# Patient Record
Sex: MALE | Race: WHITE | NOT HISPANIC OR LATINO | Employment: OTHER | RURAL
[De-identification: names, ages, dates, MRNs, and addresses within clinical notes are randomized per-mention and may not be internally consistent; named-entity substitution may affect disease eponyms.]

---

## 2017-08-14 ENCOUNTER — HISTORICAL (OUTPATIENT)
Dept: ADMINISTRATIVE | Facility: HOSPITAL | Age: 57
End: 2017-08-14

## 2017-08-17 LAB
LAB AP CLINICAL INFORMATION: NORMAL
LAB AP DIAGNOSIS - HISTORICAL: NORMAL
LAB AP GROSS PATHOLOGY - HISTORICAL: NORMAL
LAB AP SPECIMEN SUBMITTED - HISTORICAL: NORMAL

## 2020-03-17 ENCOUNTER — HISTORICAL (OUTPATIENT)
Dept: ADMINISTRATIVE | Facility: HOSPITAL | Age: 60
End: 2020-03-17

## 2020-03-19 LAB
REPORT: 25
REPORT: NORMAL

## 2020-03-20 LAB
REPORT: NORMAL

## 2020-04-02 ENCOUNTER — HISTORICAL (OUTPATIENT)
Dept: ADMINISTRATIVE | Facility: HOSPITAL | Age: 60
End: 2020-04-02

## 2020-04-02 LAB
BASOPHILS # BLD AUTO: 0.05 X10E3/UL (ref 0–0.2)
BASOPHILS NFR BLD AUTO: 0.6 % (ref 0–1)
EOSINOPHIL # BLD AUTO: 0.29 X10E3/UL (ref 0–0.5)
EOSINOPHIL NFR BLD AUTO: 3.6 % (ref 1–4)
ERYTHROCYTE [DISTWIDTH] IN BLOOD BY AUTOMATED COUNT: 11.9 % (ref 11.5–14.5)
HCT VFR BLD AUTO: 38 % (ref 40–54)
HGB BLD-MCNC: 12.7 G/DL (ref 13.5–18)
IMM GRANULOCYTES # BLD AUTO: 0.02 X10E3/UL (ref 0–0.04)
IMM GRANULOCYTES NFR BLD: 0.2 % (ref 0–0.4)
LYMPHOCYTES # BLD AUTO: 2.34 X10E3/UL (ref 1–4.8)
LYMPHOCYTES NFR BLD AUTO: 28.7 % (ref 27–41)
MCH RBC QN AUTO: 30.5 PG (ref 27–31)
MCHC RBC AUTO-ENTMCNC: 33.4 G/DL (ref 32–36)
MCV RBC AUTO: 91.1 FL (ref 80–96)
MONOCYTES # BLD AUTO: 0.48 X10E3/UL (ref 0–0.8)
MONOCYTES NFR BLD AUTO: 5.9 % (ref 2–6)
MPC BLD CALC-MCNC: 10.1 FL (ref 9.4–12.4)
NEUTROPHILS # BLD AUTO: 4.98 X10E3/UL (ref 1.8–7.7)
NEUTROPHILS NFR BLD AUTO: 61 % (ref 53–65)
NRBC # BLD AUTO: 0 X10E3/UL (ref 0–0)
NRBC, AUTO (.00): 0 /100 (ref 0–0)
PLATELET # BLD AUTO: 268 X10E3/UL (ref 150–400)
RBC # BLD AUTO: 4.17 X10E6/UL (ref 4.6–6.2)
WBC # BLD AUTO: 8.16 X10E3/UL (ref 4.5–11)

## 2020-04-03 ENCOUNTER — HISTORICAL (OUTPATIENT)
Dept: ADMINISTRATIVE | Facility: HOSPITAL | Age: 60
End: 2020-04-03

## 2020-04-03 LAB
BASOPHILS # BLD AUTO: 0.04 X10E3/UL (ref 0–0.2)
BASOPHILS NFR BLD AUTO: 0.6 % (ref 0–1)
BUN SERPL-MCNC: 18 MG/DL (ref 7–18)
CALCIUM SERPL-MCNC: 9.3 MG/DL (ref 8.5–10.1)
CHLORIDE SERPL-SCNC: 99 MMOL/L (ref 98–107)
CO2 SERPL-SCNC: 31 MMOL/L (ref 21–32)
CREAT SERPL-MCNC: 0.89 MG/DL (ref 0.7–1.3)
EOSINOPHIL # BLD AUTO: 0.26 X10E3/UL (ref 0–0.5)
EOSINOPHIL NFR BLD AUTO: 4.1 % (ref 1–4)
ERYTHROCYTE [DISTWIDTH] IN BLOOD BY AUTOMATED COUNT: 11.9 % (ref 11.5–14.5)
GLUCOSE SERPL-MCNC: 341 MG/DL (ref 74–106)
HCT VFR BLD AUTO: 37.3 % (ref 40–54)
HGB BLD-MCNC: 12.3 G/DL (ref 13.5–18)
IMM GRANULOCYTES # BLD AUTO: 0.02 X10E3/UL (ref 0–0.04)
IMM GRANULOCYTES NFR BLD: 0.3 % (ref 0–0.4)
LYMPHOCYTES # BLD AUTO: 2.03 X10E3/UL (ref 1–4.8)
LYMPHOCYTES NFR BLD AUTO: 32 % (ref 27–41)
MCH RBC QN AUTO: 30.5 PG (ref 27–31)
MCHC RBC AUTO-ENTMCNC: 33 G/DL (ref 32–36)
MCV RBC AUTO: 92.6 FL (ref 80–96)
MONOCYTES # BLD AUTO: 0.44 X10E3/UL (ref 0–0.8)
MONOCYTES NFR BLD AUTO: 6.9 % (ref 2–6)
MPC BLD CALC-MCNC: 10.4 FL (ref 9.4–12.4)
NEUTROPHILS # BLD AUTO: 3.56 X10E3/UL (ref 1.8–7.7)
NEUTROPHILS NFR BLD AUTO: 56.1 % (ref 53–65)
NRBC # BLD AUTO: 0 X10E3/UL (ref 0–0)
NRBC, AUTO (.00): 0 /100 (ref 0–0)
PLATELET # BLD AUTO: 280 X10E3/UL (ref 150–400)
POTASSIUM SERPL-SCNC: 4.5 MMOL/L (ref 3.5–5.1)
RBC # BLD AUTO: 4.03 X10E6/UL (ref 4.6–6.2)
SODIUM SERPL-SCNC: 136 MMOL/L (ref 136–145)
WBC # BLD AUTO: 6.35 X10E3/UL (ref 4.5–11)

## 2020-06-29 ENCOUNTER — HISTORICAL (OUTPATIENT)
Dept: ADMINISTRATIVE | Facility: HOSPITAL | Age: 60
End: 2020-06-29

## 2020-07-01 LAB
REPORT: NORMAL

## 2020-07-02 LAB
REPORT: NORMAL

## 2020-08-05 ENCOUNTER — HISTORICAL (OUTPATIENT)
Dept: ADMINISTRATIVE | Facility: HOSPITAL | Age: 60
End: 2020-08-05

## 2020-08-05 LAB
ALBUMIN SERPL BCP-MCNC: 3.4 G/DL (ref 3.5–5)
ALBUMIN/GLOB SERPL: 0.9 {RATIO}
ALP SERPL-CCNC: 88 U/L (ref 45–115)
ALT SERPL W P-5'-P-CCNC: 18 U/L (ref 16–61)
ANION GAP SERPL CALCULATED.3IONS-SCNC: 6 MMOL/L (ref 7–16)
AST SERPL W P-5'-P-CCNC: 10 U/L (ref 15–37)
BASOPHILS # BLD AUTO: 0.03 X10E3/UL (ref 0–0.2)
BASOPHILS NFR BLD AUTO: 0.6 % (ref 0–1)
BILIRUB SERPL-MCNC: 0.3 MG/DL (ref 0–1.2)
BUN SERPL-MCNC: 17 MG/DL (ref 7–18)
BUN/CREAT SERPL: 22
CALCIUM SERPL-MCNC: 8.7 MG/DL (ref 8.5–10.1)
CHLORIDE SERPL-SCNC: 108 MMOL/L (ref 98–107)
CHOLEST SERPL-MCNC: 186 MG/DL
CHOLEST/HDLC SERPL: 5.3 {RATIO}
CO2 SERPL-SCNC: 34 MMOL/L (ref 21–32)
CREAT SERPL-MCNC: 0.78 MG/DL (ref 0.7–1.3)
EOSINOPHIL # BLD AUTO: 0.27 X10E3/UL (ref 0–0.5)
EOSINOPHIL NFR BLD AUTO: 5.7 % (ref 1–4)
ERYTHROCYTE [DISTWIDTH] IN BLOOD BY AUTOMATED COUNT: 12.5 % (ref 11.5–14.5)
EST. AVERAGE GLUCOSE BLD GHB EST-MCNC: 230 MG/DL
GLOBULIN SER-MCNC: 3.8 G/DL (ref 2–4)
GLUCOSE SERPL-MCNC: 187 MG/DL (ref 74–106)
HBA1C MFR BLD HPLC: 9.5 %
HCT VFR BLD AUTO: 38.6 % (ref 40–54)
HDLC SERPL-MCNC: 35 MG/DL
HGB BLD-MCNC: 12.7 G/DL (ref 13.5–18)
IMM GRANULOCYTES # BLD AUTO: 0.02 X10E3/UL (ref 0–0.04)
IMM GRANULOCYTES NFR BLD: 0.4 % (ref 0–0.4)
LDLC SERPL CALC-MCNC: 127 MG/DL
LYMPHOCYTES # BLD AUTO: 1.52 X10E3/UL (ref 1–4.8)
LYMPHOCYTES NFR BLD AUTO: 31.9 % (ref 27–41)
MCH RBC QN AUTO: 30.3 PG (ref 27–31)
MCHC RBC AUTO-ENTMCNC: 32.9 G/DL (ref 32–36)
MCV RBC AUTO: 92.1 FL (ref 80–96)
MONOCYTES # BLD AUTO: 0.37 X10E3/UL (ref 0–0.8)
MONOCYTES NFR BLD AUTO: 7.8 % (ref 2–6)
MPC BLD CALC-MCNC: 10.6 FL (ref 9.4–12.4)
NEUTROPHILS # BLD AUTO: 2.56 X10E3/UL (ref 1.8–7.7)
NEUTROPHILS NFR BLD AUTO: 53.6 % (ref 53–65)
NRBC # BLD AUTO: 0 X10E3/UL (ref 0–0)
NRBC, AUTO (.00): 0 /100 (ref 0–0)
PLATELET # BLD AUTO: 198 X10E3/UL (ref 150–400)
POTASSIUM SERPL-SCNC: 5 MMOL/L (ref 3.5–5.1)
PROT SERPL-MCNC: 7.2 G/DL (ref 6.4–8.2)
RBC # BLD AUTO: 4.19 X10E6/UL (ref 4.6–6.2)
SODIUM SERPL-SCNC: 143 MMOL/L (ref 136–145)
TRIGL SERPL-MCNC: 121 MG/DL
WBC # BLD AUTO: 4.77 X10E3/UL (ref 4.5–11)

## 2020-09-14 ENCOUNTER — HISTORICAL (OUTPATIENT)
Dept: ADMINISTRATIVE | Facility: HOSPITAL | Age: 60
End: 2020-09-14

## 2020-10-12 ENCOUNTER — HISTORICAL (OUTPATIENT)
Dept: ADMINISTRATIVE | Facility: HOSPITAL | Age: 60
End: 2020-10-12

## 2020-10-14 LAB
REPORT: 38
REPORT: NORMAL

## 2020-10-18 LAB
REPORT: NORMAL

## 2020-12-03 ENCOUNTER — HISTORICAL (OUTPATIENT)
Dept: ADMINISTRATIVE | Facility: HOSPITAL | Age: 60
End: 2020-12-03

## 2020-12-06 LAB
REPORT: 25
REPORT: NORMAL

## 2020-12-09 LAB
REPORT: NORMAL

## 2020-12-16 ENCOUNTER — HISTORICAL (OUTPATIENT)
Dept: ADMINISTRATIVE | Facility: HOSPITAL | Age: 60
End: 2020-12-16

## 2020-12-17 ENCOUNTER — HISTORICAL (OUTPATIENT)
Dept: ADMINISTRATIVE | Facility: HOSPITAL | Age: 60
End: 2020-12-17

## 2020-12-17 LAB
ANION GAP SERPL CALCULATED.3IONS-SCNC: 7.2 MMOL/L (ref 7–16)
BASOPHILS # BLD AUTO: 0.06 X10E3/UL (ref 0–0.2)
BASOPHILS NFR BLD AUTO: 0.9 % (ref 0–1)
BUN SERPL-MCNC: 9 MG/DL (ref 7–18)
CALCIUM SERPL-MCNC: 8.8 MG/DL (ref 8.5–10.1)
CHLORIDE SERPL-SCNC: 105 MMOL/L (ref 98–107)
CK SERPL-CCNC: 44 U/L (ref 39–308)
CO2 SERPL-SCNC: 31 MMOL/L (ref 21–32)
CREAT SERPL-MCNC: 0.79 MG/DL (ref 0.7–1.3)
EOSINOPHIL # BLD AUTO: 0.13 X10E3/UL (ref 0–0.5)
EOSINOPHIL NFR BLD AUTO: 2 % (ref 1–4)
ERYTHROCYTE [DISTWIDTH] IN BLOOD BY AUTOMATED COUNT: 11.9 % (ref 11.5–14.5)
GLUCOSE SERPL-MCNC: 236 MG/DL (ref 74–106)
HCT VFR BLD AUTO: 39.4 % (ref 40–54)
HGB BLD-MCNC: 13.5 G/DL (ref 13.5–18)
IMM GRANULOCYTES # BLD AUTO: 0.02 X10E3/UL (ref 0–0.04)
IMM GRANULOCYTES NFR BLD: 0.3 % (ref 0–0.4)
LYMPHOCYTES # BLD AUTO: 1.9 X10E3/UL (ref 1–4.8)
LYMPHOCYTES NFR BLD AUTO: 28.9 % (ref 27–41)
MCH RBC QN AUTO: 30.7 PG (ref 27–31)
MCHC RBC AUTO-ENTMCNC: 34.3 G/DL (ref 32–36)
MCV RBC AUTO: 89.5 FL (ref 80–96)
MONOCYTES # BLD AUTO: 0.41 X10E3/UL (ref 0–0.8)
MONOCYTES NFR BLD AUTO: 6.2 % (ref 2–6)
MPC BLD CALC-MCNC: 10.2 FL (ref 9.4–12.4)
NEUTROPHILS # BLD AUTO: 4.06 X10E3/UL (ref 1.8–7.7)
NEUTROPHILS NFR BLD AUTO: 61.7 % (ref 53–65)
NRBC # BLD AUTO: 0 X10E3/UL (ref 0–0)
NRBC, AUTO (.00): 0 /100 (ref 0–0)
PLATELET # BLD AUTO: 244 X10E3/UL (ref 150–400)
POTASSIUM SERPL-SCNC: 4.2 MMOL/L (ref 3.5–5.1)
RBC # BLD AUTO: 4.4 X10E6/UL (ref 4.6–6.2)
SODIUM SERPL-SCNC: 139 MMOL/L (ref 136–145)
WBC # BLD AUTO: 6.58 X10E3/UL (ref 4.5–11)

## 2020-12-23 ENCOUNTER — HISTORICAL (OUTPATIENT)
Dept: ADMINISTRATIVE | Facility: HOSPITAL | Age: 60
End: 2020-12-23

## 2020-12-23 LAB
ANION GAP SERPL CALCULATED.3IONS-SCNC: 11 MMOL/L
BASOPHILS # BLD AUTO: 0.06 X10E3/UL (ref 0–0.2)
BASOPHILS NFR BLD AUTO: 0.7 % (ref 0–1)
BUN SERPL-MCNC: 15 MG/DL (ref 7–18)
CALCIUM SERPL-MCNC: 9.2 MG/DL (ref 8.5–10.1)
CHLORIDE SERPL-SCNC: 100 MMOL/L (ref 101–111)
CK SERPL-CCNC: 49 U/L (ref 26–308)
CO2 SERPL-SCNC: 34 MMOL/L (ref 21–32)
CREAT SERPL-MCNC: 0.8 MG/DL (ref 0.6–1.3)
EOSINOPHIL # BLD AUTO: 0.33 X10E3/UL (ref 0–0.5)
EOSINOPHIL NFR BLD AUTO: 3.8 % (ref 1–4)
ERYTHROCYTE [DISTWIDTH] IN BLOOD BY AUTOMATED COUNT: 12.1 % (ref 11.5–14.5)
GLUCOSE SERPL-MCNC: 150 MG/DL (ref 74–106)
HCT VFR BLD AUTO: 37.9 % (ref 40–54)
HGB BLD-MCNC: 12.8 G/DL (ref 13.5–18)
LYMPHOCYTES # BLD AUTO: 1.73 X10E3/UL (ref 1–4.8)
LYMPHOCYTES NFR BLD AUTO: 20 % (ref 27–41)
MCH RBC QN AUTO: 31.1 PG (ref 27–31)
MCHC RBC AUTO-ENTMCNC: 33.8 G/DL (ref 32–36)
MCV RBC AUTO: 92 FL (ref 80–96)
MONOCYTES # BLD AUTO: 0.69 X10E3/UL (ref 0–0.8)
MONOCYTES NFR BLD AUTO: 8 % (ref 2–6)
MPC BLD CALC-MCNC: 11.7 FL (ref 9.4–12.4)
NEUTROPHILS # BLD AUTO: 5.86 X10E3/UL (ref 1.8–7.7)
NEUTROPHILS NFR BLD AUTO: 67.5 % (ref 53–65)
PLATELET # BLD AUTO: 189 X10E3/UL (ref 150–400)
POTASSIUM SERPL-SCNC: 5.6 MMOL/L (ref 3.6–5)
RBC # BLD AUTO: 4.11 X10E6/UL (ref 4.6–6.2)
SODIUM SERPL-SCNC: 139 MMOL/L (ref 135–145)
WBC # BLD AUTO: 8.67 X10E3/UL (ref 4.5–11)

## 2020-12-29 ENCOUNTER — HISTORICAL (OUTPATIENT)
Dept: ADMINISTRATIVE | Facility: HOSPITAL | Age: 60
End: 2020-12-29

## 2020-12-29 LAB
ANION GAP SERPL CALCULATED.3IONS-SCNC: 9.2 MMOL/L (ref 7–16)
BASOPHILS # BLD AUTO: 0.06 X10E3/UL (ref 0–0.2)
BASOPHILS NFR BLD AUTO: 0.9 % (ref 0–1)
BUN SERPL-MCNC: 13 MG/DL (ref 7–18)
CALCIUM SERPL-MCNC: 9.1 MG/DL (ref 8.5–10.1)
CHLORIDE SERPL-SCNC: 104 MMOL/L (ref 98–107)
CK SERPL-CCNC: 45 U/L (ref 39–308)
CO2 SERPL-SCNC: 31 MMOL/L (ref 21–32)
CREAT SERPL-MCNC: 0.73 MG/DL (ref 0.7–1.3)
EOSINOPHIL # BLD AUTO: 0.28 X10E3/UL (ref 0–0.5)
EOSINOPHIL NFR BLD AUTO: 4.3 % (ref 1–4)
ERYTHROCYTE [DISTWIDTH] IN BLOOD BY AUTOMATED COUNT: 11.9 % (ref 11.5–14.5)
GLUCOSE SERPL-MCNC: 139 MG/DL (ref 74–106)
HCT VFR BLD AUTO: 37.7 % (ref 40–54)
HGB BLD-MCNC: 12.7 G/DL (ref 13.5–18)
IMM GRANULOCYTES # BLD AUTO: 0.02 X10E3/UL (ref 0–0.04)
IMM GRANULOCYTES NFR BLD: 0.3 % (ref 0–0.4)
LYMPHOCYTES # BLD AUTO: 1.91 X10E3/UL (ref 1–4.8)
LYMPHOCYTES NFR BLD AUTO: 29.1 % (ref 27–41)
MCH RBC QN AUTO: 30.2 PG (ref 27–31)
MCHC RBC AUTO-ENTMCNC: 33.7 G/DL (ref 32–36)
MCV RBC AUTO: 89.5 FL (ref 80–96)
MONOCYTES # BLD AUTO: 0.42 X10E3/UL (ref 0–0.8)
MONOCYTES NFR BLD AUTO: 6.4 % (ref 2–6)
MPC BLD CALC-MCNC: 10.6 FL (ref 9.4–12.4)
NEUTROPHILS # BLD AUTO: 3.88 X10E3/UL (ref 1.8–7.7)
NEUTROPHILS NFR BLD AUTO: 59 % (ref 53–65)
NRBC # BLD AUTO: 0 X10E3/UL (ref 0–0)
NRBC, AUTO (.00): 0 /100 (ref 0–0)
PLATELET # BLD AUTO: 249 X10E3/UL (ref 150–400)
POTASSIUM SERPL-SCNC: 4.2 MMOL/L (ref 3.5–5.1)
RBC # BLD AUTO: 4.21 X10E6/UL (ref 4.6–6.2)
SODIUM SERPL-SCNC: 140 MMOL/L (ref 136–145)
WBC # BLD AUTO: 6.57 X10E3/UL (ref 4.5–11)

## 2020-12-30 ENCOUNTER — HISTORICAL (OUTPATIENT)
Dept: ADMINISTRATIVE | Facility: HOSPITAL | Age: 60
End: 2020-12-30

## 2020-12-30 LAB
CRP SERPL-MCNC: 1.01 MG/DL (ref 0–0.8)
ERYTHROCYTE [SEDIMENTATION RATE] IN BLOOD BY WESTERGREN METHOD: 73 MM/HR (ref 0–20)
PREALB SERPL NEPH-MCNC: 21 MG/DL (ref 20–40)

## 2021-01-01 ENCOUNTER — HISTORICAL (OUTPATIENT)
Dept: ADMINISTRATIVE | Facility: HOSPITAL | Age: 61
End: 2021-01-01

## 2021-01-01 LAB
ANION GAP SERPL CALCULATED.3IONS-SCNC: 10 MMOL/L
APTT PPP: 28.5 SECONDS (ref 25.2–37.3)
BASOPHILS # BLD AUTO: 0.05 X10E3/UL (ref 0–0.2)
BASOPHILS NFR BLD AUTO: 0.6 % (ref 0–1)
BUN SERPL-MCNC: 15 MG/DL (ref 7–18)
CALCIUM SERPL-MCNC: 8.8 MG/DL (ref 8.5–10.1)
CHLORIDE SERPL-SCNC: 103 MMOL/L (ref 98–107)
CK MB SERPL-MCNC: 1.2 NG/ML (ref 1–3.6)
CK SERPL-CCNC: 43 U/L (ref 39–308)
CO2 SERPL-SCNC: 32 MMOL/L (ref 21–32)
CREAT SERPL-MCNC: 0.73 MG/DL (ref 0.7–1.3)
D DIMER PPP FEU-MCNC: 0.67 UG/ML (ref 0–0.47)
EOSINOPHIL # BLD AUTO: 0.36 X10E3/UL (ref 0–0.5)
EOSINOPHIL NFR BLD AUTO: 4.4 % (ref 1–4)
ERYTHROCYTE [DISTWIDTH] IN BLOOD BY AUTOMATED COUNT: 11.7 % (ref 11.5–14.5)
GLUCOSE SERPL-MCNC: 106 MG/DL (ref 70–105)
GLUCOSE SERPL-MCNC: 122 MG/DL (ref 74–106)
HCT VFR BLD AUTO: 35.4 % (ref 40–54)
HGB BLD-MCNC: 12.2 G/DL (ref 13.5–18)
IMM GRANULOCYTES # BLD AUTO: 0.04 X10E3/UL (ref 0–0.04)
IMM GRANULOCYTES NFR BLD: 0.5 % (ref 0–0.4)
INR BLD: 1.04 (ref 0–3.3)
LYMPHOCYTES # BLD AUTO: 2 X10E3/UL (ref 1–4.8)
LYMPHOCYTES NFR BLD AUTO: 24.5 % (ref 27–41)
MAGNESIUM SERPL-MCNC: 1.8 MG/DL (ref 1.7–2.3)
MCH RBC QN AUTO: 31.4 PG (ref 27–31)
MCHC RBC AUTO-ENTMCNC: 34.5 G/DL (ref 32–36)
MCV RBC AUTO: 91.2 FL (ref 80–96)
MONOCYTES # BLD AUTO: 0.57 X10E3/UL (ref 0–0.8)
MONOCYTES NFR BLD AUTO: 7 % (ref 2–6)
MPC BLD CALC-MCNC: 9 FL (ref 9.4–12.4)
MYOGLOBIN SERPL-MCNC: 33 NG/ML (ref 16–116)
NEUTROPHILS # BLD AUTO: 5.15 X10E3/UL (ref 1.8–7.7)
NEUTROPHILS NFR BLD AUTO: 63 % (ref 53–65)
NRBC # BLD AUTO: 0 X10E3/UL (ref 0–0)
NRBC, AUTO (.00): 0 /100 (ref 0–0)
PLATELET # BLD AUTO: 214 X10E3/UL (ref 150–400)
POTASSIUM SERPL-SCNC: 3.9 MMOL/L (ref 3.5–5.1)
PROTHROMBIN TIME: 13.1 SECONDS (ref 11.7–14.7)
RBC # BLD AUTO: 3.88 X10E6/UL (ref 4.6–6.2)
SODIUM SERPL-SCNC: 141 MMOL/L (ref 136–145)
TROPONIN I SERPL-MCNC: <0.017 NG/ML (ref 0–0.06)
WBC # BLD AUTO: 8.17 X10E3/UL (ref 4.5–11)

## 2021-01-05 ENCOUNTER — HISTORICAL (OUTPATIENT)
Dept: ADMINISTRATIVE | Facility: HOSPITAL | Age: 61
End: 2021-01-05

## 2021-01-06 ENCOUNTER — HISTORICAL (OUTPATIENT)
Dept: ADMINISTRATIVE | Facility: HOSPITAL | Age: 61
End: 2021-01-06

## 2021-01-06 LAB
ANION GAP SERPL CALCULATED.3IONS-SCNC: 9 MMOL/L (ref 7–16)
BASOPHILS # BLD AUTO: 0.05 X10E3/UL (ref 0–0.2)
BASOPHILS NFR BLD AUTO: 0.7 % (ref 0–1)
BUN SERPL-MCNC: 18 MG/DL (ref 7–18)
CALCIUM SERPL-MCNC: 8.7 MG/DL (ref 8.5–10.1)
CHLORIDE SERPL-SCNC: 102 MMOL/L (ref 98–107)
CK SERPL-CCNC: 39 U/L (ref 39–308)
CO2 SERPL-SCNC: 32 MMOL/L (ref 21–32)
CREAT SERPL-MCNC: 0.93 MG/DL (ref 0.7–1.3)
EOSINOPHIL # BLD AUTO: 0.3 X10E3/UL (ref 0–0.5)
EOSINOPHIL NFR BLD AUTO: 4.2 % (ref 1–4)
ERYTHROCYTE [DISTWIDTH] IN BLOOD BY AUTOMATED COUNT: 11.9 % (ref 11.5–14.5)
GLUCOSE SERPL-MCNC: 282 MG/DL (ref 74–106)
HCT VFR BLD AUTO: 36.8 % (ref 40–54)
HGB BLD-MCNC: 12.3 G/DL (ref 13.5–18)
IMM GRANULOCYTES # BLD AUTO: 0.02 X10E3/UL (ref 0–0.04)
IMM GRANULOCYTES NFR BLD: 0.3 % (ref 0–0.4)
LYMPHOCYTES # BLD AUTO: 2 X10E3/UL (ref 1–4.8)
LYMPHOCYTES NFR BLD AUTO: 28 % (ref 27–41)
MCH RBC QN AUTO: 30.1 PG (ref 27–31)
MCHC RBC AUTO-ENTMCNC: 33.4 G/DL (ref 32–36)
MCV RBC AUTO: 90.2 FL (ref 80–96)
MONOCYTES # BLD AUTO: 0.52 X10E3/UL (ref 0–0.8)
MONOCYTES NFR BLD AUTO: 7.3 % (ref 2–6)
MPC BLD CALC-MCNC: 10 FL (ref 9.4–12.4)
NEUTROPHILS # BLD AUTO: 4.25 X10E3/UL (ref 1.8–7.7)
NEUTROPHILS NFR BLD AUTO: 59.5 % (ref 53–65)
NRBC # BLD AUTO: 0 X10E3/UL (ref 0–0)
NRBC, AUTO (.00): 0 /100 (ref 0–0)
PLATELET # BLD AUTO: 255 X10E3/UL (ref 150–400)
POTASSIUM SERPL-SCNC: 4.6 MMOL/L (ref 3.5–5.1)
RBC # BLD AUTO: 4.08 X10E6/UL (ref 4.6–6.2)
SODIUM SERPL-SCNC: 138 MMOL/L (ref 136–145)
WBC # BLD AUTO: 7.14 X10E3/UL (ref 4.5–11)

## 2021-01-11 ENCOUNTER — HISTORICAL (OUTPATIENT)
Dept: ADMINISTRATIVE | Facility: HOSPITAL | Age: 61
End: 2021-01-11

## 2021-01-11 LAB
ALBUMIN SERPL BCP-MCNC: 3.8 G/DL (ref 3.5–5)
ALBUMIN/GLOB SERPL: 1 {RATIO}
ALP SERPL-CCNC: 102 U/L (ref 45–115)
ALT SERPL W P-5'-P-CCNC: 24 U/L (ref 16–61)
ANION GAP SERPL CALCULATED.3IONS-SCNC: 8 MMOL/L (ref 7–16)
AST SERPL W P-5'-P-CCNC: 16 U/L (ref 15–37)
BASOPHILS # BLD AUTO: 0.06 X10E3/UL (ref 0–0.2)
BASOPHILS NFR BLD AUTO: 0.8 % (ref 0–1)
BILIRUB SERPL-MCNC: 0.3 MG/DL (ref 0–1.2)
BUN SERPL-MCNC: 20 MG/DL (ref 7–18)
BUN/CREAT SERPL: 20
CALCIUM SERPL-MCNC: 9 MG/DL (ref 8.5–10.1)
CHLORIDE SERPL-SCNC: 104 MMOL/L (ref 98–107)
CK SERPL-CCNC: 44 U/L (ref 39–308)
CO2 SERPL-SCNC: 32 MMOL/L (ref 21–32)
CREAT SERPL-MCNC: 1 MG/DL (ref 0.7–1.3)
EOSINOPHIL # BLD AUTO: 0.31 X10E3/UL (ref 0–0.5)
EOSINOPHIL NFR BLD AUTO: 4.3 % (ref 1–4)
ERYTHROCYTE [DISTWIDTH] IN BLOOD BY AUTOMATED COUNT: 12.1 % (ref 11.5–14.5)
GLOBULIN SER-MCNC: 4 G/DL (ref 2–4)
GLUCOSE SERPL-MCNC: 152 MG/DL (ref 74–106)
HCT VFR BLD AUTO: 37.7 % (ref 40–54)
HGB BLD-MCNC: 12.5 G/DL (ref 13.5–18)
IMM GRANULOCYTES # BLD AUTO: 0.02 X10E3/UL (ref 0–0.04)
IMM GRANULOCYTES NFR BLD: 0.3 % (ref 0–0.4)
LYMPHOCYTES # BLD AUTO: 2.84 X10E3/UL (ref 1–4.8)
LYMPHOCYTES NFR BLD AUTO: 39.4 % (ref 27–41)
MCH RBC QN AUTO: 30.1 PG (ref 27–31)
MCHC RBC AUTO-ENTMCNC: 33.2 G/DL (ref 32–36)
MCV RBC AUTO: 90.8 FL (ref 80–96)
MONOCYTES # BLD AUTO: 0.44 X10E3/UL (ref 0–0.8)
MONOCYTES NFR BLD AUTO: 6.1 % (ref 2–6)
MPC BLD CALC-MCNC: 10.6 FL (ref 9.4–12.4)
NEUTROPHILS # BLD AUTO: 3.54 X10E3/UL (ref 1.8–7.7)
NEUTROPHILS NFR BLD AUTO: 49.1 % (ref 53–65)
NRBC # BLD AUTO: 0 X10E3/UL (ref 0–0)
NRBC, AUTO (.00): 0 /100 (ref 0–0)
PLATELET # BLD AUTO: 249 X10E3/UL (ref 150–400)
POTASSIUM SERPL-SCNC: 5.2 MMOL/L (ref 3.5–5.1)
PROT SERPL-MCNC: 7.8 G/DL (ref 6.4–8.2)
RBC # BLD AUTO: 4.15 X10E6/UL (ref 4.6–6.2)
SODIUM SERPL-SCNC: 139 MMOL/L (ref 136–145)
WBC # BLD AUTO: 7.21 X10E3/UL (ref 4.5–11)

## 2021-01-12 ENCOUNTER — HISTORICAL (OUTPATIENT)
Dept: ADMINISTRATIVE | Facility: HOSPITAL | Age: 61
End: 2021-01-12

## 2021-01-12 LAB
GLUCOSE SERPL-MCNC: 176 MG/DL (ref 70–105)
GLUCOSE SERPL-MCNC: 186 MG/DL (ref 70–105)

## 2021-01-13 ENCOUNTER — HISTORICAL (OUTPATIENT)
Dept: ADMINISTRATIVE | Facility: HOSPITAL | Age: 61
End: 2021-01-13

## 2021-01-15 ENCOUNTER — HISTORICAL (OUTPATIENT)
Dept: ADMINISTRATIVE | Facility: HOSPITAL | Age: 61
End: 2021-01-15

## 2021-01-18 ENCOUNTER — HISTORICAL (OUTPATIENT)
Dept: ADMINISTRATIVE | Facility: HOSPITAL | Age: 61
End: 2021-01-18

## 2021-01-18 LAB
ANION GAP SERPL CALCULATED.3IONS-SCNC: 8 MMOL/L (ref 7–16)
BASOPHILS # BLD AUTO: 0.06 X10E3/UL (ref 0–0.2)
BASOPHILS NFR BLD AUTO: 1 % (ref 0–1)
BUN SERPL-MCNC: 19 MG/DL (ref 7–18)
CALCIUM SERPL-MCNC: 8.8 MG/DL (ref 8.5–10.1)
CHLORIDE SERPL-SCNC: 105 MMOL/L (ref 98–107)
CK SERPL-CCNC: 39 U/L (ref 39–308)
CO2 SERPL-SCNC: 30 MMOL/L (ref 21–32)
CREAT SERPL-MCNC: 0.75 MG/DL (ref 0.7–1.3)
EOSINOPHIL # BLD AUTO: 0.35 X10E3/UL (ref 0–0.5)
EOSINOPHIL NFR BLD AUTO: 5.6 % (ref 1–4)
ERYTHROCYTE [DISTWIDTH] IN BLOOD BY AUTOMATED COUNT: 11.9 % (ref 11.5–14.5)
FERRITIN SERPL-MCNC: 50 NG/ML (ref 26–388)
GLUCOSE SERPL-MCNC: 245 MG/DL (ref 74–106)
HCT VFR BLD AUTO: 38.7 % (ref 40–54)
HGB BLD-MCNC: 12.9 G/DL (ref 13.5–18)
IMM GRANULOCYTES # BLD AUTO: 0.01 X10E3/UL (ref 0–0.04)
IMM GRANULOCYTES NFR BLD: 0.2 % (ref 0–0.4)
IRON SATN MFR SERPL: 21 % (ref 14–50)
IRON SERPL-MCNC: 72 UG/DL (ref 65–175)
LYMPHOCYTES # BLD AUTO: 1.93 X10E3/UL (ref 1–4.8)
LYMPHOCYTES NFR BLD AUTO: 31 % (ref 27–41)
MCH RBC QN AUTO: 29.9 PG (ref 27–31)
MCHC RBC AUTO-ENTMCNC: 33.3 G/DL (ref 32–36)
MCV RBC AUTO: 89.8 FL (ref 80–96)
MONOCYTES # BLD AUTO: 0.44 X10E3/UL (ref 0–0.8)
MONOCYTES NFR BLD AUTO: 7.1 % (ref 2–6)
MPC BLD CALC-MCNC: 10.8 FL (ref 9.4–12.4)
NEUTROPHILS # BLD AUTO: 3.44 X10E3/UL (ref 1.8–7.7)
NEUTROPHILS NFR BLD AUTO: 55.1 % (ref 53–65)
NRBC # BLD AUTO: 0 X10E3/UL (ref 0–0)
NRBC, AUTO (.00): 0 /100 (ref 0–0)
PLATELET # BLD AUTO: 160 X10E3/UL (ref 150–400)
POTASSIUM SERPL-SCNC: 4.3 MMOL/L (ref 3.5–5.1)
RBC # BLD AUTO: 4.31 X10E6/UL (ref 4.6–6.2)
SODIUM SERPL-SCNC: 139 MMOL/L (ref 136–145)
TIBC SERPL-MCNC: 343 UG/DL (ref 250–450)
WBC # BLD AUTO: 6.23 X10E3/UL (ref 4.5–11)

## 2021-01-19 ENCOUNTER — HISTORICAL (OUTPATIENT)
Dept: ADMINISTRATIVE | Facility: HOSPITAL | Age: 61
End: 2021-01-19

## 2021-01-19 LAB
ALBUMIN %, URINE ELECTROPHORESIS: 4.9 G/DL (ref 3.5–5.2)
ALBUMIN/GLOB SERPL: 2 {RATIO}
ALPHA1 GLOB SERPL ELPH-MCNC: 0.2 G/DL (ref 0.1–0.4)
ALPHA2 GLOB SERPL ELPH-MCNC: 0.8 G/DL (ref 0.4–1.3)
B-GLOBULIN SERPL ELPH-MCNC: 0.7 G/DL (ref 0.5–1.5)
GAMMA GLOB SERPL ELPH-MCNC: 1.1 G/DL (ref 0.5–1.8)
HBA1C MFR BLD: 7.6 % (ref 4–6)
PATH INTERP BLD-IMP: NORMAL
PATH INTERP CSF-IMP: NORMAL
PROT SERPL-MCNC: 7.6 G/DL (ref 6.4–8.2)

## 2021-01-21 ENCOUNTER — HISTORICAL (OUTPATIENT)
Dept: ADMINISTRATIVE | Facility: HOSPITAL | Age: 61
End: 2021-01-21

## 2021-01-22 ENCOUNTER — HISTORICAL (OUTPATIENT)
Dept: ADMINISTRATIVE | Facility: HOSPITAL | Age: 61
End: 2021-01-22

## 2021-01-25 ENCOUNTER — HISTORICAL (OUTPATIENT)
Dept: ADMINISTRATIVE | Facility: HOSPITAL | Age: 61
End: 2021-01-25

## 2021-01-25 LAB
ANION GAP SERPL CALCULATED.3IONS-SCNC: 6.5 MMOL/L (ref 7–16)
BASOPHILS # BLD AUTO: 0.05 X10E3/UL (ref 0–0.2)
BASOPHILS NFR BLD AUTO: 1 % (ref 0–1)
BUN SERPL-MCNC: 14 MG/DL (ref 7–18)
CALCIUM SERPL-MCNC: 8.7 MG/DL (ref 8.5–10.1)
CHLORIDE SERPL-SCNC: 100 MMOL/L (ref 98–107)
CK SERPL-CCNC: 50 U/L (ref 39–308)
CO2 SERPL-SCNC: 36 MMOL/L (ref 21–32)
CREAT SERPL-MCNC: 0.78 MG/DL (ref 0.7–1.3)
EOSINOPHIL # BLD AUTO: 0.31 X10E3/UL (ref 0–0.5)
EOSINOPHIL NFR BLD AUTO: 6.1 % (ref 1–4)
ERYTHROCYTE [DISTWIDTH] IN BLOOD BY AUTOMATED COUNT: 12.3 % (ref 11.5–14.5)
GLUCOSE SERPL-MCNC: 194 MG/DL (ref 74–106)
HCT VFR BLD AUTO: 35 % (ref 40–54)
HGB BLD-MCNC: 11.5 G/DL (ref 13.5–18)
IMM GRANULOCYTES # BLD AUTO: 0.01 X10E3/UL (ref 0–0.04)
IMM GRANULOCYTES NFR BLD: 0.2 % (ref 0–0.4)
LYMPHOCYTES # BLD AUTO: 1.39 X10E3/UL (ref 1–4.8)
LYMPHOCYTES NFR BLD AUTO: 27.3 % (ref 27–41)
MCH RBC QN AUTO: 30.1 PG (ref 27–31)
MCHC RBC AUTO-ENTMCNC: 32.9 G/DL (ref 32–36)
MCV RBC AUTO: 91.6 FL (ref 80–96)
MONOCYTES # BLD AUTO: 0.46 X10E3/UL (ref 0–0.8)
MONOCYTES NFR BLD AUTO: 9 % (ref 2–6)
MPC BLD CALC-MCNC: 10.5 FL (ref 9.4–12.4)
NEUTROPHILS # BLD AUTO: 2.87 X10E3/UL (ref 1.8–7.7)
NEUTROPHILS NFR BLD AUTO: 56.4 % (ref 53–65)
NRBC # BLD AUTO: 0 X10E3/UL (ref 0–0)
NRBC, AUTO (.00): 0 /100 (ref 0–0)
PLATELET # BLD AUTO: 157 X10E3/UL (ref 150–400)
POTASSIUM SERPL-SCNC: 4.5 MMOL/L (ref 3.5–5.1)
RBC # BLD AUTO: 3.82 X10E6/UL (ref 4.6–6.2)
SODIUM SERPL-SCNC: 138 MMOL/L (ref 136–145)
WBC # BLD AUTO: 5.09 X10E3/UL (ref 4.5–11)

## 2021-01-26 ENCOUNTER — HISTORICAL (OUTPATIENT)
Dept: ADMINISTRATIVE | Facility: HOSPITAL | Age: 61
End: 2021-01-26

## 2021-01-26 LAB
GLUCOSE SERPL-MCNC: 101 MG/DL (ref 70–105)
GLUCOSE SERPL-MCNC: 106 MG/DL (ref 70–105)
GLUCOSE SERPL-MCNC: 131 MG/DL (ref 70–105)
GLUCOSE SERPL-MCNC: 132 MG/DL (ref 70–105)
GLUCOSE SERPL-MCNC: 144 MG/DL (ref 70–105)
GLUCOSE SERPL-MCNC: 177 MG/DL (ref 70–105)
GLUCOSE SERPL-MCNC: 180 MG/DL (ref 70–105)
GLUCOSE SERPL-MCNC: 194 MG/DL (ref 70–105)
GLUCOSE SERPL-MCNC: 198 MG/DL (ref 70–105)
GLUCOSE SERPL-MCNC: 198 MG/DL (ref 70–105)
GLUCOSE SERPL-MCNC: 222 MG/DL (ref 70–105)
GLUCOSE SERPL-MCNC: 62 MG/DL (ref 70–105)
GLUCOSE SERPL-MCNC: 95 MG/DL (ref 70–105)
GLUCOSE SERPL-MCNC: 96 MG/DL (ref 70–105)

## 2021-01-27 ENCOUNTER — HISTORICAL (OUTPATIENT)
Dept: ADMINISTRATIVE | Facility: HOSPITAL | Age: 61
End: 2021-01-27

## 2021-01-28 ENCOUNTER — HISTORICAL (OUTPATIENT)
Dept: ADMINISTRATIVE | Facility: HOSPITAL | Age: 61
End: 2021-01-28

## 2021-01-28 LAB — GLUCOSE SERPL-MCNC: 187 MG/DL (ref 70–105)

## 2021-02-01 ENCOUNTER — HISTORICAL (OUTPATIENT)
Dept: ADMINISTRATIVE | Facility: HOSPITAL | Age: 61
End: 2021-02-01

## 2021-02-01 LAB — GLUCOSE SERPL-MCNC: 172 MG/DL (ref 70–105)

## 2021-02-02 ENCOUNTER — HISTORICAL (OUTPATIENT)
Dept: ADMINISTRATIVE | Facility: HOSPITAL | Age: 61
End: 2021-02-02

## 2021-02-02 LAB
GLUCOSE SERPL-MCNC: 220 MG/DL (ref 70–105)
GLUCOSE SERPL-MCNC: 258 MG/DL (ref 70–105)

## 2021-02-03 ENCOUNTER — HISTORICAL (OUTPATIENT)
Dept: ADMINISTRATIVE | Facility: HOSPITAL | Age: 61
End: 2021-02-03

## 2021-02-04 ENCOUNTER — HISTORICAL (OUTPATIENT)
Dept: ADMINISTRATIVE | Facility: HOSPITAL | Age: 61
End: 2021-02-04

## 2021-02-05 ENCOUNTER — HISTORICAL (OUTPATIENT)
Dept: ADMINISTRATIVE | Facility: HOSPITAL | Age: 61
End: 2021-02-05

## 2021-02-05 LAB
GLUCOSE SERPL-MCNC: 180 MG/DL (ref 70–105)
GLUCOSE SERPL-MCNC: 225 MG/DL (ref 70–105)
GLUCOSE SERPL-MCNC: 229 MG/DL (ref 70–105)

## 2021-02-08 ENCOUNTER — HISTORICAL (OUTPATIENT)
Dept: ADMINISTRATIVE | Facility: HOSPITAL | Age: 61
End: 2021-02-08

## 2021-02-09 ENCOUNTER — HISTORICAL (OUTPATIENT)
Dept: ADMINISTRATIVE | Facility: HOSPITAL | Age: 61
End: 2021-02-09

## 2021-02-11 ENCOUNTER — HISTORICAL (OUTPATIENT)
Dept: ADMINISTRATIVE | Facility: HOSPITAL | Age: 61
End: 2021-02-11

## 2021-02-15 LAB
GLUCOSE SERPL-MCNC: 244 MG/DL (ref 70–105)
GLUCOSE SERPL-MCNC: 261 MG/DL (ref 70–105)
GLUCOSE SERPL-MCNC: 288 MG/DL (ref 70–105)
GLUCOSE SERPL-MCNC: 298 MG/DL (ref 70–105)

## 2021-03-19 ENCOUNTER — OFFICE VISIT (OUTPATIENT)
Dept: WOUND CARE | Facility: HOSPITAL | Age: 61
End: 2021-03-19
Attending: NURSE PRACTITIONER
Payer: MEDICARE

## 2021-03-19 VITALS
DIASTOLIC BLOOD PRESSURE: 88 MMHG | HEIGHT: 68 IN | WEIGHT: 171 LBS | HEART RATE: 74 BPM | TEMPERATURE: 98 F | BODY MASS INDEX: 25.91 KG/M2 | SYSTOLIC BLOOD PRESSURE: 192 MMHG | RESPIRATION RATE: 18 BRPM

## 2021-03-19 DIAGNOSIS — E08.621 DIABETIC ULCER OF MIDFOOT ASSOCIATED WITH DIABETES MELLITUS DUE TO UNDERLYING CONDITION, WITH NECROSIS OF MUSCLE, UNSPECIFIED LATERALITY: Primary | ICD-10-CM

## 2021-03-19 DIAGNOSIS — L97.403 DIABETIC ULCER OF MIDFOOT ASSOCIATED WITH DIABETES MELLITUS DUE TO UNDERLYING CONDITION, WITH NECROSIS OF MUSCLE, UNSPECIFIED LATERALITY: Primary | ICD-10-CM

## 2021-03-19 DIAGNOSIS — L97.513 ULCER OF RIGHT FOOT WITH NECROSIS OF MUSCLE: ICD-10-CM

## 2021-03-19 DIAGNOSIS — E78.2 MIXED HYPERLIPIDEMIA: ICD-10-CM

## 2021-03-19 DIAGNOSIS — I10 BENIGN HYPERTENSION: ICD-10-CM

## 2021-03-19 DIAGNOSIS — F17.218 CIGARETTE NICOTINE DEPENDENCE WITH OTHER NICOTINE-INDUCED DISORDER: ICD-10-CM

## 2021-03-19 PROCEDURE — 11042 DBRDMT SUBQ TIS 1ST 20SQCM/<: CPT

## 2021-03-19 PROCEDURE — 11042 DBRDMT SUBQ TIS 1ST 20SQCM/<: CPT | Mod: ,,, | Performed by: NURSE PRACTITIONER

## 2021-03-19 PROCEDURE — 11042 PR DEBRIDEMENT, SKIN, SUB-Q TISSUE,=<20 SQ CM: ICD-10-PCS | Mod: ,,, | Performed by: NURSE PRACTITIONER

## 2021-03-23 PROBLEM — L97.403 DIABETIC ULCER OF MIDFOOT ASSOCIATED WITH DIABETES MELLITUS DUE TO UNDERLYING CONDITION, WITH NECROSIS OF MUSCLE: Status: ACTIVE | Noted: 2021-03-23

## 2021-03-23 PROBLEM — I10 BENIGN HYPERTENSION: Status: ACTIVE | Noted: 2021-03-23

## 2021-03-23 PROBLEM — L97.513 ULCER OF RIGHT FOOT WITH NECROSIS OF MUSCLE: Status: ACTIVE | Noted: 2021-03-23

## 2021-03-23 PROBLEM — E78.2 MIXED HYPERLIPIDEMIA: Status: ACTIVE | Noted: 2021-03-23

## 2021-03-23 PROBLEM — F17.210 DEPENDENCE ON NICOTINE FROM CIGARETTES: Status: ACTIVE | Noted: 2021-03-23

## 2021-03-23 PROBLEM — E08.621 DIABETIC ULCER OF MIDFOOT ASSOCIATED WITH DIABETES MELLITUS DUE TO UNDERLYING CONDITION, WITH NECROSIS OF MUSCLE: Status: ACTIVE | Noted: 2021-03-23

## 2021-04-02 ENCOUNTER — OFFICE VISIT (OUTPATIENT)
Dept: WOUND CARE | Facility: HOSPITAL | Age: 61
End: 2021-04-02
Attending: NURSE PRACTITIONER
Payer: MEDICARE

## 2021-04-02 VITALS
HEIGHT: 68 IN | WEIGHT: 171 LBS | DIASTOLIC BLOOD PRESSURE: 93 MMHG | TEMPERATURE: 98 F | HEART RATE: 73 BPM | BODY MASS INDEX: 25.91 KG/M2 | SYSTOLIC BLOOD PRESSURE: 198 MMHG

## 2021-04-02 DIAGNOSIS — I10 BENIGN HYPERTENSION: ICD-10-CM

## 2021-04-02 DIAGNOSIS — E78.2 MIXED HYPERLIPIDEMIA: ICD-10-CM

## 2021-04-02 DIAGNOSIS — E08.621 DIABETIC ULCER OF MIDFOOT ASSOCIATED WITH DIABETES MELLITUS DUE TO UNDERLYING CONDITION, WITH NECROSIS OF MUSCLE, UNSPECIFIED LATERALITY: ICD-10-CM

## 2021-04-02 DIAGNOSIS — F17.218 CIGARETTE NICOTINE DEPENDENCE WITH OTHER NICOTINE-INDUCED DISORDER: ICD-10-CM

## 2021-04-02 DIAGNOSIS — L97.513 ULCER OF RIGHT FOOT WITH NECROSIS OF MUSCLE: Primary | ICD-10-CM

## 2021-04-02 DIAGNOSIS — L97.403 DIABETIC ULCER OF MIDFOOT ASSOCIATED WITH DIABETES MELLITUS DUE TO UNDERLYING CONDITION, WITH NECROSIS OF MUSCLE, UNSPECIFIED LATERALITY: ICD-10-CM

## 2021-04-02 PROCEDURE — 11042 DBRDMT SUBQ TIS 1ST 20SQCM/<: CPT

## 2021-04-02 PROCEDURE — 11042 DBRDMT SUBQ TIS 1ST 20SQCM/<: CPT | Mod: ,,, | Performed by: NURSE PRACTITIONER

## 2021-04-02 PROCEDURE — 11042 PR DEBRIDEMENT, SKIN, SUB-Q TISSUE,=<20 SQ CM: ICD-10-PCS | Mod: ,,, | Performed by: NURSE PRACTITIONER

## 2021-04-02 RX ORDER — AMLODIPINE BESYLATE 10 MG/1
10 TABLET ORAL DAILY
COMMUNITY
End: 2021-04-27 | Stop reason: SDUPTHER

## 2021-04-02 RX ORDER — GABAPENTIN 300 MG/1
900 CAPSULE ORAL 4 TIMES DAILY
COMMUNITY
Start: 2020-11-23 | End: 2021-04-27 | Stop reason: SDUPTHER

## 2021-04-02 RX ORDER — OXYCODONE AND ACETAMINOPHEN 7.5; 325 MG/1; MG/1
1 TABLET ORAL
COMMUNITY
Start: 2021-03-18 | End: 2021-06-16

## 2021-04-02 RX ORDER — ESCITALOPRAM OXALATE 10 MG/1
1 TABLET ORAL 2 TIMES DAILY
COMMUNITY
Start: 2021-01-04 | End: 2021-04-27 | Stop reason: SDUPTHER

## 2021-04-02 RX ORDER — CLOPIDOGREL BISULFATE 75 MG/1
75 TABLET ORAL DAILY
COMMUNITY
End: 2021-04-27 | Stop reason: SDUPTHER

## 2021-04-02 RX ORDER — TIOTROPIUM BROMIDE INHALATION SPRAY 3.12 UG/1
1 SPRAY, METERED RESPIRATORY (INHALATION) DAILY
COMMUNITY
Start: 2021-01-18 | End: 2021-04-27 | Stop reason: SDUPTHER

## 2021-04-02 RX ORDER — ATORVASTATIN CALCIUM 40 MG/1
40 TABLET, FILM COATED ORAL NIGHTLY
COMMUNITY
Start: 2021-01-04 | End: 2021-04-27 | Stop reason: SDUPTHER

## 2021-04-02 RX ORDER — PANTOPRAZOLE SODIUM 40 MG/1
40 TABLET, DELAYED RELEASE ORAL DAILY
COMMUNITY
End: 2021-04-27 | Stop reason: SDUPTHER

## 2021-04-02 RX ORDER — NAPROXEN SODIUM 220 MG/1
81 TABLET, FILM COATED ORAL DAILY
COMMUNITY

## 2021-04-02 RX ORDER — TRAZODONE HYDROCHLORIDE 50 MG/1
50 TABLET ORAL DAILY
COMMUNITY
End: 2021-04-27 | Stop reason: SDUPTHER

## 2021-04-02 RX ORDER — METOPROLOL SUCCINATE 25 MG/1
25 TABLET, EXTENDED RELEASE ORAL DAILY
COMMUNITY
End: 2021-04-27 | Stop reason: SDUPTHER

## 2021-04-02 RX ORDER — HYDROXYZINE PAMOATE 25 MG/1
1 CAPSULE ORAL DAILY
COMMUNITY
Start: 2021-02-25 | End: 2021-04-27 | Stop reason: SDUPTHER

## 2021-04-02 RX ORDER — ALBUTEROL SULFATE 90 UG/1
2 AEROSOL, METERED RESPIRATORY (INHALATION) 2 TIMES DAILY PRN
COMMUNITY
Start: 2021-02-05 | End: 2021-09-21 | Stop reason: SDUPTHER

## 2021-04-02 RX ORDER — LISINOPRIL 10 MG/1
10 TABLET ORAL DAILY
COMMUNITY
Start: 2021-03-01 | End: 2021-04-27 | Stop reason: SDUPTHER

## 2021-04-27 ENCOUNTER — OFFICE VISIT (OUTPATIENT)
Dept: FAMILY MEDICINE | Facility: CLINIC | Age: 61
End: 2021-04-27
Payer: MEDICARE

## 2021-04-27 VITALS
DIASTOLIC BLOOD PRESSURE: 62 MMHG | WEIGHT: 172 LBS | SYSTOLIC BLOOD PRESSURE: 120 MMHG | TEMPERATURE: 98 F | HEART RATE: 72 BPM | RESPIRATION RATE: 18 BRPM | OXYGEN SATURATION: 96 % | HEIGHT: 68 IN | BODY MASS INDEX: 26.07 KG/M2

## 2021-04-27 DIAGNOSIS — J44.9 CHRONIC OBSTRUCTIVE PULMONARY DISEASE, UNSPECIFIED COPD TYPE: ICD-10-CM

## 2021-04-27 DIAGNOSIS — F32.89 OTHER DEPRESSION: ICD-10-CM

## 2021-04-27 DIAGNOSIS — L97.509 CONTROLLED TYPE 2 DIABETES MELLITUS WITH FOOT ULCER, WITHOUT LONG-TERM CURRENT USE OF INSULIN: ICD-10-CM

## 2021-04-27 DIAGNOSIS — E11.621 CONTROLLED TYPE 2 DIABETES MELLITUS WITH FOOT ULCER, WITHOUT LONG-TERM CURRENT USE OF INSULIN: ICD-10-CM

## 2021-04-27 DIAGNOSIS — E78.2 MIXED HYPERLIPIDEMIA: ICD-10-CM

## 2021-04-27 DIAGNOSIS — I10 ESSENTIAL HYPERTENSION: Primary | ICD-10-CM

## 2021-04-27 DIAGNOSIS — G47.00 INSOMNIA, UNSPECIFIED TYPE: ICD-10-CM

## 2021-04-27 PROCEDURE — 93005 ELECTROCARDIOGRAM TRACING: CPT | Mod: ,,, | Performed by: NURSE PRACTITIONER

## 2021-04-27 PROCEDURE — 93010 ELECTROCARDIOGRAM REPORT: CPT | Mod: ,,, | Performed by: NURSE PRACTITIONER

## 2021-04-27 PROCEDURE — 99213 PR OFFICE/OUTPT VISIT, EST, LEVL III, 20-29 MIN: ICD-10-PCS | Mod: ,,, | Performed by: NURSE PRACTITIONER

## 2021-04-27 PROCEDURE — 93005 PR ELECTROCARDIOGRAM, TRACING: ICD-10-PCS | Mod: ,,, | Performed by: NURSE PRACTITIONER

## 2021-04-27 PROCEDURE — 99213 OFFICE O/P EST LOW 20 MIN: CPT | Mod: ,,, | Performed by: NURSE PRACTITIONER

## 2021-04-27 PROCEDURE — 93010 PR ELECTROCARDIOGRAM REPORT: ICD-10-PCS | Mod: ,,, | Performed by: NURSE PRACTITIONER

## 2021-04-27 RX ORDER — PANTOPRAZOLE SODIUM 40 MG/1
40 TABLET, DELAYED RELEASE ORAL DAILY
Qty: 90 TABLET | Refills: 1 | Status: SHIPPED | OUTPATIENT
Start: 2021-04-27 | End: 2023-01-20 | Stop reason: SDUPTHER

## 2021-04-27 RX ORDER — HYDROXYZINE PAMOATE 25 MG/1
25 CAPSULE ORAL DAILY
Qty: 90 CAPSULE | Refills: 1 | Status: SHIPPED | OUTPATIENT
Start: 2021-04-27 | End: 2021-06-17 | Stop reason: SDUPTHER

## 2021-04-27 RX ORDER — GABAPENTIN 300 MG/1
900 CAPSULE ORAL 4 TIMES DAILY
Qty: 120 CAPSULE | Refills: 3 | Status: SHIPPED | OUTPATIENT
Start: 2021-04-27 | End: 2021-06-16 | Stop reason: SDUPTHER

## 2021-04-27 RX ORDER — ESCITALOPRAM OXALATE 10 MG/1
10 TABLET ORAL 2 TIMES DAILY
Qty: 180 TABLET | Refills: 1 | Status: SHIPPED | OUTPATIENT
Start: 2021-04-27 | End: 2022-07-06 | Stop reason: SDUPTHER

## 2021-04-27 RX ORDER — METOPROLOL SUCCINATE 25 MG/1
25 TABLET, EXTENDED RELEASE ORAL DAILY
Qty: 90 TABLET | Refills: 1 | Status: SHIPPED | OUTPATIENT
Start: 2021-04-27 | End: 2023-01-20 | Stop reason: SDUPTHER

## 2021-04-27 RX ORDER — LISINOPRIL 10 MG/1
10 TABLET ORAL DAILY
Qty: 90 TABLET | Refills: 1 | Status: SHIPPED | OUTPATIENT
Start: 2021-04-27 | End: 2023-01-20 | Stop reason: SDUPTHER

## 2021-04-27 RX ORDER — CLOPIDOGREL BISULFATE 75 MG/1
75 TABLET ORAL DAILY
Qty: 90 TABLET | Refills: 1 | Status: SHIPPED | OUTPATIENT
Start: 2021-04-27 | End: 2023-01-20 | Stop reason: SDUPTHER

## 2021-04-27 RX ORDER — TIOTROPIUM BROMIDE INHALATION SPRAY 3.12 UG/1
1 SPRAY, METERED RESPIRATORY (INHALATION) DAILY
Qty: 4 G | Refills: 5 | Status: SHIPPED | OUTPATIENT
Start: 2021-04-27 | End: 2023-01-20 | Stop reason: SDUPTHER

## 2021-04-27 RX ORDER — TRAZODONE HYDROCHLORIDE 50 MG/1
100 TABLET ORAL DAILY
Qty: 30 TABLET | Refills: 2 | Status: SHIPPED | OUTPATIENT
Start: 2021-04-27 | End: 2021-06-17 | Stop reason: SDUPTHER

## 2021-04-27 RX ORDER — AMLODIPINE BESYLATE 10 MG/1
10 TABLET ORAL DAILY
Qty: 90 TABLET | Refills: 1 | Status: SHIPPED | OUTPATIENT
Start: 2021-04-27 | End: 2023-01-20 | Stop reason: SDUPTHER

## 2021-04-27 RX ORDER — ATORVASTATIN CALCIUM 40 MG/1
40 TABLET, FILM COATED ORAL NIGHTLY
Qty: 90 TABLET | Refills: 1 | Status: SHIPPED | OUTPATIENT
Start: 2021-04-27 | End: 2022-07-21

## 2021-04-27 RX ORDER — NITROGLYCERIN 0.4 MG/1
0.4 TABLET SUBLINGUAL EVERY 5 MIN PRN
Qty: 30 TABLET | Refills: 2 | Status: SHIPPED | OUTPATIENT
Start: 2021-04-27 | End: 2023-01-20 | Stop reason: SDUPTHER

## 2021-05-10 ENCOUNTER — OFFICE VISIT (OUTPATIENT)
Dept: WOUND CARE | Facility: HOSPITAL | Age: 61
End: 2021-05-10
Attending: NURSE PRACTITIONER
Payer: MEDICARE

## 2021-05-10 VITALS
SYSTOLIC BLOOD PRESSURE: 176 MMHG | WEIGHT: 172 LBS | DIASTOLIC BLOOD PRESSURE: 76 MMHG | HEART RATE: 75 BPM | HEIGHT: 68 IN | BODY MASS INDEX: 26.07 KG/M2 | RESPIRATION RATE: 18 BRPM

## 2021-05-10 DIAGNOSIS — L97.513 ULCER OF RIGHT FOOT WITH NECROSIS OF MUSCLE: Primary | ICD-10-CM

## 2021-05-10 DIAGNOSIS — I10 BENIGN HYPERTENSION: ICD-10-CM

## 2021-05-10 DIAGNOSIS — E78.2 MIXED HYPERLIPIDEMIA: ICD-10-CM

## 2021-05-10 PROCEDURE — 11042 PR DEBRIDEMENT, SKIN, SUB-Q TISSUE,=<20 SQ CM: ICD-10-PCS | Mod: ,,, | Performed by: NURSE PRACTITIONER

## 2021-05-10 PROCEDURE — 11042 DBRDMT SUBQ TIS 1ST 20SQCM/<: CPT | Mod: ,,, | Performed by: NURSE PRACTITIONER

## 2021-05-10 RX ORDER — GENTAMICIN SULFATE 1 MG/G
1 CREAM TOPICAL DAILY
COMMUNITY
Start: 2021-04-13 | End: 2021-12-16

## 2021-05-10 RX ORDER — DICLOFENAC SODIUM 10 MG/G
1 GEL TOPICAL 4 TIMES DAILY PRN
COMMUNITY
Start: 2021-04-13 | End: 2022-04-06 | Stop reason: SDUPTHER

## 2021-05-26 ENCOUNTER — OFFICE VISIT (OUTPATIENT)
Dept: CARDIOLOGY | Facility: CLINIC | Age: 61
End: 2021-05-26
Payer: MEDICARE

## 2021-05-26 VITALS
HEART RATE: 64 BPM | DIASTOLIC BLOOD PRESSURE: 68 MMHG | SYSTOLIC BLOOD PRESSURE: 124 MMHG | HEIGHT: 68 IN | OXYGEN SATURATION: 95 % | WEIGHT: 165.25 LBS | BODY MASS INDEX: 25.05 KG/M2

## 2021-05-26 DIAGNOSIS — I10 HYPERTENSION, ESSENTIAL: Primary | ICD-10-CM

## 2021-05-26 PROCEDURE — 99215 OFFICE O/P EST HI 40 MIN: CPT | Mod: PBBFAC,25 | Performed by: NURSE PRACTITIONER

## 2021-05-26 PROCEDURE — 93010 EKG 12-LEAD: ICD-10-PCS | Mod: S$PBB,,, | Performed by: INTERNAL MEDICINE

## 2021-05-26 PROCEDURE — 99213 OFFICE O/P EST LOW 20 MIN: CPT | Mod: S$PBB,,, | Performed by: NURSE PRACTITIONER

## 2021-05-26 PROCEDURE — 93005 ELECTROCARDIOGRAM TRACING: CPT | Mod: PBBFAC | Performed by: INTERNAL MEDICINE

## 2021-05-26 PROCEDURE — 93010 ELECTROCARDIOGRAM REPORT: CPT | Mod: S$PBB,,, | Performed by: INTERNAL MEDICINE

## 2021-05-26 PROCEDURE — 99213 PR OFFICE/OUTPT VISIT, EST, LEVL III, 20-29 MIN: ICD-10-PCS | Mod: S$PBB,,, | Performed by: NURSE PRACTITIONER

## 2021-05-28 ENCOUNTER — OFFICE VISIT (OUTPATIENT)
Dept: WOUND CARE | Facility: CLINIC | Age: 61
End: 2021-05-28
Attending: NURSE PRACTITIONER
Payer: MEDICARE

## 2021-05-28 DIAGNOSIS — L97.403 DIABETIC ULCER OF MIDFOOT ASSOCIATED WITH DIABETES MELLITUS DUE TO UNDERLYING CONDITION, WITH NECROSIS OF MUSCLE, UNSPECIFIED LATERALITY: ICD-10-CM

## 2021-05-28 DIAGNOSIS — L97.513 ULCER OF RIGHT FOOT WITH NECROSIS OF MUSCLE: Primary | ICD-10-CM

## 2021-05-28 DIAGNOSIS — E78.2 MIXED HYPERLIPIDEMIA: ICD-10-CM

## 2021-05-28 DIAGNOSIS — I10 BENIGN HYPERTENSION: ICD-10-CM

## 2021-05-28 DIAGNOSIS — E08.621 DIABETIC ULCER OF MIDFOOT ASSOCIATED WITH DIABETES MELLITUS DUE TO UNDERLYING CONDITION, WITH NECROSIS OF MUSCLE, UNSPECIFIED LATERALITY: ICD-10-CM

## 2021-05-28 PROCEDURE — 99213 OFFICE O/P EST LOW 20 MIN: CPT | Mod: PBBFAC,25 | Performed by: NURSE PRACTITIONER

## 2021-05-28 PROCEDURE — 11042 DBRDMT SUBQ TIS 1ST 20SQCM/<: CPT | Mod: PBBFAC | Performed by: NURSE PRACTITIONER

## 2021-05-28 PROCEDURE — 11042 PR DEBRIDEMENT, SKIN, SUB-Q TISSUE,=<20 SQ CM: ICD-10-PCS | Mod: S$PBB,,, | Performed by: NURSE PRACTITIONER

## 2021-05-28 PROCEDURE — 11042 DBRDMT SUBQ TIS 1ST 20SQCM/<: CPT | Mod: S$PBB,,, | Performed by: NURSE PRACTITIONER

## 2021-05-28 RX ORDER — SULFAMETHOXAZOLE AND TRIMETHOPRIM 800; 160 MG/1; MG/1
1 TABLET ORAL 2 TIMES DAILY
COMMUNITY
Start: 2021-05-11 | End: 2021-12-16

## 2021-05-28 RX ORDER — AMOXICILLIN 500 MG/1
500 CAPSULE ORAL 2 TIMES DAILY
COMMUNITY
Start: 2021-05-11 | End: 2021-12-16

## 2021-06-16 ENCOUNTER — OFFICE VISIT (OUTPATIENT)
Dept: PAIN MEDICINE | Facility: CLINIC | Age: 61
End: 2021-06-16
Payer: MEDICARE

## 2021-06-16 VITALS
BODY MASS INDEX: 23.64 KG/M2 | HEART RATE: 81 BPM | DIASTOLIC BLOOD PRESSURE: 62 MMHG | HEIGHT: 68 IN | WEIGHT: 156 LBS | SYSTOLIC BLOOD PRESSURE: 108 MMHG | RESPIRATION RATE: 18 BRPM

## 2021-06-16 DIAGNOSIS — Z79.899 ENCOUNTER FOR LONG-TERM (CURRENT) USE OF OTHER MEDICATIONS: ICD-10-CM

## 2021-06-16 DIAGNOSIS — I73.9 PERIPHERAL VASCULAR DISEASE, UNSPECIFIED: Chronic | ICD-10-CM

## 2021-06-16 DIAGNOSIS — G62.9 NEUROPATHY: Chronic | ICD-10-CM

## 2021-06-16 DIAGNOSIS — L97.513 ULCER OF RIGHT FOOT WITH NECROSIS OF MUSCLE: Primary | ICD-10-CM

## 2021-06-16 LAB
CTP QC/QA: YES
POC (AMP) AMPHETAMINE: NEGATIVE
POC (BAR) BARBITURATES: NEGATIVE
POC (BUP) BUPRENORPHINE: NEGATIVE
POC (BZO) BENZODIAZEPINES: NEGATIVE
POC (COC) COCAINE: NEGATIVE
POC (MDMA) METHYLENEDIOXYMETHAMPHETAMINE 3,4: NEGATIVE
POC (MET) METHAMPHETAMINE: NEGATIVE
POC (MOP) OPIATES: ABNORMAL
POC (MTD) METHADONE: NEGATIVE
POC (OXY) OXYCODONE: ABNORMAL
POC (PCP) PHENCYCLIDINE: NEGATIVE
POC (TCA) TRICYCLIC ANTIDEPRESSANTS: NEGATIVE
POC TEMPERATURE (URINE): 90
POC THC: NEGATIVE

## 2021-06-16 PROCEDURE — 80305 DRUG TEST PRSMV DIR OPT OBS: CPT | Mod: PBBFAC | Performed by: PHYSICIAN ASSISTANT

## 2021-06-16 PROCEDURE — G0481 PR DRUG TEST DEF 8-14 CLASSES: ICD-10-PCS | Mod: ,,, | Performed by: CLINICAL MEDICAL LABORATORY

## 2021-06-16 PROCEDURE — 99214 PR OFFICE/OUTPT VISIT, EST, LEVL IV, 30-39 MIN: ICD-10-PCS | Mod: S$PBB,,, | Performed by: PHYSICIAN ASSISTANT

## 2021-06-16 PROCEDURE — 99215 OFFICE O/P EST HI 40 MIN: CPT | Mod: PBBFAC | Performed by: PHYSICIAN ASSISTANT

## 2021-06-16 PROCEDURE — 99214 OFFICE O/P EST MOD 30 MIN: CPT | Mod: S$PBB,,, | Performed by: PHYSICIAN ASSISTANT

## 2021-06-16 PROCEDURE — G0481 DRUG TEST DEF 8-14 CLASSES: HCPCS | Mod: ,,, | Performed by: CLINICAL MEDICAL LABORATORY

## 2021-06-16 RX ORDER — HYDROCODONE BITARTRATE AND ACETAMINOPHEN 10; 325 MG/1; MG/1
1 TABLET ORAL EVERY 8 HOURS
Qty: 90 TABLET | Refills: 0 | Status: SHIPPED | OUTPATIENT
Start: 2021-07-16 | End: 2021-08-15

## 2021-06-16 RX ORDER — HYDROCODONE BITARTRATE AND ACETAMINOPHEN 10; 325 MG/1; MG/1
1 TABLET ORAL EVERY 8 HOURS
Qty: 90 TABLET | Refills: 0 | Status: SHIPPED | OUTPATIENT
Start: 2021-08-15 | End: 2021-09-16 | Stop reason: SDUPTHER

## 2021-06-16 RX ORDER — HYDROCODONE BITARTRATE AND ACETAMINOPHEN 10; 325 MG/1; MG/1
1 TABLET ORAL EVERY 8 HOURS
Qty: 90 TABLET | Refills: 0 | Status: SHIPPED | OUTPATIENT
Start: 2021-06-16 | End: 2021-07-16

## 2021-06-16 RX ORDER — GABAPENTIN 300 MG/1
900 CAPSULE ORAL 4 TIMES DAILY
Qty: 360 CAPSULE | Refills: 2 | Status: SHIPPED | OUTPATIENT
Start: 2021-06-16 | End: 2021-09-16 | Stop reason: SDUPTHER

## 2021-06-17 ENCOUNTER — APPOINTMENT (OUTPATIENT)
Dept: RADIOLOGY | Facility: CLINIC | Age: 61
End: 2021-06-17
Attending: FAMILY MEDICINE
Payer: MEDICARE

## 2021-06-17 ENCOUNTER — TELEPHONE (OUTPATIENT)
Dept: FAMILY MEDICINE | Facility: CLINIC | Age: 61
End: 2021-06-17

## 2021-06-17 ENCOUNTER — OFFICE VISIT (OUTPATIENT)
Dept: FAMILY MEDICINE | Facility: CLINIC | Age: 61
End: 2021-06-17
Payer: MEDICARE

## 2021-06-17 VITALS
SYSTOLIC BLOOD PRESSURE: 114 MMHG | BODY MASS INDEX: 23.64 KG/M2 | HEART RATE: 77 BPM | OXYGEN SATURATION: 97 % | RESPIRATION RATE: 18 BRPM | TEMPERATURE: 98 F | HEIGHT: 68 IN | WEIGHT: 156 LBS | DIASTOLIC BLOOD PRESSURE: 68 MMHG

## 2021-06-17 DIAGNOSIS — J44.1 COPD EXACERBATION: Primary | ICD-10-CM

## 2021-06-17 DIAGNOSIS — G47.00 INSOMNIA, UNSPECIFIED TYPE: ICD-10-CM

## 2021-06-17 DIAGNOSIS — J44.1 COPD EXACERBATION: ICD-10-CM

## 2021-06-17 DIAGNOSIS — E11.9 DIABETES MELLITUS WITHOUT COMPLICATION: ICD-10-CM

## 2021-06-17 DIAGNOSIS — F41.9 ANXIETY: ICD-10-CM

## 2021-06-17 PROCEDURE — 99213 PR OFFICE/OUTPT VISIT, EST, LEVL III, 20-29 MIN: ICD-10-PCS | Mod: 25,,, | Performed by: FAMILY MEDICINE

## 2021-06-17 PROCEDURE — 96372 THER/PROPH/DIAG INJ SC/IM: CPT | Mod: ,,, | Performed by: FAMILY MEDICINE

## 2021-06-17 PROCEDURE — 96372 PR INJECTION,THERAP/PROPH/DIAG2ST, IM OR SUBCUT: ICD-10-PCS | Mod: ,,, | Performed by: FAMILY MEDICINE

## 2021-06-17 PROCEDURE — 71046 X-RAY EXAM CHEST 2 VIEWS: CPT | Mod: TC,RHCUB | Performed by: FAMILY MEDICINE

## 2021-06-17 PROCEDURE — 99213 OFFICE O/P EST LOW 20 MIN: CPT | Mod: 25,,, | Performed by: FAMILY MEDICINE

## 2021-06-17 PROCEDURE — 71046 X-RAY EXAM CHEST 2 VIEWS: CPT | Mod: 26,,, | Performed by: RADIOLOGY

## 2021-06-17 PROCEDURE — 71046 XR CHEST PA AND LATERAL: ICD-10-PCS | Mod: 26,,, | Performed by: RADIOLOGY

## 2021-06-17 RX ORDER — HYDROXYZINE PAMOATE 25 MG/1
25 CAPSULE ORAL DAILY
Qty: 90 CAPSULE | Refills: 1 | Status: SHIPPED | OUTPATIENT
Start: 2021-06-17 | End: 2021-10-20 | Stop reason: SDUPTHER

## 2021-06-17 RX ORDER — TRAZODONE HYDROCHLORIDE 50 MG/1
100 TABLET ORAL DAILY
Qty: 30 TABLET | Refills: 2 | Status: SHIPPED | OUTPATIENT
Start: 2021-06-17 | End: 2022-04-13

## 2021-06-17 RX ORDER — CEFTRIAXONE 1 G/1
1 INJECTION, POWDER, FOR SOLUTION INTRAMUSCULAR; INTRAVENOUS
Status: COMPLETED | OUTPATIENT
Start: 2021-06-17 | End: 2021-06-17

## 2021-06-17 RX ORDER — ALBUTEROL SULFATE 0.83 MG/ML
2.5 SOLUTION RESPIRATORY (INHALATION) 3 TIMES DAILY
COMMUNITY
End: 2022-04-18

## 2021-06-17 RX ORDER — AZITHROMYCIN 250 MG/1
TABLET, FILM COATED ORAL
Qty: 6 TABLET | Refills: 0 | Status: SHIPPED | OUTPATIENT
Start: 2021-06-17 | End: 2021-06-22

## 2021-06-17 RX ADMIN — CEFTRIAXONE 1 G: 1 INJECTION, POWDER, FOR SOLUTION INTRAMUSCULAR; INTRAVENOUS at 02:06

## 2021-06-18 LAB
6-ACETYLMORPHINE, URINE (RUSH): NEGATIVE 10 NG/ML
7-AMINOCLONAZEPAM, URINE (RUSH): NEGATIVE 25 NG/ML
A-HYDROXYALPRAZOLAM, URINE (RUSH): NEGATIVE 25 NG/ML
ACETYL FENTANYL, URINE (RUSH): NEGATIVE 2.5 NG/ML
ACETYL NORFENTANYL OXALATE, URINE (RUSH): NEGATIVE 5 NG/ML
AMPHET UR QL SCN: NEGATIVE 100 NG/ML
BENZOYLECGONINE, URINE (RUSH): NEGATIVE 100 NG/ML
BUPRENORPHINE UR QL SCN: NEGATIVE 25 NG/ML
CODEINE, URINE (RUSH): NEGATIVE 25 NG/ML
CREAT UR-MCNC: 281 MG/DL (ref 39–259)
EDDP, URINE (RUSH): NEGATIVE 25 NG/ML
FENTANYL, URINE (RUSH): NEGATIVE 2.5 NG/ML
HYDROCODONE, URINE (RUSH): 55.7 25 NG/ML
HYDROMORPHONE, URINE (RUSH): 73 25 NG/ML
LORAZEPAM, URINE (RUSH): NEGATIVE 25 NG/ML
METHADONE UR QL SCN: NEGATIVE 25 NG/ML
METHAMPHET UR QL SCN: NEGATIVE 100 NG/ML
MORPHINE, URINE (RUSH): NEGATIVE 25 NG/ML
NORBUPRENORPHINE, URINE (RUSH): NEGATIVE 25 NG/ML
NORDIAZEPAM, URINE (RUSH): NEGATIVE 25 NG/ML
NORFENTANYL OXALATE, URINE (RUSH): NEGATIVE 5 NG/ML
NORHYDROCODONE, URINE (RUSH): 97 50 NG/ML
NOROXYCODONE HCL, URINE (RUSH): >500 50 NG/ML
OXAZEPAM, URINE (RUSH): NEGATIVE 25 NG/ML
OXYCODONE UR QL SCN: >250 25 NG/ML
OXYMORPHONE, URINE (RUSH): >250 25 NG/ML
PH UR STRIP: 5.5 PH UNITS
SP GR UR STRIP: >=1.03
TAPENTADOL, URINE (RUSH): NEGATIVE 25 NG/ML
TEMAZEPAM, URINE (RUSH): NEGATIVE 25 NG/ML
THC-COOH, URINE (RUSH): NEGATIVE 25 NG/ML
TRAMADOL, URINE (RUSH): 676.6 100 NG/ML

## 2021-07-12 PROCEDURE — 94640 AIRWAY INHALATION TREATMENT: CPT | Mod: ,,, | Performed by: FAMILY MEDICINE

## 2021-07-12 PROCEDURE — 94640 PR INHAL RX, AIRWAY OBST/DX SPUTUM INDUCT: ICD-10-PCS | Mod: ,,, | Performed by: FAMILY MEDICINE

## 2021-07-12 RX ORDER — ALBUTEROL SULFATE 0.83 MG/ML
2.5 SOLUTION RESPIRATORY (INHALATION)
Status: COMPLETED | OUTPATIENT
Start: 2021-07-12 | End: 2021-07-12

## 2021-07-12 RX ADMIN — ALBUTEROL SULFATE 2.5 MG: 0.83 SOLUTION RESPIRATORY (INHALATION) at 11:07

## 2021-07-15 ENCOUNTER — OFFICE VISIT (OUTPATIENT)
Dept: WOUND CARE | Facility: CLINIC | Age: 61
End: 2021-07-15
Attending: SURGERY
Payer: MEDICARE

## 2021-07-15 VITALS — RESPIRATION RATE: 20 BRPM | SYSTOLIC BLOOD PRESSURE: 171 MMHG | HEART RATE: 68 BPM | DIASTOLIC BLOOD PRESSURE: 65 MMHG

## 2021-07-15 DIAGNOSIS — E78.2 MIXED HYPERLIPIDEMIA: ICD-10-CM

## 2021-07-15 DIAGNOSIS — L97.513 ULCER OF RIGHT FOOT WITH NECROSIS OF MUSCLE: Primary | ICD-10-CM

## 2021-07-15 DIAGNOSIS — I10 BENIGN HYPERTENSION: ICD-10-CM

## 2021-07-15 DIAGNOSIS — E08.621 DIABETIC ULCER OF MIDFOOT ASSOCIATED WITH DIABETES MELLITUS DUE TO UNDERLYING CONDITION, WITH NECROSIS OF MUSCLE, UNSPECIFIED LATERALITY: ICD-10-CM

## 2021-07-15 DIAGNOSIS — F17.218 CIGARETTE NICOTINE DEPENDENCE WITH OTHER NICOTINE-INDUCED DISORDER: ICD-10-CM

## 2021-07-15 DIAGNOSIS — L97.403 DIABETIC ULCER OF MIDFOOT ASSOCIATED WITH DIABETES MELLITUS DUE TO UNDERLYING CONDITION, WITH NECROSIS OF MUSCLE, UNSPECIFIED LATERALITY: ICD-10-CM

## 2021-07-15 PROCEDURE — 11043 DBRDMT MUSC&/FSCA 1ST 20/<: CPT | Mod: S$PBB,,, | Performed by: SURGERY

## 2021-07-15 PROCEDURE — 99215 OFFICE O/P EST HI 40 MIN: CPT | Mod: PBBFAC,25 | Performed by: SURGERY

## 2021-07-15 PROCEDURE — 11043 DBRDMT MUSC&/FSCA 1ST 20/<: CPT | Mod: PBBFAC | Performed by: SURGERY

## 2021-07-15 PROCEDURE — 11043 PR DEBRIDEMENT, SKIN, SUB-Q TISSUE,MUSCLE,=<20 SQ CM: ICD-10-PCS | Mod: S$PBB,,, | Performed by: SURGERY

## 2021-08-14 ENCOUNTER — HOSPITAL ENCOUNTER (EMERGENCY)
Facility: HOSPITAL | Age: 61
Discharge: HOME | End: 2021-08-15
Attending: EMERGENCY MEDICINE
Payer: MEDICARE

## 2021-08-14 VITALS
HEART RATE: 63 BPM | BODY MASS INDEX: 24.71 KG/M2 | WEIGHT: 163 LBS | TEMPERATURE: 98 F | RESPIRATION RATE: 16 BRPM | DIASTOLIC BLOOD PRESSURE: 68 MMHG | OXYGEN SATURATION: 97 % | HEIGHT: 68 IN | SYSTOLIC BLOOD PRESSURE: 143 MMHG

## 2021-08-14 DIAGNOSIS — L08.9 DIABETIC FOOT INFECTION: ICD-10-CM

## 2021-08-14 DIAGNOSIS — S82.899A ANKLE FRACTURE: ICD-10-CM

## 2021-08-14 DIAGNOSIS — E08.621: ICD-10-CM

## 2021-08-14 DIAGNOSIS — F17.210 DEPENDENCE ON NICOTINE FROM CIGARETTES: ICD-10-CM

## 2021-08-14 DIAGNOSIS — F41.9 ANXIETY: ICD-10-CM

## 2021-08-14 DIAGNOSIS — I73.9 PERIPHERAL VASCULAR DISEASE, UNSPECIFIED: Chronic | ICD-10-CM

## 2021-08-14 DIAGNOSIS — I10 BENIGN HYPERTENSION: ICD-10-CM

## 2021-08-14 DIAGNOSIS — G47.00 INSOMNIA: ICD-10-CM

## 2021-08-14 DIAGNOSIS — E08.621: Primary | ICD-10-CM

## 2021-08-14 DIAGNOSIS — E78.2 MIXED HYPERLIPIDEMIA: ICD-10-CM

## 2021-08-14 DIAGNOSIS — E11.628 DIABETIC FOOT INFECTION: ICD-10-CM

## 2021-08-14 DIAGNOSIS — E11.9 DIABETES MELLITUS WITHOUT COMPLICATION: ICD-10-CM

## 2021-08-14 DIAGNOSIS — L97.513 ULCER OF RIGHT FOOT WITH NECROSIS OF MUSCLE: ICD-10-CM

## 2021-08-14 DIAGNOSIS — L97.408: Primary | ICD-10-CM

## 2021-08-14 DIAGNOSIS — L97.403: ICD-10-CM

## 2021-08-14 DIAGNOSIS — G62.9 NEUROPATHY: Chronic | ICD-10-CM

## 2021-08-14 LAB
ALBUMIN SERPL BCP-MCNC: 3.2 G/DL (ref 3.5–5)
ALBUMIN/GLOB SERPL: 0.7 {RATIO}
ALP SERPL-CCNC: 125 U/L (ref 45–115)
ALT SERPL W P-5'-P-CCNC: 17 U/L (ref 16–61)
ANION GAP SERPL CALCULATED.3IONS-SCNC: 10 MMOL/L (ref 7–16)
AST SERPL W P-5'-P-CCNC: 7 U/L (ref 15–37)
BASOPHILS # BLD AUTO: 0.04 K/UL (ref 0–0.2)
BASOPHILS NFR BLD AUTO: 0.4 % (ref 0–1)
BILIRUB SERPL-MCNC: 0.2 MG/DL (ref 0–1.2)
BUN SERPL-MCNC: 17 MG/DL (ref 7–18)
BUN/CREAT SERPL: 15 (ref 6–20)
CALCIUM SERPL-MCNC: 8.7 MG/DL (ref 8.5–10.1)
CHLORIDE SERPL-SCNC: 105 MMOL/L (ref 98–107)
CO2 SERPL-SCNC: 32 MMOL/L (ref 21–32)
CREAT SERPL-MCNC: 1.16 MG/DL (ref 0.7–1.3)
DIFFERENTIAL METHOD BLD: ABNORMAL
EOSINOPHIL # BLD AUTO: 0.29 K/UL (ref 0–0.5)
EOSINOPHIL NFR BLD AUTO: 3.2 % (ref 1–4)
ERYTHROCYTE [DISTWIDTH] IN BLOOD BY AUTOMATED COUNT: 11.5 % (ref 11.5–14.5)
GLOBULIN SER-MCNC: 4.4 G/DL (ref 2–4)
GLUCOSE SERPL-MCNC: 311 MG/DL (ref 74–106)
HCT VFR BLD AUTO: 37 % (ref 40–54)
HGB BLD-MCNC: 13.1 G/DL (ref 13.5–18)
IMM GRANULOCYTES # BLD AUTO: 0.04 K/UL (ref 0–0.04)
IMM GRANULOCYTES NFR BLD: 0.4 % (ref 0–0.4)
LYMPHOCYTES # BLD AUTO: 2.95 K/UL (ref 1–4.8)
LYMPHOCYTES NFR BLD AUTO: 32.9 % (ref 27–41)
MCH RBC QN AUTO: 31.3 PG (ref 27–31)
MCHC RBC AUTO-ENTMCNC: 35.4 G/DL (ref 32–36)
MCV RBC AUTO: 88.5 FL (ref 80–96)
MONOCYTES # BLD AUTO: 0.6 K/UL (ref 0–0.8)
MONOCYTES NFR BLD AUTO: 6.7 % (ref 2–6)
MPC BLD CALC-MCNC: 9.4 FL (ref 9.4–12.4)
NEUTROPHILS # BLD AUTO: 5.04 K/UL (ref 1.8–7.7)
NEUTROPHILS NFR BLD AUTO: 56.4 % (ref 53–65)
NRBC # BLD AUTO: 0 X10E3/UL
NRBC, AUTO (.00): 0 %
PLATELET # BLD AUTO: 268 K/UL (ref 150–400)
POTASSIUM SERPL-SCNC: 4 MMOL/L (ref 3.5–5.1)
PROT SERPL-MCNC: 7.6 G/DL (ref 6.4–8.2)
RBC # BLD AUTO: 4.18 M/UL (ref 4.6–6.2)
SODIUM SERPL-SCNC: 143 MMOL/L (ref 136–145)
WBC # BLD AUTO: 8.96 K/UL (ref 4.5–11)

## 2021-08-14 PROCEDURE — 36415 COLL VENOUS BLD VENIPUNCTURE: CPT | Performed by: EMERGENCY MEDICINE

## 2021-08-14 PROCEDURE — 99285 EMERGENCY DEPT VISIT HI MDM: CPT | Mod: 25

## 2021-08-14 PROCEDURE — 99283 EMERGENCY DEPT VISIT LOW MDM: CPT | Mod: ,,, | Performed by: EMERGENCY MEDICINE

## 2021-08-14 PROCEDURE — 63600175 PHARM REV CODE 636 W HCPCS: Performed by: EMERGENCY MEDICINE

## 2021-08-14 PROCEDURE — 25000003 PHARM REV CODE 250: Performed by: EMERGENCY MEDICINE

## 2021-08-14 PROCEDURE — 96365 THER/PROPH/DIAG IV INF INIT: CPT

## 2021-08-14 PROCEDURE — 85025 COMPLETE CBC W/AUTO DIFF WBC: CPT | Performed by: EMERGENCY MEDICINE

## 2021-08-14 PROCEDURE — 99283 PR EMERGENCY DEPT VISIT,LEVEL III: ICD-10-PCS | Mod: ,,, | Performed by: EMERGENCY MEDICINE

## 2021-08-14 PROCEDURE — 80053 COMPREHEN METABOLIC PANEL: CPT | Performed by: EMERGENCY MEDICINE

## 2021-08-14 PROCEDURE — 87040 BLOOD CULTURE FOR BACTERIA: CPT | Performed by: EMERGENCY MEDICINE

## 2021-08-14 RX ORDER — SODIUM CHLORIDE 9 MG/ML
INJECTION, SOLUTION INTRAVENOUS
Status: DISCONTINUED | OUTPATIENT
Start: 2021-08-14 | End: 2021-08-14

## 2021-08-14 RX ORDER — PROPOFOL 10 MG/ML
2 INJECTION, EMULSION INTRAVENOUS
Status: DISCONTINUED | OUTPATIENT
Start: 2021-08-14 | End: 2021-08-14

## 2021-08-14 RX ORDER — AMOXICILLIN AND CLAVULANATE POTASSIUM 875; 125 MG/1; MG/1
1 TABLET, FILM COATED ORAL 2 TIMES DAILY
Qty: 20 TABLET | Refills: 0 | Status: SHIPPED | OUTPATIENT
Start: 2021-08-14 | End: 2021-08-24

## 2021-08-14 RX ADMIN — AMPICILLIN SODIUM AND SULBACTAM SODIUM 3 G: 2; 1 INJECTION, POWDER, FOR SOLUTION INTRAMUSCULAR; INTRAVENOUS at 10:08

## 2021-08-16 ENCOUNTER — CLINICAL SUPPORT (OUTPATIENT)
Dept: WOUND CARE | Facility: CLINIC | Age: 61
End: 2021-08-16
Payer: MEDICARE

## 2021-08-16 VITALS — SYSTOLIC BLOOD PRESSURE: 155 MMHG | RESPIRATION RATE: 20 BRPM | DIASTOLIC BLOOD PRESSURE: 77 MMHG | HEART RATE: 83 BPM

## 2021-08-16 DIAGNOSIS — L97.513 ULCER OF RIGHT FOOT WITH NECROSIS OF MUSCLE: Primary | ICD-10-CM

## 2021-08-16 DIAGNOSIS — I10 BENIGN HYPERTENSION: ICD-10-CM

## 2021-08-16 DIAGNOSIS — E78.2 MIXED HYPERLIPIDEMIA: ICD-10-CM

## 2021-08-16 DIAGNOSIS — L97.403 DIABETIC ULCER OF MIDFOOT ASSOCIATED WITH DIABETES MELLITUS DUE TO UNDERLYING CONDITION, WITH NECROSIS OF MUSCLE, UNSPECIFIED LATERALITY: ICD-10-CM

## 2021-08-16 DIAGNOSIS — F17.218 CIGARETTE NICOTINE DEPENDENCE WITH OTHER NICOTINE-INDUCED DISORDER: ICD-10-CM

## 2021-08-16 DIAGNOSIS — E08.621 DIABETIC ULCER OF MIDFOOT ASSOCIATED WITH DIABETES MELLITUS DUE TO UNDERLYING CONDITION, WITH NECROSIS OF MUSCLE, UNSPECIFIED LATERALITY: ICD-10-CM

## 2021-08-16 PROCEDURE — 11042 DBRDMT SUBQ TIS 1ST 20SQCM/<: CPT | Mod: S$PBB,,, | Performed by: FAMILY MEDICINE

## 2021-08-16 PROCEDURE — 99215 OFFICE O/P EST HI 40 MIN: CPT | Mod: PBBFAC | Performed by: FAMILY MEDICINE

## 2021-08-16 PROCEDURE — 11042 PR DEBRIDEMENT, SKIN, SUB-Q TISSUE,=<20 SQ CM: ICD-10-PCS | Mod: S$PBB,,, | Performed by: FAMILY MEDICINE

## 2021-08-16 PROCEDURE — 11042 DBRDMT SUBQ TIS 1ST 20SQCM/<: CPT | Mod: PBBFAC | Performed by: FAMILY MEDICINE

## 2021-08-21 LAB
BACTERIA BLD CULT: NORMAL
BACTERIA BLD CULT: NORMAL

## 2021-08-23 ENCOUNTER — CLINICAL SUPPORT (OUTPATIENT)
Dept: WOUND CARE | Facility: CLINIC | Age: 61
End: 2021-08-23
Payer: MEDICARE

## 2021-08-23 VITALS — DIASTOLIC BLOOD PRESSURE: 71 MMHG | SYSTOLIC BLOOD PRESSURE: 135 MMHG | HEART RATE: 73 BPM | RESPIRATION RATE: 20 BRPM

## 2021-08-23 DIAGNOSIS — L97.403 DIABETIC ULCER OF MIDFOOT ASSOCIATED WITH DIABETES MELLITUS DUE TO UNDERLYING CONDITION, WITH NECROSIS OF MUSCLE, UNSPECIFIED LATERALITY: ICD-10-CM

## 2021-08-23 DIAGNOSIS — I10 BENIGN HYPERTENSION: ICD-10-CM

## 2021-08-23 DIAGNOSIS — E78.2 MIXED HYPERLIPIDEMIA: ICD-10-CM

## 2021-08-23 DIAGNOSIS — F17.218 CIGARETTE NICOTINE DEPENDENCE WITH OTHER NICOTINE-INDUCED DISORDER: ICD-10-CM

## 2021-08-23 DIAGNOSIS — L97.513 ULCER OF RIGHT FOOT WITH NECROSIS OF MUSCLE: Primary | ICD-10-CM

## 2021-08-23 DIAGNOSIS — E08.621 DIABETIC ULCER OF MIDFOOT ASSOCIATED WITH DIABETES MELLITUS DUE TO UNDERLYING CONDITION, WITH NECROSIS OF MUSCLE, UNSPECIFIED LATERALITY: ICD-10-CM

## 2021-08-23 PROCEDURE — 99214 OFFICE O/P EST MOD 30 MIN: CPT | Mod: PBBFAC

## 2021-09-02 RX ORDER — KETOROLAC TROMETHAMINE 5 MG/ML
1 SOLUTION OPHTHALMIC 4 TIMES DAILY
COMMUNITY
Start: 2021-08-09 | End: 2022-03-03 | Stop reason: ALTCHOICE

## 2021-09-02 RX ORDER — CIPROFLOXACIN 750 MG/1
750 TABLET, FILM COATED ORAL 2 TIMES DAILY
COMMUNITY
Start: 2021-08-16 | End: 2021-12-16

## 2021-09-02 RX ORDER — PREDNISOLONE ACETATE 10 MG/ML
1 SUSPENSION/ DROPS OPHTHALMIC
COMMUNITY
Start: 2021-08-06 | End: 2021-12-16

## 2021-09-02 RX ORDER — MOXIFLOXACIN 5 MG/ML
1 SOLUTION/ DROPS OPHTHALMIC
COMMUNITY
Start: 2021-08-20 | End: 2022-02-24 | Stop reason: ALTCHOICE

## 2021-09-02 RX ORDER — CLINDAMYCIN HYDROCHLORIDE 300 MG/1
1 CAPSULE ORAL 2 TIMES DAILY
COMMUNITY
Start: 2021-08-16 | End: 2021-12-16

## 2021-09-02 RX ORDER — SILVER SULFADIAZINE 10 G/1000G
1 CREAM TOPICAL DAILY
COMMUNITY
Start: 2021-08-16 | End: 2022-03-03 | Stop reason: ALTCHOICE

## 2021-09-02 RX ORDER — CEPHALEXIN 500 MG/1
1 CAPSULE ORAL 2 TIMES DAILY
COMMUNITY
Start: 2021-08-20 | End: 2021-12-16

## 2021-09-03 ENCOUNTER — CLINICAL SUPPORT (OUTPATIENT)
Dept: WOUND CARE | Facility: CLINIC | Age: 61
End: 2021-09-03
Attending: SURGERY
Payer: MEDICARE

## 2021-09-03 VITALS — HEART RATE: 66 BPM | RESPIRATION RATE: 20 BRPM | SYSTOLIC BLOOD PRESSURE: 166 MMHG | DIASTOLIC BLOOD PRESSURE: 71 MMHG

## 2021-09-03 DIAGNOSIS — E78.2 MIXED HYPERLIPIDEMIA: ICD-10-CM

## 2021-09-03 DIAGNOSIS — E08.621 DIABETIC ULCER OF MIDFOOT ASSOCIATED WITH DIABETES MELLITUS DUE TO UNDERLYING CONDITION, WITH NECROSIS OF MUSCLE, UNSPECIFIED LATERALITY: ICD-10-CM

## 2021-09-03 DIAGNOSIS — I10 BENIGN HYPERTENSION: ICD-10-CM

## 2021-09-03 DIAGNOSIS — F17.218 CIGARETTE NICOTINE DEPENDENCE WITH OTHER NICOTINE-INDUCED DISORDER: ICD-10-CM

## 2021-09-03 DIAGNOSIS — L97.513 ULCER OF RIGHT FOOT WITH NECROSIS OF MUSCLE: Primary | ICD-10-CM

## 2021-09-03 DIAGNOSIS — L97.403 DIABETIC ULCER OF MIDFOOT ASSOCIATED WITH DIABETES MELLITUS DUE TO UNDERLYING CONDITION, WITH NECROSIS OF MUSCLE, UNSPECIFIED LATERALITY: ICD-10-CM

## 2021-09-03 PROCEDURE — 99214 PR OFFICE/OUTPT VISIT, EST, LEVL IV, 30-39 MIN: ICD-10-PCS | Mod: S$PBB,,, | Performed by: SURGERY

## 2021-09-03 PROCEDURE — 99215 OFFICE O/P EST HI 40 MIN: CPT | Mod: PBBFAC | Performed by: SURGERY

## 2021-09-03 PROCEDURE — 99214 OFFICE O/P EST MOD 30 MIN: CPT | Mod: S$PBB,,, | Performed by: SURGERY

## 2021-09-14 ENCOUNTER — OFFICE VISIT (OUTPATIENT)
Dept: FAMILY MEDICINE | Facility: CLINIC | Age: 61
End: 2021-09-14
Payer: MEDICARE

## 2021-09-14 VITALS
BODY MASS INDEX: 24.55 KG/M2 | HEART RATE: 97 BPM | SYSTOLIC BLOOD PRESSURE: 120 MMHG | TEMPERATURE: 98 F | WEIGHT: 162 LBS | RESPIRATION RATE: 18 BRPM | HEIGHT: 68 IN | DIASTOLIC BLOOD PRESSURE: 68 MMHG | OXYGEN SATURATION: 68 %

## 2021-09-14 DIAGNOSIS — I10 HYPERTENSION, UNSPECIFIED TYPE: ICD-10-CM

## 2021-09-14 DIAGNOSIS — E11.9 DIABETES MELLITUS WITHOUT COMPLICATION: ICD-10-CM

## 2021-09-14 DIAGNOSIS — Z23 NEED FOR IMMUNIZATION AGAINST INFLUENZA: Primary | ICD-10-CM

## 2021-09-14 PROCEDURE — G0008 ADMIN INFLUENZA VIRUS VAC: HCPCS | Mod: ,,, | Performed by: FAMILY MEDICINE

## 2021-09-14 PROCEDURE — 99212 OFFICE O/P EST SF 10 MIN: CPT | Mod: ,,, | Performed by: FAMILY MEDICINE

## 2021-09-14 PROCEDURE — 99212 PR OFFICE/OUTPT VISIT, EST, LEVL II, 10-19 MIN: ICD-10-PCS | Mod: ,,, | Performed by: FAMILY MEDICINE

## 2021-09-14 PROCEDURE — 90686 FLU VACCINE (QUAD) GREATER THAN OR EQUAL TO 3YO PRESERVATIVE FREE IM: ICD-10-PCS | Mod: ,,, | Performed by: FAMILY MEDICINE

## 2021-09-14 PROCEDURE — 90686 IIV4 VACC NO PRSV 0.5 ML IM: CPT | Mod: ,,, | Performed by: FAMILY MEDICINE

## 2021-09-14 PROCEDURE — G0008 FLU VACCINE (QUAD) GREATER THAN OR EQUAL TO 3YO PRESERVATIVE FREE IM: ICD-10-PCS | Mod: ,,, | Performed by: FAMILY MEDICINE

## 2021-09-16 ENCOUNTER — OFFICE VISIT (OUTPATIENT)
Dept: PAIN MEDICINE | Facility: CLINIC | Age: 61
End: 2021-09-16
Payer: MEDICARE

## 2021-09-16 VITALS
DIASTOLIC BLOOD PRESSURE: 69 MMHG | WEIGHT: 162 LBS | BODY MASS INDEX: 25.43 KG/M2 | RESPIRATION RATE: 19 BRPM | HEIGHT: 67 IN | HEART RATE: 73 BPM | SYSTOLIC BLOOD PRESSURE: 162 MMHG

## 2021-09-16 DIAGNOSIS — L97.513 ULCER OF RIGHT FOOT WITH NECROSIS OF MUSCLE: ICD-10-CM

## 2021-09-16 DIAGNOSIS — Z79.899 ENCOUNTER FOR LONG-TERM (CURRENT) USE OF OTHER MEDICATIONS: ICD-10-CM

## 2021-09-16 DIAGNOSIS — I73.9 PERIPHERAL VASCULAR DISEASE, UNSPECIFIED: Chronic | ICD-10-CM

## 2021-09-16 DIAGNOSIS — G62.9 NEUROPATHY: Primary | Chronic | ICD-10-CM

## 2021-09-16 LAB
CTP QC/QA: YES
POC (AMP) AMPHETAMINE: NEGATIVE
POC (BAR) BARBITURATES: NEGATIVE
POC (BUP) BUPRENORPHINE: NEGATIVE
POC (BZO) BENZODIAZEPINES: NEGATIVE
POC (COC) COCAINE: NEGATIVE
POC (MDMA) METHYLENEDIOXYMETHAMPHETAMINE 3,4: NEGATIVE
POC (MET) METHAMPHETAMINE: NEGATIVE
POC (MOP) OPIATES: ABNORMAL
POC (MTD) METHADONE: NEGATIVE
POC (OXY) OXYCODONE: NEGATIVE
POC (PCP) PHENCYCLIDINE: NEGATIVE
POC (TCA) TRICYCLIC ANTIDEPRESSANTS: NEGATIVE
POC TEMPERATURE (URINE): 94
POC THC: NEGATIVE

## 2021-09-16 PROCEDURE — 99214 OFFICE O/P EST MOD 30 MIN: CPT | Mod: S$PBB,,, | Performed by: PHYSICIAN ASSISTANT

## 2021-09-16 PROCEDURE — 99215 OFFICE O/P EST HI 40 MIN: CPT | Mod: PBBFAC | Performed by: PHYSICIAN ASSISTANT

## 2021-09-16 PROCEDURE — 80305 DRUG TEST PRSMV DIR OPT OBS: CPT | Mod: PBBFAC | Performed by: PHYSICIAN ASSISTANT

## 2021-09-16 PROCEDURE — 99214 PR OFFICE/OUTPT VISIT, EST, LEVL IV, 30-39 MIN: ICD-10-PCS | Mod: S$PBB,,, | Performed by: PHYSICIAN ASSISTANT

## 2021-09-16 RX ORDER — HYDROCODONE BITARTRATE AND ACETAMINOPHEN 10; 325 MG/1; MG/1
1 TABLET ORAL EVERY 8 HOURS
Qty: 90 TABLET | Refills: 0 | Status: SHIPPED | OUTPATIENT
Start: 2021-09-16 | End: 2021-10-16

## 2021-09-16 RX ORDER — GABAPENTIN 300 MG/1
900 CAPSULE ORAL EVERY 6 HOURS
Qty: 360 CAPSULE | Refills: 2 | Status: SHIPPED | OUTPATIENT
Start: 2021-09-16 | End: 2021-12-13 | Stop reason: SDUPTHER

## 2021-09-16 RX ORDER — HYDROCODONE BITARTRATE AND ACETAMINOPHEN 10; 325 MG/1; MG/1
1 TABLET ORAL EVERY 8 HOURS
Qty: 90 TABLET | Refills: 0 | Status: SHIPPED | OUTPATIENT
Start: 2021-10-16 | End: 2021-11-15

## 2021-09-16 RX ORDER — HYDROCODONE BITARTRATE AND ACETAMINOPHEN 10; 325 MG/1; MG/1
1 TABLET ORAL EVERY 8 HOURS
Qty: 90 TABLET | Refills: 0 | Status: SHIPPED | OUTPATIENT
Start: 2021-11-15 | End: 2021-12-15

## 2021-09-21 ENCOUNTER — OFFICE VISIT (OUTPATIENT)
Dept: FAMILY MEDICINE | Facility: CLINIC | Age: 61
End: 2021-09-21
Payer: MEDICARE

## 2021-09-21 VITALS
BODY MASS INDEX: 25.43 KG/M2 | OXYGEN SATURATION: 96 % | RESPIRATION RATE: 18 BRPM | HEART RATE: 78 BPM | SYSTOLIC BLOOD PRESSURE: 126 MMHG | DIASTOLIC BLOOD PRESSURE: 70 MMHG | WEIGHT: 162 LBS | HEIGHT: 67 IN | TEMPERATURE: 99 F

## 2021-09-21 DIAGNOSIS — J44.9 CHRONIC OBSTRUCTIVE PULMONARY DISEASE, UNSPECIFIED COPD TYPE: ICD-10-CM

## 2021-09-21 DIAGNOSIS — J44.1 COPD EXACERBATION: Primary | ICD-10-CM

## 2021-09-21 DIAGNOSIS — E78.5 HYPERLIPIDEMIA, UNSPECIFIED HYPERLIPIDEMIA TYPE: ICD-10-CM

## 2021-09-21 PROCEDURE — 99213 PR OFFICE/OUTPT VISIT, EST, LEVL III, 20-29 MIN: ICD-10-PCS | Mod: ,,, | Performed by: FAMILY MEDICINE

## 2021-09-21 PROCEDURE — 99213 OFFICE O/P EST LOW 20 MIN: CPT | Mod: ,,, | Performed by: FAMILY MEDICINE

## 2021-09-21 RX ORDER — ICOSAPENT ETHYL 1000 MG/1
2 CAPSULE ORAL 2 TIMES DAILY
Qty: 120 CAPSULE | Refills: 5 | Status: ON HOLD | OUTPATIENT
Start: 2021-09-21 | End: 2022-06-24

## 2021-09-21 RX ORDER — ALBUTEROL SULFATE 90 UG/1
2 AEROSOL, METERED RESPIRATORY (INHALATION) 2 TIMES DAILY PRN
Qty: 18 G | Refills: 2 | Status: SHIPPED | OUTPATIENT
Start: 2021-09-21 | End: 2022-01-05 | Stop reason: SDUPTHER

## 2021-09-24 ENCOUNTER — OFFICE VISIT (OUTPATIENT)
Dept: WOUND CARE | Facility: CLINIC | Age: 61
End: 2021-09-24
Attending: SURGERY
Payer: MEDICARE

## 2021-09-24 VITALS
TEMPERATURE: 98 F | SYSTOLIC BLOOD PRESSURE: 133 MMHG | DIASTOLIC BLOOD PRESSURE: 72 MMHG | RESPIRATION RATE: 20 BRPM | HEART RATE: 83 BPM

## 2021-09-24 DIAGNOSIS — I10 BENIGN HYPERTENSION: ICD-10-CM

## 2021-09-24 DIAGNOSIS — E08.621 DIABETIC ULCER OF MIDFOOT ASSOCIATED WITH DIABETES MELLITUS DUE TO UNDERLYING CONDITION, WITH NECROSIS OF MUSCLE, UNSPECIFIED LATERALITY: ICD-10-CM

## 2021-09-24 DIAGNOSIS — L97.403 DIABETIC ULCER OF MIDFOOT ASSOCIATED WITH DIABETES MELLITUS DUE TO UNDERLYING CONDITION, WITH NECROSIS OF MUSCLE, UNSPECIFIED LATERALITY: ICD-10-CM

## 2021-09-24 DIAGNOSIS — L97.513 ULCER OF RIGHT FOOT WITH NECROSIS OF MUSCLE: Primary | ICD-10-CM

## 2021-09-24 DIAGNOSIS — E78.2 MIXED HYPERLIPIDEMIA: ICD-10-CM

## 2021-09-24 DIAGNOSIS — F17.218 CIGARETTE NICOTINE DEPENDENCE WITH OTHER NICOTINE-INDUCED DISORDER: ICD-10-CM

## 2021-09-24 PROCEDURE — 11042 DBRDMT SUBQ TIS 1ST 20SQCM/<: CPT | Mod: PBBFAC | Performed by: SURGERY

## 2021-09-24 PROCEDURE — 11043 PR DEBRIDEMENT, SKIN, SUB-Q TISSUE,MUSCLE,=<20 SQ CM: ICD-10-PCS | Mod: S$PBB,,, | Performed by: SURGERY

## 2021-09-24 PROCEDURE — 99215 OFFICE O/P EST HI 40 MIN: CPT | Mod: PBBFAC,25 | Performed by: SURGERY

## 2021-09-24 PROCEDURE — 11043 DBRDMT MUSC&/FSCA 1ST 20/<: CPT | Mod: S$PBB,,, | Performed by: SURGERY

## 2021-09-24 PROCEDURE — 11043 DBRDMT MUSC&/FSCA 1ST 20/<: CPT | Mod: PBBFAC | Performed by: SURGERY

## 2021-10-01 DIAGNOSIS — E11.8 DIABETES MELLITUS WITH COMPLICATION, WITH LONG-TERM CURRENT USE OF INSULIN: Primary | ICD-10-CM

## 2021-10-01 DIAGNOSIS — Z79.4 DIABETES MELLITUS WITH COMPLICATION, WITH LONG-TERM CURRENT USE OF INSULIN: Primary | ICD-10-CM

## 2021-10-08 ENCOUNTER — CLINICAL SUPPORT (OUTPATIENT)
Dept: WOUND CARE | Facility: CLINIC | Age: 61
End: 2021-10-08
Attending: SURGERY
Payer: MEDICARE

## 2021-10-08 VITALS
HEART RATE: 75 BPM | RESPIRATION RATE: 18 BRPM | TEMPERATURE: 97 F | SYSTOLIC BLOOD PRESSURE: 165 MMHG | DIASTOLIC BLOOD PRESSURE: 85 MMHG

## 2021-10-08 DIAGNOSIS — L97.513 ULCER OF RIGHT FOOT WITH NECROSIS OF MUSCLE: Primary | ICD-10-CM

## 2021-10-08 DIAGNOSIS — E78.2 MIXED HYPERLIPIDEMIA: ICD-10-CM

## 2021-10-08 DIAGNOSIS — I10 BENIGN HYPERTENSION: ICD-10-CM

## 2021-10-08 DIAGNOSIS — L97.403 DIABETIC ULCER OF MIDFOOT ASSOCIATED WITH DIABETES MELLITUS DUE TO UNDERLYING CONDITION, WITH NECROSIS OF MUSCLE, UNSPECIFIED LATERALITY: ICD-10-CM

## 2021-10-08 DIAGNOSIS — F17.218 CIGARETTE NICOTINE DEPENDENCE WITH OTHER NICOTINE-INDUCED DISORDER: ICD-10-CM

## 2021-10-08 DIAGNOSIS — E08.621 DIABETIC ULCER OF MIDFOOT ASSOCIATED WITH DIABETES MELLITUS DUE TO UNDERLYING CONDITION, WITH NECROSIS OF MUSCLE, UNSPECIFIED LATERALITY: ICD-10-CM

## 2021-10-08 PROCEDURE — 11043 DBRDMT MUSC&/FSCA 1ST 20/<: CPT | Mod: PBBFAC | Performed by: SURGERY

## 2021-10-08 PROCEDURE — 11043 DBRDMT MUSC&/FSCA 1ST 20/<: CPT | Mod: S$PBB,,, | Performed by: SURGERY

## 2021-10-08 PROCEDURE — 99215 OFFICE O/P EST HI 40 MIN: CPT | Mod: PBBFAC | Performed by: SURGERY

## 2021-10-08 PROCEDURE — 99499 UNLISTED E&M SERVICE: CPT | Mod: S$PBB,,, | Performed by: SURGERY

## 2021-10-08 PROCEDURE — 11043 PR DEBRIDEMENT, SKIN, SUB-Q TISSUE,MUSCLE,=<20 SQ CM: ICD-10-PCS | Mod: S$PBB,,, | Performed by: SURGERY

## 2021-10-08 PROCEDURE — 99499 NO LOS: ICD-10-PCS | Mod: S$PBB,,, | Performed by: SURGERY

## 2021-10-20 DIAGNOSIS — F41.9 ANXIETY: ICD-10-CM

## 2021-10-20 RX ORDER — HYDROXYZINE PAMOATE 25 MG/1
25 CAPSULE ORAL DAILY
Qty: 90 CAPSULE | Refills: 1 | Status: SHIPPED | OUTPATIENT
Start: 2021-10-20 | End: 2022-07-06 | Stop reason: SDUPTHER

## 2021-10-29 ENCOUNTER — OFFICE VISIT (OUTPATIENT)
Dept: WOUND CARE | Facility: CLINIC | Age: 61
End: 2021-10-29
Attending: SURGERY
Payer: MEDICARE

## 2021-10-29 VITALS
DIASTOLIC BLOOD PRESSURE: 66 MMHG | RESPIRATION RATE: 20 BRPM | TEMPERATURE: 98 F | SYSTOLIC BLOOD PRESSURE: 109 MMHG | HEART RATE: 54 BPM

## 2021-10-29 DIAGNOSIS — F17.218 CIGARETTE NICOTINE DEPENDENCE WITH OTHER NICOTINE-INDUCED DISORDER: ICD-10-CM

## 2021-10-29 DIAGNOSIS — E08.621 DIABETIC ULCER OF MIDFOOT ASSOCIATED WITH DIABETES MELLITUS DUE TO UNDERLYING CONDITION, WITH NECROSIS OF MUSCLE, UNSPECIFIED LATERALITY: ICD-10-CM

## 2021-10-29 DIAGNOSIS — L97.403 DIABETIC ULCER OF MIDFOOT ASSOCIATED WITH DIABETES MELLITUS DUE TO UNDERLYING CONDITION, WITH NECROSIS OF MUSCLE, UNSPECIFIED LATERALITY: ICD-10-CM

## 2021-10-29 DIAGNOSIS — L97.513 ULCER OF RIGHT FOOT WITH NECROSIS OF MUSCLE: Primary | ICD-10-CM

## 2021-10-29 DIAGNOSIS — I10 BENIGN HYPERTENSION: ICD-10-CM

## 2021-10-29 DIAGNOSIS — E78.2 MIXED HYPERLIPIDEMIA: ICD-10-CM

## 2021-10-29 PROCEDURE — 11043 PR DEBRIDEMENT, SKIN, SUB-Q TISSUE,MUSCLE,=<20 SQ CM: ICD-10-PCS | Mod: S$PBB,,, | Performed by: SURGERY

## 2021-10-29 PROCEDURE — 11043 DBRDMT MUSC&/FSCA 1ST 20/<: CPT | Mod: S$PBB,,, | Performed by: SURGERY

## 2021-10-29 PROCEDURE — 99213 OFFICE O/P EST LOW 20 MIN: CPT | Mod: PBBFAC,25 | Performed by: SURGERY

## 2021-10-29 PROCEDURE — 11043 DBRDMT MUSC&/FSCA 1ST 20/<: CPT | Mod: PBBFAC | Performed by: SURGERY

## 2021-11-12 ENCOUNTER — OFFICE VISIT (OUTPATIENT)
Dept: WOUND CARE | Facility: CLINIC | Age: 61
End: 2021-11-12
Attending: SURGERY
Payer: MEDICARE

## 2021-11-12 VITALS
DIASTOLIC BLOOD PRESSURE: 68 MMHG | SYSTOLIC BLOOD PRESSURE: 135 MMHG | TEMPERATURE: 97 F | RESPIRATION RATE: 18 BRPM | HEART RATE: 69 BPM

## 2021-11-12 DIAGNOSIS — F17.218 CIGARETTE NICOTINE DEPENDENCE WITH OTHER NICOTINE-INDUCED DISORDER: ICD-10-CM

## 2021-11-12 DIAGNOSIS — L97.513 ULCER OF RIGHT FOOT WITH NECROSIS OF MUSCLE: Primary | ICD-10-CM

## 2021-11-12 DIAGNOSIS — I10 BENIGN HYPERTENSION: ICD-10-CM

## 2021-11-12 DIAGNOSIS — E78.2 MIXED HYPERLIPIDEMIA: ICD-10-CM

## 2021-11-12 DIAGNOSIS — E08.621 DIABETIC ULCER OF MIDFOOT ASSOCIATED WITH DIABETES MELLITUS DUE TO UNDERLYING CONDITION, WITH NECROSIS OF MUSCLE, UNSPECIFIED LATERALITY: ICD-10-CM

## 2021-11-12 DIAGNOSIS — L97.403 DIABETIC ULCER OF MIDFOOT ASSOCIATED WITH DIABETES MELLITUS DUE TO UNDERLYING CONDITION, WITH NECROSIS OF MUSCLE, UNSPECIFIED LATERALITY: ICD-10-CM

## 2021-11-12 PROCEDURE — 15004 WOUND PREP F/N/HF/G: CPT | Mod: PBBFAC | Performed by: FAMILY MEDICINE

## 2021-11-12 PROCEDURE — 15004 WOUND PREP F/N/HF/G: CPT | Mod: S$PBB,,, | Performed by: FAMILY MEDICINE

## 2021-11-12 PROCEDURE — 11042 DBRDMT SUBQ TIS 1ST 20SQCM/<: CPT | Mod: PBBFAC | Performed by: FAMILY MEDICINE

## 2021-11-12 PROCEDURE — 15275 SKIN SUB GRAFT FACE/NK/HF/G: CPT | Mod: PBBFAC | Performed by: FAMILY MEDICINE

## 2021-11-12 PROCEDURE — 15275 PR SKIN SUB GRAFT FACE/NK/HF/G UP TO 100 SQCM: ICD-10-PCS | Mod: S$PBB,,, | Performed by: FAMILY MEDICINE

## 2021-11-12 PROCEDURE — 99213 OFFICE O/P EST LOW 20 MIN: CPT | Mod: PBBFAC,25 | Performed by: FAMILY MEDICINE

## 2021-11-12 PROCEDURE — 15004 PR WND PREP PED, FACE/NCK/HND/FT/GEN 1ST 100 CM: ICD-10-PCS | Mod: S$PBB,,, | Performed by: FAMILY MEDICINE

## 2021-11-12 PROCEDURE — 15275 SKIN SUB GRAFT FACE/NK/HF/G: CPT | Mod: S$PBB,,, | Performed by: FAMILY MEDICINE

## 2021-11-19 ENCOUNTER — CLINICAL SUPPORT (OUTPATIENT)
Dept: WOUND CARE | Facility: CLINIC | Age: 61
End: 2021-11-19
Attending: FAMILY MEDICINE
Payer: MEDICARE

## 2021-11-19 VITALS
SYSTOLIC BLOOD PRESSURE: 132 MMHG | TEMPERATURE: 98 F | RESPIRATION RATE: 18 BRPM | DIASTOLIC BLOOD PRESSURE: 69 MMHG | HEART RATE: 69 BPM

## 2021-11-19 DIAGNOSIS — E08.621 DIABETIC ULCER OF MIDFOOT ASSOCIATED WITH DIABETES MELLITUS DUE TO UNDERLYING CONDITION, WITH NECROSIS OF MUSCLE, UNSPECIFIED LATERALITY: ICD-10-CM

## 2021-11-19 DIAGNOSIS — L97.513 ULCER OF RIGHT FOOT WITH NECROSIS OF MUSCLE: Primary | ICD-10-CM

## 2021-11-19 DIAGNOSIS — I10 BENIGN HYPERTENSION: ICD-10-CM

## 2021-11-19 DIAGNOSIS — F17.218 CIGARETTE NICOTINE DEPENDENCE WITH OTHER NICOTINE-INDUCED DISORDER: ICD-10-CM

## 2021-11-19 DIAGNOSIS — L97.403 DIABETIC ULCER OF MIDFOOT ASSOCIATED WITH DIABETES MELLITUS DUE TO UNDERLYING CONDITION, WITH NECROSIS OF MUSCLE, UNSPECIFIED LATERALITY: ICD-10-CM

## 2021-11-19 DIAGNOSIS — E78.2 MIXED HYPERLIPIDEMIA: ICD-10-CM

## 2021-11-19 PROCEDURE — 15275 PR SKIN SUB GRAFT FACE/NK/HF/G UP TO 100 SQCM: ICD-10-PCS | Mod: S$PBB,,, | Performed by: SURGERY

## 2021-11-19 PROCEDURE — 11042 DBRDMT SUBQ TIS 1ST 20SQCM/<: CPT | Mod: PBBFAC | Performed by: SURGERY

## 2021-11-19 PROCEDURE — 15004 WOUND PREP F/N/HF/G: CPT | Mod: S$PBB,,, | Performed by: SURGERY

## 2021-11-19 PROCEDURE — 15271 SKIN SUB GRAFT TRNK/ARM/LEG: CPT | Mod: PBBFAC | Performed by: SURGERY

## 2021-11-19 PROCEDURE — 99213 OFFICE O/P EST LOW 20 MIN: CPT | Mod: PBBFAC | Performed by: SURGERY

## 2021-11-19 PROCEDURE — 15275 SKIN SUB GRAFT FACE/NK/HF/G: CPT | Mod: PBBFAC | Performed by: SURGERY

## 2021-11-19 PROCEDURE — 15271 SKIN SUB GRAFT TRNK/ARM/LEG: CPT | Performed by: SURGERY

## 2021-11-19 PROCEDURE — 15275 SKIN SUB GRAFT FACE/NK/HF/G: CPT | Mod: S$PBB,,, | Performed by: SURGERY

## 2021-11-19 PROCEDURE — 15004 PR WND PREP PED, FACE/NCK/HND/FT/GEN 1ST 100 CM: ICD-10-PCS | Mod: S$PBB,,, | Performed by: SURGERY

## 2021-11-19 PROCEDURE — 15004 WOUND PREP F/N/HF/G: CPT | Mod: PBBFAC | Performed by: SURGERY

## 2021-11-24 ENCOUNTER — CLINICAL SUPPORT (OUTPATIENT)
Dept: WOUND CARE | Facility: CLINIC | Age: 61
End: 2021-11-24
Attending: FAMILY MEDICINE
Payer: MEDICARE

## 2021-11-24 VITALS
RESPIRATION RATE: 20 BRPM | TEMPERATURE: 98 F | SYSTOLIC BLOOD PRESSURE: 140 MMHG | HEART RATE: 68 BPM | DIASTOLIC BLOOD PRESSURE: 71 MMHG

## 2021-11-24 DIAGNOSIS — I10 BENIGN HYPERTENSION: ICD-10-CM

## 2021-11-24 DIAGNOSIS — L97.403 DIABETIC ULCER OF MIDFOOT ASSOCIATED WITH DIABETES MELLITUS DUE TO UNDERLYING CONDITION, WITH NECROSIS OF MUSCLE, UNSPECIFIED LATERALITY: ICD-10-CM

## 2021-11-24 DIAGNOSIS — E08.621 DIABETIC ULCER OF MIDFOOT ASSOCIATED WITH DIABETES MELLITUS DUE TO UNDERLYING CONDITION, WITH NECROSIS OF MUSCLE, UNSPECIFIED LATERALITY: ICD-10-CM

## 2021-11-24 DIAGNOSIS — E78.2 MIXED HYPERLIPIDEMIA: ICD-10-CM

## 2021-11-24 DIAGNOSIS — F17.218 CIGARETTE NICOTINE DEPENDENCE WITH OTHER NICOTINE-INDUCED DISORDER: ICD-10-CM

## 2021-11-24 DIAGNOSIS — L97.513 ULCER OF RIGHT FOOT WITH NECROSIS OF MUSCLE: Primary | ICD-10-CM

## 2021-11-24 PROCEDURE — 99213 OFFICE O/P EST LOW 20 MIN: CPT | Mod: PBBFAC | Performed by: FAMILY MEDICINE

## 2021-11-24 PROCEDURE — 99214 PR OFFICE/OUTPT VISIT, EST, LEVL IV, 30-39 MIN: ICD-10-PCS | Mod: S$PBB,,, | Performed by: FAMILY MEDICINE

## 2021-11-24 PROCEDURE — 99214 OFFICE O/P EST MOD 30 MIN: CPT | Mod: S$PBB,,, | Performed by: FAMILY MEDICINE

## 2021-12-03 ENCOUNTER — OFFICE VISIT (OUTPATIENT)
Dept: WOUND CARE | Facility: CLINIC | Age: 61
End: 2021-12-03
Attending: SURGERY
Payer: MEDICARE

## 2021-12-03 VITALS
RESPIRATION RATE: 20 BRPM | DIASTOLIC BLOOD PRESSURE: 86 MMHG | TEMPERATURE: 98 F | SYSTOLIC BLOOD PRESSURE: 164 MMHG | HEART RATE: 74 BPM

## 2021-12-03 DIAGNOSIS — I10 BENIGN HYPERTENSION: ICD-10-CM

## 2021-12-03 DIAGNOSIS — L97.403 DIABETIC ULCER OF MIDFOOT ASSOCIATED WITH DIABETES MELLITUS DUE TO UNDERLYING CONDITION, WITH NECROSIS OF MUSCLE, UNSPECIFIED LATERALITY: ICD-10-CM

## 2021-12-03 DIAGNOSIS — E78.2 MIXED HYPERLIPIDEMIA: ICD-10-CM

## 2021-12-03 DIAGNOSIS — L97.513 ULCER OF RIGHT FOOT WITH NECROSIS OF MUSCLE: Primary | ICD-10-CM

## 2021-12-03 DIAGNOSIS — F17.218 CIGARETTE NICOTINE DEPENDENCE WITH OTHER NICOTINE-INDUCED DISORDER: ICD-10-CM

## 2021-12-03 DIAGNOSIS — E08.621 DIABETIC ULCER OF MIDFOOT ASSOCIATED WITH DIABETES MELLITUS DUE TO UNDERLYING CONDITION, WITH NECROSIS OF MUSCLE, UNSPECIFIED LATERALITY: ICD-10-CM

## 2021-12-03 PROCEDURE — 99213 OFFICE O/P EST LOW 20 MIN: CPT | Mod: PBBFAC | Performed by: SURGERY

## 2021-12-03 PROCEDURE — 15275 SKIN SUB GRAFT FACE/NK/HF/G: CPT | Mod: PBBFAC | Performed by: SURGERY

## 2021-12-03 PROCEDURE — 15275 PR SKIN SUB GRAFT FACE/NK/HF/G UP TO 100 SQCM: ICD-10-PCS | Mod: S$PBB,,, | Performed by: SURGERY

## 2021-12-03 PROCEDURE — 15004 PR WND PREP PED, FACE/NCK/HND/FT/GEN 1ST 100 CM: ICD-10-PCS | Mod: S$PBB,,, | Performed by: SURGERY

## 2021-12-03 PROCEDURE — 15004 WOUND PREP F/N/HF/G: CPT | Mod: PBBFAC | Performed by: SURGERY

## 2021-12-03 PROCEDURE — 15004 WOUND PREP F/N/HF/G: CPT | Mod: S$PBB,,, | Performed by: SURGERY

## 2021-12-03 PROCEDURE — C5272 LOW COST SKIN SUBSTITUTE APP: HCPCS | Mod: PBBFAC | Performed by: SURGERY

## 2021-12-03 PROCEDURE — 11043 DBRDMT MUSC&/FSCA 1ST 20/<: CPT | Mod: PBBFAC | Performed by: SURGERY

## 2021-12-03 PROCEDURE — 15275 SKIN SUB GRAFT FACE/NK/HF/G: CPT | Mod: S$PBB,,, | Performed by: SURGERY

## 2021-12-10 ENCOUNTER — OFFICE VISIT (OUTPATIENT)
Dept: WOUND CARE | Facility: CLINIC | Age: 61
End: 2021-12-10
Attending: FAMILY MEDICINE
Payer: MEDICARE

## 2021-12-10 VITALS
SYSTOLIC BLOOD PRESSURE: 171 MMHG | RESPIRATION RATE: 20 BRPM | DIASTOLIC BLOOD PRESSURE: 88 MMHG | TEMPERATURE: 97 F | HEART RATE: 84 BPM

## 2021-12-10 DIAGNOSIS — I10 BENIGN HYPERTENSION: ICD-10-CM

## 2021-12-10 DIAGNOSIS — L97.403 DIABETIC ULCER OF MIDFOOT ASSOCIATED WITH DIABETES MELLITUS DUE TO UNDERLYING CONDITION, WITH NECROSIS OF MUSCLE, UNSPECIFIED LATERALITY: ICD-10-CM

## 2021-12-10 DIAGNOSIS — E08.621 DIABETIC ULCER OF MIDFOOT ASSOCIATED WITH DIABETES MELLITUS DUE TO UNDERLYING CONDITION, WITH NECROSIS OF MUSCLE, UNSPECIFIED LATERALITY: ICD-10-CM

## 2021-12-10 DIAGNOSIS — F17.218 CIGARETTE NICOTINE DEPENDENCE WITH OTHER NICOTINE-INDUCED DISORDER: ICD-10-CM

## 2021-12-10 DIAGNOSIS — E78.2 MIXED HYPERLIPIDEMIA: ICD-10-CM

## 2021-12-10 DIAGNOSIS — L97.513 ULCER OF RIGHT FOOT WITH NECROSIS OF MUSCLE: Primary | ICD-10-CM

## 2021-12-10 PROCEDURE — 99214 OFFICE O/P EST MOD 30 MIN: CPT | Mod: S$PBB,,, | Performed by: FAMILY MEDICINE

## 2021-12-10 PROCEDURE — 99213 OFFICE O/P EST LOW 20 MIN: CPT | Mod: PBBFAC | Performed by: FAMILY MEDICINE

## 2021-12-10 PROCEDURE — 99214 PR OFFICE/OUTPT VISIT, EST, LEVL IV, 30-39 MIN: ICD-10-PCS | Mod: S$PBB,,, | Performed by: FAMILY MEDICINE

## 2021-12-13 ENCOUNTER — OFFICE VISIT (OUTPATIENT)
Dept: PAIN MEDICINE | Facility: CLINIC | Age: 61
End: 2021-12-13
Payer: MEDICARE

## 2021-12-13 VITALS
RESPIRATION RATE: 20 BRPM | HEIGHT: 68 IN | WEIGHT: 172 LBS | BODY MASS INDEX: 26.07 KG/M2 | DIASTOLIC BLOOD PRESSURE: 74 MMHG | SYSTOLIC BLOOD PRESSURE: 176 MMHG | HEART RATE: 67 BPM

## 2021-12-13 DIAGNOSIS — G62.9 NEUROPATHY: Chronic | ICD-10-CM

## 2021-12-13 DIAGNOSIS — Z79.899 OTHER LONG TERM (CURRENT) DRUG THERAPY: Primary | ICD-10-CM

## 2021-12-13 DIAGNOSIS — I73.9 PERIPHERAL VASCULAR DISEASE, UNSPECIFIED: Chronic | ICD-10-CM

## 2021-12-13 DIAGNOSIS — L97.513 ULCER OF RIGHT FOOT WITH NECROSIS OF MUSCLE: Chronic | ICD-10-CM

## 2021-12-13 LAB
CTP QC/QA: YES
POC (AMP) AMPHETAMINE: NEGATIVE
POC (BAR) BARBITURATES: NEGATIVE
POC (BUP) BUPRENORPHINE: NEGATIVE
POC (BZO) BENZODIAZEPINES: NEGATIVE
POC (COC) COCAINE: NEGATIVE
POC (MDMA) METHYLENEDIOXYMETHAMPHETAMINE 3,4: NEGATIVE
POC (MET) METHAMPHETAMINE: NEGATIVE
POC (MOP) OPIATES: ABNORMAL
POC (MTD) METHADONE: NEGATIVE
POC (OXY) OXYCODONE: NEGATIVE
POC (PCP) PHENCYCLIDINE: NEGATIVE
POC (TCA) TRICYCLIC ANTIDEPRESSANTS: NEGATIVE
POC TEMPERATURE (URINE): 92
POC THC: NEGATIVE

## 2021-12-13 PROCEDURE — 99215 OFFICE O/P EST HI 40 MIN: CPT | Mod: PBBFAC | Performed by: PAIN MEDICINE

## 2021-12-13 PROCEDURE — 99214 OFFICE O/P EST MOD 30 MIN: CPT | Mod: S$PBB,,, | Performed by: PAIN MEDICINE

## 2021-12-13 PROCEDURE — 99214 PR OFFICE/OUTPT VISIT, EST, LEVL IV, 30-39 MIN: ICD-10-PCS | Mod: S$PBB,,, | Performed by: PAIN MEDICINE

## 2021-12-13 PROCEDURE — 80305 DRUG TEST PRSMV DIR OPT OBS: CPT | Mod: PBBFAC | Performed by: PAIN MEDICINE

## 2021-12-13 RX ORDER — HYDROCODONE BITARTRATE AND ACETAMINOPHEN 10; 325 MG/1; MG/1
1 TABLET ORAL EVERY 8 HOURS PRN
Qty: 90 TABLET | Refills: 0 | Status: SHIPPED | OUTPATIENT
Start: 2021-12-16 | End: 2022-01-15

## 2021-12-13 RX ORDER — HYDROCODONE BITARTRATE AND ACETAMINOPHEN 10; 325 MG/1; MG/1
1 TABLET ORAL EVERY 8 HOURS PRN
Qty: 90 TABLET | Refills: 0 | Status: SHIPPED | OUTPATIENT
Start: 2022-02-14 | End: 2022-03-08 | Stop reason: SDUPTHER

## 2021-12-13 RX ORDER — HYDROCODONE BITARTRATE AND ACETAMINOPHEN 10; 325 MG/1; MG/1
1 TABLET ORAL EVERY 8 HOURS PRN
Qty: 90 TABLET | Refills: 0 | Status: SHIPPED | OUTPATIENT
Start: 2022-01-15 | End: 2022-02-14

## 2021-12-13 RX ORDER — GABAPENTIN 300 MG/1
900 CAPSULE ORAL EVERY 6 HOURS
Qty: 360 CAPSULE | Refills: 2 | Status: SHIPPED | OUTPATIENT
Start: 2021-12-13 | End: 2022-03-08 | Stop reason: SDUPTHER

## 2021-12-16 ENCOUNTER — OFFICE VISIT (OUTPATIENT)
Dept: FAMILY MEDICINE | Facility: CLINIC | Age: 61
End: 2021-12-16
Payer: MEDICARE

## 2021-12-16 VITALS
BODY MASS INDEX: 26.07 KG/M2 | RESPIRATION RATE: 18 BRPM | HEART RATE: 81 BPM | WEIGHT: 172 LBS | SYSTOLIC BLOOD PRESSURE: 156 MMHG | TEMPERATURE: 98 F | HEIGHT: 68 IN | OXYGEN SATURATION: 96 % | DIASTOLIC BLOOD PRESSURE: 68 MMHG

## 2021-12-16 DIAGNOSIS — J44.0 COPD WITH ACUTE BRONCHITIS: Primary | ICD-10-CM

## 2021-12-16 DIAGNOSIS — R50.9 FEVER, UNSPECIFIED FEVER CAUSE: ICD-10-CM

## 2021-12-16 DIAGNOSIS — R05.9 COUGH: ICD-10-CM

## 2021-12-16 DIAGNOSIS — J20.9 COPD WITH ACUTE BRONCHITIS: Primary | ICD-10-CM

## 2021-12-16 DIAGNOSIS — R53.81 MALAISE: ICD-10-CM

## 2021-12-16 LAB
CTP QC/QA: YES
FLUAV AG NPH QL: NEGATIVE
FLUBV AG NPH QL: NEGATIVE
SARS-COV-2 AG RESP QL IA.RAPID: NEGATIVE

## 2021-12-16 PROCEDURE — 87428 SARSCOV & INF VIR A&B AG IA: CPT | Mod: RHCUB | Performed by: NURSE PRACTITIONER

## 2021-12-16 PROCEDURE — 99213 OFFICE O/P EST LOW 20 MIN: CPT | Mod: ,,, | Performed by: NURSE PRACTITIONER

## 2021-12-16 PROCEDURE — 99213 PR OFFICE/OUTPT VISIT, EST, LEVL III, 20-29 MIN: ICD-10-PCS | Mod: ,,, | Performed by: NURSE PRACTITIONER

## 2021-12-16 RX ORDER — AZITHROMYCIN 250 MG/1
TABLET, FILM COATED ORAL
Qty: 6 TABLET | Refills: 0 | Status: SHIPPED | OUTPATIENT
Start: 2021-12-16 | End: 2021-12-21

## 2021-12-17 ENCOUNTER — OFFICE VISIT (OUTPATIENT)
Dept: WOUND CARE | Facility: CLINIC | Age: 61
End: 2021-12-17
Attending: SURGERY
Payer: MEDICARE

## 2021-12-17 VITALS
DIASTOLIC BLOOD PRESSURE: 87 MMHG | HEART RATE: 82 BPM | SYSTOLIC BLOOD PRESSURE: 144 MMHG | TEMPERATURE: 98 F | RESPIRATION RATE: 20 BRPM

## 2021-12-17 DIAGNOSIS — L97.403 DIABETIC ULCER OF MIDFOOT ASSOCIATED WITH DIABETES MELLITUS DUE TO UNDERLYING CONDITION, WITH NECROSIS OF MUSCLE, UNSPECIFIED LATERALITY: ICD-10-CM

## 2021-12-17 DIAGNOSIS — L97.513 ULCER OF RIGHT FOOT WITH NECROSIS OF MUSCLE: Primary | ICD-10-CM

## 2021-12-17 DIAGNOSIS — E08.621 DIABETIC ULCER OF MIDFOOT ASSOCIATED WITH DIABETES MELLITUS DUE TO UNDERLYING CONDITION, WITH NECROSIS OF MUSCLE, UNSPECIFIED LATERALITY: ICD-10-CM

## 2021-12-17 DIAGNOSIS — I10 BENIGN HYPERTENSION: ICD-10-CM

## 2021-12-17 DIAGNOSIS — E78.2 MIXED HYPERLIPIDEMIA: ICD-10-CM

## 2021-12-17 DIAGNOSIS — F17.218 CIGARETTE NICOTINE DEPENDENCE WITH OTHER NICOTINE-INDUCED DISORDER: ICD-10-CM

## 2021-12-17 PROCEDURE — 99214 OFFICE O/P EST MOD 30 MIN: CPT | Mod: S$PBB,,, | Performed by: SURGERY

## 2021-12-17 PROCEDURE — 99215 OFFICE O/P EST HI 40 MIN: CPT | Mod: PBBFAC | Performed by: SURGERY

## 2021-12-17 PROCEDURE — 99214 PR OFFICE/OUTPT VISIT, EST, LEVL IV, 30-39 MIN: ICD-10-PCS | Mod: S$PBB,,, | Performed by: SURGERY

## 2021-12-27 ENCOUNTER — CLINICAL SUPPORT (OUTPATIENT)
Dept: WOUND CARE | Facility: CLINIC | Age: 61
End: 2021-12-27
Attending: FAMILY MEDICINE
Payer: MEDICARE

## 2021-12-27 VITALS
SYSTOLIC BLOOD PRESSURE: 173 MMHG | TEMPERATURE: 98 F | HEART RATE: 75 BPM | DIASTOLIC BLOOD PRESSURE: 76 MMHG | RESPIRATION RATE: 18 BRPM

## 2021-12-27 DIAGNOSIS — L97.403 DIABETIC ULCER OF MIDFOOT ASSOCIATED WITH DIABETES MELLITUS DUE TO UNDERLYING CONDITION, WITH NECROSIS OF MUSCLE, UNSPECIFIED LATERALITY: ICD-10-CM

## 2021-12-27 DIAGNOSIS — I10 BENIGN HYPERTENSION: ICD-10-CM

## 2021-12-27 DIAGNOSIS — E08.621 DIABETIC ULCER OF MIDFOOT ASSOCIATED WITH DIABETES MELLITUS DUE TO UNDERLYING CONDITION, WITH NECROSIS OF MUSCLE, UNSPECIFIED LATERALITY: ICD-10-CM

## 2021-12-27 DIAGNOSIS — G62.9 NEUROPATHY: Chronic | ICD-10-CM

## 2021-12-27 DIAGNOSIS — F17.218 CIGARETTE NICOTINE DEPENDENCE WITH OTHER NICOTINE-INDUCED DISORDER: ICD-10-CM

## 2021-12-27 DIAGNOSIS — L97.513 ULCER OF RIGHT FOOT WITH NECROSIS OF MUSCLE: Primary | ICD-10-CM

## 2021-12-27 DIAGNOSIS — F41.9 ANXIETY: ICD-10-CM

## 2021-12-27 DIAGNOSIS — E78.2 MIXED HYPERLIPIDEMIA: ICD-10-CM

## 2021-12-27 PROCEDURE — 99215 OFFICE O/P EST HI 40 MIN: CPT | Mod: PBBFAC | Performed by: FAMILY MEDICINE

## 2021-12-27 PROCEDURE — 99214 OFFICE O/P EST MOD 30 MIN: CPT | Mod: S$PBB,,, | Performed by: FAMILY MEDICINE

## 2021-12-27 PROCEDURE — 99214 PR OFFICE/OUTPT VISIT, EST, LEVL IV, 30-39 MIN: ICD-10-PCS | Mod: S$PBB,,, | Performed by: FAMILY MEDICINE

## 2022-01-05 ENCOUNTER — OFFICE VISIT (OUTPATIENT)
Dept: FAMILY MEDICINE | Facility: CLINIC | Age: 62
End: 2022-01-05
Payer: MEDICARE

## 2022-01-05 VITALS — OXYGEN SATURATION: 99 % | RESPIRATION RATE: 18 BRPM | TEMPERATURE: 98 F | HEART RATE: 84 BPM

## 2022-01-05 DIAGNOSIS — R51.9 NONINTRACTABLE HEADACHE, UNSPECIFIED CHRONICITY PATTERN, UNSPECIFIED HEADACHE TYPE: ICD-10-CM

## 2022-01-05 DIAGNOSIS — J44.9 CHRONIC OBSTRUCTIVE PULMONARY DISEASE, UNSPECIFIED COPD TYPE: ICD-10-CM

## 2022-01-05 DIAGNOSIS — R50.9 FEVER, UNSPECIFIED FEVER CAUSE: ICD-10-CM

## 2022-01-05 DIAGNOSIS — R05.9 COUGH: Primary | ICD-10-CM

## 2022-01-05 DIAGNOSIS — J44.1 COPD EXACERBATION: ICD-10-CM

## 2022-01-05 PROCEDURE — 99213 OFFICE O/P EST LOW 20 MIN: CPT | Mod: ,,, | Performed by: FAMILY MEDICINE

## 2022-01-05 PROCEDURE — 99213 PR OFFICE/OUTPT VISIT, EST, LEVL III, 20-29 MIN: ICD-10-PCS | Mod: ,,, | Performed by: FAMILY MEDICINE

## 2022-01-05 PROCEDURE — 87428 SARSCOV & INF VIR A&B AG IA: CPT | Mod: RHCUB | Performed by: FAMILY MEDICINE

## 2022-01-05 RX ORDER — ALBUTEROL SULFATE 90 UG/1
2 AEROSOL, METERED RESPIRATORY (INHALATION) 2 TIMES DAILY PRN
Qty: 18 G | Refills: 2 | Status: SHIPPED | OUTPATIENT
Start: 2022-01-05 | End: 2022-04-27

## 2022-01-05 RX ORDER — AZITHROMYCIN 250 MG/1
TABLET, FILM COATED ORAL
Qty: 6 TABLET | Refills: 0 | Status: SHIPPED | OUTPATIENT
Start: 2022-01-05 | End: 2022-01-10

## 2022-01-05 RX ORDER — FLUTICASONE PROPIONATE 50 MCG
2 SPRAY, SUSPENSION (ML) NASAL DAILY
Qty: 16 G | Refills: 2 | Status: SHIPPED | OUTPATIENT
Start: 2022-01-05 | End: 2023-07-28 | Stop reason: CLARIF

## 2022-01-05 NOTE — PROGRESS NOTES
Navdeep Avery is a 61 y.o. male seen today for symptoms of increased chest congestion cough nasal congestion and postnasal drainage.  His testing today for COVID and flu were negative.  Symptoms have been worsening over the last several days.  He has not lost his sense of taste or smell.      Past Medical History:   Diagnosis Date    Anemia     Anxiety     Atherosclerotic heart disease of native coronary artery with other forms of angina pectoris     Atrophic rhinitis     Carpal tunnel syndrome     COPD (chronic obstructive pulmonary disease)     Coronary arteriosclerosis     Depression     Diabetic ulcer of right foot associated with diabetes mellitus due to underlying condition, with bone involvement without evidence of necrosis     GERD (gastroesophageal reflux disease)     Hyperlipidemia     Hypertension     Insomnia     MI (myocardial infarction)     Neuropathy     Peripheral vascular disease, unspecified     Right foot ulcer, with fat layer exposed 01/07/2015    Sleep apnea     Type 1 diabetes mellitus with foot ulcer     Type 2 diabetes mellitus      Family History   Problem Relation Age of Onset    Arthritis Mother     Hypertension Mother     Learning disabilities Mother     Alcohol abuse Father     Early death Father     Heart disease Father     Cancer Sister     Diabetes Sister     Heart disease Maternal Grandfather     COPD Paternal Grandfather     Heart disease Son      Current Outpatient Medications on File Prior to Visit   Medication Sig Dispense Refill    albuterol (PROVENTIL) 2.5 mg /3 mL (0.083 %) nebulizer solution Take 2.5 mg by nebulization 3 (three) times daily. Rescue      amLODIPine (NORVASC) 10 MG tablet Take 1 tablet (10 mg total) by mouth once daily. 90 tablet 1    aspirin 81 MG Chew Take 81 mg by mouth once daily.      atorvastatin (LIPITOR) 40 MG tablet Take 1 tablet (40 mg total) by mouth nightly. 90 tablet 1    clopidogreL (PLAVIX) 75 mg tablet  Take 1 tablet (75 mg total) by mouth once daily. 90 tablet 1    diclofenac sodium (VOLTAREN) 1 % Gel Apply 1 g topically 4 (four) times daily as needed for Pain.      EScitalopram oxalate (LEXAPRO) 10 MG tablet Take 1 tablet (10 mg total) by mouth 2 (two) times a day. 180 tablet 1    gabapentin (NEURONTIN) 300 MG capsule Take 3 capsules (900 mg total) by mouth every 6 (six) hours. 360 capsule 2    HYDROcodone-acetaminophen (NORCO)  mg per tablet Take 1 tablet by mouth every 8 (eight) hours as needed for Pain (pain). 90 tablet 0    [START ON 1/15/2022] HYDROcodone-acetaminophen (NORCO)  mg per tablet Take 1 tablet by mouth every 8 (eight) hours as needed for Pain (pain). 90 tablet 0    [START ON 2/14/2022] HYDROcodone-acetaminophen (NORCO)  mg per tablet Take 1 tablet by mouth every 8 (eight) hours as needed for Pain (pain). 90 tablet 0    hydrOXYzine pamoate (VISTARIL) 25 MG Cap Take 1 capsule (25 mg total) by mouth once daily. Take nightly for sleep 90 capsule 1    icosapent ethyL (VASCEPA) 1 gram Cap Take 2 capsules (2 g total) by mouth 2 (two) times daily. 120 capsule 5    ketorolac 0.5% (ACULAR) 0.5 % Drop Place 1 drop into the right eye 4 (four) times daily.      lisinopriL 10 MG tablet Take 1 tablet (10 mg total) by mouth once daily. 90 tablet 1    metoprolol succinate (TOPROL-XL) 25 MG 24 hr tablet Take 1 tablet (25 mg total) by mouth once daily. 90 tablet 1    moxifloxacin (VIGAMOX) 0.5 % ophthalmic solution Place 1 drop into the left eye every 2 (two) hours.      nitroGLYCERIN (NITROSTAT) 0.4 MG SL tablet Place 1 tablet (0.4 mg total) under the tongue every 5 (five) minutes as needed for Chest pain. 30 tablet 2    pantoprazole (PROTONIX) 40 MG tablet Take 1 tablet (40 mg total) by mouth once daily. 90 tablet 1    silver sulfADIAZINE 1% (SILVADENE) 1 % cream Apply 1 application topically once daily. Right foot wound      SPIRIVA RESPIMAT 2.5 mcg/actuation inhaler Inhale 1  puff into the lungs once daily. 4 g 5    traZODone (DESYREL) 50 MG tablet Take 2 tablets (100 mg total) by mouth once daily. Take nightly for sleep 30 tablet 2    [DISCONTINUED] albuterol (PROVENTIL/VENTOLIN HFA) 90 mcg/actuation inhaler Inhale 2 puffs into the lungs 2 (two) times daily as needed. 18 g 2     No current facility-administered medications on file prior to visit.     Immunization History   Administered Date(s) Administered    Influenza - Quadrivalent - PF *Preferred* (6 months and older) 09/14/2021    Pneumococcal Conjugate - 13 Valent 10/25/2017    Pneumococcal Polysaccharide - 23 Valent 10/19/2016    Tdap 12/20/2019       Review of Systems   HENT: Positive for congestion.    Respiratory: Positive for cough, sputum production, shortness of breath and wheezing.         Vitals:    01/05/22 1110   Pulse: 84   Resp: 18   Temp: 97.9 °F (36.6 °C)       Physical Exam  HENT:      Nose:      Right Turbinates: Swollen.      Left Turbinates: Swollen.      Comments: Patient has copious postnasal drainage with mild erythema.  Eyes:      Conjunctiva/sclera: Conjunctivae normal.      Pupils: Pupils are equal, round, and reactive to light.   Pulmonary:      Breath sounds: Wheezing and rhonchi present.   Skin:     General: Skin is warm and dry.   Neurological:      Mental Status: He is alert.          Assessment and Plan  Cough  -     POCT SARS-COV2 (COVID) with Flu Antigen    Fever, unspecified fever cause  -     POCT SARS-COV2 (COVID) with Flu Antigen    Nonintractable headache, unspecified chronicity pattern, unspecified headache type  -     POCT SARS-COV2 (COVID) with Flu Antigen    Chronic obstructive pulmonary disease, unspecified COPD type    COPD exacerbation  -     albuterol (PROVENTIL/VENTOLIN HFA) 90 mcg/actuation inhaler; Inhale 2 puffs into the lungs 2 (two) times daily as needed for Wheezing.  Dispense: 18 g; Refill: 2  -     fluticasone propionate (FLONASE) 50 mcg/actuation nasal spray; 2 sprays  (100 mcg total) by Each Nostril route once daily.  Dispense: 16 g; Refill: 2  -     azithromycin (Z-FRANDY) 250 MG tablet; Take 2 tablets by mouth on day 1; Take 1 tablet by mouth on days 2-5  Dispense: 6 tablet; Refill: 0            Return to clinic in 1 month or earlier if symptoms worsen or persist.    Health Maintenance Topics with due status: Not Due       Topic Last Completion Date    Pneumococcal Vaccines (Age 0-64) 10/25/2017    TETANUS VACCINE 12/20/2019    Lipid Panel 09/20/2021    Hemoglobin A1c 09/20/2021    Low Dose Statin 12/16/2021

## 2022-01-17 ENCOUNTER — CLINICAL SUPPORT (OUTPATIENT)
Dept: WOUND CARE | Facility: CLINIC | Age: 62
End: 2022-01-17
Attending: FAMILY MEDICINE
Payer: MEDICARE

## 2022-01-17 VITALS
SYSTOLIC BLOOD PRESSURE: 123 MMHG | HEART RATE: 76 BPM | DIASTOLIC BLOOD PRESSURE: 66 MMHG | RESPIRATION RATE: 20 BRPM | TEMPERATURE: 97 F

## 2022-01-17 DIAGNOSIS — G62.9 NEUROPATHY: ICD-10-CM

## 2022-01-17 DIAGNOSIS — E78.2 MIXED HYPERLIPIDEMIA: ICD-10-CM

## 2022-01-17 DIAGNOSIS — I10 BENIGN HYPERTENSION: ICD-10-CM

## 2022-01-17 DIAGNOSIS — E08.621 DIABETIC ULCER OF MIDFOOT ASSOCIATED WITH DIABETES MELLITUS DUE TO UNDERLYING CONDITION, WITH NECROSIS OF MUSCLE, UNSPECIFIED LATERALITY: ICD-10-CM

## 2022-01-17 DIAGNOSIS — L97.403 DIABETIC ULCER OF MIDFOOT ASSOCIATED WITH DIABETES MELLITUS DUE TO UNDERLYING CONDITION, WITH NECROSIS OF MUSCLE, UNSPECIFIED LATERALITY: ICD-10-CM

## 2022-01-17 DIAGNOSIS — F41.9 ANXIETY: ICD-10-CM

## 2022-01-17 DIAGNOSIS — L97.513 ULCER OF RIGHT FOOT WITH NECROSIS OF MUSCLE: Primary | ICD-10-CM

## 2022-01-17 DIAGNOSIS — F17.218 CIGARETTE NICOTINE DEPENDENCE WITH OTHER NICOTINE-INDUCED DISORDER: ICD-10-CM

## 2022-01-17 PROCEDURE — 11042 DBRDMT SUBQ TIS 1ST 20SQCM/<: CPT | Mod: PBBFAC | Performed by: FAMILY MEDICINE

## 2022-01-17 PROCEDURE — 99215 OFFICE O/P EST HI 40 MIN: CPT | Mod: PBBFAC,25 | Performed by: FAMILY MEDICINE

## 2022-01-17 PROCEDURE — 11043 DBRDMT MUSC&/FSCA 1ST 20/<: CPT | Mod: S$PBB,,, | Performed by: FAMILY MEDICINE

## 2022-01-17 PROCEDURE — 11043 PR DEBRIDEMENT, SKIN, SUB-Q TISSUE,MUSCLE,=<20 SQ CM: ICD-10-PCS | Mod: S$PBB,,, | Performed by: FAMILY MEDICINE

## 2022-01-17 RX ORDER — CIPROFLOXACIN 500 MG/1
500 TABLET ORAL 2 TIMES DAILY
COMMUNITY
Start: 2021-12-27 | End: 2022-03-03 | Stop reason: ALTCHOICE

## 2022-01-17 NOTE — PROGRESS NOTES
Debridement Performed for Assessment: Wound# 1  Performed By: Provider: Dr. Adrien LEAHY  Assistant:  MIAH Castro RN    Debridement: Surgical    Photo taken post procedure: Yes    Time-Out Taken: Yes  Level: Skin/Subcutaneous Tissue/ Muscle  Post Debridement Measurements  Length: (cm) 1.8   Width: (cm) 1.6  Depth: (cm) 0.7      Area: (cm²) 2.8  Percent Debrided: 100%  Total Area Debrided: (sq cm) 2.8    Tissue and other material debrided:  Adipose, Dermis, Epidermis, Subcutaneous, Muscle  Devitalized Tissue Debrided:Biofilm, Slough, Fibrin  Instrument: scissors and pickup  Bleeding: Moderate  Hemostasis Achieved: Pressure  Procedural Pain: Insensate  Post Procedural Pain: Insensate  Response to Treatment: Procedure was tolerated well    Devitalized materials/tissue Removed  the following was removed during debridement  subcutaneous, muscle      Post Debridement Diagnosis  Post debridement diagnosis  Same as Pre-operative debridement diagnosis, No Complications noted.      Grafts or implants applied  Was a graft or implant applied?  No      Procedure assistant  Procedure assisted by:  Assistant is the same as nurse listed above      Complications related to procedure  Did any complication occure during procedure?  No complications noted during or after procedure.      Specimen  Specimen collect during procedure?  No specimen collected      Anaesthesia:  Anesthesia used  None      Blood Loss:  Blood loss during procedure  less than 5 cc

## 2022-01-17 NOTE — PATIENT INSTRUCTIONS
Clean with baby shampoo and water    Apply aquacel ag to wound    Cover with dry gauze,wrap with rachel,paper tape    Change daily and as needed    Elevate feet as much as possible    Walking boot to right foot

## 2022-01-17 NOTE — PROGRESS NOTES
Subjective:      Patient ID: Navdeep Avery is a 61 y.o. male.    Chief Complaint: Non-healing Wound Follow Up and Diabetic Foot Ulcer (Right foot)    Navdeep Avery a 61 y.o. male presents for follow up on all regular problems which are reviewed and discussed.   Noncompliant diabetic smoker not healing up. I wonder why?  Problem List Items Addressed This Visit        Neuro    Neuropathy (Chronic)       Psychiatric    Anxiety       Derm    Ulcer of right foot with necrosis of muscle - Primary       Cardiac/Vascular    Benign hypertension    Mixed hyperlipidemia       Endocrine    Diabetic ulcer of midfoot associated with diabetes mellitus due to underlying condition, with necrosis of muscle       Other    Dependence on nicotine from cigarettes          Past Medical History:  Past Medical History:   Diagnosis Date    Anemia     Anxiety     Atherosclerotic heart disease of native coronary artery with other forms of angina pectoris     Atrophic rhinitis     Carpal tunnel syndrome     COPD (chronic obstructive pulmonary disease)     Coronary arteriosclerosis     Depression     Diabetic ulcer of right foot associated with diabetes mellitus due to underlying condition, with bone involvement without evidence of necrosis     GERD (gastroesophageal reflux disease)     Hyperlipidemia     Hypertension     Insomnia     MI (myocardial infarction)     Neuropathy     Peripheral vascular disease, unspecified     Right foot ulcer, with fat layer exposed 01/07/2015    Sleep apnea     Type 1 diabetes mellitus with foot ulcer     Type 2 diabetes mellitus      Past Surgical History:   Procedure Laterality Date    AMPUTATION      ANGIOPLASTY      CARDIAC CATHETERIZATION      CORONARY STENT PLACEMENT      DEBRIDEMENT      Right foot debridment with hospital stay and IV Abx    SHOULDER OPEN ROTATOR CUFF REPAIR Left     SPINE SURGERY      VASCULAR SURGERY       Review of patient's allergies indicates:  No  Known Allergies  Current Outpatient Medications on File Prior to Visit   Medication Sig Dispense Refill    albuterol (PROVENTIL) 2.5 mg /3 mL (0.083 %) nebulizer solution Take 2.5 mg by nebulization 3 (three) times daily. Rescue      albuterol (PROVENTIL/VENTOLIN HFA) 90 mcg/actuation inhaler Inhale 2 puffs into the lungs 2 (two) times daily as needed for Wheezing. 18 g 2    amLODIPine (NORVASC) 10 MG tablet Take 1 tablet (10 mg total) by mouth once daily. 90 tablet 1    aspirin 81 MG Chew Take 81 mg by mouth once daily.      atorvastatin (LIPITOR) 40 MG tablet Take 1 tablet (40 mg total) by mouth nightly. 90 tablet 1    ciprofloxacin HCl (CIPRO) 500 MG tablet Take 500 mg by mouth 2 (two) times daily.      clopidogreL (PLAVIX) 75 mg tablet Take 1 tablet (75 mg total) by mouth once daily. 90 tablet 1    diclofenac sodium (VOLTAREN) 1 % Gel Apply 1 g topically 4 (four) times daily as needed for Pain.      EScitalopram oxalate (LEXAPRO) 10 MG tablet Take 1 tablet (10 mg total) by mouth 2 (two) times a day. 180 tablet 1    fluticasone propionate (FLONASE) 50 mcg/actuation nasal spray 2 sprays (100 mcg total) by Each Nostril route once daily. 16 g 2    gabapentin (NEURONTIN) 300 MG capsule Take 3 capsules (900 mg total) by mouth every 6 (six) hours. 360 capsule 2    HYDROcodone-acetaminophen (NORCO)  mg per tablet Take 1 tablet by mouth every 8 (eight) hours as needed for Pain (pain). 90 tablet 0    [START ON 2/14/2022] HYDROcodone-acetaminophen (NORCO)  mg per tablet Take 1 tablet by mouth every 8 (eight) hours as needed for Pain (pain). 90 tablet 0    hydrOXYzine pamoate (VISTARIL) 25 MG Cap Take 1 capsule (25 mg total) by mouth once daily. Take nightly for sleep 90 capsule 1    icosapent ethyL (VASCEPA) 1 gram Cap Take 2 capsules (2 g total) by mouth 2 (two) times daily. 120 capsule 5    ketorolac 0.5% (ACULAR) 0.5 % Drop Place 1 drop into the right eye 4 (four) times daily.       lisinopriL 10 MG tablet Take 1 tablet (10 mg total) by mouth once daily. 90 tablet 1    metoprolol succinate (TOPROL-XL) 25 MG 24 hr tablet Take 1 tablet (25 mg total) by mouth once daily. 90 tablet 1    moxifloxacin (VIGAMOX) 0.5 % ophthalmic solution Place 1 drop into the left eye every 2 (two) hours.      nitroGLYCERIN (NITROSTAT) 0.4 MG SL tablet Place 1 tablet (0.4 mg total) under the tongue every 5 (five) minutes as needed for Chest pain. 30 tablet 2    pantoprazole (PROTONIX) 40 MG tablet Take 1 tablet (40 mg total) by mouth once daily. 90 tablet 1    silver sulfADIAZINE 1% (SILVADENE) 1 % cream Apply 1 application topically once daily. Right foot wound      SPIRIVA RESPIMAT 2.5 mcg/actuation inhaler Inhale 1 puff into the lungs once daily. 4 g 5    traZODone (DESYREL) 50 MG tablet Take 2 tablets (100 mg total) by mouth once daily. Take nightly for sleep 30 tablet 2     No current facility-administered medications on file prior to visit.     Social History     Socioeconomic History    Marital status:    Occupational History    Occupation: Retired   Tobacco Use    Smoking status: Current Every Day Smoker     Packs/day: 0.50     Types: Cigarettes    Smokeless tobacco: Never Used   Substance and Sexual Activity    Alcohol use: Not Currently    Drug use: Yes     Types: Oxycodone    Sexual activity: Yes     Family History   Problem Relation Age of Onset    Arthritis Mother     Hypertension Mother     Learning disabilities Mother     Alcohol abuse Father     Early death Father     Heart disease Father     Cancer Sister     Diabetes Sister     Heart disease Maternal Grandfather     COPD Paternal Grandfather     Heart disease Son        Review of Systems   Constitutional: Negative.    HENT: Negative for congestion, ear pain, nosebleeds and trouble swallowing.    Eyes: Negative for pain and itching.   Respiratory: Negative for chest tightness.    Cardiovascular: Negative for chest pain.    Gastrointestinal: Negative for abdominal distention.   Endocrine: Negative for cold intolerance and heat intolerance.   Genitourinary: Negative for difficulty urinating.   Musculoskeletal: Negative for arthralgias.   Neurological: Negative for dizziness.       Objective:     /66   Pulse 76   Temp 97 °F (36.1 °C)   Resp 20     Physical Exam  Constitutional:       Appearance: Normal appearance. He is obese.   HENT:      Head: Normocephalic and atraumatic.      Right Ear: External ear normal.      Left Ear: External ear normal.      Nose: Nose normal.      Mouth/Throat:      Mouth: Mucous membranes are moist.      Pharynx: Oropharynx is clear.   Eyes:      Pupils: Pupils are equal, round, and reactive to light.   Cardiovascular:      Rate and Rhythm: Normal rate and regular rhythm.      Heart sounds: Normal heart sounds.   Pulmonary:      Effort: Pulmonary effort is normal.      Breath sounds: Normal breath sounds.   Abdominal:      Palpations: Abdomen is soft.   Musculoskeletal:         General: Normal range of motion.      Cervical back: Normal range of motion and neck supple.   Skin:     General: Skin is warm and dry.   Neurological:      General: No focal deficit present.      Mental Status: He is alert.   Psychiatric:         Mood and Affect: Mood normal.         Behavior: Behavior normal.         Thought Content: Thought content normal.         Judgment: Judgment normal.       Assessment:     1. Ulcer of right foot with necrosis of muscle    2. Cigarette nicotine dependence with other nicotine-induced disorder    3. Benign hypertension    4. Mixed hyperlipidemia    5. Diabetic ulcer of midfoot associated with diabetes mellitus due to underlying condition, with necrosis of muscle, unspecified laterality    6. Neuropathy    7. Anxiety        Plan:     Problem List Items Addressed This Visit        Neuro    Neuropathy (Chronic)       Psychiatric    Anxiety       Derm    Ulcer of right foot with necrosis  of muscle - Primary       Cardiac/Vascular    Benign hypertension    Mixed hyperlipidemia       Endocrine    Diabetic ulcer of midfoot associated with diabetes mellitus due to underlying condition, with necrosis of muscle       Other    Dependence on nicotine from cigarettes        Follow up in about 3 weeks (around 2/7/2022).  Debridement done see note. Ed on other stuff    I am having Navdeep Avery maintain his aspirin, amLODIPine, atorvastatin, clopidogreL, EScitalopram oxalate, lisinopriL, metoprolol succinate, pantoprazole, SPIRIVA RESPIMAT, nitroGLYCERIN, diclofenac sodium, traZODone, albuterol, ketorolac 0.5%, moxifloxacin, silver sulfADIAZINE 1%, icosapent ethyL, hydrOXYzine pamoate, HYDROcodone-acetaminophen, HYDROcodone-acetaminophen, gabapentin, albuterol, fluticasone propionate, and ciprofloxacin HCl.    Navdeep was seen today for non-healing wound follow up and diabetic foot ulcer.    Diagnoses and all orders for this visit:    Ulcer of right foot with necrosis of muscle    Cigarette nicotine dependence with other nicotine-induced disorder    Benign hypertension    Mixed hyperlipidemia    Diabetic ulcer of midfoot associated with diabetes mellitus due to underlying condition, with necrosis of muscle, unspecified laterality    Neuropathy    Anxiety         [unfilled]  No orders of the defined types were placed in this encounter.

## 2022-02-02 ENCOUNTER — TELEPHONE (OUTPATIENT)
Dept: FAMILY MEDICINE | Facility: CLINIC | Age: 62
End: 2022-02-02
Payer: MEDICARE

## 2022-02-02 RX ORDER — AZITHROMYCIN 250 MG/1
TABLET, FILM COATED ORAL
COMMUNITY
End: 2022-03-30 | Stop reason: ALTCHOICE

## 2022-02-02 NOTE — TELEPHONE ENCOUNTER
Dr Huff consulted, roberta called to pharm and advised pt to take mucinex plain over the counter and to increase fluids.Advised pt to continue use of flonase, neb tx and rescue inhaler as needed. Pt verbalized understanding.

## 2022-02-07 ENCOUNTER — CLINICAL SUPPORT (OUTPATIENT)
Dept: WOUND CARE | Facility: CLINIC | Age: 62
End: 2022-02-07
Attending: NURSE PRACTITIONER
Payer: MEDICARE

## 2022-02-07 VITALS
DIASTOLIC BLOOD PRESSURE: 95 MMHG | SYSTOLIC BLOOD PRESSURE: 170 MMHG | TEMPERATURE: 98 F | HEART RATE: 79 BPM | RESPIRATION RATE: 18 BRPM

## 2022-02-07 DIAGNOSIS — L97.513 ULCER OF RIGHT FOOT WITH NECROSIS OF MUSCLE: Primary | ICD-10-CM

## 2022-02-07 PROCEDURE — 11042 DBRDMT SUBQ TIS 1ST 20SQCM/<: CPT | Mod: PBBFAC | Performed by: NURSE PRACTITIONER

## 2022-02-07 PROCEDURE — 11042 DBRDMT SUBQ TIS 1ST 20SQCM/<: CPT | Mod: S$PBB,,, | Performed by: NURSE PRACTITIONER

## 2022-02-07 PROCEDURE — 11042 PR DEBRIDEMENT, SKIN, SUB-Q TISSUE,=<20 SQ CM: ICD-10-PCS | Mod: S$PBB,,, | Performed by: NURSE PRACTITIONER

## 2022-02-07 PROCEDURE — 99215 OFFICE O/P EST HI 40 MIN: CPT | Mod: PBBFAC,25 | Performed by: NURSE PRACTITIONER

## 2022-02-07 PROCEDURE — 99499 NO LOS: ICD-10-PCS | Mod: S$PBB,,, | Performed by: NURSE PRACTITIONER

## 2022-02-07 PROCEDURE — 99499 UNLISTED E&M SERVICE: CPT | Mod: S$PBB,,, | Performed by: NURSE PRACTITIONER

## 2022-02-07 NOTE — PROGRESS NOTES
GIANA Moncada   [facility] - WOUND CARE  [address] [facility] [address]  [phone]      PATIENT NAME: Navdeep Avery  : 1960  DATE: 22  MRN: [id number]      Billing Provider: GIANA Moncada  Level of Service:   Patient PCP Information     Provider PCP Type    Marquez Huff MD General          Reason for Visit / Chief Complaint: Diabetic Foot Ulcer (Right foot)       History of Present Illness / Problem Focused Workflow     Navdeep Avery is a 61 y.o. male who presents to clinic for follow up on chronic-non healing wound on the right Foot. Patient is compliant/non-compliant with current treatment plan.  Pertinent factors that delay wound healing include multiple co-morbidities, poor vascular supply, current every day smoker, diabetes, no protective sensation, infection and history of poor compliance. Past interventions include debridements,antibiotics, and daily dressing changes.  Denies fever, chills, malaise, fatigue and sweats.          Review of Systems     Review of Systems   Constitutional: Negative for activity change, chills and fever.   Respiratory: Negative for chest tightness and shortness of breath.    Cardiovascular: Positive for leg swelling. Negative for chest pain and palpitations.   Musculoskeletal: Positive for arthralgias and joint swelling.   Skin: Positive for wound.        See wound assessment   Neurological: Positive for weakness and numbness.   Psychiatric/Behavioral: Negative for agitation, behavioral problems, confusion and self-injury.       Medical / Social / Family History     Past Medical History:   Diagnosis Date    Anemia     Anxiety     Atherosclerotic heart disease of native coronary artery with other forms of angina pectoris     Atrophic rhinitis     Carpal tunnel syndrome     COPD (chronic obstructive pulmonary disease)     Coronary arteriosclerosis     COVID 2022    Depression      Diabetic ulcer of right foot associated with diabetes mellitus due to underlying condition, with bone involvement without evidence of necrosis     GERD (gastroesophageal reflux disease)     Hyperlipidemia     Hypertension     Insomnia     MI (myocardial infarction)     Neuropathy     Peripheral vascular disease, unspecified     Right foot ulcer, with fat layer exposed 01/07/2015    Sleep apnea     Type 1 diabetes mellitus with foot ulcer     Type 2 diabetes mellitus        Past Surgical History:   Procedure Laterality Date    AMPUTATION      ANGIOPLASTY      CARDIAC CATHETERIZATION      CORONARY STENT PLACEMENT      DEBRIDEMENT      Right foot debridment with hospital stay and IV Abx    SHOULDER OPEN ROTATOR CUFF REPAIR Left     SPINE SURGERY      VASCULAR SURGERY         Social History  Mr. Navdeep Avery  reports that he has been smoking cigarettes. He has been smoking about 0.50 packs per day. He has never used smokeless tobacco. He reports previous alcohol use. He reports current drug use. Drug: Oxycodone.    Family History  's Navdeep Avery family history includes Alcohol abuse in his father; Arthritis in his mother; COPD in his paternal grandfather; Cancer in his sister; Diabetes in his sister; Early death in his father; Heart disease in his father, maternal grandfather, and son; Hypertension in his mother; Learning disabilities in his mother.    Medications and Allergies     Medications  Outpatient Medications Marked as Taking for the 2/7/22 encounter (Clinical Support) with GIANA Moncada   Medication Sig Dispense Refill    amLODIPine (NORVASC) 10 MG tablet Take 1 tablet (10 mg total) by mouth once daily. 90 tablet 1    aspirin 81 MG Chew Take 81 mg by mouth once daily.      atorvastatin (LIPITOR) 40 MG tablet Take 1 tablet (40 mg total) by mouth nightly. 90 tablet 1    ciprofloxacin HCl (CIPRO) 500 MG tablet Take 500 mg by mouth 2 (two) times daily.      clopidogreL  (PLAVIX) 75 mg tablet Take 1 tablet (75 mg total) by mouth once daily. 90 tablet 1    EScitalopram oxalate (LEXAPRO) 10 MG tablet Take 1 tablet (10 mg total) by mouth 2 (two) times a day. 180 tablet 1    gabapentin (NEURONTIN) 300 MG capsule Take 3 capsules (900 mg total) by mouth every 6 (six) hours. 360 capsule 2    lisinopriL 10 MG tablet Take 1 tablet (10 mg total) by mouth once daily. 90 tablet 1    metoprolol succinate (TOPROL-XL) 25 MG 24 hr tablet Take 1 tablet (25 mg total) by mouth once daily. 90 tablet 1    nitroGLYCERIN (NITROSTAT) 0.4 MG SL tablet Place 1 tablet (0.4 mg total) under the tongue every 5 (five) minutes as needed for Chest pain. 30 tablet 2    pantoprazole (PROTONIX) 40 MG tablet Take 1 tablet (40 mg total) by mouth once daily. 90 tablet 1    SPIRIVA RESPIMAT 2.5 mcg/actuation inhaler Inhale 1 puff into the lungs once daily. 4 g 5    traZODone (DESYREL) 50 MG tablet Take 2 tablets (100 mg total) by mouth once daily. Take nightly for sleep 30 tablet 2       Allergies  Review of patient's allergies indicates:  No Known Allergies    Physical Examination     Vitals:    02/07/22 0922   BP: (!) 170/95   Pulse: 79   Resp: 18   Temp: 97.8 °F (36.6 °C)     Physical Exam  Vitals and nursing note reviewed.   Constitutional:       Appearance: Normal appearance.   HENT:      Head: Normocephalic.   Cardiovascular:      Rate and Rhythm: Normal rate and regular rhythm.      Pulses: Normal pulses.      Heart sounds: Normal heart sounds.   Pulmonary:      Effort: Pulmonary effort is normal. No respiratory distress.   Chest:      Chest wall: No tenderness.   Musculoskeletal:         General: Swelling present.      Right lower leg: Edema present.   Skin:     General: Skin is warm and dry.      Capillary Refill: Capillary refill takes 2 to 3 seconds.      Comments: See wound assessment    Neurological:      General: No focal deficit present.      Mental Status: He is alert and oriented to person, place,  and time. Mental status is at baseline.   Psychiatric:         Mood and Affect: Mood normal.         Behavior: Behavior normal.         Thought Content: Thought content normal.         Judgment: Judgment normal.         Assessment and Plan      1. Ulcer of right foot with necrosis of muscle           Wound 03/19/21 1031 Diabetic Ulcer Right plantar Foot #1 (Active)   03/19/21 1031    Pre-existing: Yes   Primary Wound Type: Diabetic ulc   Side: Right   Orientation: plantar   Location: Foot   Wound Number: #1   Ankle-Brachial Index:    Pulses:    Removal Indication and Assessment:    Wound Outcome:    (Retired) Wound Type:    (Retired) Wound Length (cm):    (Retired) Wound Width (cm):    (Retired) Depth (cm):    Wound Description (Comments):    Removal Indications:    Wound Image    02/07/22 0928   Dressing Appearance Dry;Intact;Clean 02/07/22 0928   Drainage Amount Moderate 02/07/22 0928   Drainage Characteristics/Odor Yellow 02/07/22 0928   Appearance Intact;Pink;Red;Slough;Yellow;Granulating 02/07/22 0928   Black (%), Wound Tissue Color 0 % 02/07/22 0928   Red (%), Wound Tissue Color 70 % 02/07/22 0928   Yellow (%), Wound Tissue Color 30 % 02/07/22 0928   Periwound Area Intact;Moist 02/07/22 0928   Wound Edges Callused 02/07/22 0928   Ballesteros Classification (diabetic foot ulcers only) Grade 1 02/07/22 0928   Wound Length (cm) 1.2 cm 02/07/22 0928   Wound Width (cm) 1.4 cm 02/07/22 0928   Wound Depth (cm) 0.4 cm 02/07/22 0928   Wound Volume (cm^3) 0.672 cm^3 02/07/22 0928   Wound Surface Area (cm^2) 1.68 cm^2 02/07/22 0928   Care Cleansed with:;Antimicrobial agent 02/07/22 0928   Dressing Applied 02/07/22 0928   Off Loading Off loading shoe 02/07/22 0928   Dressing Change Due 02/08/22 02/07/22 0928         Active Problem List with Overview Notes    Diagnosis Date Noted    Insomnia 06/17/2021    Diabetes mellitus without complication 06/17/2021    Anxiety 06/17/2021    Neuropathy     Peripheral vascular disease,  unspecified     Diabetic ulcer of midfoot associated with diabetes mellitus due to underlying condition, with necrosis of muscle 03/23/2021    Ulcer of right foot with necrosis of muscle 03/23/2021    Benign hypertension 03/23/2021    Mixed hyperlipidemia 03/23/2021    Dependence on nicotine from cigarettes 03/23/2021         :    Plan:   You may remove all bandages and shower prior to dressing changes. Bathe with a mild soap such as Dove. Do not sure antibacterial soaps such as Dial. Irrigate the wound with tepid water for 5 minutes. Dry thoroughly.  Do not take tub baths or soak in a Jacuzzi or swimming pool.  If you are unable to shower, you may use wound  or saline wash to cleanse the wound.  Elevate your leg above your heart for 30 to 45 minutes twice daily.  Elevated glucose will interfere with the healing process. Carbohydrates should be consumed sparingly and avoid anything with white flour, white sugar, white rice, white pasta, and white bread. Use whole wheat whole grain products instead.  If you are on steroids or immunosuppressants, understand that this will delay the healing process.  Monitor for signs and symptoms of infection including fever, chills, purulent drainage, increased pain, swelling, or redness and notify clinic at [phone]  Lifestyle Modifications: quit smoking, follow a low fat, low cholesterol diet, attempt to lose weight, decrease or avoid alcohol intake, reduce salt in diet and cooking, improve dietary compliance and continue current medications  Follow up in 3 weeks      Problem List Items Addressed This Visit        Derm    Ulcer of right foot with necrosis of muscle - Primary    Relevant Orders    NM Bone Scan 3 Phase Foot          Future Appointments   Date Time Provider Department Center   2/24/2022  8:30 AM [address]   2/24/2022 10:00 AM [facility]OM [facility]   2/24/2022 11:00 AM [address]  [place]   3/10/2022  8:00 AM [facility]   4/6/2022  9:00 AM Marquez Huff MD [facility] [place]   10/4/2022  2:00 PM AWV NURSE, [facility] FAMILY MEDICINE [facility] [place]            Signature:  GIANA Moncada  [facility] - WOUND CARE  [address] [facility] [address]  [phone]    Date of encounter: 2/7/22

## 2022-02-07 NOTE — PROGRESS NOTES
Debridement Performed for Assessment: Wound# 1  Performed By: Provider: Marli Jacinto NP  Assistant: MIAH Castro RN    Debridement: Surgical    Photo taken post procedure: Yes    Time-Out Taken: Yes  Level: Skin/Subcutaneous Tissue  Post Debridement Measurements  Length: (cm) 1.4   Width: (cm) 1.6  Depth: (cm) 0.3      Area: (cm²) 2.24  Percent Debrided: 100 %  Total Area Debrided: (sq cm) 2.24    Tissue and other material debrided:  Adipose, Dermis, Epidermis, Subcutaneous  Devitalized Tissue Debrided:Biofilm, Slough, Eschar  Instrument: Curette  Bleeding: Moderate  Hemostasis Achieved: Pressure  Procedural Pain: Insensate  Post Procedural Pain: Insensate  Response to Treatment: Procedure was tolerated well    Devitalized materials/tissue Removed  the following was removed during debridement  subcutaneous      Post Debridement Diagnosis  Post debridement diagnosis  Same as Pre-operative debridement diagnosis, No Complications noted.      Grafts or implants applied  Was a graft or implant applied?  No      Procedure assistant  Procedure assisted by:  Assistant is the same as nurse listed above      Complications related to procedure  Did any complication occure during procedure?  No complications noted during or after procedure.      Specimen  Specimen collect during procedure?  No specimen collected      Anaesthesia:  Anesthesia used  None      Blood Loss:  Blood loss during procedure  less than 5 cc

## 2022-02-07 NOTE — PATIENT INSTRUCTIONS
Clean ulcer with baby shampoo and water. Apply Aquael Ag, 4x4s, rachel, secure with paper tape. Change every other day and as needed.     Elevate legs on pillows when sitting and lying down    Wear offloading shoe when walking    Bone scan next visit, [facility] and then come to the [facility] Care [facility]   Patient would like to be prescribed medication:  Ativan/lorazepam. He would like to discuss this with a nurse. He was prescribed this previously.

## 2022-02-22 NOTE — PATIENT INSTRUCTIONS
Clean wound with baby shampoo and water    Apply hydrofera blue to wound,cover with dry gauze,wrap with rachel,paper tape. Change every other day and as needed    Elevate feet with pillows while in bed    Walking shoe to right foot    May use aquacel ag until hydrofera blue is available

## 2022-02-24 ENCOUNTER — HOSPITAL ENCOUNTER (OUTPATIENT)
Dept: RADIOLOGY | Facility: HOSPITAL | Age: 62
Discharge: HOME | End: 2022-02-24
Attending: NURSE PRACTITIONER
Payer: MEDICARE

## 2022-02-24 ENCOUNTER — OFFICE VISIT (OUTPATIENT)
Dept: WOUND CARE | Facility: CLINIC | Age: 62
End: 2022-02-24
Attending: NURSE PRACTITIONER
Payer: MEDICARE

## 2022-02-24 VITALS
HEART RATE: 71 BPM | SYSTOLIC BLOOD PRESSURE: 133 MMHG | DIASTOLIC BLOOD PRESSURE: 72 MMHG | TEMPERATURE: 98 F | RESPIRATION RATE: 20 BRPM

## 2022-02-24 DIAGNOSIS — E08.621 DIABETIC ULCER OF MIDFOOT ASSOCIATED WITH DIABETES MELLITUS DUE TO UNDERLYING CONDITION, WITH NECROSIS OF MUSCLE, UNSPECIFIED LATERALITY: Primary | ICD-10-CM

## 2022-02-24 DIAGNOSIS — L97.513 ULCER OF RIGHT FOOT WITH NECROSIS OF MUSCLE: ICD-10-CM

## 2022-02-24 DIAGNOSIS — L97.403 DIABETIC ULCER OF MIDFOOT ASSOCIATED WITH DIABETES MELLITUS DUE TO UNDERLYING CONDITION, WITH NECROSIS OF MUSCLE, UNSPECIFIED LATERALITY: Primary | ICD-10-CM

## 2022-02-24 PROCEDURE — 78315 BONE IMAGING 3 PHASE: CPT | Mod: 26,,, | Performed by: RADIOLOGY

## 2022-02-24 PROCEDURE — 99499 UNLISTED E&M SERVICE: CPT | Mod: S$PBB,,, | Performed by: NURSE PRACTITIONER

## 2022-02-24 PROCEDURE — 11043 PR DEBRIDEMENT, SKIN, SUB-Q TISSUE,MUSCLE,=<20 SQ CM: ICD-10-PCS | Mod: S$PBB,,, | Performed by: NURSE PRACTITIONER

## 2022-02-24 PROCEDURE — 11043 DBRDMT MUSC&/FSCA 1ST 20/<: CPT | Mod: PBBFAC | Performed by: NURSE PRACTITIONER

## 2022-02-24 PROCEDURE — 99499 NO LOS: ICD-10-PCS | Mod: S$PBB,,, | Performed by: NURSE PRACTITIONER

## 2022-02-24 PROCEDURE — 78315 NM BONE SCAN 3 PHASE FOOT: ICD-10-PCS | Mod: 26,,, | Performed by: RADIOLOGY

## 2022-02-24 PROCEDURE — 99215 OFFICE O/P EST HI 40 MIN: CPT | Mod: PBBFAC,25 | Performed by: NURSE PRACTITIONER

## 2022-02-24 PROCEDURE — 11043 DBRDMT MUSC&/FSCA 1ST 20/<: CPT | Mod: S$PBB,,, | Performed by: NURSE PRACTITIONER

## 2022-02-24 PROCEDURE — A9503 TC99M MEDRONATE: HCPCS

## 2022-02-24 RX ORDER — CYCLOSPORINE 0.5 MG/ML
1 EMULSION OPHTHALMIC EVERY 12 HOURS
Qty: 5.5 ML | Refills: 0 | Status: SHIPPED | OUTPATIENT
Start: 2022-02-24 | End: 2022-03-26

## 2022-02-24 NOTE — PROGRESS NOTES
GIANA Moncada   [facility] - WOUND CARE  [address] [facility] [place] [address]  [phone]      PATIENT NAME: Navdeep Avery  : 1960  DATE: 22  MRN: [id number]      Billing Provider: GIANA Moncada  Level of Service:   Patient PCP Information     Provider PCP Type    Marquez Huff MD General          Reason for Visit / Chief Complaint: No chief complaint on file.       History of Present Illness / Problem Focused Workflow     Navdeep Avery is a 61 y.o. male who presents to clinic for follow up on chronic-non healing wound on the right foot. Patient is compliant with current treatment plan.  Pertinent factors that delay wound healing include multiple co-morbidities, poor vascular supply, diabetes, edema, large surface area and no protective sensation. He is having a bone scan today.  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 Recommended he make appointment with eye doctor to have this evaluated, patient reports he is not going to eye doctor. He is using OTC eye drops and reports improvement. Reports that eyes have sensation of sand, no foreign bodies visualized on exam.  Denies fever, chills, malaise, fatigue and sweats.          Review of Systems     Review of Systems   Constitutional: Negative for activity change, chills and fever.   Eyes: Positive for pain and redness. Negative for discharge.   Respiratory: Negative for chest tightness and shortness of breath.    Cardiovascular: Positive for leg swelling. Negative for chest pain and palpitations.   Musculoskeletal: Positive for arthralgias and joint swelling.   Skin: Positive for wound.        See wound assessment   Neurological: Positive for weakness and numbness.   Psychiatric/Behavioral: Negative for agitation, behavioral problems, confusion and self-injury.       Medical / Social / Family History     Past Medical History:   Diagnosis Date    Anemia     Anxiety      Atherosclerotic heart disease of native coronary artery with other forms of angina pectoris     Atrophic rhinitis     Carpal tunnel syndrome     COPD (chronic obstructive pulmonary disease)     Coronary arteriosclerosis     COVID 02/02/2022    Depression     Diabetic ulcer of right foot associated with diabetes mellitus due to underlying condition, with bone involvement without evidence of necrosis     GERD (gastroesophageal reflux disease)     Hyperlipidemia     Hypertension     Insomnia     MI (myocardial infarction)     Neuropathy     Peripheral vascular disease, unspecified     Right foot ulcer, with fat layer exposed 01/07/2015    Sleep apnea     Type 1 diabetes mellitus with foot ulcer     Type 2 diabetes mellitus        Past Surgical History:   Procedure Laterality Date    AMPUTATION      ANGIOPLASTY      CARDIAC CATHETERIZATION      CORONARY STENT PLACEMENT      DEBRIDEMENT      Right foot debridment with hospital stay and IV Abx    SHOULDER OPEN ROTATOR CUFF REPAIR Left     SPINE SURGERY      VASCULAR SURGERY         Social History  Mr. Navdeep Avery  reports that he has been smoking cigarettes. He has been smoking about 0.50 packs per day. He has never used smokeless tobacco. He reports previous alcohol use. He reports current drug use. Drug: Oxycodone.    Family History  Mr.'s Navdeep Avery family history includes Alcohol abuse in his father; Arthritis in his mother; COPD in his paternal grandfather; Cancer in his sister; Diabetes in his sister; Early death in his father; Heart disease in his father, maternal grandfather, and son; Hypertension in his mother; Learning disabilities in his mother.    Medications and Allergies     Medications  No outpatient medications have been marked as taking for the 2/24/22 encounter (Office Visit) with GIANA Moncada.       Allergies  Review of patient's allergies indicates:  No Known Allergies    Physical Examination      Vitals:    02/24/22 0948   BP: 133/72   Pulse: 71   Resp: 20   Temp: 98 °F (36.7 °C)     Physical Exam  Vitals and nursing note reviewed.   Constitutional:       Appearance: Normal appearance.   HENT:      Head: Normocephalic.   Eyes:      Comments: Skin around bilateral eyes erythematous  No foreign body visualized on exam    Cardiovascular:      Rate and Rhythm: Normal rate and regular rhythm.      Pulses: Normal pulses.      Heart sounds: Normal heart sounds.   Pulmonary:      Effort: Pulmonary effort is normal. No respiratory distress.   Chest:      Chest wall: No tenderness.   Musculoskeletal:         General: Swelling present.      Right lower leg: Edema present.      Left lower leg: Edema present.   Skin:     General: Skin is warm and dry.      Capillary Refill: Capillary refill takes 2 to 3 seconds.      Comments: See wound assessment    Neurological:      General: No focal deficit present.      Mental Status: He is alert and oriented to person, place, and time. Mental status is at baseline.   Psychiatric:         Mood and Affect: Mood normal.         Behavior: Behavior normal.         Thought Content: Thought content normal.         Judgment: Judgment normal.         Assessment and Plan      1. Diabetic ulcer of midfoot associated with diabetes mellitus due to underlying condition, with necrosis of muscle, unspecified laterality    2. Ulcer of right foot with necrosis of muscle           Wound 03/19/21 1031 Diabetic Ulcer Right plantar Foot #1 (Active)   03/19/21 1031    Pre-existing: Yes   Primary Wound Type: Diabetic ulc   Side: Right   Orientation: plantar   Location: Foot   Wound Number: #1   Ankle-Brachial Index:    Pulses:    Removal Indication and Assessment:    Wound Outcome:    (Retired) Wound Type:    (Retired) Wound Length (cm):    (Retired) Wound Width (cm):    (Retired) Depth (cm):    Wound Description (Comments):    Removal Indications:    Wound Image    02/24/22 0950   Dressing Appearance  Dry;Intact;Clean 02/24/22 0950   Drainage Amount Moderate 02/24/22 0950   Drainage Characteristics/Odor Yellow 02/24/22 0950   Appearance Intact;Pink;Red;Slough;Fibrin;Granulating 02/24/22 0950   Black (%), Wound Tissue Color 0 % 02/24/22 0950   Red (%), Wound Tissue Color 80 % 02/24/22 0950   Yellow (%), Wound Tissue Color 20 % 02/24/22 0950   Periwound Area Intact;[place] 02/24/22 0950   Wound Edges Callused 02/24/22 0950   Ballesteros Classification (diabetic foot ulcers only) Grade 1 02/24/22 0950   Wound Length (cm) 1.7 cm 02/24/22 0950   Wound Width (cm) 1.4 cm 02/24/22 0950   Wound Depth (cm) 2 cm 02/24/22 0950   Wound Volume (cm^3) 4.76 cm^3 02/24/22 0950   Wound Surface Area (cm^2) 2.38 cm^2 02/24/22 0950   Care Cleansed with:;Antimicrobial agent 02/24/22 0950   Dressing Removed;Applied;Silver;Calcium alginate;Gauze;Rolled gauze 02/24/22 0950   Periwound Care Moisture barrier applied 02/24/22 0950         Active Problem List with Overview Notes    Diagnosis Date Noted    Insomnia 06/17/2021    Diabetes mellitus without complication 06/17/2021    Anxiety 06/17/2021    Neuropathy     Peripheral vascular disease, unspecified     Diabetic ulcer of midfoot associated with diabetes mellitus due to underlying condition, with necrosis of muscle 03/23/2021    Ulcer of right foot with necrosis of muscle 03/23/2021    Benign hypertension 03/23/2021    Mixed hyperlipidemia 03/23/2021    Dependence on nicotine from cigarettes 03/23/2021         :    Plan:   Bone scan pending results  Apply hydrofera blue to ulcer.   Eye drops called to pharmacy, reiterated importance of scheduling appointment with eye doctor.   Continue off-loading and lifestyle modification to enhance wound healing.     Problem List Items Addressed This Visit        Derm    Ulcer of right foot with necrosis of muscle       Endocrine    Diabetic ulcer of midfoot associated with diabetes mellitus due to underlying condition, with necrosis of muscle -  Primary          Future Appointments   Date Time Provider Department Center   2/24/2022 11:00 AM [address]   3/3/2022  9:00 AM GIANA Moncada [facility]   3/10/2022  8:00 AM Larry Samson PA [facility]   4/6/2022  9:00 AM Marquez Huff MD [facility] [place]   10/4/2022  2:00 PM AWV NURSE, [facility] FAMILY MEDICINE [facility] [place]            Signature:  GIANA Moncada  [facility] - WOUND CARE  [address]  [place] [address]  [phone]    Date of encounter: 2/24/22

## 2022-02-24 NOTE — PROGRESS NOTES
Debridement Performed for Assessment: Wound# 1  Performed By: Provider; Princess Hernandez NP  Assistant: MIAH Castro RN    Debridement: Surgical    Photo taken post procedure: Yes    Time-Out Taken: Yes  Level: Skin/Subcutaneous Tissue/ Muscle  Post Debridement Measurements  Length: (cm) 2.1  Width: (cm) 1.7  Depth: (cm) 0.4      Area: (cm²) 3.57  Percent Debrided: 100%  Total Area Debrided: (sq cm) 3.57    Tissue and other material debrided:  Adipose, Dermis, Epidermis, Subcutaneous, Muscle  Devitalized Tissue Debrided:Biofilm, Slough, Eschar  Instrument: Curette  Bleeding: Moderate  Hemostasis Achieved: Pressure  Procedural Pain: Insensate  Post Procedural Pain: Insensate  Response to Treatment: Procedure was tolerated well    Devitalized materials/tissue Removed  the following was removed during debridement  subcutaneous, muscle      Post Debridement Diagnosis  Post debridement diagnosis  Same as Pre-operative debridement diagnosis, No Complications noted.      Grafts or implants applied  Was a graft or implant applied?  No      Procedure assistant  Procedure assisted by:  Assistant is the same as nurse listed above      Complications related to procedure  Did any complication occure during procedure?  No complications noted during or after procedure.      Specimen  Specimen collect during procedure?  No specimen collected      Anaesthesia:  Anesthesia used  None      Blood Loss:  Blood loss during procedure  less than 5 cc

## 2022-03-03 ENCOUNTER — OFFICE VISIT (OUTPATIENT)
Dept: WOUND CARE | Facility: CLINIC | Age: 62
End: 2022-03-03
Attending: NURSE PRACTITIONER
Payer: MEDICARE

## 2022-03-03 ENCOUNTER — HOSPITAL ENCOUNTER (OUTPATIENT)
Dept: RADIOLOGY | Facility: HOSPITAL | Age: 62
Discharge: HOME | End: 2022-03-03
Attending: NURSE PRACTITIONER
Payer: MEDICARE

## 2022-03-03 VITALS
HEART RATE: 73 BPM | DIASTOLIC BLOOD PRESSURE: 65 MMHG | SYSTOLIC BLOOD PRESSURE: 122 MMHG | RESPIRATION RATE: 20 BRPM | TEMPERATURE: 98 F

## 2022-03-03 DIAGNOSIS — E08.621 DIABETIC ULCER OF MIDFOOT ASSOCIATED WITH DIABETES MELLITUS DUE TO UNDERLYING CONDITION, WITH NECROSIS OF MUSCLE, UNSPECIFIED LATERALITY: Primary | ICD-10-CM

## 2022-03-03 DIAGNOSIS — L97.403 DIABETIC ULCER OF MIDFOOT ASSOCIATED WITH DIABETES MELLITUS DUE TO UNDERLYING CONDITION, WITH NECROSIS OF MUSCLE, UNSPECIFIED LATERALITY: ICD-10-CM

## 2022-03-03 DIAGNOSIS — E08.621 DIABETIC ULCER OF MIDFOOT ASSOCIATED WITH DIABETES MELLITUS DUE TO UNDERLYING CONDITION, WITH NECROSIS OF MUSCLE, UNSPECIFIED LATERALITY: ICD-10-CM

## 2022-03-03 DIAGNOSIS — L97.403 DIABETIC ULCER OF MIDFOOT ASSOCIATED WITH DIABETES MELLITUS DUE TO UNDERLYING CONDITION, WITH NECROSIS OF MUSCLE, UNSPECIFIED LATERALITY: Primary | ICD-10-CM

## 2022-03-03 PROCEDURE — 99214 OFFICE O/P EST MOD 30 MIN: CPT | Mod: PBBFAC | Performed by: NURSE PRACTITIONER

## 2022-03-03 PROCEDURE — 87077 CULTURE AEROBIC IDENTIFY: CPT | Mod: ,,, | Performed by: CLINICAL MEDICAL LABORATORY

## 2022-03-03 PROCEDURE — 87186 CULTURE, WOUND: ICD-10-PCS | Mod: ,,, | Performed by: CLINICAL MEDICAL LABORATORY

## 2022-03-03 PROCEDURE — 99214 PR OFFICE/OUTPT VISIT, EST, LEVL IV, 30-39 MIN: ICD-10-PCS | Mod: S$PBB,,, | Performed by: NURSE PRACTITIONER

## 2022-03-03 PROCEDURE — 73630 XR FOOT COMPLETE 3 VIEW RIGHT: ICD-10-PCS | Mod: 26,RT,, | Performed by: RADIOLOGY

## 2022-03-03 PROCEDURE — 73630 X-RAY EXAM OF FOOT: CPT | Mod: 26,RT,, | Performed by: RADIOLOGY

## 2022-03-03 PROCEDURE — 87186 SC STD MICRODIL/AGAR DIL: CPT | Mod: ,,, | Performed by: CLINICAL MEDICAL LABORATORY

## 2022-03-03 PROCEDURE — 87077 CULTURE, WOUND: ICD-10-PCS | Mod: ,,, | Performed by: CLINICAL MEDICAL LABORATORY

## 2022-03-03 PROCEDURE — 87070 CULTURE OTHR SPECIMN AEROBIC: CPT | Mod: ,,, | Performed by: CLINICAL MEDICAL LABORATORY

## 2022-03-03 PROCEDURE — 99214 OFFICE O/P EST MOD 30 MIN: CPT | Mod: S$PBB,,, | Performed by: NURSE PRACTITIONER

## 2022-03-03 PROCEDURE — 87070 CULTURE, WOUND: ICD-10-PCS | Mod: ,,, | Performed by: CLINICAL MEDICAL LABORATORY

## 2022-03-03 PROCEDURE — 73630 X-RAY EXAM OF FOOT: CPT | Mod: TC,RT

## 2022-03-07 ENCOUNTER — HOSPITAL ENCOUNTER (OUTPATIENT)
Dept: RADIOLOGY | Facility: HOSPITAL | Age: 62
Discharge: HOME | End: 2022-03-07
Attending: NURSE PRACTITIONER
Payer: MEDICARE

## 2022-03-07 ENCOUNTER — OFFICE VISIT (OUTPATIENT)
Dept: WOUND CARE | Facility: CLINIC | Age: 62
End: 2022-03-07
Attending: NURSE PRACTITIONER
Payer: MEDICARE

## 2022-03-07 DIAGNOSIS — L97.513 ULCER OF RIGHT FOOT WITH NECROSIS OF MUSCLE: ICD-10-CM

## 2022-03-07 DIAGNOSIS — L97.516 DIABETIC ULCER OF RIGHT FOOT ASSOCIATED WITH DIABETES MELLITUS DUE TO UNDERLYING CONDITION, WITH BONE INVOLVEMENT WITHOUT EVIDENCE OF NECROSIS, UNSPECIFIED PART OF FOOT: Primary | ICD-10-CM

## 2022-03-07 DIAGNOSIS — R06.02 SOB (SHORTNESS OF BREATH): ICD-10-CM

## 2022-03-07 DIAGNOSIS — I73.9 PERIPHERAL VASCULAR DISEASE, UNSPECIFIED: Chronic | ICD-10-CM

## 2022-03-07 DIAGNOSIS — E08.621 DIABETIC ULCER OF RIGHT FOOT ASSOCIATED WITH DIABETES MELLITUS DUE TO UNDERLYING CONDITION, WITH BONE INVOLVEMENT WITHOUT EVIDENCE OF NECROSIS, UNSPECIFIED PART OF FOOT: Primary | ICD-10-CM

## 2022-03-07 LAB — MICROORGANISM SPEC CULT: ABNORMAL

## 2022-03-07 PROCEDURE — 99183 PR HYPERBARIC OXYGEN THERAPY ATTENDANCE/SUPERVISION, PER SESSION: ICD-10-PCS | Mod: S$PBB,,, | Performed by: FAMILY MEDICINE

## 2022-03-07 PROCEDURE — 71046 X-RAY EXAM CHEST 2 VIEWS: CPT | Mod: TC

## 2022-03-07 PROCEDURE — 71046 X-RAY EXAM CHEST 2 VIEWS: CPT | Mod: 26,,, | Performed by: RADIOLOGY

## 2022-03-07 PROCEDURE — G0277 HBOT, FULL BODY CHAMBER, 30M: HCPCS | Performed by: FAMILY MEDICINE

## 2022-03-07 PROCEDURE — 99183 HYPERBARIC OXYGEN THERAPY: CPT | Mod: PBBFAC | Performed by: FAMILY MEDICINE

## 2022-03-07 PROCEDURE — 99183 HYPERBARIC OXYGEN THERAPY: CPT | Mod: S$PBB,,, | Performed by: FAMILY MEDICINE

## 2022-03-07 PROCEDURE — 71046 XR CHEST PA AND LATERAL: ICD-10-PCS | Mod: 26,,, | Performed by: RADIOLOGY

## 2022-03-07 PROCEDURE — 99213 OFFICE O/P EST LOW 20 MIN: CPT | Mod: PBBFAC,25 | Performed by: FAMILY MEDICINE

## 2022-03-07 NOTE — PROGRESS NOTES
Subjective:      Patient ID: Navdeep Avery is a 61 y.o. male.    Chief Complaint: Hyperbaric Oxygen Therapy and Diabetic Foot Ulcer (Right foot)    Navdeep Avery a 61 y.o. male presents for follow up on all regular problems which are reviewed and discussed.     Problem List Items Addressed This Visit        Derm    Ulcer of right foot with necrosis of muscle       Cardiac/Vascular    Peripheral vascular disease, unspecified (Chronic)       Endocrine    Diabetic ulcer of right foot associated with diabetes mellitus due to underlying condition, with bone involvement without evidence of necrosis - Primary       Other    SOB (shortness of breath)    Relevant Orders    X-Ray Chest PA And Lateral (Completed)          Past Medical History:  Past Medical History:   Diagnosis Date    Anemia     Anxiety     Atherosclerotic heart disease of native coronary artery with other forms of angina pectoris     Atrophic rhinitis     Carpal tunnel syndrome     COPD (chronic obstructive pulmonary disease)     Coronary arteriosclerosis     COVID 02/02/2022    Depression     Diabetic ulcer of right foot associated with diabetes mellitus due to underlying condition, with bone involvement without evidence of necrosis     GERD (gastroesophageal reflux disease)     Hyperlipidemia     Hypertension     Insomnia     MI (myocardial infarction)     Neuropathy     Peripheral vascular disease, unspecified     Right foot ulcer, with fat layer exposed 01/07/2015    Sleep apnea     Type 1 diabetes mellitus with foot ulcer     Type 2 diabetes mellitus      Past Surgical History:   Procedure Laterality Date    AMPUTATION      ANGIOPLASTY      CARDIAC CATHETERIZATION      CORONARY STENT PLACEMENT      DEBRIDEMENT      Right foot debridment with hospital stay and IV Abx    SHOULDER OPEN ROTATOR CUFF REPAIR Left     SPINE SURGERY      VASCULAR SURGERY       Review of patient's allergies indicates:  No Known  Allergies  Current Outpatient Medications on File Prior to Visit   Medication Sig Dispense Refill    albuterol (PROVENTIL) 2.5 mg /3 mL (0.083 %) nebulizer solution Take 2.5 mg by nebulization 3 (three) times daily. Rescue      albuterol (PROVENTIL/VENTOLIN HFA) 90 mcg/actuation inhaler Inhale 2 puffs into the lungs 2 (two) times daily as needed for Wheezing. 18 g 2    amLODIPine (NORVASC) 10 MG tablet Take 1 tablet (10 mg total) by mouth once daily. 90 tablet 1    aspirin 81 MG Chew Take 81 mg by mouth once daily.      atorvastatin (LIPITOR) 40 MG tablet Take 1 tablet (40 mg total) by mouth nightly. 90 tablet 1    azithromycin (Z-FRANDY) 250 MG tablet Take 2 tablet by mouth today and then 1 tablet by mouth for 4 days      clopidogreL (PLAVIX) 75 mg tablet Take 1 tablet (75 mg total) by mouth once daily. 90 tablet 1    cycloSPORINE (RESTASIS MULTIDOSE) 0.05 % Drop Apply 1 drop to eye every 12 (twelve) hours. 5.5 mL 0    diclofenac sodium (VOLTAREN) 1 % Gel Apply 1 g topically 4 (four) times daily as needed for Pain.      EScitalopram oxalate (LEXAPRO) 10 MG tablet Take 1 tablet (10 mg total) by mouth 2 (two) times a day. 180 tablet 1    fluticasone propionate (FLONASE) 50 mcg/actuation nasal spray 2 sprays (100 mcg total) by Each Nostril route once daily. 16 g 2    gabapentin (NEURONTIN) 300 MG capsule Take 3 capsules (900 mg total) by mouth every 6 (six) hours. 360 capsule 2    HYDROcodone-acetaminophen (NORCO)  mg per tablet Take 1 tablet by mouth every 8 (eight) hours as needed for Pain (pain). 90 tablet 0    hydrOXYzine pamoate (VISTARIL) 25 MG Cap Take 1 capsule (25 mg total) by mouth once daily. Take nightly for sleep 90 capsule 1    icosapent ethyL (VASCEPA) 1 gram Cap Take 2 capsules (2 g total) by mouth 2 (two) times daily. 120 capsule 5    lisinopriL 10 MG tablet Take 1 tablet (10 mg total) by mouth once daily. 90 tablet 1    metoprolol succinate (TOPROL-XL) 25 MG 24 hr tablet Take 1  tablet (25 mg total) by mouth once daily. 90 tablet 1    nitroGLYCERIN (NITROSTAT) 0.4 MG SL tablet Place 1 tablet (0.4 mg total) under the tongue every 5 (five) minutes as needed for Chest pain. 30 tablet 2    pantoprazole (PROTONIX) 40 MG tablet Take 1 tablet (40 mg total) by mouth once daily. 90 tablet 1    SPIRIVA RESPIMAT 2.5 mcg/actuation inhaler Inhale 1 puff into the lungs once daily. 4 g 5    traZODone (DESYREL) 50 MG tablet Take 2 tablets (100 mg total) by mouth once daily. Take nightly for sleep 30 tablet 2     No current facility-administered medications on file prior to visit.     Social History     Socioeconomic History    Marital status:    Occupational History    Occupation: Retired   Tobacco Use    Smoking status: Current Every Day Smoker     Packs/day: 0.50     Types: Cigarettes    Smokeless tobacco: Never Used   Substance and Sexual Activity    Alcohol use: Not Currently    Drug use: Yes     Types: Oxycodone    Sexual activity: Yes     Family History   Problem Relation Age of Onset    Arthritis Mother     Hypertension Mother     Learning disabilities Mother     Alcohol abuse Father     Early death Father     Heart disease Father     Cancer Sister     Diabetes Sister     Heart disease Maternal Grandfather     COPD Paternal Grandfather     Heart disease Son        Review of Systems   Constitutional: Negative.    HENT: Negative for congestion, ear pain, nosebleeds and trouble swallowing.    Eyes: Negative for pain and itching.   Respiratory: Negative for chest tightness.    Cardiovascular: Negative for chest pain.   Gastrointestinal: Negative for abdominal distention.   Endocrine: Negative for cold intolerance and heat intolerance.   Genitourinary: Negative for difficulty urinating.   Musculoskeletal: Negative for arthralgias.   Neurological: Negative for dizziness.       Objective:     There were no vitals taken for this visit.    Physical Exam  Constitutional:        Appearance: Normal appearance. He is obese.   HENT:      Head: Normocephalic and atraumatic.      Right Ear: External ear normal.      Left Ear: External ear normal.      Nose: Nose normal.      Mouth/Throat:      Mouth: Mucous membranes are moist.      Pharynx: Oropharynx is clear.   Eyes:      Pupils: Pupils are equal, round, and reactive to light.   Cardiovascular:      Rate and Rhythm: Normal rate and regular rhythm.      Heart sounds: Normal heart sounds.   Pulmonary:      Effort: Pulmonary effort is normal.      Breath sounds: Normal breath sounds.   Abdominal:      Palpations: Abdomen is soft.   Musculoskeletal:         General: Normal range of motion.      Cervical back: Normal range of motion and neck supple.   Skin:     General: Skin is warm and dry.   Neurological:      General: No focal deficit present.      Mental Status: He is alert.   Psychiatric:         Mood and Affect: Mood normal.         Behavior: Behavior normal.         Thought Content: Thought content normal.         Judgment: Judgment normal.       Assessment:     1. Diabetic ulcer of right foot associated with diabetes mellitus due to underlying condition, with bone involvement without evidence of necrosis, unspecified part of foot    2. SOB (shortness of breath)    3. Ulcer of right foot with necrosis of muscle    4. Peripheral vascular disease, unspecified        Plan:     Problem List Items Addressed This Visit        Derm    Ulcer of right foot with necrosis of muscle       Cardiac/Vascular    Peripheral vascular disease, unspecified (Chronic)       Endocrine    Diabetic ulcer of right foot associated with diabetes mellitus due to underlying condition, with bone involvement without evidence of necrosis - Primary       Other    SOB (shortness of breath)    Relevant Orders    X-Ray Chest PA And Lateral (Completed)        Follow up in about 1 day (around 3/8/2022).      I am having Navdeep Avery maintain his aspirin, amLODIPine,  atorvastatin, clopidogreL, EScitalopram oxalate, lisinopriL, metoprolol succinate, pantoprazole, SPIRIVA RESPIMAT, nitroGLYCERIN, diclofenac sodium, traZODone, albuterol, icosapent ethyL, hydrOXYzine pamoate, HYDROcodone-acetaminophen, gabapentin, albuterol, fluticasone propionate, azithromycin, and RESTASIS MULTIDOSE.    Navdeep was seen today for hyperbaric oxygen therapy and diabetic foot ulcer.    Diagnoses and all orders for this visit:    Diabetic ulcer of right foot associated with diabetes mellitus due to underlying condition, with bone involvement without evidence of necrosis, unspecified part of foot    SOB (shortness of breath)  -     X-Ray Chest PA And Lateral; Future    Ulcer of right foot with necrosis of muscle    Peripheral vascular disease, unspecified         [unfilled]  Orders Placed This Encounter   Procedures    X-Ray Chest PA And Lateral     Standing Status:   Future     Number of Occurrences:   1     Standing Expiration Date:   3/7/2023     Order Specific Question:   May the Radiologist modify the order per protocol to meet the clinical needs of the patient?     Answer:   Yes     Order Specific Question:   Release to patient     Answer:   Immediate

## 2022-03-07 NOTE — PROGRESS NOTES
GIANA Moncada   [facility] - WOUND CARE  [address] [facility] [address]  [phone]      PATIENT NAME: Navdeep Avery  : 1960  DATE: 3/3/22  MRN: [id number]      Billing Provider: GIANA Moncada  Level of Service:   Patient PCP Information     Provider PCP Type    Marquez Huff MD General          Reason for Visit / Chief Complaint: Non-healing Wound Follow Up and Diabetic Foot Ulcer (Right foot)       History of Present Illness / Problem Focused Workflow     Navdeep Avery is a a 61 y.o. male who presents to clinic for follow up on chronic-non healing wound on the right foot. Patient is compliant with current treatment plan.  Pertinent factors that delay wound healing include multiple co-morbidities, poor vascular supply, diabetes, edema, large surface area and no protective sensation. He had 3 phase bone scan at last visit, repeat x-ray today. Wound has exposed bone, will set up for HBOT. Denies fever, chills, malaise, fatigue and sweats.        Review of Systems     Review of Systems   Constitutional: Negative for activity change, chills and fever.   Respiratory: Negative for chest tightness and shortness of breath.    Cardiovascular: Positive for leg swelling. Negative for chest pain and palpitations.   Musculoskeletal: Positive for arthralgias and joint swelling.   Skin: Positive for wound.        See wound assessment   Neurological: Positive for weakness and numbness.   Psychiatric/Behavioral: Negative for agitation, behavioral problems, confusion and self-injury.       Medical / Social / Family History     Past Medical History:   Diagnosis Date    Anemia     Anxiety     Atherosclerotic heart disease of native coronary artery with other forms of angina pectoris     Atrophic rhinitis     Carpal tunnel syndrome     COPD (chronic obstructive pulmonary disease)     Coronary arteriosclerosis     COVID 2022    Depression      Diabetic ulcer of right foot associated with diabetes mellitus due to underlying condition, with bone involvement without evidence of necrosis     GERD (gastroesophageal reflux disease)     Hyperlipidemia     Hypertension     Insomnia     MI (myocardial infarction)     Neuropathy     Peripheral vascular disease, unspecified     Right foot ulcer, with fat layer exposed 01/07/2015    Sleep apnea     Type 1 diabetes mellitus with foot ulcer     Type 2 diabetes mellitus        Past Surgical History:   Procedure Laterality Date    AMPUTATION      ANGIOPLASTY      CARDIAC CATHETERIZATION      CORONARY STENT PLACEMENT      DEBRIDEMENT      Right foot debridment with hospital stay and IV Abx    SHOULDER OPEN ROTATOR CUFF REPAIR Left     SPINE SURGERY      VASCULAR SURGERY         Social History  Mr. Navdeep Avery  reports that he has been smoking cigarettes. He has been smoking about 0.50 packs per day. He has never used smokeless tobacco. He reports previous alcohol use. He reports current drug use. Drug: Oxycodone.    Family History  's Navdeep Avery family history includes Alcohol abuse in his father; Arthritis in his mother; COPD in his paternal grandfather; Cancer in his sister; Diabetes in his sister; Early death in his father; Heart disease in his father, maternal grandfather, and son; Hypertension in his mother; Learning disabilities in his mother.    Medications and Allergies     Medications  Outpatient Medications Marked as Taking for the 3/3/22 encounter (Office Visit) with GIANA Moncada   Medication Sig Dispense Refill    amLODIPine (NORVASC) 10 MG tablet Take 1 tablet (10 mg total) by mouth once daily. 90 tablet 1    aspirin 81 MG Chew Take 81 mg by mouth once daily.      atorvastatin (LIPITOR) 40 MG tablet Take 1 tablet (40 mg total) by mouth nightly. 90 tablet 1    clopidogreL (PLAVIX) 75 mg tablet Take 1 tablet (75 mg total) by mouth once daily. 90 tablet 1     diclofenac sodium (VOLTAREN) 1 % Gel Apply 1 g topically 4 (four) times daily as needed for Pain.      EScitalopram oxalate (LEXAPRO) 10 MG tablet Take 1 tablet (10 mg total) by mouth 2 (two) times a day. 180 tablet 1    gabapentin (NEURONTIN) 300 MG capsule Take 3 capsules (900 mg total) by mouth every 6 (six) hours. 360 capsule 2    HYDROcodone-acetaminophen (NORCO)  mg per tablet Take 1 tablet by mouth every 8 (eight) hours as needed for Pain (pain). 90 tablet 0    hydrOXYzine pamoate (VISTARIL) 25 MG Cap Take 1 capsule (25 mg total) by mouth once daily. Take nightly for sleep 90 capsule 1    lisinopriL 10 MG tablet Take 1 tablet (10 mg total) by mouth once daily. 90 tablet 1    metoprolol succinate (TOPROL-XL) 25 MG 24 hr tablet Take 1 tablet (25 mg total) by mouth once daily. 90 tablet 1    nitroGLYCERIN (NITROSTAT) 0.4 MG SL tablet Place 1 tablet (0.4 mg total) under the tongue every 5 (five) minutes as needed for Chest pain. 30 tablet 2    pantoprazole (PROTONIX) 40 MG tablet Take 1 tablet (40 mg total) by mouth once daily. 90 tablet 1    traZODone (DESYREL) 50 MG tablet Take 2 tablets (100 mg total) by mouth once daily. Take nightly for sleep 30 tablet 2       Allergies  Review of patient's allergies indicates:  No Known Allergies    Physical Examination     Vitals:    03/03/22 0928   BP: 122/65   Pulse: 73   Resp: 20   Temp: 97.5 °F (36.4 °C)     Physical Exam  Vitals and nursing note reviewed.   Constitutional:       Appearance: Normal appearance.   HENT:      Head: Normocephalic.   Cardiovascular:      Rate and Rhythm: Normal rate and regular rhythm.      Pulses: Normal pulses.      Heart sounds: Normal heart sounds.   Pulmonary:      Effort: Pulmonary effort is normal. No respiratory distress.   Chest:      Chest wall: No tenderness.   Musculoskeletal:         General: Swelling present.      Right lower leg: Edema present.   Skin:     General: Skin is warm and dry.      Comments: See wound  assessment    Neurological:      General: No focal deficit present.      Mental Status: He is alert and oriented to person, place, and time. Mental status is at baseline.   Psychiatric:         Mood and Affect: Mood normal.         Behavior: Behavior normal.         Thought Content: Thought content normal.         Judgment: Judgment normal.         Assessment and Plan      1. Diabetic ulcer of midfoot associated with diabetes mellitus due to underlying condition, with necrosis of muscle, unspecified laterality           Wound 03/19/21 1031 Diabetic Ulcer Right plantar Foot #1 (Active)   03/19/21 1031    Pre-existing: Yes   Primary Wound Type: Diabetic ulc   Side: Right   Orientation: plantar   Location: Foot   Wound Number: #1   Ankle-Brachial Index:    Pulses:    Removal Indication and Assessment:    Wound Outcome:    (Retired) Wound Type:    (Retired) Wound Length (cm):    (Retired) Wound Width (cm):    (Retired) Depth (cm):    Wound Description (Comments):    Removal Indications:    Wound Image    03/03/22 1520   Dressing Appearance Dry;Intact;Dried drainage 03/03/22 1520   Drainage Amount Small 03/03/22 1520   Drainage Characteristics/Odor Serosanguineous 03/03/22 1520   Appearance Pink;Moist;Granulating;Tan;Fibrin 03/03/22 1520   Tissue loss description Full thickness 03/03/22 1520   Black (%), Wound Tissue Color 0 % 03/03/22 1520   Red (%), Wound Tissue Color 80 % 03/03/22 1520   Yellow (%), Wound Tissue Color 20 % 03/03/22 1520   Periwound Area Dry 03/03/22 1520   Wound Edges Callused 03/03/22 1520   [place] Classification (diabetic foot ulcers only) Grade 2 03/03/22 1520   Wound Length (cm) 0.7 cm 03/03/22 1520   Wound Width (cm) 0.6 cm 03/03/22 1520   Wound Depth (cm) 0.4 cm 03/03/22 1520   Wound Volume (cm^3) 0.168 cm^3 03/03/22 1520   Wound Surface Area (cm^2) 0.42 cm^2 03/03/22 1520   Care Cleansed with:;Antimicrobial agent 03/03/22 1520   Dressing Applied;Methylene blue/gentian violet;Gauze;Rolled gauze  03/03/22 1520   Compression Tubular elasticized bandage 03/03/22 1520   Dressing Change Due 03/04/22 03/03/22 1520         Active Problem List with Overview Notes    Diagnosis Date Noted    SOB (shortness of breath) 03/07/2022    Diabetic ulcer of right foot associated with diabetes mellitus due to underlying condition, with bone involvement without evidence of necrosis     Insomnia 06/17/2021    Diabetes mellitus without complication 06/17/2021    Anxiety 06/17/2021    Neuropathy     Peripheral vascular disease, unspecified     Diabetic ulcer of midfoot associated with diabetes mellitus due to underlying condition, with necrosis of muscle 03/23/2021    Ulcer of right foot with necrosis of muscle 03/23/2021    Benign hypertension 03/23/2021    Mixed hyperlipidemia 03/23/2021    Dependence on nicotine from cigarettes 03/23/2021         :    Plan:   Bone scan and x-ray review, diagnosis consistent with chronic osteomyelitis.   Start HBOT for Grade 3 diabetic foot ulcer with exposed bone.   Culture pending, will prescribe antibiotics as indicated.   RTC on Monday.     Problem List Items Addressed This Visit        Endocrine    Diabetic ulcer of midfoot associated with diabetes mellitus due to underlying condition, with necrosis of muscle - Primary    Relevant Orders    Culture, Wound (Completed)          Future Appointments   Date Time Provider Department Center   3/8/2022  8:00 AM GIANA Moncada [facility]   3/10/2022  8:00 AM Larry Samson [facility]   3/11/2022  1:30 PM Marquez Huff MD [facility]   3/17/2022  9:00 AM GIANA Moncada [facility]   4/6/2022  9:00 AM Marquez Huff MD [facility]   10/4/2022  2:00 PM AWV NURSE, [facility]            Signature:  GIANA Moncada  [facility] - WOUND CARE  [address] [facility] [place]  [address]  [phone]    Date of encounter: 3/3/22

## 2022-03-08 ENCOUNTER — OFFICE VISIT (OUTPATIENT)
Dept: WOUND CARE | Facility: CLINIC | Age: 62
End: 2022-03-08
Attending: FAMILY MEDICINE
Payer: MEDICARE

## 2022-03-08 VITALS
RESPIRATION RATE: 20 BRPM | SYSTOLIC BLOOD PRESSURE: 169 MMHG | HEART RATE: 67 BPM | DIASTOLIC BLOOD PRESSURE: 80 MMHG | TEMPERATURE: 97 F

## 2022-03-08 DIAGNOSIS — L97.513 ULCER OF RIGHT FOOT WITH NECROSIS OF MUSCLE: ICD-10-CM

## 2022-03-08 DIAGNOSIS — E08.621 DIABETIC ULCER OF RIGHT FOOT ASSOCIATED WITH DIABETES MELLITUS DUE TO UNDERLYING CONDITION, WITH BONE INVOLVEMENT WITHOUT EVIDENCE OF NECROSIS, UNSPECIFIED PART OF FOOT: Primary | ICD-10-CM

## 2022-03-08 DIAGNOSIS — L97.516 DIABETIC ULCER OF RIGHT FOOT ASSOCIATED WITH DIABETES MELLITUS DUE TO UNDERLYING CONDITION, WITH BONE INVOLVEMENT WITHOUT EVIDENCE OF NECROSIS, UNSPECIFIED PART OF FOOT: Primary | ICD-10-CM

## 2022-03-08 PROCEDURE — 99183 PR HYPERBARIC OXYGEN THERAPY ATTENDANCE/SUPERVISION, PER SESSION: ICD-10-PCS | Mod: S$PBB,,, | Performed by: FAMILY MEDICINE

## 2022-03-08 PROCEDURE — 82962 GLUCOSE BLOOD TEST: CPT | Mod: PBBFAC | Performed by: FAMILY MEDICINE

## 2022-03-08 PROCEDURE — 99214 OFFICE O/P EST MOD 30 MIN: CPT | Mod: PBBFAC,25 | Performed by: FAMILY MEDICINE

## 2022-03-08 PROCEDURE — 99183 HYPERBARIC OXYGEN THERAPY: CPT | Mod: S$PBB,,, | Performed by: FAMILY MEDICINE

## 2022-03-08 PROCEDURE — G0277 HBOT, FULL BODY CHAMBER, 30M: HCPCS | Performed by: FAMILY MEDICINE

## 2022-03-08 PROCEDURE — 99183 HYPERBARIC OXYGEN THERAPY: CPT | Mod: PBBFAC | Performed by: FAMILY MEDICINE

## 2022-03-08 NOTE — PROGRESS NOTES
Disclaimer:  This note has been generated using voice recognition software.  There may be type of graft focal areas that have been missed during a proof reading      Subjective:      Patient ID: Navdeep Avery is a 61 y.o. male.    Chief Complaint: Leg Pain, Foot Pain, and Shoulder Pain      Pain  This is a chronic problem. The current episode started more than 1 year ago. The problem occurs daily. The problem has been unchanged. Associated symptoms include arthralgias and neck pain. Pertinent negatives include no change in bowel habit, chest pain, chills, coughing, fatigue, fever, rash, sore throat, vertigo or vomiting.     Review of Systems   Constitutional: Negative for activity change, chills, fatigue, fever and unexpected weight change.   HENT: Negative for drooling, ear discharge, ear pain, facial swelling, nosebleeds, sore throat, trouble swallowing, voice change and goiter.    Eyes: Negative for photophobia, pain, discharge, redness and visual disturbance.   Respiratory: Negative for apnea, cough, choking, chest tightness, shortness of breath, wheezing and stridor.    Cardiovascular: Negative for chest pain, palpitations and leg swelling.   Gastrointestinal: Negative for abdominal distention, change in bowel habit, diarrhea, rectal pain, vomiting, fecal incontinence and change in bowel habit.   Endocrine: Negative for cold intolerance, heat intolerance, polydipsia, polyphagia and polyuria.   Genitourinary: Negative for bladder incontinence, dysuria, flank pain and frequency.   Musculoskeletal: Positive for arthralgias, back pain, leg pain, neck pain and neck stiffness.   Integumentary:  Negative for color change, pallor and rash.   Neurological: Negative for dizziness, vertigo, seizures, syncope, facial asymmetry, speech difficulty, light-headedness, disturbances in coordination, memory loss and coordination difficulties.   Hematological: Negative for adenopathy. Does not bruise/bleed easily.  "  Psychiatric/Behavioral: Negative for agitation, behavioral problems, confusion, decreased concentration, dysphoric mood, hallucinations, self-injury and suicidal ideas. The patient is not nervous/anxious and is not hyperactive.             Objective:  Vitals:    03/10/22 0810   BP: (!) 172/76   Pulse: 69   Resp: 19   Weight: 78 kg (172 lb)   Height: 5' 8" (1.727 m)   PainSc:   6         Physical Exam  Vitals and nursing note reviewed. Exam conducted with a chaperone present.   Constitutional:       General: He is awake.      Appearance: Normal appearance. He is not ill-appearing, toxic-appearing or diaphoretic.   HENT:      Head: Normocephalic and atraumatic.      Nose: Nose normal.      Mouth/Throat:      Mouth: Mucous membranes are moist.      Pharynx: Oropharynx is clear.   Eyes:      Conjunctiva/sclera: Conjunctivae normal.      Pupils: Pupils are equal, round, and reactive to light.   Cardiovascular:      Rate and Rhythm: Normal rate.   Pulmonary:      Effort: Pulmonary effort is normal. No respiratory distress.   Abdominal:      Palpations: Abdomen is soft.   Musculoskeletal:         General: Normal range of motion.      Cervical back: Normal range of motion and neck supple.      Thoracic back: Tenderness present.      Lumbar back: Tenderness present.   Skin:     General: Skin is warm and dry.      Coloration: Skin is not jaundiced or pale.   Neurological:      General: No focal deficit present.      Mental Status: He is alert and oriented to person, place, and time. Mental status is at baseline.      Cranial Nerves: Cranial nerves are intact. No cranial nerve deficit (II-XII).   Psychiatric:         Mood and Affect: Mood normal.         Behavior: Behavior normal. Behavior is cooperative.         Thought Content: Thought content normal.           Orders Placed This Encounter   Procedures    POCT Urine Drug Screen Presump     Interpretive Information:     Negative:  No drug detected at the cut off level. "   Positive:  This result represents presumptive positive for the   tested drug, other substances may yield a positive response other   than the analyte of interest. This result should be utilized for   diagnostic purpose only. Confirmation testing will be performed upon physician request only.           X-Ray Chest PA And Lateral  Narrative: EXAMINATION:  XR CHEST PA AND LATERAL    CLINICAL HISTORY:  Shortness of breath    TECHNIQUE:  PA and lateral views of the chest were performed.    COMPARISON:  12/16/2021    FINDINGS:  Heart size borderline and similar to prior.  Lungs clear.  No pneumothorax or pleural effusion.  Pulmonary vessels show normal caliber.  Bones intact.  Impression: No acute findings.    Electronically signed by: Jeffery Monae  Date:    03/07/2022  Time:    08:58       Office Visit on 03/03/2022   Component Date Value Ref Range Status    Culture, Wound/Abscess 03/03/2022 Light Growth Staphylococcus lugdunensis (A)  Final   Lab Requisition on 02/02/2022   Component Date Value Ref Range Status    COVID-19 Ag 02/02/2022 Positive (A) Negative, Invalid Corrected   Office Visit on 01/05/2022   Component Date Value Ref Range Status    SARS Coronavirus 2 Antigen 01/05/2022 Negative  Negative Final    Rapid Influenza A Ag 01/05/2022 Negative  Negative Final    Rapid Influenza B Ag 01/05/2022 Negative  Negative Final     Acceptable 01/05/2022 Yes   Final   Office Visit on 12/16/2021   Component Date Value Ref Range Status    SARS Coronavirus 2 Antigen 12/16/2021 Negative  Negative Final    Rapid Influenza A Ag 12/16/2021 Negative  Negative Final    Rapid Influenza B Ag 12/16/2021 Negative  Negative Final     Acceptable 12/16/2021 Yes   Final   Office Visit on 12/13/2021   Component Date Value Ref Range Status    POC Amphetamines 12/13/2021 Negative  Negative, Inconclusive Final    POC Barbiturates 12/13/2021 Negative  Negative, Inconclusive Final    POC Benzodiazepines  12/13/2021 Negative  Negative, Inconclusive Final    POC Cocaine 12/13/2021 Negative  Negative, Inconclusive Final    POC THC 12/13/2021 Negative  Negative, Inconclusive Final    POC Methadone 12/13/2021 Negative  Negative, Inconclusive Final    POC Methamphetamine 12/13/2021 Negative  Negative, Inconclusive Final    POC Opiates 12/13/2021 Presumptive Positive (A) Negative, Inconclusive Final    POC Oxycodone 12/13/2021 Negative  Negative, Inconclusive Final    POC Phencyclidine 12/13/2021 Negative  Negative, Inconclusive Final    POC Methylenedioxymethamphetamine * 12/13/2021 Negative  Negative, Inconclusive Final    POC Tricyclic Antidepressants 12/13/2021 Negative  Negative, Inconclusive Final    POC Buprenorphine 12/13/2021 Negative   Final     Acceptable 12/13/2021 Yes   Final    POC Temperature (Urine) 12/13/2021 92   Final   Lab Requisition on 09/20/2021   Component Date Value Ref Range Status    Sodium 09/20/2021 135 (A) 136 - 145 mmol/L Final    Potassium 09/20/2021 4.9  3.5 - 5.1 mmol/L Final    Chloride 09/20/2021 101  98 - 107 mmol/L Final    CO2 09/20/2021 32  21 - 32 mmol/L Final    Anion Gap 09/20/2021 7  7 - 16 mmol/L Final    Glucose 09/20/2021 246 (A) 74 - 106 mg/dL Final    BUN 09/20/2021 24 (A) 7 - 18 mg/dL Final    Creatinine 09/20/2021 1.15  0.70 - 1.30 mg/dL Final    BUN/Creatinine Ratio 09/20/2021 21 (A) 6 - 20 Final    Calcium 09/20/2021 9.1  8.5 - 10.1 mg/dL Final    Total Protein 09/20/2021 7.6  6.4 - 8.2 g/dL Final    Albumin 09/20/2021 3.8  3.5 - 5.0 g/dL Final    Globulin 09/20/2021 3.8  2.0 - 4.0 g/dL Final    A/G Ratio 09/20/2021 1.0   Final    Bilirubin, Total 09/20/2021 0.1  >0.0 - 1.2 mg/dL Final    Alk Phos 09/20/2021 97  45 - 115 U/L Final    ALT 09/20/2021 21  16 - 61 U/L Final    AST 09/20/2021 15  15 - 37 U/L Final    eGFR 09/20/2021 68  >=60 mL/min/1.73m² Final    Triglycerides 09/20/2021 447 (A) 35 - 150 mg/dL Final     Cholesterol 09/20/2021 246 (A) 0 - 200 mg/dL Final    HDL Cholesterol 09/20/2021 33 (A) 40 - 60 mg/dL Final    Cholesterol/HDL Ratio (Risk Factor) 09/20/2021 7.5   Final    Non-HDL 09/20/2021 213  mg/dL Final    LDL Calculated 09/20/2021    Final    LDL/HDL 09/20/2021    Final    VLDL 09/20/2021    Final    Hemoglobin A1C 09/20/2021 9.2 (A) 4.5 - 6.6 % Final    Estimated Average Glucose 09/20/2021 220  mg/dL Final    PSA Total 09/20/2021 1.750  0.000 - 4.100 ng/mL Final    WBC 09/20/2021 5.52  4.50 - 11.00 K/uL Final    RBC 09/20/2021 4.41 (A) 4.60 - 6.20 M/uL Final    Hemoglobin 09/20/2021 13.7  13.5 - 18.0 g/dL Final    Hematocrit 09/20/2021 40.4  40.0 - 54.0 % Final    MCV 09/20/2021 91.6  80.0 - 96.0 fL Final    MCH 09/20/2021 31.1 (A) 27.0 - 31.0 pg Final    MCHC 09/20/2021 33.9  32.0 - 36.0 g/dL Final    RDW 09/20/2021 12.0  11.5 - 14.5 % Final    Platelet Count 09/20/2021 185  150 - 400 K/uL Final    MPV 09/20/2021 10.7  9.4 - 12.4 fL Final    Neutrophils % 09/20/2021 40.6 (A) 53.0 - 65.0 % Final    Lymphocytes % 09/20/2021 42.6 (A) 27.0 - 41.0 % Final    Monocytes % 09/20/2021 9.1 (A) 2.0 - 6.0 % Final    Eosinophils % 09/20/2021 6.2 (A) 1.0 - 4.0 % Final    Basophils % 09/20/2021 1.3 (A) 0.0 - 1.0 % Final    Immature Granulocytes % 09/20/2021 0.2  0.0 - 0.4 % Final    nRBC, Auto 09/20/2021 0.0  <=0.0 % Final    Neutrophils, Abs 09/20/2021 2.25  1.80 - 7.70 K/uL Final    Lymphocytes, Absolute 09/20/2021 2.35  1.00 - 4.80 K/uL Final    Monocytes, Absolute 09/20/2021 0.50  0.00 - 0.80 K/uL Final    Eosinophils, Absolute 09/20/2021 0.34  0.00 - 0.50 K/uL Final    Basophils, Absolute 09/20/2021 0.07  0.00 - 0.20 K/uL Final    Immature Granulocytes, Absolute 09/20/2021 0.01  0.00 - 0.04 K/uL Final    nRBC, Absolute 09/20/2021 0.00  <=0.00 x10e3/uL Final    Diff Type 09/20/2021 Auto   Final   Office Visit on 09/16/2021   Component Date Value Ref Range Status    POC Amphetamines  09/16/2021 Negative  Negative, Inconclusive Final    POC Barbiturates 09/16/2021 Negative  Negative, Inconclusive Final    POC Benzodiazepines 09/16/2021 Negative  Negative, Inconclusive Final    POC Cocaine 09/16/2021 Negative  Negative, Inconclusive Final    POC THC 09/16/2021 Negative  Negative, Inconclusive Final    POC Methadone 09/16/2021 Negative  Negative, Inconclusive Final    POC Methamphetamine 09/16/2021 Negative  Negative, Inconclusive Final    POC Opiates 09/16/2021 Presumptive Positive (A) Negative, Inconclusive Final    POC Oxycodone 09/16/2021 Negative  Negative, Inconclusive Final    POC Phencyclidine 09/16/2021 Negative  Negative, Inconclusive Final    POC Methylenedioxymethamphetamine * 09/16/2021 Negative  Negative, Inconclusive Final    POC Tricyclic Antidepressants 09/16/2021 Negative  Negative, Inconclusive Final    POC Buprenorphine 09/16/2021 Negative   Final     Acceptable 09/16/2021 Yes   Final    POC Temperature (Urine) 09/16/2021 94   Final           Assessment:      1. Neuropathy    2. Ulcer of right foot with necrosis of muscle    3. Peripheral vascular disease, unspecified    4. Encounter for long-term (current) use of other medications            A's of Opioid Risk Assessment  Activity:Patient can perform ADL.   Analgesia:Patients pain is partially controlled by current medication. Patient has tried OTC medications such as Tylenol and Ibuprofen with out relief.   Adverse Effects: Patient denies constipation or sedation.  Aberrant Behavior:  reviewed with no aberrant drug seeking/taking behavior.  Overdose reversal drug naloxone discussed    Drug screen reviewed      Requested Prescriptions     Signed Prescriptions Disp Refills    gabapentin (NEURONTIN) 300 MG capsule 360 capsule 2     Sig: Take 3 capsules (900 mg total) by mouth every 6 (six) hours.    HYDROcodone-acetaminophen (NORCO)  mg per tablet 90 tablet 0     Sig: Take 1 tablet by mouth  every 8 (eight) hours.    HYDROcodone-acetaminophen (NORCO)  mg per tablet 90 tablet 0     Sig: Take 1 tablet by mouth every 8 (eight) hours.    HYDROcodone-acetaminophen (NORCO)  mg per tablet 90 tablet 0     Sig: Take 1 tablet by mouth every 8 (eight) hours.         Plan:    Following wound management new diabetic ulcer right lower extremity  Has resumed hyperbaric treatment for nonhealing ulcer    Any further inconsistent drug screens were no longer provide narcotics June 2021 definitive drug screen inconsistent  [facility]    He states he is doing well current medications helping control his discomfort    He would like to continue conservative management    Continue current medication    Follow-up 3 months    Dr. Dobson, December 2022    Bring original prescription medication bottles/container/box with labels to each visit

## 2022-03-08 NOTE — PROGRESS NOTES
Subjective:      Patient ID: Navdeep Avery is a 61 y.o. male.    Chief Complaint: Hyperbaric Oxygen Therapy and Diabetic Foot Ulcer (Right foot)    Navdeep Avery a 61 y.o. male presents for follow up on all regular problems which are reviewed and discussed.     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 evidence of necrosis - Primary          Past Medical History:  Past Medical History:   Diagnosis Date    Anemia     Anxiety     Atherosclerotic heart disease of native coronary artery with other forms of angina pectoris     Atrophic rhinitis     Carpal tunnel syndrome     COPD (chronic obstructive pulmonary disease)     Coronary arteriosclerosis     COVID 02/02/2022    Depression     Diabetic ulcer of right foot associated with diabetes mellitus due to underlying condition, with bone involvement without evidence of necrosis     GERD (gastroesophageal reflux disease)     Hyperlipidemia     Hypertension     Insomnia     MI (myocardial infarction)     Neuropathy     Peripheral vascular disease, unspecified     Right foot ulcer, with fat layer exposed 01/07/2015    Sleep apnea     Type 1 diabetes mellitus with foot ulcer     Type 2 diabetes mellitus      Past Surgical History:   Procedure Laterality Date    AMPUTATION      ANGIOPLASTY      CARDIAC CATHETERIZATION      CORONARY STENT PLACEMENT      DEBRIDEMENT      Right foot debridment with hospital stay and IV Abx    SHOULDER OPEN ROTATOR CUFF REPAIR Left     SPINE SURGERY      VASCULAR SURGERY       Review of patient's allergies indicates:  No Known Allergies  Current Outpatient Medications on File Prior to Visit   Medication Sig Dispense Refill    albuterol (PROVENTIL) 2.5 mg /3 mL (0.083 %) nebulizer solution Take 2.5 mg by nebulization 3 (three) times daily. Rescue      albuterol  (PROVENTIL/VENTOLIN HFA) 90 mcg/actuation inhaler Inhale 2 puffs into the lungs 2 (two) times daily as needed for Wheezing. 18 g 2    amLODIPine (NORVASC) 10 MG tablet Take 1 tablet (10 mg total) by mouth once daily. 90 tablet 1    aspirin 81 MG Chew Take 81 mg by mouth once daily.      atorvastatin (LIPITOR) 40 MG tablet Take 1 tablet (40 mg total) by mouth nightly. 90 tablet 1    azithromycin (Z-FRANDY) 250 MG tablet Take 2 tablet by mouth today and then 1 tablet by mouth for 4 days      clopidogreL (PLAVIX) 75 mg tablet Take 1 tablet (75 mg total) by mouth once daily. 90 tablet 1    cycloSPORINE (RESTASIS MULTIDOSE) 0.05 % Drop Apply 1 drop to eye every 12 (twelve) hours. 5.5 mL 0    diclofenac sodium (VOLTAREN) 1 % Gel Apply 1 g topically 4 (four) times daily as needed for Pain.      EScitalopram oxalate (LEXAPRO) 10 MG tablet Take 1 tablet (10 mg total) by mouth 2 (two) times a day. 180 tablet 1    fluticasone propionate (FLONASE) 50 mcg/actuation nasal spray 2 sprays (100 mcg total) by Each Nostril route once daily. 16 g 2    gabapentin (NEURONTIN) 300 MG capsule Take 3 capsules (900 mg total) by mouth every 6 (six) hours. 360 capsule 2    HYDROcodone-acetaminophen (NORCO)  mg per tablet Take 1 tablet by mouth every 8 (eight) hours as needed for Pain (pain). 90 tablet 0    hydrOXYzine pamoate (VISTARIL) 25 MG Cap Take 1 capsule (25 mg total) by mouth once daily. Take nightly for sleep 90 capsule 1    icosapent ethyL (VASCEPA) 1 gram Cap Take 2 capsules (2 g total) by mouth 2 (two) times daily. 120 capsule 5    lisinopriL 10 MG tablet Take 1 tablet (10 mg total) by mouth once daily. 90 tablet 1    metoprolol succinate (TOPROL-XL) 25 MG 24 hr tablet Take 1 tablet (25 mg total) by mouth once daily. 90 tablet 1    nitroGLYCERIN (NITROSTAT) 0.4 MG SL tablet Place 1 tablet (0.4 mg total) under the tongue every 5 (five) minutes as needed for Chest pain. 30 tablet 2    pantoprazole (PROTONIX) 40 MG  tablet Take 1 tablet (40 mg total) by mouth once daily. 90 tablet 1    SPIRIVA RESPIMAT 2.5 mcg/actuation inhaler Inhale 1 puff into the lungs once daily. 4 g 5    traZODone (DESYREL) 50 MG tablet Take 2 tablets (100 mg total) by mouth once daily. Take nightly for sleep 30 tablet 2     No current facility-administered medications on file prior to visit.     Social History     Socioeconomic History    Marital status:    Occupational History    Occupation: Retired   Tobacco Use    Smoking status: Current Every Day Smoker     Packs/day: 0.50     Types: Cigarettes    Smokeless tobacco: Never Used   Substance and Sexual Activity    Alcohol use: Not Currently    Drug use: Yes     Types: Oxycodone    Sexual activity: Yes     Family History   Problem Relation Age of Onset    Arthritis Mother     Hypertension Mother     Learning disabilities Mother     Alcohol abuse Father     Early death Father     Heart disease Father     Cancer Sister     Diabetes Sister     Heart disease Maternal Grandfather     COPD Paternal Grandfather     Heart disease Son        Review of Systems   Constitutional: Negative.    HENT: Negative for congestion, ear pain, nosebleeds and trouble swallowing.    Eyes: Negative for pain and itching.   Respiratory: Negative for chest tightness.    Cardiovascular: Negative for chest pain.   Gastrointestinal: Negative for abdominal distention.   Endocrine: Negative for cold intolerance and heat intolerance.   Genitourinary: Negative for difficulty urinating.   Musculoskeletal: Negative for arthralgias.   Neurological: Negative for dizziness.       Objective:     BP (!) 169/80   Pulse 67   Temp 97 °F (36.1 °C)   Resp 20     Physical Exam  Constitutional:       Appearance: Normal appearance. He is obese.   HENT:      Head: Normocephalic and atraumatic.      Right Ear: External ear normal.      Left Ear: External ear normal.      Nose: Nose normal.      Mouth/Throat:      Mouth: Mucous  membranes are moist.      Pharynx: Oropharynx is clear.   Eyes:      Pupils: Pupils are equal, round, and reactive to light.   Cardiovascular:      Rate and Rhythm: Normal rate and regular rhythm.      Heart sounds: Normal heart sounds.   Pulmonary:      Effort: Pulmonary effort is normal.      Breath sounds: Normal breath sounds.   Abdominal:      Palpations: Abdomen is soft.   Musculoskeletal:         General: Normal range of motion.      Cervical back: Normal range of motion and neck supple.   Skin:     General: Skin is warm and dry.   Neurological:      General: No focal deficit present.      Mental Status: He is alert.   Psychiatric:         Mood and Affect: Mood normal.         Behavior: Behavior normal.         Thought Content: Thought content normal.         Judgment: Judgment normal.       Assessment:     1. Diabetic ulcer of right foot associated with diabetes mellitus due to underlying condition, with bone involvement without evidence of necrosis, unspecified part of foot        Plan:     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 evidence of necrosis - Primary        No follow-ups on file.      I am having Navdeep Avery maintain his aspirin, amLODIPine, atorvastatin, clopidogreL, EScitalopram oxalate, lisinopriL, metoprolol succinate, pantoprazole, SPIRIVA RESPIMAT, nitroGLYCERIN, diclofenac sodium, traZODone, albuterol, icosapent ethyL, hydrOXYzine pamoate, HYDROcodone-acetaminophen, gabapentin, albuterol, fluticasone propionate, azithromycin, and RESTASIS MULTIDOSE.    Navdeep was seen today for hyperbaric oxygen therapy and diabetic foot ulcer.    Diagnoses and all orders for this visit:    Diabetic ulcer of right foot associated with diabetes mellitus due to underlying condition, with bone involvement without evidence of necrosis, unspecified part of foot          [unfilled]  No orders of the defined types were placed in this encounter.

## 2022-03-09 ENCOUNTER — OFFICE VISIT (OUTPATIENT)
Dept: WOUND CARE | Facility: CLINIC | Age: 62
End: 2022-03-09
Attending: FAMILY MEDICINE
Payer: MEDICARE

## 2022-03-09 VITALS
SYSTOLIC BLOOD PRESSURE: 159 MMHG | DIASTOLIC BLOOD PRESSURE: 86 MMHG | HEART RATE: 67 BPM | RESPIRATION RATE: 20 BRPM | TEMPERATURE: 98 F

## 2022-03-09 DIAGNOSIS — E08.621 DIABETIC ULCER OF RIGHT FOOT ASSOCIATED WITH DIABETES MELLITUS DUE TO UNDERLYING CONDITION, WITH BONE INVOLVEMENT WITHOUT EVIDENCE OF NECROSIS, UNSPECIFIED PART OF FOOT: ICD-10-CM

## 2022-03-09 DIAGNOSIS — E78.2 MIXED HYPERLIPIDEMIA: ICD-10-CM

## 2022-03-09 DIAGNOSIS — I10 BENIGN HYPERTENSION: ICD-10-CM

## 2022-03-09 DIAGNOSIS — L97.516 DIABETIC ULCER OF RIGHT FOOT ASSOCIATED WITH DIABETES MELLITUS DUE TO UNDERLYING CONDITION, WITH BONE INVOLVEMENT WITHOUT EVIDENCE OF NECROSIS, UNSPECIFIED PART OF FOOT: ICD-10-CM

## 2022-03-09 DIAGNOSIS — E08.621 DIABETIC ULCER OF MIDFOOT ASSOCIATED WITH DIABETES MELLITUS DUE TO UNDERLYING CONDITION, WITH NECROSIS OF MUSCLE, UNSPECIFIED LATERALITY: Primary | ICD-10-CM

## 2022-03-09 DIAGNOSIS — L97.403 DIABETIC ULCER OF MIDFOOT ASSOCIATED WITH DIABETES MELLITUS DUE TO UNDERLYING CONDITION, WITH NECROSIS OF MUSCLE, UNSPECIFIED LATERALITY: Primary | ICD-10-CM

## 2022-03-09 PROCEDURE — G0277 HBOT, FULL BODY CHAMBER, 30M: HCPCS | Performed by: FAMILY MEDICINE

## 2022-03-09 PROCEDURE — 99183 HYPERBARIC OXYGEN THERAPY: CPT | Mod: PBBFAC | Performed by: FAMILY MEDICINE

## 2022-03-09 PROCEDURE — 99183 PR HYPERBARIC OXYGEN THERAPY ATTENDANCE/SUPERVISION, PER SESSION: ICD-10-PCS | Mod: S$PBB,,, | Performed by: FAMILY MEDICINE

## 2022-03-09 PROCEDURE — 82962 GLUCOSE BLOOD TEST: CPT | Mod: PBBFAC | Performed by: FAMILY MEDICINE

## 2022-03-09 PROCEDURE — 99211 OFF/OP EST MAY X REQ PHY/QHP: CPT | Mod: PBBFAC | Performed by: FAMILY MEDICINE

## 2022-03-09 PROCEDURE — 99183 HYPERBARIC OXYGEN THERAPY: CPT | Mod: S$PBB,,, | Performed by: FAMILY MEDICINE

## 2022-03-09 NOTE — PROGRESS NOTES
Subjective:      Patient ID: Navdeep Avery is a 61 y.o. male.    Chief Complaint: No chief complaint on file.    Navdeep Avery a 61 y.o. male presents for follow up on all regular problems which are reviewed and discussed.     Problem List Items Addressed This Visit        Cardiac/Vascular    Benign hypertension    Mixed hyperlipidemia       Endocrine    Diabetic ulcer of midfoot associated with diabetes mellitus due to underlying condition, with necrosis of muscle    Diabetic ulcer of right foot associated with diabetes mellitus due to underlying condition, with bone involvement without evidence of necrosis - Primary          Past Medical History:  Past Medical History:   Diagnosis Date    Anemia     Anxiety     Atherosclerotic heart disease of native coronary artery with other forms of angina pectoris     Atrophic rhinitis     Carpal tunnel syndrome     COPD (chronic obstructive pulmonary disease)     Coronary arteriosclerosis     COVID 02/02/2022    Depression     Diabetic ulcer of right foot associated with diabetes mellitus due to underlying condition, with bone involvement without evidence of necrosis     GERD (gastroesophageal reflux disease)     Hyperlipidemia     Hypertension     Insomnia     MI (myocardial infarction)     Neuropathy     Peripheral vascular disease, unspecified     Right foot ulcer, with fat layer exposed 01/07/2015    Sleep apnea     Type 1 diabetes mellitus with foot ulcer     Type 2 diabetes mellitus      Past Surgical History:   Procedure Laterality Date    AMPUTATION      ANGIOPLASTY      CARDIAC CATHETERIZATION      CORONARY STENT PLACEMENT      DEBRIDEMENT      Right foot debridment with hospital stay and IV Abx    SHOULDER OPEN ROTATOR CUFF REPAIR Left     SPINE SURGERY      VASCULAR SURGERY       Review of patient's allergies indicates:  No Known Allergies  Current Outpatient Medications on File Prior to Visit   Medication Sig Dispense Refill     albuterol (PROVENTIL) 2.5 mg /3 mL (0.083 %) nebulizer solution Take 2.5 mg by nebulization 3 (three) times daily. Rescue      albuterol (PROVENTIL/VENTOLIN HFA) 90 mcg/actuation inhaler Inhale 2 puffs into the lungs 2 (two) times daily as needed for Wheezing. 18 g 2    amLODIPine (NORVASC) 10 MG tablet Take 1 tablet (10 mg total) by mouth once daily. 90 tablet 1    aspirin 81 MG Chew Take 81 mg by mouth once daily.      atorvastatin (LIPITOR) 40 MG tablet Take 1 tablet (40 mg total) by mouth nightly. 90 tablet 1    azithromycin (Z-FRANDY) 250 MG tablet Take 2 tablet by mouth today and then 1 tablet by mouth for 4 days      clopidogreL (PLAVIX) 75 mg tablet Take 1 tablet (75 mg total) by mouth once daily. 90 tablet 1    cycloSPORINE (RESTASIS MULTIDOSE) 0.05 % Drop Apply 1 drop to eye every 12 (twelve) hours. 5.5 mL 0    diclofenac sodium (VOLTAREN) 1 % Gel Apply 1 g topically 4 (four) times daily as needed for Pain.      EScitalopram oxalate (LEXAPRO) 10 MG tablet Take 1 tablet (10 mg total) by mouth 2 (two) times a day. 180 tablet 1    fluticasone propionate (FLONASE) 50 mcg/actuation nasal spray 2 sprays (100 mcg total) by Each Nostril route once daily. 16 g 2    gabapentin (NEURONTIN) 300 MG capsule Take 3 capsules (900 mg total) by mouth every 6 (six) hours. 360 capsule 2    HYDROcodone-acetaminophen (NORCO)  mg per tablet Take 1 tablet by mouth every 8 (eight) hours as needed for Pain (pain). 90 tablet 0    hydrOXYzine pamoate (VISTARIL) 25 MG Cap Take 1 capsule (25 mg total) by mouth once daily. Take nightly for sleep 90 capsule 1    icosapent ethyL (VASCEPA) 1 gram Cap Take 2 capsules (2 g total) by mouth 2 (two) times daily. 120 capsule 5    lisinopriL 10 MG tablet Take 1 tablet (10 mg total) by mouth once daily. 90 tablet 1    metoprolol succinate (TOPROL-XL) 25 MG 24 hr tablet Take 1 tablet (25 mg total) by mouth once daily. 90 tablet 1    nitroGLYCERIN (NITROSTAT) 0.4 MG SL tablet  Place 1 tablet (0.4 mg total) under the tongue every 5 (five) minutes as needed for Chest pain. 30 tablet 2    pantoprazole (PROTONIX) 40 MG tablet Take 1 tablet (40 mg total) by mouth once daily. 90 tablet 1    SPIRIVA RESPIMAT 2.5 mcg/actuation inhaler Inhale 1 puff into the lungs once daily. 4 g 5    traZODone (DESYREL) 50 MG tablet Take 2 tablets (100 mg total) by mouth once daily. Take nightly for sleep 30 tablet 2     No current facility-administered medications on file prior to visit.     Social History     Socioeconomic History    Marital status:    Occupational History    Occupation: Retired   Tobacco Use    Smoking status: Current Every Day Smoker     Packs/day: 0.50     Types: Cigarettes    Smokeless tobacco: Never Used   Substance and Sexual Activity    Alcohol use: Not Currently    Drug use: Yes     Types: Oxycodone    Sexual activity: Yes     Family History   Problem Relation Age of Onset    Arthritis Mother     Hypertension Mother     Learning disabilities Mother     Alcohol abuse Father     Early death Father     Heart disease Father     Cancer Sister     Diabetes Sister     Heart disease Maternal Grandfather     COPD Paternal Grandfather     Heart disease Son        Review of Systems   Constitutional: Negative.    HENT: Negative for congestion, ear pain, nosebleeds and trouble swallowing.    Eyes: Negative for pain and itching.   Respiratory: Negative for chest tightness.    Cardiovascular: Negative for chest pain.   Gastrointestinal: Negative for abdominal distention.   Endocrine: Negative for cold intolerance and heat intolerance.   Genitourinary: Negative for difficulty urinating.   Musculoskeletal: Negative for arthralgias.   Neurological: Negative for dizziness.       Objective:     There were no vitals taken for this visit.    Physical Exam  Constitutional:       Appearance: Normal appearance. He is obese.   HENT:      Head: Normocephalic and atraumatic.      Right  Ear: External ear normal.      Left Ear: External ear normal.      Nose: Nose normal.      Mouth/Throat:      Mouth: Mucous membranes are moist.      Pharynx: Oropharynx is clear.   Eyes:      Pupils: Pupils are equal, round, and reactive to light.   Cardiovascular:      Rate and Rhythm: Normal rate and regular rhythm.      Heart sounds: Normal heart sounds.   Pulmonary:      Effort: Pulmonary effort is normal.      Breath sounds: Normal breath sounds.   Abdominal:      Palpations: Abdomen is soft.   Musculoskeletal:         General: Normal range of motion.      Cervical back: Normal range of motion and neck supple.   Skin:     General: Skin is warm and dry.   Neurological:      General: No focal deficit present.      Mental Status: He is alert.   Psychiatric:         Mood and Affect: Mood normal.         Behavior: Behavior normal.         Thought Content: Thought content normal.         Judgment: Judgment normal.       Assessment:     1. Diabetic ulcer of right foot associated with diabetes mellitus due to underlying condition, with bone involvement without evidence of necrosis, unspecified part of foot    2. Diabetic ulcer of midfoot associated with diabetes mellitus due to underlying condition, with necrosis of muscle, unspecified laterality    3. Mixed hyperlipidemia    4. Benign hypertension        Plan:     Problem List Items Addressed This Visit        Cardiac/Vascular    Benign hypertension    Mixed hyperlipidemia       Endocrine    Diabetic ulcer of midfoot associated with diabetes mellitus due to underlying condition, with necrosis of muscle    Diabetic ulcer of right foot associated with diabetes mellitus due to underlying condition, with bone involvement without evidence of necrosis - Primary        No follow-ups on file.      I am having Navdeep Avery maintain his aspirin, amLODIPine, atorvastatin, clopidogreL, EScitalopram oxalate, lisinopriL, metoprolol succinate, pantoprazole, SPIRIVA RESPIMAT,  nitroGLYCERIN, diclofenac sodium, traZODone, albuterol, icosapent ethyL, hydrOXYzine pamoate, HYDROcodone-acetaminophen, gabapentin, albuterol, fluticasone propionate, azithromycin, and RESTASIS MULTIDOSE.    Diagnoses and all orders for this visit:    Diabetic ulcer of right foot associated with diabetes mellitus due to underlying condition, with bone involvement without evidence of necrosis, unspecified part of foot    Diabetic ulcer of midfoot associated with diabetes mellitus due to underlying condition, with necrosis of muscle, unspecified laterality    Mixed hyperlipidemia    Benign hypertension         [unfilled]  No orders of the defined types were placed in this encounter.

## 2022-03-10 ENCOUNTER — OFFICE VISIT (OUTPATIENT)
Dept: PAIN MEDICINE | Facility: CLINIC | Age: 62
End: 2022-03-10
Payer: MEDICARE

## 2022-03-10 ENCOUNTER — OFFICE VISIT (OUTPATIENT)
Dept: WOUND CARE | Facility: CLINIC | Age: 62
End: 2022-03-10
Attending: NURSE PRACTITIONER
Payer: MEDICARE

## 2022-03-10 VITALS
SYSTOLIC BLOOD PRESSURE: 172 MMHG | RESPIRATION RATE: 19 BRPM | HEART RATE: 69 BPM | BODY MASS INDEX: 26.07 KG/M2 | WEIGHT: 172 LBS | DIASTOLIC BLOOD PRESSURE: 76 MMHG | HEIGHT: 68 IN

## 2022-03-10 DIAGNOSIS — G62.9 NEUROPATHY: Primary | Chronic | ICD-10-CM

## 2022-03-10 DIAGNOSIS — L97.516 DIABETIC ULCER OF RIGHT FOOT ASSOCIATED WITH DIABETES MELLITUS DUE TO UNDERLYING CONDITION, WITH BONE INVOLVEMENT WITHOUT EVIDENCE OF NECROSIS, UNSPECIFIED PART OF FOOT: ICD-10-CM

## 2022-03-10 DIAGNOSIS — E08.621 DIABETIC ULCER OF MIDFOOT ASSOCIATED WITH DIABETES MELLITUS DUE TO UNDERLYING CONDITION, WITH NECROSIS OF MUSCLE, UNSPECIFIED LATERALITY: ICD-10-CM

## 2022-03-10 DIAGNOSIS — I73.9 PERIPHERAL VASCULAR DISEASE, UNSPECIFIED: Chronic | ICD-10-CM

## 2022-03-10 DIAGNOSIS — L97.403 DIABETIC ULCER OF MIDFOOT ASSOCIATED WITH DIABETES MELLITUS DUE TO UNDERLYING CONDITION, WITH NECROSIS OF MUSCLE, UNSPECIFIED LATERALITY: ICD-10-CM

## 2022-03-10 DIAGNOSIS — E11.9 DIABETES MELLITUS WITHOUT COMPLICATION: ICD-10-CM

## 2022-03-10 DIAGNOSIS — E08.621 DIABETIC ULCER OF RIGHT FOOT ASSOCIATED WITH DIABETES MELLITUS DUE TO UNDERLYING CONDITION, WITH BONE INVOLVEMENT WITHOUT EVIDENCE OF NECROSIS, UNSPECIFIED PART OF FOOT: ICD-10-CM

## 2022-03-10 DIAGNOSIS — Z79.899 ENCOUNTER FOR LONG-TERM (CURRENT) USE OF OTHER MEDICATIONS: ICD-10-CM

## 2022-03-10 DIAGNOSIS — L97.513 ULCER OF RIGHT FOOT WITH NECROSIS OF MUSCLE: Primary | ICD-10-CM

## 2022-03-10 DIAGNOSIS — L97.513 ULCER OF RIGHT FOOT WITH NECROSIS OF MUSCLE: Chronic | ICD-10-CM

## 2022-03-10 PROCEDURE — 99214 PR OFFICE/OUTPT VISIT, EST, LEVL IV, 30-39 MIN: ICD-10-PCS | Mod: S$PBB,,, | Performed by: PHYSICIAN ASSISTANT

## 2022-03-10 PROCEDURE — 99183 PR HYPERBARIC OXYGEN THERAPY ATTENDANCE/SUPERVISION, PER SESSION: ICD-10-PCS | Mod: S$PBB,,, | Performed by: FAMILY MEDICINE

## 2022-03-10 PROCEDURE — 80305 DRUG TEST PRSMV DIR OPT OBS: CPT | Mod: PBBFAC | Performed by: PHYSICIAN ASSISTANT

## 2022-03-10 PROCEDURE — 99183 HYPERBARIC OXYGEN THERAPY: CPT | Mod: S$PBB,,, | Performed by: FAMILY MEDICINE

## 2022-03-10 PROCEDURE — 99183 HYPERBARIC OXYGEN THERAPY: CPT | Mod: PBBFAC | Performed by: FAMILY MEDICINE

## 2022-03-10 PROCEDURE — 99215 OFFICE O/P EST HI 40 MIN: CPT | Mod: PBBFAC,25 | Performed by: PHYSICIAN ASSISTANT

## 2022-03-10 PROCEDURE — 99183 HYPERBARIC OXYGEN THERAPY: CPT | Performed by: FAMILY MEDICINE

## 2022-03-10 PROCEDURE — 99212 OFFICE O/P EST SF 10 MIN: CPT | Mod: PBBFAC,25 | Performed by: FAMILY MEDICINE

## 2022-03-10 PROCEDURE — 99214 OFFICE O/P EST MOD 30 MIN: CPT | Mod: S$PBB,,, | Performed by: PHYSICIAN ASSISTANT

## 2022-03-10 PROCEDURE — 82962 GLUCOSE BLOOD TEST: CPT | Mod: PBBFAC | Performed by: PHYSICIAN ASSISTANT

## 2022-03-10 RX ORDER — HYDROCODONE BITARTRATE AND ACETAMINOPHEN 10; 325 MG/1; MG/1
1 TABLET ORAL EVERY 8 HOURS
Qty: 90 TABLET | Refills: 0 | Status: SHIPPED | OUTPATIENT
Start: 2022-03-18 | End: 2022-04-17

## 2022-03-10 RX ORDER — HYDROCODONE BITARTRATE AND ACETAMINOPHEN 10; 325 MG/1; MG/1
1 TABLET ORAL EVERY 8 HOURS
Qty: 90 TABLET | Refills: 0 | Status: SHIPPED | OUTPATIENT
Start: 2022-04-16 | End: 2022-03-15 | Stop reason: SDUPTHER

## 2022-03-10 RX ORDER — HYDROCODONE BITARTRATE AND ACETAMINOPHEN 10; 325 MG/1; MG/1
1 TABLET ORAL EVERY 8 HOURS
Qty: 90 TABLET | Refills: 0 | Status: SHIPPED | OUTPATIENT
Start: 2022-05-17 | End: 2022-03-15 | Stop reason: SDUPTHER

## 2022-03-10 RX ORDER — GABAPENTIN 300 MG/1
900 CAPSULE ORAL EVERY 6 HOURS
Qty: 360 CAPSULE | Refills: 2 | Status: SHIPPED | OUTPATIENT
Start: 2022-03-10 | End: 2022-06-08 | Stop reason: SDUPTHER

## 2022-03-10 NOTE — PROGRESS NOTES
Subjective:      Patient ID: Navdeep Avery is a 61 y.o. male.    Chief Complaint: No chief complaint on file.    Navdeep Avery a 61 y.o. male presents for follow up on all regular problems which are reviewed and discussed.     Problem List Items Addressed This Visit        Derm    Ulcer of right foot with necrosis of muscle - Primary       Endocrine    Diabetic ulcer of midfoot associated with diabetes mellitus due to underlying condition, with necrosis of muscle    Diabetes mellitus without complication    Diabetic ulcer of right foot associated with diabetes mellitus due to underlying condition, with bone involvement without evidence of necrosis          Past Medical History:  Past Medical History:   Diagnosis Date    Anemia     Anxiety     Atherosclerotic heart disease of native coronary artery with other forms of angina pectoris     Atrophic rhinitis     Carpal tunnel syndrome     COPD (chronic obstructive pulmonary disease)     Coronary arteriosclerosis     COVID 02/02/2022    Depression     Diabetic ulcer of right foot associated with diabetes mellitus due to underlying condition, with bone involvement without evidence of necrosis     GERD (gastroesophageal reflux disease)     Hyperlipidemia     Hypertension     Insomnia     MI (myocardial infarction)     Neuropathy     Peripheral vascular disease, unspecified     Right foot ulcer, with fat layer exposed 01/07/2015    Sleep apnea     Type 1 diabetes mellitus with foot ulcer     Type 2 diabetes mellitus      Past Surgical History:   Procedure Laterality Date    AMPUTATION      ANGIOPLASTY      CARDIAC CATHETERIZATION      CORONARY STENT PLACEMENT      DEBRIDEMENT      Right foot debridment with hospital stay and IV Abx    SHOULDER OPEN ROTATOR CUFF REPAIR Left     SPINE SURGERY      VASCULAR SURGERY       Review of patient's allergies indicates:  No Known Allergies  Current Outpatient Medications on File Prior to Visit    Medication Sig Dispense Refill    albuterol (PROVENTIL) 2.5 mg /3 mL (0.083 %) nebulizer solution Take 2.5 mg by nebulization 3 (three) times daily. Rescue      albuterol (PROVENTIL/VENTOLIN HFA) 90 mcg/actuation inhaler Inhale 2 puffs into the lungs 2 (two) times daily as needed for Wheezing. 18 g 2    amLODIPine (NORVASC) 10 MG tablet Take 1 tablet (10 mg total) by mouth once daily. 90 tablet 1    aspirin 81 MG Chew Take 81 mg by mouth once daily.      atorvastatin (LIPITOR) 40 MG tablet Take 1 tablet (40 mg total) by mouth nightly. 90 tablet 1    azithromycin (Z-FRANDY) 250 MG tablet Take 2 tablet by mouth today and then 1 tablet by mouth for 4 days      clopidogreL (PLAVIX) 75 mg tablet Take 1 tablet (75 mg total) by mouth once daily. 90 tablet 1    cycloSPORINE (RESTASIS MULTIDOSE) 0.05 % Drop Apply 1 drop to eye every 12 (twelve) hours. 5.5 mL 0    diclofenac sodium (VOLTAREN) 1 % Gel Apply 1 g topically 4 (four) times daily as needed for Pain.      EScitalopram oxalate (LEXAPRO) 10 MG tablet Take 1 tablet (10 mg total) by mouth 2 (two) times a day. 180 tablet 1    fluticasone propionate (FLONASE) 50 mcg/actuation nasal spray 2 sprays (100 mcg total) by Each Nostril route once daily. 16 g 2    gabapentin (NEURONTIN) 300 MG capsule Take 3 capsules (900 mg total) by mouth every 6 (six) hours. 360 capsule 2    [START ON 3/18/2022] HYDROcodone-acetaminophen (NORCO)  mg per tablet Take 1 tablet by mouth every 8 (eight) hours. 90 tablet 0    [START ON 4/16/2022] HYDROcodone-acetaminophen (NORCO)  mg per tablet Take 1 tablet by mouth every 8 (eight) hours. 90 tablet 0    [START ON 5/17/2022] HYDROcodone-acetaminophen (NORCO)  mg per tablet Take 1 tablet by mouth every 8 (eight) hours. 90 tablet 0    hydrOXYzine pamoate (VISTARIL) 25 MG Cap Take 1 capsule (25 mg total) by mouth once daily. Take nightly for sleep 90 capsule 1    icosapent ethyL (VASCEPA) 1 gram Cap Take 2 capsules (2 g  total) by mouth 2 (two) times daily. 120 capsule 5    lisinopriL 10 MG tablet Take 1 tablet (10 mg total) by mouth once daily. 90 tablet 1    metoprolol succinate (TOPROL-XL) 25 MG 24 hr tablet Take 1 tablet (25 mg total) by mouth once daily. 90 tablet 1    nitroGLYCERIN (NITROSTAT) 0.4 MG SL tablet Place 1 tablet (0.4 mg total) under the tongue every 5 (five) minutes as needed for Chest pain. 30 tablet 2    pantoprazole (PROTONIX) 40 MG tablet Take 1 tablet (40 mg total) by mouth once daily. 90 tablet 1    SPIRIVA RESPIMAT 2.5 mcg/actuation inhaler Inhale 1 puff into the lungs once daily. 4 g 5    traZODone (DESYREL) 50 MG tablet Take 2 tablets (100 mg total) by mouth once daily. Take nightly for sleep 30 tablet 2     No current facility-administered medications on file prior to visit.     Social History     Socioeconomic History    Marital status:    Occupational History    Occupation: Retired   Tobacco Use    Smoking status: Current Every Day Smoker     Packs/day: 0.50     Types: Cigarettes    Smokeless tobacco: Never Used   Substance and Sexual Activity    Alcohol use: Not Currently    Drug use: Yes     Types: Oxycodone    Sexual activity: Yes     Family History   Problem Relation Age of Onset    Arthritis Mother     Hypertension Mother     Learning disabilities Mother     Alcohol abuse Father     Early death Father     Heart disease Father     Cancer Sister     Diabetes Sister     Heart disease Maternal Grandfather     COPD Paternal Grandfather     Heart disease Son        Review of Systems   Unable to perform ROS: Other       Objective:     There were no vitals taken for this visit.    Physical Exam  Constitutional:       Appearance: Normal appearance. He is obese.   HENT:      Head: Normocephalic and atraumatic.      Right Ear: External ear normal.      Left Ear: External ear normal.      Nose: Nose normal.      Mouth/Throat:      Mouth: Mucous membranes are moist.      Pharynx:  Oropharynx is clear.   Eyes:      Pupils: Pupils are equal, round, and reactive to light.   Cardiovascular:      Rate and Rhythm: Normal rate and regular rhythm.      Heart sounds: Normal heart sounds.   Pulmonary:      Effort: Pulmonary effort is normal.      Breath sounds: Normal breath sounds.   Abdominal:      Palpations: Abdomen is soft.   Musculoskeletal:         General: Normal range of motion.      Cervical back: Normal range of motion and neck supple.   Skin:     General: Skin is warm and dry.   Neurological:      General: No focal deficit present.      Mental Status: He is alert.   Psychiatric:         Mood and Affect: Mood normal.         Behavior: Behavior normal.         Thought Content: Thought content normal.         Judgment: Judgment normal.       Assessment:     1. Ulcer of right foot with necrosis of muscle    2. Diabetic ulcer of midfoot associated with diabetes mellitus due to underlying condition, with necrosis of muscle, unspecified laterality    3. Diabetes mellitus without complication    4. Diabetic ulcer of right foot associated with diabetes mellitus due to underlying condition, with bone involvement without evidence of necrosis, unspecified part of foot        Plan:     Problem List Items Addressed This Visit        Derm    Ulcer of right foot with necrosis of muscle - Primary       Endocrine    Diabetic ulcer of midfoot associated with diabetes mellitus due to underlying condition, with necrosis of muscle    Diabetes mellitus without complication    Diabetic ulcer of right foot associated with diabetes mellitus due to underlying condition, with bone involvement without evidence of necrosis        No follow-ups on file.      I am having Navdeep Avery maintain his aspirin, amLODIPine, atorvastatin, clopidogreL, EScitalopram oxalate, lisinopriL, metoprolol succinate, pantoprazole, SPIRIVA RESPIMAT, nitroGLYCERIN, diclofenac sodium, traZODone, albuterol, icosapent ethyL, hydrOXYzine  pamoate, albuterol, fluticasone propionate, azithromycin, RESTASIS MULTIDOSE, gabapentin, HYDROcodone-acetaminophen, HYDROcodone-acetaminophen, and HYDROcodone-acetaminophen.    Diagnoses and all orders for this visit:    Ulcer of right foot with necrosis of muscle    Diabetic ulcer of midfoot associated with diabetes mellitus due to underlying condition, with necrosis of muscle, unspecified laterality    Diabetes mellitus without complication    Diabetic ulcer of right foot associated with diabetes mellitus due to underlying condition, with bone involvement without evidence of necrosis, unspecified part of foot         [unfilled]  No orders of the defined types were placed in this encounter.

## 2022-03-11 ENCOUNTER — OFFICE VISIT (OUTPATIENT)
Dept: WOUND CARE | Facility: CLINIC | Age: 62
End: 2022-03-11
Attending: FAMILY MEDICINE
Payer: MEDICARE

## 2022-03-11 VITALS
TEMPERATURE: 97 F | RESPIRATION RATE: 20 BRPM | SYSTOLIC BLOOD PRESSURE: 137 MMHG | DIASTOLIC BLOOD PRESSURE: 81 MMHG | HEART RATE: 57 BPM

## 2022-03-11 DIAGNOSIS — E11.9 DIABETES MELLITUS WITHOUT COMPLICATION: ICD-10-CM

## 2022-03-11 DIAGNOSIS — L97.403 DIABETIC ULCER OF MIDFOOT ASSOCIATED WITH DIABETES MELLITUS DUE TO UNDERLYING CONDITION, WITH NECROSIS OF MUSCLE, UNSPECIFIED LATERALITY: ICD-10-CM

## 2022-03-11 DIAGNOSIS — Z71.89 COMPLEX CARE COORDINATION: ICD-10-CM

## 2022-03-11 DIAGNOSIS — E08.621 DIABETIC ULCER OF RIGHT FOOT ASSOCIATED WITH DIABETES MELLITUS DUE TO UNDERLYING CONDITION, WITH BONE INVOLVEMENT WITHOUT EVIDENCE OF NECROSIS, UNSPECIFIED PART OF FOOT: ICD-10-CM

## 2022-03-11 DIAGNOSIS — L97.513 ULCER OF RIGHT FOOT WITH NECROSIS OF MUSCLE: Primary | ICD-10-CM

## 2022-03-11 DIAGNOSIS — L97.516 DIABETIC ULCER OF RIGHT FOOT ASSOCIATED WITH DIABETES MELLITUS DUE TO UNDERLYING CONDITION, WITH BONE INVOLVEMENT WITHOUT EVIDENCE OF NECROSIS, UNSPECIFIED PART OF FOOT: ICD-10-CM

## 2022-03-11 DIAGNOSIS — E08.621 DIABETIC ULCER OF MIDFOOT ASSOCIATED WITH DIABETES MELLITUS DUE TO UNDERLYING CONDITION, WITH NECROSIS OF MUSCLE, UNSPECIFIED LATERALITY: ICD-10-CM

## 2022-03-11 PROCEDURE — 99183 PR HYPERBARIC OXYGEN THERAPY ATTENDANCE/SUPERVISION, PER SESSION: ICD-10-PCS | Mod: S$PBB,,, | Performed by: FAMILY MEDICINE

## 2022-03-11 PROCEDURE — 99213 OFFICE O/P EST LOW 20 MIN: CPT | Mod: PBBFAC | Performed by: FAMILY MEDICINE

## 2022-03-11 PROCEDURE — G0277 HBOT, FULL BODY CHAMBER, 30M: HCPCS | Performed by: FAMILY MEDICINE

## 2022-03-11 PROCEDURE — 99183 HYPERBARIC OXYGEN THERAPY: CPT | Mod: PBBFAC | Performed by: FAMILY MEDICINE

## 2022-03-11 PROCEDURE — 99183 HYPERBARIC OXYGEN THERAPY: CPT | Mod: S$PBB,,, | Performed by: FAMILY MEDICINE

## 2022-03-11 PROCEDURE — 82962 GLUCOSE BLOOD TEST: CPT | Mod: PBBFAC | Performed by: FAMILY MEDICINE

## 2022-03-14 LAB
GLUCOSE SERPL-MCNC: 247 MG/DL (ref 70–110)
GLUCOSE SERPL-MCNC: 259 MG/DL (ref 70–110)
GLUCOSE SERPL-MCNC: 268 MG/DL (ref 70–110)
GLUCOSE SERPL-MCNC: 279 MG/DL (ref 70–110)
GLUCOSE SERPL-MCNC: 297 MG/DL (ref 70–110)
GLUCOSE SERPL-MCNC: 300 MG/DL (ref 70–110)

## 2022-03-14 NOTE — PROGRESS NOTES
Subjective:      Patient ID: Navdeep Avery is a 61 y.o. male.    Chief Complaint: Hyperbaric Oxygen Therapy    Navdeep Avery a 61 y.o. male presents for follow up on all regular problems which are reviewed and discussed.     Problem List Items Addressed This Visit        Derm    Ulcer of right foot with necrosis of muscle - Primary    Relevant Orders    POCT Glucose, Hand-Held Device (Completed)       Endocrine    Diabetic ulcer of midfoot associated with diabetes mellitus due to underlying condition, with necrosis of muscle    Diabetes mellitus without complication    Diabetic ulcer of right foot associated with diabetes mellitus due to underlying condition, with bone involvement without evidence of necrosis          Past Medical History:  Past Medical History:   Diagnosis Date    Anemia     Anxiety     Atherosclerotic heart disease of native coronary artery with other forms of angina pectoris     Atrophic rhinitis     Carpal tunnel syndrome     COPD (chronic obstructive pulmonary disease)     Coronary arteriosclerosis     COVID 02/02/2022    Depression     Diabetic ulcer of right foot associated with diabetes mellitus due to underlying condition, with bone involvement without evidence of necrosis     GERD (gastroesophageal reflux disease)     Hyperlipidemia     Hypertension     Insomnia     MI (myocardial infarction)     Neuropathy     Peripheral vascular disease, unspecified     Right foot ulcer, with fat layer exposed 01/07/2015    Sleep apnea     Type 1 diabetes mellitus with foot ulcer     Type 2 diabetes mellitus      Past Surgical History:   Procedure Laterality Date    AMPUTATION      ANGIOPLASTY      CARDIAC CATHETERIZATION      CORONARY STENT PLACEMENT      DEBRIDEMENT      Right foot debridment with hospital stay and IV Abx    SHOULDER OPEN ROTATOR CUFF REPAIR Left     SPINE SURGERY      VASCULAR SURGERY       Review of patient's allergies indicates:  No Known  Allergies  Current Outpatient Medications on File Prior to Visit   Medication Sig Dispense Refill    albuterol (PROVENTIL) 2.5 mg /3 mL (0.083 %) nebulizer solution Take 2.5 mg by nebulization 3 (three) times daily. Rescue      albuterol (PROVENTIL/VENTOLIN HFA) 90 mcg/actuation inhaler Inhale 2 puffs into the lungs 2 (two) times daily as needed for Wheezing. 18 g 2    amLODIPine (NORVASC) 10 MG tablet Take 1 tablet (10 mg total) by mouth once daily. 90 tablet 1    aspirin 81 MG Chew Take 81 mg by mouth once daily.      atorvastatin (LIPITOR) 40 MG tablet Take 1 tablet (40 mg total) by mouth nightly. 90 tablet 1    azithromycin (Z-FRANDY) 250 MG tablet Take 2 tablet by mouth today and then 1 tablet by mouth for 4 days      clopidogreL (PLAVIX) 75 mg tablet Take 1 tablet (75 mg total) by mouth once daily. 90 tablet 1    cycloSPORINE (RESTASIS MULTIDOSE) 0.05 % Drop Apply 1 drop to eye every 12 (twelve) hours. 5.5 mL 0    diclofenac sodium (VOLTAREN) 1 % Gel Apply 1 g topically 4 (four) times daily as needed for Pain.      EScitalopram oxalate (LEXAPRO) 10 MG tablet Take 1 tablet (10 mg total) by mouth 2 (two) times a day. 180 tablet 1    fluticasone propionate (FLONASE) 50 mcg/actuation nasal spray 2 sprays (100 mcg total) by Each Nostril route once daily. 16 g 2    gabapentin (NEURONTIN) 300 MG capsule Take 3 capsules (900 mg total) by mouth every 6 (six) hours. 360 capsule 2    [START ON 3/18/2022] HYDROcodone-acetaminophen (NORCO)  mg per tablet Take 1 tablet by mouth every 8 (eight) hours. 90 tablet 0    [START ON 4/16/2022] HYDROcodone-acetaminophen (NORCO)  mg per tablet Take 1 tablet by mouth every 8 (eight) hours. 90 tablet 0    [START ON 5/17/2022] HYDROcodone-acetaminophen (NORCO)  mg per tablet Take 1 tablet by mouth every 8 (eight) hours. 90 tablet 0    hydrOXYzine pamoate (VISTARIL) 25 MG Cap Take 1 capsule (25 mg total) by mouth once daily. Take nightly for sleep 90  capsule 1    icosapent ethyL (VASCEPA) 1 gram Cap Take 2 capsules (2 g total) by mouth 2 (two) times daily. 120 capsule 5    lisinopriL 10 MG tablet Take 1 tablet (10 mg total) by mouth once daily. 90 tablet 1    metoprolol succinate (TOPROL-XL) 25 MG 24 hr tablet Take 1 tablet (25 mg total) by mouth once daily. 90 tablet 1    nitroGLYCERIN (NITROSTAT) 0.4 MG SL tablet Place 1 tablet (0.4 mg total) under the tongue every 5 (five) minutes as needed for Chest pain. 30 tablet 2    pantoprazole (PROTONIX) 40 MG tablet Take 1 tablet (40 mg total) by mouth once daily. 90 tablet 1    SPIRIVA RESPIMAT 2.5 mcg/actuation inhaler Inhale 1 puff into the lungs once daily. 4 g 5    traZODone (DESYREL) 50 MG tablet Take 2 tablets (100 mg total) by mouth once daily. Take nightly for sleep 30 tablet 2     No current facility-administered medications on file prior to visit.     Social History     Socioeconomic History    Marital status:    Occupational History    Occupation: Retired   Tobacco Use    Smoking status: Current Every Day Smoker     Packs/day: 0.50     Types: Cigarettes    Smokeless tobacco: Never Used   Substance and Sexual Activity    Alcohol use: Not Currently    Drug use: Yes     Types: Oxycodone    Sexual activity: Yes     Family History   Problem Relation Age of Onset    Arthritis Mother     Hypertension Mother     Learning disabilities Mother     Alcohol abuse Father     Early death Father     Heart disease Father     Cancer Sister     Diabetes Sister     Heart disease Maternal Grandfather     COPD Paternal Grandfather     Heart disease Son        Review of Systems    Objective:     /81   Pulse (!) 57   Temp 97.1 °F (36.2 °C)   Resp 20     Physical Exam  Assessment:     1. Ulcer of right foot with necrosis of muscle    2. Diabetic ulcer of midfoot associated with diabetes mellitus due to underlying condition, with necrosis of muscle, unspecified laterality    3. Diabetes  mellitus without complication    4. Diabetic ulcer of right foot associated with diabetes mellitus due to underlying condition, with bone involvement without evidence of necrosis, unspecified part of foot        Plan:     Problem List Items Addressed This Visit        Derm    Ulcer of right foot with necrosis of muscle - Primary    Relevant Orders    POCT Glucose, Hand-Held Device (Completed)       Endocrine    Diabetic ulcer of midfoot associated with diabetes mellitus due to underlying condition, with necrosis of muscle    Diabetes mellitus without complication    Diabetic ulcer of right foot associated with diabetes mellitus due to underlying condition, with bone involvement without evidence of necrosis        No follow-ups on file.      I am having Navdeep Avery maintain his aspirin, amLODIPine, atorvastatin, clopidogreL, EScitalopram oxalate, lisinopriL, metoprolol succinate, pantoprazole, SPIRIVA RESPIMAT, nitroGLYCERIN, diclofenac sodium, traZODone, albuterol, icosapent ethyL, hydrOXYzine pamoate, albuterol, fluticasone propionate, azithromycin, RESTASIS MULTIDOSE, gabapentin, HYDROcodone-acetaminophen, HYDROcodone-acetaminophen, and HYDROcodone-acetaminophen.    Navdeep was seen today for hyperbaric oxygen therapy.    Diagnoses and all orders for this visit:    Ulcer of right foot with necrosis of muscle  -     POCT Glucose, Hand-Held Device    Diabetic ulcer of midfoot associated with diabetes mellitus due to underlying condition, with necrosis of muscle, unspecified laterality    Diabetes mellitus without complication    Diabetic ulcer of right foot associated with diabetes mellitus due to underlying condition, with bone involvement without evidence of necrosis, unspecified part of foot         [unfilled]  Orders Placed This Encounter   Procedures    POCT Glucose, Hand-Held Device

## 2022-03-15 ENCOUNTER — OFFICE VISIT (OUTPATIENT)
Dept: WOUND CARE | Facility: CLINIC | Age: 62
End: 2022-03-15
Attending: FAMILY MEDICINE
Payer: MEDICARE

## 2022-03-15 VITALS
RESPIRATION RATE: 20 BRPM | SYSTOLIC BLOOD PRESSURE: 158 MMHG | DIASTOLIC BLOOD PRESSURE: 86 MMHG | TEMPERATURE: 97 F | HEART RATE: 76 BPM

## 2022-03-15 DIAGNOSIS — L97.513 ULCER OF RIGHT FOOT WITH NECROSIS OF MUSCLE: Primary | ICD-10-CM

## 2022-03-15 PROCEDURE — 99183 HYPERBARIC OXYGEN THERAPY: CPT | Mod: S$PBB,,, | Performed by: FAMILY MEDICINE

## 2022-03-15 PROCEDURE — 99215 OFFICE O/P EST HI 40 MIN: CPT | Mod: PBBFAC | Performed by: FAMILY MEDICINE

## 2022-03-15 PROCEDURE — 82962 GLUCOSE BLOOD TEST: CPT | Mod: PBBFAC | Performed by: FAMILY MEDICINE

## 2022-03-15 PROCEDURE — G0277 HBOT, FULL BODY CHAMBER, 30M: HCPCS | Performed by: FAMILY MEDICINE

## 2022-03-15 PROCEDURE — 99183 HYPERBARIC OXYGEN THERAPY: CPT | Mod: PBBFAC | Performed by: FAMILY MEDICINE

## 2022-03-15 PROCEDURE — 99183 PR HYPERBARIC OXYGEN THERAPY ATTENDANCE/SUPERVISION, PER SESSION: ICD-10-PCS | Mod: S$PBB,,, | Performed by: FAMILY MEDICINE

## 2022-03-15 NOTE — PROGRESS NOTES
Debridement Performed for Assessment: Wound# 1  Performed By: Provider: Marli Ashton NP  Assistant:    Debridement: Surgical    Photo taken post procedure:    Time-Out Taken: Yes  Level: Skin/Subcutaneous Tissue  Post Debridement Measurements  Length: (cm) 2.5   Width: (cm) 2.1  Depth: (cm) 0.2      Area: (cm²) 5.25  Percent Debrided: 100%  Total Area Debrided: (sq cm)     Tissue and other material debrided:  Adipose, Dermis, Epidermis, Subcutaneous  Devitalized Tissue Debrided:Biofilm, callus  Instrument: Curette  Bleeding: Moderate  Hemostasis Achieved: Pressure  Procedural Pain: Insensate  Post Procedural Pain: Insensate  Response to Treatment: Procedure was tolerated well    Devitalized materials/tissue Removed  the following was removed during debridement  subcutaneous      Post Debridement Diagnosis  Chronic wound  Post debridement diagnosis  Same as Pre-operative debridement diagnosis, No Complications noted.      Grafts or implants applied  Was a graft or implant applied?  No      Procedure assistant  Procedure assisted by: Priscilla Waterman LPN  Assistant is the same as nurse listed above      Complications related to procedure  Did any complication occure during procedure?  No complications noted during or after procedure.      Specimen  Specimen collect during procedure?  No specimen collected      Anaesthesia:  Anesthesia used  None      Blood Loss:  Blood loss during procedure  less than 5 cc

## 2022-03-15 NOTE — PATIENT INSTRUCTIONS
Clean wound with baby shampoo and water    Apply hydrofera blue to wound,cover with dry gauze,wrap with rachel and paper tape    Change daily and as needed    Elevate feet as much as possible    No standing    Walking boot to right foot    RTC in am for HBO

## 2022-03-16 LAB — GLUCOSE SERPL-MCNC: 285 MG/DL (ref 70–110)

## 2022-03-17 ENCOUNTER — OFFICE VISIT (OUTPATIENT)
Dept: WOUND CARE | Facility: CLINIC | Age: 62
End: 2022-03-17
Attending: FAMILY MEDICINE
Payer: MEDICARE

## 2022-03-17 VITALS
RESPIRATION RATE: 20 BRPM | HEART RATE: 67 BPM | DIASTOLIC BLOOD PRESSURE: 84 MMHG | SYSTOLIC BLOOD PRESSURE: 177 MMHG | TEMPERATURE: 97 F

## 2022-03-17 DIAGNOSIS — L97.513 ULCER OF RIGHT FOOT WITH NECROSIS OF MUSCLE: Primary | ICD-10-CM

## 2022-03-17 PROCEDURE — 99183 HYPERBARIC OXYGEN THERAPY: CPT | Mod: S$PBB,,, | Performed by: FAMILY MEDICINE

## 2022-03-17 PROCEDURE — G0277 HBOT, FULL BODY CHAMBER, 30M: HCPCS | Performed by: FAMILY MEDICINE

## 2022-03-17 PROCEDURE — 99214 OFFICE O/P EST MOD 30 MIN: CPT | Mod: PBBFAC | Performed by: FAMILY MEDICINE

## 2022-03-17 PROCEDURE — 99183 HYPERBARIC OXYGEN THERAPY: CPT | Mod: PBBFAC | Performed by: FAMILY MEDICINE

## 2022-03-17 PROCEDURE — 82962 GLUCOSE BLOOD TEST: CPT | Mod: PBBFAC | Performed by: FAMILY MEDICINE

## 2022-03-17 PROCEDURE — 99183 PR HYPERBARIC OXYGEN THERAPY ATTENDANCE/SUPERVISION, PER SESSION: ICD-10-PCS | Mod: S$PBB,,, | Performed by: FAMILY MEDICINE

## 2022-03-17 NOTE — PROGRESS NOTES
Subjective:      Patient ID: Navdeep Avery is a 61 y.o. male.    Chief Complaint: Hyperbaric Oxygen Therapy (Right DFU)    Navdeep Avery a 61 y.o. male presents for follow up on all regular problems which are reviewed and discussed.     Problem List Items Addressed This Visit        Derm    Ulcer of right foot with necrosis of muscle - Primary    Relevant Orders    POCT Glucose, Hand-Held Device (Completed)          Past Medical History:  Past Medical History:   Diagnosis Date    Anemia     Anxiety     Atherosclerotic heart disease of native coronary artery with other forms of angina pectoris     Atrophic rhinitis     Carpal tunnel syndrome     COPD (chronic obstructive pulmonary disease)     Coronary arteriosclerosis     COVID 02/02/2022    Depression     Diabetic ulcer of right foot associated with diabetes mellitus due to underlying condition, with bone involvement without evidence of necrosis     GERD (gastroesophageal reflux disease)     Hyperlipidemia     Hypertension     Insomnia     MI (myocardial infarction)     Neuropathy     Peripheral vascular disease, unspecified     Right foot ulcer, with fat layer exposed 01/07/2015    Sleep apnea     Type 1 diabetes mellitus with foot ulcer     Type 2 diabetes mellitus      Past Surgical History:   Procedure Laterality Date    AMPUTATION      ANGIOPLASTY      CARDIAC CATHETERIZATION      CORONARY STENT PLACEMENT      DEBRIDEMENT      Right foot debridment with hospital stay and IV Abx    SHOULDER OPEN ROTATOR CUFF REPAIR Left     SPINE SURGERY      VASCULAR SURGERY       Review of patient's allergies indicates:  No Known Allergies  Current Outpatient Medications on File Prior to Visit   Medication Sig Dispense Refill    albuterol (PROVENTIL) 2.5 mg /3 mL (0.083 %) nebulizer solution Take 2.5 mg by nebulization 3 (three) times daily. Rescue      albuterol (PROVENTIL/VENTOLIN HFA) 90 mcg/actuation inhaler Inhale 2 puffs into the  lungs 2 (two) times daily as needed for Wheezing. 18 g 2    amLODIPine (NORVASC) 10 MG tablet Take 1 tablet (10 mg total) by mouth once daily. 90 tablet 1    aspirin 81 MG Chew Take 81 mg by mouth once daily.      atorvastatin (LIPITOR) 40 MG tablet Take 1 tablet (40 mg total) by mouth nightly. 90 tablet 1    clopidogreL (PLAVIX) 75 mg tablet Take 1 tablet (75 mg total) by mouth once daily. 90 tablet 1    cycloSPORINE (RESTASIS MULTIDOSE) 0.05 % Drop Apply 1 drop to eye every 12 (twelve) hours. 5.5 mL 0    diclofenac sodium (VOLTAREN) 1 % Gel Apply 1 g topically 4 (four) times daily as needed for Pain.      EScitalopram oxalate (LEXAPRO) 10 MG tablet Take 1 tablet (10 mg total) by mouth 2 (two) times a day. 180 tablet 1    fluticasone propionate (FLONASE) 50 mcg/actuation nasal spray 2 sprays (100 mcg total) by Each Nostril route once daily. 16 g 2    gabapentin (NEURONTIN) 300 MG capsule Take 3 capsules (900 mg total) by mouth every 6 (six) hours. 360 capsule 2    [START ON 3/18/2022] HYDROcodone-acetaminophen (NORCO)  mg per tablet Take 1 tablet by mouth every 8 (eight) hours. 90 tablet 0    hydrOXYzine pamoate (VISTARIL) 25 MG Cap Take 1 capsule (25 mg total) by mouth once daily. Take nightly for sleep 90 capsule 1    icosapent ethyL (VASCEPA) 1 gram Cap Take 2 capsules (2 g total) by mouth 2 (two) times daily. 120 capsule 5    lisinopriL 10 MG tablet Take 1 tablet (10 mg total) by mouth once daily. 90 tablet 1    metoprolol succinate (TOPROL-XL) 25 MG 24 hr tablet Take 1 tablet (25 mg total) by mouth once daily. 90 tablet 1    nitroGLYCERIN (NITROSTAT) 0.4 MG SL tablet Place 1 tablet (0.4 mg total) under the tongue every 5 (five) minutes as needed for Chest pain. 30 tablet 2    pantoprazole (PROTONIX) 40 MG tablet Take 1 tablet (40 mg total) by mouth once daily. 90 tablet 1    SPIRIVA RESPIMAT 2.5 mcg/actuation inhaler Inhale 1 puff into the lungs once daily. 4 g 5    traZODone (DESYREL)  50 MG tablet Take 2 tablets (100 mg total) by mouth once daily. Take nightly for sleep 30 tablet 2    azithromycin (Z-FRANDY) 250 MG tablet Take 2 tablet by mouth today and then 1 tablet by mouth for 4 days       No current facility-administered medications on file prior to visit.     Social History     Socioeconomic History    Marital status:    Occupational History    Occupation: Retired   Tobacco Use    Smoking status: Current Every Day Smoker     Packs/day: 0.50     Types: Cigarettes    Smokeless tobacco: Never Used   Substance and Sexual Activity    Alcohol use: Not Currently    Drug use: Yes     Types: Oxycodone    Sexual activity: Yes     Family History   Problem Relation Age of Onset    Arthritis Mother     Hypertension Mother     Learning disabilities Mother     Alcohol abuse Father     Early death Father     Heart disease Father     Cancer Sister     Diabetes Sister     Heart disease Maternal Grandfather     COPD Paternal Grandfather     Heart disease Son        Review of Systems   Constitutional: Negative.    HENT: Negative for congestion, ear pain, nosebleeds and trouble swallowing.    Eyes: Negative for pain and itching.   Respiratory: Negative for chest tightness.    Cardiovascular: Negative for chest pain.   Gastrointestinal: Negative for abdominal distention.   Endocrine: Negative for cold intolerance and heat intolerance.   Genitourinary: Negative for difficulty urinating.   Musculoskeletal: Negative for arthralgias.   Neurological: Negative for dizziness.       Objective:     BP (!) 158/86   Pulse 76   Temp 97.3 °F (36.3 °C)   Resp 20     Physical Exam  Constitutional:       Appearance: Normal appearance. He is obese.   HENT:      Head: Normocephalic and atraumatic.      Right Ear: External ear normal.      Left Ear: External ear normal.      Nose: Nose normal.      Mouth/Throat:      Mouth: Mucous membranes are moist.      Pharynx: Oropharynx is clear.   Eyes:       Pupils: Pupils are equal, round, and reactive to light.   Cardiovascular:      Rate and Rhythm: Normal rate and regular rhythm.      Heart sounds: Normal heart sounds.   Pulmonary:      Effort: Pulmonary effort is normal.      Breath sounds: Normal breath sounds.   Abdominal:      Palpations: Abdomen is soft.   Musculoskeletal:         General: Normal range of motion.      Cervical back: Normal range of motion and neck supple.   Skin:     General: Skin is warm and dry.   Neurological:      General: No focal deficit present.      Mental Status: He is alert.   Psychiatric:         Mood and Affect: Mood normal.         Behavior: Behavior normal.         Thought Content: Thought content normal.         Judgment: Judgment normal.       Assessment:     1. Ulcer of right foot with necrosis of muscle        Plan:     Problem List Items Addressed This Visit        Derm    Ulcer of right foot with necrosis of muscle - Primary    Relevant Orders    POCT Glucose, Hand-Held Device (Completed)        No follow-ups on file.      I am having Navdeep Avery maintain his aspirin, amLODIPine, atorvastatin, clopidogreL, EScitalopram oxalate, lisinopriL, metoprolol succinate, pantoprazole, SPIRIVA RESPIMAT, nitroGLYCERIN, diclofenac sodium, traZODone, albuterol, icosapent ethyL, hydrOXYzine pamoate, albuterol, fluticasone propionate, azithromycin, RESTASIS MULTIDOSE, gabapentin, and HYDROcodone-acetaminophen.    Navdeep was seen today for hyperbaric oxygen therapy.    Diagnoses and all orders for this visit:    Ulcer of right foot with necrosis of muscle  -     POCT Glucose, Hand-Held Device         [unfilled]  Orders Placed This Encounter   Procedures    POCT Glucose, Hand-Held Device

## 2022-03-21 ENCOUNTER — OFFICE VISIT (OUTPATIENT)
Dept: WOUND CARE | Facility: CLINIC | Age: 62
End: 2022-03-21
Attending: FAMILY MEDICINE
Payer: MEDICARE

## 2022-03-21 VITALS
TEMPERATURE: 97 F | RESPIRATION RATE: 20 BRPM | SYSTOLIC BLOOD PRESSURE: 161 MMHG | DIASTOLIC BLOOD PRESSURE: 88 MMHG | HEART RATE: 88 BPM

## 2022-03-21 DIAGNOSIS — L97.513 ULCER OF RIGHT FOOT WITH NECROSIS OF MUSCLE: Primary | ICD-10-CM

## 2022-03-21 LAB
GLUCOSE SERPL-MCNC: 286 MG/DL (ref 70–110)
GLUCOSE SERPL-MCNC: 317 MG/DL (ref 70–110)
GLUCOSE SERPL-MCNC: 327 MG/DL (ref 70–110)

## 2022-03-21 PROCEDURE — 99183 HYPERBARIC OXYGEN THERAPY: CPT | Mod: S$PBB,,, | Performed by: FAMILY MEDICINE

## 2022-03-21 PROCEDURE — 99183 PR HYPERBARIC OXYGEN THERAPY ATTENDANCE/SUPERVISION, PER SESSION: ICD-10-PCS | Mod: S$PBB,,, | Performed by: FAMILY MEDICINE

## 2022-03-21 PROCEDURE — 99183 HYPERBARIC OXYGEN THERAPY: CPT | Mod: PBBFAC | Performed by: FAMILY MEDICINE

## 2022-03-21 PROCEDURE — 99214 OFFICE O/P EST MOD 30 MIN: CPT | Mod: PBBFAC | Performed by: FAMILY MEDICINE

## 2022-03-21 PROCEDURE — G0277 HBOT, FULL BODY CHAMBER, 30M: HCPCS | Performed by: FAMILY MEDICINE

## 2022-03-22 ENCOUNTER — OFFICE VISIT (OUTPATIENT)
Dept: WOUND CARE | Facility: CLINIC | Age: 62
End: 2022-03-22
Attending: FAMILY MEDICINE
Payer: MEDICARE

## 2022-03-22 VITALS
HEART RATE: 70 BPM | RESPIRATION RATE: 20 BRPM | SYSTOLIC BLOOD PRESSURE: 152 MMHG | TEMPERATURE: 97 F | DIASTOLIC BLOOD PRESSURE: 80 MMHG

## 2022-03-22 DIAGNOSIS — I73.9 PERIPHERAL VASCULAR DISEASE, UNSPECIFIED: Chronic | ICD-10-CM

## 2022-03-22 DIAGNOSIS — E08.621 DIABETIC ULCER OF MIDFOOT ASSOCIATED WITH DIABETES MELLITUS DUE TO UNDERLYING CONDITION, WITH NECROSIS OF MUSCLE, UNSPECIFIED LATERALITY: ICD-10-CM

## 2022-03-22 DIAGNOSIS — L97.516 DIABETIC ULCER OF RIGHT FOOT ASSOCIATED WITH DIABETES MELLITUS DUE TO UNDERLYING CONDITION, WITH BONE INVOLVEMENT WITHOUT EVIDENCE OF NECROSIS, UNSPECIFIED PART OF FOOT: Primary | ICD-10-CM

## 2022-03-22 DIAGNOSIS — E08.621 DIABETIC ULCER OF RIGHT FOOT ASSOCIATED WITH DIABETES MELLITUS DUE TO UNDERLYING CONDITION, WITH BONE INVOLVEMENT WITHOUT EVIDENCE OF NECROSIS, UNSPECIFIED PART OF FOOT: Primary | ICD-10-CM

## 2022-03-22 DIAGNOSIS — L97.403 DIABETIC ULCER OF MIDFOOT ASSOCIATED WITH DIABETES MELLITUS DUE TO UNDERLYING CONDITION, WITH NECROSIS OF MUSCLE, UNSPECIFIED LATERALITY: ICD-10-CM

## 2022-03-22 PROCEDURE — 99183 PR HYPERBARIC OXYGEN THERAPY ATTENDANCE/SUPERVISION, PER SESSION: ICD-10-PCS | Mod: S$PBB,,, | Performed by: FAMILY MEDICINE

## 2022-03-22 PROCEDURE — 99183 HYPERBARIC OXYGEN THERAPY: CPT | Mod: PBBFAC | Performed by: FAMILY MEDICINE

## 2022-03-22 PROCEDURE — G0277 HBOT, FULL BODY CHAMBER, 30M: HCPCS | Performed by: FAMILY MEDICINE

## 2022-03-22 PROCEDURE — 99215 OFFICE O/P EST HI 40 MIN: CPT | Mod: PBBFAC,25 | Performed by: FAMILY MEDICINE

## 2022-03-22 PROCEDURE — 99499 UNLISTED E&M SERVICE: CPT | Mod: S$PBB,,, | Performed by: FAMILY MEDICINE

## 2022-03-22 PROCEDURE — 99499 NO LOS: ICD-10-PCS | Mod: S$PBB,,, | Performed by: FAMILY MEDICINE

## 2022-03-22 PROCEDURE — 99183 HYPERBARIC OXYGEN THERAPY: CPT | Mod: S$PBB,,, | Performed by: FAMILY MEDICINE

## 2022-03-23 ENCOUNTER — OFFICE VISIT (OUTPATIENT)
Dept: WOUND CARE | Facility: CLINIC | Age: 62
End: 2022-03-23
Attending: FAMILY MEDICINE
Payer: MEDICARE

## 2022-03-23 VITALS
HEART RATE: 67 BPM | RESPIRATION RATE: 20 BRPM | TEMPERATURE: 97 F | DIASTOLIC BLOOD PRESSURE: 85 MMHG | SYSTOLIC BLOOD PRESSURE: 169 MMHG

## 2022-03-23 DIAGNOSIS — L97.513 ULCER OF RIGHT FOOT WITH NECROSIS OF MUSCLE: Primary | ICD-10-CM

## 2022-03-23 LAB
GLUCOSE SERPL-MCNC: 233 MG/DL (ref 70–110)
GLUCOSE SERPL-MCNC: 253 MG/DL (ref 70–110)

## 2022-03-23 PROCEDURE — 99183 HYPERBARIC OXYGEN THERAPY: CPT | Mod: S$PBB,,, | Performed by: FAMILY MEDICINE

## 2022-03-23 PROCEDURE — 99499 UNLISTED E&M SERVICE: CPT | Mod: S$PBB,,, | Performed by: FAMILY MEDICINE

## 2022-03-23 PROCEDURE — 82962 GLUCOSE BLOOD TEST: CPT | Mod: PBBFAC | Performed by: FAMILY MEDICINE

## 2022-03-23 PROCEDURE — G0277 HBOT, FULL BODY CHAMBER, 30M: HCPCS | Performed by: FAMILY MEDICINE

## 2022-03-23 PROCEDURE — 99183 PR HYPERBARIC OXYGEN THERAPY ATTENDANCE/SUPERVISION, PER SESSION: ICD-10-PCS | Mod: S$PBB,,, | Performed by: FAMILY MEDICINE

## 2022-03-23 PROCEDURE — 99213 OFFICE O/P EST LOW 20 MIN: CPT | Mod: PBBFAC | Performed by: FAMILY MEDICINE

## 2022-03-23 PROCEDURE — 99499 NO LOS: ICD-10-PCS | Mod: S$PBB,,, | Performed by: FAMILY MEDICINE

## 2022-03-23 PROCEDURE — 99183 HYPERBARIC OXYGEN THERAPY: CPT | Mod: PBBFAC | Performed by: FAMILY MEDICINE

## 2022-03-23 NOTE — PROGRESS NOTES
Debridement Performed for Assessment: Wound# 1  Performed By: Provider: Marli Morales NP  Assistant: Priscilla Waterman LPN    Debridement: Surgical    Photo taken post procedure:  yes  Time-Out Taken: Yes  Level: Skin/Subcutaneous Tissue  Post Debridement Measurements  Length: (cm) 1.1  Width: (cm) 1.5   Depth: (cm) 0.3      Area: (cm²) 1.65  Percent Debrided: 100%  Total Area Debrided: (sq cm)     Tissue and other material debrided:  Adipose, Dermis, Epidermis, Subcutaneous  Devitalized Tissue Debrided:callus  Instrument: Curette  Bleeding: Moderate  Hemostasis Achieved: Pressure  Procedural Pain: Insensate  Post Procedural Pain: Insensate  Response to Treatment: Procedure was tolerated well    Devitalized materials/tissue Removed  the following was removed during debridement  subcutaneous      Post Debridement Diagnosis right diabetic foot ulcer  Post debridement diagnosis  Same as Pre-operative debridement diagnosis, No Complications noted.      Grafts or implants applied  Was a graft or implant applied?  No      Procedure assistant  Procedure assisted by: Priscilla Waterman LPN  Assistant is the same as nurse listed above      Complications related to procedure  Did any complication occure during procedure?  No complications noted during or after procedure.      Specimen  Specimen collect during procedure?  No specimen collected      Anaesthesia:  Anesthesia used  None      Blood Loss:  Blood loss during procedure  less than 5 cc

## 2022-03-23 NOTE — PROGRESS NOTES
Subjective:      Patient ID: Navdeep Avery is a 61 y.o. male.    Chief Complaint: Non-healing Wound Follow Up and Hyperbaric Oxygen Therapy (Right foot)    Navdeep Avery a 61 y.o. male presents for follow up on all regular problems which are reviewed and discussed.     Problem List Items Addressed This Visit        Derm    Ulcer of right foot with necrosis of muscle - Primary          Past Medical History:  Past Medical History:   Diagnosis Date    Anemia     Anxiety     Atherosclerotic heart disease of native coronary artery with other forms of angina pectoris     Atrophic rhinitis     Carpal tunnel syndrome     COPD (chronic obstructive pulmonary disease)     Coronary arteriosclerosis     COVID 02/02/2022    Depression     Diabetic ulcer of right foot associated with diabetes mellitus due to underlying condition, with bone involvement without evidence of necrosis     GERD (gastroesophageal reflux disease)     Hyperlipidemia     Hypertension     Insomnia     MI (myocardial infarction)     Neuropathy     Peripheral vascular disease, unspecified     Right foot ulcer, with fat layer exposed 01/07/2015    Sleep apnea     Type 1 diabetes mellitus with foot ulcer     Type 2 diabetes mellitus      Past Surgical History:   Procedure Laterality Date    AMPUTATION      ANGIOPLASTY      CARDIAC CATHETERIZATION      CORONARY STENT PLACEMENT      DEBRIDEMENT      Right foot debridment with hospital stay and IV Abx    SHOULDER OPEN ROTATOR CUFF REPAIR Left     SPINE SURGERY      VASCULAR SURGERY       Review of patient's allergies indicates:  No Known Allergies  Current Outpatient Medications on File Prior to Visit   Medication Sig Dispense Refill    albuterol (PROVENTIL) 2.5 mg /3 mL (0.083 %) nebulizer solution Take 2.5 mg by nebulization 3 (three) times daily. Rescue      albuterol (PROVENTIL/VENTOLIN HFA) 90 mcg/actuation inhaler Inhale 2 puffs into the lungs 2 (two) times daily as needed  for Wheezing. 18 g 2    amLODIPine (NORVASC) 10 MG tablet Take 1 tablet (10 mg total) by mouth once daily. 90 tablet 1    aspirin 81 MG Chew Take 81 mg by mouth once daily.      atorvastatin (LIPITOR) 40 MG tablet Take 1 tablet (40 mg total) by mouth nightly. 90 tablet 1    azithromycin (Z-FRANDY) 250 MG tablet Take 2 tablet by mouth today and then 1 tablet by mouth for 4 days      clopidogreL (PLAVIX) 75 mg tablet Take 1 tablet (75 mg total) by mouth once daily. 90 tablet 1    cycloSPORINE (RESTASIS MULTIDOSE) 0.05 % Drop Apply 1 drop to eye every 12 (twelve) hours. 5.5 mL 0    diclofenac sodium (VOLTAREN) 1 % Gel Apply 1 g topically 4 (four) times daily as needed for Pain.      EScitalopram oxalate (LEXAPRO) 10 MG tablet Take 1 tablet (10 mg total) by mouth 2 (two) times a day. 180 tablet 1    fluticasone propionate (FLONASE) 50 mcg/actuation nasal spray 2 sprays (100 mcg total) by Each Nostril route once daily. 16 g 2    gabapentin (NEURONTIN) 300 MG capsule Take 3 capsules (900 mg total) by mouth every 6 (six) hours. 360 capsule 2    HYDROcodone-acetaminophen (NORCO)  mg per tablet Take 1 tablet by mouth every 8 (eight) hours. 90 tablet 0    hydrOXYzine pamoate (VISTARIL) 25 MG Cap Take 1 capsule (25 mg total) by mouth once daily. Take nightly for sleep 90 capsule 1    icosapent ethyL (VASCEPA) 1 gram Cap Take 2 capsules (2 g total) by mouth 2 (two) times daily. 120 capsule 5    lisinopriL 10 MG tablet Take 1 tablet (10 mg total) by mouth once daily. 90 tablet 1    metoprolol succinate (TOPROL-XL) 25 MG 24 hr tablet Take 1 tablet (25 mg total) by mouth once daily. 90 tablet 1    nitroGLYCERIN (NITROSTAT) 0.4 MG SL tablet Place 1 tablet (0.4 mg total) under the tongue every 5 (five) minutes as needed for Chest pain. 30 tablet 2    pantoprazole (PROTONIX) 40 MG tablet Take 1 tablet (40 mg total) by mouth once daily. 90 tablet 1    SPIRIVA RESPIMAT 2.5 mcg/actuation inhaler Inhale 1 puff into  the lungs once daily. 4 g 5    traZODone (DESYREL) 50 MG tablet Take 2 tablets (100 mg total) by mouth once daily. Take nightly for sleep 30 tablet 2     No current facility-administered medications on file prior to visit.     Social History     Socioeconomic History    Marital status:    Occupational History    Occupation: Retired   Tobacco Use    Smoking status: Current Every Day Smoker     Packs/day: 0.50     Types: Cigarettes    Smokeless tobacco: Never Used   Substance and Sexual Activity    Alcohol use: Not Currently    Drug use: Yes     Types: Oxycodone    Sexual activity: Yes     Family History   Problem Relation Age of Onset    Arthritis Mother     Hypertension Mother     Learning disabilities Mother     Alcohol abuse Father     Early death Father     Heart disease Father     Cancer Sister     Diabetes Sister     Heart disease Maternal Grandfather     COPD Paternal Grandfather     Heart disease Son        Review of Systems   Unable to perform ROS: Age       Objective:     BP (!) 161/88   Pulse 88   Temp 97.3 °F (36.3 °C)   Resp 20     Physical Exam  Constitutional:       Appearance: Normal appearance. He is obese.   HENT:      Head: Normocephalic and atraumatic.      Right Ear: External ear normal.      Left Ear: External ear normal.      Nose: Nose normal.      Mouth/Throat:      Mouth: Mucous membranes are moist.      Pharynx: Oropharynx is clear.   Eyes:      Pupils: Pupils are equal, round, and reactive to light.   Cardiovascular:      Rate and Rhythm: Normal rate and regular rhythm.      Heart sounds: Normal heart sounds.   Pulmonary:      Effort: Pulmonary effort is normal.      Breath sounds: Normal breath sounds.   Abdominal:      Palpations: Abdomen is soft.   Musculoskeletal:         General: Normal range of motion.      Cervical back: Normal range of motion and neck supple.   Skin:     General: Skin is warm and dry.   Neurological:      General: No focal deficit  present.      Mental Status: He is alert.   Psychiatric:         Mood and Affect: Mood normal.         Behavior: Behavior normal.         Thought Content: Thought content normal.         Judgment: Judgment normal.       Assessment:     1. Ulcer of right foot with necrosis of muscle        Plan:     Problem List Items Addressed This Visit        Derm    Ulcer of right foot with necrosis of muscle - Primary        No follow-ups on file.      I am having Navdeep Avery maintain his aspirin, amLODIPine, atorvastatin, clopidogreL, EScitalopram oxalate, lisinopriL, metoprolol succinate, pantoprazole, SPIRIVA RESPIMAT, nitroGLYCERIN, diclofenac sodium, traZODone, albuterol, icosapent ethyL, hydrOXYzine pamoate, albuterol, fluticasone propionate, azithromycin, RESTASIS MULTIDOSE, gabapentin, and HYDROcodone-acetaminophen.    Navdeep was seen today for non-healing wound follow up and hyperbaric oxygen therapy.    Diagnoses and all orders for this visit:    Ulcer of right foot with necrosis of muscle         [unfilled]  No orders of the defined types were placed in this encounter.

## 2022-03-24 ENCOUNTER — OFFICE VISIT (OUTPATIENT)
Dept: WOUND CARE | Facility: CLINIC | Age: 62
End: 2022-03-24
Attending: FAMILY MEDICINE
Payer: MEDICARE

## 2022-03-24 VITALS
SYSTOLIC BLOOD PRESSURE: 165 MMHG | RESPIRATION RATE: 20 BRPM | DIASTOLIC BLOOD PRESSURE: 84 MMHG | HEART RATE: 80 BPM | TEMPERATURE: 97 F

## 2022-03-24 DIAGNOSIS — L97.513 ULCER OF RIGHT FOOT WITH NECROSIS OF MUSCLE: Primary | ICD-10-CM

## 2022-03-24 LAB
GLUCOSE SERPL-MCNC: 272 MG/DL (ref 70–110)
GLUCOSE SERPL-MCNC: 315 MG/DL (ref 70–110)

## 2022-03-24 PROCEDURE — 99183 HYPERBARIC OXYGEN THERAPY: CPT | Mod: S$PBB,,, | Performed by: FAMILY MEDICINE

## 2022-03-24 PROCEDURE — 99183 PR HYPERBARIC OXYGEN THERAPY ATTENDANCE/SUPERVISION, PER SESSION: ICD-10-PCS | Mod: S$PBB,,, | Performed by: FAMILY MEDICINE

## 2022-03-24 PROCEDURE — G0277 HBOT, FULL BODY CHAMBER, 30M: HCPCS | Performed by: FAMILY MEDICINE

## 2022-03-24 PROCEDURE — 99499 NO LOS: ICD-10-PCS | Mod: S$PBB,,, | Performed by: FAMILY MEDICINE

## 2022-03-24 PROCEDURE — 99211 OFF/OP EST MAY X REQ PHY/QHP: CPT | Mod: PBBFAC | Performed by: FAMILY MEDICINE

## 2022-03-24 PROCEDURE — 82962 GLUCOSE BLOOD TEST: CPT | Mod: PBBFAC | Performed by: FAMILY MEDICINE

## 2022-03-24 PROCEDURE — 99183 HYPERBARIC OXYGEN THERAPY: CPT | Mod: PBBFAC | Performed by: FAMILY MEDICINE

## 2022-03-24 PROCEDURE — 99499 UNLISTED E&M SERVICE: CPT | Mod: S$PBB,,, | Performed by: FAMILY MEDICINE

## 2022-03-24 NOTE — PROGRESS NOTES
Subjective:      Patient ID: Navdeep Avery is a 61 y.o. male.    Chief Complaint: Hyperbaric Oxygen Therapy and Diabetic Foot Ulcer (Right foot)    Navdeep Avery a 61 y.o. male presents for follow up on all regular problems which are reviewed and discussed.     Problem List Items Addressed This Visit        Cardiac/Vascular    Peripheral vascular disease, unspecified (Chronic)       Endocrine    Diabetic ulcer of midfoot associated with diabetes mellitus due to underlying condition, with necrosis of muscle    Diabetic ulcer of right foot associated with diabetes mellitus due to underlying condition, with bone involvement without evidence of necrosis - Primary          Past Medical History:  Past Medical History:   Diagnosis Date    Anemia     Anxiety     Atherosclerotic heart disease of native coronary artery with other forms of angina pectoris     Atrophic rhinitis     Carpal tunnel syndrome     COPD (chronic obstructive pulmonary disease)     Coronary arteriosclerosis     COVID 02/02/2022    Depression     Diabetic ulcer of right foot associated with diabetes mellitus due to underlying condition, with bone involvement without evidence of necrosis     GERD (gastroesophageal reflux disease)     Hyperlipidemia     Hypertension     Insomnia     MI (myocardial infarction)     Neuropathy     Peripheral vascular disease, unspecified     Right foot ulcer, with fat layer exposed 01/07/2015    Sleep apnea     Type 1 diabetes mellitus with foot ulcer     Type 2 diabetes mellitus      Past Surgical History:   Procedure Laterality Date    AMPUTATION      ANGIOPLASTY      CARDIAC CATHETERIZATION      CORONARY STENT PLACEMENT      DEBRIDEMENT      Right foot debridment with hospital stay and IV Abx    SHOULDER OPEN ROTATOR CUFF REPAIR Left     SPINE SURGERY      VASCULAR SURGERY       Review of patient's allergies indicates:  No Known Allergies  Current Outpatient Medications on File Prior to  Visit   Medication Sig Dispense Refill    albuterol (PROVENTIL) 2.5 mg /3 mL (0.083 %) nebulizer solution Take 2.5 mg by nebulization 3 (three) times daily. Rescue      albuterol (PROVENTIL/VENTOLIN HFA) 90 mcg/actuation inhaler Inhale 2 puffs into the lungs 2 (two) times daily as needed for Wheezing. 18 g 2    amLODIPine (NORVASC) 10 MG tablet Take 1 tablet (10 mg total) by mouth once daily. 90 tablet 1    aspirin 81 MG Chew Take 81 mg by mouth once daily.      atorvastatin (LIPITOR) 40 MG tablet Take 1 tablet (40 mg total) by mouth nightly. 90 tablet 1    azithromycin (Z-FRANDY) 250 MG tablet Take 2 tablet by mouth today and then 1 tablet by mouth for 4 days      clopidogreL (PLAVIX) 75 mg tablet Take 1 tablet (75 mg total) by mouth once daily. 90 tablet 1    cycloSPORINE (RESTASIS MULTIDOSE) 0.05 % Drop Apply 1 drop to eye every 12 (twelve) hours. 5.5 mL 0    diclofenac sodium (VOLTAREN) 1 % Gel Apply 1 g topically 4 (four) times daily as needed for Pain.      EScitalopram oxalate (LEXAPRO) 10 MG tablet Take 1 tablet (10 mg total) by mouth 2 (two) times a day. 180 tablet 1    fluticasone propionate (FLONASE) 50 mcg/actuation nasal spray 2 sprays (100 mcg total) by Each Nostril route once daily. 16 g 2    gabapentin (NEURONTIN) 300 MG capsule Take 3 capsules (900 mg total) by mouth every 6 (six) hours. 360 capsule 2    HYDROcodone-acetaminophen (NORCO)  mg per tablet Take 1 tablet by mouth every 8 (eight) hours. 90 tablet 0    hydrOXYzine pamoate (VISTARIL) 25 MG Cap Take 1 capsule (25 mg total) by mouth once daily. Take nightly for sleep 90 capsule 1    icosapent ethyL (VASCEPA) 1 gram Cap Take 2 capsules (2 g total) by mouth 2 (two) times daily. 120 capsule 5    lisinopriL 10 MG tablet Take 1 tablet (10 mg total) by mouth once daily. 90 tablet 1    metoprolol succinate (TOPROL-XL) 25 MG 24 hr tablet Take 1 tablet (25 mg total) by mouth once daily. 90 tablet 1    nitroGLYCERIN (NITROSTAT) 0.4  MG SL tablet Place 1 tablet (0.4 mg total) under the tongue every 5 (five) minutes as needed for Chest pain. 30 tablet 2    pantoprazole (PROTONIX) 40 MG tablet Take 1 tablet (40 mg total) by mouth once daily. 90 tablet 1    SPIRIVA RESPIMAT 2.5 mcg/actuation inhaler Inhale 1 puff into the lungs once daily. 4 g 5    traZODone (DESYREL) 50 MG tablet Take 2 tablets (100 mg total) by mouth once daily. Take nightly for sleep 30 tablet 2     No current facility-administered medications on file prior to visit.     Social History     Socioeconomic History    Marital status:    Occupational History    Occupation: Retired   Tobacco Use    Smoking status: Current Every Day Smoker     Packs/day: 0.50     Types: Cigarettes    Smokeless tobacco: Never Used   Substance and Sexual Activity    Alcohol use: Not Currently    Drug use: Yes     Types: Oxycodone    Sexual activity: Yes     Family History   Problem Relation Age of Onset    Arthritis Mother     Hypertension Mother     Learning disabilities Mother     Alcohol abuse Father     Early death Father     Heart disease Father     Cancer Sister     Diabetes Sister     Heart disease Maternal Grandfather     COPD Paternal Grandfather     Heart disease Son        Review of Systems   Unable to perform ROS: Other   Constitutional: Negative.    HENT: Negative for congestion, ear pain, nosebleeds and trouble swallowing.    Eyes: Negative for pain and itching.   Respiratory: Negative for chest tightness.    Cardiovascular: Negative for chest pain.   Gastrointestinal: Negative for abdominal distention.   Endocrine: Negative for cold intolerance and heat intolerance.   Genitourinary: Negative for difficulty urinating.   Musculoskeletal: Negative for arthralgias.   Neurological: Negative for dizziness.       Objective:     BP (!) 152/80   Pulse 70   Temp 97.1 °F (36.2 °C)   Resp 20     Physical Exam  Assessment:     1. Diabetic ulcer of right foot associated  with diabetes mellitus due to underlying condition, with bone involvement without evidence of necrosis, unspecified part of foot    2. Diabetic ulcer of midfoot associated with diabetes mellitus due to underlying condition, with necrosis of muscle, unspecified laterality    3. Peripheral vascular disease, unspecified        Plan:     Problem List Items Addressed This Visit        Cardiac/Vascular    Peripheral vascular disease, unspecified (Chronic)       Endocrine    Diabetic ulcer of midfoot associated with diabetes mellitus due to underlying condition, with necrosis of muscle    Diabetic ulcer of right foot associated with diabetes mellitus due to underlying condition, with bone involvement without evidence of necrosis - Primary        No follow-ups on file.      I am having Navdeep Avery maintain his aspirin, amLODIPine, atorvastatin, clopidogreL, EScitalopram oxalate, lisinopriL, metoprolol succinate, pantoprazole, SPIRIVA RESPIMAT, nitroGLYCERIN, diclofenac sodium, traZODone, albuterol, icosapent ethyL, hydrOXYzine pamoate, albuterol, fluticasone propionate, azithromycin, RESTASIS MULTIDOSE, gabapentin, and HYDROcodone-acetaminophen.    Navdeep was seen today for hyperbaric oxygen therapy and diabetic foot ulcer.    Diagnoses and all orders for this visit:    Diabetic ulcer of right foot associated with diabetes mellitus due to underlying condition, with bone involvement without evidence of necrosis, unspecified part of foot    Diabetic ulcer of midfoot associated with diabetes mellitus due to underlying condition, with necrosis of muscle, unspecified laterality    Peripheral vascular disease, unspecified         [unfilled]  No orders of the defined types were placed in this encounter.

## 2022-03-24 NOTE — PROGRESS NOTES
Subjective:      Patient ID: Navdeep Avery is a 61 y.o. male.    Chief Complaint: No chief complaint on file.    Navdeep Avery a 61 y.o. male presents for follow up on all regular problems which are reviewed and discussed.     Problem List Items Addressed This Visit        Derm    Ulcer of right foot with necrosis of muscle - Primary    Relevant Orders    POCT Glucose, Hand-Held Device (Completed)    POCT Glucose, Hand-Held Device (Completed)          Past Medical History:  Past Medical History:   Diagnosis Date    Anemia     Anxiety     Atherosclerotic heart disease of native coronary artery with other forms of angina pectoris     Atrophic rhinitis     Carpal tunnel syndrome     COPD (chronic obstructive pulmonary disease)     Coronary arteriosclerosis     COVID 02/02/2022    Depression     Diabetic ulcer of right foot associated with diabetes mellitus due to underlying condition, with bone involvement without evidence of necrosis     GERD (gastroesophageal reflux disease)     Hyperlipidemia     Hypertension     Insomnia     MI (myocardial infarction)     Neuropathy     Peripheral vascular disease, unspecified     Right foot ulcer, with fat layer exposed 01/07/2015    Sleep apnea     Type 1 diabetes mellitus with foot ulcer     Type 2 diabetes mellitus      Past Surgical History:   Procedure Laterality Date    AMPUTATION      ANGIOPLASTY      CARDIAC CATHETERIZATION      CORONARY STENT PLACEMENT      DEBRIDEMENT      Right foot debridment with hospital stay and IV Abx    SHOULDER OPEN ROTATOR CUFF REPAIR Left     SPINE SURGERY      VASCULAR SURGERY       Review of patient's allergies indicates:  No Known Allergies  Current Outpatient Medications on File Prior to Visit   Medication Sig Dispense Refill    albuterol (PROVENTIL) 2.5 mg /3 mL (0.083 %) nebulizer solution Take 2.5 mg by nebulization 3 (three) times daily. Rescue      albuterol (PROVENTIL/VENTOLIN HFA) 90 mcg/actuation  inhaler Inhale 2 puffs into the lungs 2 (two) times daily as needed for Wheezing. 18 g 2    amLODIPine (NORVASC) 10 MG tablet Take 1 tablet (10 mg total) by mouth once daily. 90 tablet 1    aspirin 81 MG Chew Take 81 mg by mouth once daily.      atorvastatin (LIPITOR) 40 MG tablet Take 1 tablet (40 mg total) by mouth nightly. 90 tablet 1    azithromycin (Z-FRANDY) 250 MG tablet Take 2 tablet by mouth today and then 1 tablet by mouth for 4 days      clopidogreL (PLAVIX) 75 mg tablet Take 1 tablet (75 mg total) by mouth once daily. 90 tablet 1    cycloSPORINE (RESTASIS MULTIDOSE) 0.05 % Drop Apply 1 drop to eye every 12 (twelve) hours. 5.5 mL 0    diclofenac sodium (VOLTAREN) 1 % Gel Apply 1 g topically 4 (four) times daily as needed for Pain.      EScitalopram oxalate (LEXAPRO) 10 MG tablet Take 1 tablet (10 mg total) by mouth 2 (two) times a day. 180 tablet 1    fluticasone propionate (FLONASE) 50 mcg/actuation nasal spray 2 sprays (100 mcg total) by Each Nostril route once daily. 16 g 2    gabapentin (NEURONTIN) 300 MG capsule Take 3 capsules (900 mg total) by mouth every 6 (six) hours. 360 capsule 2    HYDROcodone-acetaminophen (NORCO)  mg per tablet Take 1 tablet by mouth every 8 (eight) hours. 90 tablet 0    hydrOXYzine pamoate (VISTARIL) 25 MG Cap Take 1 capsule (25 mg total) by mouth once daily. Take nightly for sleep 90 capsule 1    icosapent ethyL (VASCEPA) 1 gram Cap Take 2 capsules (2 g total) by mouth 2 (two) times daily. 120 capsule 5    lisinopriL 10 MG tablet Take 1 tablet (10 mg total) by mouth once daily. 90 tablet 1    metoprolol succinate (TOPROL-XL) 25 MG 24 hr tablet Take 1 tablet (25 mg total) by mouth once daily. 90 tablet 1    nitroGLYCERIN (NITROSTAT) 0.4 MG SL tablet Place 1 tablet (0.4 mg total) under the tongue every 5 (five) minutes as needed for Chest pain. 30 tablet 2    pantoprazole (PROTONIX) 40 MG tablet Take 1 tablet (40 mg total) by mouth once daily. 90 tablet 1     SPIRIVA RESPIMAT 2.5 mcg/actuation inhaler Inhale 1 puff into the lungs once daily. 4 g 5    traZODone (DESYREL) 50 MG tablet Take 2 tablets (100 mg total) by mouth once daily. Take nightly for sleep 30 tablet 2     No current facility-administered medications on file prior to visit.     Social History     Socioeconomic History    Marital status:    Occupational History    Occupation: Retired   Tobacco Use    Smoking status: Current Every Day Smoker     Packs/day: 0.50     Types: Cigarettes    Smokeless tobacco: Never Used   Substance and Sexual Activity    Alcohol use: Not Currently    Drug use: Yes     Types: Oxycodone    Sexual activity: Yes     Family History   Problem Relation Age of Onset    Arthritis Mother     Hypertension Mother     Learning disabilities Mother     Alcohol abuse Father     Early death Father     Heart disease Father     Cancer Sister     Diabetes Sister     Heart disease Maternal Grandfather     COPD Paternal Grandfather     Heart disease Son        Review of Systems   Unable to perform ROS: Other       Objective:     BP (!) 169/85   Pulse 67   Temp 96.6 °F (35.9 °C)   Resp 20     Physical Exam  Assessment:     1. Ulcer of right foot with necrosis of muscle        Plan:     Problem List Items Addressed This Visit        Derm    Ulcer of right foot with necrosis of muscle - Primary    Relevant Orders    POCT Glucose, Hand-Held Device (Completed)    POCT Glucose, Hand-Held Device (Completed)        No follow-ups on file.      I am having Navdeep Avery maintain his aspirin, amLODIPine, atorvastatin, clopidogreL, EScitalopram oxalate, lisinopriL, metoprolol succinate, pantoprazole, SPIRIVA RESPIMAT, nitroGLYCERIN, diclofenac sodium, traZODone, albuterol, icosapent ethyL, hydrOXYzine pamoate, albuterol, fluticasone propionate, azithromycin, RESTASIS MULTIDOSE, gabapentin, and HYDROcodone-acetaminophen.    Diagnoses and all orders for this visit:    Ulcer of  right foot with necrosis of muscle  -     POCT Glucose, Hand-Held Device  -     POCT Glucose, Hand-Held Device         [unfilled]  Orders Placed This Encounter   Procedures    POCT Glucose, Hand-Held Device    POCT Glucose, Hand-Held Device

## 2022-03-24 NOTE — PROGRESS NOTES
Subjective:      Patient ID: Navdeep Avery is a 61 y.o. male.    Chief Complaint: No chief complaint on file.    Navdeep Avery a 61 y.o. male presents for follow up on all regular problems which are reviewed and discussed.     Problem List Items Addressed This Visit        Derm    Ulcer of right foot with necrosis of muscle - Primary          Past Medical History:  Past Medical History:   Diagnosis Date    Anemia     Anxiety     Atherosclerotic heart disease of native coronary artery with other forms of angina pectoris     Atrophic rhinitis     Carpal tunnel syndrome     COPD (chronic obstructive pulmonary disease)     Coronary arteriosclerosis     COVID 02/02/2022    Depression     Diabetic ulcer of right foot associated with diabetes mellitus due to underlying condition, with bone involvement without evidence of necrosis     GERD (gastroesophageal reflux disease)     Hyperlipidemia     Hypertension     Insomnia     MI (myocardial infarction)     Neuropathy     Peripheral vascular disease, unspecified     Right foot ulcer, with fat layer exposed 01/07/2015    Sleep apnea     Type 1 diabetes mellitus with foot ulcer     Type 2 diabetes mellitus      Past Surgical History:   Procedure Laterality Date    AMPUTATION      ANGIOPLASTY      CARDIAC CATHETERIZATION      CORONARY STENT PLACEMENT      DEBRIDEMENT      Right foot debridment with hospital stay and IV Abx    SHOULDER OPEN ROTATOR CUFF REPAIR Left     SPINE SURGERY      VASCULAR SURGERY       Review of patient's allergies indicates:  No Known Allergies  Current Outpatient Medications on File Prior to Visit   Medication Sig Dispense Refill    albuterol (PROVENTIL) 2.5 mg /3 mL (0.083 %) nebulizer solution Take 2.5 mg by nebulization 3 (three) times daily. Rescue      albuterol (PROVENTIL/VENTOLIN HFA) 90 mcg/actuation inhaler Inhale 2 puffs into the lungs 2 (two) times daily as needed for Wheezing. 18 g 2    amLODIPine  (NORVASC) 10 MG tablet Take 1 tablet (10 mg total) by mouth once daily. 90 tablet 1    aspirin 81 MG Chew Take 81 mg by mouth once daily.      atorvastatin (LIPITOR) 40 MG tablet Take 1 tablet (40 mg total) by mouth nightly. 90 tablet 1    azithromycin (Z-FRANDY) 250 MG tablet Take 2 tablet by mouth today and then 1 tablet by mouth for 4 days      clopidogreL (PLAVIX) 75 mg tablet Take 1 tablet (75 mg total) by mouth once daily. 90 tablet 1    cycloSPORINE (RESTASIS MULTIDOSE) 0.05 % Drop Apply 1 drop to eye every 12 (twelve) hours. 5.5 mL 0    diclofenac sodium (VOLTAREN) 1 % Gel Apply 1 g topically 4 (four) times daily as needed for Pain.      EScitalopram oxalate (LEXAPRO) 10 MG tablet Take 1 tablet (10 mg total) by mouth 2 (two) times a day. 180 tablet 1    fluticasone propionate (FLONASE) 50 mcg/actuation nasal spray 2 sprays (100 mcg total) by Each Nostril route once daily. 16 g 2    gabapentin (NEURONTIN) 300 MG capsule Take 3 capsules (900 mg total) by mouth every 6 (six) hours. 360 capsule 2    HYDROcodone-acetaminophen (NORCO)  mg per tablet Take 1 tablet by mouth every 8 (eight) hours. 90 tablet 0    hydrOXYzine pamoate (VISTARIL) 25 MG Cap Take 1 capsule (25 mg total) by mouth once daily. Take nightly for sleep 90 capsule 1    icosapent ethyL (VASCEPA) 1 gram Cap Take 2 capsules (2 g total) by mouth 2 (two) times daily. 120 capsule 5    lisinopriL 10 MG tablet Take 1 tablet (10 mg total) by mouth once daily. 90 tablet 1    metoprolol succinate (TOPROL-XL) 25 MG 24 hr tablet Take 1 tablet (25 mg total) by mouth once daily. 90 tablet 1    nitroGLYCERIN (NITROSTAT) 0.4 MG SL tablet Place 1 tablet (0.4 mg total) under the tongue every 5 (five) minutes as needed for Chest pain. 30 tablet 2    pantoprazole (PROTONIX) 40 MG tablet Take 1 tablet (40 mg total) by mouth once daily. 90 tablet 1    SPIRIVA RESPIMAT 2.5 mcg/actuation inhaler Inhale 1 puff into the lungs once daily. 4 g 5     traZODone (DESYREL) 50 MG tablet Take 2 tablets (100 mg total) by mouth once daily. Take nightly for sleep 30 tablet 2     No current facility-administered medications on file prior to visit.     Social History     Socioeconomic History    Marital status:    Occupational History    Occupation: Retired   Tobacco Use    Smoking status: Current Every Day Smoker     Packs/day: 0.50     Types: Cigarettes    Smokeless tobacco: Never Used   Substance and Sexual Activity    Alcohol use: Not Currently    Drug use: Yes     Types: Oxycodone    Sexual activity: Yes     Family History   Problem Relation Age of Onset    Arthritis Mother     Hypertension Mother     Learning disabilities Mother     Alcohol abuse Father     Early death Father     Heart disease Father     Cancer Sister     Diabetes Sister     Heart disease Maternal Grandfather     COPD Paternal Grandfather     Heart disease Son        Review of Systems   Constitutional: Negative.    HENT: Negative for congestion, ear pain, nosebleeds and trouble swallowing.    Eyes: Negative for pain and itching.   Respiratory: Negative for chest tightness.    Cardiovascular: Negative for chest pain.   Gastrointestinal: Negative for abdominal distention.   Endocrine: Negative for cold intolerance and heat intolerance.   Genitourinary: Negative for difficulty urinating.   Musculoskeletal: Negative for arthralgias.   Neurological: Negative for dizziness.       Objective:     There were no vitals taken for this visit.    Physical Exam  Assessment:     1. Ulcer of right foot with necrosis of muscle        Plan:     Problem List Items Addressed This Visit        Derm    Ulcer of right foot with necrosis of muscle - Primary        No follow-ups on file.      I am having Navdeep Avery maintain his aspirin, amLODIPine, atorvastatin, clopidogreL, EScitalopram oxalate, lisinopriL, metoprolol succinate, pantoprazole, SPIRIVA RESPIMAT, nitroGLYCERIN, diclofenac  sodium, traZODone, albuterol, icosapent ethyL, hydrOXYzine pamoate, albuterol, fluticasone propionate, azithromycin, RESTASIS MULTIDOSE, gabapentin, and HYDROcodone-acetaminophen.    Diagnoses and all orders for this visit:    Ulcer of right foot with necrosis of muscle         [unfilled]  No orders of the defined types were placed in this encounter.

## 2022-03-25 ENCOUNTER — OFFICE VISIT (OUTPATIENT)
Dept: WOUND CARE | Facility: CLINIC | Age: 62
End: 2022-03-25
Attending: FAMILY MEDICINE
Payer: MEDICARE

## 2022-03-25 VITALS
RESPIRATION RATE: 20 BRPM | TEMPERATURE: 97 F | SYSTOLIC BLOOD PRESSURE: 186 MMHG | HEART RATE: 97 BPM | DIASTOLIC BLOOD PRESSURE: 92 MMHG

## 2022-03-25 DIAGNOSIS — L97.513 ULCER OF RIGHT FOOT WITH NECROSIS OF MUSCLE: Primary | ICD-10-CM

## 2022-03-25 PROCEDURE — 99499 UNLISTED E&M SERVICE: CPT | Mod: S$PBB,,, | Performed by: SURGERY

## 2022-03-25 PROCEDURE — 99499 NO LOS: ICD-10-PCS | Mod: S$PBB,,, | Performed by: SURGERY

## 2022-03-25 PROCEDURE — 99215 OFFICE O/P EST HI 40 MIN: CPT | Mod: PBBFAC | Performed by: SURGERY

## 2022-03-25 NOTE — PROGRESS NOTES
Nolan HBOT well. Wound care done to right foot. Cont hydrofera blue/dry dressing daily. RTC Mon 3/28/22 at 0800

## 2022-03-25 NOTE — PATIENT INSTRUCTIONS
RTC in Monday for HBOT    Clean wound with baby shampoo and water    Apply hydorfera blue to wound,cover with dry gauze,wrap with rachel,paper tape  Change daily and as needed    Elevate feet as much as possible    No standing

## 2022-03-28 ENCOUNTER — OFFICE VISIT (OUTPATIENT)
Dept: WOUND CARE | Facility: CLINIC | Age: 62
End: 2022-03-28
Attending: FAMILY MEDICINE
Payer: MEDICARE

## 2022-03-28 VITALS
SYSTOLIC BLOOD PRESSURE: 141 MMHG | DIASTOLIC BLOOD PRESSURE: 96 MMHG | RESPIRATION RATE: 20 BRPM | HEART RATE: 74 BPM | TEMPERATURE: 97 F

## 2022-03-28 DIAGNOSIS — L97.516 DIABETIC ULCER OF OTHER PART OF RIGHT FOOT ASSOCIATED WITH DIABETES MELLITUS DUE TO UNDERLYING CONDITION, WITH BONE INVOLVEMENT WITHOUT EVIDENCE OF NECROSIS: Primary | ICD-10-CM

## 2022-03-28 DIAGNOSIS — E08.621 DIABETIC ULCER OF OTHER PART OF RIGHT FOOT ASSOCIATED WITH DIABETES MELLITUS DUE TO UNDERLYING CONDITION, WITH BONE INVOLVEMENT WITHOUT EVIDENCE OF NECROSIS: Primary | ICD-10-CM

## 2022-03-28 DIAGNOSIS — I73.9 PERIPHERAL VASCULAR DISEASE, UNSPECIFIED: ICD-10-CM

## 2022-03-28 LAB — GLUCOSE SERPL-MCNC: 260 MG/DL (ref 70–110)

## 2022-03-28 PROCEDURE — G0277 HBOT, FULL BODY CHAMBER, 30M: HCPCS | Performed by: FAMILY MEDICINE

## 2022-03-28 PROCEDURE — 99183 HYPERBARIC OXYGEN THERAPY: CPT | Mod: S$PBB,,, | Performed by: FAMILY MEDICINE

## 2022-03-28 PROCEDURE — 82962 GLUCOSE BLOOD TEST: CPT | Mod: PBBFAC | Performed by: FAMILY MEDICINE

## 2022-03-28 PROCEDURE — 99183 HYPERBARIC OXYGEN THERAPY: CPT | Mod: PBBFAC | Performed by: FAMILY MEDICINE

## 2022-03-28 PROCEDURE — 99183 PR HYPERBARIC OXYGEN THERAPY ATTENDANCE/SUPERVISION, PER SESSION: ICD-10-PCS | Mod: S$PBB,,, | Performed by: FAMILY MEDICINE

## 2022-03-28 PROCEDURE — 99499 UNLISTED E&M SERVICE: CPT | Mod: S$PBB,,, | Performed by: FAMILY MEDICINE

## 2022-03-28 PROCEDURE — 99215 OFFICE O/P EST HI 40 MIN: CPT | Mod: PBBFAC,25 | Performed by: FAMILY MEDICINE

## 2022-03-28 PROCEDURE — 99499 NO LOS: ICD-10-PCS | Mod: S$PBB,,, | Performed by: FAMILY MEDICINE

## 2022-03-28 NOTE — PATIENT INSTRUCTIONS
RTC in the morning for HBOT     Clean wound with baby shampoo and water     Apply hydorfera blue to wound,cover with dry gauze,wrap with rachel,paper tape  Change daily and as needed     Elevate feet as much as possible     No standing

## 2022-03-28 NOTE — PROGRESS NOTES
Subjective:      Patient ID: Navdeep Avery is a 61 y.o. male.    Chief Complaint: Hyperbaric Oxygen Therapy (Ballesteros's Grade 3 right foot) and Diabetic Foot Ulcer (Right foot with bone exposure)    Navdeep Avery a 61 y.o. male presents for follow up on all regular problems which are reviewed and discussed.     Problem List Items Addressed This Visit        Cardiac/Vascular    Peripheral vascular disease, unspecified (Chronic)       Endocrine    Diabetic ulcer of right foot associated with diabetes mellitus due to underlying condition, with bone involvement without evidence of necrosis - Primary    Relevant Orders    POCT Glucose, Hand-Held Device (Completed)          Past Medical History:  Past Medical History:   Diagnosis Date    Anemia     Anxiety     Atherosclerotic heart disease of native coronary artery with other forms of angina pectoris     Atrophic rhinitis     Carpal tunnel syndrome     COPD (chronic obstructive pulmonary disease)     Coronary arteriosclerosis     COVID 02/02/2022    Depression     Diabetic ulcer of right foot associated with diabetes mellitus due to underlying condition, with bone involvement without evidence of necrosis     GERD (gastroesophageal reflux disease)     Hyperlipidemia     Hypertension     Insomnia     MI (myocardial infarction)     Neuropathy     Peripheral vascular disease, unspecified     Right foot ulcer, with fat layer exposed 01/07/2015    Sleep apnea     Type 1 diabetes mellitus with foot ulcer     Type 2 diabetes mellitus      Past Surgical History:   Procedure Laterality Date    AMPUTATION      ANGIOPLASTY      CARDIAC CATHETERIZATION      CORONARY STENT PLACEMENT      DEBRIDEMENT      Right foot debridment with hospital stay and IV Abx    SHOULDER OPEN ROTATOR CUFF REPAIR Left     SPINE SURGERY      VASCULAR SURGERY       Review of patient's allergies indicates:  No Known Allergies  Current Outpatient Medications on File Prior to  Visit   Medication Sig Dispense Refill    albuterol (PROVENTIL) 2.5 mg /3 mL (0.083 %) nebulizer solution Take 2.5 mg by nebulization 3 (three) times daily. Rescue      albuterol (PROVENTIL/VENTOLIN HFA) 90 mcg/actuation inhaler Inhale 2 puffs into the lungs 2 (two) times daily as needed for Wheezing. 18 g 2    amLODIPine (NORVASC) 10 MG tablet Take 1 tablet (10 mg total) by mouth once daily. 90 tablet 1    aspirin 81 MG Chew Take 81 mg by mouth once daily.      atorvastatin (LIPITOR) 40 MG tablet Take 1 tablet (40 mg total) by mouth nightly. 90 tablet 1    azithromycin (Z-FRANDY) 250 MG tablet Take 2 tablet by mouth today and then 1 tablet by mouth for 4 days      clopidogreL (PLAVIX) 75 mg tablet Take 1 tablet (75 mg total) by mouth once daily. 90 tablet 1    diclofenac sodium (VOLTAREN) 1 % Gel Apply 1 g topically 4 (four) times daily as needed for Pain.      EScitalopram oxalate (LEXAPRO) 10 MG tablet Take 1 tablet (10 mg total) by mouth 2 (two) times a day. 180 tablet 1    fluticasone propionate (FLONASE) 50 mcg/actuation nasal spray 2 sprays (100 mcg total) by Each Nostril route once daily. 16 g 2    gabapentin (NEURONTIN) 300 MG capsule Take 3 capsules (900 mg total) by mouth every 6 (six) hours. 360 capsule 2    HYDROcodone-acetaminophen (NORCO)  mg per tablet Take 1 tablet by mouth every 8 (eight) hours. 90 tablet 0    hydrOXYzine pamoate (VISTARIL) 25 MG Cap Take 1 capsule (25 mg total) by mouth once daily. Take nightly for sleep 90 capsule 1    icosapent ethyL (VASCEPA) 1 gram Cap Take 2 capsules (2 g total) by mouth 2 (two) times daily. 120 capsule 5    lisinopriL 10 MG tablet Take 1 tablet (10 mg total) by mouth once daily. 90 tablet 1    metoprolol succinate (TOPROL-XL) 25 MG 24 hr tablet Take 1 tablet (25 mg total) by mouth once daily. 90 tablet 1    nitroGLYCERIN (NITROSTAT) 0.4 MG SL tablet Place 1 tablet (0.4 mg total) under the tongue every 5 (five) minutes as needed for Chest  pain. 30 tablet 2    pantoprazole (PROTONIX) 40 MG tablet Take 1 tablet (40 mg total) by mouth once daily. 90 tablet 1    SPIRIVA RESPIMAT 2.5 mcg/actuation inhaler Inhale 1 puff into the lungs once daily. 4 g 5    traZODone (DESYREL) 50 MG tablet Take 2 tablets (100 mg total) by mouth once daily. Take nightly for sleep 30 tablet 2     No current facility-administered medications on file prior to visit.     Social History     Socioeconomic History    Marital status:    Occupational History    Occupation: Retired   Tobacco Use    Smoking status: Current Every Day Smoker     Packs/day: 0.50     Types: Cigarettes    Smokeless tobacco: Never Used   Substance and Sexual Activity    Alcohol use: Not Currently    Drug use: Yes     Types: Oxycodone    Sexual activity: Yes     Family History   Problem Relation Age of Onset    Arthritis Mother     Hypertension Mother     Learning disabilities Mother     Alcohol abuse Father     Early death Father     Heart disease Father     Cancer Sister     Diabetes Sister     Heart disease Maternal Grandfather     COPD Paternal Grandfather     Heart disease Son        Review of Systems   Unable to perform ROS: Other       Objective:     BP (!) 141/96   Pulse 74   Temp 97 °F (36.1 °C)   Resp 20     Physical Exam  Assessment:     1. Diabetic ulcer of other part of right foot associated with diabetes mellitus due to underlying condition, with bone involvement without evidence of necrosis    2. Peripheral vascular disease, unspecified        Plan:     Problem List Items Addressed This Visit        Cardiac/Vascular    Peripheral vascular disease, unspecified (Chronic)       Endocrine    Diabetic ulcer of right foot associated with diabetes mellitus due to underlying condition, with bone involvement without evidence of necrosis - Primary    Relevant Orders    POCT Glucose, Hand-Held Device (Completed)        No follow-ups on file.      I am having Navdeep Avery  maintain his aspirin, amLODIPine, atorvastatin, clopidogreL, EScitalopram oxalate, lisinopriL, metoprolol succinate, pantoprazole, SPIRIVA RESPIMAT, nitroGLYCERIN, diclofenac sodium, traZODone, albuterol, icosapent ethyL, hydrOXYzine pamoate, albuterol, fluticasone propionate, azithromycin, gabapentin, and HYDROcodone-acetaminophen.    Navdeep was seen today for hyperbaric oxygen therapy and diabetic foot ulcer.    Diagnoses and all orders for this visit:    Diabetic ulcer of other part of right foot associated with diabetes mellitus due to underlying condition, with bone involvement without evidence of necrosis  -     POCT Glucose, Hand-Held Device    Peripheral vascular disease, unspecified         [unfilled]  Orders Placed This Encounter   Procedures    POCT Glucose, Hand-Held Device

## 2022-03-29 ENCOUNTER — OFFICE VISIT (OUTPATIENT)
Dept: WOUND CARE | Facility: CLINIC | Age: 62
End: 2022-03-29
Attending: FAMILY MEDICINE
Payer: MEDICARE

## 2022-03-29 VITALS
SYSTOLIC BLOOD PRESSURE: 112 MMHG | TEMPERATURE: 97 F | HEART RATE: 74 BPM | DIASTOLIC BLOOD PRESSURE: 74 MMHG | RESPIRATION RATE: 20 BRPM

## 2022-03-29 DIAGNOSIS — E08.621 DIABETIC ULCER OF OTHER PART OF RIGHT FOOT ASSOCIATED WITH DIABETES MELLITUS DUE TO UNDERLYING CONDITION, WITH BONE INVOLVEMENT WITHOUT EVIDENCE OF NECROSIS: Primary | ICD-10-CM

## 2022-03-29 DIAGNOSIS — L97.516 DIABETIC ULCER OF OTHER PART OF RIGHT FOOT ASSOCIATED WITH DIABETES MELLITUS DUE TO UNDERLYING CONDITION, WITH BONE INVOLVEMENT WITHOUT EVIDENCE OF NECROSIS: Primary | ICD-10-CM

## 2022-03-29 DIAGNOSIS — I73.9 PERIPHERAL VASCULAR DISEASE, UNSPECIFIED: ICD-10-CM

## 2022-03-29 LAB — GLUCOSE SERPL-MCNC: 236 MG/DL (ref 70–110)

## 2022-03-29 PROCEDURE — 99499 UNLISTED E&M SERVICE: CPT | Mod: S$PBB,,, | Performed by: FAMILY MEDICINE

## 2022-03-29 PROCEDURE — 99499 NO LOS: ICD-10-PCS | Mod: S$PBB,,, | Performed by: FAMILY MEDICINE

## 2022-03-29 PROCEDURE — 82962 GLUCOSE BLOOD TEST: CPT | Mod: 91,PBBFAC | Performed by: FAMILY MEDICINE

## 2022-03-29 PROCEDURE — 99183 HYPERBARIC OXYGEN THERAPY: CPT | Mod: S$PBB,,, | Performed by: FAMILY MEDICINE

## 2022-03-29 PROCEDURE — G0277 HBOT, FULL BODY CHAMBER, 30M: HCPCS | Performed by: FAMILY MEDICINE

## 2022-03-29 PROCEDURE — 99183 HYPERBARIC OXYGEN THERAPY: CPT | Mod: PBBFAC | Performed by: FAMILY MEDICINE

## 2022-03-29 PROCEDURE — 99183 PR HYPERBARIC OXYGEN THERAPY ATTENDANCE/SUPERVISION, PER SESSION: ICD-10-PCS | Mod: S$PBB,,, | Performed by: FAMILY MEDICINE

## 2022-03-29 PROCEDURE — 99215 OFFICE O/P EST HI 40 MIN: CPT | Mod: PBBFAC,25 | Performed by: FAMILY MEDICINE

## 2022-03-29 PROCEDURE — 82962 GLUCOSE BLOOD TEST: CPT | Mod: PBBFAC | Performed by: FAMILY MEDICINE

## 2022-03-29 NOTE — PROGRESS NOTES
Subjective:      Patient ID: Navdeep Avery is a 61 y.o. male.    Chief Complaint: Hyperbaric Oxygen Therapy and Diabetic Foot Ulcer    Navdeep Avery a 61 y.o. male presents for follow up on all regular problems which are reviewed and discussed.     Problem List Items Addressed This Visit        Cardiac/Vascular    Peripheral vascular disease, unspecified (Chronic)       Endocrine    Diabetic ulcer of right foot associated with diabetes mellitus due to underlying condition, with bone involvement without evidence of necrosis - Primary    Relevant Orders    POCT Glucose, Hand-Held Device (Completed)          Past Medical History:  Past Medical History:   Diagnosis Date    Anemia     Anxiety     Atherosclerotic heart disease of native coronary artery with other forms of angina pectoris     Atrophic rhinitis     Carpal tunnel syndrome     COPD (chronic obstructive pulmonary disease)     Coronary arteriosclerosis     COVID 02/02/2022    Depression     Diabetic ulcer of right foot associated with diabetes mellitus due to underlying condition, with bone involvement without evidence of necrosis     GERD (gastroesophageal reflux disease)     Hyperlipidemia     Hypertension     Insomnia     MI (myocardial infarction)     Neuropathy     Peripheral vascular disease, unspecified     Right foot ulcer, with fat layer exposed 01/07/2015    Sleep apnea     Type 1 diabetes mellitus with foot ulcer     Type 2 diabetes mellitus      Past Surgical History:   Procedure Laterality Date    AMPUTATION      ANGIOPLASTY      CARDIAC CATHETERIZATION      CORONARY STENT PLACEMENT      DEBRIDEMENT      Right foot debridment with hospital stay and IV Abx    SHOULDER OPEN ROTATOR CUFF REPAIR Left     SPINE SURGERY      VASCULAR SURGERY       Review of patient's allergies indicates:  No Known Allergies  Current Outpatient Medications on File Prior to Visit   Medication Sig Dispense Refill    albuterol (PROVENTIL)  2.5 mg /3 mL (0.083 %) nebulizer solution Take 2.5 mg by nebulization 3 (three) times daily. Rescue      albuterol (PROVENTIL/VENTOLIN HFA) 90 mcg/actuation inhaler Inhale 2 puffs into the lungs 2 (two) times daily as needed for Wheezing. 18 g 2    amLODIPine (NORVASC) 10 MG tablet Take 1 tablet (10 mg total) by mouth once daily. 90 tablet 1    aspirin 81 MG Chew Take 81 mg by mouth once daily.      atorvastatin (LIPITOR) 40 MG tablet Take 1 tablet (40 mg total) by mouth nightly. 90 tablet 1    azithromycin (Z-FRANDY) 250 MG tablet Take 2 tablet by mouth today and then 1 tablet by mouth for 4 days      clopidogreL (PLAVIX) 75 mg tablet Take 1 tablet (75 mg total) by mouth once daily. 90 tablet 1    diclofenac sodium (VOLTAREN) 1 % Gel Apply 1 g topically 4 (four) times daily as needed for Pain.      EScitalopram oxalate (LEXAPRO) 10 MG tablet Take 1 tablet (10 mg total) by mouth 2 (two) times a day. 180 tablet 1    fluticasone propionate (FLONASE) 50 mcg/actuation nasal spray 2 sprays (100 mcg total) by Each Nostril route once daily. 16 g 2    gabapentin (NEURONTIN) 300 MG capsule Take 3 capsules (900 mg total) by mouth every 6 (six) hours. 360 capsule 2    HYDROcodone-acetaminophen (NORCO)  mg per tablet Take 1 tablet by mouth every 8 (eight) hours. 90 tablet 0    hydrOXYzine pamoate (VISTARIL) 25 MG Cap Take 1 capsule (25 mg total) by mouth once daily. Take nightly for sleep 90 capsule 1    icosapent ethyL (VASCEPA) 1 gram Cap Take 2 capsules (2 g total) by mouth 2 (two) times daily. 120 capsule 5    lisinopriL 10 MG tablet Take 1 tablet (10 mg total) by mouth once daily. 90 tablet 1    metoprolol succinate (TOPROL-XL) 25 MG 24 hr tablet Take 1 tablet (25 mg total) by mouth once daily. 90 tablet 1    nitroGLYCERIN (NITROSTAT) 0.4 MG SL tablet Place 1 tablet (0.4 mg total) under the tongue every 5 (five) minutes as needed for Chest pain. 30 tablet 2    pantoprazole (PROTONIX) 40 MG tablet Take 1  tablet (40 mg total) by mouth once daily. 90 tablet 1    SPIRIVA RESPIMAT 2.5 mcg/actuation inhaler Inhale 1 puff into the lungs once daily. 4 g 5    traZODone (DESYREL) 50 MG tablet Take 2 tablets (100 mg total) by mouth once daily. Take nightly for sleep 30 tablet 2     No current facility-administered medications on file prior to visit.     Social History     Socioeconomic History    Marital status:    Occupational History    Occupation: Retired   Tobacco Use    Smoking status: Current Every Day Smoker     Packs/day: 0.50     Types: Cigarettes    Smokeless tobacco: Never Used   Substance and Sexual Activity    Alcohol use: Not Currently    Drug use: Yes     Types: Oxycodone    Sexual activity: Yes     Family History   Problem Relation Age of Onset    Arthritis Mother     Hypertension Mother     Learning disabilities Mother     Alcohol abuse Father     Early death Father     Heart disease Father     Cancer Sister     Diabetes Sister     Heart disease Maternal Grandfather     COPD Paternal Grandfather     Heart disease Son        Review of Systems   Unable to perform ROS: Other       Objective:     /74   Pulse 74   Temp 97 °F (36.1 °C)   Resp 20     Physical Exam  Assessment:     1. Diabetic ulcer of other part of right foot associated with diabetes mellitus due to underlying condition, with bone involvement without evidence of necrosis    2. Peripheral vascular disease, unspecified        Plan:     Problem List Items Addressed This Visit        Cardiac/Vascular    Peripheral vascular disease, unspecified (Chronic)       Endocrine    Diabetic ulcer of right foot associated with diabetes mellitus due to underlying condition, with bone involvement without evidence of necrosis - Primary    Relevant Orders    POCT Glucose, Hand-Held Device (Completed)        No follow-ups on file.      I am having Navdeep Bennie maintain his aspirin, amLODIPine, atorvastatin, clopidogreL,  EScitalopram oxalate, lisinopriL, metoprolol succinate, pantoprazole, SPIRIVA RESPIMAT, nitroGLYCERIN, diclofenac sodium, traZODone, albuterol, icosapent ethyL, hydrOXYzine pamoate, albuterol, fluticasone propionate, azithromycin, gabapentin, and HYDROcodone-acetaminophen.    Navdeep was seen today for hyperbaric oxygen therapy and diabetic foot ulcer.    Diagnoses and all orders for this visit:    Diabetic ulcer of other part of right foot associated with diabetes mellitus due to underlying condition, with bone involvement without evidence of necrosis  -     POCT Glucose, Hand-Held Device    Peripheral vascular disease, unspecified         [unfilled]  Orders Placed This Encounter   Procedures    POCT Glucose, Hand-Held Device

## 2022-03-30 ENCOUNTER — OFFICE VISIT (OUTPATIENT)
Dept: WOUND CARE | Facility: CLINIC | Age: 62
End: 2022-03-30
Attending: FAMILY MEDICINE
Payer: MEDICARE

## 2022-03-30 VITALS
DIASTOLIC BLOOD PRESSURE: 71 MMHG | HEART RATE: 88 BPM | SYSTOLIC BLOOD PRESSURE: 162 MMHG | RESPIRATION RATE: 20 BRPM | TEMPERATURE: 97 F

## 2022-03-30 DIAGNOSIS — E08.621 DIABETIC ULCER OF OTHER PART OF RIGHT FOOT ASSOCIATED WITH DIABETES MELLITUS DUE TO UNDERLYING CONDITION, WITH BONE INVOLVEMENT WITHOUT EVIDENCE OF NECROSIS: Primary | ICD-10-CM

## 2022-03-30 DIAGNOSIS — L97.516 DIABETIC ULCER OF OTHER PART OF RIGHT FOOT ASSOCIATED WITH DIABETES MELLITUS DUE TO UNDERLYING CONDITION, WITH BONE INVOLVEMENT WITHOUT EVIDENCE OF NECROSIS: Primary | ICD-10-CM

## 2022-03-30 LAB
GLUCOSE SERPL-MCNC: 204 MG/DL (ref 70–110)
GLUCOSE SERPL-MCNC: 235 MG/DL (ref 70–110)
GLUCOSE SERPL-MCNC: 257 MG/DL (ref 70–110)

## 2022-03-30 PROCEDURE — 99183 HYPERBARIC OXYGEN THERAPY: CPT | Mod: S$PBB,,, | Performed by: FAMILY MEDICINE

## 2022-03-30 PROCEDURE — 99183 HYPERBARIC OXYGEN THERAPY: CPT | Mod: PBBFAC | Performed by: FAMILY MEDICINE

## 2022-03-30 PROCEDURE — 99499 NO LOS: ICD-10-PCS | Mod: S$PBB,,, | Performed by: FAMILY MEDICINE

## 2022-03-30 PROCEDURE — 99215 OFFICE O/P EST HI 40 MIN: CPT | Mod: PBBFAC,25 | Performed by: FAMILY MEDICINE

## 2022-03-30 PROCEDURE — 99183 PR HYPERBARIC OXYGEN THERAPY ATTENDANCE/SUPERVISION, PER SESSION: ICD-10-PCS | Mod: S$PBB,,, | Performed by: FAMILY MEDICINE

## 2022-03-30 PROCEDURE — 82962 GLUCOSE BLOOD TEST: CPT | Mod: 91,PBBFAC | Performed by: FAMILY MEDICINE

## 2022-03-30 PROCEDURE — 99499 UNLISTED E&M SERVICE: CPT | Mod: S$PBB,,, | Performed by: FAMILY MEDICINE

## 2022-03-30 PROCEDURE — G0277 HBOT, FULL BODY CHAMBER, 30M: HCPCS | Performed by: FAMILY MEDICINE

## 2022-03-30 RX ORDER — CLINDAMYCIN HYDROCHLORIDE 300 MG/1
300 CAPSULE ORAL EVERY 8 HOURS
Qty: 90 CAPSULE | Refills: 0 | Status: SHIPPED | OUTPATIENT
Start: 2022-03-30 | End: 2022-04-29

## 2022-03-30 RX ORDER — AMOXICILLIN AND CLAVULANATE POTASSIUM 875; 125 MG/1; MG/1
1 TABLET, FILM COATED ORAL EVERY 12 HOURS
Qty: 60 TABLET | Refills: 0 | Status: SHIPPED | OUTPATIENT
Start: 2022-03-30 | End: 2022-04-27

## 2022-03-31 ENCOUNTER — OFFICE VISIT (OUTPATIENT)
Dept: WOUND CARE | Facility: CLINIC | Age: 62
End: 2022-03-31
Attending: FAMILY MEDICINE
Payer: MEDICARE

## 2022-03-31 VITALS
HEART RATE: 71 BPM | RESPIRATION RATE: 20 BRPM | SYSTOLIC BLOOD PRESSURE: 162 MMHG | DIASTOLIC BLOOD PRESSURE: 68 MMHG | TEMPERATURE: 98 F

## 2022-03-31 DIAGNOSIS — L97.516 DIABETIC ULCER OF OTHER PART OF RIGHT FOOT ASSOCIATED WITH DIABETES MELLITUS DUE TO UNDERLYING CONDITION, WITH BONE INVOLVEMENT WITHOUT EVIDENCE OF NECROSIS: Primary | ICD-10-CM

## 2022-03-31 DIAGNOSIS — E08.621 DIABETIC ULCER OF OTHER PART OF RIGHT FOOT ASSOCIATED WITH DIABETES MELLITUS DUE TO UNDERLYING CONDITION, WITH BONE INVOLVEMENT WITHOUT EVIDENCE OF NECROSIS: Primary | ICD-10-CM

## 2022-03-31 LAB
GLUCOSE SERPL-MCNC: 283 MG/DL (ref 70–110)
GLUCOSE SERPL-MCNC: 306 MG/DL (ref 70–110)

## 2022-03-31 PROCEDURE — 99214 OFFICE O/P EST MOD 30 MIN: CPT | Mod: PBBFAC | Performed by: FAMILY MEDICINE

## 2022-03-31 PROCEDURE — G0277 HBOT, FULL BODY CHAMBER, 30M: HCPCS | Performed by: FAMILY MEDICINE

## 2022-03-31 PROCEDURE — 99183 PR HYPERBARIC OXYGEN THERAPY ATTENDANCE/SUPERVISION, PER SESSION: ICD-10-PCS | Mod: S$PBB,,, | Performed by: FAMILY MEDICINE

## 2022-03-31 PROCEDURE — 82962 GLUCOSE BLOOD TEST: CPT | Mod: 91,PBBFAC | Performed by: FAMILY MEDICINE

## 2022-03-31 PROCEDURE — 99499 NO LOS: ICD-10-PCS | Mod: S$PBB,,, | Performed by: FAMILY MEDICINE

## 2022-03-31 PROCEDURE — 99183 HYPERBARIC OXYGEN THERAPY: CPT | Mod: PBBFAC | Performed by: FAMILY MEDICINE

## 2022-03-31 PROCEDURE — 99183 HYPERBARIC OXYGEN THERAPY: CPT | Mod: S$PBB,,, | Performed by: FAMILY MEDICINE

## 2022-03-31 PROCEDURE — 99499 UNLISTED E&M SERVICE: CPT | Mod: S$PBB,,, | Performed by: FAMILY MEDICINE

## 2022-03-31 NOTE — PATIENT INSTRUCTIONS
Clean wound with baby shampoo and water    Apply polymem silver dressing. Do not change till Mon 4/4/22    Cont antibiotics as prescribed    [facility] wound gel daily when available

## 2022-03-31 NOTE — PROGRESS NOTES
Nolan HBOT well. See wound assessment. Silver polymem dressing applied.Instructed pat do not change till Mon. RTC on 4/4/22.

## 2022-03-31 NOTE — PROGRESS NOTES
Subjective:      Patient ID: Navdeep Avery is a 61 y.o. male.    Chief Complaint: Hyperbaric Oxygen Therapy    Navdeep Avery a 61 y.o. male presents for follow up on all regular problems which are reviewed and discussed.     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 evidence of necrosis - Primary    Relevant Orders    POCT Glucose, Hand-Held Device (Completed)    POCT Glucose, Hand-Held Device (Completed)          Past Medical History:  Past Medical History:   Diagnosis Date    Anemia     Anxiety     Atherosclerotic heart disease of native coronary artery with other forms of angina pectoris     Atrophic rhinitis     Carpal tunnel syndrome     COPD (chronic obstructive pulmonary disease)     Coronary arteriosclerosis     COVID 02/02/2022    Depression     Diabetic ulcer of right foot associated with diabetes mellitus due to underlying condition, with bone involvement without evidence of necrosis     GERD (gastroesophageal reflux disease)     Hyperlipidemia     Hypertension     Insomnia     MI (myocardial infarction)     Neuropathy     Peripheral vascular disease, unspecified     Right foot ulcer, with fat layer exposed 01/07/2015    Sleep apnea     Type 1 diabetes mellitus with foot ulcer     Type 2 diabetes mellitus      Past Surgical History:   Procedure Laterality Date    AMPUTATION      ANGIOPLASTY      CARDIAC CATHETERIZATION      CORONARY STENT PLACEMENT      DEBRIDEMENT      Right foot debridment with hospital stay and IV Abx    SHOULDER OPEN ROTATOR CUFF REPAIR Left     SPINE SURGERY      VASCULAR SURGERY       Review of patient's allergies indicates:  No Known Allergies  Current Outpatient Medications on File Prior to Visit   Medication Sig Dispense Refill    amLODIPine (NORVASC) 10 MG tablet Take 1 tablet (10 mg total) by mouth once  daily. 90 tablet 1    aspirin 81 MG Chew Take 81 mg by mouth once daily.      atorvastatin (LIPITOR) 40 MG tablet Take 1 tablet (40 mg total) by mouth nightly. 90 tablet 1    clopidogreL (PLAVIX) 75 mg tablet Take 1 tablet (75 mg total) by mouth once daily. 90 tablet 1    diclofenac sodium (VOLTAREN) 1 % Gel Apply 1 g topically 4 (four) times daily as needed for Pain.      EScitalopram oxalate (LEXAPRO) 10 MG tablet Take 1 tablet (10 mg total) by mouth 2 (two) times a day. 180 tablet 1    gabapentin (NEURONTIN) 300 MG capsule Take 3 capsules (900 mg total) by mouth every 6 (six) hours. 360 capsule 2    hydrOXYzine pamoate (VISTARIL) 25 MG Cap Take 1 capsule (25 mg total) by mouth once daily. Take nightly for sleep 90 capsule 1    icosapent ethyL (VASCEPA) 1 gram Cap Take 2 capsules (2 g total) by mouth 2 (two) times daily. 120 capsule 5    lisinopriL 10 MG tablet Take 1 tablet (10 mg total) by mouth once daily. 90 tablet 1    metoprolol succinate (TOPROL-XL) 25 MG 24 hr tablet Take 1 tablet (25 mg total) by mouth once daily. 90 tablet 1    nitroGLYCERIN (NITROSTAT) 0.4 MG SL tablet Place 1 tablet (0.4 mg total) under the tongue every 5 (five) minutes as needed for Chest pain. 30 tablet 2    pantoprazole (PROTONIX) 40 MG tablet Take 1 tablet (40 mg total) by mouth once daily. 90 tablet 1    SPIRIVA RESPIMAT 2.5 mcg/actuation inhaler Inhale 1 puff into the lungs once daily. 4 g 5    traZODone (DESYREL) 50 MG tablet Take 2 tablets (100 mg total) by mouth once daily. Take nightly for sleep 30 tablet 2    albuterol (PROVENTIL) 2.5 mg /3 mL (0.083 %) nebulizer solution Take 2.5 mg by nebulization 3 (three) times daily. Rescue      albuterol (PROVENTIL/VENTOLIN HFA) 90 mcg/actuation inhaler Inhale 2 puffs into the lungs 2 (two) times daily as needed for Wheezing. 18 g 2    fluticasone propionate (FLONASE) 50 mcg/actuation nasal spray 2 sprays (100 mcg total) by Each Nostril route once daily. 16 g 2     HYDROcodone-acetaminophen (NORCO)  mg per tablet Take 1 tablet by mouth every 8 (eight) hours. 90 tablet 0     No current facility-administered medications on file prior to visit.     Social History     Socioeconomic History    Marital status:    Occupational History    Occupation: Retired   Tobacco Use    Smoking status: Current Every Day Smoker     Packs/day: 0.50     Types: Cigarettes    Smokeless tobacco: Never Used   Substance and Sexual Activity    Alcohol use: Not Currently    Drug use: Yes     Types: Oxycodone    Sexual activity: Yes     Family History   Problem Relation Age of Onset    Arthritis Mother     Hypertension Mother     Learning disabilities Mother     Alcohol abuse Father     Early death Father     Heart disease Father     Cancer Sister     Diabetes Sister     Heart disease Maternal Grandfather     COPD Paternal Grandfather     Heart disease Son        Review of Systems   Unable to perform ROS: Other       Objective:     BP (!) 162/71   Pulse 88   Temp 97.1 °F (36.2 °C)   Resp 20     Physical Exam  Assessment:     1. Diabetic ulcer of other part of right foot associated with diabetes mellitus due to underlying condition, with bone involvement without evidence of necrosis        Plan:     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 evidence of necrosis - Primary    Relevant Orders    POCT Glucose, Hand-Held Device (Completed)    POCT Glucose, Hand-Held Device (Completed)        No follow-ups on file.      I have discontinued Navdeep Avery's azithromycin. I am also having him start on amoxicillin-clavulanate 875-125mg and clindamycin. Additionally, I am having him maintain his aspirin, amLODIPine, atorvastatin, clopidogreL, EScitalopram oxalate, lisinopriL, metoprolol succinate, pantoprazole, SPIRIVA RESPIMAT, nitroGLYCERIN,  diclofenac sodium, traZODone, albuterol, icosapent ethyL, hydrOXYzine pamoate, albuterol, fluticasone propionate, gabapentin, and HYDROcodone-acetaminophen.    Navdeep was seen today for hyperbaric oxygen therapy.    Diagnoses and all orders for this visit:    Diabetic ulcer of other part of right foot associated with diabetes mellitus due to underlying condition, with bone involvement without evidence of necrosis  -     POCT Glucose, Hand-Held Device  -     POCT Glucose, Hand-Held Device    Other orders  -     amoxicillin-clavulanate 875-125mg (AUGMENTIN) 875-125 mg per tablet; Take 1 tablet by mouth every 12 (twelve) hours.  -     clindamycin (CLEOCIN) 300 MG capsule; Take 1 capsule (300 mg total) by mouth every 8 (eight) hours.      Medications Ordered This Encounter   Medications    amoxicillin-clavulanate 875-125mg (AUGMENTIN) 875-125 mg per tablet     Sig: Take 1 tablet by mouth every 12 (twelve) hours.     Dispense:  60 tablet     Refill:  0    clindamycin (CLEOCIN) 300 MG capsule     Sig: Take 1 capsule (300 mg total) by mouth every 8 (eight) hours.     Dispense:  90 capsule     Refill:  0     [unfilled]  Orders Placed This Encounter   Procedures    POCT Glucose, Hand-Held Device    POCT Glucose, Hand-Held Device

## 2022-04-01 NOTE — PROGRESS NOTES
Subjective:      Patient ID: Navdeep Avery is a 61 y.o. male.    Chief Complaint: Hyperbaric Oxygen Therapy (Right foot wound)    Navdeep Avrey a 61 y.o. male presents for follow up on all regular problems which are reviewed and discussed.     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 evidence of necrosis - Primary    Relevant Orders    POCT Glucose, Hand-Held Device (Completed)    POCT Glucose, Hand-Held Device (Completed)          Past Medical History:  Past Medical History:   Diagnosis Date    Anemia     Anxiety     Atherosclerotic heart disease of native coronary artery with other forms of angina pectoris     Atrophic rhinitis     Carpal tunnel syndrome     COPD (chronic obstructive pulmonary disease)     Coronary arteriosclerosis     COVID 02/02/2022    Depression     Diabetic ulcer of right foot associated with diabetes mellitus due to underlying condition, with bone involvement without evidence of necrosis     GERD (gastroesophageal reflux disease)     Hyperlipidemia     Hypertension     Insomnia     MI (myocardial infarction)     Neuropathy     Peripheral vascular disease, unspecified     Right foot ulcer, with fat layer exposed 01/07/2015    Sleep apnea     Type 1 diabetes mellitus with foot ulcer     Type 2 diabetes mellitus      Past Surgical History:   Procedure Laterality Date    AMPUTATION      ANGIOPLASTY      CARDIAC CATHETERIZATION      CORONARY STENT PLACEMENT      DEBRIDEMENT      Right foot debridment with hospital stay and IV Abx    SHOULDER OPEN ROTATOR CUFF REPAIR Left     SPINE SURGERY      VASCULAR SURGERY       Review of patient's allergies indicates:  No Known Allergies  Current Outpatient Medications on File Prior to Visit   Medication Sig Dispense Refill    albuterol (PROVENTIL) 2.5 mg /3 mL (0.083 %) nebulizer  solution Take 2.5 mg by nebulization 3 (three) times daily. Rescue      albuterol (PROVENTIL/VENTOLIN HFA) 90 mcg/actuation inhaler Inhale 2 puffs into the lungs 2 (two) times daily as needed for Wheezing. 18 g 2    amLODIPine (NORVASC) 10 MG tablet Take 1 tablet (10 mg total) by mouth once daily. 90 tablet 1    amoxicillin-clavulanate 875-125mg (AUGMENTIN) 875-125 mg per tablet Take 1 tablet by mouth every 12 (twelve) hours. 60 tablet 0    aspirin 81 MG Chew Take 81 mg by mouth once daily.      atorvastatin (LIPITOR) 40 MG tablet Take 1 tablet (40 mg total) by mouth nightly. 90 tablet 1    clindamycin (CLEOCIN) 300 MG capsule Take 1 capsule (300 mg total) by mouth every 8 (eight) hours. 90 capsule 0    clopidogreL (PLAVIX) 75 mg tablet Take 1 tablet (75 mg total) by mouth once daily. 90 tablet 1    diclofenac sodium (VOLTAREN) 1 % Gel Apply 1 g topically 4 (four) times daily as needed for Pain.      EScitalopram oxalate (LEXAPRO) 10 MG tablet Take 1 tablet (10 mg total) by mouth 2 (two) times a day. 180 tablet 1    fluticasone propionate (FLONASE) 50 mcg/actuation nasal spray 2 sprays (100 mcg total) by Each Nostril route once daily. 16 g 2    gabapentin (NEURONTIN) 300 MG capsule Take 3 capsules (900 mg total) by mouth every 6 (six) hours. 360 capsule 2    HYDROcodone-acetaminophen (NORCO)  mg per tablet Take 1 tablet by mouth every 8 (eight) hours. 90 tablet 0    hydrOXYzine pamoate (VISTARIL) 25 MG Cap Take 1 capsule (25 mg total) by mouth once daily. Take nightly for sleep 90 capsule 1    icosapent ethyL (VASCEPA) 1 gram Cap Take 2 capsules (2 g total) by mouth 2 (two) times daily. 120 capsule 5    lisinopriL 10 MG tablet Take 1 tablet (10 mg total) by mouth once daily. 90 tablet 1    metoprolol succinate (TOPROL-XL) 25 MG 24 hr tablet Take 1 tablet (25 mg total) by mouth once daily. 90 tablet 1    nitroGLYCERIN (NITROSTAT) 0.4 MG SL tablet Place 1 tablet (0.4 mg total) under the tongue  every 5 (five) minutes as needed for Chest pain. 30 tablet 2    pantoprazole (PROTONIX) 40 MG tablet Take 1 tablet (40 mg total) by mouth once daily. 90 tablet 1    SPIRIVA RESPIMAT 2.5 mcg/actuation inhaler Inhale 1 puff into the lungs once daily. 4 g 5    traZODone (DESYREL) 50 MG tablet Take 2 tablets (100 mg total) by mouth once daily. Take nightly for sleep 30 tablet 2     No current facility-administered medications on file prior to visit.     Social History     Socioeconomic History    Marital status:    Occupational History    Occupation: Retired   Tobacco Use    Smoking status: Current Every Day Smoker     Packs/day: 0.50     Types: Cigarettes    Smokeless tobacco: Never Used   Substance and Sexual Activity    Alcohol use: Not Currently    Drug use: Yes     Types: Oxycodone    Sexual activity: Yes     Family History   Problem Relation Age of Onset    Arthritis Mother     Hypertension Mother     Learning disabilities Mother     Alcohol abuse Father     Early death Father     Heart disease Father     Cancer Sister     Diabetes Sister     Heart disease Maternal Grandfather     COPD Paternal Grandfather     Heart disease Son        Review of Systems   Unable to perform ROS: Other       Objective:     BP (!) 162/68   Pulse 71   Temp 98 °F (36.7 °C)   Resp 20     Physical Exam  Assessment:     1. Diabetic ulcer of other part of right foot associated with diabetes mellitus due to underlying condition, with bone involvement without evidence of necrosis        Plan:     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 evidence of necrosis - Primary    Relevant Orders    POCT Glucose, Hand-Held Device (Completed)    POCT Glucose, Hand-Held Device (Completed)        No follow-ups on file.      I am having Navdeep Avery maintain his aspirin, amLODIPine,  atorvastatin, clopidogreL, EScitalopram oxalate, lisinopriL, metoprolol succinate, pantoprazole, SPIRIVA RESPIMAT, nitroGLYCERIN, diclofenac sodium, traZODone, albuterol, icosapent ethyL, hydrOXYzine pamoate, albuterol, fluticasone propionate, gabapentin, HYDROcodone-acetaminophen, amoxicillin-clavulanate 875-125mg, and clindamycin.    Navdeep was seen today for hyperbaric oxygen therapy.    Diagnoses and all orders for this visit:    Diabetic ulcer of other part of right foot associated with diabetes mellitus due to underlying condition, with bone involvement without evidence of necrosis  -     POCT Glucose, Hand-Held Device  -     POCT Glucose, Hand-Held Device         [unfilled]  Orders Placed This Encounter   Procedures    POCT Glucose, Hand-Held Device    POCT Glucose, Hand-Held Device

## 2022-04-04 ENCOUNTER — OFFICE VISIT (OUTPATIENT)
Dept: WOUND CARE | Facility: CLINIC | Age: 62
End: 2022-04-04
Attending: FAMILY MEDICINE
Payer: MEDICARE

## 2022-04-04 VITALS
TEMPERATURE: 98 F | RESPIRATION RATE: 20 BRPM | HEART RATE: 75 BPM | SYSTOLIC BLOOD PRESSURE: 169 MMHG | DIASTOLIC BLOOD PRESSURE: 81 MMHG

## 2022-04-04 DIAGNOSIS — E08.621 DIABETIC ULCER OF OTHER PART OF RIGHT FOOT ASSOCIATED WITH DIABETES MELLITUS DUE TO UNDERLYING CONDITION, WITH BONE INVOLVEMENT WITHOUT EVIDENCE OF NECROSIS: Primary | ICD-10-CM

## 2022-04-04 DIAGNOSIS — L97.516 DIABETIC ULCER OF OTHER PART OF RIGHT FOOT ASSOCIATED WITH DIABETES MELLITUS DUE TO UNDERLYING CONDITION, WITH BONE INVOLVEMENT WITHOUT EVIDENCE OF NECROSIS: Primary | ICD-10-CM

## 2022-04-04 PROCEDURE — 99499 NO LOS: ICD-10-PCS | Mod: S$PBB,,, | Performed by: FAMILY MEDICINE

## 2022-04-04 PROCEDURE — 99499 UNLISTED E&M SERVICE: CPT | Mod: S$PBB,,, | Performed by: FAMILY MEDICINE

## 2022-04-04 PROCEDURE — 99183 PR HYPERBARIC OXYGEN THERAPY ATTENDANCE/SUPERVISION, PER SESSION: ICD-10-PCS | Mod: S$PBB,,, | Performed by: FAMILY MEDICINE

## 2022-04-04 PROCEDURE — 99183 HYPERBARIC OXYGEN THERAPY: CPT | Mod: PBBFAC | Performed by: FAMILY MEDICINE

## 2022-04-04 PROCEDURE — 99183 HYPERBARIC OXYGEN THERAPY: CPT | Mod: S$PBB,,, | Performed by: FAMILY MEDICINE

## 2022-04-04 PROCEDURE — G0277 HBOT, FULL BODY CHAMBER, 30M: HCPCS | Performed by: FAMILY MEDICINE

## 2022-04-04 PROCEDURE — 99214 OFFICE O/P EST MOD 30 MIN: CPT | Mod: PBBFAC | Performed by: FAMILY MEDICINE

## 2022-04-04 PROCEDURE — 82962 GLUCOSE BLOOD TEST: CPT | Mod: 91,PBBFAC | Performed by: FAMILY MEDICINE

## 2022-04-05 LAB
GLUCOSE SERPL-MCNC: 214 MG/DL (ref 70–110)
GLUCOSE SERPL-MCNC: 249 MG/DL (ref 70–110)

## 2022-04-05 NOTE — PROGRESS NOTES
Subjective:      Patient ID: Navdeep Avery is a 61 y.o. male.    Chief Complaint: Hyperbaric Oxygen Therapy (Right foot)    Navdeep Avery a 61 y.o. male presents for follow up on all regular problems which are reviewed and discussed.     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 evidence of necrosis - Primary          Past Medical History:  Past Medical History:   Diagnosis Date    Anemia     Anxiety     Atherosclerotic heart disease of native coronary artery with other forms of angina pectoris     Atrophic rhinitis     Carpal tunnel syndrome     COPD (chronic obstructive pulmonary disease)     Coronary arteriosclerosis     COVID 02/02/2022    Depression     Diabetic ulcer of right foot associated with diabetes mellitus due to underlying condition, with bone involvement without evidence of necrosis     GERD (gastroesophageal reflux disease)     Hyperlipidemia     Hypertension     Insomnia     MI (myocardial infarction)     Neuropathy     Peripheral vascular disease, unspecified     Right foot ulcer, with fat layer exposed 01/07/2015    Sleep apnea     Type 1 diabetes mellitus with foot ulcer     Type 2 diabetes mellitus      Past Surgical History:   Procedure Laterality Date    AMPUTATION      ANGIOPLASTY      CARDIAC CATHETERIZATION      CORONARY STENT PLACEMENT      DEBRIDEMENT      Right foot debridment with hospital stay and IV Abx    SHOULDER OPEN ROTATOR CUFF REPAIR Left     SPINE SURGERY      VASCULAR SURGERY       Review of patient's allergies indicates:  No Known Allergies  Current Outpatient Medications on File Prior to Visit   Medication Sig Dispense Refill    albuterol (PROVENTIL) 2.5 mg /3 mL (0.083 %) nebulizer solution Take 2.5 mg by nebulization 3 (three) times daily. Rescue      albuterol (PROVENTIL/VENTOLIN HFA) 90  mcg/actuation inhaler Inhale 2 puffs into the lungs 2 (two) times daily as needed for Wheezing. 18 g 2    amLODIPine (NORVASC) 10 MG tablet Take 1 tablet (10 mg total) by mouth once daily. 90 tablet 1    amoxicillin-clavulanate 875-125mg (AUGMENTIN) 875-125 mg per tablet Take 1 tablet by mouth every 12 (twelve) hours. 60 tablet 0    aspirin 81 MG Chew Take 81 mg by mouth once daily.      atorvastatin (LIPITOR) 40 MG tablet Take 1 tablet (40 mg total) by mouth nightly. 90 tablet 1    clindamycin (CLEOCIN) 300 MG capsule Take 1 capsule (300 mg total) by mouth every 8 (eight) hours. 90 capsule 0    clopidogreL (PLAVIX) 75 mg tablet Take 1 tablet (75 mg total) by mouth once daily. 90 tablet 1    diclofenac sodium (VOLTAREN) 1 % Gel Apply 1 g topically 4 (four) times daily as needed for Pain.      EScitalopram oxalate (LEXAPRO) 10 MG tablet Take 1 tablet (10 mg total) by mouth 2 (two) times a day. 180 tablet 1    fluticasone propionate (FLONASE) 50 mcg/actuation nasal spray 2 sprays (100 mcg total) by Each Nostril route once daily. 16 g 2    gabapentin (NEURONTIN) 300 MG capsule Take 3 capsules (900 mg total) by mouth every 6 (six) hours. 360 capsule 2    HYDROcodone-acetaminophen (NORCO)  mg per tablet Take 1 tablet by mouth every 8 (eight) hours. 90 tablet 0    hydrOXYzine pamoate (VISTARIL) 25 MG Cap Take 1 capsule (25 mg total) by mouth once daily. Take nightly for sleep 90 capsule 1    icosapent ethyL (VASCEPA) 1 gram Cap Take 2 capsules (2 g total) by mouth 2 (two) times daily. 120 capsule 5    lisinopriL 10 MG tablet Take 1 tablet (10 mg total) by mouth once daily. 90 tablet 1    metoprolol succinate (TOPROL-XL) 25 MG 24 hr tablet Take 1 tablet (25 mg total) by mouth once daily. 90 tablet 1    nitroGLYCERIN (NITROSTAT) 0.4 MG SL tablet Place 1 tablet (0.4 mg total) under the tongue every 5 (five) minutes as needed for Chest pain. 30 tablet 2    pantoprazole (PROTONIX) 40 MG tablet Take 1  tablet (40 mg total) by mouth once daily. 90 tablet 1    SPIRIVA RESPIMAT 2.5 mcg/actuation inhaler Inhale 1 puff into the lungs once daily. 4 g 5    traZODone (DESYREL) 50 MG tablet Take 2 tablets (100 mg total) by mouth once daily. Take nightly for sleep 30 tablet 2     No current facility-administered medications on file prior to visit.     Social History     Socioeconomic History    Marital status:    Occupational History    Occupation: Retired   Tobacco Use    Smoking status: Current Every Day Smoker     Packs/day: 0.50     Types: Cigarettes    Smokeless tobacco: Never Used   Substance and Sexual Activity    Alcohol use: Not Currently    Drug use: Yes     Types: Oxycodone    Sexual activity: Yes     Family History   Problem Relation Age of Onset    Arthritis Mother     Hypertension Mother     Learning disabilities Mother     Alcohol abuse Father     Early death Father     Heart disease Father     Cancer Sister     Diabetes Sister     Heart disease Maternal Grandfather     COPD Paternal Grandfather     Heart disease Son        Review of Systems   Unable to perform ROS: Other       Objective:     BP (!) 169/81   Pulse 75   Temp 98.2 °F (36.8 °C)   Resp 20     Physical Exam  Assessment:     1. Diabetic ulcer of other part of right foot associated with diabetes mellitus due to underlying condition, with bone involvement without evidence of necrosis        Plan:     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 evidence of necrosis - Primary        No follow-ups on file.      I am having Navdeep Avery maintain his aspirin, amLODIPine, atorvastatin, clopidogreL, EScitalopram oxalate, lisinopriL, metoprolol succinate, pantoprazole, SPIRIVA RESPIMAT, nitroGLYCERIN, diclofenac sodium, traZODone, albuterol, icosapent ethyL, hydrOXYzine pamoate, albuterol,  fluticasone propionate, gabapentin, HYDROcodone-acetaminophen, amoxicillin-clavulanate 875-125mg, and clindamycin.    Navdeep was seen today for hyperbaric oxygen therapy.    Diagnoses and all orders for this visit:    Diabetic ulcer of other part of right foot associated with diabetes mellitus due to underlying condition, with bone involvement without evidence of necrosis         [unfilled]  No orders of the defined types were placed in this encounter.

## 2022-04-06 ENCOUNTER — OFFICE VISIT (OUTPATIENT)
Dept: FAMILY MEDICINE | Facility: CLINIC | Age: 62
End: 2022-04-06
Payer: MEDICARE

## 2022-04-06 VITALS
OXYGEN SATURATION: 96 % | TEMPERATURE: 99 F | HEIGHT: 68 IN | BODY MASS INDEX: 26.07 KG/M2 | SYSTOLIC BLOOD PRESSURE: 138 MMHG | HEART RATE: 73 BPM | DIASTOLIC BLOOD PRESSURE: 70 MMHG | WEIGHT: 172 LBS | RESPIRATION RATE: 17 BRPM

## 2022-04-06 DIAGNOSIS — E13.9 DIABETES 1.5, MANAGED AS TYPE 2: Primary | ICD-10-CM

## 2022-04-06 DIAGNOSIS — L98.9 SKIN LESION: ICD-10-CM

## 2022-04-06 DIAGNOSIS — M25.50 ARTHRALGIA, UNSPECIFIED JOINT: ICD-10-CM

## 2022-04-06 DIAGNOSIS — E11.319 DIABETIC RETINOPATHY OF BOTH EYES ASSOCIATED WITH TYPE 2 DIABETES MELLITUS, MACULAR EDEMA PRESENCE UNSPECIFIED, UNSPECIFIED RETINOPATHY SEVERITY: ICD-10-CM

## 2022-04-06 LAB
CREAT UR-MCNC: 42 MG/DL (ref 39–259)
MICROALBUMIN UR-MCNC: 17.1 MG/DL (ref 0–2.8)
MICROALBUMIN/CREAT RATIO PNL UR: 407.1 MG/G (ref 0–30)

## 2022-04-06 PROCEDURE — 99213 PR OFFICE/OUTPT VISIT, EST, LEVL III, 20-29 MIN: ICD-10-PCS | Mod: ,,, | Performed by: FAMILY MEDICINE

## 2022-04-06 PROCEDURE — 82570 ASSAY OF URINE CREATININE: CPT | Mod: ,,, | Performed by: CLINICAL MEDICAL LABORATORY

## 2022-04-06 PROCEDURE — 82043 UR ALBUMIN QUANTITATIVE: CPT | Mod: ,,, | Performed by: CLINICAL MEDICAL LABORATORY

## 2022-04-06 PROCEDURE — 82043 MICROALBUMIN / CREATININE RATIO URINE: ICD-10-PCS | Mod: ,,, | Performed by: CLINICAL MEDICAL LABORATORY

## 2022-04-06 PROCEDURE — 82570 MICROALBUMIN / CREATININE RATIO URINE: ICD-10-PCS | Mod: ,,, | Performed by: CLINICAL MEDICAL LABORATORY

## 2022-04-06 PROCEDURE — 99213 OFFICE O/P EST LOW 20 MIN: CPT | Mod: ,,, | Performed by: FAMILY MEDICINE

## 2022-04-06 RX ORDER — DICLOFENAC SODIUM 10 MG/G
1 GEL TOPICAL 4 TIMES DAILY PRN
Qty: 20 G | Refills: 1 | Status: SHIPPED | OUTPATIENT
Start: 2022-04-06 | End: 2024-03-14 | Stop reason: SDUPTHER

## 2022-04-07 ENCOUNTER — OFFICE VISIT (OUTPATIENT)
Dept: WOUND CARE | Facility: CLINIC | Age: 62
End: 2022-04-07
Payer: MEDICARE

## 2022-04-07 ENCOUNTER — TELEPHONE (OUTPATIENT)
Dept: FAMILY MEDICINE | Facility: CLINIC | Age: 62
End: 2022-04-07
Payer: MEDICARE

## 2022-04-07 VITALS
SYSTOLIC BLOOD PRESSURE: 176 MMHG | DIASTOLIC BLOOD PRESSURE: 86 MMHG | TEMPERATURE: 97 F | RESPIRATION RATE: 20 BRPM | HEART RATE: 78 BPM

## 2022-04-07 DIAGNOSIS — L97.516 DIABETIC ULCER OF RIGHT FOOT ASSOCIATED WITH DIABETES MELLITUS DUE TO UNDERLYING CONDITION, WITH BONE INVOLVEMENT WITHOUT EVIDENCE OF NECROSIS, UNSPECIFIED PART OF FOOT: Primary | ICD-10-CM

## 2022-04-07 DIAGNOSIS — E08.621 DIABETIC ULCER OF RIGHT FOOT ASSOCIATED WITH DIABETES MELLITUS DUE TO UNDERLYING CONDITION, WITH BONE INVOLVEMENT WITHOUT EVIDENCE OF NECROSIS, UNSPECIFIED PART OF FOOT: Primary | ICD-10-CM

## 2022-04-07 PROCEDURE — 99213 PR OFFICE/OUTPT VISIT, EST, LEVL III, 20-29 MIN: ICD-10-PCS | Mod: S$PBB,,, | Performed by: NURSE PRACTITIONER

## 2022-04-07 PROCEDURE — 82962 GLUCOSE BLOOD TEST: CPT | Mod: 91,PBBFAC | Performed by: NURSE PRACTITIONER

## 2022-04-07 PROCEDURE — 99215 OFFICE O/P EST HI 40 MIN: CPT | Mod: PBBFAC | Performed by: NURSE PRACTITIONER

## 2022-04-07 PROCEDURE — 99213 OFFICE O/P EST LOW 20 MIN: CPT | Mod: S$PBB,,, | Performed by: NURSE PRACTITIONER

## 2022-04-08 ENCOUNTER — OFFICE VISIT (OUTPATIENT)
Dept: WOUND CARE | Facility: CLINIC | Age: 62
End: 2022-04-08
Attending: SURGERY
Payer: MEDICARE

## 2022-04-08 VITALS
SYSTOLIC BLOOD PRESSURE: 181 MMHG | DIASTOLIC BLOOD PRESSURE: 83 MMHG | HEART RATE: 67 BPM | TEMPERATURE: 97 F | RESPIRATION RATE: 20 BRPM

## 2022-04-08 DIAGNOSIS — E08.621 DIABETIC ULCER OF RIGHT FOOT ASSOCIATED WITH DIABETES MELLITUS DUE TO UNDERLYING CONDITION, WITH BONE INVOLVEMENT WITHOUT EVIDENCE OF NECROSIS, UNSPECIFIED PART OF FOOT: Primary | ICD-10-CM

## 2022-04-08 DIAGNOSIS — L97.516 DIABETIC ULCER OF RIGHT FOOT ASSOCIATED WITH DIABETES MELLITUS DUE TO UNDERLYING CONDITION, WITH BONE INVOLVEMENT WITHOUT EVIDENCE OF NECROSIS, UNSPECIFIED PART OF FOOT: Primary | ICD-10-CM

## 2022-04-08 PROCEDURE — 99499 UNLISTED E&M SERVICE: CPT | Mod: S$PBB,,, | Performed by: SURGERY

## 2022-04-08 PROCEDURE — 99499 NO LOS: ICD-10-PCS | Mod: S$PBB,,, | Performed by: SURGERY

## 2022-04-08 PROCEDURE — 11042 DBRDMT SUBQ TIS 1ST 20SQCM/<: CPT | Mod: S$PBB,,, | Performed by: SURGERY

## 2022-04-08 PROCEDURE — 11042 PR DEBRIDEMENT, SKIN, SUB-Q TISSUE,=<20 SQ CM: ICD-10-PCS | Mod: S$PBB,,, | Performed by: SURGERY

## 2022-04-08 PROCEDURE — 99214 OFFICE O/P EST MOD 30 MIN: CPT | Mod: PBBFAC | Performed by: SURGERY

## 2022-04-08 NOTE — PROGRESS NOTES
Debridement Performed for Assessment: Wound# 1  Performed By: Provider: Marli Morales NP  Assistant:    Debridement: Surgical    Photo taken post procedure:    Time-Out Taken: Yes  Level: Skin/Subcutaneous Tissue  Post Debridement Measurements  Length: (cm) 2.5  Width: (cm) 2.7  Depth: (cm) 0.2    Area: (cm²) 6.75  Percent Debrided: 100%  Total Area Debrided: (sq cm)     Tissue and other material debrided:  Adipose, Dermis, Epidermis, Subcutaneous  Devitalized Tissue Debrided:Biofilm, callus  Instrument: Curette  Bleeding: Moderate  Hemostasis Achieved: Pressure  Procedural Pain: Insensate  Post Procedural Pain: Insensate  Response to Treatment: Procedure was tolerated well    Devitalized materials/tissue Removed  the following was removed during debridement  subcutaneous      Post Debridement Diagnosis chronic wound  Post debridement diagnosis  Same as Pre-operative debridement diagnosis, No Complications noted.      Grafts or implants applied  Was a graft or implant applied?  No      Procedure assistant  Procedure assisted by: Priscilla Waterman LPN  Assistant is the same as nurse listed above      Complications related to procedure  Did any complication occure during procedure?  No complications noted during or after procedure.      Specimen  Specimen collect during procedure?  No specimen collected      Anaesthesia:  Anesthesia used  None      Blood Loss:  Blood loss during procedure  less than 5 cc

## 2022-04-08 NOTE — PATIENT INSTRUCTIONS
Clean wound with baby shampoo and water,apply dakin's moist gauze to wound,cover with dry gauze,wrap with rachel and paper tape. Change daily and as needed    Elevate feet as much as possible    No standing    Offloading shoe right boot

## 2022-04-11 ENCOUNTER — OFFICE VISIT (OUTPATIENT)
Dept: WOUND CARE | Facility: CLINIC | Age: 62
End: 2022-04-11
Attending: SURGERY
Payer: MEDICARE

## 2022-04-11 VITALS
HEART RATE: 74 BPM | DIASTOLIC BLOOD PRESSURE: 88 MMHG | SYSTOLIC BLOOD PRESSURE: 176 MMHG | RESPIRATION RATE: 20 BRPM | TEMPERATURE: 98 F

## 2022-04-11 DIAGNOSIS — L97.516 DIABETIC ULCER OF RIGHT FOOT ASSOCIATED WITH DIABETES MELLITUS DUE TO UNDERLYING CONDITION, WITH BONE INVOLVEMENT WITHOUT EVIDENCE OF NECROSIS, UNSPECIFIED PART OF FOOT: Primary | ICD-10-CM

## 2022-04-11 DIAGNOSIS — E08.621 DIABETIC ULCER OF RIGHT FOOT ASSOCIATED WITH DIABETES MELLITUS DUE TO UNDERLYING CONDITION, WITH BONE INVOLVEMENT WITHOUT EVIDENCE OF NECROSIS, UNSPECIFIED PART OF FOOT: Primary | ICD-10-CM

## 2022-04-11 LAB
GLUCOSE SERPL-MCNC: 231 MG/DL (ref 70–110)
GLUCOSE SERPL-MCNC: 255 MG/DL (ref 70–110)
GLUCOSE SERPL-MCNC: 265 MG/DL (ref 70–110)
GLUCOSE SERPL-MCNC: 323 MG/DL (ref 70–110)

## 2022-04-11 PROCEDURE — 99213 OFFICE O/P EST LOW 20 MIN: CPT | Mod: PBBFAC,25 | Performed by: SURGERY

## 2022-04-11 PROCEDURE — 99499 NO LOS: ICD-10-PCS | Mod: S$PBB,,, | Performed by: SURGERY

## 2022-04-11 PROCEDURE — 99499 UNLISTED E&M SERVICE: CPT | Mod: S$PBB,,, | Performed by: SURGERY

## 2022-04-11 PROCEDURE — 99183 HYPERBARIC OXYGEN THERAPY: CPT | Mod: S$PBB,,, | Performed by: SURGERY

## 2022-04-11 PROCEDURE — 99183 HYPERBARIC OXYGEN THERAPY: CPT | Mod: PBBFAC | Performed by: SURGERY

## 2022-04-11 PROCEDURE — 99183 PR HYPERBARIC OXYGEN THERAPY ATTENDANCE/SUPERVISION, PER SESSION: ICD-10-PCS | Mod: S$PBB,,, | Performed by: SURGERY

## 2022-04-11 PROCEDURE — G0277 HBOT, FULL BODY CHAMBER, 30M: HCPCS | Performed by: SURGERY

## 2022-04-11 PROCEDURE — 82962 GLUCOSE BLOOD TEST: CPT | Mod: PBBFAC | Performed by: SURGERY

## 2022-04-11 NOTE — PATIENT INSTRUCTIONS
Clean wound with baby shampoo and water    Apply dakin's moist gauze,cover with dry gauze,wrap with kerlix,paper tape    Change daily and if needed    Elevate feet as much as possible    No standing

## 2022-04-11 NOTE — PROGRESS NOTES
GIANA Moncada   [facility] - WOUND CARE  [address] [facility] [address]  [phone]      PATIENT NAME: Navdeep Avery  : 1960  DATE: 22  MRN: [id number]      Billing Provider: GIANA Moncada  Level of Service:   Patient PCP Information     Provider PCP Type    Marquez Huff MD General          Reason for Visit / Chief Complaint: Diabetic Foot Ulcer and Wound Care (Right plantar foot)       History of Present Illness / Problem Focused Workflow     Navdeep Avery is a a 61 y.o. male who presents to clinic for follow up on chronic-non healing wound on the right foot. Patient is compliant with current treatment plan.  Pertinent factors that delay wound healing include multiple co-morbidities, poor vascular supply, diabetes, edema, large surface area and no protective sensation. He is currently undergoing HBOT. Wound has improved. Denies fever, chills, malaise, fatigue and sweats.        Review of Systems     Review of Systems   Constitutional: Negative for activity change, chills and fever.   Respiratory: Negative for chest tightness and shortness of breath.    Cardiovascular: Positive for leg swelling. Negative for chest pain and palpitations.   Musculoskeletal: Positive for arthralgias and joint swelling.   Skin: Positive for wound.        See wound assessment   Neurological: Positive for weakness and numbness.   Psychiatric/Behavioral: Negative for agitation, behavioral problems, confusion and self-injury.       Medical / Social / Family History     Past Medical History:   Diagnosis Date    Anemia     Anxiety     Atherosclerotic heart disease of native coronary artery with other forms of angina pectoris     Atrophic rhinitis     Carpal tunnel syndrome     COPD (chronic obstructive pulmonary disease)     Coronary arteriosclerosis     COVID 2022    Depression     Diabetic ulcer of right foot associated with diabetes mellitus  due to underlying condition, with bone involvement without evidence of necrosis     GERD (gastroesophageal reflux disease)     Hyperlipidemia     Hypertension     Insomnia     MI (myocardial infarction)     Neuropathy     Peripheral vascular disease, unspecified     Right foot ulcer, with fat layer exposed 01/07/2015    Sleep apnea     Type 1 diabetes mellitus with foot ulcer     Type 2 diabetes mellitus        Past Surgical History:   Procedure Laterality Date    AMPUTATION      ANGIOPLASTY      CARDIAC CATHETERIZATION      CORONARY STENT PLACEMENT      DEBRIDEMENT      Right foot debridment with hospital stay and IV Abx    SHOULDER OPEN ROTATOR CUFF REPAIR Left     SPINE SURGERY      VASCULAR SURGERY         Social History  Mr. Navdeep Avery  reports that he has been smoking cigarettes. He has been smoking about 0.50 packs per day. He has never used smokeless tobacco. He reports previous alcohol use. He reports current drug use. Drug: Oxycodone.    Family History  's Navdeep Avery family history includes Alcohol abuse in his father; Arthritis in his mother; COPD in his paternal grandfather; Cancer in his sister; Diabetes in his sister; Early death in his father; Heart disease in his father, maternal grandfather, and son; Hypertension in his mother; Learning disabilities in his mother.    Medications and Allergies     Medications  Outpatient Medications Marked as Taking for the 4/7/22 encounter (Office Visit) with GIANA Moncada   Medication Sig Dispense Refill    amLODIPine (NORVASC) 10 MG tablet Take 1 tablet (10 mg total) by mouth once daily. 90 tablet 1    amoxicillin-clavulanate 875-125mg (AUGMENTIN) 875-125 mg per tablet Take 1 tablet by mouth every 12 (twelve) hours. 60 tablet 0    aspirin 81 MG Chew Take 81 mg by mouth once daily.      atorvastatin (LIPITOR) 40 MG tablet Take 1 tablet (40 mg total) by mouth nightly. 90 tablet 1    clindamycin (CLEOCIN) 300 MG  capsule Take 1 capsule (300 mg total) by mouth every 8 (eight) hours. 90 capsule 0    clopidogreL (PLAVIX) 75 mg tablet Take 1 tablet (75 mg total) by mouth once daily. 90 tablet 1    diclofenac sodium (VOLTAREN) 1 % Gel Apply 1 g topically 4 (four) times daily as needed (pain). 20 g 1    EScitalopram oxalate (LEXAPRO) 10 MG tablet Take 1 tablet (10 mg total) by mouth 2 (two) times a day. 180 tablet 1    fluticasone propionate (FLONASE) 50 mcg/actuation nasal spray 2 sprays (100 mcg total) by Each Nostril route once daily. 16 g 2    gabapentin (NEURONTIN) 300 MG capsule Take 3 capsules (900 mg total) by mouth every 6 (six) hours. 360 capsule 2    HYDROcodone-acetaminophen (NORCO)  mg per tablet Take 1 tablet by mouth every 8 (eight) hours. 90 tablet 0    hydrOXYzine pamoate (VISTARIL) 25 MG Cap Take 1 capsule (25 mg total) by mouth once daily. Take nightly for sleep 90 capsule 1    icosapent ethyL (VASCEPA) 1 gram Cap Take 2 capsules (2 g total) by mouth 2 (two) times daily. 120 capsule 5    lisinopriL 10 MG tablet Take 1 tablet (10 mg total) by mouth once daily. 90 tablet 1    metoprolol succinate (TOPROL-XL) 25 MG 24 hr tablet Take 1 tablet (25 mg total) by mouth once daily. 90 tablet 1    nitroGLYCERIN (NITROSTAT) 0.4 MG SL tablet Place 1 tablet (0.4 mg total) under the tongue every 5 (five) minutes as needed for Chest pain. 30 tablet 2    pantoprazole (PROTONIX) 40 MG tablet Take 1 tablet (40 mg total) by mouth once daily. 90 tablet 1    SPIRIVA RESPIMAT 2.5 mcg/actuation inhaler Inhale 1 puff into the lungs once daily. 4 g 5    traZODone (DESYREL) 50 MG tablet Take 2 tablets (100 mg total) by mouth once daily. Take nightly for sleep 30 tablet 2       Allergies  Review of patient's allergies indicates:  No Known Allergies    Physical Examination     Vitals:    04/07/22 0834   BP: (!) 176/86   Pulse: 78   Resp: 20   Temp: 97.3 °F (36.3 °C)     Physical Exam  Vitals and nursing note reviewed.    Constitutional:       Appearance: Normal appearance.   HENT:      Head: Normocephalic.   Cardiovascular:      Rate and Rhythm: Normal rate and regular rhythm.      Pulses: Normal pulses.      Heart sounds: Normal heart sounds.   Pulmonary:      Effort: Pulmonary effort is normal. No respiratory distress.   Chest:      Chest wall: No tenderness.   Musculoskeletal:         General: Swelling present.      Right lower leg: Edema present.   Skin:     General: Skin is warm and dry.      Comments: See wound assessment    Neurological:      General: No focal deficit present.      Mental Status: He is alert and oriented to person, place, and time. Mental status is at baseline.   Psychiatric:         Mood and Affect: Mood normal.         Behavior: Behavior normal.         Thought Content: Thought content normal.         Judgment: Judgment normal.         Assessment and Plan      1. Diabetic ulcer of right foot associated with diabetes mellitus due to underlying condition, with bone involvement without evidence of necrosis, unspecified part of foot           Wound 03/19/21 1031 Diabetic Ulcer Right plantar Foot #1 (Active)   03/19/21 1031    Pre-existing: Yes   Primary Wound Type: Diabetic ulc   Side: Right   Orientation: plantar   Location: Foot   Wound Number: #1   Ankle-Brachial Index:    Pulses:    Removal Indication and Assessment:    Wound Outcome:    (Retired) Wound Type:    (Retired) Wound Length (cm):    (Retired) Wound Width (cm):    (Retired) Depth (cm):    Wound Description (Comments):    Removal Indications:    Wound Image    04/07/22 0837   Dressing Appearance Dry;Intact;Dried drainage 04/07/22 0837   Drainage Amount Moderate 04/07/22 0837   Drainage Characteristics/Odor Serosanguineous 04/07/22 0837   Appearance Pink;Moist;Granulating 04/07/22 0837   Tissue loss description Full thickness 04/07/22 0837   Black (%), Wound Tissue Color 0 % 04/07/22 0837   Red (%), Wound Tissue Color 100 % 04/07/22 0837   Yellow  (%), Wound Tissue Color 0 % 04/07/22 0837   Periwound Area Dry 04/07/22 0837   Wound Edges Callused 04/07/22 0837   Ballesteros Classification (diabetic foot ulcers only) Grade 2 04/07/22 0837   Wound Length (cm) 0.9 cm 04/07/22 0837   Wound Width (cm) 1.1 cm 04/07/22 0837   Wound Depth (cm) 0.3 cm 04/07/22 0837   Wound Volume (cm^3) 0.297 cm^3 04/07/22 0837   Wound Surface Area (cm^2) 0.99 cm^2 04/07/22 0837   Care Cleansed with:;Antimicrobial agent 04/07/22 0837   Dressing Applied;Gauze, wet to dry;Gauze;Rolled gauze 04/07/22 0837         Active Problem List with Overview Notes    Diagnosis Date Noted    SOB (shortness of breath) 03/07/2022    Diabetic ulcer of right foot associated with diabetes mellitus due to underlying condition, with bone involvement without evidence of necrosis     Insomnia 06/17/2021    Diabetes mellitus without complication 06/17/2021    Anxiety 06/17/2021    Neuropathy     Peripheral vascular disease, unspecified     Diabetic ulcer of midfoot associated with diabetes mellitus due to underlying condition, with necrosis of muscle 03/23/2021    Ulcer of right foot with necrosis of muscle 03/23/2021    Benign hypertension 03/23/2021    Mixed hyperlipidemia 03/23/2021    Dependence on nicotine from cigarettes 03/23/2021      Plan:   RTC in am for HBOT    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 evidence of necrosis - Primary          Future Appointments   Date Time Provider Department Center   4/11/2022  1:00 PM [facility]   4/12/2022 10:45 AM Marquez Huff MD [facility] [place]   5/19/2022  1:30 PM Madalyn Calhoun MD [facility]   6/13/2022  7:45 AM AQUILINO Dhillon [facility]   7/6/2022  9:45 AM Marquez Huff MD [facility] [place]   10/4/2022  2:00 PM WANDER  NURSE, [facility] [place]            Signature:  GIANA Moncada  [facility] - WOUND CARE  [address] [facility] [address]  [phone]    Date of encounter: 4/7/22

## 2022-04-12 ENCOUNTER — OFFICE VISIT (OUTPATIENT)
Dept: WOUND CARE | Facility: CLINIC | Age: 62
End: 2022-04-12
Attending: SURGERY
Payer: MEDICARE

## 2022-04-12 VITALS
HEART RATE: 68 BPM | TEMPERATURE: 97 F | DIASTOLIC BLOOD PRESSURE: 72 MMHG | SYSTOLIC BLOOD PRESSURE: 164 MMHG | RESPIRATION RATE: 20 BRPM

## 2022-04-12 DIAGNOSIS — L97.516 DIABETIC ULCER OF RIGHT FOOT ASSOCIATED WITH DIABETES MELLITUS DUE TO UNDERLYING CONDITION, WITH BONE INVOLVEMENT WITHOUT EVIDENCE OF NECROSIS, UNSPECIFIED PART OF FOOT: Primary | ICD-10-CM

## 2022-04-12 DIAGNOSIS — E08.621 DIABETIC ULCER OF RIGHT FOOT ASSOCIATED WITH DIABETES MELLITUS DUE TO UNDERLYING CONDITION, WITH BONE INVOLVEMENT WITHOUT EVIDENCE OF NECROSIS, UNSPECIFIED PART OF FOOT: Primary | ICD-10-CM

## 2022-04-12 LAB — GLUCOSE SERPL-MCNC: 246 MG/DL (ref 70–110)

## 2022-04-12 PROCEDURE — 99183 HYPERBARIC OXYGEN THERAPY: CPT | Mod: S$PBB,,, | Performed by: SURGERY

## 2022-04-12 PROCEDURE — 99499 NO LOS: ICD-10-PCS | Mod: S$PBB,,, | Performed by: SURGERY

## 2022-04-12 PROCEDURE — 99499 UNLISTED E&M SERVICE: CPT | Mod: S$PBB,,, | Performed by: SURGERY

## 2022-04-12 PROCEDURE — 99183 HYPERBARIC OXYGEN THERAPY: CPT | Mod: PBBFAC | Performed by: SURGERY

## 2022-04-12 PROCEDURE — G0277 HBOT, FULL BODY CHAMBER, 30M: HCPCS | Performed by: SURGERY

## 2022-04-12 PROCEDURE — 82962 GLUCOSE BLOOD TEST: CPT | Mod: PBBFAC | Performed by: SURGERY

## 2022-04-12 PROCEDURE — 99183 PR HYPERBARIC OXYGEN THERAPY ATTENDANCE/SUPERVISION, PER SESSION: ICD-10-PCS | Mod: S$PBB,,, | Performed by: SURGERY

## 2022-04-12 PROCEDURE — 99215 OFFICE O/P EST HI 40 MIN: CPT | Mod: PBBFAC,25 | Performed by: SURGERY

## 2022-04-12 NOTE — PROGRESS NOTES
Onlan HBOT well. Vashe wet/dry dressing applied. HBOT completed. RTC in 1 week for f/u with MAHESH Cagle. Orders faxed to [facility]

## 2022-04-12 NOTE — PATIENT INSTRUCTIONS
Clean with baby shampoo and water or vashe (no subs)    Apply silver polymem to wound (cut 1 inch larger than wound)    Cover with dry gauze,wrap with kilng and paper tape. Change twice a week and as needed    Elevate feet as much as possible    Walking boot to right foot

## 2022-04-13 ENCOUNTER — OFFICE VISIT (OUTPATIENT)
Dept: FAMILY MEDICINE | Facility: CLINIC | Age: 62
End: 2022-04-13
Payer: MEDICARE

## 2022-04-13 VITALS
SYSTOLIC BLOOD PRESSURE: 157 MMHG | HEIGHT: 68 IN | WEIGHT: 172 LBS | RESPIRATION RATE: 18 BRPM | DIASTOLIC BLOOD PRESSURE: 83 MMHG | OXYGEN SATURATION: 97 % | BODY MASS INDEX: 26.07 KG/M2 | TEMPERATURE: 97 F | HEART RATE: 76 BPM

## 2022-04-13 DIAGNOSIS — E11.9 DIABETES MELLITUS WITHOUT COMPLICATION: Primary | ICD-10-CM

## 2022-04-13 PROCEDURE — 99213 OFFICE O/P EST LOW 20 MIN: CPT | Mod: ,,, | Performed by: FAMILY MEDICINE

## 2022-04-13 PROCEDURE — 99213 PR OFFICE/OUTPT VISIT, EST, LEVL III, 20-29 MIN: ICD-10-PCS | Mod: ,,, | Performed by: FAMILY MEDICINE

## 2022-04-13 RX ORDER — EMPAGLIFLOZIN 10 MG/1
10 TABLET, FILM COATED ORAL DAILY
Qty: 30 TABLET | Refills: 6 | Status: SHIPPED | OUTPATIENT
Start: 2022-04-13 | End: 2023-01-20 | Stop reason: SDUPTHER

## 2022-04-13 NOTE — PROGRESS NOTES
Navdeep Avery is a 61 y.o. male seen today for follow-up on his diabetes.  On his recent lab work is microalbumin was elevated and to decrease his risk for kidney damage we did discuss starting the patient on Jardiance.  Patient also needs help with his diabetes and blood pressure.  He does have a history of PID and coronary artery disease and we have hesitated using this class of medication in the past.  I warned him to make sure he is well hydrated  and we discussed proper hygiene on these kinds of medications.  Patient was instructed that if his wounds we open on his feet to discontinue the medication immediately.  At this point I believe the risk benefit ratio favors the benefit.    Past Medical History:   Diagnosis Date    Anemia     Anxiety     Atherosclerotic heart disease of native coronary artery with other forms of angina pectoris     Atrophic rhinitis     Carpal tunnel syndrome     COPD (chronic obstructive pulmonary disease)     Coronary arteriosclerosis     COVID 02/02/2022    Depression     Diabetic ulcer of right foot associated with diabetes mellitus due to underlying condition, with bone involvement without evidence of necrosis     GERD (gastroesophageal reflux disease)     Hyperlipidemia     Hypertension     Insomnia     MI (myocardial infarction)     Neuropathy     Peripheral vascular disease, unspecified     Right foot ulcer, with fat layer exposed 01/07/2015    Sleep apnea     Type 1 diabetes mellitus with foot ulcer     Type 2 diabetes mellitus      Family History   Problem Relation Age of Onset    Arthritis Mother     Hypertension Mother     Learning disabilities Mother     Alcohol abuse Father     Early death Father     Heart disease Father     Cancer Sister     Diabetes Sister     Heart disease Maternal Grandfather     COPD Paternal Grandfather     Heart disease Son      Current Outpatient Medications on File Prior to Visit   Medication Sig Dispense Refill     albuterol (PROVENTIL) 2.5 mg /3 mL (0.083 %) nebulizer solution Take 2.5 mg by nebulization 3 (three) times daily. Rescue      albuterol (PROVENTIL/VENTOLIN HFA) 90 mcg/actuation inhaler Inhale 2 puffs into the lungs 2 (two) times daily as needed for Wheezing. 18 g 2    amLODIPine (NORVASC) 10 MG tablet Take 1 tablet (10 mg total) by mouth once daily. 90 tablet 1    amoxicillin-clavulanate 875-125mg (AUGMENTIN) 875-125 mg per tablet Take 1 tablet by mouth every 12 (twelve) hours. 60 tablet 0    aspirin 81 MG Chew Take 81 mg by mouth once daily.      atorvastatin (LIPITOR) 40 MG tablet Take 1 tablet (40 mg total) by mouth nightly. 90 tablet 1    clindamycin (CLEOCIN) 300 MG capsule Take 1 capsule (300 mg total) by mouth every 8 (eight) hours. 90 capsule 0    clopidogreL (PLAVIX) 75 mg tablet Take 1 tablet (75 mg total) by mouth once daily. 90 tablet 1    diclofenac sodium (VOLTAREN) 1 % Gel Apply 1 g topically 4 (four) times daily as needed (pain). 20 g 1    EScitalopram oxalate (LEXAPRO) 10 MG tablet Take 1 tablet (10 mg total) by mouth 2 (two) times a day. 180 tablet 1    fluticasone propionate (FLONASE) 50 mcg/actuation nasal spray 2 sprays (100 mcg total) by Each Nostril route once daily. 16 g 2    gabapentin (NEURONTIN) 300 MG capsule Take 3 capsules (900 mg total) by mouth every 6 (six) hours. 360 capsule 2    HYDROcodone-acetaminophen (NORCO)  mg per tablet Take 1 tablet by mouth every 8 (eight) hours. 90 tablet 0    hydrOXYzine pamoate (VISTARIL) 25 MG Cap Take 1 capsule (25 mg total) by mouth once daily. Take nightly for sleep 90 capsule 1    icosapent ethyL (VASCEPA) 1 gram Cap Take 2 capsules (2 g total) by mouth 2 (two) times daily. 120 capsule 5    lisinopriL 10 MG tablet Take 1 tablet (10 mg total) by mouth once daily. 90 tablet 1    metoprolol succinate (TOPROL-XL) 25 MG 24 hr tablet Take 1 tablet (25 mg total) by mouth once daily. 90 tablet 1    nitroGLYCERIN (NITROSTAT) 0.4  MG SL tablet Place 1 tablet (0.4 mg total) under the tongue every 5 (five) minutes as needed for Chest pain. 30 tablet 2    pantoprazole (PROTONIX) 40 MG tablet Take 1 tablet (40 mg total) by mouth once daily. 90 tablet 1    SPIRIVA RESPIMAT 2.5 mcg/actuation inhaler Inhale 1 puff into the lungs once daily. 4 g 5    [DISCONTINUED] traZODone (DESYREL) 50 MG tablet Take 2 tablets (100 mg total) by mouth once daily. Take nightly for sleep 30 tablet 2     No current facility-administered medications on file prior to visit.     Immunization History   Administered Date(s) Administered    Influenza - Quadrivalent - PF *Preferred* (6 months and older) 09/14/2021    Pneumococcal Conjugate - 13 Valent 10/25/2017    Pneumococcal Polysaccharide - 23 Valent 10/19/2016    Tdap 12/20/2019       Review of Systems   Constitutional: Negative for fever, malaise/fatigue and weight loss.   Respiratory: Negative for shortness of breath.    Cardiovascular: Negative for chest pain and palpitations.   Gastrointestinal: Negative for nausea and vomiting.   Psychiatric/Behavioral: Negative for depression.        Vitals:    04/13/22 0826   BP: (!) 157/83   Pulse: 76   Resp:    Temp:        Physical Exam  Eyes:      Conjunctiva/sclera: Conjunctivae normal.      Pupils: Pupils are equal, round, and reactive to light.   Pulmonary:      Effort: Pulmonary effort is normal.   Neurological:      Mental Status: He is alert.   Psychiatric:         Mood and Affect: Mood normal.         Behavior: Behavior normal.         Thought Content: Thought content normal.         Judgment: Judgment normal.          Assessment and Plan  Diabetes mellitus without complication  -     empagliflozin (JARDIANCE) 10 mg tablet; Take 1 tablet (10 mg total) by mouth once daily.  Dispense: 30 tablet; Refill: 6            Return to clinic in 2 weeks with blood sugar readings are as needed.  Patient will be readmitted to home health and will ask him to monitor his blood  sugars wounds blood pressure and also to draw CBC CMP lipid and A1c level.    Health Maintenance Topics with due status: Not Due       Topic Last Completion Date    Pneumococcal Vaccines (Age 0-64) 10/25/2017    TETANUS VACCINE 12/20/2019    Lipid Panel 09/20/2021    Diabetes Urine Screening 04/06/2022    Low Dose Statin 04/13/2022

## 2022-04-19 ENCOUNTER — OFFICE VISIT (OUTPATIENT)
Dept: WOUND CARE | Facility: CLINIC | Age: 62
End: 2022-04-19
Attending: FAMILY MEDICINE
Payer: MEDICARE

## 2022-04-19 VITALS — TEMPERATURE: 97 F

## 2022-04-19 DIAGNOSIS — R60.0 LOCALIZED EDEMA: ICD-10-CM

## 2022-04-19 DIAGNOSIS — M79.604 PAIN IN BOTH LOWER EXTREMITIES: ICD-10-CM

## 2022-04-19 DIAGNOSIS — M79.605 PAIN IN BOTH LOWER EXTREMITIES: ICD-10-CM

## 2022-04-19 DIAGNOSIS — I73.9 PERIPHERAL VASCULAR DISEASE, UNSPECIFIED: Primary | ICD-10-CM

## 2022-04-19 PROCEDURE — 99213 OFFICE O/P EST LOW 20 MIN: CPT | Mod: PBBFAC | Performed by: NURSE PRACTITIONER

## 2022-04-19 PROCEDURE — 99213 PR OFFICE/OUTPT VISIT, EST, LEVL III, 20-29 MIN: ICD-10-PCS | Mod: S$PBB,,, | Performed by: NURSE PRACTITIONER

## 2022-04-19 PROCEDURE — 99213 OFFICE O/P EST LOW 20 MIN: CPT | Mod: S$PBB,,, | Performed by: NURSE PRACTITIONER

## 2022-04-19 RX ORDER — HYDROCODONE BITARTRATE AND ACETAMINOPHEN 10; 325 MG/1; MG/1
TABLET ORAL
COMMUNITY
Start: 2022-04-18 | End: 2022-06-08 | Stop reason: SDUPTHER

## 2022-04-19 NOTE — PROGRESS NOTES
Patient stated he fell last weekend on his son's steps and bruised his right great toe. Dopplers ordered today and instructed patient to expect a phone call from scheduling for his dopplers. He stated he would. Called [place] pharmacy and asked for patient assistance for his gel. They are faxing over paperwork to possibly get his co pay covered.

## 2022-04-19 NOTE — PROGRESS NOTES
GIANA Moncada   [facility] - WOUND CARE  [address] [facility] [address]  [phone]      PATIENT NAME: Navdeep Avery  : 1960  DATE: 22  MRN: [id number]      Billing Provider: GIANA Moncada  Level of Service:   Patient PCP Information     Provider PCP Type    Marquez Huff MD General          Reason for Visit / Chief Complaint: Diabetic Foot Ulcer (Right plantar foot)       History of Present Illness / Problem Focused Workflow     Navdeep Avery is a a 61 y.o. male who presents to clinic for follow up on chronic-non healing wound on the right foot. He reports he fell going up the steps on Easter and hit his foot. He reports left great toe nail is discolored. He has recently completed HBOT. He is due for venous and arterial dopplers, will refer to Dr. Matos. Pertinent factors that delay wound healing include multiple co-morbidities, poor vascular supply, diabetes, edema, large surface area and no protective sensation. Denies fever, chills, malaise, fatigue and sweats.        Review of Systems     Review of Systems   Constitutional: Positive for activity change. Negative for chills and fever.   Respiratory: Negative for chest tightness and shortness of breath.    Cardiovascular: Positive for leg swelling. Negative for chest pain and palpitations.   Musculoskeletal: Positive for arthralgias and joint swelling.   Skin: Positive for wound.        See wound assessment   Neurological: Positive for weakness and numbness.   Psychiatric/Behavioral: Negative for agitation, behavioral problems, confusion and self-injury.       Medical / Social / Family History     Past Medical History:   Diagnosis Date    Anemia     Anxiety     Atherosclerotic heart disease of native coronary artery with other forms of angina pectoris     Atrophic rhinitis     Carpal tunnel syndrome     COPD (chronic obstructive pulmonary disease)     Coronary arteriosclerosis      COVID 02/02/2022    Depression     Diabetic ulcer of right foot associated with diabetes mellitus due to underlying condition, with bone involvement without evidence of necrosis     GERD (gastroesophageal reflux disease)     Hyperlipidemia     Hypertension     Insomnia     MI (myocardial infarction)     Neuropathy     Peripheral vascular disease, unspecified     Right foot ulcer, with fat layer exposed 01/07/2015    Sleep apnea     Type 1 diabetes mellitus with foot ulcer     Type 2 diabetes mellitus        Past Surgical History:   Procedure Laterality Date    AMPUTATION      ANGIOPLASTY      CARDIAC CATHETERIZATION      CORONARY STENT PLACEMENT      DEBRIDEMENT      Right foot debridment with hospital stay and IV Abx    SHOULDER OPEN ROTATOR CUFF REPAIR Left     SPINE SURGERY      VASCULAR SURGERY         Social History  Mr. Navdeep Avery  reports that he has been smoking cigarettes. He has been smoking about 0.50 packs per day. He has never used smokeless tobacco. He reports previous alcohol use. He reports current drug use. Drug: Oxycodone.    Family History  Mr.'s Navdeep Avery family history includes Alcohol abuse in his father; Arthritis in his mother; COPD in his paternal grandfather; Cancer in his sister; Diabetes in his sister; Early death in his father; Heart disease in his father, maternal grandfather, and son; Hypertension in his mother; Learning disabilities in his mother.    Medications and Allergies     Medications  Outpatient Medications Marked as Taking for the 4/19/22 encounter (Office Visit) with GIANA Moncada   Medication Sig Dispense Refill    albuterol (PROVENTIL/VENTOLIN HFA) 90 mcg/actuation inhaler Inhale 2 puffs into the lungs 2 (two) times daily as needed for Wheezing. 18 g 2    amLODIPine (NORVASC) 10 MG tablet Take 1 tablet (10 mg total) by mouth once daily. 90 tablet 1    amoxicillin-clavulanate 875-125mg (AUGMENTIN) 875-125 mg per tablet Take  1 tablet by mouth every 12 (twelve) hours. 60 tablet 0    aspirin 81 MG Chew Take 81 mg by mouth once daily.      atorvastatin (LIPITOR) 40 MG tablet Take 1 tablet (40 mg total) by mouth nightly. 90 tablet 1    clindamycin (CLEOCIN) 300 MG capsule Take 1 capsule (300 mg total) by mouth every 8 (eight) hours. 90 capsule 0    clopidogreL (PLAVIX) 75 mg tablet Take 1 tablet (75 mg total) by mouth once daily. 90 tablet 1    diclofenac sodium (VOLTAREN) 1 % Gel Apply 1 g topically 4 (four) times daily as needed (pain). 20 g 1    empagliflozin (JARDIANCE) 10 mg tablet Take 1 tablet (10 mg total) by mouth once daily. 30 tablet 6    EScitalopram oxalate (LEXAPRO) 10 MG tablet Take 1 tablet (10 mg total) by mouth 2 (two) times a day. 180 tablet 1    fluticasone propionate (FLONASE) 50 mcg/actuation nasal spray 2 sprays (100 mcg total) by Each Nostril route once daily. 16 g 2    gabapentin (NEURONTIN) 300 MG capsule Take 3 capsules (900 mg total) by mouth every 6 (six) hours. 360 capsule 2    HYDROcodone-acetaminophen (NORCO)  mg per tablet TAKE 1 TABLET BY MOUTH EVERY 8 HOURS AS NEEDED FOR PAIN (MAY CAUSE DROWSINESS)      hydrOXYzine pamoate (VISTARIL) 25 MG Cap Take 1 capsule (25 mg total) by mouth once daily. Take nightly for sleep 90 capsule 1    icosapent ethyL (VASCEPA) 1 gram Cap Take 2 capsules (2 g total) by mouth 2 (two) times daily. 120 capsule 5    lisinopriL 10 MG tablet Take 1 tablet (10 mg total) by mouth once daily. 90 tablet 1    metoprolol succinate (TOPROL-XL) 25 MG 24 hr tablet Take 1 tablet (25 mg total) by mouth once daily. 90 tablet 1    nitroGLYCERIN (NITROSTAT) 0.4 MG SL tablet Place 1 tablet (0.4 mg total) under the tongue every 5 (five) minutes as needed for Chest pain. 30 tablet 2    pantoprazole (PROTONIX) 40 MG tablet Take 1 tablet (40 mg total) by mouth once daily. 90 tablet 1    SPIRIVA RESPIMAT 2.5 mcg/actuation inhaler Inhale 1 puff into the lungs once daily. 4 g 5        Allergies  Review of patient's allergies indicates:  No Known Allergies    Physical Examination     Vitals:    04/19/22 0839   Temp: 96.8 °F (36 °C)     Physical Exam  Vitals and nursing note reviewed.   Constitutional:       Appearance: Normal appearance.   HENT:      Head: Normocephalic.   Cardiovascular:      Rate and Rhythm: Normal rate and regular rhythm.      Pulses: Normal pulses.      Heart sounds: Normal heart sounds.   Pulmonary:      Effort: Pulmonary effort is normal. No respiratory distress.   Chest:      Chest wall: No tenderness.   Musculoskeletal:         General: Swelling present.      Right lower leg: Edema present.   Skin:     General: Skin is warm and dry.      Comments: See wound assessment    Neurological:      General: No focal deficit present.      Mental Status: He is alert and oriented to person, place, and time. Mental status is at baseline.   Psychiatric:         Mood and Affect: Mood normal.         Behavior: Behavior normal.         Thought Content: Thought content normal.         Judgment: Judgment normal.         Assessment and Plan      1. Peripheral vascular disease, unspecified    2. Localized edema    3. Pain in both lower extremities           Wound 03/19/21 1031 Diabetic Ulcer Right plantar Foot #1 (Active)   03/19/21 1031    Pre-existing: Yes   Primary Wound Type: Diabetic ulc   Side: Right   Orientation: plantar   Location: Foot   Wound Number: #1   Ankle-Brachial Index:    Pulses:    Removal Indication and Assessment:    Wound Outcome:    (Retired) Wound Type:    (Retired) Wound Length (cm):    (Retired) Wound Width (cm):    (Retired) Depth (cm):    Wound Description (Comments):    Removal Indications:    Wound Image    04/19/22 0842   Dressing Appearance Moist drainage 04/19/22 0842   Drainage Amount Moderate 04/19/22 0842   Drainage Characteristics/Odor Serosanguineous 04/19/22 0842   Appearance Pink;Moist;Not granulating 04/19/22 0842   Tissue loss description Full  thickness 04/19/22 0842   Black (%), Wound Tissue Color 0 % 04/19/22 0842   Red (%), Wound Tissue Color 100 % 04/19/22 0842   Yellow (%), Wound Tissue Color 0 % 04/19/22 0842   Periwound Area Scar tissue 04/19/22 0842   Wound Edges Callused 04/19/22 0842   Ballesteros Classification (diabetic foot ulcers only) Grade 2 04/19/22 0842   Wound Length (cm) 1.3 cm 04/19/22 0842   Wound Width (cm) 1.5 cm 04/19/22 0842   Wound Depth (cm) 0.5 cm 04/19/22 0842   Wound Volume (cm^3) 0.975 cm^3 04/19/22 0842   Wound Surface Area (cm^2) 1.95 cm^2 04/19/22 0842   Care Cleansed with:;Antimicrobial agent 04/19/22 0842   Dressing Applied;Gauze;Rolled gauze 04/19/22 0842   Periwound Care Moisture barrier applied 04/19/22 0842   Off Loading Off loading shoe 04/19/22 0842         Active Problem List with Overview Notes    Diagnosis Date Noted    SOB (shortness of breath) 03/07/2022    Diabetic ulcer of right foot associated with diabetes mellitus due to underlying condition, with bone involvement without evidence of necrosis     Insomnia 06/17/2021    Diabetes mellitus without complication 06/17/2021    Anxiety 06/17/2021    Neuropathy     Peripheral vascular disease, unspecified     Diabetic ulcer of midfoot associated with diabetes mellitus due to underlying condition, with necrosis of muscle 03/23/2021    Ulcer of right foot with necrosis of muscle 03/23/2021    Benign hypertension 03/23/2021    Mixed hyperlipidemia 03/23/2021    Dependence on nicotine from cigarettes 03/23/2021      Plan:   Clean with baby shampoo and water or vashe (no subs)  Apply silver polymem to wound (cut 1 inch larger than wound)  Cover with dry gauze,wrap with kilng and paper tape.   Change twice a week and as needed  Walking boot to right foot  Diabetes:  Monitor glucose closely. Check fasting glucose and 2 hours after meals. HgA1C goal <7, fasting glucose , and 2 hours after meals <180  Hypertension:  Check blood pressure twice daily, goal  <120/80  Diet:   Increase protein intake, avoid fried, fatty foods and foods high in simple carbs.   Vitamins:  Take vitamin C 1000 mg, zinc 50mg, vitamin d 5000 units, and a daily multivitamin. Addy is a good source of protein and nutrients to aid in wound healing.   Monitor closely for s/s of infection including fever, chills, increase in pain, odor from wound, and increased redness from foot. Go to ER if any complications develop.   Keep leg elevated and avoid pressure on wound.    Problem List Items Addressed This Visit        Cardiac/Vascular    Peripheral vascular disease, unspecified - Primary (Chronic)    Relevant Orders    VAS US Arterial Legs Bilateral      Other Visit Diagnoses     Localized edema        Relevant Orders    VAS US Venous Legs Bilateral    Pain in both lower extremities        Relevant Orders    VAS US Venous Legs Bilateral          Future Appointments   Date Time Provider Department Center   4/27/2022 10:15 AM Marquez Huff MD [facility] [place]   5/3/2022  9:00 AM [facility]cada [facility]   5/19/2022  1:30 PM Madalyn Calhoun MD [facility] DER[facility] [place]   6/13/2022  7:45 AM AQUILINO Dhillon [facility]   7/6/2022  9:45 AM Marquez Huff MD [facility] [place]   10/4/2022  2:00 PM AWV NURSE, [facility] FAMILY MEDICINE [facility] [place]            Signature:  GIANA Moncada  [facility] - WOUND CARE  [address]  [place] [address]  [phone]    Date of encounter: 4/19/22

## 2022-04-27 ENCOUNTER — OFFICE VISIT (OUTPATIENT)
Dept: FAMILY MEDICINE | Facility: CLINIC | Age: 62
End: 2022-04-27
Payer: MEDICARE

## 2022-04-27 VITALS
WEIGHT: 172 LBS | SYSTOLIC BLOOD PRESSURE: 135 MMHG | TEMPERATURE: 98 F | BODY MASS INDEX: 26.07 KG/M2 | HEIGHT: 68 IN | DIASTOLIC BLOOD PRESSURE: 86 MMHG | OXYGEN SATURATION: 97 % | RESPIRATION RATE: 18 BRPM | HEART RATE: 85 BPM

## 2022-04-27 DIAGNOSIS — J44.1 COPD EXACERBATION: Primary | ICD-10-CM

## 2022-04-27 DIAGNOSIS — E11.9 DIABETES MELLITUS WITHOUT COMPLICATION: ICD-10-CM

## 2022-04-27 PROCEDURE — 99213 OFFICE O/P EST LOW 20 MIN: CPT | Mod: ,,, | Performed by: FAMILY MEDICINE

## 2022-04-27 PROCEDURE — 99213 PR OFFICE/OUTPT VISIT, EST, LEVL III, 20-29 MIN: ICD-10-PCS | Mod: ,,, | Performed by: FAMILY MEDICINE

## 2022-04-27 RX ORDER — AMOXICILLIN AND CLAVULANATE POTASSIUM 875; 125 MG/1; MG/1
125 TABLET, FILM COATED ORAL 2 TIMES DAILY
COMMUNITY
Start: 2022-04-13 | End: 2022-04-27 | Stop reason: SDUPTHER

## 2022-04-27 RX ORDER — INSULIN GLARGINE 300 U/ML
40 INJECTION, SOLUTION SUBCUTANEOUS DAILY
COMMUNITY
Start: 2021-11-01 | End: 2022-07-06 | Stop reason: SDUPTHER

## 2022-04-27 RX ORDER — ALBUTEROL SULFATE 90 UG/1
2 AEROSOL, METERED RESPIRATORY (INHALATION) 2 TIMES DAILY PRN
Qty: 18 G | Refills: 2 | Status: SHIPPED | OUTPATIENT
Start: 2022-04-27 | End: 2022-10-21 | Stop reason: SDUPTHER

## 2022-04-27 NOTE — PROGRESS NOTES
Navdeep Avery is a 61 y.o. male seen today for follow-up on his diabetes.  He has done quite well with the Jardiance with no lightheadedness and with improved blood sugars.  After reviewing his blood sugar log we decided to increase his Toujeo to 40 units in the morning.  Patient has had more cough lately and will need a refill on his albuterol.    Past Medical History:   Diagnosis Date    Anemia     Anxiety     Atherosclerotic heart disease of native coronary artery with other forms of angina pectoris     Atrophic rhinitis     Carpal tunnel syndrome     COPD (chronic obstructive pulmonary disease)     Coronary arteriosclerosis     COVID 02/02/2022    Depression     Diabetic ulcer of right foot associated with diabetes mellitus due to underlying condition, with bone involvement without evidence of necrosis     GERD (gastroesophageal reflux disease)     Hyperlipidemia     Hypertension     Insomnia     MI (myocardial infarction)     Neuropathy     Peripheral vascular disease, unspecified     Right foot ulcer, with fat layer exposed 01/07/2015    Sleep apnea     Type 1 diabetes mellitus with foot ulcer     Type 2 diabetes mellitus      Family History   Problem Relation Age of Onset    Arthritis Mother     Hypertension Mother     Learning disabilities Mother     Alcohol abuse Father     Early death Father     Heart disease Father     Cancer Sister     Diabetes Sister     Heart disease Maternal Grandfather     COPD Paternal Grandfather     Heart disease Son      Current Outpatient Medications on File Prior to Visit   Medication Sig Dispense Refill    albuterol (PROVENTIL) 2.5 mg /3 mL (0.083 %) nebulizer solution USE 1 VIAL IN NEBULIZER 3 TIMES DAILY 100 mL 11    amLODIPine (NORVASC) 10 MG tablet Take 1 tablet (10 mg total) by mouth once daily. 90 tablet 1    aspirin 81 MG Chew Take 81 mg by mouth once daily.      atorvastatin (LIPITOR) 40 MG tablet Take 1 tablet (40 mg total) by  mouth nightly. 90 tablet 1    clindamycin (CLEOCIN) 300 MG capsule Take 1 capsule (300 mg total) by mouth every 8 (eight) hours. 90 capsule 0    clopidogreL (PLAVIX) 75 mg tablet Take 1 tablet (75 mg total) by mouth once daily. 90 tablet 1    diclofenac sodium (VOLTAREN) 1 % Gel Apply 1 g topically 4 (four) times daily as needed (pain). 20 g 1    empagliflozin (JARDIANCE) 10 mg tablet Take 1 tablet (10 mg total) by mouth once daily. 30 tablet 6    EScitalopram oxalate (LEXAPRO) 10 MG tablet Take 1 tablet (10 mg total) by mouth 2 (two) times a day. 180 tablet 1    fluticasone propionate (FLONASE) 50 mcg/actuation nasal spray 2 sprays (100 mcg total) by Each Nostril route once daily. 16 g 2    gabapentin (NEURONTIN) 300 MG capsule Take 3 capsules (900 mg total) by mouth every 6 (six) hours. 360 capsule 2    HYDROcodone-acetaminophen (NORCO)  mg per tablet TAKE 1 TABLET BY MOUTH EVERY 8 HOURS AS NEEDED FOR PAIN (MAY CAUSE DROWSINESS)      hydrOXYzine pamoate (VISTARIL) 25 MG Cap Take 1 capsule (25 mg total) by mouth once daily. Take nightly for sleep 90 capsule 1    icosapent ethyL (VASCEPA) 1 gram Cap Take 2 capsules (2 g total) by mouth 2 (two) times daily. 120 capsule 5    lisinopriL 10 MG tablet Take 1 tablet (10 mg total) by mouth once daily. 90 tablet 1    metoprolol succinate (TOPROL-XL) 25 MG 24 hr tablet Take 1 tablet (25 mg total) by mouth once daily. 90 tablet 1    nitroGLYCERIN (NITROSTAT) 0.4 MG SL tablet Place 1 tablet (0.4 mg total) under the tongue every 5 (five) minutes as needed for Chest pain. 30 tablet 2    pantoprazole (PROTONIX) 40 MG tablet Take 1 tablet (40 mg total) by mouth once daily. 90 tablet 1    SPIRIVA RESPIMAT 2.5 mcg/actuation inhaler Inhale 1 puff into the lungs once daily. 4 g 5    [DISCONTINUED] albuterol (PROVENTIL/VENTOLIN HFA) 90 mcg/actuation inhaler Inhale 2 puffs into the lungs 2 (two) times daily as needed for Wheezing. 18 g 2    [DISCONTINUED]  amoxicillin-clavulanate 875-125mg (AUGMENTIN) 875-125 mg per tablet Take 125 mg by mouth 2 (two) times a day.      amoxicillin-clavulanate 875-125mg (AUGMENTIN) 875-125 mg per tablet Take 1 tablet by mouth every 12 (twelve) hours. (Patient not taking: Reported on 4/27/2022) 60 tablet 0     No current facility-administered medications on file prior to visit.     Immunization History   Administered Date(s) Administered    Influenza - Quadrivalent - PF *Preferred* (6 months and older) 09/14/2021    Pneumococcal Conjugate - 13 Valent 10/25/2017    Pneumococcal Polysaccharide - 23 Valent 10/19/2016    Tdap 12/20/2019       Review of Systems   Constitutional: Negative for fever, malaise/fatigue and weight loss.   Respiratory: Negative for shortness of breath.    Cardiovascular: Negative for chest pain and palpitations.   Gastrointestinal: Negative for nausea and vomiting.   Psychiatric/Behavioral: Negative for depression.        Vitals:    04/27/22 1008   BP: 135/86   Pulse: 85   Resp: 18   Temp: 97.6 °F (36.4 °C)       Physical Exam  Eyes:      Conjunctiva/sclera: Conjunctivae normal.   Pulmonary:      Effort: Pulmonary effort is normal.   Neurological:      Mental Status: He is alert.   Psychiatric:         Mood and Affect: Mood normal.         Behavior: Behavior normal.         Thought Content: Thought content normal.         Judgment: Judgment normal.          Assessment and Plan  COPD exacerbation  -     albuterol (PROVENTIL/VENTOLIN HFA) 90 mcg/actuation inhaler; Inhale 2 puffs into the lungs 2 (two) times daily as needed for Wheezing.  Dispense: 18 g; Refill: 2    Diabetes mellitus without complication            Return to clinic in 3 months for follow-up A1c or as needed.    Health Maintenance Topics with due status: Not Due       Topic Last Completion Date    Pneumococcal Vaccines (Age 0-64) 10/25/2017    TETANUS VACCINE 12/20/2019    Diabetes Urine Screening 04/06/2022    Lipid Panel 04/15/2022     Hemoglobin A1c 04/15/2022    Low Dose Statin 04/27/2022

## 2022-05-03 ENCOUNTER — OFFICE VISIT (OUTPATIENT)
Dept: WOUND CARE | Facility: CLINIC | Age: 62
End: 2022-05-03
Attending: FAMILY MEDICINE
Payer: MEDICARE

## 2022-05-03 VITALS
SYSTOLIC BLOOD PRESSURE: 175 MMHG | HEART RATE: 62 BPM | DIASTOLIC BLOOD PRESSURE: 83 MMHG | TEMPERATURE: 98 F | RESPIRATION RATE: 20 BRPM

## 2022-05-03 DIAGNOSIS — I73.9 PERIPHERAL VASCULAR DISEASE, UNSPECIFIED: ICD-10-CM

## 2022-05-03 DIAGNOSIS — E08.621 DIABETIC ULCER OF RIGHT FOOT ASSOCIATED WITH DIABETES MELLITUS DUE TO UNDERLYING CONDITION, WITH BONE INVOLVEMENT WITHOUT EVIDENCE OF NECROSIS, UNSPECIFIED PART OF FOOT: Primary | ICD-10-CM

## 2022-05-03 DIAGNOSIS — L97.516 DIABETIC ULCER OF RIGHT FOOT ASSOCIATED WITH DIABETES MELLITUS DUE TO UNDERLYING CONDITION, WITH BONE INVOLVEMENT WITHOUT EVIDENCE OF NECROSIS, UNSPECIFIED PART OF FOOT: Primary | ICD-10-CM

## 2022-05-03 PROCEDURE — 11042 DBRDMT SUBQ TIS 1ST 20SQCM/<: CPT | Mod: S$PBB,,, | Performed by: NURSE PRACTITIONER

## 2022-05-03 PROCEDURE — 11042 DBRDMT SUBQ TIS 1ST 20SQCM/<: CPT | Mod: PBBFAC | Performed by: NURSE PRACTITIONER

## 2022-05-03 PROCEDURE — 99499 NO LOS: ICD-10-PCS | Mod: S$PBB,,, | Performed by: NURSE PRACTITIONER

## 2022-05-03 PROCEDURE — 99215 OFFICE O/P EST HI 40 MIN: CPT | Mod: PBBFAC,25 | Performed by: NURSE PRACTITIONER

## 2022-05-03 PROCEDURE — 99499 UNLISTED E&M SERVICE: CPT | Mod: S$PBB,,, | Performed by: NURSE PRACTITIONER

## 2022-05-03 PROCEDURE — 11042 PR DEBRIDEMENT, SKIN, SUB-Q TISSUE,=<20 SQ CM: ICD-10-PCS | Mod: S$PBB,,, | Performed by: NURSE PRACTITIONER

## 2022-05-03 NOTE — PROGRESS NOTES
GIANA Moncada   [facility] - WOUND CARE  [address] [facility] [place] [address]  [phone]      PATIENT NAME: Navdeep Avery  : 1960  DATE: 5/3/22  MRN: [id number]      Billing Provider: GIANA Moncada  Level of Service:   Patient PCP Information     Provider PCP Type    Marquez Huff MD General          Reason for Visit / Chief Complaint: No chief complaint on file.       History of Present Illness / Problem Focused Workflow     Navdeep Avery is a a 61 y.o. male who presents to clinic for follow up on chronic-non healing wound on the right foot. Wound has thick callus noted periwound, bedside debridement today. He is using Keystone gel as prescribed. Reports increased walking and pressure on wound. Reinforced importance of off-loading. Pertinent factors that delay wound healing include multiple co-morbidities, poor vascular supply, diabetes, edema, large surface area and no protective sensation. Denies fever, chills, malaise, fatigue and sweats.        Review of Systems     Review of Systems   Constitutional: Positive for activity change. Negative for chills and fever.   Respiratory: Negative for chest tightness and shortness of breath.    Cardiovascular: Positive for leg swelling. Negative for chest pain and palpitations.   Musculoskeletal: Positive for arthralgias and joint swelling.   Skin: Positive for wound.        See wound assessment   Neurological: Positive for weakness and numbness.   Psychiatric/Behavioral: Negative for agitation, behavioral problems, confusion and self-injury.       Medical / Social / Family History     Past Medical History:   Diagnosis Date    Anemia     Anxiety     Atherosclerotic heart disease of native coronary artery with other forms of angina pectoris     Atrophic rhinitis     Carpal tunnel syndrome     COPD (chronic obstructive pulmonary disease)     Coronary arteriosclerosis     COVID 2022     Depression     Diabetic ulcer of right foot associated with diabetes mellitus due to underlying condition, with bone involvement without evidence of necrosis     GERD (gastroesophageal reflux disease)     Hyperlipidemia     Hypertension     Insomnia     MI (myocardial infarction)     Neuropathy     Peripheral vascular disease, unspecified     Right foot ulcer, with fat layer exposed 01/07/2015    Sleep apnea     Type 1 diabetes mellitus with foot ulcer     Type 2 diabetes mellitus        Past Surgical History:   Procedure Laterality Date    AMPUTATION      ANGIOPLASTY      CARDIAC CATHETERIZATION      CORONARY STENT PLACEMENT      DEBRIDEMENT      Right foot debridment with hospital stay and IV Abx    SHOULDER OPEN ROTATOR CUFF REPAIR Left     SPINE SURGERY      VASCULAR SURGERY         Social History  Mr. Navdeep Avery  reports that he has been smoking cigarettes. He has been smoking about 0.50 packs per day. He has never used smokeless tobacco. He reports previous alcohol use. He reports current drug use. Drug: Oxycodone.    Family History  's Navdeep Avery family history includes Alcohol abuse in his father; Arthritis in his mother; COPD in his paternal grandfather; Cancer in his sister; Diabetes in his sister; Early death in his father; Heart disease in his father, maternal grandfather, and son; Hypertension in his mother; Learning disabilities in his mother.    Medications and Allergies     Medications  Outpatient Medications Marked as Taking for the 5/3/22 encounter (Office Visit) with GIANA Moncada   Medication Sig Dispense Refill    albuterol (PROVENTIL) 2.5 mg /3 mL (0.083 %) nebulizer solution USE 1 VIAL IN NEBULIZER 3 TIMES DAILY 100 mL 11    albuterol (PROVENTIL/VENTOLIN HFA) 90 mcg/actuation inhaler Inhale 2 puffs into the lungs 2 (two) times daily as needed for Wheezing. 18 g 2    amLODIPine (NORVASC) 10 MG tablet Take 1 tablet (10 mg total) by mouth once  daily. 90 tablet 1    aspirin 81 MG Chew Take 81 mg by mouth once daily.      atorvastatin (LIPITOR) 40 MG tablet Take 1 tablet (40 mg total) by mouth nightly. 90 tablet 1    clopidogreL (PLAVIX) 75 mg tablet Take 1 tablet (75 mg total) by mouth once daily. 90 tablet 1    EScitalopram oxalate (LEXAPRO) 10 MG tablet Take 1 tablet (10 mg total) by mouth 2 (two) times a day. 180 tablet 1    fluticasone propionate (FLONASE) 50 mcg/actuation nasal spray 2 sprays (100 mcg total) by Each Nostril route once daily. 16 g 2    gabapentin (NEURONTIN) 300 MG capsule Take 3 capsules (900 mg total) by mouth every 6 (six) hours. 360 capsule 2    HYDROcodone-acetaminophen (NORCO)  mg per tablet TAKE 1 TABLET BY MOUTH EVERY 8 HOURS AS NEEDED FOR PAIN (MAY CAUSE DROWSINESS)      hydrOXYzine pamoate (VISTARIL) 25 MG Cap Take 1 capsule (25 mg total) by mouth once daily. Take nightly for sleep 90 capsule 1    icosapent ethyL (VASCEPA) 1 gram Cap Take 2 capsules (2 g total) by mouth 2 (two) times daily. 120 capsule 5       Allergies  Review of patient's allergies indicates:  No Known Allergies    Physical Examination     Vitals:    05/03/22 0904   BP: (!) 175/83   Pulse: 62   Resp: 20   Temp: 97.9 °F (36.6 °C)     Physical Exam  Vitals and nursing note reviewed.   Constitutional:       Appearance: Normal appearance.   HENT:      Head: Normocephalic.   Cardiovascular:      Rate and Rhythm: Normal rate and regular rhythm.      Pulses: Normal pulses.      Heart sounds: Normal heart sounds.   Pulmonary:      Effort: Pulmonary effort is normal. No respiratory distress.   Chest:      Chest wall: No tenderness.   Musculoskeletal:         General: Swelling present.      Right lower leg: Edema present.   Skin:     General: Skin is warm and dry.      Comments: See wound assessment    Neurological:      General: No focal deficit present.      Mental Status: He is alert and oriented to person, place, and time. Mental status is at  baseline.   Psychiatric:         Mood and Affect: Mood normal.         Behavior: Behavior normal.         Thought Content: Thought content normal.         Judgment: Judgment normal.         Assessment and Plan      1. Diabetic ulcer of right foot associated with diabetes mellitus due to underlying condition, with bone involvement without evidence of necrosis, unspecified part of foot    2. Peripheral vascular disease, unspecified           Wound 06/29/21 Right anterior;plantar Foot #2 (Active)   06/29/21     Pre-existing: Yes   Primary Wound Type:    Side: Right   Orientation: anterior;plantar   Location: Foot   Wound Number: #2   Ankle-Brachial Index:    Pulses:    Removal Indication and Assessment:    Wound Outcome:    (Retired) Wound Type:    (Retired) Wound Length (cm):    (Retired) Wound Width (cm):    (Retired) Depth (cm):    Wound Description (Comments):    Removal Indications:    Wound Image    05/03/22 0910   Dressing Appearance Intact;Clean;Dry 05/03/22 0910   Drainage Amount None 05/03/22 0910   Appearance Pink;Red;Black 05/03/22 0910   Tissue loss description Full thickness 05/03/22 0910   Black (%), Wound Tissue Color 0 % 05/03/22 0910   Red (%), Wound Tissue Color 100 % 05/03/22 0910   Yellow (%), Wound Tissue Color 0 % 05/03/22 0910   Periwound Area Dry;Other (see comments) 05/03/22 0910   Wound Edges Callused 05/03/22 0910   Wound Length (cm) 0.7 cm 05/03/22 0910   Wound Width (cm) 1.8 cm 05/03/22 0910   Wound Depth (cm) 0.5 cm 05/03/22 0910   Wound Volume (cm^3) 0.63 cm^3 05/03/22 0910   Wound Surface Area (cm^2) 1.26 cm^2 05/03/22 0910   Care Cleansed with:;Soap and water 05/03/22 0910   Dressing Silver;Gauze;Rolled gauze 05/03/22 0910         Active Problem List with Overview Notes    Diagnosis Date Noted    SOB (shortness of breath) 03/07/2022    Diabetic ulcer of right foot associated with diabetes mellitus due to underlying condition, with bone involvement without evidence of necrosis      Insomnia 06/17/2021    Diabetes mellitus without complication 06/17/2021    Anxiety 06/17/2021    Neuropathy     Peripheral vascular disease, unspecified     Diabetic ulcer of midfoot associated with diabetes mellitus due to underlying condition, with necrosis of muscle 03/23/2021    Ulcer of right foot with necrosis of muscle 03/23/2021    Benign hypertension 03/23/2021    Mixed hyperlipidemia 03/23/2021    Dependence on nicotine from cigarettes 03/23/2021      Plan:   Clean with baby shampoo and water or vashe (no subs)  Apply keystone prescribed wound gel to wound  Cover with dry gauze,wrap with kilng and paper tape.   Change daily and as needed  Walking boot to right foot  Keep leg elevated and avoid pressure on wound.  Avoid prolonged standing  Diabetes:  Monitor glucose closely. Check fasting glucose and 2 hours after meals. HgA1C goal <7, fasting glucose , and 2 hours after meals <180  Hypertension:  Check blood pressure twice daily, goal <120/80  Diet:   Increase protein intake, avoid fried, fatty foods and foods high in simple carbs.   Vitamins:  Take vitamin C 1000 mg, zinc 50mg, vitamin d 5000 units, and a daily multivitamin. Addy is a good source of protein and nutrients to aid in wound healing.   Monitor closely for s/s of infection including fever, chills, increase in pain, odor from wound, and increased redness from foot. Go to ER if any complications develop.   Problem List Items Addressed This Visit        Cardiac/Vascular    Peripheral vascular disease, unspecified (Chronic)       Endocrine    Diabetic ulcer of right foot associated with diabetes mellitus due to underlying condition, with bone involvement without evidence of necrosis - Primary          Future Appointments   Date Time Provider Department Center   5/19/2022  1:30 PM Madalyn Calhoun MD [facility] DERM [place]   6/13/2022  7:45 AM AQUILINO Dhillon [facility]   7/6/2022  9:45 AM Marquez Huff MD [facility]  [facility] [place]   10/4/2022  2:00 PM AWV NURSE, [facility] FAMILY MEDICINE [facility] [place]            Signature:  GIANA Moncada  [facility] - WOUND CARE  [address] [facility] [place] [address]  [phone]    Date of encounter: 5/3/22

## 2022-05-03 NOTE — PATIENT INSTRUCTIONS
Clean with baby shampoo and water or vashe (no subs)    Apply keystone prescribed wound gel to wound    Cover with dry gauze,wrap with kilng and paper tape.     Change daily and as needed    Walking boot to right foot    Keep leg elevated and avoid pressure on wound.    No prolonged standing    Diabetes:  Monitor glucose closely. Check fasting glucose and 2 hours after meals. HgA1C goal <7, fasting glucose , and 2 hours after meals <180  Hypertension:  Check blood pressure twice daily, goal <120/80  Diet:   Increase protein intake, avoid fried, fatty foods and foods high in simple carbs.   Vitamins:  Take vitamin C 1000 mg, zinc 50mg, vitamin d 5000 units, and a daily multivitamin. Addy is a good source of protein and nutrients to aid in wound healing.   Monitor closely for s/s of infection including fever, chills, increase in pain, odor from wound, and increased redness from foot. Go to ER if any complications develop.

## 2022-05-03 NOTE — PROGRESS NOTES
Debridement Performed for Assessment: Wound# 1  Performed By: Provider: Marli Morales NP  Assistant: Mainor Castro    Debridement: Surgical    Photo taken post procedure:    Time-Out Taken: Yes  Level: Skin/Subcutaneous Tissue  Post Debridement Measurements  Length: (cm) 3.4  Width: (cm) 3.2  Depth: (cm) 0.3      Area: (cm²) 10.88  Percent Debrided: 100%  Total Area Debrided: (sq cm)     Tissue and other material debrided:  Adipose, Dermis, Epidermis, Subcutaneous  Devitalized Tissue Debrided:Biofilm, callus  Instrument: Curette  Bleeding: Moderate  Hemostasis Achieved: Pressure  Procedural Pain: Insensate  Post Procedural Pain: Insensate  Response to Treatment: Procedure was tolerated well    Devitalized materials/tissue Removed  the following was removed during debridement  subcutaneous      Post Debridement Diagnosis chronic right foot diabetic ulcer  Post debridement diagnosis  Same as Pre-operative debridement diagnosis, No Complications noted.      Grafts or implants applied  Was a graft or implant applied?  No      Procedure assistant  Procedure assisted by: Herman Castro,RN  Assistant is the same as nurse listed above      Complications related to procedure  Did any complication occure during procedure?  No complications noted during or after procedure.      Specimen  Specimen collect during procedure?  No specimen collected      Anaesthesia:  Anesthesia used  None      Blood Loss:  Blood loss during procedure  less than 5 cc

## 2022-05-16 NOTE — PATIENT INSTRUCTIONS
Clean with baby shampoo and water or vashe (no subs)  Apply keystone prescribed wound gel to wound  Cover with dry gauze,wrap with kilng and paper tape.   Change daily and as needed  Walking boot to right foot  Keep leg elevated and avoid pressure on wound.  Avoid prolonged standing  Diabetes:  Monitor glucose closely. Check fasting glucose and 2 hours after meals. HgA1C goal <7, fasting glucose , and 2 hours after meals <180  Hypertension:  Check blood pressure twice daily, goal <120/80  Diet:   Increase protein intake, avoid fried, fatty foods and foods high in simple carbs.   Vitamins:  Take vitamin C 1000 mg, zinc 50mg, vitamin d 5000 units, and a daily multivitamin. Addy is a good source of protein and nutrients to aid in wound healing.   Monitor closely for s/s of infection including fever, chills, increase in pain, odor from wound, and increased redness from foot. Go to ER if any complications develop.   RTC in 3 weeks

## 2022-05-16 NOTE — PROGRESS NOTES
GIANA Moncada   [facility] - WOUND CARE  [address] [facility] [address]  [phone]      PATIENT NAME: Navdeep Avery  : 1960  DATE: 22  MRN: [id number]      Billing Provider: GIANA Moncada  Level of Service:   Patient PCP Information     Provider PCP Type    Marquez Huff MD General          Reason for Visit / Chief Complaint: Diabetic Foot Ulcer (Riight foot)       History of Present Illness / Problem Focused Workflow     Navdeep Avery is a 61 y.o. male who presents to clinic for follow up on chronic-non healing wound on the right foot. Wound has thick callus noted periwound. Bedside debridement performed today. He is using Keystone gel as prescribed. Reports increased walking and pressure on wound. Reinforced importance of off-loading. Pertinent factors that delay wound healing include multiple co-morbidities, poor vascular supply, diabetes, edema, large surface area and no protective sensation. Denies fever, chills, malaise, fatigue and sweats.        Review of Systems     Review of Systems   Constitutional: Positive for activity change. Negative for chills and fever.   Respiratory: Negative for chest tightness and shortness of breath.    Cardiovascular: Positive for leg swelling. Negative for chest pain and palpitations.   Musculoskeletal: Positive for arthralgias and joint swelling.   Skin: Positive for wound.        See wound assessment   Neurological: Positive for weakness and numbness.   Psychiatric/Behavioral: Negative for agitation, behavioral problems, confusion and self-injury.       Medical / Social / Family History     Past Medical History:   Diagnosis Date    Anemia     Anxiety     Atherosclerotic heart disease of native coronary artery with other forms of angina pectoris     Atrophic rhinitis     Carpal tunnel syndrome     COPD (chronic obstructive pulmonary disease)     Coronary arteriosclerosis     COVID  02/02/2022    Depression     Diabetic ulcer of right foot associated with diabetes mellitus due to underlying condition, with bone involvement without evidence of necrosis     GERD (gastroesophageal reflux disease)     Hyperlipidemia     Hypertension     Insomnia     MI (myocardial infarction)     Neuropathy     Peripheral vascular disease, unspecified     Right foot ulcer, with fat layer exposed 01/07/2015    Sleep apnea     Type 1 diabetes mellitus with foot ulcer     Type 2 diabetes mellitus        Past Surgical History:   Procedure Laterality Date    AMPUTATION      ANGIOPLASTY      CARDIAC CATHETERIZATION      CORONARY STENT PLACEMENT      DEBRIDEMENT      Right foot debridment with hospital stay and IV Abx    SHOULDER OPEN ROTATOR CUFF REPAIR Left     SPINE SURGERY      VASCULAR SURGERY         Social History  Mr. Navdeep Avery  reports that he has been smoking cigarettes. He has been smoking about 0.50 packs per day. He has never used smokeless tobacco. He reports previous alcohol use. He reports current drug use. Drug: Oxycodone.    Family History  'issac Avery family history includes Alcohol abuse in his father; Arthritis in his mother; COPD in his paternal grandfather; Cancer in his sister; Diabetes in his sister; Early death in his father; Heart disease in his father, maternal grandfather, and son; Hypertension in his mother; Learning disabilities in his mother.    Medications and Allergies     Medications  No outpatient medications have been marked as taking for the 5/17/22 encounter (Office Visit) with GIANA Moncada.       Allergies  Review of patient's allergies indicates:  No Known Allergies    Physical Examination     Vitals:    05/17/22 0840   BP: (!) 196/86   Pulse: 65   Resp: 20   Temp: 97.5 °F (36.4 °C)     Physical Exam  Vitals and nursing note reviewed.   Constitutional:       Appearance: Normal appearance.   HENT:      Head: Normocephalic.    Cardiovascular:      Rate and Rhythm: Normal rate and regular rhythm.      Pulses: Normal pulses.      Heart sounds: Normal heart sounds.   Pulmonary:      Effort: Pulmonary effort is normal. No respiratory distress.   Chest:      Chest wall: No tenderness.   Musculoskeletal:         General: Swelling present.      Right lower leg: Edema present.   Skin:     General: Skin is warm and dry.      Comments: See wound assessment    Neurological:      General: No focal deficit present.      Mental Status: He is alert and oriented to person, place, and time. Mental status is at baseline.   Psychiatric:         Mood and Affect: Mood normal.         Behavior: Behavior normal.         Thought Content: Thought content normal.         Judgment: Judgment normal.         Assessment and Plan      1. Diabetic ulcer of right foot associated with diabetes mellitus due to underlying condition, with bone involvement without evidence of necrosis, unspecified part of foot    2. Peripheral vascular disease, unspecified           Wound 03/19/21 1031 Diabetic Ulcer Right plantar Foot #1 (Active)   03/19/21 1031    Pre-existing: Yes   Primary Wound Type: Diabetic ulc   Side: Right   Orientation: plantar   Location: Foot   Wound Number: #1   Ankle-Brachial Index:    Pulses:    Removal Indication and Assessment:    Wound Outcome:    (Retired) Wound Type:    (Retired) Wound Length (cm):    (Retired) Wound Width (cm):    (Retired) Depth (cm):    Wound Description (Comments):    Removal Indications:    Wound Image   05/17/22 0831            Wound 06/29/21 Right anterior;plantar Foot #2 (Active)   06/29/21     Pre-existing: Yes   Primary Wound Type:    Side: Right   Orientation: anterior;plantar   Location: Foot   Wound Number: #2   Ankle-Brachial Index:    Pulses:    Removal Indication and Assessment:    Wound Outcome:    (Retired) Wound Type:    (Retired) Wound Length (cm):    (Retired) Wound Width (cm):    (Retired) Depth (cm):    Wound  Description (Comments):    Removal Indications:    Wound Image    05/17/22 1010   Dressing Appearance Dried drainage 05/17/22 0828   Drainage Amount Scant 05/17/22 0828   Drainage Characteristics/Odor Serosanguineous 05/17/22 0828   Appearance Red;Pink;Yellow;Dry;Granulating 05/17/22 0828   Tissue loss description Full thickness 05/17/22 0828   Black (%), Wound Tissue Color 0 % 05/17/22 0828   Red (%), Wound Tissue Color 90 % 05/17/22 0828   Yellow (%), Wound Tissue Color 10 % 05/17/22 0828   Periwound Area Dry 05/17/22 0828   Wound Edges Open 05/17/22 0828   Wound Length (cm) 1.7 cm 05/17/22 1010   Wound Width (cm) 1.8 cm 05/17/22 1010   Wound Depth (cm) 0.8 cm 05/17/22 1010   Wound Volume (cm^3) 2.448 cm^3 05/17/22 1010   Wound Surface Area (cm^2) 3.06 cm^2 05/17/22 1010   Care Cleansed with:;Soap and water;Applied:;Moisturizing agent 05/17/22 0828   Dressing Applied;Gauze;Rolled gauze 05/17/22 0828   Periwound Care Moisturizer applied 05/17/22 0828         Active Problem List with Overview Notes    Diagnosis Date Noted    SOB (shortness of breath) 03/07/2022    Diabetic ulcer of right foot associated with diabetes mellitus due to underlying condition, with bone involvement without evidence of necrosis     Insomnia 06/17/2021    Diabetes mellitus without complication 06/17/2021    Anxiety 06/17/2021    Neuropathy     Peripheral vascular disease, unspecified     Diabetic ulcer of midfoot associated with diabetes mellitus due to underlying condition, with necrosis of muscle 03/23/2021    Ulcer of right foot with necrosis of muscle 03/23/2021    Benign hypertension 03/23/2021    Mixed hyperlipidemia 03/23/2021    Dependence on nicotine from cigarettes 03/23/2021         :    Plan:   Clean with baby shampoo and water or vashe (no subs)  Apply keystone prescribed wound gel to wound  Cover with dry gauze,wrap with kilng and paper tape.   Change daily and as needed  Walking boot to right foot  Keep leg  elevated and avoid pressure on wound.  Avoid prolonged standing  Diabetes:  Monitor glucose closely. Check fasting glucose and 2 hours after meals. HgA1C goal <7, fasting glucose , and 2 hours after meals <180  Hypertension:  Check blood pressure twice daily, goal <120/80  Diet:   Increase protein intake, avoid fried, fatty foods and foods high in simple carbs.   Vitamins:  Take vitamin C 1000 mg, zinc 50mg, vitamin d 5000 units, and a daily multivitamin. Addy is a good source of protein and nutrients to aid in wound healing.   Monitor closely for s/s of infection including fever, chills, increase in pain, odor from wound, and increased redness from foot. Go to ER if any complications develop.     Problem List Items Addressed This Visit        Cardiac/Vascular    Peripheral vascular disease, unspecified (Chronic)       Endocrine    Diabetic ulcer of right foot associated with diabetes mellitus due to underlying condition, with bone involvement without evidence of necrosis - Primary          Future Appointments   Date Time Provider Department Center   5/19/2022  1:30 PM Madalyn Calhoun MD [facility] DERM [place]   6/7/2022  9:00 AM [facility]   6/13/2022  7:45 AM AQUILINO Dhillon [facility]   7/6/2022  9:45 AM Marquez Hfuf MD [facility] [place]   10/4/2022  2:00 PM AWV NURSE, [facility] FAMILY MEDICINE [facility] [place]            Signature:  GIANA Moncada  [facility] - WOUND CARE  [address]  [facility] [address]  [phone]    Date of encounter: 5/17/22

## 2022-05-17 ENCOUNTER — OFFICE VISIT (OUTPATIENT)
Dept: WOUND CARE | Facility: CLINIC | Age: 62
End: 2022-05-17
Payer: MEDICARE

## 2022-05-17 VITALS
DIASTOLIC BLOOD PRESSURE: 86 MMHG | SYSTOLIC BLOOD PRESSURE: 196 MMHG | HEART RATE: 65 BPM | TEMPERATURE: 98 F | RESPIRATION RATE: 20 BRPM

## 2022-05-17 DIAGNOSIS — L97.516 DIABETIC ULCER OF RIGHT FOOT ASSOCIATED WITH DIABETES MELLITUS DUE TO UNDERLYING CONDITION, WITH BONE INVOLVEMENT WITHOUT EVIDENCE OF NECROSIS, UNSPECIFIED PART OF FOOT: Primary | ICD-10-CM

## 2022-05-17 DIAGNOSIS — E08.621 DIABETIC ULCER OF RIGHT FOOT ASSOCIATED WITH DIABETES MELLITUS DUE TO UNDERLYING CONDITION, WITH BONE INVOLVEMENT WITHOUT EVIDENCE OF NECROSIS, UNSPECIFIED PART OF FOOT: Primary | ICD-10-CM

## 2022-05-17 DIAGNOSIS — I73.9 PERIPHERAL VASCULAR DISEASE, UNSPECIFIED: ICD-10-CM

## 2022-05-17 PROCEDURE — 99499 NO LOS: ICD-10-PCS | Mod: S$PBB,,, | Performed by: NURSE PRACTITIONER

## 2022-05-17 PROCEDURE — 11043 DBRDMT MUSC&/FSCA 1ST 20/<: CPT | Mod: S$PBB,,, | Performed by: NURSE PRACTITIONER

## 2022-05-17 PROCEDURE — 11042 DBRDMT SUBQ TIS 1ST 20SQCM/<: CPT | Mod: PBBFAC | Performed by: NURSE PRACTITIONER

## 2022-05-17 PROCEDURE — 11043 DBRDMT MUSC&/FSCA 1ST 20/<: CPT | Mod: PBBFAC | Performed by: NURSE PRACTITIONER

## 2022-05-17 PROCEDURE — 11043 PR DEBRIDEMENT, SKIN, SUB-Q TISSUE,MUSCLE,=<20 SQ CM: ICD-10-PCS | Mod: S$PBB,,, | Performed by: NURSE PRACTITIONER

## 2022-05-17 PROCEDURE — 99499 UNLISTED E&M SERVICE: CPT | Mod: S$PBB,,, | Performed by: NURSE PRACTITIONER

## 2022-05-17 PROCEDURE — 99215 OFFICE O/P EST HI 40 MIN: CPT | Mod: PBBFAC,25 | Performed by: NURSE PRACTITIONER

## 2022-05-17 NOTE — PROGRESS NOTES
Debridement Performed for Assessment: Wound# 2  Performed By: Provider: Marli Morales NP  Assistant: Marilin Rosa RN    Debridement: Surgical    Photo taken post procedure:    Time-Out Taken: Yes  Level: Skin/Subcutaneous Tissue/ Muscle  Post Debridement Measurements  Length: (cm) 1.7  Width: (cm) 1.8  Depth: (cm) 0.8      Area: (cm²) 3.06  Percent Debrided: 100%  Total Area Debrided: (sq cm)     Tissue and other material debrided:  Adipose, Dermis, Epidermis, Subcutaneous, Muscle  Devitalized Tissue Debrided:Biofilm, Slough  Instrument: Curette  Bleeding: Moderate  Hemostasis Achieved: Pressure  Procedural Pain: Insensate  Post Procedural Pain: Insensate  Response to Treatment: Procedure was tolerated well    Devitalized materials/tissue Removed  the following was removed during debridement  subcutaneous, muscle      Post Debridement Diagnosis chronic would left foot  Post debridement diagnosis  Same as Pre-operative debridement diagnosis, No Complications noted.      Grafts or implants applied  Was a graft or implant applied?  No      Procedure assistant  Procedure assisted by: Marilin Rosa RN  Assistant is the same as nurse listed above      Complications related to procedure  Did any complication occure during procedure?  No complications noted during or after procedure.      Specimen  Specimen collect during procedure?  No specimen collected      Anaesthesia:  Anesthesia used  None      Blood Loss:  Blood loss during procedure  less than 5 cc

## 2022-06-06 NOTE — PATIENT INSTRUCTIONS
Clean with baby shampoo and water or vashe (no subs)  Stop keystone wound gel for now  Apply gray polymem to wound bed. Cut slightly larger than wound. Cover with dry gauze,wrap with kilng and paper tape.   Change three times a week and as needed for drainage.  Walking boot to right foot  Keep leg elevated and avoid pressure on wound.  Avoid prolonged standing  Diabetes:  Monitor glucose closely. Check fasting glucose and 2 hours after meals. HgA1C goal <7, fasting glucose , and 2 hours after meals <180  Hypertension:  Check blood pressure twice daily, goal <120/80  Diet:   Increase protein intake, avoid fried, fatty foods and foods high in simple carbs.   Vitamins:  Take vitamin C 1000 mg, zinc 50mg, vitamin d 5000 units, and a daily multivitamin. Addy is a good source of protein and nutrients to aid in wound healing.   Monitor closely for s/s of infection including fever, chills, increase in pain, odor from wound, and increased redness from foot. Go to ER if any complications develop.   RTC in 3 weeks

## 2022-06-06 NOTE — PROGRESS NOTES
GIANA Moncada   [facility] - WOUND CARE  [address] [facility] [address]  [phone]      PATIENT NAME: Navdeep Avery  : 1960  DATE: 22  MRN: [id number]      Billing Provider: GIANA Moncada  Level of Service:   Patient PCP Information     Provider PCP Type    Marquez Huff MD General          Reason for Visit / Chief Complaint: Diabetic Foot Ulcer (Right foot)       History of Present Illness / Problem Focused Workflow     Navdeep Avery is a 61 y.o. male who presents to clinic for follow up on chronic-non healing wound on the right foot. Wound has thick callus noted periwound. Bedside debridement performed today, biofilm noted over wound today. He is using Keystone gel as prescribed. Reinforced importance of off-loading. Pertinent factors that delay wound healing include multiple co-morbidities, poor vascular supply, diabetes, edema, large surface area and no protective sensation. Denies fever, chills, malaise, fatigue and sweats.        Review of Systems     Review of Systems   Constitutional: Positive for activity change. Negative for chills and fever.   Respiratory: Negative for chest tightness and shortness of breath.    Cardiovascular: Positive for leg swelling. Negative for chest pain and palpitations.   Musculoskeletal: Positive for arthralgias and joint swelling.   Skin: Positive for wound.        See wound assessment   Neurological: Positive for weakness and numbness.   Psychiatric/Behavioral: Negative for agitation, behavioral problems, confusion and self-injury.       Medical / Social / Family History     Past Medical History:   Diagnosis Date    Anemia     Anxiety     Atherosclerotic heart disease of native coronary artery with other forms of angina pectoris     Atrophic rhinitis     Carpal tunnel syndrome     COPD (chronic obstructive pulmonary disease)     Coronary arteriosclerosis     COVID 2022    Depression      Diabetic ulcer of right foot associated with diabetes mellitus due to underlying condition, with bone involvement without evidence of necrosis     GERD (gastroesophageal reflux disease)     Hyperlipidemia     Hypertension     Insomnia     MI (myocardial infarction)     Neuropathy     Peripheral vascular disease, unspecified     Right foot ulcer, with fat layer exposed 01/07/2015    Sleep apnea     Type 1 diabetes mellitus with foot ulcer     Type 2 diabetes mellitus        Past Surgical History:   Procedure Laterality Date    AMPUTATION      ANGIOPLASTY      CARDIAC CATHETERIZATION      CORONARY STENT PLACEMENT      DEBRIDEMENT      Right foot debridment with hospital stay and IV Abx    SHOULDER OPEN ROTATOR CUFF REPAIR Left     SPINE SURGERY      VASCULAR SURGERY         Social History  Mr. Navdeep Avery  reports that he has been smoking cigarettes. He has been smoking about 0.50 packs per day. He has never used smokeless tobacco. He reports previous alcohol use. He reports current drug use. Drug: Oxycodone.    Family History  's Navdeep Avery family history includes Alcohol abuse in his father; Arthritis in his mother; COPD in his paternal grandfather; Cancer in his sister; Diabetes in his sister; Early death in his father; Heart disease in his father, maternal grandfather, and son; Hypertension in his mother; Learning disabilities in his mother.    Medications and Allergies     Medications  Outpatient Medications Marked as Taking for the 6/7/22 encounter (Office Visit) with GIANA Moncada   Medication Sig Dispense Refill    albuterol (PROVENTIL) 2.5 mg /3 mL (0.083 %) nebulizer solution USE 1 VIAL IN NEBULIZER 3 TIMES DAILY 100 mL 11    albuterol (PROVENTIL/VENTOLIN HFA) 90 mcg/actuation inhaler Inhale 2 puffs into the lungs 2 (two) times daily as needed for Wheezing. 18 g 2    amLODIPine (NORVASC) 10 MG tablet Take 1 tablet (10 mg total) by mouth once daily. 90 tablet  1    aspirin 81 MG Chew Take 81 mg by mouth once daily.      atorvastatin (LIPITOR) 40 MG tablet Take 1 tablet (40 mg total) by mouth nightly. 90 tablet 1    clopidogreL (PLAVIX) 75 mg tablet Take 1 tablet (75 mg total) by mouth once daily. 90 tablet 1    diclofenac sodium (VOLTAREN) 1 % Gel Apply 1 g topically 4 (four) times daily as needed (pain). 20 g 1    empagliflozin (JARDIANCE) 10 mg tablet Take 1 tablet (10 mg total) by mouth once daily. 30 tablet 6    EScitalopram oxalate (LEXAPRO) 10 MG tablet Take 1 tablet (10 mg total) by mouth 2 (two) times a day. 180 tablet 1    fluticasone propionate (FLONASE) 50 mcg/actuation nasal spray 2 sprays (100 mcg total) by Each Nostril route once daily. 16 g 2    gabapentin (NEURONTIN) 300 MG capsule Take 3 capsules (900 mg total) by mouth every 6 (six) hours. 360 capsule 2    HYDROcodone-acetaminophen (NORCO)  mg per tablet TAKE 1 TABLET BY MOUTH EVERY 8 HOURS AS NEEDED FOR PAIN (MAY CAUSE DROWSINESS)      hydrOXYzine pamoate (VISTARIL) 25 MG Cap Take 1 capsule (25 mg total) by mouth once daily. Take nightly for sleep 90 capsule 1    icosapent ethyL (VASCEPA) 1 gram Cap Take 2 capsules (2 g total) by mouth 2 (two) times daily. 120 capsule 5    insulin glargine U-300 conc (TOUJEO MAX U-300 SOLOSTAR) 300 unit/mL (3 mL) insulin pen Inject 40 Units into the skin once daily at 6am.      lisinopriL 10 MG tablet Take 1 tablet (10 mg total) by mouth once daily. 90 tablet 1    metoprolol succinate (TOPROL-XL) 25 MG 24 hr tablet Take 1 tablet (25 mg total) by mouth once daily. 90 tablet 1    pantoprazole (PROTONIX) 40 MG tablet Take 1 tablet (40 mg total) by mouth once daily. 90 tablet 1    SPIRIVA RESPIMAT 2.5 mcg/actuation inhaler Inhale 1 puff into the lungs once daily. 4 g 5       Allergies  Review of patient's allergies indicates:  No Known Allergies    Physical Examination     Vitals:    06/07/22 0845   BP: (!) 164/78   Pulse: 61   Resp: 18   Temp: 98.2 °F  (36.8 °C)     Physical Exam  Vitals and nursing note reviewed.   Constitutional:       Appearance: Normal appearance.   HENT:      Head: Normocephalic.   Cardiovascular:      Rate and Rhythm: Normal rate and regular rhythm.      Pulses: Normal pulses.      Heart sounds: Normal heart sounds.   Pulmonary:      Effort: Pulmonary effort is normal. No respiratory distress.   Chest:      Chest wall: No tenderness.   Musculoskeletal:         General: Swelling present.      Right lower leg: Edema present.   Skin:     General: Skin is warm and dry.      Comments: See wound assessment    Neurological:      General: No focal deficit present.      Mental Status: He is alert and oriented to person, place, and time. Mental status is at baseline.   Psychiatric:         Mood and Affect: Mood normal.         Behavior: Behavior normal.         Thought Content: Thought content normal.         Judgment: Judgment normal.         Assessment and Plan      1. Diabetic ulcer of right foot associated with diabetes mellitus due to underlying condition, with bone involvement without evidence of necrosis, unspecified part of foot    2. Peripheral vascular disease, unspecified           Wound 06/29/21 Right anterior;plantar Foot #2 (Active)   06/29/21     Pre-existing: Yes   Primary Wound Type:    Side: Right   Orientation: anterior;plantar   Location: Foot   Wound Number: #2   Ankle-Brachial Index:    Pulses:    Removal Indication and Assessment:    Wound Outcome:    (Retired) Wound Type:    (Retired) Wound Length (cm):    (Retired) Wound Width (cm):    (Retired) Depth (cm):    Wound Description (Comments):    Removal Indications:    Wound Image    06/07/22 0921   Dressing Appearance Dried drainage 06/07/22 0849   Drainage Amount Small 06/07/22 0849   Drainage Characteristics/Odor Serosanguineous 06/07/22 0849   Appearance Pink;Red;Tan;White;Yellow 06/07/22 0849   Tissue loss description Full thickness 06/07/22 0849   Black (%), Wound Tissue  Color 0 % 06/07/22 0849   Red (%), Wound Tissue Color 90 % 06/07/22 0849   Yellow (%), Wound Tissue Color 10 % 06/07/22 0849   Periwound Area Macerated;Moist;Pale white;Pink;Redness 06/07/22 0849   Wound Edges Open 06/07/22 0849   Wound Length (cm) 1.8 cm 06/07/22 0921   Wound Width (cm) 1.9 cm 06/07/22 0921   Wound Depth (cm) 0.7 cm 06/07/22 0921   Wound Volume (cm^3) 2.394 cm^3 06/07/22 0921   Wound Surface Area (cm^2) 3.42 cm^2 06/07/22 0921   Care Debrided 06/07/22 0921   Dressing Applied;Gauze;Rolled gauze 06/07/22 0849   Periwound Care Moisture barrier applied 06/07/22 0849   Off Loading Off loading shoe 06/07/22 0849         Active Problem List with Overview Notes    Diagnosis Date Noted    SOB (shortness of breath) 03/07/2022    Diabetic ulcer of right foot associated with diabetes mellitus due to underlying condition, with bone involvement without evidence of necrosis     Insomnia 06/17/2021    Diabetes mellitus without complication 06/17/2021    Anxiety 06/17/2021    Neuropathy     Peripheral vascular disease, unspecified     Diabetic ulcer of midfoot associated with diabetes mellitus due to underlying condition, with necrosis of muscle 03/23/2021    Ulcer of right foot with necrosis of muscle 03/23/2021    Benign hypertension 03/23/2021    Mixed hyperlipidemia 03/23/2021    Dependence on nicotine from cigarettes 03/23/2021      Plan:   Clean with baby shampoo and water or vashe (no subs)  Stop keystone wound gel for now  Apply gray polymem to wound bed. Cut slightly larger than wound. Cover with dry gauze,wrap with kilng and paper tape.   Change three times a week and as needed for drainage.  Walking boot to right foot  Keep leg elevated and avoid pressure on wound.  Avoid prolonged standing  Diabetes:  Monitor glucose closely. Check fasting glucose and 2 hours after meals. HgA1C goal <7, fasting glucose , and 2 hours after meals <180  Hypertension:  Check blood pressure twice daily,  goal <120/80  Diet:   Increase protein intake, avoid fried, fatty foods and foods high in simple carbs.   Vitamins:  Take vitamin C 1000 mg, zinc 50mg, vitamin d 5000 units, and a daily multivitamin. Addy is a good source of protein and nutrients to aid in wound healing.   Monitor closely for s/s of infection including fever, chills, increase in pain, odor from wound, and increased redness from foot. Go to ER if any complications develop.   RTC in 3 weeks    Problem List Items Addressed This Visit        Cardiac/Vascular    Peripheral vascular disease, unspecified (Chronic)       Endocrine    Diabetic ulcer of right foot associated with diabetes mellitus due to underlying condition, with bone involvement without evidence of necrosis - Primary          Future Appointments   Date Time Provider Department Center   6/13/2022  7:45 AM AQUILINO Dhillon [facility]   6/28/2022  9:00 AM [facility]cada [facility]   7/6/2022  9:45 AM Marquez Huff MD [facility]   10/4/2022  2:00 PM AWROSA NURSE [facility]   10/5/2023  9:00 AM WANDER NURSE [facility]            Signature:  GIANA Moncada  [facility] - WOUND CARE  [address] [facility] [place] [address]  [phone]    Date of encounter: 6/7/22

## 2022-06-07 ENCOUNTER — OFFICE VISIT (OUTPATIENT)
Dept: WOUND CARE | Facility: CLINIC | Age: 62
End: 2022-06-07
Attending: FAMILY MEDICINE
Payer: MEDICARE

## 2022-06-07 VITALS
TEMPERATURE: 98 F | HEART RATE: 61 BPM | DIASTOLIC BLOOD PRESSURE: 78 MMHG | RESPIRATION RATE: 18 BRPM | SYSTOLIC BLOOD PRESSURE: 164 MMHG

## 2022-06-07 DIAGNOSIS — I73.9 PERIPHERAL VASCULAR DISEASE, UNSPECIFIED: ICD-10-CM

## 2022-06-07 DIAGNOSIS — E08.621 DIABETIC ULCER OF RIGHT FOOT ASSOCIATED WITH DIABETES MELLITUS DUE TO UNDERLYING CONDITION, WITH BONE INVOLVEMENT WITHOUT EVIDENCE OF NECROSIS, UNSPECIFIED PART OF FOOT: Primary | ICD-10-CM

## 2022-06-07 DIAGNOSIS — L97.516 DIABETIC ULCER OF RIGHT FOOT ASSOCIATED WITH DIABETES MELLITUS DUE TO UNDERLYING CONDITION, WITH BONE INVOLVEMENT WITHOUT EVIDENCE OF NECROSIS, UNSPECIFIED PART OF FOOT: Primary | ICD-10-CM

## 2022-06-07 PROCEDURE — 11043 PR DEBRIDEMENT, SKIN, SUB-Q TISSUE,MUSCLE,=<20 SQ CM: ICD-10-PCS | Mod: S$PBB,,, | Performed by: NURSE PRACTITIONER

## 2022-06-07 PROCEDURE — 11043 DBRDMT MUSC&/FSCA 1ST 20/<: CPT | Mod: PBBFAC | Performed by: NURSE PRACTITIONER

## 2022-06-07 PROCEDURE — 99499 NO LOS: ICD-10-PCS | Mod: S$PBB,,, | Performed by: NURSE PRACTITIONER

## 2022-06-07 PROCEDURE — 11043 DBRDMT MUSC&/FSCA 1ST 20/<: CPT | Mod: S$PBB,,, | Performed by: NURSE PRACTITIONER

## 2022-06-07 PROCEDURE — 99215 OFFICE O/P EST HI 40 MIN: CPT | Mod: PBBFAC | Performed by: NURSE PRACTITIONER

## 2022-06-07 PROCEDURE — 99499 UNLISTED E&M SERVICE: CPT | Mod: S$PBB,,, | Performed by: NURSE PRACTITIONER

## 2022-06-07 NOTE — PROGRESS NOTES
Debridement Performed for Assessment: Wound# 2  Performed By: Provider: Marli Morales NP  Assistant:    Debridement: Surgical    Photo taken post procedure:    Time-Out Taken: Yes  Level: Skin/Subcutaneous Tissue/ Muscle  Post Debridement Measurements  Length: (cm) 1.8  Width: (cm) 1.9  Depth: (cm) 0.7      Area: (cm²) 3.42  Percent Debrided: 100%  Total Area Debrided: (sq cm)     Tissue and other material debrided:  Adipose, Dermis, Epidermis, Subcutaneous, Muscle  Devitalized Tissue Debrided:Biofilm, Slough  Instrument: Curette  Bleeding: Moderate  Hemostasis Achieved: Pressure  Procedural Pain: Insensate  Post Procedural Pain: Insensate  Response to Treatment: Procedure was tolerated well    Devitalized materials/tissue Removed  the following was removed during debridement  subcutaneous, muscle      Post Debridement Diagnosis chronic wound right plantar foot  Post debridement diagnosis  Same as Pre-operative debridement diagnosis, No Complications noted.      Grafts or implants applied  Was a graft or implant applied?  No      Procedure assistant  Procedure assisted by: Marilin Rosa RN  Assistant is the same as nurse listed above      Complications related to procedure  Did any complication occure during procedure?  No complications noted during or after procedure.      Specimen  Specimen collect during procedure?  No specimen collected      Anaesthesia:  Anesthesia used  None      Blood Loss:  Blood loss during procedure  less than 5 cc

## 2022-06-08 NOTE — PROGRESS NOTES
Subjective:      Patient ID: [id number] Avery is a 62 y.o. male.    Chief Complaint: Joint Pain      Pain  This is a chronic problem. The current episode started more than 1 year ago. The problem occurs daily. The problem has been waxing and waning. Associated symptoms include arthralgias and neck pain. Pertinent negatives include no change in bowel habit, chest pain, chills, coughing, fatigue, fever, rash, sore throat, vertigo or vomiting.     Review of Systems   Constitutional: Negative for activity change, chills, fatigue, fever and unexpected weight change.   HENT: Negative for drooling, ear discharge, ear pain, facial swelling, nosebleeds, sore throat, trouble swallowing, voice change and goiter.    Eyes: Negative for photophobia, pain, discharge, redness and visual disturbance.   Respiratory: Negative for apnea, cough, choking, chest tightness, shortness of breath, wheezing and stridor.    Cardiovascular: Negative for chest pain, palpitations and leg swelling.   Gastrointestinal: Negative for abdominal distention, change in bowel habit, diarrhea, rectal pain, vomiting, fecal incontinence and change in bowel habit.   Endocrine: Negative for cold intolerance, heat intolerance, polydipsia, polyphagia and polyuria.   Genitourinary: Negative for bladder incontinence, dysuria, flank pain and frequency.   Musculoskeletal: Positive for arthralgias, back pain, leg pain, neck pain and neck stiffness.   Integumentary:  Negative for color change, pallor and rash.   Neurological: Negative for dizziness, vertigo, seizures, syncope, facial asymmetry, speech difficulty, light-headedness, disturbances in coordination, memory loss and coordination difficulties.   Hematological: Negative for adenopathy. Does not bruise/bleed easily.   Psychiatric/Behavioral: Negative for agitation, behavioral problems, confusion, decreased concentration, dysphoric mood, hallucinations, self-injury and suicidal ideas. The patient is not  "nervous/anxious and is not hyperactive.             Objective:  Vitals:    06/13/22 0756 06/13/22 0757   BP: (!) 145/68    Pulse: 93    Resp: 18    SpO2: 98%    Weight: 78 kg (172 lb)    Height: 5' 8" (1.727 m)    PainSc:   8   8         Physical Exam  Vitals and nursing note reviewed. Exam conducted with a chaperone present.   Constitutional:       General: He is awake.      Appearance: Normal appearance. He is not ill-appearing, toxic-appearing or diaphoretic.   HENT:      Head: Normocephalic and atraumatic.      Nose: Nose normal.      Mouth/Throat:      Mouth: Mucous membranes are moist.      Pharynx: Oropharynx is clear.   Eyes:      Conjunctiva/sclera: Conjunctivae normal.      Pupils: Pupils are equal, round, and reactive to light.   Cardiovascular:      Rate and Rhythm: Normal rate.   Pulmonary:      Effort: Pulmonary effort is normal. No respiratory distress.   Abdominal:      Palpations: Abdomen is soft.   Musculoskeletal:         General: Normal range of motion.      Cervical back: Normal range of motion and neck supple.      Thoracic back: Tenderness present.      Lumbar back: Tenderness present.   Skin:     General: Skin is warm and dry.      Coloration: Skin is not jaundiced or pale.   Neurological:      General: No focal deficit present.      Mental Status: He is alert and oriented to person, place, and time. Mental status is at baseline.      Cranial Nerves: Cranial nerves are intact. No cranial nerve deficit (II-XII).   Psychiatric:         Mood and Affect: Mood normal.         Behavior: Behavior normal. Behavior is cooperative.         Thought Content: Thought content normal.           Orders Placed This Encounter   Procedures    POCT Urine Drug Screen Presump     Interpretive Information:     Negative:  No drug detected at the cut off level.   Positive:  This result represents presumptive positive for the   tested drug, other substances may yield a positive response other   than the analyte of " interest. This result should be utilized for   diagnostic purpose only. Confirmation testing will be performed upon physician request only.           X-Ray Ankle Complete 3 View Right  Narrative: EXAMINATION:  XR FOOT COMPLETE 3 VIEW RIGHT; XR ANKLE COMPLETE 3 VIEW RIGHT    CLINICAL HISTORY:  Diabetes mellitus due to underlying condition with foot ulcer    COMPARISON:  24 June 2022    FINDINGS:  No evidence of fracture seen.  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  Chronic changes in the 2nd metatarsophalangeal joint and in the distal 3rd metatarsal appears similar to previous.  Mild ankle degenerative changes present.  No soft tissue abnormality is seen.  Impression: No evidence of acute osteomyelitis demonstrated    Electronically signed by: Francisco Felton  Date:    08/15/2022  Time:    08:36  X-Ray Foot Complete 3 view Right  Narrative: EXAMINATION:  XR FOOT COMPLETE 3 VIEW RIGHT; XR ANKLE COMPLETE 3 VIEW RIGHT    CLINICAL HISTORY:  Diabetes mellitus due to underlying condition with foot ulcer    COMPARISON:  24 June 2022    FINDINGS:  No evidence of fracture seen.  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  Chronic changes in the 2nd metatarsophalangeal joint and in the distal 3rd metatarsal appears similar to previous.  Mild ankle degenerative changes present.  No soft tissue abnormality is seen.  Impression: No evidence of acute osteomyelitis demonstrated    Electronically signed by: Francisco Felton  Date:    08/15/2022  Time:    08:36       Lab Requisition on 04/15/2022   Component Date Value Ref Range Status    Sodium 04/15/2022 138  136 - 145 mmol/L Final    Potassium 04/15/2022 4.9  3.5 - 5.1 mmol/L Final    Chloride 04/15/2022 103  98 - 107 mmol/L Final    CO2 04/15/2022 33 (A) 21 - 32 mmol/L Final    Anion Gap 04/15/2022 7  7 - 16 mmol/L Final    Glucose 04/15/2022 242 (A) 74 - 106 mg/dL Final    BUN 04/15/2022 16  7 - 18 mg/dL Final    Creatinine 04/15/2022 1.01   0.70 - 1.30 mg/dL Final    BUN/Creatinine Ratio 04/15/2022 16  6 - 20 Final    Calcium 04/15/2022 9.8  8.5 - 10.1 mg/dL Final    Total Protein 04/15/2022 8.2  6.4 - 8.2 g/dL Final    Albumin 04/15/2022 3.8  3.5 - 5.0 g/dL Final    Globulin 04/15/2022 4.4 (A) 2.0 - 4.0 g/dL Final    A/G Ratio 04/15/2022 0.9   Final    Bilirubin, Total 04/15/2022 0.4  0.0 - 1.2 mg/dL Final    Alk Phos 04/15/2022 94  45 - 115 U/L Final    ALT 04/15/2022 29  16 - 61 U/L Final    AST 04/15/2022 18  15 - 37 U/L Final    eGFR 04/15/2022 79  >=60 mL/min/1.73m² Final    Triglycerides 04/15/2022 208 (A) 35 - 150 mg/dL Final    Cholesterol 04/15/2022 223 (A) 0 - 200 mg/dL Final    HDL Cholesterol 04/15/2022 36 (A) 40 - 60 mg/dL Final    Cholesterol/HDL Ratio (Risk Factor) 04/15/2022 6.2   Final    Non-HDL 04/15/2022 187  mg/dL Final    LDL Calculated 04/15/2022 145  mg/dL Final    LDL/HDL 04/15/2022 4.0   Final    VLDL 04/15/2022 42  mg/dL Final    Hemoglobin A1C 04/15/2022 9.6 (A) 4.5 - 6.6 % Final    Estimated Average Glucose 04/15/2022 234  mg/dL Final    WBC 04/15/2022 5.22  4.50 - 11.00 K/uL Final    RBC 04/15/2022 4.64  4.60 - 6.20 M/uL Final    Hemoglobin 04/15/2022 13.8  13.5 - 18.0 g/dL Final    Hematocrit 04/15/2022 41.7  40.0 - 54.0 % Final    MCV 04/15/2022 89.9  80.0 - 96.0 fL Final    MCH 04/15/2022 29.7  27.0 - 31.0 pg Final    MCHC 04/15/2022 33.1  32.0 - 36.0 g/dL Final    RDW 04/15/2022 12.7  11.5 - 14.5 % Final    Platelet Count 04/15/2022 238  150 - 400 K/uL Final    MPV 04/15/2022 10.3  9.4 - 12.4 fL Final    Neutrophils % 04/15/2022 50.4 (A) 53.0 - 65.0 % Final    Lymphocytes % 04/15/2022 38.7  27.0 - 41.0 % Final    Monocytes % 04/15/2022 6.3 (A) 2.0 - 6.0 % Final    Eosinophils % 04/15/2022 3.6  1.0 - 4.0 % Final    Basophils % 04/15/2022 0.8  0.0 - 1.0 % Final    Immature Granulocytes % 04/15/2022 0.2  0.0 - 0.4 % Final    nRBC, Auto 04/15/2022 0.0  <=0.0 % Final    Neutrophils,  Abs 04/15/2022 2.63  1.80 - 7.70 K/uL Final    Lymphocytes, Absolute 04/15/2022 2.02  1.00 - 4.80 K/uL Final    Monocytes, Absolute 04/15/2022 0.33  0.00 - 0.80 K/uL Final    Eosinophils, Absolute 04/15/2022 0.19  0.00 - 0.50 K/uL Final    Basophils, Absolute 04/15/2022 0.04  0.00 - 0.20 K/uL Final    Immature Granulocytes, Absolute 04/15/2022 0.01  0.00 - 0.04 K/uL Final    nRBC, Absolute 04/15/2022 0.00  <=0.00 x10e3/uL Final    Diff Type 04/15/2022 Auto   Final    Total Protein 04/15/2022 8.1  6.4 - 8.2 g/dL Final    Albumin, PE 04/15/2022 5.02  3.5 - 5.2 g/dL Final    Alpha-1-Globulin 04/15/2022 0.2  0.1 - 0.4 g/dL Final    Alpha-2-Globulin 04/15/2022 0.9  0.4 - 1.3 g/dL Final    Beta, PE 04/15/2022 0.8  0.5 - 1.5 g/dL Final    Gamma, PE 04/15/2022 1.2  0.5 - 1.8 g/dL Final    Pathologist Interp, Pro Elect, Blo* 04/15/2022 Normal. No monoclonal bands identified   Final   Office Visit on 04/12/2022   Component Date Value Ref Range Status    POC Glucose 04/12/2022 246 (A) 70 - 110 MG/DL Final   Office Visit on 04/11/2022   Component Date Value Ref Range Status    POC Glucose 04/11/2022 231 (A) 70 - 110 MG/DL Final   Office Visit on 04/07/2022   Component Date Value Ref Range Status    POC Glucose 04/11/2022 265 (A) 70 - 110 MG/DL Final    POC Glucose 04/08/2022 323 (A) 70 - 110 MG/DL Final    POC Glucose 04/07/2022 255 (A) 70 - 110 MG/DL Final   Office Visit on 04/06/2022   Component Date Value Ref Range Status    Creatinine, Urine 04/06/2022 42  39 - 259 mg/dL Final    Microalbumin 04/06/2022 17.1 (A) 0.0 - 2.8 mg/dL Final    Microalbumin/Creatinine Ratio 04/06/2022 407.1 (A) 0.0 - 30.0 mg/g Final   Office Visit on 04/04/2022   Component Date Value Ref Range Status    POC Glucose 04/04/2022 249 (A) 70 - 110 MG/DL Final    POC Glucose 04/04/2022 214 (A) 70 - 110 MG/DL Final   Office Visit on 03/31/2022   Component Date Value Ref Range Status    POC Glucose 03/31/2022 283 (A) 70 - 110 MG/DL  Final    POC Glucose 03/31/2022 306 (A) 70 - 110 MG/DL Final   Office Visit on 03/30/2022   Component Date Value Ref Range Status    POC Glucose 03/30/2022 257 (A) 70 - 110 MG/DL Final    POC Glucose 03/30/2022 204 (A) 70 - 110 MG/DL Final   Office Visit on 03/29/2022   Component Date Value Ref Range Status    POC Glucose 03/29/2022 236 (A) 70 - 110 MG/DL Final    POC Glucose 03/29/2022 235 (A) 70 - 110 MG/DL Final   Office Visit on 03/28/2022   Component Date Value Ref Range Status    POC Glucose 03/28/2022 260 (A) 70 - 110 MG/DL Final   There may be more visits with results that are not included.           Assessment:      1. Ulcer of right foot with necrosis of muscle    2. Neuropathy    3. Peripheral vascular disease, unspecified    4. Encounter for long-term (current) use of other medications            A's of Opioid Risk Assessment  Activity:Patient can perform ADL.   Analgesia:Patients pain is partially controlled by current medication. Patient has tried OTC medications such as Tylenol and Ibuprofen with out relief.   Adverse Effects: Patient denies constipation or sedation.  Aberrant Behavior:  reviewed with no aberrant drug seeking/taking behavior.  Overdose reversal drug naloxone discussed    Drug screen reviewed      Requested Prescriptions     Signed Prescriptions Disp Refills    gabapentin (NEURONTIN) 300 MG capsule 360 capsule 2     Sig: Take 3 capsules (900 mg total) by mouth every 6 (six) hours.    HYDROcodone-acetaminophen (NORCO)  mg per tablet 81 tablet 0     Sig: Take 1 tablet by mouth every 8 (eight) hours as needed for Pain.         Plan:    Following wound management diabetic ulcer right foot    He is concerned his left great toe may have infection he will discuss this with his wound management provider    Any further inconsistent drug screens were no longer provide narcotics June 2021 definitive drug screen inconsistent  positive for hydrocodone and oxycodone    He  understands the importance of discussing his wound with his wound management provider    He would like to continue conservative management from our standpoint    Continue current medication    Follow-up 3 months    Dr. Dobson, December 2022    Bring original prescription medication bottles/container/box with labels to each visit

## 2022-06-13 ENCOUNTER — OFFICE VISIT (OUTPATIENT)
Dept: PAIN MEDICINE | Facility: CLINIC | Age: 62
End: 2022-06-13
Payer: MEDICARE

## 2022-06-13 VITALS
BODY MASS INDEX: 26.07 KG/M2 | SYSTOLIC BLOOD PRESSURE: 145 MMHG | HEIGHT: 68 IN | WEIGHT: 172 LBS | DIASTOLIC BLOOD PRESSURE: 68 MMHG | OXYGEN SATURATION: 98 % | HEART RATE: 93 BPM | RESPIRATION RATE: 18 BRPM

## 2022-06-13 DIAGNOSIS — G62.9 NEUROPATHY: Chronic | ICD-10-CM

## 2022-06-13 DIAGNOSIS — I73.9 PERIPHERAL VASCULAR DISEASE, UNSPECIFIED: Chronic | ICD-10-CM

## 2022-06-13 DIAGNOSIS — Z79.899 ENCOUNTER FOR LONG-TERM (CURRENT) USE OF OTHER MEDICATIONS: ICD-10-CM

## 2022-06-13 DIAGNOSIS — L97.513 ULCER OF RIGHT FOOT WITH NECROSIS OF MUSCLE: Primary | Chronic | ICD-10-CM

## 2022-06-13 PROCEDURE — 99214 PR OFFICE/OUTPT VISIT, EST, LEVL IV, 30-39 MIN: ICD-10-PCS | Mod: S$PBB,,, | Performed by: PHYSICIAN ASSISTANT

## 2022-06-13 PROCEDURE — 99214 OFFICE O/P EST MOD 30 MIN: CPT | Mod: S$PBB,,, | Performed by: PHYSICIAN ASSISTANT

## 2022-06-13 PROCEDURE — 99215 OFFICE O/P EST HI 40 MIN: CPT | Mod: PBBFAC | Performed by: PHYSICIAN ASSISTANT

## 2022-06-13 PROCEDURE — 80305 DRUG TEST PRSMV DIR OPT OBS: CPT | Mod: PBBFAC | Performed by: PHYSICIAN ASSISTANT

## 2022-06-13 RX ORDER — HYDROCODONE BITARTRATE AND ACETAMINOPHEN 10; 325 MG/1; MG/1
1 TABLET ORAL EVERY 8 HOURS PRN
Qty: 90 TABLET | Refills: 0 | Status: SHIPPED | OUTPATIENT
Start: 2022-06-17 | End: 2022-06-24 | Stop reason: CLARIF

## 2022-06-13 RX ORDER — HYDROCODONE BITARTRATE AND ACETAMINOPHEN 10; 325 MG/1; MG/1
1 TABLET ORAL EVERY 8 HOURS PRN
Qty: 90 TABLET | Refills: 0 | Status: ON HOLD | OUTPATIENT
Start: 2022-07-16 | End: 2022-08-20

## 2022-06-13 RX ORDER — HYDROCODONE BITARTRATE AND ACETAMINOPHEN 10; 325 MG/1; MG/1
1 TABLET ORAL EVERY 8 HOURS PRN
Qty: 90 TABLET | Refills: 0 | Status: ON HOLD | OUTPATIENT
Start: 2022-08-16 | End: 2022-06-29 | Stop reason: HOSPADM

## 2022-06-13 RX ORDER — GABAPENTIN 300 MG/1
900 CAPSULE ORAL EVERY 6 HOURS
Qty: 360 CAPSULE | Refills: 2 | Status: SHIPPED | OUTPATIENT
Start: 2022-06-13 | End: 2022-09-15 | Stop reason: SDUPTHER

## 2022-06-24 ENCOUNTER — HOSPITAL ENCOUNTER (INPATIENT)
Facility: HOSPITAL | Age: 62
LOS: 5 days | Discharge: HOME | DRG: 629 | End: 2022-06-29
Attending: HOSPITALIST | Admitting: HOSPITALIST
Payer: MEDICARE

## 2022-06-24 ENCOUNTER — OFFICE VISIT (OUTPATIENT)
Dept: WOUND CARE | Facility: CLINIC | Age: 62
End: 2022-06-24
Attending: FAMILY MEDICINE
Payer: MEDICARE

## 2022-06-24 VITALS
SYSTOLIC BLOOD PRESSURE: 147 MMHG | HEART RATE: 72 BPM | DIASTOLIC BLOOD PRESSURE: 77 MMHG | RESPIRATION RATE: 18 BRPM | TEMPERATURE: 98 F

## 2022-06-24 DIAGNOSIS — R60.0 LOCALIZED EDEMA: ICD-10-CM

## 2022-06-24 DIAGNOSIS — L97.416 DIABETIC ULCER OF RIGHT MIDFOOT ASSOCIATED WITH DIABETES MELLITUS DUE TO UNDERLYING CONDITION, WITH BONE INVOLVEMENT WITHOUT EVIDENCE OF NECROSIS: ICD-10-CM

## 2022-06-24 DIAGNOSIS — E08.621 DIABETIC ULCER OF RIGHT FOOT ASSOCIATED WITH DIABETES MELLITUS DUE TO UNDERLYING CONDITION, WITH BONE INVOLVEMENT WITHOUT EVIDENCE OF NECROSIS, UNSPECIFIED PART OF FOOT: Primary | ICD-10-CM

## 2022-06-24 DIAGNOSIS — L97.413 DIABETIC ULCER OF RIGHT MIDFOOT ASSOCIATED WITH DIABETES MELLITUS DUE TO UNDERLYING CONDITION, WITH NECROSIS OF MUSCLE: ICD-10-CM

## 2022-06-24 DIAGNOSIS — E08.621 DIABETIC ULCER OF RIGHT MIDFOOT ASSOCIATED WITH DIABETES MELLITUS DUE TO UNDERLYING CONDITION, WITH BONE INVOLVEMENT WITHOUT EVIDENCE OF NECROSIS: ICD-10-CM

## 2022-06-24 DIAGNOSIS — T14.8XXA INFECTED WOUND: ICD-10-CM

## 2022-06-24 DIAGNOSIS — L97.513 ULCER OF RIGHT FOOT WITH NECROSIS OF MUSCLE: ICD-10-CM

## 2022-06-24 DIAGNOSIS — L03.115 CELLULITIS OF RIGHT LEG: ICD-10-CM

## 2022-06-24 DIAGNOSIS — L97.516 DIABETIC ULCER OF RIGHT FOOT ASSOCIATED WITH DIABETES MELLITUS DUE TO UNDERLYING CONDITION, WITH BONE INVOLVEMENT WITHOUT EVIDENCE OF NECROSIS, UNSPECIFIED PART OF FOOT: Primary | ICD-10-CM

## 2022-06-24 DIAGNOSIS — L08.9 INFECTED WOUND: ICD-10-CM

## 2022-06-24 DIAGNOSIS — I73.9 PERIPHERAL VASCULAR DISEASE, UNSPECIFIED: ICD-10-CM

## 2022-06-24 DIAGNOSIS — E08.621 DIABETIC ULCER OF RIGHT MIDFOOT ASSOCIATED WITH DIABETES MELLITUS DUE TO UNDERLYING CONDITION, WITH NECROSIS OF MUSCLE: ICD-10-CM

## 2022-06-24 PROBLEM — Z95.1 S/P CABG (CORONARY ARTERY BYPASS GRAFT): Chronic | Status: ACTIVE | Noted: 2022-06-24

## 2022-06-24 PROBLEM — J44.9 COPD (CHRONIC OBSTRUCTIVE PULMONARY DISEASE): Chronic | Status: ACTIVE | Noted: 2022-06-24

## 2022-06-24 PROBLEM — E10.9 TYPE 1 DIABETES MELLITUS: Chronic | Status: ACTIVE | Noted: 2022-06-24

## 2022-06-24 LAB
ALBUMIN SERPL BCP-MCNC: 3.2 G/DL (ref 3.5–5)
ALBUMIN/GLOB SERPL: 0.8 {RATIO}
ALP SERPL-CCNC: 113 U/L (ref 45–115)
ALT SERPL W P-5'-P-CCNC: 23 U/L (ref 16–61)
ANION GAP SERPL CALCULATED.3IONS-SCNC: 11 MMOL/L (ref 7–16)
APTT PPP: 27.4 SECONDS (ref 25.2–37.3)
AST SERPL W P-5'-P-CCNC: 13 U/L (ref 15–37)
ATYPICAL LYMPHOCYTES: ABNORMAL
BASOPHILS # BLD AUTO: 0.04 K/UL (ref 0–0.2)
BASOPHILS NFR BLD AUTO: 0.6 % (ref 0–1)
BILIRUB SERPL-MCNC: 0.2 MG/DL (ref 0–1.2)
BUN SERPL-MCNC: 21 MG/DL (ref 7–18)
BUN/CREAT SERPL: 20 (ref 6–20)
CALCIUM SERPL-MCNC: 9 MG/DL (ref 8.5–10.1)
CHLORIDE SERPL-SCNC: 101 MMOL/L (ref 98–107)
CO2 SERPL-SCNC: 33 MMOL/L (ref 21–32)
CREAT SERPL-MCNC: 1.04 MG/DL (ref 0.7–1.3)
CRP SERPL-MCNC: 9.9 MG/DL (ref 0–0.8)
DIFFERENTIAL METHOD BLD: ABNORMAL
EOSINOPHIL # BLD AUTO: 0.26 K/UL (ref 0–0.5)
EOSINOPHIL NFR BLD AUTO: 4 % (ref 1–4)
EOSINOPHIL NFR BLD MANUAL: 3 % (ref 1–4)
ERYTHROCYTE [DISTWIDTH] IN BLOOD BY AUTOMATED COUNT: 11.7 % (ref 11.5–14.5)
ERYTHROCYTE [SEDIMENTATION RATE] IN BLOOD BY WESTERGREN METHOD: 70 MM/HR (ref 0–20)
GLOBULIN SER-MCNC: 3.9 G/DL (ref 2–4)
GLUCOSE SERPL-MCNC: 229 MG/DL (ref 70–105)
GLUCOSE SERPL-MCNC: 270 MG/DL (ref 70–105)
GLUCOSE SERPL-MCNC: 283 MG/DL (ref 74–106)
HCT VFR BLD AUTO: 36.6 % (ref 40–54)
HGB BLD-MCNC: 12.9 G/DL (ref 13.5–18)
IMM GRANULOCYTES # BLD AUTO: 0.04 K/UL (ref 0–0.04)
IMM GRANULOCYTES NFR BLD: 0.6 % (ref 0–0.4)
INR BLD: 1.01 (ref 0.9–1.1)
LACTATE SERPL-SCNC: 1.6 MMOL/L (ref 0.4–2)
LYMPHOCYTES # BLD AUTO: 2.31 K/UL (ref 1–4.8)
LYMPHOCYTES NFR BLD AUTO: 35.5 % (ref 27–41)
LYMPHOCYTES NFR BLD MANUAL: 31 % (ref 27–41)
MCH RBC QN AUTO: 31.3 PG (ref 27–31)
MCHC RBC AUTO-ENTMCNC: 35.2 G/DL (ref 32–36)
MCV RBC AUTO: 88.8 FL (ref 80–96)
MONOCYTES # BLD AUTO: 0.67 K/UL (ref 0–0.8)
MONOCYTES NFR BLD AUTO: 10.3 % (ref 2–6)
MONOCYTES NFR BLD MANUAL: 8 % (ref 2–6)
MPC BLD CALC-MCNC: 9.4 FL (ref 9.4–12.4)
NEUTROPHILS # BLD AUTO: 3.18 K/UL (ref 1.8–7.7)
NEUTROPHILS NFR BLD AUTO: 49 % (ref 53–65)
NEUTS BAND NFR BLD MANUAL: 3 % (ref 1–5)
NEUTS SEG NFR BLD MANUAL: 55 % (ref 50–62)
NRBC # BLD AUTO: 0 X10E3/UL
NRBC, AUTO (.00): 0 %
PLATELET # BLD AUTO: 207 K/UL (ref 150–400)
PLATELET MORPHOLOGY: NORMAL
POTASSIUM SERPL-SCNC: 5.7 MMOL/L (ref 3.5–5.1)
PROT SERPL-MCNC: 7.1 G/DL (ref 6.4–8.2)
PROTHROMBIN TIME: 12.9 SECONDS (ref 11.7–14.7)
RBC # BLD AUTO: 4.12 M/UL (ref 4.6–6.2)
RBC MORPH BLD: NORMAL
SODIUM SERPL-SCNC: 139 MMOL/L (ref 136–145)
WBC # BLD AUTO: 6.5 K/UL (ref 4.5–11)

## 2022-06-24 PROCEDURE — 99223 PR INITIAL HOSPITAL CARE,LEVL III: ICD-10-PCS | Mod: AI,,, | Performed by: HOSPITALIST

## 2022-06-24 PROCEDURE — 99214 PR OFFICE/OUTPT VISIT, EST, LEVL IV, 30-39 MIN: ICD-10-PCS | Mod: S$PBB,,, | Performed by: NURSE PRACTITIONER

## 2022-06-24 PROCEDURE — 87076 CULTURE ANAEROBE IDENT EACH: CPT | Mod: ,,, | Performed by: CLINICAL MEDICAL LABORATORY

## 2022-06-24 PROCEDURE — 87070 CULTURE OTHR SPECIMN AEROBIC: CPT | Mod: ,,, | Performed by: CLINICAL MEDICAL LABORATORY

## 2022-06-24 PROCEDURE — 86140 C-REACTIVE PROTEIN: CPT | Performed by: REGISTERED NURSE

## 2022-06-24 PROCEDURE — 85610 PROTHROMBIN TIME: CPT | Performed by: REGISTERED NURSE

## 2022-06-24 PROCEDURE — 85730 THROMBOPLASTIN TIME PARTIAL: CPT | Performed by: REGISTERED NURSE

## 2022-06-24 PROCEDURE — 87075 CULTURE, ANAEROBE: ICD-10-PCS | Mod: ,,, | Performed by: CLINICAL MEDICAL LABORATORY

## 2022-06-24 PROCEDURE — 96372 THER/PROPH/DIAG INJ SC/IM: CPT

## 2022-06-24 PROCEDURE — 82962 GLUCOSE BLOOD TEST: CPT

## 2022-06-24 PROCEDURE — 25000003 PHARM REV CODE 250: Performed by: REGISTERED NURSE

## 2022-06-24 PROCEDURE — 87075 CULTR BACTERIA EXCEPT BLOOD: CPT | Mod: ,,, | Performed by: CLINICAL MEDICAL LABORATORY

## 2022-06-24 PROCEDURE — 85025 COMPLETE CBC W/AUTO DIFF WBC: CPT | Performed by: REGISTERED NURSE

## 2022-06-24 PROCEDURE — 87186 SC STD MICRODIL/AGAR DIL: CPT | Mod: ,,, | Performed by: CLINICAL MEDICAL LABORATORY

## 2022-06-24 PROCEDURE — C9399 UNCLASSIFIED DRUGS OR BIOLOG: HCPCS | Performed by: REGISTERED NURSE

## 2022-06-24 PROCEDURE — 63600175 PHARM REV CODE 636 W HCPCS: Performed by: REGISTERED NURSE

## 2022-06-24 PROCEDURE — 25000242 PHARM REV CODE 250 ALT 637 W/ HCPCS: Performed by: REGISTERED NURSE

## 2022-06-24 PROCEDURE — 87040 BLOOD CULTURE FOR BACTERIA: CPT | Performed by: REGISTERED NURSE

## 2022-06-24 PROCEDURE — 83605 ASSAY OF LACTIC ACID: CPT | Performed by: REGISTERED NURSE

## 2022-06-24 PROCEDURE — 80053 COMPREHEN METABOLIC PANEL: CPT | Performed by: REGISTERED NURSE

## 2022-06-24 PROCEDURE — 87076 CULTURE, ANAEROBE: ICD-10-PCS | Mod: ,,, | Performed by: CLINICAL MEDICAL LABORATORY

## 2022-06-24 PROCEDURE — 94640 AIRWAY INHALATION TREATMENT: CPT

## 2022-06-24 PROCEDURE — 87077 PR  BACTERIA IDENTIFICATION, AEROBIC ISOLATE: ICD-10-PCS | Mod: ,,, | Performed by: CLINICAL MEDICAL LABORATORY

## 2022-06-24 PROCEDURE — 87186 PR  ANTIBIOTIC SENS,MIC,EACH: ICD-10-PCS | Mod: ,,, | Performed by: CLINICAL MEDICAL LABORATORY

## 2022-06-24 PROCEDURE — 99223 1ST HOSP IP/OBS HIGH 75: CPT | Mod: AI,,, | Performed by: HOSPITALIST

## 2022-06-24 PROCEDURE — 87077 CULTURE AEROBIC IDENTIFY: CPT | Mod: ,,, | Performed by: CLINICAL MEDICAL LABORATORY

## 2022-06-24 PROCEDURE — 99215 OFFICE O/P EST HI 40 MIN: CPT | Mod: PBBFAC | Performed by: NURSE PRACTITIONER

## 2022-06-24 PROCEDURE — 85651 RBC SED RATE NONAUTOMATED: CPT | Performed by: REGISTERED NURSE

## 2022-06-24 PROCEDURE — 87070 CULTURE, WOUND: ICD-10-PCS | Mod: ,,, | Performed by: CLINICAL MEDICAL LABORATORY

## 2022-06-24 PROCEDURE — 94761 N-INVAS EAR/PLS OXIMETRY MLT: CPT

## 2022-06-24 PROCEDURE — 36415 COLL VENOUS BLD VENIPUNCTURE: CPT | Performed by: REGISTERED NURSE

## 2022-06-24 PROCEDURE — 11000001 HC ACUTE MED/SURG PRIVATE ROOM

## 2022-06-24 PROCEDURE — 99214 OFFICE O/P EST MOD 30 MIN: CPT | Mod: S$PBB,,, | Performed by: NURSE PRACTITIONER

## 2022-06-24 RX ORDER — HEPARIN SODIUM 5000 [USP'U]/ML
5000 INJECTION, SOLUTION INTRAVENOUS; SUBCUTANEOUS EVERY 8 HOURS
Status: COMPLETED | OUTPATIENT
Start: 2022-06-24 | End: 2022-06-26

## 2022-06-24 RX ORDER — PANTOPRAZOLE SODIUM 40 MG/1
40 TABLET, DELAYED RELEASE ORAL DAILY
Status: DISCONTINUED | OUTPATIENT
Start: 2022-06-25 | End: 2022-06-29 | Stop reason: HOSPADM

## 2022-06-24 RX ORDER — SODIUM CHLORIDE 0.9 % (FLUSH) 0.9 %
10 SYRINGE (ML) INJECTION EVERY 12 HOURS PRN
Status: DISCONTINUED | OUTPATIENT
Start: 2022-06-24 | End: 2022-06-29 | Stop reason: HOSPADM

## 2022-06-24 RX ORDER — ACETAMINOPHEN 325 MG/1
650 TABLET ORAL EVERY 4 HOURS PRN
Status: DISCONTINUED | OUTPATIENT
Start: 2022-06-24 | End: 2022-06-29 | Stop reason: HOSPADM

## 2022-06-24 RX ORDER — IBUPROFEN 200 MG
16 TABLET ORAL
Status: DISCONTINUED | OUTPATIENT
Start: 2022-06-24 | End: 2022-06-29 | Stop reason: HOSPADM

## 2022-06-24 RX ORDER — IBUPROFEN 200 MG
24 TABLET ORAL
Status: DISCONTINUED | OUTPATIENT
Start: 2022-06-24 | End: 2022-06-29 | Stop reason: HOSPADM

## 2022-06-24 RX ORDER — NAPROXEN SODIUM 220 MG/1
81 TABLET, FILM COATED ORAL DAILY
Status: DISCONTINUED | OUTPATIENT
Start: 2022-06-25 | End: 2022-06-29 | Stop reason: HOSPADM

## 2022-06-24 RX ORDER — IPRATROPIUM BROMIDE AND ALBUTEROL SULFATE 2.5; .5 MG/3ML; MG/3ML
3 SOLUTION RESPIRATORY (INHALATION) EVERY 12 HOURS
Status: DISCONTINUED | OUTPATIENT
Start: 2022-06-24 | End: 2022-06-29 | Stop reason: HOSPADM

## 2022-06-24 RX ORDER — PROCHLORPERAZINE EDISYLATE 5 MG/ML
5 INJECTION INTRAMUSCULAR; INTRAVENOUS EVERY 6 HOURS PRN
Status: DISCONTINUED | OUTPATIENT
Start: 2022-06-24 | End: 2022-06-29 | Stop reason: HOSPADM

## 2022-06-24 RX ORDER — HYDROXYZINE PAMOATE 25 MG/1
25 CAPSULE ORAL NIGHTLY
Status: DISCONTINUED | OUTPATIENT
Start: 2022-06-24 | End: 2022-06-29 | Stop reason: HOSPADM

## 2022-06-24 RX ORDER — INSULIN ASPART 100 [IU]/ML
1-10 INJECTION, SOLUTION INTRAVENOUS; SUBCUTANEOUS
Status: DISCONTINUED | OUTPATIENT
Start: 2022-06-24 | End: 2022-06-29 | Stop reason: HOSPADM

## 2022-06-24 RX ORDER — ONDANSETRON 4 MG/1
8 TABLET, FILM COATED ORAL EVERY 8 HOURS PRN
Status: DISCONTINUED | OUTPATIENT
Start: 2022-06-24 | End: 2022-06-29 | Stop reason: HOSPADM

## 2022-06-24 RX ORDER — ALBUTEROL SULFATE 0.83 MG/ML
2.5 SOLUTION RESPIRATORY (INHALATION) EVERY 12 HOURS
Status: DISCONTINUED | OUTPATIENT
Start: 2022-06-24 | End: 2022-06-24

## 2022-06-24 RX ORDER — ESCITALOPRAM OXALATE 10 MG/1
20 TABLET ORAL DAILY
Status: DISCONTINUED | OUTPATIENT
Start: 2022-06-25 | End: 2022-06-29 | Stop reason: HOSPADM

## 2022-06-24 RX ORDER — LISINOPRIL 10 MG/1
10 TABLET ORAL DAILY
Status: DISCONTINUED | OUTPATIENT
Start: 2022-06-25 | End: 2022-06-29 | Stop reason: HOSPADM

## 2022-06-24 RX ORDER — HYDROCODONE BITARTRATE AND ACETAMINOPHEN 10; 325 MG/1; MG/1
1 TABLET ORAL EVERY 12 HOURS PRN
Status: DISCONTINUED | OUTPATIENT
Start: 2022-06-24 | End: 2022-06-29 | Stop reason: HOSPADM

## 2022-06-24 RX ORDER — ATORVASTATIN CALCIUM 40 MG/1
40 TABLET, FILM COATED ORAL NIGHTLY
Status: DISCONTINUED | OUTPATIENT
Start: 2022-06-24 | End: 2022-06-29 | Stop reason: HOSPADM

## 2022-06-24 RX ORDER — GABAPENTIN 300 MG/1
900 CAPSULE ORAL EVERY 6 HOURS
Status: DISCONTINUED | OUTPATIENT
Start: 2022-06-24 | End: 2022-06-29 | Stop reason: HOSPADM

## 2022-06-24 RX ORDER — ACETAMINOPHEN 325 MG/1
650 TABLET ORAL EVERY 8 HOURS PRN
Status: DISCONTINUED | OUTPATIENT
Start: 2022-06-24 | End: 2022-06-29 | Stop reason: HOSPADM

## 2022-06-24 RX ORDER — METOPROLOL SUCCINATE 25 MG/1
25 TABLET, EXTENDED RELEASE ORAL DAILY
Status: DISCONTINUED | OUTPATIENT
Start: 2022-06-25 | End: 2022-06-29 | Stop reason: HOSPADM

## 2022-06-24 RX ORDER — AMLODIPINE BESYLATE 10 MG/1
10 TABLET ORAL DAILY
Status: DISCONTINUED | OUTPATIENT
Start: 2022-06-25 | End: 2022-06-29 | Stop reason: HOSPADM

## 2022-06-24 RX ORDER — GLUCAGON 1 MG
1 KIT INJECTION
Status: DISCONTINUED | OUTPATIENT
Start: 2022-06-24 | End: 2022-06-29 | Stop reason: HOSPADM

## 2022-06-24 RX ORDER — ONDANSETRON 4 MG/1
8 TABLET, ORALLY DISINTEGRATING ORAL EVERY 8 HOURS PRN
Status: DISCONTINUED | OUTPATIENT
Start: 2022-06-24 | End: 2022-06-24

## 2022-06-24 RX ADMIN — HEPARIN SODIUM 5000 UNITS: 5000 INJECTION INTRAVENOUS; SUBCUTANEOUS at 10:06

## 2022-06-24 RX ADMIN — ATORVASTATIN CALCIUM 40 MG: 40 TABLET, FILM COATED ORAL at 10:06

## 2022-06-24 RX ADMIN — INSULIN ASPART 6 UNITS: 100 INJECTION, SOLUTION INTRAVENOUS; SUBCUTANEOUS at 05:06

## 2022-06-24 RX ADMIN — INSULIN DETEMIR 15 UNITS: 100 INJECTION, SOLUTION SUBCUTANEOUS at 10:06

## 2022-06-24 RX ADMIN — HYDROXYZINE PAMOATE 25 MG: 25 CAPSULE ORAL at 10:06

## 2022-06-24 RX ADMIN — HYDROCODONE BITARTRATE AND ACETAMINOPHEN 1 TABLET: 10; 325 TABLET ORAL at 07:06

## 2022-06-24 RX ADMIN — GABAPENTIN 900 MG: 300 CAPSULE ORAL at 05:06

## 2022-06-24 RX ADMIN — PIPERACILLIN SODIUM AND TAZOBACTAM SODIUM 4.5 G: 4; 500 INJECTION, POWDER, FOR SOLUTION INTRAVENOUS at 05:06

## 2022-06-24 RX ADMIN — VANCOMYCIN HYDROCHLORIDE 1500 MG: 1 INJECTION, POWDER, LYOPHILIZED, FOR SOLUTION INTRAVENOUS at 10:06

## 2022-06-24 RX ADMIN — IPRATROPIUM BROMIDE AND ALBUTEROL SULFATE 3 ML: 2.5; .5 SOLUTION RESPIRATORY (INHALATION) at 07:06

## 2022-06-24 NOTE — SUBJECTIVE & OBJECTIVE
Past Medical History:   Diagnosis Date    Anemia     Anxiety     Atherosclerotic heart disease of native coronary artery with other forms of angina pectoris     Atrophic rhinitis     Carpal tunnel syndrome     Cellulitis of right lower extremity     COPD (chronic obstructive pulmonary disease)     Coronary arteriosclerosis     COVID 02/02/2022    Depression     Diabetic ulcer of right foot associated with diabetes mellitus due to underlying condition, with bone involvement without evidence of necrosis     GERD (gastroesophageal reflux disease)     Hyperlipidemia     Hypertension     Insomnia     MI (myocardial infarction)     Neuropathy     Peripheral vascular disease, unspecified     Right foot ulcer, with fat layer exposed 01/07/2015    Sleep apnea     Type 1 diabetes mellitus with foot ulcer     Type 2 diabetes mellitus        Past Surgical History:   Procedure Laterality Date    AMPUTATION      ANGIOPLASTY      CARDIAC CATHETERIZATION      CORONARY STENT PLACEMENT      DEBRIDEMENT      Right foot debridment with hospital stay and IV Abx    SHOULDER OPEN ROTATOR CUFF REPAIR Left     SPINE SURGERY      VASCULAR SURGERY         Review of patient's allergies indicates:  No Known Allergies    No current facility-administered medications on file prior to encounter.     Current Outpatient Medications on File Prior to Encounter   Medication Sig    albuterol (PROVENTIL) 2.5 mg /3 mL (0.083 %) nebulizer solution USE 1 VIAL IN NEBULIZER 3 TIMES DAILY    albuterol (PROVENTIL/VENTOLIN HFA) 90 mcg/actuation inhaler Inhale 2 puffs into the lungs 2 (two) times daily as needed for Wheezing.    amLODIPine (NORVASC) 10 MG tablet Take 1 tablet (10 mg total) by mouth once daily.    aspirin 81 MG Chew Take 81 mg by mouth once daily.    atorvastatin (LIPITOR) 40 MG tablet Take 1 tablet (40 mg total) by mouth nightly.    clopidogreL (PLAVIX) 75 mg tablet Take 1 tablet (75 mg total) by mouth once daily.    diclofenac sodium (VOLTAREN) 1  % Gel Apply 1 g topically 4 (four) times daily as needed (pain).    empagliflozin (JARDIANCE) 10 mg tablet Take 1 tablet (10 mg total) by mouth once daily.    EScitalopram oxalate (LEXAPRO) 10 MG tablet Take 1 tablet (10 mg total) by mouth 2 (two) times a day.    fluticasone propionate (FLONASE) 50 mcg/actuation nasal spray 2 sprays (100 mcg total) by Each Nostril route once daily.    gabapentin (NEURONTIN) 300 MG capsule Take 3 capsules (900 mg total) by mouth every 6 (six) hours.    [START ON 7/16/2022] HYDROcodone-acetaminophen (NORCO)  mg per tablet Take 1 tablet by mouth every 8 (eight) hours as needed for Pain.    [START ON 8/16/2022] HYDROcodone-acetaminophen (NORCO)  mg per tablet Take 1 tablet by mouth every 8 (eight) hours as needed for Pain.    hydrOXYzine pamoate (VISTARIL) 25 MG Cap Take 1 capsule (25 mg total) by mouth once daily. Take nightly for sleep    insulin glargine U-300 conc (TOUJEO MAX U-300 SOLOSTAR) 300 unit/mL (3 mL) insulin pen Inject 40 Units into the skin once daily at 6am.    lisinopriL 10 MG tablet Take 1 tablet (10 mg total) by mouth once daily.    metoprolol succinate (TOPROL-XL) 25 MG 24 hr tablet Take 1 tablet (25 mg total) by mouth once daily.    nitroGLYCERIN (NITROSTAT) 0.4 MG SL tablet Place 1 tablet (0.4 mg total) under the tongue every 5 (five) minutes as needed for Chest pain.    pantoprazole (PROTONIX) 40 MG tablet Take 1 tablet (40 mg total) by mouth once daily.    SPIRIVA RESPIMAT 2.5 mcg/actuation inhaler Inhale 1 puff into the lungs once daily.    [DISCONTINUED] HYDROcodone-acetaminophen (NORCO)  mg per tablet Take 1 tablet by mouth every 8 (eight) hours as needed for Pain.    [DISCONTINUED] icosapent ethyL (VASCEPA) 1 gram Cap Take 2 capsules (2 g total) by mouth 2 (two) times daily.     Family History       Problem Relation (Age of Onset)    Alcohol abuse Father    Arthritis Mother    COPD Paternal Grandfather    Cancer Sister    Diabetes Sister     Early death Father    Heart disease Father, Maternal Grandfather, Son    Hypertension Mother    Learning disabilities Mother          Tobacco Use    Smoking status: Current Every Day Smoker     Packs/day: 0.50     Types: Cigarettes    Smokeless tobacco: Never Used   Substance and Sexual Activity    Alcohol use: Not Currently    Drug use: Yes     Types: Oxycodone    Sexual activity: Yes     Review of Systems   Constitutional:  Positive for fever. Negative for chills and diaphoresis.   HENT:  Negative for sinus pain.    Eyes:  Negative for visual disturbance.   Respiratory:  Positive for shortness of breath. Negative for cough and chest tightness.    Cardiovascular:  Positive for leg swelling. Negative for chest pain and palpitations.   Gastrointestinal:  Negative for abdominal pain, blood in stool, constipation, diarrhea, nausea and vomiting.   Genitourinary:  Negative for flank pain.   Musculoskeletal:  Positive for gait problem.   Skin:  Positive for wound.   Neurological:  Positive for light-headedness. Negative for dizziness, syncope and headaches.   All other systems reviewed and are negative.  Objective:     Vital Signs (Most Recent):    Vital Signs (24h Range):  Temp:  [98 °F (36.7 °C)] 98 °F (36.7 °C)  Pulse:  [72] 72  Resp:  [18] 18  BP: (147)/(77) 147/77        There is no height or weight on file to calculate BMI.    Physical Exam  Vitals reviewed.   Constitutional:       General: He is not in acute distress.     Appearance: He is normal weight.   HENT:      Head: Normocephalic and atraumatic.      Mouth/Throat:      Mouth: Mucous membranes are moist.   Eyes:      Extraocular Movements: Extraocular movements intact.      Pupils: Pupils are equal, round, and reactive to light.   Cardiovascular:      Rate and Rhythm: Normal rate.      Pulses: Normal pulses.      Heart sounds: Normal heart sounds.   Pulmonary:      Effort: Pulmonary effort is normal. No respiratory distress.      Breath sounds: Normal breath  sounds.   Abdominal:      General: Bowel sounds are normal. There is no distension.      Palpations: Abdomen is soft.      Tenderness: There is no abdominal tenderness. There is no guarding.   Musculoskeletal:         General: Normal range of motion.      Cervical back: Normal range of motion and neck supple.      Right lower leg: Edema present.        Feet:    Feet:      Right foot:      Skin integrity: Callus present.      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      Capillary Refill: Capillary refill takes less than 2 seconds.      Findings: Lesion present.   Neurological:      General: No focal deficit present.      Mental Status: He is alert and oriented to person, place, and time.      Cranial Nerves: No cranial nerve deficit.      Sensory: No sensory deficit.   Psychiatric:         Mood and Affect: Mood normal.         Behavior: Behavior normal.         CRANIAL NERVES     CN III, IV, VI   Pupils are equal, round, and reactive to light.     Significant Labs: All pertinent labs within the past 24 hours have been reviewed.    Significant Imaging: I have reviewed all pertinent imaging results/findings within the past 24 hours.

## 2022-06-24 NOTE — ASSESSMENT & PLAN NOTE
* Routine vitals    * CBC, CMP, UA, Lactic acid,  Ptt, Inr  * BC x 2, Lactic acid   * Right foot xr:     * US of lower legs with betzy   * Daily Cbc, Bmp    * Crp, ESR   * Zosyn Pharmacy to dose    * Vancomycin pharmacy to dose    * Wound care consult   * Surgery consult. Debridement planned for Monday 6/27/22. Plavix held  * Turn q 2 hours and place waffle boots on bilateral feet.   * DVT ppx:  Heparin sc 5000 units q 8 hours  * Protonix 40 mg daily.

## 2022-06-24 NOTE — PROGRESS NOTES
GIANA Moncada   [facility] - WOUND CARE  [address] [place] [address]  [phone]      PATIENT NAME: Navdeep Avery  : 1960  DATE: 22  MRN: [id number]      Billing Provider: GIANA Moncada  Level of Service: CO OFFICE/OUTPT VISIT, EST, LEVL IV, 30-39 MIN  Patient PCP Information     Provider PCP Type    Marquez Huff MD General          Reason for Visit / Chief Complaint: Diabetic Foot Ulcer (Infected R DFU/Cellulitis to RLE/Pain to RLE with edema)       History of Present Illness / Problem Focused Workflow     Navdeep Avery is a 61 y.o. male who presents to clinic for follow up on chronic-non healing wound on the right foot. Wound has thick callus noted marcelo-wound and biofilm noted over wound today. Wound is worse since last visit. Reports that he was at his son's house walking up and down the stairs and foot started hurting that night. He reports pain from his foot to his knee that now extends up to thigh. His foot is swollen and tender to palpation.   He also reports dizziness and feeling off-balance. Significant PMH includes hypertension, diabetes, peripheral vascular disease, and high cholesterol.       Review of Systems     Review of Systems   Constitutional: Positive for activity change. Negative for chills and fever.   Respiratory: Negative for chest tightness and shortness of breath.    Cardiovascular: Positive for leg swelling. Negative for chest pain and palpitations.   Musculoskeletal: Positive for arthralgias and joint swelling.   Skin: Positive for wound.        See wound assessment   Neurological: Positive for weakness and numbness.   Psychiatric/Behavioral: Negative for agitation, behavioral problems, confusion and self-injury.       Medical / Social / Family History     Past Medical History:   Diagnosis Date    Anemia     Anxiety     Atherosclerotic heart disease of native coronary artery with other forms of angina  pectoris     Atrophic rhinitis     Carpal tunnel syndrome     Cellulitis of right lower extremity     COPD (chronic obstructive pulmonary disease)     Coronary arteriosclerosis     COVID 02/02/2022    Depression     Diabetic ulcer of right foot associated with diabetes mellitus due to underlying condition, with bone involvement without evidence of necrosis     GERD (gastroesophageal reflux disease)     Hyperlipidemia     Hypertension     Insomnia     MI (myocardial infarction)     Neuropathy     Peripheral vascular disease, unspecified     Right foot ulcer, with fat layer exposed 01/07/2015    Sleep apnea     Type 1 diabetes mellitus with foot ulcer     Type 2 diabetes mellitus        Past Surgical History:   Procedure Laterality Date    AMPUTATION      ANGIOPLASTY      CARDIAC CATHETERIZATION      CORONARY STENT PLACEMENT      DEBRIDEMENT      Right foot debridment with hospital stay and IV Abx    SHOULDER OPEN ROTATOR CUFF REPAIR Left     SPINE SURGERY      VASCULAR SURGERY         Social History  Mr. Navdeep Avery  reports that he has been smoking cigarettes. He has been smoking about 0.50 packs per day. He has never used smokeless tobacco. He reports previous alcohol use. He reports current drug use. Drug: Oxycodone.    Family History  Mr.'s Navdeep Avery family history includes Alcohol abuse in his father; Arthritis in his mother; COPD in his paternal grandfather; Cancer in his sister; Diabetes in his sister; Early death in his father; Heart disease in his father, maternal grandfather, and son; Hypertension in his mother; Learning disabilities in his mother.    Medications and Allergies     Medications  Outpatient Medications Marked as Taking for the 6/24/22 encounter (Office Visit) with GIANA Moncada   Medication Sig Dispense Refill    albuterol (PROVENTIL) 2.5 mg /3 mL (0.083 %) nebulizer solution USE 1 VIAL IN NEBULIZER 3 TIMES DAILY 100 mL 11    albuterol  (PROVENTIL/VENTOLIN HFA) 90 mcg/actuation inhaler Inhale 2 puffs into the lungs 2 (two) times daily as needed for Wheezing. 18 g 2    amLODIPine (NORVASC) 10 MG tablet Take 1 tablet (10 mg total) by mouth once daily. 90 tablet 1    aspirin 81 MG Chew Take 81 mg by mouth once daily.      atorvastatin (LIPITOR) 40 MG tablet Take 1 tablet (40 mg total) by mouth nightly. 90 tablet 1    clopidogreL (PLAVIX) 75 mg tablet Take 1 tablet (75 mg total) by mouth once daily. 90 tablet 1    diclofenac sodium (VOLTAREN) 1 % Gel Apply 1 g topically 4 (four) times daily as needed (pain). 20 g 1    empagliflozin (JARDIANCE) 10 mg tablet Take 1 tablet (10 mg total) by mouth once daily. 30 tablet 6    EScitalopram oxalate (LEXAPRO) 10 MG tablet Take 1 tablet (10 mg total) by mouth 2 (two) times a day. 180 tablet 1    fluticasone propionate (FLONASE) 50 mcg/actuation nasal spray 2 sprays (100 mcg total) by Each Nostril route once daily. 16 g 2    gabapentin (NEURONTIN) 300 MG capsule Take 3 capsules (900 mg total) by mouth every 6 (six) hours. 360 capsule 2    [START ON 7/16/2022] HYDROcodone-acetaminophen (NORCO)  mg per tablet Take 1 tablet by mouth every 8 (eight) hours as needed for Pain. 90 tablet 0    hydrOXYzine pamoate (VISTARIL) 25 MG Cap Take 1 capsule (25 mg total) by mouth once daily. Take nightly for sleep 90 capsule 1    icosapent ethyL (VASCEPA) 1 gram Cap Take 2 capsules (2 g total) by mouth 2 (two) times daily. 120 capsule 5    insulin glargine U-300 conc (TOUJEO MAX U-300 SOLOSTAR) 300 unit/mL (3 mL) insulin pen Inject 40 Units into the skin once daily at 6am.      lisinopriL 10 MG tablet Take 1 tablet (10 mg total) by mouth once daily. 90 tablet 1    metoprolol succinate (TOPROL-XL) 25 MG 24 hr tablet Take 1 tablet (25 mg total) by mouth once daily. 90 tablet 1    pantoprazole (PROTONIX) 40 MG tablet Take 1 tablet (40 mg total) by mouth once daily. 90 tablet 1    SPIRIVA RESPIMAT 2.5  mcg/actuation inhaler Inhale 1 puff into the lungs once daily. 4 g 5       Allergies  Review of patient's allergies indicates:  No Known Allergies    Physical Examination     Vitals:    06/24/22 1246   BP: (!) 147/77   Pulse: 72   Resp: 18   Temp: 98 °F (36.7 °C)     Physical Exam  Vitals and nursing note reviewed.   Constitutional:       Appearance: Normal appearance.   HENT:      Head: Normocephalic.   Cardiovascular:      Rate and Rhythm: Normal rate and regular rhythm.      Pulses: Normal pulses.      Heart sounds: Normal heart sounds.   Pulmonary:      Effort: Pulmonary effort is normal. No respiratory distress.   Chest:      Chest wall: No tenderness.   Musculoskeletal:         General: Swelling, tenderness and deformity present.      Right lower leg: Edema present.   Skin:     General: Skin is warm and dry.      Findings: Bruising and erythema present.      Comments: See LDA for photos and measurements   Neurological:      Mental Status: He is alert and oriented to person, place, and time.   Psychiatric:         Mood and Affect: Mood normal.         Behavior: Behavior normal.         Thought Content: Thought content normal.         Judgment: Judgment normal.         Assessment and Plan      1. Diabetic ulcer of right foot associated with diabetes mellitus due to underlying condition, with bone involvement without evidence of necrosis, unspecified part of foot    2. Cellulitis of right leg    3. Peripheral vascular disease, unspecified    4. Localized edema           Wound 03/19/21 1031 Diabetic Ulcer Right plantar Foot #1 (Active)   03/19/21 1031    Pre-existing: Yes   Primary Wound Type: Diabetic ulc   Side: Right   Orientation: plantar   Location: Foot   Wound Number: #1   Ankle-Brachial Index:    Pulses:    Removal Indication and Assessment:    Wound Outcome:    (Retired) Wound Type:    (Retired) Wound Length (cm):    (Retired) Wound Width (cm):    (Retired) Depth (cm):    Wound Description (Comments):     Removal Indications:    Wound Image     06/24/22 1240   Dressing Appearance Dry;Intact;Clean 06/24/22 1240   Drainage Amount Moderate 06/24/22 1240   Drainage Characteristics/Odor Yellow;Serosanguineous 06/24/22 1240   Appearance Intact;Pink;Red;Maroon;Purple;Yellow;Slough;Fibrin;Moist;Granulating;Adipose 06/24/22 1240   Black (%), Wound Tissue Color 0 % 06/24/22 1240   Red (%), Wound Tissue Color 80 % 06/24/22 1240   Yellow (%), Wound Tissue Color 20 % 06/24/22 1240   Periwound Area Intact;Pink;Moist;Warm;Swelling 06/24/22 1240   Wound Edges Callused 06/24/22 1240   Wound Length (cm) 1.8 cm 06/24/22 1240   Wound Width (cm) 2 cm 06/24/22 1240   Wound Depth (cm) 0.3 cm 06/24/22 1240   Wound Volume (cm^3) 1.08 cm^3 06/24/22 1240   Wound Surface Area (cm^2) 3.6 cm^2 06/24/22 1240   Care Cleansed with:;Antimicrobial agent 06/24/22 1240   Dressing Applied;Gauze, wet to dry;Gauze;Rolled gauze 06/24/22 1240         Active Problem List with Overview Notes    Diagnosis Date Noted    SOB (shortness of breath) 03/07/2022    Diabetic ulcer of right foot associated with diabetes mellitus due to underlying condition, with bone involvement without evidence of necrosis     Insomnia 06/17/2021    Diabetes mellitus without complication 06/17/2021    Anxiety 06/17/2021    Neuropathy     Peripheral vascular disease, unspecified     Diabetic ulcer of midfoot associated with diabetes mellitus due to underlying condition, with necrosis of muscle 03/23/2021    Ulcer of right foot with necrosis of muscle 03/23/2021    Benign hypertension 03/23/2021    Mixed hyperlipidemia 03/23/2021    Dependence on nicotine from cigarettes 03/23/2021      Plan:   Admit to [facility] for IV antibiotics, debridement (pt known to Dr. Kiser), and wound care.   Cultures today in Wound care    Problem List Items Addressed This Visit        Cardiac/Vascular    Peripheral vascular disease, unspecified (Chronic)       Endocrine    Diabetic ulcer of right foot  associated with diabetes mellitus due to underlying condition, with bone involvement without evidence of necrosis - Primary    Relevant Orders    Culture, Wound    Culture, Anaerobe      Other Visit Diagnoses     Cellulitis of right leg        Localized edema              Future Appointments   Date Time Provider Department Center   6/28/2022  9:00 AM [facility]IANA [facility]cada [facility]   7/6/2022  9:45 AM Marquez Huff MD [facility] [place]   9/12/2022  7:45 AM AQUILINO Dhillon [facility]   10/4/2022  2:00 PM AWV NURSE, [facility] FAMILY [facility] [place]   10/5/2023  9:00 AM AWV NURSE, [facility] [place]            Signature:  GIANA Moncada  [facility] - WOUND CARE  [address]  [facility] [address]  [phone]    Date of encounter: 6/24/22

## 2022-06-24 NOTE — PROGRESS NOTES
Patient underwent hyperbaric oxygen therapy treatment today, and voiced no complaints.  There were no observed complications.

## 2022-06-24 NOTE — H&P
[facility] Medicine  History & Physical    Patient Name: Navdeep Avery  MRN: [id number]  Patient Class: IP- Inpatient  Admission Date: 6/24/2022  Attending Physician: Valeriano Kyle MD   Primary Care Provider: Marquez Huff MD         Patient information was obtained from past medical records and Patient     Subjective:     Principal Problem:Ulcer of right foot with necrosis of muscle    Chief Complaint: No chief complaint on file.       HPI: 62 y/o male who is admitted today from [facility] due to non healing wound of right foot. Upon questioning the patient reports he has chronic wounds to right foot for at least 2 years with past debridement (). Reports that he was at his son's house recently walking up and down stairs and foot started hurting afterwards. He reports pain from his foot to his knee that now extends up to thigh. His foot is swollen and tender to palpation. He endorses subjective fever, occasional SOB worse than normal with exertion.There is no reported chest pain, vomiting, diarrhea, diaphoresis, vision change, numbness or recent illness. Patent is a smoker of 1/2 a pack of cigarretes for > 50 years. Reports having Covid.   Past Medical History: Anxiety, Depression, CAD, CABG (2 years ago [facility]), COPD, HTN, HLD, COVID, SAMEERA, DM 1, GERD, PVD, Neuropathy, Tobacco use             Past Medical History:   Diagnosis Date    Anemia     Anxiety     Atherosclerotic heart disease of native coronary artery with other forms of angina pectoris     Atrophic rhinitis     Carpal tunnel syndrome     Cellulitis of right lower extremity     COPD (chronic obstructive pulmonary disease)     Coronary arteriosclerosis     COVID 02/02/2022    Depression     Diabetic ulcer of right foot associated with diabetes mellitus due to underlying condition, with bone involvement without evidence of necrosis     GERD (gastroesophageal reflux disease)     Hyperlipidemia      Hypertension     Insomnia     MI (myocardial infarction)     Neuropathy     Peripheral vascular disease, unspecified     Right foot ulcer, with fat layer exposed 01/07/2015    Sleep apnea     Type 1 diabetes mellitus with foot ulcer     Type 2 diabetes mellitus        Past Surgical History:   Procedure Laterality Date    AMPUTATION      ANGIOPLASTY      CARDIAC CATHETERIZATION      CORONARY STENT PLACEMENT      DEBRIDEMENT      Right foot debridment with hospital stay and IV Abx    SHOULDER OPEN ROTATOR CUFF REPAIR Left     SPINE SURGERY      VASCULAR SURGERY         Review of patient's allergies indicates:  No Known Allergies    No current facility-administered medications on file prior to encounter.     Current Outpatient Medications on File Prior to Encounter   Medication Sig    albuterol (PROVENTIL) 2.5 mg /3 mL (0.083 %) nebulizer solution USE 1 VIAL IN NEBULIZER 3 TIMES DAILY    albuterol (PROVENTIL/VENTOLIN HFA) 90 mcg/actuation inhaler Inhale 2 puffs into the lungs 2 (two) times daily as needed for Wheezing.    amLODIPine (NORVASC) 10 MG tablet Take 1 tablet (10 mg total) by mouth once daily.    aspirin 81 MG Chew Take 81 mg by mouth once daily.    atorvastatin (LIPITOR) 40 MG tablet Take 1 tablet (40 mg total) by mouth nightly.    clopidogreL (PLAVIX) 75 mg tablet Take 1 tablet (75 mg total) by mouth once daily.    diclofenac sodium (VOLTAREN) 1 % Gel Apply 1 g topically 4 (four) times daily as needed (pain).    empagliflozin (JARDIANCE) 10 mg tablet Take 1 tablet (10 mg total) by mouth once daily.    EScitalopram oxalate (LEXAPRO) 10 MG tablet Take 1 tablet (10 mg total) by mouth 2 (two) times a day.    fluticasone propionate (FLONASE) 50 mcg/actuation nasal spray 2 sprays (100 mcg total) by Each Nostril route once daily.    gabapentin (NEURONTIN) 300 MG capsule Take 3 capsules (900 mg total) by mouth every 6 (six) hours.    [START ON 7/16/2022] HYDROcodone-acetaminophen (NORCO)   mg per tablet Take 1 tablet by mouth every 8 (eight) hours as needed for Pain.    [START ON 8/16/2022] HYDROcodone-acetaminophen (NORCO)  mg per tablet Take 1 tablet by mouth every 8 (eight) hours as needed for Pain.    hydrOXYzine pamoate (VISTARIL) 25 MG Cap Take 1 capsule (25 mg total) by mouth once daily. Take nightly for sleep    insulin glargine U-300 conc (TOUJEO MAX U-300 SOLOSTAR) 300 unit/mL (3 mL) insulin pen Inject 40 Units into the skin once daily at 6am.    lisinopriL 10 MG tablet Take 1 tablet (10 mg total) by mouth once daily.    metoprolol succinate (TOPROL-XL) 25 MG 24 hr tablet Take 1 tablet (25 mg total) by mouth once daily.    nitroGLYCERIN (NITROSTAT) 0.4 MG SL tablet Place 1 tablet (0.4 mg total) under the tongue every 5 (five) minutes as needed for Chest pain.    pantoprazole (PROTONIX) 40 MG tablet Take 1 tablet (40 mg total) by mouth once daily.    SPIRIVA RESPIMAT 2.5 mcg/actuation inhaler Inhale 1 puff into the lungs once daily.    [DISCONTINUED] HYDROcodone-acetaminophen (NORCO)  mg per tablet Take 1 tablet by mouth every 8 (eight) hours as needed for Pain.    [DISCONTINUED] icosapent ethyL (VASCEPA) 1 gram Cap Take 2 capsules (2 g total) by mouth 2 (two) times daily.     Family History       Problem Relation (Age of Onset)    Alcohol abuse Father    Arthritis Mother    COPD Paternal Grandfather    Cancer Sister    Diabetes Sister    Early death Father    Heart disease Father, Maternal Grandfather, Son    Hypertension Mother    Learning disabilities Mother          Tobacco Use    Smoking status: Current Every Day Smoker     Packs/day: 0.50     Types: Cigarettes    Smokeless tobacco: Never Used   Substance and Sexual Activity    Alcohol use: Not Currently    Drug use: Yes     Types: Oxycodone    Sexual activity: Yes     Review of Systems   Constitutional:  Positive for fever. Negative for chills and diaphoresis.   HENT:  Negative for sinus pain.    Eyes:   Negative for visual disturbance.   Respiratory:  Positive for shortness of breath. Negative for cough and chest tightness.    Cardiovascular:  Positive for leg swelling. Negative for chest pain and palpitations.   Gastrointestinal:  Negative for abdominal pain, blood in stool, constipation, diarrhea, nausea and vomiting.   Genitourinary:  Negative for flank pain.   Musculoskeletal:  Positive for gait problem.   Skin:  Positive for wound.   Neurological:  Positive for light-headedness. Negative for dizziness, syncope and headaches.   All other systems reviewed and are negative.  Objective:     Vital Signs (Most Recent):    Vital Signs (24h Range):  Temp:  [98 °F (36.7 °C)] 98 °F (36.7 °C)  Pulse:  [72] 72  Resp:  [18] 18  BP: (147)/(77) 147/77        There is no height or weight on file to calculate BMI.    Physical Exam  Vitals reviewed.   Constitutional:       General: He is not in acute distress.     Appearance: He is normal weight.   HENT:      Head: Normocephalic and atraumatic.      Mouth/Throat:      Mouth: Mucous membranes are moist.   Eyes:      Extraocular Movements: Extraocular movements intact.      Pupils: Pupils are equal, round, and reactive to light.   Cardiovascular:      Rate and Rhythm: Normal rate.      Pulses: Normal pulses.      Heart sounds: Normal heart sounds.   Pulmonary:      Effort: Pulmonary effort is normal. No respiratory distress.      Breath sounds: Normal breath sounds.   Abdominal:      General: Bowel sounds are normal. There is no distension.      Palpations: Abdomen is soft.      Tenderness: There is no abdominal tenderness. There is no guarding.   Musculoskeletal:         General: Normal range of motion.      Cervical back: Normal range of motion and neck supple.      Right lower leg: Edema present.        Feet:    Feet:      Right foot:      Skin integrity: Callus present.      Comments: callus noted marcelo-wound and biofilm noted over wound   Prior amputation of great toe and 2  nd tor  Skin:     General: Skin is warm.      Capillary Refill: Capillary refill takes less than 2 seconds.      Findings: Lesion present.   Neurological:      General: No focal deficit present.      Mental Status: He is alert and oriented to person, place, and time.      Cranial Nerves: No cranial nerve deficit.      Sensory: No sensory deficit.   Psychiatric:         Mood and Affect: Mood normal.         Behavior: Behavior normal.         CRANIAL NERVES     CN III, IV, VI   Pupils are equal, round, and reactive to light.     Significant Labs: All pertinent labs within the past 24 hours have been reviewed.    Significant Imaging: I have reviewed all pertinent imaging results/findings within the past 24 hours.    Assessment/Plan:     * Ulcer of right foot with necrosis of muscle  * Routine vitals    * CBC, CMP, UA, Lactic acid,  Ptt, Inr  * BC x 2, Lactic acid   * Right foot xr:     * US of lower legs with betzy   * Daily Cbc, Bmp    * Crp, ESR   * Zosyn Pharmacy to dose    * Vancomycin pharmacy to dose    * Wound care consult   * Surgery consult. Debridement planned for Monday 6/27/22. Plavix held  * Turn q 2 hours and place waffle boots on bilateral feet.   * DVT ppx:  Heparin sc 5000 units q 8 hours  * Protonix 40 mg daily.            COPD (chronic obstructive pulmonary disease)  Chronic resume home medication          Type 1 diabetes mellitus  - Moderate SSI  - BS AC/HS  - Detemir 15 units daily  - A1C    Neuropathy  Chronic resume home medication      Dependence on nicotine from cigarettes  Cessation education      Mixed hyperlipidemia  Chronic resume home medication          Benign hypertension  Chronic resume home medication            VTE Risk Mitigation (From admission, onward)         Ordered     heparin (porcine) injection 5,000 Units  Every 8 hours         06/24/22 1535     IP VTE HIGH RISK PATIENT  Once         06/24/22 1535     Place sequential compression device  Until discontinued         06/24/22 1535                    Abdon Robbins, ACNP  Department of Hospital Medicine   [facility]

## 2022-06-24 NOTE — H&P (VIEW-ONLY)
GIANA Moncada   [facility] - WOUND CARE  [address] [facility] [address]  [phone]      PATIENT NAME: Navdeep Avery  : 1960  DATE: 22  MRN: [id number]      Billing Provider: GIANA Moncada  Level of Service: MO OFFICE/OUTPT VISIT, EST, LEVL IV, 30-39 MIN  Patient PCP Information     Provider PCP Type    Marquez Huff MD General          Reason for Visit / Chief Complaint: Diabetic Foot Ulcer (Infected R DFU/Cellulitis to RLE/Pain to RLE with edema)       History of Present Illness / Problem Focused Workflow     Navdeep Avery is a 61 y.o. male who presents to clinic for follow up on chronic-non healing wound on the right foot. Wound has thick callus noted marcelo-wound and biofilm noted over wound today. Wound is worse since last visit. Reports that he was at his son's house walking up and down the stairs and foot started hurting that night. He reports pain from his foot to his knee that now extends up to thigh. His foot is swollen and tender to palpation.   He also reports dizziness and feeling off-balance. Significant PMH includes hypertension, diabetes, peripheral vascular disease, and high cholesterol.       Review of Systems     Review of Systems   Constitutional: Positive for activity change. Negative for chills and fever.   Respiratory: Negative for chest tightness and shortness of breath.    Cardiovascular: Positive for leg swelling. Negative for chest pain and palpitations.   Musculoskeletal: Positive for arthralgias and joint swelling.   Skin: Positive for wound.        See wound assessment   Neurological: Positive for weakness and numbness.   Psychiatric/Behavioral: Negative for agitation, behavioral problems, confusion and self-injury.       Medical / Social / Family History     Past Medical History:   Diagnosis Date    Anemia     Anxiety     Atherosclerotic heart disease of native coronary artery with other forms of angina  pectoris     Atrophic rhinitis     Carpal tunnel syndrome     Cellulitis of right lower extremity     COPD (chronic obstructive pulmonary disease)     Coronary arteriosclerosis     COVID 02/02/2022    Depression     Diabetic ulcer of right foot associated with diabetes mellitus due to underlying condition, with bone involvement without evidence of necrosis     GERD (gastroesophageal reflux disease)     Hyperlipidemia     Hypertension     Insomnia     MI (myocardial infarction)     Neuropathy     Peripheral vascular disease, unspecified     Right foot ulcer, with fat layer exposed 01/07/2015    Sleep apnea     Type 1 diabetes mellitus with foot ulcer     Type 2 diabetes mellitus        Past Surgical History:   Procedure Laterality Date    AMPUTATION      ANGIOPLASTY      CARDIAC CATHETERIZATION      CORONARY STENT PLACEMENT      DEBRIDEMENT      Right foot debridment with hospital stay and IV Abx    SHOULDER OPEN ROTATOR CUFF REPAIR Left     SPINE SURGERY      VASCULAR SURGERY         Social History  Mr. Navdeep Avery  reports that he has been smoking cigarettes. He has been smoking about 0.50 packs per day. He has never used smokeless tobacco. He reports previous alcohol use. He reports current drug use. Drug: Oxycodone.    Family History  Mr.'s Navdeep Avery family history includes Alcohol abuse in his father; Arthritis in his mother; COPD in his paternal grandfather; Cancer in his sister; Diabetes in his sister; Early death in his father; Heart disease in his father, maternal grandfather, and son; Hypertension in his mother; Learning disabilities in his mother.    Medications and Allergies     Medications  Outpatient Medications Marked as Taking for the 6/24/22 encounter (Office Visit) with GIANA Moncada   Medication Sig Dispense Refill    albuterol (PROVENTIL) 2.5 mg /3 mL (0.083 %) nebulizer solution USE 1 VIAL IN NEBULIZER 3 TIMES DAILY 100 mL 11    albuterol  (PROVENTIL/VENTOLIN HFA) 90 mcg/actuation inhaler Inhale 2 puffs into the lungs 2 (two) times daily as needed for Wheezing. 18 g 2    amLODIPine (NORVASC) 10 MG tablet Take 1 tablet (10 mg total) by mouth once daily. 90 tablet 1    aspirin 81 MG Chew Take 81 mg by mouth once daily.      atorvastatin (LIPITOR) 40 MG tablet Take 1 tablet (40 mg total) by mouth nightly. 90 tablet 1    clopidogreL (PLAVIX) 75 mg tablet Take 1 tablet (75 mg total) by mouth once daily. 90 tablet 1    diclofenac sodium (VOLTAREN) 1 % Gel Apply 1 g topically 4 (four) times daily as needed (pain). 20 g 1    empagliflozin (JARDIANCE) 10 mg tablet Take 1 tablet (10 mg total) by mouth once daily. 30 tablet 6    EScitalopram oxalate (LEXAPRO) 10 MG tablet Take 1 tablet (10 mg total) by mouth 2 (two) times a day. 180 tablet 1    fluticasone propionate (FLONASE) 50 mcg/actuation nasal spray 2 sprays (100 mcg total) by Each Nostril route once daily. 16 g 2    gabapentin (NEURONTIN) 300 MG capsule Take 3 capsules (900 mg total) by mouth every 6 (six) hours. 360 capsule 2    [START ON 7/16/2022] HYDROcodone-acetaminophen (NORCO)  mg per tablet Take 1 tablet by mouth every 8 (eight) hours as needed for Pain. 90 tablet 0    hydrOXYzine pamoate (VISTARIL) 25 MG Cap Take 1 capsule (25 mg total) by mouth once daily. Take nightly for sleep 90 capsule 1    icosapent ethyL (VASCEPA) 1 gram Cap Take 2 capsules (2 g total) by mouth 2 (two) times daily. 120 capsule 5    insulin glargine U-300 conc (TOUJEO MAX U-300 SOLOSTAR) 300 unit/mL (3 mL) insulin pen Inject 40 Units into the skin once daily at 6am.      lisinopriL 10 MG tablet Take 1 tablet (10 mg total) by mouth once daily. 90 tablet 1    metoprolol succinate (TOPROL-XL) 25 MG 24 hr tablet Take 1 tablet (25 mg total) by mouth once daily. 90 tablet 1    pantoprazole (PROTONIX) 40 MG tablet Take 1 tablet (40 mg total) by mouth once daily. 90 tablet 1    SPIRIVA RESPIMAT 2.5  mcg/actuation inhaler Inhale 1 puff into the lungs once daily. 4 g 5       Allergies  Review of patient's allergies indicates:  No Known Allergies    Physical Examination     Vitals:    06/24/22 1246   BP: (!) 147/77   Pulse: 72   Resp: 18   Temp: 98 °F (36.7 °C)     Physical Exam  Vitals and nursing note reviewed.   Constitutional:       Appearance: Normal appearance.   HENT:      Head: Normocephalic.   Cardiovascular:      Rate and Rhythm: Normal rate and regular rhythm.      Pulses: Normal pulses.      Heart sounds: Normal heart sounds.   Pulmonary:      Effort: Pulmonary effort is normal. No respiratory distress.   Chest:      Chest wall: No tenderness.   Musculoskeletal:         General: Swelling, tenderness and deformity present.      Right lower leg: Edema present.   Skin:     General: Skin is warm and dry.      Findings: Bruising and erythema present.      Comments: See LDA for photos and measurements   Neurological:      Mental Status: He is alert and oriented to person, place, and time.   Psychiatric:         Mood and Affect: Mood normal.         Behavior: Behavior normal.         Thought Content: Thought content normal.         Judgment: Judgment normal.         Assessment and Plan      1. Diabetic ulcer of right foot associated with diabetes mellitus due to underlying condition, with bone involvement without evidence of necrosis, unspecified part of foot    2. Cellulitis of right leg    3. Peripheral vascular disease, unspecified    4. Localized edema           Wound 03/19/21 1031 Diabetic Ulcer Right plantar Foot #1 (Active)   03/19/21 1031    Pre-existing: Yes   Primary Wound Type: Diabetic ulc   Side: Right   Orientation: plantar   Location: Foot   Wound Number: #1   Ankle-Brachial Index:    Pulses:    Removal Indication and Assessment:    Wound Outcome:    (Retired) Wound Type:    (Retired) Wound Length (cm):    (Retired) Wound Width (cm):    (Retired) Depth (cm):    Wound Description (Comments):     Removal Indications:    Wound Image     06/24/22 1240   Dressing Appearance Dry;Intact;Clean 06/24/22 1240   Drainage Amount Moderate 06/24/22 1240   Drainage Characteristics/Odor Yellow;Serosanguineous 06/24/22 1240   Appearance Intact;Pink;Red;Maroon;Purple;Yellow;Slough;Fibrin;Moist;Granulating;Adipose 06/24/22 1240   Black (%), Wound Tissue Color 0 % 06/24/22 1240   Red (%), Wound Tissue Color 80 % 06/24/22 1240   Yellow (%), Wound Tissue Color 20 % 06/24/22 1240   Periwound Area Intact;Pink;Moist;Warm;Swelling 06/24/22 1240   Wound Edges Callused 06/24/22 1240   Wound Length (cm) 1.8 cm 06/24/22 1240   Wound Width (cm) 2 cm 06/24/22 1240   Wound Depth (cm) 0.3 cm 06/24/22 1240   Wound Volume (cm^3) 1.08 cm^3 06/24/22 1240   Wound Surface Area (cm^2) 3.6 cm^2 06/24/22 1240   Care Cleansed with:;Antimicrobial agent 06/24/22 1240   Dressing Applied;Gauze, wet to dry;Gauze;Rolled gauze 06/24/22 1240         Active Problem List with Overview Notes    Diagnosis Date Noted    SOB (shortness of breath) 03/07/2022    Diabetic ulcer of right foot associated with diabetes mellitus due to underlying condition, with bone involvement without evidence of necrosis     Insomnia 06/17/2021    Diabetes mellitus without complication 06/17/2021    Anxiety 06/17/2021    Neuropathy     Peripheral vascular disease, unspecified     Diabetic ulcer of midfoot associated with diabetes mellitus due to underlying condition, with necrosis of muscle 03/23/2021    Ulcer of right foot with necrosis of muscle 03/23/2021    Benign hypertension 03/23/2021    Mixed hyperlipidemia 03/23/2021    Dependence on nicotine from cigarettes 03/23/2021      Plan:   Admit to [facility] for IV antibiotics, debridement (pt known to Dr. Kiser), and wound care.   Cultures today in Wound care    Problem List Items Addressed This Visit        Cardiac/Vascular    Peripheral vascular disease, unspecified (Chronic)       Endocrine    Diabetic ulcer of right foot  associated with diabetes mellitus due to underlying condition, with bone involvement without evidence of necrosis - Primary    Relevant Orders    Culture, Wound    Culture, Anaerobe      Other Visit Diagnoses     Cellulitis of right leg        Localized edema              Future Appointments   Date Time Provider Department Center   6/28/2022  9:00 AM GIANA Moncada [facility]   7/6/2022  9:45 AM Marquez Huff MD [facility] [place]   9/12/2022  7:45 AM AQUILINO Dhillon [facility]   10/4/2022  2:00 PM AWV NURSE, [facility] [place]   10/5/2023  9:00 AM AWV NURSE, [facility] [place]            Signature:  GIANA Moncada  [facility] - WOUND CARE  [address]  [facility] [address]  [phone]    Date of encounter: 6/24/22

## 2022-06-24 NOTE — PLAN OF CARE
Problem: Adult Inpatient Plan of Care  Goal: Plan of Care Review  Outcome: Ongoing, Progressing  Goal: Patient-Specific Goal (Individualized)  Outcome: Ongoing, Progressing  Goal: Absence of Hospital-Acquired Illness or Injury  Outcome: Ongoing, Progressing  Goal: Optimal Comfort and Wellbeing  Outcome: Ongoing, Progressing     Problem: Diabetes Comorbidity  Goal: Blood Glucose Level Within Targeted Range  Outcome: Ongoing, Progressing     Problem: Impaired Wound Healing  Goal: Optimal Wound Healing  Outcome: Ongoing, Progressing

## 2022-06-24 NOTE — HPI
60 y/o male who is admitted today from [facility] due to non healing wound of right foot. Upon questioning the patient reports he has chronic wounds to right foot for at least 2 years with past debridement (). Reports that he was at his son's house recently walking up and down stairs and foot started hurting afterwards. He reports pain from his foot to his knee that now extends up to thigh. His foot is swollen and tender to palpation. He endorses subjective fever, occasional SOB worse than normal with exertion.There is no reported chest pain, vomiting, diarrhea, diaphoresis, vision change, numbness or recent illness. Patent is a smoker of 1/2 a pack of cigarretes for > 50 years. Reports having Covid.   Past Medical History: Anxiety, Depression, CAD, CABG (2 years ago [facility]), COPD, HTN, HLD, COVID, SAMEERA, DM 1, GERD, PVD, Neuropathy, Tobacco use

## 2022-06-25 LAB
ANION GAP SERPL CALCULATED.3IONS-SCNC: 8 MMOL/L (ref 7–16)
BASOPHILS # BLD AUTO: 0.03 K/UL (ref 0–0.2)
BASOPHILS NFR BLD AUTO: 0.6 % (ref 0–1)
BUN SERPL-MCNC: 15 MG/DL (ref 7–18)
BUN/CREAT SERPL: 16 (ref 6–20)
CALCIUM SERPL-MCNC: 8.7 MG/DL (ref 8.5–10.1)
CHLORIDE SERPL-SCNC: 103 MMOL/L (ref 98–107)
CO2 SERPL-SCNC: 31 MMOL/L (ref 21–32)
CREAT SERPL-MCNC: 0.91 MG/DL (ref 0.7–1.3)
DIFFERENTIAL METHOD BLD: ABNORMAL
EOSINOPHIL # BLD AUTO: 0.24 K/UL (ref 0–0.5)
EOSINOPHIL NFR BLD AUTO: 4.7 % (ref 1–4)
ERYTHROCYTE [DISTWIDTH] IN BLOOD BY AUTOMATED COUNT: 11.9 % (ref 11.5–14.5)
GLUCOSE SERPL-MCNC: 203 MG/DL (ref 70–105)
GLUCOSE SERPL-MCNC: 203 MG/DL (ref 70–105)
GLUCOSE SERPL-MCNC: 204 MG/DL (ref 70–105)
GLUCOSE SERPL-MCNC: 271 MG/DL (ref 70–105)
GLUCOSE SERPL-MCNC: 319 MG/DL (ref 74–106)
HCT VFR BLD AUTO: 33.3 % (ref 40–54)
HGB BLD-MCNC: 11.6 G/DL (ref 13.5–18)
IMM GRANULOCYTES # BLD AUTO: 0.03 K/UL (ref 0–0.04)
IMM GRANULOCYTES NFR BLD: 0.6 % (ref 0–0.4)
LYMPHOCYTES # BLD AUTO: 1.91 K/UL (ref 1–4.8)
LYMPHOCYTES NFR BLD AUTO: 37 % (ref 27–41)
MCH RBC QN AUTO: 31.1 PG (ref 27–31)
MCHC RBC AUTO-ENTMCNC: 34.8 G/DL (ref 32–36)
MCV RBC AUTO: 89.3 FL (ref 80–96)
MONOCYTES # BLD AUTO: 0.46 K/UL (ref 0–0.8)
MONOCYTES NFR BLD AUTO: 8.9 % (ref 2–6)
MPC BLD CALC-MCNC: 9.6 FL (ref 9.4–12.4)
NEUTROPHILS # BLD AUTO: 2.49 K/UL (ref 1.8–7.7)
NEUTROPHILS NFR BLD AUTO: 48.2 % (ref 53–65)
NRBC # BLD AUTO: 0 X10E3/UL
NRBC, AUTO (.00): 0 %
PLATELET # BLD AUTO: 224 K/UL (ref 150–400)
POTASSIUM SERPL-SCNC: 4.5 MMOL/L (ref 3.5–5.1)
RBC # BLD AUTO: 3.73 M/UL (ref 4.6–6.2)
SODIUM SERPL-SCNC: 137 MMOL/L (ref 136–145)
WBC # BLD AUTO: 5.16 K/UL (ref 4.5–11)

## 2022-06-25 PROCEDURE — 25000242 PHARM REV CODE 250 ALT 637 W/ HCPCS: Performed by: REGISTERED NURSE

## 2022-06-25 PROCEDURE — 94640 AIRWAY INHALATION TREATMENT: CPT

## 2022-06-25 PROCEDURE — 94761 N-INVAS EAR/PLS OXIMETRY MLT: CPT

## 2022-06-25 PROCEDURE — 63600175 PHARM REV CODE 636 W HCPCS: Performed by: REGISTERED NURSE

## 2022-06-25 PROCEDURE — C9399 UNCLASSIFIED DRUGS OR BIOLOG: HCPCS | Performed by: REGISTERED NURSE

## 2022-06-25 PROCEDURE — 25000003 PHARM REV CODE 250: Performed by: REGISTERED NURSE

## 2022-06-25 PROCEDURE — 80048 BASIC METABOLIC PNL TOTAL CA: CPT | Performed by: REGISTERED NURSE

## 2022-06-25 PROCEDURE — 99232 PR SUBSEQUENT HOSPITAL CARE,LEVL II: ICD-10-PCS | Mod: ,,, | Performed by: INTERNAL MEDICINE

## 2022-06-25 PROCEDURE — 85025 COMPLETE CBC W/AUTO DIFF WBC: CPT | Performed by: REGISTERED NURSE

## 2022-06-25 PROCEDURE — 99232 SBSQ HOSP IP/OBS MODERATE 35: CPT | Mod: ,,, | Performed by: INTERNAL MEDICINE

## 2022-06-25 PROCEDURE — 36415 COLL VENOUS BLD VENIPUNCTURE: CPT | Performed by: REGISTERED NURSE

## 2022-06-25 PROCEDURE — 11000001 HC ACUTE MED/SURG PRIVATE ROOM

## 2022-06-25 PROCEDURE — 82962 GLUCOSE BLOOD TEST: CPT

## 2022-06-25 RX ORDER — HYDRALAZINE HYDROCHLORIDE 20 MG/ML
10 INJECTION INTRAMUSCULAR; INTRAVENOUS EVERY 8 HOURS PRN
Status: DISCONTINUED | OUTPATIENT
Start: 2022-06-25 | End: 2022-06-29 | Stop reason: HOSPADM

## 2022-06-25 RX ADMIN — HEPARIN SODIUM 5000 UNITS: 5000 INJECTION INTRAVENOUS; SUBCUTANEOUS at 01:06

## 2022-06-25 RX ADMIN — VANCOMYCIN HYDROCHLORIDE 1500 MG: 1 INJECTION, POWDER, LYOPHILIZED, FOR SOLUTION INTRAVENOUS at 11:06

## 2022-06-25 RX ADMIN — GABAPENTIN 900 MG: 300 CAPSULE ORAL at 12:06

## 2022-06-25 RX ADMIN — GABAPENTIN 900 MG: 300 CAPSULE ORAL at 06:06

## 2022-06-25 RX ADMIN — LISINOPRIL 10 MG: 10 TABLET ORAL at 08:06

## 2022-06-25 RX ADMIN — ESCITALOPRAM OXALATE 20 MG: 10 TABLET ORAL at 08:06

## 2022-06-25 RX ADMIN — IPRATROPIUM BROMIDE AND ALBUTEROL SULFATE 3 ML: 2.5; .5 SOLUTION RESPIRATORY (INHALATION) at 07:06

## 2022-06-25 RX ADMIN — PIPERACILLIN SODIUM AND TAZOBACTAM SODIUM 4.5 G: 4; 500 INJECTION, POWDER, FOR SOLUTION INTRAVENOUS at 02:06

## 2022-06-25 RX ADMIN — PANTOPRAZOLE SODIUM 40 MG: 40 TABLET, DELAYED RELEASE ORAL at 08:06

## 2022-06-25 RX ADMIN — ASPIRIN 81 MG 81 MG: 81 TABLET ORAL at 08:06

## 2022-06-25 RX ADMIN — HEPARIN SODIUM 5000 UNITS: 5000 INJECTION INTRAVENOUS; SUBCUTANEOUS at 06:06

## 2022-06-25 RX ADMIN — INSULIN DETEMIR 25 UNITS: 100 INJECTION, SOLUTION SUBCUTANEOUS at 10:06

## 2022-06-25 RX ADMIN — HYDROCODONE BITARTRATE AND ACETAMINOPHEN 1 TABLET: 10; 325 TABLET ORAL at 10:06

## 2022-06-25 RX ADMIN — HEPARIN SODIUM 5000 UNITS: 5000 INJECTION INTRAVENOUS; SUBCUTANEOUS at 10:06

## 2022-06-25 RX ADMIN — ACETAMINOPHEN 650 MG: 325 TABLET, FILM COATED ORAL at 12:06

## 2022-06-25 RX ADMIN — AMLODIPINE BESYLATE 10 MG: 10 TABLET ORAL at 08:06

## 2022-06-25 RX ADMIN — ATORVASTATIN CALCIUM 40 MG: 40 TABLET, FILM COATED ORAL at 10:06

## 2022-06-25 RX ADMIN — HYDROXYZINE PAMOATE 25 MG: 25 CAPSULE ORAL at 10:06

## 2022-06-25 RX ADMIN — INSULIN ASPART 4 UNITS: 100 INJECTION, SOLUTION INTRAVENOUS; SUBCUTANEOUS at 07:06

## 2022-06-25 RX ADMIN — PIPERACILLIN SODIUM AND TAZOBACTAM SODIUM 4.5 G: 4; 500 INJECTION, POWDER, FOR SOLUTION INTRAVENOUS at 10:06

## 2022-06-25 RX ADMIN — PIPERACILLIN SODIUM AND TAZOBACTAM SODIUM 4.5 G: 4; 500 INJECTION, POWDER, FOR SOLUTION INTRAVENOUS at 06:06

## 2022-06-25 RX ADMIN — INSULIN ASPART 4 UNITS: 100 INJECTION, SOLUTION INTRAVENOUS; SUBCUTANEOUS at 12:06

## 2022-06-25 RX ADMIN — METOPROLOL SUCCINATE 25 MG: 25 TABLET, FILM COATED, EXTENDED RELEASE ORAL at 08:06

## 2022-06-25 RX ADMIN — GABAPENTIN 900 MG: 300 CAPSULE ORAL at 11:06

## 2022-06-25 NOTE — ASSESSMENT & PLAN NOTE
* Routine vitals    * CBC, CMP, UA, Lactic acid,  Ptt, Inr  * BC x 2, Lactic acid   * Right foot xr:     * US of lower legs with betzy   * Daily Cbc, Bmp    * Crp, ESR   * Zosyn Pharmacy to dose    * Vancomycin pharmacy to dose    * Wound care consult   * Surgery consult. Debridement planned for Monday 6/27/22. Plavix held  * Turn q 2 hours and place waffle boots on bilateral feet.   * DVT ppx:  Heparin sc 5000 units q 8 hours  * Protonix 40 mg daily.   6/25  - Vanco/ Zosyn day # 2

## 2022-06-25 NOTE — ASSESSMENT & PLAN NOTE
- Moderate SSI  - BS AC/HS  - Detemir 15 units daily  - A1C  6/25  - Detemir increased 25 units daily  -  - 320 range overnight

## 2022-06-25 NOTE — ASSESSMENT & PLAN NOTE
* Routine vitals    * CBC, CMP, UA, Lactic acid,  Ptt, Inr  * BC x 2, Lactic acid   * Right foot xr:     * US of lower legs with betzy   * Daily Cbc, Bmp    * Crp, ESR   * Zosyn Pharmacy to dose    * Vancomycin pharmacy to dose    * Wound care consult   * Surgery consult. Debridement planned for Monday 6/27/22. Plavix held  * Turn q 2 hours and place waffle boots on bilateral feet.   * DVT ppx:  Heparin sc 5000 units q 8 hours  * Protonix 40 mg daily.   6/25  - Vanco/ Zosyn day # 2  - wcx:    Culture, Wound/Abscess Light Growth Gram-negative Bacilli Abnormal  P  Culture, Wound/Abscess Heavy Growth Staphylococcus aureus Abnormal  P      6/26:  - Vanco/ Zosyn day # 3  - surgery tomorrow  - Npo after miodnight  - Heparin stopped. Resume when ok with surgery  - Swelling and erythema improved to right leg. Debridement tomorrow.

## 2022-06-25 NOTE — PROGRESS NOTES
[facility] Medicine  Progress Note    Patient Name: Navdeep Avery  MRN: [id number]  Patient Class: IP- Inpatient   Admission Date: 6/24/2022  Length of Stay: 1 days  Attending Physician: Allen Bobby MD  Primary Care Provider: Marquez Huff MD        Subjective:     Principal Problem:Ulcer of right foot with necrosis of muscle        HPI:  62 y/o male who is admitted today from [facility] due to non healing wound of right foot. Upon questioning the patient reports he has chronic wounds to right foot for at least 2 years with past debridement (). Reports that he was at his son's house recently walking up and down stairs and foot started hurting afterwards. He reports pain from his foot to his knee that now extends up to thigh. His foot is swollen and tender to palpation. He endorses subjective fever, occasional SOB worse than normal with exertion.There is no reported chest pain, vomiting, diarrhea, diaphoresis, vision change, numbness or recent illness. Patent is a smoker of 1/2 a pack of cigarretes for > 50 years. Reports having Covid.   Past Medical History: Anxiety, Depression, CAD, CABG (2 years ago [facility]), COPD, HTN, HLD, COVID, SAMEERA, DM 1, GERD, PVD, Neuropathy, Tobacco use             Overview/Hospital Course:  No notes on file    Interval History: No acute events overnight. Denies sob or chest pain.     Review of Systems   Constitutional:  Positive for fever. Negative for chills and diaphoresis.   HENT:  Negative for sinus pain.    Eyes:  Negative for visual disturbance.   Respiratory:  Positive for shortness of breath. Negative for cough and chest tightness.    Cardiovascular:  Positive for leg swelling. Negative for chest pain and palpitations.   Gastrointestinal:  Negative for abdominal pain, blood in stool, constipation, diarrhea, nausea and vomiting.   Genitourinary:  Negative for flank pain.   Musculoskeletal:  Positive for gait problem.   Skin:   Positive for wound.   Neurological:  Positive for light-headedness. Negative for dizziness, syncope and headaches.   All other systems reviewed and are negative.  Objective:     Vital Signs (Most Recent):  Temp: 97.5 °F (36.4 °C) (06/25/22 0800)  Pulse: 60 (06/25/22 0800)  Resp: 18 (06/25/22 0800)  BP: (!) 175/91 (06/25/22 0800)  SpO2: 99 % (06/25/22 0800)   Vital Signs (24h Range):  Temp:  [97.5 °F (36.4 °C)-99 °F (37.2 °C)] 97.5 °F (36.4 °C)  Pulse:  [60-72] 60  Resp:  [16-20] 18  SpO2:  [94 %-99 %] 99 %  BP: (140-175)/(67-91) 175/91     Weight: 73.2 kg (161 lb 6 oz)  Body mass index is 24.54 kg/m².  No intake or output data in the 24 hours ending 06/25/22 0923   Physical Exam  Vitals reviewed.   Constitutional:       General: He is not in acute distress.     Appearance: He is normal weight.   HENT:      Head: Normocephalic and atraumatic.      Mouth/Throat:      Mouth: Mucous membranes are moist.   Eyes:      Extraocular Movements: Extraocular movements intact.      Pupils: Pupils are equal, round, and reactive to light.   Cardiovascular:      Rate and Rhythm: Normal rate.      Pulses: Normal pulses.      Heart sounds: Normal heart sounds.   Pulmonary:      Effort: Pulmonary effort is normal. No respiratory distress.      Breath sounds: Normal breath sounds.   Abdominal:      General: Bowel sounds are normal. There is no distension.      Palpations: Abdomen is soft.      Tenderness: There is no abdominal tenderness. There is no guarding.   Musculoskeletal:         General: Normal range of motion.      Cervical back: Normal range of motion and neck supple.      Right lower leg: Edema present.        Feet:    Feet:      Right foot:      Skin integrity: Callus present.      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      Capillary Refill: Capillary refill takes less than 2 seconds.      Findings: Lesion present.    Neurological:      General: No focal deficit present.      Mental Status: He is alert and oriented to person, place, and time.      Cranial Nerves: No cranial nerve deficit.      Sensory: No sensory deficit.   Psychiatric:         Mood and Affect: Mood normal.         Behavior: Behavior normal.       Significant Labs: All pertinent labs within the past 24 hours have been reviewed.    Significant Imaging: I have reviewed all pertinent imaging results/findings within the past 24 hours.      Assessment/Plan:      * Ulcer of right foot with necrosis of muscle  * Routine vitals    * CBC, CMP, UA, Lactic acid,  Ptt, Inr  * BC x 2, Lactic acid   * Right foot xr:     * US of lower legs with betzy   * Daily Cbc, Bmp    * Crp, ESR   * Zosyn Pharmacy to dose    * Vancomycin pharmacy to dose    * Wound care consult   * Surgery consult. Debridement planned for Monday 6/27/22. Plavix held  * Turn q 2 hours and place waffle boots on bilateral feet.   * DVT ppx:  Heparin sc 5000 units q 8 hours  * Protonix 40 mg daily.   6/25  - Vanco/ Zosyn day # 2  - Wcx:          Culture, Wound/Abscess Light Growth Gram-negative Bacilli Abnormal  P  Culture, Wound/Abscess Heavy Growth Staphylococcus aureus Abnormal  P            COPD (chronic obstructive pulmonary disease)  Chronic resume home medication          Type 1 diabetes mellitus  - Moderate SSI  - BS AC/HS  - Detemir 15 units daily  - A1C  6/25  - Detemir increased 25 units daily  -  - 320 range overnight    Neuropathy  Chronic resume home medication      Dependence on nicotine from cigarettes  Cessation education      Mixed hyperlipidemia  Chronic resume home medication          Benign hypertension  Chronic resume home medication            VTE Risk Mitigation (From admission, onward)         Ordered     heparin (porcine) injection 5,000 Units  Every 8 hours         06/24/22 1535     IP VTE HIGH RISK PATIENT  Once         06/24/22 1535     Place sequential compression device   Until discontinued         06/24/22 1535                Discharge Planning   MARGARITO:      Code Status: Full Code   Is the patient medically ready for discharge?:     Reason for patient still in hospital (select all that apply): Treatment                     STELLA Weston  Department of Hospital Medicine   [facility]

## 2022-06-25 NOTE — SUBJECTIVE & OBJECTIVE
Interval History: No acute events overnight. Denies sob or chest pain.     Review of Systems   Constitutional:  Positive for fever. Negative for chills and diaphoresis.   HENT:  Negative for sinus pain.    Eyes:  Negative for visual disturbance.   Respiratory:  Positive for shortness of breath. Negative for cough and chest tightness.    Cardiovascular:  Positive for leg swelling. Negative for chest pain and palpitations.   Gastrointestinal:  Negative for abdominal pain, blood in stool, constipation, diarrhea, nausea and vomiting.   Genitourinary:  Negative for flank pain.   Musculoskeletal:  Positive for gait problem.   Skin:  Positive for wound.   Neurological:  Positive for light-headedness. Negative for dizziness, syncope and headaches.   All other systems reviewed and are negative.  Objective:     Vital Signs (Most Recent):  Temp: 97.5 °F (36.4 °C) (06/25/22 0800)  Pulse: 60 (06/25/22 0800)  Resp: 18 (06/25/22 0800)  BP: (!) 175/91 (06/25/22 0800)  SpO2: 99 % (06/25/22 0800)   Vital Signs (24h Range):  Temp:  [97.5 °F (36.4 °C)-99 °F (37.2 °C)] 97.5 °F (36.4 °C)  Pulse:  [60-72] 60  Resp:  [16-20] 18  SpO2:  [94 %-99 %] 99 %  BP: (140-175)/(67-91) 175/91     Weight: 73.2 kg (161 lb 6 oz)  Body mass index is 24.54 kg/m².  No intake or output data in the 24 hours ending 06/25/22 0923   Physical Exam  Vitals reviewed.   Constitutional:       General: He is not in acute distress.     Appearance: He is normal weight.   HENT:      Head: Normocephalic and atraumatic.      Mouth/Throat:      Mouth: Mucous membranes are moist.   Eyes:      Extraocular Movements: Extraocular movements intact.      Pupils: Pupils are equal, round, and reactive to light.   Cardiovascular:      Rate and Rhythm: Normal rate.      Pulses: Normal pulses.      Heart sounds: Normal heart sounds.   Pulmonary:      Effort: Pulmonary effort is normal. No respiratory distress.      Breath sounds: Normal breath sounds.   Abdominal:      General: Bowel  sounds are normal. There is no distension.      Palpations: Abdomen is soft.      Tenderness: There is no abdominal tenderness. There is no guarding.   Musculoskeletal:         General: Normal range of motion.      Cervical back: Normal range of motion and neck supple.      Right lower leg: Edema present.        Feet:    Feet:      Right foot:      Skin integrity: Callus present.      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      Capillary Refill: Capillary refill takes less than 2 seconds.      Findings: Lesion present.   Neurological:      General: No focal deficit present.      Mental Status: He is alert and oriented to person, place, and time.      Cranial Nerves: No cranial nerve deficit.      Sensory: No sensory deficit.   Psychiatric:         Mood and Affect: Mood normal.         Behavior: Behavior normal.       Significant Labs: All pertinent labs within the past 24 hours have been reviewed.    Significant Imaging: I have reviewed all pertinent imaging results/findings within the past 24 hours.

## 2022-06-25 NOTE — CONSULTS
Pharmacy consulted to dose Vancomycin. Based on pt wt and renal function, the following therapy will be initiated: Vancomycin 1500 mg IV q 24 hrs. Trough prior 3rd dose. Pharmacy to follow and make necessary adjustments.

## 2022-06-25 NOTE — NURSING
1405-Rec'd pt to unit via wheelchair, transported by wound treatment staff, Pt alert and oriented, resp even and unlabored, NADN, clear bilaterally to auscultation, room air, bowels present in all quads, pt able to make needs known, ambulates about assigned room, able to perform active ROM with all extremities, great right toe amputation and large plantar wound to right foot, pt voices no c/o at time of assessment, safety measures in effect, will continue to monitor.

## 2022-06-26 LAB
ANION GAP SERPL CALCULATED.3IONS-SCNC: 12 MMOL/L (ref 7–16)
ATYPICAL LYMPHOCYTES: ABNORMAL
BASOPHILS # BLD AUTO: 0.04 K/UL (ref 0–0.2)
BASOPHILS NFR BLD AUTO: 0.8 % (ref 0–1)
BUN SERPL-MCNC: 14 MG/DL (ref 7–18)
BUN/CREAT SERPL: 18 (ref 6–20)
CALCIUM SERPL-MCNC: 8.8 MG/DL (ref 8.5–10.1)
CHLORIDE SERPL-SCNC: 104 MMOL/L (ref 98–107)
CO2 SERPL-SCNC: 30 MMOL/L (ref 21–32)
CREAT SERPL-MCNC: 0.79 MG/DL (ref 0.7–1.3)
DIFFERENTIAL METHOD BLD: ABNORMAL
EOSINOPHIL # BLD AUTO: 0.24 K/UL (ref 0–0.5)
EOSINOPHIL NFR BLD AUTO: 4.6 % (ref 1–4)
EOSINOPHIL NFR BLD MANUAL: 4 % (ref 1–4)
ERYTHROCYTE [DISTWIDTH] IN BLOOD BY AUTOMATED COUNT: 11.7 % (ref 11.5–14.5)
GLUCOSE SERPL-MCNC: 106 MG/DL (ref 70–105)
GLUCOSE SERPL-MCNC: 178 MG/DL (ref 74–106)
GLUCOSE SERPL-MCNC: 231 MG/DL (ref 70–105)
GLUCOSE SERPL-MCNC: 294 MG/DL (ref 70–105)
HCT VFR BLD AUTO: 33.8 % (ref 40–54)
HGB BLD-MCNC: 11.9 G/DL (ref 13.5–18)
IMM GRANULOCYTES # BLD AUTO: 0.02 K/UL (ref 0–0.04)
IMM GRANULOCYTES NFR BLD: 0.4 % (ref 0–0.4)
LYMPHOCYTES # BLD AUTO: 2.2 K/UL (ref 1–4.8)
LYMPHOCYTES NFR BLD AUTO: 42.6 % (ref 27–41)
LYMPHOCYTES NFR BLD MANUAL: 40 % (ref 27–41)
MCH RBC QN AUTO: 31.3 PG (ref 27–31)
MCHC RBC AUTO-ENTMCNC: 35.2 G/DL (ref 32–36)
MCV RBC AUTO: 88.9 FL (ref 80–96)
MICROORGANISM SPEC CULT: ABNORMAL
MICROORGANISM SPEC CULT: ABNORMAL
MONOCYTES # BLD AUTO: 0.38 K/UL (ref 0–0.8)
MONOCYTES NFR BLD AUTO: 7.4 % (ref 2–6)
MONOCYTES NFR BLD MANUAL: 5 % (ref 2–6)
MPC BLD CALC-MCNC: 9.7 FL (ref 9.4–12.4)
NEUTROPHILS # BLD AUTO: 2.29 K/UL (ref 1.8–7.7)
NEUTROPHILS NFR BLD AUTO: 44.2 % (ref 53–65)
NEUTS BAND NFR BLD MANUAL: 5 % (ref 1–5)
NEUTS SEG NFR BLD MANUAL: 46 % (ref 50–62)
NRBC # BLD AUTO: 0 X10E3/UL
NRBC, AUTO (.00): 0 %
PLATELET # BLD AUTO: 252 K/UL (ref 150–400)
PLATELET MORPHOLOGY: NORMAL
POTASSIUM SERPL-SCNC: 4.6 MMOL/L (ref 3.5–5.1)
RBC # BLD AUTO: 3.8 M/UL (ref 4.6–6.2)
RBC MORPH BLD: NORMAL
SODIUM SERPL-SCNC: 141 MMOL/L (ref 136–145)
VANCOMYCIN TROUGH SERPL-MCNC: 7.4 ΜG/ML (ref 10–20)
WBC # BLD AUTO: 5.17 K/UL (ref 4.5–11)

## 2022-06-26 PROCEDURE — 11000001 HC ACUTE MED/SURG PRIVATE ROOM

## 2022-06-26 PROCEDURE — 25000003 PHARM REV CODE 250: Performed by: INTERNAL MEDICINE

## 2022-06-26 PROCEDURE — 94640 AIRWAY INHALATION TREATMENT: CPT

## 2022-06-26 PROCEDURE — 63600175 PHARM REV CODE 636 W HCPCS: Performed by: REGISTERED NURSE

## 2022-06-26 PROCEDURE — 85025 COMPLETE CBC W/AUTO DIFF WBC: CPT | Performed by: REGISTERED NURSE

## 2022-06-26 PROCEDURE — 80048 BASIC METABOLIC PNL TOTAL CA: CPT | Performed by: REGISTERED NURSE

## 2022-06-26 PROCEDURE — 25000003 PHARM REV CODE 250: Performed by: REGISTERED NURSE

## 2022-06-26 PROCEDURE — 80202 ASSAY OF VANCOMYCIN: CPT | Performed by: REGISTERED NURSE

## 2022-06-26 PROCEDURE — 82962 GLUCOSE BLOOD TEST: CPT

## 2022-06-26 PROCEDURE — 25000242 PHARM REV CODE 250 ALT 637 W/ HCPCS: Performed by: REGISTERED NURSE

## 2022-06-26 PROCEDURE — 94761 N-INVAS EAR/PLS OXIMETRY MLT: CPT

## 2022-06-26 PROCEDURE — 96372 THER/PROPH/DIAG INJ SC/IM: CPT

## 2022-06-26 PROCEDURE — 36415 COLL VENOUS BLD VENIPUNCTURE: CPT | Performed by: REGISTERED NURSE

## 2022-06-26 PROCEDURE — C9399 UNCLASSIFIED DRUGS OR BIOLOG: HCPCS | Performed by: REGISTERED NURSE

## 2022-06-26 RX ORDER — MUPIROCIN 20 MG/G
OINTMENT TOPICAL 2 TIMES DAILY
Status: DISCONTINUED | OUTPATIENT
Start: 2022-06-26 | End: 2022-06-29 | Stop reason: HOSPADM

## 2022-06-26 RX ADMIN — INSULIN ASPART 4 UNITS: 100 INJECTION, SOLUTION INTRAVENOUS; SUBCUTANEOUS at 11:06

## 2022-06-26 RX ADMIN — PANTOPRAZOLE SODIUM 40 MG: 40 TABLET, DELAYED RELEASE ORAL at 09:06

## 2022-06-26 RX ADMIN — PIPERACILLIN SODIUM AND TAZOBACTAM SODIUM 4.5 G: 4; 500 INJECTION, POWDER, FOR SOLUTION INTRAVENOUS at 02:06

## 2022-06-26 RX ADMIN — HEPARIN SODIUM 5000 UNITS: 5000 INJECTION INTRAVENOUS; SUBCUTANEOUS at 06:06

## 2022-06-26 RX ADMIN — IPRATROPIUM BROMIDE AND ALBUTEROL SULFATE 3 ML: 2.5; .5 SOLUTION RESPIRATORY (INHALATION) at 08:06

## 2022-06-26 RX ADMIN — HYDROCODONE BITARTRATE AND ACETAMINOPHEN 1 TABLET: 10; 325 TABLET ORAL at 10:06

## 2022-06-26 RX ADMIN — HEPARIN SODIUM 5000 UNITS: 5000 INJECTION INTRAVENOUS; SUBCUTANEOUS at 10:06

## 2022-06-26 RX ADMIN — ESCITALOPRAM OXALATE 20 MG: 10 TABLET ORAL at 09:06

## 2022-06-26 RX ADMIN — GABAPENTIN 900 MG: 300 CAPSULE ORAL at 06:06

## 2022-06-26 RX ADMIN — PIPERACILLIN SODIUM AND TAZOBACTAM SODIUM 4.5 G: 4; 500 INJECTION, POWDER, FOR SOLUTION INTRAVENOUS at 06:06

## 2022-06-26 RX ADMIN — INSULIN ASPART 6 UNITS: 100 INJECTION, SOLUTION INTRAVENOUS; SUBCUTANEOUS at 05:06

## 2022-06-26 RX ADMIN — ASPIRIN 81 MG 81 MG: 81 TABLET ORAL at 09:06

## 2022-06-26 RX ADMIN — HYDROXYZINE PAMOATE 25 MG: 25 CAPSULE ORAL at 08:06

## 2022-06-26 RX ADMIN — LISINOPRIL 10 MG: 10 TABLET ORAL at 09:06

## 2022-06-26 RX ADMIN — METOPROLOL SUCCINATE 25 MG: 25 TABLET, FILM COATED, EXTENDED RELEASE ORAL at 09:06

## 2022-06-26 RX ADMIN — GABAPENTIN 900 MG: 300 CAPSULE ORAL at 05:06

## 2022-06-26 RX ADMIN — AMLODIPINE BESYLATE 10 MG: 10 TABLET ORAL at 09:06

## 2022-06-26 RX ADMIN — ATORVASTATIN CALCIUM 40 MG: 40 TABLET, FILM COATED ORAL at 08:06

## 2022-06-26 RX ADMIN — HEPARIN SODIUM 5000 UNITS: 5000 INJECTION INTRAVENOUS; SUBCUTANEOUS at 02:06

## 2022-06-26 RX ADMIN — PIPERACILLIN SODIUM AND TAZOBACTAM SODIUM 4.5 G: 4; 500 INJECTION, POWDER, FOR SOLUTION INTRAVENOUS at 10:06

## 2022-06-26 RX ADMIN — INSULIN DETEMIR 25 UNITS: 100 INJECTION, SOLUTION SUBCUTANEOUS at 08:06

## 2022-06-26 RX ADMIN — MUPIROCIN: 20 OINTMENT TOPICAL at 10:06

## 2022-06-26 RX ADMIN — VANCOMYCIN HYDROCHLORIDE 1500 MG: 1 INJECTION, POWDER, LYOPHILIZED, FOR SOLUTION INTRAVENOUS at 11:06

## 2022-06-26 RX ADMIN — GABAPENTIN 900 MG: 300 CAPSULE ORAL at 11:06

## 2022-06-26 NOTE — PLAN OF CARE
Problem: Adult Inpatient Plan of Care  Goal: Plan of Care Review  6/26/2022 0811 by Sridevi Hughes RN  Outcome: Ongoing, Progressing  6/26/2022 0811 by Sridevi Hughes RN  Outcome: Ongoing, Progressing  Goal: Patient-Specific Goal (Individualized)  6/26/2022 0811 by Sridevi Hughes RN  Outcome: Ongoing, Progressing  6/26/2022 0811 by Sridevi Hughes RN  Outcome: Ongoing, Progressing  Goal: Absence of Hospital-Acquired Illness or Injury  6/26/2022 0811 by Sridevi Hughes RN  Outcome: Ongoing, Progressing  6/26/2022 0811 by Sridevi Hughes RN  Outcome: Ongoing, Progressing  Goal: Optimal Comfort and Wellbeing  6/26/2022 0811 by Sridevi Hughes RN  Outcome: Ongoing, Progressing  6/26/2022 0811 by Sridevi Hughes RN  Outcome: Ongoing, Progressing  Goal: Readiness for Transition of Care  6/26/2022 0811 by Sridevi Hughes RN  Outcome: Ongoing, Progressing  6/26/2022 0811 by Sridevi Hughes RN  Outcome: Ongoing, Progressing     Problem: Diabetes Comorbidity  Goal: Blood Glucose Level Within Targeted Range  6/26/2022 0811 by Sridevi Hughes RN  Outcome: Ongoing, Progressing  6/26/2022 0811 by Sridevi Hughes RN  Outcome: Ongoing, Progressing     Problem: Impaired Wound Healing  Goal: Optimal Wound Healing  6/26/2022 0811 by Sridevi Hughes RN  Outcome: Ongoing, Progressing  6/26/2022 0811 by Sridevi Hughes RN  Outcome: Ongoing, Progressing     Problem: Gas Exchange Impaired  Goal: Optimal Gas Exchange  6/26/2022 0811 by Sridevi Hughes RN  Outcome: Ongoing, Progressing  6/26/2022 0811 by Sridevi Hughes RN  Outcome: Ongoing, Progressing

## 2022-06-26 NOTE — SUBJECTIVE & OBJECTIVE
Interval History: No acute events overnight. Denies sob or chest pain. Swelling and erythema improved to right leg. Debridement tomorrow.    Review of Systems   Constitutional:  Negative for chills and diaphoresis.   HENT:  Negative for sinus pain.    Eyes:  Negative for visual disturbance.   Respiratory:  Negative for cough and chest tightness.    Cardiovascular:  Positive for leg swelling. Negative for chest pain and palpitations.   Gastrointestinal:  Negative for abdominal pain, blood in stool, constipation, diarrhea, nausea and vomiting.   Genitourinary:  Negative for flank pain.   Musculoskeletal:  Positive for gait problem.   Skin:  Positive for wound.   Neurological:  Negative for dizziness, syncope and headaches.   All other systems reviewed and are negative.  Objective:     Vital Signs (Most Recent):  Temp: 98.3 °F (36.8 °C) (06/26/22 0800)  Pulse: (!) 58 (06/26/22 0815)  Resp: 18 (06/26/22 0815)  BP: 123/65 (06/26/22 0800)  SpO2: 96 % (06/26/22 0815)   Vital Signs (24h Range):  Temp:  [97.8 °F (36.6 °C)-98.6 °F (37 °C)] 98.3 °F (36.8 °C)  Pulse:  [58-75] 58  Resp:  [18-20] 18  SpO2:  [95 %-100 %] 96 %  BP: (114-187)/(60-86) 123/65     Weight: 73.2 kg (161 lb 6 oz)  Body mass index is 24.54 kg/m².    Intake/Output Summary (Last 24 hours) at 6/26/2022 0905  Last data filed at 6/25/2022 1842  Gross per 24 hour   Intake 1010 ml   Output --   Net 1010 ml      Physical Exam  Vitals and nursing note reviewed.   Constitutional:       General: He is not in acute distress.     Appearance: He is normal weight.   HENT:      Head: Normocephalic and atraumatic.      Mouth/Throat:      Mouth: Mucous membranes are moist.   Eyes:      Extraocular Movements: Extraocular movements intact.      Pupils: Pupils are equal, round, and reactive to light.   Cardiovascular:      Rate and Rhythm: Normal rate.      Pulses: Normal pulses.      Heart sounds: Normal heart sounds.   Pulmonary:      Effort: Pulmonary effort is normal. No  respiratory distress.      Breath sounds: Normal breath sounds.   Abdominal:      General: Bowel sounds are normal. There is no distension.      Palpations: Abdomen is soft.      Tenderness: There is no abdominal tenderness. There is no guarding.   Musculoskeletal:         General: Normal range of motion.      Cervical back: Normal range of motion and neck supple.      Right lower leg: Edema present.        Feet:    Feet:      Right foot:      Skin integrity: Callus present.      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      Capillary Refill: Capillary refill takes less than 2 seconds.      Findings: Lesion present.   Neurological:      General: No focal deficit present.      Mental Status: He is alert and oriented to person, place, and time.      Cranial Nerves: No cranial nerve deficit.      Sensory: No sensory deficit.   Psychiatric:         Mood and Affect: Mood normal.         Behavior: Behavior normal.       Significant Labs: All pertinent labs within the past 24 hours have been reviewed.    Significant Imaging: I have reviewed all pertinent imaging results/findings within the past 24 hours.

## 2022-06-26 NOTE — PLAN OF CARE
Current Plan of Care Appropriate, On-going and Progressing    Problem: Adult Inpatient Plan of Care  Goal: Plan of Care Review  Outcome: Ongoing, Progressing  Goal: Patient-Specific Goal (Individualized)  Outcome: Ongoing, Progressing  Goal: Absence of Hospital-Acquired Illness or Injury  Outcome: Ongoing, Progressing  Goal: Optimal Comfort and Wellbeing  Outcome: Ongoing, Progressing  Goal: Readiness for Transition of Care  Outcome: Ongoing, Progressing     Problem: Diabetes Comorbidity  Goal: Blood Glucose Level Within Targeted Range  Outcome: Ongoing, Progressing     Problem: Impaired Wound Healing  Goal: Optimal Wound Healing  Outcome: Ongoing, Progressing     Problem: Gas Exchange Impaired  Goal: Optimal Gas Exchange  Outcome: Ongoing, Progressing

## 2022-06-27 ENCOUNTER — ANESTHESIA (OUTPATIENT)
Dept: SURGERY | Facility: HOSPITAL | Age: 62
End: 2022-06-27
Payer: MEDICARE

## 2022-06-27 ENCOUNTER — ANESTHESIA EVENT (OUTPATIENT)
Dept: SURGERY | Facility: HOSPITAL | Age: 62
End: 2022-06-27
Payer: MEDICARE

## 2022-06-27 ENCOUNTER — HOSPITAL ENCOUNTER (OUTPATIENT)
Facility: HOSPITAL | Age: 62
Discharge: NURSING, REHAB OR LONG-TERM CARE | End: 2022-06-27
Attending: SURGERY | Admitting: SURGERY
Payer: MEDICARE

## 2022-06-27 VITALS
RESPIRATION RATE: 12 BRPM | TEMPERATURE: 98 F | DIASTOLIC BLOOD PRESSURE: 71 MMHG | OXYGEN SATURATION: 95 % | SYSTOLIC BLOOD PRESSURE: 136 MMHG | HEART RATE: 76 BPM

## 2022-06-27 DIAGNOSIS — T14.8XXA INFECTED WOUND: ICD-10-CM

## 2022-06-27 DIAGNOSIS — E08.621 DIABETIC ULCER OF RIGHT FOOT ASSOCIATED WITH DIABETES MELLITUS DUE TO UNDERLYING CONDITION, WITH BONE INVOLVEMENT WITHOUT EVIDENCE OF NECROSIS, UNSPECIFIED PART OF FOOT: ICD-10-CM

## 2022-06-27 DIAGNOSIS — L08.9 INFECTED WOUND: ICD-10-CM

## 2022-06-27 DIAGNOSIS — L97.516 DIABETIC ULCER OF RIGHT FOOT ASSOCIATED WITH DIABETES MELLITUS DUE TO UNDERLYING CONDITION, WITH BONE INVOLVEMENT WITHOUT EVIDENCE OF NECROSIS, UNSPECIFIED PART OF FOOT: ICD-10-CM

## 2022-06-27 LAB
ANION GAP SERPL CALCULATED.3IONS-SCNC: 10 MMOL/L (ref 7–16)
BASOPHILS # BLD AUTO: 0.04 K/UL (ref 0–0.2)
BASOPHILS NFR BLD AUTO: 0.7 % (ref 0–1)
BUN SERPL-MCNC: 12 MG/DL (ref 7–18)
BUN/CREAT SERPL: 12 (ref 6–20)
CALCIUM SERPL-MCNC: 8.5 MG/DL (ref 8.5–10.1)
CHLORIDE SERPL-SCNC: 105 MMOL/L (ref 98–107)
CO2 SERPL-SCNC: 32 MMOL/L (ref 21–32)
CREAT SERPL-MCNC: 0.97 MG/DL (ref 0.7–1.3)
DIFFERENTIAL METHOD BLD: ABNORMAL
EOSINOPHIL # BLD AUTO: 0.25 K/UL (ref 0–0.5)
EOSINOPHIL NFR BLD AUTO: 4.5 % (ref 1–4)
ERYTHROCYTE [DISTWIDTH] IN BLOOD BY AUTOMATED COUNT: 11.8 % (ref 11.5–14.5)
GLUCOSE SERPL-MCNC: 226 MG/DL (ref 74–106)
GLUCOSE SERPL-MCNC: 227 MG/DL (ref 70–105)
GLUCOSE SERPL-MCNC: 326 MG/DL (ref 70–105)
GLUCOSE SERPL-MCNC: 97 MG/DL (ref 70–105)
HCT VFR BLD AUTO: 33.6 % (ref 40–54)
HGB BLD-MCNC: 11.4 G/DL (ref 13.5–18)
IMM GRANULOCYTES # BLD AUTO: 0.02 K/UL (ref 0–0.04)
IMM GRANULOCYTES NFR BLD: 0.4 % (ref 0–0.4)
LYMPHOCYTES # BLD AUTO: 2.1 K/UL (ref 1–4.8)
LYMPHOCYTES NFR BLD AUTO: 38.2 % (ref 27–41)
MCH RBC QN AUTO: 31 PG (ref 27–31)
MCHC RBC AUTO-ENTMCNC: 33.9 G/DL (ref 32–36)
MCV RBC AUTO: 91.3 FL (ref 80–96)
MONOCYTES # BLD AUTO: 0.4 K/UL (ref 0–0.8)
MONOCYTES NFR BLD AUTO: 7.3 % (ref 2–6)
MPC BLD CALC-MCNC: 9.5 FL (ref 9.4–12.4)
NEUTROPHILS # BLD AUTO: 2.69 K/UL (ref 1.8–7.7)
NEUTROPHILS NFR BLD AUTO: 48.9 % (ref 53–65)
NRBC # BLD AUTO: 0 X10E3/UL
NRBC, AUTO (.00): 0 %
PLATELET # BLD AUTO: 268 K/UL (ref 150–400)
POTASSIUM SERPL-SCNC: 4.3 MMOL/L (ref 3.5–5.1)
RBC # BLD AUTO: 3.68 M/UL (ref 4.6–6.2)
SODIUM SERPL-SCNC: 143 MMOL/L (ref 136–145)
WBC # BLD AUTO: 5.5 K/UL (ref 4.5–11)

## 2022-06-27 PROCEDURE — 37000009 HC ANESTHESIA EA ADD 15 MINS: Performed by: SURGERY

## 2022-06-27 PROCEDURE — 63600175 PHARM REV CODE 636 W HCPCS: Performed by: NURSE ANESTHETIST, CERTIFIED REGISTERED

## 2022-06-27 PROCEDURE — 63600175 PHARM REV CODE 636 W HCPCS: Performed by: REGISTERED NURSE

## 2022-06-27 PROCEDURE — 99233 PR SUBSEQUENT HOSPITAL CARE,LEVL III: ICD-10-PCS | Mod: ,,, | Performed by: HOSPITALIST

## 2022-06-27 PROCEDURE — 25000003 PHARM REV CODE 250: Performed by: HOSPITALIST

## 2022-06-27 PROCEDURE — 37000008 HC ANESTHESIA 1ST 15 MINUTES: Performed by: SURGERY

## 2022-06-27 PROCEDURE — 94760 N-INVAS EAR/PLS OXIMETRY 1: CPT

## 2022-06-27 PROCEDURE — 82962 GLUCOSE BLOOD TEST: CPT

## 2022-06-27 PROCEDURE — 11000001 HC ACUTE MED/SURG PRIVATE ROOM

## 2022-06-27 PROCEDURE — 11043 DBRDMT MUSC&/FSCA 1ST 20/<: CPT | Mod: ,,, | Performed by: SURGERY

## 2022-06-27 PROCEDURE — 36000706: Performed by: SURGERY

## 2022-06-27 PROCEDURE — 36415 COLL VENOUS BLD VENIPUNCTURE: CPT | Performed by: REGISTERED NURSE

## 2022-06-27 PROCEDURE — 27201423 OPTIME MED/SURG SUP & DEVICES STERILE SUPPLY: Performed by: SURGERY

## 2022-06-27 PROCEDURE — 94761 N-INVAS EAR/PLS OXIMETRY MLT: CPT

## 2022-06-27 PROCEDURE — 85025 COMPLETE CBC W/AUTO DIFF WBC: CPT | Performed by: REGISTERED NURSE

## 2022-06-27 PROCEDURE — 99233 SBSQ HOSP IP/OBS HIGH 50: CPT | Mod: ,,, | Performed by: HOSPITALIST

## 2022-06-27 PROCEDURE — 25000003 PHARM REV CODE 250: Performed by: INTERNAL MEDICINE

## 2022-06-27 PROCEDURE — 36000707: Performed by: SURGERY

## 2022-06-27 PROCEDURE — D9220A PRA ANESTHESIA: Mod: CRNA,,, | Performed by: NURSE ANESTHETIST, CERTIFIED REGISTERED

## 2022-06-27 PROCEDURE — C9399 UNCLASSIFIED DRUGS OR BIOLOG: HCPCS | Performed by: REGISTERED NURSE

## 2022-06-27 PROCEDURE — 11043 PR DEBRIDEMENT, SKIN, SUB-Q TISSUE,MUSCLE,=<20 SQ CM: ICD-10-PCS | Mod: ,,, | Performed by: SURGERY

## 2022-06-27 PROCEDURE — 11043 DBRDMT MUSC&/FSCA 1ST 20/<: CPT

## 2022-06-27 PROCEDURE — D9220A PRA ANESTHESIA: Mod: ANES,,, | Performed by: ANESTHESIOLOGY

## 2022-06-27 PROCEDURE — 80048 BASIC METABOLIC PNL TOTAL CA: CPT | Performed by: REGISTERED NURSE

## 2022-06-27 PROCEDURE — 71000033 HC RECOVERY, INTIAL HOUR: Performed by: SURGERY

## 2022-06-27 PROCEDURE — 25000242 PHARM REV CODE 250 ALT 637 W/ HCPCS: Performed by: REGISTERED NURSE

## 2022-06-27 PROCEDURE — 25000003 PHARM REV CODE 250: Performed by: REGISTERED NURSE

## 2022-06-27 PROCEDURE — 63600175 PHARM REV CODE 636 W HCPCS: Performed by: HOSPITALIST

## 2022-06-27 PROCEDURE — 94640 AIRWAY INHALATION TREATMENT: CPT

## 2022-06-27 PROCEDURE — D9220A PRA ANESTHESIA: ICD-10-PCS | Mod: ANES,,, | Performed by: ANESTHESIOLOGY

## 2022-06-27 PROCEDURE — D9220A PRA ANESTHESIA: ICD-10-PCS | Mod: CRNA,,, | Performed by: NURSE ANESTHETIST, CERTIFIED REGISTERED

## 2022-06-27 PROCEDURE — 25000003 PHARM REV CODE 250: Performed by: NURSE ANESTHETIST, CERTIFIED REGISTERED

## 2022-06-27 RX ORDER — CEFAZOLIN SODIUM 1 G/3ML
INJECTION, POWDER, FOR SOLUTION INTRAMUSCULAR; INTRAVENOUS
Status: DISCONTINUED | OUTPATIENT
Start: 2022-06-27 | End: 2022-06-27

## 2022-06-27 RX ORDER — PROPOFOL 10 MG/ML
VIAL (ML) INTRAVENOUS
Status: DISCONTINUED | OUTPATIENT
Start: 2022-06-27 | End: 2022-06-27

## 2022-06-27 RX ORDER — MORPHINE SULFATE 8 MG/ML
4 INJECTION INTRAMUSCULAR; INTRAVENOUS; SUBCUTANEOUS EVERY 5 MIN PRN
Status: DISCONTINUED | OUTPATIENT
Start: 2022-06-27 | End: 2022-06-27 | Stop reason: HOSPADM

## 2022-06-27 RX ORDER — ONDANSETRON 2 MG/ML
4 INJECTION INTRAMUSCULAR; INTRAVENOUS DAILY PRN
Status: DISCONTINUED | OUTPATIENT
Start: 2022-06-27 | End: 2022-06-27 | Stop reason: HOSPADM

## 2022-06-27 RX ORDER — DIPHENHYDRAMINE HYDROCHLORIDE 50 MG/ML
25 INJECTION INTRAMUSCULAR; INTRAVENOUS EVERY 6 HOURS PRN
Status: DISCONTINUED | OUTPATIENT
Start: 2022-06-27 | End: 2022-06-27 | Stop reason: HOSPADM

## 2022-06-27 RX ORDER — MEPERIDINE HYDROCHLORIDE 25 MG/ML
25 INJECTION INTRAMUSCULAR; INTRAVENOUS; SUBCUTANEOUS EVERY 10 MIN PRN
Status: DISCONTINUED | OUTPATIENT
Start: 2022-06-27 | End: 2022-06-27 | Stop reason: HOSPADM

## 2022-06-27 RX ORDER — OXYCODONE HYDROCHLORIDE 5 MG/1
5 TABLET ORAL
Status: DISCONTINUED | OUTPATIENT
Start: 2022-06-27 | End: 2022-06-27 | Stop reason: HOSPADM

## 2022-06-27 RX ORDER — HYDROCODONE BITARTRATE AND ACETAMINOPHEN 7.5; 325 MG/1; MG/1
1 TABLET ORAL EVERY 6 HOURS PRN
Status: DISCONTINUED | OUTPATIENT
Start: 2022-06-27 | End: 2022-06-27 | Stop reason: HOSPADM

## 2022-06-27 RX ORDER — INSULIN ASPART 100 [IU]/ML
20 INJECTION, SUSPENSION SUBCUTANEOUS
Status: DISCONTINUED | OUTPATIENT
Start: 2022-06-28 | End: 2022-06-29 | Stop reason: HOSPADM

## 2022-06-27 RX ORDER — EPHEDRINE SULFATE 50 MG/ML
INJECTION, SOLUTION INTRAVENOUS
Status: DISCONTINUED | OUTPATIENT
Start: 2022-06-27 | End: 2022-06-27

## 2022-06-27 RX ORDER — ONDANSETRON 2 MG/ML
INJECTION INTRAMUSCULAR; INTRAVENOUS
Status: DISCONTINUED | OUTPATIENT
Start: 2022-06-27 | End: 2022-06-27

## 2022-06-27 RX ORDER — LIDOCAINE HYDROCHLORIDE 20 MG/ML
INJECTION, SOLUTION EPIDURAL; INFILTRATION; INTRACAUDAL; PERINEURAL
Status: DISCONTINUED | OUTPATIENT
Start: 2022-06-27 | End: 2022-06-27

## 2022-06-27 RX ORDER — HYDROMORPHONE HYDROCHLORIDE 2 MG/ML
0.5 INJECTION, SOLUTION INTRAMUSCULAR; INTRAVENOUS; SUBCUTANEOUS EVERY 5 MIN PRN
Status: DISCONTINUED | OUTPATIENT
Start: 2022-06-27 | End: 2022-06-27 | Stop reason: HOSPADM

## 2022-06-27 RX ORDER — ETOMIDATE 2 MG/ML
INJECTION INTRAVENOUS
Status: DISCONTINUED | OUTPATIENT
Start: 2022-06-27 | End: 2022-06-27

## 2022-06-27 RX ADMIN — ESCITALOPRAM OXALATE 20 MG: 10 TABLET ORAL at 10:06

## 2022-06-27 RX ADMIN — ASPIRIN 81 MG 81 MG: 81 TABLET ORAL at 10:06

## 2022-06-27 RX ADMIN — GABAPENTIN 900 MG: 300 CAPSULE ORAL at 12:06

## 2022-06-27 RX ADMIN — PIPERACILLIN SODIUM AND TAZOBACTAM SODIUM 4.5 G: 4; 500 INJECTION, POWDER, FOR SOLUTION INTRAVENOUS at 06:06

## 2022-06-27 RX ADMIN — METOPROLOL SUCCINATE 25 MG: 25 TABLET, FILM COATED, EXTENDED RELEASE ORAL at 11:06

## 2022-06-27 RX ADMIN — AMLODIPINE BESYLATE 10 MG: 10 TABLET ORAL at 11:06

## 2022-06-27 RX ADMIN — VANCOMYCIN HYDROCHLORIDE 1500 MG: 5 INJECTION, POWDER, LYOPHILIZED, FOR SOLUTION INTRAVENOUS at 05:06

## 2022-06-27 RX ADMIN — ACETAMINOPHEN 650 MG: 325 TABLET, FILM COATED ORAL at 08:06

## 2022-06-27 RX ADMIN — PROPOFOL 100 MG: 10 INJECTION, EMULSION INTRAVENOUS at 08:06

## 2022-06-27 RX ADMIN — LISINOPRIL 10 MG: 10 TABLET ORAL at 11:06

## 2022-06-27 RX ADMIN — ETOMIDATE 10 MG: 2 INJECTION, SOLUTION INTRAVENOUS at 08:06

## 2022-06-27 RX ADMIN — EPHEDRINE SULFATE 15 MG: 50 INJECTION INTRAVENOUS at 08:06

## 2022-06-27 RX ADMIN — INSULIN ASPART 4 UNITS: 100 INJECTION, SOLUTION INTRAVENOUS; SUBCUTANEOUS at 06:06

## 2022-06-27 RX ADMIN — PANTOPRAZOLE SODIUM 40 MG: 40 TABLET, DELAYED RELEASE ORAL at 11:06

## 2022-06-27 RX ADMIN — IPRATROPIUM BROMIDE AND ALBUTEROL SULFATE 3 ML: 2.5; .5 SOLUTION RESPIRATORY (INHALATION) at 07:06

## 2022-06-27 RX ADMIN — PIPERACILLIN SODIUM AND TAZOBACTAM SODIUM 4.5 G: 4; 500 INJECTION, POWDER, FOR SOLUTION INTRAVENOUS at 12:06

## 2022-06-27 RX ADMIN — LIDOCAINE HYDROCHLORIDE 60 MG: 20 INJECTION, SOLUTION INTRAVENOUS at 08:06

## 2022-06-27 RX ADMIN — HYDROCODONE BITARTRATE AND ACETAMINOPHEN 1 TABLET: 10; 325 TABLET ORAL at 03:06

## 2022-06-27 RX ADMIN — PIPERACILLIN SODIUM AND TAZOBACTAM SODIUM 4.5 G: 4; 500 INJECTION, POWDER, FOR SOLUTION INTRAVENOUS at 03:06

## 2022-06-27 RX ADMIN — MUPIROCIN: 20 OINTMENT TOPICAL at 08:06

## 2022-06-27 RX ADMIN — EPHEDRINE SULFATE 10 MG: 50 INJECTION INTRAVENOUS at 09:06

## 2022-06-27 RX ADMIN — CEFAZOLIN 2 G: 1 INJECTION, POWDER, FOR SOLUTION INTRAMUSCULAR; INTRAVENOUS; PARENTERAL at 08:06

## 2022-06-27 RX ADMIN — ATORVASTATIN CALCIUM 40 MG: 40 TABLET, FILM COATED ORAL at 08:06

## 2022-06-27 RX ADMIN — MUPIROCIN: 20 OINTMENT TOPICAL at 03:06

## 2022-06-27 RX ADMIN — SODIUM CHLORIDE: 9 INJECTION, SOLUTION INTRAVENOUS at 08:06

## 2022-06-27 RX ADMIN — HYDROXYZINE PAMOATE 25 MG: 25 CAPSULE ORAL at 08:06

## 2022-06-27 RX ADMIN — GABAPENTIN 900 MG: 300 CAPSULE ORAL at 06:06

## 2022-06-27 RX ADMIN — ONDANSETRON 4 MG: 2 INJECTION INTRAMUSCULAR; INTRAVENOUS at 09:06

## 2022-06-27 RX ADMIN — INSULIN DETEMIR 25 UNITS: 100 INJECTION, SOLUTION SUBCUTANEOUS at 08:06

## 2022-06-27 NOTE — SUBJECTIVE & OBJECTIVE
Interval History: Awake and resting in bed. Had debridement of right lower leg this morning. Dressing clean, dry and intact. No complaints.    Review of Systems   Respiratory:  Negative for shortness of breath.    Cardiovascular:  Negative for chest pain.   Gastrointestinal:  Negative for constipation, diarrhea, nausea and vomiting.   Objective:     Vital Signs (Most Recent):  Temp: 98.6 °F (37 °C) (06/27/22 0400)  Pulse: 68 (06/27/22 1059)  Resp: 18 (06/27/22 0707)  BP: (!) 144/75 (06/27/22 1059)  SpO2: 96 % (06/27/22 0707)   Vital Signs (24h Range):  Temp:  [97.7 °F (36.5 °C)-98.7 °F (37.1 °C)] 97.7 °F (36.5 °C)  Pulse:  [60-78] 68  Resp:  [10-22] 12  SpO2:  [95 %-100 %] 95 %  BP: (119-161)/(64-75) 144/75     Weight: 74.1 kg (163 lb 5.8 oz)  Body mass index is 24.84 kg/m².    Intake/Output Summary (Last 24 hours) at 6/27/2022 1440  Last data filed at 6/27/2022 1000  Gross per 24 hour   Intake 50 ml   Output --   Net 50 ml      Physical Exam  HENT:      Head: Normocephalic.   Cardiovascular:      Pulses: Normal pulses.      Heart sounds: Normal heart sounds.   Pulmonary:      Breath sounds: Normal breath sounds.   Abdominal:      General: Bowel sounds are normal.      Palpations: Abdomen is soft.   Skin:     General: Skin is warm and dry.      Comments: Dressing to rle   Neurological:      Mental Status: He is alert and oriented to person, place, and time.   Psychiatric:         Mood and Affect: Mood normal.       Significant Labs: All pertinent labs within the past 24 hours have been reviewed.  Recent Lab Results  (Last 5 results in the past 24 hours)        06/27/22  0624   06/27/22  0546   06/27/22  0047   06/26/22  2117   06/26/22  1629        Anion Gap   10             Baso #   0.04             Basophil %   0.7             BUN   12             BUN/CREAT RATIO   12             Calcium   8.5             Chloride   105             CO2   32             Creatinine   0.97             Differential Type   Auto              eGFR if non    83             Eos #   0.25             Eosinophil %   4.5             Glucose   226             Hematocrit   33.6             Hemoglobin   11.4             Immature Grans (Abs)   0.02             Immature Granulocytes   0.4             Lymph #   2.10             Lymph %   38.2             MCH   31.0             MCHC   33.9             MCV   91.3             Mono #   0.40             Mono %   7.3             MPV   9.5             Neutrophils, Abs   2.69             Neutrophils Relative   48.9             nRBC   0.0             NUCLEATED RBC ABSOLUTE   0.00             Platelets   268             POC Glucose 227     326     294       Potassium   4.3             RBC   3.68             RDW   11.8             Sodium   143             Vancomycin-Trough       7.4         WBC   5.50                                    Significant Imaging: I have reviewed all pertinent imaging results/findings within the past 24 hours.

## 2022-06-27 NOTE — ANESTHESIA POSTPROCEDURE EVALUATION
Anesthesia Post Evaluation    Patient: Navdeep Avery    Procedure(s) Performed: Procedure(s) (LRB):  DEBRIDEMENT, LOWER EXTREMITY (Right)    Final Anesthesia Type: general      Patient location during evaluation: PACU  Patient participation: Yes- Able to Participate  Level of consciousness: awake and alert  Post-procedure vital signs: reviewed and stable  Pain management: adequate  Airway patency: patent  SAMEERA mitigation strategies: Multimodal analgesia  PONV status at discharge: No PONV  Anesthetic complications: no      Cardiovascular status: blood pressure returned to baseline  Respiratory status: unassisted  Hydration status: euvolemic  Follow-up not needed.          Vitals Value Taken Time   /75 06/27/22 1059   Temp 36.5 °C (97.7 °F) 06/27/22 0931   Pulse 68 06/27/22 1059   Resp 12 06/27/22 1000   SpO2 95 % 06/27/22 1005         Event Time   Out of Recovery 10:05:00         Pain/Leda Score: Pain Rating Prior to Med Admin: 7 (6/26/2022 10:58 PM)  Pain Rating Post Med Admin: 0 (6/26/2022 11:58 PM)  Leda Score: 10 (6/27/2022 10:00 AM)

## 2022-06-27 NOTE — ASSESSMENT & PLAN NOTE
- Moderate SSI  - BS AC/HS  - Detemir 15 units daily  - A1C  6/25  - Detemir increased 25 units daily  -  - 320 range overnight    06/27  -blood glucose low to mid 200s

## 2022-06-27 NOTE — HOSPITAL COURSE
Patient information was obtained from past medical records and Patient      Subjective:      Principal Problem:Ulcer of right foot with necrosis of muscle     Chief Complaint: No chief complaint on file.        HPI: 60 y/o male who is admitted today from [facility] due to non healing wound of right foot. Upon questioning the patient reports he has chronic wounds to right foot for at least 2 years with past debridement (). Reports that he was at his son's house recently walking up and down stairs and foot started hurting afterwards. He reports pain from his foot to his knee that now extends up to thigh. His foot is swollen and tender to palpation. He endorses subjective fever, occasional SOB worse than normal with exertion.There is no reported chest pain, vomiting, diarrhea, diaphoresis, vision change, numbness or recent illness. Patent is a smoker of 1/2 a pack of cigarretes for > 50 years. Reports having Covid.   Past Medical History: Anxiety, Depression, CAD, CABG (2 years ago [facility]), COPD, HTN, HLD, COVID, SAMEERA, DM 1, GERD, PVD, Neuropathy, Tobacco use                      Past Medical History:   Diagnosis Date    Anemia      Anxiety      Atherosclerotic heart disease of native coronary artery with other forms of angina pectoris      Atrophic rhinitis      Carpal tunnel syndrome      Cellulitis of right lower extremity      COPD (chronic obstructive pulmonary disease)      Coronary arteriosclerosis      COVID 02/02/2022    Depression      Diabetic ulcer of right foot associated with diabetes mellitus due to underlying condition, with bone involvement without evidence of necrosis      GERD (gastroesophageal reflux disease)      Hyperlipidemia      Hypertension      Insomnia      MI (myocardial infarction)      Neuropathy      Peripheral vascular disease, unspecified      Right foot ulcer, with fat layer exposed 01/07/2015    Sleep apnea      Type 1 diabetes mellitus with foot ulcer      Type 2 diabetes  mellitus                 Past Surgical History:   Procedure Laterality Date    AMPUTATION        ANGIOPLASTY        CARDIAC CATHETERIZATION        CORONARY STENT PLACEMENT        DEBRIDEMENT         Right foot debridment with hospital stay and IV Abx    SHOULDER OPEN ROTATOR CUFF REPAIR Left      SPINE SURGERY        VASCULAR SURGERY             Review of patient's allergies indicates:  No Known Allergies     No current facility-administered medications on file prior to encounter.           Current Outpatient Medications on File Prior to Encounter   Medication Sig    albuterol (PROVENTIL) 2.5 mg /3 mL (0.083 %) nebulizer solution USE 1 VIAL IN NEBULIZER 3 TIMES DAILY    albuterol (PROVENTIL/VENTOLIN HFA) 90 mcg/actuation inhaler Inhale 2 puffs into the lungs 2 (two) times daily as needed for Wheezing.    amLODIPine (NORVASC) 10 MG tablet Take 1 tablet (10 mg total) by mouth once daily.    aspirin 81 MG Chew Take 81 mg by mouth once daily.    atorvastatin (LIPITOR) 40 MG tablet Take 1 tablet (40 mg total) by mouth nightly.    clopidogreL (PLAVIX) 75 mg tablet Take 1 tablet (75 mg total) by mouth once daily.    diclofenac sodium (VOLTAREN) 1 % Gel Apply 1 g topically 4 (four) times daily as needed (pain).    empagliflozin (JARDIANCE) 10 mg tablet Take 1 tablet (10 mg total) by mouth once daily.    EScitalopram oxalate (LEXAPRO) 10 MG tablet Take 1 tablet (10 mg total) by mouth 2 (two) times a day.    fluticasone propionate (FLONASE) 50 mcg/actuation nasal spray 2 sprays (100 mcg total) by Each Nostril route once daily.    gabapentin (NEURONTIN) 300 MG capsule Take 3 capsules (900 mg total) by mouth every 6 (six) hours.    [START ON 7/16/2022] HYDROcodone-acetaminophen (NORCO)  mg per tablet Take 1 tablet by mouth every 8 (eight) hours as needed for Pain.    [START ON 8/16/2022] HYDROcodone-acetaminophen (NORCO)  mg per tablet Take 1 tablet by mouth every 8 (eight) hours as needed for Pain.    hydrOXYzine  pamoate (VISTARIL) 25 MG Cap Take 1 capsule (25 mg total) by mouth once daily. Take nightly for sleep    insulin glargine U-300 conc (TOUJEO MAX U-300 SOLOSTAR) 300 unit/mL (3 mL) insulin pen Inject 40 Units into the skin once daily at 6am.    lisinopriL 10 MG tablet Take 1 tablet (10 mg total) by mouth once daily.    metoprolol succinate (TOPROL-XL) 25 MG 24 hr tablet Take 1 tablet (25 mg total) by mouth once daily.    nitroGLYCERIN (NITROSTAT) 0.4 MG SL tablet Place 1 tablet (0.4 mg total) under the tongue every 5 (five) minutes as needed for Chest pain.    pantoprazole (PROTONIX) 40 MG tablet Take 1 tablet (40 mg total) by mouth once daily.    SPIRIVA RESPIMAT 2.5 mcg/actuation inhaler Inhale 1 puff into the lungs once daily.    [DISCONTINUED] HYDROcodone-acetaminophen (NORCO)  mg per tablet Take 1 tablet by mouth every 8 (eight) hours as needed for Pain.    [DISCONTINUED] icosapent ethyL (VASCEPA) 1 gram Cap Take 2 capsules (2 g total) by mouth 2 (two) times daily.      Family History         Problem Relation (Age of Onset)     Alcohol abuse Father     Arthritis Mother     COPD Paternal Grandfather     Cancer Sister     Diabetes Sister     Early death Father     Heart disease Father, Maternal Grandfather, Son     Hypertension Mother     Learning disabilities Mother                   Tobacco Use    Smoking status: Current Every Day Smoker       Packs/day: 0.50       Types: Cigarettes    Smokeless tobacco: Never Used   Substance and Sexual Activity    Alcohol use: Not Currently    Drug use: Yes       Types: Oxycodone    Sexual activity: Yes      Review of Systems   Constitutional:  Positive for fever. Negative for chills and diaphoresis.   HENT:  Negative for sinus pain.    Eyes:  Negative for visual disturbance.   Respiratory:  Positive for shortness of breath. Negative for cough and chest tightness.    Cardiovascular:  Positive for leg swelling. Negative for chest pain and palpitations.   Gastrointestinal:   Negative for abdominal pain, blood in stool, constipation, diarrhea, nausea and vomiting.   Genitourinary:  Negative for flank pain.   Musculoskeletal:  Positive for gait problem.   Skin:  Positive for wound.   Neurological:  Positive for light-headedness. Negative for dizziness, syncope and headaches.   All other systems reviewed and are negative.  Objective:      Vital Signs (Most Recent):    Vital Signs (24h Range):  Temp:  [98 °F (36.7 °C)] 98 °F (36.7 °C)  Pulse:  [72] 72  Resp:  [18] 18  BP: (147)/(77) 147/77         There is no height or weight on file to calculate BMI.     Physical Exam  Vitals reviewed.   Constitutional:       General: He is not in acute distress.     Appearance: He is normal weight.   HENT:      Head: Normocephalic and atraumatic.      Mouth/Throat:      Mouth: Mucous membranes are moist.   Eyes:      Extraocular Movements: Extraocular movements intact.      Pupils: Pupils are equal, round, and reactive to light.   Cardiovascular:      Rate and Rhythm: Normal rate.      Pulses: Normal pulses.      Heart sounds: Normal heart sounds.   Pulmonary:      Effort: Pulmonary effort is normal. No respiratory distress.      Breath sounds: Normal breath sounds.   Abdominal:      General: Bowel sounds are normal. There is no distension.      Palpations: Abdomen is soft.      Tenderness: There is no abdominal tenderness. There is no guarding.   Musculoskeletal:         General: Normal range of motion.      Cervical back: Normal range of motion and neck supple.      Right lower leg: Edema present.        Feet:    Feet:      Right foot:      Skin integrity: Callus present.      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      Capillary Refill: Capillary refill takes less than 2 seconds.      Findings: Lesion present.   Neurological:      General: No focal deficit present.      Mental Status: He is alert and  oriented to person, place, and time.      Cranial Nerves: No cranial nerve deficit.      Sensory: No sensory deficit.   Psychiatric:         Mood and Affect: Mood normal.         Behavior: Behavior normal.            CRANIAL NERVES      CN III, IV, VI   Pupils are equal, round, and reactive to light.      Hospital course.    Admitted to [facility] [place] [facility] in with plan antibiotics and check cultures.  Seen by Dr. Kiser with debridement of right foot wound.  Patient did well.  Cultures grew mixed organisms of E coli and Staph species including MRSA.  All antibiotics covered by [facility].  Patient is anxious to go home and feeling better.  Discussed with Dr. Kiser and will discharge patient home on oral antibiotics.  [facility] with his primary care provider.  Blood sugar elevated and added 70/ 30 insulin to morning regimen and will continue with his Lantus shot at bedtime.   He should follow up with Dr. Huff in the next week to recheck blood sugar  Discharge home

## 2022-06-27 NOTE — PROGRESS NOTES
Pharmacokinetic Assessment Follow Up: IV Vancomycin    Vancomycin serum concentration assessment(s):    The trough level was drawn correctly and can be used to guide therapy at this time. The measurement is below the desired definitive target range of 10 to 20 mcg/mL.    Vancomycin Regimen Plan:    Change regimen to Vancomycin 1500 mg IV every 18 hours with next serum trough concentration measured at 0430 prior to 4th dose on 6/29    Drug levels (last 3 results):  Recent Labs   Lab Result Units 06/26/22  2117   Vancomycin, Trough µg/mL 7.4*       Pharmacy will continue to follow and monitor vancomycin.    Please contact pharmacy at extension [phone] for questions regarding this assessment.    Patient brief summary:  Navdeep Avery is a 61 y.o. male initiated on antimicrobial therapy with IV Vancomycin for treatment of skin & soft tissue infection    The patient's current regimen is vanc 1500 mg IV q18h    Drug Allergies:   Review of patient's allergies indicates:  No Known Allergies    Actual Body Weight:   74.1 kg    Renal Function:   Estimated Creatinine Clearance: 75.4 mL/min (based on SCr of 0.97 mg/dL).,     Dialysis Method (if applicable):  N/A    CBC (last 72 hours):  Recent Labs   Lab Result Units 06/24/22 1621 06/25/22 0419 06/26/22 0321 06/27/22  0546   WBC K/uL 6.50 5.16 5.17 5.50   Hemoglobin g/dL 12.9* 11.6* 11.9* 11.4*   Hematocrit % 36.6* 33.3* 33.8* 33.6*   Platelet Count K/uL 207 224 252 268   Lymphocytes % % 35.5 37.0 42.6* 38.2   Lymphocytes, Man % % 31  --  40  --    Monocytes % % 10.3* 8.9* 7.4* 7.3*   Monocytes, Man % % 8*  --  5  --    Eosinophils % % 4.0 4.7* 4.6* 4.5*   Eosinophils, Man % % 3  --  4  --    Basophils % % 0.6 0.6 0.8 0.7       Metabolic Panel (last 72 hours):  Recent Labs   Lab Result Units 06/24/22  1621 06/25/22 0419 06/26/22  0321 06/27/22  0546   Sodium mmol/L 139 137 141 143   Potassium mmol/L 5.7* 4.5 4.6 4.3   Chloride mmol/L 101 103 104 105   CO2 mmol/L 33* 31 30 32    Glucose mg/dL 283* 319* 178* 226*   BUN mg/dL 21* 15 14 12   Creatinine mg/dL 1.04 0.91 0.79 0.97   Albumin g/dL 3.2*  --   --   --    Bilirubin, Total mg/dL 0.2  --   --   --    Alk Phos U/L 113  --   --   --    AST U/L 13*  --   --   --    ALT U/L 23  --   --   --        Vancomycin Administrations:  vancomycin given in the last 96 hours                     vancomycin (VANCOCIN) 1,500 mg in dextrose 5 % 250 mL IVPB (mg) 1,500 mg New Bag 06/26/22 2300     1,500 mg New Bag 06/25/22 2321     1,500 mg New Bag 06/24/22 2253                    Microbiologic Results:  Microbiology Results (last 7 days)       Procedure Component Value Units Date/Time    Blood culture (site 1) [[id number]] Collected: 06/24/22 1621    Order Status: Completed Specimen: Blood Updated: 06/27/22 0531     Culture, Blood No Growth At 48 Hours    Blood culture (site 2) [[id number]] Collected: 06/24/22 1621    Order Status: Completed Specimen: Blood Updated: 06/27/22 0531     Culture, Blood No Growth At 48 Hours

## 2022-06-27 NOTE — PLAN OF CARE
Problem: Adult Inpatient Plan of Care  Goal: Plan of Care Review  Outcome: Ongoing, Progressing  Goal: Patient-Specific Goal (Individualized)  Outcome: Ongoing, Progressing  Goal: Absence of Hospital-Acquired Illness or Injury  Outcome: Ongoing, Progressing  Goal: Optimal Comfort and Wellbeing  Outcome: Ongoing, Progressing  Goal: Readiness for Transition of Care  Outcome: Ongoing, Progressing     Problem: Diabetes Comorbidity  Goal: Blood Glucose Level Within Targeted Range  Outcome: Ongoing, Progressing     Problem: Impaired Wound Healing  Goal: Optimal Wound Healing  Outcome: Ongoing, Progressing     Problem: Gas Exchange Impaired  Goal: Optimal Gas Exchange  Outcome: Ongoing, Progressing

## 2022-06-27 NOTE — PLAN OF CARE
Problem: Adult Inpatient Plan of Care  Goal: Plan of Care Review  Outcome: Ongoing, Progressing  Goal: Patient-Specific Goal (Individualized)  Outcome: Ongoing, Progressing  Goal: Absence of Hospital-Acquired Illness or Injury  Outcome: Ongoing, Progressing  Goal: Optimal Comfort and Wellbeing  Outcome: Ongoing, Progressing     Problem: Gas Exchange Impaired  Goal: Optimal Gas Exchange  Outcome: Ongoing, Progressing

## 2022-06-27 NOTE — INTERVAL H&P NOTE
The patient has been examined and the H&P has been reviewed:    I concur with the findings and no changes have occurred since H&P was written.        Active Hospital Problems    Diagnosis  POA    *Ulcer of right foot with necrosis of muscle [L97.513]  Yes    Type 1 diabetes mellitus [E10.9]  Yes     Chronic    COPD (chronic obstructive pulmonary disease) [J44.9]  Yes     Chronic    Neuropathy [G62.9]  Yes     Chronic    Mixed hyperlipidemia [E78.2]  Yes    Benign hypertension [I10]  Yes    Dependence on nicotine from cigarettes [F17.210]  Yes      Resolved Hospital Problems   No resolved problems to display.

## 2022-06-27 NOTE — ANESTHESIA PREPROCEDURE EVALUATION
06/27/2022  Navdeep Avery is a 61 y.o., male.    Past Medical History:   Diagnosis Date    Anemia     Anxiety     Atherosclerotic heart disease of native coronary artery with other forms of angina pectoris     Atrophic rhinitis     Carpal tunnel syndrome     Cellulitis of right lower extremity     COPD (chronic obstructive pulmonary disease)     Coronary arteriosclerosis     COVID 02/02/2022    Depression     Diabetic ulcer of right foot associated with diabetes mellitus due to underlying condition, with bone involvement without evidence of necrosis     GERD (gastroesophageal reflux disease)     Hyperlipidemia     Hypertension     Insomnia     MI (myocardial infarction)     Neuropathy     Peripheral vascular disease, unspecified     Right foot ulcer, with fat layer exposed 01/07/2015    Sleep apnea     Type 1 diabetes mellitus with foot ulcer     Type 2 diabetes mellitus        Pre-op Assessment    I have reviewed the Patient Summary Reports.    I have reviewed the NPO Status.   I have reviewed the Medications.     Review of Systems  Social:  Non-Smoker, No Alcohol Use    Hematology/Oncology:  Hematology Normal   Oncology Normal     EENT/Dental:EENT/Dental Normal   Cardiovascular:   Hypertension Past MI CAD      Pulmonary:   COPD Sleep Apnea    Renal/:  Renal/ Normal     Hepatic/GI:   GERD    Musculoskeletal:  Musculoskeletal Normal    Neurological:  Neurology Normal  Neuromuscular Disease Diabetic ulcer of midfoot  Endocrine:   Diabetes    Dermatological:  Skin Normal    Psych:   depression          Physical Exam  General: Well nourished, Cooperative, Alert and Oriented    Airway:  Mallampati: II / II  Mouth Opening: Normal  TM Distance: Normal  Neck ROM: Normal ROM    Dental:  Intact    Chest/Lungs:  Clear to auscultation    Heart:  Rate: Normal  Rhythm: Regular Rhythm  Sounds:  Normal        Chemistry        Component Value Date/Time     06/27/2022 0546    K 4.3 06/27/2022 0546     06/27/2022 0546    CO2 32 06/27/2022 0546    BUN 12 06/27/2022 0546    CREATININE 0.97 06/27/2022 0546     (H) 06/27/2022 0546        Component Value Date/Time    CALCIUM 8.5 06/27/2022 0546    ALKPHOS 113 06/24/2022 1621    AST 13 (L) 06/24/2022 1621    ALT 23 06/24/2022 1621    BILITOT 0.2 06/24/2022 1621    ESTGFRAFRICA 130 01/25/2021 1000    EGFRNONAA 83 06/27/2022 0546        Lab Results   Component Value Date    WBC 5.50 06/27/2022    RBC 3.68 (L) 06/27/2022    HGB 11.4 (L) 06/27/2022    MCV 91.3 06/27/2022    MCH 31.0 06/27/2022    MCHC 33.9 06/27/2022    RDW 11.8 06/27/2022     06/27/2022    MPV 9.5 06/27/2022    LYMPH 38.2 06/27/2022    LYMPH 2.10 06/27/2022    MONO 7.3 (H) 06/27/2022    EOS 0.25 06/27/2022    BASO 0.04 06/27/2022     Results for orders placed or performed in visit on 05/26/21   EKG 12-lead    Collection Time: 05/26/21 12:33 PM    Narrative    Test Reason : I10,    Vent. Rate : 064 BPM     Atrial Rate : 064 BPM     P-R Int : 138 ms          QRS Dur : 106 ms      QT Int : 412 ms       P-R-T Axes : 067 065 122 degrees     QTc Int : 425 ms    Normal sinus rhythm  Inferior infarct ,age undetermined  Anterior infarct ,age undetermined  T wave abnormality, consider lateral ischemia  Abnormal ECG  No previous ECGs available  Confirmed by Db Ibarra MD ([id number]) on 6/1/2021 5:04:19 PM    Referred By:  ALLYSON           Confirmed By:Db Ibarra MD         Anesthesia Plan  Type of Anesthesia, risks & benefits discussed:    Anesthesia Type: Gen Supraglottic Airway  Intra-op Monitoring Plan: Standard ASA Monitors  Post Op Pain Control Plan: multimodal analgesia  Induction:  IV  Airway Plan: Direct  Informed Consent: Informed consent signed with the Patient and all parties understand the risks and agree with anesthesia plan.  All questions answered.   ASA Score:  3  Day of Surgery Review of History & Physical: H&P Update referred to the surgeon/provider.    Ready For Surgery From Anesthesia Perspective.     .

## 2022-06-27 NOTE — OR NURSING
0927 Rec'd pt to PACU asleep with oral airway in place. VSS. No signs of distress noted. Right foot dressing c/d/i. Cap refill less than 3 seconds, pedal pulses present but weak. Will continue to monitor.     0940 Oral airway removed. Respirations even and unlabored. Denies needs.    0945 Spoke with pt spouse via phone. Update given and all questions answered.    1005 Out of PACU. VSS. No signs of bleeding/distress noted.     1015 Pt to room 134 awake and alert. Visitors at bedside. Bedside report given to WELLINGTON Hobbs RN. Right foot dressing c/d/i, cap refill less than 3 seconds, pedal pulses present but weak. Able to wiggle toes on command. Reports tingling, informed sensation is due to block given. Denies pain/needs at this time. /73, P 72, R 12, O2 100% room air, T 97.4 oral.   Rifampin Counseling: I discussed with the patient the risks of rifampin including but not limited to liver damage, kidney damage, red-orange body fluids, nausea/vomiting and severe allergy.

## 2022-06-27 NOTE — NURSING
Reminded patient that he is NPO after midnight for surgery in am after he communicates with MD regarding debridement of right foot and consent is received. Patient voiced understanding  Patient stated that his dressing was changed today but states that his wound was not cleaned. States that the dressing was pulled off and another dressing was applied but states that it was not cleaned. Offered to do wound care, pt refused.

## 2022-06-27 NOTE — PLAN OF CARE
Problem: Adult Inpatient Plan of Care  Goal: Absence of Hospital-Acquired Illness or Injury  Intervention: Identify and Manage Fall Risk  Flowsheets (Taken 6/27/2022 1654)  Safety Promotion/Fall Prevention:   assistive device/personal item within reach   medications reviewed   nonskid shoes/socks when out of bed     Problem: Diabetes Comorbidity  Goal: Blood Glucose Level Within Targeted Range  Intervention: Monitor and Manage Glycemia  Flowsheets (Taken 6/27/2022 165)  Glycemic Management: blood glucose monitored

## 2022-06-27 NOTE — OP NOTE
[facility] - Periop Services  Surgery Department  Operative Note    SUMMARY     Date of Procedure: 6/27/2022     Procedure: Procedure(s) (LRB):  DEBRIDEMENT, LOWER EXTREMITY (Right)     Surgeon(s) and Role:     * Forrest Kiser MD - Primary    Assisting Surgeon: None    Pre-Operative Diagnosis: Infected wound [T14.8XXA, L08.9]  Diabetic ulcer of right foot associated with diabetes mellitus due to underlying condition, with bone involvement without evidence of necrosis, unspecified part of foot [E08.621, L97.516]    Post-Operative Diagnosis: Post-Op Diagnosis Codes:     * Infected wound [T14.8XXA, L08.9]     * Diabetic ulcer of right foot associated with diabetes mellitus due to underlying condition, with bone involvement without evidence of necrosis, unspecified part of foot [E08.621, L97.516]    Anesthesia: Monitor Anesthesia Care    Procedures Performed: Debridement of right foot    Significant Findings of the Procedure: Ulcer of right plantar aspect 2nd MTP head with deep involvement down to bone    Procedure in Detail:  After informed consent patient brought to the OR prepped and draped in the usual sterile fashion.  He had this ulcer on the bottom the has some mild maceration with some undermining the required debridement.  We cut circumferentially around this ulcer and actually found that the center part when will deep down to the bone.  We debrided this skin muscle tissues all the way down to the bone and sent some off to pathology.  Our ulcer after debridement size approximately 4 x 3 x 1 cm.  We then ensured hemostasis and dressed the area with wet to dry.    Complications: No    Estimated Blood Loss (EBL): * No values recorded between 6/27/2022  9:13 AM and 6/27/2022  9:24 AM *           Implants: * No implants in log *    Specimens:   Specimen (24h ago, onward)            Orders from this encounter     Start     Ordered    06/27/22 0921  Surgical Pathology  RELEASE UPON ORDERING         06/27/22  0921          Orders from suspended admission ([facility] - Valeriano Kyle MD)     None                        Condition: Good    Disposition: PACU - hemodynamically stable.    Attestation: I was present and scrubbed for the entire procedure.

## 2022-06-27 NOTE — TRANSFER OF CARE
Anesthesia Transfer of Care Note    Patient: Navdeep Avery    Procedure(s) Performed: Procedure(s) (LRB):  DEBRIDEMENT, LOWER EXTREMITY (Right)    Patient location: PACU    Anesthesia Type: general    Transport from OR: Transported from OR on 100% O2 by closed face mask with adequate spontaneous ventilation    Post pain: adequate analgesia    Post assessment: no apparent anesthetic complications    Post vital signs: stable    Level of consciousness: responds to stimulation    Nausea/Vomiting: no nausea/vomiting    Complications: none    Transfer of care protocol was followed      Last vitals:   Visit Vitals  BP (!) 161/68   Pulse 74   Temp 36.5 °C (97.7 °F)   Resp 11   SpO2 100%

## 2022-06-27 NOTE — NURSING
Wound care note: pt had surgery today per Dr Kiser to right plantar foot. Wound care will assess tomorrow.

## 2022-06-28 LAB
ANION GAP SERPL CALCULATED.3IONS-SCNC: 8 MMOL/L (ref 7–16)
BASOPHILS # BLD AUTO: 0.03 K/UL (ref 0–0.2)
BASOPHILS NFR BLD AUTO: 0.5 % (ref 0–1)
BUN SERPL-MCNC: 9 MG/DL (ref 7–18)
BUN/CREAT SERPL: 11 (ref 6–20)
CALCIUM SERPL-MCNC: 8.4 MG/DL (ref 8.5–10.1)
CHLORIDE SERPL-SCNC: 108 MMOL/L (ref 98–107)
CO2 SERPL-SCNC: 30 MMOL/L (ref 21–32)
CREAT SERPL-MCNC: 0.84 MG/DL (ref 0.7–1.3)
DIFFERENTIAL METHOD BLD: ABNORMAL
EOSINOPHIL # BLD AUTO: 0.25 K/UL (ref 0–0.5)
EOSINOPHIL NFR BLD AUTO: 4 % (ref 1–4)
ERYTHROCYTE [DISTWIDTH] IN BLOOD BY AUTOMATED COUNT: 11.9 % (ref 11.5–14.5)
ESTROGEN SERPL-MCNC: NORMAL PG/ML
GLUCOSE SERPL-MCNC: 161 MG/DL (ref 74–106)
GLUCOSE SERPL-MCNC: 190 MG/DL (ref 70–105)
GLUCOSE SERPL-MCNC: 196 MG/DL (ref 70–105)
GLUCOSE SERPL-MCNC: 265 MG/DL (ref 70–105)
GLUCOSE SERPL-MCNC: 323 MG/DL (ref 70–105)
GLUCOSE SERPL-MCNC: 87 MG/DL (ref 70–105)
HCT VFR BLD AUTO: 33.2 % (ref 40–54)
HGB BLD-MCNC: 11.4 G/DL (ref 13.5–18)
IMM GRANULOCYTES # BLD AUTO: 0.03 K/UL (ref 0–0.04)
IMM GRANULOCYTES NFR BLD: 0.5 % (ref 0–0.4)
INSULIN SERPL-ACNC: NORMAL U[IU]/ML
LAB AP GROSS DESCRIPTION: NORMAL
LAB AP LABORATORY NOTES: NORMAL
LYMPHOCYTES # BLD AUTO: 1.62 K/UL (ref 1–4.8)
LYMPHOCYTES NFR BLD AUTO: 26.1 % (ref 27–41)
MCH RBC QN AUTO: 30.9 PG (ref 27–31)
MCHC RBC AUTO-ENTMCNC: 34.3 G/DL (ref 32–36)
MCV RBC AUTO: 90 FL (ref 80–96)
MONOCYTES # BLD AUTO: 0.45 K/UL (ref 0–0.8)
MONOCYTES NFR BLD AUTO: 7.2 % (ref 2–6)
MPC BLD CALC-MCNC: 8.9 FL (ref 9.4–12.4)
NEUTROPHILS # BLD AUTO: 3.83 K/UL (ref 1.8–7.7)
NEUTROPHILS NFR BLD AUTO: 61.7 % (ref 53–65)
NRBC # BLD AUTO: 0 X10E3/UL
NRBC, AUTO (.00): 0 %
PLATELET # BLD AUTO: 245 K/UL (ref 150–400)
POTASSIUM SERPL-SCNC: 4.5 MMOL/L (ref 3.5–5.1)
PROCALCITONIN SERPL-MCNC: <0.1 NG/ML
RBC # BLD AUTO: 3.69 M/UL (ref 4.6–6.2)
SODIUM SERPL-SCNC: 141 MMOL/L (ref 136–145)
T3RU NFR SERPL: NORMAL %
WBC # BLD AUTO: 6.21 K/UL (ref 4.5–11)

## 2022-06-28 PROCEDURE — 82962 GLUCOSE BLOOD TEST: CPT

## 2022-06-28 PROCEDURE — 63600175 PHARM REV CODE 636 W HCPCS: Performed by: HOSPITALIST

## 2022-06-28 PROCEDURE — 80048 BASIC METABOLIC PNL TOTAL CA: CPT | Performed by: REGISTERED NURSE

## 2022-06-28 PROCEDURE — 25000003 PHARM REV CODE 250: Performed by: REGISTERED NURSE

## 2022-06-28 PROCEDURE — 85025 COMPLETE CBC W/AUTO DIFF WBC: CPT | Performed by: REGISTERED NURSE

## 2022-06-28 PROCEDURE — 94761 N-INVAS EAR/PLS OXIMETRY MLT: CPT

## 2022-06-28 PROCEDURE — 99232 PR SUBSEQUENT HOSPITAL CARE,LEVL II: ICD-10-PCS | Mod: ,,, | Performed by: NURSE PRACTITIONER

## 2022-06-28 PROCEDURE — 96372 THER/PROPH/DIAG INJ SC/IM: CPT

## 2022-06-28 PROCEDURE — 63600175 PHARM REV CODE 636 W HCPCS: Performed by: REGISTERED NURSE

## 2022-06-28 PROCEDURE — 94640 AIRWAY INHALATION TREATMENT: CPT

## 2022-06-28 PROCEDURE — 11000001 HC ACUTE MED/SURG PRIVATE ROOM

## 2022-06-28 PROCEDURE — 25000003 PHARM REV CODE 250: Performed by: HOSPITALIST

## 2022-06-28 PROCEDURE — 36415 COLL VENOUS BLD VENIPUNCTURE: CPT | Performed by: REGISTERED NURSE

## 2022-06-28 PROCEDURE — 99232 SBSQ HOSP IP/OBS MODERATE 35: CPT | Mod: ,,, | Performed by: NURSE PRACTITIONER

## 2022-06-28 PROCEDURE — C9399 UNCLASSIFIED DRUGS OR BIOLOG: HCPCS | Performed by: HOSPITALIST

## 2022-06-28 PROCEDURE — 25000242 PHARM REV CODE 250 ALT 637 W/ HCPCS: Performed by: REGISTERED NURSE

## 2022-06-28 RX ADMIN — HYDROXYZINE PAMOATE 25 MG: 25 CAPSULE ORAL at 08:06

## 2022-06-28 RX ADMIN — AMLODIPINE BESYLATE 10 MG: 10 TABLET ORAL at 08:06

## 2022-06-28 RX ADMIN — MUPIROCIN: 20 OINTMENT TOPICAL at 08:06

## 2022-06-28 RX ADMIN — INSULIN ASPART 20 UNITS: 100 INJECTION, SUSPENSION SUBCUTANEOUS at 08:06

## 2022-06-28 RX ADMIN — INSULIN DETEMIR 30 UNITS: 100 INJECTION, SOLUTION SUBCUTANEOUS at 08:06

## 2022-06-28 RX ADMIN — GABAPENTIN 900 MG: 300 CAPSULE ORAL at 05:06

## 2022-06-28 RX ADMIN — ASPIRIN 81 MG 81 MG: 81 TABLET ORAL at 08:06

## 2022-06-28 RX ADMIN — INSULIN ASPART 4 UNITS: 100 INJECTION, SOLUTION INTRAVENOUS; SUBCUTANEOUS at 12:06

## 2022-06-28 RX ADMIN — PANTOPRAZOLE SODIUM 40 MG: 40 TABLET, DELAYED RELEASE ORAL at 08:06

## 2022-06-28 RX ADMIN — ACETAMINOPHEN 650 MG: 325 TABLET, FILM COATED ORAL at 12:06

## 2022-06-28 RX ADMIN — ATORVASTATIN CALCIUM 40 MG: 40 TABLET, FILM COATED ORAL at 08:06

## 2022-06-28 RX ADMIN — INSULIN ASPART 2 UNITS: 100 INJECTION, SOLUTION INTRAVENOUS; SUBCUTANEOUS at 11:06

## 2022-06-28 RX ADMIN — HYDROCODONE BITARTRATE AND ACETAMINOPHEN 1 TABLET: 10; 325 TABLET ORAL at 05:06

## 2022-06-28 RX ADMIN — IPRATROPIUM BROMIDE AND ALBUTEROL SULFATE 3 ML: 2.5; .5 SOLUTION RESPIRATORY (INHALATION) at 07:06

## 2022-06-28 RX ADMIN — INSULIN ASPART 2 UNITS: 100 INJECTION, SOLUTION INTRAVENOUS; SUBCUTANEOUS at 05:06

## 2022-06-28 RX ADMIN — PIPERACILLIN SODIUM AND TAZOBACTAM SODIUM 4.5 G: 4; 500 INJECTION, POWDER, FOR SOLUTION INTRAVENOUS at 10:06

## 2022-06-28 RX ADMIN — GABAPENTIN 900 MG: 300 CAPSULE ORAL at 12:06

## 2022-06-28 RX ADMIN — VANCOMYCIN HYDROCHLORIDE 1500 MG: 5 INJECTION, POWDER, LYOPHILIZED, FOR SOLUTION INTRAVENOUS at 10:06

## 2022-06-28 RX ADMIN — PIPERACILLIN SODIUM AND TAZOBACTAM SODIUM 4.5 G: 4; 500 INJECTION, POWDER, FOR SOLUTION INTRAVENOUS at 01:06

## 2022-06-28 RX ADMIN — INSULIN ASPART 3 UNITS: 100 INJECTION, SOLUTION INTRAVENOUS; SUBCUTANEOUS at 08:06

## 2022-06-28 RX ADMIN — METOPROLOL SUCCINATE 25 MG: 25 TABLET, FILM COATED, EXTENDED RELEASE ORAL at 08:06

## 2022-06-28 RX ADMIN — GABAPENTIN 900 MG: 300 CAPSULE ORAL at 11:06

## 2022-06-28 RX ADMIN — ESCITALOPRAM OXALATE 20 MG: 10 TABLET ORAL at 08:06

## 2022-06-28 RX ADMIN — PIPERACILLIN SODIUM AND TAZOBACTAM SODIUM 4.5 G: 4; 500 INJECTION, POWDER, FOR SOLUTION INTRAVENOUS at 05:06

## 2022-06-28 RX ADMIN — LISINOPRIL 10 MG: 10 TABLET ORAL at 08:06

## 2022-06-28 RX ADMIN — IPRATROPIUM BROMIDE AND ALBUTEROL SULFATE 3 ML: 2.5; .5 SOLUTION RESPIRATORY (INHALATION) at 08:06

## 2022-06-28 NOTE — SUBJECTIVE & OBJECTIVE
Interval History: 06/28/2022 Awake and resting in bed without complaints. Dressing to right foot. Continue abx and wound care.    Review of Systems   Respiratory:  Negative for shortness of breath.    Cardiovascular:  Negative for chest pain.   Gastrointestinal:  Negative for constipation, diarrhea, nausea and vomiting.   Objective:     Vital Signs (Most Recent):  Temp: 98.6 °F (37 °C) (06/28/22 0400)  Pulse: 72 (06/28/22 0737)  Resp: 20 (06/28/22 0737)  BP: (!) 153/70 (06/28/22 0400)  SpO2: 98 % (06/28/22 0737)   Vital Signs (24h Range):  Temp:  [97.6 °F (36.4 °C)-98.6 °F (37 °C)] 98.6 °F (37 °C)  Pulse:  [58-97] 72  Resp:  [16-20] 20  SpO2:  [95 %-100 %] 98 %  BP: (121-153)/(63-76) 153/70     Weight: 77.8 kg (171 lb 8 oz)  Body mass index is 26.08 kg/m².    Intake/Output Summary (Last 24 hours) at 6/28/2022 1204  Last data filed at 6/28/2022 1037  Gross per 24 hour   Intake 710 ml   Output --   Net 710 ml      Physical Exam  HENT:      Head: Normocephalic.   Cardiovascular:      Rate and Rhythm: Normal rate and regular rhythm.      Heart sounds: Normal heart sounds.   Pulmonary:      Breath sounds: Normal breath sounds.   Abdominal:      General: Bowel sounds are normal.      Palpations: Abdomen is soft.   Skin:     General: Skin is warm and dry.      Comments: Dressing to right foot    Neurological:      Mental Status: He is alert and oriented to person, place, and time.   Psychiatric:         Mood and Affect: Mood normal.       Significant Labs: All pertinent labs within the past 24 hours have been reviewed.  Recent Lab Results         06/28/22  1135   06/28/22  0653   06/28/22  0231   06/28/22  0026        Anion Gap     8         Baso #     0.03         Basophil %     0.5         BUN     9         BUN/CREAT RATIO     11         Calcium     8.4         Chloride     108         CO2     30         Creatinine     0.84         Differential Type     Auto         eGFR if non      98         Eos #      0.25         Eosinophil %     4.0         Glucose     161         Hematocrit     33.2         Hemoglobin     11.4         Immature Grans (Abs)     0.03         Immature Granulocytes     0.5         Lymph #     1.62         Lymph %     26.1         MCH     30.9         MCHC     34.3         MCV     90.0         Mono #     0.45         Mono %     7.2         MPV     8.9         Neutrophils, Abs     3.83         Neutrophils Relative     61.7         nRBC     0.0         NUCLEATED RBC ABSOLUTE     0.00         Platelets     245         POC Glucose 190   87     323       Potassium     4.5         RBC     3.69         RDW     11.9         Sodium     141         WBC     6.21                 Significant Imaging: I have reviewed all pertinent imaging results/findings within the past 24 hours.

## 2022-06-28 NOTE — ASSESSMENT & PLAN NOTE
Clean with vashe   Apply vashe moistened 4x4s to wound bed  Cover and secure with 4x4s, kerlix, and paper tape  Change daily  Keep leg elevated and avoid pressure on wound  IV antibiotics  Evaluate for HBOT

## 2022-06-28 NOTE — NURSING
Patient returned from surgery. NAD noted. Report received at bedside. Dressing noted to right foot. Four eyes on patient. Family at bedside.

## 2022-06-28 NOTE — CONSULTS
[facility]  Wound Care  Progress Note    Patient Name: Navdeep Avery  MRN: [id number]  Admission Date: 6/24/2022  Attending Physician: Valeriano Kyle MD    Past Medical History:   Diagnosis Date    Anemia     Anxiety     Atherosclerotic heart disease of native coronary artery with other forms of angina pectoris     Atrophic rhinitis     Carpal tunnel syndrome     Cellulitis of right lower extremity     COPD (chronic obstructive pulmonary disease)     Coronary arteriosclerosis     COVID 02/02/2022    Depression     Diabetic ulcer of right foot associated with diabetes mellitus due to underlying condition, with bone involvement without evidence of necrosis     GERD (gastroesophageal reflux disease)     Hyperlipidemia     Hypertension     Insomnia     MI (myocardial infarction)     Neuropathy     Peripheral vascular disease, unspecified     Right foot ulcer, with fat layer exposed 01/07/2015    Sleep apnea     Type 1 diabetes mellitus with foot ulcer     Type 2 diabetes mellitus         Subjective:     HPI:  Navdeep Avery is a 61 y.o. male with chronic-non healing wound on the right foot. He had debridement in the OR yesterday. He has had this wound for greater than 6 months. He has minimal sensation to foot. Significant PMH includes hypertension, diabetes, peripheral vascular disease, and high cholesterol. Denies fever and chills.        Review of Systems   Constitutional: Negative for chills and fever.   Respiratory: Negative for chest tightness and shortness of breath.    Cardiovascular: Positive for leg swelling. Negative for chest pain and palpitations.   Musculoskeletal: Positive for arthralgias and joint swelling.   Skin: Positive for color change and wound.        wound   Neurological: Positive for weakness and numbness.   Psychiatric/Behavioral: Negative for agitation, behavioral problems, confusion and self-injury.     Objective:     Vital Signs (Most  Recent):  Temp: 98.6 °F (37 °C) (06/28/22 0400)  Pulse: 72 (06/28/22 0737)  Resp: 20 (06/28/22 0737)  BP: (!) 153/70 (06/28/22 0400)  SpO2: 98 % (06/28/22 0737) Vital Signs (24h Range):  Temp:  [97.6 °F (36.4 °C)-98.6 °F (37 °C)] 98.6 °F (37 °C)  Pulse:  [58-97] 72  Resp:  [16-20] 20  SpO2:  [95 %-100 %] 98 %  BP: (121-153)/(63-77) 153/70     Weight: 77.8 kg (171 lb 8 oz)  Body mass index is 26.08 kg/m².  No data recorded    Physical Exam  Vitals reviewed.   Constitutional:       Appearance: Normal appearance.   HENT:      Head: Normocephalic.   Cardiovascular:      Rate and Rhythm: Normal rate and regular rhythm.      Pulses: Normal pulses.      Heart sounds: Normal heart sounds.   Pulmonary:      Effort: Pulmonary effort is normal.      Breath sounds: Normal breath sounds.   Skin:     Findings: Erythema and rash present.      Comments: Wound, see LDA for photos and measurements   Neurological:      Mental Status: He is alert. Mental status is at baseline.      Motor: Weakness present.               Incision/Site 06/27/22 0933 Right Foot (Active)   06/27/22 0933   Present Prior to Hospital Arrival?:    Side: Right   Location: Foot   Orientation:    Incision Type:    Closure Method:    Additional Comments:    Removal Indication and Assessment:    Wound Outcome:    Removal Indications:    Incision WDL WDL 06/27/22 1000   Dressing Appearance Dry;Intact;Clean 06/27/22 1930   Drainage Amount None 06/27/22 1930   Appearance Dressing in place, unable to visualize 06/28/22 0915   Dressing Rolled gauze 06/28/22 0915   Number of days: 1         Assessment and Plan    Diabetic ulcer of right foot associated with diabetes mellitus due to underlying condition, with bone involvement without evidence of necrosis  Clean with vashe   Apply vashe moistened 4x4s to wound bed  Cover and secure with 4x4s, kerlix, and paper tape  Change daily  Keep leg elevated and avoid pressure on wound  IV antibiotics      Evaluate for HBOT            Signature:  GIANA Moncada  Wound Care    Date of encounter: 06/28/2022

## 2022-06-28 NOTE — PROGRESS NOTES
[facility] Medicine  Progress Note    Patient Name: Navdeep Avery  MRN: [id number]  Patient Class: IP- Inpatient   Admission Date: 6/24/2022  Length of Stay: 4 days  Attending Physician: Valeriano Kyle MD  Primary Care Provider: Marquez Huff MD        Subjective:     Principal Problem:Ulcer of right foot with necrosis of muscle        HPI:  60 y/o male who is admitted today from [facility] due to non healing wound of right foot. Upon questioning the patient reports he has chronic wounds to right foot for at least 2 years with past debridement (). Reports that he was at his son's house recently walking up and down stairs and foot started hurting afterwards. He reports pain from his foot to his knee that now extends up to thigh. His foot is swollen and tender to palpation. He endorses subjective fever, occasional SOB worse than normal with exertion.There is no reported chest pain, vomiting, diarrhea, diaphoresis, vision change, numbness or recent illness. Patent is a smoker of 1/2 a pack of cigarretes for > 50 years. Reports having Covid.   Past Medical History: Anxiety, Depression, CAD, CABG (2 years ago [facility]), COPD, HTN, HLD, COVID, SAMEERA, DM 1, GERD, PVD, Neuropathy, Tobacco use             Overview/Hospital Course:  06/27/2022  Awake and resting in bed. Had debridement of right lower leg this morning. Dressing clean, dry and intact. No complaints.   06/28/2022 Awake and resting in bed without complaints. Dressing to right foot. Continue abx and wound care.      Interval History: 06/28/2022 Awake and resting in bed without complaints. Dressing to right foot. Continue abx and wound care.    Review of Systems   Respiratory:  Negative for shortness of breath.    Cardiovascular:  Negative for chest pain.   Gastrointestinal:  Negative for constipation, diarrhea, nausea and vomiting.   Objective:     Vital Signs (Most Recent):  Temp: 98.6 °F (37 °C) (06/28/22 0400)  Pulse: 72  (06/28/22 0737)  Resp: 20 (06/28/22 0737)  BP: (!) 153/70 (06/28/22 0400)  SpO2: 98 % (06/28/22 0737)   Vital Signs (24h Range):  Temp:  [97.6 °F (36.4 °C)-98.6 °F (37 °C)] 98.6 °F (37 °C)  Pulse:  [58-97] 72  Resp:  [16-20] 20  SpO2:  [95 %-100 %] 98 %  BP: (121-153)/(63-76) 153/70     Weight: 77.8 kg (171 lb 8 oz)  Body mass index is 26.08 kg/m².    Intake/Output Summary (Last 24 hours) at 6/28/2022 1204  Last data filed at 6/28/2022 1037  Gross per 24 hour   Intake 710 ml   Output --   Net 710 ml      Physical Exam  HENT:      Head: Normocephalic.   Cardiovascular:      Rate and Rhythm: Normal rate and regular rhythm.      Heart sounds: Normal heart sounds.   Pulmonary:      Breath sounds: Normal breath sounds.   Abdominal:      General: Bowel sounds are normal.      Palpations: Abdomen is soft.   Skin:     General: Skin is warm and dry.      Comments: Dressing to right foot    Neurological:      Mental Status: He is alert and oriented to person, place, and time.   Psychiatric:         Mood and Affect: Mood normal.       Significant Labs: All pertinent labs within the past 24 hours have been reviewed.  Recent Lab Results         06/28/22  1135   06/28/22  0653   06/28/22  0231   06/28/22  0026        Anion Gap     8         Baso #     0.03         Basophil %     0.5         BUN     9         BUN/CREAT RATIO     11         Calcium     8.4         Chloride     108         CO2     30         Creatinine     0.84         Differential Type     Auto         eGFR if non      98         Eos #     0.25         Eosinophil %     4.0         Glucose     161         Hematocrit     33.2         Hemoglobin     11.4         Immature Grans (Abs)     0.03         Immature Granulocytes     0.5         Lymph #     1.62         Lymph %     26.1         MCH     30.9         MCHC     34.3         MCV     90.0         Mono #     0.45         Mono %     7.2         MPV     8.9         Neutrophils, Abs     3.83          Neutrophils Relative     61.7         nRBC     0.0         NUCLEATED RBC ABSOLUTE     0.00         Platelets     245         POC Glucose 190   87     323       Potassium     4.5         RBC     3.69         RDW     11.9         Sodium     141         WBC     6.21                 Significant Imaging: I have reviewed all pertinent imaging results/findings within the past 24 hours.      Assessment/Plan:      * Ulcer of right foot with necrosis of muscle  * Routine vitals    * CBC, CMP, UA, Lactic acid,  Ptt, Inr  * BC x 2, Lactic acid   * Right foot xr:     * US of lower legs with betzy   * Daily Cbc, Bmp    * Crp, ESR   * Zosyn Pharmacy to dose    * Vancomycin pharmacy to dose    * Wound care consult   * Surgery consult. Debridement planned for Monday 6/27/22. Plavix held  * Turn q 2 hours and place waffle boots on bilateral feet.   * DVT ppx:  Heparin sc 5000 units q 8 hours  * Protonix 40 mg daily.   6/25  - Vanco/ Zosyn day # 2  - wcx:    Culture, Wound/Abscess Light Growth Gram-negative Bacilli Abnormal  P  Culture, Wound/Abscess Heavy Growth Staphylococcus aureus Abnormal  P      6/26:  - Vanco/ Zosyn day # 3  - surgery tomorrow  - Npo after miodnight  - Heparin stopped. Resume when ok with surgery  - Swelling and erythema improved to right leg. Debridement tomorrow.           COPD (chronic obstructive pulmonary disease)  Chronic resume home medication          Type 1 diabetes mellitus  - Moderate SSI  - BS AC/HS  - Detemir 15 units daily  - A1C  6/25  - Detemir increased 25 units daily  -  - 320 range overnight    06/27  -blood glucose low to mid 200s    Neuropathy  Chronic resume home medication      Dependence on nicotine from cigarettes  Cessation education      Mixed hyperlipidemia  Chronic resume home medication          Benign hypertension  Chronic resume home medication            VTE Risk Mitigation (From admission, onward)         Ordered     IP VTE HIGH RISK PATIENT  Once         06/24/22 3554      Place sequential compression device  Until discontinued         06/24/22 1535                Discharge Planning   MARGARITO:      Code Status: Prior   Is the patient medically ready for discharge?:     Reason for patient still in hospital (select all that apply): Treatment                     GIANA Wilde  Department of Hospital Medicine   [facility]

## 2022-06-28 NOTE — PLAN OF CARE
Problem: Adult Inpatient Plan of Care  Goal: Absence of Hospital-Acquired Illness or Injury  Intervention: Identify and Manage Fall Risk  Flowsheets (Taken 6/28/2022 1501)  Safety Promotion/Fall Prevention:   assistive device/personal item within reach   medications reviewed   nonskid shoes/socks when out of bed     Problem: Diabetes Comorbidity  Goal: Blood Glucose Level Within Targeted Range  Intervention: Monitor and Manage Glycemia  Flowsheets (Taken 6/28/2022 1501)  Glycemic Management: blood glucose monitored

## 2022-06-29 VITALS
RESPIRATION RATE: 20 BRPM | WEIGHT: 170.44 LBS | OXYGEN SATURATION: 95 % | HEIGHT: 68 IN | DIASTOLIC BLOOD PRESSURE: 70 MMHG | TEMPERATURE: 99 F | BODY MASS INDEX: 25.83 KG/M2 | HEART RATE: 61 BPM | SYSTOLIC BLOOD PRESSURE: 138 MMHG

## 2022-06-29 PROBLEM — E10.9 TYPE 1 DIABETES MELLITUS: Chronic | Status: RESOLVED | Noted: 2022-06-24 | Resolved: 2022-06-29

## 2022-06-29 LAB
ANION GAP SERPL CALCULATED.3IONS-SCNC: 10 MMOL/L (ref 7–16)
BACTERIA SPEC ANAEROBE CULT: ABNORMAL
BASOPHILS # BLD AUTO: 0.04 K/UL (ref 0–0.2)
BASOPHILS NFR BLD AUTO: 0.7 % (ref 0–1)
BUN SERPL-MCNC: 11 MG/DL (ref 7–18)
BUN/CREAT SERPL: 9 (ref 6–20)
CALCIUM SERPL-MCNC: 8.5 MG/DL (ref 8.5–10.1)
CHLORIDE SERPL-SCNC: 105 MMOL/L (ref 98–107)
CO2 SERPL-SCNC: 29 MMOL/L (ref 21–32)
CREAT SERPL-MCNC: 1.18 MG/DL (ref 0.7–1.3)
DIFFERENTIAL METHOD BLD: ABNORMAL
EOSINOPHIL # BLD AUTO: 0.28 K/UL (ref 0–0.5)
EOSINOPHIL NFR BLD AUTO: 4.7 % (ref 1–4)
ERYTHROCYTE [DISTWIDTH] IN BLOOD BY AUTOMATED COUNT: 11.9 % (ref 11.5–14.5)
GLUCOSE SERPL-MCNC: 107 MG/DL (ref 70–105)
GLUCOSE SERPL-MCNC: 175 MG/DL (ref 70–105)
GLUCOSE SERPL-MCNC: 184 MG/DL (ref 74–106)
HCT VFR BLD AUTO: 34.6 % (ref 40–54)
HGB BLD-MCNC: 11.8 G/DL (ref 13.5–18)
IMM GRANULOCYTES # BLD AUTO: 0.05 K/UL (ref 0–0.04)
IMM GRANULOCYTES NFR BLD: 0.8 % (ref 0–0.4)
LYMPHOCYTES # BLD AUTO: 1.73 K/UL (ref 1–4.8)
LYMPHOCYTES NFR BLD AUTO: 29 % (ref 27–41)
MCH RBC QN AUTO: 30.8 PG (ref 27–31)
MCHC RBC AUTO-ENTMCNC: 34.1 G/DL (ref 32–36)
MCV RBC AUTO: 90.3 FL (ref 80–96)
MONOCYTES # BLD AUTO: 0.41 K/UL (ref 0–0.8)
MONOCYTES NFR BLD AUTO: 6.9 % (ref 2–6)
MPC BLD CALC-MCNC: 9.2 FL (ref 9.4–12.4)
NEUTROPHILS # BLD AUTO: 3.45 K/UL (ref 1.8–7.7)
NEUTROPHILS NFR BLD AUTO: 57.9 % (ref 53–65)
NRBC # BLD AUTO: 0 X10E3/UL
NRBC, AUTO (.00): 0 %
PLATELET # BLD AUTO: 253 K/UL (ref 150–400)
PLATELET MORPHOLOGY: ABNORMAL
POTASSIUM SERPL-SCNC: 4.8 MMOL/L (ref 3.5–5.1)
RBC # BLD AUTO: 3.83 M/UL (ref 4.6–6.2)
RBC MORPH BLD: NORMAL
SODIUM SERPL-SCNC: 139 MMOL/L (ref 136–145)
VANCOMYCIN TROUGH SERPL-MCNC: 12.8 ΜG/ML (ref 10–20)
WBC # BLD AUTO: 5.96 K/UL (ref 4.5–11)

## 2022-06-29 PROCEDURE — 99239 HOSP IP/OBS DSCHRG MGMT >30: CPT | Mod: ,,, | Performed by: HOSPITALIST

## 2022-06-29 PROCEDURE — 80202 ASSAY OF VANCOMYCIN: CPT | Performed by: HOSPITALIST

## 2022-06-29 PROCEDURE — 94640 AIRWAY INHALATION TREATMENT: CPT

## 2022-06-29 PROCEDURE — 63600175 PHARM REV CODE 636 W HCPCS: Performed by: REGISTERED NURSE

## 2022-06-29 PROCEDURE — 85025 COMPLETE CBC W/AUTO DIFF WBC: CPT | Performed by: REGISTERED NURSE

## 2022-06-29 PROCEDURE — 80048 BASIC METABOLIC PNL TOTAL CA: CPT | Performed by: REGISTERED NURSE

## 2022-06-29 PROCEDURE — 94761 N-INVAS EAR/PLS OXIMETRY MLT: CPT

## 2022-06-29 PROCEDURE — 25000003 PHARM REV CODE 250: Performed by: REGISTERED NURSE

## 2022-06-29 PROCEDURE — 25000242 PHARM REV CODE 250 ALT 637 W/ HCPCS: Performed by: REGISTERED NURSE

## 2022-06-29 PROCEDURE — 36415 COLL VENOUS BLD VENIPUNCTURE: CPT | Performed by: REGISTERED NURSE

## 2022-06-29 PROCEDURE — 25000003 PHARM REV CODE 250: Performed by: HOSPITALIST

## 2022-06-29 PROCEDURE — 99239 PR HOSPITAL DISCHARGE DAY,>30 MIN: ICD-10-PCS | Mod: ,,, | Performed by: HOSPITALIST

## 2022-06-29 PROCEDURE — 82962 GLUCOSE BLOOD TEST: CPT

## 2022-06-29 PROCEDURE — 63600175 PHARM REV CODE 636 W HCPCS: Performed by: HOSPITALIST

## 2022-06-29 RX ORDER — AMOXICILLIN AND CLAVULANATE POTASSIUM 875; 125 MG/1; MG/1
1 TABLET, FILM COATED ORAL EVERY 12 HOURS
Qty: 20 TABLET | Refills: 0 | Status: ON HOLD | OUTPATIENT
Start: 2022-06-29 | End: 2022-08-15 | Stop reason: ALTCHOICE

## 2022-06-29 RX ORDER — INSULIN ASPART 100 [IU]/ML
15 INJECTION, SUSPENSION SUBCUTANEOUS EVERY MORNING
Qty: 4.5 ML | Refills: 2 | Status: SHIPPED | OUTPATIENT
Start: 2022-06-29 | End: 2022-07-06 | Stop reason: SDUPTHER

## 2022-06-29 RX ADMIN — AMLODIPINE BESYLATE 10 MG: 10 TABLET ORAL at 09:06

## 2022-06-29 RX ADMIN — GABAPENTIN 900 MG: 300 CAPSULE ORAL at 12:06

## 2022-06-29 RX ADMIN — GABAPENTIN 900 MG: 300 CAPSULE ORAL at 05:06

## 2022-06-29 RX ADMIN — PANTOPRAZOLE SODIUM 40 MG: 40 TABLET, DELAYED RELEASE ORAL at 08:06

## 2022-06-29 RX ADMIN — IPRATROPIUM BROMIDE AND ALBUTEROL SULFATE 3 ML: 2.5; .5 SOLUTION RESPIRATORY (INHALATION) at 07:06

## 2022-06-29 RX ADMIN — ASPIRIN 81 MG 81 MG: 81 TABLET ORAL at 08:06

## 2022-06-29 RX ADMIN — VANCOMYCIN HYDROCHLORIDE 1500 MG: 5 INJECTION, POWDER, LYOPHILIZED, FOR SOLUTION INTRAVENOUS at 05:06

## 2022-06-29 RX ADMIN — LISINOPRIL 10 MG: 10 TABLET ORAL at 08:06

## 2022-06-29 RX ADMIN — INSULIN ASPART 20 UNITS: 100 INJECTION, SUSPENSION SUBCUTANEOUS at 06:06

## 2022-06-29 RX ADMIN — PIPERACILLIN SODIUM AND TAZOBACTAM SODIUM 4.5 G: 4; 500 INJECTION, POWDER, FOR SOLUTION INTRAVENOUS at 09:06

## 2022-06-29 RX ADMIN — PIPERACILLIN SODIUM AND TAZOBACTAM SODIUM 4.5 G: 4; 500 INJECTION, POWDER, FOR SOLUTION INTRAVENOUS at 02:06

## 2022-06-29 RX ADMIN — MUPIROCIN: 20 OINTMENT TOPICAL at 09:06

## 2022-06-29 RX ADMIN — ESCITALOPRAM OXALATE 20 MG: 10 TABLET ORAL at 08:06

## 2022-06-29 RX ADMIN — HYDROCODONE BITARTRATE AND ACETAMINOPHEN 1 TABLET: 10; 325 TABLET ORAL at 05:06

## 2022-06-29 RX ADMIN — METOPROLOL SUCCINATE 25 MG: 25 TABLET, FILM COATED, EXTENDED RELEASE ORAL at 08:06

## 2022-06-29 NOTE — PROGRESS NOTES
Pharmacokinetic Assessment Follow Up: IV Vancomycin    Vancomycin serum concentration assessment(s):    The trough level was drawn correctly and can be used to guide therapy at this time. The measurement is within the desired definitive target range of 10 to 20 mcg/mL.    Vancomycin Regimen Plan:    Continue regimen of Vancomycin 1500 mg IV every 18 hours with next serum trough concentration measured at 1630 on 7/3    Drug levels (last 3 results):  Recent Labs   Lab Result Units 06/26/22 2117 06/29/22 0227   Vancomycin, Trough µg/mL 7.4* 12.8       Pharmacy will continue to follow and monitor vancomycin.    Please contact pharmacy at extension [phone] for questions regarding this assessment.    Patient brief summary:  Navdeep Avery is a 61 y.o. male initiated on antimicrobial therapy with IV Vancomycin for treatment of skin & soft tissue infection    The patient's current regimen is vanc 1500 mg IV q18h    Drug Allergies:   Review of patient's allergies indicates:  No Known Allergies    Actual Body Weight:   77.3 kg    Renal Function:   Estimated Creatinine Clearance: 62 mL/min (based on SCr of 1.18 mg/dL).,     Dialysis Method (if applicable):  N/A    CBC (last 72 hours):  Recent Labs   Lab Result Units 06/27/22  0546 06/28/22 0231 06/29/22 0227   WBC K/uL 5.50 6.21 5.96   Hemoglobin g/dL 11.4* 11.4* 11.8*   Hematocrit % 33.6* 33.2* 34.6*   Platelet Count K/uL 268 245 253   Lymphocytes % % 38.2 26.1* 29.0   Monocytes % % 7.3* 7.2* 6.9*   Eosinophils % % 4.5* 4.0 4.7*   Basophils % % 0.7 0.5 0.7       Metabolic Panel (last 72 hours):  Recent Labs   Lab Result Units 06/27/22  0546 06/28/22 0231 06/29/22 0227   Sodium mmol/L 143 141 139   Potassium mmol/L 4.3 4.5 4.8   Chloride mmol/L 105 108* 105   CO2 mmol/L 32 30 29   Glucose mg/dL 226* 161* 184*   BUN mg/dL 12 9 11   Creatinine mg/dL 0.97 0.84 1.18       Vancomycin Administrations:  vancomycin given in the last 96 hours                     vancomycin  (VANCOCIN) 1,500 mg in dextrose 5 % 250 mL IVPB (mg) 1,500 mg New Bag 06/29/22 0515     1,500 mg New Bag 06/28/22 1037     1,500 mg New Bag 06/27/22 1720    vancomycin (VANCOCIN) 1,500 mg in dextrose 5 % 250 mL IVPB (mg) 1,500 mg New Bag 06/26/22 2300     1,500 mg New Bag 06/25/22 2321                    Microbiologic Results:  Microbiology Results (last 7 days)       Procedure Component Value Units Date/Time    Blood culture (site 1) [[id number]] Collected: 06/24/22 1621    Order Status: Completed Specimen: Blood Updated: 06/29/22 0524     Culture, Blood No Growth At 72 Hours    Blood culture (site 2) [[id number]] Collected: 06/24/22 1621    Order Status: Completed Specimen: Blood Updated: 06/29/22 0524     Culture, Blood No Growth At 72 Hours

## 2022-06-29 NOTE — DISCHARGE SUMMARY
[facility] Medicine  Discharge Summary      Patient Name: Navdeep Avery  MRN: [id number]  Patient Class: IP- Inpatient  Admission Date: 6/24/2022  Hospital Length of Stay: 5 days  Discharge Date and Time:  06/29/2022 12:07 PM  Attending Physician: Valeriano Kyle MD   Discharging Provider: Valeriano Kyle MD  Primary Care Provider: Marquez Huff MD      HPI:   62 y/o male who is admitted today from [facility] due to non healing wound of right foot. Upon questioning the patient reports he has chronic wounds to right foot for at least 2 years with past debridement (). Reports that he was at his son's house recently walking up and down stairs and foot started hurting afterwards. He reports pain from his foot to his knee that now extends up to thigh. His foot is swollen and tender to palpation. He endorses subjective fever, occasional SOB worse than normal with exertion.There is no reported chest pain, vomiting, diarrhea, diaphoresis, vision change, numbness or recent illness. Patent is a smoker of 1/2 a pack of cigarretes for > 50 years. Reports having Covid.   Past Medical History: Anxiety, Depression, CAD, CABG (2 years ago [facility]), COPD, HTN, HLD, COVID, SAMEERA, DM 1, GERD, PVD, Neuropathy, Tobacco use             * No surgery found *      Hospital Course:       Patient information was obtained from past medical records and Patient      Subjective:      Principal Problem:Ulcer of right foot with necrosis of muscle     Chief Complaint: No chief complaint on file.        HPI: 62 y/o male who is admitted today from [facility] due to non healing wound of right foot. Upon questioning the patient reports he has chronic wounds to right foot for at least 2 years with past debridement (). Reports that he was at his son's house recently walking up and down stairs and foot started hurting afterwards. He reports pain from his foot to his knee that now extends up to thigh. His  foot is swollen and tender to palpation. He endorses subjective fever, occasional SOB worse than normal with exertion.There is no reported chest pain, vomiting, diarrhea, diaphoresis, vision change, numbness or recent illness. Patent is a smoker of 1/2 a pack of cigarretes for > 50 years. Reports having Covid.   Past Medical History: Anxiety, Depression, CAD, CABG (2 years ago [facility]), COPD, HTN, HLD, COVID, SAMEERA, DM 1, GERD, PVD, Neuropathy, Tobacco use                      Past Medical History:   Diagnosis Date    Anemia      Anxiety      Atherosclerotic heart disease of native coronary artery with other forms of angina pectoris      Atrophic rhinitis      Carpal tunnel syndrome      Cellulitis of right lower extremity      COPD (chronic obstructive pulmonary disease)      Coronary arteriosclerosis      COVID 02/02/2022    Depression      Diabetic ulcer of right foot associated with diabetes mellitus due to underlying condition, with bone involvement without evidence of necrosis      GERD (gastroesophageal reflux disease)      Hyperlipidemia      Hypertension      Insomnia      MI (myocardial infarction)      Neuropathy      Peripheral vascular disease, unspecified      Right foot ulcer, with fat layer exposed 01/07/2015    Sleep apnea      Type 1 diabetes mellitus with foot ulcer      Type 2 diabetes mellitus                 Past Surgical History:   Procedure Laterality Date    AMPUTATION        ANGIOPLASTY        CARDIAC CATHETERIZATION        CORONARY STENT PLACEMENT        DEBRIDEMENT         Right foot debridment with hospital stay and IV Abx    SHOULDER OPEN ROTATOR CUFF REPAIR Left      SPINE SURGERY        VASCULAR SURGERY             Review of patient's allergies indicates:  No Known Allergies     No current facility-administered medications on file prior to encounter.           Current Outpatient Medications on File Prior to Encounter   Medication Sig    albuterol (PROVENTIL)  2.5 mg /3 mL (0.083 %) nebulizer solution USE 1 VIAL IN NEBULIZER 3 TIMES DAILY    albuterol (PROVENTIL/VENTOLIN HFA) 90 mcg/actuation inhaler Inhale 2 puffs into the lungs 2 (two) times daily as needed for Wheezing.    amLODIPine (NORVASC) 10 MG tablet Take 1 tablet (10 mg total) by mouth once daily.    aspirin 81 MG Chew Take 81 mg by mouth once daily.    atorvastatin (LIPITOR) 40 MG tablet Take 1 tablet (40 mg total) by mouth nightly.    clopidogreL (PLAVIX) 75 mg tablet Take 1 tablet (75 mg total) by mouth once daily.    diclofenac sodium (VOLTAREN) 1 % Gel Apply 1 g topically 4 (four) times daily as needed (pain).    empagliflozin (JARDIANCE) 10 mg tablet Take 1 tablet (10 mg total) by mouth once daily.    EScitalopram oxalate (LEXAPRO) 10 MG tablet Take 1 tablet (10 mg total) by mouth 2 (two) times a day.    fluticasone propionate (FLONASE) 50 mcg/actuation nasal spray 2 sprays (100 mcg total) by Each Nostril route once daily.    gabapentin (NEURONTIN) 300 MG capsule Take 3 capsules (900 mg total) by mouth every 6 (six) hours.    [START ON 7/16/2022] HYDROcodone-acetaminophen (NORCO)  mg per tablet Take 1 tablet by mouth every 8 (eight) hours as needed for Pain.    [START ON 8/16/2022] HYDROcodone-acetaminophen (NORCO)  mg per tablet Take 1 tablet by mouth every 8 (eight) hours as needed for Pain.    hydrOXYzine pamoate (VISTARIL) 25 MG Cap Take 1 capsule (25 mg total) by mouth once daily. Take nightly for sleep    insulin glargine U-300 conc (TOUJEO MAX U-300 SOLOSTAR) 300 unit/mL (3 mL) insulin pen Inject 40 Units into the skin once daily at 6am.    lisinopriL 10 MG tablet Take 1 tablet (10 mg total) by mouth once daily.    metoprolol succinate (TOPROL-XL) 25 MG 24 hr tablet Take 1 tablet (25 mg total) by mouth once daily.    nitroGLYCERIN (NITROSTAT) 0.4 MG SL tablet Place 1 tablet (0.4 mg total) under the tongue every 5 (five) minutes as needed for Chest pain.    pantoprazole  (PROTONIX) 40 MG tablet Take 1 tablet (40 mg total) by mouth once daily.    SPIRIVA RESPIMAT 2.5 mcg/actuation inhaler Inhale 1 puff into the lungs once daily.    [DISCONTINUED] HYDROcodone-acetaminophen (NORCO)  mg per tablet Take 1 tablet by mouth every 8 (eight) hours as needed for Pain.    [DISCONTINUED] icosapent ethyL (VASCEPA) 1 gram Cap Take 2 capsules (2 g total) by mouth 2 (two) times daily.      Family History         Problem Relation (Age of Onset)     Alcohol abuse Father     Arthritis Mother     COPD Paternal Grandfather     Cancer Sister     Diabetes Sister     Early death Father     Heart disease Father, Maternal Grandfather, Son     Hypertension Mother     Learning disabilities Mother                   Tobacco Use    Smoking status: Current Every Day Smoker       Packs/day: 0.50       Types: Cigarettes    Smokeless tobacco: Never Used   Substance and Sexual Activity    Alcohol use: Not Currently    Drug use: Yes       Types: Oxycodone    Sexual activity: Yes      Review of Systems   Constitutional:  Positive for fever. Negative for chills and diaphoresis.   HENT:  Negative for sinus pain.    Eyes:  Negative for visual disturbance.   Respiratory:  Positive for shortness of breath. Negative for cough and chest tightness.    Cardiovascular:  Positive for leg swelling. Negative for chest pain and palpitations.   Gastrointestinal:  Negative for abdominal pain, blood in stool, constipation, diarrhea, nausea and vomiting.   Genitourinary:  Negative for flank pain.   Musculoskeletal:  Positive for gait problem.   Skin:  Positive for wound.   Neurological:  Positive for light-headedness. Negative for dizziness, syncope and headaches.   All other systems reviewed and are negative.  Objective:      Vital Signs (Most Recent):    Vital Signs (24h Range):  Temp:  [98 °F (36.7 °C)] 98 °F (36.7 °C)  Pulse:  [72] 72  Resp:  [18] 18  BP: (147)/(77) 147/77         There is no height or weight on file to  calculate BMI.     Physical Exam  Vitals reviewed.   Constitutional:       General: He is not in acute distress.     Appearance: He is normal weight.   HENT:      Head: Normocephalic and atraumatic.      Mouth/Throat:      Mouth: Mucous membranes are moist.   Eyes:      Extraocular Movements: Extraocular movements intact.      Pupils: Pupils are equal, round, and reactive to light.   Cardiovascular:      Rate and Rhythm: Normal rate.      Pulses: Normal pulses.      Heart sounds: Normal heart sounds.   Pulmonary:      Effort: Pulmonary effort is normal. No respiratory distress.      Breath sounds: Normal breath sounds.   Abdominal:      General: Bowel sounds are normal. There is no distension.      Palpations: Abdomen is soft.      Tenderness: There is no abdominal tenderness. There is no guarding.   Musculoskeletal:         General: Normal range of motion.      Cervical back: Normal range of motion and neck supple.      Right lower leg: Edema present.        Feet:    Feet:      Right foot:      Skin integrity: Callus present.      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      Capillary Refill: Capillary refill takes less than 2 seconds.      Findings: Lesion present.   Neurological:      General: No focal deficit present.      Mental Status: He is alert and oriented to person, place, and time.      Cranial Nerves: No cranial nerve deficit.      Sensory: No sensory deficit.   Psychiatric:         Mood and Affect: Mood normal.         Behavior: Behavior normal.            CRANIAL NERVES      CN III, IV, VI   Pupils are equal, round, and reactive to light.      Hospital course.    Ad[facility] in with plan antibiotics and check cultures.  Seen by Dr. Kiser with debridement of right foot wound.  Patient did well.  Cultures grew mixed organisms of E coli and Staph species including MRSA.  All  "antibiotics covered by Bactrim DS.  Patient is anxious to go home and feeling better.  Discussed with Dr. Kiser and will discharge patient home on oral antibiotics.  He is to [facility] with [facility] along with his primary care provider.  Blood sugar elevated and added 70/ 30 insulin to morning regimen and will continue with his Lantus shot at bedtime.   He should follow up with Dr. Huff in the next week to recheck blood sugar  Discharge home                             Goals of Care Treatment Preferences:  Code Status: Full Code      Consults:   Consults (From admission, onward)        Status Ordering Provider     Pharmacy to dose Vancomycin consult  Once        Provider:  (Not yet assigned)    Acknowledged PENNIE ANDINO     Inpatient consult to General Surgery  Once        Provider:  Forrest Kiser MD    Acknowledged PENNIE ANDINO     Pharmacy to dose Vancomycin consult  Once        Provider:  (Not yet assigned)   "And" Linked Group Details    Completed AUGIE HAN          * Ulcer of right foot with necrosis of muscle  Surgical debridement by Dr Kiser  [facility]  Discharge on oral antibiotics    Diabetic peripheral neuropathy  Control blood sugar      Diabetic ulcer of right foot associated with diabetes mellitus due to underlying condition, with bone involvement without evidence of necrosis        Type 2 diabetes mellitus  Added 70 30 insulin to morning regimen and continue with previous evening dose of insulin and oral agent  Hemoglobin A1c recent past was over nine      Dependence on nicotine from cigarettes  Cessation education      Mixed hyperlipidemia  Chronic resume home medication          Final Active Diagnoses:    Diagnosis Date Noted POA    PRINCIPAL PROBLEM:  Ulcer of right foot with necrosis of muscle [L97.513] 03/23/2021 Yes    Diabetic peripheral neuropathy [E11.42]  Yes    COPD (chronic obstructive pulmonary disease) [J44.9] 06/24/2022 Yes     Chronic    " "Diabetic ulcer of right foot associated with diabetes mellitus due to underlying condition, with bone involvement without evidence of necrosis [E08.621, L97.516]  Yes    Type 2 diabetes mellitus [E11.9] 06/17/2021 Yes    Mixed hyperlipidemia [E78.2] 03/23/2021 Yes    Benign hypertension [I10] 03/23/2021 Yes    Dependence on nicotine from cigarettes [F17.210] 03/23/2021 Yes      Problems Resolved During this Admission:       Discharged Condition: fair    Disposition: Home or Self Care    Follow Up:   Follow-up Information     [facility] Follow up in 2 week(s).    Specialty: Wound Care  Why: Appt: July 13, 2022 @ 10:00  Contact information:  [address] [place] [id number] [phone]           Marquez Huff MD Follow up in 1 week(s).    Specialty: Family Medicine  Contact information:  [address] [facility].  [facility] [address] [phone]                       Patient Instructions:      DIABETIC SUPPLIES FOR HOME USE     Order Specific Question Answer Comments   Height: 5' 8" (1.727 m)    Weight: 77.3 kg (170 lb 6.7 oz)    Does patient have medical equipment at home? walker, standard    Does patient have medical equipment at home? oxygen    Does patient have medical equipment at home? CPAP    Length of need (1-99 months): 99    Is the Patient insulin dependent? Yes    How many times each day does your Patient test his or her glucose level? QID    Do you follow the Patient at least every 6 months for Management of Diabetes? Yes    Lancing device? Yes    Blood glucose strips (if YES, put quantity in Comments field)? Yes 100   Alcohol wipes? Yes      Diet diabetic     Change dressing (specify)   Order Comments: Dressing change:  Wound in dressing care instructions from surgery, obtain prior to discharge     Activity as tolerated   Order Comments: Check with surgery concerning weight-bearing instructions       Significant " Diagnostic Studies: Labs:   BMP:   Recent Labs   Lab 06/28/22  0231 06/29/22 0227   * 184*    139   K 4.5 4.8   * 105   CO2 30 29   BUN 9 11   CREATININE 0.84 1.18   CALCIUM 8.4* 8.5   , CMP   Recent Labs   Lab 06/28/22 0231 06/29/22 0227    139   K 4.5 4.8   * 105   CO2 30 29   * 184*   BUN 9 11   CREATININE 0.84 1.18   CALCIUM 8.4* 8.5   ANIONGAP 8 10   EGFRNONAA 98 66    and CBC   Recent Labs   Lab 06/28/22 0231 06/29/22 0227   WBC 6.21 5.96   HGB 11.4* 11.8*   HCT 33.2* 34.6*    253       Pending Diagnostic Studies:     Procedure Component Value Units Date/Time    Urinalysis, Reflex to Urine Culture [[id number]]     Order Status: Sent Lab Status: No result     Specimen: Urine          Intake/Output - Last 3 Shifts       06/27 0700 06/28 0659 06/28 0700 06/29 0659 06/29 0700 06/30 0659    P.O.  610     I.V. (mL/kg) 50 (0.6)  250 (3.2)    IV Piggyback 350 350.1     Total Intake(mL/kg) 400 (5.1) 960.1 (12.4) 250 (3.2)    Net +400 +960.1 +250           Urine Occurrence  2 x     Stool Occurrence  0 x             Microbiology Results (last 7 days)     Procedure Component Value Units Date/Time    Blood culture (site 1) [[id number]] Collected: 06/24/22 1621    Order Status: Completed Specimen: Blood Updated: 06/29/22 0524     Culture, Blood No Growth At 72 Hours    Blood culture (site 2) [[id number]] Collected: 06/24/22 1621    Order Status: Completed Specimen: Blood Updated: 06/29/22 0524     Culture, Blood No Growth At 72 Hours              Medications:  Reconciled Home Medications:      Medication List      START taking these medications    amoxicillin-clavulanate 875-125mg 875-125 mg per tablet  Commonly known as: AUGMENTIN  Take 1 tablet by mouth every 12 (twelve) hours.     insulin aspart protamine-insulin aspart 100 unit/mL (70-30) Inpn pen  Commonly known as: NovoLOG Mix 70-30FlexPen U-100  Inject 15 Units into the skin every morning.        CHANGE how you take  these medications    HYDROcodone-acetaminophen  mg per tablet  Commonly known as: NORCO  Take 1 tablet by mouth every 8 (eight) hours as needed for Pain.  Start taking on: July 16, 2022  What changed: Another medication with the same name was removed. Continue taking this medication, and follow the directions you see here.        CONTINUE taking these medications    * albuterol 2.5 mg /3 mL (0.083 %) nebulizer solution  Commonly known as: PROVENTIL  USE 1 VIAL IN NEBULIZER 3 TIMES DAILY     * albuterol 90 mcg/actuation inhaler  Commonly known as: PROVENTIL/VENTOLIN HFA  Inhale 2 puffs into the lungs 2 (two) times daily as needed for Wheezing.     amLODIPine 10 MG tablet  Commonly known as: NORVASC  Take 1 tablet (10 mg total) by mouth once daily.     aspirin 81 MG Chew  Take 81 mg by mouth once daily.     atorvastatin 40 MG tablet  Commonly known as: LIPITOR  Take 1 tablet (40 mg total) by mouth nightly.     clopidogreL 75 mg tablet  Commonly known as: PLAVIX  Take 1 tablet (75 mg total) by mouth once daily.     diclofenac sodium 1 % Gel  Commonly known as: VOLTAREN  Apply 1 g topically 4 (four) times daily as needed (pain).     EScitalopram oxalate 10 MG tablet  Commonly known as: LEXAPRO  Take 1 tablet (10 mg total) by mouth 2 (two) times a day.     fluticasone propionate 50 mcg/actuation nasal spray  Commonly known as: FLONASE  2 sprays (100 mcg total) by Each Nostril route once daily.     gabapentin 300 MG capsule  Commonly known as: NEURONTIN  Take 3 capsules (900 mg total) by mouth every 6 (six) hours.     hydrOXYzine pamoate 25 MG Cap  Commonly known as: VISTARIL  Take 1 capsule (25 mg total) by mouth once daily. Take nightly for sleep     JARDIANCE 10 mg tablet  Generic drug: empagliflozin  Take 1 tablet (10 mg total) by mouth once daily.     lisinopriL 10 MG tablet  Take 1 tablet (10 mg total) by mouth once daily.     metoprolol succinate 25 MG 24 hr tablet  Commonly known as: TOPROL-XL  Take 1 tablet  (25 mg total) by mouth once daily.     nitroGLYCERIN 0.4 MG SL tablet  Commonly known as: NITROSTAT  Place 1 tablet (0.4 mg total) under the tongue every 5 (five) minutes as needed for Chest pain.     pantoprazole 40 MG tablet  Commonly known as: PROTONIX  Take 1 tablet (40 mg total) by mouth once daily.     SPIRIVA RESPIMAT 2.5 mcg/actuation inhaler  Generic drug: tiotropium bromide  Inhale 1 puff into the lungs once daily.     TOUJEO MAX U-300 SOLOSTAR 300 unit/mL (3 mL) insulin pen  Generic drug: insulin glargine U-300 conc  Inject 40 Units into the skin once daily at 6am.         * This list has 2 medication(s) that are the same as other medications prescribed for you. Read the directions carefully, and ask your doctor or other care provider to review them with you.                Indwelling Lines/Drains at time of discharge:   Lines/Drains/Airways     None                 Time spent on the discharge of patient: more 30   minutes         Valeriano Kyle MD  Department of Hospital Medicine  [facility]

## 2022-06-29 NOTE — ASSESSMENT & PLAN NOTE
Added 70 30 insulin to morning regimen and continue with previous evening dose of insulin and oral agent  Hemoglobin A1c recent past was over nine

## 2022-06-29 NOTE — NURSING
Patient lying in bed awake and alert. NADN. Makes needs known with clear speech. Independent with ADL's. Transfers self. IV removed. Patient jean claude well. No new skin issues noted. Patient to be discharged to home via personal vehicle today at 12:30.   6/25 @ 1231: Wife in room now packing pt belongings. Educated pt and wife on new/changed medications. Reviewed follow up appointments. Instructed pt on weight bearing as jean claude per Dr. Kiser. Patient verbalized understanding.

## 2022-06-29 NOTE — PROGRESS NOTES
Pt discharging today. Nutrition reviewed. Pt has been receiving a Diabetic/consistent Carbohydrate diet throughout admission. Diabetic/consistent carbohydrate diet remains most appropriate diet in order to promote greater glycemic control given continued hyperglycemia. Pt has been eating % of meals, good appetite. Recommend continuation of diabetic diet at discharge with addition of diabetic friendly supplements if intakes are <50% of meals. RD signing off.

## 2022-06-29 NOTE — PLAN OF CARE
[facility]  Initial Discharge Assessment       Primary Care Provider: Marquez Huff MD    Admission Diagnosis: Infected wound [T14.8XXA, L08.9]    Admission Date: 6/24/2022  Expected Discharge Date:     Discharge Barriers Identified: None    Payor: MEDICARE / Plan: MEDICARE PART A & B / Product Type: Government /     Extended Emergency Contact Information  Primary Emergency Contact: Charisma Avery  Mobile Phone: [phone]  Relation: Spouse  Preferred language: English   needed? No    Discharge Plan A: Home with family, Home Health  Discharge Plan B: Home with family, Home Health       [facility] [place] [address]  [address]  [facility] [address]  Phone: [phone] Fax: [phone]     [facility] - [place] - [address]  [address]  [facility] [address]  Phone: [phone] Fax: [phone]    [facility] [address]  [address] [place] [address] [address]vd[address]  [address]  [facility] [address]  Phone: [phone] Fax: [phone]      Initial Assessment (most recent)     Adult Discharge Assessment - 06/29/22 0900        Discharge Assessment    Assessment Type Discharge Planning Assessment     Source of Information family     If unable to respond/provide information was family/caregiver contacted? Yes     Contact Name/Number Charisma Avery- spouse [phone]     Communicated MARGARITO with patient/caregiver Date not available/Unable to determine     Lives With spouse     Do you expect to return to your current living situation? Yes     Do you have help at home or someone to help you manage your care at home? Yes     Who are your caregiver(s) and their phone number(s)? Charisma Avery- [phone]     Prior to hospitilization cognitive status: Alert/Oriented     Current cognitive status: Alert/Oriented     Equipment Currently Used at Home walker, standard;oxygen;CPAP      Patient currently being followed by outpatient case management? No     Do you currently have service(s) that help you manage your care at home? Yes     Name and Contact number of agency [facility]     Is the pt/caregiver preference to resume services with current agency Yes     Who is going to help you get home at discharge? wife     How do you get to doctors appointments? family or friend will provide     Discharge Plan A Home with family;Home Health     Discharge Plan B Home with family;Home Health     DME Needed Upon Discharge  none     Discharge Plan discussed with: Spouse/sig other     Discharge Barriers Identified None                    SS spoke with pt's wife, Navdeep. Pt lives at home and has [facility]. Pt has needed dme pta. Wife did not want to set up a password. Informed of IDT meeting and would like to attend.

## 2022-06-29 NOTE — DISCHARGE INSTRUCTIONS
Patient is currently receiving nebulizer treatments Q12 with DuoNeb medication. Patient is also on room air.  O2 sats @ 95% on room air.

## 2022-06-30 ENCOUNTER — TELEPHONE (OUTPATIENT)
Dept: FAMILY MEDICINE | Facility: CLINIC | Age: 62
End: 2022-06-30

## 2022-06-30 DIAGNOSIS — L97.413 DIABETIC ULCER OF RIGHT MIDFOOT ASSOCIATED WITH DIABETES MELLITUS DUE TO UNDERLYING CONDITION, WITH NECROSIS OF MUSCLE: Primary | ICD-10-CM

## 2022-06-30 DIAGNOSIS — E08.621 DIABETIC ULCER OF RIGHT MIDFOOT ASSOCIATED WITH DIABETES MELLITUS DUE TO UNDERLYING CONDITION, WITH NECROSIS OF MUSCLE: Primary | ICD-10-CM

## 2022-06-30 LAB
BACTERIA BLD CULT: NORMAL
BACTERIA BLD CULT: NORMAL

## 2022-06-30 NOTE — TELEPHONE ENCOUNTER
Contacted pt for TCC call. Spoke with pt's wife. She reports pt is doing okay. She states pt's pain is controlled with pain meds. Reviewed dc instructions. Wife fu of all f/u appts and has transportation. She reports HH visit is scheduled for tomorrow. She vu of wound care instructions and has needed supplies. She denies any DME needs, pt currently has a RW, CPAP and oxygen. Wife reports pt pt's blood sugar was 226 this morning but stated pt checked his blood sugar after he ate. Educated wife on fasting blood sugar checks, she vu and stated she has been trying to get pt to check fasting blood sugars. Encouraged to keep a log of blood sugars and take to PCP visit. She vu. Medications reviewed. Wife vu of medications after discussion. She denies any medication needs. Instructed to take med bottles to all dr appts. Instructed to notify HH nurse or pt's dr for changes in his condition.

## 2022-07-06 ENCOUNTER — OFFICE VISIT (OUTPATIENT)
Dept: FAMILY MEDICINE | Facility: CLINIC | Age: 62
End: 2022-07-06
Payer: MEDICARE

## 2022-07-06 VITALS
DIASTOLIC BLOOD PRESSURE: 75 MMHG | HEIGHT: 68 IN | BODY MASS INDEX: 25.76 KG/M2 | TEMPERATURE: 98 F | OXYGEN SATURATION: 95 % | WEIGHT: 170 LBS | HEART RATE: 61 BPM | RESPIRATION RATE: 18 BRPM | SYSTOLIC BLOOD PRESSURE: 134 MMHG

## 2022-07-06 DIAGNOSIS — E11.8 DIABETES MELLITUS WITH COMPLICATION, WITH LONG-TERM CURRENT USE OF INSULIN: Primary | ICD-10-CM

## 2022-07-06 DIAGNOSIS — F41.9 ANXIETY: ICD-10-CM

## 2022-07-06 DIAGNOSIS — E78.5 HYPERLIPIDEMIA, UNSPECIFIED HYPERLIPIDEMIA TYPE: ICD-10-CM

## 2022-07-06 DIAGNOSIS — Z79.4 DIABETES MELLITUS WITH COMPLICATION, WITH LONG-TERM CURRENT USE OF INSULIN: Primary | ICD-10-CM

## 2022-07-06 LAB
CHOLEST SERPL-MCNC: 161 MG/DL (ref 0–200)
CHOLEST/HDLC SERPL: 4.2 {RATIO}
HDLC SERPL-MCNC: 38 MG/DL (ref 40–60)
LDLC SERPL CALC-MCNC: 91 MG/DL
LDLC/HDLC SERPL: 2.4 {RATIO}
NONHDLC SERPL-MCNC: 123 MG/DL
TRIGL SERPL-MCNC: 161 MG/DL (ref 35–150)
VLDLC SERPL-MCNC: 32 MG/DL

## 2022-07-06 PROCEDURE — 99495 TCM SERVICES (MODERATE COMPLEXITY): ICD-10-PCS | Mod: ,,, | Performed by: FAMILY MEDICINE

## 2022-07-06 PROCEDURE — 80061 LIPID PANEL: ICD-10-PCS | Mod: ,,, | Performed by: CLINICAL MEDICAL LABORATORY

## 2022-07-06 PROCEDURE — 80061 LIPID PANEL: CPT | Mod: ,,, | Performed by: CLINICAL MEDICAL LABORATORY

## 2022-07-06 PROCEDURE — 99495 TRANSJ CARE MGMT MOD F2F 14D: CPT | Mod: ,,, | Performed by: FAMILY MEDICINE

## 2022-07-06 RX ORDER — INSULIN ASPART 100 [IU]/ML
15 INJECTION, SUSPENSION SUBCUTANEOUS EVERY MORNING
Qty: 4.5 ML | Refills: 2 | Status: ON HOLD | OUTPATIENT
Start: 2022-07-06 | End: 2022-09-12 | Stop reason: HOSPADM

## 2022-07-06 RX ORDER — HYDROXYZINE PAMOATE 25 MG/1
25 CAPSULE ORAL DAILY
Qty: 90 CAPSULE | Refills: 1 | Status: SHIPPED | OUTPATIENT
Start: 2022-07-06 | End: 2022-12-23 | Stop reason: SDUPTHER

## 2022-07-06 RX ORDER — INSULIN GLARGINE 300 U/ML
40 INJECTION, SOLUTION SUBCUTANEOUS DAILY
Qty: 2 PEN | Refills: 1 | Status: ON HOLD | OUTPATIENT
Start: 2022-07-06 | End: 2022-09-12 | Stop reason: HOSPADM

## 2022-07-06 RX ORDER — ESCITALOPRAM OXALATE 10 MG/1
10 TABLET ORAL 2 TIMES DAILY
Qty: 180 TABLET | Refills: 1 | Status: ON HOLD | OUTPATIENT
Start: 2022-07-06 | End: 2022-09-12 | Stop reason: SDUPTHER

## 2022-07-08 ENCOUNTER — TELEPHONE (OUTPATIENT)
Dept: FAMILY MEDICINE | Facility: CLINIC | Age: 62
End: 2022-07-08
Payer: MEDICARE

## 2022-07-13 NOTE — PATIENT INSTRUCTIONS
Clean with baby shampoo and water or vashe.  Apply aquacel ag to wound bed. Cover with dry gauze, wrap with kilng and paper tape.   Change daily and as needed for drainage.  Walking boot to right foot  Keep leg elevated and avoid pressure on wound.  Avoid prolonged standing  Diabetes:  Monitor glucose closely. Check fasting glucose and 2 hours after meals. HgA1C goal <7, fasting glucose , and 2 hours after meals <180  Hypertension:  Check blood pressure twice daily, goal <120/80  Diet:   Increase protein intake, avoid fried, fatty foods and foods high in simple carbs.   Vitamins:  Take vitamin C 1000 mg, zinc 50mg, vitamin d 5000 units, and a daily multivitamin. Addy is a good source of protein and nutrients to aid in wound healing.   Monitor closely for s/s of infection including fever, chills, increase in pain, odor from wound, and increased redness from foot. Go to ER if any complications develop.   Discontinue current antibiotic.  Start new antibiotics and take until completion. (Cleocin 300 mg three times daily/cipro 500 three times daily.)  Keep arterial doppler appointment

## 2022-07-14 ENCOUNTER — OFFICE VISIT (OUTPATIENT)
Dept: WOUND CARE | Facility: CLINIC | Age: 62
End: 2022-07-14
Attending: FAMILY MEDICINE
Payer: MEDICARE

## 2022-07-14 VITALS
SYSTOLIC BLOOD PRESSURE: 124 MMHG | HEART RATE: 55 BPM | RESPIRATION RATE: 18 BRPM | DIASTOLIC BLOOD PRESSURE: 62 MMHG | TEMPERATURE: 98 F

## 2022-07-14 DIAGNOSIS — E08.621 DIABETIC ULCER OF RIGHT FOOT ASSOCIATED WITH DIABETES MELLITUS DUE TO UNDERLYING CONDITION, WITH BONE INVOLVEMENT WITHOUT EVIDENCE OF NECROSIS, UNSPECIFIED PART OF FOOT: Primary | ICD-10-CM

## 2022-07-14 DIAGNOSIS — I73.9 PERIPHERAL VASCULAR DISEASE, UNSPECIFIED: ICD-10-CM

## 2022-07-14 DIAGNOSIS — L97.516 DIABETIC ULCER OF RIGHT FOOT ASSOCIATED WITH DIABETES MELLITUS DUE TO UNDERLYING CONDITION, WITH BONE INVOLVEMENT WITHOUT EVIDENCE OF NECROSIS, UNSPECIFIED PART OF FOOT: Primary | ICD-10-CM

## 2022-07-14 PROCEDURE — 99214 PR OFFICE/OUTPT VISIT, EST, LEVL IV, 30-39 MIN: ICD-10-PCS | Mod: S$PBB,,, | Performed by: FAMILY MEDICINE

## 2022-07-14 PROCEDURE — 99214 OFFICE O/P EST MOD 30 MIN: CPT | Mod: S$PBB,,, | Performed by: FAMILY MEDICINE

## 2022-07-14 PROCEDURE — 99215 OFFICE O/P EST HI 40 MIN: CPT | Mod: PBBFAC | Performed by: FAMILY MEDICINE

## 2022-07-14 NOTE — PROGRESS NOTES
Subjective:      Patient ID: Navdeep Avery is a 61 y.o. male.    Chief Complaint: Diabetic ulcer of right foot associated with diabetes bogdani    Navdeep Avery a 61 y.o. male presents for follow up on all regular problems which are reviewed and discussed.     Problem List Items Addressed This Visit        Cardiac/Vascular    Peripheral vascular disease, unspecified (Chronic)    Relevant Orders    US Lower Extrem Arteries Bilat with JACQUELIN (xpd)       Endocrine    Diabetic ulcer of right foot associated with diabetes mellitus due to underlying condition, with bone involvement without evidence of necrosis - Primary    Relevant Orders    US Lower Extrem Arteries Bilat with JACQUELIN (xpd)          Past Medical History:  Past Medical History:   Diagnosis Date    Anemia     Anxiety     Atherosclerotic heart disease of native coronary artery with other forms of angina pectoris     Atrophic rhinitis     Carpal tunnel syndrome     Cellulitis of right lower extremity     COPD (chronic obstructive pulmonary disease)     Coronary arteriosclerosis     COVID 02/02/2022    Depression     Diabetic ulcer of right foot associated with diabetes mellitus due to underlying condition, with bone involvement without evidence of necrosis     GERD (gastroesophageal reflux disease)     Hyperlipidemia     Hypertension     Insomnia     MI (myocardial infarction)     Neuropathy     Peripheral vascular disease, unspecified     Right foot ulcer, with fat layer exposed 01/07/2015    Sleep apnea     Type 1 diabetes mellitus with foot ulcer     Type 2 diabetes mellitus      Past Surgical History:   Procedure Laterality Date    AMPUTATION      ANGIOPLASTY      CARDIAC CATHETERIZATION      CORONARY STENT PLACEMENT      DEBRIDEMENT      Right foot debridment with hospital stay and IV Abx    DEBRIDEMENT OF LOWER EXTREMITY Right 6/27/2022    Procedure: DEBRIDEMENT, LOWER EXTREMITY;  Surgeon: Forrest Kiser MD;  Location: [facility];  Service: General;  Laterality: Right;  right foot    SHOULDER OPEN ROTATOR CUFF REPAIR Left     SPINE SURGERY      VASCULAR SURGERY       Review of patient's allergies indicates:  No Known Allergies  Current Outpatient Medications on File Prior to Visit   Medication Sig Dispense Refill    albuterol (PROVENTIL) 2.5 mg /3 mL (0.083 %) nebulizer solution USE 1 VIAL IN NEBULIZER 3 TIMES DAILY 100 mL 11    albuterol (PROVENTIL/VENTOLIN HFA) 90 mcg/actuation inhaler Inhale 2 puffs into the lungs 2 (two) times daily as needed for Wheezing. 18 g 2    amLODIPine (NORVASC) 10 MG tablet Take 1 tablet (10 mg total) by mouth once daily. 90 tablet 1    amoxicillin-clavulanate 875-125mg (AUGMENTIN) 875-125 mg per tablet Take 1 tablet by mouth every 12 (twelve) hours. 20 tablet 0    aspirin 81 MG Chew Take 81 mg by mouth once daily.      atorvastatin (LIPITOR) 40 MG tablet Take 1 tablet (40 mg total) by mouth nightly. 90 tablet 1    clopidogreL (PLAVIX) 75 mg tablet Take 1 tablet (75 mg total) by mouth once daily. 90 tablet 1    diclofenac sodium (VOLTAREN) 1 % Gel Apply 1 g topically 4 (four) times daily as needed (pain). 20 g 1    empagliflozin (JARDIANCE) 10 mg tablet Take 1 tablet (10 mg total) by mouth once daily. 30 tablet 6    EScitalopram oxalate (LEXAPRO) 10 MG tablet Take 1 tablet (10 mg total) by mouth 2 (two) times a day. 180 tablet 1    fluticasone propionate (FLONASE) 50 mcg/actuation nasal spray 2 sprays (100 mcg total) by Each Nostril route once daily. 16 g 2    gabapentin (NEURONTIN) 300 MG capsule Take 3 capsules (900 mg total) by mouth every 6 (six) hours. (Patient taking differently: Take 900 mg by mouth 3 (three) times daily.) 360 capsule 2    [START ON 7/16/2022] HYDROcodone-acetaminophen (NORCO)  mg per tablet Take 1 tablet by mouth every 8 (eight) hours as needed for Pain. 90 tablet 0    hydrOXYzine pamoate (VISTARIL) 25 MG Cap Take 1 capsule (25 mg total) by mouth once  daily. Take nightly for sleep 90 capsule 1    insulin aspart protamine-insulin aspart (NOVOLOG MIX 70-30FLEXPEN U-100) 100 unit/mL (70-30) InPn pen Inject 15 Units into the skin every morning. 4.5 mL 2    insulin glargine U-300 conc (TOUJEO MAX U-300 SOLOSTAR) 300 unit/mL (3 mL) insulin pen Inject 40 Units into the skin once daily at 6am. 2 pen 1    lisinopriL 10 MG tablet Take 1 tablet (10 mg total) by mouth once daily. 90 tablet 1    metoprolol succinate (TOPROL-XL) 25 MG 24 hr tablet Take 1 tablet (25 mg total) by mouth once daily. 90 tablet 1    nitroGLYCERIN (NITROSTAT) 0.4 MG SL tablet Place 1 tablet (0.4 mg total) under the tongue every 5 (five) minutes as needed for Chest pain. 30 tablet 2    pantoprazole (PROTONIX) 40 MG tablet Take 1 tablet (40 mg total) by mouth once daily. 90 tablet 1    SPIRIVA RESPIMAT 2.5 mcg/actuation inhaler Inhale 1 puff into the lungs once daily. 4 g 5     No current facility-administered medications on file prior to visit.     Social History     Socioeconomic History    Marital status:    Occupational History    Occupation: Retired   Tobacco Use    Smoking status: Current Every Day Smoker     Packs/day: 0.50     Types: Cigarettes    Smokeless tobacco: Never Used   Substance and Sexual Activity    Alcohol use: Not Currently    Drug use: Yes     Types: Oxycodone    Sexual activity: Yes     Family History   Problem Relation Age of Onset    Arthritis Mother     Hypertension Mother     Learning disabilities Mother     Alcohol abuse Father     Early death Father     Heart disease Father     Cancer Sister     Diabetes Sister     Heart disease Maternal Grandfather     COPD Paternal Grandfather     Heart disease Son        Review of Systems   Constitutional: Negative.    HENT: Negative for congestion, ear pain, nosebleeds and trouble swallowing.    Eyes: Negative for pain and itching.   Respiratory: Negative for chest tightness.    Cardiovascular: Negative  for chest pain.   Gastrointestinal: Negative for abdominal distention.   Endocrine: Negative for cold intolerance and heat intolerance.   Genitourinary: Negative for difficulty urinating.   Musculoskeletal: Negative for arthralgias.   Skin: Positive for wound.   Neurological: Negative for dizziness.       Objective:     /62   Pulse (!) 55   Temp 98.2 °F (36.8 °C)   Resp 18     Physical Exam  Constitutional:       Appearance: Normal appearance.   HENT:      Head: Normocephalic and atraumatic.      Right Ear: External ear normal.      Left Ear: External ear normal.      Nose: Nose normal.      Mouth/Throat:      Mouth: Mucous membranes are moist.      Pharynx: Oropharynx is clear.   Eyes:      Pupils: Pupils are equal, round, and reactive to light.   Cardiovascular:      Rate and Rhythm: Normal rate and regular rhythm.      Heart sounds: Normal heart sounds.   Pulmonary:      Effort: Pulmonary effort is normal.      Breath sounds: Normal breath sounds.   Abdominal:      Palpations: Abdomen is soft.   Musculoskeletal:         General: Normal range of motion.      Cervical back: Normal range of motion and neck supple.   Skin:     General: Skin is warm and dry.      Coloration: Skin is pale.      Findings: Erythema and lesion present.   Neurological:      General: No focal deficit present.      Mental Status: He is alert.   Psychiatric:         Mood and Affect: Mood normal.         Behavior: Behavior normal.         Thought Content: Thought content normal.         Judgment: Judgment normal.       Assessment:     1. Diabetic ulcer of right foot associated with diabetes mellitus due to underlying condition, with bone involvement without evidence of necrosis, unspecified part of foot    2. Peripheral vascular disease, unspecified        Plan:     Problem List Items Addressed This Visit        Cardiac/Vascular    Peripheral vascular disease, unspecified (Chronic)    Relevant Orders    US Lower Extrem Arteries Bilat  with JACQUELIN (xpd)       Endocrine    Diabetic ulcer of right foot associated with diabetes mellitus due to underlying condition, with bone involvement without evidence of necrosis - Primary    Relevant Orders    US Lower Extrem Arteries Bilat with JACQUELIN (xpd)        No follow-ups on file.  Start diff. Ab. And topical and doppler he smokes + dm so isnt gonna do well    I am having Navdeep Avery maintain his aspirin, amLODIPine, atorvastatin, clopidogreL, lisinopriL, metoprolol succinate, pantoprazole, SPIRIVA RESPIMAT, nitroGLYCERIN, fluticasone propionate, diclofenac sodium, JARDIANCE, albuterol, albuterol, gabapentin, HYDROcodone-acetaminophen, amoxicillin-clavulanate 875-125mg, hydrOXYzine pamoate, EScitalopram oxalate, insulin aspart protamine-insulin aspart, and TOUJEO MAX U-300 SOLOSTAR.    Navdeep was seen today for diabetic ulcer of right foot associated with diabetes melli.    Diagnoses and all orders for this visit:    Diabetic ulcer of right foot associated with diabetes mellitus due to underlying condition, with bone involvement without evidence of necrosis, unspecified part of foot  -     US Lower Extrem Arteries Bilat with JACQUELIN (xpd); Future    Peripheral vascular disease, unspecified  -     US Lower Extrem Arteries Bilat with JACQUELIN (xpd); Future         [unfilled]  Orders Placed This Encounter   Procedures    US Lower Extrem Arteries Bilat with JACQUELIN (xpd)     Standing Status:   Future     Standing Expiration Date:   7/14/2023     Order Specific Question:   May the Radiologist modify the order per protocol to meet the clinical needs of the patient?     Answer:   Yes     Order Specific Question:   Release to patient     Answer:   Immediate

## 2022-07-21 ENCOUNTER — OFFICE VISIT (OUTPATIENT)
Dept: FAMILY MEDICINE | Facility: CLINIC | Age: 62
End: 2022-07-21
Payer: MEDICARE

## 2022-07-21 VITALS
HEART RATE: 87 BPM | RESPIRATION RATE: 17 BRPM | OXYGEN SATURATION: 96 % | TEMPERATURE: 98 F | WEIGHT: 170 LBS | SYSTOLIC BLOOD PRESSURE: 106 MMHG | HEIGHT: 68 IN | BODY MASS INDEX: 25.76 KG/M2 | DIASTOLIC BLOOD PRESSURE: 56 MMHG

## 2022-07-21 DIAGNOSIS — E11.9 DIABETES MELLITUS WITHOUT COMPLICATION: ICD-10-CM

## 2022-07-21 DIAGNOSIS — E78.5 HYPERLIPIDEMIA, UNSPECIFIED HYPERLIPIDEMIA TYPE: Primary | ICD-10-CM

## 2022-07-21 PROCEDURE — 99213 OFFICE O/P EST LOW 20 MIN: CPT | Mod: ,,, | Performed by: FAMILY MEDICINE

## 2022-07-21 PROCEDURE — 99213 PR OFFICE/OUTPT VISIT, EST, LEVL III, 20-29 MIN: ICD-10-PCS | Mod: ,,, | Performed by: FAMILY MEDICINE

## 2022-07-21 RX ORDER — ROSUVASTATIN CALCIUM 20 MG/1
20 TABLET, COATED ORAL NIGHTLY
Qty: 90 TABLET | Refills: 1 | Status: ON HOLD | OUTPATIENT
Start: 2022-07-21 | End: 2023-01-06 | Stop reason: HOSPADM

## 2022-07-21 RX ORDER — ROSUVASTATIN CALCIUM 20 MG/1
20 TABLET, COATED ORAL DAILY
Qty: 90 TABLET | Refills: 3 | Status: SHIPPED | OUTPATIENT
Start: 2022-07-21 | End: 2022-07-21

## 2022-07-21 NOTE — PROGRESS NOTES
Navdeep Avery is a 61 y.o. male seen today for follow-up on recent lab work.  Unfortunately, his A1c remains elevated at 9.6 however recently his blood sugars have been markedly improved.  In fact, today's blood sugar was 71 when he checked.  I am hoping his elevated A1c is secondary to his anemia and also his more distant high blood sugars.  Patient's LDL cholesterol is not to goal and we discussed changing his medication from Lipitor to Crestor.  Patient is already on a very low-fat diet.      Past Medical History:   Diagnosis Date    Anemia     Anxiety     Atherosclerotic heart disease of native coronary artery with other forms of angina pectoris     Atrophic rhinitis     Carpal tunnel syndrome     Cellulitis of right lower extremity     COPD (chronic obstructive pulmonary disease)     Coronary arteriosclerosis     COVID 02/02/2022    Depression     Diabetic ulcer of right foot associated with diabetes mellitus due to underlying condition, with bone involvement without evidence of necrosis     GERD (gastroesophageal reflux disease)     Hyperlipidemia     Hypertension     Insomnia     MI (myocardial infarction)     Neuropathy     Peripheral vascular disease, unspecified     Right foot ulcer, with fat layer exposed 01/07/2015    Sleep apnea     Type 1 diabetes mellitus with foot ulcer     Type 2 diabetes mellitus      Family History   Problem Relation Age of Onset    Arthritis Mother     Hypertension Mother     Learning disabilities Mother     Alcohol abuse Father     Early death Father     Heart disease Father     Cancer Sister     Diabetes Sister     Heart disease Maternal Grandfather     COPD Paternal Grandfather     Heart disease Son      Current Outpatient Medications on File Prior to Visit   Medication Sig Dispense Refill    albuterol (PROVENTIL) 2.5 mg /3 mL (0.083 %) nebulizer solution USE 1 VIAL IN NEBULIZER 3 TIMES DAILY 100 mL 11    albuterol (PROVENTIL/VENTOLIN HFA)  90 mcg/actuation inhaler Inhale 2 puffs into the lungs 2 (two) times daily as needed for Wheezing. 18 g 2    amLODIPine (NORVASC) 10 MG tablet Take 1 tablet (10 mg total) by mouth once daily. 90 tablet 1    amoxicillin-clavulanate 875-125mg (AUGMENTIN) 875-125 mg per tablet Take 1 tablet by mouth every 12 (twelve) hours. 20 tablet 0    aspirin 81 MG Chew Take 81 mg by mouth once daily.      clopidogreL (PLAVIX) 75 mg tablet Take 1 tablet (75 mg total) by mouth once daily. 90 tablet 1    diclofenac sodium (VOLTAREN) 1 % Gel Apply 1 g topically 4 (four) times daily as needed (pain). 20 g 1    empagliflozin (JARDIANCE) 10 mg tablet Take 1 tablet (10 mg total) by mouth once daily. 30 tablet 6    EScitalopram oxalate (LEXAPRO) 10 MG tablet Take 1 tablet (10 mg total) by mouth 2 (two) times a day. 180 tablet 1    fluticasone propionate (FLONASE) 50 mcg/actuation nasal spray 2 sprays (100 mcg total) by Each Nostril route once daily. 16 g 2    gabapentin (NEURONTIN) 300 MG capsule Take 3 capsules (900 mg total) by mouth every 6 (six) hours. (Patient taking differently: Take 900 mg by mouth 3 (three) times daily.) 360 capsule 2    HYDROcodone-acetaminophen (NORCO)  mg per tablet Take 1 tablet by mouth every 8 (eight) hours as needed for Pain. 90 tablet 0    hydrOXYzine pamoate (VISTARIL) 25 MG Cap Take 1 capsule (25 mg total) by mouth once daily. Take nightly for sleep 90 capsule 1    insulin aspart protamine-insulin aspart (NOVOLOG MIX 70-30FLEXPEN U-100) 100 unit/mL (70-30) InPn pen Inject 15 Units into the skin every morning. 4.5 mL 2    insulin glargine U-300 conc (TOUJEO MAX U-300 SOLOSTAR) 300 unit/mL (3 mL) insulin pen Inject 40 Units into the skin once daily at 6am. 2 pen 1    lisinopriL 10 MG tablet Take 1 tablet (10 mg total) by mouth once daily. 90 tablet 1    metoprolol succinate (TOPROL-XL) 25 MG 24 hr tablet Take 1 tablet (25 mg total) by mouth once daily. 90 tablet 1    pantoprazole  (PROTONIX) 40 MG tablet Take 1 tablet (40 mg total) by mouth once daily. 90 tablet 1    SPIRIVA RESPIMAT 2.5 mcg/actuation inhaler Inhale 1 puff into the lungs once daily. 4 g 5    [DISCONTINUED] atorvastatin (LIPITOR) 40 MG tablet Take 1 tablet (40 mg total) by mouth nightly. 90 tablet 1    nitroGLYCERIN (NITROSTAT) 0.4 MG SL tablet Place 1 tablet (0.4 mg total) under the tongue every 5 (five) minutes as needed for Chest pain. 30 tablet 2     No current facility-administered medications on file prior to visit.     Immunization History   Administered Date(s) Administered    Influenza - Quadrivalent - PF *Preferred* (6 months and older) 09/14/2021    Pneumococcal Conjugate - 13 Valent 10/25/2017    Pneumococcal Polysaccharide - 23 Valent 10/19/2016    Tdap 12/20/2019       Review of Systems   Constitutional: Negative for fever, malaise/fatigue and weight loss.   Respiratory: Negative for shortness of breath.    Cardiovascular: Negative for chest pain and palpitations.   Gastrointestinal: Negative for nausea and vomiting.   Psychiatric/Behavioral: Negative for depression.        Vitals:    07/21/22 1315   BP: (!) 106/56   Pulse: 87   Resp: 17   Temp: 98.3 °F (36.8 °C)       Physical Exam  Eyes:      Conjunctiva/sclera: Conjunctivae normal.   Pulmonary:      Effort: Pulmonary effort is normal.   Neurological:      Mental Status: He is alert.   Psychiatric:         Mood and Affect: Mood normal.         Behavior: Behavior normal.         Thought Content: Thought content normal.         Judgment: Judgment normal.          Assessment and Plan  Hyperlipidemia, unspecified hyperlipidemia type  -     Discontinue: rosuvastatin (CRESTOR) 20 MG tablet; Take 1 tablet (20 mg total) by mouth once daily.  Dispense: 90 tablet; Refill: 3  -     rosuvastatin (CRESTOR) 20 MG tablet; Take 1 tablet (20 mg total) by mouth every evening.  Dispense: 90 tablet; Refill: 1    Diabetes mellitus without complication            Return to  clinic in 3 months for follow-up.  Patient will have a CBC CMP lipid and A1c done just before that visit.    Health Maintenance Topics with due status: Not Due       Topic Last Completion Date    Pneumococcal Vaccines (Age 0-64) 10/25/2017    TETANUS VACCINE 12/20/2019    Influenza Vaccine 09/14/2021    Diabetes Urine Screening 04/06/2022    Low Dose Statin 07/06/2022    Lipid Panel 07/06/2022    Hemoglobin A1c 07/20/2022

## 2022-07-28 NOTE — PROGRESS NOTES
GIANA Moncada   [facility] - WOUND CARE  [address] [facility] [address]  [phone]      PATIENT NAME: Navdeep Avery  : 1960  DATE: 22  MRN: [id number]      Billing Provider: GIANA Moncada  Level of Service:   Patient PCP Information     Provider PCP Type    Marquez Huff MD General          Reason for Visit / Chief Complaint: Diabetic ulcer of right foot associated with diabetes melli       History of Present Illness / Problem Focused Workflow     Navdeep Avery is a 61 y.o. male who presents to clinic for follow up on chronic-non healing wound on the right foot. Wound has thick callus noted marcelo-wound and biofilm noted over wound today. Bedside debridement today. He reports pain and tenderness in right foot. Arterial doppler today, results pending at time of discharge. Significant PMH includes hypertension, diabetes, peripheral vascular disease, and high cholesterol. Denies fever or chills.       Review of Systems     Review of Systems   Constitutional: Positive for activity change. Negative for chills and fever.   Respiratory: Negative for chest tightness and shortness of breath.    Cardiovascular: Positive for leg swelling. Negative for chest pain and palpitations.   Musculoskeletal: Positive for arthralgias and joint swelling.   Skin: Positive for wound.        See wound assessment   Neurological: Positive for weakness and numbness.   Psychiatric/Behavioral: Negative for agitation, behavioral problems, confusion and self-injury.       Medical / Social / Family History     Past Medical History:   Diagnosis Date    Anemia     Anxiety     Atherosclerotic heart disease of native coronary artery with other forms of angina pectoris     Atrophic rhinitis     Carpal tunnel syndrome     Cellulitis of right lower extremity     COPD (chronic obstructive pulmonary disease)     Coronary arteriosclerosis     COVID 2022     Depression     Diabetic ulcer of right foot associated with diabetes mellitus due to underlying condition, with bone involvement without evidence of necrosis     GERD (gastroesophageal reflux disease)     Hyperlipidemia     Hypertension     Insomnia     MI (myocardial infarction)     Neuropathy     Peripheral vascular disease, unspecified     Right foot ulcer, with fat layer exposed 01/07/2015    Sleep apnea     Type 1 diabetes mellitus with foot ulcer     Type 2 diabetes mellitus        Past Surgical History:   Procedure Laterality Date    AMPUTATION      ANGIOPLASTY      CARDIAC CATHETERIZATION      CORONARY STENT PLACEMENT      DEBRIDEMENT      Right foot debridment with hospital stay and IV Abx    DEBRIDEMENT OF LOWER EXTREMITY Right 6/27/2022    Procedure: DEBRIDEMENT, LOWER EXTREMITY;  Surgeon: Forrest Kiser MD;  Location: [facility] [place];  Service: General;  Laterality: Right;  right foot    SHOULDER OPEN ROTATOR CUFF REPAIR Left     SPINE SURGERY      VASCULAR SURGERY         Social History  Mr. Navdeep Avery  reports that he has been smoking cigarettes. He has been smoking about 0.50 packs per day. He has never used smokeless tobacco. He reports previous alcohol use. He reports current drug use. Drug: Oxycodone.    Family History  'issac Avery family history includes Alcohol abuse in his father; Arthritis in his mother; COPD in his paternal grandfather; Cancer in his sister; Diabetes in his sister; Early death in his father; Heart disease in his father, maternal grandfather, and son; Hypertension in his mother; Learning disabilities in his mother.    Medications and Allergies     Medications  Outpatient Medications Marked as Taking for the 7/29/22 encounter (Office Visit) with GIANA Moncada   Medication Sig Dispense Refill    albuterol (PROVENTIL) 2.5 mg /3 mL (0.083 %) nebulizer solution USE 1 VIAL IN NEBULIZER 3 TIMES DAILY 100 mL 11    albuterol  (PROVENTIL/VENTOLIN HFA) 90 mcg/actuation inhaler Inhale 2 puffs into the lungs 2 (two) times daily as needed for Wheezing. 18 g 2    amLODIPine (NORVASC) 10 MG tablet Take 1 tablet (10 mg total) by mouth once daily. 90 tablet 1    amoxicillin-clavulanate 875-125mg (AUGMENTIN) 875-125 mg per tablet Take 1 tablet by mouth every 12 (twelve) hours. 20 tablet 0    aspirin 81 MG Chew Take 81 mg by mouth once daily.      clopidogreL (PLAVIX) 75 mg tablet Take 1 tablet (75 mg total) by mouth once daily. 90 tablet 1    diclofenac sodium (VOLTAREN) 1 % Gel Apply 1 g topically 4 (four) times daily as needed (pain). 20 g 1    empagliflozin (JARDIANCE) 10 mg tablet Take 1 tablet (10 mg total) by mouth once daily. 30 tablet 6    EScitalopram oxalate (LEXAPRO) 10 MG tablet Take 1 tablet (10 mg total) by mouth 2 (two) times a day. 180 tablet 1    fluticasone propionate (FLONASE) 50 mcg/actuation nasal spray 2 sprays (100 mcg total) by Each Nostril route once daily. 16 g 2    gabapentin (NEURONTIN) 300 MG capsule Take 3 capsules (900 mg total) by mouth every 6 (six) hours. (Patient taking differently: Take 900 mg by mouth 3 (three) times daily.) 360 capsule 2    HYDROcodone-acetaminophen (NORCO)  mg per tablet Take 1 tablet by mouth every 8 (eight) hours as needed for Pain. 90 tablet 0    hydrOXYzine pamoate (VISTARIL) 25 MG Cap Take 1 capsule (25 mg total) by mouth once daily. Take nightly for sleep 90 capsule 1    insulin aspart protamine-insulin aspart (NOVOLOG MIX 70-30FLEXPEN U-100) 100 unit/mL (70-30) InPn pen Inject 15 Units into the skin every morning. 4.5 mL 2    insulin glargine U-300 conc (TOUJEO MAX U-300 SOLOSTAR) 300 unit/mL (3 mL) insulin pen Inject 40 Units into the skin once daily at 6am. 2 pen 1    lisinopriL 10 MG tablet Take 1 tablet (10 mg total) by mouth once daily. 90 tablet 1    metoprolol succinate (TOPROL-XL) 25 MG 24 hr tablet Take 1 tablet (25 mg total) by mouth once daily. 90 tablet  1    pantoprazole (PROTONIX) 40 MG tablet Take 1 tablet (40 mg total) by mouth once daily. 90 tablet 1    rosuvastatin (CRESTOR) 20 MG tablet Take 1 tablet (20 mg total) by mouth every evening. 90 tablet 1    SPIRIVA RESPIMAT 2.5 mcg/actuation inhaler Inhale 1 puff into the lungs once daily. 4 g 5       Allergies  Review of patient's allergies indicates:  No Known Allergies    Physical Examination     Vitals:    07/29/22 0946   BP: (!) 140/71   Pulse: 67   Resp: 20   Temp: 98.4 °F (36.9 °C)     Physical Exam  Vitals and nursing note reviewed.   Constitutional:       Appearance: Normal appearance.   HENT:      Head: Normocephalic.   Cardiovascular:      Rate and Rhythm: Normal rate and regular rhythm.      Pulses: Normal pulses.      Heart sounds: Normal heart sounds.   Pulmonary:      Effort: Pulmonary effort is normal. No respiratory distress.   Chest:      Chest wall: No tenderness.   Musculoskeletal:         General: Swelling, tenderness and deformity present.      Right lower leg: Edema present.   Skin:     General: Skin is warm and dry.      Findings: Bruising and erythema present.      Comments: See LDA for photos and measurements   Neurological:      Mental Status: He is alert and oriented to person, place, and time.   Psychiatric:         Mood and Affect: Mood normal.         Behavior: Behavior normal.         Thought Content: Thought content normal.         Judgment: Judgment normal.         Assessment and Plan      1. Diabetic ulcer of right foot associated with diabetes mellitus due to underlying condition, with bone involvement without evidence of necrosis, unspecified part of foot    2. Peripheral vascular disease, unspecified           Incision/Site 06/27/22 0933 Right Foot (Active)   06/27/22 0933   Present Prior to Hospital Arrival?:    Side: Right   Location: Foot   Orientation:    Incision Type:    Closure Method:    Additional Comments:    Removal Indication and Assessment:    Wound Outcome:     Removal Indications:    Wound Image    07/29/22 1120   Dressing Appearance Dried drainage 07/29/22 0951   Drainage Amount Moderate 07/29/22 0951   Drainage Characteristics/Odor Serosanguineous 07/29/22 0951   Appearance Pink;Red;Yellow;White;Slough;Moist;Granulating 07/29/22 0951   Black (%), Wound Tissue Color 0 % 07/29/22 0951   Red (%), Wound Tissue Color 80 % 07/29/22 0951   Yellow (%), Wound Tissue Color 20 % 07/29/22 0951   Periwound Area Macerated;Moist;Pale white;Redness 07/29/22 0951   Wound Edges Callused;Open 07/29/22 0951   Wound Length (cm) 2.5 cm 07/29/22 1120   Wound Width (cm) 3.1 cm 07/29/22 1120   Wound Depth (cm) 0.8 cm 07/29/22 1120   Wound Volume (cm^3) 6.2 cm^3 07/29/22 1120   Wound Surface Area (cm^2) 7.75 cm^2 07/29/22 1120   Care Debrided 07/29/22 1120   Dressing Applied;Silver;Gauze;Rolled gauze 07/29/22 0951   Periwound Care Moisture barrier applied 07/29/22 0951   Off Loading Off loading shoe 07/29/22 0951   Dressing Change Due 07/30/22 07/29/22 0951         Active Problem List with Overview Notes    Diagnosis Date Noted    Diabetic peripheral neuropathy     S/P CABG (coronary artery bypass graft) 06/24/2022    COPD (chronic obstructive pulmonary disease) 06/24/2022    SOB (shortness of breath) 03/07/2022    Diabetic ulcer of right foot associated with diabetes mellitus due to underlying condition, with bone involvement without evidence of necrosis     Insomnia 06/17/2021    Type 2 diabetes mellitus 06/17/2021    Anxiety 06/17/2021    Peripheral vascular disease, unspecified     Diabetic ulcer of midfoot associated with diabetes mellitus due to underlying condition, with necrosis of muscle 03/23/2021    Ulcer of right foot with necrosis of muscle 03/23/2021    Benign hypertension 03/23/2021    Mixed hyperlipidemia 03/23/2021    Dependence on nicotine from cigarettes 03/23/2021      Plan:   Clean with vashe.  Apply aloe vista to foot, be sure to avoid wound bed.  Apply  silver polymem to wound bed. Cut slightly larger than wound bed to ensure the entire wound is covered.Cover with dry gauze, wrap with cast padding and paper tape.   Wear edema wear to right leg over cast padding.  Change MWF and as needed for drainage.  Walking boot to right foot  Keep leg elevated and avoid pressure on wound.  Avoid prolonged standing  Diabetes:  Monitor glucose closely. Check fasting glucose and 2 hours after meals. HgA1C goal <7, fasting glucose , and 2 hours after meals <180  Hypertension:  Check blood pressure twice daily, goal <120/80  Diet:   Increase protein intake, avoid fried, fatty foods and foods high in simple carbs.   Vitamins:  Take vitamin C 1000 mg, zinc 50mg, vitamin d 5000 units, and a daily multivitamin. Addy is a good source of protein and nutrients to aid in wound healing.   Monitor closely for s/s of infection including fever, chills, increase in pain, odor from wound, and increased redness from foot. Go to ER if any complications develop.     Problem List Items Addressed This Visit        Cardiac/Vascular    Peripheral vascular disease, unspecified (Chronic)       Endocrine    Diabetic ulcer of right foot associated with diabetes mellitus due to underlying condition, with bone involvement without evidence of necrosis - Primary          Future Appointments   Date Time Provider Department Center   8/12/2022  9:00 AM GIANA Moncada [facility]   9/12/2022  7:45 AM AQUILINO Dhillon [facility]   10/4/2022  2:00 PM AWV NURSE, [facility] STONE [place]   10/21/2022  1:15 PM Marquez Huff MD [facility] STONE Danielson Darrius   10/5/2023  9:00 AM AWV NURSE, [facility] STONE [place]            Signature:  GIANA Moncada  [facility] - WOUND CARE  [address]  [place] [address]  [phone]    Date of encounter: 7/29/22

## 2022-07-28 NOTE — PATIENT INSTRUCTIONS
Clean with vashe.  Apply aloe vista to foot, be sure to avoid wound bed.  Apply silver polymem to wound bed. Cut slightly larger than wound bed to ensure the entire wound is covered.Cover with dry gauze, wrap with cast padding and paper tape.   Wear edema wear to right leg over cast padding.  Change MWF and as needed for drainage.  Walking boot to right foot  Keep leg elevated and avoid pressure on wound.  Avoid prolonged standing  Diabetes:  Monitor glucose closely. Check fasting glucose and 2 hours after meals. HgA1C goal <7, fasting glucose , and 2 hours after meals <180  Hypertension:  Check blood pressure twice daily, goal <120/80  Diet:   Increase protein intake, avoid fried, fatty foods and foods high in simple carbs.   Vitamins:  Take vitamin C 1000 mg, zinc 50mg, vitamin d 5000 units, and a daily multivitamin. Addy is a good source of protein and nutrients to aid in wound healing.   Monitor closely for s/s of infection including fever, chills, increase in pain, odor from wound, and increased redness from foot. Go to ER if any complications develop.

## 2022-07-29 ENCOUNTER — OFFICE VISIT (OUTPATIENT)
Dept: WOUND CARE | Facility: CLINIC | Age: 62
End: 2022-07-29
Attending: FAMILY MEDICINE
Payer: MEDICARE

## 2022-07-29 ENCOUNTER — HOSPITAL ENCOUNTER (OUTPATIENT)
Dept: RADIOLOGY | Facility: HOSPITAL | Age: 62
Discharge: HOME | End: 2022-07-29
Attending: NURSE PRACTITIONER
Payer: MEDICARE

## 2022-07-29 VITALS
DIASTOLIC BLOOD PRESSURE: 71 MMHG | SYSTOLIC BLOOD PRESSURE: 140 MMHG | TEMPERATURE: 98 F | RESPIRATION RATE: 20 BRPM | HEART RATE: 67 BPM

## 2022-07-29 DIAGNOSIS — E08.621 DIABETIC ULCER OF RIGHT FOOT ASSOCIATED WITH DIABETES MELLITUS DUE TO UNDERLYING CONDITION, WITH BONE INVOLVEMENT WITHOUT EVIDENCE OF NECROSIS, UNSPECIFIED PART OF FOOT: ICD-10-CM

## 2022-07-29 DIAGNOSIS — I73.9 PERIPHERAL VASCULAR DISEASE, UNSPECIFIED: ICD-10-CM

## 2022-07-29 DIAGNOSIS — L97.516 DIABETIC ULCER OF RIGHT FOOT ASSOCIATED WITH DIABETES MELLITUS DUE TO UNDERLYING CONDITION, WITH BONE INVOLVEMENT WITHOUT EVIDENCE OF NECROSIS, UNSPECIFIED PART OF FOOT: ICD-10-CM

## 2022-07-29 DIAGNOSIS — E08.621 DIABETIC ULCER OF RIGHT FOOT ASSOCIATED WITH DIABETES MELLITUS DUE TO UNDERLYING CONDITION, WITH BONE INVOLVEMENT WITHOUT EVIDENCE OF NECROSIS, UNSPECIFIED PART OF FOOT: Primary | ICD-10-CM

## 2022-07-29 DIAGNOSIS — L97.516 DIABETIC ULCER OF RIGHT FOOT ASSOCIATED WITH DIABETES MELLITUS DUE TO UNDERLYING CONDITION, WITH BONE INVOLVEMENT WITHOUT EVIDENCE OF NECROSIS, UNSPECIFIED PART OF FOOT: Primary | ICD-10-CM

## 2022-07-29 PROCEDURE — 11042 DEBRIDEMENT: ICD-10-PCS | Mod: S$PBB,,, | Performed by: NURSE PRACTITIONER

## 2022-07-29 PROCEDURE — 93925 LOWER EXTREMITY STUDY: CPT | Mod: 26,,, | Performed by: STUDENT IN AN ORGANIZED HEALTH CARE EDUCATION/TRAINING PROGRAM

## 2022-07-29 PROCEDURE — 99499 UNLISTED E&M SERVICE: CPT | Mod: S$PBB,,, | Performed by: NURSE PRACTITIONER

## 2022-07-29 PROCEDURE — 99215 OFFICE O/P EST HI 40 MIN: CPT | Mod: PBBFAC,25 | Performed by: NURSE PRACTITIONER

## 2022-07-29 PROCEDURE — 93922 US ARTERIAL LOWER EXTREMITY BILAT WITH ABI (XPD): ICD-10-PCS | Mod: 26,,, | Performed by: STUDENT IN AN ORGANIZED HEALTH CARE EDUCATION/TRAINING PROGRAM

## 2022-07-29 PROCEDURE — 93922 UPR/L XTREMITY ART 2 LEVELS: CPT | Mod: 26,,, | Performed by: STUDENT IN AN ORGANIZED HEALTH CARE EDUCATION/TRAINING PROGRAM

## 2022-07-29 PROCEDURE — 99499 NO LOS: ICD-10-PCS | Mod: S$PBB,,, | Performed by: NURSE PRACTITIONER

## 2022-07-29 PROCEDURE — 11042 DBRDMT SUBQ TIS 1ST 20SQCM/<: CPT | Mod: PBBFAC | Performed by: NURSE PRACTITIONER

## 2022-07-29 PROCEDURE — 93925 LOWER EXTREMITY STUDY: CPT | Mod: TC

## 2022-07-29 PROCEDURE — 93925 US ARTERIAL LOWER EXTREMITY BILAT WITH ABI (XPD): ICD-10-PCS | Mod: 26,,, | Performed by: STUDENT IN AN ORGANIZED HEALTH CARE EDUCATION/TRAINING PROGRAM

## 2022-07-29 NOTE — PROGRESS NOTES
Debridement Performed for Assessment: Wound# right foot  Performed By: Provider: Marli Morales NP  Assistant:    Debridement: Surgical    Photo taken post procedure:    Time-Out Taken: Yes  Level: Skin/Subcutaneous Tissue/ Muscle  Post Debridement Measurements  Length: (cm) 2.5  Width: (cm) 3.1  Depth: (cm) 0.8      Area: (cm²) 7.75  Percent Debrided: 100%  Total Area Debrided: (sq cm)     Tissue and other material debrided:  Adipose, Dermis, Epidermis, Subcutaneous, Muscle  Devitalized Tissue Debrided:Biofilm, Slough, Eschar  Instrument: Curette  Bleeding: Moderate  Hemostasis Achieved: Pressure  Procedural Pain: Insensate  Post Procedural Pain: Insensate  Response to Treatment: Procedure was tolerated well    Devitalized materials/tissue Removed  the following was removed during debridement  subcutaneous, muscle      Post Debridement Diagnosis right plantar foot wound  Post debridement diagnosis  Same as Pre-operative debridement diagnosis, No Complications noted.      Grafts or implants applied  Was a graft or implant applied?  No      Procedure assistant  Procedure assisted by: Marilin Rosa RN  Assistant is the same as nurse listed above      Complications related to procedure  Did any complication occure during procedure?  No complications noted during or after procedure.      Specimen  Specimen collect during procedure?  No specimen collected      Anaesthesia:  Anesthesia used  None      Blood Loss:  Blood loss during procedure  less than 5 cc

## 2022-07-29 NOTE — PROCEDURES
Debridement    Date/Time: 7/29/2022 10:00 AM  Performed by: GIANA Moncada  Authorized by: GIANA Moncada     Consent Done?:  Yes (Written)  Local anesthesia used?: No      Wound Details:    Location:  Right foot    Location:  Right 1st Metatarsal Head    Type of Debridement:  Excisional       Length (cm):  2.5       Area (sq cm):  7.75       Width (cm):  3.1       Percent Debrided (%):  100       Depth (cm):  0.8       Total Area Debrided (sq cm):  7.75    Depth of debridement:  Subcutaneous tissue    Tissue debrided:  Adipose, Dermis, Epidermis and Subcutaneous    Devitalized tissue debrided:  Biofilm, Callus, Clots and Exudate    Instruments:  Curette    Bleeding:  Minimal  Hemostasis Achieved: Yes    Method Used:  Pressure  Patient tolerance:  Patient tolerated the procedure well with no immediate complications     Assistant PETRA Rosa RN

## 2022-08-10 NOTE — PROGRESS NOTES
GIANA Moncada   [facility] - WOUND CARE  [address] [facility] [address]  [phone]      PATIENT NAME: Navdeep Avery  : 1960  DATE: 22  MRN: [id number]      Billing Provider: GIANA Moncada  Level of Service:   Patient PCP Information     Provider PCP Type    Marquez Huff MD General          Reason for Visit / Chief Complaint: No chief complaint on file.       History of Present Illness / Problem Focused Workflow     Navdeep Avery is a 61 y.o. male who presents to clinic for follow up on chronic-non healing wound on the right foot. Wound has thick callus noted marcelo-wound and biofilm noted over wound today. Bedside debridement today. He reports pain and tenderness in right foot and ankle, x-ray ordered. Dopplers reviewed today. Significant PMH includes hypertension, diabetes, peripheral vascular disease, and high cholesterol. Last HgA1C 9.6 in , diabeters managed by PCP. Last doppler , ABIs WNL. Denies fever or chills.       Review of Systems     Review of Systems   Constitutional: Positive for activity change. Negative for chills and fever.   Respiratory: Negative for chest tightness and shortness of breath.    Cardiovascular: Positive for leg swelling. Negative for chest pain and palpitations.   Musculoskeletal: Positive for arthralgias and joint swelling.   Skin: Positive for wound.        See wound assessment   Neurological: Positive for weakness and numbness.   Psychiatric/Behavioral: Negative for agitation, behavioral problems, confusion and self-injury.       Medical / Social / Family History     Past Medical History:   Diagnosis Date    Anemia     Anxiety     Atherosclerotic heart disease of native coronary artery with other forms of angina pectoris     Atrophic rhinitis     Carpal tunnel syndrome     Cellulitis of right lower extremity     COPD (chronic obstructive pulmonary disease)     Coronary  arteriosclerosis     COVID 02/02/2022    Depression     Diabetic ulcer of right foot associated with diabetes mellitus due to underlying condition, with bone involvement without evidence of necrosis     GERD (gastroesophageal reflux disease)     Hyperlipidemia     Hypertension     Insomnia     MI (myocardial infarction)     Neuropathy     Peripheral vascular disease, unspecified     Right foot ulcer, with fat layer exposed 01/07/2015    Sleep apnea     Type 1 diabetes mellitus with foot ulcer     Type 2 diabetes mellitus        Past Surgical History:   Procedure Laterality Date    AMPUTATION      ANGIOPLASTY      CARDIAC CATHETERIZATION      CORONARY STENT PLACEMENT      DEBRIDEMENT      Right foot debridment with hospital stay and IV Abx    DEBRIDEMENT OF LOWER EXTREMITY Right 6/27/2022    Procedure: DEBRIDEMENT, LOWER EXTREMITY;  Surgeon: Forrest Kiser MD;  Location: [facility] OR;  Service: General;  Laterality: Right;  right foot    SHOULDER OPEN ROTATOR CUFF REPAIR Left     SPINE SURGERY      VASCULAR SURGERY         Social History  Mr. Navdeep Avery  reports that he has been smoking cigarettes. He has been smoking about 0.50 packs per day. He has never used smokeless tobacco. He reports previous alcohol use. He reports current drug use. Drug: Oxycodone.    Family History  's Navdeep Avery family history includes Alcohol abuse in his father; Arthritis in his mother; COPD in his paternal grandfather; Cancer in his sister; Diabetes in his sister; Early death in his father; Heart disease in his father, maternal grandfather, and son; Hypertension in his mother; Learning disabilities in his mother.    Medications and Allergies     Medications  No outpatient medications have been marked as taking for the 8/12/22 encounter (Appointment) with GINAA Moncada.       Allergies  Review of patient's allergies indicates:  No Known Allergies    Physical Examination   There were no  vitals filed for this visit.  Physical Exam  Vitals and nursing note reviewed.   Constitutional:       Appearance: Normal appearance.   HENT:      Head: Normocephalic.   Cardiovascular:      Rate and Rhythm: Normal rate and regular rhythm.      Pulses: Normal pulses.      Heart sounds: Normal heart sounds.   Pulmonary:      Effort: Pulmonary effort is normal. No respiratory distress.   Chest:      Chest wall: No tenderness.   Musculoskeletal:         General: Swelling, tenderness and deformity present.      Right lower leg: Edema present.   Skin:     General: Skin is warm and dry.      Findings: Bruising and erythema present.      Comments: See LDA for photos and measurements   Neurological:      Mental Status: He is alert and oriented to person, place, and time.   Psychiatric:         Mood and Affect: Mood normal.         Behavior: Behavior normal.         Thought Content: Thought content normal.         Judgment: Judgment normal.         Assessment and Plan      1. Diabetic ulcer of right foot associated with diabetes mellitus due to underlying condition, with bone involvement without evidence of necrosis, unspecified part of foot    2. Peripheral vascular disease, unspecified             Active Problem List with Overview Notes    Diagnosis Date Noted    Diabetic peripheral neuropathy     S/P CABG (coronary artery bypass graft) 06/24/2022    COPD (chronic obstructive pulmonary disease) 06/24/2022    SOB (shortness of breath) 03/07/2022    Diabetic ulcer of right foot associated with diabetes mellitus due to underlying condition, with bone involvement without evidence of necrosis                    Insomnia 06/17/2021    Type 2 diabetes mellitus 06/17/2021    Anxiety 06/17/2021    Peripheral vascular disease, unspecified     Diabetic ulcer of midfoot associated with diabetes mellitus due to underlying condition, with necrosis of muscle 03/23/2021    Ulcer of right foot with necrosis of muscle 03/23/2021     Benign hypertension 03/23/2021    Mixed hyperlipidemia 03/23/2021    Dependence on nicotine from cigarettes 03/23/2021     Plan:   Clean with vashe.  Apply aloe vista to foot, be sure to avoid wound bed.  Apply vashe moisten drawtex  to wound bed. Cut slightly larger than wound bed to ensure the entire wound is covered.Cover with dry gauze, wrap with cast padding and paper tape.   Wear edema wear to right leg over cast padding.  Change daily and as needed for drainage.  Walking boot to right foot  Keep leg elevated and avoid pressure on wound.  Avoid prolonged standing  Diabetes:  Monitor glucose closely. Check fasting glucose and 2 hours after meals. HgA1C goal <7, fasting glucose , and 2 hours after meals <180  Hypertension:  Check blood pressure twice daily, goal <120/80  Diet:   Increase protein intake, avoid fried, fatty foods and foods high in simple carbs.   Vitamins:  Take vitamin C 1000 mg, zinc 50mg, vitamin d 5000 units, and a daily multivitamin. Addy is a good source of protein and nutrients to aid in wound healing.   Monitor closely for s/s of infection including fever, chills, increase in pain, odor from wound, and increased redness from foot. Go to ER if any complications develop.     Problem List Items Addressed This Visit        Cardiac/Vascular    Peripheral vascular disease, unspecified (Chronic)       Endocrine    Diabetic ulcer of right foot associated with diabetes mellitus due to underlying condition, with bone involvement without evidence of necrosis - Primary    Overview                              Future Appointments   Date Time Provider Department Center   8/12/2022  9:00 AM GIANA Moncada [facility]    9/12/2022  7:45 AM AQUILINO Dhillon [facility]   10/4/2022  2:00 PM AWROSA NURSE, [facility] STONE Danielson Darrius   10/21/2022  1:15 PM Marquez Huff MD [facility] STONE [place]   10/5/2023  9:00 AM WANDER NURSE, [facility]  FAMILY MEDICINE [facility] STONE Rizo            Signature:  Marilin Rosa RN  [facility] - [facility]OUND [facility]  [address] [facility] [address]  [phone]    Date of encounter: 8/12/22

## 2022-08-10 NOTE — PATIENT INSTRUCTIONS
Clean with vashe.  Apply aloe vista to foot, be sure to avoid wound bed.  Apply vashe moisten drawtex  to wound bed. Cut slightly larger than wound bed to ensure the entire wound is covered.Cover with dry gauze, wrap with cast padding and paper tape.   Wear edema wear to right leg over cast padding.  Change daily and as needed for drainage.  Walking boot to right foot  Keep leg elevated and avoid pressure on wound.  Avoid prolonged standing  Diabetes:  Monitor glucose closely. Check fasting glucose and 2 hours after meals. HgA1C goal <7, fasting glucose , and 2 hours after meals <180  Hypertension:  Check blood pressure twice daily, goal <120/80  Diet:   Increase protein intake, avoid fried, fatty foods and foods high in simple carbs.   Vitamins:  Take vitamin C 1000 mg, zinc 50mg, vitamin d 5000 units, and a daily multivitamin. Addy is a good source of protein and nutrients to aid in wound healing.   Monitor closely for s/s of infection including fever, chills, increase in pain, odor from wound, and increased redness from foot. Go to ER if any complications develop.

## 2022-08-12 ENCOUNTER — OFFICE VISIT (OUTPATIENT)
Dept: WOUND CARE | Facility: CLINIC | Age: 62
End: 2022-08-12
Attending: FAMILY MEDICINE
Payer: MEDICARE

## 2022-08-12 VITALS
TEMPERATURE: 97 F | DIASTOLIC BLOOD PRESSURE: 69 MMHG | SYSTOLIC BLOOD PRESSURE: 123 MMHG | RESPIRATION RATE: 20 BRPM | HEART RATE: 80 BPM

## 2022-08-12 DIAGNOSIS — E08.621 DIABETIC ULCER OF RIGHT FOOT ASSOCIATED WITH DIABETES MELLITUS DUE TO UNDERLYING CONDITION, WITH BONE INVOLVEMENT WITHOUT EVIDENCE OF NECROSIS, UNSPECIFIED PART OF FOOT: Primary | ICD-10-CM

## 2022-08-12 DIAGNOSIS — L97.516 DIABETIC ULCER OF RIGHT FOOT ASSOCIATED WITH DIABETES MELLITUS DUE TO UNDERLYING CONDITION, WITH BONE INVOLVEMENT WITHOUT EVIDENCE OF NECROSIS, UNSPECIFIED PART OF FOOT: Primary | ICD-10-CM

## 2022-08-12 DIAGNOSIS — I73.9 PERIPHERAL VASCULAR DISEASE, UNSPECIFIED: ICD-10-CM

## 2022-08-12 PROCEDURE — 11042 DEBRIDEMENT: ICD-10-PCS | Mod: S$PBB,,, | Performed by: NURSE PRACTITIONER

## 2022-08-12 PROCEDURE — 11042 DBRDMT SUBQ TIS 1ST 20SQCM/<: CPT | Mod: PBBFAC | Performed by: NURSE PRACTITIONER

## 2022-08-12 PROCEDURE — 99499 NO LOS: ICD-10-PCS | Mod: S$PBB,,, | Performed by: NURSE PRACTITIONER

## 2022-08-12 PROCEDURE — 99499 UNLISTED E&M SERVICE: CPT | Mod: S$PBB,,, | Performed by: NURSE PRACTITIONER

## 2022-08-12 PROCEDURE — 99215 OFFICE O/P EST HI 40 MIN: CPT | Mod: PBBFAC | Performed by: NURSE PRACTITIONER

## 2022-08-12 RX ORDER — CIPROFLOXACIN 500 MG/1
500 TABLET ORAL 3 TIMES DAILY
Status: ON HOLD | COMMUNITY
Start: 2022-07-14 | End: 2022-08-15

## 2022-08-12 RX ORDER — CLINDAMYCIN HYDROCHLORIDE 300 MG/1
300 CAPSULE ORAL 3 TIMES DAILY
Status: ON HOLD | COMMUNITY
Start: 2022-07-14 | End: 2022-08-15

## 2022-08-12 NOTE — PROGRESS NOTES
Debridement Performed for Assessment: Wound# 1  Performed By: Provider: Marli Morales NP  Assistant:    Debridement: Surgical    Photo taken post procedure: yes    Time-Out Taken: Yes  Level: Skin/Subcutaneous Tissue  Post Debridement Measurements  Length: (cm) 3.5  Width: (cm) 3.1  Depth: (cm) 0.4      Area: (cm²) 11.90  Percent Debrided: 100%  Total Area Debrided: (sq cm)     Tissue and other material debrided:  Adipose, Dermis, Epidermis, Subcutaneous  Devitalized Tissue Debrided:Biofilm, callus  Instrument: Curette  Bleeding: Moderate  Hemostasis Achieved: Pressure  Procedural Pain: Insensate  Post Procedural Pain: Insensate  Response to Treatment: Procedure was tolerated well    Devitalized materials/tissue Removed  the following was removed during debridement  subcutaneous      Post Debridement Diagnosis  Post debridement diagnosis  Chronic right foot diabetic ulcer  Same as Pre-operative debridement diagnosis, No Complications noted.      Grafts or implants applied  Was a graft or implant applied?  No      Procedure assistant  Procedure assisted by: Priscilla Waterman LPN  Assistant is the same as nurse listed above      Complications related to procedure  Did any complication occure during procedure?  No complications noted during or after procedure.      Specimen  Specimen collect during procedure?  No specimen collected      Anaesthesia:  Anesthesia used  None      Blood Loss:  Blood loss during procedure  less than 5 cc

## 2022-08-12 NOTE — PROCEDURES
Debridement    Date/Time: 8/12/2022 9:00 AM  Performed by: GIANA Moncada  Authorized by: GIANA Moncada     Consent Done?:  Yes (Written)    Wound Details:    Location:  Right foot    Location:  Right 1st Metatarsal Head    Type of Debridement:  Excisional       Length (cm):  3.5       Area (sq cm):  10.85       Width (cm):  3.1       Percent Debrided (%):  100       Depth (cm):  0.4       Total Area Debrided (sq cm):  10.85    Depth of debridement:  Subcutaneous tissue    Tissue debrided:  Adipose, Dermis, Epidermis and Subcutaneous    Devitalized tissue debrided:  Biofilm, Callus, Clots and Exudate    Instruments:  Curette    Bleeding:  Minimal  Hemostasis Achieved: Yes    Method Used:  Pressure  Patient tolerance:  Patient tolerated the procedure well with no immediate complications     Assistant VIRGINIE Waterman LPN

## 2022-08-12 NOTE — PROGRESS NOTES
See wound assessment. Debridement done. Stop silver polymem for now. Vashe moisten drawtex/dry dressing daily. RTC 2 weeks. Xray of right ankle and foot ordered. Orders faxed to [facility]. Offloading shoe to right foot noted.

## 2022-08-15 ENCOUNTER — OFFICE VISIT (OUTPATIENT)
Dept: WOUND CARE | Facility: CLINIC | Age: 62
End: 2022-08-15
Attending: FAMILY MEDICINE
Payer: MEDICARE

## 2022-08-15 ENCOUNTER — HOSPITAL ENCOUNTER (INPATIENT)
Facility: HOSPITAL | Age: 62
LOS: 5 days | Discharge: HOME | DRG: 638 | End: 2022-08-20
Attending: HOSPITALIST | Admitting: FAMILY MEDICINE
Payer: MEDICARE

## 2022-08-15 ENCOUNTER — HOSPITAL ENCOUNTER (OUTPATIENT)
Dept: RADIOLOGY | Facility: HOSPITAL | Age: 62
Discharge: HOME | End: 2022-08-15
Attending: NURSE PRACTITIONER
Payer: MEDICARE

## 2022-08-15 VITALS
SYSTOLIC BLOOD PRESSURE: 110 MMHG | HEART RATE: 68 BPM | DIASTOLIC BLOOD PRESSURE: 63 MMHG | RESPIRATION RATE: 20 BRPM | TEMPERATURE: 98 F

## 2022-08-15 DIAGNOSIS — E08.621 DIABETIC ULCER OF RIGHT FOOT ASSOCIATED WITH DIABETES MELLITUS DUE TO UNDERLYING CONDITION, WITH BONE INVOLVEMENT WITHOUT EVIDENCE OF NECROSIS, UNSPECIFIED PART OF FOOT: ICD-10-CM

## 2022-08-15 DIAGNOSIS — L08.9 WOUND INFECTION: ICD-10-CM

## 2022-08-15 DIAGNOSIS — L97.413 DIABETIC ULCER OF RIGHT MIDFOOT ASSOCIATED WITH DIABETES MELLITUS DUE TO UNDERLYING CONDITION, WITH NECROSIS OF MUSCLE: ICD-10-CM

## 2022-08-15 DIAGNOSIS — E08.621 DIABETIC ULCER OF RIGHT MIDFOOT ASSOCIATED WITH DIABETES MELLITUS DUE TO UNDERLYING CONDITION, WITH NECROSIS OF MUSCLE: ICD-10-CM

## 2022-08-15 DIAGNOSIS — L97.416 DIABETIC ULCER OF RIGHT MIDFOOT ASSOCIATED WITH DIABETES MELLITUS DUE TO UNDERLYING CONDITION, WITH BONE INVOLVEMENT WITHOUT EVIDENCE OF NECROSIS: Primary | ICD-10-CM

## 2022-08-15 DIAGNOSIS — T14.8XXA WOUND INFECTION: ICD-10-CM

## 2022-08-15 DIAGNOSIS — G62.9 NEUROPATHY: Chronic | ICD-10-CM

## 2022-08-15 DIAGNOSIS — L97.516 DIABETIC ULCER OF RIGHT FOOT ASSOCIATED WITH DIABETES MELLITUS DUE TO UNDERLYING CONDITION, WITH BONE INVOLVEMENT WITHOUT EVIDENCE OF NECROSIS, UNSPECIFIED PART OF FOOT: ICD-10-CM

## 2022-08-15 DIAGNOSIS — E08.621 DIABETIC ULCER OF RIGHT MIDFOOT ASSOCIATED WITH DIABETES MELLITUS DUE TO UNDERLYING CONDITION, WITH BONE INVOLVEMENT WITHOUT EVIDENCE OF NECROSIS: Primary | ICD-10-CM

## 2022-08-15 DIAGNOSIS — L97.513 ULCER OF RIGHT FOOT WITH NECROSIS OF MUSCLE: Chronic | ICD-10-CM

## 2022-08-15 DIAGNOSIS — I73.9 PERIPHERAL VASCULAR DISEASE, UNSPECIFIED: Chronic | ICD-10-CM

## 2022-08-15 LAB
GLUCOSE SERPL-MCNC: 184 MG/DL (ref 70–105)
GLUCOSE SERPL-MCNC: 241 MG/DL (ref 70–105)

## 2022-08-15 PROCEDURE — 73630 XR FOOT COMPLETE 3 VIEW RIGHT: ICD-10-PCS | Mod: 26,RT,, | Performed by: RADIOLOGY

## 2022-08-15 PROCEDURE — 87077 CULTURE AEROBIC IDENTIFY: CPT | Mod: ,,, | Performed by: CLINICAL MEDICAL LABORATORY

## 2022-08-15 PROCEDURE — 94640 AIRWAY INHALATION TREATMENT: CPT

## 2022-08-15 PROCEDURE — 82962 GLUCOSE BLOOD TEST: CPT

## 2022-08-15 PROCEDURE — 87075 CULTR BACTERIA EXCEPT BLOOD: CPT | Mod: ,,, | Performed by: CLINICAL MEDICAL LABORATORY

## 2022-08-15 PROCEDURE — 87186 CULTURE, WOUND: ICD-10-PCS | Mod: ,,, | Performed by: CLINICAL MEDICAL LABORATORY

## 2022-08-15 PROCEDURE — 11042 DEBRIDEMENT: ICD-10-PCS | Mod: S$PBB,,, | Performed by: NURSE PRACTITIONER

## 2022-08-15 PROCEDURE — 11042 DBRDMT SUBQ TIS 1ST 20SQCM/<: CPT | Mod: PBBFAC | Performed by: NURSE PRACTITIONER

## 2022-08-15 PROCEDURE — 87070 CULTURE, WOUND: ICD-10-PCS | Mod: ,,, | Performed by: CLINICAL MEDICAL LABORATORY

## 2022-08-15 PROCEDURE — 87070 CULTURE OTHR SPECIMN AEROBIC: CPT | Mod: ,,, | Performed by: CLINICAL MEDICAL LABORATORY

## 2022-08-15 PROCEDURE — 87186 SC STD MICRODIL/AGAR DIL: CPT | Mod: ,,, | Performed by: CLINICAL MEDICAL LABORATORY

## 2022-08-15 PROCEDURE — 99223 1ST HOSP IP/OBS HIGH 75: CPT | Mod: AI,,, | Performed by: FAMILY MEDICINE

## 2022-08-15 PROCEDURE — 73610 X-RAY EXAM OF ANKLE: CPT | Mod: TC,RT

## 2022-08-15 PROCEDURE — 25000003 PHARM REV CODE 250: Performed by: FAMILY MEDICINE

## 2022-08-15 PROCEDURE — 87075 CULTURE, ANAEROBE: ICD-10-PCS | Mod: ,,, | Performed by: CLINICAL MEDICAL LABORATORY

## 2022-08-15 PROCEDURE — 87077 CULTURE, WOUND: ICD-10-PCS | Mod: ,,, | Performed by: CLINICAL MEDICAL LABORATORY

## 2022-08-15 PROCEDURE — 96372 THER/PROPH/DIAG INJ SC/IM: CPT

## 2022-08-15 PROCEDURE — 99499 NO LOS: ICD-10-PCS | Mod: S$PBB,,, | Performed by: NURSE PRACTITIONER

## 2022-08-15 PROCEDURE — 99223 PR INITIAL HOSPITAL CARE,LEVL III: ICD-10-PCS | Mod: AI,,, | Performed by: FAMILY MEDICINE

## 2022-08-15 PROCEDURE — 11000001 HC ACUTE MED/SURG PRIVATE ROOM

## 2022-08-15 PROCEDURE — 99215 OFFICE O/P EST HI 40 MIN: CPT | Mod: PBBFAC,25 | Performed by: NURSE PRACTITIONER

## 2022-08-15 PROCEDURE — 99499 UNLISTED E&M SERVICE: CPT | Mod: S$PBB,,, | Performed by: NURSE PRACTITIONER

## 2022-08-15 PROCEDURE — 25000242 PHARM REV CODE 250 ALT 637 W/ HCPCS: Performed by: FAMILY MEDICINE

## 2022-08-15 PROCEDURE — 73630 X-RAY EXAM OF FOOT: CPT | Mod: TC,RT

## 2022-08-15 PROCEDURE — 73630 X-RAY EXAM OF FOOT: CPT | Mod: 26,RT,, | Performed by: RADIOLOGY

## 2022-08-15 PROCEDURE — 73610 X-RAY EXAM OF ANKLE: CPT | Mod: 26,RT,, | Performed by: RADIOLOGY

## 2022-08-15 PROCEDURE — 63600175 PHARM REV CODE 636 W HCPCS: Performed by: FAMILY MEDICINE

## 2022-08-15 PROCEDURE — 73610 XR ANKLE COMPLETE 3 VIEW RIGHT: ICD-10-PCS | Mod: 26,RT,, | Performed by: RADIOLOGY

## 2022-08-15 RX ORDER — IBUPROFEN 200 MG
24 TABLET ORAL
Status: DISCONTINUED | OUTPATIENT
Start: 2022-08-15 | End: 2022-08-20 | Stop reason: HOSPADM

## 2022-08-15 RX ORDER — LISINOPRIL 10 MG/1
10 TABLET ORAL DAILY
Status: DISCONTINUED | OUTPATIENT
Start: 2022-08-16 | End: 2022-08-20 | Stop reason: HOSPADM

## 2022-08-15 RX ORDER — ATORVASTATIN CALCIUM 20 MG/1
20 TABLET, FILM COATED ORAL DAILY
Refills: 1 | Status: DISCONTINUED | OUTPATIENT
Start: 2022-08-16 | End: 2022-08-20 | Stop reason: HOSPADM

## 2022-08-15 RX ORDER — FAMOTIDINE 20 MG/1
20 TABLET, FILM COATED ORAL 2 TIMES DAILY
Status: DISCONTINUED | OUTPATIENT
Start: 2022-08-15 | End: 2022-08-15

## 2022-08-15 RX ORDER — INSULIN ASPART 100 [IU]/ML
15 INJECTION, SUSPENSION SUBCUTANEOUS 2 TIMES DAILY WITH MEALS
Refills: 2 | Status: DISCONTINUED | OUTPATIENT
Start: 2022-08-15 | End: 2022-08-16

## 2022-08-15 RX ORDER — HYDROXYZINE PAMOATE 25 MG/1
25 CAPSULE ORAL DAILY
Status: DISCONTINUED | OUTPATIENT
Start: 2022-08-15 | End: 2022-08-20 | Stop reason: HOSPADM

## 2022-08-15 RX ORDER — ALBUTEROL SULFATE 0.83 MG/ML
2.5 SOLUTION RESPIRATORY (INHALATION) EVERY 12 HOURS PRN
Status: DISCONTINUED | OUTPATIENT
Start: 2022-08-15 | End: 2022-08-20 | Stop reason: HOSPADM

## 2022-08-15 RX ORDER — NAPROXEN SODIUM 220 MG/1
81 TABLET, FILM COATED ORAL DAILY
Status: DISCONTINUED | OUTPATIENT
Start: 2022-08-16 | End: 2022-08-20 | Stop reason: HOSPADM

## 2022-08-15 RX ORDER — AMLODIPINE BESYLATE 10 MG/1
10 TABLET ORAL DAILY
Status: DISCONTINUED | OUTPATIENT
Start: 2022-08-16 | End: 2022-08-20 | Stop reason: HOSPADM

## 2022-08-15 RX ORDER — FAMOTIDINE 10 MG/ML
20 INJECTION INTRAVENOUS 2 TIMES DAILY
Status: DISCONTINUED | OUTPATIENT
Start: 2022-08-15 | End: 2022-08-15

## 2022-08-15 RX ORDER — GLUCAGON 1 MG
1 KIT INJECTION
Status: DISCONTINUED | OUTPATIENT
Start: 2022-08-15 | End: 2022-08-20 | Stop reason: HOSPADM

## 2022-08-15 RX ORDER — FAMOTIDINE 20 MG/1
20 TABLET, FILM COATED ORAL DAILY
Status: DISCONTINUED | OUTPATIENT
Start: 2022-08-16 | End: 2022-08-15

## 2022-08-15 RX ORDER — CLOPIDOGREL BISULFATE 75 MG/1
75 TABLET ORAL DAILY
Status: DISCONTINUED | OUTPATIENT
Start: 2022-08-16 | End: 2022-08-20 | Stop reason: HOSPADM

## 2022-08-15 RX ORDER — IBUPROFEN 200 MG
16 TABLET ORAL
Status: DISCONTINUED | OUTPATIENT
Start: 2022-08-15 | End: 2022-08-20 | Stop reason: HOSPADM

## 2022-08-15 RX ORDER — INSULIN ASPART 100 [IU]/ML
0-5 INJECTION, SOLUTION INTRAVENOUS; SUBCUTANEOUS
Status: DISCONTINUED | OUTPATIENT
Start: 2022-08-15 | End: 2022-08-20 | Stop reason: HOSPADM

## 2022-08-15 RX ORDER — ESCITALOPRAM OXALATE 10 MG/1
10 TABLET ORAL DAILY
Status: DISCONTINUED | OUTPATIENT
Start: 2022-08-16 | End: 2022-08-20 | Stop reason: HOSPADM

## 2022-08-15 RX ORDER — MUPIROCIN 20 MG/G
OINTMENT TOPICAL 2 TIMES DAILY
Status: COMPLETED | OUTPATIENT
Start: 2022-08-15 | End: 2022-08-20

## 2022-08-15 RX ORDER — FLUTICASONE PROPIONATE 50 MCG
2 SPRAY, SUSPENSION (ML) NASAL DAILY
Status: DISCONTINUED | OUTPATIENT
Start: 2022-08-16 | End: 2022-08-20 | Stop reason: HOSPADM

## 2022-08-15 RX ORDER — IPRATROPIUM BROMIDE 0.5 MG/2.5ML
0.5 SOLUTION RESPIRATORY (INHALATION) EVERY 6 HOURS
Status: DISCONTINUED | OUTPATIENT
Start: 2022-08-15 | End: 2022-08-20 | Stop reason: HOSPADM

## 2022-08-15 RX ORDER — ALBUTEROL SULFATE 90 UG/1
2 AEROSOL, METERED RESPIRATORY (INHALATION) 2 TIMES DAILY PRN
Status: DISCONTINUED | OUTPATIENT
Start: 2022-08-15 | End: 2022-08-15 | Stop reason: CLARIF

## 2022-08-15 RX ORDER — NITROGLYCERIN 0.4 MG/1
0.4 TABLET SUBLINGUAL EVERY 5 MIN PRN
Status: DISCONTINUED | OUTPATIENT
Start: 2022-08-15 | End: 2022-08-20 | Stop reason: HOSPADM

## 2022-08-15 RX ORDER — PANTOPRAZOLE SODIUM 40 MG/1
40 TABLET, DELAYED RELEASE ORAL DAILY
Status: DISCONTINUED | OUTPATIENT
Start: 2022-08-16 | End: 2022-08-20 | Stop reason: HOSPADM

## 2022-08-15 RX ORDER — HYDROCODONE BITARTRATE AND ACETAMINOPHEN 10; 325 MG/1; MG/1
1 TABLET ORAL EVERY 8 HOURS PRN
Status: DISCONTINUED | OUTPATIENT
Start: 2022-08-15 | End: 2022-08-20 | Stop reason: HOSPADM

## 2022-08-15 RX ORDER — IBUPROFEN 200 MG
1 TABLET ORAL DAILY
Status: DISCONTINUED | OUTPATIENT
Start: 2022-08-16 | End: 2022-08-20 | Stop reason: HOSPADM

## 2022-08-15 RX ORDER — METOPROLOL SUCCINATE 25 MG/1
25 TABLET, EXTENDED RELEASE ORAL DAILY
Status: DISCONTINUED | OUTPATIENT
Start: 2022-08-16 | End: 2022-08-20 | Stop reason: HOSPADM

## 2022-08-15 RX ORDER — GABAPENTIN 300 MG/1
900 CAPSULE ORAL EVERY 6 HOURS
Status: DISCONTINUED | OUTPATIENT
Start: 2022-08-15 | End: 2022-08-20 | Stop reason: HOSPADM

## 2022-08-15 RX ADMIN — GABAPENTIN 900 MG: 300 CAPSULE ORAL at 11:08

## 2022-08-15 RX ADMIN — INSULIN ASPART 15 UNITS: 100 INJECTION, SUSPENSION SUBCUTANEOUS at 05:08

## 2022-08-15 RX ADMIN — GABAPENTIN 900 MG: 300 CAPSULE ORAL at 06:08

## 2022-08-15 RX ADMIN — PIPERACILLIN AND TAZOBACTAM 4.5 G: 4; .5 INJECTION, POWDER, LYOPHILIZED, FOR SOLUTION INTRAVENOUS at 05:08

## 2022-08-15 RX ADMIN — MUPIROCIN: 20 OINTMENT TOPICAL at 09:08

## 2022-08-15 RX ADMIN — VANCOMYCIN HYDROCHLORIDE 1500 MG: 5 INJECTION, POWDER, LYOPHILIZED, FOR SOLUTION INTRAVENOUS at 09:08

## 2022-08-15 RX ADMIN — INSULIN ASPART 1 UNITS: 100 INJECTION, SOLUTION INTRAVENOUS; SUBCUTANEOUS at 09:08

## 2022-08-15 RX ADMIN — HYDROCODONE BITARTRATE AND ACETAMINOPHEN 1 TABLET: 10; 325 TABLET ORAL at 10:08

## 2022-08-15 RX ADMIN — IPRATROPIUM BROMIDE 0.5 MG: 0.5 SOLUTION RESPIRATORY (INHALATION) at 08:08

## 2022-08-15 RX ADMIN — HYDROXYZINE PAMOATE 25 MG: 25 CAPSULE ORAL at 09:08

## 2022-08-15 NOTE — PROGRESS NOTES
GIANA Moncada   [facility]  [address] [facility] [address]  [phone]      PATIENT NAME: Navdeep Avery  : 1960  DATE: 8/15/22  MRN: [id number]      Billing Provider: GIANA Moncada  Level of Service:   Patient PCP Information     Provider PCP Type    Marquez Huff MD General          Reason for Visit / Chief Complaint: Diabetic ulcer of right foot associated with diabetes melli       History of Present Illness / Problem Focused Workflow     Navdeep Avery is a 61 y.o. male who presents to clinic for follow up on chronic-non healing wound on the right foot. Wound has biofilm over wound today. Bedside debridement today. He had x-rays today, reviewed in clinic. Right lower extremity is hot to palpation with pitting edema and erythematous. Significant PMH includes hypertension, diabetes, peripheral vascular disease, and high cholesterol. Last HgA1C 9.6 in , diabeters managed by PCP. Last doppler , ABIs WNL. Denies fever or chills.       Review of Systems     Review of Systems   Constitutional: Positive for activity change. Negative for chills and fever.   Respiratory: Negative for chest tightness and shortness of breath.    Cardiovascular: Positive for leg swelling. Negative for chest pain and palpitations.   Musculoskeletal: Positive for arthralgias, gait problem and joint swelling.   Skin: Positive for wound.        wound   Neurological: Positive for weakness and numbness.   Psychiatric/Behavioral: Negative for agitation, behavioral problems, confusion and self-injury.       Medical / Social / Family History     Past Medical History:   Diagnosis Date    Anemia     Anxiety     Atherosclerotic heart disease of native coronary artery with other forms of angina pectoris     Atrophic rhinitis     Carpal tunnel syndrome     Cellulitis of right lower extremity     COPD (chronic obstructive pulmonary disease)      Coronary arteriosclerosis     COVID 02/02/2022    Depression     Diabetic ulcer of right foot associated with diabetes mellitus due to underlying condition, with bone involvement without evidence of necrosis     GERD (gastroesophageal reflux disease)     Hyperlipidemia     Hypertension     Insomnia     MI (myocardial infarction)     Neuropathy     Peripheral vascular disease, unspecified     Right foot ulcer, with fat layer exposed 01/07/2015    Sleep apnea     Type 1 diabetes mellitus with foot ulcer     Type 2 diabetes mellitus        Past Surgical History:   Procedure Laterality Date    AMPUTATION      ANGIOPLASTY      CARDIAC CATHETERIZATION      CORONARY STENT PLACEMENT      DEBRIDEMENT      Right foot debridment with hospital stay and IV Abx    DEBRIDEMENT OF LOWER EXTREMITY Right 6/27/2022    Procedure: DEBRIDEMENT, LOWER EXTREMITY;  Surgeon: Forrest Kiser MD;  Location: [facility] OR;  Service: General;  Laterality: Right;  right foot    SHOULDER OPEN ROTATOR CUFF REPAIR Left     SPINE SURGERY      VASCULAR SURGERY         Social History  Mr. Navdeep Avery  reports that he has been smoking cigarettes. He has been smoking about 0.50 packs per day. He has never used smokeless tobacco. He reports previous alcohol use. He reports current drug use. Drug: Oxycodone.    Family History  's Navdeep Avery family history includes Alcohol abuse in his father; Arthritis in his mother; COPD in his paternal grandfather; Cancer in his sister; Diabetes in his sister; Early death in his father; Heart disease in his father, maternal grandfather, and son; Hypertension in his mother; Learning disabilities in his mother.    Medications and Allergies     Medications  No outpatient medications have been marked as taking for the 8/15/22 encounter (Office Visit) with GIANA Moncada.       Allergies  Review of patient's allergies indicates:  No Known Allergies    Physical Examination      Vitals:    08/15/22 0908   BP: 110/63   Pulse: 68   Resp: 20   Temp: 97.5 °F (36.4 °C)     Physical Exam  Vitals and nursing note reviewed.   Constitutional:       Appearance: Normal appearance.   HENT:      Head: Normocephalic.   Cardiovascular:      Rate and Rhythm: Normal rate and regular rhythm.      Pulses: Normal pulses.      Heart sounds: Normal heart sounds.   Pulmonary:      Effort: Pulmonary effort is normal. No respiratory distress.   Chest:      Chest wall: No tenderness.   Musculoskeletal:         General: Swelling and tenderness present.      Right lower leg: Edema present.   Skin:     General: Skin is warm and dry.      Findings: Erythema present.      Comments: See LDA for measurements and picture   Neurological:      General: No focal deficit present.      Mental Status: He is alert and oriented to person, place, and time. Mental status is at baseline.   Psychiatric:         Mood and Affect: Mood normal.         Behavior: Behavior normal.         Thought Content: Thought content normal.         Judgment: Judgment normal.         Assessment and Plan      1. Diabetic ulcer of right midfoot associated with diabetes mellitus due to underlying condition, with bone involvement without evidence of necrosis           Incision/Site 06/27/22 0933 Right Foot (Active)   06/27/22 0933   Present Prior to Hospital Arrival?:    Side: Right   Location: Foot   Orientation:    Incision Type:    Closure Method:    Additional Comments:    Removal Indication and Assessment:    Wound Outcome:    Removal Indications:    Wound Image    08/15/22 0910   Dressing Appearance Dried drainage 08/15/22 0910   Drainage Amount Moderate 08/15/22 0910   Drainage Characteristics/Odor Serosanguineous 08/15/22 0910   Appearance Pink;Red;Slough;Moist 08/15/22 0910   Black (%), Wound Tissue Color 0 % 08/15/22 0910   Red (%), Wound Tissue Color 100 % 08/15/22 0910   Yellow (%), Wound Tissue Color 0 % 08/15/22 0910   Periwound Area Pale  white;Macerated 08/15/22 0910   Wound Edges Callused;Open 08/15/22 0910   Wound Length (cm) 2.1 cm 08/15/22 0910   Wound Width (cm) 2.5 cm 08/15/22 0910   Wound Depth (cm) 1 cm 08/15/22 0910   Wound Volume (cm^3) 5.25 cm^3 08/15/22 0910   Wound Surface Area (cm^2) 5.25 cm^2 08/15/22 0910   Care Cleansed with:;Soap and water;Applied:;Skin Barrier 08/15/22 0910   Dressing Applied;Silver;Gauze;Rolled gauze 08/15/22 0910   Periwound Care Moisture barrier applied 08/15/22 0910   Off Loading Off loading shoe 08/15/22 0910         Active Problem List with Overview Notes    Diagnosis Date Noted    Diabetic peripheral neuropathy     S/P CABG (coronary artery bypass graft) 06/24/2022    COPD (chronic obstructive pulmonary disease) 06/24/2022    SOB (shortness of breath) 03/07/2022    Diabetic ulcer of right foot associated with diabetes mellitus due to underlying condition, with bone involvement without evidence of necrosis                    Insomnia 06/17/2021    Type 2 diabetes mellitus 06/17/2021    Anxiety 06/17/2021    Peripheral vascular disease, unspecified     Diabetic ulcer of midfoot associated with diabetes mellitus due to underlying condition, with necrosis of muscle 03/23/2021    Ulcer of right foot with necrosis of muscle 03/23/2021    Benign hypertension 03/23/2021    Mixed hyperlipidemia 03/23/2021    Dependence on nicotine from cigarettes 03/23/2021        Plan:   Admit to [facility] with cellulitis for IV antibiotics    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 evidence of necrosis - Primary    Overview                        Relevant Orders    Culture, Wound    Culture, Anaerobe          Future Appointments   Date Time Provider Department Center   8/26/2022  9:00 AM GIANA Moncada [facility]   9/12/2022  7:45 AM Larry Samson, PA [facility]  [facility]   9/19/2022  9:00 AM [facility]    10/4/2022  2:00 PM [facility]SE, [facility]   10/21/2022  1:15 PM Marquez Huff MD [facility]   10/5/2023  9:00 AM [facility] NURSE, [facility]            Signature:  GIANA Moncada  [facility]  [address]  [facility] [address]  [phone]    Date of encounter: 8/15/22

## 2022-08-15 NOTE — SUBJECTIVE & OBJECTIVE
Past Medical History:   Diagnosis Date    Anemia     Anxiety     Atherosclerotic heart disease of native coronary artery with other forms of angina pectoris     Atrophic rhinitis     Carpal tunnel syndrome     Cellulitis of right lower extremity     COPD (chronic obstructive pulmonary disease)     Coronary arteriosclerosis     COVID 02/02/2022    Depression     Diabetic ulcer of right foot associated with diabetes mellitus due to underlying condition, with bone involvement without evidence of necrosis     GERD (gastroesophageal reflux disease)     Hyperlipidemia     Hypertension     Insomnia     MI (myocardial infarction)     Neuropathy     Peripheral vascular disease, unspecified     Right foot ulcer, with fat layer exposed 01/07/2015    Sleep apnea     Type 1 diabetes mellitus with foot ulcer     Type 2 diabetes mellitus        Past Surgical History:   Procedure Laterality Date    AMPUTATION      ANGIOPLASTY      CARDIAC CATHETERIZATION      CORONARY STENT PLACEMENT      DEBRIDEMENT      Right foot debridment with hospital stay and IV Abx    DEBRIDEMENT OF LOWER EXTREMITY Right 6/27/2022    Procedure: DEBRIDEMENT, LOWER EXTREMITY;  Surgeon: Forrest Kiser MD;  Location: [facility];  Service: General;  Laterality: Right;  right foot    SHOULDER OPEN ROTATOR CUFF REPAIR Left     SPINE SURGERY      VASCULAR SURGERY         Review of patient's allergies indicates:  No Known Allergies    No current facility-administered medications on file prior to encounter.     Current Outpatient Medications on File Prior to Encounter   Medication Sig    albuterol (PROVENTIL) 2.5 mg /3 mL (0.083 %) nebulizer solution USE 1 VIAL IN NEBULIZER 3 TIMES DAILY    albuterol (PROVENTIL/VENTOLIN HFA) 90 mcg/actuation inhaler Inhale 2 puffs into the lungs 2 (two) times daily as needed for Wheezing.    amLODIPine (NORVASC) 10 MG tablet Take 1 tablet (10 mg total) by mouth once daily.    aspirin 81 MG Chew Take 81 mg by mouth once daily.     clopidogreL (PLAVIX) 75 mg tablet Take 1 tablet (75 mg total) by mouth once daily.    diclofenac sodium (VOLTAREN) 1 % Gel Apply 1 g topically 4 (four) times daily as needed (pain).    empagliflozin (JARDIANCE) 10 mg tablet Take 1 tablet (10 mg total) by mouth once daily.    EScitalopram oxalate (LEXAPRO) 10 MG tablet Take 1 tablet (10 mg total) by mouth 2 (two) times a day. (Patient taking differently: Take 10 mg by mouth once daily.)    fluticasone propionate (FLONASE) 50 mcg/actuation nasal spray 2 sprays (100 mcg total) by Each Nostril route once daily.    gabapentin (NEURONTIN) 300 MG capsule Take 3 capsules (900 mg total) by mouth every 6 (six) hours.    HYDROcodone-acetaminophen (NORCO)  mg per tablet Take 1 tablet by mouth every 8 (eight) hours as needed for Pain.    hydrOXYzine pamoate (VISTARIL) 25 MG Cap Take 1 capsule (25 mg total) by mouth once daily. Take nightly for sleep    insulin aspart protamine-insulin aspart (NOVOLOG MIX 70-30FLEXPEN U-100) 100 unit/mL (70-30) InPn pen Inject 15 Units into the skin every morning. (Patient taking differently: Inject 15 Units into the skin 2 (two) times a day.)    insulin glargine U-300 conc (TOUJEO MAX U-300 SOLOSTAR) 300 unit/mL (3 mL) insulin pen Inject 40 Units into the skin once daily at 6am.    lisinopriL 10 MG tablet Take 1 tablet (10 mg total) by mouth once daily.    metoprolol succinate (TOPROL-XL) 25 MG 24 hr tablet Take 1 tablet (25 mg total) by mouth once daily.    nitroGLYCERIN (NITROSTAT) 0.4 MG SL tablet Place 1 tablet (0.4 mg total) under the tongue every 5 (five) minutes as needed for Chest pain.    pantoprazole (PROTONIX) 40 MG tablet Take 1 tablet (40 mg total) by mouth once daily.    rosuvastatin (CRESTOR) 20 MG tablet Take 1 tablet (20 mg total) by mouth every evening.    SPIRIVA RESPIMAT 2.5 mcg/actuation inhaler Inhale 1 puff into the lungs once daily.    [DISCONTINUED] amoxicillin-clavulanate 499-125mg (AUGMENTIN) 875-125 mg per  tablet Take 1 tablet by mouth every 12 (twelve) hours. (Patient not taking: Reported on 8/12/2022)    [DISCONTINUED] ciprofloxacin HCl (CIPRO) 500 MG tablet Take 500 mg by mouth 3 (three) times daily.    [DISCONTINUED] clindamycin (CLEOCIN) 300 MG capsule Take 300 mg by mouth 3 (three) times daily.     Family History       Problem Relation (Age of Onset)    Alcohol abuse Father    Arthritis Mother    COPD Paternal Grandfather    Cancer Sister    Diabetes Sister    Early death Father    Heart disease Father, Maternal Grandfather, Son    Hypertension Mother    Learning disabilities Mother          Tobacco Use    Smoking status: Current Every Day Smoker     Packs/day: 0.50     Types: Cigarettes    Smokeless tobacco: Never Used   Substance and Sexual Activity    Alcohol use: Not Currently    Drug use: Yes     Types: Oxycodone    Sexual activity: Yes     Review of Systems   Constitutional:  Negative for chills and fever.   HENT:  Positive for sinus pressure. Negative for sinus pain.    Eyes:  Positive for visual disturbance. Negative for pain.   Respiratory:  Negative for cough and shortness of breath.    Cardiovascular:  Negative for chest pain, palpitations and leg swelling.   Gastrointestinal:  Negative for abdominal distention, abdominal pain, nausea and vomiting.   Endocrine: Positive for cold intolerance. Negative for heat intolerance, polydipsia and polyuria.   Genitourinary:  Negative for dysuria and hematuria.   Musculoskeletal:  Positive for arthralgias. Negative for myalgias and neck pain.        Bilateral shoulders. Bilateral knees.    Skin:  Positive for wound. Negative for pallor.        See HPI   Neurological:  Positive for numbness. Negative for weakness.        Diabetic neuropathy bilateral lower extremities.    Hematological:  Negative for adenopathy. Does not bruise/bleed easily.   Psychiatric/Behavioral:  Negative for confusion and decreased concentration.    Objective:     Vital Signs (Most  Recent):  Temp: 97.7 °F (36.5 °C) (08/15/22 1530)  Pulse: 68 (08/15/22 1530)  Resp: 18 (08/15/22 1530)  BP: (!) 162/96 (08/15/22 1530)  SpO2: 96 % (08/15/22 1530)   Vital Signs (24h Range):  Temp:  [97.5 °F (36.4 °C)-97.7 °F (36.5 °C)] 97.7 °F (36.5 °C)  Pulse:  [68] 68  Resp:  [18-20] 18  SpO2:  [96 %] 96 %  BP: (110-162)/(63-96) 162/96     Weight: 72.9 kg (160 lb 11.5 oz)  Body mass index is 25.94 kg/m².    Physical Exam        Significant Labs: All pertinent labs within the past 24 hours have been reviewed.  Recent Lab Results         08/15/22  1731        POC Glucose 184               Significant Imaging: I have reviewed all pertinent imaging results/findings within the past 24 hours.

## 2022-08-15 NOTE — HPI
61 y/o WM seen in [facility] today.  Has had a chronic ulcer Right foot for over a year. Over the last 1-2 days foot has become red and warm and patient notes this is starting to come up his R leg. No fever or chills. No new pain. Slightly more drainage from wound.  After evaluation in clinic it was felt that he would benefit form admission and IV abx.

## 2022-08-15 NOTE — PROGRESS NOTES
Pharmacy has been consulted to dose vanc for a wound in this 63 y/o male pt.  Pt has just been admitted and does not have lab work available to determine kidney function.  Pharmacy has ordered a one time dose of vanc 1500 mg IV to be given tonight and will reassess when the pharmacy is open in the morning.

## 2022-08-15 NOTE — PROGRESS NOTES
Pharmacist Intervention IV to PO Note    Navdeep Avery is a 62 y.o. male being treated with IV medication famotidine    Patient Data:    Vital Signs (Most Recent):  Temp: 97.7 °F (36.5 °C) (08/15/22 1530)  Pulse: 68 (08/15/22 1530)  Resp: 18 (08/15/22 1530)  BP: (!) 162/96 (08/15/22 1530)  SpO2: 96 % (08/15/22 1530)   Vital Signs (72h Range):  Temp:  [97.5 °F (36.4 °C)-97.7 °F (36.5 °C)]   Pulse:  [68]   Resp:  [18-20]   BP: (110-162)/(63-96)   SpO2:  [96 %]      CBC:  No results for input(s): WBC, RBC, HGB, HCT, PLT, MCV, MCH, MCHC in the last 168 hours.  CMP:   No results for input(s): GLU, CALCIUM, ALBUMIN, PROT, NA, K, CO2, CL, BUN, CREATININE, ALKPHOS, ALT, AST, BILITOT in the last 168 hours.    Dietary Orders:  Diet Orders  Report           Diet diabetic: Diabetic starting at 08/15 1554            Based on the following criteria, this patient qualifies for intravenous to oral conversion:  [x] The patients gastrointestinal tract is functioning (tolerating medications via oral or enteral route for 24 hours and tolerating food or enteral feeds for 24 hours).    IV medication famotidine will be changed to oral medication famotidine    Pharmacist's Name: Rosanna Balderas  Pharmacist's Extension: [phone]

## 2022-08-15 NOTE — PROCEDURES
Debridement    Date/Time: 8/15/2022 10:00 AM  Performed by: GIANA Moncada  Authorized by: GIANA Moncada     Consent Done?:  Yes (Written)    Wound Details:    Location:  Right foot    Location:  Right Plantar    Type of Debridement:  Excisional       Length (cm):  2.2       Area (sq cm):  5.72       Width (cm):  2.6       Percent Debrided (%):  100       Depth (cm):  0.3       Total Area Debrided (sq cm):  5.72    Depth of debridement:  Subcutaneous tissue    Tissue debrided:  Adipose, Dermis, Epidermis and Subcutaneous    Devitalized tissue debrided:  Biofilm, Callus, Clots and Exudate    Instruments:  Curette    Bleeding:  Minimal  Hemostasis Achieved: Yes    Method Used:  Pressure  Patient tolerance:  Patient tolerated the procedure well with no immediate complications     Assistant VIRGINIE Waterman LPN

## 2022-08-15 NOTE — ASSESSMENT & PLAN NOTE
Recent Aic suggests poor control. Will monitor and attempt to obtain reasonable control while he is here.

## 2022-08-15 NOTE — H&P
[facility] Hospital Medicine  History & Physical    Patient Name: Navdeep Avery  MRN: [id number]  Patient Class: IP- Inpatient  Admission Date: 8/15/2022  Attending Physician: Allen Edward Jr., MD   Primary Care Provider: Marquez Huff MD         Patient information was obtained from Patient and Wound care records. .     Subjective:     Principal Problem:Wound infection    Chief Complaint:   Chief Complaint   Patient presents with    Cellulitis R leg     Infected DFU Right foot now with redness and warmth of R foot and lower leg.          HPI: 61 y/o WM seen in [facility] today.  Has had a chronic ulcer Right foot for over a year. Over the last 1-2 days foot has become red and warm and patient notes this is starting to come up his R leg. No fever or chills. No new pain. Slightly more drainage from wound.  After evaluation in clinic it was felt that he would benefit form admission and IV abx.       Past Medical History:   Diagnosis Date    Anemia     Anxiety     Atherosclerotic heart disease of native coronary artery with other forms of angina pectoris     Atrophic rhinitis     Carpal tunnel syndrome     Cellulitis of right lower extremity     COPD (chronic obstructive pulmonary disease)     Coronary arteriosclerosis     COVID 02/02/2022    Depression     Diabetic ulcer of right foot associated with diabetes mellitus due to underlying condition, with bone involvement without evidence of necrosis     GERD (gastroesophageal reflux disease)     Hyperlipidemia     Hypertension     Insomnia     MI (myocardial infarction)     Neuropathy     Peripheral vascular disease, unspecified     Right foot ulcer, with fat layer exposed 01/07/2015    Sleep apnea     Type 1 diabetes mellitus with foot ulcer     Type 2 diabetes mellitus        Past Surgical History:   Procedure Laterality Date    AMPUTATION      ANGIOPLASTY      CARDIAC CATHETERIZATION      CORONARY STENT  PLACEMENT      DEBRIDEMENT      Right foot debridment with hospital stay and IV Abx    DEBRIDEMENT OF LOWER EXTREMITY Right 6/27/2022    Procedure: DEBRIDEMENT, LOWER EXTREMITY;  Surgeon: Forrest Kiser MD;  Location: [facility];  Service: General;  Laterality: Right;  right foot    SHOULDER OPEN ROTATOR CUFF REPAIR Left     SPINE SURGERY      VASCULAR SURGERY         Review of patient's allergies indicates:  No Known Allergies    No current facility-administered medications on file prior to encounter.     Current Outpatient Medications on File Prior to Encounter   Medication Sig    albuterol (PROVENTIL) 2.5 mg /3 mL (0.083 %) nebulizer solution USE 1 VIAL IN NEBULIZER 3 TIMES DAILY    albuterol (PROVENTIL/VENTOLIN HFA) 90 mcg/actuation inhaler Inhale 2 puffs into the lungs 2 (two) times daily as needed for Wheezing.    amLODIPine (NORVASC) 10 MG tablet Take 1 tablet (10 mg total) by mouth once daily.    aspirin 81 MG Chew Take 81 mg by mouth once daily.    clopidogreL (PLAVIX) 75 mg tablet Take 1 tablet (75 mg total) by mouth once daily.    diclofenac sodium (VOLTAREN) 1 % Gel Apply 1 g topically 4 (four) times daily as needed (pain).    empagliflozin (JARDIANCE) 10 mg tablet Take 1 tablet (10 mg total) by mouth once daily.    EScitalopram oxalate (LEXAPRO) 10 MG tablet Take 1 tablet (10 mg total) by mouth 2 (two) times a day. (Patient taking differently: Take 10 mg by mouth once daily.)    fluticasone propionate (FLONASE) 50 mcg/actuation nasal spray 2 sprays (100 mcg total) by Each Nostril route once daily.    gabapentin (NEURONTIN) 300 MG capsule Take 3 capsules (900 mg total) by mouth every 6 (six) hours.    HYDROcodone-acetaminophen (NORCO)  mg per tablet Take 1 tablet by mouth every 8 (eight) hours as needed for Pain.    hydrOXYzine pamoate (VISTARIL) 25 MG Cap Take 1 capsule (25 mg total) by mouth once daily. Take nightly for sleep    insulin aspart protamine-insulin aspart  (NOVOLOG MIX 70-30FLEXPEN U-100) 100 unit/mL (70-30) InPn pen Inject 15 Units into the skin every morning. (Patient taking differently: Inject 15 Units into the skin 2 (two) times a day.)    insulin glargine U-300 conc (TOUJEO MAX U-300 SOLOSTAR) 300 unit/mL (3 mL) insulin pen Inject 40 Units into the skin once daily at 6am.    lisinopriL 10 MG tablet Take 1 tablet (10 mg total) by mouth once daily.    metoprolol succinate (TOPROL-XL) 25 MG 24 hr tablet Take 1 tablet (25 mg total) by mouth once daily.    nitroGLYCERIN (NITROSTAT) 0.4 MG SL tablet Place 1 tablet (0.4 mg total) under the tongue every 5 (five) minutes as needed for Chest pain.    pantoprazole (PROTONIX) 40 MG tablet Take 1 tablet (40 mg total) by mouth once daily.    rosuvastatin (CRESTOR) 20 MG tablet Take 1 tablet (20 mg total) by mouth every evening.    SPIRIVA RESPIMAT 2.5 mcg/actuation inhaler Inhale 1 puff into the lungs once daily.    [DISCONTINUED] amoxicillin-clavulanate 875-125mg (AUGMENTIN) 875-125 mg per tablet Take 1 tablet by mouth every 12 (twelve) hours. (Patient not taking: Reported on 8/12/2022)    [DISCONTINUED] ciprofloxacin HCl (CIPRO) 500 MG tablet Take 500 mg by mouth 3 (three) times daily.    [DISCONTINUED] clindamycin (CLEOCIN) 300 MG capsule Take 300 mg by mouth 3 (three) times daily.     Family History       Problem Relation (Age of Onset)    Alcohol abuse Father    Arthritis Mother    COPD Paternal Grandfather    Cancer Sister    Diabetes Sister    Early death Father    Heart disease Father, Maternal Grandfather, Son    Hypertension Mother    Learning disabilities Mother          Tobacco Use    Smoking status: Current Every Day Smoker     Packs/day: 0.50     Types: Cigarettes    Smokeless tobacco: Never Used   Substance and Sexual Activity    Alcohol use: Not Currently    Drug use: Yes     Types: Oxycodone    Sexual activity: Yes     Review of Systems   Constitutional:  Negative for chills and fever.   HENT:   Positive for sinus pressure. Negative for sinus pain.    Eyes:  Positive for visual disturbance. Negative for pain.   Respiratory:  Negative for cough and shortness of breath.    Cardiovascular:  Negative for chest pain, palpitations and leg swelling.   Gastrointestinal:  Negative for abdominal distention, abdominal pain, nausea and vomiting.   Endocrine: Positive for cold intolerance. Negative for heat intolerance, polydipsia and polyuria.   Genitourinary:  Negative for dysuria and hematuria.   Musculoskeletal:  Positive for arthralgias. Negative for myalgias and neck pain.        Bilateral shoulders. Bilateral knees.    Skin:  Positive for wound. Negative for pallor.        See HPI   Neurological:  Positive for numbness. Negative for weakness.        Diabetic neuropathy bilateral lower extremities.    Hematological:  Negative for adenopathy. Does not bruise/bleed easily.   Psychiatric/Behavioral:  Negative for confusion and decreased concentration.    Objective:     Vital Signs (Most Recent):  Temp: 97.7 °F (36.5 °C) (08/15/22 1530)  Pulse: 68 (08/15/22 1530)  Resp: 18 (08/15/22 1530)  BP: (!) 162/96 (08/15/22 1530)  SpO2: 96 % (08/15/22 1530)   Vital Signs (24h Range):  Temp:  [97.5 °F (36.4 °C)-97.7 °F (36.5 °C)] 97.7 °F (36.5 °C)  Pulse:  [68] 68  Resp:  [18-20] 18  SpO2:  [96 %] 96 %  BP: (110-162)/(63-96) 162/96     Weight: 72.9 kg (160 lb 11.5 oz)  Body mass index is 25.94 kg/m².    Physical Exam        Significant Labs: All pertinent labs within the past 24 hours have been reviewed.  Recent Lab Results         08/15/22  1731        POC Glucose 184               Significant Imaging: I have reviewed all pertinent imaging results/findings within the past 24 hours.    Assessment/Plan:     * Wound infection    Infected DFU R foot centered over 1st MT head.  Cultures done.  Appears infection now involving foot and lower leg. Vanc and zosyn. Follow up cultures.     Diabetic ulcer of midfoot associated with  diabetes mellitus due to underlying condition, with necrosis of muscle  Patient's FSGs are uncontrolled due to hyperglycemia on current medication regimen.  Last A1c reviewed-   Lab Results   Component Value Date    HGBA1C 9.6 (H) 07/20/2022     Most recent fingerstick glucose reviewed- No results for input(s): POCTGLUCOSE in the last 24 hours.  Current correctional scale  Low    Antihyperglycemics (From admission, onward)            Start     Stop Route Frequency Ordered    08/15/22 1730  insulin asp prt-insulin aspart (NovoLOG 70/30) injection 15 Units         -- SubQ 2 times daily with meals 08/15/22 1629        Will adjust insulin regimen while in hospital.     Benign hypertension    Continue amlodipine.     Type 2 diabetes mellitus  Recent Aic suggests poor control. Will monitor and attempt to obtain reasonable control while he is here.       VTE Risk Mitigation (From admission, onward)         Ordered     Place sequential compression device  Until discontinued         08/15/22 1556     IP VTE LOW RISK PATIENT  Once         08/15/22 1556                   Allen Edward Jr, MD  Department of Hospital Medicine   [facility]

## 2022-08-15 NOTE — ASSESSMENT & PLAN NOTE
Infected DFU R foot centered over 1st MT head.  Cultures done.  Appears infection now involving foot and lower leg. Stefania and zosyn. Follow up cultures.

## 2022-08-15 NOTE — ASSESSMENT & PLAN NOTE
Patient's FSGs are uncontrolled due to hyperglycemia on current medication regimen.  Last A1c reviewed-   Lab Results   Component Value Date    HGBA1C 9.6 (H) 07/20/2022     Most recent fingerstick glucose reviewed- No results for input(s): POCTGLUCOSE in the last 24 hours.  Current correctional scale  Low    Antihyperglycemics (From admission, onward)            Start     Stop Route Frequency Ordered    08/15/22 1730  insulin asp prt-insulin aspart (NovoLOG 70/30) injection 15 Units         -- SubQ 2 times daily with meals 08/15/22 1629        Will adjust insulin regimen while in hospital.

## 2022-08-16 LAB
ANION GAP SERPL CALCULATED.3IONS-SCNC: 9 MMOL/L (ref 7–16)
BASOPHILS # BLD AUTO: 0.04 K/UL (ref 0–0.2)
BASOPHILS NFR BLD AUTO: 0.7 % (ref 0–1)
BUN SERPL-MCNC: 20 MG/DL (ref 7–18)
BUN/CREAT SERPL: 23 (ref 6–20)
CALCIUM SERPL-MCNC: 9 MG/DL (ref 8.5–10.1)
CHLORIDE SERPL-SCNC: 103 MMOL/L (ref 98–107)
CO2 SERPL-SCNC: 27 MMOL/L (ref 21–32)
CREAT SERPL-MCNC: 0.86 MG/DL (ref 0.7–1.3)
DIFFERENTIAL METHOD BLD: ABNORMAL
EGFR (NO RACE VARIABLE) (RUSH/TITUS): 97 ML/MIN/1.73M²
EOSINOPHIL # BLD AUTO: 0.28 K/UL (ref 0–0.5)
EOSINOPHIL NFR BLD AUTO: 5.1 % (ref 1–4)
ERYTHROCYTE [DISTWIDTH] IN BLOOD BY AUTOMATED COUNT: 11.9 % (ref 11.5–14.5)
GLUCOSE SERPL-MCNC: 117 MG/DL (ref 70–105)
GLUCOSE SERPL-MCNC: 120 MG/DL (ref 70–105)
GLUCOSE SERPL-MCNC: 151 MG/DL (ref 74–106)
GLUCOSE SERPL-MCNC: 152 MG/DL (ref 70–105)
GLUCOSE SERPL-MCNC: 221 MG/DL (ref 70–105)
HCT VFR BLD AUTO: 35.6 % (ref 40–54)
HGB BLD-MCNC: 12.1 G/DL (ref 13.5–18)
IMM GRANULOCYTES # BLD AUTO: 0.01 K/UL (ref 0–0.04)
IMM GRANULOCYTES NFR BLD: 0.2 % (ref 0–0.4)
LYMPHOCYTES # BLD AUTO: 1.82 K/UL (ref 1–4.8)
LYMPHOCYTES NFR BLD AUTO: 33 % (ref 27–41)
MCH RBC QN AUTO: 30.9 PG (ref 27–31)
MCHC RBC AUTO-ENTMCNC: 34 G/DL (ref 32–36)
MCV RBC AUTO: 91 FL (ref 80–96)
MONOCYTES # BLD AUTO: 0.55 K/UL (ref 0–0.8)
MONOCYTES NFR BLD AUTO: 10 % (ref 2–6)
MPC BLD CALC-MCNC: 9 FL (ref 9.4–12.4)
NEUTROPHILS # BLD AUTO: 2.81 K/UL (ref 1.8–7.7)
NEUTROPHILS NFR BLD AUTO: 51 % (ref 53–65)
NRBC # BLD AUTO: 0 X10E3/UL
NRBC, AUTO (.00): 0 %
PLATELET # BLD AUTO: 244 K/UL (ref 150–400)
POTASSIUM SERPL-SCNC: 4.2 MMOL/L (ref 3.5–5.1)
RBC # BLD AUTO: 3.91 M/UL (ref 4.6–6.2)
SODIUM SERPL-SCNC: 135 MMOL/L (ref 136–145)
WBC # BLD AUTO: 5.51 K/UL (ref 4.5–11)

## 2022-08-16 PROCEDURE — 96372 THER/PROPH/DIAG INJ SC/IM: CPT

## 2022-08-16 PROCEDURE — 85025 COMPLETE CBC W/AUTO DIFF WBC: CPT | Performed by: FAMILY MEDICINE

## 2022-08-16 PROCEDURE — 25000242 PHARM REV CODE 250 ALT 637 W/ HCPCS: Performed by: FAMILY MEDICINE

## 2022-08-16 PROCEDURE — 80048 BASIC METABOLIC PNL TOTAL CA: CPT | Performed by: FAMILY MEDICINE

## 2022-08-16 PROCEDURE — 94640 AIRWAY INHALATION TREATMENT: CPT

## 2022-08-16 PROCEDURE — 25000003 PHARM REV CODE 250: Performed by: FAMILY MEDICINE

## 2022-08-16 PROCEDURE — 63600175 PHARM REV CODE 636 W HCPCS: Performed by: FAMILY MEDICINE

## 2022-08-16 PROCEDURE — S4991 NICOTINE PATCH NONLEGEND: HCPCS | Performed by: FAMILY MEDICINE

## 2022-08-16 PROCEDURE — 99232 PR SUBSEQUENT HOSPITAL CARE,LEVL II: ICD-10-PCS | Mod: ,,, | Performed by: NURSE PRACTITIONER

## 2022-08-16 PROCEDURE — 94761 N-INVAS EAR/PLS OXIMETRY MLT: CPT

## 2022-08-16 PROCEDURE — 99232 SBSQ HOSP IP/OBS MODERATE 35: CPT | Mod: ,,, | Performed by: FAMILY MEDICINE

## 2022-08-16 PROCEDURE — 11000001 HC ACUTE MED/SURG PRIVATE ROOM

## 2022-08-16 PROCEDURE — 99232 SBSQ HOSP IP/OBS MODERATE 35: CPT | Mod: ,,, | Performed by: NURSE PRACTITIONER

## 2022-08-16 PROCEDURE — 82962 GLUCOSE BLOOD TEST: CPT

## 2022-08-16 PROCEDURE — 99232 PR SUBSEQUENT HOSPITAL CARE,LEVL II: ICD-10-PCS | Mod: ,,, | Performed by: FAMILY MEDICINE

## 2022-08-16 PROCEDURE — 36415 COLL VENOUS BLD VENIPUNCTURE: CPT | Performed by: FAMILY MEDICINE

## 2022-08-16 RX ORDER — INSULIN ASPART 100 [IU]/ML
17 INJECTION, SUSPENSION SUBCUTANEOUS 2 TIMES DAILY WITH MEALS
Status: DISCONTINUED | OUTPATIENT
Start: 2022-08-16 | End: 2022-08-18

## 2022-08-16 RX ADMIN — PANTOPRAZOLE SODIUM 40 MG: 40 TABLET, DELAYED RELEASE ORAL at 09:08

## 2022-08-16 RX ADMIN — PIPERACILLIN AND TAZOBACTAM 4.5 G: 4; .5 INJECTION, POWDER, LYOPHILIZED, FOR SOLUTION INTRAVENOUS at 10:08

## 2022-08-16 RX ADMIN — IPRATROPIUM BROMIDE 0.5 MG: 0.5 SOLUTION RESPIRATORY (INHALATION) at 02:08

## 2022-08-16 RX ADMIN — IPRATROPIUM BROMIDE 0.5 MG: 0.5 SOLUTION RESPIRATORY (INHALATION) at 08:08

## 2022-08-16 RX ADMIN — GABAPENTIN 900 MG: 300 CAPSULE ORAL at 05:08

## 2022-08-16 RX ADMIN — INSULIN ASPART 15 UNITS: 100 INJECTION, SUSPENSION SUBCUTANEOUS at 06:08

## 2022-08-16 RX ADMIN — ATORVASTATIN CALCIUM 20 MG: 20 TABLET, FILM COATED ORAL at 09:08

## 2022-08-16 RX ADMIN — MUPIROCIN: 20 OINTMENT TOPICAL at 09:08

## 2022-08-16 RX ADMIN — ASPIRIN 81 MG CHEWABLE TABLET 81 MG: 81 TABLET CHEWABLE at 09:08

## 2022-08-16 RX ADMIN — FLUTICASONE PROPIONATE 100 MCG: 50 SPRAY, METERED NASAL at 09:08

## 2022-08-16 RX ADMIN — ESCITALOPRAM OXALATE 10 MG: 10 TABLET ORAL at 09:08

## 2022-08-16 RX ADMIN — VANCOMYCIN HYDROCHLORIDE 1500 MG: 5 INJECTION, POWDER, LYOPHILIZED, FOR SOLUTION INTRAVENOUS at 03:08

## 2022-08-16 RX ADMIN — PIPERACILLIN AND TAZOBACTAM 4.5 G: 4; .5 INJECTION, POWDER, LYOPHILIZED, FOR SOLUTION INTRAVENOUS at 05:08

## 2022-08-16 RX ADMIN — INSULIN ASPART 2 UNITS: 100 INJECTION, SOLUTION INTRAVENOUS; SUBCUTANEOUS at 07:08

## 2022-08-16 RX ADMIN — PIPERACILLIN AND TAZOBACTAM 4.5 G: 4; .5 INJECTION, POWDER, LYOPHILIZED, FOR SOLUTION INTRAVENOUS at 02:08

## 2022-08-16 RX ADMIN — LISINOPRIL 10 MG: 10 TABLET ORAL at 09:08

## 2022-08-16 RX ADMIN — HYDROCODONE BITARTRATE AND ACETAMINOPHEN 1 TABLET: 10; 325 TABLET ORAL at 04:08

## 2022-08-16 RX ADMIN — HYDROCODONE BITARTRATE AND ACETAMINOPHEN 1 TABLET: 10; 325 TABLET ORAL at 05:08

## 2022-08-16 RX ADMIN — AMLODIPINE BESYLATE 10 MG: 10 TABLET ORAL at 09:08

## 2022-08-16 RX ADMIN — GABAPENTIN 900 MG: 300 CAPSULE ORAL at 01:08

## 2022-08-16 RX ADMIN — GABAPENTIN 900 MG: 300 CAPSULE ORAL at 11:08

## 2022-08-16 RX ADMIN — IPRATROPIUM BROMIDE 0.5 MG: 0.5 SOLUTION RESPIRATORY (INHALATION) at 01:08

## 2022-08-16 RX ADMIN — INSULIN ASPART 17 UNITS: 100 INJECTION, SUSPENSION SUBCUTANEOUS at 03:08

## 2022-08-16 RX ADMIN — HYDROXYZINE PAMOATE 25 MG: 25 CAPSULE ORAL at 09:08

## 2022-08-16 RX ADMIN — IPRATROPIUM BROMIDE 0.5 MG: 0.5 SOLUTION RESPIRATORY (INHALATION) at 07:08

## 2022-08-16 RX ADMIN — NICOTINE 1 PATCH: 14 PATCH TRANSDERMAL at 09:08

## 2022-08-16 RX ADMIN — METOPROLOL SUCCINATE 25 MG: 25 TABLET, EXTENDED RELEASE ORAL at 09:08

## 2022-08-16 RX ADMIN — CLOPIDOGREL 75 MG: 75 TABLET, FILM COATED ORAL at 09:08

## 2022-08-16 NOTE — ASSESSMENT & PLAN NOTE
Recent Aic suggests poor control. Will monitor and attempt to obtain reasonable control while he is here.     8/16 - Increase 70/30 to 17 units BID.

## 2022-08-16 NOTE — PLAN OF CARE
Problem: Gas Exchange Impaired  Goal: Optimal Gas Exchange  Outcome: Ongoing, Progressing     Problem: Fall Injury Risk  Goal: Absence of Fall and Fall-Related Injury  Outcome: Ongoing, Progressing     Problem: Pain Chronic (Persistent)  Goal: Acceptable Pain Control and Functional Ability  Outcome: Ongoing, Progressing

## 2022-08-16 NOTE — PROGRESS NOTES
[facility]  Wound Care  Progress Note    Patient Name: Navdeep Avery  MRN: [id number]  Admission Date: 8/15/2022  Attending Physician: Allen Edward Jr., MD    Past Medical History:   Diagnosis Date    Anemia     Anxiety     Atherosclerotic heart disease of native coronary artery with other forms of angina pectoris     Atrophic rhinitis     Carpal tunnel syndrome     Cellulitis of right lower extremity     COPD (chronic obstructive pulmonary disease)     Coronary arteriosclerosis     COVID 02/02/2022    Depression     Diabetic ulcer of right foot associated with diabetes mellitus due to underlying condition, with bone involvement without evidence of necrosis     GERD (gastroesophageal reflux disease)     Hyperlipidemia     Hypertension     Insomnia     MI (myocardial infarction)     Neuropathy     Peripheral vascular disease, unspecified     Right foot ulcer, with fat layer exposed 01/07/2015    Sleep apnea     Type 1 diabetes mellitus with foot ulcer     Type 2 diabetes mellitus         Subjective:     HPI:  Navdeep Avery is a 61 y.o. male with chronic-non healing wound on the right foot. Patient known to me from previous admission and outpatient wound care clinic.  He was admitted from [facility] with cellulitis RLE and infected wound. Erythema and edema are improving. X-ray of right ankle and foot prior to admission.  Cultures pending. Significant PMH includes hypertension, diabetes, peripheral vascular disease, and high cholesterol. Last doppler July 22, ABIs WNL. Denies fever or chills.        Review of Systems   Constitutional: Positive for activity change. Negative for chills and fever.   Respiratory: Negative for chest tightness and shortness of breath.    Cardiovascular: Positive for leg swelling. Negative for chest pain and palpitations.   Musculoskeletal: Positive for arthralgias, gait problem and joint swelling.   Skin: Positive for wound.        wound    Neurological: Positive for weakness and numbness.   Psychiatric/Behavioral: Negative for agitation, behavioral problems, confusion and self-injury.     Objective:     Vital Signs (Most Recent):  Temp: 98 °F (36.7 °C) (08/16/22 0800)  Pulse: 61 (08/16/22 0800)  Resp: 20 (08/16/22 0800)  BP: 101/72 (08/16/22 0800)  SpO2: 95 % (08/16/22 0800) Vital Signs (24h Range):  Temp:  [97.7 °F (36.5 °C)-98.6 °F (37 °C)] 98 °F (36.7 °C)  Pulse:  [61-76] 61  Resp:  [17-20] 20  SpO2:  [95 %-98 %] 95 %  BP: (101-162)/(59-96) 101/72     Weight: 72.9 kg (160 lb 11.5 oz)  Body mass index is 25.94 kg/m².  No data recorded    Physical Exam  Vitals reviewed.   Constitutional:       Appearance: Normal appearance.   HENT:      Head: Normocephalic.   Cardiovascular:      Rate and Rhythm: Normal rate and regular rhythm.      Pulses: Normal pulses.      Heart sounds: Normal heart sounds.   Pulmonary:      Effort: Pulmonary effort is normal.      Breath sounds: Normal breath sounds.   Skin:     Findings: Erythema present.      Comments: See LDA for photo/measurements   Neurological:      Mental Status: He is alert. Mental status is at baseline.      Motor: Weakness present.      Gait: Gait abnormal.             Incision/Site 06/27/22 0933 Right Foot (Active)   06/27/22 0933   Present Prior to Hospital Arrival?:    Side: Right   Location: Foot   Orientation:    Incision Type:    Closure Method:    Additional Comments:    Removal Indication and Assessment:    Wound Outcome:    Removal Indications:    Wound Image    08/15/22 0910   Dressing Appearance Dried drainage 08/15/22 0910   Drainage Amount Moderate 08/15/22 0910   Drainage Characteristics/Odor Serosanguineous 08/15/22 0910   Appearance Dressing in place, unable to visualize 08/17/22 1915   Black (%), Wound Tissue Color 0 % 08/15/22 0910   Red (%), Wound Tissue Color 100 % 08/15/22 0910   Yellow (%), Wound Tissue Color 0 % 08/15/22 0910   Periwound Area Pale white;Macerated 08/15/22 0926    Wound Edges Callused;Open 08/15/22 0910   Wound Length (cm) 2.1 cm 08/15/22 0910   Wound Width (cm) 2.5 cm 08/15/22 0910   Wound Depth (cm) 1 cm 08/15/22 0910   Wound Volume (cm^3) 5.25 cm^3 08/15/22 0910   Wound Surface Area (cm^2) 5.25 cm^2 08/15/22 0910   Care Cleansed with:;Soap and water;Applied:;Skin Barrier 08/15/22 0910   Dressing Applied;Silver;Gauze;Rolled gauze 08/15/22 0910   Periwound Care Moisture barrier applied 08/15/22 0910   Off Loading Off loading shoe 08/15/22 0910   Number of days: 51         Assessment and Plan    iabetic ulcer of midfoot associated with diabetes mellitus due to underlying condition, with necrosis of muscle  Clean with vashe  Apply vashe moistened drawtex, then additional layer of drawtex  Cover and secure with 4x4s, kerlix, and paper tape  Change daily and PRN for drainage  Elevate leg when in bed or sitting  Keep pressure off wound  IV antibiotics          Signature:  GIANA Moncada  Wound Care    Date of encounter: 08/16/2022

## 2022-08-16 NOTE — ASSESSMENT & PLAN NOTE
Infected DFU R foot centered over 1st MT head.  Cultures done.  Appears infection now involving foot and lower leg. Vanc and zosyn. Follow up cultures.     8/16 - Improving Cellulitis RLE

## 2022-08-16 NOTE — PROGRESS NOTES
[facility] Hospital Medicine  Progress Note    Patient Name: Navdeep Avery  MRN: [id number]  Patient Class: IP- Inpatient   Admission Date: 8/15/2022  Length of Stay: 1 days  Attending Physician: Allen Edward Jr., MD  Primary Care Provider: Marquez Huff MD        Subjective:     Principal Problem:Wound infection        HPI:  63 y/o WM seen in [facility] today.  Has had a chronic ulcer Right foot for over a year. Over the last 1-2 days foot has become red and warm and patient notes this is starting to come up his R leg. No fever or chills. No new pain. Slightly more drainage from wound.  After evaluation in clinic it was felt that he would benefit form admission and IV abx.       Overview/Hospital Course:  No notes on file    Interval History: Feels well, thinks leg may be a little better.     Review of Systems   Constitutional:  Negative for chills and fever.   Respiratory:  Negative for cough and shortness of breath.    Cardiovascular:  Negative for chest pain.   Gastrointestinal:  Negative for abdominal pain.   Objective:     Vital Signs (Most Recent):  Temp: 98 °F (36.7 °C) (08/16/22 0800)  Pulse: 61 (08/16/22 0800)  Resp: 20 (08/16/22 0800)  BP: 101/72 (08/16/22 0800)  SpO2: 95 % (08/16/22 0800) Vital Signs (24h Range):  Temp:  [97.7 °F (36.5 °C)-98.6 °F (37 °C)] 98 °F (36.7 °C)  Pulse:  [61-76] 61  Resp:  [17-20] 20  SpO2:  [95 %-98 %] 95 %  BP: (101-162)/(59-96) 101/72     Weight: 72.9 kg (160 lb 11.5 oz)  Body mass index is 25.94 kg/m².    Intake/Output Summary (Last 24 hours) at 8/16/2022 1047  Last data filed at 8/15/2022 2312  Gross per 24 hour   Intake 350.05 ml   Output --   Net 350.05 ml      Physical Exam  Cardiovascular:      Rate and Rhythm: Normal rate and regular rhythm.      Heart sounds: Normal heart sounds.   Pulmonary:      Effort: Pulmonary effort is normal. No respiratory distress.      Breath sounds: No wheezing.   Abdominal:      General: Abdomen is flat.  Bowel sounds are normal. There is no distension.      Palpations: Abdomen is soft.   Skin:     General: Skin is warm and dry.      Comments: Decreased erythema and warmth RLE.       Significant Labs: All pertinent labs within the past 24 hours have been reviewed.  BMP:   Recent Labs   Lab 08/16/22  0452   *   *   K 4.2      CO2 27   BUN 20*   CREATININE 0.86   CALCIUM 9.0     CBC: No results for input(s): WBC, HGB, HCT, PLT in the last 48 hours.    Significant Imaging: I have reviewed all pertinent imaging results/findings within the past 24 hours.      Assessment/Plan:      * Wound infection    Infected DFU R foot centered over 1st MT head.  Cultures done.  Appears infection now involving foot and lower leg. Vanc and zosyn. Follow up cultures.     8/16 - Improving Cellulitis RLE    Diabetic ulcer of midfoot associated with diabetes mellitus due to underlying condition, with necrosis of muscle  Patient's FSGs are uncontrolled due to hyperglycemia on current medication regimen.  Last A1c reviewed-   Lab Results   Component Value Date    HGBA1C 9.6 (H) 07/20/2022     Most recent fingerstick glucose reviewed- No results for input(s): POCTGLUCOSE in the last 24 hours.  Current correctional scale  Low    Antihyperglycemics (From admission, onward)            Start     Stop Route Frequency Ordered    08/15/22 1730  insulin asp prt-insulin aspart (NovoLOG 70/30) injection 15 Units         -- SubQ 2 times daily with meals 08/15/22 1629        Will adjust insulin regimen while in hospital.     Benign hypertension    Continue amlodipine.     Type 2 diabetes mellitus  Recent Aic suggests poor control. Will monitor and attempt to obtain reasonable control while he is here.     8/16 - Increase 70/30 to 17 units BID.      VTE Risk Mitigation (From admission, onward)         Ordered     Place sequential compression device  Until discontinued         08/15/22 1556     IP VTE LOW RISK PATIENT  Once          08/15/22 1556                Discharge Planning   MARGARITO:      Code Status: Full Code   Is the patient medically ready for discharge?:     Reason for patient still in hospital (select all that apply): Treatment  Discharge Plan A: Home with family, Home Health                  Allen Edward Jr, MD  Department of Hospital Medicine   [facility]

## 2022-08-16 NOTE — PROGRESS NOTES
Pharmacokinetic Initial Assessment: IV Vancomycin    Assessment/Plan:    Initiate intravenous vancomycin as 1500 mg IV q18h  Desired empiric serum trough concentration is 10 to 20 mcg/mL  Draw vancomycin trough level 30 min prior to fourth dose on 8/18 at approximately 0330  Pharmacy will continue to follow and monitor vancomycin.      Please contact pharmacy at extension [phone] with any questions regarding this assessment.     Patient brief summary:  Navdeep Avery is a 62 y.o. male initiated on antimicrobial therapy with IV Vancomycin for treatment of suspected skin & soft tissue infection    Drug Allergies:   Review of patient's allergies indicates:  No Known Allergies    Actual Body Weight:   72.9 kg    Renal Function:   Estimated Creatinine Clearance: 78.3 mL/min (based on SCr of 0.86 mg/dL).,     Dialysis Method (if applicable):  N/A    CBC (last 72 hours):  No results for input(s): WHITE BLOOD CELL COUNT, HEMOGLOBIN, HEMATOCRIT, PLATELETS, GRAN%, LYMPH%, MONO%, EOSINOPHIL%, BASOPHIL%, DIFFERENTIAL METHOD in the last 72 hours.    Metabolic Panel (last 72 hours):  Recent Labs   Lab Result Units 08/16/22  0452   Sodium mmol/L 135*   Potassium mmol/L 4.2   Chloride mmol/L 103   CO2 mmol/L 27   Glucose mg/dL 151*   BUN mg/dL 20*   Creatinine mg/dL 0.86       Drug levels (last 3 results):  No results for input(s): VANCOMYCINRA, VANCORANDOM, VANCOMYCINPE, VANCOPEAK, VANCOMYCINTR, VANCOTROUGH in the last 72 hours.    Microbiologic Results:  Microbiology Results (last 7 days)       ** No results found for the last 168 hours. **

## 2022-08-16 NOTE — SUBJECTIVE & OBJECTIVE
Interval History: Feels well, thinks leg may be a little better.     Review of Systems   Constitutional:  Negative for chills and fever.   Respiratory:  Negative for cough and shortness of breath.    Cardiovascular:  Negative for chest pain.   Gastrointestinal:  Negative for abdominal pain.   Objective:     Vital Signs (Most Recent):  Temp: 98 °F (36.7 °C) (08/16/22 0800)  Pulse: 61 (08/16/22 0800)  Resp: 20 (08/16/22 0800)  BP: 101/72 (08/16/22 0800)  SpO2: 95 % (08/16/22 0800) Vital Signs (24h Range):  Temp:  [97.7 °F (36.5 °C)-98.6 °F (37 °C)] 98 °F (36.7 °C)  Pulse:  [61-76] 61  Resp:  [17-20] 20  SpO2:  [95 %-98 %] 95 %  BP: (101-162)/(59-96) 101/72     Weight: 72.9 kg (160 lb 11.5 oz)  Body mass index is 25.94 kg/m².    Intake/Output Summary (Last 24 hours) at 8/16/2022 1047  Last data filed at 8/15/2022 2312  Gross per 24 hour   Intake 350.05 ml   Output --   Net 350.05 ml      Physical Exam  Cardiovascular:      Rate and Rhythm: Normal rate and regular rhythm.      Heart sounds: Normal heart sounds.   Pulmonary:      Effort: Pulmonary effort is normal. No respiratory distress.      Breath sounds: No wheezing.   Abdominal:      General: Abdomen is flat. Bowel sounds are normal. There is no distension.      Palpations: Abdomen is soft.   Skin:     General: Skin is warm and dry.      Comments: Decreased erythema and warmth RLE.       Significant Labs: All pertinent labs within the past 24 hours have been reviewed.  BMP:   Recent Labs   Lab 08/16/22  0452   *   *   K 4.2      CO2 27   BUN 20*   CREATININE 0.86   CALCIUM 9.0     CBC: No results for input(s): WBC, HGB, HCT, PLT in the last 48 hours.    Significant Imaging: I have reviewed all pertinent imaging results/findings within the past 24 hours.

## 2022-08-16 NOTE — PLAN OF CARE
[facility]  Initial Discharge Assessment       Primary Care Provider: Marquez Huff MD    Admission Diagnosis: Wound infection [T14.8XXA, L08.9]    Admission Date: 8/15/2022  Expected Discharge Date:     Discharge Barriers Identified: None    Payor: MEDICARE / Plan: MEDICARE PART A & B / Product Type: Government /     Extended Emergency Contact Information  Primary Emergency Contact: Charisma Avery  Mobile Phone: [phone]  Relation: Spouse  Preferred language: English   needed? No    Discharge Plan A: Home with family, Home Health  Discharge Plan B: Home with family, Home Health       [facility] [place] [address]  [address]  [facility] [address]  Phone: [phone] Fax: [phone]     [facility] - [place], [place] - [address]  [address]  [facility] [address]  Phone: [phone] Fax: [phone]    [facility] [address]  [address] [place] [address] Blvd[address]  [address]  [facility] [address]  Phone: [phone] Fax: [phone]      Initial Assessment (most recent)     Adult Discharge Assessment - 08/16/22 0944        Discharge Assessment    Assessment Type Discharge Planning Assessment     Source of Information family     Communicated MARGARITO with patient/caregiver Date not available/Unable to determine     Lives With spouse     Do you expect to return to your current living situation? Yes     Do you have help at home or someone to help you manage your care at home? Yes     Who are your caregiver(s) and their phone number(s)? Charisma Avery - spouse [phone]     Prior to hospitilization cognitive status: Alert/Oriented     Current cognitive status: Alert/Oriented     Equipment Currently Used at Home walker, rolling;oxygen;CPAP     Patient currently being followed by outpatient case management? No     Do you currently have service(s) that help you  manage your care at home? Yes     Name and Contact number of agency [facility]     Is the pt/caregiver preference to resume services with current agency Yes     Do you have prescription coverage? Yes     Do you have any problems affording any of your prescribed medications? No     Is the patient taking medications as prescribed? yes     Who is going to help you get home at discharge? Wife- Charisma Avery     Are you on dialysis? No     Discharge Plan A Home with family;Home Health     Discharge Plan B Home with family;Home Health     DME Needed Upon Discharge  none     Discharge Plan discussed with: Spouse/sig other     Name(s) and Number(s) Charisma Avery- wife     Discharge Barriers Identified None        Relationship/Environment    Name(s) of Who Lives With Patient Charisma Avery- wife                 SS spoke with pt's wife, pt was asleep in room. Pt lives at home with wife. Pt current with [facility] and choice obtained to resume at discharge. Pt has a walker and oxygen pta. Pt has a CPAP but wife says he will not wear it at home. Password set and is [id number] and informed wife of meeting, unable to attend this week but will attend next week if pt is still here. SS will follow for discharge needs.

## 2022-08-16 NOTE — PLAN OF CARE
Problem: Diabetes Comorbidity  Goal: Blood Glucose Level Within Targeted Range  Outcome: Ongoing, Progressing     Problem: Skin Injury Risk Increased  Goal: Skin Health and Integrity  Outcome: Ongoing, Progressing     Problem: Diabetes Comorbidity  Goal: Blood Glucose Level Within Targeted Range  Outcome: Ongoing, Progressing     Problem: Skin Injury Risk Increased  Goal: Skin Health and Integrity  Outcome: Ongoing, Progressing

## 2022-08-17 LAB
ANION GAP SERPL CALCULATED.3IONS-SCNC: 6 MMOL/L (ref 7–16)
BUN SERPL-MCNC: 19 MG/DL (ref 7–18)
BUN/CREAT SERPL: 19 (ref 6–20)
CALCIUM SERPL-MCNC: 9.4 MG/DL (ref 8.5–10.1)
CHLORIDE SERPL-SCNC: 105 MMOL/L (ref 98–107)
CO2 SERPL-SCNC: 32 MMOL/L (ref 21–32)
CREAT SERPL-MCNC: 1.01 MG/DL (ref 0.7–1.3)
EGFR (NO RACE VARIABLE) (RUSH/TITUS): 83 ML/MIN/1.73M²
GLUCOSE SERPL-MCNC: 130 MG/DL (ref 74–106)
GLUCOSE SERPL-MCNC: 144 MG/DL (ref 70–105)
GLUCOSE SERPL-MCNC: 268 MG/DL (ref 70–105)
POTASSIUM SERPL-SCNC: 5 MMOL/L (ref 3.5–5.1)
SODIUM SERPL-SCNC: 138 MMOL/L (ref 136–145)

## 2022-08-17 PROCEDURE — 99232 PR SUBSEQUENT HOSPITAL CARE,LEVL II: ICD-10-PCS | Mod: ,,, | Performed by: FAMILY MEDICINE

## 2022-08-17 PROCEDURE — 96372 THER/PROPH/DIAG INJ SC/IM: CPT

## 2022-08-17 PROCEDURE — 25000003 PHARM REV CODE 250: Performed by: FAMILY MEDICINE

## 2022-08-17 PROCEDURE — 94761 N-INVAS EAR/PLS OXIMETRY MLT: CPT

## 2022-08-17 PROCEDURE — 99232 SBSQ HOSP IP/OBS MODERATE 35: CPT | Mod: ,,, | Performed by: FAMILY MEDICINE

## 2022-08-17 PROCEDURE — 94640 AIRWAY INHALATION TREATMENT: CPT

## 2022-08-17 PROCEDURE — 11000001 HC ACUTE MED/SURG PRIVATE ROOM

## 2022-08-17 PROCEDURE — S4991 NICOTINE PATCH NONLEGEND: HCPCS | Performed by: FAMILY MEDICINE

## 2022-08-17 PROCEDURE — 25000242 PHARM REV CODE 250 ALT 637 W/ HCPCS: Performed by: FAMILY MEDICINE

## 2022-08-17 PROCEDURE — 36415 COLL VENOUS BLD VENIPUNCTURE: CPT | Performed by: FAMILY MEDICINE

## 2022-08-17 PROCEDURE — 82962 GLUCOSE BLOOD TEST: CPT

## 2022-08-17 PROCEDURE — 63600175 PHARM REV CODE 636 W HCPCS: Performed by: FAMILY MEDICINE

## 2022-08-17 PROCEDURE — 80048 BASIC METABOLIC PNL TOTAL CA: CPT | Performed by: FAMILY MEDICINE

## 2022-08-17 RX ADMIN — ASPIRIN 81 MG CHEWABLE TABLET 81 MG: 81 TABLET CHEWABLE at 09:08

## 2022-08-17 RX ADMIN — FLUTICASONE PROPIONATE 100 MCG: 50 SPRAY, METERED NASAL at 09:08

## 2022-08-17 RX ADMIN — PANTOPRAZOLE SODIUM 40 MG: 40 TABLET, DELAYED RELEASE ORAL at 09:08

## 2022-08-17 RX ADMIN — METOPROLOL SUCCINATE 25 MG: 25 TABLET, EXTENDED RELEASE ORAL at 09:08

## 2022-08-17 RX ADMIN — VANCOMYCIN HYDROCHLORIDE 1500 MG: 5 INJECTION, POWDER, LYOPHILIZED, FOR SOLUTION INTRAVENOUS at 10:08

## 2022-08-17 RX ADMIN — ESCITALOPRAM OXALATE 10 MG: 10 TABLET ORAL at 09:08

## 2022-08-17 RX ADMIN — AMLODIPINE BESYLATE 10 MG: 10 TABLET ORAL at 09:08

## 2022-08-17 RX ADMIN — MUPIROCIN: 20 OINTMENT TOPICAL at 09:08

## 2022-08-17 RX ADMIN — IPRATROPIUM BROMIDE 0.5 MG: 0.5 SOLUTION RESPIRATORY (INHALATION) at 08:08

## 2022-08-17 RX ADMIN — HYDROCODONE BITARTRATE AND ACETAMINOPHEN 1 TABLET: 10; 325 TABLET ORAL at 06:08

## 2022-08-17 RX ADMIN — GABAPENTIN 900 MG: 300 CAPSULE ORAL at 01:08

## 2022-08-17 RX ADMIN — GABAPENTIN 900 MG: 300 CAPSULE ORAL at 06:08

## 2022-08-17 RX ADMIN — PIPERACILLIN AND TAZOBACTAM 4.5 G: 4; .5 INJECTION, POWDER, LYOPHILIZED, FOR SOLUTION INTRAVENOUS at 11:08

## 2022-08-17 RX ADMIN — IPRATROPIUM BROMIDE 0.5 MG: 0.5 SOLUTION RESPIRATORY (INHALATION) at 12:08

## 2022-08-17 RX ADMIN — HYDROCODONE BITARTRATE AND ACETAMINOPHEN 1 TABLET: 10; 325 TABLET ORAL at 10:08

## 2022-08-17 RX ADMIN — GABAPENTIN 900 MG: 300 CAPSULE ORAL at 05:08

## 2022-08-17 RX ADMIN — INSULIN ASPART 17 UNITS: 100 INJECTION, SUSPENSION SUBCUTANEOUS at 08:08

## 2022-08-17 RX ADMIN — CLOPIDOGREL 75 MG: 75 TABLET, FILM COATED ORAL at 09:08

## 2022-08-17 RX ADMIN — PIPERACILLIN AND TAZOBACTAM 4.5 G: 4; .5 INJECTION, POWDER, LYOPHILIZED, FOR SOLUTION INTRAVENOUS at 06:08

## 2022-08-17 RX ADMIN — HYDROXYZINE PAMOATE 25 MG: 25 CAPSULE ORAL at 09:08

## 2022-08-17 RX ADMIN — NICOTINE 1 PATCH: 14 PATCH TRANSDERMAL at 09:08

## 2022-08-17 RX ADMIN — IPRATROPIUM BROMIDE 0.5 MG: 0.5 SOLUTION RESPIRATORY (INHALATION) at 07:08

## 2022-08-17 RX ADMIN — PIPERACILLIN AND TAZOBACTAM 4.5 G: 4; .5 INJECTION, POWDER, LYOPHILIZED, FOR SOLUTION INTRAVENOUS at 01:08

## 2022-08-17 RX ADMIN — LISINOPRIL 10 MG: 10 TABLET ORAL at 09:08

## 2022-08-17 RX ADMIN — INSULIN ASPART 3 UNITS: 100 INJECTION, SOLUTION INTRAVENOUS; SUBCUTANEOUS at 06:08

## 2022-08-17 RX ADMIN — ATORVASTATIN CALCIUM 20 MG: 20 TABLET, FILM COATED ORAL at 09:08

## 2022-08-17 RX ADMIN — INSULIN ASPART 17 UNITS: 100 INJECTION, SUSPENSION SUBCUTANEOUS at 04:08

## 2022-08-17 RX ADMIN — IPRATROPIUM BROMIDE 0.5 MG: 0.5 SOLUTION RESPIRATORY (INHALATION) at 01:08

## 2022-08-17 RX ADMIN — HYDROCODONE BITARTRATE AND ACETAMINOPHEN 1 TABLET: 10; 325 TABLET ORAL at 12:08

## 2022-08-17 NOTE — PLAN OF CARE
Problem: Adult Inpatient Plan of Care  Goal: Plan of Care Review  Outcome: Ongoing, Progressing  Flowsheets (Taken 8/17/2022 1810)  Plan of Care Reviewed With: patient  Goal: Patient-Specific Goal (Individualized)  Outcome: Ongoing, Progressing  Goal: Absence of Hospital-Acquired Illness or Injury  Outcome: Ongoing, Progressing

## 2022-08-18 LAB
ANION GAP SERPL CALCULATED.3IONS-SCNC: 11 MMOL/L (ref 7–16)
BACTERIA SPEC ANAEROBE CULT: NORMAL
BUN SERPL-MCNC: 19 MG/DL (ref 7–18)
BUN/CREAT SERPL: 20 (ref 6–20)
CALCIUM SERPL-MCNC: 8.9 MG/DL (ref 8.5–10.1)
CHLORIDE SERPL-SCNC: 104 MMOL/L (ref 98–107)
CO2 SERPL-SCNC: 28 MMOL/L (ref 21–32)
CREAT SERPL-MCNC: 0.93 MG/DL (ref 0.7–1.3)
EGFR (NO RACE VARIABLE) (RUSH/TITUS): 92 ML/MIN/1.73M²
GLUCOSE SERPL-MCNC: 149 MG/DL (ref 70–105)
GLUCOSE SERPL-MCNC: 158 MG/DL (ref 70–105)
GLUCOSE SERPL-MCNC: 224 MG/DL (ref 74–106)
GLUCOSE SERPL-MCNC: 246 MG/DL (ref 70–105)
GLUCOSE SERPL-MCNC: 291 MG/DL (ref 70–105)
GLUCOSE SERPL-MCNC: 346 MG/DL (ref 70–105)
MICROORGANISM SPEC CULT: ABNORMAL
POTASSIUM SERPL-SCNC: 4.5 MMOL/L (ref 3.5–5.1)
SODIUM SERPL-SCNC: 138 MMOL/L (ref 136–145)
VANCOMYCIN TROUGH SERPL-MCNC: 11.6 ΜG/ML (ref 10–20)

## 2022-08-18 PROCEDURE — 96372 THER/PROPH/DIAG INJ SC/IM: CPT

## 2022-08-18 PROCEDURE — 80048 BASIC METABOLIC PNL TOTAL CA: CPT | Performed by: FAMILY MEDICINE

## 2022-08-18 PROCEDURE — 94761 N-INVAS EAR/PLS OXIMETRY MLT: CPT

## 2022-08-18 PROCEDURE — 25000003 PHARM REV CODE 250: Performed by: FAMILY MEDICINE

## 2022-08-18 PROCEDURE — 36415 COLL VENOUS BLD VENIPUNCTURE: CPT | Performed by: FAMILY MEDICINE

## 2022-08-18 PROCEDURE — 25000242 PHARM REV CODE 250 ALT 637 W/ HCPCS: Performed by: FAMILY MEDICINE

## 2022-08-18 PROCEDURE — 94640 AIRWAY INHALATION TREATMENT: CPT

## 2022-08-18 PROCEDURE — 99232 SBSQ HOSP IP/OBS MODERATE 35: CPT | Mod: ,,, | Performed by: FAMILY MEDICINE

## 2022-08-18 PROCEDURE — 80202 ASSAY OF VANCOMYCIN: CPT | Performed by: FAMILY MEDICINE

## 2022-08-18 PROCEDURE — 11000001 HC ACUTE MED/SURG PRIVATE ROOM

## 2022-08-18 PROCEDURE — 82962 GLUCOSE BLOOD TEST: CPT

## 2022-08-18 PROCEDURE — 63600175 PHARM REV CODE 636 W HCPCS: Performed by: FAMILY MEDICINE

## 2022-08-18 PROCEDURE — S4991 NICOTINE PATCH NONLEGEND: HCPCS | Performed by: FAMILY MEDICINE

## 2022-08-18 PROCEDURE — 99232 PR SUBSEQUENT HOSPITAL CARE,LEVL II: ICD-10-PCS | Mod: ,,, | Performed by: FAMILY MEDICINE

## 2022-08-18 RX ORDER — INSULIN ASPART 100 [IU]/ML
20 INJECTION, SUSPENSION SUBCUTANEOUS 2 TIMES DAILY WITH MEALS
Status: DISCONTINUED | OUTPATIENT
Start: 2022-08-19 | End: 2022-08-20 | Stop reason: HOSPADM

## 2022-08-18 RX ADMIN — VANCOMYCIN HYDROCHLORIDE 1500 MG: 5 INJECTION, POWDER, LYOPHILIZED, FOR SOLUTION INTRAVENOUS at 10:08

## 2022-08-18 RX ADMIN — GABAPENTIN 900 MG: 300 CAPSULE ORAL at 12:08

## 2022-08-18 RX ADMIN — INSULIN ASPART 4 UNITS: 100 INJECTION, SOLUTION INTRAVENOUS; SUBCUTANEOUS at 05:08

## 2022-08-18 RX ADMIN — GABAPENTIN 900 MG: 300 CAPSULE ORAL at 05:08

## 2022-08-18 RX ADMIN — INSULIN ASPART 17 UNITS: 100 INJECTION, SUSPENSION SUBCUTANEOUS at 05:08

## 2022-08-18 RX ADMIN — MUPIROCIN: 20 OINTMENT TOPICAL at 09:08

## 2022-08-18 RX ADMIN — MUPIROCIN: 20 OINTMENT TOPICAL at 10:08

## 2022-08-18 RX ADMIN — IPRATROPIUM BROMIDE 0.5 MG: 0.5 SOLUTION RESPIRATORY (INHALATION) at 08:08

## 2022-08-18 RX ADMIN — HYDROXYZINE PAMOATE 25 MG: 25 CAPSULE ORAL at 09:08

## 2022-08-18 RX ADMIN — FLUTICASONE PROPIONATE 100 MCG: 50 SPRAY, METERED NASAL at 10:08

## 2022-08-18 RX ADMIN — CLOPIDOGREL 75 MG: 75 TABLET, FILM COATED ORAL at 10:08

## 2022-08-18 RX ADMIN — IPRATROPIUM BROMIDE 0.5 MG: 0.5 SOLUTION RESPIRATORY (INHALATION) at 12:08

## 2022-08-18 RX ADMIN — METOPROLOL SUCCINATE 25 MG: 25 TABLET, EXTENDED RELEASE ORAL at 10:08

## 2022-08-18 RX ADMIN — ASPIRIN 81 MG CHEWABLE TABLET 81 MG: 81 TABLET CHEWABLE at 10:08

## 2022-08-18 RX ADMIN — HYDROCODONE BITARTRATE AND ACETAMINOPHEN 1 TABLET: 10; 325 TABLET ORAL at 03:08

## 2022-08-18 RX ADMIN — PIPERACILLIN AND TAZOBACTAM 4.5 G: 4; .5 INJECTION, POWDER, LYOPHILIZED, FOR SOLUTION INTRAVENOUS at 05:08

## 2022-08-18 RX ADMIN — PIPERACILLIN AND TAZOBACTAM 4.5 G: 4; .5 INJECTION, POWDER, LYOPHILIZED, FOR SOLUTION INTRAVENOUS at 01:08

## 2022-08-18 RX ADMIN — HYDROCODONE BITARTRATE AND ACETAMINOPHEN 1 TABLET: 10; 325 TABLET ORAL at 10:08

## 2022-08-18 RX ADMIN — VANCOMYCIN HYDROCHLORIDE 1500 MG: 5 INJECTION, POWDER, LYOPHILIZED, FOR SOLUTION INTRAVENOUS at 03:08

## 2022-08-18 RX ADMIN — INSULIN ASPART 17 UNITS: 100 INJECTION, SUSPENSION SUBCUTANEOUS at 06:08

## 2022-08-18 RX ADMIN — HYDROCODONE BITARTRATE AND ACETAMINOPHEN 1 TABLET: 10; 325 TABLET ORAL at 12:08

## 2022-08-18 RX ADMIN — LISINOPRIL 10 MG: 10 TABLET ORAL at 10:08

## 2022-08-18 RX ADMIN — ESCITALOPRAM OXALATE 10 MG: 10 TABLET ORAL at 10:08

## 2022-08-18 RX ADMIN — ATORVASTATIN CALCIUM 20 MG: 20 TABLET, FILM COATED ORAL at 10:08

## 2022-08-18 RX ADMIN — PIPERACILLIN AND TAZOBACTAM 4.5 G: 4; .5 INJECTION, POWDER, LYOPHILIZED, FOR SOLUTION INTRAVENOUS at 10:08

## 2022-08-18 RX ADMIN — IPRATROPIUM BROMIDE 0.5 MG: 0.5 SOLUTION RESPIRATORY (INHALATION) at 07:08

## 2022-08-18 RX ADMIN — PANTOPRAZOLE SODIUM 40 MG: 40 TABLET, DELAYED RELEASE ORAL at 10:08

## 2022-08-18 RX ADMIN — NICOTINE 1 PATCH: 14 PATCH TRANSDERMAL at 10:08

## 2022-08-18 RX ADMIN — AMLODIPINE BESYLATE 10 MG: 10 TABLET ORAL at 10:08

## 2022-08-18 NOTE — NURSING
Wound care note: 61yo male admitted to [facility] with diagnosis of wound infection. Diabetic ulcer noted to right plantar. Assessed by GIANA Varela. Orders for drawtex/vashe dressing, daily.

## 2022-08-18 NOTE — SUBJECTIVE & OBJECTIVE
Interval History: No complaints. Feeling better.     Review of Systems   Respiratory:  Negative for shortness of breath.    Cardiovascular:  Negative for chest pain.   Gastrointestinal:  Negative for abdominal pain.   Objective:     Vital Signs (Most Recent):  Temp: 97.7 °F (36.5 °C) (08/17/22 1600)  Pulse: 68 (08/17/22 1600)  Resp: 18 (08/17/22 1829)  BP: (!) 141/68 (08/17/22 1600)  SpO2: 98 % (08/17/22 1600)   Vital Signs (24h Range):  Temp:  [97.6 °F (36.4 °C)-98.2 °F (36.8 °C)] 97.7 °F (36.5 °C)  Pulse:  [53-68] 68  Resp:  [16-20] 18  SpO2:  [93 %-98 %] 98 %  BP: ()/(50-73) 141/68     Weight: 76.1 kg (167 lb 12.3 oz)  Body mass index is 27.08 kg/m².    Intake/Output Summary (Last 24 hours) at 8/17/2022 1944  Last data filed at 8/17/2022 0600  Gross per 24 hour   Intake 560 ml   Output --   Net 560 ml      Physical Exam  Vitals reviewed.   Constitutional:       General: He is not in acute distress.     Appearance: He is not toxic-appearing.   HENT:      Head: Normocephalic and atraumatic.   Eyes:      General: No scleral icterus.  Cardiovascular:      Rate and Rhythm: Normal rate and regular rhythm.   Pulmonary:      Effort: Pulmonary effort is normal. No respiratory distress.      Breath sounds: Normal breath sounds.   Abdominal:      General: Abdomen is flat.      Palpations: Abdomen is soft.   Skin:     Comments: Decreased erythema R leg   Neurological:      Mental Status: He is alert.       Significant Labs: All pertinent labs within the past 24 hours have been reviewed.  Recent Lab Results         08/17/22  1646   08/17/22  0606   08/17/22  0401   08/16/22 2022        Anion Gap     6         BUN     19         BUN/CREAT RATIO     19         Calcium     9.4         Chloride     105         CO2     32         Creatinine     1.01         eGFR     83         Glucose     130         POC Glucose 268   144     120       Potassium     5.0         Sodium     138                 Significant Imaging: I have  reviewed all pertinent imaging results/findings within the past 24 hours.

## 2022-08-18 NOTE — ASSESSMENT & PLAN NOTE
Patient's FSGs are uncontrolled due to hyperglycemia on current medication regimen.  Last A1c reviewed-   Lab Results   Component Value Date    HGBA1C 9.6 (H) 07/20/2022     Most recent fingerstick glucose reviewed- No results for input(s): POCTGLUCOSE in the last 24 hours.  Current correctional scale  Low    Antihyperglycemics (From admission, onward)            Start     Stop Route Frequency Ordered    08/16/22 1645  insulin asp prt-insulin aspart (NovoLOG 70/30) injection 17 Units         -- SubQ 2 times daily with meals 08/16/22 1118    08/15/22 1917  insulin aspart U-100 injection 0-5 Units         -- SubQ Before meals & nightly PRN 08/15/22 1817        Will adjust insulin regimen while in hospital.

## 2022-08-18 NOTE — PROGRESS NOTES
[facility] Hospital Medicine  Progress Note    Patient Name: Navdeep Avery  MRN: [id number]  Patient Class: IP- Inpatient   Admission Date: 8/15/2022  Length of Stay: 2 days  Attending Physician: Allen Edward Jr., MD  Primary Care Provider: Marquez Huff MD        Subjective:     Principal Problem:Wound infection        HPI:  63 y/o WM seen in [facility] today.  Has had a chronic ulcer Right foot for over a year. Over the last 1-2 days foot has become red and warm and patient notes this is starting to come up his R leg. No fever or chills. No new pain. Slightly more drainage from wound.  After evaluation in clinic it was felt that he would benefit form admission and IV abx.       Overview/Hospital Course:  No notes on file    Interval History: No complaints. Feeling better.     Review of Systems   Respiratory:  Negative for shortness of breath.    Cardiovascular:  Negative for chest pain.   Gastrointestinal:  Negative for abdominal pain.   Objective:     Vital Signs (Most Recent):  Temp: 97.7 °F (36.5 °C) (08/17/22 1600)  Pulse: 68 (08/17/22 1600)  Resp: 18 (08/17/22 1829)  BP: (!) 141/68 (08/17/22 1600)  SpO2: 98 % (08/17/22 1600)   Vital Signs (24h Range):  Temp:  [97.6 °F (36.4 °C)-98.2 °F (36.8 °C)] 97.7 °F (36.5 °C)  Pulse:  [53-68] 68  Resp:  [16-20] 18  SpO2:  [93 %-98 %] 98 %  BP: ()/(50-73) 141/68     Weight: 76.1 kg (167 lb 12.3 oz)  Body mass index is 27.08 kg/m².    Intake/Output Summary (Last 24 hours) at 8/17/2022 1944  Last data filed at 8/17/2022 0600  Gross per 24 hour   Intake 560 ml   Output --   Net 560 ml      Physical Exam  Vitals reviewed.   Constitutional:       General: He is not in acute distress.     Appearance: He is not toxic-appearing.   HENT:      Head: Normocephalic and atraumatic.   Eyes:      General: No scleral icterus.  Cardiovascular:      Rate and Rhythm: Normal rate and regular rhythm.   Pulmonary:      Effort: Pulmonary effort is  normal. No respiratory distress.      Breath sounds: Normal breath sounds.   Abdominal:      General: Abdomen is flat.      Palpations: Abdomen is soft.   Skin:     Comments: Decreased erythema R leg   Neurological:      Mental Status: He is alert.       Significant Labs: All pertinent labs within the past 24 hours have been reviewed.  Recent Lab Results         08/17/22  1646   08/17/22  0606   08/17/22  0401   08/16/22 2022        Anion Gap     6         BUN     19         BUN/CREAT RATIO     19         Calcium     9.4         Chloride     105         CO2     32         Creatinine     1.01         eGFR     83         Glucose     130         POC Glucose 268   144     120       Potassium     5.0         Sodium     138                 Significant Imaging: I have reviewed all pertinent imaging results/findings within the past 24 hours.      Assessment/Plan:      * Wound infection    Infected DFU R foot centered over 1st MT head.  Cultures done.  Appears infection now involving foot and lower leg. Vanc and zosyn. Follow up cultures.     8/16 - Improving Cellulitis RLE    8/17 - Staph growing on wound cx. 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  Last A1c reviewed-   Lab Results   Component Value Date    HGBA1C 9.6 (H) 07/20/2022     Most recent fingerstick glucose reviewed- No results for input(s): POCTGLUCOSE in the last 24 hours.  Current correctional scale  Low    Antihyperglycemics (From admission, onward)            Start     Stop Route Frequency Ordered    08/16/22 1645  insulin asp prt-insulin aspart (NovoLOG 70/30) injection 17 Units         -- SubQ 2 times daily with meals 08/16/22 1118    08/15/22 1917  insulin aspart U-100 injection 0-5 Units         -- SubQ Before meals & nightly PRN 08/15/22 1817        Will adjust insulin regimen while  in hospital.     Benign hypertension    Continue amlodipine.     Type 2 diabetes mellitus  Recent Aic suggests poor control. Will monitor and attempt to obtain reasonable control while he is here.     8/16 - Increase 70/30 to 17 units BID.    8/17 - Monitoring change in dose      VTE Risk Mitigation (From admission, onward)         Ordered     Place sequential compression device  Until discontinued         08/15/22 1556     IP VTE LOW RISK PATIENT  Once         08/15/22 1556                Discharge Planning   MARGARITO:      Code Status: Full Code   Is the patient medically ready for discharge?:     Reason for patient still in hospital (select all that apply): Treatment  Discharge Plan A: Home with family, Home Health                  Allen Edward Jr, MD  Department of Hospital Medicine   [facility]

## 2022-08-18 NOTE — PLAN OF CARE
Per IDT meeting will continue with current plan of care. IV zosyn every 6 hours and IV Vanc every 18 hours. Daily dressing changes with Vashe.

## 2022-08-18 NOTE — ASSESSMENT & PLAN NOTE
Infected DFU R foot centered over 1st MT head.  Cultures done.  Appears infection now involving foot and lower leg. Vanc and zosyn. Follow up cultures.     8/16 - Improving Cellulitis RLE    8/17 - Staph growing on wound cx. Await final

## 2022-08-18 NOTE — PROGRESS NOTES
Pharmacokinetic Assessment Follow Up: IV Vancomycin    Vancomycin serum concentration assessment(s):    The trough level was drawn correctly and can be used to guide therapy at this time. The measurement is within the desired definitive target range of 10 to 20 mcg/mL.    Vancomycin Regimen Plan:    Continue regimen of Vancomycin 1500 mg IV every 18 hours with next serum trough concentration measured at 2130 on 8/21    Drug levels (last 3 results):  Recent Labs   Lab Result Units 08/18/22  0254   Vancomycin, Trough µg/mL 11.6       Pharmacy will continue to follow and monitor vancomycin.    Please contact pharmacy at extension [phone] for questions regarding this assessment.    Patient brief summary:  Navdeep Avery is a 62 y.o. male initiated on antimicrobial therapy with IV Vancomycin for treatment of skin & soft tissue infection    The patient's current regimen is vanc 1500 mg IV q18h    Drug Allergies:   Review of patient's allergies indicates:  No Known Allergies    Actual Body Weight:   76.2 kg    Renal Function:   Estimated Creatinine Clearance: 72.4 mL/min (based on SCr of 0.93 mg/dL).,     Dialysis Method (if applicable):  N/A    CBC (last 72 hours):  Recent Labs   Lab Result Units 08/16/22  1054   WBC K/uL 5.51   Hemoglobin g/dL 12.1*   Hematocrit % 35.6*   Platelet Count K/uL 244   Lymphocytes % % 33.0   Monocytes % % 10.0*   Eosinophils % % 5.1*   Basophils % % 0.7       Metabolic Panel (last 72 hours):  Recent Labs   Lab Result Units 08/16/22  0452 08/17/22  0401 08/18/22  0254   Sodium mmol/L 135* 138 138   Potassium mmol/L 4.2 5.0 4.5   Chloride mmol/L 103 105 104   CO2 mmol/L 27 32 28   Glucose mg/dL 151* 130* 224*   BUN mg/dL 20* 19* 19*   Creatinine mg/dL 0.86 1.01 0.93       Vancomycin Administrations:  vancomycin given in the last 96 hours                     vancomycin (VANCOCIN) 1,500 mg in dextrose 5 % 250 mL IVPB (mg) 1,500 mg New Bag 08/18/22 0345     1,500 mg New Bag 08/17/22 1054     1,500  mg New Bag 08/16/22 1557    vancomycin (VANCOCIN) 1,500 mg in dextrose 5 % 250 mL IVPB (mg) 1,500 mg New Bag 08/15/22 1172                    Microbiologic Results:  Microbiology Results (last 7 days)       ** No results found for the last 168 hours. **

## 2022-08-18 NOTE — ASSESSMENT & PLAN NOTE
Recent Aic suggests poor control. Will monitor and attempt to obtain reasonable control while he is here.     8/16 - Increase 70/30 to 17 units BID.    8/17 - Monitoring change in dose

## 2022-08-18 NOTE — ASSESSMENT & PLAN NOTE
Clean with vashe  Apply vashe moistened drawtex, then additional layer of drawtex  Cover and secure with 4x4s, kerlix, and paper tape  Change daily and PRN for drainage  Elevate leg when in bed or sitting  Keep pressure off wound  IV antibiotics

## 2022-08-19 LAB
ANION GAP SERPL CALCULATED.3IONS-SCNC: 9 MMOL/L (ref 7–16)
BUN SERPL-MCNC: 16 MG/DL (ref 7–18)
BUN/CREAT SERPL: 19 (ref 6–20)
CALCIUM SERPL-MCNC: 9 MG/DL (ref 8.5–10.1)
CHLORIDE SERPL-SCNC: 104 MMOL/L (ref 98–107)
CO2 SERPL-SCNC: 29 MMOL/L (ref 21–32)
CREAT SERPL-MCNC: 0.83 MG/DL (ref 0.7–1.3)
EGFR (NO RACE VARIABLE) (RUSH/TITUS): 98 ML/MIN/1.73M²
GLUCOSE SERPL-MCNC: 113 MG/DL (ref 70–105)
GLUCOSE SERPL-MCNC: 146 MG/DL (ref 74–106)
GLUCOSE SERPL-MCNC: 186 MG/DL (ref 70–105)
GLUCOSE SERPL-MCNC: 199 MG/DL (ref 70–105)
GLUCOSE SERPL-MCNC: 338 MG/DL (ref 70–105)
POTASSIUM SERPL-SCNC: 4.1 MMOL/L (ref 3.5–5.1)
SODIUM SERPL-SCNC: 138 MMOL/L (ref 136–145)

## 2022-08-19 PROCEDURE — 11000001 HC ACUTE MED/SURG PRIVATE ROOM

## 2022-08-19 PROCEDURE — 99232 SBSQ HOSP IP/OBS MODERATE 35: CPT | Mod: ,,, | Performed by: FAMILY MEDICINE

## 2022-08-19 PROCEDURE — 94640 AIRWAY INHALATION TREATMENT: CPT

## 2022-08-19 PROCEDURE — 99232 PR SUBSEQUENT HOSPITAL CARE,LEVL II: ICD-10-PCS | Mod: ,,, | Performed by: FAMILY MEDICINE

## 2022-08-19 PROCEDURE — 80048 BASIC METABOLIC PNL TOTAL CA: CPT | Performed by: FAMILY MEDICINE

## 2022-08-19 PROCEDURE — 25000242 PHARM REV CODE 250 ALT 637 W/ HCPCS: Performed by: FAMILY MEDICINE

## 2022-08-19 PROCEDURE — 96372 THER/PROPH/DIAG INJ SC/IM: CPT

## 2022-08-19 PROCEDURE — 94761 N-INVAS EAR/PLS OXIMETRY MLT: CPT

## 2022-08-19 PROCEDURE — 82962 GLUCOSE BLOOD TEST: CPT

## 2022-08-19 PROCEDURE — S4991 NICOTINE PATCH NONLEGEND: HCPCS | Performed by: FAMILY MEDICINE

## 2022-08-19 PROCEDURE — 25000003 PHARM REV CODE 250: Performed by: FAMILY MEDICINE

## 2022-08-19 PROCEDURE — 63600175 PHARM REV CODE 636 W HCPCS: Performed by: FAMILY MEDICINE

## 2022-08-19 PROCEDURE — 36415 COLL VENOUS BLD VENIPUNCTURE: CPT | Performed by: FAMILY MEDICINE

## 2022-08-19 RX ADMIN — INSULIN ASPART 20 UNITS: 100 INJECTION, SUSPENSION SUBCUTANEOUS at 07:08

## 2022-08-19 RX ADMIN — LISINOPRIL 10 MG: 10 TABLET ORAL at 09:08

## 2022-08-19 RX ADMIN — GABAPENTIN 900 MG: 300 CAPSULE ORAL at 01:08

## 2022-08-19 RX ADMIN — IPRATROPIUM BROMIDE 0.5 MG: 0.5 SOLUTION RESPIRATORY (INHALATION) at 02:08

## 2022-08-19 RX ADMIN — PIPERACILLIN AND TAZOBACTAM 4.5 G: 4; .5 INJECTION, POWDER, LYOPHILIZED, FOR SOLUTION INTRAVENOUS at 10:08

## 2022-08-19 RX ADMIN — HYDROCODONE BITARTRATE AND ACETAMINOPHEN 1 TABLET: 10; 325 TABLET ORAL at 09:08

## 2022-08-19 RX ADMIN — MUPIROCIN: 20 OINTMENT TOPICAL at 09:08

## 2022-08-19 RX ADMIN — AMLODIPINE BESYLATE 10 MG: 10 TABLET ORAL at 09:08

## 2022-08-19 RX ADMIN — HYDROXYZINE PAMOATE 25 MG: 25 CAPSULE ORAL at 09:08

## 2022-08-19 RX ADMIN — IPRATROPIUM BROMIDE 0.5 MG: 0.5 SOLUTION RESPIRATORY (INHALATION) at 12:08

## 2022-08-19 RX ADMIN — PIPERACILLIN AND TAZOBACTAM 4.5 G: 4; .5 INJECTION, POWDER, LYOPHILIZED, FOR SOLUTION INTRAVENOUS at 02:08

## 2022-08-19 RX ADMIN — GABAPENTIN 900 MG: 300 CAPSULE ORAL at 05:08

## 2022-08-19 RX ADMIN — IPRATROPIUM BROMIDE 0.5 MG: 0.5 SOLUTION RESPIRATORY (INHALATION) at 07:08

## 2022-08-19 RX ADMIN — CLOPIDOGREL 75 MG: 75 TABLET, FILM COATED ORAL at 09:08

## 2022-08-19 RX ADMIN — NICOTINE 1 PATCH: 14 PATCH TRANSDERMAL at 09:08

## 2022-08-19 RX ADMIN — HYDROCODONE BITARTRATE AND ACETAMINOPHEN 1 TABLET: 10; 325 TABLET ORAL at 05:08

## 2022-08-19 RX ADMIN — ATORVASTATIN CALCIUM 20 MG: 20 TABLET, FILM COATED ORAL at 09:08

## 2022-08-19 RX ADMIN — PIPERACILLIN AND TAZOBACTAM 4.5 G: 4; .5 INJECTION, POWDER, LYOPHILIZED, FOR SOLUTION INTRAVENOUS at 06:08

## 2022-08-19 RX ADMIN — GABAPENTIN 900 MG: 300 CAPSULE ORAL at 06:08

## 2022-08-19 RX ADMIN — METOPROLOL SUCCINATE 25 MG: 25 TABLET, EXTENDED RELEASE ORAL at 09:08

## 2022-08-19 RX ADMIN — INSULIN ASPART 20 UNITS: 100 INJECTION, SUSPENSION SUBCUTANEOUS at 05:08

## 2022-08-19 RX ADMIN — GABAPENTIN 900 MG: 300 CAPSULE ORAL at 11:08

## 2022-08-19 RX ADMIN — GABAPENTIN 900 MG: 300 CAPSULE ORAL at 12:08

## 2022-08-19 RX ADMIN — ESCITALOPRAM OXALATE 10 MG: 10 TABLET ORAL at 09:08

## 2022-08-19 RX ADMIN — ASPIRIN 81 MG CHEWABLE TABLET 81 MG: 81 TABLET CHEWABLE at 09:08

## 2022-08-19 RX ADMIN — FLUTICASONE PROPIONATE 100 MCG: 50 SPRAY, METERED NASAL at 09:08

## 2022-08-19 RX ADMIN — VANCOMYCIN HYDROCHLORIDE 1500 MG: 5 INJECTION, POWDER, LYOPHILIZED, FOR SOLUTION INTRAVENOUS at 06:08

## 2022-08-19 RX ADMIN — INSULIN ASPART 4 UNITS: 100 INJECTION, SOLUTION INTRAVENOUS; SUBCUTANEOUS at 05:08

## 2022-08-19 RX ADMIN — PANTOPRAZOLE SODIUM 40 MG: 40 TABLET, DELAYED RELEASE ORAL at 09:08

## 2022-08-19 NOTE — SUBJECTIVE & OBJECTIVE
Interval History: Feeling well. Very pleased with progress of foot healing.     Review of Systems   Respiratory:  Negative for shortness of breath.    Cardiovascular:  Negative for chest pain.   Gastrointestinal:  Negative for abdominal pain.   Objective:     Vital Signs (Most Recent):  Temp: 97.8 °F (36.6 °C) (08/18/22 1600)  Pulse: 66 (08/18/22 1600)  Resp: 18 (08/18/22 1600)  BP: (!) 148/80 (08/18/22 1600)  SpO2: 95 % (08/18/22 1600)   Vital Signs (24h Range):  Temp:  [97.6 °F (36.4 °C)-98 °F (36.7 °C)] 97.8 °F (36.6 °C)  Pulse:  [56-78] 66  Resp:  [12-18] 18  SpO2:  [93 %-96 %] 95 %  BP: (123-156)/(61-80) 148/80     Weight: 76.2 kg (167 lb 15.9 oz)  Body mass index is 27.11 kg/m².    Intake/Output Summary (Last 24 hours) at 8/18/2022 1944  Last data filed at 8/18/2022 1409  Gross per 24 hour   Intake 700 ml   Output --   Net 700 ml      Physical Exam  Vitals reviewed.   Constitutional:       General: He is not in acute distress.     Appearance: Normal appearance. He is not toxic-appearing.   HENT:      Head: Normocephalic and atraumatic.   Eyes:      General: No scleral icterus.  Cardiovascular:      Rate and Rhythm: Normal rate and regular rhythm.   Pulmonary:      Effort: Pulmonary effort is normal. No respiratory distress.      Breath sounds: Normal breath sounds.   Abdominal:      General: Abdomen is flat.      Palpations: Abdomen is soft.   Skin:     Comments: Erythema of foot resolved. Decreased drainage in ulcer.  Looks much better .    Neurological:      Mental Status: He is alert.       Significant Labs: All pertinent labs within the past 24 hours have been reviewed.  Recent Lab Results  (Last 5 results in the past 24 hours)        08/18/22  1645   08/18/22  1200   08/18/22  0618   08/18/22  0254   08/18/22  0118        Anion Gap       11         BUN       19         BUN/CREAT RATIO       20         Calcium       8.9         Chloride       104         CO2       28         Creatinine       0.93          eGFR       92         Glucose       224         POC Glucose 346   158   291     246       Potassium       4.5         Sodium       138         Vancomycin-Trough       11.6                                Significant Imaging: I have reviewed all pertinent imaging results/findings within the past 24 hours.

## 2022-08-19 NOTE — ASSESSMENT & PLAN NOTE
Infected DFU R foot centered over 1st MT head.  Cultures done.  Appears infection now involving foot and lower leg. Vanc and zosyn. Follow up cultures.     8/16 - Improving Cellulitis RLE    8/17 - Staph growing on wound cx. Await final    8/18 - MRSA, sensitive to Bactrim.  Discuss d/c plans with wound care.

## 2022-08-19 NOTE — ASSESSMENT & PLAN NOTE
Recent Aic suggests poor control. Will monitor and attempt to obtain reasonable control while he is here.     8/16 - Increase 70/30 to 17 units BID.    8/17 - Monitoring change in dose    8/18 - Increase 70/30 insulin to 20 BID

## 2022-08-20 VITALS
SYSTOLIC BLOOD PRESSURE: 130 MMHG | HEIGHT: 66 IN | DIASTOLIC BLOOD PRESSURE: 84 MMHG | WEIGHT: 168.19 LBS | TEMPERATURE: 98 F | HEART RATE: 65 BPM | BODY MASS INDEX: 27.03 KG/M2 | RESPIRATION RATE: 19 BRPM | OXYGEN SATURATION: 98 %

## 2022-08-20 LAB
ANION GAP SERPL CALCULATED.3IONS-SCNC: 13 MMOL/L (ref 7–16)
BUN SERPL-MCNC: 15 MG/DL (ref 7–18)
BUN/CREAT SERPL: 15 (ref 6–20)
CALCIUM SERPL-MCNC: 8.8 MG/DL (ref 8.5–10.1)
CHLORIDE SERPL-SCNC: 103 MMOL/L (ref 98–107)
CO2 SERPL-SCNC: 29 MMOL/L (ref 21–32)
CREAT SERPL-MCNC: 1.01 MG/DL (ref 0.7–1.3)
EGFR (NO RACE VARIABLE) (RUSH/TITUS): 83 ML/MIN/1.73M²
GLUCOSE SERPL-MCNC: 106 MG/DL (ref 70–105)
GLUCOSE SERPL-MCNC: 214 MG/DL (ref 70–105)
GLUCOSE SERPL-MCNC: 227 MG/DL (ref 74–106)
POTASSIUM SERPL-SCNC: 4.8 MMOL/L (ref 3.5–5.1)
SODIUM SERPL-SCNC: 140 MMOL/L (ref 136–145)

## 2022-08-20 PROCEDURE — 99239 HOSP IP/OBS DSCHRG MGMT >30: CPT | Mod: ,,, | Performed by: FAMILY MEDICINE

## 2022-08-20 PROCEDURE — 63600175 PHARM REV CODE 636 W HCPCS: Performed by: FAMILY MEDICINE

## 2022-08-20 PROCEDURE — 36415 COLL VENOUS BLD VENIPUNCTURE: CPT | Performed by: FAMILY MEDICINE

## 2022-08-20 PROCEDURE — 94761 N-INVAS EAR/PLS OXIMETRY MLT: CPT

## 2022-08-20 PROCEDURE — 80048 BASIC METABOLIC PNL TOTAL CA: CPT | Performed by: FAMILY MEDICINE

## 2022-08-20 PROCEDURE — 94640 AIRWAY INHALATION TREATMENT: CPT

## 2022-08-20 PROCEDURE — S4991 NICOTINE PATCH NONLEGEND: HCPCS | Performed by: FAMILY MEDICINE

## 2022-08-20 PROCEDURE — 25000242 PHARM REV CODE 250 ALT 637 W/ HCPCS: Performed by: FAMILY MEDICINE

## 2022-08-20 PROCEDURE — 25000003 PHARM REV CODE 250: Performed by: FAMILY MEDICINE

## 2022-08-20 PROCEDURE — 82962 GLUCOSE BLOOD TEST: CPT

## 2022-08-20 PROCEDURE — 99239 PR HOSPITAL DISCHARGE DAY,>30 MIN: ICD-10-PCS | Mod: ,,, | Performed by: FAMILY MEDICINE

## 2022-08-20 RX ORDER — HYDROCODONE BITARTRATE AND ACETAMINOPHEN 10; 325 MG/1; MG/1
1 TABLET ORAL EVERY 8 HOURS PRN
Qty: 9 TABLET | Refills: 0
Start: 2022-08-20 | End: 2022-08-22

## 2022-08-20 RX ORDER — SULFAMETHOXAZOLE AND TRIMETHOPRIM 800; 160 MG/1; MG/1
1 TABLET ORAL 2 TIMES DAILY
Qty: 14 TABLET | Refills: 0 | Status: SHIPPED | OUTPATIENT
Start: 2022-08-20 | End: 2022-08-27

## 2022-08-20 RX ADMIN — HYDROCODONE BITARTRATE AND ACETAMINOPHEN 1 TABLET: 10; 325 TABLET ORAL at 10:08

## 2022-08-20 RX ADMIN — FLUTICASONE PROPIONATE 100 MCG: 50 SPRAY, METERED NASAL at 09:08

## 2022-08-20 RX ADMIN — CLOPIDOGREL 75 MG: 75 TABLET, FILM COATED ORAL at 09:08

## 2022-08-20 RX ADMIN — PIPERACILLIN AND TAZOBACTAM 4.5 G: 4; .5 INJECTION, POWDER, LYOPHILIZED, FOR SOLUTION INTRAVENOUS at 10:08

## 2022-08-20 RX ADMIN — LISINOPRIL 10 MG: 10 TABLET ORAL at 09:08

## 2022-08-20 RX ADMIN — GABAPENTIN 900 MG: 300 CAPSULE ORAL at 11:08

## 2022-08-20 RX ADMIN — ATORVASTATIN CALCIUM 20 MG: 20 TABLET, FILM COATED ORAL at 09:08

## 2022-08-20 RX ADMIN — ASPIRIN 81 MG CHEWABLE TABLET 81 MG: 81 TABLET CHEWABLE at 09:08

## 2022-08-20 RX ADMIN — INSULIN ASPART 2 UNITS: 100 INJECTION, SOLUTION INTRAVENOUS; SUBCUTANEOUS at 06:08

## 2022-08-20 RX ADMIN — MUPIROCIN: 20 OINTMENT TOPICAL at 09:08

## 2022-08-20 RX ADMIN — ESCITALOPRAM OXALATE 10 MG: 10 TABLET ORAL at 09:08

## 2022-08-20 RX ADMIN — PIPERACILLIN AND TAZOBACTAM 4.5 G: 4; .5 INJECTION, POWDER, LYOPHILIZED, FOR SOLUTION INTRAVENOUS at 02:08

## 2022-08-20 RX ADMIN — VANCOMYCIN HYDROCHLORIDE 1500 MG: 5 INJECTION, POWDER, LYOPHILIZED, FOR SOLUTION INTRAVENOUS at 09:08

## 2022-08-20 RX ADMIN — AMLODIPINE BESYLATE 10 MG: 10 TABLET ORAL at 09:08

## 2022-08-20 RX ADMIN — NICOTINE 1 PATCH: 14 PATCH TRANSDERMAL at 09:08

## 2022-08-20 RX ADMIN — GABAPENTIN 900 MG: 300 CAPSULE ORAL at 06:08

## 2022-08-20 RX ADMIN — INSULIN ASPART 20 UNITS: 100 INJECTION, SUSPENSION SUBCUTANEOUS at 07:08

## 2022-08-20 RX ADMIN — HYDROCODONE BITARTRATE AND ACETAMINOPHEN 1 TABLET: 10; 325 TABLET ORAL at 03:08

## 2022-08-20 RX ADMIN — IPRATROPIUM BROMIDE 0.5 MG: 0.5 SOLUTION RESPIRATORY (INHALATION) at 12:08

## 2022-08-20 RX ADMIN — PANTOPRAZOLE SODIUM 40 MG: 40 TABLET, DELAYED RELEASE ORAL at 09:08

## 2022-08-20 RX ADMIN — METOPROLOL SUCCINATE 25 MG: 25 TABLET, EXTENDED RELEASE ORAL at 09:08

## 2022-08-20 RX ADMIN — IPRATROPIUM BROMIDE 0.5 MG: 0.5 SOLUTION RESPIRATORY (INHALATION) at 07:08

## 2022-08-20 NOTE — NURSING
1420-Pt transported off unit via wheelchair by nursing staff, all belongings and discharge instructions given and explained to pt and family, pt left facility via personal vehicle, NADN, no c/o voiced, safety measures in efffect.

## 2022-08-20 NOTE — HOSPITAL COURSE
Patient with infected DFU R foot with cellulitis involving R foot and lower leg.  Wounds were cultured and patient started on vancomycin and zosyn. Cx grew MRSA..  His cellulitis and foot wound improved rapidly.  At this point he is felt safe to go home.  He will continue Bactrim DS BID for a week.  Follow up in wound care clinic next Friday.

## 2022-08-20 NOTE — ASSESSMENT & PLAN NOTE
Infected DFU R foot centered over 1st MT head.  Cultures done.  Appears infection now involving foot and lower leg. Vanc and zosyn. Follow up cultures.     8/16 - Improving Cellulitis RLE    8/17 - Staph growing on wound cx. Await final    8/18 - MRSA, sensitive to Bactrim.  Discuss d/c plans with wound care.     8/19 - Complete IV abx tonight. Home tomorrow on po bactrim

## 2022-08-20 NOTE — PROGRESS NOTES
[facility] Hospital Medicine  Progress Note    Patient Name: Navdeep Avery  MRN: [id number]  Patient Class: IP- Inpatient   Admission Date: 8/15/2022  Length of Stay: 4 days  Attending Physician: Allen Edward Jr., MD  Primary Care Provider: Marquez Huff MD        Subjective:     Principal Problem:Wound infection        HPI:  63 y/o WM seen in [facility] today.  Has had a chronic ulcer Right foot for over a year. Over the last 1-2 days foot has become red and warm and patient notes this is starting to come up his R leg. No fever or chills. No new pain. Slightly more drainage from wound.  After evaluation in clinic it was felt that he would benefit form admission and IV abx.       Overview/Hospital Course:  No notes on file    Interval History: No complaints.    Review of Systems   Respiratory:  Negative for shortness of breath.    Cardiovascular:  Negative for chest pain.   Gastrointestinal:  Negative for abdominal distention.   Objective:     Vital Signs (Most Recent):  Temp: 98.3 °F (36.8 °C) (08/19/22 1600)  Pulse: 76 (08/19/22 1600)  Resp: 20 (08/19/22 1731)  BP: (!) 149/76 (08/19/22 1600)  SpO2: 97 % (08/19/22 1600)   Vital Signs (24h Range):  Temp:  [97.6 °F (36.4 °C)-98.3 °F (36.8 °C)] 98.3 °F (36.8 °C)  Pulse:  [57-80] 76  Resp:  [18-20] 20  SpO2:  [92 %-97 %] 97 %  BP: (128-149)/(70-79) 149/76     Weight: 76.3 kg (168 lb 3.4 oz)  Body mass index is 27.15 kg/m².    Intake/Output Summary (Last 24 hours) at 8/19/2022 2000  Last data filed at 8/19/2022 1445  Gross per 24 hour   Intake 1000 ml   Output --   Net 1000 ml      Physical Exam  Vitals reviewed.   Constitutional:       General: He is not in acute distress.     Appearance: Normal appearance. He is not toxic-appearing.   HENT:      Head: Normocephalic and atraumatic.   Eyes:      General: No scleral icterus.  Cardiovascular:      Rate and Rhythm: Normal rate and regular rhythm.   Pulmonary:      Effort: Pulmonary effort  is normal. No respiratory distress.      Breath sounds: Normal breath sounds.   Abdominal:      General: Abdomen is flat.      Palpations: Abdomen is soft.   Skin:     Comments: Erythema of foot resolved. Decreased drainage in ulcer.  Looks much better .    Neurological:      Mental Status: He is alert.       Significant Labs: All pertinent labs within the past 24 hours have been reviewed.  Recent Lab Results  (Last 5 results in the past 24 hours)        08/19/22  1723   08/19/22  1155   08/19/22  0708   08/19/22  0239   08/18/22  2225        Anion Gap       9         BUN       16         BUN/CREAT RATIO       19         Calcium       9.0         Chloride       104         CO2       29         Creatinine       0.83         eGFR       98         Glucose       146         POC Glucose 338   113   199     149       Potassium       4.1         Sodium       138                                Significant Imaging: I have reviewed all pertinent imaging results/findings within the past 24 hours.      Assessment/Plan:      * Wound infection    Infected DFU R foot centered over 1st MT head.  Cultures done.  Appears infection now involving foot and lower leg. Vanc and zosyn. Follow up cultures.     8/16 - Improving Cellulitis RLE    8/17 - Staph growing on wound cx. Await final    8/18 - MRSA, sensitive to Bactrim.  Discuss d/c plans with wound care.     8/19 - Complete IV abx tonight. 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  Last A1c reviewed-   Lab Results   Component Value Date    HGBA1C 9.6 (H) 07/20/2022     Most recent fingerstick glucose reviewed- No results for input(s): POCTGLUCOSE in the last 24 hours.  Current correctional scale  Low    Antihyperglycemics (From admission, onward)            Start     Stop Route Frequency Ordered     08/16/22 1645  insulin asp prt-insulin aspart (NovoLOG 70/30) injection 17 Units         -- SubQ 2 times daily with meals 08/16/22 1118    08/15/22 1917  insulin aspart U-100 injection 0-5 Units         -- SubQ Before meals & nightly PRN 08/15/22 1817        Will adjust insulin regimen while in hospital.     Benign hypertension    Continue amlodipine.     Type 2 diabetes mellitus  Recent Aic suggests poor control. Will monitor and attempt to obtain reasonable control while he is here.     8/16 - Increase 70/30 to 17 units BID.    8/17 - Monitoring change in dose    8/18 - Increase 70/30 insulin to 20 BID      VTE Risk Mitigation (From admission, onward)         Ordered     Place sequential compression device  Until discontinued         08/15/22 1556     IP VTE LOW RISK PATIENT  Once         08/15/22 1556                Discharge Planning   MARGARITO:      Code Status: Full Code   Is the patient medically ready for discharge?:     Reason for patient still in hospital (select all that apply): Treatment  Discharge Plan A: Home with family, Home Health                  Allen Edward Jr, MD  Department of Hospital Medicine   [facility]

## 2022-08-20 NOTE — PROGRESS NOTES
[facility] Hospital Medicine  Progress Note    Patient Name: Navdeep Avery  MRN: [id number]  Patient Class: IP- Inpatient   Admission Date: 8/15/2022  Length of Stay: 4 days  Attending Physician: Allen Edward Jr., MD  Primary Care Provider: Marquez Huff MD        Subjective:     Principal Problem:Wound infection        HPI:  63 y/o WM seen in [facility] today.  Has had a chronic ulcer Right foot for over a year. Over the last 1-2 days foot has become red and warm and patient notes this is starting to come up his R leg. No fever or chills. No new pain. Slightly more drainage from wound.  After evaluation in clinic it was felt that he would benefit form admission and IV abx.       Overview/Hospital Course:  No notes on file    Interval History: Feeling well. Very pleased with progress of foot healing.     Review of Systems   Respiratory:  Negative for shortness of breath.    Cardiovascular:  Negative for chest pain.   Gastrointestinal:  Negative for abdominal pain.   Objective:     Vital Signs (Most Recent):  Temp: 97.8 °F (36.6 °C) (08/18/22 1600)  Pulse: 66 (08/18/22 1600)  Resp: 18 (08/18/22 1600)  BP: (!) 148/80 (08/18/22 1600)  SpO2: 95 % (08/18/22 1600)   Vital Signs (24h Range):  Temp:  [97.6 °F (36.4 °C)-98 °F (36.7 °C)] 97.8 °F (36.6 °C)  Pulse:  [56-78] 66  Resp:  [12-18] 18  SpO2:  [93 %-96 %] 95 %  BP: (123-156)/(61-80) 148/80     Weight: 76.2 kg (167 lb 15.9 oz)  Body mass index is 27.11 kg/m².    Intake/Output Summary (Last 24 hours) at 8/18/2022 1944  Last data filed at 8/18/2022 1409  Gross per 24 hour   Intake 700 ml   Output --   Net 700 ml      Physical Exam  Vitals reviewed.   Constitutional:       General: He is not in acute distress.     Appearance: Normal appearance. He is not toxic-appearing.   HENT:      Head: Normocephalic and atraumatic.   Eyes:      General: No scleral icterus.  Cardiovascular:      Rate and Rhythm: Normal rate and regular rhythm.    Pulmonary:      Effort: Pulmonary effort is normal. No respiratory distress.      Breath sounds: Normal breath sounds.   Abdominal:      General: Abdomen is flat.      Palpations: Abdomen is soft.   Skin:     Comments: Erythema of foot resolved. Decreased drainage in ulcer.  Looks much better .    Neurological:      Mental Status: He is alert.       Significant Labs: All pertinent labs within the past 24 hours have been reviewed.  Recent Lab Results  (Last 5 results in the past 24 hours)        08/18/22  1645   08/18/22  1200   08/18/22  0618   08/18/22  0254   08/18/22  0118        Anion Gap       11         BUN       19         BUN/CREAT RATIO       20         Calcium       8.9         Chloride       104         CO2       28         Creatinine       0.93         eGFR       92         Glucose       224         POC Glucose 346   158   291     246       Potassium       4.5         Sodium       138         Vancomycin-Trough       11.6                                Significant Imaging: I have reviewed all pertinent imaging results/findings within the past 24 hours.      Assessment/Plan:      * Wound infection    Infected DFU R foot centered over 1st MT head.  Cultures done.  Appears infection now involving foot and lower leg. Vanc and zosyn. Follow up cultures.     8/16 - Improving Cellulitis RLE    8/17 - Staph growing on wound cx. Await final    8/18 - MRSA, sensitive to Bactrim.  Discuss d/c plans with wound care.     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  Last A1c reviewed-   Lab Results   Component Value Date    HGBA1C 9.6 (H) 07/20/2022     Most recent fingerstick glucose reviewed- No results for input(s): POCTGLUCOSE in the last 24 hours.  Current correctional scale  Low    Antihyperglycemics (From admission, onward)            Start     Stop Route  Frequency Ordered    08/16/22 1645  insulin asp prt-insulin aspart (NovoLOG 70/30) injection 17 Units         -- SubQ 2 times daily with meals 08/16/22 1118    08/15/22 1917  insulin aspart U-100 injection 0-5 Units         -- SubQ Before meals & nightly PRN 08/15/22 1817        Will adjust insulin regimen while in hospital.     Benign hypertension    Continue amlodipine.     Type 2 diabetes mellitus  Recent Aic suggests poor control. Will monitor and attempt to obtain reasonable control while he is here.     8/16 - Increase 70/30 to 17 units BID.    8/17 - Monitoring change in dose    8/18 - Increase 70/30 insulin to 20 BID      VTE Risk Mitigation (From admission, onward)         Ordered     Place sequential compression device  Until discontinued         08/15/22 1556     IP VTE LOW RISK PATIENT  Once         08/15/22 1556                Discharge Planning   MARGARITO:      Code Status: Full Code   Is the patient medically ready for discharge?:     Reason for patient still in hospital (select all that apply): Treatment  Discharge Plan A: Home with family, Home Health                  Allen Edward Jr, MD  Department of Hospital Medicine   [facility]

## 2022-08-20 NOTE — PLAN OF CARE
Problem: Adult Inpatient Plan of Care  Goal: Plan of Care Review  Outcome: Met  Goal: Patient-Specific Goal (Individualized)  Outcome: Met  Goal: Absence of Hospital-Acquired Illness or Injury  Outcome: Met  Goal: Readiness for Transition of Care  Outcome: Met

## 2022-08-20 NOTE — ASSESSMENT & PLAN NOTE
Recent Aic suggests poor control. Will monitor and attempt to obtain reasonable control while he is here.     8/16 - Increase 70/30 to 17 units BID.    8/17 - Monitoring change in dose    8/18 - Increase 70/30 insulin to 20 BID    8/20 - Resume home regimen. D/c this am.

## 2022-08-20 NOTE — SUBJECTIVE & OBJECTIVE
Interval History: No complaints.    Review of Systems   Respiratory:  Negative for shortness of breath.    Cardiovascular:  Negative for chest pain.   Gastrointestinal:  Negative for abdominal distention.   Objective:     Vital Signs (Most Recent):  Temp: 98.3 °F (36.8 °C) (08/19/22 1600)  Pulse: 76 (08/19/22 1600)  Resp: 20 (08/19/22 1731)  BP: (!) 149/76 (08/19/22 1600)  SpO2: 97 % (08/19/22 1600)   Vital Signs (24h Range):  Temp:  [97.6 °F (36.4 °C)-98.3 °F (36.8 °C)] 98.3 °F (36.8 °C)  Pulse:  [57-80] 76  Resp:  [18-20] 20  SpO2:  [92 %-97 %] 97 %  BP: (128-149)/(70-79) 149/76     Weight: 76.3 kg (168 lb 3.4 oz)  Body mass index is 27.15 kg/m².    Intake/Output Summary (Last 24 hours) at 8/19/2022 2000  Last data filed at 8/19/2022 1445  Gross per 24 hour   Intake 1000 ml   Output --   Net 1000 ml      Physical Exam  Vitals reviewed.   Constitutional:       General: He is not in acute distress.     Appearance: Normal appearance. He is not toxic-appearing.   HENT:      Head: Normocephalic and atraumatic.   Eyes:      General: No scleral icterus.  Cardiovascular:      Rate and Rhythm: Normal rate and regular rhythm.   Pulmonary:      Effort: Pulmonary effort is normal. No respiratory distress.      Breath sounds: Normal breath sounds.   Abdominal:      General: Abdomen is flat.      Palpations: Abdomen is soft.   Skin:     Comments: Erythema of foot resolved. Decreased drainage in ulcer.  Looks much better .    Neurological:      Mental Status: He is alert.       Significant Labs: All pertinent labs within the past 24 hours have been reviewed.  Recent Lab Results  (Last 5 results in the past 24 hours)        08/19/22  1723   08/19/22  1155   08/19/22  0708   08/19/22  0239   08/18/22  2225        Anion Gap       9         BUN       16         BUN/CREAT RATIO       19         Calcium       9.0         Chloride       104         CO2       29         Creatinine       0.83         eGFR       98         Glucose        146         POC Glucose 338   113   199     149       Potassium       4.1         Sodium       138                                Significant Imaging: I have reviewed all pertinent imaging results/findings within the past 24 hours.

## 2022-08-20 NOTE — DISCHARGE SUMMARY
[facility] Hospital Medicine  Discharge Summary      Patient Name: Navdeep Avery  MRN: [id number]  Patient Class: IP- Inpatient  Admission Date: 8/15/2022  Hospital Length of Stay: 5 days  Discharge Date and Time:  08/20/2022 7:39 AM  Attending Physician: Sanket Moran Jr., MD   Discharging Provider: Sanket Moran Jr, MD  Primary Care Provider: Marquez Huff MD      HPI:   63 y/o WM seen in [facility] today.  Has had a chronic ulcer Right foot for over a year. Over the last 1-2 days foot has become red and warm and patient notes this is starting to come up his R leg. No fever or chills. No new pain. Slightly more drainage from wound.  After evaluation in clinic it was felt that he would benefit form admission and IV abx.       * No surgery found *      Hospital Course:   Patient with infected DFU R foot with cellulitis involving R foot and lower leg.  Wounds were cultured and patient started on vancomycin and zosyn. Cx grew MRSA..  His cellulitis and foot wound improved rapidly.  At this point he is felt safe to go home.  He will continue Bactrim DS BID for a week.  Follow up in wound care clinic next Friday.        Goals of Care Treatment Preferences:  Code Status: Full Code      Consults:   Consults (From admission, onward)        Status Ordering Provider     Pharmacy to dose Vancomycin consult  Once        Provider:  (Not yet assigned)    Acknowledged SANKET MORAN JR          * Wound infection    Infected DFU R foot centered over 1st MT head.  Cultures done.  Appears infection now involving foot and lower leg. Vanc and zosyn. Follow up cultures.     8/16 - Improving Cellulitis RLE    8/17 - Staph growing on wound cx. Await final    8/18 - MRSA, sensitive to Bactrim.  Discuss d/c plans with wound care.     8/19 - Complete IV abx tonight. Home tomorrow on po bactrim    Type 2 diabetes mellitus  Recent Aic suggests poor control. Will monitor and attempt to obtain reasonable  control while he is here.     8/16 - Increase 70/30 to 17 units BID.    8/17 - Monitoring change in dose    8/18 - Increase 70/30 insulin to 20 BID    8/20 - Resume home regimen. D/c this am.      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 Go in 6 day(s).    Specialties: General Surgery, Wound Care  Why: 10 oclock  Contact information:  [address] [place] [id number]  [phone]                       Patient Instructions:      Wound care discharge order   Order Comments: Clean with vashe  Pack with vashe moistened drawtex, apply second layer of drawtex  Change daily and PRN for soilage  Keep leg elevated  Wear off-loading shoe when ambulating  F/u in Wound care clinic in 1 week     Order Specific Question Answer Comments   Wound Location,  Apply to: right foot    Wound Care Option: Other:  Specify        Significant Diagnostic Studies: Labs: All labs within the past 24 hours have been reviewed    Pending Diagnostic Studies:     None         Medications:  Reconciled Home Medications:      Medication List      START taking these medications    sulfamethoxazole-trimethoprim 800-160mg 800-160 mg Tab  Commonly known as: BACTRIM DS  Take 1 tablet by mouth 2 (two) times daily. for 7 days        CHANGE how you take these medications    EScitalopram oxalate 10 MG tablet  Commonly known as: LEXAPRO  Take 1 tablet (10 mg total) by mouth 2 (two) times a day.  What changed: when to take this     insulin aspart  protamine-insulin aspart 100 unit/mL (70-30) Inpn pen  Commonly known as: NovoLOG Mix 70-30FlexPen U-100  Inject 15 Units into the skin every morning.  What changed: when to take this        CONTINUE taking these medications    * albuterol 2.5 mg /3 mL (0.083 %) nebulizer solution  Commonly known as: PROVENTIL  USE 1 VIAL IN NEBULIZER 3 TIMES DAILY     * albuterol 90 mcg/actuation inhaler  Commonly known as: PROVENTIL/VENTOLIN HFA  Inhale 2 puffs into the lungs 2 (two) times daily as needed for Wheezing.     amLODIPine 10 MG tablet  Commonly known as: NORVASC  Take 1 tablet (10 mg total) by mouth once daily.     aspirin 81 MG Chew  Take 81 mg by mouth once daily.     clopidogreL 75 mg tablet  Commonly known as: PLAVIX  Take 1 tablet (75 mg total) by mouth once daily.     diclofenac sodium 1 % Gel  Commonly known as: VOLTAREN  Apply 1 g topically 4 (four) times daily as needed (pain).     fluticasone propionate 50 mcg/actuation nasal spray  Commonly known as: FLONASE  2 sprays (100 mcg total) by Each Nostril route once daily.     gabapentin 300 MG capsule  Commonly known as: NEURONTIN  Take 3 capsules (900 mg total) by mouth every 6 (six) hours.     HYDROcodone-acetaminophen  mg per tablet  Commonly known as: NORCO  Take 1 tablet by mouth every 8 (eight) hours as needed for Pain.     hydrOXYzine pamoate 25 MG Cap  Commonly known as: VISTARIL  Take 1 capsule (25 mg total) by mouth once daily. Take nightly for sleep     JARDIANCE 10 mg tablet  Generic drug: empagliflozin  Take 1 tablet (10 mg total) by mouth once daily.     lisinopriL 10 MG tablet  Take 1 tablet (10 mg total) by mouth once daily.     metoprolol succinate 25 MG 24 hr tablet  Commonly known as: TOPROL-XL  Take 1 tablet (25 mg total) by mouth once daily.     nitroGLYCERIN 0.4 MG SL tablet  Commonly known as: NITROSTAT  Place 1 tablet (0.4 mg total) under the tongue every 5 (five) minutes as needed for Chest pain.     pantoprazole 40 MG  tablet  Commonly known as: PROTONIX  Take 1 tablet (40 mg total) by mouth once daily.     rosuvastatin 20 MG tablet  Commonly known as: CRESTOR  Take 1 tablet (20 mg total) by mouth every evening.     SPIRIVA RESPIMAT 2.5 mcg/actuation inhaler  Generic drug: tiotropium bromide  Inhale 1 puff into the lungs once daily.     TOUJEO MAX U-300 SOLOSTAR 300 unit/mL (3 mL) insulin pen  Generic drug: insulin glargine U-300 conc  Inject 40 Units into the skin once daily at 6am.         * This list has 2 medication(s) that are the same as other medications prescribed for you. Read the directions carefully, and ask your doctor or other care provider to review them with you.            STOP taking these medications    ciprofloxacin HCl 500 MG tablet  Commonly known as: CIPRO     clindamycin 300 MG capsule  Commonly known as: CLEOCIN            Indwelling Lines/Drains at time of discharge:   Lines/Drains/Airways     None                 Time spent on the discharge of patient: 32 minutes         Allen Edward Jr, MD  Department of Hospital Medicine  [facility]

## 2022-08-22 ENCOUNTER — TELEPHONE (OUTPATIENT)
Dept: FAMILY MEDICINE | Facility: CLINIC | Age: 62
End: 2022-08-22
Payer: MEDICARE

## 2022-08-22 RX ORDER — HYDROCODONE BITARTRATE AND ACETAMINOPHEN 10; 325 MG/1; MG/1
1 TABLET ORAL EVERY 8 HOURS PRN
Qty: 81 TABLET | Refills: 0 | Status: SHIPPED | OUTPATIENT
Start: 2022-08-22 | End: 2022-09-15 | Stop reason: SDUPTHER

## 2022-08-22 NOTE — TELEPHONE ENCOUNTER
Contacted pt for [facility] call. Pt reports he is doing good and states his wound is looking good. Pt vu of wound care orders. Pt reports his blood sugars are coming down, report glucose level was 130 this morning. Pt vu of all follow up appts. He denies transportation issues. He lives with his wife, current with HH and has a RW, W/C, CPAP, nebulizer, oxygen and glucose supplies. He denies any other DME needs. Meds reviewed and reconciled. Instructed pt to take all meds to PCP appt. Instructed to notify HH or dr for new or worsening symptoms or problems.

## 2022-08-25 NOTE — PROGRESS NOTES
GIANA Moncada   [facility]  [address] [facility] [address]  [phone]      PATIENT NAME: Navdeep Avery  : 1960  DATE: 22  MRN: [id number]      Billing Provider: GIANA Moncada  Level of Service:   Patient PCP Information     Provider PCP Type    Marquez Huff MD General          Reason for Visit / Chief Complaint: Diabetic Foot Ulcer (Right foot)       History of Present Illness / Problem Focused Workflow     Navdeep Avery is a 61 y.o. male who presents to clinic for follow up on chronic-non healing wound on the right foot after recent admission to [facility] for wound infection. Biofilm to wound bed and callus marcelo-wound, bedside debridement today. Compliant with current POC including drawtex moistened with vashe. Significant PMH includes hypertension, diabetes, peripheral vascular disease, and high cholesterol. Last HgA1C 9.6 in , diabeters managed by PCP. Last doppler , ABIs WNL. Denies fever or chills.       Review of Systems     Review of Systems   Constitutional: Positive for activity change. Negative for chills and fever.   Respiratory: Negative for chest tightness and shortness of breath.    Cardiovascular: Positive for leg swelling. Negative for chest pain and palpitations.   Musculoskeletal: Positive for arthralgias, gait problem and joint swelling.   Skin: Positive for wound.        wound   Neurological: Positive for weakness and numbness.   Psychiatric/Behavioral: Negative for agitation, behavioral problems, confusion and self-injury.       Medical / Social / Family History     Past Medical History:   Diagnosis Date    Anemia     Anxiety     Atherosclerotic heart disease of native coronary artery with other forms of angina pectoris     Atrophic rhinitis     Carpal tunnel syndrome     Cellulitis of right lower extremity     COPD (chronic obstructive pulmonary disease)     Coronary  arteriosclerosis     COVID 02/02/2022    Depression     Diabetic ulcer of right foot associated with diabetes mellitus due to underlying condition, with bone involvement without evidence of necrosis     GERD (gastroesophageal reflux disease)     Hyperlipidemia     Hypertension     Insomnia     MI (myocardial infarction)     Neuropathy     Peripheral vascular disease, unspecified     Right foot ulcer, with fat layer exposed 01/07/2015    Sleep apnea     Type 1 diabetes mellitus with foot ulcer     Type 2 diabetes mellitus        Past Surgical History:   Procedure Laterality Date    AMPUTATION      ANGIOPLASTY      CARDIAC CATHETERIZATION      CORONARY STENT PLACEMENT      DEBRIDEMENT      Right foot debridment with hospital stay and IV Abx    DEBRIDEMENT OF LOWER EXTREMITY Right 6/27/2022    Procedure: DEBRIDEMENT, LOWER EXTREMITY;  Surgeon: Forrest Kiser MD;  Location: [facility] [place];  Service: General;  Laterality: Right;  right foot    SHOULDER OPEN ROTATOR CUFF REPAIR Left     SPINE SURGERY      VASCULAR SURGERY         Social History  Mr. Navdeep Avery  reports that he has been smoking cigarettes. He has been smoking about 0.50 packs per day. He has never used smokeless tobacco. He reports previous alcohol use. He reports current drug use. Drug: Oxycodone.    Family History  's Navdeep Avery family history includes Alcohol abuse in his father; Arthritis in his mother; COPD in his paternal grandfather; Cancer in his sister; Diabetes in his sister; Early death in his father; Heart disease in his father, maternal grandfather, and son; Hypertension in his mother; Learning disabilities in his mother.    Medications and Allergies     Medications  Outpatient Medications Marked as Taking for the 8/26/22 encounter (Office Visit) with GIANA Moncada   Medication Sig Dispense Refill    albuterol (PROVENTIL) 2.5 mg /3 mL (0.083 %) nebulizer solution USE 1 VIAL IN NEBULIZER 3 TIMES  DAILY 100 mL 11    albuterol (PROVENTIL/VENTOLIN HFA) 90 mcg/actuation inhaler Inhale 2 puffs into the lungs 2 (two) times daily as needed for Wheezing. 18 g 2    amLODIPine (NORVASC) 10 MG tablet Take 1 tablet (10 mg total) by mouth once daily. 90 tablet 1    aspirin 81 MG Chew Take 81 mg by mouth once daily.      clopidogreL (PLAVIX) 75 mg tablet Take 1 tablet (75 mg total) by mouth once daily. 90 tablet 1    diclofenac sodium (VOLTAREN) 1 % Gel Apply 1 g topically 4 (four) times daily as needed (pain). 20 g 1    empagliflozin (JARDIANCE) 10 mg tablet Take 1 tablet (10 mg total) by mouth once daily. 30 tablet 6    EScitalopram oxalate (LEXAPRO) 10 MG tablet Take 1 tablet (10 mg total) by mouth 2 (two) times a day. (Patient taking differently: Take 10 mg by mouth once daily.) 180 tablet 1    fluticasone propionate (FLONASE) 50 mcg/actuation nasal spray 2 sprays (100 mcg total) by Each Nostril route once daily. 16 g 2    gabapentin (NEURONTIN) 300 MG capsule Take 3 capsules (900 mg total) by mouth every 6 (six) hours. 360 capsule 2    HYDROcodone-acetaminophen (NORCO)  mg per tablet Take 1 tablet by mouth every 8 (eight) hours as needed for Pain. 81 tablet 0    hydrOXYzine pamoate (VISTARIL) 25 MG Cap Take 1 capsule (25 mg total) by mouth once daily. Take nightly for sleep 90 capsule 1    insulin aspart protamine-insulin aspart (NOVOLOG MIX 70-30FLEXPEN U-100) 100 unit/mL (70-30) InPn pen Inject 15 Units into the skin every morning. (Patient taking differently: Inject 15 Units into the skin 2 (two) times a day.) 4.5 mL 2    insulin glargine U-300 conc (TOUJEO MAX U-300 SOLOSTAR) 300 unit/mL (3 mL) insulin pen Inject 40 Units into the skin once daily at 6am. 2 pen 1    lisinopriL 10 MG tablet Take 1 tablet (10 mg total) by mouth once daily. 90 tablet 1    metoprolol succinate (TOPROL-XL) 25 MG 24 hr tablet Take 1 tablet (25 mg total) by mouth once daily. 90 tablet 1    pantoprazole (PROTONIX) 40  MG tablet Take 1 tablet (40 mg total) by mouth once daily. 90 tablet 1    rosuvastatin (CRESTOR) 20 MG tablet Take 1 tablet (20 mg total) by mouth every evening. 90 tablet 1    SPIRIVA RESPIMAT 2.5 mcg/actuation inhaler Inhale 1 puff into the lungs once daily. 4 g 5    sulfamethoxazole-trimethoprim 800-160mg (BACTRIM DS) 800-160 mg Tab Take 1 tablet by mouth 2 (two) times daily. for 7 days 14 tablet 0       Allergies  Review of patient's allergies indicates:  No Known Allergies    Physical Examination     Vitals:    08/26/22 0901   BP: (!) 155/71   Pulse: 83   Resp: 20   Temp: 97.8 °F (36.6 °C)     Physical Exam  Vitals and nursing note reviewed.   Constitutional:       Appearance: Normal appearance.   HENT:      Head: Normocephalic.   Cardiovascular:      Rate and Rhythm: Normal rate and regular rhythm.      Pulses: Normal pulses.      Heart sounds: Normal heart sounds.   Pulmonary:      Effort: Pulmonary effort is normal. No respiratory distress.   Chest:      Chest wall: No tenderness.   Musculoskeletal:         General: Swelling and tenderness present.      Right lower leg: Edema present.   Skin:     General: Skin is warm and dry.      Findings: Erythema present.      Comments: See LDA for measurements and picture   Neurological:      General: No focal deficit present.      Mental Status: He is alert and oriented to person, place, and time. Mental status is at baseline.   Psychiatric:         Mood and Affect: Mood normal.         Behavior: Behavior normal.         Thought Content: Thought content normal.         Judgment: Judgment normal.         Assessment and Plan             Incision/Site 06/27/22 0933 Right Foot (Active)   06/27/22 0933   Present Prior to Hospital Arrival?:    Side: Right   Location: Foot   Orientation:    Incision Type:    Closure Method:    Additional Comments:    Removal Indication and Assessment:    Wound Outcome:    Removal Indications:    Wound Image    08/26/22 0901   Dressing  Appearance Moist drainage 08/26/22 0901   Drainage Amount Moderate 08/26/22 0901   Drainage Characteristics/Odor Serosanguineous 08/26/22 0901   Appearance Pink;Moist;Granulating 08/26/22 0901   Black (%), Wound Tissue Color 0 % 08/26/22 0901   Red (%), Wound Tissue Color 100 % 08/26/22 0901   Yellow (%), Wound Tissue Color 0 % 08/26/22 0901   Periwound Area Moist 08/26/22 0901   Wound Edges Callused 08/26/22 0901   Wound Length (cm) 2 cm 08/26/22 0901   Wound Width (cm) 1.3 cm 08/26/22 0901   Wound Depth (cm) 0.4 cm 08/26/22 0901   Wound Volume (cm^3) 1.04 cm^3 08/26/22 0901   Wound Surface Area (cm^2) 2.6 cm^2 08/26/22 0901   Care Cleansed with:;Antimicrobial agent;Soap and water 08/26/22 0901   Dressing Applied;Gauze, wet to moist;Hydrofiber;Gauze;Rolled gauze 08/26/22 0901   Periwound Care Moisturizer applied 08/26/22 0901   Compression Two layer compression 08/26/22 0901   Dressing Change Due 08/27/22 08/26/22 0901     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 evidence of necrosis - Primary    Overview                    Current Assessment & Plan     Clean with vashe  Apply vashe moistened drawtex, then additional layer of drawtex  Cover and secure with 4x4s, kerlix, and paper tape  Change daily and PRN for drainage  Avoid prolonged standing.  Elevate legs as much as possible.  Offloading shoe right foot  Monitor closely for s/s of infection including fever, chills, increase in pain, odor from wound, and increased redness from foot. Go to ER if any complications develop.   Keep leg elevated and avoid pressure on wound.  Diabetes:  Monitor glucose closely. Check fasting glucose and 2 hours after meals. HgA1C goal <7, fasting glucose , and 2 hours after meals <180  Hypertension:  Check blood pressure twice daily, goal <120/80  Diet:   Increase protein intake, avoid  fried, fatty foods and foods high in simple carbs.   Vitamins:  Take vitamin C 1000 mg, zinc 50mg, vitamin d 5000 units, and a daily multivitamin. Addy is a good source of protein and nutrients to aid in wound healing.                  Future Appointments   Date Time Provider Department Center   8/29/2022  8:30 AM Marquez Huff MD [facility] [place]   9/9/2022  8:00 AM GIANA Moncada [facility]   9/12/2022  7:45 AM AQUILINO Dhill[facility]   10/4/2022  2:00 PM AWV NURSE, [facility] [place]   10/21/2022  1:15 PM Marquez Huff MD [facility]   10/5/2023  9:00 AM AWV NURSE, [facility] [place]            Signature:  GIANA Moncada  [facility]  [address] [facility] [address]  [phone]    Date of encounter: 8/26/22

## 2022-08-25 NOTE — PATIENT INSTRUCTIONS
Clean with vashe  Apply vashe moistened drawtex, then additional layer of drawtex  Cover and secure with 4x4s, kerlix, and paper tape  Change daily and PRN for drainage  Avoid prolonged standing.  Elevate legs as much as possible.  Offloading shoe right foot  Monitor closely for s/s of infection including fever, chills, increase in pain, odor from wound, and increased redness from foot. Go to ER if any complications develop.   Keep leg elevated and avoid pressure on wound.  Diabetes:  Monitor glucose closely. Check fasting glucose and 2 hours after meals. HgA1C goal <7, fasting glucose , and 2 hours after meals <180  Hypertension:  Check blood pressure twice daily, goal <120/80  Diet:   Increase protein intake, avoid fried, fatty foods and foods high in simple carbs.   Vitamins:  Take vitamin C 1000 mg, zinc 50mg, vitamin d 5000 units, and a daily multivitamin. Addy is a good source of protein and nutrients to aid in wound healing.

## 2022-08-26 ENCOUNTER — OFFICE VISIT (OUTPATIENT)
Dept: WOUND CARE | Facility: CLINIC | Age: 62
End: 2022-08-26
Attending: FAMILY MEDICINE
Payer: MEDICARE

## 2022-08-26 VITALS
RESPIRATION RATE: 20 BRPM | TEMPERATURE: 98 F | HEART RATE: 83 BPM | SYSTOLIC BLOOD PRESSURE: 155 MMHG | DIASTOLIC BLOOD PRESSURE: 71 MMHG

## 2022-08-26 DIAGNOSIS — L97.416 DIABETIC ULCER OF RIGHT MIDFOOT ASSOCIATED WITH DIABETES MELLITUS DUE TO UNDERLYING CONDITION, WITH BONE INVOLVEMENT WITHOUT EVIDENCE OF NECROSIS: Primary | ICD-10-CM

## 2022-08-26 DIAGNOSIS — E08.621 DIABETIC ULCER OF RIGHT MIDFOOT ASSOCIATED WITH DIABETES MELLITUS DUE TO UNDERLYING CONDITION, WITH BONE INVOLVEMENT WITHOUT EVIDENCE OF NECROSIS: Primary | ICD-10-CM

## 2022-08-26 PROCEDURE — 99215 OFFICE O/P EST HI 40 MIN: CPT | Mod: PBBFAC,25 | Performed by: NURSE PRACTITIONER

## 2022-08-26 PROCEDURE — 99499 UNLISTED E&M SERVICE: CPT | Mod: S$PBB,,, | Performed by: NURSE PRACTITIONER

## 2022-08-26 PROCEDURE — 11042 DBRDMT SUBQ TIS 1ST 20SQCM/<: CPT | Mod: PBBFAC | Performed by: NURSE PRACTITIONER

## 2022-08-26 PROCEDURE — 11042 DEBRIDEMENT: ICD-10-PCS | Mod: S$PBB,,, | Performed by: NURSE PRACTITIONER

## 2022-08-26 PROCEDURE — 99499 NO LOS: ICD-10-PCS | Mod: S$PBB,,, | Performed by: NURSE PRACTITIONER

## 2022-08-26 NOTE — PROCEDURES
Debridement    Date/Time: 8/26/2022 9:00 AM  Performed by: GIANA Moncada  Authorized by: GIANA Moncada     Consent Done?:  Yes (Written)    Wound Details:    Location:  Right foot    Location:  Right Plantar    Type of Debridement:  Excisional       Length (cm):  2.4       Area (sq cm):  4.32       Width (cm):  1.8       Percent Debrided (%):  100       Depth (cm):  0.4       Total Area Debrided (sq cm):  4.32    Depth of debridement:  Subcutaneous tissue    Tissue debrided:  Adipose, Dermis, Epidermis and Subcutaneous    Devitalized tissue debrided:  Biofilm, Callus, Clots and Fibrin    Instruments:  Curette    Bleeding:  Minimal  Hemostasis Achieved: Yes    Method Used:  Pressure  Patient tolerance:  Patient tolerated the procedure well with no immediate complications     Assistant VIRGINIE Waterman LPN

## 2022-08-26 NOTE — PROGRESS NOTES
Debridement Performed for Assessment: Wound# 1  Performed By: Provider: Marli Morales NP  Assistant:    Debridement: Surgical    Photo taken post procedure: yes    Time-Out Taken: Yes  Level: Skin/Subcutaneous Tissue  Post Debridement Measurements  Length: (cm) 2.4  Width: (cm) 1.8  Depth: (cm) 0.4      Area: (cm²) 4.32  Percent Debrided: 100%  Total Area Debrided: (sq cm)     Tissue and other material debrided:  Adipose, Dermis, Epidermis, Subcutaneous  Devitalized Tissue Debrided:Biofilm, callus  Instrument: Curette  Bleeding: Moderate  Hemostasis Achieved: Pressure  Procedural Pain: Insensate  Post Procedural Pain: Insensate  Response to Treatment: Procedure was tolerated well    Devitalized materials/tissue Removed  the following was removed during debridement  subcutaneous      Post Debridement Diagnosis  Chronic right foot diabetic foot ulcer  Post debridement diagnosis  Same as Pre-operative debridement diagnosis, No Complications noted.      Grafts or implants applied  Was a graft or implant applied?  No      Procedure assistant  Procedure assisted by:  Mainor Castro RN  Assistant is the same as nurse listed above      Complications related to procedure  Did any complication occure during procedure?  No complications noted during or after procedure.      Specimen  Specimen collect during procedure?  No specimen collected      Anaesthesia:  Anesthesia used  None      Blood Loss:  Blood loss during procedure  less than 5 cc

## 2022-08-30 ENCOUNTER — OFFICE VISIT (OUTPATIENT)
Dept: FAMILY MEDICINE | Facility: CLINIC | Age: 62
End: 2022-08-30
Payer: MEDICARE

## 2022-08-30 VITALS
DIASTOLIC BLOOD PRESSURE: 62 MMHG | WEIGHT: 168 LBS | TEMPERATURE: 99 F | BODY MASS INDEX: 27 KG/M2 | HEART RATE: 68 BPM | RESPIRATION RATE: 18 BRPM | OXYGEN SATURATION: 97 % | HEIGHT: 66 IN | SYSTOLIC BLOOD PRESSURE: 105 MMHG

## 2022-08-30 DIAGNOSIS — Z79.4 TYPE 2 DIABETES MELLITUS WITH DIABETIC NEUROPATHY, WITH LONG-TERM CURRENT USE OF INSULIN: ICD-10-CM

## 2022-08-30 DIAGNOSIS — L02.415 CELLULITIS AND ABSCESS OF RIGHT LEG: Primary | ICD-10-CM

## 2022-08-30 DIAGNOSIS — E11.40 TYPE 2 DIABETES MELLITUS WITH DIABETIC NEUROPATHY, WITH LONG-TERM CURRENT USE OF INSULIN: ICD-10-CM

## 2022-08-30 DIAGNOSIS — L03.115 CELLULITIS AND ABSCESS OF RIGHT LEG: Primary | ICD-10-CM

## 2022-08-30 PROCEDURE — 99495 TCM SERVICES (MODERATE COMPLEXITY): ICD-10-PCS | Mod: ,,, | Performed by: FAMILY MEDICINE

## 2022-08-30 PROCEDURE — 99495 TRANSJ CARE MGMT MOD F2F 14D: CPT | Mod: ,,, | Performed by: FAMILY MEDICINE

## 2022-08-30 NOTE — PROGRESS NOTES
Navdeep Avery is a 62 y.o. male seen today for his TCC visit for cellulitis affecting his right lower extremity.  Patient remains afebrile and has had no recurrence of his symptoms.  Patient's prescription medications were reviewed and reconciled.  Unfortunately, patient's A1c was elevated at 9.6 and we discussed adding the Matt system to his diabetic management.      Past Medical History:   Diagnosis Date    Anemia     Anxiety     Atherosclerotic heart disease of native coronary artery with other forms of angina pectoris     Atrophic rhinitis     Carpal tunnel syndrome     Cellulitis of right lower extremity     COPD (chronic obstructive pulmonary disease)     Coronary arteriosclerosis     COVID 02/02/2022    Depression     Diabetic ulcer of right foot associated with diabetes mellitus due to underlying condition, with bone involvement without evidence of necrosis     GERD (gastroesophageal reflux disease)     Hyperlipidemia     Hypertension     Insomnia     MI (myocardial infarction)     Neuropathy     Peripheral vascular disease, unspecified     Right foot ulcer, with fat layer exposed 01/07/2015    Sleep apnea     Type 1 diabetes mellitus with foot ulcer     Type 2 diabetes mellitus      Family History   Problem Relation Age of Onset    Arthritis Mother     Hypertension Mother     Learning disabilities Mother     Alcohol abuse Father     Early death Father     Heart disease Father     Cancer Sister     Diabetes Sister     Heart disease Maternal Grandfather     COPD Paternal Grandfather     Heart disease Son      Current Outpatient Medications on File Prior to Visit   Medication Sig Dispense Refill    albuterol (PROVENTIL) 2.5 mg /3 mL (0.083 %) nebulizer solution USE 1 VIAL IN NEBULIZER 3 TIMES DAILY 100 mL 11    albuterol (PROVENTIL/VENTOLIN HFA) 90 mcg/actuation inhaler Inhale 2 puffs into the lungs 2 (two) times daily as needed for Wheezing. 18 g 2    amLODIPine (NORVASC) 10 MG tablet Take 1 tablet (10 mg  total) by mouth once daily. 90 tablet 1    aspirin 81 MG Chew Take 81 mg by mouth once daily.      clopidogreL (PLAVIX) 75 mg tablet Take 1 tablet (75 mg total) by mouth once daily. 90 tablet 1    diclofenac sodium (VOLTAREN) 1 % Gel Apply 1 g topically 4 (four) times daily as needed (pain). 20 g 1    empagliflozin (JARDIANCE) 10 mg tablet Take 1 tablet (10 mg total) by mouth once daily. 30 tablet 6    EScitalopram oxalate (LEXAPRO) 10 MG tablet Take 1 tablet (10 mg total) by mouth 2 (two) times a day. (Patient taking differently: Take 10 mg by mouth once daily.) 180 tablet 1    fluticasone propionate (FLONASE) 50 mcg/actuation nasal spray 2 sprays (100 mcg total) by Each Nostril route once daily. 16 g 2    gabapentin (NEURONTIN) 300 MG capsule Take 3 capsules (900 mg total) by mouth every 6 (six) hours. 360 capsule 2    HYDROcodone-acetaminophen (NORCO)  mg per tablet Take 1 tablet by mouth every 8 (eight) hours as needed for Pain. 81 tablet 0    hydrOXYzine pamoate (VISTARIL) 25 MG Cap Take 1 capsule (25 mg total) by mouth once daily. Take nightly for sleep 90 capsule 1    insulin aspart protamine-insulin aspart (NOVOLOG MIX 70-30FLEXPEN U-100) 100 unit/mL (70-30) InPn pen Inject 15 Units into the skin every morning. (Patient taking differently: Inject 15 Units into the skin 2 (two) times a day.) 4.5 mL 2    insulin glargine U-300 conc (TOUJEO MAX U-300 SOLOSTAR) 300 unit/mL (3 mL) insulin pen Inject 40 Units into the skin once daily at 6am. 2 pen 1    lisinopriL 10 MG tablet Take 1 tablet (10 mg total) by mouth once daily. 90 tablet 1    metoprolol succinate (TOPROL-XL) 25 MG 24 hr tablet Take 1 tablet (25 mg total) by mouth once daily. 90 tablet 1    pantoprazole (PROTONIX) 40 MG tablet Take 1 tablet (40 mg total) by mouth once daily. 90 tablet 1    rosuvastatin (CRESTOR) 20 MG tablet Take 1 tablet (20 mg total) by mouth every evening. 90 tablet 1    SPIRIVA RESPIMAT 2.5 mcg/actuation inhaler Inhale 1 puff  into the lungs once daily. 4 g 5    nitroGLYCERIN (NITROSTAT) 0.4 MG SL tablet Place 1 tablet (0.4 mg total) under the tongue every 5 (five) minutes as needed for Chest pain. 30 tablet 2     No current facility-administered medications on file prior to visit.     Immunization History   Administered Date(s) Administered    Influenza - Quadrivalent - PF *Preferred* (6 months and older) 09/14/2021    Pneumococcal Conjugate - 13 Valent 10/25/2017    Pneumococcal Polysaccharide - 23 Valent 10/19/2016    Tdap 12/20/2019       Review of Systems   Constitutional:  Negative for fever, malaise/fatigue and weight loss.   Respiratory:  Negative for shortness of breath.    Cardiovascular:  Negative for chest pain and palpitations.   Gastrointestinal:  Negative for nausea and vomiting.   Psychiatric/Behavioral:  Negative for depression.       Vitals:    08/30/22 1501   BP: 105/62   Pulse: 68   Resp: 18   Temp: 98.6 °F (37 °C)       Physical Exam  Eyes:      Conjunctiva/sclera: Conjunctivae normal.   Pulmonary:      Effort: Pulmonary effort is normal.   Skin:     General: Skin is warm and dry.      Findings: No erythema or rash.   Neurological:      Mental Status: He is alert.        Assessment and Plan  Cellulitis and abscess of right leg    Type 2 diabetes mellitus with diabetic neuropathy, with long-term current use of insulin          Return to clinic in 1 month with home blood sugar log.    Health Maintenance Topics with due status: Not Due       Topic Last Completion Date    Pneumococcal Vaccines (Age 0-64) 10/25/2017    TETANUS VACCINE 12/20/2019    Influenza Vaccine 09/14/2021    Diabetes Urine Screening 04/06/2022    Lipid Panel 07/06/2022    Hemoglobin A1c 07/20/2022    Low Dose Statin 08/30/2022

## 2022-09-06 ENCOUNTER — HOSPITAL ENCOUNTER (INPATIENT)
Facility: HOSPITAL | Age: 62
LOS: 5 days | Discharge: HOME HEALTH | DRG: 629 | End: 2022-09-12
Attending: EMERGENCY MEDICINE | Admitting: INTERNAL MEDICINE
Payer: MEDICARE

## 2022-09-06 DIAGNOSIS — M86.9 DIABETIC FOOT ULCER WITH OSTEOMYELITIS: Primary | ICD-10-CM

## 2022-09-06 DIAGNOSIS — M79.671 RIGHT FOOT PAIN: ICD-10-CM

## 2022-09-06 DIAGNOSIS — S99.911A RIGHT ANKLE INJURY: ICD-10-CM

## 2022-09-06 DIAGNOSIS — E11.8 DIABETES MELLITUS WITH COMPLICATION, WITH LONG-TERM CURRENT USE OF INSULIN: ICD-10-CM

## 2022-09-06 DIAGNOSIS — Z01.818 PREOP EXAMINATION: ICD-10-CM

## 2022-09-06 DIAGNOSIS — F41.9 ANXIETY: ICD-10-CM

## 2022-09-06 DIAGNOSIS — Z79.4 DIABETES MELLITUS WITH COMPLICATION, WITH LONG-TERM CURRENT USE OF INSULIN: ICD-10-CM

## 2022-09-06 DIAGNOSIS — L97.509 DIABETIC FOOT ULCER WITH OSTEOMYELITIS: Primary | ICD-10-CM

## 2022-09-06 DIAGNOSIS — E11.621 DIABETIC FOOT ULCER WITH OSTEOMYELITIS: Primary | ICD-10-CM

## 2022-09-06 DIAGNOSIS — E11.69 DIABETIC FOOT ULCER WITH OSTEOMYELITIS: Primary | ICD-10-CM

## 2022-09-06 LAB
ALBUMIN SERPL BCP-MCNC: 3.3 G/DL (ref 3.5–5)
ALBUMIN/GLOB SERPL: 0.8 {RATIO}
ALP SERPL-CCNC: 121 U/L (ref 45–115)
ALT SERPL W P-5'-P-CCNC: 16 U/L (ref 16–61)
ANION GAP SERPL CALCULATED.3IONS-SCNC: 10 MMOL/L (ref 7–16)
AST SERPL W P-5'-P-CCNC: 11 U/L (ref 15–37)
BASOPHILS # BLD AUTO: 0.03 K/UL (ref 0–0.2)
BASOPHILS NFR BLD AUTO: 0.4 % (ref 0–1)
BILIRUB SERPL-MCNC: 0.3 MG/DL (ref ?–1.2)
BUN SERPL-MCNC: 23 MG/DL (ref 7–18)
BUN/CREAT SERPL: 19 (ref 6–20)
CALCIUM SERPL-MCNC: 9.2 MG/DL (ref 8.5–10.1)
CHLORIDE SERPL-SCNC: 101 MMOL/L (ref 98–107)
CO2 SERPL-SCNC: 30 MMOL/L (ref 21–32)
CREAT SERPL-MCNC: 1.2 MG/DL (ref 0.7–1.3)
CRP SERPL-MCNC: 2.96 MG/DL (ref 0–0.8)
DIFFERENTIAL METHOD BLD: ABNORMAL
EGFR (NO RACE VARIABLE) (RUSH/TITUS): 68 ML/MIN/1.73M²
EOSINOPHIL # BLD AUTO: 0.43 K/UL (ref 0–0.5)
EOSINOPHIL NFR BLD AUTO: 5.4 % (ref 1–4)
ERYTHROCYTE [DISTWIDTH] IN BLOOD BY AUTOMATED COUNT: 12.1 % (ref 11.5–14.5)
FLUAV AG UPPER RESP QL IA.RAPID: NEGATIVE
FLUBV AG UPPER RESP QL IA.RAPID: NEGATIVE
GLOBULIN SER-MCNC: 4.4 G/DL (ref 2–4)
GLUCOSE SERPL-MCNC: 283 MG/DL (ref 74–106)
HCT VFR BLD AUTO: 31.9 % (ref 40–54)
HGB BLD-MCNC: 11.1 G/DL (ref 13.5–18)
IMM GRANULOCYTES # BLD AUTO: 0.03 K/UL (ref 0–0.04)
IMM GRANULOCYTES NFR BLD: 0.4 % (ref 0–0.4)
LYMPHOCYTES # BLD AUTO: 2.21 K/UL (ref 1–4.8)
LYMPHOCYTES NFR BLD AUTO: 27.9 % (ref 27–41)
MCH RBC QN AUTO: 31.1 PG (ref 27–31)
MCHC RBC AUTO-ENTMCNC: 34.8 G/DL (ref 32–36)
MCV RBC AUTO: 89.4 FL (ref 80–96)
MONOCYTES # BLD AUTO: 0.6 K/UL (ref 0–0.8)
MONOCYTES NFR BLD AUTO: 7.6 % (ref 2–6)
MPC BLD CALC-MCNC: 9.6 FL (ref 9.4–12.4)
NEUTROPHILS # BLD AUTO: 4.63 K/UL (ref 1.8–7.7)
NEUTROPHILS NFR BLD AUTO: 58.3 % (ref 53–65)
NRBC # BLD AUTO: 0 X10E3/UL
NRBC, AUTO (.00): 0 %
PLATELET # BLD AUTO: 258 K/UL (ref 150–400)
POTASSIUM SERPL-SCNC: 4.4 MMOL/L (ref 3.5–5.1)
PROT SERPL-MCNC: 7.7 G/DL (ref 6.4–8.2)
RBC # BLD AUTO: 3.57 M/UL (ref 4.6–6.2)
SARS-COV+SARS-COV-2 AG RESP QL IA.RAPID: NEGATIVE
SODIUM SERPL-SCNC: 137 MMOL/L (ref 136–145)
WBC # BLD AUTO: 7.93 K/UL (ref 4.5–11)

## 2022-09-06 PROCEDURE — 99284 PR EMERGENCY DEPT VISIT,LEVEL IV: ICD-10-PCS | Mod: CS,,, | Performed by: EMERGENCY MEDICINE

## 2022-09-06 PROCEDURE — 36415 COLL VENOUS BLD VENIPUNCTURE: CPT

## 2022-09-06 PROCEDURE — 85610 PROTHROMBIN TIME: CPT | Performed by: FAMILY MEDICINE

## 2022-09-06 PROCEDURE — 99285 EMERGENCY DEPT VISIT HI MDM: CPT | Mod: 25

## 2022-09-06 PROCEDURE — 87428 SARSCOV & INF VIR A&B AG IA: CPT | Performed by: EMERGENCY MEDICINE

## 2022-09-06 PROCEDURE — 96372 THER/PROPH/DIAG INJ SC/IM: CPT

## 2022-09-06 PROCEDURE — 96366 THER/PROPH/DIAG IV INF ADDON: CPT

## 2022-09-06 PROCEDURE — 86140 C-REACTIVE PROTEIN: CPT | Performed by: EMERGENCY MEDICINE

## 2022-09-06 PROCEDURE — 85730 THROMBOPLASTIN TIME PARTIAL: CPT | Performed by: FAMILY MEDICINE

## 2022-09-06 PROCEDURE — 87040 BLOOD CULTURE FOR BACTERIA: CPT | Performed by: EMERGENCY MEDICINE

## 2022-09-06 PROCEDURE — 63600175 PHARM REV CODE 636 W HCPCS: Performed by: EMERGENCY MEDICINE

## 2022-09-06 PROCEDURE — 96365 THER/PROPH/DIAG IV INF INIT: CPT

## 2022-09-06 PROCEDURE — 99284 EMERGENCY DEPT VISIT MOD MDM: CPT | Mod: CS,,, | Performed by: EMERGENCY MEDICINE

## 2022-09-06 PROCEDURE — 99223 PR INITIAL HOSPITAL CARE,LEVL III: ICD-10-PCS | Mod: AI,,, | Performed by: INTERNAL MEDICINE

## 2022-09-06 PROCEDURE — 80053 COMPREHEN METABOLIC PANEL: CPT | Performed by: EMERGENCY MEDICINE

## 2022-09-06 PROCEDURE — 25000003 PHARM REV CODE 250: Performed by: EMERGENCY MEDICINE

## 2022-09-06 PROCEDURE — 85651 RBC SED RATE NONAUTOMATED: CPT | Performed by: EMERGENCY MEDICINE

## 2022-09-06 PROCEDURE — 85025 COMPLETE CBC W/AUTO DIFF WBC: CPT | Performed by: EMERGENCY MEDICINE

## 2022-09-06 PROCEDURE — 96368 THER/DIAG CONCURRENT INF: CPT

## 2022-09-06 PROCEDURE — 99223 1ST HOSP IP/OBS HIGH 75: CPT | Mod: AI,,, | Performed by: INTERNAL MEDICINE

## 2022-09-06 RX ORDER — OXYCODONE AND ACETAMINOPHEN 5; 325 MG/1; MG/1
2 TABLET ORAL
Status: COMPLETED | OUTPATIENT
Start: 2022-09-06 | End: 2022-09-06

## 2022-09-06 RX ADMIN — VANCOMYCIN HYDROCHLORIDE 1500 MG: 1 INJECTION, POWDER, LYOPHILIZED, FOR SOLUTION INTRAVENOUS at 10:09

## 2022-09-06 RX ADMIN — OXYCODONE AND ACETAMINOPHEN 2 TABLET: 5; 325 TABLET ORAL at 10:09

## 2022-09-06 RX ADMIN — PIPERACILLIN SODIUM AND TAZOBACTAM SODIUM 4.5 G: 4; .5 INJECTION, POWDER, LYOPHILIZED, FOR SOLUTION INTRAVENOUS at 10:09

## 2022-09-07 PROBLEM — M86.9 OSTEOMYELITIS OF RIGHT FOOT: Status: ACTIVE | Noted: 2022-09-07

## 2022-09-07 PROBLEM — E11.621 DIABETIC FOOT ULCER WITH OSTEOMYELITIS: Status: ACTIVE | Noted: 2021-06-17

## 2022-09-07 PROBLEM — L97.509 DIABETIC FOOT ULCER WITH OSTEOMYELITIS: Status: ACTIVE | Noted: 2021-06-17

## 2022-09-07 PROBLEM — M86.9 OSTEOMYELITIS OF RIGHT FOOT: Status: RESOLVED | Noted: 2022-09-07 | Resolved: 2022-09-07

## 2022-09-07 PROBLEM — E08.621 DIABETIC ULCER OF MIDFOOT ASSOCIATED WITH DIABETES MELLITUS DUE TO UNDERLYING CONDITION, WITH NECROSIS OF MUSCLE: Status: RESOLVED | Noted: 2021-03-23 | Resolved: 2022-09-07

## 2022-09-07 PROBLEM — J44.9 COPD (CHRONIC OBSTRUCTIVE PULMONARY DISEASE): Status: ACTIVE | Noted: 2022-06-24

## 2022-09-07 PROBLEM — G47.30 SLEEP APNEA: Chronic | Status: ACTIVE | Noted: 2022-09-07

## 2022-09-07 PROBLEM — Z95.1 S/P CABG (CORONARY ARTERY BYPASS GRAFT): Status: ACTIVE | Noted: 2022-06-24

## 2022-09-07 PROBLEM — E11.69 DIABETIC FOOT ULCER WITH OSTEOMYELITIS: Status: ACTIVE | Noted: 2021-06-17

## 2022-09-07 PROBLEM — G47.30 SLEEP APNEA: Status: ACTIVE | Noted: 2022-09-07

## 2022-09-07 PROBLEM — M86.9 DIABETIC FOOT ULCER WITH OSTEOMYELITIS: Status: ACTIVE | Noted: 2021-06-17

## 2022-09-07 PROBLEM — L97.403 DIABETIC ULCER OF MIDFOOT ASSOCIATED WITH DIABETES MELLITUS DUE TO UNDERLYING CONDITION, WITH NECROSIS OF MUSCLE: Status: RESOLVED | Noted: 2021-03-23 | Resolved: 2022-09-07

## 2022-09-07 LAB
APTT PPP: 31.1 SECONDS (ref 25.2–37.3)
ERYTHROCYTE [SEDIMENTATION RATE] IN BLOOD BY WESTERGREN METHOD: 78 MM/HR (ref 0–20)
GLUCOSE SERPL-MCNC: 215 MG/DL (ref 70–105)
GLUCOSE SERPL-MCNC: 274 MG/DL (ref 70–105)
INR BLD: 0.95
PROTHROMBIN TIME: 12.3 SECONDS (ref 11.7–14.7)

## 2022-09-07 PROCEDURE — 25000003 PHARM REV CODE 250: Performed by: FAMILY MEDICINE

## 2022-09-07 PROCEDURE — S4991 NICOTINE PATCH NONLEGEND: HCPCS | Performed by: FAMILY MEDICINE

## 2022-09-07 PROCEDURE — 27000190 HC CPAP FULL FACE MASK W/VALVE

## 2022-09-07 PROCEDURE — 99232 PR SUBSEQUENT HOSPITAL CARE,LEVL II: ICD-10-PCS | Mod: GC,,, | Performed by: FAMILY MEDICINE

## 2022-09-07 PROCEDURE — 94761 N-INVAS EAR/PLS OXIMETRY MLT: CPT

## 2022-09-07 PROCEDURE — 82962 GLUCOSE BLOOD TEST: CPT

## 2022-09-07 PROCEDURE — 11000001 HC ACUTE MED/SURG PRIVATE ROOM

## 2022-09-07 PROCEDURE — 25000003 PHARM REV CODE 250: Performed by: EMERGENCY MEDICINE

## 2022-09-07 PROCEDURE — 93005 ELECTROCARDIOGRAM TRACING: CPT

## 2022-09-07 PROCEDURE — 63600175 PHARM REV CODE 636 W HCPCS: Performed by: FAMILY MEDICINE

## 2022-09-07 PROCEDURE — 25000242 PHARM REV CODE 250 ALT 637 W/ HCPCS: Performed by: INTERNAL MEDICINE

## 2022-09-07 PROCEDURE — 93010 EKG 12-LEAD: ICD-10-PCS | Mod: ,,, | Performed by: INTERNAL MEDICINE

## 2022-09-07 PROCEDURE — 99900035 HC TECH TIME PER 15 MIN (STAT)

## 2022-09-07 PROCEDURE — 63600175 PHARM REV CODE 636 W HCPCS: Performed by: EMERGENCY MEDICINE

## 2022-09-07 PROCEDURE — 93010 ELECTROCARDIOGRAM REPORT: CPT | Mod: ,,, | Performed by: INTERNAL MEDICINE

## 2022-09-07 PROCEDURE — 99232 SBSQ HOSP IP/OBS MODERATE 35: CPT | Mod: GC,,, | Performed by: FAMILY MEDICINE

## 2022-09-07 PROCEDURE — 94640 AIRWAY INHALATION TREATMENT: CPT

## 2022-09-07 PROCEDURE — 94660 CPAP INITIATION&MGMT: CPT

## 2022-09-07 PROCEDURE — 99900031 HC PATIENT EDUCATION (STAT)

## 2022-09-07 RX ORDER — SODIUM CHLORIDE 0.9 % (FLUSH) 0.9 %
10 SYRINGE (ML) INJECTION EVERY 12 HOURS PRN
Status: DISCONTINUED | OUTPATIENT
Start: 2022-09-07 | End: 2022-09-12 | Stop reason: HOSPADM

## 2022-09-07 RX ORDER — PANTOPRAZOLE SODIUM 40 MG/1
40 TABLET, DELAYED RELEASE ORAL DAILY
Status: DISCONTINUED | OUTPATIENT
Start: 2022-09-07 | End: 2022-09-12 | Stop reason: HOSPADM

## 2022-09-07 RX ORDER — IBUPROFEN 200 MG
1 TABLET ORAL DAILY
Status: DISCONTINUED | OUTPATIENT
Start: 2022-09-07 | End: 2022-09-12 | Stop reason: HOSPADM

## 2022-09-07 RX ORDER — ESCITALOPRAM OXALATE 10 MG/1
10 TABLET ORAL DAILY
Status: DISCONTINUED | OUTPATIENT
Start: 2022-09-07 | End: 2022-09-12 | Stop reason: HOSPADM

## 2022-09-07 RX ORDER — IBUPROFEN 200 MG
16 TABLET ORAL
Status: DISCONTINUED | OUTPATIENT
Start: 2022-09-07 | End: 2022-09-12 | Stop reason: HOSPADM

## 2022-09-07 RX ORDER — IPRATROPIUM BROMIDE AND ALBUTEROL SULFATE 2.5; .5 MG/3ML; MG/3ML
3 SOLUTION RESPIRATORY (INHALATION) EVERY 6 HOURS PRN
Status: DISCONTINUED | OUTPATIENT
Start: 2022-09-07 | End: 2022-09-12 | Stop reason: HOSPADM

## 2022-09-07 RX ORDER — ATORVASTATIN CALCIUM 80 MG/1
80 TABLET, FILM COATED ORAL DAILY
Status: DISCONTINUED | OUTPATIENT
Start: 2022-09-07 | End: 2022-09-12 | Stop reason: HOSPADM

## 2022-09-07 RX ORDER — GLUCAGON 1 MG
1 KIT INJECTION
Status: DISCONTINUED | OUTPATIENT
Start: 2022-09-07 | End: 2022-09-12 | Stop reason: HOSPADM

## 2022-09-07 RX ORDER — HYDROCODONE BITARTRATE AND ACETAMINOPHEN 10; 325 MG/1; MG/1
1 TABLET ORAL EVERY 8 HOURS PRN
Status: DISCONTINUED | OUTPATIENT
Start: 2022-09-07 | End: 2022-09-09

## 2022-09-07 RX ORDER — CLOPIDOGREL BISULFATE 75 MG/1
75 TABLET ORAL DAILY
Status: DISCONTINUED | OUTPATIENT
Start: 2022-09-07 | End: 2022-09-07

## 2022-09-07 RX ORDER — LABETALOL HYDROCHLORIDE 5 MG/ML
10 INJECTION, SOLUTION INTRAVENOUS EVERY 6 HOURS PRN
Status: DISCONTINUED | OUTPATIENT
Start: 2022-09-07 | End: 2022-09-12 | Stop reason: HOSPADM

## 2022-09-07 RX ORDER — IBUPROFEN 200 MG
24 TABLET ORAL
Status: DISCONTINUED | OUTPATIENT
Start: 2022-09-07 | End: 2022-09-12 | Stop reason: HOSPADM

## 2022-09-07 RX ORDER — LISINOPRIL 10 MG/1
10 TABLET ORAL DAILY
Status: DISCONTINUED | OUTPATIENT
Start: 2022-09-07 | End: 2022-09-12 | Stop reason: HOSPADM

## 2022-09-07 RX ORDER — GABAPENTIN 300 MG/1
900 CAPSULE ORAL EVERY 6 HOURS
Status: DISCONTINUED | OUTPATIENT
Start: 2022-09-07 | End: 2022-09-12 | Stop reason: HOSPADM

## 2022-09-07 RX ORDER — HYDROXYZINE PAMOATE 25 MG/1
25 CAPSULE ORAL DAILY
Status: DISCONTINUED | OUTPATIENT
Start: 2022-09-07 | End: 2022-09-12 | Stop reason: HOSPADM

## 2022-09-07 RX ORDER — DICLOFENAC SODIUM 10 MG/G
1 GEL TOPICAL 4 TIMES DAILY PRN
Status: DISCONTINUED | OUTPATIENT
Start: 2022-09-07 | End: 2022-09-12 | Stop reason: HOSPADM

## 2022-09-07 RX ORDER — AMLODIPINE BESYLATE 10 MG/1
10 TABLET ORAL DAILY
Status: DISCONTINUED | OUTPATIENT
Start: 2022-09-07 | End: 2022-09-12 | Stop reason: HOSPADM

## 2022-09-07 RX ORDER — NAPROXEN SODIUM 220 MG/1
81 TABLET, FILM COATED ORAL DAILY
Status: DISCONTINUED | OUTPATIENT
Start: 2022-09-07 | End: 2022-09-12 | Stop reason: HOSPADM

## 2022-09-07 RX ORDER — ACETAMINOPHEN 325 MG/1
650 TABLET ORAL EVERY 4 HOURS PRN
Status: DISCONTINUED | OUTPATIENT
Start: 2022-09-07 | End: 2022-09-12 | Stop reason: HOSPADM

## 2022-09-07 RX ORDER — INSULIN ASPART 100 [IU]/ML
0-5 INJECTION, SOLUTION INTRAVENOUS; SUBCUTANEOUS
Status: DISCONTINUED | OUTPATIENT
Start: 2022-09-07 | End: 2022-09-09

## 2022-09-07 RX ORDER — MUPIROCIN 20 MG/G
OINTMENT TOPICAL 2 TIMES DAILY
Status: COMPLETED | OUTPATIENT
Start: 2022-09-07 | End: 2022-09-11

## 2022-09-07 RX ORDER — IPRATROPIUM BROMIDE 0.5 MG/2.5ML
0.5 SOLUTION RESPIRATORY (INHALATION) EVERY 6 HOURS
Status: DISCONTINUED | OUTPATIENT
Start: 2022-09-07 | End: 2022-09-12 | Stop reason: HOSPADM

## 2022-09-07 RX ORDER — ONDANSETRON 2 MG/ML
8 INJECTION INTRAMUSCULAR; INTRAVENOUS EVERY 8 HOURS PRN
Status: DISCONTINUED | OUTPATIENT
Start: 2022-09-07 | End: 2022-09-12 | Stop reason: HOSPADM

## 2022-09-07 RX ORDER — METOPROLOL SUCCINATE 25 MG/1
25 TABLET, EXTENDED RELEASE ORAL DAILY
Status: DISCONTINUED | OUTPATIENT
Start: 2022-09-07 | End: 2022-09-12 | Stop reason: HOSPADM

## 2022-09-07 RX ADMIN — CLOPIDOGREL 75 MG: 75 TABLET, FILM COATED ORAL at 09:09

## 2022-09-07 RX ADMIN — VANCOMYCIN HYDROCHLORIDE 1500 MG: 1 INJECTION, POWDER, LYOPHILIZED, FOR SOLUTION INTRAVENOUS at 05:09

## 2022-09-07 RX ADMIN — ATORVASTATIN CALCIUM 80 MG: 80 TABLET, FILM COATED ORAL at 09:09

## 2022-09-07 RX ADMIN — PANTOPRAZOLE SODIUM 40 MG: 40 TABLET, DELAYED RELEASE ORAL at 09:09

## 2022-09-07 RX ADMIN — AMLODIPINE BESYLATE 10 MG: 10 TABLET ORAL at 09:09

## 2022-09-07 RX ADMIN — ESCITALOPRAM OXALATE 10 MG: 10 TABLET ORAL at 09:09

## 2022-09-07 RX ADMIN — PIPERACILLIN SODIUM AND TAZOBACTAM SODIUM 4.5 G: 4; .5 INJECTION, POWDER, LYOPHILIZED, FOR SOLUTION INTRAVENOUS at 09:09

## 2022-09-07 RX ADMIN — INSULIN DETEMIR 15 UNITS: 100 INJECTION, SOLUTION SUBCUTANEOUS at 03:09

## 2022-09-07 RX ADMIN — MUPIROCIN: 20 OINTMENT TOPICAL at 11:09

## 2022-09-07 RX ADMIN — ASPIRIN 81 MG CHEWABLE TABLET 81 MG: 81 TABLET CHEWABLE at 09:09

## 2022-09-07 RX ADMIN — INSULIN DETEMIR 15 UNITS: 100 INJECTION, SOLUTION SUBCUTANEOUS at 09:09

## 2022-09-07 RX ADMIN — METOPROLOL SUCCINATE 25 MG: 25 TABLET, FILM COATED, EXTENDED RELEASE ORAL at 09:09

## 2022-09-07 RX ADMIN — PIPERACILLIN SODIUM AND TAZOBACTAM SODIUM 4.5 G: 4; .5 INJECTION, POWDER, LYOPHILIZED, FOR SOLUTION INTRAVENOUS at 12:09

## 2022-09-07 RX ADMIN — HYDROCODONE BITARTRATE AND ACETAMINOPHEN 1 TABLET: 10; 325 TABLET ORAL at 11:09

## 2022-09-07 RX ADMIN — HYDROXYZINE PAMOATE 25 MG: 25 CAPSULE ORAL at 09:09

## 2022-09-07 RX ADMIN — IPRATROPIUM BROMIDE 0.5 MG: 0.5 SOLUTION RESPIRATORY (INHALATION) at 12:09

## 2022-09-07 RX ADMIN — LISINOPRIL 10 MG: 10 TABLET ORAL at 09:09

## 2022-09-07 RX ADMIN — IPRATROPIUM BROMIDE 0.5 MG: 0.5 SOLUTION RESPIRATORY (INHALATION) at 06:09

## 2022-09-07 RX ADMIN — IPRATROPIUM BROMIDE 0.5 MG: 0.5 SOLUTION RESPIRATORY (INHALATION) at 07:09

## 2022-09-07 RX ADMIN — NICOTINE 1 PATCH: 14 PATCH, EXTENDED RELEASE TRANSDERMAL at 09:09

## 2022-09-07 RX ADMIN — GABAPENTIN 900 MG: 300 CAPSULE ORAL at 05:09

## 2022-09-07 RX ADMIN — HYDROCODONE BITARTRATE AND ACETAMINOPHEN 1 TABLET: 10; 325 TABLET ORAL at 02:09

## 2022-09-07 RX ADMIN — PIPERACILLIN SODIUM AND TAZOBACTAM SODIUM 4.5 G: 4; .5 INJECTION, POWDER, LYOPHILIZED, FOR SOLUTION INTRAVENOUS at 03:09

## 2022-09-07 RX ADMIN — GABAPENTIN 900 MG: 300 CAPSULE ORAL at 06:09

## 2022-09-07 RX ADMIN — MUPIROCIN: 20 OINTMENT TOPICAL at 09:09

## 2022-09-07 RX ADMIN — GABAPENTIN 900 MG: 300 CAPSULE ORAL at 12:09

## 2022-09-07 RX ADMIN — GABAPENTIN 900 MG: 300 CAPSULE ORAL at 11:09

## 2022-09-07 RX ADMIN — HYDROCODONE BITARTRATE AND ACETAMINOPHEN 1 TABLET: 10; 325 TABLET ORAL at 05:09

## 2022-09-07 NOTE — HPI
63 yo male presents to [facility] ED c/o R ankle pain x 3 days after he tripped. Ankle pain is all over his ankle but worst on medial R ankle. Pain is 8.5/10, radiates to R knee, dull and sharp in quality, intermittent, worse with weight-bearing. without relieving factors and no response to Norco. Denies fall. R foot ulcer on plantar surface has been present for 2 years. He has had debridement and 2 toes amputated. [facility] seen 5 days ago. Has home health nurse that comes once a week. He has brought his BS down from 500s to 100s. Denies fevers, chills. Uses home oxygen 2 L as needed.     ED Course: Vanc and Zosyn given.     Pt admitted to [facility] for treatment of diabetic foot ulcer infection.

## 2022-09-07 NOTE — H&P (VIEW-ONLY)
[facility] - Emergency Department  General Surgery  Consult Note    Patient Name: Navdeep Avery  MRN: [id number]  Code Status: Full Code  Admission Date: 9/6/2022  Hospital Length of Stay: 0 days  Attending Physician: Andrew Gillespie MD  Primary Care Provider: Marquez Huff MD    Patient information was obtained from ER records.     General Surgery  Consult performed by: STELLA Sauceda  Consult ordered by: Elizabeth Watkins DO  Reason for consult: diabetic foot  Assessment/Recommendations: RIGHT foot Debridement in OR tomorrow per dr florian   Hold plavix, patient understands increased of bleeding risk      Subjective:     Principal Problem: Diabetic foot ulcer with osteomyelitis    History of Present Illness: Increased drainage RIGHT plantar diabetic foot ulcer with increased edema/pain, afebrile, no leukocytosis  Xray concerning for possible osteomyelitis  US negative DVT  Previous RIGHT great toe, amp per DR. SHELBI MAST, RIGHT 2nd toe amp followed by dr Rashel [facility] wound care, recently in [facility] debrided in OR 06/27  Normal JACQUELIN July 2022  PMH:  CAD, Diabetes with CABG 4-5 years ago  on plavix      No current facility-administered medications on file prior to encounter.     Current Outpatient Medications on File Prior to Encounter   Medication Sig    albuterol (PROVENTIL) 2.5 mg /3 mL (0.083 %) nebulizer solution USE 1 VIAL IN NEBULIZER 3 TIMES DAILY    albuterol (PROVENTIL/VENTOLIN HFA) 90 mcg/actuation inhaler Inhale 2 puffs into the lungs 2 (two) times daily as needed for Wheezing.    amLODIPine (NORVASC) 10 MG tablet Take 1 tablet (10 mg total) by mouth once daily.    aspirin 81 MG Chew Take 81 mg by mouth once daily.    clopidogreL (PLAVIX) 75 mg tablet Take 1 tablet (75 mg total) by mouth once daily.    diclofenac sodium (VOLTAREN) 1 % Gel Apply 1 g topically 4 (four) times daily as needed (pain).    empagliflozin (JARDIANCE) 10 mg tablet Take 1 tablet (10 mg total) by mouth once daily.     EScitalopram oxalate (LEXAPRO) 10 MG tablet Take 1 tablet (10 mg total) by mouth 2 (two) times a day. (Patient taking differently: Take 10 mg by mouth once daily.)    fluticasone propionate (FLONASE) 50 mcg/actuation nasal spray 2 sprays (100 mcg total) by Each Nostril route once daily.    gabapentin (NEURONTIN) 300 MG capsule Take 3 capsules (900 mg total) by mouth every 6 (six) hours.    HYDROcodone-acetaminophen (NORCO)  mg per tablet Take 1 tablet by mouth every 8 (eight) hours as needed for Pain.    hydrOXYzine pamoate (VISTARIL) 25 MG Cap Take 1 capsule (25 mg total) by mouth once daily. Take nightly for sleep    insulin aspart protamine-insulin aspart (NOVOLOG MIX 70-30FLEXPEN U-100) 100 unit/mL (70-30) InPn pen Inject 15 Units into the skin every morning. (Patient taking differently: Inject 15 Units into the skin 2 (two) times a day.)    insulin glargine U-300 conc (TOUJEO MAX U-300 SOLOSTAR) 300 unit/mL (3 mL) insulin pen Inject 40 Units into the skin once daily at 6am.    lisinopriL 10 MG tablet Take 1 tablet (10 mg total) by mouth once daily.    metoprolol succinate (TOPROL-XL) 25 MG 24 hr tablet Take 1 tablet (25 mg total) by mouth once daily.    nitroGLYCERIN (NITROSTAT) 0.4 MG SL tablet Place 1 tablet (0.4 mg total) under the tongue every 5 (five) minutes as needed for Chest pain.    pantoprazole (PROTONIX) 40 MG tablet Take 1 tablet (40 mg total) by mouth once daily.    rosuvastatin (CRESTOR) 20 MG tablet Take 1 tablet (20 mg total) by mouth every evening.    SPIRIVA RESPIMAT 2.5 mcg/actuation inhaler Inhale 1 puff into the lungs once daily.       Review of patient's allergies indicates:  No Known Allergies    Past Medical History:   Diagnosis Date    Anemia     Anxiety     Arthritis     Atherosclerotic heart disease of native coronary artery with other forms of angina pectoris     Atrophic rhinitis     Carpal tunnel syndrome     Cellulitis of right lower extremity     COPD (chronic obstructive  pulmonary disease)     Coronary arteriosclerosis     COVID 02/02/2022    Depression     Diabetic ulcer of right foot associated with diabetes mellitus due to underlying condition, with bone involvement without evidence of necrosis     GERD (gastroesophageal reflux disease)     Hyperlipidemia     Hypertension     Insomnia     MI (myocardial infarction)     Neuropathy     Peripheral vascular disease, unspecified     Right foot ulcer, with fat layer exposed 01/07/2015    Sleep apnea     Type 2 diabetes mellitus      Past Surgical History:   Procedure Laterality Date    AMPUTATION      ANGIOPLASTY      CARDIAC CATHETERIZATION      CORONARY ARTERY BYPASS GRAFT      CORONARY STENT PLACEMENT      DEBRIDEMENT      Right foot debridment with hospital stay and IV Abx    DEBRIDEMENT OF LOWER EXTREMITY Right 06/27/2022    Procedure: DEBRIDEMENT, LOWER EXTREMITY;  Surgeon: Forrest Kiser MD;  Location: [facility] OR;  Service: General;  Laterality: Right;  right foot    SHOULDER OPEN ROTATOR CUFF REPAIR Left     SPINE SURGERY      VASCULAR SURGERY       Family History       Problem Relation (Age of Onset)    Alcohol abuse Father    Arthritis Mother    COPD Paternal Grandfather    Cancer Sister    Diabetes Sister    Early death Father    Heart disease Father, Maternal Grandfather, Son    Hypertension Mother    Learning disabilities Mother          Tobacco Use    Smoking status: Every Day     Packs/day: 0.25     Types: Cigarettes    Smokeless tobacco: Never   Substance and Sexual Activity    Alcohol use: Not Currently    Drug use: Yes     Types: Oxycodone    Sexual activity: Yes     Review of Systems   Cardiovascular:  Positive for leg swelling.        RLE   Skin:  Positive for wound.   Objective:     Vital Signs (Most Recent):  Temp: 98 °F (36.7 °C) (09/06/22 2128)  Pulse: 60 (09/07/22 1248)  Resp: 12 (09/07/22 1431)  BP: 135/62 (09/07/22 0941)  SpO2: 100 % (09/07/22 0708) Vital Signs (24h Range):  Temp:  [98 °F (36.7 °C)] 98 °F  (36.7 °C)  Pulse:  [59-81] 60  Resp:  [9-18] 12  SpO2:  [87 %-100 %] 100 %  BP: (109-176)/(56-78) 135/62     Weight: 76.7 kg (169 lb 3.2 oz)  Body mass index is 27.31 kg/m².    Physical Exam  Vitals and nursing note reviewed. Exam conducted with a chaperone present.   Constitutional:       Comments: lethargic   HENT:      Head: Normocephalic.   Eyes:      Conjunctiva/sclera: Conjunctivae normal.   Cardiovascular:      Rate and Rhythm: Bradycardia present.      Pulses: Normal pulses.   Pulmonary:      Effort: Pulmonary effort is normal.   Abdominal:      Palpations: Abdomen is soft.   Musculoskeletal:         General: Swelling present.      Comments: 1+ pitting RLE edema  Right plantar ulcer approximately 2x2cm pale pink granulation tissue, expressed bloody purulent drainage, no celluliitus   Skin:     General: Skin is warm and dry.   Neurological:      Mental Status: He is oriented to person, place, and time.       Significant Labs:  I have reviewed all pertinent lab results within the past 24 hours.  CBC:   Recent Labs   Lab 09/06/22 2220   WBC 7.93   RBC 3.57*   HGB 11.1*   HCT 31.9*      MCV 89.4   MCH 31.1*   MCHC 34.8     BMP:   Recent Labs   Lab 09/06/22 2220   *      K 4.4      CO2 30   BUN 23*   CREATININE 1.20   CALCIUM 9.2     CMP:   Recent Labs   Lab 09/06/22 2220   *   CALCIUM 9.2   ALBUMIN 3.3*   PROT 7.7      K 4.4   CO2 30      BUN 23*   CREATININE 1.20   ALKPHOS 121*   ALT 16   AST 11*   BILITOT 0.3     LFTs:   Recent Labs   Lab 09/06/22 2220   ALT 16   AST 11*   ALKPHOS 121*   BILITOT 0.3   PROT 7.7   ALBUMIN 3.3*     Coagulation:   Recent Labs   Lab 09/06/22 2225   LABPROT 12.3   INR 0.95   APTT 31.1       Significant Diagnostics:  I have reviewed all pertinent imaging results/findings within the past 24 hours.      Assessment/Plan:     Ulcer of right foot with necrosis of muscle  09/07/2022 RIGHT plantar  Diabetic foot ulcer with foot abscess  concerning for osteomyelitis expressed bloody pus, no necrotic tissue visualized,palpable RIGHT DP pulse agree with iv vancomycin/zosyn  Npo for possible I/D today per Dr. Matos      VTE Risk Mitigation (From admission, onward)           Ordered     Place sequential compression device  Until discontinued         09/07/22 0211     IP VTE LOW RISK PATIENT  Once         09/07/22 0211                    Thank you for your consult. I will follow-up with patient. Please contact us if you have any additional questions.    Rosie Luna, VÍCTORP  General Surgery  [facility] - Emergency Department

## 2022-09-07 NOTE — ASSESSMENT & PLAN NOTE
- s/p CABG (3-4 yrs ago). Stents (about 5 years ago).  - Continue home ASA, statin, Lisinopril and Plavix

## 2022-09-07 NOTE — SUBJECTIVE & OBJECTIVE
Interval History: Patient was seen in the ED this morning. Patient states he has dealt with this issue of his toe in the past most recently a month ago. They determined it was cellulitis and discharged him on home Bactrim per chart review. Patient says that it has not really changed during that time and is still complaining of pain of his foot. Patient xrays revealed osteomyelitis and he is currently on vanc and zosyn as we await cultures. Patient is being followed by wound care and surgery is consulted.     Review of Systems   Constitutional:  Negative for chills, fatigue, fever and unexpected weight change.   HENT:  Negative for congestion, hearing loss and trouble swallowing.    Eyes:  Negative for visual disturbance.   Respiratory:  Negative for cough, chest tightness, shortness of breath and wheezing.    Cardiovascular:  Positive for leg swelling. Negative for chest pain and palpitations.   Gastrointestinal:  Negative for abdominal pain, anal bleeding, blood in stool, constipation, diarrhea, nausea and vomiting.   Endocrine: Negative for polyuria.   Genitourinary:  Negative for decreased urine volume, difficulty urinating, dysuria and hematuria.   Musculoskeletal:  Positive for arthralgias, joint swelling, myalgias, neck pain and neck stiffness. Negative for back pain.   Skin:  Positive for wound. Negative for rash.   Neurological:  Negative for dizziness, weakness, light-headedness and headaches.   Psychiatric/Behavioral:  Negative for self-injury and suicidal ideas.    Objective:     Vital Signs (Most Recent):  Temp: 98 °F (36.7 °C) (09/06/22 2128)  Pulse: 60 (09/07/22 0708)  Resp: 12 (09/07/22 0708)  BP: 135/62 (09/07/22 0941)  SpO2: 100 % (09/07/22 0708) Vital Signs (24h Range):  Temp:  [98 °F (36.7 °C)] 98 °F (36.7 °C)  Pulse:  [59-81] 60  Resp:  [9-18] 12  SpO2:  [87 %-100 %] 100 %  BP: (109-176)/(56-78) 135/62     Weight: 76.7 kg (169 lb 3.2 oz)  Body mass index is 27.31 kg/m².    Intake/Output Summary  (Last 24 hours) at 9/7/2022 1056  Last data filed at 9/7/2022 0034  Gross per 24 hour   Intake 350 ml   Output --   Net 350 ml      Physical Exam  Vitals and nursing note reviewed.   Constitutional:       General: He is not in acute distress.     Appearance: Normal appearance. He is not ill-appearing, toxic-appearing or diaphoretic.   HENT:      Head: Normocephalic and atraumatic.      Right Ear: External ear normal.      Left Ear: External ear normal.      Mouth/Throat:      Mouth: Mucous membranes are moist.      Pharynx: Oropharynx is clear. No oropharyngeal exudate or posterior oropharyngeal erythema.   Eyes:      Extraocular Movements: Extraocular movements intact.      Conjunctiva/sclera: Conjunctivae normal.      Pupils: Pupils are equal, round, and reactive to light.   Cardiovascular:      Rate and Rhythm: Normal rate and regular rhythm.      Pulses: Normal pulses.      Heart sounds: Normal heart sounds. No murmur heard.    No friction rub. No gallop.   Pulmonary:      Effort: Pulmonary effort is normal. No respiratory distress.      Breath sounds: Normal breath sounds. No wheezing, rhonchi or rales.   Abdominal:      General: Bowel sounds are normal. There is no distension.      Palpations: Abdomen is soft. There is no mass.      Tenderness: There is no abdominal tenderness. There is no guarding or rebound.   Musculoskeletal:         General: Tenderness present. No swelling.      Cervical back: No tenderness.      Right lower leg: Edema present.      Left lower leg: No edema.      Comments: Amputation of distal first and third toes on R.    Lymphadenopathy:      Cervical: No cervical adenopathy.   Skin:     General: Skin is warm and dry.      Capillary Refill: Capillary refill takes less than 2 seconds.      Findings: Lesion present.      Comments: Ulcer on R foot plantar surface about 1.5 cm with surrounding erythema and warmth. No drainage.    Neurological:      General: No focal deficit present.       Mental Status: He is alert and oriented to person, place, and time.      Sensory: No sensory deficit.      Motor: No weakness.   Psychiatric:         Mood and Affect: Mood normal.         Behavior: Behavior normal.       Significant Labs: All pertinent labs within the past 24 hours have been reviewed.    Significant Imaging: I have reviewed all pertinent imaging results/findings within the past 24 hours.

## 2022-09-07 NOTE — ASSESSMENT & PLAN NOTE
Patient's FSGs are uncontrolled due to hyperglycemia on current medication regimen.  Last A1c reviewed-   Lab Results   Component Value Date    HGBA1C 9.6 (H) 07/20/2022     Most recent fingerstick glucose reviewed- No results for input(s): POCTGLUCOSE in the last 24 hours.  Current correctional scale  Low  Maintain anti-hyperglycemic dose as follows-   Antihyperglycemics (From admission, onward)    Start     Stop Route Frequency Ordered    09/07/22 0300  insulin detemir U-100 injection 15 Units         -- SubQ Nightly 09/07/22 0211    09/07/22 0254  insulin aspart U-100 injection 0-5 Units         -- SubQ Before meals & nightly PRN 09/07/22 0211        Hold Oral hypoglycemics while patient is in the hospital.  Foot XR read by myself shows bony destruction, indicating osteomyelitis  ESR 78. CRP 2.96.   Vanc and zosyn   Blood Cx pending  Surgery consulted  BLE venous doppler pending  BLE arterial doppler showed normal JACQUELIN 7/29/22  Contacting  for at home abx.

## 2022-09-07 NOTE — PROGRESS NOTES
09/07/22 0836   Wound Care Follow Up   Wound Care Follow-up? Yes   Wound Care- Next Visit Date 09/14/22   Follow Up Plan reassess wound

## 2022-09-07 NOTE — H&P
[facility] - Emergency Department  Hospital Medicine  History & Physical    Patient Name: Navdeep Avery  MRN: [id number]  Patient Class: IP- Inpatient  Admission Date: 9/6/2022  Attending Physician: Andrew Gillespie MD   Primary Care Provider: Marquez Huff MD         Patient information was obtained from patient, past medical records and ER records.     Subjective:     Principal Problem:Diabetic foot ulcer with osteomyelitis    Chief Complaint:   Chief Complaint   Patient presents with    Ankle Pain     Pt states injured his right ankle yesterday and has swelling and pain to right ankle norco 10 taken approx 6 hours pta - pt is being treated by wound care to right ankle- pt has a walking boot on at time of triage        HPI: 61 yo male presents to [facility] ED c/o R ankle pain x 3 days after he tripped. Ankle pain is all over his ankle but worst on medial R ankle. Pain is 8.5/10, radiates to R knee, dull and sharp in quality, intermittent, worse with weight-bearing. without relieving factors and no response to Norco. Denies fall. R foot ulcer on plantar surface has been present for 2 years. He has had debridement and 2 toes amputated. [facility] and was last seen 5 days ago. Has home health nurse that comes once a week. He has brought his BS down from 500s to 100s. Denies fevers, chills. Uses home oxygen 2 L as needed.     ED Course: Vanc and Zosyn given.     Pt admitted to [facility] for treatment of diabetic foot ulcer infection.       Past Medical History:   Diagnosis Date    Anemia     Anxiety     Arthritis     Atherosclerotic heart disease of native coronary artery with other forms of angina pectoris     Atrophic rhinitis     Carpal tunnel syndrome     Cellulitis of right lower extremity     COPD (chronic obstructive pulmonary disease)     Coronary arteriosclerosis     COVID 02/02/2022    Depression     Diabetic ulcer of right foot associated with diabetes mellitus  due to underlying condition, with bone involvement without evidence of necrosis     GERD (gastroesophageal reflux disease)     Hyperlipidemia     Hypertension     Insomnia     MI (myocardial infarction)     Neuropathy     Peripheral vascular disease, unspecified     Right foot ulcer, with fat layer exposed 01/07/2015    Sleep apnea     Type 2 diabetes mellitus        Past Surgical History:   Procedure Laterality Date    AMPUTATION      ANGIOPLASTY      CARDIAC CATHETERIZATION      CORONARY ARTERY BYPASS GRAFT      CORONARY STENT PLACEMENT      DEBRIDEMENT      Right foot debridment with hospital stay and IV Abx    DEBRIDEMENT OF LOWER EXTREMITY Right 06/27/2022    Procedure: DEBRIDEMENT, LOWER EXTREMITY;  Surgeon: Forrest Kiser MD;  Location: [facility];  Service: General;  Laterality: Right;  right foot    SHOULDER OPEN ROTATOR CUFF REPAIR Left     SPINE SURGERY      VASCULAR SURGERY         Review of patient's allergies indicates:  No Known Allergies    No current facility-administered medications on file prior to encounter.     Current Outpatient Medications on File Prior to Encounter   Medication Sig    albuterol (PROVENTIL) 2.5 mg /3 mL (0.083 %) nebulizer solution USE 1 VIAL IN NEBULIZER 3 TIMES DAILY    albuterol (PROVENTIL/VENTOLIN HFA) 90 mcg/actuation inhaler Inhale 2 puffs into the lungs 2 (two) times daily as needed for Wheezing.    amLODIPine (NORVASC) 10 MG tablet Take 1 tablet (10 mg total) by mouth once daily.    aspirin 81 MG Chew Take 81 mg by mouth once daily.    clopidogreL (PLAVIX) 75 mg tablet Take 1 tablet (75 mg total) by mouth once daily.    diclofenac sodium (VOLTAREN) 1 % Gel Apply 1 g topically 4 (four) times daily as needed (pain).    empagliflozin (JARDIANCE) 10 mg tablet Take 1 tablet (10 mg total) by mouth once daily.    EScitalopram oxalate (LEXAPRO) 10 MG tablet Take 1 tablet (10 mg total) by mouth 2 (two) times a day. (Patient taking differently: Take 10 mg by mouth once  daily.)    fluticasone propionate (FLONASE) 50 mcg/actuation nasal spray 2 sprays (100 mcg total) by Each Nostril route once daily.    gabapentin (NEURONTIN) 300 MG capsule Take 3 capsules (900 mg total) by mouth every 6 (six) hours.    HYDROcodone-acetaminophen (NORCO)  mg per tablet Take 1 tablet by mouth every 8 (eight) hours as needed for Pain.    hydrOXYzine pamoate (VISTARIL) 25 MG Cap Take 1 capsule (25 mg total) by mouth once daily. Take nightly for sleep    insulin aspart protamine-insulin aspart (NOVOLOG MIX 70-30FLEXPEN U-100) 100 unit/mL (70-30) InPn pen Inject 15 Units into the skin every morning. (Patient taking differently: Inject 15 Units into the skin 2 (two) times a day.)    insulin glargine U-300 conc (TOUJEO MAX U-300 SOLOSTAR) 300 unit/mL (3 mL) insulin pen Inject 40 Units into the skin once daily at 6am.    lisinopriL 10 MG tablet Take 1 tablet (10 mg total) by mouth once daily.    metoprolol succinate (TOPROL-XL) 25 MG 24 hr tablet Take 1 tablet (25 mg total) by mouth once daily.    nitroGLYCERIN (NITROSTAT) 0.4 MG SL tablet Place 1 tablet (0.4 mg total) under the tongue every 5 (five) minutes as needed for Chest pain.    pantoprazole (PROTONIX) 40 MG tablet Take 1 tablet (40 mg total) by mouth once daily.    rosuvastatin (CRESTOR) 20 MG tablet Take 1 tablet (20 mg total) by mouth every evening.    SPIRIVA RESPIMAT 2.5 mcg/actuation inhaler Inhale 1 puff into the lungs once daily.     Family History       Problem Relation (Age of Onset)    Alcohol abuse Father    Arthritis Mother    COPD Paternal Grandfather    Cancer Sister    Diabetes Sister    Early death Father    Heart disease Father, Maternal Grandfather, Son    Hypertension Mother    Learning disabilities Mother          Tobacco Use    Smoking status: Every Day     Packs/day: 0.25     Types: Cigarettes    Smokeless tobacco: Never   Substance and Sexual Activity    Alcohol use: Not Currently    Drug use: Yes     Types: Oxycodone     Sexual activity: Yes     Review of Systems   Constitutional:  Negative for chills, fatigue, fever and unexpected weight change.   HENT:  Negative for congestion, hearing loss and trouble swallowing.    Eyes:  Negative for visual disturbance.   Respiratory:  Negative for cough, chest tightness, shortness of breath and wheezing.    Cardiovascular:  Positive for leg swelling. Negative for chest pain and palpitations.   Gastrointestinal:  Negative for abdominal pain, anal bleeding, blood in stool, constipation, diarrhea, nausea and vomiting.   Endocrine: Negative for polyuria.   Genitourinary:  Negative for decreased urine volume, difficulty urinating, dysuria and hematuria.   Musculoskeletal:  Positive for arthralgias, joint swelling, myalgias, neck pain and neck stiffness. Negative for back pain.   Skin:  Positive for wound. Negative for rash.   Neurological:  Negative for dizziness, weakness, light-headedness and headaches.   Psychiatric/Behavioral:  Negative for self-injury and suicidal ideas.    Objective:     Vital Signs (Most Recent):  Temp: 98 °F (36.7 °C) (09/06/22 2128)  Pulse: 70 (09/07/22 0138)  Resp: 12 (09/07/22 0138)  BP: 138/61 (09/07/22 0138)  SpO2: (!) 94 % (09/07/22 0138)   Vital Signs (24h Range):  Temp:  [98 °F (36.7 °C)] 98 °F (36.7 °C)  Pulse:  [68-81] 70  Resp:  [12-18] 12  SpO2:  [94 %-99 %] 94 %  BP: (138-176)/(61-76) 138/61     Weight: 76.7 kg (169 lb 3.2 oz)  Body mass index is 27.31 kg/m².    Physical Exam  Vitals and nursing note reviewed.   Constitutional:       General: He is not in acute distress.     Appearance: Normal appearance. He is not ill-appearing, toxic-appearing or diaphoretic.   HENT:      Head: Normocephalic and atraumatic.      Right Ear: External ear normal.      Left Ear: External ear normal.      Mouth/Throat:      Mouth: Mucous membranes are moist.      Pharynx: Oropharynx is clear. No oropharyngeal exudate or posterior oropharyngeal erythema.   Eyes:      Extraocular  Movements: Extraocular movements intact.      Conjunctiva/sclera: Conjunctivae normal.      Pupils: Pupils are equal, round, and reactive to light.   Cardiovascular:      Rate and Rhythm: Normal rate and regular rhythm.      Pulses: Normal pulses.      Heart sounds: Normal heart sounds. No murmur heard.    No friction rub. No gallop.   Pulmonary:      Effort: Pulmonary effort is normal. No respiratory distress.      Breath sounds: Normal breath sounds. No wheezing, rhonchi or rales.   Abdominal:      General: Bowel sounds are normal. There is no distension.      Palpations: Abdomen is soft. There is no mass.      Tenderness: There is no abdominal tenderness. There is no guarding or rebound.   Musculoskeletal:         General: Tenderness present. No swelling.      Cervical back: No tenderness.      Right lower leg: Edema present.      Left lower leg: No edema.      Comments: Amputation of distal first and third toes on R.    Lymphadenopathy:      Cervical: No cervical adenopathy.   Skin:     General: Skin is warm and dry.      Capillary Refill: Capillary refill takes less than 2 seconds.      Findings: Lesion present.      Comments: Ulcer on R foot plantar surface about 1.5 cm with surrounding erythema and warmth. No drainage.    Neurological:      General: No focal deficit present.      Mental Status: He is alert and oriented to person, place, and time.      Sensory: No sensory deficit.      Motor: No weakness.   Psychiatric:         Mood and Affect: Mood normal.         Behavior: Behavior normal.         CRANIAL NERVES     CN III, IV, VI   Pupils are equal, round, and reactive to light.     Significant Labs: All pertinent labs within the past 24 hours have been reviewed.    Significant Imaging: I have reviewed all pertinent imaging results/findings within the past 24 hours.    Assessment/Plan:     * Diabetic foot ulcer with osteomyelitis  Patient's FSGs are uncontrolled due to hyperglycemia on current medication  regimen.  Last A1c reviewed-   Lab Results   Component Value Date    HGBA1C 9.6 (H) 07/20/2022     Most recent fingerstick glucose reviewed- No results for input(s): POCTGLUCOSE in the last 24 hours.  Current correctional scale  Low  Maintain anti-hyperglycemic dose as follows-   Antihyperglycemics (From admission, onward)      Start     Stop Route Frequency Ordered    09/07/22 0300  insulin detemir U-100 injection 15 Units         -- SubQ Nightly 09/07/22 0211 09/07/22 0254  insulin aspart U-100 injection 0-5 Units         -- SubQ Before meals & nightly PRN 09/07/22 0211          Hold Oral hypoglycemics while patient is in the hospital.  Foot XR read by myself shows bony destruction, indicating osteomyelitis  ESR 78. CRP 2.96.   Vanc and zosyn   Blood Cx pending  Surgery consulted  BLE venous doppler pending  BLE arterial doppler showed normal JACQUELIN 7/29/22    S/P CABG (coronary artery bypass graft)  - s/p CABG (3-4 yrs ago). Stents (about 5 years ago).  - Continue home ASA, statin, Lisinopril and Plavix    Sleep apnea  - CPAP QHS      Benign hypertension  - Continue home Amlodipine, Lisinopril and Toprolol  - Labetalol prn with parameters    COPD (chronic obstructive pulmonary disease)  - Home Tiotroprium   - Duoneb prn         Dependence on nicotine from cigarettes  - Nicotine patch      Diabetic peripheral neuropathy  - Home Gabapentin    Anxiety  - Home Escitalopram       Mixed hyperlipidemia  - Home statin      VTE Risk Mitigation (From admission, onward)           Ordered     Place sequential compression device  Until discontinued         09/07/22 0211     IP VTE LOW RISK PATIENT  Once         09/07/22 0211                       Elizabeth Watkins DO  Department of Hospital Medicine   [facility] Department

## 2022-09-07 NOTE — HPI
Increased drainage RIGHT plantar diabetic foot ulcer with increased edema/pain, afebrile, no leukocytosis  Xray concerning for possible osteomyelitis  US negative DVT  Previous RIGHT great toe, amp per DR. SHELBI MAST, RIGHT 2nd toe amp followed by dr Rashel [facility] wound care, recently in [facility] debrided in [place] 06/27  Normal JACQUELIN July 2022  PMH:  CAD, Diabetes with CABG 4-5 years ago  on plavix

## 2022-09-07 NOTE — PROGRESS NOTES
[facility] - Emergency Department  Wound Care    Patient Name:  Navdeep Avery   MRN:  [id number]  Date: 9/7/2022  Diagnosis: Diabetic foot ulcer with osteomyelitis    History:     Past Medical History:   Diagnosis Date    Anemia     Anxiety     Arthritis     Atherosclerotic heart disease of native coronary artery with other forms of angina pectoris     Atrophic rhinitis     Carpal tunnel syndrome     Cellulitis of right lower extremity     COPD (chronic obstructive pulmonary disease)     Coronary arteriosclerosis     COVID 02/02/2022    Depression     Diabetic ulcer of right foot associated with diabetes mellitus due to underlying condition, with bone involvement without evidence of necrosis     GERD (gastroesophageal reflux disease)     Hyperlipidemia     Hypertension     Insomnia     MI (myocardial infarction)     Neuropathy     Peripheral vascular disease, unspecified     Right foot ulcer, with fat layer exposed 01/07/2015    Sleep apnea     Type 2 diabetes mellitus        Social History     Socioeconomic History    Marital status:    Occupational History    Occupation: Retired   Tobacco Use    Smoking status: Every Day     Packs/day: 0.25     Types: Cigarettes    Smokeless tobacco: Never   Substance and Sexual Activity    Alcohol use: Not Currently    Drug use: Yes     Types: Oxycodone    Sexual activity: Yes       Precautions:     Allergies as of 09/06/2022    (No Known Allergies)       WO Assessment Details/Treatment        09/07/22 0752   WOCN Assessment   Visit Date 09/07/22   Visit Time 0752   Consult Type New   WOCN Speciality Wound   Wound neuropathic   Number of Wounds 1   Intervention assessed;applied;chart review;orders   Teaching on-going;complication;discharge        Altered Skin Integrity 09/06/22 2226 Right plantar Foot Diabetic Ulcer   Date First Assessed/Time First Assessed: 09/06/22 2226   Altered Skin Integrity Present on Admission: yes  Side: Right  Orientation: plantar   Location: (c) Foot  Primary Wound Type: Diabetic Ulcer   Wound Image      Dressing Appearance Open to air   Drainage Amount Small   Drainage Characteristics/Odor Serosanguineous;Yellow;No odor   Appearance Pink;Red;Yellow;Dry;Adipose;Fibrin   Tissue loss description Full thickness   Periwound Area Intact;Dry   Wound Edges Defined;Callused;Open   Ballesteros Classification (diabetic foot ulcers only) Grade 3   Wound Length (cm) 1 cm   Wound Width (cm) 1.5 cm   Wound Depth (cm) 0.5 cm   Wound Volume (cm^3) 0.75 cm^3   Wound Surface Area (cm^2) 1.5 cm^2   Care Cleansed with:;Wound cleanser   Dressing Applied;Hydrogel;Gauze;Rolled gauze   Periwound Care Dry periwound area maintained;Skin barrier film applied   Dressing Change Due 09/08/22       WO Team consulted for diabetic ulcer to right plantar foot - 1st MTP joint    Narrative: See above assessment findings. [facility]. Last visit was 8/25/22 and treatment moistened drawtex daily.       Goals per TIME Model: Promote moist wound healing, Moisture management, Reduce bioburden, and Educate on proper wound management post D/C     Barriers to Wound Healing: multiple co-morbidities poor vascular supply diabetes dry wound bed decreased granulation tissue no protective sensation    Recommendations made to primary team for:   Right plantar foot diabetic ulcer: DAILY & PRN saturated  1. Apply vashe moistened gauze to area and allow it to site 1-2 minutes.  2. Upon removal of gauze, gently wipe area to manually debride any biofilm or necrotic tissue.  3. Periwound: pat dry. Spray no sting barrier spray and allow to air dry.  4. Apply Anasept Gel 1/4th inch thick to wound bed.  5. Cover with dry gauze and wrap in kerlix.  *label with DATE, TIME AND INITIALS*  *DOCUMENT ASSESSMENT FINDINGS AND DRESSING CHANGE UNDER THE FLOWSHEETS/ADULT PCS ON THE APPROPRIATE LDA*    Orders placed.    Thank you for the consult.     We will continue to follow. See  additional note under Notes TAB for tentative f/u plan/dates.    09/07/2022

## 2022-09-07 NOTE — ASSESSMENT & PLAN NOTE
Patient currently on vanc and zosyn.   Surgery is consulted to follow up I appreciate their expertise\  Wound care is following.  Xray of foot reveals suggestive findings of osteomyelitis.   Will consult  about at home antibiotics.

## 2022-09-07 NOTE — PROGRESS NOTES
[facility] - Emergency Department  Hospital Medicine  Progress Note    Patient Name: Navdeep Avery  MRN: [id number]  Patient Class: IP- Inpatient   Admission Date: 9/6/2022  Length of Stay: 0 days  Attending Physician: Andrew Gillespie MD  Primary Care Provider: Marquez Huff MD        Subjective:     Principal Problem:Diabetic foot ulcer with osteomyelitis        HPI:  63 yo male presents to [facility] ED c/o R ankle pain x 3 days after he tripped. Ankle pain is all over his ankle but worst on medial R ankle. Pain is 8.5/10, radiates to R knee, dull and sharp in quality, intermittent, worse with weight-bearing. without relieving factors and no response to Norco. Denies fall. R foot ulcer on plantar surface has been present for 2 years. He has had debridement and 2 toes amputated. [facility] and was last seen 5 days ago. Has home health nurse that comes once a week. He has brought his BS down from 500s to 100s. Denies fevers, chills. Uses home oxygen 2 L as needed.     ED Course: Vanc and Zosyn given.     Pt admitted to [facility] for treatment of diabetic foot ulcer infection.       Overview/Hospital Course:  No notes on file    Interval History: Patient was seen in the ED this morning. Patient states he has dealt with this issue of his toe in the past most recently a month ago. They determined it was cellulitis and discharged him on home Bactrim per chart review. Patient says that it has not really changed during that time and is still complaining of pain of his foot. Patient xrays revealed osteomyelitis and he is currently on vanc and zosyn as we await cultures. Patient is being followed by wound care and surgery is consulted.     Review of Systems   Constitutional:  Negative for chills, fatigue, fever and unexpected weight change.   HENT:  Negative for congestion, hearing loss and trouble swallowing.    Eyes:  Negative for visual disturbance.   Respiratory:  Negative  for cough, chest tightness, shortness of breath and wheezing.    Cardiovascular:  Positive for leg swelling. Negative for chest pain and palpitations.   Gastrointestinal:  Negative for abdominal pain, anal bleeding, blood in stool, constipation, diarrhea, nausea and vomiting.   Endocrine: Negative for polyuria.   Genitourinary:  Negative for decreased urine volume, difficulty urinating, dysuria and hematuria.   Musculoskeletal:  Positive for arthralgias, joint swelling, myalgias, neck pain and neck stiffness. Negative for back pain.   Skin:  Positive for wound. Negative for rash.   Neurological:  Negative for dizziness, weakness, light-headedness and headaches.   Psychiatric/Behavioral:  Negative for self-injury and suicidal ideas.    Objective:     Vital Signs (Most Recent):  Temp: 98 °F (36.7 °C) (09/06/22 2128)  Pulse: 60 (09/07/22 0708)  Resp: 12 (09/07/22 0708)  BP: 135/62 (09/07/22 0941)  SpO2: 100 % (09/07/22 0708) Vital Signs (24h Range):  Temp:  [98 °F (36.7 °C)] 98 °F (36.7 °C)  Pulse:  [59-81] 60  Resp:  [9-18] 12  SpO2:  [87 %-100 %] 100 %  BP: (109-176)/(56-78) 135/62     Weight: 76.7 kg (169 lb 3.2 oz)  Body mass index is 27.31 kg/m².    Intake/Output Summary (Last 24 hours) at 9/7/2022 1056  Last data filed at 9/7/2022 0034  Gross per 24 hour   Intake 350 ml   Output --   Net 350 ml      Physical Exam  Vitals and nursing note reviewed.   Constitutional:       General: He is not in acute distress.     Appearance: Normal appearance. He is not ill-appearing, toxic-appearing or diaphoretic.   HENT:      Head: Normocephalic and atraumatic.      Right Ear: External ear normal.      Left Ear: External ear normal.      Mouth/Throat:      Mouth: Mucous membranes are moist.      Pharynx: Oropharynx is clear. No oropharyngeal exudate or posterior oropharyngeal erythema.   Eyes:      Extraocular Movements: Extraocular movements intact.      Conjunctiva/sclera: Conjunctivae normal.      Pupils: Pupils are equal,  round, and reactive to light.   Cardiovascular:      Rate and Rhythm: Normal rate and regular rhythm.      Pulses: Normal pulses.      Heart sounds: Normal heart sounds. No murmur heard.    No friction rub. No gallop.   Pulmonary:      Effort: Pulmonary effort is normal. No respiratory distress.      Breath sounds: Normal breath sounds. No wheezing, rhonchi or rales.   Abdominal:      General: Bowel sounds are normal. There is no distension.      Palpations: Abdomen is soft. There is no mass.      Tenderness: There is no abdominal tenderness. There is no guarding or rebound.   Musculoskeletal:         General: Tenderness present. No swelling.      Cervical back: No tenderness.      Right lower leg: Edema present.      Left lower leg: No edema.      Comments: Amputation of distal first and third toes on R.    Lymphadenopathy:      Cervical: No cervical adenopathy.   Skin:     General: Skin is warm and dry.      Capillary Refill: Capillary refill takes less than 2 seconds.      Findings: Lesion present.      Comments: Ulcer on R foot plantar surface about 1.5 cm with surrounding erythema and warmth. No drainage.    Neurological:      General: No focal deficit present.      Mental Status: He is alert and oriented to person, place, and time.      Sensory: No sensory deficit.      Motor: No weakness.   Psychiatric:         Mood and Affect: Mood normal.         Behavior: Behavior normal.       Significant Labs: All pertinent labs within the past 24 hours have been reviewed.    Significant Imaging: I have reviewed all pertinent imaging results/findings within the past 24 hours.      Assessment/Plan:      * Diabetic foot ulcer with osteomyelitis  Patient's FSGs are uncontrolled due to hyperglycemia on current medication regimen.  Last A1c reviewed-   Lab Results   Component Value Date    HGBA1C 9.6 (H) 07/20/2022     Most recent fingerstick glucose reviewed- No results for input(s): POCTGLUCOSE in the last 24  hours.  Current correctional scale  Low  Maintain anti-hyperglycemic dose as follows-   Antihyperglycemics (From admission, onward)    Start     Stop Route Frequency Ordered    09/07/22 0300  insulin detemir U-100 injection 15 Units         -- SubQ Nightly 09/07/22 0211    09/07/22 0254  insulin aspart U-100 injection 0-5 Units         -- SubQ Before meals & nightly PRN 09/07/22 0211        Hold Oral hypoglycemics while patient is in the hospital.  Foot XR read by myself shows bony destruction, indicating osteomyelitis  ESR 78. CRP 2.96.   Vanc and zosyn   Blood Cx pending  Surgery consulted  BLE venous doppler pending  BLE arterial doppler showed normal JACQUELIN 7/29/22  Contacting  for at home abx.     Sleep apnea  - CPAP QHS      Diabetic peripheral neuropathy  - Home Gabapentin    COPD (chronic obstructive pulmonary disease)  - Home Tiotroprium   - Duoneb prn         S/P CABG (coronary artery bypass graft)  - s/p CABG (3-4 yrs ago). Stents (about 5 years ago).  - Continue home ASA, statin, Lisinopril and Plavix    Anxiety  - Home Escitalopram       Dependence on nicotine from cigarettes  - Nicotine patch      Mixed hyperlipidemia  - Home atorvastatin 80mg      Benign hypertension  - Continue home Amlodipine, Lisinopril and Toprolol  - Labetalol prn with parameters      VTE Risk Mitigation (From admission, onward)         Ordered     Place sequential compression device  Until discontinued         09/07/22 0211     IP VTE LOW RISK PATIENT  Once         09/07/22 0211                Discharge Planning   MARGARITO:      Code Status: Full Code   Is the patient medically ready for discharge?:     Reason for patient still in hospital (select all that apply): Treatment                     Dung Conde DO  Department of Hospital Medicine   [facility] Emergency Department

## 2022-09-07 NOTE — ED PROVIDER NOTES
Encounter Date: 9/6/2022    SCRIBE #1 NOTE: I, Polina Soriano, am scribing for, and in the presence of,  Matthew Hauser MD. I have scribed the entire note.     History     Chief Complaint   Patient presents with    Ankle Pain     Pt states injured his right ankle yesterday and has swelling and pain to right ankle norco 10 taken approx 6 hours pta - pt is being treated by wound care to right ankle- pt has a walking boot on at time of triage     This is a 63 y/o white male,who presents to the ED with complaints of right foot pain. He states he is a diabetic and is currently being followed by would care. He has had the first 3 digits of the right foot amputated. He denies any fever, nausea or vomiting. He has the foot wrapped at the time of the exam. He states he has now noticed a hole in the bottom of the foot. He reports he has been dealing with the ulcer for the past year.  Pt took [place] 10 6 hours PTA. There are no other complaints/pain in the ED at this time. Pt has a Pmhx of diabetes, peripheral vascular disease, neuopathy, MI, HTN, hyperlipidemia, GERD, COPD, conaory arteriosclerosis, and cellulitis of the right right lower extremity. Pt currently smokes 0.5 PPD cigarettes.       The history is provided by the patient. No  was used.   Review of patient's allergies indicates:  No Known Allergies  Past Medical History:   Diagnosis Date    Anemia     Anxiety     Arthritis     Atherosclerotic heart disease of native coronary artery with other forms of angina pectoris     Atrophic rhinitis     Carpal tunnel syndrome     Cellulitis of right lower extremity     COPD (chronic obstructive pulmonary disease)     Coronary arteriosclerosis     COVID 02/02/2022    Depression     Diabetic ulcer of right foot associated with diabetes mellitus due to underlying condition, with bone involvement without evidence of necrosis     GERD (gastroesophageal reflux disease)     Hyperlipidemia     Hypertension      Insomnia     MI (myocardial infarction)     Neuropathy     Peripheral vascular disease, unspecified     Right foot ulcer, with fat layer exposed 01/07/2015    Sleep apnea     Type 2 diabetes mellitus      Past Surgical History:   Procedure Laterality Date    AMPUTATION      ANGIOPLASTY      CARDIAC CATHETERIZATION      CORONARY ARTERY BYPASS GRAFT      CORONARY STENT PLACEMENT      DEBRIDEMENT      Right foot debridment with hospital stay and IV Abx    DEBRIDEMENT OF LOWER EXTREMITY Right 06/27/2022    Procedure: DEBRIDEMENT, LOWER EXTREMITY;  Surgeon: Forrest Kiser MD;  Location: [facility] OR;  Service: General;  Laterality: Right;  right foot    IRRIGATION AND DEBRIDEMENT OF LOWER EXTREMITY Right 9/8/2022    Procedure: IRRIGATION AND DEBRIDEMENT, LOWER EXTREMITY;  Surgeon: Selina Matos MD;  Location: [facility] OR;  Service: General;  Laterality: Right;    SHOULDER OPEN ROTATOR CUFF REPAIR Left     SPINE SURGERY      VASCULAR SURGERY       Family History   Problem Relation Age of Onset    Arthritis Mother     Hypertension Mother     Learning disabilities Mother     Alcohol abuse Father     Early death Father     Heart disease Father     Cancer Sister     Diabetes Sister     Heart disease Maternal Grandfather     COPD Paternal Grandfather     Heart disease Son      Social History     Tobacco Use    Smoking status: Every Day     Packs/day: 0.25     Types: Cigarettes    Smokeless tobacco: Never   Substance Use Topics    Alcohol use: Not Currently    Drug use: Yes     Types: Oxycodone     Review of Systems   Constitutional:  Negative for fever.   Gastrointestinal:  Negative for nausea and vomiting.   Musculoskeletal:         Right foot pain.    All other systems reviewed and are negative.    Physical Exam     Initial Vitals [09/06/22 2128]   BP Pulse Resp Temp SpO2   (!) 144/75 81 18 98 °F (36.7 °C) 98 %      MAP       --         Physical Exam    Nursing note and vitals reviewed.  Constitutional: He appears  well-developed and well-nourished.   HENT:   Head: Normocephalic and atraumatic.   Eyes: EOM are normal. Pupils are equal, round, and reactive to light.   Neck: Neck supple. No thyromegaly present.   Normal range of motion.  Cardiovascular:  Normal rate, regular rhythm, normal heart sounds and intact distal pulses.           No murmur heard.  Pulmonary/Chest: Breath sounds normal. No respiratory distress. He has no wheezes.   Abdominal: Abdomen is soft. Bowel sounds are normal. There is no abdominal tenderness.   Musculoskeletal:         General: No tenderness or edema. Normal range of motion.      Cervical back: Normal range of motion and neck supple.      Comments: First three digits of the right foot removed and there is an open diabetic ulcer to the bottom of the right foot.      Lymphadenopathy:     He has no cervical adenopathy.   Neurological: He is alert and oriented to person, place, and time. He has normal strength and normal reflexes. No cranial nerve deficit or sensory deficit. GCS score is 15. GCS eye subscore is 4. GCS verbal subscore is 5. GCS motor subscore is 6.   Skin: Skin is warm and dry. Capillary refill takes less than 2 seconds. No rash noted.   Psychiatric: He has a normal mood and affect.       ED Course   Procedures  Labs Reviewed   COMPREHENSIVE METABOLIC PANEL - Abnormal; Notable for the following components:       Result Value    Glucose 283 (*)     BUN 23 (*)     Albumin 3.3 (*)     Globulin 4.4 (*)     Alk Phos 121 (*)     AST 11 (*)     All other components within normal limits   C-REACTIVE PROTEIN - Abnormal; Notable for the following components:    CRP 2.96 (*)     All other components within normal limits   SEDIMENTATION RATE, AUTOMATED - Abnormal; Notable for the following components:    ESR Westergren 78 (*)     All other components within normal limits   CBC WITH DIFFERENTIAL - Abnormal; Notable for the following components:    RBC 3.57 (*)     Hemoglobin 11.1 (*)     Hematocrit  31.9 (*)     MCH 31.1 (*)     Monocytes % 7.6 (*)     Eosinophils % 5.4 (*)     All other components within normal limits   POCT GLUCOSE MONITORING CONTINUOUS - Abnormal; Notable for the following components:    POC Glucose 215 (*)     All other components within normal limits   SARS-COV2 (COVID) W/ FLU ANTIGEN - Normal    Narrative:     Negative SARS-CoV results should not be used as the sole basis for treatment or patient management decisions; negative results should be considered in the context of a patient's recent exposures, history and the presene of clinical signs and symptoms consistent with COVID-19.  Negative results should be treated as presumptive and confirmed by molecular assay, if necessary for patient management.   PROTIME-INR - Normal   APTT - Normal   CBC W/ AUTO DIFFERENTIAL    Narrative:     The following orders were created for panel order CBC auto differential.  Procedure                               Abnormality         Status                     ---------                               -----------         ------                     CBC with Differential[[id number]]        Abnormal            Final result                 Please view results for these tests on the individual orders.   EXTRA TUBES    Narrative:     The following orders were created for panel order EXTRA TUBES.  Procedure                               Abnormality         Status                     ---------                               -----------         ------                     Light Blue Top Hold[[id number]]                              In process                 Light Green Top Hold[[id number]]                             In process                   Please view results for these tests on the individual orders.   LIGHT BLUE TOP HOLD   LIGHT GREEN TOP HOLD   EXTRA TUBES    Narrative:     The following orders were created for panel order EXTRA TUBES.  Procedure                               Abnormality         Status                      ---------                               -----------         ------                     Lavender Top Hold[[id number]]                                In process                   Please view results for these tests on the individual orders.   LAVENDER TOP HOLD        ECG Results              EKG 12-lead (Final result)  Result time 09/09/22 03:36:23      Final result by Interface, Lab In [facility] (09/09/22 03:36:23)                   Narrative:    Test Reason : Z01.818,    Vent. Rate : 088 BPM     Atrial Rate : 000 BPM     P-R Int : 120 ms          QRS Dur : 088 ms      QT Int : 406 ms       P-R-T Axes : 084 081 096 degrees     QTc Int : 455 ms    Sinus rhythm  Lateral ST-T abnormality  is nonspecific  Borderline ECG    Confirmed by Elizabeth DALTON, Walter VILLASEÑOR ([id number]) on 9/9/2022 3:36:13 AM    Referred By: [email or web address]   SELF           Confirmed By:Walter Johnson MD                                  Imaging Results              US Lower Extremity Veins Right (Final result)  Result time 09/07/22 07:43:18   Procedure changed from US Lower Extremity Veins Bilateral     Final result by Spencer Christensen DO (09/07/22 07:43:18)                   Impression:      No evidence of deep venous thrombosis in the right lower extremity. Moderately prominent nonspecific right inguinal lymph node.    Point of Service: [facility]      Electronically signed by: Spencer Christensen  Date:    09/07/2022  Time:    07:43               Narrative:    EXAMINATION:  US LOWER EXTREMITY VEINS RIGHT    CLINICAL HISTORY:  edema of one leg;    COMPARISON:  None.    TECHNIQUE:  Grayscale, spectral, and color Doppler interrogation of the right lower extremity veins was performed. Augmentation and compression was performed.    FINDINGS:  Grayscale, color Doppler, and pulsed Doppler evaluation of the veins of the right lower extremity demonstrate no evidence of deep venous thrombosis.  Moderately prominent nonspecific right inguinal lymph node.                                        X-Ray Foot Complete Right (Final result)  Result time 09/07/22 07:55:04      Final result by Jeffery Monae MD (09/07/22 07:55:04)                   Impression:      Some osseous irregularity at the great toe amputation site as well as increased soft tissue swelling.  The findings are suspicious for osteomyelitis.      Electronically signed by: Jeffery Monae  Date:    09/07/2022  Time:    07:55               Narrative:    EXAMINATION:  XR FOOT COMPLETE 3 VIEW RIGHT    CLINICAL HISTORY:  . Pain in right foot    TECHNIQUE:  AP, lateral, and oblique views of the right foot were performed.    COMPARISON:  Foot radiograph 08/15/2022    FINDINGS:  Previous amputation of the great toe at the base of the proximal phalanx and of the 3rd toe at the metatarsophalangeal joint as well.  There is some irregularity involving the great toe amputation site more so than what was appreciated on the prior study along with worsening soft tissue swelling in this location.  There is an old deformity mid diaphysis of the 5th metatarsal.  There is no acute fracture.  There appears to be a chronic subluxation involving the 2nd toe metatarsophalangeal joint unchanged.                                       X-Ray Ankle Complete Right (Final result)  Result time 09/07/22 07:49:54      Final result by Jeffery Monae MD (09/07/22 07:49:54)                   Impression:      Degenerative changes.  No acute fracture.      Electronically signed by: Jeffery Monae  Date:    09/07/2022  Time:    07:49               Narrative:    EXAMINATION:  XR ANKLE COMPLETE 3 VIEW RIGHT    CLINICAL HISTORY:  Unspecified injury of right ankle, initial encounter    TECHNIQUE:  AP, lateral, and oblique images of the right ankle were performed.    COMPARISON:  None    FINDINGS:  There is no fracture identified.  No dislocation.  Degenerative changes.  The joint spaces are maintained.  Soft tissues are unremarkable.                                        Medications   morphine injection 2 mg (2 mg Intravenous Given 9/12/22 0223)   vancomycin (VANCOCIN) 1,500 mg in dextrose 5 % 250 mL IVPB (0 mg Intravenous Stopped 9/7/22 0034)   piperacillin-tazobactam (ZOSYN) 4.5 g in dextrose 5 % in water (D5W) 5 % 100 mL IVPB (MB+) (0 g Intravenous Stopped 9/6/22 2322)   oxyCODONE-acetaminophen 5-325 mg per tablet 2 tablet (2 tablets Oral Given 9/6/22 2252)   mupirocin 2 % ointment ( Nasal Given 9/11/22 2109)   0.9%  NaCl infusion ( Intravenous New Bag 9/8/22 0759)   morphine injection 2 mg (2 mg Intravenous Given 9/9/22 0047)                Attending Attestation:           Physician Attestation for Scribe:  Physician Attestation Statement for Scribe #1: I, Matthew Hauser MD, reviewed documentation, as scribed by Polina Soriano in my presence, and it is both accurate and complete.                    Clinical Impression:   Final diagnoses:  [S99.911A] Right ankle injury  [M79.671] Right foot pain        ED Disposition Condition    Admit                 Matthew Hauser MD  09/15/22 9498

## 2022-09-07 NOTE — ASSESSMENT & PLAN NOTE
Patient's FSGs are uncontrolled due to hyperglycemia on current medication regimen.  Last A1c reviewed-   Lab Results   Component Value Date    HGBA1C 9.6 (H) 07/20/2022     Most recent fingerstick glucose reviewed- No results for input(s): POCTGLUCOSE in the last 24 hours.  Current correctional scale  Low  Maintain anti-hyperglycemic dose as follows-   Antihyperglycemics (From admission, onward)    Start     Stop Route Frequency Ordered    09/07/22 0300  insulin detemir U-100 injection 15 Units         -- SubQ Nightly 09/07/22 0211    09/07/22 0254  insulin aspart U-100 injection 0-5 Units         -- SubQ Before meals & nightly PRN 09/07/22 0211        Hold Oral hypoglycemics while patient is in the hospital.  Foot XR read by myself shows bony destruction, indicating osteomyelitis  Vanc and zosyn   Blood Cx pending  Surgery consulted  BLE venous doppler pending  BLE arterial doppler showed normal JACQUELIN 7/29/22

## 2022-09-07 NOTE — ASSESSMENT & PLAN NOTE
09/07/2022 RIGHT plantar  Diabetic foot ulcer with foot abscess concerning for osteomyelitis expressed bloody pus, no necrotic tissue visualized,palpable RIGHT DP pulse agree with iv vancomycin/zosyn  Npo for possible I/D today per Dr. Matos

## 2022-09-07 NOTE — PLAN OF CARE
[facility] - Emergency Department  Initial Discharge Assessment       Primary Care Provider: Marquez Huff MD    Admission Diagnosis: Right ankle injury [S99.911A]    Admission Date: 9/6/2022  Expected Discharge Date:     Discharge Barriers Identified: None    Payor: MEDICARE / Plan: MEDICARE PART A & B / Product Type: Government /     Extended Emergency Contact Information  Primary Emergency Contact: Charisma Avery  Mobile Phone: [phone]  Relation: Spouse  Preferred language: English   needed? No    Discharge Plan A: Home with family  Discharge Plan B: Home with family      [facility] [place] [address]  [address]  [facility] [address]  Phone: [phone] Fax: [phone]    [facility] - [place] - [address]  [address] [address]  Phone: [phone] Fax: [phone]    [facility] - [place], [place] - [facility]  [facility]  [address]  [facility] [address]  Phone: [phone] Fax: [phone]      Initial Assessment (most recent)       Adult Discharge Assessment - 09/07/22 1410          Discharge Assessment    Assessment Type Discharge Planning Assessment     Confirmed/corrected address, phone number and insurance Yes     Source of Information patient     Communicated MARGARITO with patient/caregiver Date not available/Unable to determine     Lives With spouse     Do you expect to return to your current living situation? Yes     Do you have help at home or someone to help you manage your care at home? Yes     Who are your caregiver(s) and their phone number(s)? spouse- charisma     Prior to hospitilization cognitive status: Unable to Assess     Walking or Climbing Stairs Difficulty none     Equipment Currently Used at Home none     Patient currently being followed by outpatient case management? No     Do you currently have service(s) that help you  manage your care at home? Yes     Name and Contact number of agency [facility]     Is the pt/caregiver preference to resume services with current agency Yes     Do you have prescription coverage? Yes     Coverage [employer]     Do you have any problems affording any of your prescribed medications? No     How do you get to doctors appointments? family or friend will provide     Are you on dialysis? No     Discharge Plan A Home with family     Discharge Plan B Home with family     DME Needed Upon Discharge  none     Discharge Plan discussed with: Spouse/sig other     Discharge Barriers Identified None                      Consult for hh. Ss spoke with pt's spouse michel and pt lives at home with spouse and is current with [facility]. Choice obtained and initial info faxed. Ss following.

## 2022-09-07 NOTE — SUBJECTIVE & OBJECTIVE
No current facility-administered medications on file prior to encounter.     Current Outpatient Medications on File Prior to Encounter   Medication Sig    albuterol (PROVENTIL) 2.5 mg /3 mL (0.083 %) nebulizer solution USE 1 VIAL IN NEBULIZER 3 TIMES DAILY    albuterol (PROVENTIL/VENTOLIN HFA) 90 mcg/actuation inhaler Inhale 2 puffs into the lungs 2 (two) times daily as needed for Wheezing.    amLODIPine (NORVASC) 10 MG tablet Take 1 tablet (10 mg total) by mouth once daily.    aspirin 81 MG Chew Take 81 mg by mouth once daily.    clopidogreL (PLAVIX) 75 mg tablet Take 1 tablet (75 mg total) by mouth once daily.    diclofenac sodium (VOLTAREN) 1 % Gel Apply 1 g topically 4 (four) times daily as needed (pain).    empagliflozin (JARDIANCE) 10 mg tablet Take 1 tablet (10 mg total) by mouth once daily.    EScitalopram oxalate (LEXAPRO) 10 MG tablet Take 1 tablet (10 mg total) by mouth 2 (two) times a day. (Patient taking differently: Take 10 mg by mouth once daily.)    fluticasone propionate (FLONASE) 50 mcg/actuation nasal spray 2 sprays (100 mcg total) by Each Nostril route once daily.    gabapentin (NEURONTIN) 300 MG capsule Take 3 capsules (900 mg total) by mouth every 6 (six) hours.    HYDROcodone-acetaminophen (NORCO)  mg per tablet Take 1 tablet by mouth every 8 (eight) hours as needed for Pain.    hydrOXYzine pamoate (VISTARIL) 25 MG Cap Take 1 capsule (25 mg total) by mouth once daily. Take nightly for sleep    insulin aspart protamine-insulin aspart (NOVOLOG MIX 70-30FLEXPEN U-100) 100 unit/mL (70-30) InPn pen Inject 15 Units into the skin every morning. (Patient taking differently: Inject 15 Units into the skin 2 (two) times a day.)    insulin glargine U-300 conc (TOUJEO MAX U-300 SOLOSTAR) 300 unit/mL (3 mL) insulin pen Inject 40 Units into the skin once daily at 6am.    lisinopriL 10 MG tablet Take 1 tablet (10 mg total) by mouth once daily.    metoprolol succinate (TOPROL-XL) 25 MG 24 hr tablet Take  1 tablet (25 mg total) by mouth once daily.    nitroGLYCERIN (NITROSTAT) 0.4 MG SL tablet Place 1 tablet (0.4 mg total) under the tongue every 5 (five) minutes as needed for Chest pain.    pantoprazole (PROTONIX) 40 MG tablet Take 1 tablet (40 mg total) by mouth once daily.    rosuvastatin (CRESTOR) 20 MG tablet Take 1 tablet (20 mg total) by mouth every evening.    SPIRIVA RESPIMAT 2.5 mcg/actuation inhaler Inhale 1 puff into the lungs once daily.       Review of patient's allergies indicates:  No Known Allergies    Past Medical History:   Diagnosis Date    Anemia     Anxiety     Arthritis     Atherosclerotic heart disease of native coronary artery with other forms of angina pectoris     Atrophic rhinitis     Carpal tunnel syndrome     Cellulitis of right lower extremity     COPD (chronic obstructive pulmonary disease)     Coronary arteriosclerosis     COVID 02/02/2022    Depression     Diabetic ulcer of right foot associated with diabetes mellitus due to underlying condition, with bone involvement without evidence of necrosis     GERD (gastroesophageal reflux disease)     Hyperlipidemia     Hypertension     Insomnia     MI (myocardial infarction)     Neuropathy     Peripheral vascular disease, unspecified     Right foot ulcer, with fat layer exposed 01/07/2015    Sleep apnea     Type 2 diabetes mellitus      Past Surgical History:   Procedure Laterality Date    AMPUTATION      ANGIOPLASTY      CARDIAC CATHETERIZATION      CORONARY ARTERY BYPASS GRAFT      CORONARY STENT PLACEMENT      DEBRIDEMENT      Right foot debridment with hospital stay and IV Abx    DEBRIDEMENT OF LOWER EXTREMITY Right 06/27/2022    Procedure: DEBRIDEMENT, LOWER EXTREMITY;  Surgeon: Forrest Kiser MD;  Location: [facility];  Service: General;  Laterality: Right;  right foot    SHOULDER OPEN ROTATOR CUFF REPAIR Left     SPINE SURGERY      VASCULAR SURGERY       Family History       Problem Relation (Age of Onset)    Alcohol abuse Father     Arthritis Mother    COPD Paternal Grandfather    Cancer Sister    Diabetes Sister    Early death Father    Heart disease Father, Maternal Grandfather, Son    Hypertension Mother    Learning disabilities Mother          Tobacco Use    Smoking status: Every Day     Packs/day: 0.25     Types: Cigarettes    Smokeless tobacco: Never   Substance and Sexual Activity    Alcohol use: Not Currently    Drug use: Yes     Types: Oxycodone    Sexual activity: Yes     Review of Systems   Cardiovascular:  Positive for leg swelling.        RLE   Skin:  Positive for wound.   Objective:     Vital Signs (Most Recent):  Temp: 98 °F (36.7 °C) (09/06/22 2128)  Pulse: 60 (09/07/22 1248)  Resp: 12 (09/07/22 1431)  BP: 135/62 (09/07/22 0941)  SpO2: 100 % (09/07/22 0708) Vital Signs (24h Range):  Temp:  [98 °F (36.7 °C)] 98 °F (36.7 °C)  Pulse:  [59-81] 60  Resp:  [9-18] 12  SpO2:  [87 %-100 %] 100 %  BP: (109-176)/(56-78) 135/62     Weight: 76.7 kg (169 lb 3.2 oz)  Body mass index is 27.31 kg/m².    Physical Exam  Vitals and nursing note reviewed. Exam conducted with a chaperone present.   Constitutional:       Comments: lethargic   HENT:      Head: Normocephalic.   Eyes:      Conjunctiva/sclera: Conjunctivae normal.   Cardiovascular:      Rate and Rhythm: Bradycardia present.      Pulses: Normal pulses.   Pulmonary:      Effort: Pulmonary effort is normal.   Abdominal:      Palpations: Abdomen is soft.   Musculoskeletal:         General: Swelling present.      Comments: 1+ pitting RLE edema  Right plantar ulcer approximately 2x2cm pale pink granulation tissue, expressed bloody purulent drainage, no celluliitus   Skin:     General: Skin is warm and dry.   Neurological:      Mental Status: He is oriented to person, place, and time.       Significant Labs:  I have reviewed all pertinent lab results within the past 24 hours.  CBC:   Recent Labs   Lab 09/06/22  2220   WBC 7.93   RBC 3.57*   HGB 11.1*   HCT 31.9*      MCV 89.4   MCH  31.1*   MCHC 34.8     BMP:   Recent Labs   Lab 09/06/22 2220   *      K 4.4      CO2 30   BUN 23*   CREATININE 1.20   CALCIUM 9.2     CMP:   Recent Labs   Lab 09/06/22 2220   *   CALCIUM 9.2   ALBUMIN 3.3*   PROT 7.7      K 4.4   CO2 30      BUN 23*   CREATININE 1.20   ALKPHOS 121*   ALT 16   AST 11*   BILITOT 0.3     LFTs:   Recent Labs   Lab 09/06/22 2220   ALT 16   AST 11*   ALKPHOS 121*   BILITOT 0.3   PROT 7.7   ALBUMIN 3.3*     Coagulation:   Recent Labs   Lab 09/06/22 2225   LABPROT 12.3   INR 0.95   APTT 31.1       Significant Diagnostics:  I have reviewed all pertinent imaging results/findings within the past 24 hours.

## 2022-09-07 NOTE — SUBJECTIVE & OBJECTIVE
Past Medical History:   Diagnosis Date    Anemia     Anxiety     Arthritis     Atherosclerotic heart disease of native coronary artery with other forms of angina pectoris     Atrophic rhinitis     Carpal tunnel syndrome     Cellulitis of right lower extremity     COPD (chronic obstructive pulmonary disease)     Coronary arteriosclerosis     COVID 02/02/2022    Depression     Diabetic ulcer of right foot associated with diabetes mellitus due to underlying condition, with bone involvement without evidence of necrosis     GERD (gastroesophageal reflux disease)     Hyperlipidemia     Hypertension     Insomnia     MI (myocardial infarction)     Neuropathy     Peripheral vascular disease, unspecified     Right foot ulcer, with fat layer exposed 01/07/2015    Sleep apnea     Type 2 diabetes mellitus        Past Surgical History:   Procedure Laterality Date    AMPUTATION      ANGIOPLASTY      CARDIAC CATHETERIZATION      CORONARY ARTERY BYPASS GRAFT      CORONARY STENT PLACEMENT      DEBRIDEMENT      Right foot debridment with hospital stay and IV Abx    DEBRIDEMENT OF LOWER EXTREMITY Right 06/27/2022    Procedure: DEBRIDEMENT, LOWER EXTREMITY;  Surgeon: Forrest Kiser MD;  Location: [facility], [place];  Service: General;  Laterality: Right;  right foot    SHOULDER OPEN ROTATOR CUFF REPAIR Left     SPINE SURGERY      VASCULAR SURGERY         Review of patient's allergies indicates:  No Known Allergies    No current facility-administered medications on file prior to encounter.     Current Outpatient Medications on File Prior to Encounter   Medication Sig    albuterol (PROVENTIL) 2.5 mg /3 mL (0.083 %) nebulizer solution USE 1 VIAL IN NEBULIZER 3 TIMES DAILY    albuterol (PROVENTIL/VENTOLIN HFA) 90 mcg/actuation inhaler Inhale 2 puffs into the lungs 2 (two) times daily as needed for Wheezing.    amLODIPine (NORVASC) 10 MG tablet Take 1 tablet (10 mg total) by mouth once daily.    aspirin 81 MG Chew Take 81 mg by mouth once daily.     clopidogreL (PLAVIX) 75 mg tablet Take 1 tablet (75 mg total) by mouth once daily.    diclofenac sodium (VOLTAREN) 1 % Gel Apply 1 g topically 4 (four) times daily as needed (pain).    empagliflozin (JARDIANCE) 10 mg tablet Take 1 tablet (10 mg total) by mouth once daily.    EScitalopram oxalate (LEXAPRO) 10 MG tablet Take 1 tablet (10 mg total) by mouth 2 (two) times a day. (Patient taking differently: Take 10 mg by mouth once daily.)    fluticasone propionate (FLONASE) 50 mcg/actuation nasal spray 2 sprays (100 mcg total) by Each Nostril route once daily.    gabapentin (NEURONTIN) 300 MG capsule Take 3 capsules (900 mg total) by mouth every 6 (six) hours.    HYDROcodone-acetaminophen (NORCO)  mg per tablet Take 1 tablet by mouth every 8 (eight) hours as needed for Pain.    hydrOXYzine pamoate (VISTARIL) 25 MG Cap Take 1 capsule (25 mg total) by mouth once daily. Take nightly for sleep    insulin aspart protamine-insulin aspart (NOVOLOG MIX 70-30FLEXPEN U-100) 100 unit/mL (70-30) InPn pen Inject 15 Units into the skin every morning. (Patient taking differently: Inject 15 Units into the skin 2 (two) times a day.)    insulin glargine U-300 conc (TOUJEO MAX U-300 SOLOSTAR) 300 unit/mL (3 mL) insulin pen Inject 40 Units into the skin once daily at 6am.    lisinopriL 10 MG tablet Take 1 tablet (10 mg total) by mouth once daily.    metoprolol succinate (TOPROL-XL) 25 MG 24 hr tablet Take 1 tablet (25 mg total) by mouth once daily.    nitroGLYCERIN (NITROSTAT) 0.4 MG SL tablet Place 1 tablet (0.4 mg total) under the tongue every 5 (five) minutes as needed for Chest pain.    pantoprazole (PROTONIX) 40 MG tablet Take 1 tablet (40 mg total) by mouth once daily.    rosuvastatin (CRESTOR) 20 MG tablet Take 1 tablet (20 mg total) by mouth every evening.    SPIRIVA RESPIMAT 2.5 mcg/actuation inhaler Inhale 1 puff into the lungs once daily.     Family History       Problem Relation (Age of Onset)    Alcohol abuse Father     Arthritis Mother    COPD Paternal Grandfather    Cancer Sister    Diabetes Sister    Early death Father    Heart disease Father, Maternal Grandfather, Son    Hypertension Mother    Learning disabilities Mother          Tobacco Use    Smoking status: Every Day     Packs/day: 0.25     Types: Cigarettes    Smokeless tobacco: Never   Substance and Sexual Activity    Alcohol use: Not Currently    Drug use: Yes     Types: Oxycodone    Sexual activity: Yes     Review of Systems   Constitutional:  Negative for chills, fatigue, fever and unexpected weight change.   HENT:  Negative for congestion, hearing loss and trouble swallowing.    Eyes:  Negative for visual disturbance.   Respiratory:  Negative for cough, chest tightness, shortness of breath and wheezing.    Cardiovascular:  Positive for leg swelling. Negative for chest pain and palpitations.   Gastrointestinal:  Negative for abdominal pain, anal bleeding, blood in stool, constipation, diarrhea, nausea and vomiting.   Endocrine: Negative for polyuria.   Genitourinary:  Negative for decreased urine volume, difficulty urinating, dysuria and hematuria.   Musculoskeletal:  Positive for arthralgias, joint swelling, myalgias, neck pain and neck stiffness. Negative for back pain.   Skin:  Positive for wound. Negative for rash.   Neurological:  Negative for dizziness, weakness, light-headedness and headaches.   Psychiatric/Behavioral:  Negative for self-injury and suicidal ideas.    Objective:     Vital Signs (Most Recent):  Temp: 98 °F (36.7 °C) (09/06/22 2128)  Pulse: 70 (09/07/22 0138)  Resp: 12 (09/07/22 0138)  BP: 138/61 (09/07/22 0138)  SpO2: (!) 94 % (09/07/22 0138)   Vital Signs (24h Range):  Temp:  [98 °F (36.7 °C)] 98 °F (36.7 °C)  Pulse:  [68-81] 70  Resp:  [12-18] 12  SpO2:  [94 %-99 %] 94 %  BP: (138-176)/(61-76) 138/61     Weight: 76.7 kg (169 lb 3.2 oz)  Body mass index is 27.31 kg/m².    Physical Exam  Vitals and nursing note reviewed.   Constitutional:        General: He is not in acute distress.     Appearance: Normal appearance. He is not ill-appearing, toxic-appearing or diaphoretic.   HENT:      Head: Normocephalic and atraumatic.      Right Ear: External ear normal.      Left Ear: External ear normal.      Mouth/Throat:      Mouth: Mucous membranes are moist.      Pharynx: Oropharynx is clear. No oropharyngeal exudate or posterior oropharyngeal erythema.   Eyes:      Extraocular Movements: Extraocular movements intact.      Conjunctiva/sclera: Conjunctivae normal.      Pupils: Pupils are equal, round, and reactive to light.   Cardiovascular:      Rate and Rhythm: Normal rate and regular rhythm.      Pulses: Normal pulses.      Heart sounds: Normal heart sounds. No murmur heard.    No friction rub. No gallop.   Pulmonary:      Effort: Pulmonary effort is normal. No respiratory distress.      Breath sounds: Normal breath sounds. No wheezing, rhonchi or rales.   Abdominal:      General: Bowel sounds are normal. There is no distension.      Palpations: Abdomen is soft. There is no mass.      Tenderness: There is no abdominal tenderness. There is no guarding or rebound.   Musculoskeletal:         General: Tenderness present. No swelling.      Cervical back: No tenderness.      Right lower leg: Edema present.      Left lower leg: No edema.      Comments: Amputation of distal first and third toes on R.    Lymphadenopathy:      Cervical: No cervical adenopathy.   Skin:     General: Skin is warm and dry.      Capillary Refill: Capillary refill takes less than 2 seconds.      Findings: Lesion present.      Comments: Ulcer on R foot plantar surface about 1.5 cm with surrounding erythema and warmth. No drainage.    Neurological:      General: No focal deficit present.      Mental Status: He is alert and oriented to person, place, and time.      Sensory: No sensory deficit.      Motor: No weakness.   Psychiatric:         Mood and Affect: Mood normal.         Behavior: Behavior  normal.         CRANIAL NERVES     CN III, IV, VI   Pupils are equal, round, and reactive to light.     Significant Labs: All pertinent labs within the past 24 hours have been reviewed.    Significant Imaging: I have reviewed all pertinent imaging results/findings within the past 24 hours.

## 2022-09-07 NOTE — CONSULTS
Pharmacy consulted to dose Vancomycin. Based on pt wt and current renal function, the following therapy will be initiated: Vancomycin 1500 mg IV q 18 hrs. Vancomycin 1500 mg IV  x 1 already given in ED. Trough prior 9/9 AM dose. Pharmacy to follow daily and make necessary adjustments. Thank you.

## 2022-09-07 NOTE — CONSULTS
[facility] - Emergency Department  General Surgery  Consult Note    Patient Name: Navdeep Avery  MRN: [id number]  Code Status: Full Code  Admission Date: 9/6/2022  Hospital Length of Stay: 0 days  Attending Physician: Andrew Gillespie MD  Primary Care Provider: Marquez Huff MD    Patient information was obtained from ER records.     General Surgery  Consult performed by: STELLA Sauceda  Consult ordered by: Elizabeth Watkins DO  Reason for consult: diabetic foot  Assessment/Recommendations: RIGHT foot Debridement in OR tomorrow per dr florian   Hold plavix, patient understands increased of bleeding risk      Subjective:     Principal Problem: Diabetic foot ulcer with osteomyelitis    History of Present Illness: Increased drainage RIGHT plantar diabetic foot ulcer with increased edema/pain, afebrile, no leukocytosis  Xray concerning for possible osteomyelitis  US negative DVT  Previous RIGHT great toe, amp per DR. SHELBI MAST, RIGHT 2nd toe amp followed by dr Rashel RIGGS wound care, recently in [facility] debrided in OR 06/27  Normal JACQUELIN July 2022  PMH:  CAD, Diabetes with CABG 4-5 years ago  on plavix      No current facility-administered medications on file prior to encounter.     Current Outpatient Medications on File Prior to Encounter   Medication Sig    albuterol (PROVENTIL) 2.5 mg /3 mL (0.083 %) nebulizer solution USE 1 VIAL IN NEBULIZER 3 TIMES DAILY    albuterol (PROVENTIL/VENTOLIN HFA) 90 mcg/actuation inhaler Inhale 2 puffs into the lungs 2 (two) times daily as needed for Wheezing.    amLODIPine (NORVASC) 10 MG tablet Take 1 tablet (10 mg total) by mouth once daily.    aspirin 81 MG Chew Take 81 mg by mouth once daily.    clopidogreL (PLAVIX) 75 mg tablet Take 1 tablet (75 mg total) by mouth once daily.    diclofenac sodium (VOLTAREN) 1 % Gel Apply 1 g topically 4 (four) times daily as needed (pain).    empagliflozin (JARDIANCE) 10 mg tablet Take 1 tablet (10 mg total) by mouth once daily.     EScitalopram oxalate (LEXAPRO) 10 MG tablet Take 1 tablet (10 mg total) by mouth 2 (two) times a day. (Patient taking differently: Take 10 mg by mouth once daily.)    fluticasone propionate (FLONASE) 50 mcg/actuation nasal spray 2 sprays (100 mcg total) by Each Nostril route once daily.    gabapentin (NEURONTIN) 300 MG capsule Take 3 capsules (900 mg total) by mouth every 6 (six) hours.    HYDROcodone-acetaminophen (NORCO)  mg per tablet Take 1 tablet by mouth every 8 (eight) hours as needed for Pain.    hydrOXYzine pamoate (VISTARIL) 25 MG Cap Take 1 capsule (25 mg total) by mouth once daily. Take nightly for sleep    insulin aspart protamine-insulin aspart (NOVOLOG MIX 70-30FLEXPEN U-100) 100 unit/mL (70-30) InPn pen Inject 15 Units into the skin every morning. (Patient taking differently: Inject 15 Units into the skin 2 (two) times a day.)    insulin glargine U-300 conc (TOUJEO MAX U-300 SOLOSTAR) 300 unit/mL (3 mL) insulin pen Inject 40 Units into the skin once daily at 6am.    lisinopriL 10 MG tablet Take 1 tablet (10 mg total) by mouth once daily.    metoprolol succinate (TOPROL-XL) 25 MG 24 hr tablet Take 1 tablet (25 mg total) by mouth once daily.    nitroGLYCERIN (NITROSTAT) 0.4 MG SL tablet Place 1 tablet (0.4 mg total) under the tongue every 5 (five) minutes as needed for Chest pain.    pantoprazole (PROTONIX) 40 MG tablet Take 1 tablet (40 mg total) by mouth once daily.    rosuvastatin (CRESTOR) 20 MG tablet Take 1 tablet (20 mg total) by mouth every evening.    SPIRIVA RESPIMAT 2.5 mcg/actuation inhaler Inhale 1 puff into the lungs once daily.       Review of patient's allergies indicates:  No Known Allergies    Past Medical History:   Diagnosis Date    Anemia     Anxiety     Arthritis     Atherosclerotic heart disease of native coronary artery with other forms of angina pectoris     Atrophic rhinitis     Carpal tunnel syndrome     Cellulitis of right lower extremity     COPD (chronic obstructive  pulmonary disease)     Coronary arteriosclerosis     COVID 02/02/2022    Depression     Diabetic ulcer of right foot associated with diabetes mellitus due to underlying condition, with bone involvement without evidence of necrosis     GERD (gastroesophageal reflux disease)     Hyperlipidemia     Hypertension     Insomnia     MI (myocardial infarction)     Neuropathy     Peripheral vascular disease, unspecified     Right foot ulcer, with fat layer exposed 01/07/2015    Sleep apnea     Type 2 diabetes mellitus      Past Surgical History:   Procedure Laterality Date    AMPUTATION      ANGIOPLASTY      CARDIAC CATHETERIZATION      CORONARY ARTERY BYPASS GRAFT      CORONARY STENT PLACEMENT      DEBRIDEMENT      Right foot debridment with hospital stay and IV Abx    DEBRIDEMENT OF LOWER EXTREMITY Right 06/27/2022    Procedure: DEBRIDEMENT, LOWER EXTREMITY;  Surgeon: Forrest Kiser MD;  Location: [facility] OR;  Service: General;  Laterality: Right;  right foot    SHOULDER OPEN ROTATOR CUFF REPAIR Left     SPINE SURGERY      VASCULAR SURGERY       Family History       Problem Relation (Age of Onset)    Alcohol abuse Father    Arthritis Mother    COPD Paternal Grandfather    Cancer Sister    Diabetes Sister    Early death Father    Heart disease Father, Maternal Grandfather, Son    Hypertension Mother    Learning disabilities Mother          Tobacco Use    Smoking status: Every Day     Packs/day: 0.25     Types: Cigarettes    Smokeless tobacco: Never   Substance and Sexual Activity    Alcohol use: Not Currently    Drug use: Yes     Types: Oxycodone    Sexual activity: Yes     Review of Systems   Cardiovascular:  Positive for leg swelling.        RLE   Skin:  Positive for wound.   Objective:     Vital Signs (Most Recent):  Temp: 98 °F (36.7 °C) (09/06/22 2128)  Pulse: 60 (09/07/22 1248)  Resp: 12 (09/07/22 1431)  BP: 135/62 (09/07/22 0941)  SpO2: 100 % (09/07/22 0708) Vital Signs (24h Range):  Temp:  [98 °F (36.7 °C)] 98 °F  (36.7 °C)  Pulse:  [59-81] 60  Resp:  [9-18] 12  SpO2:  [87 %-100 %] 100 %  BP: (109-176)/(56-78) 135/62     Weight: 76.7 kg (169 lb 3.2 oz)  Body mass index is 27.31 kg/m².    Physical Exam  Vitals and nursing note reviewed. Exam conducted with a chaperone present.   Constitutional:       Comments: lethargic   HENT:      Head: Normocephalic.   Eyes:      Conjunctiva/sclera: Conjunctivae normal.   Cardiovascular:      Rate and Rhythm: Bradycardia present.      Pulses: Normal pulses.   Pulmonary:      Effort: Pulmonary effort is normal.   Abdominal:      Palpations: Abdomen is soft.   Musculoskeletal:         General: Swelling present.      Comments: 1+ pitting RLE edema  Right plantar ulcer approximately 2x2cm pale pink granulation tissue, expressed bloody purulent drainage, no celluliitus   Skin:     General: Skin is warm and dry.   Neurological:      Mental Status: He is oriented to person, place, and time.       Significant Labs:  I have reviewed all pertinent lab results within the past 24 hours.  CBC:   Recent Labs   Lab 09/06/22 2220   WBC 7.93   RBC 3.57*   HGB 11.1*   HCT 31.9*      MCV 89.4   MCH 31.1*   MCHC 34.8     BMP:   Recent Labs   Lab 09/06/22 2220   *      K 4.4      CO2 30   BUN 23*   CREATININE 1.20   CALCIUM 9.2     CMP:   Recent Labs   Lab 09/06/22 2220   *   CALCIUM 9.2   ALBUMIN 3.3*   PROT 7.7      K 4.4   CO2 30      BUN 23*   CREATININE 1.20   ALKPHOS 121*   ALT 16   AST 11*   BILITOT 0.3     LFTs:   Recent Labs   Lab 09/06/22 2220   ALT 16   AST 11*   ALKPHOS 121*   BILITOT 0.3   PROT 7.7   ALBUMIN 3.3*     Coagulation:   Recent Labs   Lab 09/06/22 2225   LABPROT 12.3   INR 0.95   APTT 31.1       Significant Diagnostics:  I have reviewed all pertinent imaging results/findings within the past 24 hours.      Assessment/Plan:     Ulcer of right foot with necrosis of muscle  09/07/2022 RIGHT plantar  Diabetic foot ulcer with foot abscess  concerning for osteomyelitis expressed bloody pus, no necrotic tissue visualized,palpable RIGHT DP pulse agree with iv vancomycin/zosyn  Npo for possible I/D today per Dr. Matos      VTE Risk Mitigation (From admission, onward)           Ordered     Place sequential compression device  Until discontinued         09/07/22 0211     IP VTE LOW RISK PATIENT  Once         09/07/22 0211                    Thank you for your consult. I will follow-up with patient. Please contact us if you have any additional questions.    Rosie Luna, VÍCTORP  General Surgery  [facility] - Emergency Department

## 2022-09-07 NOTE — PLAN OF CARE
Problem: Adult Inpatient Plan of Care  Goal: Plan of Care Review  Outcome: Ongoing, Progressing  Goal: Patient-Specific Goal (Individualized)  Outcome: Ongoing, Progressing  Goal: Absence of Hospital-Acquired Illness or Injury  Outcome: Ongoing, Progressing  Goal: Optimal Comfort and Wellbeing  Outcome: Ongoing, Progressing  Goal: Readiness for Transition of Care  Outcome: Ongoing, Progressing     Problem: Diabetes Comorbidity  Goal: Blood Glucose Level Within Targeted Range  Outcome: Ongoing, Progressing     Problem: Impaired Wound Healing  Goal: Optimal Wound Healing  Outcome: Ongoing, Progressing     POC reviewed and continues

## 2022-09-07 NOTE — ASSESSMENT & PLAN NOTE
RX PROGRESS NOTE: Antibiotic Timeout Assessment      Indication for therapy: Pneumonia    Antimicrobial agents assessed: ceftriaxone    Assessment/Plan:  Pharmacy has contacted Juan A Stanford MD to discuss antibiotic plan after 72 hours of empiric therapy.  Plan to Continue current therapy for total duration of 7 days.  Stop dates will be placed by pharmacy to reflect this plan.    Pharmacy will continue to monitor and assess microbiology, duration, and appropriateness of antibiotic therapy and contact provider as appropriate.    Thank you,    BASSEM MEDRANO [facility]  9/24/2020 2:21 PM  Contact # [phone]     - Home Tiotroprium   - Duoneb prn

## 2022-09-08 ENCOUNTER — ANESTHESIA EVENT (OUTPATIENT)
Dept: SURGERY | Facility: HOSPITAL | Age: 62
DRG: 629 | End: 2022-09-08
Payer: MEDICARE

## 2022-09-08 ENCOUNTER — ANESTHESIA (OUTPATIENT)
Dept: SURGERY | Facility: HOSPITAL | Age: 62
DRG: 629 | End: 2022-09-08
Payer: MEDICARE

## 2022-09-08 LAB
ANION GAP SERPL CALCULATED.3IONS-SCNC: 7 MMOL/L (ref 7–16)
BASOPHILS # BLD AUTO: 0.04 K/UL (ref 0–0.2)
BASOPHILS NFR BLD AUTO: 0.8 % (ref 0–1)
BUN SERPL-MCNC: 18 MG/DL (ref 7–18)
BUN/CREAT SERPL: 18 (ref 6–20)
CALCIUM SERPL-MCNC: 9 MG/DL (ref 8.5–10.1)
CHLORIDE SERPL-SCNC: 102 MMOL/L (ref 98–107)
CO2 SERPL-SCNC: 31 MMOL/L (ref 21–32)
CREAT SERPL-MCNC: 1 MG/DL (ref 0.7–1.3)
DIFFERENTIAL METHOD BLD: ABNORMAL
EGFR (NO RACE VARIABLE) (RUSH/TITUS): 84 ML/MIN/1.73M²
EOSINOPHIL # BLD AUTO: 0.38 K/UL (ref 0–0.5)
EOSINOPHIL NFR BLD AUTO: 7.7 % (ref 1–4)
ERYTHROCYTE [DISTWIDTH] IN BLOOD BY AUTOMATED COUNT: 12 % (ref 11.5–14.5)
GLUCOSE SERPL-MCNC: 100 MG/DL (ref 70–105)
GLUCOSE SERPL-MCNC: 131 MG/DL (ref 70–105)
GLUCOSE SERPL-MCNC: 132 MG/DL (ref 70–105)
GLUCOSE SERPL-MCNC: 136 MG/DL (ref 74–106)
GLUCOSE SERPL-MCNC: 376 MG/DL (ref 70–105)
GLUCOSE SERPL-MCNC: 92 MG/DL (ref 70–105)
HCT VFR BLD AUTO: 32.4 % (ref 40–54)
HGB BLD-MCNC: 11 G/DL (ref 13.5–18)
IMM GRANULOCYTES # BLD AUTO: 0.02 K/UL (ref 0–0.04)
IMM GRANULOCYTES NFR BLD: 0.4 % (ref 0–0.4)
LYMPHOCYTES # BLD AUTO: 1.62 K/UL (ref 1–4.8)
LYMPHOCYTES NFR BLD AUTO: 32.7 % (ref 27–41)
MCH RBC QN AUTO: 30 PG (ref 27–31)
MCHC RBC AUTO-ENTMCNC: 34 G/DL (ref 32–36)
MCV RBC AUTO: 88.3 FL (ref 80–96)
MONOCYTES # BLD AUTO: 0.38 K/UL (ref 0–0.8)
MONOCYTES NFR BLD AUTO: 7.7 % (ref 2–6)
MPC BLD CALC-MCNC: 9.4 FL (ref 9.4–12.4)
NEUTROPHILS # BLD AUTO: 2.52 K/UL (ref 1.8–7.7)
NEUTROPHILS NFR BLD AUTO: 50.7 % (ref 53–65)
NRBC # BLD AUTO: 0 X10E3/UL
NRBC, AUTO (.00): 0 %
PLATELET # BLD AUTO: 242 K/UL (ref 150–400)
POTASSIUM SERPL-SCNC: 4.4 MMOL/L (ref 3.5–5.1)
RBC # BLD AUTO: 3.67 M/UL (ref 4.6–6.2)
SODIUM SERPL-SCNC: 136 MMOL/L (ref 136–145)
WBC # BLD AUTO: 4.96 K/UL (ref 4.5–11)

## 2022-09-08 PROCEDURE — D9220A PRA ANESTHESIA: Mod: CRNA,,, | Performed by: NURSE ANESTHETIST, CERTIFIED REGISTERED

## 2022-09-08 PROCEDURE — 25000003 PHARM REV CODE 250: Performed by: EMERGENCY MEDICINE

## 2022-09-08 PROCEDURE — 85025 COMPLETE CBC W/AUTO DIFF WBC: CPT

## 2022-09-08 PROCEDURE — 99499 UNLISTED E&M SERVICE: CPT | Mod: ,,, | Performed by: SURGERY

## 2022-09-08 PROCEDURE — 63600175 PHARM REV CODE 636 W HCPCS: Performed by: EMERGENCY MEDICINE

## 2022-09-08 PROCEDURE — 63600175 PHARM REV CODE 636 W HCPCS: Performed by: FAMILY MEDICINE

## 2022-09-08 PROCEDURE — 27000510 HC BLANKET BAIR HUGGER ANY SIZE: Performed by: ANESTHESIOLOGY

## 2022-09-08 PROCEDURE — 27000655: Performed by: ANESTHESIOLOGY

## 2022-09-08 PROCEDURE — 28122 PARTIAL REMOVAL OF FOOT BONE: CPT | Mod: RT,,, | Performed by: SURGERY

## 2022-09-08 PROCEDURE — D9220A PRA ANESTHESIA: Mod: ANES,,, | Performed by: ANESTHESIOLOGY

## 2022-09-08 PROCEDURE — 99499 NO LOS: ICD-10-PCS | Mod: ,,, | Performed by: SURGERY

## 2022-09-08 PROCEDURE — D9220A PRA ANESTHESIA: ICD-10-PCS | Mod: ANES,,, | Performed by: ANESTHESIOLOGY

## 2022-09-08 PROCEDURE — D9220A PRA ANESTHESIA: ICD-10-PCS | Mod: CRNA,,, | Performed by: NURSE ANESTHETIST, CERTIFIED REGISTERED

## 2022-09-08 PROCEDURE — 82962 GLUCOSE BLOOD TEST: CPT

## 2022-09-08 PROCEDURE — 27000716 HC OXISENSOR PROBE, ANY SIZE: Performed by: ANESTHESIOLOGY

## 2022-09-08 PROCEDURE — 25000003 PHARM REV CODE 250: Performed by: NURSE ANESTHETIST, CERTIFIED REGISTERED

## 2022-09-08 PROCEDURE — 37000009 HC ANESTHESIA EA ADD 15 MINS: Performed by: SURGERY

## 2022-09-08 PROCEDURE — 94640 AIRWAY INHALATION TREATMENT: CPT

## 2022-09-08 PROCEDURE — 36000706: Performed by: SURGERY

## 2022-09-08 PROCEDURE — 27000177 HC AIRWAY, LARYNGEAL MASK: Performed by: ANESTHESIOLOGY

## 2022-09-08 PROCEDURE — 36000707: Performed by: SURGERY

## 2022-09-08 PROCEDURE — 80048 BASIC METABOLIC PNL TOTAL CA: CPT

## 2022-09-08 PROCEDURE — S4991 NICOTINE PATCH NONLEGEND: HCPCS | Performed by: FAMILY MEDICINE

## 2022-09-08 PROCEDURE — 63600175 PHARM REV CODE 636 W HCPCS: Performed by: NURSE ANESTHETIST, CERTIFIED REGISTERED

## 2022-09-08 PROCEDURE — 71000033 HC RECOVERY, INTIAL HOUR: Performed by: SURGERY

## 2022-09-08 PROCEDURE — 36415 COLL VENOUS BLD VENIPUNCTURE: CPT

## 2022-09-08 PROCEDURE — 28122 PR PART REMV OTHR TARSAL/METATARSAL: ICD-10-PCS | Mod: RT,,, | Performed by: SURGERY

## 2022-09-08 PROCEDURE — 37000008 HC ANESTHESIA 1ST 15 MINUTES: Performed by: SURGERY

## 2022-09-08 PROCEDURE — 11000001 HC ACUTE MED/SURG PRIVATE ROOM

## 2022-09-08 PROCEDURE — 25000242 PHARM REV CODE 250 ALT 637 W/ HCPCS: Performed by: INTERNAL MEDICINE

## 2022-09-08 PROCEDURE — 87075 CULTR BACTERIA EXCEPT BLOOD: CPT | Performed by: SURGERY

## 2022-09-08 PROCEDURE — 25000003 PHARM REV CODE 250: Performed by: FAMILY MEDICINE

## 2022-09-08 PROCEDURE — 99232 SBSQ HOSP IP/OBS MODERATE 35: CPT | Mod: GC,,, | Performed by: FAMILY MEDICINE

## 2022-09-08 PROCEDURE — 25000003 PHARM REV CODE 250

## 2022-09-08 PROCEDURE — 99232 PR SUBSEQUENT HOSPITAL CARE,LEVL II: ICD-10-PCS | Mod: GC,,, | Performed by: FAMILY MEDICINE

## 2022-09-08 PROCEDURE — 87070 CULTURE OTHR SPECIMN AEROBIC: CPT | Performed by: SURGERY

## 2022-09-08 PROCEDURE — 99900035 HC TECH TIME PER 15 MIN (STAT)

## 2022-09-08 RX ORDER — PROPOFOL 10 MG/ML
VIAL (ML) INTRAVENOUS
Status: DISCONTINUED | OUTPATIENT
Start: 2022-09-08 | End: 2022-09-08

## 2022-09-08 RX ORDER — SODIUM CHLORIDE, SODIUM LACTATE, POTASSIUM CHLORIDE, CALCIUM CHLORIDE 600; 310; 30; 20 MG/100ML; MG/100ML; MG/100ML; MG/100ML
INJECTION, SOLUTION INTRAVENOUS CONTINUOUS PRN
Status: DISCONTINUED | OUTPATIENT
Start: 2022-09-08 | End: 2022-09-08

## 2022-09-08 RX ORDER — ONDANSETRON 2 MG/ML
4 INJECTION INTRAMUSCULAR; INTRAVENOUS DAILY PRN
Status: DISCONTINUED | OUTPATIENT
Start: 2022-09-08 | End: 2022-09-08 | Stop reason: HOSPADM

## 2022-09-08 RX ORDER — SODIUM CHLORIDE 9 MG/ML
INJECTION, SOLUTION INTRAVENOUS ONCE
Status: COMPLETED | OUTPATIENT
Start: 2022-09-08 | End: 2022-09-08

## 2022-09-08 RX ORDER — DIPHENHYDRAMINE HYDROCHLORIDE 50 MG/ML
25 INJECTION INTRAMUSCULAR; INTRAVENOUS EVERY 6 HOURS PRN
Status: DISCONTINUED | OUTPATIENT
Start: 2022-09-08 | End: 2022-09-08 | Stop reason: HOSPADM

## 2022-09-08 RX ORDER — PIPERACILLIN SODIUM, TAZOBACTAM SODIUM 4; .5 G/20ML; G/20ML
INJECTION, POWDER, LYOPHILIZED, FOR SOLUTION INTRAVENOUS
Status: DISCONTINUED | OUTPATIENT
Start: 2022-09-08 | End: 2022-09-08

## 2022-09-08 RX ORDER — FENTANYL CITRATE 50 UG/ML
INJECTION, SOLUTION INTRAMUSCULAR; INTRAVENOUS
Status: DISCONTINUED | OUTPATIENT
Start: 2022-09-08 | End: 2022-09-08

## 2022-09-08 RX ORDER — EPHEDRINE SULFATE 50 MG/ML
INJECTION, SOLUTION INTRAVENOUS
Status: DISCONTINUED | OUTPATIENT
Start: 2022-09-08 | End: 2022-09-08

## 2022-09-08 RX ORDER — ENOXAPARIN SODIUM 100 MG/ML
40 INJECTION SUBCUTANEOUS EVERY 24 HOURS
Status: DISCONTINUED | OUTPATIENT
Start: 2022-09-09 | End: 2022-09-09

## 2022-09-08 RX ORDER — MORPHINE SULFATE 10 MG/ML
4 INJECTION INTRAMUSCULAR; INTRAVENOUS; SUBCUTANEOUS EVERY 5 MIN PRN
Status: DISCONTINUED | OUTPATIENT
Start: 2022-09-08 | End: 2022-09-08 | Stop reason: HOSPADM

## 2022-09-08 RX ORDER — MEPERIDINE HYDROCHLORIDE 25 MG/ML
25 INJECTION INTRAMUSCULAR; INTRAVENOUS; SUBCUTANEOUS EVERY 10 MIN PRN
Status: DISCONTINUED | OUTPATIENT
Start: 2022-09-08 | End: 2022-09-08 | Stop reason: HOSPADM

## 2022-09-08 RX ORDER — ONDANSETRON 2 MG/ML
INJECTION INTRAMUSCULAR; INTRAVENOUS
Status: DISCONTINUED | OUTPATIENT
Start: 2022-09-08 | End: 2022-09-08

## 2022-09-08 RX ORDER — LIDOCAINE HYDROCHLORIDE 10 MG/ML
1 INJECTION INFILTRATION; PERINEURAL ONCE
Status: DISCONTINUED | OUTPATIENT
Start: 2022-09-08 | End: 2022-09-08 | Stop reason: HOSPADM

## 2022-09-08 RX ORDER — LIDOCAINE HYDROCHLORIDE 20 MG/ML
INJECTION, SOLUTION EPIDURAL; INFILTRATION; INTRACAUDAL; PERINEURAL
Status: DISCONTINUED | OUTPATIENT
Start: 2022-09-08 | End: 2022-09-08

## 2022-09-08 RX ORDER — SODIUM CHLORIDE, SODIUM LACTATE, POTASSIUM CHLORIDE, CALCIUM CHLORIDE 600; 310; 30; 20 MG/100ML; MG/100ML; MG/100ML; MG/100ML
INJECTION, SOLUTION INTRAVENOUS CONTINUOUS
Status: DISCONTINUED | OUTPATIENT
Start: 2022-09-08 | End: 2022-09-09

## 2022-09-08 RX ORDER — MIDAZOLAM HYDROCHLORIDE 1 MG/ML
INJECTION INTRAMUSCULAR; INTRAVENOUS
Status: DISCONTINUED | OUTPATIENT
Start: 2022-09-08 | End: 2022-09-08

## 2022-09-08 RX ORDER — HYDROMORPHONE HYDROCHLORIDE 2 MG/ML
0.5 INJECTION, SOLUTION INTRAMUSCULAR; INTRAVENOUS; SUBCUTANEOUS EVERY 5 MIN PRN
Status: DISCONTINUED | OUTPATIENT
Start: 2022-09-08 | End: 2022-09-08 | Stop reason: HOSPADM

## 2022-09-08 RX ADMIN — PANTOPRAZOLE SODIUM 40 MG: 40 TABLET, DELAYED RELEASE ORAL at 03:09

## 2022-09-08 RX ADMIN — NICOTINE 1 PATCH: 14 PATCH, EXTENDED RELEASE TRANSDERMAL at 03:09

## 2022-09-08 RX ADMIN — VANCOMYCIN HYDROCHLORIDE 1500 MG: 1 INJECTION, POWDER, LYOPHILIZED, FOR SOLUTION INTRAVENOUS at 01:09

## 2022-09-08 RX ADMIN — SODIUM CHLORIDE, POTASSIUM CHLORIDE, SODIUM LACTATE AND CALCIUM CHLORIDE: 600; 310; 30; 20 INJECTION, SOLUTION INTRAVENOUS at 01:09

## 2022-09-08 RX ADMIN — INSULIN ASPART 3 UNITS: 100 INJECTION, SOLUTION INTRAVENOUS; SUBCUTANEOUS at 09:09

## 2022-09-08 RX ADMIN — FENTANYL CITRATE 100 MCG: 50 INJECTION INTRAMUSCULAR; INTRAVENOUS at 01:09

## 2022-09-08 RX ADMIN — MIDAZOLAM 2 MG: 1 INJECTION INTRAMUSCULAR; INTRAVENOUS at 01:09

## 2022-09-08 RX ADMIN — ATORVASTATIN CALCIUM 80 MG: 80 TABLET, FILM COATED ORAL at 03:09

## 2022-09-08 RX ADMIN — INSULIN DETEMIR 20 UNITS: 100 INJECTION, SOLUTION SUBCUTANEOUS at 09:09

## 2022-09-08 RX ADMIN — HYDROXYZINE PAMOATE 25 MG: 25 CAPSULE ORAL at 03:09

## 2022-09-08 RX ADMIN — METOPROLOL SUCCINATE 25 MG: 25 TABLET, FILM COATED, EXTENDED RELEASE ORAL at 03:09

## 2022-09-08 RX ADMIN — AMLODIPINE BESYLATE 10 MG: 10 TABLET ORAL at 03:09

## 2022-09-08 RX ADMIN — GABAPENTIN 900 MG: 300 CAPSULE ORAL at 05:09

## 2022-09-08 RX ADMIN — ONDANSETRON 4 MG: 2 INJECTION INTRAMUSCULAR; INTRAVENOUS at 01:09

## 2022-09-08 RX ADMIN — LIDOCAINE HYDROCHLORIDE 50 MG: 20 INJECTION, SOLUTION EPIDURAL; INFILTRATION; INTRACAUDAL; PERINEURAL at 01:09

## 2022-09-08 RX ADMIN — GABAPENTIN 900 MG: 300 CAPSULE ORAL at 03:09

## 2022-09-08 RX ADMIN — ESCITALOPRAM OXALATE 10 MG: 10 TABLET ORAL at 03:09

## 2022-09-08 RX ADMIN — ASPIRIN 81 MG CHEWABLE TABLET 81 MG: 81 TABLET CHEWABLE at 03:09

## 2022-09-08 RX ADMIN — EPHEDRINE SULFATE 50 MG: 50 INJECTION INTRAVENOUS at 01:09

## 2022-09-08 RX ADMIN — PIPERACILLIN AND TAZOBACTAM 4.5 G: 4; .5 INJECTION, POWDER, LYOPHILIZED, FOR SOLUTION INTRAVENOUS; PARENTERAL at 01:09

## 2022-09-08 RX ADMIN — MUPIROCIN: 20 OINTMENT TOPICAL at 04:09

## 2022-09-08 RX ADMIN — IPRATROPIUM BROMIDE 0.5 MG: 0.5 SOLUTION RESPIRATORY (INHALATION) at 08:09

## 2022-09-08 RX ADMIN — MUPIROCIN: 20 OINTMENT TOPICAL at 09:09

## 2022-09-08 RX ADMIN — PROPOFOL 130 MG: 10 INJECTION, EMULSION INTRAVENOUS at 01:09

## 2022-09-08 RX ADMIN — IPRATROPIUM BROMIDE 0.5 MG: 0.5 SOLUTION RESPIRATORY (INHALATION) at 07:09

## 2022-09-08 RX ADMIN — SODIUM CHLORIDE: 9 INJECTION, SOLUTION INTRAVENOUS at 07:09

## 2022-09-08 RX ADMIN — PIPERACILLIN SODIUM AND TAZOBACTAM SODIUM 4.5 G: 4; .5 INJECTION, POWDER, LYOPHILIZED, FOR SOLUTION INTRAVENOUS at 05:09

## 2022-09-08 RX ADMIN — PIPERACILLIN SODIUM AND TAZOBACTAM SODIUM 4.5 G: 4; .5 INJECTION, POWDER, LYOPHILIZED, FOR SOLUTION INTRAVENOUS at 09:09

## 2022-09-08 RX ADMIN — IPRATROPIUM BROMIDE 0.5 MG: 0.5 SOLUTION RESPIRATORY (INHALATION) at 12:09

## 2022-09-08 RX ADMIN — HYDROCODONE BITARTRATE AND ACETAMINOPHEN 1 TABLET: 10; 325 TABLET ORAL at 08:09

## 2022-09-08 RX ADMIN — LISINOPRIL 10 MG: 10 TABLET ORAL at 03:09

## 2022-09-08 NOTE — OP NOTE
[facility] - Periop Services  General Surgery  Operative Note    SUMMARY     Date of Procedure: 9/8/2022     Procedure: Procedure(s) (LRB):  IRRIGATION AND DEBRIDEMENT, LOWER EXTREMITY (Right)       Surgeon(s) and Role:     * Selina Matos MD - Primary    Assisting Surgeon: None      Patient presents with osteomyelitis involving the metatarsal head of the great toe on the right foot       Pre-Operative Diagnosis: Diabetic foot ulcer with osteomyelitis [E11.621, E11.69, L97.509, M86.9]    Post-Operative Diagnosis: Post-Op Diagnosis Codes:     * Diabetic foot ulcer with osteomyelitis [E11.621, E11.69, L97.509, M86.9]    Anesthesia: General/MAC    Operative Findings (including complications, if any):  Soft metatarsal head bone    Description of Technical Procedures:  Taken operative suite right foot prepped draped.  Mid longitudinal incision on the anterior dorsum of the right foot overlying the metatarsal head.  We did excise down and transected the extensor tendon of the great toe count of metatarsal head utilize rongeur rongeured out and removed all soft bone back to good healthy appearing metatarsal bone.  We copiously irrigated we then closed with interrupted 3-0 nylons and packed between the sutures with iodoform gauze.  We had cultured the area with placed a sterile dressing of 4x4s and Kerlix and Ace wrap    Significant Surgical Tasks Conducted by the Assistant(s), if Applicable:  Dressing applied    Estimated Blood Loss (EBL): 5 mL           Implants: * No implants in log *    Specimens:   Specimen (24h ago, onward)       Start     Ordered    09/08/22 1358  Surgical Pathology  RELEASE UPON ORDERING         09/08/22 7938                            Condition: Good    Disposition: PACU - hemodynamically stable.    Attestation: I was present and scrubbed for the entire procedure.

## 2022-09-08 NOTE — NURSING
Patient is alert and oriented x 4. Respirations even and unlabored on room air. Denies pain of any kind. Chg bath has been given in preporation for surgery this am. Normal saline is now running at 75ml/ hr as ordered. Patient has family at bedside. Patient's bed alarm has been set and the patient also has non slip sock on and a fall risk arm band to his left wrist. Patient has been told to call me prior to attempting to get out of bed for any reason. Patient verbalized understanding.patient is currently npo for surgery this morning. Bed low, call bell in reach.

## 2022-09-08 NOTE — ANESTHESIA POSTPROCEDURE EVALUATION
Anesthesia Post Evaluation    Patient: Navdeep Avery    Procedure(s) Performed: Procedure(s) (LRB):  IRRIGATION AND DEBRIDEMENT, LOWER EXTREMITY (Right)    Final Anesthesia Type: general      Patient location during evaluation: PACU  Post-procedure vital signs: reviewed and stable  Pain management: adequate  Airway patency: patent  SAMEERA mitigation strategies: Extubation and recovery carried out in lateral, semiupright, or other nonsupine position  PONV status at discharge: No PONV  Anesthetic complications: no      Cardiovascular status: hemodynamically stable  Respiratory status: unassisted  Hydration status: euvolemic  Follow-up not needed.          Vitals Value Taken Time   /71 09/08/22 1528   Temp 36.7 °C (98 °F) 09/08/22 1416   Pulse 90 09/08/22 1528   Resp 10 09/08/22 1501   SpO2 93 % 09/08/22 1501   Vitals shown include unvalidated device data.      Event Time   Out of Recovery 09/08/2022 15:00:00         Pain/Leda Score: Pain Rating Prior to Med Admin: 6 (9/7/2022 11:14 PM)  Pain Rating Post Med Admin: 2 (9/8/2022 12:14 AM)  Leda Score: 10 (9/8/2022  2:56 PM)

## 2022-09-08 NOTE — PROGRESS NOTES
[facility] - [address] [place] [address] Medicine  Progress Note    Patient Name: Navdeep Avery  MRN: [id number]  Patient Class: IP- Inpatient   Admission Date: 9/6/2022  Length of Stay: 1 days  Attending Physician: Andrew Gillespie MD  Primary Care Provider: Marquez Huff MD        Subjective:     Principal Problem:Diabetic foot ulcer with osteomyelitis        HPI:  63 yo male presents to [facility] ED c/o R ankle pain x 3 days after he tripped. Ankle pain is all over his ankle but worst on medial R ankle. Pain is 8.5/10, radiates to R knee, dull and sharp in quality, intermittent, worse with weight-bearing. without relieving factors and no response to Norco. Denies fall. R foot ulcer on plantar surface has been present for 2 years. He has had debridement and 2 toes amputated. [facility] follows [facility] and was last seen 5 days ago. Has home health nurse that comes once a week. He has brought his BS down from 500s to 100s. Denies fevers, chills. Uses home oxygen 2 L as needed.     ED Course: Vanc and Zosyn given.     Pt admitted to [facility] for treatment of diabetic foot ulcer infection.       Overview/Hospital Course:  No notes on file    Interval History: Patient is scheduled to go to surgery this morning for I/D with Dr. Matos. Patient states he is doing good this morning no overnight events. He denies any fever chills or Sob overnight.  consulted yesterday to discuss home abx and home health on discharge.     Review of Systems   Constitutional:  Negative for chills, fatigue, fever and unexpected weight change.   HENT:  Negative for congestion, hearing loss and trouble swallowing.    Eyes:  Negative for visual disturbance.   Respiratory:  Negative for cough, chest tightness, shortness of breath and wheezing.    Cardiovascular:  Positive for leg swelling. Negative for chest pain and palpitations.   Gastrointestinal:  Negative for abdominal pain, anal bleeding,  blood in stool, constipation, diarrhea, nausea and vomiting.   Endocrine: Negative for polyuria.   Genitourinary:  Negative for decreased urine volume, difficulty urinating, dysuria and hematuria.   Musculoskeletal:  Positive for arthralgias, joint swelling, myalgias, neck pain and neck stiffness. Negative for back pain.   Skin:  Positive for wound. Negative for rash.   Neurological:  Negative for dizziness, weakness, light-headedness and headaches.   Psychiatric/Behavioral:  Negative for self-injury and suicidal ideas.    Objective:     Vital Signs (Most Recent):  Temp: 98.2 °F (36.8 °C) (09/08/22 0400)  Pulse: 64 (09/08/22 0745)  Resp: 18 (09/08/22 0745)  BP: (!) 154/59 (09/08/22 0400)  SpO2: 99 % (09/08/22 0745)   Vital Signs (24h Range):  Temp:  [97.5 °F (36.4 °C)-98.2 °F (36.8 °C)] 98.2 °F (36.8 °C)  Pulse:  [56-70] 64  Resp:  [11-95] 18  SpO2:  [18 %-100 %] 99 %  BP: (109-154)/(58-65) 154/59     Weight: 76.7 kg (169 lb 3.2 oz)  Body mass index is 27.31 kg/m².  No intake or output data in the 24 hours ending 09/08/22 0755   Physical Exam  Vitals and nursing note reviewed.   Constitutional:       General: He is not in acute distress.     Appearance: Normal appearance. He is not ill-appearing, toxic-appearing or diaphoretic.   HENT:      Head: Normocephalic and atraumatic.      Right Ear: External ear normal.      Left Ear: External ear normal.      Mouth/Throat:      Mouth: Mucous membranes are moist.      Pharynx: Oropharynx is clear. No oropharyngeal exudate or posterior oropharyngeal erythema.   Eyes:      Extraocular Movements: Extraocular movements intact.      Conjunctiva/sclera: Conjunctivae normal.      Pupils: Pupils are equal, round, and reactive to light.   Cardiovascular:      Rate and Rhythm: Normal rate and regular rhythm.      Pulses: Normal pulses.      Heart sounds: Normal heart sounds. No murmur heard.    No friction rub. No gallop.   Pulmonary:      Effort: Pulmonary effort is normal. No  respiratory distress.      Breath sounds: Wheezing present. No rhonchi or rales.   Abdominal:      General: Bowel sounds are normal. There is no distension.      Palpations: Abdomen is soft. There is no mass.      Tenderness: There is no abdominal tenderness. There is no guarding or rebound.   Musculoskeletal:         General: Tenderness present. No swelling.      Cervical back: No tenderness.      Right lower leg: Edema present.      Left lower leg: No edema.      Comments: Amputation of distal first and third toes on R.    Lymphadenopathy:      Cervical: No cervical adenopathy.   Skin:     General: Skin is warm and dry.      Capillary Refill: Capillary refill takes less than 2 seconds.      Findings: Lesion present.      Comments: Ulcer on R foot plantar surface about 1.5 cm with surrounding erythema and warmth. No drainage.    Neurological:      General: No focal deficit present.      Mental Status: He is alert and oriented to person, place, and time.      Sensory: No sensory deficit.      Motor: No weakness.   Psychiatric:         Mood and Affect: Mood normal.         Behavior: Behavior normal.       Significant Labs: All pertinent labs within the past 24 hours have been reviewed.    Significant Imaging: I have reviewed all pertinent imaging results/findings within the past 24 hours.      Assessment/Plan:      * Diabetic foot ulcer with osteomyelitis  Patient's FSGs are uncontrolled due to hyperglycemia on current medication regimen.  Last A1c reviewed-   Lab Results   Component Value Date    HGBA1C 9.6 (H) 07/20/2022     Most recent fingerstick glucose reviewed- No results for input(s): POCTGLUCOSE in the last 24 hours.  Current correctional scale  Low  Maintain anti-hyperglycemic dose as follows-   Antihyperglycemics (From admission, onward)      Start     Stop Route Frequency Ordered    09/08/22 2100  insulin detemir U-100 injection 20 Units         -- SubQ Nightly 09/08/22 0053    09/07/22 0254  insulin  aspart U-100 injection 0-5 Units         -- SubQ Before meals & nightly PRN 09/07/22 0211          Hold Oral hypoglycemics while patient is in the hospital.  Foot Xray- Some osseous irregularity at the great toe amputation site as well as increased soft tissue swelling. The findings are suspicious for osteomyelitis.   ESR 78. CRP 2.96.   Vanc and zosyn started on 9/7. Patient will likely need 6 weeks of Abx coverage due to osteomyelitis findings.  Blood Cx no growth at 24hrs.   Surgery consulted  BLE venous doppler no evidence of DVT present  (9/7)  BLE arterial doppler showed normal JACQUELIN 7/29/22  Per SS, they spoke with patient wife and state he his current with ameydsis according to note from SS.     Sleep apnea  - CPAP QHS      Diabetic peripheral neuropathy  - Home Gabapentin    COPD (chronic obstructive pulmonary disease)  - Home Tiotroprium   - Duoneb prn         S/P CABG (coronary artery bypass graft)  - s/p CABG (3-4 yrs ago). Stents (about 5 years ago).  - Continue home ASA, statin, Lisinopril and Plavix  -Will hold plavix and asa during surgery, will restart after surgery with DVT prophylaxis.     Anxiety  - Home Escitalopram 10mg      Dependence on nicotine from cigarettes  - Nicotine patch 14mg/24hr      Mixed hyperlipidemia  - Home atorvastatin 80mg      Benign hypertension  - Continue home Amlodipine 10mg, Lisinopril 10mg and Toprolol 25mg  - Labetalol prn with parameters    VTE Risk Mitigation (From admission, onward)           Ordered     Place sequential compression device  Until discontinued         09/07/22 0211     IP VTE LOW RISK PATIENT  Once         09/07/22 0211                    Discharge Planning   MARGARITO:      Code Status: Full Code   Is the patient medically ready for discharge?:     Reason for patient still in hospital (select all that apply): Treatment  Discharge Plan A: Home with family                  Dung Conde DO  Department of Hospital Medicine   [facility] - [address]  Medical Telemetry

## 2022-09-08 NOTE — ASSESSMENT & PLAN NOTE
- s/p CABG (3-4 yrs ago). Stents (about 5 years ago).  - Continue home ASA, statin, Lisinopril and Plavix  -Will hold plavix and asa during surgery, will restart after surgery with DVT prophylaxis.

## 2022-09-08 NOTE — OR NURSING
1411 PT TO PACU ASLEEP. ORAL AIRWAY IN PLACE. O2 AT 6L VIA FACEMASK. IV INFUSING TO R ARM. DRESSING TO R FOOT C/D/I. NO DRAINAGE NOTED. VSS. SAFETY MEASURES IN PLACE.     1424 PT AWAKE, DROWSY. ORAL ARIWAY REMOVED. VSS.     1430 O2 REMOVED. VSS. DRESSING TO R FOOT C/D/I. PT DENIES PAIN AT THIS TIME.     1440 PT RESTING QUIETLY. NO DISTRESS NOTED. VSS.    1500 OUT OF PACU    1505 PT TRANSFERRED TO ROOM 560. RELEASED TO BERE KAUFFMAN RN. PT AWAKE AND ALERT. RESP EVEN AND UNLABORED. PT ABLE TO TRANSFER SELF FROM STRETCHER TO BED. IV INFUSING TO R ARM. DRESSING TO R FOOT C/D/I. NO DRAINAGE NOTED. VS: /72, HR 91, RR 16, SAO2 95%, TEMP 97.7. SAFETY MEASURES IN PLACE. FAMILY AT BEDSIDE.

## 2022-09-08 NOTE — TRANSFER OF CARE
"Anesthesia Transfer of Care Note    Patient: Navdeep Avery    Procedure(s) Performed: Procedure(s) (LRB):  IRRIGATION AND DEBRIDEMENT, LOWER EXTREMITY (Right)    Patient location: PACU    Anesthesia Type: general    Transport from OR: Transported from OR on 6-10 L/min O2 by face mask with adequate spontaneous ventilation    Post pain: adequate analgesia    Post assessment: no apparent anesthetic complications    Post vital signs: stable    Level of consciousness: responds to stimulation, awake and sedated    Nausea/Vomiting: no nausea/vomiting    Complications: none    Transfer of care protocol was followed      Last vitals:   Visit Vitals  BP (!) 148/73 (BP Location: Left arm, Patient Position: Lying)   Pulse 89   Temp 36.7 °C (98 °F) (Oral)   Resp 10   Ht 5' 6" (1.676 m)   Wt 76.7 kg (169 lb 3.2 oz)   SpO2 100%   BMI 27.31 kg/m²     "

## 2022-09-08 NOTE — ASSESSMENT & PLAN NOTE
Patient's FSGs are uncontrolled due to hyperglycemia on current medication regimen.  Last A1c reviewed-   Lab Results   Component Value Date    HGBA1C 9.6 (H) 07/20/2022     Most recent fingerstick glucose reviewed- No results for input(s): POCTGLUCOSE in the last 24 hours.  Current correctional scale  Low  Maintain anti-hyperglycemic dose as follows-   Antihyperglycemics (From admission, onward)    Start     Stop Route Frequency Ordered    09/08/22 2100  insulin detemir U-100 injection 20 Units         -- SubQ Nightly 09/08/22 0053    09/07/22 0254  insulin aspart U-100 injection 0-5 Units         -- SubQ Before meals & nightly PRN 09/07/22 0211        Hold Oral hypoglycemics while patient is in the hospital.  Foot Xray- Some osseous irregularity at the great toe amputation site as well as increased soft tissue swelling. The findings are suspicious for osteomyelitis.   ESR 78. CRP 2.96.   Vanc and zosyn started on 9/7. Patient will likely need 6 weeks of Abx coverage due to osteomyelitis findings.  Blood Cx no growth at 24hrs.   Surgery consulted  BLE venous doppler no evidence of DVT present  (9/7)  BLE arterial doppler showed normal JACQUELIN 7/29/22  Per SS, they spoke with patient wife and state he his current with ameydsis according to note from SS.

## 2022-09-08 NOTE — NURSING
Patient gown has been changed and his iv has been flushed with normal saline prior to the patient leaving for surgery. Patient was held completely npo prior to surgery. Patient is being transported to surgery by christoph ( transporter's name).patient was not in any distress prior to leaving the unit.

## 2022-09-08 NOTE — INTERVAL H&P NOTE
The patient has been examined and the H&P has been reviewed:    I concur with the findings and no changes have occurred since H&P was written.    Anesthesia/Surgery risks, benefits and alternative options discussed and understood by patient/family.          Active Hospital Problems    Diagnosis  POA    *Diabetic foot ulcer with osteomyelitis [E11.621, E11.69, L97.509, M86.9]  Yes    Sleep apnea [G47.30]  Yes    Diabetic peripheral neuropathy [E11.42]  Yes    S/P CABG (coronary artery bypass graft) [Z95.1]  Not Applicable    COPD (chronic obstructive pulmonary disease) [J44.9]  Yes    Anxiety [F41.9]  Yes    Dependence on nicotine from cigarettes [F17.210]  Yes    Mixed hyperlipidemia [E78.2]  Yes    Benign hypertension [I10]  Yes    Ulcer of right foot with necrosis of muscle [L97.513]  Yes      Resolved Hospital Problems    Diagnosis Date Resolved POA    Osteomyelitis of right foot [M86.9] 09/07/2022 Yes    Diabetic ulcer of midfoot associated with diabetes mellitus due to underlying condition, with necrosis of muscle [E08.621, L97.403] 09/07/2022 Yes

## 2022-09-08 NOTE — SUBJECTIVE & OBJECTIVE
Interval History: Patient is scheduled to go to surgery this morning for I/D with Dr. Matos. Patient states he is doing good this morning no overnight events. He denies any fever chills or Sob overnight.  consulted yesterday to discuss home abx and home health on discharge.     Review of Systems   Constitutional:  Negative for chills, fatigue, fever and unexpected weight change.   HENT:  Negative for congestion, hearing loss and trouble swallowing.    Eyes:  Negative for visual disturbance.   Respiratory:  Negative for cough, chest tightness, shortness of breath and wheezing.    Cardiovascular:  Positive for leg swelling. Negative for chest pain and palpitations.   Gastrointestinal:  Negative for abdominal pain, anal bleeding, blood in stool, constipation, diarrhea, nausea and vomiting.   Endocrine: Negative for polyuria.   Genitourinary:  Negative for decreased urine volume, difficulty urinating, dysuria and hematuria.   Musculoskeletal:  Positive for arthralgias, joint swelling, myalgias, neck pain and neck stiffness. Negative for back pain.   Skin:  Positive for wound. Negative for rash.   Neurological:  Negative for dizziness, weakness, light-headedness and headaches.   Psychiatric/Behavioral:  Negative for self-injury and suicidal ideas.    Objective:     Vital Signs (Most Recent):  Temp: 98.2 °F (36.8 °C) (09/08/22 0400)  Pulse: 64 (09/08/22 0745)  Resp: 18 (09/08/22 0745)  BP: (!) 154/59 (09/08/22 0400)  SpO2: 99 % (09/08/22 0745)   Vital Signs (24h Range):  Temp:  [97.5 °F (36.4 °C)-98.2 °F (36.8 °C)] 98.2 °F (36.8 °C)  Pulse:  [56-70] 64  Resp:  [11-95] 18  SpO2:  [18 %-100 %] 99 %  BP: (109-154)/(58-65) 154/59     Weight: 76.7 kg (169 lb 3.2 oz)  Body mass index is 27.31 kg/m².  No intake or output data in the 24 hours ending 09/08/22 0755   Physical Exam  Vitals and nursing note reviewed.   Constitutional:       General: He is not in acute distress.     Appearance: Normal appearance. He is  not ill-appearing, toxic-appearing or diaphoretic.   HENT:      Head: Normocephalic and atraumatic.      Right Ear: External ear normal.      Left Ear: External ear normal.      Mouth/Throat:      Mouth: Mucous membranes are moist.      Pharynx: Oropharynx is clear. No oropharyngeal exudate or posterior oropharyngeal erythema.   Eyes:      Extraocular Movements: Extraocular movements intact.      Conjunctiva/sclera: Conjunctivae normal.      Pupils: Pupils are equal, round, and reactive to light.   Cardiovascular:      Rate and Rhythm: Normal rate and regular rhythm.      Pulses: Normal pulses.      Heart sounds: Normal heart sounds. No murmur heard.    No friction rub. No gallop.   Pulmonary:      Effort: Pulmonary effort is normal. No respiratory distress.      Breath sounds: Wheezing present. No rhonchi or rales.   Abdominal:      General: Bowel sounds are normal. There is no distension.      Palpations: Abdomen is soft. There is no mass.      Tenderness: There is no abdominal tenderness. There is no guarding or rebound.   Musculoskeletal:         General: Tenderness present. No swelling.      Cervical back: No tenderness.      Right lower leg: Edema present.      Left lower leg: No edema.      Comments: Amputation of distal first and third toes on R.    Lymphadenopathy:      Cervical: No cervical adenopathy.   Skin:     General: Skin is warm and dry.      Capillary Refill: Capillary refill takes less than 2 seconds.      Findings: Lesion present.      Comments: Ulcer on R foot plantar surface about 1.5 cm with surrounding erythema and warmth. No drainage.    Neurological:      General: No focal deficit present.      Mental Status: He is alert and oriented to person, place, and time.      Sensory: No sensory deficit.      Motor: No weakness.   Psychiatric:         Mood and Affect: Mood normal.         Behavior: Behavior normal.       Significant Labs: All pertinent labs within the past 24 hours have been  reviewed.    Significant Imaging: I have reviewed all pertinent imaging results/findings within the past 24 hours.

## 2022-09-08 NOTE — ASSESSMENT & PLAN NOTE
- Continue home Amlodipine 10mg, Lisinopril 10mg and Toprolol 25mg  - Labetalol prn with parameters

## 2022-09-08 NOTE — PLAN OF CARE
Problem: Adult Inpatient Plan of Care  Goal: Plan of Care Review  Outcome: Ongoing, Progressing  Goal: Patient-Specific Goal (Individualized)  Outcome: Ongoing, Progressing  Goal: Absence of Hospital-Acquired Illness or Injury  Outcome: Ongoing, Progressing  Goal: Optimal Comfort and Wellbeing  Outcome: Ongoing, Progressing  Goal: Readiness for Transition of Care  Outcome: Ongoing, Progressing     Problem: Diabetes Comorbidity  Goal: Blood Glucose Level Within Targeted Range  Outcome: Ongoing, Progressing     Problem: Impaired Wound Healing  Goal: Optimal Wound Healing  Outcome: Ongoing, Progressing     Problem: Fall Injury Risk  Goal: Absence of Fall and Fall-Related Injury  Outcome: Ongoing, Progressing     Problem: Gas Exchange Impaired  Goal: Optimal Gas Exchange  Outcome: Ongoing, Progressing

## 2022-09-08 NOTE — PLAN OF CARE
Problem: Adult Inpatient Plan of Care  Goal: Plan of Care Review  Outcome: Ongoing, Progressing  Goal: Patient-Specific Goal (Individualized)  Outcome: Ongoing, Progressing     Problem: Gas Exchange Impaired  Goal: Optimal Gas Exchange  Outcome: Ongoing, Progressing     Problem: Adult Inpatient Plan of Care  Goal: Plan of Care Review  Outcome: Ongoing, Progressing     Problem: Adult Inpatient Plan of Care  Goal: Patient-Specific Goal (Individualized)  Outcome: Ongoing, Progressing     Problem: Gas Exchange Impaired  Goal: Optimal Gas Exchange  Outcome: Ongoing, Progressing     Problem: Gas Exchange Impaired  Goal: Optimal Gas Exchange  Outcome: Ongoing, Progressing

## 2022-09-08 NOTE — ANESTHESIA PROCEDURE NOTES
Intubation    Date/Time: 9/8/2022 1:34 PM  Performed by: Ishmael Jiménez CRNA  Authorized by: Zaire Matamoros MD     Intubation:     Induction:  Intravenous    Intubated:  Postinduction    Mask Ventilation:  Easy mask    Attempts:  1    Attempted By:  CRNA    Method of Intubation:  Direct    Difficult Airway Encountered?: No      Complications:  None    Airway Device:  Supraglottic airway/LMA    Airway Device Size:  4.0    Style/Cuff Inflation:  Cuffed (inflated to minimal occlusive pressure)    Placement Verified By:  Capnometry    Complicating Factors:  None    Findings Post-Intubation:  BS equal bilateral and atraumatic/condition of teeth unchanged

## 2022-09-09 LAB
ANION GAP SERPL CALCULATED.3IONS-SCNC: 12 MMOL/L (ref 7–16)
BASOPHILS # BLD AUTO: 0.03 K/UL (ref 0–0.2)
BASOPHILS NFR BLD AUTO: 0.4 % (ref 0–1)
BUN SERPL-MCNC: 13 MG/DL (ref 7–18)
BUN/CREAT SERPL: 15 (ref 6–20)
CALCIUM SERPL-MCNC: 8.8 MG/DL (ref 8.5–10.1)
CHLORIDE SERPL-SCNC: 104 MMOL/L (ref 98–107)
CO2 SERPL-SCNC: 29 MMOL/L (ref 21–32)
CREAT SERPL-MCNC: 0.86 MG/DL (ref 0.7–1.3)
DIFFERENTIAL METHOD BLD: ABNORMAL
EGFR (NO RACE VARIABLE) (RUSH/TITUS): 97 ML/MIN/1.73M²
EOSINOPHIL # BLD AUTO: 0.24 K/UL (ref 0–0.5)
EOSINOPHIL NFR BLD AUTO: 3.5 % (ref 1–4)
ERYTHROCYTE [DISTWIDTH] IN BLOOD BY AUTOMATED COUNT: 11.9 % (ref 11.5–14.5)
GLUCOSE SERPL-MCNC: 177 MG/DL (ref 70–105)
GLUCOSE SERPL-MCNC: 187 MG/DL (ref 74–106)
GLUCOSE SERPL-MCNC: 208 MG/DL (ref 70–105)
GLUCOSE SERPL-MCNC: 264 MG/DL (ref 70–105)
GLUCOSE SERPL-MCNC: 285 MG/DL (ref 70–105)
HCT VFR BLD AUTO: 30.4 % (ref 40–54)
HGB BLD-MCNC: 10.2 G/DL (ref 13.5–18)
IMM GRANULOCYTES # BLD AUTO: 0.02 K/UL (ref 0–0.04)
IMM GRANULOCYTES NFR BLD: 0.3 % (ref 0–0.4)
LYMPHOCYTES # BLD AUTO: 1.89 K/UL (ref 1–4.8)
LYMPHOCYTES NFR BLD AUTO: 27.3 % (ref 27–41)
MCH RBC QN AUTO: 29.8 PG (ref 27–31)
MCHC RBC AUTO-ENTMCNC: 33.6 G/DL (ref 32–36)
MCV RBC AUTO: 88.9 FL (ref 80–96)
MONOCYTES # BLD AUTO: 0.55 K/UL (ref 0–0.8)
MONOCYTES NFR BLD AUTO: 7.9 % (ref 2–6)
MPC BLD CALC-MCNC: 9.4 FL (ref 9.4–12.4)
NEUTROPHILS # BLD AUTO: 4.19 K/UL (ref 1.8–7.7)
NEUTROPHILS NFR BLD AUTO: 60.6 % (ref 53–65)
NRBC # BLD AUTO: 0 X10E3/UL
NRBC, AUTO (.00): 0 %
PLATELET # BLD AUTO: 237 K/UL (ref 150–400)
POTASSIUM SERPL-SCNC: 3.7 MMOL/L (ref 3.5–5.1)
RBC # BLD AUTO: 3.42 M/UL (ref 4.6–6.2)
SODIUM SERPL-SCNC: 141 MMOL/L (ref 136–145)
VANCOMYCIN TROUGH SERPL-MCNC: 10 ΜG/ML (ref 10–20)
WBC # BLD AUTO: 6.92 K/UL (ref 4.5–11)

## 2022-09-09 PROCEDURE — 11000001 HC ACUTE MED/SURG PRIVATE ROOM

## 2022-09-09 PROCEDURE — 25000242 PHARM REV CODE 250 ALT 637 W/ HCPCS: Performed by: FAMILY MEDICINE

## 2022-09-09 PROCEDURE — 63600175 PHARM REV CODE 636 W HCPCS: Performed by: ANESTHESIOLOGY

## 2022-09-09 PROCEDURE — 80202 ASSAY OF VANCOMYCIN: CPT | Performed by: FAMILY MEDICINE

## 2022-09-09 PROCEDURE — 63600175 PHARM REV CODE 636 W HCPCS

## 2022-09-09 PROCEDURE — 97161 PT EVAL LOW COMPLEX 20 MIN: CPT

## 2022-09-09 PROCEDURE — 63600175 PHARM REV CODE 636 W HCPCS: Performed by: FAMILY MEDICINE

## 2022-09-09 PROCEDURE — S4991 NICOTINE PATCH NONLEGEND: HCPCS | Performed by: SURGERY

## 2022-09-09 PROCEDURE — 85025 COMPLETE CBC W/AUTO DIFF WBC: CPT

## 2022-09-09 PROCEDURE — 99232 SBSQ HOSP IP/OBS MODERATE 35: CPT | Mod: GC,,, | Performed by: FAMILY MEDICINE

## 2022-09-09 PROCEDURE — 63600175 PHARM REV CODE 636 W HCPCS: Performed by: SURGERY

## 2022-09-09 PROCEDURE — 25000003 PHARM REV CODE 250: Performed by: EMERGENCY MEDICINE

## 2022-09-09 PROCEDURE — 25000242 PHARM REV CODE 250 ALT 637 W/ HCPCS: Performed by: SURGERY

## 2022-09-09 PROCEDURE — 25000003 PHARM REV CODE 250: Performed by: SURGERY

## 2022-09-09 PROCEDURE — 25000003 PHARM REV CODE 250

## 2022-09-09 PROCEDURE — 82962 GLUCOSE BLOOD TEST: CPT

## 2022-09-09 PROCEDURE — 36415 COLL VENOUS BLD VENIPUNCTURE: CPT

## 2022-09-09 PROCEDURE — 99900035 HC TECH TIME PER 15 MIN (STAT)

## 2022-09-09 PROCEDURE — 99232 PR SUBSEQUENT HOSPITAL CARE,LEVL II: ICD-10-PCS | Mod: GC,,, | Performed by: FAMILY MEDICINE

## 2022-09-09 PROCEDURE — 94640 AIRWAY INHALATION TREATMENT: CPT

## 2022-09-09 PROCEDURE — 25000003 PHARM REV CODE 250: Performed by: NURSE PRACTITIONER

## 2022-09-09 PROCEDURE — 63600175 PHARM REV CODE 636 W HCPCS: Performed by: EMERGENCY MEDICINE

## 2022-09-09 PROCEDURE — 25000242 PHARM REV CODE 250 ALT 637 W/ HCPCS: Performed by: INTERNAL MEDICINE

## 2022-09-09 PROCEDURE — 80048 BASIC METABOLIC PNL TOTAL CA: CPT

## 2022-09-09 PROCEDURE — 27000221 HC OXYGEN, UP TO 24 HOURS

## 2022-09-09 PROCEDURE — 25000003 PHARM REV CODE 250: Performed by: FAMILY MEDICINE

## 2022-09-09 PROCEDURE — 94761 N-INVAS EAR/PLS OXIMETRY MLT: CPT

## 2022-09-09 PROCEDURE — 94760 N-INVAS EAR/PLS OXIMETRY 1: CPT

## 2022-09-09 RX ORDER — INSULIN ASPART 100 [IU]/ML
1-10 INJECTION, SOLUTION INTRAVENOUS; SUBCUTANEOUS
Status: DISCONTINUED | OUTPATIENT
Start: 2022-09-09 | End: 2022-09-12 | Stop reason: HOSPADM

## 2022-09-09 RX ORDER — MORPHINE SULFATE 2 MG/ML
2 INJECTION, SOLUTION INTRAMUSCULAR; INTRAVENOUS ONCE
Status: COMPLETED | OUTPATIENT
Start: 2022-09-09 | End: 2022-09-09

## 2022-09-09 RX ORDER — HYDROCODONE BITARTRATE AND ACETAMINOPHEN 7.5; 325 MG/1; MG/1
1 TABLET ORAL EVERY 4 HOURS PRN
Status: DISCONTINUED | OUTPATIENT
Start: 2022-09-09 | End: 2022-09-10

## 2022-09-09 RX ORDER — HYDROCODONE BITARTRATE AND ACETAMINOPHEN 5; 325 MG/1; MG/1
1 TABLET ORAL EVERY 6 HOURS PRN
Status: DISCONTINUED | OUTPATIENT
Start: 2022-09-09 | End: 2022-09-09

## 2022-09-09 RX ORDER — CLOPIDOGREL BISULFATE 75 MG/1
75 TABLET ORAL DAILY
Status: DISCONTINUED | OUTPATIENT
Start: 2022-09-09 | End: 2022-09-12 | Stop reason: HOSPADM

## 2022-09-09 RX ADMIN — CLOPIDOGREL 75 MG: 75 TABLET, FILM COATED ORAL at 09:09

## 2022-09-09 RX ADMIN — ATORVASTATIN CALCIUM 80 MG: 80 TABLET, FILM COATED ORAL at 09:09

## 2022-09-09 RX ADMIN — PIPERACILLIN SODIUM AND TAZOBACTAM SODIUM 4.5 G: 4; .5 INJECTION, POWDER, LYOPHILIZED, FOR SOLUTION INTRAVENOUS at 04:09

## 2022-09-09 RX ADMIN — GABAPENTIN 900 MG: 300 CAPSULE ORAL at 06:09

## 2022-09-09 RX ADMIN — PIPERACILLIN SODIUM AND TAZOBACTAM SODIUM 4.5 G: 4; .5 INJECTION, POWDER, LYOPHILIZED, FOR SOLUTION INTRAVENOUS at 09:09

## 2022-09-09 RX ADMIN — MUPIROCIN: 20 OINTMENT TOPICAL at 09:09

## 2022-09-09 RX ADMIN — METOPROLOL SUCCINATE 25 MG: 25 TABLET, FILM COATED, EXTENDED RELEASE ORAL at 09:09

## 2022-09-09 RX ADMIN — VANCOMYCIN HYDROCHLORIDE 1500 MG: 1 INJECTION, POWDER, LYOPHILIZED, FOR SOLUTION INTRAVENOUS at 06:09

## 2022-09-09 RX ADMIN — LISINOPRIL 10 MG: 10 TABLET ORAL at 09:09

## 2022-09-09 RX ADMIN — ENOXAPARIN SODIUM 40 MG: 40 INJECTION SUBCUTANEOUS at 12:09

## 2022-09-09 RX ADMIN — PIPERACILLIN SODIUM AND TAZOBACTAM SODIUM 4.5 G: 4; .5 INJECTION, POWDER, LYOPHILIZED, FOR SOLUTION INTRAVENOUS at 12:09

## 2022-09-09 RX ADMIN — INSULIN ASPART 3 UNITS: 100 INJECTION, SOLUTION INTRAVENOUS; SUBCUTANEOUS at 05:09

## 2022-09-09 RX ADMIN — IPRATROPIUM BROMIDE AND ALBUTEROL SULFATE 3 ML: .5; 3 SOLUTION RESPIRATORY (INHALATION) at 01:09

## 2022-09-09 RX ADMIN — IPRATROPIUM BROMIDE 0.5 MG: 0.5 SOLUTION RESPIRATORY (INHALATION) at 01:09

## 2022-09-09 RX ADMIN — HYDROCODONE BITARTRATE AND ACETAMINOPHEN 1 TABLET: 7.5; 325 TABLET ORAL at 09:09

## 2022-09-09 RX ADMIN — SODIUM CHLORIDE, POTASSIUM CHLORIDE, SODIUM LACTATE AND CALCIUM CHLORIDE: 600; 310; 30; 20 INJECTION, SOLUTION INTRAVENOUS at 01:09

## 2022-09-09 RX ADMIN — HYDROXYZINE PAMOATE 25 MG: 25 CAPSULE ORAL at 09:09

## 2022-09-09 RX ADMIN — GABAPENTIN 900 MG: 300 CAPSULE ORAL at 12:09

## 2022-09-09 RX ADMIN — PANTOPRAZOLE SODIUM 40 MG: 40 TABLET, DELAYED RELEASE ORAL at 09:09

## 2022-09-09 RX ADMIN — IPRATROPIUM BROMIDE 0.5 MG: 0.5 SOLUTION RESPIRATORY (INHALATION) at 07:09

## 2022-09-09 RX ADMIN — INSULIN DETEMIR 20 UNITS: 100 INJECTION, SOLUTION SUBCUTANEOUS at 09:09

## 2022-09-09 RX ADMIN — HYDROCODONE BITARTRATE AND ACETAMINOPHEN 1 TABLET: 10; 325 TABLET ORAL at 05:09

## 2022-09-09 RX ADMIN — ASPIRIN 81 MG CHEWABLE TABLET 81 MG: 81 TABLET CHEWABLE at 09:09

## 2022-09-09 RX ADMIN — ESCITALOPRAM OXALATE 10 MG: 10 TABLET ORAL at 09:09

## 2022-09-09 RX ADMIN — VANCOMYCIN HYDROCHLORIDE 1500 MG: 5 INJECTION, POWDER, LYOPHILIZED, FOR SOLUTION INTRAVENOUS at 06:09

## 2022-09-09 RX ADMIN — HYDROCODONE BITARTRATE AND ACETAMINOPHEN 1 TABLET: 10; 325 TABLET ORAL at 02:09

## 2022-09-09 RX ADMIN — NICOTINE 1 PATCH: 14 PATCH, EXTENDED RELEASE TRANSDERMAL at 09:09

## 2022-09-09 RX ADMIN — GABAPENTIN 900 MG: 300 CAPSULE ORAL at 05:09

## 2022-09-09 RX ADMIN — AMLODIPINE BESYLATE 10 MG: 10 TABLET ORAL at 09:09

## 2022-09-09 RX ADMIN — MORPHINE SULFATE 2 MG: 2 INJECTION, SOLUTION INTRAMUSCULAR; INTRAVENOUS at 12:09

## 2022-09-09 NOTE — PLAN OF CARE
Chart reviewed. Per MD notes, patient possibly needing home IV abx at discharge. Choice obtained for [facility]. SS spoke with Dr. Conde to request order for IV medication/dose/frequency/duration. MD still awaiting culture results to determine exact medication and dose. MD to place SS consult once determination is made. SS following.

## 2022-09-09 NOTE — PT/OT/SLP EVAL
"Physical Therapy Evaluation and Discharge Note    Patient Name:  Navdeep Avery   MRN:  [id number]    Recommendations:     Discharge Recommendations:  home   Discharge Equipment Recommendations: crutches, axillary   Barriers to discharge: None    Assessment:     Navdeep Avery is a 62 y.o. male admitted with a medical diagnosis of Diabetic foot ulcer with osteomyelitis. .  At this time, patient safe with mobility using crutches PWB right LE via heel and does not require further acute PT services.     Recent Surgery: Procedure(s) (LRB):  IRRIGATION AND DEBRIDEMENT, LOWER EXTREMITY (Right) 1 Day Post-Op    Plan:     During this hospitalization, patient does not require further acute PT services.  Please re-consult if situation changes.      Subjective     Chief Complaint: foot ulcer  Patient/Family Comments/goals: "I have used crutches before"  Pain/Comfort:  Pain Rating 1: 3/10  Location - Side 1: Right  Location 1: foot  Pain Addressed 1: Pre-medicate for activity, Reposition, Distraction, Cessation of Activity  Pain Rating Post-Intervention 1: 3/10    Patients cultural, spiritual, Confucianism conflicts given the current situation: no    Living Environment:  Pt lives with spouse in a two story home with 3 steps/rail to enter. Patient can stay on the main level.  Prior to admission, patients level of function was independent with SPC at times.  Equipment used at home: cane, straight (at times).  DME owned (not currently used): rolling walker and wheelchair.  Upon discharge, patient will have assistance from family.    Objective:     Communicated with Dori Solis RN prior to session.  Patient found supine with peripheral IV upon PT entry to room.    General Precautions: Standard, fall   Orthopedic Precautions:RLE partial weight bearing (via heel)   Braces: N/A   Respiratory Status: Room air    Exams:  Cognitive Exam:  Patient is oriented to Person, Place, Time, and Situation  Sensation:    -       Intact  RLE " ROM: Deficits: hip WFL ;knee WFL; ankle limited by dressing on foot  RLE Strength: Deficits: hip WFL ; knee WFL; ankle NT due to wound  LLE ROM: WFL  LLE Strength: WFL    Functional Mobility:  Bed Mobility:     Supine to Sit: independence  Sit to Supine: independence  Transfers:     Sit to Stand:  modified independence with axillary crutches after coaching  Gait: 100' x 2 trials with crutches mod I after coaching; PWB via right heel  Stairs:  Pt ascended/descended  4 stair(s) with Axillary crutches with right handrail with Contact Guard Assistance and verbal cues; non-reciprocal pattern .     AM-PAC 6 CLICK MOBILITY  Total Score:23       Therapeutic Activities and Exercises:   Gait training; stair training completed with crutches and PWB via right heel. Patient safe with all and safe for d/c home when medically stable.    AM-PAC 6 CLICK MOBILITY  Total Score:23     Patient left HOB elevated with all lines intact, call button in reach, Dori Solis RN notified, and spouse present.    GOALS:   Multidisciplinary Problems       Physical Therapy Goals          Problem: Physical Therapy    Goal Priority Disciplines Outcome Goal Variances Interventions   Physical Therapy Goal     PT, PT/OT Adequate for Care Transition     Description: Short term goals:  Patient will demo safe mobility with crutches PWB via right heel    Long term goal:  Patient will resume all prior ADLs and independent mobility once cleared for full weight bearing                       History:     Past Medical History:   Diagnosis Date    Anemia     Anxiety     Arthritis     Atherosclerotic heart disease of native coronary artery with other forms of angina pectoris     Atrophic rhinitis     Carpal tunnel syndrome     Cellulitis of right lower extremity     COPD (chronic obstructive pulmonary disease)     Coronary arteriosclerosis     COVID 02/02/2022    Depression     Diabetic ulcer of right foot associated with diabetes mellitus due to underlying  condition, with bone involvement without evidence of necrosis     GERD (gastroesophageal reflux disease)     Hyperlipidemia     Hypertension     Insomnia     MI (myocardial infarction)     Neuropathy     Peripheral vascular disease, unspecified     Right foot ulcer, with fat layer exposed 01/07/2015    Sleep apnea     Type 2 diabetes mellitus        Past Surgical History:   Procedure Laterality Date    AMPUTATION      ANGIOPLASTY      CARDIAC CATHETERIZATION      CORONARY ARTERY BYPASS GRAFT      CORONARY STENT PLACEMENT      DEBRIDEMENT      Right foot debridment with hospital stay and IV Abx    DEBRIDEMENT OF LOWER EXTREMITY Right 06/27/2022    Procedure: DEBRIDEMENT, LOWER EXTREMITY;  Surgeon: Forrest Kiser MD;  Location: [facility] OR;  Service: General;  Laterality: Right;  right foot    IRRIGATION AND DEBRIDEMENT OF LOWER EXTREMITY Right 9/8/2022    Procedure: IRRIGATION AND DEBRIDEMENT, LOWER EXTREMITY;  Surgeon: Selina Matos MD;  Location: [facility] OR;  Service: General;  Laterality: Right;    SHOULDER OPEN ROTATOR CUFF REPAIR Left     SPINE SURGERY      VASCULAR SURGERY         Time Tracking:     PT Received On: 09/09/22  PT Start Time: 1121     PT Stop Time: 1142  PT Total Time (min): 21 min     Billable Minutes: Evaluation Low complexity      09/09/2022

## 2022-09-09 NOTE — ASSESSMENT & PLAN NOTE
- s/p CABG (3-4 yrs ago). Stents (about 5 years ago).  - Continue home ASA, statin, Lisinopril and Plavix  -Will hold plavix, patient is currently on novelox for DVT prophylaxis.

## 2022-09-09 NOTE — SUBJECTIVE & OBJECTIVE
Interval History: Patient is sitting comfortably in bed. Patient underwent surgery yesterday with Dr. Matos. They were able to get down to the bone and sent some for culture. Patient states he has some pain overnight and was given some morphine for the pain he sees the pain clinic and states he has been on norco 10 for a long period of time for his chronic pain. We discussed with patient about consulting his Pain management Dr. Dobson to come and see him while in the hospital.   Review of Systems   Constitutional:  Negative for chills, fatigue, fever and unexpected weight change.   HENT:  Negative for congestion, hearing loss and trouble swallowing.    Eyes:  Negative for visual disturbance.   Respiratory:  Negative for cough, chest tightness, shortness of breath and wheezing.    Cardiovascular:  Positive for leg swelling. Negative for chest pain and palpitations.   Gastrointestinal:  Negative for abdominal pain, anal bleeding, blood in stool, constipation, diarrhea, nausea and vomiting.   Endocrine: Negative for polyuria.   Genitourinary:  Negative for decreased urine volume, difficulty urinating, dysuria and hematuria.   Musculoskeletal:  Positive for arthralgias, joint swelling, myalgias, neck pain and neck stiffness. Negative for back pain.   Skin:  Positive for wound. Negative for rash.   Neurological:  Negative for dizziness, weakness, light-headedness and headaches.   Psychiatric/Behavioral:  Negative for self-injury and suicidal ideas.    Objective:     Vital Signs (Most Recent):  Temp: 97.8 °F (36.6 °C) (09/09/22 0804)  Pulse: 65 (09/09/22 0804)  Resp: 18 (09/09/22 0804)  BP: (!) 142/66 (09/09/22 0804)  SpO2: 95 % (09/09/22 0804)   Vital Signs (24h Range):  Temp:  [97.4 °F (36.3 °C)-98.4 °F (36.9 °C)] 97.8 °F (36.6 °C)  Pulse:  [5-98] 65  Resp:  [8-22] 18  SpO2:  [93 %-100 %] 95 %  BP: (125-167)/(57-84) 142/66     Weight: 76.7 kg (169 lb 3.2 oz)  Body mass index is 27.31 kg/m².    Intake/Output Summary (Last  24 hours) at 9/9/2022 0851  Last data filed at 9/8/2022 1900  Gross per 24 hour   Intake 1500 ml   Output 905 ml   Net 595 ml      Physical Exam  Vitals and nursing note reviewed.   Constitutional:       General: He is not in acute distress.     Appearance: Normal appearance. He is not ill-appearing, toxic-appearing or diaphoretic.   HENT:      Head: Normocephalic and atraumatic.      Right Ear: External ear normal.      Left Ear: External ear normal.      Mouth/Throat:      Mouth: Mucous membranes are moist.      Pharynx: Oropharynx is clear. No oropharyngeal exudate or posterior oropharyngeal erythema.   Eyes:      Extraocular Movements: Extraocular movements intact.      Conjunctiva/sclera: Conjunctivae normal.      Pupils: Pupils are equal, round, and reactive to light.   Cardiovascular:      Rate and Rhythm: Normal rate and regular rhythm.      Pulses: Normal pulses.      Heart sounds: Normal heart sounds. No murmur heard.    No friction rub. No gallop.   Pulmonary:      Effort: Pulmonary effort is normal. No respiratory distress.      Breath sounds: Wheezing present. No rhonchi or rales.   Abdominal:      General: Bowel sounds are normal. There is no distension.      Palpations: Abdomen is soft. There is no mass.      Tenderness: There is no abdominal tenderness. There is no guarding or rebound.   Musculoskeletal:         General: Tenderness present. No swelling.      Cervical back: No tenderness.      Right lower leg: Edema present.      Left lower leg: No edema.      Comments: Amputation of distal first and third toes on R.    Lymphadenopathy:      Cervical: No cervical adenopathy.   Skin:     General: Skin is warm and dry.      Capillary Refill: Capillary refill takes less than 2 seconds.      Findings: Lesion present.      Comments: Right foot wrapped in vivian, does not appear to have any drainage at this time.    Neurological:      General: No focal deficit present.      Mental Status: He is alert and  oriented to person, place, and time.      Sensory: No sensory deficit.      Motor: No weakness.   Psychiatric:         Mood and Affect: Mood normal.         Behavior: Behavior normal.       Significant Labs: All pertinent labs within the past 24 hours have been reviewed.    Significant Imaging: I have reviewed all pertinent imaging results/findings within the past 24 hours.

## 2022-09-09 NOTE — PROGRESS NOTES
[facility] - [address] [place] [address]  General Surgery  Progress Note    Subjective:     History of Present Illness:  Increased drainage RIGHT plantar diabetic foot ulcer with increased edema/pain, afebrile, no leukocytosis  Xray concerning for possible osteomyelitis  US negative DVT  Previous RIGHT great toe, amp per DR. SHELBI MAST, RIGHT 2nd toe amp followed by dr Rashel RIGGS wound care, recently in [facility] debrided in OR 06/27  Normal JACQUELIN July 2022  PMH:  CAD, Diabetes with CABG 4-5 years ago  on plavix      Post-Op Info:  Procedure(s) (LRB):  IRRIGATION AND DEBRIDEMENT, LOWER EXTREMITY (Right)   1 Day Post-Op     Interval History: foot debridement yesterday afternoon dr florian      Medications:  Continuous Infusions:  Scheduled Meds:   amLODIPine  10 mg Oral Daily    aspirin  81 mg Oral Daily    atorvastatin  80 mg Oral Daily    clopidogreL  75 mg Oral Daily    EScitalopram oxalate  10 mg Oral Daily    gabapentin  900 mg Oral Q6H    hydrOXYzine pamoate  25 mg Oral Daily    insulin detemir U-100  20 Units Subcutaneous QHS    ipratropium  0.5 mg Nebulization Q6H    lisinopriL  10 mg Oral Daily    metoprolol succinate  25 mg Oral Daily    mupirocin   Nasal BID    nicotine  1 patch Transdermal Daily    pantoprazole  40 mg Oral Daily    piperacillin-tazobactam (ZOSYN) IVPB  4.5 g Intravenous Q8H    vancomycin (VANCOCIN) IVPB  1,500 mg Intravenous Q12H     PRN Meds:acetaminophen, albuterol-ipratropium, dextrose 50%, dextrose 50%, diclofenac sodium, glucagon (human recombinant), glucose, glucose, HYDROcodone-acetaminophen, HYDROcodone-acetaminophen, insulin aspart U-100, labetalol, ondansetron, sodium chloride 0.9%, vancomycin - pharmacy to dose     Review of patient's allergies indicates:  No Known Allergies  Objective:     Vital Signs (Most Recent):  Temp: 98.3 °F (36.8 °C) (09/09/22 1000)  Pulse: 72 (09/09/22 1346)  Resp: 20 (09/09/22 1402)  BP: 136/62 (09/09/22 1000)  SpO2: (!) 93 % (09/09/22 1346)    Vital Signs (24h Range):  Temp:  [97.4 °F (36.3 °C)-98.3 °F (36.8 °C)] 98.3 °F (36.8 °C)  Pulse:  [5-98] 72  Resp:  [16-22] 20  SpO2:  [93 %-98 %] 93 %  BP: (126-150)/(57-84) 136/62     Weight: 76.7 kg (169 lb 3.2 oz)  Body mass index is 27.31 kg/m².    Intake/Output - Last 3 Shifts         09/07 0700  09/08 0659 09/08 0700  09/09 0659 09/09 0700  09/10 0659    P.O.  500     I.V. (mL/kg)  750 (9.8)     IV Piggyback  250     Total Intake(mL/kg)  1500 (19.6)     Urine (mL/kg/hr)  900 (0.5)     Blood  5     Total Output  905     Net  +595                    Physical Exam    Significant Labs:  I have reviewed all pertinent lab results within the past 24 hours.  CBC:   Recent Labs   Lab 09/09/22  0335   WBC 6.92   RBC 3.42*   HGB 10.2*   HCT 30.4*      MCV 88.9   MCH 29.8   MCHC 33.6     BMP:   Recent Labs   Lab 09/09/22  0335   *      K 3.7      CO2 29   BUN 13   CREATININE 0.86   CALCIUM 8.8     CMP:   Recent Labs   Lab 09/06/22  2220 09/08/22  0316 09/09/22  0335   *   < > 187*   CALCIUM 9.2   < > 8.8   ALBUMIN 3.3*  --   --    PROT 7.7  --   --       < > 141   K 4.4   < > 3.7   CO2 30   < > 29      < > 104   BUN 23*   < > 13   CREATININE 1.20   < > 0.86   ALKPHOS 121*  --   --    ALT 16  --   --    AST 11*  --   --    BILITOT 0.3  --   --     < > = values in this interval not displayed.     LFTs:   Recent Labs   Lab 09/06/22 2220   ALT 16   AST 11*   ALKPHOS 121*   BILITOT 0.3   PROT 7.7   ALBUMIN 3.3*       Significant Diagnostics:  I have reviewed all pertinent imaging results/findings within the past 24 hours.    Assessment/Plan:     Ulcer of right foot with necrosis of muscle  09/07/2022 RIGHT plantar  Diabetic foot ulcer with foot abscess concerning for osteomyelitis expressed bloody pus, no necrotic tissue visualized,palpable RIGHT DP pulse agree with iv vancomycin/zosyn  Npo for possible I/D today per Dr. Matos    09/08 debridement wound/bone dr matos    09/09/2022  dressing clean dry intact, afebrile, no leukocytosis  NGTD on cultures, previous MRSA infection August  Consult PT, wound care orders repack with iodoform/4x4/kerlix ace wrap daily start am glucose control        Rosie Luna, VÍCTORP  General Surgery  [facility] - [address] [place] [address]

## 2022-09-09 NOTE — PROGRESS NOTES
[facility] - [address] [place] [address] Medicine  Progress Note    Patient Name: Navdeep Avery  MRN: [id number]  Patient Class: IP- Inpatient   Admission Date: 9/6/2022  Length of Stay: 2 days  Attending Physician: Andrew Gillespie MD  Primary Care Provider: Marquez Huff MD        Subjective:     Principal Problem:Diabetic foot ulcer with osteomyelitis        HPI:  61 yo male presents to [facility] ED c/o R ankle pain x 3 days after he tripped. Ankle pain is all over his ankle but worst on medial R ankle. Pain is 8.5/10, radiates to R knee, dull and sharp in quality, intermittent, worse with weight-bearing. without relieving factors and no response to Norco. Denies fall. R foot ulcer on plantar surface has been present for 2 years. He has had debridement and 2 toes amputated. [facility] and was last seen 5 days ago. Has home health nurse that comes once a week. He has brought his BS down from 500s to 100s. Denies fevers, chills. Uses home oxygen 2 L as needed.     ED Course: Vanc and Zosyn given.     Pt admitted to [facility] for treatment of diabetic foot ulcer infection.       Overview/Hospital Course:  No notes on file    Interval History: Patient is sitting comfortably in bed. Patient underwent surgery yesterday with Dr. Matos. They were able to get down to the bone and sent some for culture. Patient states he has some pain overnight and was given some morphine for the pain he sees the pain clinic and states he has been on norco 10 for a long period of time for his chronic pain. We discussed with patient about consulting his Pain management Dr. Dobson to come and see him while in the hospital.   Review of Systems   Constitutional:  Negative for chills, fatigue, fever and unexpected weight change.   HENT:  Negative for congestion, hearing loss and trouble swallowing.    Eyes:  Negative for visual disturbance.   Respiratory:  Negative for cough, chest  tightness, shortness of breath and wheezing.    Cardiovascular:  Positive for leg swelling. Negative for chest pain and palpitations.   Gastrointestinal:  Negative for abdominal pain, anal bleeding, blood in stool, constipation, diarrhea, nausea and vomiting.   Endocrine: Negative for polyuria.   Genitourinary:  Negative for decreased urine volume, difficulty urinating, dysuria and hematuria.   Musculoskeletal:  Positive for arthralgias, joint swelling, myalgias, neck pain and neck stiffness. Negative for back pain.   Skin:  Positive for wound. Negative for rash.   Neurological:  Negative for dizziness, weakness, light-headedness and headaches.   Psychiatric/Behavioral:  Negative for self-injury and suicidal ideas.    Objective:     Vital Signs (Most Recent):  Temp: 97.8 °F (36.6 °C) (09/09/22 0804)  Pulse: 65 (09/09/22 0804)  Resp: 18 (09/09/22 0804)  BP: (!) 142/66 (09/09/22 0804)  SpO2: 95 % (09/09/22 0804)   Vital Signs (24h Range):  Temp:  [97.4 °F (36.3 °C)-98.4 °F (36.9 °C)] 97.8 °F (36.6 °C)  Pulse:  [5-98] 65  Resp:  [8-22] 18  SpO2:  [93 %-100 %] 95 %  BP: (125-167)/(57-84) 142/66     Weight: 76.7 kg (169 lb 3.2 oz)  Body mass index is 27.31 kg/m².    Intake/Output Summary (Last 24 hours) at 9/9/2022 0851  Last data filed at 9/8/2022 1900  Gross per 24 hour   Intake 1500 ml   Output 905 ml   Net 595 ml      Physical Exam  Vitals and nursing note reviewed.   Constitutional:       General: He is not in acute distress.     Appearance: Normal appearance. He is not ill-appearing, toxic-appearing or diaphoretic.   HENT:      Head: Normocephalic and atraumatic.      Right Ear: External ear normal.      Left Ear: External ear normal.      Mouth/Throat:      Mouth: Mucous membranes are moist.      Pharynx: Oropharynx is clear. No oropharyngeal exudate or posterior oropharyngeal erythema.   Eyes:      Extraocular Movements: Extraocular movements intact.      Conjunctiva/sclera: Conjunctivae normal.      Pupils:  Pupils are equal, round, and reactive to light.   Cardiovascular:      Rate and Rhythm: Normal rate and regular rhythm.      Pulses: Normal pulses.      Heart sounds: Normal heart sounds. No murmur heard.    No friction rub. No gallop.   Pulmonary:      Effort: Pulmonary effort is normal. No respiratory distress.      Breath sounds: Wheezing present. No rhonchi or rales.   Abdominal:      General: Bowel sounds are normal. There is no distension.      Palpations: Abdomen is soft. There is no mass.      Tenderness: There is no abdominal tenderness. There is no guarding or rebound.   Musculoskeletal:         General: Tenderness present. No swelling.      Cervical back: No tenderness.      Right lower leg: Edema present.      Left lower leg: No edema.      Comments: Amputation of distal first and third toes on R.    Lymphadenopathy:      Cervical: No cervical adenopathy.   Skin:     General: Skin is warm and dry.      Capillary Refill: Capillary refill takes less than 2 seconds.      Findings: Lesion present.      Comments: Right foot wrapped in vivian, does not appear to have any drainage at this time.    Neurological:      General: No focal deficit present.      Mental Status: He is alert and oriented to person, place, and time.      Sensory: No sensory deficit.      Motor: No weakness.   Psychiatric:         Mood and Affect: Mood normal.         Behavior: Behavior normal.       Significant Labs: All pertinent labs within the past 24 hours have been reviewed.    Significant Imaging: I have reviewed all pertinent imaging results/findings within the past 24 hours.      Assessment/Plan:      * Diabetic foot ulcer with osteomyelitis  Patient's FSGs are uncontrolled due to hyperglycemia on current medication regimen.  Last A1c reviewed-   Lab Results   Component Value Date    HGBA1C 9.6 (H) 07/20/2022     Most recent fingerstick glucose reviewed- No results for input(s): POCTGLUCOSE in the last 24 hours.  Current  correctional scale  Low  Maintain anti-hyperglycemic dose as follows-   Antihyperglycemics (From admission, onward)    Start     Stop Route Frequency Ordered    09/08/22 2100  insulin detemir U-100 injection 20 Units         -- SubQ Nightly 09/08/22 0053    09/07/22 0254  insulin aspart U-100 injection 0-5 Units         -- SubQ Before meals & nightly PRN 09/07/22 0211        Hold Oral hypoglycemics while patient is in the hospital.  Foot Xray- Some osseous irregularity at the great toe amputation site as well as increased soft tissue swelling. The findings are suspicious for osteomyelitis.   ESR 78. CRP 2.96.   Vanc and zosyn started on 9/7. Patient will likely need 6 weeks of Abx coverage due to osteomyelitis findings.  Blood Cx no growth at 24hrs.   Surgery was preformed, and was able to get some bone and sent to path for analysis, awaiting cultures and sensitivities to adjust abx regimen.   BLE venous doppler no evidence of DVT present  (9/7)  BLE arterial doppler showed normal JACQUELIN 7/29/22  Per SS, they will need to know home abx dose and regimen in order to contact to establish home health and home abx.     Sleep apnea  - CPAP QHS      Diabetic peripheral neuropathy  - Home Gabapentin    COPD (chronic obstructive pulmonary disease)  - Home Tiotroprium   - Duoneb prn         S/P CABG (coronary artery bypass graft)  - s/p CABG (3-4 yrs ago). Stents (about 5 years ago).  - Continue home ASA, statin, Lisinopril and Plavix  -Will hold plavix, patient is currently on novelox for DVT prophylaxis.     Anxiety  - Home Escitalopram 10mg      Dependence on nicotine from cigarettes  - Nicotine patch 14mg/24hr      Mixed hyperlipidemia  - Home atorvastatin 80mg      Benign hypertension  - Continue home Amlodipine 10mg, Lisinopril 10mg and Toprolol 25mg  - Labetalol prn with parameters      VTE Risk Mitigation (From admission, onward)         Ordered     enoxaparin injection 40 mg  Daily         09/08/22 1622     Place  sequential compression device  Until discontinued         09/07/22 0211     IP VTE LOW RISK PATIENT  Once         09/07/22 0211                Discharge Planning   MARGARITO:      Code Status: Full Code   Is the patient medically ready for discharge?:     Reason for patient still in hospital (select all that apply): Treatment  Discharge Plan A: Home with family                  Dung Conde DO  Department of Hospital Medicine   [facility] - [address] [facility] Tele[facility]

## 2022-09-09 NOTE — PLAN OF CARE
Problem: Physical Therapy  Goal: Physical Therapy Goal  Description: Short term goals:  Patient will demo safe mobility with crutches PWB via right heel    Long term goal:  Patient will resume all prior ADLs and independent mobility once cleared for full weight bearing  Outcome: Adequate for Care Transition   Patient demonstrated safe mobility with crutches for d/c home once medically stable.

## 2022-09-09 NOTE — SUBJECTIVE & OBJECTIVE
Interval History: foot debridement yesterday afternoon dr florian      Medications:  Continuous Infusions:  Scheduled Meds:   amLODIPine  10 mg Oral Daily    aspirin  81 mg Oral Daily    atorvastatin  80 mg Oral Daily    clopidogreL  75 mg Oral Daily    EScitalopram oxalate  10 mg Oral Daily    gabapentin  900 mg Oral Q6H    hydrOXYzine pamoate  25 mg Oral Daily    insulin detemir U-100  20 Units Subcutaneous QHS    ipratropium  0.5 mg Nebulization Q6H    lisinopriL  10 mg Oral Daily    metoprolol succinate  25 mg Oral Daily    mupirocin   Nasal BID    nicotine  1 patch Transdermal Daily    pantoprazole  40 mg Oral Daily    piperacillin-tazobactam (ZOSYN) IVPB  4.5 g Intravenous Q8H    vancomycin (VANCOCIN) IVPB  1,500 mg Intravenous Q12H     PRN Meds:acetaminophen, albuterol-ipratropium, dextrose 50%, dextrose 50%, diclofenac sodium, glucagon (human recombinant), glucose, glucose, HYDROcodone-acetaminophen, HYDROcodone-acetaminophen, insulin aspart U-100, labetalol, ondansetron, sodium chloride 0.9%, vancomycin - pharmacy to dose     Review of patient's allergies indicates:  No Known Allergies  Objective:     Vital Signs (Most Recent):  Temp: 98.3 °F (36.8 °C) (09/09/22 1000)  Pulse: 72 (09/09/22 1346)  Resp: 20 (09/09/22 1402)  BP: 136/62 (09/09/22 1000)  SpO2: (!) 93 % (09/09/22 1346)   Vital Signs (24h Range):  Temp:  [97.4 °F (36.3 °C)-98.3 °F (36.8 °C)] 98.3 °F (36.8 °C)  Pulse:  [5-98] 72  Resp:  [16-22] 20  SpO2:  [93 %-98 %] 93 %  BP: (126-150)/(57-84) 136/62     Weight: 76.7 kg (169 lb 3.2 oz)  Body mass index is 27.31 kg/m².    Intake/Output - Last 3 Shifts         09/07 0700  09/08 0659 09/08 0700  09/09 0659 09/09 0700  09/10 0659    P.O.  500     I.V. (mL/kg)  750 (9.8)     IV Piggyback  250     Total Intake(mL/kg)  1500 (19.6)     Urine (mL/kg/hr)  900 (0.5)     Blood  5     Total Output  905     Net  +595                    Physical Exam    Significant Labs:  I have reviewed all pertinent lab results  within the past 24 hours.  CBC:   Recent Labs   Lab 09/09/22 0335   WBC 6.92   RBC 3.42*   HGB 10.2*   HCT 30.4*      MCV 88.9   MCH 29.8   MCHC 33.6     BMP:   Recent Labs   Lab 09/09/22  0335   *      K 3.7      CO2 29   BUN 13   CREATININE 0.86   CALCIUM 8.8     CMP:   Recent Labs   Lab 09/06/22 2220 09/08/22 0316 09/09/22  0335   *   < > 187*   CALCIUM 9.2   < > 8.8   ALBUMIN 3.3*  --   --    PROT 7.7  --   --       < > 141   K 4.4   < > 3.7   CO2 30   < > 29      < > 104   BUN 23*   < > 13   CREATININE 1.20   < > 0.86   ALKPHOS 121*  --   --    ALT 16  --   --    AST 11*  --   --    BILITOT 0.3  --   --     < > = values in this interval not displayed.     LFTs:   Recent Labs   Lab 09/06/22 2220   ALT 16   AST 11*   ALKPHOS 121*   BILITOT 0.3   PROT 7.7   ALBUMIN 3.3*       Significant Diagnostics:  I have reviewed all pertinent imaging results/findings within the past 24 hours.

## 2022-09-09 NOTE — NURSING
Patient has returned from surgery. Vitals are stable. Respiration are even and unlabored on room air. Dressing to right foot is dry and intact, without any signs of bleeding. Tele monitor has been placed on the patient. Patient has been notified that he will be on surgery checks for the next four hours. Patient verbalized understanding of instructions given. Bed alarm has been set, call light in reach, side rails upx2.

## 2022-09-09 NOTE — ASSESSMENT & PLAN NOTE
09/07/2022 RIGHT plantar  Diabetic foot ulcer with foot abscess concerning for osteomyelitis expressed bloody pus, no necrotic tissue visualized,palpable RIGHT DP pulse agree with iv vancomycin/zosyn  Npo for possible I/D today per Dr. Matos    09/08 debridement wound/bone dr matos    09/09/2022 dressing clean dry intact, afebrile, no leukocytosis  NGTD on cultures, previous MRSA infection August  Consult PT, wound care orders repack with iodoform/4x4/kerlix ace wrap daily start am glucose control

## 2022-09-09 NOTE — ASSESSMENT & PLAN NOTE
Patient's FSGs are uncontrolled due to hyperglycemia on current medication regimen.  Last A1c reviewed-   Lab Results   Component Value Date    HGBA1C 9.6 (H) 07/20/2022     Most recent fingerstick glucose reviewed- No results for input(s): POCTGLUCOSE in the last 24 hours.  Current correctional scale  Low  Maintain anti-hyperglycemic dose as follows-   Antihyperglycemics (From admission, onward)    Start     Stop Route Frequency Ordered    09/08/22 2100  insulin detemir U-100 injection 20 Units         -- SubQ Nightly 09/08/22 0053    09/07/22 0254  insulin aspart U-100 injection 0-5 Units         -- SubQ Before meals & nightly PRN 09/07/22 0211        Hold Oral hypoglycemics while patient is in the hospital.  Foot Xray- Some osseous irregularity at the great toe amputation site as well as increased soft tissue swelling. The findings are suspicious for osteomyelitis.   ESR 78. CRP 2.96.   Vanc and zosyn started on 9/7. Patient will likely need 6 weeks of Abx coverage due to osteomyelitis findings.  Blood Cx no growth at 24hrs.   Surgery was preformed, and was able to get some bone and sent to path for analysis, awaiting cultures and sensitivities to adjust abx regimen.   BLE venous doppler no evidence of DVT present  (9/7)  BLE arterial doppler showed normal JACQUELIN 7/29/22  Per SS, they will need to know home abx dose and regimen in order to contact to establish home health and home abx.

## 2022-09-10 LAB
ANION GAP SERPL CALCULATED.3IONS-SCNC: 8 MMOL/L (ref 7–16)
BASOPHILS # BLD AUTO: 0.03 K/UL (ref 0–0.2)
BASOPHILS NFR BLD AUTO: 0.5 % (ref 0–1)
BUN SERPL-MCNC: 10 MG/DL (ref 7–18)
BUN/CREAT SERPL: 14 (ref 6–20)
CALCIUM SERPL-MCNC: 8.6 MG/DL (ref 8.5–10.1)
CHLORIDE SERPL-SCNC: 106 MMOL/L (ref 98–107)
CO2 SERPL-SCNC: 29 MMOL/L (ref 21–32)
CREAT SERPL-MCNC: 0.74 MG/DL (ref 0.7–1.3)
DIFFERENTIAL METHOD BLD: ABNORMAL
EGFR (NO RACE VARIABLE) (RUSH/TITUS): 101 ML/MIN/1.73M²
EOSINOPHIL # BLD AUTO: 0.28 K/UL (ref 0–0.5)
EOSINOPHIL NFR BLD AUTO: 4.9 % (ref 1–4)
ERYTHROCYTE [DISTWIDTH] IN BLOOD BY AUTOMATED COUNT: 11.9 % (ref 11.5–14.5)
GLUCOSE SERPL-MCNC: 177 MG/DL (ref 74–106)
GLUCOSE SERPL-MCNC: 184 MG/DL (ref 70–105)
GLUCOSE SERPL-MCNC: 196 MG/DL (ref 70–105)
GLUCOSE SERPL-MCNC: 239 MG/DL (ref 70–105)
GLUCOSE SERPL-MCNC: 301 MG/DL (ref 70–105)
HCT VFR BLD AUTO: 31.8 % (ref 40–54)
HGB BLD-MCNC: 10.7 G/DL (ref 13.5–18)
IMM GRANULOCYTES # BLD AUTO: 0.02 K/UL (ref 0–0.04)
IMM GRANULOCYTES NFR BLD: 0.3 % (ref 0–0.4)
LYMPHOCYTES # BLD AUTO: 2.15 K/UL (ref 1–4.8)
LYMPHOCYTES NFR BLD AUTO: 37.5 % (ref 27–41)
MCH RBC QN AUTO: 30.4 PG (ref 27–31)
MCHC RBC AUTO-ENTMCNC: 33.6 G/DL (ref 32–36)
MCV RBC AUTO: 90.3 FL (ref 80–96)
MONOCYTES # BLD AUTO: 0.4 K/UL (ref 0–0.8)
MONOCYTES NFR BLD AUTO: 7 % (ref 2–6)
MPC BLD CALC-MCNC: 9.2 FL (ref 9.4–12.4)
NEUTROPHILS # BLD AUTO: 2.86 K/UL (ref 1.8–7.7)
NEUTROPHILS NFR BLD AUTO: 49.8 % (ref 53–65)
NRBC # BLD AUTO: 0 X10E3/UL
NRBC, AUTO (.00): 0 %
PLATELET # BLD AUTO: 240 K/UL (ref 150–400)
POTASSIUM SERPL-SCNC: 4.4 MMOL/L (ref 3.5–5.1)
RBC # BLD AUTO: 3.52 M/UL (ref 4.6–6.2)
SODIUM SERPL-SCNC: 139 MMOL/L (ref 136–145)
VANCOMYCIN TROUGH SERPL-MCNC: 30.7 ΜG/ML (ref 10–20)
WBC # BLD AUTO: 5.74 K/UL (ref 4.5–11)

## 2022-09-10 PROCEDURE — 63600175 PHARM REV CODE 636 W HCPCS: Performed by: SURGERY

## 2022-09-10 PROCEDURE — 94761 N-INVAS EAR/PLS OXIMETRY MLT: CPT

## 2022-09-10 PROCEDURE — 25000003 PHARM REV CODE 250: Performed by: NURSE PRACTITIONER

## 2022-09-10 PROCEDURE — 11000001 HC ACUTE MED/SURG PRIVATE ROOM

## 2022-09-10 PROCEDURE — 25000242 PHARM REV CODE 250 ALT 637 W/ HCPCS: Performed by: SURGERY

## 2022-09-10 PROCEDURE — 80202 ASSAY OF VANCOMYCIN: CPT | Performed by: FAMILY MEDICINE

## 2022-09-10 PROCEDURE — 94660 CPAP INITIATION&MGMT: CPT

## 2022-09-10 PROCEDURE — 63600175 PHARM REV CODE 636 W HCPCS

## 2022-09-10 PROCEDURE — 63600175 PHARM REV CODE 636 W HCPCS: Performed by: FAMILY MEDICINE

## 2022-09-10 PROCEDURE — 85025 COMPLETE CBC W/AUTO DIFF WBC: CPT | Performed by: SURGERY

## 2022-09-10 PROCEDURE — 99900035 HC TECH TIME PER 15 MIN (STAT)

## 2022-09-10 PROCEDURE — 99232 SBSQ HOSP IP/OBS MODERATE 35: CPT | Mod: GC,,, | Performed by: FAMILY MEDICINE

## 2022-09-10 PROCEDURE — 25000003 PHARM REV CODE 250: Performed by: SURGERY

## 2022-09-10 PROCEDURE — 99232 PR SUBSEQUENT HOSPITAL CARE,LEVL II: ICD-10-PCS | Mod: GC,,, | Performed by: FAMILY MEDICINE

## 2022-09-10 PROCEDURE — 25000003 PHARM REV CODE 250

## 2022-09-10 PROCEDURE — S4991 NICOTINE PATCH NONLEGEND: HCPCS | Performed by: SURGERY

## 2022-09-10 PROCEDURE — 80048 BASIC METABOLIC PNL TOTAL CA: CPT | Performed by: SURGERY

## 2022-09-10 PROCEDURE — 94640 AIRWAY INHALATION TREATMENT: CPT

## 2022-09-10 PROCEDURE — 36415 COLL VENOUS BLD VENIPUNCTURE: CPT | Performed by: SURGERY

## 2022-09-10 PROCEDURE — 82962 GLUCOSE BLOOD TEST: CPT

## 2022-09-10 PROCEDURE — 25000003 PHARM REV CODE 250: Performed by: FAMILY MEDICINE

## 2022-09-10 RX ORDER — MORPHINE SULFATE 2 MG/ML
2 INJECTION, SOLUTION INTRAMUSCULAR; INTRAVENOUS EVERY 6 HOURS PRN
Status: DISPENSED | OUTPATIENT
Start: 2022-09-10 | End: 2022-09-12

## 2022-09-10 RX ORDER — HYDROCODONE BITARTRATE AND ACETAMINOPHEN 10; 325 MG/1; MG/1
1 TABLET ORAL EVERY 6 HOURS PRN
Status: DISCONTINUED | OUTPATIENT
Start: 2022-09-10 | End: 2022-09-12 | Stop reason: HOSPADM

## 2022-09-10 RX ADMIN — GABAPENTIN 900 MG: 300 CAPSULE ORAL at 05:09

## 2022-09-10 RX ADMIN — AMLODIPINE BESYLATE 10 MG: 10 TABLET ORAL at 09:09

## 2022-09-10 RX ADMIN — ATORVASTATIN CALCIUM 80 MG: 80 TABLET, FILM COATED ORAL at 09:09

## 2022-09-10 RX ADMIN — VANCOMYCIN HYDROCHLORIDE 1500 MG: 5 INJECTION, POWDER, LYOPHILIZED, FOR SOLUTION INTRAVENOUS at 06:09

## 2022-09-10 RX ADMIN — IPRATROPIUM BROMIDE 0.5 MG: 0.5 SOLUTION RESPIRATORY (INHALATION) at 12:09

## 2022-09-10 RX ADMIN — LISINOPRIL 10 MG: 10 TABLET ORAL at 09:09

## 2022-09-10 RX ADMIN — METOPROLOL SUCCINATE 25 MG: 25 TABLET, FILM COATED, EXTENDED RELEASE ORAL at 09:09

## 2022-09-10 RX ADMIN — PIPERACILLIN SODIUM AND TAZOBACTAM SODIUM 4.5 G: 4; .5 INJECTION, POWDER, LYOPHILIZED, FOR SOLUTION INTRAVENOUS at 09:09

## 2022-09-10 RX ADMIN — INSULIN ASPART 2 UNITS: 100 INJECTION, SOLUTION INTRAVENOUS; SUBCUTANEOUS at 09:09

## 2022-09-10 RX ADMIN — MORPHINE SULFATE 2 MG: 2 INJECTION, SOLUTION INTRAMUSCULAR; INTRAVENOUS at 06:09

## 2022-09-10 RX ADMIN — PIPERACILLIN SODIUM AND TAZOBACTAM SODIUM 4.5 G: 4; .5 INJECTION, POWDER, LYOPHILIZED, FOR SOLUTION INTRAVENOUS at 11:09

## 2022-09-10 RX ADMIN — HYDROXYZINE PAMOATE 25 MG: 25 CAPSULE ORAL at 09:09

## 2022-09-10 RX ADMIN — INSULIN DETEMIR 20 UNITS: 100 INJECTION, SOLUTION SUBCUTANEOUS at 09:09

## 2022-09-10 RX ADMIN — MUPIROCIN: 20 OINTMENT TOPICAL at 09:09

## 2022-09-10 RX ADMIN — IPRATROPIUM BROMIDE AND ALBUTEROL SULFATE 3 ML: .5; 3 SOLUTION RESPIRATORY (INHALATION) at 07:09

## 2022-09-10 RX ADMIN — GABAPENTIN 900 MG: 300 CAPSULE ORAL at 11:09

## 2022-09-10 RX ADMIN — PANTOPRAZOLE SODIUM 40 MG: 40 TABLET, DELAYED RELEASE ORAL at 09:09

## 2022-09-10 RX ADMIN — HYDROCODONE BITARTRATE AND ACETAMINOPHEN 1 TABLET: 7.5; 325 TABLET ORAL at 05:09

## 2022-09-10 RX ADMIN — GABAPENTIN 900 MG: 300 CAPSULE ORAL at 12:09

## 2022-09-10 RX ADMIN — PIPERACILLIN SODIUM AND TAZOBACTAM SODIUM 4.5 G: 4; .5 INJECTION, POWDER, LYOPHILIZED, FOR SOLUTION INTRAVENOUS at 04:09

## 2022-09-10 RX ADMIN — ESCITALOPRAM OXALATE 10 MG: 10 TABLET ORAL at 09:09

## 2022-09-10 RX ADMIN — CLOPIDOGREL 75 MG: 75 TABLET, FILM COATED ORAL at 09:09

## 2022-09-10 RX ADMIN — HYDROCODONE BITARTRATE AND ACETAMINOPHEN 1 TABLET: 10; 325 TABLET ORAL at 02:09

## 2022-09-10 RX ADMIN — IPRATROPIUM BROMIDE 0.5 MG: 0.5 SOLUTION RESPIRATORY (INHALATION) at 07:09

## 2022-09-10 RX ADMIN — MORPHINE SULFATE 2 MG: 2 INJECTION, SOLUTION INTRAMUSCULAR; INTRAVENOUS at 09:09

## 2022-09-10 RX ADMIN — NICOTINE 1 PATCH: 14 PATCH, EXTENDED RELEASE TRANSDERMAL at 09:09

## 2022-09-10 RX ADMIN — ASPIRIN 81 MG CHEWABLE TABLET 81 MG: 81 TABLET CHEWABLE at 09:09

## 2022-09-10 RX ADMIN — INSULIN ASPART 2 UNITS: 100 INJECTION, SOLUTION INTRAVENOUS; SUBCUTANEOUS at 05:09

## 2022-09-10 NOTE — PLAN OF CARE
Problem: Adult Inpatient Plan of Care  Goal: Plan of Care Review  Outcome: Ongoing, Progressing  Goal: Patient-Specific Goal (Individualized)  Outcome: Ongoing, Progressing  Goal: Absence of Hospital-Acquired Illness or Injury  Outcome: Ongoing, Progressing  Goal: Optimal Comfort and Wellbeing  Outcome: Ongoing, Progressing  Goal: Readiness for Transition of Care  Outcome: Ongoing, Progressing     Problem: Diabetes Comorbidity  Goal: Blood Glucose Level Within Targeted Range  Outcome: Ongoing, Progressing     Problem: Impaired Wound Healing  Goal: Optimal Wound Healing  Outcome: Ongoing, Progressing     Problem: Fall Injury Risk  Goal: Absence of Fall and Fall-Related Injury  Outcome: Ongoing, Progressing     Problem: Gas Exchange Impaired  Goal: Optimal Gas Exchange  Outcome: Ongoing, Progressing     Problem: Skin Injury Risk Increased  Goal: Skin Health and Integrity  Outcome: Ongoing, Progressing

## 2022-09-10 NOTE — NURSING
Pt resting in bed. Resp even & unlabored. Dressing to right foot present with small amount of sanguineous drainage. Dressing changed per order. Pt tolerated. Family present at bedside. Safety ms ongoing. Nan.

## 2022-09-10 NOTE — SUBJECTIVE & OBJECTIVE
Interval History: Patient was seen laying comfortably in bed with no acute event over night. Patient is post  I&D day 2 by general surgery. He denies fever, chills, chest pain, palpitations, shortness of breath, and abdominal pain. He continues to complain of pain, so Norco was increased  to 10 q6h PRN and morphine 2 mg q6h PRN was added for 2 days. Patient is pending Pain Management consult.     Review of Systems   Constitutional:  Negative for chills, fatigue, fever and unexpected weight change.   HENT:  Negative for congestion, hearing loss and trouble swallowing.    Eyes:  Negative for visual disturbance.   Respiratory:  Negative for cough, chest tightness, shortness of breath and wheezing.    Cardiovascular:  Positive for leg swelling. Negative for chest pain and palpitations.   Gastrointestinal:  Negative for abdominal pain, anal bleeding, blood in stool, constipation, diarrhea, nausea and vomiting.   Endocrine: Negative for polyuria.   Genitourinary:  Negative for decreased urine volume, difficulty urinating, dysuria and hematuria.   Musculoskeletal:  Positive for arthralgias, joint swelling, myalgias, neck pain and neck stiffness. Negative for back pain.   Skin:  Positive for wound. Negative for rash.   Neurological:  Negative for dizziness, weakness, light-headedness and headaches.   Psychiatric/Behavioral:  Negative for agitation, behavioral problems, confusion, self-injury and suicidal ideas.    Objective:     Vital Signs (Most Recent):  Temp: 97.6 °F (36.4 °C) (09/10/22 0400)  Pulse: 61 (09/10/22 0400)  Resp: 16 (09/10/22 0547)  BP: 130/61 (09/10/22 0400)  SpO2: 95 % (09/10/22 0400) Vital Signs (24h Range):  Temp:  [97.6 °F (36.4 °C)-98.3 °F (36.8 °C)] 97.6 °F (36.4 °C)  Pulse:  [60-97] 61  Resp:  [16-20] 16  SpO2:  [93 %-97 %] 95 %  BP: (130-152)/(61-66) 130/61     Weight: 77.3 kg (170 lb 6.7 oz)  Body mass index is 27.51 kg/m².    Intake/Output Summary (Last 24 hours) at 9/10/2022 0659  Last data filed  at 9/9/2022 1600  Gross per 24 hour   Intake --   Output 600 ml   Net -600 ml      Physical Exam  Vitals and nursing note reviewed.   Constitutional:       General: He is not in acute distress.     Appearance: Normal appearance. He is not ill-appearing.   HENT:      Head: Normocephalic and atraumatic.      Right Ear: External ear normal.      Left Ear: External ear normal.      Mouth/Throat:      Mouth: Mucous membranes are moist.   Eyes:      General: No scleral icterus.     Conjunctiva/sclera: Conjunctivae normal.   Cardiovascular:      Rate and Rhythm: Normal rate and regular rhythm.      Pulses: Normal pulses.      Heart sounds: Normal heart sounds. No murmur heard.    No friction rub. No gallop.   Pulmonary:      Effort: Pulmonary effort is normal. No respiratory distress.      Breath sounds: No wheezing, rhonchi or rales.   Abdominal:      General: Bowel sounds are normal. There is no distension.      Palpations: Abdomen is soft. There is no mass.      Tenderness: There is no abdominal tenderness. There is no guarding or rebound.   Musculoskeletal:         General: No swelling.      Cervical back: No tenderness.      Right lower leg: Edema present.      Left lower leg: No edema.      Comments: Amputation of distal first and third toes on R.    Lymphadenopathy:      Cervical: No cervical adenopathy.   Skin:     General: Skin is warm and dry.      Findings: Lesion present.      Comments: Right foot wrapped in vivian, does not appear to have any drainage at this time.    Neurological:      General: No focal deficit present.      Mental Status: He is alert and oriented to person, place, and time.      Sensory: No sensory deficit.      Motor: No weakness.   Psychiatric:         Mood and Affect: Mood normal.         Behavior: Behavior normal.         Thought Content: Thought content normal.         Judgment: Judgment normal.       Significant Labs: All pertinent labs within the past 24 hours have been  reviewed.  Recent Lab Results  (Last 5 results in the past 24 hours)        09/10/22  0518   09/10/22  0314   09/09/22  1943   09/09/22  1555   09/09/22  1042        Anion Gap   8             Baso #   0.03             Basophil %   0.5             BUN   10             BUN/CREAT RATIO   14             Calcium   8.6             Chloride   106             CO2   29             Creatinine   0.74             Differential Type   Auto             eGFR   101             Eos #   0.28             Eosinophil %   4.9             Glucose   177             Hematocrit   31.8             Hemoglobin   10.7             Immature Grans (Abs)   0.02             Immature Granulocytes   0.3             Lymph #   2.15             Lymph %   37.5             MCH   30.4             MCHC   33.6             MCV   90.3             Mono #   0.40             Mono %   7.0             MPV   9.2             Neutrophils, Abs   2.86             Neutrophils Relative   49.8             nRBC   0.0             NUCLEATED RBC ABSOLUTE   0.00             Platelets   240             POC Glucose 196     208   285   177       Potassium   4.4             RBC   3.52             RDW   11.9             Sodium   139             WBC   5.74                                    Significant Imaging: I have reviewed all pertinent imaging results/findings within the past 24 hours.  I have reviewed and interpreted all pertinent imaging results/findings within the past 24 hours.

## 2022-09-10 NOTE — PROGRESS NOTES
[facility] - [address] [place] [address] Medicine  Progress Note    Patient Name: Navdeep Avery  MRN: [id number]  Patient Class: IP- Inpatient   Admission Date: 9/6/2022  Length of Stay: 3 days  Attending Physician: Andrew Gillespie MD  Primary Care Provider: Marquez Huff MD        Subjective:     Principal Problem:Diabetic foot ulcer with osteomyelitis        HPI:  61 yo male presents to [facility] ED c/o R ankle pain x 3 days after he tripped. Ankle pain is all over his ankle but worst on medial R ankle. Pain is 8.5/10, radiates to R knee, dull and sharp in quality, intermittent, worse with weight-bearing. without relieving factors and no response to Norco. Denies fall. R foot ulcer on plantar surface has been present for 2 years. He has had debridement and 2 toes amputated. [facility] and was last seen 5 days ago. Has home health nurse that comes once a week. He has brought his BS down from 500s to 100s. Denies fevers, chills. Uses home oxygen 2 L as needed.     ED Course: Vanc and Zosyn given.     Pt admitted to [facility] for treatment of diabetic foot ulcer infection.       Overview/Hospital Course:  No notes on file    Interval History: Patient was seen laying comfortably in bed with no acute event over night. Patient is post  I&D day 2 by general surgery. He denies fever, chills, chest pain, palpitations, shortness of breath, and abdominal pain. He continues to complain of pain, so Norco was increased  to 10 q6h PRN and morphine 2 mg q6h PRN was added for 2 days. Patient is pending Pain Management consult.     Review of Systems   Constitutional:  Negative for chills, fatigue, fever and unexpected weight change.   HENT:  Negative for congestion, hearing loss and trouble swallowing.    Eyes:  Negative for visual disturbance.   Respiratory:  Negative for cough, chest tightness, shortness of breath and wheezing.    Cardiovascular:  Positive for leg swelling.  Negative for chest pain and palpitations.   Gastrointestinal:  Negative for abdominal pain, anal bleeding, blood in stool, constipation, diarrhea, nausea and vomiting.   Endocrine: Negative for polyuria.   Genitourinary:  Negative for decreased urine volume, difficulty urinating, dysuria and hematuria.   Musculoskeletal:  Positive for arthralgias, joint swelling, myalgias, neck pain and neck stiffness. Negative for back pain.   Skin:  Positive for wound. Negative for rash.   Neurological:  Negative for dizziness, weakness, light-headedness and headaches.   Psychiatric/Behavioral:  Negative for agitation, behavioral problems, confusion, self-injury and suicidal ideas.    Objective:     Vital Signs (Most Recent):  Temp: 97.6 °F (36.4 °C) (09/10/22 0400)  Pulse: 61 (09/10/22 0400)  Resp: 16 (09/10/22 0547)  BP: 130/61 (09/10/22 0400)  SpO2: 95 % (09/10/22 0400) Vital Signs (24h Range):  Temp:  [97.6 °F (36.4 °C)-98.3 °F (36.8 °C)] 97.6 °F (36.4 °C)  Pulse:  [60-97] 61  Resp:  [16-20] 16  SpO2:  [93 %-97 %] 95 %  BP: (130-152)/(61-66) 130/61     Weight: 77.3 kg (170 lb 6.7 oz)  Body mass index is 27.51 kg/m².    Intake/Output Summary (Last 24 hours) at 9/10/2022 0659  Last data filed at 9/9/2022 1600  Gross per 24 hour   Intake --   Output 600 ml   Net -600 ml      Physical Exam  Vitals and nursing note reviewed.   Constitutional:       General: He is not in acute distress.     Appearance: Normal appearance. He is not ill-appearing.   HENT:      Head: Normocephalic and atraumatic.      Right Ear: External ear normal.      Left Ear: External ear normal.      Mouth/Throat:      Mouth: Mucous membranes are moist.   Eyes:      General: No scleral icterus.     Conjunctiva/sclera: Conjunctivae normal.   Cardiovascular:      Rate and Rhythm: Normal rate and regular rhythm.      Pulses: Normal pulses.      Heart sounds: Normal heart sounds. No murmur heard.    No friction rub. No gallop.   Pulmonary:      Effort: Pulmonary  effort is normal. No respiratory distress.      Breath sounds: No wheezing, rhonchi or rales.   Abdominal:      General: Bowel sounds are normal. There is no distension.      Palpations: Abdomen is soft. There is no mass.      Tenderness: There is no abdominal tenderness. There is no guarding or rebound.   Musculoskeletal:         General: No swelling.      Cervical back: No tenderness.      Right lower leg: Edema present.      Left lower leg: No edema.      Comments: Amputation of distal first and third toes on R.    Lymphadenopathy:      Cervical: No cervical adenopathy.   Skin:     General: Skin is warm and dry.      Findings: Lesion present.      Comments: Right foot wrapped in vivian, does not appear to have any drainage at this time.    Neurological:      General: No focal deficit present.      Mental Status: He is alert and oriented to person, place, and time.      Sensory: No sensory deficit.      Motor: No weakness.   Psychiatric:         Mood and Affect: Mood normal.         Behavior: Behavior normal.         Thought Content: Thought content normal.         Judgment: Judgment normal.       Significant Labs: All pertinent labs within the past 24 hours have been reviewed.  Recent Lab Results  (Last 5 results in the past 24 hours)        09/10/22  0518   09/10/22  0314   09/09/22  1943   09/09/22  1555   09/09/22  1042        Anion Gap   8             Baso #   0.03             Basophil %   0.5             BUN   10             BUN/CREAT RATIO   14             Calcium   8.6             Chloride   106             CO2   29             Creatinine   0.74             Differential Type   Auto             eGFR   101             Eos #   0.28             Eosinophil %   4.9             Glucose   177             Hematocrit   31.8             Hemoglobin   10.7             Immature Grans (Abs)   0.02             Immature Granulocytes   0.3             Lymph #   2.15             Lymph %   37.5             MCH   30.4              MCHC   33.6             MCV   90.3             Mono #   0.40             Mono %   7.0             MPV   9.2             Neutrophils, Abs   2.86             Neutrophils Relative   49.8             nRBC   0.0             NUCLEATED RBC ABSOLUTE   0.00             Platelets   240             POC Glucose 196     208   285   177       Potassium   4.4             RBC   3.52             RDW   11.9             Sodium   139             WBC   5.74                                    Significant Imaging: I have reviewed all pertinent imaging results/findings within the past 24 hours.  I have reviewed and interpreted all pertinent imaging results/findings within the past 24 hours.      Assessment/Plan:      * Diabetic foot ulcer with osteomyelitis  Patient's FSGs are uncontrolled due to hyperglycemia on current medication regimen.  Last A1c reviewed-   Lab Results   Component Value Date    HGBA1C 9.6 (H) 07/20/2022     Most recent fingerstick glucose reviewed- No results for input(s): POCTGLUCOSE in the last 24 hours.  Current correctional scale  Low  Maintain anti-hyperglycemic dose as follows-   Antihyperglycemics (From admission, onward)    Start     Stop Route Frequency Ordered    09/09/22 1809  insulin aspart U-100 injection 1-10 Units         -- SubQ Before meals & nightly PRN 09/09/22 1810    09/08/22 2100  insulin detemir U-100 injection 20 Units         -- SubQ Nightly 09/08/22 0053        Hold Oral hypoglycemics while patient is in the hospital.  Foot Xray- Some osseous irregularity at the great toe amputation site as well as increased soft tissue swelling. The findings are suspicious for osteomyelitis.   ESR 78. CRP 2.96.   Vanc and zosyn started on 9/7. Patient will likely need 6 weeks of Abx coverage due to osteomyelitis findings.  Blood Cx no growth at 24hrs.   Surgery was preformed, and was able to get some bone and sent to path for analysis, awaiting cultures and sensitivities to adjust abx regimen.   BLE  venous doppler no evidence of DVT present  (9/7)  BLE arterial doppler showed normal JACQUELIN 7/29/22  Per SS, they will need to know home abx dose and regimen in order to contact to establish home health and home abx.    0910  Patient is day 2, S/P ID and is being followed by general surgery            Wound culture showed light growth staph aureus, pending sensitivity            Continue antibiotics and wound care    Sleep apnea  - CPAP QHS      Diabetic peripheral neuropathy  - Home Gabapentin    COPD (chronic obstructive pulmonary disease)  - Home Tiotroprium   - Duoneb prn         S/P CABG (coronary artery bypass graft)  - s/p CABG (3-4 yrs ago). Stents (about 5 years ago).  - Continue home ASA, statin, Lisinopril and Plavix  -Will hold plavix, patient is currently on novelox for DVT prophylaxis.     Anxiety  - Home Escitalopram 10mg      Dependence on nicotine from cigarettes  - Nicotine patch 14mg/24hr      Mixed hyperlipidemia  - Home atorvastatin 80mg      Benign hypertension  - Continue home Amlodipine 10mg, Lisinopril 10mg and Toprolol 25mg  - Labetalol prn with parameters      VTE Risk Mitigation (From admission, onward)         Ordered     Place sequential compression device  Until discontinued         09/07/22 0211     IP VTE LOW RISK PATIENT  Once         09/07/22 0211                Discharge Planning   MARGARITO:      Code Status: Full Code   Is the patient medically ready for discharge?:     Reason for patient still in hospital (select all that apply): Treatment  Discharge Plan A: Home with family                  Mando Kurtz MD  Department of Hospital Medicine   [facility] - [address] [place] [address]

## 2022-09-10 NOTE — PROGRESS NOTES
Patient without complaint     Initial culture result is staph aureus, sensitivity pending.  Dressing removed today, and iodoform on packed from between sutures.  There was no evidence of infection.    Continue dressing changes and IV antibiotics  Should be able to this discharge home soon

## 2022-09-10 NOTE — ASSESSMENT & PLAN NOTE
Patient's FSGs are uncontrolled due to hyperglycemia on current medication regimen.  Last A1c reviewed-   Lab Results   Component Value Date    HGBA1C 9.6 (H) 07/20/2022     Most recent fingerstick glucose reviewed- No results for input(s): POCTGLUCOSE in the last 24 hours.  Current correctional scale  Low  Maintain anti-hyperglycemic dose as follows-   Antihyperglycemics (From admission, onward)    Start     Stop Route Frequency Ordered    09/09/22 1809  insulin aspart U-100 injection 1-10 Units         -- SubQ Before meals & nightly PRN 09/09/22 1810    09/08/22 2100  insulin detemir U-100 injection 20 Units         -- SubQ Nightly 09/08/22 0053        Hold Oral hypoglycemics while patient is in the hospital.  Foot Xray- Some osseous irregularity at the great toe amputation site as well as increased soft tissue swelling. The findings are suspicious for osteomyelitis.   ESR 78. CRP 2.96.   Vanc and zosyn started on 9/7. Patient will likely need 6 weeks of Abx coverage due to osteomyelitis findings.  Blood Cx no growth at 24hrs.   Surgery was preformed, and was able to get some bone and sent to path for analysis, awaiting cultures and sensitivities to adjust abx regimen.   BLE venous doppler no evidence of DVT present  (9/7)  BLE arterial doppler showed normal JACQUELIN 7/29/22  Per SS, they will need to know home abx dose and regimen in order to contact to establish home health and home abx.    0910  Patient is day 2, S/P ID and is being followed by general surgery            Wound culture showed light growth staph aureus, pending sensitivity            Continue antibiotics and wound care

## 2022-09-11 LAB
ANION GAP SERPL CALCULATED.3IONS-SCNC: 11 MMOL/L (ref 7–16)
ATYPICAL LYMPHOCYTES: ABNORMAL
BASOPHILS # BLD AUTO: 0.05 K/UL (ref 0–0.2)
BASOPHILS NFR BLD AUTO: 1 % (ref 0–1)
BUN SERPL-MCNC: 15 MG/DL (ref 7–18)
BUN/CREAT SERPL: 14 (ref 6–20)
CALCIUM SERPL-MCNC: 8.6 MG/DL (ref 8.5–10.1)
CHLORIDE SERPL-SCNC: 103 MMOL/L (ref 98–107)
CO2 SERPL-SCNC: 30 MMOL/L (ref 21–32)
CREAT SERPL-MCNC: 1.08 MG/DL (ref 0.7–1.3)
DIFFERENTIAL METHOD BLD: ABNORMAL
EGFR (NO RACE VARIABLE) (RUSH/TITUS): 77 ML/MIN/1.73M²
EOSINOPHIL # BLD AUTO: 0.29 K/UL (ref 0–0.5)
EOSINOPHIL NFR BLD AUTO: 5.8 % (ref 1–4)
EOSINOPHIL NFR BLD MANUAL: 7 % (ref 1–4)
ERYTHROCYTE [DISTWIDTH] IN BLOOD BY AUTOMATED COUNT: 11.9 % (ref 11.5–14.5)
GLUCOSE SERPL-MCNC: 135 MG/DL (ref 70–105)
GLUCOSE SERPL-MCNC: 138 MG/DL (ref 74–106)
GLUCOSE SERPL-MCNC: 167 MG/DL (ref 70–105)
GLUCOSE SERPL-MCNC: 203 MG/DL (ref 70–105)
GLUCOSE SERPL-MCNC: 206 MG/DL (ref 70–105)
HCT VFR BLD AUTO: 30.5 % (ref 40–54)
HGB BLD-MCNC: 10.4 G/DL (ref 13.5–18)
IMM GRANULOCYTES # BLD AUTO: 0.03 K/UL (ref 0–0.04)
IMM GRANULOCYTES NFR BLD: 0.6 % (ref 0–0.4)
LYMPHOCYTES # BLD AUTO: 1.95 K/UL (ref 1–4.8)
LYMPHOCYTES NFR BLD AUTO: 39.2 % (ref 27–41)
LYMPHOCYTES NFR BLD MANUAL: 41 % (ref 27–41)
MCH RBC QN AUTO: 30.3 PG (ref 27–31)
MCHC RBC AUTO-ENTMCNC: 34.1 G/DL (ref 32–36)
MCV RBC AUTO: 88.9 FL (ref 80–96)
MICROORGANISM SPEC CULT: ABNORMAL
MONOCYTES # BLD AUTO: 0.35 K/UL (ref 0–0.8)
MONOCYTES NFR BLD AUTO: 7 % (ref 2–6)
MONOCYTES NFR BLD MANUAL: 8 % (ref 2–6)
MPC BLD CALC-MCNC: 9.1 FL (ref 9.4–12.4)
MYELOCYTES NFR BLD MANUAL: 1 %
NEUTROPHILS # BLD AUTO: 2.3 K/UL (ref 1.8–7.7)
NEUTROPHILS NFR BLD AUTO: 46.4 % (ref 53–65)
NEUTS BAND NFR BLD MANUAL: 1 % (ref 1–5)
NEUTS SEG NFR BLD MANUAL: 42 % (ref 50–62)
NRBC # BLD AUTO: 0 X10E3/UL
NRBC, AUTO (.00): 0 %
PLATELET # BLD AUTO: 230 K/UL (ref 150–400)
PLATELET MORPHOLOGY: ABNORMAL
POTASSIUM SERPL-SCNC: 4 MMOL/L (ref 3.5–5.1)
RBC # BLD AUTO: 3.43 M/UL (ref 4.6–6.2)
RBC MORPH BLD: NORMAL
SODIUM SERPL-SCNC: 140 MMOL/L (ref 136–145)
WBC # BLD AUTO: 4.97 K/UL (ref 4.5–11)

## 2022-09-11 PROCEDURE — 25000003 PHARM REV CODE 250

## 2022-09-11 PROCEDURE — 63600175 PHARM REV CODE 636 W HCPCS: Performed by: SURGERY

## 2022-09-11 PROCEDURE — 63600175 PHARM REV CODE 636 W HCPCS

## 2022-09-11 PROCEDURE — S4991 NICOTINE PATCH NONLEGEND: HCPCS | Performed by: SURGERY

## 2022-09-11 PROCEDURE — 25000003 PHARM REV CODE 250: Performed by: SURGERY

## 2022-09-11 PROCEDURE — 63600175 PHARM REV CODE 636 W HCPCS: Performed by: FAMILY MEDICINE

## 2022-09-11 PROCEDURE — 99232 SBSQ HOSP IP/OBS MODERATE 35: CPT | Mod: GC,,, | Performed by: FAMILY MEDICINE

## 2022-09-11 PROCEDURE — 82962 GLUCOSE BLOOD TEST: CPT

## 2022-09-11 PROCEDURE — 99232 PR SUBSEQUENT HOSPITAL CARE,LEVL II: ICD-10-PCS | Mod: GC,,, | Performed by: FAMILY MEDICINE

## 2022-09-11 PROCEDURE — 11000001 HC ACUTE MED/SURG PRIVATE ROOM

## 2022-09-11 PROCEDURE — 94640 AIRWAY INHALATION TREATMENT: CPT

## 2022-09-11 PROCEDURE — 80048 BASIC METABOLIC PNL TOTAL CA: CPT | Performed by: SURGERY

## 2022-09-11 PROCEDURE — 25000242 PHARM REV CODE 250 ALT 637 W/ HCPCS: Performed by: SURGERY

## 2022-09-11 PROCEDURE — 94761 N-INVAS EAR/PLS OXIMETRY MLT: CPT

## 2022-09-11 PROCEDURE — 99900035 HC TECH TIME PER 15 MIN (STAT)

## 2022-09-11 PROCEDURE — 25000003 PHARM REV CODE 250: Performed by: FAMILY MEDICINE

## 2022-09-11 PROCEDURE — 85025 COMPLETE CBC W/AUTO DIFF WBC: CPT | Performed by: SURGERY

## 2022-09-11 PROCEDURE — 94660 CPAP INITIATION&MGMT: CPT

## 2022-09-11 PROCEDURE — 36415 COLL VENOUS BLD VENIPUNCTURE: CPT | Performed by: SURGERY

## 2022-09-11 RX ADMIN — NICOTINE 1 PATCH: 14 PATCH, EXTENDED RELEASE TRANSDERMAL at 09:09

## 2022-09-11 RX ADMIN — IPRATROPIUM BROMIDE 0.5 MG: 0.5 SOLUTION RESPIRATORY (INHALATION) at 06:09

## 2022-09-11 RX ADMIN — METOPROLOL SUCCINATE 25 MG: 25 TABLET, FILM COATED, EXTENDED RELEASE ORAL at 09:09

## 2022-09-11 RX ADMIN — MORPHINE SULFATE 2 MG: 2 INJECTION, SOLUTION INTRAMUSCULAR; INTRAVENOUS at 10:09

## 2022-09-11 RX ADMIN — CLOPIDOGREL 75 MG: 75 TABLET, FILM COATED ORAL at 09:09

## 2022-09-11 RX ADMIN — INSULIN ASPART 2 UNITS: 100 INJECTION, SOLUTION INTRAVENOUS; SUBCUTANEOUS at 01:09

## 2022-09-11 RX ADMIN — GABAPENTIN 900 MG: 300 CAPSULE ORAL at 11:09

## 2022-09-11 RX ADMIN — MUPIROCIN: 20 OINTMENT TOPICAL at 09:09

## 2022-09-11 RX ADMIN — VANCOMYCIN HYDROCHLORIDE 1500 MG: 5 INJECTION, POWDER, LYOPHILIZED, FOR SOLUTION INTRAVENOUS at 05:09

## 2022-09-11 RX ADMIN — AMLODIPINE BESYLATE 10 MG: 10 TABLET ORAL at 09:09

## 2022-09-11 RX ADMIN — INSULIN ASPART 2 UNITS: 100 INJECTION, SOLUTION INTRAVENOUS; SUBCUTANEOUS at 05:09

## 2022-09-11 RX ADMIN — IPRATROPIUM BROMIDE 0.5 MG: 0.5 SOLUTION RESPIRATORY (INHALATION) at 12:09

## 2022-09-11 RX ADMIN — HYDROCODONE BITARTRATE AND ACETAMINOPHEN 1 TABLET: 10; 325 TABLET ORAL at 06:09

## 2022-09-11 RX ADMIN — LISINOPRIL 10 MG: 10 TABLET ORAL at 09:09

## 2022-09-11 RX ADMIN — INSULIN DETEMIR 20 UNITS: 100 INJECTION, SOLUTION SUBCUTANEOUS at 09:09

## 2022-09-11 RX ADMIN — PANTOPRAZOLE SODIUM 40 MG: 40 TABLET, DELAYED RELEASE ORAL at 09:09

## 2022-09-11 RX ADMIN — VANCOMYCIN HYDROCHLORIDE 1500 MG: 5 INJECTION, POWDER, LYOPHILIZED, FOR SOLUTION INTRAVENOUS at 06:09

## 2022-09-11 RX ADMIN — ATORVASTATIN CALCIUM 80 MG: 80 TABLET, FILM COATED ORAL at 09:09

## 2022-09-11 RX ADMIN — GABAPENTIN 900 MG: 300 CAPSULE ORAL at 12:09

## 2022-09-11 RX ADMIN — INSULIN DETEMIR 10 UNITS: 100 INJECTION, SOLUTION SUBCUTANEOUS at 01:09

## 2022-09-11 RX ADMIN — PIPERACILLIN SODIUM AND TAZOBACTAM SODIUM 4.5 G: 4; .5 INJECTION, POWDER, LYOPHILIZED, FOR SOLUTION INTRAVENOUS at 01:09

## 2022-09-11 RX ADMIN — GABAPENTIN 900 MG: 300 CAPSULE ORAL at 01:09

## 2022-09-11 RX ADMIN — IPRATROPIUM BROMIDE 0.5 MG: 0.5 SOLUTION RESPIRATORY (INHALATION) at 07:09

## 2022-09-11 RX ADMIN — HYDROCODONE BITARTRATE AND ACETAMINOPHEN 1 TABLET: 10; 325 TABLET ORAL at 05:09

## 2022-09-11 RX ADMIN — GABAPENTIN 900 MG: 300 CAPSULE ORAL at 06:09

## 2022-09-11 RX ADMIN — ASPIRIN 81 MG CHEWABLE TABLET 81 MG: 81 TABLET CHEWABLE at 09:09

## 2022-09-11 RX ADMIN — HYDROXYZINE PAMOATE 25 MG: 25 CAPSULE ORAL at 09:09

## 2022-09-11 RX ADMIN — HYDROCODONE BITARTRATE AND ACETAMINOPHEN 1 TABLET: 10; 325 TABLET ORAL at 12:09

## 2022-09-11 RX ADMIN — PIPERACILLIN SODIUM AND TAZOBACTAM SODIUM 4.5 G: 4; .5 INJECTION, POWDER, LYOPHILIZED, FOR SOLUTION INTRAVENOUS at 04:09

## 2022-09-11 RX ADMIN — ESCITALOPRAM OXALATE 10 MG: 10 TABLET ORAL at 09:09

## 2022-09-11 RX ADMIN — GABAPENTIN 900 MG: 300 CAPSULE ORAL at 05:09

## 2022-09-11 NOTE — SUBJECTIVE & OBJECTIVE
Interval History: Patient states he has no complaints overnight. Patient states that he is feeling better, and states that his pain has improved. Spoke with him regarding discharge plans, and how we are awaiting for wound cultures to come back.     Review of Systems   Constitutional:  Negative for chills, fatigue, fever and unexpected weight change.   HENT:  Negative for congestion, hearing loss and trouble swallowing.    Eyes:  Negative for visual disturbance.   Respiratory:  Negative for cough, chest tightness, shortness of breath and wheezing.    Cardiovascular:  Positive for leg swelling. Negative for chest pain and palpitations.   Gastrointestinal:  Negative for abdominal pain, anal bleeding, blood in stool, constipation, diarrhea, nausea and vomiting.   Endocrine: Negative for polyuria.   Genitourinary:  Negative for decreased urine volume, difficulty urinating, dysuria and hematuria.   Musculoskeletal:  Positive for arthralgias, joint swelling, myalgias, neck pain and neck stiffness. Negative for back pain.   Skin:  Positive for wound. Negative for rash.   Neurological:  Negative for dizziness, weakness, light-headedness and headaches.   Psychiatric/Behavioral:  Negative for self-injury and suicidal ideas.    Objective:     Vital Signs (Most Recent):  Temp: 97.8 °F (36.6 °C) (09/11/22 0710)  Pulse: 61 (09/11/22 0747)  Resp: 20 (09/11/22 1003)  BP: (!) 144/66 (09/11/22 0710)  SpO2: (!) 93 % (09/11/22 0747)   Vital Signs (24h Range):  Temp:  [97.4 °F (36.3 °C)-98.4 °F (36.9 °C)] 97.8 °F (36.6 °C)  Pulse:  [54-84] 61  Resp:  [12-20] 20  SpO2:  [93 %-99 %] 93 %  BP: (122-152)/(59-80) 144/66     Weight: 79.9 kg (176 lb 2.4 oz)  Body mass index is 28.43 kg/m².  No intake or output data in the 24 hours ending 09/11/22 1017   Physical Exam  Vitals and nursing note reviewed.   Constitutional:       General: He is not in acute distress.     Appearance: Normal appearance. He is not ill-appearing, toxic-appearing or  diaphoretic.   HENT:      Head: Normocephalic and atraumatic.      Right Ear: External ear normal.      Left Ear: External ear normal.      Mouth/Throat:      Mouth: Mucous membranes are moist.      Pharynx: Oropharynx is clear. No oropharyngeal exudate or posterior oropharyngeal erythema.   Eyes:      Extraocular Movements: Extraocular movements intact.      Conjunctiva/sclera: Conjunctivae normal.      Pupils: Pupils are equal, round, and reactive to light.   Cardiovascular:      Rate and Rhythm: Normal rate and regular rhythm.      Pulses: Normal pulses.      Heart sounds: Normal heart sounds. No murmur heard.    No friction rub. No gallop.   Pulmonary:      Effort: Pulmonary effort is normal. No respiratory distress.      Breath sounds: Wheezing present. No rhonchi or rales.   Abdominal:      General: Bowel sounds are normal. There is no distension.      Palpations: Abdomen is soft. There is no mass.      Tenderness: There is no abdominal tenderness. There is no guarding or rebound.   Musculoskeletal:         General: Tenderness present. No swelling.      Cervical back: No tenderness.      Right lower leg: Edema present.      Left lower leg: No edema.      Comments: Amputation of distal first and third toes on R.    Lymphadenopathy:      Cervical: No cervical adenopathy.   Skin:     General: Skin is warm and dry.      Capillary Refill: Capillary refill takes less than 2 seconds.      Findings: Lesion present.      Comments: Right foot wrapped in vivian, does not appear to have any drainage at this time.    Neurological:      General: No focal deficit present.      Mental Status: He is alert and oriented to person, place, and time.      Sensory: No sensory deficit.      Motor: No weakness.   Psychiatric:         Mood and Affect: Mood normal.         Behavior: Behavior normal.       Significant Labs: All pertinent labs within the past 24 hours have been reviewed.    Significant Imaging: I have reviewed all  pertinent imaging results/findings within the past 24 hours.

## 2022-09-11 NOTE — ASSESSMENT & PLAN NOTE
Patient's FSGs are uncontrolled due to hyperglycemia on current medication regimen.  Last A1c reviewed-   Lab Results   Component Value Date    HGBA1C 9.6 (H) 07/20/2022     Most recent fingerstick glucose reviewed- No results for input(s): POCTGLUCOSE in the last 24 hours.  Current correctional scale  Low  Maintain anti-hyperglycemic dose as follows-   Antihyperglycemics (From admission, onward)    Start     Stop Route Frequency Ordered    09/11/22 1115  insulin detemir U-100 injection 10 Units         -- SubQ Daily 09/11/22 1007    09/09/22 1809  insulin aspart U-100 injection 1-10 Units         -- SubQ Before meals & nightly PRN 09/09/22 1810    09/08/22 2100  insulin detemir U-100 injection 20 Units         -- SubQ Nightly 09/08/22 0053        Hold Oral hypoglycemics while patient is in the hospital.  Foot Xray- Some osseous irregularity at the great toe amputation site as well as increased soft tissue swelling. The findings are suspicious for osteomyelitis.   ESR 78. CRP 2.96.   Vanc and zosyn started on 9/7. Patient will likely need 6 weeks of Abx coverage due to osteomyelitis findings.  Blood Cx no growth at 24hrs.   Surgery was preformed, and was able to get some bone and sent to path for analysis, awaiting cultures and sensitivities to adjust abx regimen.   BLE venous doppler no evidence of DVT present  (9/7)  BLE arterial doppler showed normal JACQUELIN 7/29/22  Per SS, they will need to know home abx dose and regimen in order to contact to establish home health and home abx.    0910  Patient is day 2, S/P ID and is being followed by general surgery            Wound culture showed light growth staph aureus, pending sensitivity            Continue antibiotics and wound care  9/11- Patient is on day 3 S/P ID and is being followed by general surgery.    Wound culture grows MRSA, sensitive to vanc, will discontinue zosyn. Patient on vanc until 10/18   Continue antibiotics and wound care, we will add 10 units of  levemir in the morning due to rising sugars in the evening.

## 2022-09-11 NOTE — PROGRESS NOTES
[facility] - [address] [place] [address] Medicine  Progress Note    Patient Name: Navdeep Avery  MRN: [id number]  Patient Class: IP- Inpatient   Admission Date: 9/6/2022  Length of Stay: 4 days  Attending Physician: Andrew Gillespie MD  Primary Care Provider: Marquez Huff MD        Subjective:     Principal Problem:Diabetic foot ulcer with osteomyelitis        HPI:  61 yo male presents to [facility] ED c/o R ankle pain x 3 days after he tripped. Ankle pain is all over his ankle but worst on medial R ankle. Pain is 8.5/10, radiates to R knee, dull and sharp in quality, intermittent, worse with weight-bearing. without relieving factors and no response to Norco. Denies fall. R foot ulcer on plantar surface has been present for 2 years. He has had debridement and 2 toes amputated. [facility] and was last seen 5 days ago. Has home health nurse that comes once a week. He has brought his BS down from 500s to 100s. Denies fevers, chills. Uses home oxygen 2 L as needed.     ED Course: Vanc and Zosyn given.     Pt admitted to [facility] for treatment of diabetic foot ulcer infection.       Overview/Hospital Course:  No notes on file    Interval History: Patient states he has no complaints overnight. Patient states that he is feeling better, and states that his pain has improved. Spoke with him regarding discharge plans, and how we are awaiting for wound cultures to come back.     Review of Systems   Constitutional:  Negative for chills, fatigue, fever and unexpected weight change.   HENT:  Negative for congestion, hearing loss and trouble swallowing.    Eyes:  Negative for visual disturbance.   Respiratory:  Negative for cough, chest tightness, shortness of breath and wheezing.    Cardiovascular:  Positive for leg swelling. Negative for chest pain and palpitations.   Gastrointestinal:  Negative for abdominal pain, anal bleeding, blood in stool, constipation, diarrhea,  nausea and vomiting.   Endocrine: Negative for polyuria.   Genitourinary:  Negative for decreased urine volume, difficulty urinating, dysuria and hematuria.   Musculoskeletal:  Positive for arthralgias, joint swelling, myalgias, neck pain and neck stiffness. Negative for back pain.   Skin:  Positive for wound. Negative for rash.   Neurological:  Negative for dizziness, weakness, light-headedness and headaches.   Psychiatric/Behavioral:  Negative for self-injury and suicidal ideas.    Objective:     Vital Signs (Most Recent):  Temp: 97.8 °F (36.6 °C) (09/11/22 0710)  Pulse: 61 (09/11/22 0747)  Resp: 20 (09/11/22 1003)  BP: (!) 144/66 (09/11/22 0710)  SpO2: (!) 93 % (09/11/22 0747)   Vital Signs (24h Range):  Temp:  [97.4 °F (36.3 °C)-98.4 °F (36.9 °C)] 97.8 °F (36.6 °C)  Pulse:  [54-84] 61  Resp:  [12-20] 20  SpO2:  [93 %-99 %] 93 %  BP: (122-152)/(59-80) 144/66     Weight: 79.9 kg (176 lb 2.4 oz)  Body mass index is 28.43 kg/m².  No intake or output data in the 24 hours ending 09/11/22 1017   Physical Exam  Vitals and nursing note reviewed.   Constitutional:       General: He is not in acute distress.     Appearance: Normal appearance. He is not ill-appearing, toxic-appearing or diaphoretic.   HENT:      Head: Normocephalic and atraumatic.      Right Ear: External ear normal.      Left Ear: External ear normal.      Mouth/Throat:      Mouth: Mucous membranes are moist.      Pharynx: Oropharynx is clear. No oropharyngeal exudate or posterior oropharyngeal erythema.   Eyes:      Extraocular Movements: Extraocular movements intact.      Conjunctiva/sclera: Conjunctivae normal.      Pupils: Pupils are equal, round, and reactive to light.   Cardiovascular:      Rate and Rhythm: Normal rate and regular rhythm.      Pulses: Normal pulses.      Heart sounds: Normal heart sounds. No murmur heard.    No friction rub. No gallop.   Pulmonary:      Effort: Pulmonary effort is normal. No respiratory distress.      Breath sounds:  Wheezing present. No rhonchi or rales.   Abdominal:      General: Bowel sounds are normal. There is no distension.      Palpations: Abdomen is soft. There is no mass.      Tenderness: There is no abdominal tenderness. There is no guarding or rebound.   Musculoskeletal:         General: Tenderness present. No swelling.      Cervical back: No tenderness.      Right lower leg: Edema present.      Left lower leg: No edema.      Comments: Amputation of distal first and third toes on R.    Lymphadenopathy:      Cervical: No cervical adenopathy.   Skin:     General: Skin is warm and dry.      Capillary Refill: Capillary refill takes less than 2 seconds.      Findings: Lesion present.      Comments: Right foot wrapped in vivian, does not appear to have any drainage at this time.    Neurological:      General: No focal deficit present.      Mental Status: He is alert and oriented to person, place, and time.      Sensory: No sensory deficit.      Motor: No weakness.   Psychiatric:         Mood and Affect: Mood normal.         Behavior: Behavior normal.       Significant Labs: All pertinent labs within the past 24 hours have been reviewed.    Significant Imaging: I have reviewed all pertinent imaging results/findings within the past 24 hours.      Assessment/Plan:      * Diabetic foot ulcer with osteomyelitis  Patient's FSGs are uncontrolled due to hyperglycemia on current medication regimen.  Last A1c reviewed-   Lab Results   Component Value Date    HGBA1C 9.6 (H) 07/20/2022     Most recent fingerstick glucose reviewed- No results for input(s): POCTGLUCOSE in the last 24 hours.  Current correctional scale  Low  Maintain anti-hyperglycemic dose as follows-   Antihyperglycemics (From admission, onward)    Start     Stop Route Frequency Ordered    09/11/22 1115  insulin detemir U-100 injection 10 Units         -- SubQ Daily 09/11/22 1007    09/09/22 1809  insulin aspart U-100 injection 1-10 Units         -- SubQ Before meals &  nightly PRN 09/09/22 1810    09/08/22 2100  insulin detemir U-100 injection 20 Units         -- SubQ Nightly 09/08/22 0053        Hold Oral hypoglycemics while patient is in the hospital.  Foot Xray- Some osseous irregularity at the great toe amputation site as well as increased soft tissue swelling. The findings are suspicious for osteomyelitis.   ESR 78. CRP 2.96.   Vanc and zosyn started on 9/7. Patient will likely need 6 weeks of Abx coverage due to osteomyelitis findings.  Blood Cx no growth at 24hrs.   Surgery was preformed, and was able to get some bone and sent to path for analysis, awaiting cultures and sensitivities to adjust abx regimen.   BLE venous doppler no evidence of DVT present  (9/7)  BLE arterial doppler showed normal JACQUELIN 7/29/22  Per SS, they will need to know home abx dose and regimen in order to contact to establish home health and home abx.    0910  Patient is day 2, S/P ID and is being followed by general surgery            Wound culture showed light growth staph aureus, pending sensitivity            Continue antibiotics and wound care  9/11- Patient is on day 3 S/P ID and is being followed by general surgery.    Wound culture is still pending sensitivity.    Continue antibiotics and wound care, we will add 10 units of levemir in the morning due to rising sugars in the evening.     Sleep apnea  - CPAP QHS      Diabetic peripheral neuropathy  - Home Gabapentin    COPD (chronic obstructive pulmonary disease)  - Home Tiotroprium   - Duoneb prn         S/P CABG (coronary artery bypass graft)  - s/p CABG (3-4 yrs ago). Stents (about 5 years ago).  - Continue home ASA, statin, Lisinopril and Plavix  -Will hold plavix, patient is currently on novelox for DVT prophylaxis.     Anxiety  - Home Escitalopram 10mg      Dependence on nicotine from cigarettes  - Nicotine patch 14mg/24hr      Mixed hyperlipidemia  - Home atorvastatin 80mg      Benign hypertension  - Continue home Amlodipine 10mg,  Lisinopril 10mg and Toprolol 25mg  - Labetalol prn with parameters      VTE Risk Mitigation (From admission, onward)         Ordered     Place sequential compression device  Until discontinued         09/07/22 0211     IP VTE LOW RISK PATIENT  Once         09/07/22 0211                Discharge Planning   MARGARITO:      Code Status: Full Code   Is the patient medically ready for discharge?:     Reason for patient still in hospital (select all that apply): Treatment  Discharge Plan A: Home with family                  Dung Conde DO  Department of Hospital Medicine   [facility]

## 2022-09-11 NOTE — NURSING
Pt resting in bed. Resp even & unlabored. NADN. Dressing change performed per order on right foot. Cleansed with vashe, covered with 4x4 gauze, and wrapped in kerlix, then taped. Pt tolerated well. Given coffee per request. Wife at bedside. Safety ms ongoing. Cb in reach. Wctm.

## 2022-09-11 NOTE — ASSESSMENT & PLAN NOTE
Patient's FSGs are uncontrolled due to hyperglycemia on current medication regimen.  Last A1c reviewed-   Lab Results   Component Value Date    HGBA1C 9.6 (H) 07/20/2022     Most recent fingerstick glucose reviewed- No results for input(s): POCTGLUCOSE in the last 24 hours.  Current correctional scale  Low  Maintain anti-hyperglycemic dose as follows-   Antihyperglycemics (From admission, onward)    Start     Stop Route Frequency Ordered    09/11/22 1115  insulin detemir U-100 injection 10 Units         -- SubQ Daily 09/11/22 1007    09/09/22 1809  insulin aspart U-100 injection 1-10 Units         -- SubQ Before meals & nightly PRN 09/09/22 1810    09/08/22 2100  insulin detemir U-100 injection 20 Units         -- SubQ Nightly 09/08/22 0053        Hold Oral hypoglycemics while patient is in the hospital.  Foot Xray- Some osseous irregularity at the great toe amputation site as well as increased soft tissue swelling. The findings are suspicious for osteomyelitis.   ESR 78. CRP 2.96.   Vanc and zosyn started on 9/7. Patient will likely need 6 weeks of Abx coverage due to osteomyelitis findings.  Blood Cx no growth at 24hrs.   Surgery was preformed, and was able to get some bone and sent to path for analysis, awaiting cultures and sensitivities to adjust abx regimen.   BLE venous doppler no evidence of DVT present  (9/7)  BLE arterial doppler showed normal JACQUELIN 7/29/22  Per SS, they will need to know home abx dose and regimen in order to contact to establish home health and home abx.    0910  Patient is day 2, S/P ID and is being followed by general surgery            Wound culture showed light growth staph aureus, pending sensitivity            Continue antibiotics and wound care  9/11- Patient is on day 3 S/P ID and is being followed by general surgery.    Wound culture is still pending sensitivity.    Continue antibiotics and wound care, we will add 10 units of levemir in the morning due to rising sugars in the  evening.

## 2022-09-11 NOTE — PROGRESS NOTES
Trough drawn while medication infusing so cannot use it to guide dosing at this time. Will re-draw in the morning.

## 2022-09-11 NOTE — PLAN OF CARE
Problem: Adult Inpatient Plan of Care  Goal: Plan of Care Review  9/11/2022 0449 by Pinky Randolph RN  Outcome: Ongoing, Progressing  9/11/2022 0446 by Pinky Randolph RN  Outcome: Ongoing, Progressing  Goal: Patient-Specific Goal (Individualized)  9/11/2022 0449 by Pinky Randolph, RN  Outcome: Ongoing, Progressing  9/11/2022 0446 by Pinky Randolph, RN  Outcome: Ongoing, Progressing  Goal: Absence of Hospital-Acquired Illness or Injury  9/11/2022 0449 by Pinky Randolph, RN  Outcome: Ongoing, Progressing  9/11/2022 0446 by Pinky Randolph, RN  Outcome: Ongoing, Progressing  Goal: Optimal Comfort and Wellbeing  9/11/2022 0449 by Pinky Randolph, RN  Outcome: Ongoing, Progressing  9/11/2022 0446 by Pinky Randolph RN  Outcome: Ongoing, Progressing  Goal: Readiness for Transition of Care  9/11/2022 0449 by Pinky Randolph, RN  Outcome: Ongoing, Progressing  9/11/2022 0446 by Pinky Randolph RN  Outcome: Ongoing, Progressing     Problem: Diabetes Comorbidity  Goal: Blood Glucose Level Within Targeted Range  9/11/2022 0449 by Pinky Randolph, RN  Outcome: Ongoing, Progressing  9/11/2022 0446 by Pinky Randolph RN  Outcome: Ongoing, Progressing     Problem: Impaired Wound Healing  Goal: Optimal Wound Healing  9/11/2022 0449 by Pinky Randolph, RN  Outcome: Ongoing, Progressing  9/11/2022 0446 by Pinky Randolph, RN  Outcome: Ongoing, Progressing     Problem: Fall Injury Risk  Goal: Absence of Fall and Fall-Related Injury  9/11/2022 0449 by Pinky Randolph, RN  Outcome: Ongoing, Progressing  9/11/2022 0446 by Pinky Randolph RN  Outcome: Ongoing, Progressing     Problem: Gas Exchange Impaired  Goal: Optimal Gas Exchange  9/11/2022 0449 by Pinky Randolph, RN  Outcome: Ongoing, Progressing  9/11/2022 0446 by Pinky Randolph RN  Outcome: Ongoing, Progressing     Problem: Skin Injury Risk Increased  Goal: Skin Health and Integrity  9/11/2022 0449 by Pinky Randolph, RN  Outcome: Ongoing,  Progressing  9/11/2022 0446 by Pinky Randolph RN  Outcome: Ongoing, Progressing

## 2022-09-11 NOTE — PROGRESS NOTES
Patient complain of foot pain    Less erythema today  No expressible purulence  Cultures growing MRSA    Continue IV antibiotics  Observe wound tomorrow, and likely discharge home

## 2022-09-12 VITALS
HEART RATE: 59 BPM | WEIGHT: 179 LBS | OXYGEN SATURATION: 95 % | SYSTOLIC BLOOD PRESSURE: 147 MMHG | DIASTOLIC BLOOD PRESSURE: 73 MMHG | HEIGHT: 66 IN | BODY MASS INDEX: 28.77 KG/M2 | TEMPERATURE: 98 F | RESPIRATION RATE: 18 BRPM

## 2022-09-12 PROBLEM — F41.9 ANXIETY: Chronic | Status: ACTIVE | Noted: 2021-06-17

## 2022-09-12 PROBLEM — L97.513 ULCER OF RIGHT FOOT WITH NECROSIS OF MUSCLE: Status: RESOLVED | Noted: 2021-03-23 | Resolved: 2022-09-12

## 2022-09-12 PROBLEM — Z95.1 S/P CABG (CORONARY ARTERY BYPASS GRAFT): Chronic | Status: RESOLVED | Noted: 2022-06-24 | Resolved: 2022-09-12

## 2022-09-12 LAB
ANION GAP SERPL CALCULATED.3IONS-SCNC: 6 MMOL/L (ref 7–16)
BACTERIA BLD CULT: NORMAL
BACTERIA BLD CULT: NORMAL
BACTERIA SPEC ANAEROBE CULT: NORMAL
BASOPHILS # BLD AUTO: 0.04 K/UL (ref 0–0.2)
BASOPHILS NFR BLD AUTO: 0.8 % (ref 0–1)
BUN SERPL-MCNC: 15 MG/DL (ref 7–18)
BUN/CREAT SERPL: 16 (ref 6–20)
CALCIUM SERPL-MCNC: 8.8 MG/DL (ref 8.5–10.1)
CHLORIDE SERPL-SCNC: 106 MMOL/L (ref 98–107)
CO2 SERPL-SCNC: 32 MMOL/L (ref 21–32)
CREAT SERPL-MCNC: 0.94 MG/DL (ref 0.7–1.3)
DIFFERENTIAL METHOD BLD: ABNORMAL
EGFR (NO RACE VARIABLE) (RUSH/TITUS): 91 ML/MIN/1.73M²
EOSINOPHIL # BLD AUTO: 0.43 K/UL (ref 0–0.5)
EOSINOPHIL NFR BLD AUTO: 8.3 % (ref 1–4)
ERYTHROCYTE [DISTWIDTH] IN BLOOD BY AUTOMATED COUNT: 11.9 % (ref 11.5–14.5)
GLUCOSE SERPL-MCNC: 142 MG/DL (ref 70–105)
GLUCOSE SERPL-MCNC: 142 MG/DL (ref 74–106)
GLUCOSE SERPL-MCNC: 233 MG/DL (ref 70–105)
HCT VFR BLD AUTO: 31.6 % (ref 40–54)
HGB BLD-MCNC: 10.5 G/DL (ref 13.5–18)
IMM GRANULOCYTES # BLD AUTO: 0.04 K/UL (ref 0–0.04)
IMM GRANULOCYTES NFR BLD: 0.8 % (ref 0–0.4)
LYMPHOCYTES # BLD AUTO: 1.96 K/UL (ref 1–4.8)
LYMPHOCYTES NFR BLD AUTO: 38 % (ref 27–41)
MCH RBC QN AUTO: 30.4 PG (ref 27–31)
MCHC RBC AUTO-ENTMCNC: 33.2 G/DL (ref 32–36)
MCV RBC AUTO: 91.6 FL (ref 80–96)
MONOCYTES # BLD AUTO: 0.48 K/UL (ref 0–0.8)
MONOCYTES NFR BLD AUTO: 9.3 % (ref 2–6)
MPC BLD CALC-MCNC: 9.2 FL (ref 9.4–12.4)
NEUTROPHILS # BLD AUTO: 2.21 K/UL (ref 1.8–7.7)
NEUTROPHILS NFR BLD AUTO: 42.8 % (ref 53–65)
NRBC # BLD AUTO: 0 X10E3/UL
NRBC, AUTO (.00): 0 %
PLATELET # BLD AUTO: 245 K/UL (ref 150–400)
POTASSIUM SERPL-SCNC: 5.1 MMOL/L (ref 3.5–5.1)
RBC # BLD AUTO: 3.45 M/UL (ref 4.6–6.2)
SODIUM SERPL-SCNC: 139 MMOL/L (ref 136–145)
VANCOMYCIN TROUGH SERPL-MCNC: 23.6 ΜG/ML (ref 10–20)
WBC # BLD AUTO: 5.16 K/UL (ref 4.5–11)

## 2022-09-12 PROCEDURE — 85025 COMPLETE CBC W/AUTO DIFF WBC: CPT | Performed by: SURGERY

## 2022-09-12 PROCEDURE — 99239 PR HOSPITAL DISCHARGE DAY,>30 MIN: ICD-10-PCS | Mod: ,,, | Performed by: HOSPITALIST

## 2022-09-12 PROCEDURE — 63600175 PHARM REV CODE 636 W HCPCS

## 2022-09-12 PROCEDURE — 63600175 PHARM REV CODE 636 W HCPCS: Performed by: FAMILY MEDICINE

## 2022-09-12 PROCEDURE — 99239 HOSP IP/OBS DSCHRG MGMT >30: CPT | Mod: ,,, | Performed by: HOSPITALIST

## 2022-09-12 PROCEDURE — 80202 ASSAY OF VANCOMYCIN: CPT | Performed by: FAMILY MEDICINE

## 2022-09-12 PROCEDURE — 80048 BASIC METABOLIC PNL TOTAL CA: CPT | Performed by: SURGERY

## 2022-09-12 PROCEDURE — 94761 N-INVAS EAR/PLS OXIMETRY MLT: CPT

## 2022-09-12 PROCEDURE — 25000242 PHARM REV CODE 250 ALT 637 W/ HCPCS: Performed by: SURGERY

## 2022-09-12 PROCEDURE — 82962 GLUCOSE BLOOD TEST: CPT

## 2022-09-12 PROCEDURE — 99900035 HC TECH TIME PER 15 MIN (STAT)

## 2022-09-12 PROCEDURE — 25000003 PHARM REV CODE 250: Performed by: SURGERY

## 2022-09-12 PROCEDURE — 94640 AIRWAY INHALATION TREATMENT: CPT

## 2022-09-12 PROCEDURE — S4991 NICOTINE PATCH NONLEGEND: HCPCS | Performed by: SURGERY

## 2022-09-12 PROCEDURE — 25000003 PHARM REV CODE 250

## 2022-09-12 PROCEDURE — 25000003 PHARM REV CODE 250: Performed by: FAMILY MEDICINE

## 2022-09-12 PROCEDURE — 36415 COLL VENOUS BLD VENIPUNCTURE: CPT | Performed by: SURGERY

## 2022-09-12 RX ORDER — ESCITALOPRAM OXALATE 10 MG/1
10 TABLET ORAL DAILY
Qty: 30 TABLET | Refills: 1 | Status: SHIPPED | OUTPATIENT
Start: 2022-09-12 | End: 2022-11-11 | Stop reason: SDUPTHER

## 2022-09-12 RX ORDER — SULFAMETHOXAZOLE AND TRIMETHOPRIM 800; 160 MG/1; MG/1
1 TABLET ORAL 2 TIMES DAILY
Qty: 42 TABLET | Refills: 0 | Status: SHIPPED | OUTPATIENT
Start: 2022-09-12 | End: 2022-10-03

## 2022-09-12 RX ADMIN — AMLODIPINE BESYLATE 10 MG: 10 TABLET ORAL at 09:09

## 2022-09-12 RX ADMIN — IPRATROPIUM BROMIDE 0.5 MG: 0.5 SOLUTION RESPIRATORY (INHALATION) at 07:09

## 2022-09-12 RX ADMIN — PANTOPRAZOLE SODIUM 40 MG: 40 TABLET, DELAYED RELEASE ORAL at 09:09

## 2022-09-12 RX ADMIN — LISINOPRIL 10 MG: 10 TABLET ORAL at 09:09

## 2022-09-12 RX ADMIN — INSULIN DETEMIR 10 UNITS: 100 INJECTION, SOLUTION SUBCUTANEOUS at 09:09

## 2022-09-12 RX ADMIN — METOPROLOL SUCCINATE 25 MG: 25 TABLET, FILM COATED, EXTENDED RELEASE ORAL at 09:09

## 2022-09-12 RX ADMIN — ESCITALOPRAM OXALATE 10 MG: 10 TABLET ORAL at 09:09

## 2022-09-12 RX ADMIN — GABAPENTIN 900 MG: 300 CAPSULE ORAL at 05:09

## 2022-09-12 RX ADMIN — VANCOMYCIN HYDROCHLORIDE 1500 MG: 5 INJECTION, POWDER, LYOPHILIZED, FOR SOLUTION INTRAVENOUS at 05:09

## 2022-09-12 RX ADMIN — NICOTINE 1 PATCH: 14 PATCH, EXTENDED RELEASE TRANSDERMAL at 09:09

## 2022-09-12 RX ADMIN — HYDROCODONE BITARTRATE AND ACETAMINOPHEN 1 TABLET: 10; 325 TABLET ORAL at 11:09

## 2022-09-12 RX ADMIN — ASPIRIN 81 MG CHEWABLE TABLET 81 MG: 81 TABLET CHEWABLE at 09:09

## 2022-09-12 RX ADMIN — HYDROXYZINE PAMOATE 25 MG: 25 CAPSULE ORAL at 09:09

## 2022-09-12 RX ADMIN — GABAPENTIN 900 MG: 300 CAPSULE ORAL at 11:09

## 2022-09-12 RX ADMIN — ATORVASTATIN CALCIUM 80 MG: 80 TABLET, FILM COATED ORAL at 09:09

## 2022-09-12 RX ADMIN — IPRATROPIUM BROMIDE 0.5 MG: 0.5 SOLUTION RESPIRATORY (INHALATION) at 12:09

## 2022-09-12 RX ADMIN — MORPHINE SULFATE 2 MG: 2 INJECTION, SOLUTION INTRAMUSCULAR; INTRAVENOUS at 02:09

## 2022-09-12 RX ADMIN — HYDROCODONE BITARTRATE AND ACETAMINOPHEN 1 TABLET: 10; 325 TABLET ORAL at 04:09

## 2022-09-12 RX ADMIN — CLOPIDOGREL 75 MG: 75 TABLET, FILM COATED ORAL at 09:09

## 2022-09-12 NOTE — PROGRESS NOTES
Vancomycin trough was not within target range. Changing to 1750mg q18hrs, Trough with the 2nd new dose and frequency. Monitoring daily to make the necessary changes.

## 2022-09-12 NOTE — PROGRESS NOTES
GENERAL SURGERY  Afebrile, no leukocytosis  Wound clean S/P debridement  Cx MRSA    Ok to DC from surgery standpoint with bactrim or levaquin  Instructed on wound care  Follow up with me 2 weeks for suture removal  PWB LEFT foot ok to use crutches

## 2022-09-12 NOTE — HOSPITAL COURSE
9/6: Pt started with Vanc and Zosyn IV for R plantar diabetic ulcer with concern for osteomyelitis  9/7: BLE venous doppler negative.  9/8: I&D performed by Dr. Matos.   9/11: Wound Cx grew MRSA suspectible to Vanc and Bactrim.   9/12: NGTD for Blood Cx x 5 days. Pt has reached maximum benefit of inpatient stay and is discharged with Bactrim x 21 days. F/U with Desire Luna, surgery, in 2 weeks and PCP in 1 week.

## 2022-09-12 NOTE — PLAN OF CARE
SS received order for home IV abx. SS faxed order to [facility]. SS will need order with specific medication/dose/frequency/stop date, Dr. Kyle notified.     1301 SS spoke with Dr. Kyle. Patient no longer needs IV abx, SS to disregard home IV abx order. SS notified Katarina at [facility].    84

## 2022-09-12 NOTE — PLAN OF CARE
[facility] - [facility] [address] [facility]  Discharge Final Note    Primary Care Provider: Marquez Huff MD    Expected Discharge Date: 9/12/2022    Final Discharge Note (most recent)       Final Note - 09/12/22 1548          Final Note    Assessment Type Final Discharge Note     Anticipated Discharge Disposition Home-Health Care [facility]     What phone number can be called within the next 1-3 days to see how you are doing after discharge? [phone]        Post-Acute Status    Post-Acute Authorization Home Health     Home Health Status Set-up Complete/Auth obtained     Patient choice form signed by patient/caregiver List with quality metrics by geographic area provided;List from CMS Compare;List from System Post-Acute Care     Discharge Delays None known at this time                   Patient discharged home today. Discharge information faxed to [facility].     Important Message from Medicare  Important Message from Medicare regarding Discharge Appeal Rights: Given to patient/caregiver, Explained to patient/caregiver, Signed/date by patient/caregiver     Date IMM was signed: 09/12/22  Time IMM was signed: 1104     Follow-up providers       STELLA Sauceda   Specialty: Surgery, Emergency Medicine    [address] [place] [address] [facility] [address]   Phone: [phone]       Next Steps: Schedule an appointment as soon as possible for a visit on 9/29/2022    Instructions: post op f/u; 1:00 pm    Marquez Huff MD   Specialty: Family Medicine   Relationship: PCP - General    [address] [facility].  [facility] [place] [address]   Phone: [phone]       Next Steps: Follow up on 9/20/2022    Instructions: 3:00 pm              After-discharge care                [facility] Medical [facility]       [facility]   Service: [facility] Health Services    [address] [facility] [address]   Phone: [phone]

## 2022-09-12 NOTE — PLAN OF CARE
Problem: Adult Inpatient Plan of Care  Goal: Plan of Care Review  Outcome: Met  Goal: Patient-Specific Goal (Individualized)  Outcome: Met  Goal: Absence of Hospital-Acquired Illness or Injury  Outcome: Met  Goal: Optimal Comfort and Wellbeing  Outcome: Met  Goal: Readiness for Transition of Care  Outcome: Met     Problem: Diabetes Comorbidity  Goal: Blood Glucose Level Within Targeted Range  Outcome: Met     Problem: Impaired Wound Healing  Goal: Optimal Wound Healing  Outcome: Met     Problem: Fall Injury Risk  Goal: Absence of Fall and Fall-Related Injury  Outcome: Met     Problem: Gas Exchange Impaired  Goal: Optimal Gas Exchange  Outcome: Met     Problem: Skin Injury Risk Increased  Goal: Skin Health and Integrity  Outcome: Met     Adequate for discharge

## 2022-09-13 ENCOUNTER — TELEPHONE (OUTPATIENT)
Dept: FAMILY MEDICINE | Facility: CLINIC | Age: 62
End: 2022-09-13

## 2022-09-13 LAB
ESTROGEN SERPL-MCNC: NORMAL PG/ML
INSULIN SERPL-ACNC: NORMAL U[IU]/ML
LAB AP GROSS DESCRIPTION: NORMAL
LAB AP LABORATORY NOTES: NORMAL
T3RU NFR SERPL: NORMAL %

## 2022-09-13 NOTE — DISCHARGE SUMMARY
[facility] - [address] [place] [address] Medicine  Discharge Summary      Patient Name: Navdeep Avery  MRN: [id number]  Patient Class: IP- Inpatient  Admission Date: 9/6/2022  Hospital Length of Stay: 5 days  Discharge Date and Time:  09/12/2022 8:30 PM  Attending Physician: Dr. Valeriano Kyle MD   Discharging Provider: Dinora Pollock DO  Primary Care Provider: Marquez Huff MD      HPI:   61 yo male presents to [facility] ED c/o R ankle pain x 3 days after he tripped. Ankle pain is all over his ankle but worst on medial R ankle. Pain is 8.5/10, radiates to R knee, dull and sharp in quality, intermittent, worse with weight-bearing. without relieving factors and no response to Norco. Denies fall. R foot ulcer on plantar surface has been present for 2 years. He has had debridement and 2 toes amputated. [facility] and was last seen 5 days ago. Has home health nurse that comes once a week. He has brought his BS down from 500s to 100s. Denies fevers, chills. Uses home oxygen 2 L as needed.     ED Course: Vanc and Zosyn given.     Pt admitted to [facility] for treatment of diabetic foot ulcer infection.       Procedure(s) (LRB):  IRRIGATION AND DEBRIDEMENT, LOWER EXTREMITY (Right)      Hospital Course:   9/6: Pt started with Vanc and Zosyn IV for R plantar diabetic ulcer with concern for osteomyelitis  9/7: BLE venous doppler negative.  9/8: I&D performed by Dr. Matos.   9/11: Wound Cx grew MRSA suspectible to Vanc and Bactrim.   9/12: NGTD for Blood Cx x 5 days. Pt has reached maximum benefit of inpatient stay and is discharged with Bactrim x 21 days. F/U with Desire Luna, surgery, in 2 weeks and PCP in 1 week.        Goals of Care Treatment Preferences:  Code Status: Full Code      Consults:   Consults (From admission, onward)        Status Ordering Provider     Inpatient consult to Social Work  Once        Provider:  (Not yet assigned)    Completed MUKUL SCHAFER      Inpatient consult to Social Work  Once        Provider:  (Not yet assigned)    Completed MUKUL SCHAFER     Pharmacy to dose Vancomycin consult  Once        Provider:  (Not yet assigned)    Completed HERB GARRISON     General Surgery  Once        Provider:  (Not yet assigned)    Completed HERB GARRISON.          * Diabetic foot ulcer with osteomyelitis  Patient's FSGs are uncontrolled due to hyperglycemia on current medication regimen.  Last A1c reviewed-   Lab Results   Component Value Date    HGBA1C 9.6 (H) 07/20/2022     Most recent fingerstick glucose reviewed- No results for input(s): POCTGLUCOSE in the last 24 hours.  Current correctional scale  Low  Maintain anti-hyperglycemic dose as follows-   Antihyperglycemics (From admission, onward)    Start     Stop Route Frequency Ordered    09/11/22 1115  insulin detemir U-100 injection 10 Units         -- SubQ Daily 09/11/22 1007    09/09/22 1809  insulin aspart U-100 injection 1-10 Units         -- SubQ Before meals & nightly PRN 09/09/22 1810    09/08/22 2100  insulin detemir U-100 injection 20 Units         -- SubQ Nightly 09/08/22 0053        Hold Oral hypoglycemics while patient is in the hospital.  Foot Xray- Some osseous irregularity at the great toe amputation site as well as increased soft tissue swelling. The findings are suspicious for osteomyelitis.   ESR 78. CRP 2.96.   Vanc and zosyn started on 9/7. Patient will likely need 6 weeks of Abx coverage due to osteomyelitis findings.  Blood Cx no growth at 24hrs.   Surgery was preformed, and was able to get some bone and sent to path for analysis, awaiting cultures and sensitivities to adjust abx regimen.   BLE venous doppler no evidence of DVT present  (9/7)  BLE arterial doppler showed normal JACQUELIN 7/29/22  Per SS, they will need to know home abx dose and regimen in order to contact to establish home health and home abx.    0910  Patient is day 2, S/P ID and is being followed by general surgery             Wound culture showed light growth staph aureus, pending sensitivity            Continue antibiotics and wound care  9/11- Patient is on day 3 S/P ID and is being followed by general surgery.    Wound culture grows MRSA, sensitive to vanc, will discontinue zosyn. Patient on vanc until 10/18   Continue antibiotics and wound care, we will add 10 units of levemir in the morning due to rising sugars in the evening.       Final Active Diagnoses:    Diagnosis Date Noted POA    PRINCIPAL PROBLEM:  Diabetic foot ulcer with osteomyelitis [E11.621, E11.69, L97.509, M86.9] 06/17/2021 Yes    Sleep apnea [G47.30] 09/07/2022 Yes    Diabetic peripheral neuropathy [E11.42]  Yes    COPD (chronic obstructive pulmonary disease) [J44.9] 06/24/2022 Yes    Anxiety [F41.9] 06/17/2021 Yes     Chronic    Dependence on nicotine from cigarettes [F17.210] 03/23/2021 Yes    Mixed hyperlipidemia [E78.2] 03/23/2021 Yes    Benign hypertension [I10] 03/23/2021 Yes      Problems Resolved During this Admission:    Diagnosis Date Noted Date Resolved POA    Osteomyelitis of right foot [M86.9] 09/07/2022 09/07/2022 Yes    S/P CABG (coronary artery bypass graft) [Z95.1] 06/24/2022 09/12/2022 Not Applicable     Chronic    Diabetic ulcer of midfoot associated with diabetes mellitus due to underlying condition, with necrosis of muscle [E08.621, L97.403] 03/23/2021 09/07/2022 Yes    Ulcer of right foot with necrosis of muscle [L97.513] 03/23/2021 09/12/2022 Yes       Discharged Condition: stable    Disposition: Home or Self Care    Follow Up:   Contact information for follow-up providers     STELLA Sauceda. Schedule an appointment as soon as possible for a visit on 9/29/2022.    Specialties: Surgery, Emergency Medicine  Why: post op f/u; 1:00 pm  Contact information:  [address] [place] [address] [facility] [address]  [phone]             Marquez Hfuf MD Follow up on 9/20/2022.     Specialty: Family Medicine  Why: 3:00 pm  Contact information:  [address]  [facility] [address]  [phone]                   Contact information for after-discharge care     Home Medical Care     [facility] .    Service: Home Health Services  Contact information:  [address] [facility] [address]  [phone]                           Patient Instructions:   No discharge procedures on file.    Significant Diagnostic Studies: Labs: All labs within the past 24 hours have been reviewed    Pending Diagnostic Studies:     Procedure Component Value Units Date/Time    EXTRA TUBES [[id number]] Collected: 09/06/22 2225    Order Status: Sent Lab Status: In process Updated: 09/06/22 2253    Specimen: Blood, Venous     Narrative:      The following orders were created for panel order EXTRA TUBES.  Procedure                               Abnormality         Status                     ---------                               -----------         ------                     Light Blue Top Hold[[id number]]                              In process                 Light Green Top Hold[[id number]]                             In process                   Please view results for these tests on the individual orders.    EXTRA TUBES [[id number]] Collected: 09/06/22 2225    Order Status: Sent Lab Status: In process Updated: 09/06/22 2252    Specimen: Blood, Venous     Narrative:      The following orders were created for panel order EXTRA TUBES.  Procedure                               Abnormality         Status                     ---------                               -----------         ------                     Lavender Top Hold[[id number]]                                In process                   Please view results for these tests on the individual orders.         Medications:  Reconciled Home Medications:      Medication List      START taking these  medications    insulin detemir U-100 100 unit/mL injection  Commonly known as: Levemir  Inject 20 Units into the skin every evening AND 10 Units every morning.     sulfamethoxazole-trimethoprim 800-160mg 800-160 mg Tab  Commonly known as: BACTRIM DS  Take 1 tablet by mouth 2 (two) times daily. for 21 days        CONTINUE taking these medications    * albuterol 2.5 mg /3 mL (0.083 %) nebulizer solution  Commonly known as: PROVENTIL  USE 1 VIAL IN NEBULIZER 3 TIMES DAILY     * albuterol 90 mcg/actuation inhaler  Commonly known as: PROVENTIL/VENTOLIN HFA  Inhale 2 puffs into the lungs 2 (two) times daily as needed for Wheezing.     amLODIPine 10 MG tablet  Commonly known as: NORVASC  Take 1 tablet (10 mg total) by mouth once daily.     aspirin 81 MG Chew  Take 81 mg by mouth once daily.     clopidogreL 75 mg tablet  Commonly known as: PLAVIX  Take 1 tablet (75 mg total) by mouth once daily.     diclofenac sodium 1 % Gel  Commonly known as: VOLTAREN  Apply 1 g topically 4 (four) times daily as needed (pain).     EScitalopram oxalate 10 MG tablet  Commonly known as: LEXAPRO  Take 1 tablet (10 mg total) by mouth once daily.     fluticasone propionate 50 mcg/actuation nasal spray  Commonly known as: FLONASE  2 sprays (100 mcg total) by Each Nostril route once daily.     gabapentin 300 MG capsule  Commonly known as: NEURONTIN  Take 3 capsules (900 mg total) by mouth every 6 (six) hours.     HYDROcodone-acetaminophen  mg per tablet  Commonly known as: NORCO  Take 1 tablet by mouth every 8 (eight) hours as needed for Pain.     hydrOXYzine pamoate 25 MG Cap  Commonly known as: VISTARIL  Take 1 capsule (25 mg total) by mouth once daily. Take nightly for sleep     JARDIANCE 10 mg tablet  Generic drug: empagliflozin  Take 1 tablet (10 mg total) by mouth once daily.     lisinopriL 10 MG tablet  Take 1 tablet (10 mg total) by mouth once daily.     metoprolol succinate 25 MG 24 hr tablet  Commonly known as: TOPROL-XL  Take 1  tablet (25 mg total) by mouth once daily.     nitroGLYCERIN 0.4 MG SL tablet  Commonly known as: NITROSTAT  Place 1 tablet (0.4 mg total) under the tongue every 5 (five) minutes as needed for Chest pain.     pantoprazole 40 MG tablet  Commonly known as: PROTONIX  Take 1 tablet (40 mg total) by mouth once daily.     rosuvastatin 20 MG tablet  Commonly known as: CRESTOR  Take 1 tablet (20 mg total) by mouth every evening.     SPIRIVA RESPIMAT 2.5 mcg/actuation inhaler  Generic drug: tiotropium bromide  Inhale 1 puff into the lungs once daily.         * This list has 2 medication(s) that are the same as other medications prescribed for you. Read the directions carefully, and ask your doctor or other care provider to review them with you.            STOP taking these medications    insulin aspart protamine-insulin aspart 100 unit/mL (70-30) Inpn pen  Commonly known as: NovoLOG Mix 70-30FlexPen U-100     TOUJEO MAX U-300 SOLOSTAR 300 unit/mL (3 mL) insulin pen  Generic drug: insulin glargine U-300 conc            Time spent on the discharge of patient: 45 minutes         Dinora Pollock DO  Department of Hospital Medicine  [facility] - [facility] [address] [facility]

## 2022-09-15 NOTE — PROGRESS NOTES
Subjective:         Patient ID: [id number] Avery is a 62 y.o. male.    Chief Complaint: Foot Pain, Knee Pain, and Shoulder Pain      Pain  This is a chronic problem. The current episode started more than 1 year ago. The problem occurs daily. The problem has been waxing and waning. Associated symptoms include arthralgias and neck pain. Pertinent negatives include no change in bowel habit, chest pain, chills, coughing, fatigue, fever, rash, sore throat, vertigo or vomiting.   Review of Systems   Constitutional:  Negative for activity change, chills, fatigue, fever and unexpected weight change.   HENT:  Negative for drooling, ear discharge, ear pain, facial swelling, nosebleeds, sore throat, trouble swallowing, voice change and goiter.    Eyes:  Negative for photophobia, pain, discharge, redness and visual disturbance.   Respiratory:  Negative for apnea, cough, choking, chest tightness, shortness of breath, wheezing and stridor.    Cardiovascular:  Negative for chest pain, palpitations and leg swelling.   Gastrointestinal:  Negative for abdominal distention, change in bowel habit, diarrhea, rectal pain, vomiting, fecal incontinence and change in bowel habit.   Endocrine: Negative for cold intolerance, heat intolerance, polydipsia, polyphagia and polyuria.   Genitourinary:  Negative for bladder incontinence, dysuria, flank pain and frequency.   Musculoskeletal:  Positive for arthralgias, back pain, leg pain, neck pain and neck stiffness.   Integumentary:  Negative for color change, pallor and rash.   Neurological:  Negative for dizziness, vertigo, seizures, syncope, facial asymmetry, speech difficulty, light-headedness, coordination difficulties, memory loss and coordination difficulties.   Hematological:  Negative for adenopathy. Does not bruise/bleed easily.   Psychiatric/Behavioral:  Negative for agitation, behavioral problems, confusion, decreased concentration, dysphoric mood, hallucinations, self-injury and  suicidal ideas. The patient is not nervous/anxious and is not hyperactive.          Past Medical History:   Diagnosis Date    Anemia     Anxiety     Arthritis     Atherosclerotic heart disease of native coronary artery with other forms of angina pectoris     Atrophic rhinitis     Carpal tunnel syndrome     Cellulitis of right lower extremity     COPD (chronic obstructive pulmonary disease)     Coronary arteriosclerosis     COVID 02/02/2022    Depression     Diabetic ulcer of right foot associated with diabetes mellitus due to underlying condition, with bone involvement without evidence of necrosis     GERD (gastroesophageal reflux disease)     Hyperlipidemia     Hypertension     Insomnia     MI (myocardial infarction)     Neuropathy     Peripheral vascular disease, unspecified     Right foot ulcer, with fat layer exposed 01/07/2015    Sleep apnea     Type 2 diabetes mellitus      Past Surgical History:   Procedure Laterality Date    AMPUTATION      ANGIOPLASTY      CARDIAC CATHETERIZATION      CORONARY ARTERY BYPASS GRAFT      CORONARY STENT PLACEMENT      DEBRIDEMENT      Right foot debridment with hospital stay and IV Abx    DEBRIDEMENT OF LOWER EXTREMITY Right 06/27/2022    Procedure: DEBRIDEMENT, LOWER EXTREMITY;  Surgeon: Forrest Kiser MD;  Location: [facility];  Service: General;  Laterality: Right;  right foot    IRRIGATION AND DEBRIDEMENT OF LOWER EXTREMITY Right 9/8/2022    Procedure: IRRIGATION AND DEBRIDEMENT, LOWER EXTREMITY;  Surgeon: Selina Matos MD;  Location: [facility];  Service: General;  Laterality: Right;    SHOULDER OPEN ROTATOR CUFF REPAIR Left     SPINE SURGERY      VASCULAR SURGERY       Social History     Socioeconomic History    Marital status:    Occupational History    Occupation: Retired   Tobacco Use    Smoking status: Every Day     Packs/day: 0.25     Types: Cigarettes    Smokeless tobacco: Never   Substance and Sexual Activity    Alcohol use: Not Currently    Drug  "use: Yes     Types: Oxycodone    Sexual activity: Yes     Family History   Problem Relation Age of Onset    Arthritis Mother     Hypertension Mother     Learning disabilities Mother     Alcohol abuse Father     Early death Father     Heart disease Father     Cancer Sister     Diabetes Sister     Heart disease Maternal Grandfather     COPD Paternal Grandfather     Heart disease Son      Review of patient's allergies indicates:  No Known Allergies     Objective:  Vitals:    09/16/22 0832 09/16/22 0833   BP: 121/63    Pulse: 64    Weight: 76.7 kg (169 lb)    Height: 5' 9.6" (1.768 m)    PainSc:   6   6         Physical Exam  Vitals and nursing note reviewed. Exam conducted with a chaperone present.   Constitutional:       General: He is awake.      Appearance: Normal appearance. He is not ill-appearing, toxic-appearing or diaphoretic.   HENT:      Head: Normocephalic and atraumatic.      Nose: Nose normal.      Mouth/Throat:      Mouth: Mucous membranes are moist.      Pharynx: Oropharynx is clear.   Eyes:      Conjunctiva/sclera: Conjunctivae normal.      Pupils: Pupils are equal, round, and reactive to light.   Cardiovascular:      Rate and Rhythm: Normal rate.   Pulmonary:      Effort: Pulmonary effort is normal. No respiratory distress.   Abdominal:      Palpations: Abdomen is soft.   Musculoskeletal:         General: Normal range of motion.      Cervical back: Normal range of motion and neck supple.      Thoracic back: Tenderness present.      Lumbar back: Tenderness present.   Skin:     General: Skin is warm and dry.      Coloration: Skin is not jaundiced or pale.   Neurological:      General: No focal deficit present.      Mental Status: He is alert and oriented to person, place, and time. Mental status is at baseline.      Cranial Nerves: No cranial nerve deficit (II-XII).   Psychiatric:         Mood and Affect: Mood normal.         Behavior: Behavior normal. Behavior is cooperative.         Thought Content: " Thought content normal.         X-Ray Foot Complete Right  Narrative: EXAMINATION:  XR FOOT COMPLETE 3 VIEW RIGHT    CLINICAL HISTORY:  . Pain in right foot    TECHNIQUE:  AP, lateral, and oblique views of the right foot were performed.    COMPARISON:  Foot radiograph 08/15/2022    FINDINGS:  Previous amputation of the great toe at the base of the proximal phalanx and of the 3rd toe at the metatarsophalangeal joint as well.  There is some irregularity involving the great toe amputation site more so than what was appreciated on the prior study along with worsening soft tissue swelling in this location.  There is an old deformity mid diaphysis of the 5th metatarsal.  There is no acute fracture.  There appears to be a chronic subluxation involving the 2nd toe metatarsophalangeal joint unchanged.  Impression: Some osseous irregularity at the great toe amputation site as well as increased soft tissue swelling.  The findings are suspicious for osteomyelitis.    Electronically signed by: Jeffery Monae  Date:    09/07/2022  Time:    07:55  X-Ray Ankle Complete Right  Narrative: EXAMINATION:  XR ANKLE COMPLETE 3 VIEW RIGHT    CLINICAL HISTORY:  Unspecified injury of right ankle, initial encounter    TECHNIQUE:  AP, lateral, and oblique images of the right ankle were performed.    COMPARISON:  None    FINDINGS:  There is no fracture identified.  No dislocation.  Degenerative changes.  The joint spaces are maintained.  Soft tissues are unremarkable.  Impression: Degenerative changes.  No acute fracture.    Electronically signed by: Jeffery Monae  Date:    09/07/2022  Time:    07:49  US Lower Extremity Veins Right  Narrative: EXAMINATION:  US LOWER EXTREMITY VEINS RIGHT    CLINICAL HISTORY:  edema of one leg;    COMPARISON:  None.    TECHNIQUE:  Grayscale, spectral, and color Doppler interrogation of the right lower extremity veins was performed. Augmentation and compression was performed.    FINDINGS:  Grayscale, color Doppler, and  pulsed Doppler evaluation of the veins of the right lower extremity demonstrate no evidence of deep venous thrombosis.  Moderately prominent nonspecific right inguinal lymph node.  Impression: No evidence of deep venous thrombosis in the right lower extremity. Moderately prominent nonspecific right inguinal lymph node.    Point of Service: [facility]    Electronically signed by: Spencer Christensen  Date:    09/07/2022  Time:    07:43       No results displayed because visit has over 200 results.      Admission on 08/15/2022, Discharged on 08/20/2022   Component Date Value Ref Range Status    POC Glucose 08/15/2022 184 (H)  70 - 105 mg/dL Final    POC Glucose 08/15/2022 241 (H)  70 - 105 mg/dL Final    Sodium 08/16/2022 135 (L)  136 - 145 mmol/L Final    Potassium 08/16/2022 4.2  3.5 - 5.1 mmol/L Final    Chloride 08/16/2022 103  98 - 107 mmol/L Final    CO2 08/16/2022 27  21 - 32 mmol/L Final    Anion Gap 08/16/2022 9  7 - 16 mmol/L Final    Glucose 08/16/2022 151 (H)  74 - 106 mg/dL Final    BUN 08/16/2022 20 (H)  7 - 18 mg/dL Final    Creatinine 08/16/2022 0.86  0.70 - 1.30 mg/dL Final    BUN/Creatinine Ratio 08/16/2022 23 (H)  6 - 20 Final    Calcium 08/16/2022 9.0  8.5 - 10.1 mg/dL Final    eGFR 08/16/2022 97  >=60 mL/min/1.73m² Final    POC Glucose 08/16/2022 221 (H)  70 - 105 mg/dL Final    WBC 08/16/2022 5.51  4.50 - 11.00 K/uL Final    RBC 08/16/2022 3.91 (L)  4.60 - 6.20 M/uL Final    Hemoglobin 08/16/2022 12.1 (L)  13.5 - 18.0 g/dL Final    Hematocrit 08/16/2022 35.6 (L)  40.0 - 54.0 % Final    MCV 08/16/2022 91.0  80.0 - 96.0 fL Final    MCH 08/16/2022 30.9  27.0 - 31.0 pg Final    MCHC 08/16/2022 34.0  32.0 - 36.0 g/dL Final    RDW 08/16/2022 11.9  11.5 - 14.5 % Final    Platelet Count 08/16/2022 244  150 - 400 K/uL Final    MPV 08/16/2022 9.0 (L)  9.4 - 12.4 fL Final    Neutrophils % 08/16/2022 51.0 (L)  53.0 - 65.0 % Final    Lymphocytes % 08/16/2022 33.0  27.0 - 41.0 % Final    Monocytes %  08/16/2022 10.0 (H)  2.0 - 6.0 % Final    Eosinophils % 08/16/2022 5.1 (H)  1.0 - 4.0 % Final    Basophils % 08/16/2022 0.7  0.0 - 1.0 % Final    Immature Granulocytes % 08/16/2022 0.2  0.0 - 0.4 % Final    nRBC, Auto 08/16/2022 0.0  <=0.0 % Final    Neutrophils, Abs 08/16/2022 2.81  1.80 - 7.70 K/uL Final    Lymphocytes, Absolute 08/16/2022 1.82  1.00 - 4.80 K/uL Final    Monocytes, Absolute 08/16/2022 0.55  0.00 - 0.80 K/uL Final    Eosinophils, Absolute 08/16/2022 0.28  0.00 - 0.50 K/uL Final    Basophils, Absolute 08/16/2022 0.04  0.00 - 0.20 K/uL Final    Immature Granulocytes, Absolute 08/16/2022 0.01  0.00 - 0.04 K/uL Final    nRBC, Absolute 08/16/2022 0.00  <=0.00 x10e3/uL Final    Diff Type 08/16/2022 Auto   Final    POC Glucose 08/16/2022 117 (H)  70 - 105 mg/dL Final    POC Glucose 08/16/2022 152 (H)  70 - 105 mg/dL Final    POC Glucose 08/16/2022 120 (H)  70 - 105 mg/dL Final    Sodium 08/17/2022 138  136 - 145 mmol/L Final    Potassium 08/17/2022 5.0  3.5 - 5.1 mmol/L Final    Chloride 08/17/2022 105  98 - 107 mmol/L Final    CO2 08/17/2022 32  21 - 32 mmol/L Final    Anion Gap 08/17/2022 6 (L)  7 - 16 mmol/L Final    Glucose 08/17/2022 130 (H)  74 - 106 mg/dL Final    BUN 08/17/2022 19 (H)  7 - 18 mg/dL Final    Creatinine 08/17/2022 1.01  0.70 - 1.30 mg/dL Final    BUN/Creatinine Ratio 08/17/2022 19  6 - 20 Final    Calcium 08/17/2022 9.4  8.5 - 10.1 mg/dL Final    eGFR 08/17/2022 83  >=60 mL/min/1.73m² Final    POC Glucose 08/17/2022 144 (H)  70 - 105 mg/dL Final    POC Glucose 08/17/2022 268 (H)  70 - 105 mg/dL Final    Sodium 08/18/2022 138  136 - 145 mmol/L Final    Potassium 08/18/2022 4.5  3.5 - 5.1 mmol/L Final    Chloride 08/18/2022 104  98 - 107 mmol/L Final    CO2 08/18/2022 28  21 - 32 mmol/L Final    Anion Gap 08/18/2022 11  7 - 16 mmol/L Final    Glucose 08/18/2022 224 (H)  74 - 106 mg/dL Final    BUN 08/18/2022 19 (H)  7 - 18 mg/dL Final    Creatinine 08/18/2022 0.93  0.70 - 1.30 mg/dL  Final    BUN/Creatinine Ratio 08/18/2022 20  6 - 20 Final    Calcium 08/18/2022 8.9  8.5 - 10.1 mg/dL Final    eGFR 08/18/2022 92  >=60 mL/min/1.73m² Final    Vancomycin, Trough 08/18/2022 11.6  10.0 - 20.0 µg/mL Final    POC Glucose 08/18/2022 246 (H)  70 - 105 mg/dL Final    POC Glucose 08/18/2022 291 (H)  70 - 105 mg/dL Final    POC Glucose 08/18/2022 158 (H)  70 - 105 mg/dL Final    POC Glucose 08/18/2022 346 (H)  70 - 105 mg/dL Final    POC Glucose 08/18/2022 149 (H)  70 - 105 mg/dL Final    Sodium 08/19/2022 138  136 - 145 mmol/L Final    Potassium 08/19/2022 4.1  3.5 - 5.1 mmol/L Final    Chloride 08/19/2022 104  98 - 107 mmol/L Final    CO2 08/19/2022 29  21 - 32 mmol/L Final    Anion Gap 08/19/2022 9  7 - 16 mmol/L Final    Glucose 08/19/2022 146 (H)  74 - 106 mg/dL Final    BUN 08/19/2022 16  7 - 18 mg/dL Final    Creatinine 08/19/2022 0.83  0.70 - 1.30 mg/dL Final    BUN/Creatinine Ratio 08/19/2022 19  6 - 20 Final    Calcium 08/19/2022 9.0  8.5 - 10.1 mg/dL Final    eGFR 08/19/2022 98  >=60 mL/min/1.73m² Final    POC Glucose 08/19/2022 199 (H)  70 - 105 mg/dL Final    POC Glucose 08/19/2022 113 (H)  70 - 105 mg/dL Final    POC Glucose 08/19/2022 338 (H)  70 - 105 mg/dL Final    POC Glucose 08/19/2022 186 (H)  70 - 105 mg/dL Final    Sodium 08/20/2022 140  136 - 145 mmol/L Final    Potassium 08/20/2022 4.8  3.5 - 5.1 mmol/L Final    Chloride 08/20/2022 103  98 - 107 mmol/L Final    CO2 08/20/2022 29  21 - 32 mmol/L Final    Anion Gap 08/20/2022 13  7 - 16 mmol/L Final    Glucose 08/20/2022 227 (H)  74 - 106 mg/dL Final    BUN 08/20/2022 15  7 - 18 mg/dL Final    Creatinine 08/20/2022 1.01  0.70 - 1.30 mg/dL Final    BUN/Creatinine Ratio 08/20/2022 15  6 - 20 Final    Calcium 08/20/2022 8.8  8.5 - 10.1 mg/dL Final    eGFR 08/20/2022 83  >=60 mL/min/1.73m² Final    POC Glucose 08/20/2022 214 (H)  70 - 105 mg/dL Final    POC Glucose 08/20/2022 106 (H)  70 - 105 mg/dL Final   Office Visit on 08/15/2022    Component Date Value Ref Range Status    Culture, Wound/Abscess 08/15/2022 Light Growth Methicillin resistant Staphylococcus aureus (A)   Final    Culture, Anaerobe 08/15/2022 No Anaerobes Isolated   Final   Lab Requisition on 07/20/2022   Component Date Value Ref Range Status    Hemoglobin A1C 07/20/2022 9.6 (H)  4.5 - 6.6 % Final    Estimated Average Glucose 07/20/2022 234  mg/dL Final   Office Visit on 07/06/2022   Component Date Value Ref Range Status    Triglycerides 07/06/2022 161 (H)  35 - 150 mg/dL Final    Cholesterol 07/06/2022 161  0 - 200 mg/dL Final    HDL Cholesterol 07/06/2022 38 (L)  40 - 60 mg/dL Final    Cholesterol/HDL Ratio (Risk Factor) 07/06/2022 4.2   Final    Non-HDL 07/06/2022 123  mg/dL Final    LDL Calculated 07/06/2022 91  mg/dL Final    LDL/HDL 07/06/2022 2.4   Final    VLDL 07/06/2022 32  mg/dL Final   No results displayed because visit has over 200 results.      Admission on 06/27/2022, Discharged on 06/27/2022   Component Date Value Ref Range Status    Case Report 06/27/2022    Final                    Value:Surgical Pathology                                Case: [id number]                                   Authorizing Provider:  Forrest Kiser MD        Collected:           06/27/2022 09:18 AM          Ordering Location:     [facility]   Received:            06/27/2022 10:10 AM                                 Services                                                                     Pathologist:           Eros Arnold MD                                                            Specimen:    Foot, Right, right foot tissue                                                             Final Diagnosis 06/27/2022    Final                    Value:This result contains rich text formatting which cannot be displayed here.    Gross Description 06/27/2022    Final                    Value:This result contains rich text formatting which cannot be displayed here.     Microscopic Description 06/27/2022    Final                    Value:This result contains rich text formatting which cannot be displayed here.    Laboratory Notes 06/27/2022    Final                    Value:This result contains rich text formatting which cannot be displayed here.    POC Glucose 06/27/2022 97  70 - 105 mg/dL Final   Office Visit on 06/24/2022   Component Date Value Ref Range Status    Culture, Wound/Abscess 06/24/2022 Light Growth Escherichia coli (A)   Final    Culture, Wound/Abscess 06/24/2022 Heavy Growth Methicillin resistant Staphylococcus aureus (A)   Final    Culture, Anaerobe 06/24/2022 Finegoldia magna (A)   Final   Office Visit on 06/13/2022   Component Date Value Ref Range Status    POC Amphetamines 06/13/2022 Negative  Negative, Inconclusive Final    POC Barbiturates 06/13/2022 Negative  Negative, Inconclusive Final    POC Benzodiazepines 06/13/2022 Negative  Negative, Inconclusive Final    POC Cocaine 06/13/2022 Negative  Negative, Inconclusive Final    POC THC 06/13/2022 Negative  Negative, Inconclusive Final    POC Methadone 06/13/2022 Negative  Negative, Inconclusive Final    POC Methamphetamine 06/13/2022 Negative  Negative, Inconclusive Final    POC Opiates 06/13/2022 Presumptive Positive (A)  Negative, Inconclusive Final    POC Oxycodone 06/13/2022 Negative  Negative, Inconclusive Final    POC Phencyclidine 06/13/2022 Negative  Negative, Inconclusive Final    POC Methylenedioxymethamphetamine * 06/13/2022 Negative  Negative, Inconclusive Final    POC Tricyclic Antidepressants 06/13/2022 Negative  Negative, Inconclusive Final    POC Buprenorphine 06/13/2022 Negative   Final     Acceptable 06/13/2022 Yes   Final    POC Temperature (Urine) 06/13/2022 92   Final   Lab Requisition on 04/15/2022   Component Date Value Ref Range Status    Sodium 04/15/2022 138  136 - 145 mmol/L Final    Potassium 04/15/2022 4.9  3.5 - 5.1 mmol/L Final    Chloride 04/15/2022 103  98 - 107  mmol/L Final    CO2 04/15/2022 33 (H)  21 - 32 mmol/L Final    Anion Gap 04/15/2022 7  7 - 16 mmol/L Final    Glucose 04/15/2022 242 (H)  74 - 106 mg/dL Final    BUN 04/15/2022 16  7 - 18 mg/dL Final    Creatinine 04/15/2022 1.01  0.70 - 1.30 mg/dL Final    BUN/Creatinine Ratio 04/15/2022 16  6 - 20 Final    Calcium 04/15/2022 9.8  8.5 - 10.1 mg/dL Final    Total Protein 04/15/2022 8.2  6.4 - 8.2 g/dL Final    Albumin 04/15/2022 3.8  3.5 - 5.0 g/dL Final    Globulin 04/15/2022 4.4 (H)  2.0 - 4.0 g/dL Final    A/G Ratio 04/15/2022 0.9   Final    Bilirubin, Total 04/15/2022 0.4  0.0 - 1.2 mg/dL Final    Alk Phos 04/15/2022 94  45 - 115 U/L Final    ALT 04/15/2022 29  16 - 61 U/L Final    AST 04/15/2022 18  15 - 37 U/L Final    eGFR 04/15/2022 79  >=60 mL/min/1.73m² Final    Triglycerides 04/15/2022 208 (H)  35 - 150 mg/dL Final    Cholesterol 04/15/2022 223 (H)  0 - 200 mg/dL Final    HDL Cholesterol 04/15/2022 36 (L)  40 - 60 mg/dL Final    Cholesterol/HDL Ratio (Risk Factor) 04/15/2022 6.2   Final    Non-HDL 04/15/2022 187  mg/dL Final    LDL Calculated 04/15/2022 145  mg/dL Final    LDL/HDL 04/15/2022 4.0   Final    VLDL 04/15/2022 42  mg/dL Final    Hemoglobin A1C 04/15/2022 9.6 (H)  4.5 - 6.6 % Final    Estimated Average Glucose 04/15/2022 234  mg/dL Final    WBC 04/15/2022 5.22  4.50 - 11.00 K/uL Final    RBC 04/15/2022 4.64  4.60 - 6.20 M/uL Final    Hemoglobin 04/15/2022 13.8  13.5 - 18.0 g/dL Final    Hematocrit 04/15/2022 41.7  40.0 - 54.0 % Final    MCV 04/15/2022 89.9  80.0 - 96.0 fL Final    MCH 04/15/2022 29.7  27.0 - 31.0 pg Final    MCHC 04/15/2022 33.1  32.0 - 36.0 g/dL Final    RDW 04/15/2022 12.7  11.5 - 14.5 % Final    Platelet Count 04/15/2022 238  150 - 400 K/uL Final    MPV 04/15/2022 10.3  9.4 - 12.4 fL Final    Neutrophils % 04/15/2022 50.4 (L)  53.0 - 65.0 % Final    Lymphocytes % 04/15/2022 38.7  27.0 - 41.0 % Final    Monocytes % 04/15/2022 6.3 (H)  2.0 - 6.0 % Final    Eosinophils %  04/15/2022 3.6  1.0 - 4.0 % Final    Basophils % 04/15/2022 0.8  0.0 - 1.0 % Final    Immature Granulocytes % 04/15/2022 0.2  0.0 - 0.4 % Final    nRBC, Auto 04/15/2022 0.0  <=0.0 % Final    Neutrophils, Abs 04/15/2022 2.63  1.80 - 7.70 K/uL Final    Lymphocytes, Absolute 04/15/2022 2.02  1.00 - 4.80 K/uL Final    Monocytes, Absolute 04/15/2022 0.33  0.00 - 0.80 K/uL Final    Eosinophils, Absolute 04/15/2022 0.19  0.00 - 0.50 K/uL Final    Basophils, Absolute 04/15/2022 0.04  0.00 - 0.20 K/uL Final    Immature Granulocytes, Absolute 04/15/2022 0.01  0.00 - 0.04 K/uL Final    nRBC, Absolute 04/15/2022 0.00  <=0.00 x10e3/uL Final    Diff Type 04/15/2022 Auto   Final    Total Protein 04/15/2022 8.1  6.4 - 8.2 g/dL Final    Albumin, PE 04/15/2022 5.02  3.5 - 5.2 g/dL Final    Alpha-1-Globulin 04/15/2022 0.2  0.1 - 0.4 g/dL Final    Alpha-2-Globulin 04/15/2022 0.9  0.4 - 1.3 g/dL Final    Beta, PE 04/15/2022 0.8  0.5 - 1.5 g/dL Final    Gamma, PE 04/15/2022 1.2  0.5 - 1.8 g/dL Final    Pathologist Interp, Pro Elect, Blo* 04/15/2022 Normal. No monoclonal bands identified   Final   There may be more visits with results that are not included.         Orders Placed This Encounter   Procedures    POCT Urine Drug Screen Presump     Interpretive Information:     Negative:  No drug detected at the cut off level.   Positive:  This result represents presumptive positive for the   tested drug, other substances may yield a positive response other   than the analyte of interest. This result should be utilized for   diagnostic purpose only. Confirmation testing will be performed upon physician request only.          Requested Prescriptions     Signed Prescriptions Disp Refills    gabapentin (NEURONTIN) 300 MG capsule 360 capsule 2     Sig: Take 3 capsules (900 mg total) by mouth every 6 (six) hours.    HYDROcodone-acetaminophen (NORCO)  mg per tablet 120 tablet 0     Sig: Take 1 tablet by mouth every 6 (six) hours as needed for  Pain.    HYDROcodone-acetaminophen (NORCO)  mg per tablet 120 tablet 0     Sig: Take 1 tablet by mouth every 6 (six) hours as needed for Pain.    HYDROcodone-acetaminophen (NORCO)  mg per tablet 120 tablet 0     Sig: Take 1 tablet by mouth every 6 (six) hours as needed for Pain.       Assessment:     1. Ulcer of right foot with necrosis of muscle    2. Neuropathy    3. Peripheral vascular disease, unspecified    4. Encounter for long-term (current) use of other medications         A's of Opioid Risk Assessment  Activity:Patient can perform ADL.   Analgesia:Patients pain is partially controlled by current medication. Patient has tried OTC medications such as Tylenol and Ibuprofen with out relief.   Adverse Effects: Patient denies constipation or sedation.  Aberrant Behavior:  reviewed with no aberrant drug seeking/taking behavior.  Overdose reversal drug naloxone discussed    Drug screen reviewed      Plan:    Following wound management diabetic ulcer right foot     Pharmacy closed on Sunday Mr. [facility] 4.  He may pick his November prescription up November 19     will use November 20 as refill date next visit     Any further inconsistent drug screens were no longer provide narcotics June 2021 definitive drug screen inconsistent  positive for hydrocodone and oxycodone     Recovering after further right foot toe amputations due to osteomyelitis chronic infection complaint increasing burning numbness pain right foot back requesting adjustment pain medication     Patient verbalized understanding narcotics agreement patient verbalized understanding need to keep office visit     Will increase [place] 10 1 p.o. q.6 hours 120 tablets     Follow-up 3 months     Dr. Dobson, December 2022    Bring original prescription medication bottles/container/box with labels to each visit    Pill count    Physical therapy

## 2022-09-16 ENCOUNTER — OFFICE VISIT (OUTPATIENT)
Dept: PAIN MEDICINE | Facility: CLINIC | Age: 62
End: 2022-09-16
Payer: MEDICARE

## 2022-09-16 VITALS
SYSTOLIC BLOOD PRESSURE: 121 MMHG | BODY MASS INDEX: 24.2 KG/M2 | WEIGHT: 169 LBS | DIASTOLIC BLOOD PRESSURE: 63 MMHG | HEART RATE: 64 BPM | HEIGHT: 70 IN

## 2022-09-16 DIAGNOSIS — G62.9 NEUROPATHY: Chronic | ICD-10-CM

## 2022-09-16 DIAGNOSIS — L97.513 ULCER OF RIGHT FOOT WITH NECROSIS OF MUSCLE: Primary | Chronic | ICD-10-CM

## 2022-09-16 DIAGNOSIS — Z79.899 ENCOUNTER FOR LONG-TERM (CURRENT) USE OF OTHER MEDICATIONS: ICD-10-CM

## 2022-09-16 DIAGNOSIS — I73.9 PERIPHERAL VASCULAR DISEASE, UNSPECIFIED: Chronic | ICD-10-CM

## 2022-09-16 LAB
CTP QC/QA: YES
POC (AMP) AMPHETAMINE: NEGATIVE
POC (BAR) BARBITURATES: NEGATIVE
POC (BUP) BUPRENORPHINE: NEGATIVE
POC (BZO) BENZODIAZEPINES: NEGATIVE
POC (COC) COCAINE: NEGATIVE
POC (MDMA) METHYLENEDIOXYMETHAMPHETAMINE 3,4: NEGATIVE
POC (MET) METHAMPHETAMINE: NEGATIVE
POC (MOP) OPIATES: ABNORMAL
POC (MTD) METHADONE: NEGATIVE
POC (OXY) OXYCODONE: NEGATIVE
POC (PCP) PHENCYCLIDINE: NEGATIVE
POC (TCA) TRICYCLIC ANTIDEPRESSANTS: NEGATIVE
POC TEMPERATURE (URINE): 90
POC THC: NEGATIVE

## 2022-09-16 PROCEDURE — 99214 OFFICE O/P EST MOD 30 MIN: CPT | Mod: PBBFAC | Performed by: PHYSICIAN ASSISTANT

## 2022-09-16 PROCEDURE — 99214 PR OFFICE/OUTPT VISIT, EST, LEVL IV, 30-39 MIN: ICD-10-PCS | Mod: S$PBB,,, | Performed by: PHYSICIAN ASSISTANT

## 2022-09-16 PROCEDURE — 80305 DRUG TEST PRSMV DIR OPT OBS: CPT | Mod: PBBFAC | Performed by: PHYSICIAN ASSISTANT

## 2022-09-16 PROCEDURE — 99214 OFFICE O/P EST MOD 30 MIN: CPT | Mod: S$PBB,,, | Performed by: PHYSICIAN ASSISTANT

## 2022-09-16 RX ORDER — HYDROCODONE BITARTRATE AND ACETAMINOPHEN 10; 325 MG/1; MG/1
1 TABLET ORAL EVERY 6 HOURS PRN
Qty: 120 TABLET | Refills: 0 | Status: SHIPPED | OUTPATIENT
Start: 2022-09-16 | End: 2022-09-16

## 2022-09-16 RX ORDER — HYDROCODONE BITARTRATE AND ACETAMINOPHEN 10; 325 MG/1; MG/1
1 TABLET ORAL EVERY 6 HOURS PRN
Qty: 120 TABLET | Refills: 0 | Status: SHIPPED | OUTPATIENT
Start: 2022-11-19 | End: 2022-12-19

## 2022-09-16 RX ORDER — GABAPENTIN 300 MG/1
900 CAPSULE ORAL EVERY 6 HOURS
Qty: 360 CAPSULE | Refills: 2 | Status: SHIPPED | OUTPATIENT
Start: 2022-09-16 | End: 2022-12-14 | Stop reason: SDUPTHER

## 2022-09-16 RX ORDER — HYDROCODONE BITARTRATE AND ACETAMINOPHEN 10; 325 MG/1; MG/1
1 TABLET ORAL EVERY 8 HOURS PRN
Qty: 90 TABLET | Refills: 0 | Status: SHIPPED | OUTPATIENT
Start: 2022-11-19 | End: 2022-09-16

## 2022-09-16 RX ORDER — HYDROCODONE BITARTRATE AND ACETAMINOPHEN 10; 325 MG/1; MG/1
1 TABLET ORAL EVERY 8 HOURS PRN
Qty: 90 TABLET | Refills: 0 | Status: SHIPPED | OUTPATIENT
Start: 2022-09-21 | End: 2022-09-16

## 2022-09-16 RX ORDER — HYDROCODONE BITARTRATE AND ACETAMINOPHEN 10; 325 MG/1; MG/1
1 TABLET ORAL EVERY 6 HOURS PRN
Qty: 120 TABLET | Refills: 0 | Status: SHIPPED | OUTPATIENT
Start: 2022-10-21 | End: 2022-11-20

## 2022-09-16 RX ORDER — HYDROCODONE BITARTRATE AND ACETAMINOPHEN 10; 325 MG/1; MG/1
1 TABLET ORAL EVERY 8 HOURS PRN
Qty: 90 TABLET | Refills: 0 | Status: SHIPPED | OUTPATIENT
Start: 2022-10-21 | End: 2022-09-16

## 2022-09-16 RX ORDER — HYDROCODONE BITARTRATE AND ACETAMINOPHEN 10; 325 MG/1; MG/1
1 TABLET ORAL EVERY 6 HOURS PRN
Qty: 120 TABLET | Refills: 0 | Status: SHIPPED | OUTPATIENT
Start: 2022-09-21 | End: 2022-10-21

## 2022-09-20 ENCOUNTER — OFFICE VISIT (OUTPATIENT)
Dept: FAMILY MEDICINE | Facility: CLINIC | Age: 62
End: 2022-09-20
Payer: MEDICARE

## 2022-09-20 VITALS
DIASTOLIC BLOOD PRESSURE: 64 MMHG | HEART RATE: 74 BPM | RESPIRATION RATE: 18 BRPM | WEIGHT: 162 LBS | BODY MASS INDEX: 23.19 KG/M2 | OXYGEN SATURATION: 96 % | SYSTOLIC BLOOD PRESSURE: 128 MMHG | HEIGHT: 70 IN

## 2022-09-20 DIAGNOSIS — Z79.4 TYPE 2 DIABETES MELLITUS WITH DIABETIC NEUROPATHY, WITH LONG-TERM CURRENT USE OF INSULIN: Primary | ICD-10-CM

## 2022-09-20 DIAGNOSIS — E11.40 TYPE 2 DIABETES MELLITUS WITH DIABETIC NEUROPATHY, WITH LONG-TERM CURRENT USE OF INSULIN: Primary | ICD-10-CM

## 2022-09-20 DIAGNOSIS — M79.672 LEFT FOOT PAIN: ICD-10-CM

## 2022-09-20 PROCEDURE — 96372 PR INJECTION,THERAP/PROPH/DIAG2ST, IM OR SUBCUT: ICD-10-PCS | Mod: ,,, | Performed by: FAMILY MEDICINE

## 2022-09-20 PROCEDURE — 99495 TRANSJ CARE MGMT MOD F2F 14D: CPT | Mod: ,,, | Performed by: FAMILY MEDICINE

## 2022-09-20 PROCEDURE — 96372 THER/PROPH/DIAG INJ SC/IM: CPT | Mod: ,,, | Performed by: FAMILY MEDICINE

## 2022-09-20 PROCEDURE — 99495 TCM SERVICES (MODERATE COMPLEXITY): ICD-10-PCS | Mod: ,,, | Performed by: FAMILY MEDICINE

## 2022-09-20 RX ORDER — PEN NEEDLE, DIABETIC 33 GX5/32"
NEEDLE, DISPOSABLE MISCELLANEOUS
COMMUNITY
End: 2022-09-20 | Stop reason: SDUPTHER

## 2022-09-20 RX ORDER — KETOROLAC TROMETHAMINE 30 MG/ML
30 INJECTION, SOLUTION INTRAMUSCULAR; INTRAVENOUS
Status: COMPLETED | OUTPATIENT
Start: 2022-09-20 | End: 2022-09-20

## 2022-09-20 RX ORDER — PEN NEEDLE, DIABETIC 33 GX5/32"
NEEDLE, DISPOSABLE MISCELLANEOUS
Qty: 100 EACH | Refills: 3 | Status: SHIPPED | OUTPATIENT
Start: 2022-09-20 | End: 2022-09-23 | Stop reason: SDUPTHER

## 2022-09-20 RX ADMIN — KETOROLAC TROMETHAMINE 30 MG: 30 INJECTION, SOLUTION INTRAMUSCULAR; INTRAVENOUS at 04:09

## 2022-09-20 NOTE — PROGRESS NOTES
Navdeep Avery is a 62 y.o. 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  Patient's medications were reviewed and reconciled.  Currently he is doing well except for postop pain and I encouraged him to follow-up with his pain management specialist with the symptoms.  Today we will provide the patient with a Toradol injection.  His blood sugars remain well controlled in the  range and he will be due his A1c next month.      Past Medical History:   Diagnosis Date    Anemia     Anxiety     Arthritis     Atherosclerotic heart disease of native coronary artery with other forms of angina pectoris     Atrophic rhinitis     Carpal tunnel syndrome     Cellulitis of right lower extremity     COPD (chronic obstructive pulmonary disease)     Coronary arteriosclerosis     COVID 02/02/2022    Depression     Diabetic ulcer of right foot associated with diabetes mellitus due to underlying condition, with bone involvement without evidence of necrosis     GERD (gastroesophageal reflux disease)     Hyperlipidemia     Hypertension     Insomnia     MI (myocardial infarction)     Neuropathy     Peripheral vascular disease, unspecified     Right foot ulcer, with fat layer exposed 01/07/2015    Sleep apnea     Type 2 diabetes mellitus      Family History   Problem Relation Age of Onset    Arthritis Mother     Hypertension Mother     Learning disabilities Mother     Alcohol abuse Father     Early death Father     Heart disease Father     Cancer Sister     Diabetes Sister     Heart disease Maternal Grandfather     COPD Paternal Grandfather     Heart disease Son      Current Outpatient Medications on File Prior to Visit   Medication Sig Dispense Refill    albuterol (PROVENTIL) 2.5 mg /3 mL (0.083 %) nebulizer solution USE 1 VIAL IN NEBULIZER 3 TIMES DAILY 100 mL 11    albuterol (PROVENTIL/VENTOLIN HFA) 90 mcg/actuation inhaler Inhale 2 puffs into the lungs 2 (two)  times daily as needed for Wheezing. 18 g 2    amLODIPine (NORVASC) 10 MG tablet Take 1 tablet (10 mg total) by mouth once daily. 90 tablet 1    aspirin 81 MG Chew Take 81 mg by mouth once daily.      clopidogreL (PLAVIX) 75 mg tablet Take 1 tablet (75 mg total) by mouth once daily. 90 tablet 1    diclofenac sodium (VOLTAREN) 1 % Gel Apply 1 g topically 4 (four) times daily as needed (pain). 20 g 1    empagliflozin (JARDIANCE) 10 mg tablet Take 1 tablet (10 mg total) by mouth once daily. 30 tablet 6    EScitalopram oxalate (LEXAPRO) 10 MG tablet Take 1 tablet (10 mg total) by mouth once daily. 30 tablet 1    fluticasone propionate (FLONASE) 50 mcg/actuation nasal spray 2 sprays (100 mcg total) by Each Nostril route once daily. 16 g 2    gabapentin (NEURONTIN) 300 MG capsule Take 3 capsules (900 mg total) by mouth every 6 (six) hours. 360 capsule 2    [START ON 9/21/2022] HYDROcodone-acetaminophen (NORCO)  mg per tablet Take 1 tablet by mouth every 6 (six) hours as needed for Pain. 120 tablet 0    [START ON 10/21/2022] HYDROcodone-acetaminophen (NORCO)  mg per tablet Take 1 tablet by mouth every 6 (six) hours as needed for Pain. 120 tablet 0    [START ON 11/19/2022] HYDROcodone-acetaminophen (NORCO)  mg per tablet Take 1 tablet by mouth every 6 (six) hours as needed for Pain. 120 tablet 0    hydrOXYzine pamoate (VISTARIL) 25 MG Cap Take 1 capsule (25 mg total) by mouth once daily. Take nightly for sleep 90 capsule 1    insulin detemir U-100 (LEVEMIR) 100 unit/mL injection Inject 20 Units into the skin every evening AND 10 Units every morning. 10 mL 1    lisinopriL 10 MG tablet Take 1 tablet (10 mg total) by mouth once daily. 90 tablet 1    metoprolol succinate (TOPROL-XL) 25 MG 24 hr tablet Take 1 tablet (25 mg total) by mouth once daily. 90 tablet 1    pantoprazole (PROTONIX) 40 MG tablet Take 1 tablet (40 mg total) by mouth once daily. 90 tablet 1    rosuvastatin (CRESTOR) 20 MG tablet Take 1  "tablet (20 mg total) by mouth every evening. 90 tablet 1    SPIRIVA RESPIMAT 2.5 mcg/actuation inhaler Inhale 1 puff into the lungs once daily. 4 g 5    sulfamethoxazole-trimethoprim 800-160mg (BACTRIM DS) 800-160 mg Tab Take 1 tablet by mouth 2 (two) times daily. for 21 days 42 tablet 0    nitroGLYCERIN (NITROSTAT) 0.4 MG SL tablet Place 1 tablet (0.4 mg total) under the tongue every 5 (five) minutes as needed for Chest pain. 30 tablet 2    [DISCONTINUED] pen needle, diabetic 33 gauge x 5/32" Ndle Use with insulin twice daily for diabetes       No current facility-administered medications on file prior to visit.     Immunization History   Administered Date(s) Administered    Influenza - Quadrivalent - PF *Preferred* (6 months and older) 09/14/2021    Pneumococcal Conjugate - 13 Valent 10/25/2017    Pneumococcal Polysaccharide - 23 Valent 10/19/2016    Tdap 12/20/2019       Review of Systems   Constitutional:  Negative for fever, malaise/fatigue and weight loss.   Respiratory:  Negative for shortness of breath.    Cardiovascular:  Negative for chest pain and palpitations.   Gastrointestinal:  Negative for nausea and vomiting.   Musculoskeletal:  Positive for joint pain and myalgias.   Psychiatric/Behavioral:  Negative for depression.       Vitals:    09/20/22 1520   BP: 128/64   Pulse: 74   Resp: 18       Physical Exam  Vitals reviewed.   Constitutional:       Appearance: Normal appearance.   HENT:      Head: Normocephalic.   Eyes:      Extraocular Movements: Extraocular movements intact.      Conjunctiva/sclera: Conjunctivae normal.      Pupils: Pupils are equal, round, and reactive to light.   Neck:      Thyroid: No thyroid mass or thyromegaly.   Cardiovascular:      Rate and Rhythm: Normal rate and regular rhythm.      Heart sounds: Normal heart sounds. No murmur heard.    No gallop.   Pulmonary:      Effort: Pulmonary effort is normal. No respiratory distress.      Breath sounds: Normal breath sounds. No " "wheezing or rales.   Skin:     General: Skin is warm and dry.      Coloration: Skin is not jaundiced or pale.   Neurological:      Mental Status: He is alert.   Psychiatric:         Mood and Affect: Mood normal.         Behavior: Behavior normal.         Thought Content: Thought content normal.         Judgment: Judgment normal.        Assessment and Plan  Type 2 diabetes mellitus with diabetic neuropathy, with long-term current use of insulin  -     pen needle, diabetic 33 gauge x 5/32" Ndle; Use with insulin twice daily for diabetes  Dispense: 100 each; Refill: 3    Left foot pain  -     ketorolac injection 30 mg            Return to clinic in 1 month for follow-up or as needed.    Health Maintenance Topics with due status: Not Due       Topic Last Completion Date    TETANUS VACCINE 12/20/2019    Diabetes Urine Screening 04/06/2022    Lipid Panel 07/06/2022    Hemoglobin A1c 07/20/2022    Low Dose Statin 09/16/2022           "

## 2022-09-21 NOTE — PATIENT INSTRUCTIONS
Clean with vashe. Apply vashe moisten drawtex to wound,cover with dry gauze,wrap with rachel and paper tape change daily and as needed      Monitor closely for s/s of infection including fever, chills, increase in pain, odor from wound, and increased redness from foot. Go to ER if any complications develop.   Keep leg elevated and avoid pressure on wound.  Diabetes:  Monitor glucose closely. Check fasting glucose and 2 hours after meals. HgA1C goal <7, fasting glucose , and 2 hours after meals <180  Hypertension:  Check blood pressure twice daily, goal <120/80  Diet:   Increase protein intake, avoid fried, fatty foods and foods high in simple carbs.   Vitamins:  Take vitamin C 1000 mg, zinc 50mg, vitamin d 5000 units, and a daily multivitamin. Addy is a good source of protein and nutrients to aid in wound healing.

## 2022-09-23 ENCOUNTER — OFFICE VISIT (OUTPATIENT)
Dept: WOUND CARE | Facility: CLINIC | Age: 62
End: 2022-09-23
Attending: FAMILY MEDICINE
Payer: MEDICARE

## 2022-09-23 VITALS
TEMPERATURE: 97 F | RESPIRATION RATE: 20 BRPM | DIASTOLIC BLOOD PRESSURE: 74 MMHG | SYSTOLIC BLOOD PRESSURE: 181 MMHG | HEART RATE: 67 BPM

## 2022-09-23 DIAGNOSIS — E08.621 DIABETIC ULCER OF RIGHT MIDFOOT ASSOCIATED WITH DIABETES MELLITUS DUE TO UNDERLYING CONDITION, WITH BONE INVOLVEMENT WITHOUT EVIDENCE OF NECROSIS: Primary | ICD-10-CM

## 2022-09-23 DIAGNOSIS — G47.30 SLEEP APNEA, UNSPECIFIED TYPE: ICD-10-CM

## 2022-09-23 DIAGNOSIS — G47.00 INSOMNIA, UNSPECIFIED TYPE: ICD-10-CM

## 2022-09-23 DIAGNOSIS — L08.9 WOUND INFECTION: ICD-10-CM

## 2022-09-23 DIAGNOSIS — T14.8XXA WOUND INFECTION: ICD-10-CM

## 2022-09-23 DIAGNOSIS — L97.416 DIABETIC ULCER OF RIGHT MIDFOOT ASSOCIATED WITH DIABETES MELLITUS DUE TO UNDERLYING CONDITION, WITH BONE INVOLVEMENT WITHOUT EVIDENCE OF NECROSIS: Primary | ICD-10-CM

## 2022-09-23 PROCEDURE — 99214 PR OFFICE/OUTPT VISIT, EST, LEVL IV, 30-39 MIN: ICD-10-PCS | Mod: S$PBB,,, | Performed by: FAMILY MEDICINE

## 2022-09-23 PROCEDURE — 99215 OFFICE O/P EST HI 40 MIN: CPT | Mod: PBBFAC | Performed by: FAMILY MEDICINE

## 2022-09-23 PROCEDURE — 99214 OFFICE O/P EST MOD 30 MIN: CPT | Mod: S$PBB,,, | Performed by: FAMILY MEDICINE

## 2022-09-23 RX ORDER — SYRING-NEEDL,DISP,INSUL,0.3 ML 31 GX5/16"
1 SYRINGE, EMPTY DISPOSABLE MISCELLANEOUS 2 TIMES DAILY
COMMUNITY
Start: 2022-09-21

## 2022-09-23 NOTE — PROGRESS NOTES
Chepe Jurado III, DO   [facility]  [address]  [place] [address]  [phone]      PATIENT NAME: Navdeep Avery  : 1960  DATE: 22  MRN: [id number]      Billing Provider: Chepe Jurado III, DO  Level of Service:   Patient PCP Information       Provider PCP Type    Marquez Huff MD General            Reason for Visit / Chief Complaint: Diabetic Foot Ulcer (Right foot)       History of Present Illness / Problem Focused Workflow     Navdeep Avery is a HPI    Review of Systems     Review of Systems   Constitutional: Negative.    HENT:  Negative for congestion, ear pain, nosebleeds and trouble swallowing.    Eyes:  Negative for pain and itching.   Respiratory:  Negative for chest tightness.    Cardiovascular:  Negative for chest pain.   Gastrointestinal:  Negative for abdominal distention.   Endocrine: Negative for cold intolerance and heat intolerance.   Genitourinary:  Negative for difficulty urinating.   Musculoskeletal:  Negative for arthralgias.   Neurological:  Negative for dizziness.     Medical / Social / Family History     Past Medical History:   Diagnosis Date    Anemia     Anxiety     Arthritis     Atherosclerotic heart disease of native coronary artery with other forms of angina pectoris     Atrophic rhinitis     Carpal tunnel syndrome     Cellulitis of right lower extremity     COPD (chronic obstructive pulmonary disease)     Coronary arteriosclerosis     COVID 2022    Depression     Diabetic ulcer of right foot associated with diabetes mellitus due to underlying condition, with bone involvement without evidence of necrosis     GERD (gastroesophageal reflux disease)     Hyperlipidemia     Hypertension     Insomnia     MI (myocardial infarction)     Neuropathy     Peripheral vascular disease, unspecified     Right foot ulcer, with fat layer exposed 2015    Sleep apnea     Type 2 diabetes mellitus        Past  Surgical History:   Procedure Laterality Date    AMPUTATION      ANGIOPLASTY      CARDIAC CATHETERIZATION      CORONARY ARTERY BYPASS GRAFT      CORONARY STENT PLACEMENT      DEBRIDEMENT      Right foot debridment with hospital stay and IV Abx    DEBRIDEMENT OF LOWER EXTREMITY Right 06/27/2022    Procedure: DEBRIDEMENT, LOWER EXTREMITY;  Surgeon: Forrest Kiser MD;  Location: [facility] OR;  Service: General;  Laterality: Right;  right foot    IRRIGATION AND DEBRIDEMENT OF LOWER EXTREMITY Right 9/8/2022    Procedure: IRRIGATION AND DEBRIDEMENT, LOWER EXTREMITY;  Surgeon: Selina Matos MD;  Location: [facility] OR;  Service: General;  Laterality: Right;    SHOULDER OPEN ROTATOR CUFF REPAIR Left     SPINE SURGERY      VASCULAR SURGERY         Social History  Mr. Navdeep Avery  reports that he has been smoking cigarettes. He has been smoking an average of .25 packs per day. He has never used smokeless tobacco. He reports that he does not currently use alcohol. He reports current drug use. Drug: Oxycodone.    Family History  Mr.'s Navdeep Avery family history includes Alcohol abuse in his father; Arthritis in his mother; COPD in his paternal grandfather; Cancer in his sister; Diabetes in his sister; Early death in his father; Heart disease in his father, maternal grandfather, and son; Hypertension in his mother; Learning disabilities in his mother.    Medications and Allergies     Medications  Outpatient Medications Marked as Taking for the 9/23/22 encounter (Office Visit) with Chepe Jurado III, DO   Medication Sig Dispense Refill    albuterol (PROVENTIL) 2.5 mg /3 mL (0.083 %) nebulizer solution USE 1 VIAL IN NEBULIZER 3 TIMES DAILY 100 mL 11    albuterol (PROVENTIL/VENTOLIN HFA) 90 mcg/actuation inhaler Inhale 2 puffs into the lungs 2 (two) times daily as needed for Wheezing. 18 g 2    amLODIPine (NORVASC) 10 MG tablet Take 1 tablet (10 mg total) by mouth once daily. 90 tablet 1    aspirin 81 MG Chew Take  81 mg by mouth once daily.      clopidogreL (PLAVIX) 75 mg tablet Take 1 tablet (75 mg total) by mouth once daily. 90 tablet 1    diclofenac sodium (VOLTAREN) 1 % Gel Apply 1 g topically 4 (four) times daily as needed (pain). 20 g 1    empagliflozin (JARDIANCE) 10 mg tablet Take 1 tablet (10 mg total) by mouth once daily. 30 tablet 6    EScitalopram oxalate (LEXAPRO) 10 MG tablet Take 1 tablet (10 mg total) by mouth once daily. 30 tablet 1    fluticasone propionate (FLONASE) 50 mcg/actuation nasal spray 2 sprays (100 mcg total) by Each Nostril route once daily. 16 g 2    gabapentin (NEURONTIN) 300 MG capsule Take 3 capsules (900 mg total) by mouth every 6 (six) hours. 360 capsule 2    HYDROcodone-acetaminophen (NORCO)  mg per tablet Take 1 tablet by mouth every 6 (six) hours as needed for Pain. 120 tablet 0    [START ON 10/21/2022] HYDROcodone-acetaminophen (NORCO)  mg per tablet Take 1 tablet by mouth every 6 (six) hours as needed for Pain. 120 tablet 0    [START ON 11/19/2022] HYDROcodone-acetaminophen (NORCO)  mg per tablet Take 1 tablet by mouth every 6 (six) hours as needed for Pain. 120 tablet 0    hydrOXYzine pamoate (VISTARIL) 25 MG Cap Take 1 capsule (25 mg total) by mouth once daily. Take nightly for sleep 90 capsule 1    insulin detemir U-100 (LEVEMIR) 100 unit/mL injection Inject 20 Units into the skin every evening AND 10 Units every morning. 10 mL 1    lisinopriL 10 MG tablet Take 1 tablet (10 mg total) by mouth once daily. 90 tablet 1    metoprolol succinate (TOPROL-XL) 25 MG 24 hr tablet Take 1 tablet (25 mg total) by mouth once daily. 90 tablet 1    pantoprazole (PROTONIX) 40 MG tablet Take 1 tablet (40 mg total) by mouth once daily. 90 tablet 1    rosuvastatin (CRESTOR) 20 MG tablet Take 1 tablet (20 mg total) by mouth every evening. 90 tablet 1    SPIRIVA RESPIMAT 2.5 mcg/actuation inhaler Inhale 1 puff into the lungs once daily. 4 g 5    sulfamethoxazole-trimethoprim 800-160mg  "(BACTRIM DS) 800-160 mg Tab Take 1 tablet by mouth 2 (two) times daily. for 21 days 42 tablet 0    SURE COMFORT INSULIN SYRINGE 0.3 mL 31 gauge x 5/16" Syrg Inject 1 application into the skin 2 (two) times a day.         Allergies  Review of patient's allergies indicates:  No Known Allergies    Physical Examination     Vitals:    09/23/22 0849   BP: (!) 181/74   Pulse: 67   Resp: 20   Temp: 97 °F (36.1 °C)     Physical Exam  Constitutional:       Appearance: Normal appearance. He is obese.     Assessment and Plan             Altered Skin Integrity 09/06/22 2226 Right plantar Foot Diabetic Ulcer (Active)   09/06/22 2226   Altered Skin Integrity Present on Admission: yes   Side: Right   Orientation: plantar   Location: Foot   Wound Number:    Is this injury device related?:    Primary Wound Type: Diabetic ulc   Description of Altered Skin Integrity:    Ankle-Brachial Index:    Pulses:    Removal Indication and Assessment:    Wound Outcome:    (Retired) Wound Length (cm):    (Retired) Wound Width (cm):    (Retired) Depth (cm):    Wound Description (Comments):    Removal Indications:    Wound Image    09/23/22 0909   Description of Altered Skin Integrity Full thickness tissue loss. Subcutaneous fat may be visible but bone, tendon or muscle are not exposed 09/23/22 0909   Dressing Appearance Moist drainage 09/23/22 0909   Drainage Amount Moderate 09/23/22 0909   Drainage Characteristics/Odor Serosanguineous 09/23/22 0909   Appearance Pink;Moist;Granulating 09/23/22 0909   Tissue loss description Full thickness 09/23/22 0909   Black (%), Wound Tissue Color 0 % 09/23/22 0909   Red (%), Wound Tissue Color 100 % 09/23/22 0909   Yellow (%), Wound Tissue Color 0 % 09/23/22 0909   Periwound Area Moist 09/23/22 0909   Wound Edges Callused 09/23/22 0909   [place] Classification (diabetic foot ulcers only) Grade 2 09/23/22 0909   Wound Length (cm) 0.5 cm 09/23/22 0909   Wound Width (cm) 0.9 cm 09/23/22 0909   Wound Depth (cm) 0.5 " cm 09/23/22 0909   Wound Volume (cm^3) 0.225 cm^3 09/23/22 0909   Wound Surface Area (cm^2) 0.45 cm^2 09/23/22 0909   Care Cleansed with:;Antimicrobial agent 09/23/22 0909   Dressing Applied;Gauze, wet to dry;Gauze;Rolled gauze 09/23/22 0909   Periwound Care Moisture barrier applied 09/23/22 0909   Off Loading Off loading shoe 09/23/22 0909   Dressing Change Due 09/24/22 09/23/22 0909            Incision/Site 09/08/22 1357 Right Foot (Active)   09/08/22 1357   Present Prior to Hospital Arrival?:    Side: Right   Location: Foot   Orientation:    Incision Type:    Closure Method:    Additional Comments:    Removal Indication and Assessment:    Wound Outcome:    Removal Indications:    Wound Image    09/23/22 0911   Dressing Appearance Moist drainage 09/23/22 0911   Drainage Amount Moderate 09/23/22 0911   Drainage Characteristics/Odor Serosanguineous 09/23/22 0911   Appearance Staples intact 09/23/22 0911   Black (%), Wound Tissue Color 0 % 09/23/22 0911   Red (%), Wound Tissue Color 100 % 09/23/22 0911   Yellow (%), Wound Tissue Color 0 % 09/23/22 0911   Periwound Area Redness 09/23/22 0911   Wound Edges Approximated 09/23/22 0911   Wound Length (cm) 4 cm 09/23/22 0911   Wound Width (cm) 0.3 cm 09/23/22 0911   Wound Depth (cm) 0.2 cm 09/23/22 0911   Wound Volume (cm^3) 0.24 cm^3 09/23/22 0911   Wound Surface Area (cm^2) 1.2 cm^2 09/23/22 0911   Care Cleansed with:;Antimicrobial agent 09/23/22 0911   Dressing Applied;Gauze;Rolled gauze 09/23/22 0911   Periwound Care Moisture barrier applied 09/23/22 0911   Off Loading Off loading shoe 09/23/22 0911   Dressing Change Due 09/24/22 09/23/22 0911     Problem List Items Addressed This Visit          Endocrine    Diabetic ulcer of right foot associated with diabetes mellitus due to underlying condition, with bone involvement without evidence of necrosis - Primary    Overview                     Orthopedic    Wound infection       Other    Insomnia    Sleep apnea        Future Appointments   Date Time Provider Department Center   9/29/2022  9:15 AM Marquez Huff MD [facility] [place] [facility]   10/4/2022  2:00 PM AWV NURSE, [facility] FAMILY MEDIC[facility] [place] [facility]   10/6/2022  1:00 PM Rosie Luna ACNP [facility]   10/7/2022  9:00 AM [facility]   10/21/2022  1:15 PM Marquez Huff MD [facility] [place] [facility]   10/24/2022 10:00 AM Marquez Huff MD [facility] [place] [facility]   12/14/2022  8:00 AM Brie Dobson MD [facility]   10/5/2023  9:00 AM AWV NURSE, [facility] FAMILY MEDICINE [facility] [place]            Signature:  Chepe Jurado III, DO  [facility]  [address] [facility] [address]  [phone]    Date of encounter: 9/23/22

## 2022-09-29 ENCOUNTER — OFFICE VISIT (OUTPATIENT)
Dept: SURGERY | Facility: CLINIC | Age: 62
End: 2022-09-29
Payer: MEDICARE

## 2022-09-29 DIAGNOSIS — Z09 POSTOP CHECK: Primary | ICD-10-CM

## 2022-09-29 PROCEDURE — 99214 OFFICE O/P EST MOD 30 MIN: CPT | Mod: PBBFAC | Performed by: NURSE PRACTITIONER

## 2022-09-29 PROCEDURE — 99024 PR POST-OP FOLLOW-UP VISIT: ICD-10-PCS | Mod: S$PBB,,, | Performed by: NURSE PRACTITIONER

## 2022-09-29 PROCEDURE — 99024 POSTOP FOLLOW-UP VISIT: CPT | Mod: S$PBB,,, | Performed by: NURSE PRACTITIONER

## 2022-09-29 NOTE — PROGRESS NOTES
Navdeep Avery is a 62 y.o. male patient.   No diagnosis found.  Past Medical History:   Diagnosis Date    Anemia     Anxiety     Arthritis     Atherosclerotic heart disease of native coronary artery with other forms of angina pectoris     Atrophic rhinitis     Carpal tunnel syndrome     Cellulitis of right lower extremity     COPD (chronic obstructive pulmonary disease)     Coronary arteriosclerosis     COVID 02/02/2022    Depression     Diabetic ulcer of right foot associated with diabetes mellitus due to underlying condition, with bone involvement without evidence of necrosis     GERD (gastroesophageal reflux disease)     Hyperlipidemia     Hypertension     Insomnia     MI (myocardial infarction)     Neuropathy     Peripheral vascular disease, unspecified     Right foot ulcer, with fat layer exposed 01/07/2015    Sleep apnea     Type 2 diabetes mellitus      No past surgical history pertinent negatives on file.  Scheduled Meds:  Continuous Infusions:  PRN Meds:    Review of patient's allergies indicates:  No Known Allergies  There are no hospital problems to display for this patient.    There were no vitals taken for this visit.    Subjective doing well followed [facility]  Objective RIGHT foot plantar ulcer healed, incision dorsum foot with sutures well approximated healing no signs of infection   Assessment & Plan     S/P debridement of right foot 09/08/2022 with removal of metatarsal head due to osteomyelitis  DC sutures  Keep scheduled appointment with [facility]  Follow up with surgery austin Luna, STELLA  9/29/2022     22-Oct-2019 11:53

## 2022-10-07 ENCOUNTER — OFFICE VISIT (OUTPATIENT)
Dept: WOUND CARE | Facility: CLINIC | Age: 62
End: 2022-10-07
Attending: FAMILY MEDICINE
Payer: MEDICARE

## 2022-10-07 VITALS
TEMPERATURE: 98 F | RESPIRATION RATE: 20 BRPM | DIASTOLIC BLOOD PRESSURE: 66 MMHG | HEART RATE: 65 BPM | SYSTOLIC BLOOD PRESSURE: 131 MMHG

## 2022-10-07 DIAGNOSIS — E08.621 DIABETIC ULCER OF RIGHT MIDFOOT ASSOCIATED WITH DIABETES MELLITUS DUE TO UNDERLYING CONDITION, WITH BONE INVOLVEMENT WITHOUT EVIDENCE OF NECROSIS: ICD-10-CM

## 2022-10-07 DIAGNOSIS — L97.416 DIABETIC ULCER OF RIGHT MIDFOOT ASSOCIATED WITH DIABETES MELLITUS DUE TO UNDERLYING CONDITION, WITH BONE INVOLVEMENT WITHOUT EVIDENCE OF NECROSIS: ICD-10-CM

## 2022-10-07 DIAGNOSIS — I73.9 PERIPHERAL VASCULAR DISEASE, UNSPECIFIED: Chronic | ICD-10-CM

## 2022-10-07 PROCEDURE — 99499 NO LOS: ICD-10-PCS | Mod: S$PBB,,, | Performed by: NURSE PRACTITIONER

## 2022-10-07 PROCEDURE — 99499 UNLISTED E&M SERVICE: CPT | Mod: S$PBB,,, | Performed by: NURSE PRACTITIONER

## 2022-10-07 PROCEDURE — 99215 OFFICE O/P EST HI 40 MIN: CPT | Mod: PBBFAC | Performed by: NURSE PRACTITIONER

## 2022-10-07 NOTE — PROGRESS NOTES
GIANA Moncada   [facility]  [address] [facility] [address]  [phone]      PATIENT NAME: Navdeep Avery  : 1960  DATE: 10/7/22  MRN: [id number]      Billing Provider: GIANA Moncada  Level of Service:   Patient PCP Information       Provider PCP Type    Marquez Huff MD General            Reason for Visit / Chief Complaint: Non-healing Wound Follow Up (R DFU)       History of Present Illness / Problem Focused Workflow     Navdeep Avery is a 61 y.o. male who presents to clinic for follow up on chronic-non healing wound on the right foot after recent admission to [facility] for wound infection. Wound has healed since last visit. Callus removed with curette and blade. Off-loading shoe purchased today. Keep area clean and dry and well moisturized. PMH includes hypertension, diabetes, peripheral vascular disease, and high cholesterol. Last HgA1C 9.6 in , diabeters managed by PCP. Last doppler , ABIs WNL. Denies fever or chills.       Review of Systems     Review of Systems   Constitutional:  Positive for activity change. Negative for chills and fever.   Respiratory:  Negative for chest tightness and shortness of breath.    Cardiovascular:  Positive for leg swelling. Negative for chest pain and palpitations.   Musculoskeletal:  Positive for arthralgias, gait problem and joint swelling.   Skin:  Positive for wound.        wound   Neurological:  Positive for weakness and numbness.   Psychiatric/Behavioral:  Negative for agitation, behavioral problems, confusion and self-injury.      Medical / Social / Family History     Past Medical History:   Diagnosis Date    Anemia     Anxiety     Arthritis     Atherosclerotic heart disease of native coronary artery with other forms of angina pectoris     Atrophic rhinitis     Carpal tunnel syndrome     Cellulitis of right lower extremity     COPD (chronic obstructive pulmonary disease)      Coronary arteriosclerosis     COVID 02/02/2022    Depression     Diabetic ulcer of right foot associated with diabetes mellitus due to underlying condition, with bone involvement without evidence of necrosis     GERD (gastroesophageal reflux disease)     Hyperlipidemia     Hypertension     Insomnia     MI (myocardial infarction)     Neuropathy     Peripheral vascular disease, unspecified     Right foot ulcer, with fat layer exposed 01/07/2015    Sleep apnea     Type 2 diabetes mellitus        Past Surgical History:   Procedure Laterality Date    AMPUTATION      ANGIOPLASTY      CARDIAC CATHETERIZATION      CORONARY ARTERY BYPASS GRAFT      CORONARY STENT PLACEMENT      DEBRIDEMENT      Right foot debridment with hospital stay and IV Abx    DEBRIDEMENT OF LOWER EXTREMITY Right 06/27/2022    Procedure: DEBRIDEMENT, LOWER EXTREMITY;  Surgeon: Forrest Kiser MD;  Location: [facility] OR;  Service: General;  Laterality: Right;  right foot    IRRIGATION AND DEBRIDEMENT OF LOWER EXTREMITY Right 9/8/2022    Procedure: IRRIGATION AND DEBRIDEMENT, LOWER EXTREMITY;  Surgeon: Selina Matos MD;  Location: [facility] OR;  Service: General;  Laterality: Right;    SHOULDER OPEN ROTATOR CUFF REPAIR Left     SPINE SURGERY      VASCULAR SURGERY         Social History  Mr. Navdeep Avery  reports that he has been smoking cigarettes. He has been smoking an average of .25 packs per day. He has never used smokeless tobacco. He reports that he does not currently use alcohol. He reports current drug use. Drug: Oxycodone.    Family History  'issac Avery family history includes Alcohol abuse in his father; Arthritis in his mother; COPD in his paternal grandfather; Cancer in his sister; Diabetes in his sister; Early death in his father; Heart disease in his father, maternal grandfather, and son; Hypertension in his mother; Learning disabilities in his mother.    Medications and Allergies     Medications  No outpatient  medications have been marked as taking for the 10/7/22 encounter (Office Visit) with GIANA Moncada.       Allergies  Review of patient's allergies indicates:  No Known Allergies    Physical Examination     Vitals:    10/07/22 0918   BP: 131/66   Pulse: 65   Resp: 20   Temp: 97.8 °F (36.6 °C)     Physical Exam  Vitals and nursing note reviewed.   Constitutional:       Appearance: Normal appearance.   HENT:      Head: Normocephalic.   Cardiovascular:      Rate and Rhythm: Normal rate and regular rhythm.      Pulses: Normal pulses.      Heart sounds: Normal heart sounds.   Pulmonary:      Effort: Pulmonary effort is normal. No respiratory distress.   Chest:      Chest wall: No tenderness.   Musculoskeletal:         General: Swelling and tenderness present.      Right lower leg: Edema present.   Skin:     General: Skin is warm and dry.      Findings: Erythema present.      Comments: See LDA for measurements and picture   Neurological:      General: No focal deficit present.      Mental Status: He is alert and oriented to person, place, and time. Mental status is at baseline.   Psychiatric:         Mood and Affect: Mood normal.         Behavior: Behavior normal.         Thought Content: Thought content normal.         Judgment: Judgment normal.       Assessment and Plan             Altered Skin Integrity 09/06/22 2226 Right plantar Foot Diabetic Ulcer (Active)   09/06/22 2226   Altered Skin Integrity Present on Admission: yes   Side: Right   Orientation: plantar   Location: Foot   Wound Number:    Is this injury device related?:    Primary Wound Type: Diabetic ulc   Description of Altered Skin Integrity:    Ankle-Brachial Index:    Pulses:    Removal Indication and Assessment:    Wound Outcome:    (Retired) Wound Length (cm):    (Retired) Wound Width (cm):    (Retired) Depth (cm):    Wound Description (Comments):    Removal Indications:    Wound Image     10/07/22 0920   Description of Altered Skin Integrity  Partial thickness tissue loss. Shallow open ulcer with a red or pink wound bed, without slough. Intact or Open/Ruptured Serum-filled blister. 10/07/22 0920   Dressing Appearance Dry;Intact;Clean 10/07/22 0920   Drainage Amount None 10/07/22 0920   Appearance Galan;Dry;Fibrin 10/07/22 0920   Tissue loss description Partial thickness 10/07/22 0920   Black (%), Wound Tissue Color 0 % 10/07/22 0920   Red (%), Wound Tissue Color 0 % 10/07/22 0920   Yellow (%), Wound Tissue Color 0 % 10/07/22 0920   Periwound Area Dry 10/07/22 0920   Wound Edges Callused 10/07/22 0920   Wound Length (cm) 0.5 cm 10/07/22 0920   Wound Width (cm) 0.5 cm 10/07/22 0920   Wound Depth (cm) 0.1 cm 10/07/22 0920   Wound Volume (cm^3) 0.025 cm^3 10/07/22 0920   Wound Surface Area (cm^2) 0.25 cm^2 10/07/22 0920   Care Cleansed with:;Antimicrobial agent 10/07/22 0920   Dressing Applied;Gauze;Rolled gauze 10/07/22 0920   Periwound Care Moisturizer applied 10/07/22 0920   Dressing Change Due 10/08/22 10/07/22 0920            Incision/Site 09/08/22 1357 Right Foot (Active)   09/08/22 1357   Present Prior to Hospital Arrival?:    Side: Right   Location: Foot   Orientation:    Incision Type:    Closure Method:    Additional Comments:    Removal Indication and Assessment:    Wound Outcome:    Removal Indications:    Wound Image   10/07/22 1008   Dressing Appearance Dry;Intact;Clean 10/07/22 1008   Appearance Intact;[place] 10/07/22 1008   Black (%), Wound Tissue Color 0 % 10/07/22 1008   Red (%), Wound Tissue Color 100 % 10/07/22 1008   Yellow (%), Wound Tissue Color 0 % 10/07/22 1008   Periwound Area Intact;Dry 10/07/22 1008   Wound Edges Rolled/closed 10/07/22 1008   Wound Length (cm) 0 cm 10/07/22 1008   Wound Width (cm) 0 cm 10/07/22 1008   Wound Depth (cm) 0 cm 10/07/22 1008   Wound Volume (cm^3) 0 cm^3 10/07/22 1008   Wound Surface Area (cm^2) 0 cm^2 10/07/22 1008   Care Cleansed with:;Antimicrobial agent 10/07/22 1008   Dressing Applied;Gauze;Rolled  gauze 10/07/22 1008   Periwound Care Moisturizer applied 10/07/22 1008   Dressing Change Due 10/08/22 10/07/22 1008     Problem List Items Addressed This Visit          Cardiac/Vascular    Peripheral vascular disease, unspecified (Chronic)    Current Assessment & Plan     Heart Healthy Diet-reduce sodium intake to 1,500  mg/day and limit alcohol  Smoking Cessation - if smoke set goals to quit  Exercise daily  Weight loss -  Maintain healthy weight.  If overweight, set goal to  lose about 5% to 10% of baseline weight within six months  Keep blood pressure well controlled - take medications as prescribed   Ensure diabetes is controlled - diabetic diet, medications as prescribed, check blood glucose routinely             Endocrine    Diabetic ulcer of right foot associated with diabetes mellitus due to underlying condition, with bone involvement without evidence of necrosis    Overview                          Current Assessment & Plan     Clean with baby shampoo and water  Apply moisturize daily to wound,may cover with dry gauze and wrap with rachel and paper tape for protection.   Right offload shoe while walking  Monitor closely for s/s of infection including fever, chills, increase in pain, odor from wound, and increased redness from foot. Go to ER if any complications develop.   Keep leg elevated and avoid pressure on wound.  Diabetes:  Monitor glucose closely. Check fasting glucose and 2 hours after meals. HgA1C goal <7, fasting glucose , and 2 hours after meals <180  Hypertension:  Check blood pressure twice daily, goal <120/80  Diet:   Increase protein intake, avoid fried, fatty foods and foods high in simple carbs.   Vitamins:  Take vitamin C 1000 mg, zinc 50mg, vitamin d 5000 units, and a daily multivitamin. Addy is a good source of protein and nutrients to aid in wound healing.             Future Appointments   Date Time Provider Department Center   10/21/2022  1:15 PM Marquez Huff MD [facility]  [facility] [place]   10/24/2022 10:00 AM Marquez Huff MD [facility] [place]   11/4/2022  9:00 AM GIANA Moncada [facility]   11/29/2022  8:00 AM AWV NURSE, [facility] [place]   12/14/2022  8:00 AM Brie Dobson MD [facility]   10/5/2023  9:00 AM AWV NURSE, [facility] [place]            Signature:  GIANA Moncada  [facility]  [address] [facility] [address]  [phone]    Date of encounter: 10/7/22

## 2022-10-07 NOTE — PATIENT INSTRUCTIONS
Clean with baby shampoo and water    Apply moisturize daily to wound,may cover with dry gauze and wrap with rachel and paper tape for protection.     Right offload shoe while walking    Monitor closely for s/s of infection including fever, chills, increase in pain, odor from wound, and increased redness from foot. Go to ER if any complications develop.   Keep leg elevated and avoid pressure on wound.  Diabetes:  Monitor glucose closely. Check fasting glucose and 2 hours after meals. HgA1C goal <7, fasting glucose , and 2 hours after meals <180  Hypertension:  Check blood pressure twice daily, goal <120/80  Diet:   Increase protein intake, avoid fried, fatty foods and foods high in simple carbs.   Vitamins:  Take vitamin C 1000 mg, zinc 50mg, vitamin d 5000 units, and a daily multivitamin. Addy is a good source of protein and nutrients to aid in wound healing.

## 2022-10-09 DIAGNOSIS — Z71.89 COMPLEX CARE COORDINATION: ICD-10-CM

## 2022-10-10 NOTE — ASSESSMENT & PLAN NOTE
Heart Healthy Diet-reduce sodium intake to 1,500  mg/day and limit alcohol  Smoking Cessation - if smoke set goals to quit  Exercise daily  Weight loss -  Maintain healthy weight.  If overweight, set goal to  lose about 5% to 10% of baseline weight within six months  Keep blood pressure well controlled - take medications as prescribed   Ensure diabetes is controlled - diabetic diet, medications as prescribed, check blood glucose routinely

## 2022-10-21 ENCOUNTER — TELEPHONE (OUTPATIENT)
Dept: FAMILY MEDICINE | Facility: CLINIC | Age: 62
End: 2022-10-21
Payer: MEDICARE

## 2022-10-21 ENCOUNTER — OFFICE VISIT (OUTPATIENT)
Dept: FAMILY MEDICINE | Facility: CLINIC | Age: 62
End: 2022-10-21
Payer: MEDICARE

## 2022-10-21 VITALS
HEART RATE: 87 BPM | RESPIRATION RATE: 18 BRPM | WEIGHT: 162 LBS | DIASTOLIC BLOOD PRESSURE: 52 MMHG | SYSTOLIC BLOOD PRESSURE: 130 MMHG | HEIGHT: 70 IN | BODY MASS INDEX: 23.19 KG/M2 | OXYGEN SATURATION: 95 %

## 2022-10-21 DIAGNOSIS — I10 ESSENTIAL HYPERTENSION: ICD-10-CM

## 2022-10-21 DIAGNOSIS — J44.1 COPD EXACERBATION: ICD-10-CM

## 2022-10-21 DIAGNOSIS — Z23 NEED FOR PROPHYLACTIC VACCINATION AND INOCULATION AGAINST INFLUENZA: ICD-10-CM

## 2022-10-21 DIAGNOSIS — E11.40 TYPE 2 DIABETES MELLITUS WITH DIABETIC NEUROPATHY, WITH LONG-TERM CURRENT USE OF INSULIN: ICD-10-CM

## 2022-10-21 DIAGNOSIS — R07.9 CHEST PAIN, UNSPECIFIED TYPE: Primary | ICD-10-CM

## 2022-10-21 DIAGNOSIS — Z79.4 TYPE 2 DIABETES MELLITUS WITH DIABETIC NEUROPATHY, WITH LONG-TERM CURRENT USE OF INSULIN: ICD-10-CM

## 2022-10-21 LAB
ALBUMIN SERPL BCP-MCNC: 3.4 G/DL (ref 3.5–5)
ALBUMIN/GLOB SERPL: 0.9 {RATIO}
ALP SERPL-CCNC: 118 U/L (ref 45–115)
ALT SERPL W P-5'-P-CCNC: 17 U/L (ref 16–61)
ANION GAP SERPL CALCULATED.3IONS-SCNC: 11 MMOL/L (ref 7–16)
AST SERPL W P-5'-P-CCNC: 11 U/L (ref 15–37)
BASOPHILS # BLD AUTO: 0.04 K/UL (ref 0–0.2)
BASOPHILS NFR BLD AUTO: 0.7 % (ref 0–1)
BILIRUB SERPL-MCNC: 0.2 MG/DL (ref ?–1.2)
BUN SERPL-MCNC: 14 MG/DL (ref 7–18)
BUN/CREAT SERPL: 15 (ref 6–20)
CALCIUM SERPL-MCNC: 8.7 MG/DL (ref 8.5–10.1)
CHLORIDE SERPL-SCNC: 104 MMOL/L (ref 98–107)
CO2 SERPL-SCNC: 28 MMOL/L (ref 21–32)
CREAT SERPL-MCNC: 0.94 MG/DL (ref 0.7–1.3)
DIFFERENTIAL METHOD BLD: ABNORMAL
EGFR (NO RACE VARIABLE) (RUSH/TITUS): 91 ML/MIN/1.73M²
EKG 12-LEAD: ABNORMAL
EOSINOPHIL # BLD AUTO: 0.21 K/UL (ref 0–0.5)
EOSINOPHIL NFR BLD AUTO: 3.8 % (ref 1–4)
ERYTHROCYTE [DISTWIDTH] IN BLOOD BY AUTOMATED COUNT: 13.5 % (ref 11.5–14.5)
EST. AVERAGE GLUCOSE BLD GHB EST-MCNC: 154 MG/DL
GLOBULIN SER-MCNC: 3.7 G/DL (ref 2–4)
GLUCOSE SERPL-MCNC: 267 MG/DL (ref 74–106)
HBA1C MFR BLD HPLC: 7.2 % (ref 4.5–6.6)
HCT VFR BLD AUTO: 36.1 % (ref 40–54)
HGB BLD-MCNC: 12 G/DL (ref 13.5–18)
IMM GRANULOCYTES # BLD AUTO: 0.01 K/UL (ref 0–0.04)
IMM GRANULOCYTES NFR BLD: 0.2 % (ref 0–0.4)
LYMPHOCYTES # BLD AUTO: 1.91 K/UL (ref 1–4.8)
LYMPHOCYTES NFR BLD AUTO: 34.7 % (ref 27–41)
MCH RBC QN AUTO: 31.1 PG (ref 27–31)
MCHC RBC AUTO-ENTMCNC: 33.2 G/DL (ref 32–36)
MCV RBC AUTO: 93.5 FL (ref 80–96)
MONOCYTES # BLD AUTO: 0.4 K/UL (ref 0–0.8)
MONOCYTES NFR BLD AUTO: 7.3 % (ref 2–6)
MPC BLD CALC-MCNC: 10.3 FL (ref 9.4–12.4)
NEUTROPHILS # BLD AUTO: 2.94 K/UL (ref 1.8–7.7)
NEUTROPHILS NFR BLD AUTO: 53.3 % (ref 53–65)
NRBC # BLD AUTO: 0 X10E3/UL
NRBC, AUTO (.00): 0 %
PLATELET # BLD AUTO: 255 K/UL (ref 150–400)
POTASSIUM SERPL-SCNC: 4.1 MMOL/L (ref 3.5–5.1)
PR INTERVAL: ABNORMAL
PROT SERPL-MCNC: 7.1 G/DL (ref 6.4–8.2)
PRT AXES: ABNORMAL
QRS DURATION: ABNORMAL
QT/QTC: ABNORMAL
RBC # BLD AUTO: 3.86 M/UL (ref 4.6–6.2)
SODIUM SERPL-SCNC: 139 MMOL/L (ref 136–145)
VENTRICULAR RATE: ABNORMAL
WBC # BLD AUTO: 5.51 K/UL (ref 4.5–11)

## 2022-10-21 PROCEDURE — 99214 PR OFFICE/OUTPT VISIT, EST, LEVL IV, 30-39 MIN: ICD-10-PCS | Mod: ,,, | Performed by: FAMILY MEDICINE

## 2022-10-21 PROCEDURE — 85025 COMPLETE CBC W/AUTO DIFF WBC: CPT | Mod: ,,, | Performed by: CLINICAL MEDICAL LABORATORY

## 2022-10-21 PROCEDURE — 85025 CBC WITH DIFFERENTIAL: ICD-10-PCS | Mod: ,,, | Performed by: CLINICAL MEDICAL LABORATORY

## 2022-10-21 PROCEDURE — 90686 FLU VACCINE (QUAD) GREATER THAN OR EQUAL TO 3YO PRESERVATIVE FREE IM: ICD-10-PCS | Mod: ,,, | Performed by: FAMILY MEDICINE

## 2022-10-21 PROCEDURE — 80053 COMPREHENSIVE METABOLIC PANEL: ICD-10-PCS | Mod: ,,, | Performed by: CLINICAL MEDICAL LABORATORY

## 2022-10-21 PROCEDURE — 83036 HEMOGLOBIN GLYCOSYLATED A1C: CPT | Mod: ,,, | Performed by: CLINICAL MEDICAL LABORATORY

## 2022-10-21 PROCEDURE — G0008 ADMIN INFLUENZA VIRUS VAC: HCPCS | Mod: ,,, | Performed by: FAMILY MEDICINE

## 2022-10-21 PROCEDURE — G0008 FLU VACCINE (QUAD) GREATER THAN OR EQUAL TO 3YO PRESERVATIVE FREE IM: ICD-10-PCS | Mod: ,,, | Performed by: FAMILY MEDICINE

## 2022-10-21 PROCEDURE — 93005 ELECTROCARDIOGRAM TRACING: CPT | Mod: RHCUB | Performed by: FAMILY MEDICINE

## 2022-10-21 PROCEDURE — 99214 OFFICE O/P EST MOD 30 MIN: CPT | Mod: ,,, | Performed by: FAMILY MEDICINE

## 2022-10-21 PROCEDURE — 83036 HEMOGLOBIN A1C: ICD-10-PCS | Mod: ,,, | Performed by: CLINICAL MEDICAL LABORATORY

## 2022-10-21 PROCEDURE — 80053 COMPREHEN METABOLIC PANEL: CPT | Mod: ,,, | Performed by: CLINICAL MEDICAL LABORATORY

## 2022-10-21 PROCEDURE — 90686 IIV4 VACC NO PRSV 0.5 ML IM: CPT | Mod: ,,, | Performed by: FAMILY MEDICINE

## 2022-10-21 RX ORDER — ALBUTEROL SULFATE 90 UG/1
2 AEROSOL, METERED RESPIRATORY (INHALATION) 2 TIMES DAILY PRN
Qty: 18 G | Refills: 2 | Status: SHIPPED | OUTPATIENT
Start: 2022-10-21 | End: 2022-12-06 | Stop reason: SDUPTHER

## 2022-10-21 NOTE — PROGRESS NOTES
Navdeep Avery is a 62 y.o. male seen today for follow-up on his diabetes.  Unfortunately, patient was recently had but did in the chest by a mule.  Since he has had soreness involving his right anterior chest.  He is concerned that the wires in his the sternal closure have been impacted.  Patient reports his wounds to his right foot has healed.  His blood sugars remain much better controlled.      Past Medical History:   Diagnosis Date    Anemia     Anxiety     Arthritis     Atherosclerotic heart disease of native coronary artery with other forms of angina pectoris     Atrophic rhinitis     Carpal tunnel syndrome     Cellulitis of right lower extremity     COPD (chronic obstructive pulmonary disease)     Coronary arteriosclerosis     COVID 02/02/2022    Depression     Diabetic ulcer of right foot associated with diabetes mellitus due to underlying condition, with bone involvement without evidence of necrosis     GERD (gastroesophageal reflux disease)     Hyperlipidemia     Hypertension     Insomnia     MI (myocardial infarction)     Neuropathy     Peripheral vascular disease, unspecified     Right foot ulcer, with fat layer exposed 01/07/2015    Sleep apnea     Type 2 diabetes mellitus      Family History   Problem Relation Age of Onset    Arthritis Mother     Hypertension Mother     Learning disabilities Mother     Alcohol abuse Father     Early death Father     Heart disease Father     Cancer Sister     Diabetes Sister     Heart disease Maternal Grandfather     COPD Paternal Grandfather     Heart disease Son      Current Outpatient Medications on File Prior to Visit   Medication Sig Dispense Refill    albuterol (PROVENTIL) 2.5 mg /3 mL (0.083 %) nebulizer solution USE 1 VIAL IN NEBULIZER 3 TIMES DAILY 100 mL 11    amLODIPine (NORVASC) 10 MG tablet Take 1 tablet (10 mg total) by mouth once daily. 90 tablet 1    aspirin 81 MG Chew Take 81 mg by mouth once daily.      clopidogreL (PLAVIX) 75 mg tablet Take 1  tablet (75 mg total) by mouth once daily. 90 tablet 1    diclofenac sodium (VOLTAREN) 1 % Gel Apply 1 g topically 4 (four) times daily as needed (pain). 20 g 1    empagliflozin (JARDIANCE) 10 mg tablet Take 1 tablet (10 mg total) by mouth once daily. 30 tablet 6    EScitalopram oxalate (LEXAPRO) 10 MG tablet Take 1 tablet (10 mg total) by mouth once daily. 30 tablet 1    fluticasone propionate (FLONASE) 50 mcg/actuation nasal spray 2 sprays (100 mcg total) by Each Nostril route once daily. 16 g 2    gabapentin (NEURONTIN) 300 MG capsule Take 3 capsules (900 mg total) by mouth every 6 (six) hours. 360 capsule 2    HYDROcodone-acetaminophen (NORCO)  mg per tablet Take 1 tablet by mouth every 6 (six) hours as needed for Pain. 120 tablet 0    HYDROcodone-acetaminophen (NORCO)  mg per tablet Take 1 tablet by mouth every 6 (six) hours as needed for Pain. 120 tablet 0    [START ON 11/19/2022] HYDROcodone-acetaminophen (NORCO)  mg per tablet Take 1 tablet by mouth every 6 (six) hours as needed for Pain. 120 tablet 0    hydrOXYzine pamoate (VISTARIL) 25 MG Cap Take 1 capsule (25 mg total) by mouth once daily. Take nightly for sleep 90 capsule 1    insulin detemir U-100 (LEVEMIR) 100 unit/mL injection Inject 20 Units into the skin every evening AND 10 Units every morning. 10 mL 1    lisinopriL 10 MG tablet Take 1 tablet (10 mg total) by mouth once daily. 90 tablet 1    metoprolol succinate (TOPROL-XL) 25 MG 24 hr tablet Take 1 tablet (25 mg total) by mouth once daily. 90 tablet 1    nitroGLYCERIN (NITROSTAT) 0.4 MG SL tablet Place 1 tablet (0.4 mg total) under the tongue every 5 (five) minutes as needed for Chest pain. 30 tablet 2    pantoprazole (PROTONIX) 40 MG tablet Take 1 tablet (40 mg total) by mouth once daily. 90 tablet 1    rosuvastatin (CRESTOR) 20 MG tablet Take 1 tablet (20 mg total) by mouth every evening. 90 tablet 1    SPIRIVA RESPIMAT 2.5 mcg/actuation inhaler Inhale 1 puff into the lungs  "once daily. 4 g 5    SURE COMFORT INSULIN SYRINGE 0.3 mL 31 gauge x 5/16" Syrg Inject 1 application into the skin 2 (two) times a day.      [DISCONTINUED] albuterol (PROVENTIL/VENTOLIN HFA) 90 mcg/actuation inhaler Inhale 2 puffs into the lungs 2 (two) times daily as needed for Wheezing. 18 g 2     No current facility-administered medications on file prior to visit.     Immunization History   Administered Date(s) Administered    Influenza - Quadrivalent - PF *Preferred* (6 months and older) 09/14/2021    Pneumococcal Conjugate - 13 Valent 10/25/2017    Pneumococcal Polysaccharide - 23 Valent 10/19/2016    Tdap 12/20/2019       Review of Systems   Constitutional:  Negative for fever, malaise/fatigue and weight loss.   Respiratory:  Positive for shortness of breath.    Cardiovascular:  Positive for chest pain. Negative for palpitations.   Gastrointestinal:  Negative for nausea and vomiting.   Psychiatric/Behavioral:  Negative for depression.       Vitals:    10/21/22 1304   BP: (!) 130/52   Pulse: 87   Resp: 18       Physical Exam  Vitals reviewed.   Constitutional:       Appearance: Normal appearance.   HENT:      Head: Normocephalic.   Eyes:      Extraocular Movements: Extraocular movements intact.      Conjunctiva/sclera: Conjunctivae normal.      Pupils: Pupils are equal, round, and reactive to light.   Neck:      Thyroid: No thyroid mass or thyromegaly.   Cardiovascular:      Rate and Rhythm: Normal rate and regular rhythm.      Heart sounds: Normal heart sounds. No murmur heard.    No gallop.   Pulmonary:      Effort: Pulmonary effort is normal. No respiratory distress.      Breath sounds: Decreased air movement present. Decreased breath sounds present. No wheezing or rales.   Skin:     General: Skin is warm and dry.      Coloration: Skin is not jaundiced or pale.   Neurological:      Mental Status: He is alert.   Psychiatric:         Mood and Affect: Mood normal.         Behavior: Behavior normal.         " Thought Content: Thought content normal.         Judgment: Judgment normal.        Assessment and Plan  Chest pain, unspecified type  -     X-Ray Chest PA And Lateral; Future; Expected date: 10/21/2022  -     POCT EKG 12-LEAD (NOT FOR [facility] USE)    COPD exacerbation  -     albuterol (PROVENTIL/VENTOLIN HFA) 90 mcg/actuation inhaler; Inhale 2 puffs into the lungs 2 (two) times daily as needed for Wheezing.  Dispense: 18 g; Refill: 2    Need for prophylactic vaccination and inoculation against influenza  -     Influenza - Quadrivalent *Preferred* (6 months+) (PF)    Type 2 diabetes mellitus with diabetic neuropathy, with long-term current use of insulin  -     Hemoglobin A1C; Future; Expected date: 10/21/2022    Essential hypertension  -     CBC Auto Differential; Future; Expected date: 10/21/2022  -     Comprehensive Metabolic Panel; Future; Expected date: 10/21/2022            Return to clinic in 3 months or as needed once his lab work is in.    Health Maintenance Topics with due status: Not Due       Topic Last Completion Date    TETANUS VACCINE 12/20/2019    Diabetes Urine Screening 04/06/2022    Lipid Panel 07/06/2022    Low Dose Statin 10/21/2022

## 2022-10-24 ENCOUNTER — TELEPHONE (OUTPATIENT)
Dept: FAMILY MEDICINE | Facility: CLINIC | Age: 62
End: 2022-10-24
Payer: MEDICARE

## 2022-10-24 NOTE — TELEPHONE ENCOUNTER
----- Message from Marquez Huff MD sent at 10/24/2022  7:50 AM CDT -----  Please let him know his A1c is down to 7.2.  I am very proud of him.  Continue current regimen.

## 2022-11-01 ENCOUNTER — HOSPITAL ENCOUNTER (EMERGENCY)
Facility: HOSPITAL | Age: 62
Discharge: ANOTHER HOSPITAL | End: 2022-11-01
Payer: MEDICARE

## 2022-11-01 VITALS
SYSTOLIC BLOOD PRESSURE: 149 MMHG | BODY MASS INDEX: 25.76 KG/M2 | OXYGEN SATURATION: 98 % | HEIGHT: 68 IN | TEMPERATURE: 98 F | WEIGHT: 170 LBS | HEART RATE: 66 BPM | RESPIRATION RATE: 16 BRPM | DIASTOLIC BLOOD PRESSURE: 83 MMHG

## 2022-11-01 DIAGNOSIS — T14.90XA TRAUMA: ICD-10-CM

## 2022-11-01 DIAGNOSIS — S32.401A CLOSED DISPLACED FRACTURE OF RIGHT ACETABULUM, UNSPECIFIED PORTION OF ACETABULUM, INITIAL ENCOUNTER: Primary | ICD-10-CM
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
## 2022-11-01 LAB
ALBUMIN SERPL BCP-MCNC: 3.8 G/DL (ref 3.5–5)
ALBUMIN/GLOB SERPL: 1 {RATIO}
ALP SERPL-CCNC: 131 U/L (ref 45–115)
ALT SERPL W P-5'-P-CCNC: 23 U/L (ref 16–61)
ANION GAP SERPL CALCULATED.3IONS-SCNC: 10 MMOL/L (ref 7–16)
AST SERPL W P-5'-P-CCNC: 16 U/L (ref 15–37)
BASOPHILS # BLD AUTO: 0.03 K/UL (ref 0–0.2)
BASOPHILS NFR BLD AUTO: 0.3 % (ref 0–1)
BILIRUB SERPL-MCNC: 0.3 MG/DL (ref ?–1.2)
BUN SERPL-MCNC: 13 MG/DL (ref 7–18)
BUN/CREAT SERPL: 13 (ref 6–20)
CALCIUM SERPL-MCNC: 8.6 MG/DL (ref 8.5–10.1)
CHLORIDE SERPL-SCNC: 104 MMOL/L (ref 98–107)
CO2 SERPL-SCNC: 31 MMOL/L (ref 21–32)
CREAT SERPL-MCNC: 1 MG/DL (ref 0.7–1.3)
DIFFERENTIAL METHOD BLD: ABNORMAL
EGFR (NO RACE VARIABLE) (RUSH/TITUS): 84 ML/MIN/1.73M²
EOSINOPHIL # BLD AUTO: 0.17 K/UL (ref 0–0.5)
EOSINOPHIL NFR BLD AUTO: 1.7 % (ref 1–4)
ERYTHROCYTE [DISTWIDTH] IN BLOOD BY AUTOMATED COUNT: 13.5 % (ref 11.5–14.5)
GLOBULIN SER-MCNC: 4 G/DL (ref 2–4)
GLUCOSE SERPL-MCNC: 183 MG/DL (ref 74–106)
HCT VFR BLD AUTO: 37.7 % (ref 40–54)
HGB BLD-MCNC: 12.9 G/DL (ref 13.5–18)
LYMPHOCYTES # BLD AUTO: 1.28 K/UL (ref 1–4.8)
LYMPHOCYTES NFR BLD AUTO: 12.5 % (ref 27–41)
MCH RBC QN AUTO: 31 PG (ref 27–31)
MCHC RBC AUTO-ENTMCNC: 34.2 G/DL (ref 32–36)
MCV RBC AUTO: 90.6 FL (ref 80–96)
MONOCYTES # BLD AUTO: 0.59 K/UL (ref 0–0.8)
MONOCYTES NFR BLD AUTO: 5.8 % (ref 2–6)
MPC BLD CALC-MCNC: 9.7 FL (ref 9.4–12.4)
NEUTROPHILS # BLD AUTO: 8.18 K/UL (ref 1.8–7.7)
NEUTROPHILS NFR BLD AUTO: 79.7 % (ref 53–65)
PLATELET # BLD AUTO: 197 K/UL (ref 150–400)
POTASSIUM SERPL-SCNC: 4.2 MMOL/L (ref 3.5–5.1)
PROT SERPL-MCNC: 7.8 G/DL (ref 6.4–8.2)
RBC # BLD AUTO: 4.16 M/UL (ref 4.6–6.2)
SODIUM SERPL-SCNC: 141 MMOL/L (ref 136–145)
WBC # BLD AUTO: 10.25 K/UL (ref 4.5–11)

## 2022-11-01 PROCEDURE — 63600175 PHARM REV CODE 636 W HCPCS: Performed by: FAMILY MEDICINE

## 2022-11-01 PROCEDURE — 80053 COMPREHEN METABOLIC PANEL: CPT | Performed by: FAMILY MEDICINE

## 2022-11-01 PROCEDURE — 96375 TX/PRO/DX INJ NEW DRUG ADDON: CPT

## 2022-11-01 PROCEDURE — 99284 EMERGENCY DEPT VISIT MOD MDM: CPT | Performed by: FAMILY MEDICINE

## 2022-11-01 PROCEDURE — 99285 EMERGENCY DEPT VISIT HI MDM: CPT | Mod: 25

## 2022-11-01 PROCEDURE — 96376 TX/PRO/DX INJ SAME DRUG ADON: CPT

## 2022-11-01 PROCEDURE — 96374 THER/PROPH/DIAG INJ IV PUSH: CPT

## 2022-11-01 PROCEDURE — 85025 COMPLETE CBC W/AUTO DIFF WBC: CPT | Performed by: FAMILY MEDICINE

## 2022-11-01 PROCEDURE — 96372 THER/PROPH/DIAG INJ SC/IM: CPT

## 2022-11-01 PROCEDURE — 36415 COLL VENOUS BLD VENIPUNCTURE: CPT | Performed by: FAMILY MEDICINE

## 2022-11-01 PROCEDURE — 63600175 PHARM REV CODE 636 W HCPCS: Performed by: PHYSICIAN ASSISTANT

## 2022-11-01 RX ORDER — HYDROMORPHONE HYDROCHLORIDE 2 MG/ML
1 INJECTION, SOLUTION INTRAMUSCULAR; INTRAVENOUS; SUBCUTANEOUS
Status: COMPLETED | OUTPATIENT
Start: 2022-11-01 | End: 2022-11-01

## 2022-11-01 RX ORDER — ONDANSETRON 2 MG/ML
4 INJECTION INTRAMUSCULAR; INTRAVENOUS
Status: DISCONTINUED | OUTPATIENT
Start: 2022-11-01 | End: 2022-11-01

## 2022-11-01 RX ORDER — MORPHINE SULFATE 10 MG/ML
5 INJECTION INTRAMUSCULAR; INTRAVENOUS; SUBCUTANEOUS
Status: COMPLETED | OUTPATIENT
Start: 2022-11-01 | End: 2022-11-01

## 2022-11-01 RX ORDER — ONDANSETRON 2 MG/ML
4 INJECTION INTRAMUSCULAR; INTRAVENOUS
Status: COMPLETED | OUTPATIENT
Start: 2022-11-01 | End: 2022-11-01

## 2022-11-01 RX ORDER — HYDROMORPHONE HYDROCHLORIDE 2 MG/ML
1 INJECTION, SOLUTION INTRAMUSCULAR; INTRAVENOUS; SUBCUTANEOUS
Status: DISCONTINUED | OUTPATIENT
Start: 2022-11-01 | End: 2022-11-01

## 2022-11-01 RX ORDER — KETOROLAC TROMETHAMINE 30 MG/ML
60 INJECTION, SOLUTION INTRAMUSCULAR; INTRAVENOUS
Status: COMPLETED | OUTPATIENT
Start: 2022-11-01 | End: 2022-11-01

## 2022-11-01 RX ORDER — MORPHINE SULFATE 10 MG/ML
10 INJECTION INTRAMUSCULAR; INTRAVENOUS; SUBCUTANEOUS
Status: COMPLETED | OUTPATIENT
Start: 2022-11-01 | End: 2022-11-01

## 2022-11-01 RX ADMIN — MORPHINE SULFATE 10 MG: 10 INJECTION INTRAVENOUS at 05:11

## 2022-11-01 RX ADMIN — MORPHINE SULFATE 5 MG: 10 INJECTION INTRAVENOUS at 01:11

## 2022-11-01 RX ADMIN — HYDROMORPHONE HYDROCHLORIDE 1 MG: 2 INJECTION, SOLUTION INTRAMUSCULAR; INTRAVENOUS; SUBCUTANEOUS at 09:11

## 2022-11-01 RX ADMIN — ONDANSETRON 4 MG: 2 INJECTION INTRAMUSCULAR; INTRAVENOUS at 01:11

## 2022-11-01 RX ADMIN — KETOROLAC TROMETHAMINE 60 MG: 30 INJECTION, SOLUTION INTRAMUSCULAR at 12:11

## 2022-11-01 RX ADMIN — ONDANSETRON 4 MG: 2 INJECTION INTRAMUSCULAR; INTRAVENOUS at 09:11

## 2022-11-01 NOTE — ED PROVIDER NOTES
Encounter Date: 11/1/2022       History     Chief Complaint   Patient presents with    Leg Pain    Hip Pain    Fall     Patient comes in with pain in the right hip after falling off a ladder.  As soon as he got to the ER he knee the have a bowel movement which was normal.  Patient with no nausea vomiting diarrhea no some loss of conscious no head injury no chest injury just right hip pain.  He could barely put pressure on it to stand and ambulate.  Head fall off a ladder coming off top of the house.      Review of patient's allergies indicates:  No Known Allergies  Past Medical History:   Diagnosis Date    Anemia     Anxiety     Arthritis     Atherosclerotic heart disease of native coronary artery with other forms of angina pectoris     Atrophic rhinitis     Carpal tunnel syndrome     Cellulitis of right lower extremity     COPD (chronic obstructive pulmonary disease)     Coronary arteriosclerosis     COVID 02/02/2022    Depression     Diabetic ulcer of right foot associated with diabetes mellitus due to underlying condition, with bone involvement without evidence of necrosis     GERD (gastroesophageal reflux disease)     Hyperlipidemia     Hypertension     Insomnia     MI (myocardial infarction)     Neuropathy     Peripheral vascular disease, unspecified     Right foot ulcer, with fat layer exposed 01/07/2015    Sleep apnea     Type 2 diabetes mellitus      Past Surgical History:   Procedure Laterality Date    AMPUTATION      ANGIOPLASTY      CARDIAC CATHETERIZATION      CORONARY ARTERY BYPASS GRAFT      CORONARY STENT PLACEMENT      DEBRIDEMENT      Right foot debridment with hospital stay and IV Abx    DEBRIDEMENT OF LOWER EXTREMITY Right 06/27/2022    Procedure: DEBRIDEMENT, LOWER EXTREMITY;  Surgeon: Forrest Kiser MD;  Location: [facility];  Service: General;  Laterality: Right;  right foot    IRRIGATION AND DEBRIDEMENT OF LOWER EXTREMITY Right 9/8/2022    Procedure: IRRIGATION AND DEBRIDEMENT, LOWER  EXTREMITY;  Surgeon: Selina Matos MD;  Location: [facility];  Service: General;  Laterality: Right;    SHOULDER OPEN ROTATOR CUFF REPAIR Left     SPINE SURGERY      VASCULAR SURGERY       Family History   Problem Relation Age of Onset    Arthritis Mother     Hypertension Mother     Learning disabilities Mother     Alcohol abuse Father     Early death Father     Heart disease Father     Cancer Sister     Diabetes Sister     Heart disease Maternal Grandfather     COPD Paternal Grandfather     Heart disease Son      Social History     Tobacco Use    Smoking status: Some Days     Packs/day: 0.25     Types: Cigarettes    Smokeless tobacco: Never   Substance Use Topics    Alcohol use: Not Currently    Drug use: Yes     Types: Oxycodone     Review of Systems   Constitutional:  Positive for fatigue. Negative for fever.   HENT: Negative.  Negative for sore throat.    Eyes: Negative.    Respiratory: Negative.  Negative for shortness of breath.    Cardiovascular: Negative.  Negative for chest pain.   Gastrointestinal: Negative.  Negative for nausea.   Endocrine: Negative.    Genitourinary: Negative.  Negative for dysuria.   Musculoskeletal: Negative.  Negative for back pain.   Skin: Negative.  Negative for rash.   Allergic/Immunologic: Negative.    Neurological: Negative.  Negative for weakness.   Hematological: Negative.  Does not bruise/bleed easily.   Psychiatric/Behavioral: Negative.       Physical Exam     Initial Vitals [11/01/22 1146]   BP Pulse Resp Temp SpO2   (!) 173/95 86 18 97.8 °F (36.6 °C) 98 %      MAP       --         Physical Exam    Constitutional: He appears well-developed and well-nourished.   HENT:   Head: Normocephalic and atraumatic.   Right Ear: External ear normal.   Left Ear: External ear normal.   Nose: Nose normal.   Mouth/Throat: Oropharynx is clear and moist.   Eyes: Conjunctivae and EOM are normal. Pupils are equal, round, and reactive to light.   Neck: Neck supple.   Normal range of  motion.  Cardiovascular:  Normal rate, regular rhythm, normal heart sounds and intact distal pulses.           Pulmonary/Chest: Breath sounds normal.   Abdominal: Abdomen is soft. Bowel sounds are normal.   Genitourinary:    Prostate and penis normal.     Musculoskeletal:         General: Normal range of motion.      Cervical back: Normal range of motion and neck supple.      Comments: The patient with pain decreased range of motion of the right hip.     Neurological: He is alert and oriented to person, place, and time. He has normal strength and normal reflexes.   Skin: Skin is warm and dry.   Psychiatric: He has a normal mood and affect. His behavior is normal. Judgment and thought content normal.       Medical Screening Exam   See Full Note    ED Course   Procedures  Labs Reviewed - No data to display       Imaging Results    None          Medications - No data to display                    Clinical Impression:   Final diagnoses:  [W19.XXXA] Liyah Clark,   11/01/22 1506

## 2022-11-01 NOTE — ED NOTES
Notified [facility] of transfer. Awaiting arrival. Also notified [facility], spoke with Mildred. Will call [facility] back when EMS arrives.

## 2022-11-01 NOTE — ED NOTES
Report called to Kristy RONQUILLO at [facility] ED. Will transfer pt via EMS. VINAYAK RAMOS.

## 2022-11-07 ENCOUNTER — TELEPHONE (OUTPATIENT)
Dept: EMERGENCY MEDICINE | Facility: HOSPITAL | Age: 62
End: 2022-11-07
Payer: MEDICARE

## 2022-11-07 NOTE — TELEPHONE ENCOUNTER
Courtesy callback done. Spoke with wife and she stated he was till at [facility] but was doing better. Hoping to co me home in a couple of days.  
no

## 2022-11-08 ENCOUNTER — TELEPHONE (OUTPATIENT)
Dept: FAMILY MEDICINE | Facility: CLINIC | Age: 62
End: 2022-11-08
Payer: MEDICARE

## 2022-11-08 NOTE — TELEPHONE ENCOUNTER
Wife contacted, reports [facility] contacted her Friday with reports of Dr andrew already approving their service for pt instead of [facility]. [facility] contacted today, Aline reports referral was faxed from [facility] for therapy eval. Notified for [facility] to readmit pt for service. Yuliana with [facility] notified of [facility] admit. Wife request nurse change from last admission due to pt/nurse conflict. Dafne with [facility] notified and reports she will have another nurse assigned. Dr Andrew notified.

## 2022-11-08 NOTE — TELEPHONE ENCOUNTER
----- Message from Angelica Jose sent at 11/8/2022  1:09 PM CST -----  Yuliana with [facility] called to let you know that Navdeep Richards's wife called and they want to use [facility] instead of [facility].

## 2022-11-11 DIAGNOSIS — E11.40 TYPE 2 DIABETES MELLITUS WITH DIABETIC NEUROPATHY, WITH LONG-TERM CURRENT USE OF INSULIN: Primary | ICD-10-CM

## 2022-11-11 DIAGNOSIS — Z79.4 TYPE 2 DIABETES MELLITUS WITH DIABETIC NEUROPATHY, WITH LONG-TERM CURRENT USE OF INSULIN: Primary | ICD-10-CM

## 2022-11-11 DIAGNOSIS — F41.9 ANXIETY: ICD-10-CM

## 2022-11-11 NOTE — PROGRESS NOTES
GIANA Moncada   [facility]  [address] [facility] [address]  [phone]      PATIENT NAME: Navdeep Avery  : 1960  DATE: 22  MRN: [id number]      Billing Provider: GIANA Moncada  Level of Service:   Patient PCP Information       Provider PCP Type    Marquez Huff MD General            Reason for Visit / Chief Complaint: Diabetic Foot Ulcer (Right plantar foot)       History of Present Illness / Problem Focused Workflow     Navdeep Avery is a 61 y.o. male who presents to clinic for follow up on chronic-non healing wound on the right foot. Wound continues to have callus. Callus removed with curette and blade. Since last appointment  Keep area clean and dry and well moisturized. PMH includes hypertension, diabetes, peripheral vascular disease, and high cholesterol. Last HgA1C 9.6 in , diabeters managed by PCP. Last doppler , ABIs WNL. Denies fever or chills.       Review of Systems     Review of Systems   Constitutional:  Positive for activity change. Negative for chills and fever.   Respiratory:  Negative for chest tightness and shortness of breath.    Cardiovascular:  Positive for leg swelling. Negative for chest pain and palpitations.   Musculoskeletal:  Positive for arthralgias, gait problem and joint swelling.   Skin:  Positive for wound.        wound   Neurological:  Positive for weakness and numbness.   Psychiatric/Behavioral:  Negative for agitation, behavioral problems, confusion and self-injury.      Medical / Social / Family History     Past Medical History:   Diagnosis Date    Anemia     Anxiety     Arthritis     Atherosclerotic heart disease of native coronary artery with other forms of angina pectoris     Atrophic rhinitis     Carpal tunnel syndrome     Cellulitis of right lower extremity     COPD (chronic obstructive pulmonary disease)     Coronary arteriosclerosis     COVID 2022     Depression     Diabetic ulcer of right foot associated with diabetes mellitus due to underlying condition, with bone involvement without evidence of necrosis     Fall with significant injury, sequela 11/07/2022    GERD (gastroesophageal reflux disease)     Hyperlipidemia     Hypertension     Insomnia     MI (myocardial infarction)     Neuropathy     Peripheral vascular disease, unspecified     Right foot ulcer, with fat layer exposed 01/07/2015    S/p left hip fracture 11/08/2022    Sleep apnea     Type 2 diabetes mellitus        Past Surgical History:   Procedure Laterality Date    AMPUTATION      ANGIOPLASTY      CARDIAC CATHETERIZATION      CORONARY ARTERY BYPASS GRAFT      CORONARY STENT PLACEMENT      DEBRIDEMENT      Right foot debridment with hospital stay and IV Abx    DEBRIDEMENT OF LOWER EXTREMITY Right 06/27/2022    Procedure: DEBRIDEMENT, LOWER EXTREMITY;  Surgeon: Forrest Kiser MD;  Location: [facility] OR;  Service: General;  Laterality: Right;  right foot    IRRIGATION AND DEBRIDEMENT OF LOWER EXTREMITY Right 9/8/2022    Procedure: IRRIGATION AND DEBRIDEMENT, LOWER EXTREMITY;  Surgeon: Selina Matos MD;  Location: [facility] OR;  Service: General;  Laterality: Right;    SHOULDER OPEN ROTATOR CUFF REPAIR Left     SPINE SURGERY      VASCULAR SURGERY         Social History  Mr. Navdeep Avery  reports that he has been smoking cigarettes. He has been smoking an average of .25 packs per day. He has never used smokeless tobacco. He reports that he does not currently use alcohol. He reports current drug use. Drug: Oxycodone.    Family History  'issac Avery family history includes Alcohol abuse in his father; Arthritis in his mother; COPD in his paternal grandfather; Cancer in his sister; Diabetes in his sister; Early death in his father; Heart disease in his father, maternal grandfather, and son; Hypertension in his mother; Learning disabilities in his mother.    Medications and Allergies      Medications  Outpatient Medications Marked as Taking for the 11/14/22 encounter (Office Visit) with GIANA Moncada   Medication Sig Dispense Refill    acetaminophen (TYLENOL) 325 MG tablet Take 650 mg by mouth every 6 (six) hours.      albuterol (PROVENTIL) 2.5 mg /3 mL (0.083 %) nebulizer solution USE 1 VIAL IN NEBULIZER 3 TIMES DAILY 100 mL 11    albuterol (PROVENTIL/VENTOLIN HFA) 90 mcg/actuation inhaler Inhale 2 puffs into the lungs 2 (two) times daily as needed for Wheezing. 18 g 2    amLODIPine (NORVASC) 10 MG tablet Take 1 tablet (10 mg total) by mouth once daily. 90 tablet 1    aspirin 81 MG Chew Take 81 mg by mouth once daily.      clopidogreL (PLAVIX) 75 mg tablet Take 1 tablet (75 mg total) by mouth once daily. 90 tablet 1    diclofenac sodium (VOLTAREN) 1 % Gel Apply 1 g topically 4 (four) times daily as needed (pain). 20 g 1    empagliflozin (JARDIANCE) 10 mg tablet Take 1 tablet (10 mg total) by mouth once daily. 30 tablet 6    fluticasone propionate (FLONASE) 50 mcg/actuation nasal spray 2 sprays (100 mcg total) by Each Nostril route once daily. 16 g 2    gabapentin (NEURONTIN) 300 MG capsule Take 3 capsules (900 mg total) by mouth every 6 (six) hours. 360 capsule 2    HYDROcodone-acetaminophen (NORCO)  mg per tablet Take 1 tablet by mouth every 6 (six) hours as needed for Pain. 120 tablet 0    [START ON 11/19/2022] HYDROcodone-acetaminophen (NORCO)  mg per tablet Take 1 tablet by mouth every 6 (six) hours as needed for Pain. 120 tablet 0    hydrOXYzine pamoate (VISTARIL) 25 MG Cap Take 1 capsule (25 mg total) by mouth once daily. Take nightly for sleep 90 capsule 1    insulin detemir U-100 (LEVEMIR) 100 unit/mL injection Inject 20 Units into the skin every evening AND 10 Units every morning. 10 mL 1    LIDOcaine (LIDODERM) 5 % 1 patch once daily.      lisinopriL 10 MG tablet Take 1 tablet (10 mg total) by mouth once daily. 90 tablet 1    methocarbamoL (ROBAXIN) 500 MG Tab  "Take 500 mg by mouth 4 (four) times daily.      metoprolol succinate (TOPROL-XL) 25 MG 24 hr tablet Take 1 tablet (25 mg total) by mouth once daily. 90 tablet 1    pantoprazole (PROTONIX) 40 MG tablet Take 1 tablet (40 mg total) by mouth once daily. 90 tablet 1    rosuvastatin (CRESTOR) 20 MG tablet Take 1 tablet (20 mg total) by mouth every evening. 90 tablet 1    SENNA 8.6 mg tablet Take 2 tablets by mouth once daily.      SPIRIVA RESPIMAT 2.5 mcg/actuation inhaler Inhale 1 puff into the lungs once daily. 4 g 5    SURE COMFORT INSULIN SYRINGE 0.3 mL 31 gauge x 5/16" Syrg Inject 1 application into the skin 2 (two) times a day.      traZODone (DESYREL) 50 MG tablet Take 50 mg by mouth nightly as needed.         Allergies  Review of patient's allergies indicates:  No Known Allergies    Physical Examination     Vitals:    11/14/22 0837   BP: 118/76   Pulse: 74   Resp: 20   Temp: 97.4 °F (36.3 °C)     Physical Exam  Vitals and nursing note reviewed.   Constitutional:       Appearance: Normal appearance.   HENT:      Head: Normocephalic.   Cardiovascular:      Rate and Rhythm: Normal rate and regular rhythm.      Pulses: Normal pulses.      Heart sounds: Normal heart sounds.   Pulmonary:      Effort: Pulmonary effort is normal. No respiratory distress.   Chest:      Chest wall: No tenderness.   Musculoskeletal:         General: Swelling and tenderness present.      Right lower leg: Edema present.   Skin:     General: Skin is warm and dry.      Findings: Erythema present.      Comments: See LDA for measurements and picture   Neurological:      General: No focal deficit present.      Mental Status: He is alert and oriented to person, place, and time. Mental status is at baseline.   Psychiatric:         Mood and Affect: Mood normal.         Behavior: Behavior normal.         Thought Content: Thought content normal.         Judgment: Judgment normal.       Assessment and Plan             Altered Skin Integrity 09/06/22 2226 " Right plantar Foot Diabetic Ulcer (Active)   09/06/22 2226   Altered Skin Integrity Present on Admission: yes   Side: Right   Orientation: plantar   Location: Foot   Wound Number:    Is this injury device related?:    Primary Wound Type: Diabetic ulc   Description of Altered Skin Integrity:    Ankle-Brachial Index:    Pulses:    Removal Indication and Assessment:    Wound Outcome:    (Retired) Wound Length (cm):    (Retired) Wound Width (cm):    (Retired) Depth (cm):    Wound Description (Comments):    Removal Indications:    Wound Image   11/14/22 0929   Description of Altered Skin Integrity Partial thickness tissue loss. Shallow open ulcer with a red or pink wound bed, without slough. Intact or Open/Ruptured Serum-filled blister. 11/14/22 0845   Dressing Appearance No dressing 11/14/22 0845   Drainage Amount Small 11/14/22 0845   Appearance Pink;Moist;Epithelialization 11/14/22 0845   Tissue loss description Partial thickness 11/14/22 0845   Black (%), Wound Tissue Color 0 % 11/14/22 0845   Red (%), Wound Tissue Color 10 % 11/14/22 0845   Yellow (%), Wound Tissue Color 0 % 11/14/22 0845   Periwound Area Dry;Intact 11/14/22 0845   Wound Edges Callused;Rolled/closed 11/14/22 0845   Wound Length (cm) 1 cm 11/14/22 0845   Wound Width (cm) 1.5 cm 11/14/22 0845   Wound Depth (cm) 0.2 cm 11/14/22 0845   Wound Volume (cm^3) 0.3 cm^3 11/14/22 0845   Wound Surface Area (cm^2) 1.5 cm^2 11/14/22 0845   Care Cleansed with:;Soap and water 11/14/22 0845   Periwound Care Moisturizer applied 11/14/22 0845   Dressing Change Due 11/15/22 11/14/22 0845     Problem List Items Addressed This Visit          Endocrine    Diabetic ulcer of right foot associated with diabetes mellitus due to underlying condition, with bone involvement without evidence of necrosis - Primary    Overview                          Current Assessment & Plan     Clean with baby shampoo and water     Apply moisturize daily to wound,may cover with dry gauze and  wrap with rachel and paper tape for protection.      Right offload shoe while walking     Monitor closely for s/s of infection including fever, chills, increase in pain, odor from wound, and increased redness from foot. Go to ER if any complications develop.   Keep leg elevated and avoid pressure on wound.  Diabetes:  Monitor glucose closely. Check fasting glucose and 2 hours after meals. HgA1C goal <7, fasting glucose , and 2 hours after meals <180  Hypertension:  Check blood pressure twice daily, goal <120/80  Diet:   Increase protein intake, avoid fried, fatty foods and foods high in simple carbs.   Vitamins:  Take vitamin C 1000 mg, zinc 50mg, vitamin d 5000 units, and a daily multivitamin. Addy is a good source of protein and nutrients to aid in wound healing.             Future Appointments   Date Time Provider Department Center   11/29/2022  8:00 AM WANDER NURSE, [facility] [place]   11/30/2022  9:00 AM GIANA Moncada [facility]   12/14/2022  8:00 AM Brie Dobson MD [facility]   1/20/2023  9:00 AM Marquez Huff MD [facility] [place]   10/5/2023  9:00 AM WANDER NURSE [facility] [place]            Signature:  GIANA Moncada  [facility]  [address] [facility] [address]  [phone]    Date of encounter: 11/14/22

## 2022-11-11 NOTE — TELEPHONE ENCOUNTER
----- Message from Susie Hughes MA sent at 11/10/2022 10:37 AM CST -----  Please call in insulin to [facility] for pt.

## 2022-11-14 ENCOUNTER — OFFICE VISIT (OUTPATIENT)
Dept: WOUND CARE | Facility: CLINIC | Age: 62
End: 2022-11-14
Attending: FAMILY MEDICINE
Payer: MEDICARE

## 2022-11-14 VITALS
RESPIRATION RATE: 20 BRPM | DIASTOLIC BLOOD PRESSURE: 76 MMHG | SYSTOLIC BLOOD PRESSURE: 118 MMHG | TEMPERATURE: 97 F | HEART RATE: 74 BPM

## 2022-11-14 DIAGNOSIS — E08.621 DIABETIC ULCER OF RIGHT MIDFOOT ASSOCIATED WITH DIABETES MELLITUS DUE TO UNDERLYING CONDITION, WITH BONE INVOLVEMENT WITHOUT EVIDENCE OF NECROSIS: Primary | ICD-10-CM

## 2022-11-14 DIAGNOSIS — L97.416 DIABETIC ULCER OF RIGHT MIDFOOT ASSOCIATED WITH DIABETES MELLITUS DUE TO UNDERLYING CONDITION, WITH BONE INVOLVEMENT WITHOUT EVIDENCE OF NECROSIS: Primary | ICD-10-CM

## 2022-11-14 PROCEDURE — 11042 DBRDMT SUBQ TIS 1ST 20SQCM/<: CPT | Mod: S$PBB,58,, | Performed by: NURSE PRACTITIONER

## 2022-11-14 PROCEDURE — 99213 OFFICE O/P EST LOW 20 MIN: CPT | Mod: PBBFAC,25 | Performed by: NURSE PRACTITIONER

## 2022-11-14 PROCEDURE — 99499 UNLISTED E&M SERVICE: CPT | Mod: S$PBB,,, | Performed by: NURSE PRACTITIONER

## 2022-11-14 PROCEDURE — 11042 PR DEBRIDEMENT, SKIN, SUB-Q TISSUE,=<20 SQ CM: ICD-10-PCS | Mod: S$PBB,58,, | Performed by: NURSE PRACTITIONER

## 2022-11-14 PROCEDURE — 11042 DBRDMT SUBQ TIS 1ST 20SQCM/<: CPT | Mod: PBBFAC | Performed by: NURSE PRACTITIONER

## 2022-11-14 PROCEDURE — 99499 NO LOS: ICD-10-PCS | Mod: S$PBB,,, | Performed by: NURSE PRACTITIONER

## 2022-11-14 RX ORDER — METHOCARBAMOL 500 MG/1
500 TABLET, FILM COATED ORAL 4 TIMES DAILY
COMMUNITY
Start: 2022-11-08 | End: 2022-11-30 | Stop reason: ALTCHOICE

## 2022-11-14 RX ORDER — ACETAMINOPHEN 325 MG/1
650 TABLET ORAL EVERY 6 HOURS
COMMUNITY
Start: 2022-11-08 | End: 2023-07-28 | Stop reason: CLARIF

## 2022-11-14 RX ORDER — LIDOCAINE 50 MG/G
1 PATCH TOPICAL DAILY
COMMUNITY
Start: 2022-11-08

## 2022-11-14 RX ORDER — SENNOSIDES 8.6 MG
2 TABLET ORAL DAILY
COMMUNITY
Start: 2022-11-08 | End: 2024-02-27

## 2022-11-14 RX ORDER — TRAZODONE HYDROCHLORIDE 50 MG/1
50 TABLET ORAL NIGHTLY PRN
COMMUNITY
Start: 2022-11-08 | End: 2023-05-17

## 2022-11-14 NOTE — PROGRESS NOTES
Debridement Performed for Assessment: Wound# 1  Performed By: Provider: Marli Ashton NP  Assistant: MIAH Castro RN    Debridement: Surgical    Photo taken post procedure: Yes    Time-Out Taken: Yes  Level: Skin/Subcutaneous Tissue  Post Debridement Measurements  Length: (cm) 0.3  Width: (cm) 0.2  Depth: (cm) 0.2      Area: (cm²) 0.6  Percent Debrided: 100%  Total Area Debrided: (sq cm) 0.6    Tissue and other material debrided:  Adipose, Dermis, Epidermis, Subcutaneous  Devitalized Tissue Debrided:Biofilm, Fibrin, Callus  Instrument: Curette  Bleeding: Moderate  Hemostasis Achieved: Pressure  Procedural Pain: Insensate  Post Procedural Pain: Insensate  Response to Treatment: Procedure was tolerated well    Devitalized materials/tissue Removed  the following was removed during debridement  subcutaneous      Post Debridement Diagnosis  Post debridement diagnosis  Same as Pre-operative debridement diagnosis, No Complications noted.      Grafts or implants applied  Was a graft or implant applied?  No      Procedure assistant  Procedure assisted by:  Assistant is the same as nurse listed above      Complications related to procedure  Did any complication occure during procedure?  No complications noted during or after procedure.      Specimen  Specimen collect during procedure?  No specimen collected      Anaesthesia:  Anesthesia used  None      Blood Loss:  Blood loss during procedure  less than 5 cc

## 2022-11-15 ENCOUNTER — TELEPHONE (OUTPATIENT)
Dept: FAMILY MEDICINE | Facility: CLINIC | Age: 62
End: 2022-11-15
Payer: MEDICARE

## 2022-11-15 RX ORDER — ATORVASTATIN CALCIUM 80 MG/1
80 TABLET, FILM COATED ORAL NIGHTLY
COMMUNITY
Start: 2022-11-08 | End: 2023-01-20 | Stop reason: SDUPTHER

## 2022-11-15 RX ORDER — ENOXAPARIN SODIUM 100 MG/ML
INJECTION SUBCUTANEOUS
Status: ON HOLD | COMMUNITY
Start: 2022-11-08 | End: 2023-01-06 | Stop reason: HOSPADM

## 2022-11-15 NOTE — TELEPHONE ENCOUNTER
----- Message from Aline Romero sent at 11/14/2022  2:54 PM CST -----  Regarding: Refill  Pt is asking for his Toujeo to be refilled. Also, he needs needles for his Epi pen. Pt contact is wife @ [phone]. Justine HALEY Roberts.

## 2022-11-21 RX ORDER — ESCITALOPRAM OXALATE 10 MG/1
10 TABLET ORAL DAILY
Qty: 30 TABLET | Refills: 1 | Status: SHIPPED | OUTPATIENT
Start: 2022-11-21 | End: 2023-02-17 | Stop reason: SDUPTHER

## 2022-11-30 ENCOUNTER — OFFICE VISIT (OUTPATIENT)
Dept: WOUND CARE | Facility: CLINIC | Age: 62
End: 2022-11-30
Attending: FAMILY MEDICINE
Payer: MEDICARE

## 2022-11-30 VITALS
SYSTOLIC BLOOD PRESSURE: 129 MMHG | DIASTOLIC BLOOD PRESSURE: 69 MMHG | TEMPERATURE: 98 F | RESPIRATION RATE: 18 BRPM | HEART RATE: 67 BPM

## 2022-11-30 DIAGNOSIS — L97.416 DIABETIC ULCER OF RIGHT MIDFOOT ASSOCIATED WITH DIABETES MELLITUS DUE TO UNDERLYING CONDITION, WITH BONE INVOLVEMENT WITHOUT EVIDENCE OF NECROSIS: Primary | ICD-10-CM

## 2022-11-30 DIAGNOSIS — E08.621 DIABETIC ULCER OF RIGHT MIDFOOT ASSOCIATED WITH DIABETES MELLITUS DUE TO UNDERLYING CONDITION, WITH BONE INVOLVEMENT WITHOUT EVIDENCE OF NECROSIS: Primary | ICD-10-CM

## 2022-11-30 DIAGNOSIS — I73.9 PERIPHERAL VASCULAR DISEASE, UNSPECIFIED: ICD-10-CM

## 2022-11-30 PROCEDURE — 99499 NO LOS: ICD-10-PCS | Mod: S$PBB,,, | Performed by: NURSE PRACTITIONER

## 2022-11-30 PROCEDURE — 99215 OFFICE O/P EST HI 40 MIN: CPT | Mod: PBBFAC | Performed by: NURSE PRACTITIONER

## 2022-11-30 PROCEDURE — 99499 UNLISTED E&M SERVICE: CPT | Mod: S$PBB,,, | Performed by: NURSE PRACTITIONER

## 2022-11-30 NOTE — PROGRESS NOTES
GIANA Moncada   [facility]  [address] [facility] [address]  [phone]      PATIENT NAME: Navdeep Avery  : 1960  DATE: 22  MRN: [id number]      Billing Provider: GIANA Moncada  Level of Service:   Patient PCP Information       Provider PCP Type    Marquez Huff MD General            Reason for Visit / Chief Complaint: Diabetic ulcer of right midfoot associated with diabetes me       History of Present Illness / Problem Focused Workflow     Navdeep Avery is a 61 y.o. male who presents to clinic for follow up on chronic-non healing wound on the right foot. Wound continues to have callus. Callus removed with curette and blade. Pertinent PMH includes hypertension, diabetes, peripheral vascular disease, and high cholesterol. Last HgA1C 9.6 in , diabeters managed by PCP. Last doppler , ABIs WNL. Denies fever or chills.     Review of Systems     Review of Systems   Constitutional:  Positive for activity change. Negative for chills and fever.   Respiratory:  Negative for chest tightness and shortness of breath.    Cardiovascular:  Positive for leg swelling. Negative for chest pain and palpitations.   Musculoskeletal:  Positive for arthralgias, gait problem and joint swelling.   Skin:  Positive for wound.        wound   Neurological:  Positive for weakness and numbness.   Psychiatric/Behavioral:  Negative for agitation, behavioral problems, confusion and self-injury.      Medical / Social / Family History     Past Medical History:   Diagnosis Date    Anemia     Anxiety     Arthritis     Atherosclerotic heart disease of native coronary artery with other forms of angina pectoris     Atrophic rhinitis     Carpal tunnel syndrome     Cellulitis of right lower extremity     COPD (chronic obstructive pulmonary disease)     Coronary arteriosclerosis     COVID 2022    Depression     Diabetic ulcer of right foot  associated with diabetes mellitus due to underlying condition, with bone involvement without evidence of necrosis     Fall with significant injury, sequela 11/07/2022    GERD (gastroesophageal reflux disease)     Hyperlipidemia     Hypertension     Insomnia     MI (myocardial infarction)     Neuropathy     Peripheral vascular disease, unspecified     Right foot ulcer, with fat layer exposed 01/07/2015    S/p left hip fracture 11/08/2022    Sleep apnea     Type 2 diabetes mellitus        Past Surgical History:   Procedure Laterality Date    AMPUTATION      ANGIOPLASTY      CARDIAC CATHETERIZATION      CORONARY ARTERY BYPASS GRAFT      CORONARY STENT PLACEMENT      DEBRIDEMENT      Right foot debridment with hospital stay and IV Abx    DEBRIDEMENT OF LOWER EXTREMITY Right 06/27/2022    Procedure: DEBRIDEMENT, LOWER EXTREMITY;  Surgeon: Forrest Kiser MD;  Location: [facility] OR;  Service: General;  Laterality: Right;  right foot    IRRIGATION AND DEBRIDEMENT OF LOWER EXTREMITY Right 9/8/2022    Procedure: IRRIGATION AND DEBRIDEMENT, LOWER EXTREMITY;  Surgeon: Selina Matos MD;  Location: [facility] OR;  Service: General;  Laterality: Right;    SHOULDER OPEN ROTATOR CUFF REPAIR Left     SPINE SURGERY      VASCULAR SURGERY         Social History  Mr. Navdeep Avery  reports that he has been smoking cigarettes. He has been smoking an average of .25 packs per day. He has never used smokeless tobacco. He reports that he does not currently use alcohol. He reports current drug use. Drug: Oxycodone.    Family History  'issac Avery family history includes Alcohol abuse in his father; Arthritis in his mother; COPD in his paternal grandfather; Cancer in his sister; Diabetes in his sister; Early death in his father; Heart disease in his father, maternal grandfather, and son; Hypertension in his mother; Learning disabilities in his mother.    Medications and Allergies     Medications  Outpatient Medications Marked  as Taking for the 11/30/22 encounter (Office Visit) with GIANA Moncada   Medication Sig Dispense Refill    acetaminophen (TYLENOL) 325 MG tablet Take 650 mg by mouth every 6 (six) hours.      albuterol (PROVENTIL) 2.5 mg /3 mL (0.083 %) nebulizer solution USE 1 VIAL IN NEBULIZER 3 TIMES DAILY 100 mL 11    albuterol (PROVENTIL/VENTOLIN HFA) 90 mcg/actuation inhaler Inhale 2 puffs into the lungs 2 (two) times daily as needed for Wheezing. 18 g 2    amLODIPine (NORVASC) 10 MG tablet Take 1 tablet (10 mg total) by mouth once daily. 90 tablet 1    aspirin 81 MG Chew Take 81 mg by mouth once daily.      atorvastatin (LIPITOR) 80 MG tablet Take 80 mg by mouth every evening.      clopidogreL (PLAVIX) 75 mg tablet Take 1 tablet (75 mg total) by mouth once daily. 90 tablet 1    diclofenac sodium (VOLTAREN) 1 % Gel Apply 1 g topically 4 (four) times daily as needed (pain). 20 g 1    empagliflozin (JARDIANCE) 10 mg tablet Take 1 tablet (10 mg total) by mouth once daily. 30 tablet 6    enoxaparin (LOVENOX) 40 mg/0.4 mL Syrg Inject into the skin.      EScitalopram oxalate (LEXAPRO) 10 MG tablet Take 1 tablet (10 mg total) by mouth once daily. 30 tablet 1    fluticasone propionate (FLONASE) 50 mcg/actuation nasal spray 2 sprays (100 mcg total) by Each Nostril route once daily. 16 g 2    gabapentin (NEURONTIN) 300 MG capsule Take 3 capsules (900 mg total) by mouth every 6 (six) hours. 360 capsule 2    HYDROcodone-acetaminophen (NORCO)  mg per tablet Take 1 tablet by mouth every 6 (six) hours as needed for Pain. 120 tablet 0    hydrOXYzine pamoate (VISTARIL) 25 MG Cap Take 1 capsule (25 mg total) by mouth once daily. Take nightly for sleep 90 capsule 1    insulin detemir U-100 (LEVEMIR) 100 unit/mL injection Inject 20 Units into the skin every evening AND 10 Units every morning. 10 mL 1    LIDOcaine (LIDODERM) 5 % 1 patch once daily.      lisinopriL 10 MG tablet Take 1 tablet (10 mg total) by mouth once daily. 90  "tablet 1    metoprolol succinate (TOPROL-XL) 25 MG 24 hr tablet Take 1 tablet (25 mg total) by mouth once daily. 90 tablet 1    pantoprazole (PROTONIX) 40 MG tablet Take 1 tablet (40 mg total) by mouth once daily. 90 tablet 1    rosuvastatin (CRESTOR) 20 MG tablet Take 1 tablet (20 mg total) by mouth every evening. 90 tablet 1    SENNA 8.6 mg tablet Take 2 tablets by mouth once daily.      SPIRIVA RESPIMAT 2.5 mcg/actuation inhaler Inhale 1 puff into the lungs once daily. 4 g 5    SURE COMFORT INSULIN SYRINGE 0.3 mL 31 gauge x 5/16" Syrg Inject 1 application into the skin 2 (two) times a day.      traZODone (DESYREL) 50 MG tablet Take 50 mg by mouth nightly as needed.         Allergies  Review of patient's allergies indicates:  No Known Allergies    Physical Examination     Vitals:    11/30/22 0844   BP: 129/69   Pulse: 67   Resp: 18   Temp: 97.8 °F (36.6 °C)     Physical Exam  Vitals and nursing note reviewed.   Constitutional:       Appearance: Normal appearance.   HENT:      Head: Normocephalic.   Cardiovascular:      Rate and Rhythm: Normal rate and regular rhythm.      Pulses: Normal pulses.      Heart sounds: Normal heart sounds.   Pulmonary:      Effort: Pulmonary effort is normal. No respiratory distress.   Chest:      Chest wall: No tenderness.   Musculoskeletal:         General: Swelling and tenderness present.      Right lower leg: Edema present.   Skin:     General: Skin is warm and dry.      Findings: Erythema present.      Comments: See LDA for measurements and picture   Neurological:      General: No focal deficit present.      Mental Status: He is alert and oriented to person, place, and time. Mental status is at baseline.   Psychiatric:         Mood and Affect: Mood normal.         Behavior: Behavior normal.         Thought Content: Thought content normal.         Judgment: Judgment normal.     Assessment and Plan             Altered Skin Integrity 09/06/22 8636 Right plantar Foot Diabetic Ulcer " (Active)   09/06/22 2226   Altered Skin Integrity Present on Admission: yes   Side: Right   Orientation: plantar   Location: Foot   Wound Number:    Is this injury device related?:    Primary Wound Type: Diabetic ulc   Description of Altered Skin Integrity:    Ankle-Brachial Index:    Pulses:    Removal Indication and Assessment:    Wound Outcome:    (Retired) Wound Length (cm):    (Retired) Wound Width (cm):    (Retired) Depth (cm):    Wound Description (Comments):    Removal Indications:    Wound Image    11/30/22 0845   Description of Altered Skin Integrity Intact skin with non-blanchable redness of localized area 11/30/22 0845   Dressing Appearance Open to air 11/30/22 0845   Drainage Amount None 11/30/22 0845   Appearance Dry;Closed/resurfaced 11/30/22 0845   Tissue loss description Not applicable 11/30/22 0845   Black (%), Wound Tissue Color 0 % 11/30/22 0845   Red (%), Wound Tissue Color 0 % 11/30/22 0845   Yellow (%), Wound Tissue Color 0 % 11/30/22 0845   Periwound Area Dry 11/30/22 0845   Wound Edges Callused;Rolled/closed 11/30/22 0845   Wound Length (cm) 0 cm 11/30/22 0845   Wound Width (cm) 0 cm 11/30/22 0845   Wound Depth (cm) 0 cm 11/30/22 0845   Wound Volume (cm^3) 0 cm^3 11/30/22 0845   Wound Surface Area (cm^2) 0 cm^2 11/30/22 0845   Care Cleansed with:;Soap and water;Applied:;Moisturizing agent;Debrided 11/30/22 0845   Dressing Applied;Foam;Island/border 11/30/22 0845   Periwound Care Moisturizer applied 11/30/22 0845     Problem List Items Addressed This Visit          Cardiac/Vascular    Peripheral vascular disease, unspecified (Chronic)       Endocrine    Diabetic ulcer of right foot associated with diabetes mellitus due to underlying condition, with bone involvement without evidence of necrosis - Primary    Overview                              Current Assessment & Plan      Clean with baby shampoo and water     Apply moisturize daily to wound, may cover with dry gauze and wrap with rachel and  paper tape for protection.      Right offload shoe while walking     Monitor closely for s/s of infection including fever, chills, increase in pain, odor from wound, and increased redness from foot. Go to ER if any complications develop.   Keep leg elevated and avoid pressure on wound.  Diabetes:  Monitor glucose closely. Check fasting glucose and 2 hours after meals. HgA1C goal <7, fasting glucose , and 2 hours after meals <180  Hypertension:  Check blood pressure twice daily, goal <120/80  Diet:   Increase protein intake, avoid fried, fatty foods and foods high in simple carbs.   Vitamins:  Take vitamin C 1000 mg, zinc 50mg, vitamin d 5000 units, and a daily multivitamin. Addy is a good source of protein and nutrients to aid in wound healing.     F/u in wound care clinic in 3 months. Call us if anything new arises or old wounds reopen before appointment             Future Appointments   Date Time Provider Department Center   12/6/2022 10:00 AM AWROSA NURSE, [facility] FAMILY MEDICINE [facility] [place]   12/14/2022  8:00 AM Brie Dobson MD [facility]   1/20/2023  9:00 AM Marquez Huff MD [facility] [place]   2/28/2023  9:00 AM GIANA Moncada [facility]   10/5/2023  9:00 AM WANDER NURSE [facility] MEDICINE [facility] [place]            Signature:  GIANA Moncada  [facility]  [address] [facility] [address]  [phone]    Date of encounter: 11/30/22

## 2022-11-30 NOTE — PATIENT INSTRUCTIONS
Clean with baby shampoo and water     Apply moisturize daily to wound, may cover with dry gauze and wrap with rachel and paper tape for protection.      Right offload shoe while walking     Monitor closely for s/s of infection including fever, chills, increase in pain, odor from wound, and increased redness from foot. Go to ER if any complications develop.   Keep leg elevated and avoid pressure on wound.  Diabetes:  Monitor glucose closely. Check fasting glucose and 2 hours after meals. HgA1C goal <7, fasting glucose , and 2 hours after meals <180  Hypertension:  Check blood pressure twice daily, goal <120/80  Diet:   Increase protein intake, avoid fried, fatty foods and foods high in simple carbs.   Vitamins:  Take vitamin C 1000 mg, zinc 50mg, vitamin d 5000 units, and a daily multivitamin. Addy is a good source of protein and nutrients to aid in wound healing.     F/u in wound care clinic in 3 months. Call us if anything new arises or old wounds reopen before appointment

## 2022-12-06 ENCOUNTER — OFFICE VISIT (OUTPATIENT)
Dept: FAMILY MEDICINE | Facility: CLINIC | Age: 62
End: 2022-12-06
Payer: MEDICARE

## 2022-12-06 VITALS
HEIGHT: 68 IN | RESPIRATION RATE: 18 BRPM | OXYGEN SATURATION: 95 % | TEMPERATURE: 98 F | HEART RATE: 66 BPM | DIASTOLIC BLOOD PRESSURE: 60 MMHG | BODY MASS INDEX: 25.42 KG/M2 | SYSTOLIC BLOOD PRESSURE: 105 MMHG | WEIGHT: 167.75 LBS

## 2022-12-06 DIAGNOSIS — Z00.00 ENCOUNTER FOR SUBSEQUENT ANNUAL WELLNESS VISIT (AWV) IN MEDICARE PATIENT: Primary | ICD-10-CM

## 2022-12-06 DIAGNOSIS — J44.1 COPD EXACERBATION: ICD-10-CM

## 2022-12-06 DIAGNOSIS — E11.40 TYPE 2 DIABETES MELLITUS WITH DIABETIC NEUROPATHY, WITH LONG-TERM CURRENT USE OF INSULIN: ICD-10-CM

## 2022-12-06 DIAGNOSIS — E11.8 DIABETES MELLITUS WITH COMPLICATION, WITH LONG-TERM CURRENT USE OF INSULIN: ICD-10-CM

## 2022-12-06 DIAGNOSIS — I10 ESSENTIAL HYPERTENSION: ICD-10-CM

## 2022-12-06 DIAGNOSIS — Z79.4 DIABETES MELLITUS WITH COMPLICATION, WITH LONG-TERM CURRENT USE OF INSULIN: ICD-10-CM

## 2022-12-06 DIAGNOSIS — Z79.4 TYPE 2 DIABETES MELLITUS WITH DIABETIC NEUROPATHY, WITH LONG-TERM CURRENT USE OF INSULIN: ICD-10-CM

## 2022-12-06 PROCEDURE — G0439 PR MEDICARE ANNUAL WELLNESS SUBSEQUENT VISIT: ICD-10-PCS | Mod: ,,, | Performed by: NURSE PRACTITIONER

## 2022-12-06 PROCEDURE — G0439 PPPS, SUBSEQ VISIT: HCPCS | Mod: ,,, | Performed by: NURSE PRACTITIONER

## 2022-12-06 RX ORDER — ALBUTEROL SULFATE 90 UG/1
2 AEROSOL, METERED RESPIRATORY (INHALATION) 2 TIMES DAILY PRN
Qty: 18 G | Refills: 2 | Status: SHIPPED | OUTPATIENT
Start: 2022-12-06 | End: 2023-01-20 | Stop reason: SDUPTHER

## 2022-12-06 RX ORDER — INSULIN DETEMIR 100 [IU]/ML
INJECTION, SOLUTION SUBCUTANEOUS
Qty: 3 EACH | Refills: 2 | Status: SHIPPED | OUTPATIENT
Start: 2022-12-06 | End: 2023-01-20 | Stop reason: SDUPTHER

## 2022-12-06 NOTE — PROGRESS NOTES
[facility] LAIRD   [facility]      PATIENT NAME: Navdeep Avery   : 1960    AGE: 62 y.o. DATE: 2022   MRN: [id number]        Reason for Visit / Chief Complaint: Medicare AWV (Subsequent Medicare AWV)        Navdeep Avery presents for a Subsequent Medicare AWV today.     The following components were reviewed and updated:    Medical/Social/Family History:  Past Medical History:   Diagnosis Date    Anemia     Anxiety     Arthritis     Atherosclerotic heart disease of native coronary artery with other forms of angina pectoris     Atrophic rhinitis     Carpal tunnel syndrome     Cellulitis of right lower extremity     COPD (chronic obstructive pulmonary disease)     Coronary arteriosclerosis     COVID 2022    Depression     Diabetic ulcer of right foot associated with diabetes mellitus due to underlying condition, with bone involvement without evidence of necrosis     Fall with significant injury, sequela 2022    GERD (gastroesophageal reflux disease)     Hyperlipidemia     Hypertension     Insomnia     MI (myocardial infarction)     Neuropathy     Peripheral vascular disease, unspecified     Right foot ulcer, with fat layer exposed 2015    S/p left hip fracture 2022    Sleep apnea     Type 2 diabetes mellitus         Family History   Problem Relation Age of Onset    Arthritis Mother     Hypertension Mother     Learning disabilities Mother     Alcohol abuse Father     Early death Father     Heart disease Father     Cancer Sister     Diabetes Sister     Heart disease Maternal Grandfather     COPD Paternal Grandfather     Heart disease Son         Social History     Tobacco Use   Smoking Status Some Days    Packs/day: 0.25    Types: Cigarettes   Smokeless Tobacco Never      Social History     Substance and Sexual Activity   Alcohol Use Not Currently       Family History   Problem Relation Age of Onset    Arthritis Mother     Hypertension Mother     Learning disabilities  Mother     Alcohol abuse Father     Early death Father     Heart disease Father     Cancer Sister     Diabetes Sister     Heart disease Maternal Grandfather     COPD Paternal Grandfather     Heart disease Son        Past Surgical History:   Procedure Laterality Date    AMPUTATION      ANGIOPLASTY      CARDIAC CATHETERIZATION      CORONARY ARTERY BYPASS GRAFT      CORONARY STENT PLACEMENT      DEBRIDEMENT      Right foot debridment with hospital stay and IV Abx    DEBRIDEMENT OF LOWER EXTREMITY Right 06/27/2022    Procedure: DEBRIDEMENT, LOWER EXTREMITY;  Surgeon: Forrest Kiser MD;  Location: [facility] OR;  Service: General;  Laterality: Right;  right foot    HIP FRACTURE SURGERY Left     IRRIGATION AND DEBRIDEMENT OF LOWER EXTREMITY Right 09/08/2022    Procedure: IRRIGATION AND DEBRIDEMENT, LOWER EXTREMITY;  Surgeon: Selina Matos MD;  Location: [facility] OR;  Service: General;  Laterality: Right;    SHOULDER OPEN ROTATOR CUFF REPAIR Left     SPINE SURGERY      VASCULAR SURGERY           Allergies and Current Medications   Review of patient's allergies indicates:  No Known Allergies    Current Outpatient Medications:     acetaminophen (TYLENOL) 325 MG tablet, Take 650 mg by mouth every 6 (six) hours., Disp: , Rfl:     albuterol (PROVENTIL) 2.5 mg /3 mL (0.083 %) nebulizer solution, USE 1 VIAL IN NEBULIZER 3 TIMES DAILY, Disp: 100 mL, Rfl: 11    albuterol (PROVENTIL/VENTOLIN HFA) 90 mcg/actuation inhaler, Inhale 2 puffs into the lungs 2 (two) times daily as needed for Wheezing., Disp: 18 g, Rfl: 2    amLODIPine (NORVASC) 10 MG tablet, Take 1 tablet (10 mg total) by mouth once daily., Disp: 90 tablet, Rfl: 1    aspirin 81 MG Chew, Take 81 mg by mouth once daily., Disp: , Rfl:     atorvastatin (LIPITOR) 80 MG tablet, Take 80 mg by mouth every evening., Disp: , Rfl:     clopidogreL (PLAVIX) 75 mg tablet, Take 1 tablet (75 mg total) by mouth once daily., Disp: 90 tablet, Rfl: 1    diclofenac sodium (VOLTAREN) 1 %  "Gel, Apply 1 g topically 4 (four) times daily as needed (pain)., Disp: 20 g, Rfl: 1    empagliflozin (JARDIANCE) 10 mg tablet, Take 1 tablet (10 mg total) by mouth once daily., Disp: 30 tablet, Rfl: 6    enoxaparin (LOVENOX) 40 mg/0.4 mL Syrg, Inject into the skin., Disp: , Rfl:     EScitalopram oxalate (LEXAPRO) 10 MG tablet, Take 1 tablet (10 mg total) by mouth once daily., Disp: 30 tablet, Rfl: 1    fluticasone propionate (FLONASE) 50 mcg/actuation nasal spray, 2 sprays (100 mcg total) by Each Nostril route once daily., Disp: 16 g, Rfl: 2    gabapentin (NEURONTIN) 300 MG capsule, Take 3 capsules (900 mg total) by mouth every 6 (six) hours., Disp: 360 capsule, Rfl: 2    HYDROcodone-acetaminophen (NORCO)  mg per tablet, Take 1 tablet by mouth every 6 (six) hours as needed for Pain., Disp: 120 tablet, Rfl: 0    hydrOXYzine pamoate (VISTARIL) 25 MG Cap, Take 1 capsule (25 mg total) by mouth once daily. Take nightly for sleep, Disp: 90 capsule, Rfl: 1    LIDOcaine (LIDODERM) 5 %, 1 patch once daily., Disp: , Rfl:     lisinopriL 10 MG tablet, Take 1 tablet (10 mg total) by mouth once daily., Disp: 90 tablet, Rfl: 1    metoprolol succinate (TOPROL-XL) 25 MG 24 hr tablet, Take 1 tablet (25 mg total) by mouth once daily., Disp: 90 tablet, Rfl: 1    pantoprazole (PROTONIX) 40 MG tablet, Take 1 tablet (40 mg total) by mouth once daily., Disp: 90 tablet, Rfl: 1    rosuvastatin (CRESTOR) 20 MG tablet, Take 1 tablet (20 mg total) by mouth every evening., Disp: 90 tablet, Rfl: 1    SENNA 8.6 mg tablet, Take 2 tablets by mouth once daily., Disp: , Rfl:     SPIRIVA RESPIMAT 2.5 mcg/actuation inhaler, Inhale 1 puff into the lungs once daily., Disp: 4 g, Rfl: 5    SURE COMFORT INSULIN SYRINGE 0.3 mL 31 gauge x 5/16" Syrg, Inject 1 application into the skin 2 (two) times a day., Disp: , Rfl:     traZODone (DESYREL) 50 MG tablet, Take 50 mg by mouth nightly as needed., Disp: , Rfl:     insulin detemir U-100 (LEVEMIR FLEXTOUCH " U-100 INSULN) 100 unit/mL (3 mL) InPn pen, Inject 10 units subcutaneously every morning, inject 20 units subcutaneously every evening., Disp: 3 each, Rfl: 2    nitroGLYCERIN (NITROSTAT) 0.4 MG SL tablet, Place 1 tablet (0.4 mg total) under the tongue every 5 (five) minutes as needed for Chest pain., Disp: 30 tablet, Rfl: 2    Health Risk Assessment   Fall Risk: Yes   Advance Directive:  Does not have an advanced directive. Verbal education and written education included in today's AVS.   Depression: PHQ9 score: 2    HTN: DASH diet, exercise, weight management, med compliance, home BP monitoring, and follow-up discussed.   T2DM: Diabetic diet, glucose monitoring, activity level, weight management, med compliance, and follow-up discussed.   Tobacco use: Current. 0.25 ppd  STI: Not at risk    Alcohol misuse: No   Statin Use: Yes    Opioid Risk Score         Value Time User    Opioid Risk Score  8 12/6/2022 10:14 AM Angelica Garcia RN               Health Risk Assessment  What is your age?:  (62)  Are you male or female?: Male  During the past four weeks, how much have you been bothered by emotional problems such as feeling anxious, depressed, irritable, sad, or downhearted and blue?: Slightly  During the past five weeks, has your physical and/or emotional health limited your social activities with family, friends, neighbors, or groups?: Quite a bit  During the past four weeks, how much bodily pain have you generally had?: Severe pain  During the past four weeks, was someone available to help if you needed and wanted help?: Yes, as much as I wanted  During the past four weeks, what was the hardest physical activity you could do for at least two minutes?: Very light  Can you get to places out of walking distance without help?  (For example, can you travel alone on buses or taxis, or drive your own car?): No  Can you go shopping for groceries or clothes without someone's help?: No  Can you prepare your own meals?:  No  Can you do your own housework without help?: No  Because of any health problems, do you need the help of another person with your personal care needs such as eating, bathing, dressing, or getting around the house?: Yes  Can you handle your own money without help?: No  During the past four weeks, how would you rate your health in general?: Good  How have things been going for you during the past four weeks?: Good and bad parts about equal  Are you having difficulties driving your car?: No  Do you always fasten your seat belt when you are in a car?: Yes, usually  How often in the past four weeks have you been bothered by falling or dizzy when standing up?: Never  How often in the past four weeks have you been bothered by sexual problems?: Never  How often in the past four weeks have you been bothered by trouble eating well?: Never  How often in the past four weeks have you been bothered by teeth or denture problems?: Never  How often in the past four weeks have you been bothered with problems using the telephone?: Never  How often in the past four weeks have you been bothered by tiredness or fatigue?: Always  Have you fallen two or more times in the past year?: No  Are you afraid of falling?: Yes  Are you a smoker?: Yes, I'm not ready to quit  During the past four weeks, how many drinks of wine, beer, or other alcoholic beverages did you have?: No alcohol at all  Do you exercise for about 20 minutes three or more days a week?: No, I usually do not exercise this much  Have you been given any information to help you with hazards in your house that might hurt you?: Yes  Have you been given any information to help you with keeping track of your medications?: Yes  How often do you have trouble taking medicines the way you've been told to take them?: I always take them as prescribed  How confident are you that you can control and manage most of your health problems?: Very confident  What is your race? (Check all that  apply.):     Health Maintenance       Health Maintenance Topics with due status: Not Due       Topic Last Completion Date    TETANUS VACCINE 12/20/2019    Diabetes Urine Screening 04/06/2022    Lipid Panel 07/06/2022    Hemoglobin A1c 10/21/2022    Low Dose Statin 12/06/2022     Health Maintenance Due   Topic Date Due    Foot Exam  Never done    Sign Pain Contract  Never done       Incontinence  Bowel: No  Bladder: Yes    Lab results available in Epic or see dates from [facility] above:   Lab Results   Component Value Date    CHOL 161 07/06/2022    CHOL 223 (H) 04/15/2022    CHOL 246 (H) 09/20/2021     Lab Results   Component Value Date    HDL 38 (L) 07/06/2022    HDL 36 (L) 04/15/2022    HDL 33 (L) 09/20/2021     Lab Results   Component Value Date    LDLCALC 91 07/06/2022    LDLCALC 145 04/15/2022    LDLCALC  09/20/2021      Comment:      Unable to calculate due to one of the following values:  Cholesterol <5  HDL Cholesterol <5  Triglycerides <10 or >400     Lab Results   Component Value Date    TRIG 161 (H) 07/06/2022    TRIG 208 (H) 04/15/2022    TRIG 447 (H) 09/20/2021     Lab Results   Component Value Date    CHOLHDL 4.2 07/06/2022    CHOLHDL 6.2 04/15/2022    CHOLHDL 7.5 09/20/2021       Lab Results   Component Value Date    HGBA1C 7.2 (H) 10/21/2022       Sodium   Date Value Ref Range Status   11/01/2022 141 136 - 145 mmol/L Final     Potassium   Date Value Ref Range Status   11/01/2022 4.2 3.5 - 5.1 mmol/L Final     Chloride   Date Value Ref Range Status   11/01/2022 104 98 - 107 mmol/L Final     CO2   Date Value Ref Range Status   11/01/2022 31 21 - 32 mmol/L Final     Glucose   Date Value Ref Range Status   11/01/2022 183 (H) 74 - 106 mg/dL Final     BUN   Date Value Ref Range Status   11/01/2022 13 7 - 18 mg/dL Final     Creatinine   Date Value Ref Range Status   11/01/2022 1.00 0.70 - 1.30 mg/dL Final     Calcium   Date Value Ref Range Status   11/01/2022 8.6 8.5 - 10.1 mg/dL Final     Total Protein  "  Date Value Ref Range Status   11/01/2022 7.8 6.4 - 8.2 g/dL Final     Albumin   Date Value Ref Range Status   11/01/2022 3.8 3.5 - 5.0 g/dL Final     Bilirubin, Total   Date Value Ref Range Status   11/01/2022 0.3 >0.0 - 1.2 mg/dL Final     Alk Phos   Date Value Ref Range Status   11/01/2022 131 (H) 45 - 115 U/L Final     AST   Date Value Ref Range Status   11/01/2022 16 15 - 37 U/L Final     ALT   Date Value Ref Range Status   11/01/2022 23 16 - 61 U/L Final     Anion Gap   Date Value Ref Range Status   11/01/2022 10 7 - 16 mmol/L Final     eGFR    Date Value Ref Range Status   01/25/2021 130       Comment:     The above 11 analytes were performed by [id number] [address] [place] [id number]     eGFR   Date Value Ref Range Status   06/29/2022 66 >=60 mL/min/1.73m² Final         Lab Results   Component Value Date    PSA 1.750 09/20/2021           Care Team   PCP:     Eye specialist:        **See Completed Assessments for Annual Wellness visit within the encounter summary    The following assessments were completed & reviewed:  Depression Screening  Cognitive function Screening  Timed Get Up Test  Whisper Test  Vision Screen  Health Risk Assessment  Checklist of ADLs and IADLs      Objective  Vitals:    12/06/22 0956   BP: 105/60   Pulse: 66   Resp: 18   Temp: 97.6 °F (36.4 °C)   TempSrc: Oral   SpO2: 95%   Weight: 76.1 kg (167 lb 12.3 oz)   Height: 5' 8" (1.727 m)   PainSc:   9   PainLoc: Hip      Body mass index is 25.51 kg/m².  Ideal body weight: 68.4 kg (150 lb 12.7 oz)       Physical Exam      Assessment:     1. Encounter for subsequent annual wellness visit (AWV) in Medicare patient    2. BMI 25.0-25.9,adult    3. COPD exacerbation  - albuterol (PROVENTIL/VENTOLIN HFA) 90 mcg/actuation inhaler; Inhale 2 puffs into the lungs 2 (two) times daily as needed for Wheezing.  Dispense: 18 g; Refill: 2  -stop smoking, pt. Counseled 5 mins.    4. Type 2 diabetes mellitus " with diabetic neuropathy, with long-term current use of insulin  -Improving diabetic diet, continue meds, exercise.    5. Essential hypertension  -controlled continue medications, DASH diet, exercise.    6. Diabetes mellitus with complication, with long-term current use of insulin  -continue medications, diabetic diet, exercise regimen.       Plan:    Referrals:        Advised to call office if does not hear from anyone with referral appt within 2-3 weeks to check on status of referral. Voiced understanding.      Discussed and provided with a screening schedule and personal prevention plan in accordance with USPSTF age appropriate recommendations and Medicare screening guidelines.   Education, counseling, and referrals were provided as needed.  After Visit Summary printed and given to patient which includes written education and a list of any referrals if indicated.     Education including advanced directives, diet, exercise, falls, and preventive health discussed with patient and patient verbalized understanding.      F/u plan for yearly AWV.    Signature:  GIANA Kraft

## 2022-12-06 NOTE — PATIENT INSTRUCTIONS
Counseling and Referral of Other Preventative  (Italic type indicates deductible and co-insurance are waived)    Patient Name: Navdeep Avery  Today's Date: 12/6/2022    Health Maintenance         Date Due Completion Date    Foot Exam Never done ---    Sign Pain Contract Never done ---    Hepatitis C Screening 12/06/2022 (Originally 1960) ---    Eye Exam 12/21/2022 (Originally 8/8/1968) ---    Shingles Vaccine (1 of 2) 10/21/2023 (Originally 8/8/2008) ---    Colorectal Cancer Screening 10/21/2023 (Originally 1960) ---    Naloxone Prescription 10/23/2023 (Originally 8/8/1976) ---    Urine Drug Screen 10/26/2023 (Originally 8/8/1976) ---    COVID-19 Vaccine (1) 12/06/2023 (Originally 2/8/1961) ---    HIV Screening 10/21/2028 (Originally 8/8/1973) ---    Hemoglobin A1c 01/21/2023 10/21/2022    Override on 1/4/2021: Done    Diabetes Urine Screening 04/06/2023 4/6/2022    Lipid Panel 07/06/2023 7/6/2022    Override on 8/27/2019: Done    Low Dose Statin 11/08/2023 11/8/2022    TETANUS VACCINE 12/20/2029 12/20/2019          No orders of the defined types were placed in this encounter.

## 2022-12-14 ENCOUNTER — OFFICE VISIT (OUTPATIENT)
Dept: PAIN MEDICINE | Facility: CLINIC | Age: 62
End: 2022-12-14
Payer: MEDICARE

## 2022-12-14 VITALS
DIASTOLIC BLOOD PRESSURE: 71 MMHG | WEIGHT: 168 LBS | SYSTOLIC BLOOD PRESSURE: 128 MMHG | HEART RATE: 66 BPM | BODY MASS INDEX: 24.05 KG/M2 | HEIGHT: 70 IN

## 2022-12-14 DIAGNOSIS — I73.9 PERIPHERAL VASCULAR DISEASE, UNSPECIFIED: Chronic | ICD-10-CM

## 2022-12-14 DIAGNOSIS — G62.9 NEUROPATHY: Chronic | ICD-10-CM

## 2022-12-14 DIAGNOSIS — L97.513 ULCER OF RIGHT FOOT WITH NECROSIS OF MUSCLE: Chronic | ICD-10-CM

## 2022-12-14 DIAGNOSIS — Z79.899 ENCOUNTER FOR LONG-TERM (CURRENT) USE OF OTHER MEDICATIONS: Primary | ICD-10-CM

## 2022-12-14 PROCEDURE — 80305 DRUG TEST PRSMV DIR OPT OBS: CPT | Mod: PBBFAC | Performed by: PAIN MEDICINE

## 2022-12-14 PROCEDURE — 99214 OFFICE O/P EST MOD 30 MIN: CPT | Mod: S$PBB,,, | Performed by: PAIN MEDICINE

## 2022-12-14 PROCEDURE — 99214 PR OFFICE/OUTPT VISIT, EST, LEVL IV, 30-39 MIN: ICD-10-PCS | Mod: S$PBB,,, | Performed by: PAIN MEDICINE

## 2022-12-14 PROCEDURE — 99215 OFFICE O/P EST HI 40 MIN: CPT | Mod: PBBFAC | Performed by: PAIN MEDICINE

## 2022-12-14 RX ORDER — HYDROCODONE BITARTRATE AND ACETAMINOPHEN 10; 325 MG/1; MG/1
1 TABLET ORAL EVERY 6 HOURS PRN
Qty: 120 TABLET | Refills: 0 | Status: SHIPPED | OUTPATIENT
Start: 2023-01-21 | End: 2023-03-13 | Stop reason: SDUPTHER

## 2022-12-14 RX ORDER — HYDROCODONE BITARTRATE AND ACETAMINOPHEN 10; 325 MG/1; MG/1
1 TABLET ORAL EVERY 6 HOURS PRN
Qty: 120 TABLET | Refills: 0 | Status: SHIPPED | OUTPATIENT
Start: 2022-12-22 | End: 2023-01-21

## 2022-12-14 RX ORDER — GABAPENTIN 300 MG/1
900 CAPSULE ORAL EVERY 6 HOURS
Qty: 360 CAPSULE | Refills: 2 | Status: SHIPPED | OUTPATIENT
Start: 2022-12-14 | End: 2023-03-13 | Stop reason: SDUPTHER

## 2022-12-14 RX ORDER — HYDROCODONE BITARTRATE AND ACETAMINOPHEN 10; 325 MG/1; MG/1
1 TABLET ORAL EVERY 6 HOURS PRN
Qty: 120 TABLET | Refills: 0 | Status: SHIPPED | OUTPATIENT
Start: 2023-02-20 | End: 2023-03-13 | Stop reason: SDUPTHER

## 2022-12-14 NOTE — PROGRESS NOTES
She Disclaimer: This note has been generated using voice-recognition software. There may be typographical errors that have been missed during proof-reading        Patient ID: Navdeep Avery is a 62 y.o. male.      Chief Complaint: Joint Pain and Hip Pain      62-year-old male returns for re-evaluation of left hip, right foot and diabetic neuropathy pain. HE is status post right foot amputation due to osteomyelitis September 2022.  He notes that the foot is healing well.  He attempted to assist his son working on a roof  1.5 months ago and fell off a ladder.  He sustained a left hip fracture required an ORIF at [facility].  He remains in home physical therapy.  He returns today for medication refill.  His pain is tolerable with prescribed medications.  He notes that his blood glucose levels are better but still runs around 200 on most days.            Pain Assessment  Pain Assessment: 0-10  Pain Score:   5  Pain Location: Other (Comment) (joint pain and left hip)  Pain Descriptors: Aching, Constant  Pain Frequency: Continuous  Pain Onset: Awakened from sleep  Clinical Progression: Gradually worsening  Aggravating Factors: Walking  Pain Intervention(s): Medication (See eMAR)      A's of Opioid Risk Assessment  Activity:Patient can not perform ADL.   Analgesia:Patients pain is partially controlled by current medication.   Adverse Effects: Patient denies constipation or sedation.  Aberrant Behavior:  reviewed with no aberrant drug seeking/taking behavior.      Patient denies any suicidal or homicidal ideations    Physical Therapy/Home Exercise: yes      CT Hip Without Contrast Left  Narrative: EXAMINATION:  CT HIP WITHOUT CONTRAST LEFT    CLINICAL HISTORY:  Hip pain, chronic, labral tear suspected, xray done;    COMPARISON:  Pelvis/hip x-ray November 1, 2022    TECHNIQUE:  Multiple axial tomographic images of the left hip were obtained without the use of intravenous contrast.  Coronal and sagittal reformatted images  provided.    FINDINGS:  Acute, mildly displaced fracture involving the posterior left acetabulum with articular extension where there is fracture gap measuring up to 7 mm.  Nondisplaced fracture lines extend to the superior left acetabulum as well as the iliopectineal line on the left.  Atherosclerotic calcification demonstrated.  Impression: Left acetabular fracture as detailed.    The CT exam was performed using one or more of the following dose    reduction techniques- Automated exposure control, adjustment of the mA    and/or kV according to patient size, and/or use of iterative    reconstructed technique.    Point of Service: [facility]    Electronically signed by: Spencer Christensen  Date:    11/01/2022  Time:    13:50  X-Ray Hip 2 or 3 views Left (with Pelvis when performed)  Narrative: EXAMINATION:  XR HIP WITH PELVIS WHEN PERFORMED, 2 OR 3 VIEWS LEFT    CLINICAL HISTORY:  Unspecified fall, initial encounter    TECHNIQUE:  XR HIP WITH PELVIS WHEN PERFORMED, 2 OR 3 VIEWS LEFT    COMPARISON:  None    FINDINGS:  Acute nondisplaced fracture involving the left-sided acetabulum, correlate with need for CT in order to better characterize the fracture.  Impression: Acute nondisplaced fracture involving the left-sided acetabulum, correlate with need for CT in order to better characterize the fracture.    Electronically signed by: Jose Rafael Moses  Date:    11/01/2022  Time:    12:31      Review of Systems   Constitutional: Negative.    HENT: Negative.     Eyes: Negative.    Respiratory: Negative.     Cardiovascular: Negative.    Gastrointestinal: Negative.    Endocrine: Negative.    Genitourinary: Negative.    Musculoskeletal:  Positive for arthralgias (Left hip) and gait problem. Joint swelling: right foot.  Integumentary:  Negative.   Allergic/Immunologic: Negative.    Hematological: Negative.    Psychiatric/Behavioral:  Positive for sleep disturbance.            Past Medical History:   Diagnosis Date     Anemia     Anxiety     Arthritis     Atherosclerotic heart disease of native coronary artery with other forms of angina pectoris     Atrophic rhinitis     Carpal tunnel syndrome     Cellulitis of right lower extremity     COPD (chronic obstructive pulmonary disease)     Coronary arteriosclerosis     COVID 02/02/2022    Depression     Diabetic ulcer of right foot associated with diabetes mellitus due to underlying condition, with bone involvement without evidence of necrosis     Fall with significant injury, sequela 11/07/2022    GERD (gastroesophageal reflux disease)     Hyperlipidemia     Hypertension     Insomnia     MI (myocardial infarction)     Neuropathy     Peripheral vascular disease, unspecified     Right foot ulcer, with fat layer exposed 01/07/2015    S/p left hip fracture 11/08/2022    Sleep apnea     Type 2 diabetes mellitus      Past Surgical History:   Procedure Laterality Date    AMPUTATION      ANGIOPLASTY      CARDIAC CATHETERIZATION      CORONARY ARTERY BYPASS GRAFT      CORONARY STENT PLACEMENT      DEBRIDEMENT      Right foot debridment with hospital stay and IV Abx    DEBRIDEMENT OF LOWER EXTREMITY Right 06/27/2022    Procedure: DEBRIDEMENT, LOWER EXTREMITY;  Surgeon: Forrest Kiser MD;  Location: [facility];  Service: General;  Laterality: Right;  right foot    HIP FRACTURE SURGERY Left     IRRIGATION AND DEBRIDEMENT OF LOWER EXTREMITY Right 09/08/2022    Procedure: IRRIGATION AND DEBRIDEMENT, LOWER EXTREMITY;  Surgeon: Selina Matos MD;  Location: [facility];  Service: General;  Laterality: Right;    SHOULDER OPEN ROTATOR CUFF REPAIR Left     SPINE SURGERY      VASCULAR SURGERY       Social History     Socioeconomic History    Marital status:    Occupational History    Occupation: Retired   Tobacco Use    Smoking status: Some Days     Packs/day: 0.25     Types: Cigarettes    Smokeless tobacco: Never   Substance and Sexual Activity    Alcohol use: Not Currently    Drug use: Yes      Types: Oxycodone    Sexual activity: Yes     Partners: Female     Family History   Problem Relation Age of Onset    Arthritis Mother     Hypertension Mother     Learning disabilities Mother     Alcohol abuse Father     Early death Father     Heart disease Father     Cancer Sister     Diabetes Sister     Heart disease Maternal Grandfather     COPD Paternal Grandfather     Heart disease Son      Review of patient's allergies indicates:  No Known Allergies  has a current medication list which includes the following prescription(s): acetaminophen, albuterol, albuterol, amlodipine, aspirin, atorvastatin, clopidogrel, diclofenac sodium, jardiance, enoxaparin, escitalopram oxalate, fluticasone propionate, hydrocodone-acetaminophen, hydroxyzine pamoate, levemir flextouch u-100 insuln, lidocaine, lisinopril, metoprolol succinate, pantoprazole, rosuvastatin, senna, spiriva respimat, sure comfort insulin syringe, trazodone, gabapentin, [START ON 12/22/2022] hydrocodone-acetaminophen, [START ON 1/21/2023] hydrocodone-acetaminophen, [START ON 2/20/2023] hydrocodone-acetaminophen, and nitroglycerin.      Objective:  Vitals:    12/14/22 0759   BP: 128/71   Pulse: 66        Physical Exam  Vitals and nursing note reviewed. Chaperone present: Accompanied by his wife.   Constitutional:       General: He is not in acute distress.     Appearance: Normal appearance. He is not ill-appearing, toxic-appearing or diaphoretic.   HENT:      Head: Normocephalic and atraumatic.      Nose: Nose normal.      Mouth/Throat:      Mouth: Mucous membranes are moist.   Eyes:      Extraocular Movements: Extraocular movements intact.      Pupils: Pupils are equal, round, and reactive to light.   Cardiovascular:      Rate and Rhythm: Normal rate and regular rhythm.      Heart sounds: Normal heart sounds.   Pulmonary:      Effort: Pulmonary effort is normal. No respiratory distress.      Breath sounds: Normal breath sounds. No stridor. No wheezing or  rhonchi.   Abdominal:      General: Bowel sounds are normal.      Palpations: Abdomen is soft.   Musculoskeletal:         General: No swelling or deformity.      Cervical back: Normal and normal range of motion. No spasms or tenderness. No pain with movement. Normal range of motion.      Thoracic back: Normal.      Lumbar back: No spasms, tenderness or bony tenderness. Normal range of motion. Negative right straight leg raise test and negative left straight leg raise test. No scoliosis.      Left hip: Laceration (Well-healed incisional scar of the left hip) and tenderness present. Decreased range of motion.      Right lower leg: No edema.      Left lower leg: No edema.      Right foot: Swelling and tenderness present.      Comments: Right foot ulceration   Skin:     General: Skin is warm.   Neurological:      General: No focal deficit present.      Mental Status: He is alert and oriented to person, place, and time. Mental status is at baseline.      Cranial Nerves: No cranial nerve deficit.      Sensory: Sensation is intact. No sensory deficit.      Motor: No weakness.      Coordination: Coordination normal.      Gait: Gait abnormal (Uses a walker for assistance with mobility).      Deep Tendon Reflexes: Reflexes are normal and symmetric.   Psychiatric:         Mood and Affect: Mood normal.         Behavior: Behavior normal.         Assessment:      1. Encounter for long-term (current) use of other medications    2. Ulcer of right foot with necrosis of muscle    3. Neuropathy    4. Peripheral vascular disease, unspecified          Plan:  1. reviewed  2.Addiction, Dependency, Tolerance, Opioid abuse-misuse, Death, Diversion Discussed. Overdose reversal drug Naloxone discussed.  3.Refill/Continue medications for pain control and function       Requested Prescriptions     Signed Prescriptions Disp Refills    HYDROcodone-acetaminophen (NORCO)  mg per tablet 120 tablet 0     Sig: Take 1 tablet by mouth every 6  (six) hours as needed for Pain (pain).    HYDROcodone-acetaminophen (NORCO)  mg per tablet 120 tablet 0     Sig: Take 1 tablet by mouth every 6 (six) hours as needed for Pain (pain).    HYDROcodone-acetaminophen (NORCO)  mg per tablet 120 tablet 0     Sig: Take 1 tablet by mouth every 6 (six) hours as needed for Pain (pain).    gabapentin (NEURONTIN) 300 MG capsule 360 capsule 2     Sig: Take 3 capsules (900 mg total) by mouth every 6 (six) hours.     4.Urine drug screen point of care obtained and consistent with prescribed medications and medication refill date  5. We will obtain a liver function test for chronic acetaminophen intake  Orders Placed This Encounter   Procedures    Hepatic function panel     Standing Status:   Future     Standing Expiration Date:   2/12/2024    Gamma GT     Standing Status:   Future     Standing Expiration Date:   2/12/2024    POCT Urine Drug Screen Presump     Interpretive Information:     Negative:  No drug detected at the cut off level.   Positive:  This result represents presumptive positive for the   tested drug, other substances may yield a positive response other   than the analyte of interest. This result should be utilized for   diagnostic purpose only. Confirmation testing will be performed upon physician request only.         5.Follow with AQUILINO Richards in 3 months for re-evaluation and medication refill  6. Patient is not a candidate for nerve block injections         report:  Reviewed and consistent with medication use as prescribed.      The total time spent for evaluation and management on 12/14/2022 including reviewing separately obtained history, performing a medically appropriate exam and evaluation, documenting clinical information in the health record, independently interpreting results and communicating them to the patient/family/caregiver, and ordering medications/tests/procedures was between 15-29 minutes.    The above plan and management options  were discussed at length with patient. Patient is in agreement with the above and verbalized understanding. It will be communicated with the referring physician via electronic record, fax, or mail.

## 2022-12-23 DIAGNOSIS — F41.9 ANXIETY: ICD-10-CM

## 2022-12-23 RX ORDER — HYDROXYZINE PAMOATE 25 MG/1
25 CAPSULE ORAL DAILY
Qty: 90 CAPSULE | Refills: 1 | Status: SHIPPED | OUTPATIENT
Start: 2022-12-23 | End: 2023-05-17 | Stop reason: SDUPTHER

## 2022-12-23 NOTE — TELEPHONE ENCOUNTER
----- Message from Princess Rizvi sent at 12/23/2022 12:55 PM CST -----  Needs a refill on his hydrOXYzine pamoate (VISTARIL) 25 MG Cap. [facility].        Thank you and Pamela Leger!

## 2023-01-01 NOTE — PROGRESS NOTES
Navdeep Avery is a 61 y.o. male seen today for follow-up on his diabetes, diabetic retinopathy and hyperlipidemia.  Patient is not fasting currently but will have his home health agency do some fasting lab work on the patient.  Patient is due for his diabetic eye exam and does have a history of diabetic retinopathy.  Patient also reports a new cataract which may need surgical repair was recently discovered.  He is asking for 2nd opinion regarding his diabetic retinopathy.    Past Medical History:   Diagnosis Date    Anemia     Anxiety     Atherosclerotic heart disease of native coronary artery with other forms of angina pectoris     Atrophic rhinitis     Carpal tunnel syndrome     COPD (chronic obstructive pulmonary disease)     Coronary arteriosclerosis     COVID 02/02/2022    Depression     Diabetic ulcer of right foot associated with diabetes mellitus due to underlying condition, with bone involvement without evidence of necrosis     GERD (gastroesophageal reflux disease)     Hyperlipidemia     Hypertension     Insomnia     MI (myocardial infarction)     Neuropathy     Peripheral vascular disease, unspecified     Right foot ulcer, with fat layer exposed 01/07/2015    Sleep apnea     Type 1 diabetes mellitus with foot ulcer     Type 2 diabetes mellitus      Family History   Problem Relation Age of Onset    Arthritis Mother     Hypertension Mother     Learning disabilities Mother     Alcohol abuse Father     Early death Father     Heart disease Father     Cancer Sister     Diabetes Sister     Heart disease Maternal Grandfather     COPD Paternal Grandfather     Heart disease Son      Current Outpatient Medications on File Prior to Visit   Medication Sig Dispense Refill    albuterol (PROVENTIL) 2.5 mg /3 mL (0.083 %) nebulizer solution Take 2.5 mg by nebulization 3 (three) times daily. Rescue      albuterol (PROVENTIL/VENTOLIN HFA) 90 mcg/actuation inhaler Inhale 2 puffs into the  lungs 2 (two) times daily as needed for Wheezing. 18 g 2    amLODIPine (NORVASC) 10 MG tablet Take 1 tablet (10 mg total) by mouth once daily. 90 tablet 1    amoxicillin-clavulanate 875-125mg (AUGMENTIN) 875-125 mg per tablet Take 1 tablet by mouth every 12 (twelve) hours. 60 tablet 0    aspirin 81 MG Chew Take 81 mg by mouth once daily.      atorvastatin (LIPITOR) 40 MG tablet Take 1 tablet (40 mg total) by mouth nightly. 90 tablet 1    clindamycin (CLEOCIN) 300 MG capsule Take 1 capsule (300 mg total) by mouth every 8 (eight) hours. 90 capsule 0    clopidogreL (PLAVIX) 75 mg tablet Take 1 tablet (75 mg total) by mouth once daily. 90 tablet 1    EScitalopram oxalate (LEXAPRO) 10 MG tablet Take 1 tablet (10 mg total) by mouth 2 (two) times a day. 180 tablet 1    fluticasone propionate (FLONASE) 50 mcg/actuation nasal spray 2 sprays (100 mcg total) by Each Nostril route once daily. 16 g 2    gabapentin (NEURONTIN) 300 MG capsule Take 3 capsules (900 mg total) by mouth every 6 (six) hours. 360 capsule 2    HYDROcodone-acetaminophen (NORCO)  mg per tablet Take 1 tablet by mouth every 8 (eight) hours. 90 tablet 0    hydrOXYzine pamoate (VISTARIL) 25 MG Cap Take 1 capsule (25 mg total) by mouth once daily. Take nightly for sleep 90 capsule 1    icosapent ethyL (VASCEPA) 1 gram Cap Take 2 capsules (2 g total) by mouth 2 (two) times daily. 120 capsule 5    lisinopriL 10 MG tablet Take 1 tablet (10 mg total) by mouth once daily. 90 tablet 1    metoprolol succinate (TOPROL-XL) 25 MG 24 hr tablet Take 1 tablet (25 mg total) by mouth once daily. 90 tablet 1    nitroGLYCERIN (NITROSTAT) 0.4 MG SL tablet Place 1 tablet (0.4 mg total) under the tongue every 5 (five) minutes as needed for Chest pain. 30 tablet 2    pantoprazole (PROTONIX) 40 MG tablet Take 1 tablet (40 mg total) by mouth once daily. 90 tablet 1    SPIRIVA RESPIMAT 2.5 mcg/actuation inhaler Inhale 1 puff into the lungs once daily. 4 g 5     traZODone (DESYREL) 50 MG tablet Take 2 tablets (100 mg total) by mouth once daily. Take nightly for sleep 30 tablet 2    [DISCONTINUED] diclofenac sodium (VOLTAREN) 1 % Gel Apply 1 g topically 4 (four) times daily as needed for Pain.       No current facility-administered medications on file prior to visit.     Immunization History   Administered Date(s) Administered    Influenza - Quadrivalent - PF *Preferred* (6 months and older) 09/14/2021    Pneumococcal Conjugate - 13 Valent 10/25/2017    Pneumococcal Polysaccharide - 23 Valent 10/19/2016    Tdap 12/20/2019       Review of Systems   Constitutional: Negative for fever, malaise/fatigue and weight loss.   Respiratory: Negative for shortness of breath.    Cardiovascular: Negative for chest pain and palpitations.   Gastrointestinal: Negative for nausea and vomiting.   Musculoskeletal: Positive for joint pain.   Psychiatric/Behavioral: Negative for depression.        Vitals:    04/06/22 0902   BP: 138/70   Pulse: 73   Resp: 17   Temp: 98.5 °F (36.9 °C)       Physical Exam  Vitals reviewed.   Constitutional:       Appearance: Normal appearance.   HENT:      Head: Normocephalic.   Eyes:      Extraocular Movements: Extraocular movements intact.      Conjunctiva/sclera: Conjunctivae normal.      Pupils: Pupils are equal, round, and reactive to light.   Neck:      Thyroid: No thyroid mass or thyromegaly.   Cardiovascular:      Rate and Rhythm: Normal rate and regular rhythm.      Heart sounds: Normal heart sounds. No murmur heard.    No gallop.   Pulmonary:      Effort: Pulmonary effort is normal. No respiratory distress.      Breath sounds: Normal breath sounds. No wheezing or rales.   Skin:     General: Skin is warm and dry.      Coloration: Skin is not jaundiced or pale.      Findings: Lesion present.             Comments: Patient has a scaly sunken lesion to his right forearm.   Neurological:      Mental Status: He is alert.   Psychiatric:         Mood and  Affect: Mood normal.         Behavior: Behavior normal.         Thought Content: Thought content normal.         Judgment: Judgment normal.          Assessment and Plan  Diabetes 1.5, managed as type 2  -     Microalbumin/Creatinine Ratio, Urine    Arthralgia, unspecified joint  -     diclofenac sodium (VOLTAREN) 1 % Gel; Apply 1 g topically 4 (four) times daily as needed (pain).  Dispense: 20 g; Refill: 1    Diabetic retinopathy of both eyes associated with type 2 diabetes mellitus, macular edema presence unspecified, unspecified retinopathy severity  -     Ambulatory referral/consult to Ophthalmology; Future; Expected date: 04/13/2022    Skin lesion  -     Ambulatory referral/consult to Dermatology; Future; Expected date: 04/13/2022            Return to clinic in 3 months or as needed once his blood work is in    Health Maintenance Topics with due status: Not Due       Topic Last Completion Date    Pneumococcal Vaccines (Age 0-64) 10/25/2017    TETANUS VACCINE 12/20/2019    Lipid Panel 09/20/2021    Low Dose Statin 03/15/2022          Afib

## 2023-01-04 ENCOUNTER — HOSPITAL ENCOUNTER (INPATIENT)
Facility: HOSPITAL | Age: 63
LOS: 2 days | Discharge: HOME | DRG: 580 | End: 2023-01-06
Attending: EMERGENCY MEDICINE | Admitting: FAMILY MEDICINE
Payer: MEDICARE

## 2023-01-04 DIAGNOSIS — K21.9 GASTROESOPHAGEAL REFLUX DISEASE, UNSPECIFIED WHETHER ESOPHAGITIS PRESENT: ICD-10-CM

## 2023-01-04 DIAGNOSIS — Z01.818 PRE-OP EVALUATION: ICD-10-CM

## 2023-01-04 DIAGNOSIS — D72.829 LEUKOCYTOSIS, UNSPECIFIED TYPE: ICD-10-CM

## 2023-01-04 DIAGNOSIS — Z79.4 TYPE 2 DIABETES MELLITUS WITH OTHER SPECIFIED COMPLICATION, WITH LONG-TERM CURRENT USE OF INSULIN: ICD-10-CM

## 2023-01-04 DIAGNOSIS — R07.9 CHEST PAIN: ICD-10-CM

## 2023-01-04 DIAGNOSIS — M79.673 FOOT PAIN: ICD-10-CM

## 2023-01-04 DIAGNOSIS — I10 BENIGN HYPERTENSION: ICD-10-CM

## 2023-01-04 DIAGNOSIS — F32.9 MAJOR DEPRESSIVE DISORDER, REMISSION STATUS UNSPECIFIED, UNSPECIFIED WHETHER RECURRENT: ICD-10-CM

## 2023-01-04 DIAGNOSIS — J44.9 CHRONIC OBSTRUCTIVE PULMONARY DISEASE, UNSPECIFIED COPD TYPE: ICD-10-CM

## 2023-01-04 DIAGNOSIS — L08.9 DIABETIC FOOT INFECTION: Primary | ICD-10-CM

## 2023-01-04 DIAGNOSIS — E11.69 TYPE 2 DIABETES MELLITUS WITH OTHER SPECIFIED COMPLICATION, WITH LONG-TERM CURRENT USE OF INSULIN: ICD-10-CM

## 2023-01-04 DIAGNOSIS — R50.9 FEVER, UNSPECIFIED FEVER CAUSE: ICD-10-CM

## 2023-01-04 DIAGNOSIS — L02.611 ABSCESS OF RIGHT FOOT: ICD-10-CM

## 2023-01-04 DIAGNOSIS — E11.628 DIABETIC FOOT INFECTION: Primary | ICD-10-CM

## 2023-01-04 DIAGNOSIS — E78.2 MIXED HYPERLIPIDEMIA: ICD-10-CM

## 2023-01-04 LAB
ALBUMIN SERPL BCP-MCNC: 3.2 G/DL (ref 3.5–5)
ALBUMIN/GLOB SERPL: 0.8 {RATIO}
ALP SERPL-CCNC: 111 U/L (ref 45–115)
ALT SERPL W P-5'-P-CCNC: 10 U/L (ref 16–61)
ANION GAP SERPL CALCULATED.3IONS-SCNC: 12 MMOL/L (ref 7–16)
AST SERPL W P-5'-P-CCNC: 10 U/L (ref 15–37)
BASOPHILS # BLD AUTO: 0.04 K/UL (ref 0–0.2)
BASOPHILS NFR BLD AUTO: 0.3 % (ref 0–1)
BILIRUB SERPL-MCNC: 0.4 MG/DL (ref ?–1.2)
BUN SERPL-MCNC: 18 MG/DL (ref 7–18)
BUN/CREAT SERPL: 22 (ref 6–20)
CALCIUM SERPL-MCNC: 8.6 MG/DL (ref 8.5–10.1)
CHLORIDE SERPL-SCNC: 101 MMOL/L (ref 98–107)
CO2 SERPL-SCNC: 31 MMOL/L (ref 21–32)
CREAT SERPL-MCNC: 0.82 MG/DL (ref 0.7–1.3)
DIFFERENTIAL METHOD BLD: ABNORMAL
EGFR (NO RACE VARIABLE) (RUSH/TITUS): 98 ML/MIN/1.73M²
EOSINOPHIL # BLD AUTO: 0.02 K/UL (ref 0–0.5)
EOSINOPHIL NFR BLD AUTO: 0.1 % (ref 1–4)
ERYTHROCYTE [DISTWIDTH] IN BLOOD BY AUTOMATED COUNT: 12.7 % (ref 11.5–14.5)
GLOBULIN SER-MCNC: 3.8 G/DL (ref 2–4)
GLUCOSE SERPL-MCNC: 160 MG/DL (ref 74–106)
GLUCOSE SERPL-MCNC: 172 MG/DL (ref 70–105)
HCT VFR BLD AUTO: 36.2 % (ref 40–54)
HGB BLD-MCNC: 12.1 G/DL (ref 13.5–18)
IMM GRANULOCYTES # BLD AUTO: 0.08 K/UL (ref 0–0.04)
IMM GRANULOCYTES NFR BLD: 0.6 % (ref 0–0.4)
LACTATE SERPL-SCNC: 1.3 MMOL/L (ref 0.4–2)
LYMPHOCYTES # BLD AUTO: 1.81 K/UL (ref 1–4.8)
LYMPHOCYTES NFR BLD AUTO: 13 % (ref 27–41)
MCH RBC QN AUTO: 30.8 PG (ref 27–31)
MCHC RBC AUTO-ENTMCNC: 33.4 G/DL (ref 32–36)
MCV RBC AUTO: 92.1 FL (ref 80–96)
MONOCYTES # BLD AUTO: 1.16 K/UL (ref 0–0.8)
MONOCYTES NFR BLD AUTO: 8.4 % (ref 2–6)
MPC BLD CALC-MCNC: 10.1 FL (ref 9.4–12.4)
NEUTROPHILS # BLD AUTO: 10.77 K/UL (ref 1.8–7.7)
NEUTROPHILS NFR BLD AUTO: 77.6 % (ref 53–65)
NRBC # BLD AUTO: 0 X10E3/UL
NRBC, AUTO (.00): 0 %
PLATELET # BLD AUTO: 163 K/UL (ref 150–400)
POTASSIUM SERPL-SCNC: 3.8 MMOL/L (ref 3.5–5.1)
PROT SERPL-MCNC: 7 G/DL (ref 6.4–8.2)
RBC # BLD AUTO: 3.93 M/UL (ref 4.6–6.2)
SODIUM SERPL-SCNC: 140 MMOL/L (ref 136–145)
WBC # BLD AUTO: 13.88 K/UL (ref 4.5–11)

## 2023-01-04 PROCEDURE — 25000003 PHARM REV CODE 250: Performed by: EMERGENCY MEDICINE

## 2023-01-04 PROCEDURE — 83605 ASSAY OF LACTIC ACID: CPT | Performed by: EMERGENCY MEDICINE

## 2023-01-04 PROCEDURE — 85025 COMPLETE CBC W/AUTO DIFF WBC: CPT | Performed by: EMERGENCY MEDICINE

## 2023-01-04 PROCEDURE — 87040 BLOOD CULTURE FOR BACTERIA: CPT | Performed by: EMERGENCY MEDICINE

## 2023-01-04 PROCEDURE — 99285 EMERGENCY DEPT VISIT HI MDM: CPT | Mod: ,,, | Performed by: EMERGENCY MEDICINE

## 2023-01-04 PROCEDURE — 96375 TX/PRO/DX INJ NEW DRUG ADDON: CPT

## 2023-01-04 PROCEDURE — 11000001 HC ACUTE MED/SURG PRIVATE ROOM

## 2023-01-04 PROCEDURE — 80053 COMPREHEN METABOLIC PANEL: CPT | Performed by: EMERGENCY MEDICINE

## 2023-01-04 PROCEDURE — 96374 THER/PROPH/DIAG INJ IV PUSH: CPT

## 2023-01-04 PROCEDURE — 36415 COLL VENOUS BLD VENIPUNCTURE: CPT | Performed by: EMERGENCY MEDICINE

## 2023-01-04 PROCEDURE — 82962 GLUCOSE BLOOD TEST: CPT

## 2023-01-04 PROCEDURE — 99285 EMERGENCY DEPT VISIT HI MDM: CPT

## 2023-01-04 PROCEDURE — 99285 PR EMERGENCY DEPT VISIT,LEVEL V: ICD-10-PCS | Mod: ,,, | Performed by: EMERGENCY MEDICINE

## 2023-01-04 PROCEDURE — 63600175 PHARM REV CODE 636 W HCPCS: Performed by: EMERGENCY MEDICINE

## 2023-01-04 RX ORDER — ONDANSETRON 2 MG/ML
4 INJECTION INTRAMUSCULAR; INTRAVENOUS
Status: COMPLETED | OUTPATIENT
Start: 2023-01-04 | End: 2023-01-04

## 2023-01-04 RX ORDER — MORPHINE SULFATE 4 MG/ML
4 INJECTION, SOLUTION INTRAMUSCULAR; INTRAVENOUS
Status: COMPLETED | OUTPATIENT
Start: 2023-01-04 | End: 2023-01-04

## 2023-01-04 RX ADMIN — MORPHINE SULFATE 4 MG: 4 INJECTION, SOLUTION INTRAMUSCULAR; INTRAVENOUS at 11:01

## 2023-01-04 RX ADMIN — PIPERACILLIN AND TAZOBACTAM 4.5 G: 4; .5 INJECTION, POWDER, FOR SOLUTION INTRAVENOUS; PARENTERAL at 11:01

## 2023-01-04 RX ADMIN — ONDANSETRON 4 MG: 2 INJECTION INTRAMUSCULAR; INTRAVENOUS at 11:01

## 2023-01-04 NOTE — Clinical Note
Diagnosis: Diabetic foot infection [176421]   Admitting Provider:: SANKET MORAN JR [[id number]]   Future Attending Provider: SANKET MORAN JR [[id number]]   Reason for IP Medical Treatment  (Clinical interventions that can only be accomplished in the IP setting? ) :: Patient needs IV antibiotics and will also likely need surgery.   Estimated Length of Stay:: 2 midnights   I certify that Inpatient services for greater than or equal to 2 midnights are medically necessary:: Yes   Plans for Post-Acute care--if anticipated (pick the single best option):: A. No post acute care anticipated at this time   Special Needs:: No Special Needs [1]

## 2023-01-05 ENCOUNTER — ANESTHESIA EVENT (OUTPATIENT)
Dept: SURGERY | Facility: HOSPITAL | Age: 63
DRG: 580 | End: 2023-01-05
Payer: MEDICARE

## 2023-01-05 ENCOUNTER — ANESTHESIA (OUTPATIENT)
Dept: SURGERY | Facility: HOSPITAL | Age: 63
DRG: 580 | End: 2023-01-05
Payer: MEDICARE

## 2023-01-05 PROBLEM — E11.9 DIABETES MELLITUS, TYPE 2: Status: ACTIVE | Noted: 2023-01-05

## 2023-01-05 PROBLEM — K21.9 GERD (GASTROESOPHAGEAL REFLUX DISEASE): Status: ACTIVE | Noted: 2023-01-05

## 2023-01-05 PROBLEM — L02.611 ABSCESS OF RIGHT FOOT: Status: ACTIVE | Noted: 2023-01-05

## 2023-01-05 PROBLEM — F32.9 MAJOR DEPRESSIVE DISORDER: Status: ACTIVE | Noted: 2023-01-05

## 2023-01-05 LAB
ANION GAP SERPL CALCULATED.3IONS-SCNC: 12 MMOL/L (ref 7–16)
APTT PPP: 31.5 SECONDS (ref 25.2–37.3)
BASOPHILS # BLD AUTO: 0.05 K/UL (ref 0–0.2)
BASOPHILS NFR BLD AUTO: 0.4 % (ref 0–1)
BUN SERPL-MCNC: 17 MG/DL (ref 7–18)
BUN/CREAT SERPL: 20 (ref 6–20)
CALCIUM SERPL-MCNC: 9 MG/DL (ref 8.5–10.1)
CHLORIDE SERPL-SCNC: 102 MMOL/L (ref 98–107)
CHOLEST SERPL-MCNC: 147 MG/DL (ref 0–200)
CHOLEST/HDLC SERPL: 3.8 {RATIO}
CO2 SERPL-SCNC: 30 MMOL/L (ref 21–32)
CREAT SERPL-MCNC: 0.83 MG/DL (ref 0.7–1.3)
DIFFERENTIAL METHOD BLD: ABNORMAL
EGFR (NO RACE VARIABLE) (RUSH/TITUS): 98 ML/MIN/1.73M²
EOSINOPHIL # BLD AUTO: 0.05 K/UL (ref 0–0.5)
EOSINOPHIL NFR BLD AUTO: 0.4 % (ref 1–4)
ERYTHROCYTE [DISTWIDTH] IN BLOOD BY AUTOMATED COUNT: 12.8 % (ref 11.5–14.5)
GLUCOSE SERPL-MCNC: 152 MG/DL (ref 70–105)
GLUCOSE SERPL-MCNC: 162 MG/DL (ref 70–105)
GLUCOSE SERPL-MCNC: 164 MG/DL (ref 70–105)
GLUCOSE SERPL-MCNC: 165 MG/DL (ref 74–106)
GLUCOSE SERPL-MCNC: 178 MG/DL (ref 70–105)
GLUCOSE SERPL-MCNC: 180 MG/DL (ref 70–105)
GLUCOSE SERPL-MCNC: 231 MG/DL (ref 70–105)
GLUCOSE SERPL-MCNC: 247 MG/DL (ref 70–105)
HCT VFR BLD AUTO: 35 % (ref 40–54)
HDLC SERPL-MCNC: 39 MG/DL (ref 40–60)
HGB BLD-MCNC: 11.4 G/DL (ref 13.5–18)
IMM GRANULOCYTES # BLD AUTO: 0.05 K/UL (ref 0–0.04)
IMM GRANULOCYTES NFR BLD: 0.4 % (ref 0–0.4)
INDIRECT COOMBS: NORMAL
INR BLD: 1.23
LDLC SERPL CALC-MCNC: 86 MG/DL
LDLC/HDLC SERPL: 2.2 {RATIO}
LYMPHOCYTES # BLD AUTO: 2.56 K/UL (ref 1–4.8)
LYMPHOCYTES NFR BLD AUTO: 21.8 % (ref 27–41)
MCH RBC QN AUTO: 30.7 PG (ref 27–31)
MCHC RBC AUTO-ENTMCNC: 32.6 G/DL (ref 32–36)
MCV RBC AUTO: 94.3 FL (ref 80–96)
MONOCYTES # BLD AUTO: 0.84 K/UL (ref 0–0.8)
MONOCYTES NFR BLD AUTO: 7.2 % (ref 2–6)
MPC BLD CALC-MCNC: 10.2 FL (ref 9.4–12.4)
NEUTROPHILS # BLD AUTO: 8.17 K/UL (ref 1.8–7.7)
NEUTROPHILS NFR BLD AUTO: 69.8 % (ref 53–65)
NONHDLC SERPL-MCNC: 108 MG/DL
NRBC # BLD AUTO: 0 X10E3/UL
NRBC, AUTO (.00): 0 %
PLATELET # BLD AUTO: 158 K/UL (ref 150–400)
POTASSIUM SERPL-SCNC: 3.7 MMOL/L (ref 3.5–5.1)
PROTHROMBIN TIME: 15 SECONDS (ref 11.7–14.7)
RBC # BLD AUTO: 3.71 M/UL (ref 4.6–6.2)
RH BLD: NORMAL
SODIUM SERPL-SCNC: 140 MMOL/L (ref 136–145)
TRIGL SERPL-MCNC: 110 MG/DL (ref 35–150)
VANCOMYCIN TROUGH SERPL-MCNC: 19.6 ΜG/ML (ref 10–20)
VLDLC SERPL-MCNC: 22 MG/DL
WBC # BLD AUTO: 11.72 K/UL (ref 4.5–11)

## 2023-01-05 PROCEDURE — 37000008 HC ANESTHESIA 1ST 15 MINUTES: Performed by: STUDENT IN AN ORGANIZED HEALTH CARE EDUCATION/TRAINING PROGRAM

## 2023-01-05 PROCEDURE — 87075 CULTR BACTERIA EXCEPT BLOOD: CPT | Performed by: STUDENT IN AN ORGANIZED HEALTH CARE EDUCATION/TRAINING PROGRAM

## 2023-01-05 PROCEDURE — 25000242 PHARM REV CODE 250 ALT 637 W/ HCPCS: Performed by: GENERAL PRACTICE

## 2023-01-05 PROCEDURE — 80048 BASIC METABOLIC PNL TOTAL CA: CPT | Performed by: GENERAL PRACTICE

## 2023-01-05 PROCEDURE — 25000003 PHARM REV CODE 250: Performed by: STUDENT IN AN ORGANIZED HEALTH CARE EDUCATION/TRAINING PROGRAM

## 2023-01-05 PROCEDURE — 27000221 HC OXYGEN, UP TO 24 HOURS

## 2023-01-05 PROCEDURE — D9220A PRA ANESTHESIA: Mod: ANES,,, | Performed by: ANESTHESIOLOGY

## 2023-01-05 PROCEDURE — 71000033 HC RECOVERY, INTIAL HOUR: Performed by: STUDENT IN AN ORGANIZED HEALTH CARE EDUCATION/TRAINING PROGRAM

## 2023-01-05 PROCEDURE — 87076 CULTURE ANAEROBE IDENT EACH: CPT | Performed by: STUDENT IN AN ORGANIZED HEALTH CARE EDUCATION/TRAINING PROGRAM

## 2023-01-05 PROCEDURE — 85025 COMPLETE CBC W/AUTO DIFF WBC: CPT | Performed by: GENERAL PRACTICE

## 2023-01-05 PROCEDURE — 99223 PR INITIAL HOSPITAL CARE,LEVL III: ICD-10-PCS | Mod: 25,,, | Performed by: STUDENT IN AN ORGANIZED HEALTH CARE EDUCATION/TRAINING PROGRAM

## 2023-01-05 PROCEDURE — 25000003 PHARM REV CODE 250: Performed by: GENERAL PRACTICE

## 2023-01-05 PROCEDURE — 63600175 PHARM REV CODE 636 W HCPCS: Performed by: NURSE ANESTHETIST, CERTIFIED REGISTERED

## 2023-01-05 PROCEDURE — D9220A PRA ANESTHESIA: Mod: CRNA,,, | Performed by: NURSE ANESTHETIST, CERTIFIED REGISTERED

## 2023-01-05 PROCEDURE — 87070 CULTURE OTHR SPECIMN AEROBIC: CPT | Performed by: STUDENT IN AN ORGANIZED HEALTH CARE EDUCATION/TRAINING PROGRAM

## 2023-01-05 PROCEDURE — 94640 AIRWAY INHALATION TREATMENT: CPT

## 2023-01-05 PROCEDURE — 63600175 PHARM REV CODE 636 W HCPCS: Performed by: STUDENT IN AN ORGANIZED HEALTH CARE EDUCATION/TRAINING PROGRAM

## 2023-01-05 PROCEDURE — 36000706: Performed by: STUDENT IN AN ORGANIZED HEALTH CARE EDUCATION/TRAINING PROGRAM

## 2023-01-05 PROCEDURE — 63600175 PHARM REV CODE 636 W HCPCS: Performed by: GENERAL PRACTICE

## 2023-01-05 PROCEDURE — 99900035 HC TECH TIME PER 15 MIN (STAT)

## 2023-01-05 PROCEDURE — 85610 PROTHROMBIN TIME: CPT | Performed by: GENERAL PRACTICE

## 2023-01-05 PROCEDURE — 80202 ASSAY OF VANCOMYCIN: CPT | Performed by: GENERAL PRACTICE

## 2023-01-05 PROCEDURE — 25000003 PHARM REV CODE 250: Performed by: INTERNAL MEDICINE

## 2023-01-05 PROCEDURE — D9220A PRA ANESTHESIA: ICD-10-PCS | Mod: CRNA,,, | Performed by: NURSE ANESTHETIST, CERTIFIED REGISTERED

## 2023-01-05 PROCEDURE — 82962 GLUCOSE BLOOD TEST: CPT

## 2023-01-05 PROCEDURE — 36000707: Performed by: STUDENT IN AN ORGANIZED HEALTH CARE EDUCATION/TRAINING PROGRAM

## 2023-01-05 PROCEDURE — 37000009 HC ANESTHESIA EA ADD 15 MINS: Performed by: STUDENT IN AN ORGANIZED HEALTH CARE EDUCATION/TRAINING PROGRAM

## 2023-01-05 PROCEDURE — 25000003 PHARM REV CODE 250: Performed by: NURSE ANESTHETIST, CERTIFIED REGISTERED

## 2023-01-05 PROCEDURE — 99232 SBSQ HOSP IP/OBS MODERATE 35: CPT | Mod: ,,, | Performed by: FAMILY MEDICINE

## 2023-01-05 PROCEDURE — 27000284 HC CANNULA NASAL: Performed by: NURSE ANESTHETIST, CERTIFIED REGISTERED

## 2023-01-05 PROCEDURE — 99232 PR SUBSEQUENT HOSPITAL CARE,LEVL II: ICD-10-PCS | Mod: ,,, | Performed by: FAMILY MEDICINE

## 2023-01-05 PROCEDURE — 86900 BLOOD TYPING SEROLOGIC ABO: CPT | Performed by: GENERAL PRACTICE

## 2023-01-05 PROCEDURE — 11043 PR DEBRIDEMENT, SKIN, SUB-Q TISSUE,MUSCLE,=<20 SQ CM: ICD-10-PCS | Mod: ,,, | Performed by: STUDENT IN AN ORGANIZED HEALTH CARE EDUCATION/TRAINING PROGRAM

## 2023-01-05 PROCEDURE — 11000001 HC ACUTE MED/SURG PRIVATE ROOM

## 2023-01-05 PROCEDURE — 85730 THROMBOPLASTIN TIME PARTIAL: CPT | Performed by: GENERAL PRACTICE

## 2023-01-05 PROCEDURE — D9220A PRA ANESTHESIA: ICD-10-PCS | Mod: ANES,,, | Performed by: ANESTHESIOLOGY

## 2023-01-05 PROCEDURE — 99223 1ST HOSP IP/OBS HIGH 75: CPT | Mod: 25,,, | Performed by: STUDENT IN AN ORGANIZED HEALTH CARE EDUCATION/TRAINING PROGRAM

## 2023-01-05 PROCEDURE — 94761 N-INVAS EAR/PLS OXIMETRY MLT: CPT

## 2023-01-05 PROCEDURE — 11043 DBRDMT MUSC&/FSCA 1ST 20/<: CPT | Mod: ,,, | Performed by: STUDENT IN AN ORGANIZED HEALTH CARE EDUCATION/TRAINING PROGRAM

## 2023-01-05 PROCEDURE — 25000242 PHARM REV CODE 250 ALT 637 W/ HCPCS: Performed by: STUDENT IN AN ORGANIZED HEALTH CARE EDUCATION/TRAINING PROGRAM

## 2023-01-05 PROCEDURE — 25000242 PHARM REV CODE 250 ALT 637 W/ HCPCS: Performed by: INTERNAL MEDICINE

## 2023-01-05 PROCEDURE — 93010 ELECTROCARDIOGRAM REPORT: CPT | Mod: ,,, | Performed by: INTERNAL MEDICINE

## 2023-01-05 PROCEDURE — 25000003 PHARM REV CODE 250: Performed by: FAMILY MEDICINE

## 2023-01-05 PROCEDURE — 36415 COLL VENOUS BLD VENIPUNCTURE: CPT | Performed by: GENERAL PRACTICE

## 2023-01-05 PROCEDURE — 80061 LIPID PANEL: CPT | Performed by: GENERAL PRACTICE

## 2023-01-05 PROCEDURE — 27000716 HC OXISENSOR PROBE, ANY SIZE: Performed by: NURSE ANESTHETIST, CERTIFIED REGISTERED

## 2023-01-05 PROCEDURE — 93010 EKG 12-LEAD: ICD-10-PCS | Mod: ,,, | Performed by: INTERNAL MEDICINE

## 2023-01-05 PROCEDURE — 93005 ELECTROCARDIOGRAM TRACING: CPT

## 2023-01-05 RX ORDER — MEPERIDINE HYDROCHLORIDE 25 MG/ML
25 INJECTION INTRAMUSCULAR; INTRAVENOUS; SUBCUTANEOUS ONCE AS NEEDED
Status: DISCONTINUED | OUTPATIENT
Start: 2023-01-05 | End: 2023-01-05 | Stop reason: HOSPADM

## 2023-01-05 RX ORDER — CEFAZOLIN SODIUM 2 G/50ML
2 SOLUTION INTRAVENOUS
Status: CANCELLED | OUTPATIENT
Start: 2023-01-05

## 2023-01-05 RX ORDER — IPRATROPIUM BROMIDE 0.5 MG/2.5ML
0.5 SOLUTION RESPIRATORY (INHALATION) EVERY 6 HOURS
Status: DISCONTINUED | OUTPATIENT
Start: 2023-01-05 | End: 2023-01-05

## 2023-01-05 RX ORDER — HYDROMORPHONE HYDROCHLORIDE 2 MG/ML
0.5 INJECTION, SOLUTION INTRAMUSCULAR; INTRAVENOUS; SUBCUTANEOUS EVERY 5 MIN PRN
Status: DISCONTINUED | OUTPATIENT
Start: 2023-01-05 | End: 2023-01-05 | Stop reason: HOSPADM

## 2023-01-05 RX ORDER — AMLODIPINE BESYLATE 10 MG/1
10 TABLET ORAL DAILY
Status: DISCONTINUED | OUTPATIENT
Start: 2023-01-05 | End: 2023-01-06 | Stop reason: HOSPADM

## 2023-01-05 RX ORDER — FENTANYL CITRATE 50 UG/ML
INJECTION, SOLUTION INTRAMUSCULAR; INTRAVENOUS
Status: DISCONTINUED | OUTPATIENT
Start: 2023-01-05 | End: 2023-01-05

## 2023-01-05 RX ORDER — ACETAMINOPHEN 325 MG/1
650 TABLET ORAL EVERY 6 HOURS PRN
Status: DISCONTINUED | OUTPATIENT
Start: 2023-01-05 | End: 2023-01-06 | Stop reason: HOSPADM

## 2023-01-05 RX ORDER — CLOPIDOGREL BISULFATE 75 MG/1
75 TABLET ORAL DAILY
Status: DISCONTINUED | OUTPATIENT
Start: 2023-01-05 | End: 2023-01-05

## 2023-01-05 RX ORDER — HYDROXYZINE PAMOATE 25 MG/1
25 CAPSULE ORAL DAILY
Status: DISCONTINUED | OUTPATIENT
Start: 2023-01-05 | End: 2023-01-06 | Stop reason: HOSPADM

## 2023-01-05 RX ORDER — ONDANSETRON 4 MG/1
8 TABLET, ORALLY DISINTEGRATING ORAL EVERY 8 HOURS PRN
Status: DISCONTINUED | OUTPATIENT
Start: 2023-01-05 | End: 2023-01-06 | Stop reason: HOSPADM

## 2023-01-05 RX ORDER — CEFAZOLIN SODIUM 1 G/3ML
INJECTION, POWDER, FOR SOLUTION INTRAMUSCULAR; INTRAVENOUS
Status: DISCONTINUED | OUTPATIENT
Start: 2023-01-05 | End: 2023-01-05

## 2023-01-05 RX ORDER — GABAPENTIN 300 MG/1
900 CAPSULE ORAL EVERY 6 HOURS
Status: DISCONTINUED | OUTPATIENT
Start: 2023-01-05 | End: 2023-01-06 | Stop reason: HOSPADM

## 2023-01-05 RX ORDER — ONDANSETRON 2 MG/ML
INJECTION INTRAMUSCULAR; INTRAVENOUS
Status: DISCONTINUED | OUTPATIENT
Start: 2023-01-05 | End: 2023-01-05

## 2023-01-05 RX ORDER — PROPOFOL 10 MG/ML
VIAL (ML) INTRAVENOUS
Status: DISCONTINUED | OUTPATIENT
Start: 2023-01-05 | End: 2023-01-05

## 2023-01-05 RX ORDER — SODIUM CHLORIDE 0.9 % (FLUSH) 0.9 %
10 SYRINGE (ML) INJECTION EVERY 12 HOURS PRN
Status: DISCONTINUED | OUTPATIENT
Start: 2023-01-05 | End: 2023-01-06 | Stop reason: HOSPADM

## 2023-01-05 RX ORDER — NAPROXEN SODIUM 220 MG/1
81 TABLET, FILM COATED ORAL DAILY
Status: DISCONTINUED | OUTPATIENT
Start: 2023-01-05 | End: 2023-01-05

## 2023-01-05 RX ORDER — LIDOCAINE HYDROCHLORIDE 20 MG/ML
INJECTION, SOLUTION EPIDURAL; INFILTRATION; INTRACAUDAL; PERINEURAL
Status: DISCONTINUED | OUTPATIENT
Start: 2023-01-05 | End: 2023-01-05

## 2023-01-05 RX ORDER — DEXTROSE MONOHYDRATE 100 MG/ML
25 INJECTION, SOLUTION INTRAVENOUS
Status: DISCONTINUED | OUTPATIENT
Start: 2023-01-05 | End: 2023-01-06 | Stop reason: HOSPADM

## 2023-01-05 RX ORDER — ESCITALOPRAM OXALATE 10 MG/1
10 TABLET ORAL DAILY
Status: DISCONTINUED | OUTPATIENT
Start: 2023-01-05 | End: 2023-01-06 | Stop reason: HOSPADM

## 2023-01-05 RX ORDER — POLYETHYLENE GLYCOL 3350 17 G/17G
17 POWDER, FOR SOLUTION ORAL DAILY
Status: DISCONTINUED | OUTPATIENT
Start: 2023-01-05 | End: 2023-01-06 | Stop reason: HOSPADM

## 2023-01-05 RX ORDER — INSULIN ASPART 100 [IU]/ML
0-5 INJECTION, SOLUTION INTRAVENOUS; SUBCUTANEOUS
Status: DISCONTINUED | OUTPATIENT
Start: 2023-01-05 | End: 2023-01-06 | Stop reason: HOSPADM

## 2023-01-05 RX ORDER — MORPHINE SULFATE 10 MG/ML
4 INJECTION INTRAMUSCULAR; INTRAVENOUS; SUBCUTANEOUS EVERY 5 MIN PRN
Status: DISCONTINUED | OUTPATIENT
Start: 2023-01-05 | End: 2023-01-05 | Stop reason: HOSPADM

## 2023-01-05 RX ORDER — IBUPROFEN 200 MG
24 TABLET ORAL
Status: DISCONTINUED | OUTPATIENT
Start: 2023-01-05 | End: 2023-01-06 | Stop reason: HOSPADM

## 2023-01-05 RX ORDER — DEXTROSE MONOHYDRATE 100 MG/ML
12.5 INJECTION, SOLUTION INTRAVENOUS
Status: DISCONTINUED | OUTPATIENT
Start: 2023-01-05 | End: 2023-01-06 | Stop reason: HOSPADM

## 2023-01-05 RX ORDER — DIPHENHYDRAMINE HYDROCHLORIDE 50 MG/ML
INJECTION INTRAMUSCULAR; INTRAVENOUS
Status: DISCONTINUED | OUTPATIENT
Start: 2023-01-05 | End: 2023-01-05

## 2023-01-05 RX ORDER — TRAZODONE HYDROCHLORIDE 50 MG/1
50 TABLET ORAL NIGHTLY PRN
Status: DISCONTINUED | OUTPATIENT
Start: 2023-01-05 | End: 2023-01-06 | Stop reason: HOSPADM

## 2023-01-05 RX ORDER — SIMETHICONE 80 MG
1 TABLET,CHEWABLE ORAL 4 TIMES DAILY PRN
Status: DISCONTINUED | OUTPATIENT
Start: 2023-01-05 | End: 2023-01-06 | Stop reason: HOSPADM

## 2023-01-05 RX ORDER — IBUPROFEN 200 MG
16 TABLET ORAL
Status: DISCONTINUED | OUTPATIENT
Start: 2023-01-05 | End: 2023-01-06 | Stop reason: HOSPADM

## 2023-01-05 RX ORDER — NALOXONE HCL 0.4 MG/ML
0.02 VIAL (ML) INJECTION
Status: DISCONTINUED | OUTPATIENT
Start: 2023-01-05 | End: 2023-01-06 | Stop reason: HOSPADM

## 2023-01-05 RX ORDER — ONDANSETRON 2 MG/ML
4 INJECTION INTRAMUSCULAR; INTRAVENOUS DAILY PRN
Status: DISCONTINUED | OUTPATIENT
Start: 2023-01-05 | End: 2023-01-05 | Stop reason: HOSPADM

## 2023-01-05 RX ORDER — MIDAZOLAM HYDROCHLORIDE 1 MG/ML
INJECTION INTRAMUSCULAR; INTRAVENOUS
Status: DISCONTINUED | OUTPATIENT
Start: 2023-01-05 | End: 2023-01-05

## 2023-01-05 RX ORDER — METOPROLOL SUCCINATE 25 MG/1
25 TABLET, EXTENDED RELEASE ORAL DAILY
Status: DISCONTINUED | OUTPATIENT
Start: 2023-01-05 | End: 2023-01-06 | Stop reason: HOSPADM

## 2023-01-05 RX ORDER — GLUCAGON 1 MG
1 KIT INJECTION
Status: DISCONTINUED | OUTPATIENT
Start: 2023-01-05 | End: 2023-01-06 | Stop reason: HOSPADM

## 2023-01-05 RX ORDER — MUPIROCIN 20 MG/G
OINTMENT TOPICAL 2 TIMES DAILY
Status: DISCONTINUED | OUTPATIENT
Start: 2023-01-05 | End: 2023-01-06 | Stop reason: HOSPADM

## 2023-01-05 RX ORDER — DIPHENHYDRAMINE HYDROCHLORIDE 50 MG/ML
25 INJECTION INTRAMUSCULAR; INTRAVENOUS EVERY 6 HOURS PRN
Status: DISCONTINUED | OUTPATIENT
Start: 2023-01-05 | End: 2023-01-05 | Stop reason: HOSPADM

## 2023-01-05 RX ORDER — HYDROCODONE BITARTRATE AND ACETAMINOPHEN 10; 325 MG/1; MG/1
1 TABLET ORAL EVERY 6 HOURS PRN
Status: DISCONTINUED | OUTPATIENT
Start: 2023-01-05 | End: 2023-01-06 | Stop reason: HOSPADM

## 2023-01-05 RX ORDER — PANTOPRAZOLE SODIUM 40 MG/1
40 TABLET, DELAYED RELEASE ORAL DAILY
Status: DISCONTINUED | OUTPATIENT
Start: 2023-01-05 | End: 2023-01-06 | Stop reason: HOSPADM

## 2023-01-05 RX ORDER — IPRATROPIUM BROMIDE AND ALBUTEROL SULFATE 2.5; .5 MG/3ML; MG/3ML
3 SOLUTION RESPIRATORY (INHALATION) EVERY 6 HOURS
Status: DISCONTINUED | OUTPATIENT
Start: 2023-01-05 | End: 2023-01-06 | Stop reason: HOSPADM

## 2023-01-05 RX ORDER — IPRATROPIUM BROMIDE AND ALBUTEROL SULFATE 2.5; .5 MG/3ML; MG/3ML
3 SOLUTION RESPIRATORY (INHALATION)
Status: DISCONTINUED | OUTPATIENT
Start: 2023-01-05 | End: 2023-01-06 | Stop reason: HOSPADM

## 2023-01-05 RX ORDER — IPRATROPIUM BROMIDE AND ALBUTEROL SULFATE 2.5; .5 MG/3ML; MG/3ML
3 SOLUTION RESPIRATORY (INHALATION) ONCE AS NEEDED
Status: DISCONTINUED | OUTPATIENT
Start: 2023-01-05 | End: 2023-01-05 | Stop reason: HOSPADM

## 2023-01-05 RX ORDER — BUPIVACAINE HYDROCHLORIDE 7.5 MG/ML
INJECTION, SOLUTION EPIDURAL; RETROBULBAR
Status: DISCONTINUED | OUTPATIENT
Start: 2023-01-05 | End: 2023-01-05 | Stop reason: HOSPADM

## 2023-01-05 RX ORDER — LISINOPRIL 10 MG/1
10 TABLET ORAL DAILY
Status: DISCONTINUED | OUTPATIENT
Start: 2023-01-05 | End: 2023-01-06 | Stop reason: HOSPADM

## 2023-01-05 RX ORDER — ATORVASTATIN CALCIUM 80 MG/1
80 TABLET, FILM COATED ORAL NIGHTLY
Status: DISCONTINUED | OUTPATIENT
Start: 2023-01-05 | End: 2023-01-06 | Stop reason: HOSPADM

## 2023-01-05 RX ADMIN — PROPOFOL 20 MG: 10 INJECTION, EMULSION INTRAVENOUS at 01:01

## 2023-01-05 RX ADMIN — MIDAZOLAM 2 MG: 1 INJECTION INTRAMUSCULAR; INTRAVENOUS at 12:01

## 2023-01-05 RX ADMIN — PROPOFOL 20 MG: 10 INJECTION, EMULSION INTRAVENOUS at 12:01

## 2023-01-05 RX ADMIN — IPRATROPIUM BROMIDE 0.5 MG: 0.5 SOLUTION RESPIRATORY (INHALATION) at 01:01

## 2023-01-05 RX ADMIN — ATORVASTATIN CALCIUM 80 MG: 80 TABLET, FILM COATED ORAL at 08:01

## 2023-01-05 RX ADMIN — DIPHENHYDRAMINE HYDROCHLORIDE 12.5 MG: 50 INJECTION, SOLUTION INTRAMUSCULAR; INTRAVENOUS at 12:01

## 2023-01-05 RX ADMIN — LIDOCAINE HYDROCHLORIDE 50 MG: 20 INJECTION, SOLUTION EPIDURAL; INFILTRATION; INTRACAUDAL; PERINEURAL at 12:01

## 2023-01-05 RX ADMIN — ONDANSETRON 4 MG: 2 INJECTION INTRAMUSCULAR; INTRAVENOUS at 12:01

## 2023-01-05 RX ADMIN — INSULIN DETEMIR 15 UNITS: 100 INJECTION, SOLUTION SUBCUTANEOUS at 08:01

## 2023-01-05 RX ADMIN — FENTANYL CITRATE 50 MCG: 50 INJECTION INTRAMUSCULAR; INTRAVENOUS at 12:01

## 2023-01-05 RX ADMIN — GABAPENTIN 900 MG: 300 CAPSULE ORAL at 05:01

## 2023-01-05 RX ADMIN — VANCOMYCIN HYDROCHLORIDE 1500 MG: 1 INJECTION, POWDER, LYOPHILIZED, FOR SOLUTION INTRAVENOUS at 08:01

## 2023-01-05 RX ADMIN — PIPERACILLIN AND TAZOBACTAM 4.5 G: 4; .5 INJECTION, POWDER, LYOPHILIZED, FOR SOLUTION INTRAVENOUS; PARENTERAL at 08:01

## 2023-01-05 RX ADMIN — FENTANYL CITRATE 25 MCG: 50 INJECTION INTRAMUSCULAR; INTRAVENOUS at 01:01

## 2023-01-05 RX ADMIN — PIPERACILLIN AND TAZOBACTAM 4.5 G: 4; .5 INJECTION, POWDER, LYOPHILIZED, FOR SOLUTION INTRAVENOUS; PARENTERAL at 04:01

## 2023-01-05 RX ADMIN — INSULIN ASPART 1 UNITS: 100 INJECTION, SOLUTION INTRAVENOUS; SUBCUTANEOUS at 08:01

## 2023-01-05 RX ADMIN — VANCOMYCIN HYDROCHLORIDE 1500 MG: 1 INJECTION, POWDER, LYOPHILIZED, FOR SOLUTION INTRAVENOUS at 02:01

## 2023-01-05 RX ADMIN — TRAZODONE HYDROCHLORIDE 50 MG: 50 TABLET ORAL at 10:01

## 2023-01-05 RX ADMIN — IPRATROPIUM BROMIDE AND ALBUTEROL SULFATE 3 ML: .5; 3 SOLUTION RESPIRATORY (INHALATION) at 07:01

## 2023-01-05 RX ADMIN — ATORVASTATIN CALCIUM 80 MG: 80 TABLET, FILM COATED ORAL at 01:01

## 2023-01-05 RX ADMIN — DIPHENHYDRAMINE HYDROCHLORIDE 12.5 MG: 50 INJECTION, SOLUTION INTRAMUSCULAR; INTRAVENOUS at 01:01

## 2023-01-05 RX ADMIN — INSULIN ASPART 2 UNITS: 100 INJECTION, SOLUTION INTRAVENOUS; SUBCUTANEOUS at 05:01

## 2023-01-05 RX ADMIN — CEFAZOLIN 2 G: 1 INJECTION, POWDER, FOR SOLUTION INTRAMUSCULAR; INTRAVENOUS; PARENTERAL at 12:01

## 2023-01-05 RX ADMIN — HYDROCODONE BITARTRATE AND ACETAMINOPHEN 1 TABLET: 10; 325 TABLET ORAL at 05:01

## 2023-01-05 RX ADMIN — MUPIROCIN: 20 OINTMENT TOPICAL at 09:01

## 2023-01-05 RX ADMIN — FENTANYL CITRATE 25 MCG: 50 INJECTION INTRAMUSCULAR; INTRAVENOUS at 12:01

## 2023-01-05 RX ADMIN — ACETAMINOPHEN 650 MG: 325 TABLET ORAL at 08:01

## 2023-01-05 RX ADMIN — MUPIROCIN: 20 OINTMENT TOPICAL at 08:01

## 2023-01-05 RX ADMIN — PIPERACILLIN AND TAZOBACTAM 4.5 G: 4; .5 INJECTION, POWDER, LYOPHILIZED, FOR SOLUTION INTRAVENOUS; PARENTERAL at 11:01

## 2023-01-05 RX ADMIN — ACETAMINOPHEN 650 MG: 325 TABLET ORAL at 01:01

## 2023-01-05 RX ADMIN — GABAPENTIN 900 MG: 300 CAPSULE ORAL at 01:01

## 2023-01-05 RX ADMIN — SODIUM CHLORIDE, POTASSIUM CHLORIDE, SODIUM LACTATE AND CALCIUM CHLORIDE: 600; 310; 30; 20 INJECTION, SOLUTION INTRAVENOUS at 12:01

## 2023-01-05 NOTE — ASSESSMENT & PLAN NOTE
Patient's FSGs are controlled on current medication regimen.  Last A1c reviewed-   Lab Results   Component Value Date    HGBA1C 7.2 (H) 10/21/2022     Most recent fingerstick glucose reviewed- No results for input(s): POCTGLUCOSE in the last 24 hours.  Current correctional scale  Low  Maintain anti-hyperglycemic dose as follows-   Antihyperglycemics (From admission, onward)    Start     Stop Route Frequency Ordered    01/05/23 0100  insulin detemir U-100 injection 15 Units         -- SubQ Nightly 01/05/23 0005    01/05/23 0059  insulin aspart U-100 injection 0-5 Units         -- SubQ Before meals & nightly PRN 01/05/23 0005        Hold Oral hypoglycemics while patient is in the hospital.   251.9

## 2023-01-05 NOTE — HPI
62-year-old  male presented to the ER with complaints of right foot pain for the past several days; also complaining of fever, some drainage and foul odor to the right foot.  Patient has a history multiple toe amputations; past medical history of hypertension, diabetes, COPD, GERD, myocardial infarction with multiple stents placed and triple vessel bypass several years ago; currently on Plavix.  Patient admitted for IV antibiotics and surgical consult.  Still complain of pain in the right foot and drainage.  Denies any chest pain or shortness of breath, nausea/vomiting/diarrhea.

## 2023-01-05 NOTE — CONSULTS
Pharmacy consulted to dose Vancomycin.     Based on pt wt of 77.1 kg and Scr of 0.82, the following therapy will be initiated:     Vancomycin 1500 mg IV q 18 hrs. Trough prior 4th dose.    Pharmacy to follow daily and make necessary adjustments.    Thank you for the consult.

## 2023-01-05 NOTE — H&P
[facility] - Emergency Department  Hospital Medicine  History & Physical    Patient Name: Navdeep Avery  MRN: [id number]  Patient Class: IP- Inpatient  Admission Date: 1/4/2023  Attending Physician: Allen Edward Jr., MD   Primary Care Provider: Marquez Huff MD         Patient information was obtained from patient, relative(s), past medical records and ER records.     Subjective:     Principal Problem:Abscess of right foot    Chief Complaint:   Chief Complaint   Patient presents with    Wound Check        HPI: Patient is a 63 y/o male with PMH of HTN, DM2, MI (8 stents and triple vessel bypass), COPD and GERD who presents to [facility] with complaint of a wound on the bottom of his right foot, left hip pain, and fever for the last 2 days. Spouse in the room stated that the wound was first noticed last night and the wound has an odor and it is swelling. Patient states that the right foot pain radiates to his right knee. Patient also states that he recently had a left hip fracture that required an ORIF at [facility]. He states that he had a fall 3 days ago and since the fall, ambulation is very painful. Patient denies chest pain, headache, bowel or urinary habit changes.    In the ED vital signs were remarkable for /63 and SpO2 93%. Blood work positive for elevated WBC 13.8. Lactic acid 1.3. EKG sinus rhythm. Patient was given Morphine, Zofran and Zosyn. Patient will be admitted for further management.       Past Medical History:   Diagnosis Date    Anemia     Anxiety     Arthritis     Atherosclerotic heart disease of native coronary artery with other forms of angina pectoris     Atrophic rhinitis     Carpal tunnel syndrome     Cellulitis of right lower extremity     COPD (chronic obstructive pulmonary disease)     Coronary arteriosclerosis     COVID 02/02/2022    Depression     Diabetic ulcer of right foot associated with diabetes mellitus due to underlying condition, with bone  involvement without evidence of necrosis     Fall with significant injury, sequela 11/07/2022    GERD (gastroesophageal reflux disease)     Hyperlipidemia     Hypertension     Insomnia     MI (myocardial infarction)     Neuropathy     Peripheral vascular disease, unspecified     Right foot ulcer, with fat layer exposed 01/07/2015    S/p left hip fracture 11/08/2022    Sleep apnea     Type 2 diabetes mellitus        Past Surgical History:   Procedure Laterality Date    AMPUTATION      ANGIOPLASTY      CARDIAC CATHETERIZATION      CORONARY ARTERY BYPASS GRAFT      CORONARY STENT PLACEMENT      DEBRIDEMENT      Right foot debridment with hospital stay and IV Abx    DEBRIDEMENT OF LOWER EXTREMITY Right 06/27/2022    Procedure: DEBRIDEMENT, LOWER EXTREMITY;  Surgeon: Forrest Kiser MD;  Location: [facility] OR;  Service: General;  Laterality: Right;  right foot    HIP FRACTURE SURGERY Left     IRRIGATION AND DEBRIDEMENT OF LOWER EXTREMITY Right 09/08/2022    Procedure: IRRIGATION AND DEBRIDEMENT, LOWER EXTREMITY;  Surgeon: Selina Matos MD;  Location: [facility] OR;  Service: General;  Laterality: Right;    SHOULDER OPEN ROTATOR CUFF REPAIR Left     SPINE SURGERY      VASCULAR SURGERY         Review of patient's allergies indicates:  No Known Allergies    No current facility-administered medications on file prior to encounter.     Current Outpatient Medications on File Prior to Encounter   Medication Sig    acetaminophen (TYLENOL) 325 MG tablet Take 650 mg by mouth every 6 (six) hours.    albuterol (PROVENTIL) 2.5 mg /3 mL (0.083 %) nebulizer solution USE 1 VIAL IN NEBULIZER 3 TIMES DAILY    albuterol (PROVENTIL/VENTOLIN HFA) 90 mcg/actuation inhaler Inhale 2 puffs into the lungs 2 (two) times daily as needed for Wheezing.    amLODIPine (NORVASC) 10 MG tablet Take 1 tablet (10 mg total) by mouth once daily.    aspirin 81 MG Chew Take 81 mg by mouth once daily.    atorvastatin (LIPITOR) 80 MG tablet Take 80 mg by mouth  every evening.    clopidogreL (PLAVIX) 75 mg tablet Take 1 tablet (75 mg total) by mouth once daily.    diclofenac sodium (VOLTAREN) 1 % Gel Apply 1 g topically 4 (four) times daily as needed (pain).    empagliflozin (JARDIANCE) 10 mg tablet Take 1 tablet (10 mg total) by mouth once daily.    enoxaparin (LOVENOX) 40 mg/0.4 mL Syrg Inject into the skin.    EScitalopram oxalate (LEXAPRO) 10 MG tablet Take 1 tablet (10 mg total) by mouth once daily.    fluticasone propionate (FLONASE) 50 mcg/actuation nasal spray 2 sprays (100 mcg total) by Each Nostril route once daily.    gabapentin (NEURONTIN) 300 MG capsule Take 3 capsules (900 mg total) by mouth every 6 (six) hours.    HYDROcodone-acetaminophen (NORCO)  mg per tablet Take 1 tablet by mouth every 6 (six) hours as needed for Pain (pain).    [START ON 1/21/2023] HYDROcodone-acetaminophen (NORCO)  mg per tablet Take 1 tablet by mouth every 6 (six) hours as needed for Pain (pain).    [START ON 2/20/2023] HYDROcodone-acetaminophen (NORCO)  mg per tablet Take 1 tablet by mouth every 6 (six) hours as needed for Pain (pain).    hydrOXYzine pamoate (VISTARIL) 25 MG Cap Take 1 capsule (25 mg total) by mouth once daily. Take nightly for sleep    insulin detemir U-100 (LEVEMIR FLEXTOUCH U-100 INSULN) 100 unit/mL (3 mL) InPn pen Inject 10 units subcutaneously every morning, inject 20 units subcutaneously every evening.    LIDOcaine (LIDODERM) 5 % 1 patch once daily.    lisinopriL 10 MG tablet Take 1 tablet (10 mg total) by mouth once daily.    metoprolol succinate (TOPROL-XL) 25 MG 24 hr tablet Take 1 tablet (25 mg total) by mouth once daily.    nitroGLYCERIN (NITROSTAT) 0.4 MG SL tablet Place 1 tablet (0.4 mg total) under the tongue every 5 (five) minutes as needed for Chest pain.    pantoprazole (PROTONIX) 40 MG tablet Take 1 tablet (40 mg total) by mouth once daily.    rosuvastatin (CRESTOR) 20 MG tablet Take 1 tablet (20 mg total) by mouth every evening.  "   SENNA 8.6 mg tablet Take 2 tablets by mouth once daily.    SPIRIVA RESPIMAT 2.5 mcg/actuation inhaler Inhale 1 puff into the lungs once daily.    SURE COMFORT INSULIN SYRINGE 0.3 mL 31 gauge x 5/16" Syrg Inject 1 application into the skin 2 (two) times a day.    traZODone (DESYREL) 50 MG tablet Take 50 mg by mouth nightly as needed.     Family History       Problem Relation (Age of Onset)    Alcohol abuse Father    Arthritis Mother    COPD Paternal Grandfather    Cancer Sister    Diabetes Sister    Early death Father    Heart disease Father, Maternal Grandfather, Son    Hypertension Mother    Learning disabilities Mother          Tobacco Use    Smoking status: Some Days     Packs/day: 0.25     Types: Cigarettes    Smokeless tobacco: Never   Substance and Sexual Activity    Alcohol use: Not Currently    Drug use: Yes     Types: Oxycodone    Sexual activity: Yes     Partners: Female     Review of Systems   Constitutional:  Positive for fever. Negative for chills.   HENT:  Negative for congestion, rhinorrhea, sinus pressure and sore throat.    Eyes:  Negative for visual disturbance.   Respiratory:  Positive for shortness of breath. Negative for cough, wheezing and stridor.    Cardiovascular:  Negative for chest pain, palpitations and leg swelling.   Gastrointestinal:  Negative for abdominal pain, constipation, diarrhea and nausea.   Genitourinary:  Negative for decreased urine volume, dysuria, frequency, hematuria and urgency.   Musculoskeletal:  Negative for joint swelling and myalgias.        Right great toe and left hip pain   Skin:  Positive for wound (right foot anterior plantar area).   Neurological:  Negative for dizziness, tremors, speech difficulty, numbness and headaches.   Psychiatric/Behavioral:  Negative for agitation, confusion and hallucinations.    Objective:     Vital Signs (Most Recent):  Temp: 100.3 °F (37.9 °C) (01/04/23 2117)  Pulse: 86 (01/04/23 2345)  Resp: 17 (01/04/23 2345)  BP: 137/61 " (01/04/23 2330)  SpO2: 95 % (01/04/23 2345) Vital Signs (24h Range):  Temp:  [100.3 °F (37.9 °C)] 100.3 °F (37.9 °C)  Pulse:  [] 86  Resp:  [16-20] 17  SpO2:  [84 %-95 %] 95 %  BP: (114-160)/(61-68) 137/61     Weight: 77.1 kg (170 lb)  Body mass index is 25.85 kg/m².    Physical Exam  Vitals and nursing note reviewed.   Constitutional:       General: He is not in acute distress.     Appearance: He is normal weight. He is not ill-appearing, toxic-appearing or diaphoretic.   HENT:      Head: Normocephalic and atraumatic.      Right Ear: External ear normal.      Left Ear: External ear normal.      Nose: Nose normal. No congestion or rhinorrhea.      Mouth/Throat:      Mouth: Mucous membranes are moist.      Pharynx: Oropharynx is clear. No oropharyngeal exudate or posterior oropharyngeal erythema.   Eyes:      General: No scleral icterus.     Extraocular Movements: Extraocular movements intact.      Conjunctiva/sclera: Conjunctivae normal.      Pupils: Pupils are equal, round, and reactive to light.   Cardiovascular:      Rate and Rhythm: Normal rate and regular rhythm.      Pulses: Normal pulses.      Heart sounds: Normal heart sounds. No murmur heard.  Pulmonary:      Effort: Pulmonary effort is normal. No respiratory distress.      Breath sounds: No wheezing or rhonchi.   Abdominal:      General: Abdomen is flat. Bowel sounds are normal. There is no distension.      Palpations: Abdomen is soft.      Tenderness: There is no abdominal tenderness. There is no right CVA tenderness, left CVA tenderness, guarding or rebound.   Musculoskeletal:         General: Tenderness (right foot plantar area) present. Normal range of motion.      Cervical back: Neck supple. No rigidity or tenderness.      Right lower leg: Edema present.      Left lower leg: Edema present.   Skin:     General: Skin is warm and dry.      Capillary Refill: Capillary refill takes less than 2 seconds.      Coloration: Skin is not jaundiced.       Findings: Lesion (fluctuaring lesion on the anterior plantar area of right foot) present. No rash.   Neurological:      General: No focal deficit present.      Mental Status: He is alert and oriented to person, place, and time.      Cranial Nerves: No cranial nerve deficit.      Sensory: No sensory deficit.      Motor: No weakness.   Psychiatric:         Mood and Affect: Mood normal.         Behavior: Behavior normal.         Thought Content: Thought content normal.         CRANIAL NERVES     CN III, IV, VI   Pupils are equal, round, and reactive to light.     Significant Labs: All pertinent labs within the past 24 hours have been reviewed.  Recent Lab Results         01/04/23 2155 01/04/23 2127        Albumin/Globulin Ratio 0.8         Albumin 3.2         Alkaline Phosphatase 111         ALT 10         Anion Gap 12         AST 10         Baso # 0.04         Basophil % 0.3         BILIRUBIN TOTAL 0.4         BUN 18         BUN/CREAT RATIO 22         Calcium 8.6         Chloride 101         CO2 31         Creatinine 0.82         Differential Type Auto         eGFR 98         Eos # 0.02         Eosinophil % 0.1         Globulin, Total 3.8         Glucose 160         Hematocrit 36.2         Hemoglobin 12.1         Immature Grans (Abs) 0.08         Immature Granulocytes 0.6         Lactate, Brad 1.3         Lymph # 1.81         Lymph % 13.0         MCH 30.8         MCHC 33.4         MCV 92.1         Mono # 1.16         Mono % 8.4         MPV 10.1         Neutrophils, Abs 10.77         Neutrophils Relative 77.6         nRBC 0.0         NUCLEATED RBC ABSOLUTE 0.00         Platelets 163         POC Glucose   172       Potassium 3.8         PROTEIN TOTAL 7.0         RBC 3.93         RDW 12.7         Sodium 140         WBC 13.88                 Significant Imaging: I have reviewed all pertinent imaging results/findings within the past 24 hours.  I have reviewed and interpreted all pertinent imaging results/findings within  the past 24 hours.    Assessment/Plan:     * Abscess of right foot  Xray right foot pending official reading  Blood culture pending  Start IV Vancomycin and Zosyn  Hold Aspirin and Plavix  Pre op blood work ordered  Chest xray pending official reading  General surgery consulted  Patient is RCRI class 3 and may proceed for planned I&D    COPD (chronic obstructive pulmonary disease)  Continue home Ipratropium nebulizer      Diabetes mellitus, type 2  Patient's FSGs are controlled on current medication regimen.  Last A1c reviewed-   Lab Results   Component Value Date    HGBA1C 7.2 (H) 10/21/2022     Most recent fingerstick glucose reviewed- No results for input(s): POCTGLUCOSE in the last 24 hours.  Current correctional scale  Low  Maintain anti-hyperglycemic dose as follows-   Antihyperglycemics (From admission, onward)      Start     Stop Route Frequency Ordered    01/05/23 0100  insulin detemir U-100 injection 15 Units         -- SubQ Nightly 01/05/23 0005    01/05/23 0059  insulin aspart U-100 injection 0-5 Units         -- SubQ Before meals & nightly PRN 01/05/23 0005          Hold Oral hypoglycemics while patient is in the hospital.    Mixed hyperlipidemia  Lipid panel pending  Pt takes Crestor 20mg at home. Will swap to Atorvastatin 80mg while in the hospital.      Benign hypertension  Continue home Amlodipine 10mg, Lisinopril 10mg and Toprol XL 25mg.      Major depressive disorder  Continue home Escitalopram        GERD (gastroesophageal reflux disease)  Continue home PPI      VTE Risk Mitigation (From admission, onward)           Ordered     IP VTE LOW RISK PATIENT  Once         01/05/23 0005     Place sequential compression device  Until discontinued         01/05/23 0005                       Ron Negro MD  Department of Hospital Medicine   [facility] - Emergency Department

## 2023-01-05 NOTE — ASSESSMENT & PLAN NOTE
To the OR for incision and drainage/debridement of right foot abscess.      Risks and benefits explained with the patient including risks for infection, bleeding, injury to surrounding structures, hematoma/seroma formation with need for possible evacuation, possible open.  The patient verbalized understanding, agrees and wishes to proceed with surgery.

## 2023-01-05 NOTE — OP NOTE
[facility] - Periop Services  Surgery Department  Operative Note    SUMMARY     Date of Procedure: 1/5/2023     Procedure: Procedure(s) (LRB):  IRRIGATION AND DEBRIDEMENT, LOWER EXTREMITY (Right)     Surgeon(s) and Role:     * Evelio Chiu, DO - Primary    Assisting Surgeon: None    Pre-Operative Diagnosis: Diabetic foot infection [E11.628, L08.9]  Fever, unspecified fever cause [R50.9]  Abscess of right foot [L02.611]    Post-Operative Diagnosis: Post-Op Diagnosis Codes:     * Diabetic foot infection [E11.628, L08.9]     * Fever, unspecified fever cause [R50.9]     * Abscess of right foot [L02.611]    Anesthesia: Local MAC    Operative Findings (including complications, if any):  Right foot abscess with necrotic muscle extending down to bone in the plantar surface of the right foot adjacent to the 3rd metatarsophalangeal joint    Description of Technical Procedures:  Patient was taken the operating room and placed on the stretcher in the supine position.  Patient underwent general anesthesia per the anesthesia team.  The right foot was then prepped and draped in usual sterile fashion.  We localize the area of the foot with 0.75% Marcaine plain.  We found an ulcer that measured approximately 2 x 3 by 1 cm at this site with purulent drainage just underneath the skin that we cultured and sent to microbiology.  We used electrocautery to dissect and circumferentially excise the necrotic skin and found this ulcer with a necrotic muscle in the center.  We then used a curette and debrided the muscle to healthy muscle which extended all the way to the 3rd metatarsophalangeal joint and the distal head of the 3rd metatarsal was exposed but not involve the knees necrosis.  We cultured fluid in the cavity and sent this to microbiology.  We cleaned all tissue down to healthy muscle and then irrigated the cavity, bleeding was controlled with electrocautery.  We then packed the cavity which extended proximally 2 cm  deep with half-inch iodoform packing.  Sterile dressing applied.  Patient was awakened, taken to PACU in stable condition.  Patient tolerated procedure well        Estimated Blood Loss (EBL): 5cc           Implants: * No implants in log *    Specimens:   Specimen (24h ago, onward)      None                    Condition: Good    Disposition: PACU - hemodynamically stable.    Attestation: I was present and scrubbed for the entire procedure.

## 2023-01-05 NOTE — ANESTHESIA POSTPROCEDURE EVALUATION
Anesthesia Post Evaluation    Patient: Navdeep Avery    Procedure(s) Performed: Procedure(s) (LRB):  IRRIGATION AND DEBRIDEMENT, LOWER EXTREMITY (Right)    Final Anesthesia Type: MAC      Patient location during evaluation: PACU  Patient participation: Yes- Able to Participate  Level of consciousness: awake and alert and oriented  Post-procedure vital signs: reviewed and stable  Pain management: adequate  Airway patency: patent  SAMEERA mitigation strategies: Multimodal analgesia  PONV status at discharge: No PONV  Anesthetic complications: no      Cardiovascular status: hemodynamically stable  Respiratory status: unassisted and spontaneous ventilation  Hydration status: euvolemic  Follow-up needed           Vitals Value Taken Time   /70 01/05/23 1413   Temp 36.6 °C (97.8 °F) 01/05/23 1355   Pulse 72 01/05/23 1414   Resp 11 01/05/23 1414   SpO2 100 % 01/05/23 1414   Vitals shown include unvalidated device data.      No case tracking events are documented in the log.      Pain/Leda Score: Pain Rating Prior to Med Admin: 3 (1/5/2023  1:02 AM)  Leda Score: 8 (1/5/2023  2:05 PM)

## 2023-01-05 NOTE — PLAN OF CARE
[facility] - Short Stay Unit  Initial Discharge Assessment       Primary Care Provider: Marquez Huff MD    Admission Diagnosis: Foot pain [M79.673]  Diabetic foot infection [E11.628, L08.9]  Chest pain [R07.9]  Pre-op evaluation [Z01.818]  Fever, unspecified fever cause [R50.9]  Leukocytosis, unspecified type [D72.829]    Admission Date: 1/4/2023  Expected Discharge Date:     Discharge Barriers Identified: None    Payor: MEDICARE / Plan: MEDICARE PART A & B / Product Type: Government /     Extended Emergency Contact Information  Primary Emergency Contact: Charisma Avery  Mobile Phone: [phone]  Relation: Spouse  Preferred language: English   needed? No    Discharge Plan A: Home  Discharge Plan B: Home Health      [facility] [place], [place] - [address]  [address] [place], [place] [address]  Phone: [phone] Fax: [phone]    [facility] - [place], [place] - [address]  [address] [place] [address]  Phone: [phone] Fax: [phone]    [facility] [place] [id number] [facility].  [address] [place] [address].  [address]  [place] [address]  Phone: [phone] Fax: [phone]    [facility], [place] - [address]  [address] [place] [id number]  Phone: [phone] Fax: [phone]      Initial Assessment (most recent)       Adult Discharge Assessment - 01/05/23 1023          Discharge Assessment    Assessment Type Discharge Planning Assessment     Source of Information family     Communicated MARGARITO with patient/caregiver Date not available/Unable to determine     People in Home spouse     Do you expect to return to your current living situation? Yes     Do you have help at home or someone to help you manage your care at home? Yes     Who are your caregiver(s) and their phone number(s)? álvaro Zafar [phone]     Equipment Currently Used at Home walker,  rolling;oxygen;power chair     Patient currently being followed by outpatient case management? No     Do you currently have service(s) that help you manage your care at home? No     Do you take prescription medications? Yes     Do you have any problems affording any of your prescribed medications? No     Is the patient taking medications as prescribed? yes     Who is going to help you get home at discharge? wife     How do you get to doctors appointments? family or friend will provide     Are you on dialysis? No     Do you take coumadin? No     Discharge Plan A Home     Discharge Plan B Home Health     DME Needed Upon Discharge  none     Discharge Plan discussed with: Spouse/sig other     Discharge Barriers Identified None        Physical Activity    On average, how many days per week do you engage in moderate to strenuous exercise (like a brisk walk)? 0 days     On average, how many minutes do you engage in exercise at this level? 0 min        Financial Resource Strain    How hard is it for you to pay for the very basics like food, housing, medical care, and heating? Not very hard        Housing Stability    In the last 12 months, was there a time when you were not able to pay the mortgage or rent on time? No     In the last 12 months, how many places have you lived? 1     In the last 12 months, was there a time when you did not have a steady place to sleep or slept in a shelter (including now)? No        Transportation Needs    In the past 12 months, has lack of transportation kept you from medical appointments or from getting medications? No     In the past 12 months, has lack of transportation kept you from meetings, work, or from getting things needed for daily living? No        Food Insecurity    Within the past 12 months, you worried that your food would run out before you got the money to buy more. Never true     Within the past 12 months, the food you bought just didn't last and you didn't have money to get  more. Never true        Stress    Do you feel stress - tense, restless, nervous, or anxious, or unable to sleep at night because your mind is troubled all the time - these days? Only a little        Social Connections    In a typical week, how many times do you talk on the phone with family, friends, or neighbors? More than three times a week     How often do you get together with friends or relatives? More than three times a week     How often do you attend Religion or Orthodoxy services? Never     Do you belong to any clubs or organizations such as Religion groups, unions, fraternal or athletic groups, or school groups? No     How often do you attend meetings of the clubs or organizations you belong to? Never     Are you , , , , never , or living with a partner?         Alcohol Use    Q1: How often do you have a drink containing alcohol? Never     Q2: How many drinks containing alcohol do you have on a typical day when you are drinking? Patient does not drink     Q3: How often do you have six or more drinks on one occasion? Never                   Spoke with patient and his wife in the room. Patient lives with his wife Charisma. He has a power chair, rolling walker, and oxygen at home. Has had home health before but is not current now. IM explained and signed by patients wife. If home health ordered they gave choice for [facility]. Will follow for dc planning.

## 2023-01-05 NOTE — HPI
Patient is a 61 y/o male with PMH of HTN, DM2, MI (8 stents and triple vessel bypass), COPD and GERD who presents to [facility] with complaint of a wound on the bottom of his right foot, left hip pain, and fever for the last 2 days. Spouse in the room stated that the wound was first noticed last night and the wound has an odor and it is swelling. Patient states that the right foot pain radiates to his right knee. Patient also states that he recently had a left hip fracture that required an ORIF at [facility]. He states that he had a fall 3 days ago and since the fall, ambulation is very painful. Patient denies chest pain, headache, bowel or urinary habit changes.    In the ED vital signs were remarkable for /63 and SpO2 93%. Blood work positive for elevated WBC 13.8. Lactic acid 1.3. EKG sinus rhythm. Patient was given Morphine, Zofran and Zosyn. Patient will be admitted for further management.

## 2023-01-05 NOTE — ASSESSMENT & PLAN NOTE
Lipid panel pending  Pt takes Crestor 20mg at home. Will swap to Atorvastatin 80mg while in the hospital.

## 2023-01-05 NOTE — ED TRIAGE NOTES
Recently recovered from hip surgery noticed wound on right heel that was swollen and draining tonight. PHM of DM. Is patient of wound care for another wound on heel.

## 2023-01-05 NOTE — SUBJECTIVE & OBJECTIVE
Past Medical History:   Diagnosis Date    Anemia     Anxiety     Arthritis     Atherosclerotic heart disease of native coronary artery with other forms of angina pectoris     Atrophic rhinitis     Carpal tunnel syndrome     Cellulitis of right lower extremity     COPD (chronic obstructive pulmonary disease)     Coronary arteriosclerosis     COVID 02/02/2022    Depression     Diabetic ulcer of right foot associated with diabetes mellitus due to underlying condition, with bone involvement without evidence of necrosis     Fall with significant injury, sequela 11/07/2022    GERD (gastroesophageal reflux disease)     Hyperlipidemia     Hypertension     Insomnia     MI (myocardial infarction)     Neuropathy     Peripheral vascular disease, unspecified     Right foot ulcer, with fat layer exposed 01/07/2015    S/p left hip fracture 11/08/2022    Sleep apnea     Type 2 diabetes mellitus        Past Surgical History:   Procedure Laterality Date    AMPUTATION      ANGIOPLASTY      CARDIAC CATHETERIZATION      CORONARY ARTERY BYPASS GRAFT      CORONARY STENT PLACEMENT      DEBRIDEMENT      Right foot debridment with hospital stay and IV Abx    DEBRIDEMENT OF LOWER EXTREMITY Right 06/27/2022    Procedure: DEBRIDEMENT, LOWER EXTREMITY;  Surgeon: Forrest Kiser MD;  Location: [facility] OR;  Service: General;  Laterality: Right;  right foot    HIP FRACTURE SURGERY Left     IRRIGATION AND DEBRIDEMENT OF LOWER EXTREMITY Right 09/08/2022    Procedure: IRRIGATION AND DEBRIDEMENT, LOWER EXTREMITY;  Surgeon: Selina Matos MD;  Location: [facility] OR;  Service: General;  Laterality: Right;    SHOULDER OPEN ROTATOR CUFF REPAIR Left     SPINE SURGERY      VASCULAR SURGERY         Review of patient's allergies indicates:  No Known Allergies    No current facility-administered medications on file prior to encounter.     Current Outpatient Medications on File Prior to Encounter   Medication Sig    acetaminophen (TYLENOL) 325 MG tablet  Take 650 mg by mouth every 6 (six) hours.    albuterol (PROVENTIL) 2.5 mg /3 mL (0.083 %) nebulizer solution USE 1 VIAL IN NEBULIZER 3 TIMES DAILY    albuterol (PROVENTIL/VENTOLIN HFA) 90 mcg/actuation inhaler Inhale 2 puffs into the lungs 2 (two) times daily as needed for Wheezing.    amLODIPine (NORVASC) 10 MG tablet Take 1 tablet (10 mg total) by mouth once daily.    aspirin 81 MG Chew Take 81 mg by mouth once daily.    atorvastatin (LIPITOR) 80 MG tablet Take 80 mg by mouth every evening.    clopidogreL (PLAVIX) 75 mg tablet Take 1 tablet (75 mg total) by mouth once daily.    diclofenac sodium (VOLTAREN) 1 % Gel Apply 1 g topically 4 (four) times daily as needed (pain).    empagliflozin (JARDIANCE) 10 mg tablet Take 1 tablet (10 mg total) by mouth once daily.    enoxaparin (LOVENOX) 40 mg/0.4 mL Syrg Inject into the skin.    EScitalopram oxalate (LEXAPRO) 10 MG tablet Take 1 tablet (10 mg total) by mouth once daily.    fluticasone propionate (FLONASE) 50 mcg/actuation nasal spray 2 sprays (100 mcg total) by Each Nostril route once daily.    gabapentin (NEURONTIN) 300 MG capsule Take 3 capsules (900 mg total) by mouth every 6 (six) hours.    HYDROcodone-acetaminophen (NORCO)  mg per tablet Take 1 tablet by mouth every 6 (six) hours as needed for Pain (pain).    [START ON 1/21/2023] HYDROcodone-acetaminophen (NORCO)  mg per tablet Take 1 tablet by mouth every 6 (six) hours as needed for Pain (pain).    [START ON 2/20/2023] HYDROcodone-acetaminophen (NORCO)  mg per tablet Take 1 tablet by mouth every 6 (six) hours as needed for Pain (pain).    hydrOXYzine pamoate (VISTARIL) 25 MG Cap Take 1 capsule (25 mg total) by mouth once daily. Take nightly for sleep    insulin detemir U-100 (LEVEMIR FLEXTOUCH U-100 INSULN) 100 unit/mL (3 mL) InPn pen Inject 10 units subcutaneously every morning, inject 20 units subcutaneously every evening.    LIDOcaine (LIDODERM) 5 % 1 patch once daily.    lisinopriL 10 MG  "tablet Take 1 tablet (10 mg total) by mouth once daily.    metoprolol succinate (TOPROL-XL) 25 MG 24 hr tablet Take 1 tablet (25 mg total) by mouth once daily.    nitroGLYCERIN (NITROSTAT) 0.4 MG SL tablet Place 1 tablet (0.4 mg total) under the tongue every 5 (five) minutes as needed for Chest pain.    pantoprazole (PROTONIX) 40 MG tablet Take 1 tablet (40 mg total) by mouth once daily.    rosuvastatin (CRESTOR) 20 MG tablet Take 1 tablet (20 mg total) by mouth every evening.    SENNA 8.6 mg tablet Take 2 tablets by mouth once daily.    SPIRIVA RESPIMAT 2.5 mcg/actuation inhaler Inhale 1 puff into the lungs once daily.    SURE COMFORT INSULIN SYRINGE 0.3 mL 31 gauge x 5/16" Syrg Inject 1 application into the skin 2 (two) times a day.    traZODone (DESYREL) 50 MG tablet Take 50 mg by mouth nightly as needed.     Family History       Problem Relation (Age of Onset)    Alcohol abuse Father    Arthritis Mother    COPD Paternal Grandfather    Cancer Sister    Diabetes Sister    Early death Father    Heart disease Father, Maternal Grandfather, Son    Hypertension Mother    Learning disabilities Mother          Tobacco Use    Smoking status: Some Days     Packs/day: 0.25     Types: Cigarettes    Smokeless tobacco: Never   Substance and Sexual Activity    Alcohol use: Not Currently    Drug use: Yes     Types: Oxycodone    Sexual activity: Yes     Partners: Female     Review of Systems   Constitutional:  Positive for fever. Negative for chills.   HENT:  Negative for congestion, rhinorrhea, sinus pressure and sore throat.    Eyes:  Negative for visual disturbance.   Respiratory:  Positive for shortness of breath. Negative for cough, wheezing and stridor.    Cardiovascular:  Negative for chest pain, palpitations and leg swelling.   Gastrointestinal:  Negative for abdominal pain, constipation, diarrhea and nausea.   Genitourinary:  Negative for decreased urine volume, dysuria, frequency, hematuria and urgency. "   Musculoskeletal:  Negative for joint swelling and myalgias.        Right great toe and left hip pain   Skin:  Positive for wound (right foot anterior plantar area).   Neurological:  Negative for dizziness, tremors, speech difficulty, numbness and headaches.   Psychiatric/Behavioral:  Negative for agitation, confusion and hallucinations.    Objective:     Vital Signs (Most Recent):  Temp: 100.3 °F (37.9 °C) (01/04/23 2117)  Pulse: 86 (01/04/23 2345)  Resp: 17 (01/04/23 2345)  BP: 137/61 (01/04/23 2330)  SpO2: 95 % (01/04/23 2345) Vital Signs (24h Range):  Temp:  [100.3 °F (37.9 °C)] 100.3 °F (37.9 °C)  Pulse:  [] 86  Resp:  [16-20] 17  SpO2:  [84 %-95 %] 95 %  BP: (114-160)/(61-68) 137/61     Weight: 77.1 kg (170 lb)  Body mass index is 25.85 kg/m².    Physical Exam  Vitals and nursing note reviewed.   Constitutional:       General: He is not in acute distress.     Appearance: He is normal weight. He is not ill-appearing, toxic-appearing or diaphoretic.   HENT:      Head: Normocephalic and atraumatic.      Right Ear: External ear normal.      Left Ear: External ear normal.      Nose: Nose normal. No congestion or rhinorrhea.      Mouth/Throat:      Mouth: Mucous membranes are moist.      Pharynx: Oropharynx is clear. No oropharyngeal exudate or posterior oropharyngeal erythema.   Eyes:      General: No scleral icterus.     Extraocular Movements: Extraocular movements intact.      Conjunctiva/sclera: Conjunctivae normal.      Pupils: Pupils are equal, round, and reactive to light.   Cardiovascular:      Rate and Rhythm: Normal rate and regular rhythm.      Pulses: Normal pulses.      Heart sounds: Normal heart sounds. No murmur heard.  Pulmonary:      Effort: Pulmonary effort is normal. No respiratory distress.      Breath sounds: No wheezing or rhonchi.   Abdominal:      General: Abdomen is flat. Bowel sounds are normal. There is no distension.      Palpations: Abdomen is soft.      Tenderness: There is no  abdominal tenderness. There is no right CVA tenderness, left CVA tenderness, guarding or rebound.   Musculoskeletal:         General: Tenderness (right foot plantar area) present. Normal range of motion.      Cervical back: Neck supple. No rigidity or tenderness.      Right lower leg: Edema present.      Left lower leg: Edema present.   Skin:     General: Skin is warm and dry.      Capillary Refill: Capillary refill takes less than 2 seconds.      Coloration: Skin is not jaundiced.      Findings: Lesion (fluctuaring lesion on the anterior plantar area of right foot) present. No rash.   Neurological:      General: No focal deficit present.      Mental Status: He is alert and oriented to person, place, and time.      Cranial Nerves: No cranial nerve deficit.      Sensory: No sensory deficit.      Motor: No weakness.   Psychiatric:         Mood and Affect: Mood normal.         Behavior: Behavior normal.         Thought Content: Thought content normal.         CRANIAL NERVES     CN III, IV, VI   Pupils are equal, round, and reactive to light.     Significant Labs: All pertinent labs within the past 24 hours have been reviewed.  Recent Lab Results         01/04/23  2155   01/04/23 2127        Albumin/Globulin Ratio 0.8         Albumin 3.2         Alkaline Phosphatase 111         ALT 10         Anion Gap 12         AST 10         Baso # 0.04         Basophil % 0.3         BILIRUBIN TOTAL 0.4         BUN 18         BUN/CREAT RATIO 22         Calcium 8.6         Chloride 101         CO2 31         Creatinine 0.82         Differential Type Auto         eGFR 98         Eos # 0.02         Eosinophil % 0.1         Globulin, Total 3.8         Glucose 160         Hematocrit 36.2         Hemoglobin 12.1         Immature Grans (Abs) 0.08         Immature Granulocytes 0.6         Lactate, Brad 1.3         Lymph # 1.81         Lymph % 13.0         MCH 30.8         MCHC 33.4         MCV 92.1         Mono # 1.16         Mono % 8.4          MPV 10.1         Neutrophils, Abs 10.77         Neutrophils Relative 77.6         nRBC 0.0         NUCLEATED RBC ABSOLUTE 0.00         Platelets 163         POC Glucose   172       Potassium 3.8         PROTEIN TOTAL 7.0         RBC 3.93         RDW 12.7         Sodium 140         WBC 13.88                 Significant Imaging: I have reviewed all pertinent imaging results/findings within the past 24 hours.  I have reviewed and interpreted all pertinent imaging results/findings within the past 24 hours.

## 2023-01-05 NOTE — PROGRESS NOTES
[facility]  Adult Nutrition  First Assessment Note         Reason for Assessment  Reason For Assessment: identified at risk by screening criteria (MST 2)   Nutrition Risk Screen: no indicators present     Patient seen for MST. Patient does not currently meet criteria for malnutrition. Patient asleep upon RD visit. RD spoke with his wife about intake and weight history. He has had a decreased po intake prior to admit. She reports no weight loss. Review of chart history, weight stable. Patient with abscess of right foot. Dx of PVD and diabetes.    Recommend diet advancement as medically appropriate to a diabetic diet with Addy BID and Glucerna TID     History of Present Illness: 62-year-old  male presented to the ER with complaints of right foot pain for the past several days; also complaining of fever, some drainage and foul odor to the right foot.  Patient has a history multiple toe amputations; past medical history of hypertension, diabetes, COPD, GERD, myocardial infarction with multiple stents placed and triple vessel bypass several years ago; currently on Plavix.  Patient admitted for IV antibiotics and surgical consult.  Still complain of pain in the right foot and drainage.  Denies any chest pain or shortness of breath, nausea/vomiting/diarrhea.     RD following for diet advancement, weight and labs.     Malnutrition  Is Patient Malnourished: No  Skin Integrity  Nabil Risk Assessment  Sensory Perception: 3-->slightly limited  Moisture: 3-->occasionally moist  Activity: 3-->walks occasionally  Mobility: 3-->slightly limited  Nutrition: 3-->adequate  Friction and Shear: 3-->no apparent problem  Nabil Score: 18  Comments on skin integrity: wounds  Nutrition Diagnosis  Altered Nutrition Related Lab Values   related to Diabetes Mellitus as evidenced by elevated blood glucose    Nutrition Diagnosis Status: Chronic/ continues      Comments: advance diet to diabetic  diet.  Nutrition Risk  Level of Risk/Frequency of Follow-up: high  Comments on nutrition risk:NPO with wounds  Recent Labs   Lab 01/05/23  0504 01/05/23  0557   *  --    POCGLU  --  164*     Comments on Glucose: dx of diabetes  Nutrition Prescription / Recommendations  Recommendation/Intervention: Recommend advance diet as medically appropriate to 2000 jeanette CCD diet with addition of Addy BID and Chocolate Glucerna TID with meals.  Nutrition Goal Status: new  Current Diet Order: NPO  Chewing or Swallowing Difficulty?: No Chewing or swallowing difficulty  Recommended Diet: Consistent Carbohydrate 2000 (75g Carbs)  Recommended Oral Supplement: Addy [90 kcals, 2.5g Protein, 10g Carbs(3g Sugar), 7g L-Arginine, 7g L-Glutamine, Vitamin C 300mg, 9.5mg Zinc] twice daily and Glucerna Shake [220 kcals, 10g Protein, 26g Carbs(4g Fiber, 7g Sugar), 9g Fat] three times daily  Is Nutrition Support Recommended: No  Is Education Recommended: No  Monitor and Evaluation  % current Intake: NPO  % intake to meet estimated needs: P.O. + Supplements  Food and Nutrient Intake: energy intake  Food and Nutrient Adminstration: diet order  Anthropometric Measurements: weight, weight change, body mass index  Biochemical Data, Medical Tests and Procedures: electrolyte and renal panel, inflammatory profile, gastrointestinal profile, lipid profile, glucose/endocrine profile  Nutrition-Focused Physical Findings: overall appearance, extremities, muscles and bones, head and eyes, skin     Current Medical Diagnosis and Past Medical History  Diagnosis: diabetes diagnosis/complications, pulmonary disease  Past Medical History:   Diagnosis Date    Anemia     Anxiety     Arthritis     Atherosclerotic heart disease of native coronary artery with other forms of angina pectoris     Atrophic rhinitis     Carpal tunnel syndrome     Cellulitis of right lower extremity     COPD (chronic obstructive pulmonary disease)     Coronary arteriosclerosis  "    COVID 02/02/2022    Depression     Diabetic ulcer of right foot associated with diabetes mellitus due to underlying condition, with bone involvement without evidence of necrosis     Fall with significant injury, sequela 11/07/2022    GERD (gastroesophageal reflux disease)     Hyperlipidemia     Hypertension     Insomnia     MI (myocardial infarction)     Neuropathy     Peripheral vascular disease, unspecified     Right foot ulcer, with fat layer exposed 01/07/2015    S/p left hip fracture 11/08/2022    Sleep apnea     Type 2 diabetes mellitus      Nutrition/Diet History  Spiritual, Cultural Beliefs, Caodaism Practices, Values that Affect Care: no  Food Allergies: NKFA  Factors Affecting Nutritional Intake: None identified at this time  Lab/Procedures/Meds  Recent Labs   Lab 01/04/23  2155 01/05/23  0504    140   K 3.8 3.7   BUN 18 17   CREATININE 0.82 0.83   CALCIUM 8.6 9.0   ALBUMIN 3.2*  --     102   ALT 10*  --    AST 10*  --      Last A1c:   Lab Results   Component Value Date    HGBA1C 7.2 (H) 10/21/2022    HGBA1C 7.6 (A) 01/19/2021     Lab Results   Component Value Date    RBC 3.71 (L) 01/05/2023    HGB 11.4 (L) 01/05/2023    HCT 35.0 (L) 01/05/2023    MCV 94.3 01/05/2023    MCH 30.7 01/05/2023    MCHC 32.6 01/05/2023    TIBC 343 01/18/2021     Pertinent Labs Reviewed: reviewed  Pertinent Labs Comments: Sodium: 140  Potassium: 3.7  Chloride: 102  CO2: 30  Anion Gap: 12  BUN: 17  Creatinine: 0.83  BUN/CREAT RATIO: 20  eGFR: 98  Glucose: 165 - dx of diabetes  Pertinent Medications Reviewed: reviewed  Pertinent Medications Comments: lasix, clopidogrel, pantrazole, apixaban, methylprednisolone, levofloxacin  Anthropometrics  Temp: 98 °F (36.7 °C)  Height Method: Estimated  Height: 5' 7" (170.2 cm)  Height (inches): 67 in  Weight Method: Bed Scale  Weight: 77.1 kg (170 lb)  Weight (lb): 170 lb  Ideal Body Weight (IBW), Male: 148 lb  % Ideal Body Weight, Male (lb): 110.43 %  BMI " (Calculated): 25.9  BMI Grade: 25 - 29.9 - overweight     Estimated/Assessed Needs      Temp: 98 °F (36.7 °C)Axillary  Weight Used For Calorie Calculations: 77.1 kg (169 lb 15.6 oz)   Energy Need Method: Kcal/kg Energy Calorie Requirements (kcal): 4002-9231  Weight Used For Protein Calculations: 77.1 kg (169 lb 15.6 oz)  Protein Requirements:   Estimated Fluid Requirement Method: RDA Method    RDA Method (mL): 1927     Nutrition by Nursing              Last Bowel Movement: 01/04/23              Nutrition Follow-Up  RD Follow-up?: Yes

## 2023-01-05 NOTE — ED PROVIDER NOTES
Encounter Date: 1/4/2023    SCRIBE #1 NOTE: I, Cherri Moreno, am scribing for, and in the presence of,  Hiram Temple MD. I have scribed the entire note.     History     Chief Complaint   Patient presents with    Wound Check     62 y.o. male presents to the ED with complaints of a wound on the bottom of his right foot, hip pain, and fever. Spouse stated her and the patient both noticed the wound tonight after the patient got out of the shower. Spouse stated the wound had an odor to it and was starting to swell. Patient stated he felt pain in the foot tonight and it radiated all the way to his knee. Patient stated he has a hx of dm. Patient also stated he had hip surgery and fell 3 days ago. Patient stated ever since his fall, ambulation is very painful. Patient stated the surgery was to fix a broken hip and a broken fibula.     The history is provided by the patient and the spouse. No  was used.   Review of patient's allergies indicates:  No Known Allergies  Past Medical History:   Diagnosis Date    Anemia     Anxiety     Arthritis     Atherosclerotic heart disease of native coronary artery with other forms of angina pectoris     Atrophic rhinitis     Carpal tunnel syndrome     Cellulitis of right lower extremity     COPD (chronic obstructive pulmonary disease)     Coronary arteriosclerosis     COVID 02/02/2022    Depression     Diabetic ulcer of right foot associated with diabetes mellitus due to underlying condition, with bone involvement without evidence of necrosis     Fall with significant injury, sequela 11/07/2022    GERD (gastroesophageal reflux disease)     Hyperlipidemia     Hypertension     Insomnia     MI (myocardial infarction)     Neuropathy     Peripheral vascular disease, unspecified     Right foot ulcer, with fat layer exposed 01/07/2015    S/p left hip fracture 11/08/2022    Sleep apnea     Type 2 diabetes mellitus      Past Surgical History:   Procedure Laterality Date     AMPUTATION      ANGIOPLASTY      CARDIAC CATHETERIZATION      CORONARY ARTERY BYPASS GRAFT      CORONARY STENT PLACEMENT      DEBRIDEMENT      Right foot debridment with hospital stay and IV Abx    DEBRIDEMENT OF LOWER EXTREMITY Right 06/27/2022    Procedure: DEBRIDEMENT, LOWER EXTREMITY;  Surgeon: Forrest Kiser MD;  Location: [facility];  Service: General;  Laterality: Right;  right foot    HIP FRACTURE SURGERY Left     IRRIGATION AND DEBRIDEMENT OF LOWER EXTREMITY Right 09/08/2022    Procedure: IRRIGATION AND DEBRIDEMENT, LOWER EXTREMITY;  Surgeon: Selina Matos MD;  Location: [facility] OR;  Service: General;  Laterality: Right;    IRRIGATION AND DEBRIDEMENT OF LOWER EXTREMITY Right 1/5/2023    Procedure: IRRIGATION AND DEBRIDEMENT, LOWER EXTREMITY;  Surgeon: Evelio Chiu DO;  Location: [facility] OR;  Service: General;  Laterality: Right;    SHOULDER OPEN ROTATOR CUFF REPAIR Left     SPINE SURGERY      VASCULAR SURGERY       Family History   Problem Relation Age of Onset    Arthritis Mother     Hypertension Mother     Learning disabilities Mother     Alcohol abuse Father     Early death Father     Heart disease Father     Cancer Sister     Diabetes Sister     Heart disease Maternal Grandfather     COPD Paternal Grandfather     Heart disease Son      Social History     Tobacco Use    Smoking status: Some Days     Packs/day: 0.25     Types: Cigarettes    Smokeless tobacco: Never   Substance Use Topics    Alcohol use: Not Currently    Drug use: Yes     Types: Oxycodone     Review of Systems   Constitutional:  Positive for fever.   HENT: Negative.     Eyes: Negative.    Respiratory:  Positive for cough.    Cardiovascular: Negative.    Gastrointestinal: Negative.    Endocrine: Negative.    Genitourinary: Negative.    Musculoskeletal:         Hip and foot pain.     Skin: Negative.    Allergic/Immunologic: Negative.    Neurological: Negative.    Hematological: Negative.    Psychiatric/Behavioral: Negative.      All other systems reviewed and are negative.    Physical Exam     Initial Vitals [01/04/23 2117]   BP Pulse Resp Temp SpO2   (!) 153/63 100 20 100.3 °F (37.9 °C) (!) 93 %      MAP       --         Physical Exam    Nursing note and vitals reviewed.  Constitutional: He appears well-developed and well-nourished.   Neck:   Normal range of motion.  Cardiovascular:  Normal rate, regular rhythm and normal heart sounds.           Pulmonary/Chest: Breath sounds normal.   Abdominal: Abdomen is soft.   Musculoskeletal:         General: Normal range of motion.      Cervical back: Normal range of motion.      Comments: Fluctuant, tender area on the bottom of the right foot. Tenderness of the left buttock.      Neurological: He is alert and oriented to person, place, and time.   Skin: Skin is warm.       ED Course   Procedures  Labs Reviewed   COMPREHENSIVE METABOLIC PANEL - Abnormal; Notable for the following components:       Result Value    Glucose 160 (*)     BUN/Creatinine Ratio 22 (*)     Albumin 3.2 (*)     ALT 10 (*)     AST 10 (*)     All other components within normal limits   CBC WITH DIFFERENTIAL - Abnormal; Notable for the following components:    WBC 13.88 (*)     RBC 3.93 (*)     Hemoglobin 12.1 (*)     Hematocrit 36.2 (*)     Neutrophils % 77.6 (*)     Lymphocytes % 13.0 (*)     Monocytes % 8.4 (*)     Eosinophils % 0.1 (*)     Immature Granulocytes % 0.6 (*)     Neutrophils, Abs 10.77 (*)     Monocytes, Absolute 1.16 (*)     Immature Granulocytes, Absolute 0.08 (*)     All other components within normal limits   POCT GLUCOSE MONITORING CONTINUOUS - Abnormal; Notable for the following components:    POC Glucose 172 (*)     All other components within normal limits   POCT GLUCOSE MONITORING CONTINUOUS - Abnormal; Notable for the following components:    POC Glucose 178 (*)     All other components within normal limits   LACTIC ACID, PLASMA - Normal   CBC W/ AUTO DIFFERENTIAL    Narrative:     The following orders  were created for panel order CBC auto differential.  Procedure                               Abnormality         Status                     ---------                               -----------         ------                     CBC with Differential[[id number]]        Abnormal            Final result                 Please view results for these tests on the individual orders.        ECG Results              EKG 12-lead (Final result)  Result time 01/10/23 03:23:19      Final result by Interface, Lab In [facility] (01/10/23 03:23:19)                   Narrative:    Test Reason : Z01.818,    Vent. Rate : 080 BPM     Atrial Rate : 000 BPM     P-R Int : 154 ms          QRS Dur : 106 ms      QT Int : 378 ms       P-R-T Axes : 066 006 114 degrees     QTc Int : 413 ms    Sinus rhythm  Possible anterior infarct - age undetermined  Lateral T wave abnormality  may be due to myocardial ischemia  Abnormal ECG    Confirmed by Elizabeth DALTON, Walter VILLASEÑOR ([id number]) on 1/10/2023 3:23:08 AM    Referred By: [email or web address]   SELF           Confirmed By:Walter Johnson MD                                  Imaging Results              X-Ray Chest AP Portable (Final result)  Result time 01/05/23 07:50:25      Final result by Francisco Felton II, MD (01/05/23 07:50:25)                   Impression:      Findings suggest mild cardiac decompensation and / or pneumonitis.      Electronically signed by: Francisco Felton  Date:    01/05/2023  Time:    07:50               Narrative:    EXAMINATION:  XR CHEST AP PORTABLE    CLINICAL HISTORY:  pre op history of COPD;    COMPARISON:  21 October 2022    FINDINGS:  The heart and mediastinum are stable in size and configuration.  The pulmonary vascularity is slightly increased with bilateral increased interstitial lung density.  No other lung infiltrates, effusions, pneumothorax or other abnormality is demonstrated.                                       X-Ray Foot Complete Right (Final result)  Result time 01/05/23 07:53:32       Final result by Francisco Felton II, MD (01/05/23 07:53:32)                   Impression:      Second digit dislocation.  No other definite acute findings.      Electronically signed by: Francisco Felton  Date:    01/05/2023  Time:    07:53               Narrative:    EXAMINATION:  XR FOOT COMPLETE 3 VIEW RIGHT    CLINICAL HISTORY:  Pain in unspecified foot    COMPARISON:  None available    FINDINGS:  No evidence of fracture seen.  Amputation in the 1st digit and distal 1st metatarsal with hypertrophic bone formation present at the amputation site.  The 3rd digit has been amputated previously.  The 2nd digit is subluxed dorsally.  Remaining alignment of the joints appears normal.  No degenerative change is present.  No soft tissue abnormality is seen.                                       Medications   morphine injection 4 mg (4 mg Intravenous Given 1/4/23 2310)   ondansetron injection 4 mg (4 mg Intravenous Given 1/4/23 2310)   piperacillin-tazobactam (ZOSYN) 4.5 g in dextrose 5 % in water (D5W) 5 % 100 mL IVPB (MB+) (0 g Intravenous Stopped 1/5/23 0004)                Attending Attestation:           Physician Attestation for Scribe:  Physician Attestation Statement for Scribe #1: I, Hiram Temple MD, reviewed documentation, as scribed by Cherri Moreno in my presence, and it is both accurate and complete.           ED Course as of 01/13/23 1007 Wed Jan 04, 2023 2241 WBC(!): 13.88 [BB]   2241 Hemoglobin(!): 12.1 [BB]   2241 Hematocrit(!): 36.2 [BB]   2241 Platelets: 163 [BB]   2257 Glucose(!): 160 [BB]   2257 BUN: 18 [BB]   2258 Creatinine: 0.82 [BB]   2305 I made the decision that patient needs admission.  I discussed case with general surgeon on-call who requested patient be admitted to the medical service.  I discussed admission with hospitalist service who agrees. [BB]      ED Course User Index  [BB] Hiram Temple MD               Differential diagnosis includes cellulitis, abscess, osteomyelitis.  Final  diagnosis based on history, physical exam, and lab findings is foot pain, diabetic foot infection, fever, leukocytosis.  Clinical Impression:   Final diagnoses:  [M79.673] Foot pain  [E11.628, L08.9] Diabetic foot infection (Primary)  [R50.9] Fever, unspecified fever cause  [D72.829] Leukocytosis, unspecified type        ED Disposition Condition    Admit Stable                Hiram Temple MD  01/04/23 2350       Hiram Temple MD  01/13/23 1007

## 2023-01-05 NOTE — SUBJECTIVE & OBJECTIVE
No current facility-administered medications on file prior to encounter.     Current Outpatient Medications on File Prior to Encounter   Medication Sig    acetaminophen (TYLENOL) 325 MG tablet Take 650 mg by mouth every 6 (six) hours.    albuterol (PROVENTIL) 2.5 mg /3 mL (0.083 %) nebulizer solution USE 1 VIAL IN NEBULIZER 3 TIMES DAILY    albuterol (PROVENTIL/VENTOLIN HFA) 90 mcg/actuation inhaler Inhale 2 puffs into the lungs 2 (two) times daily as needed for Wheezing.    amLODIPine (NORVASC) 10 MG tablet Take 1 tablet (10 mg total) by mouth once daily.    aspirin 81 MG Chew Take 81 mg by mouth once daily.    atorvastatin (LIPITOR) 80 MG tablet Take 80 mg by mouth every evening.    clopidogreL (PLAVIX) 75 mg tablet Take 1 tablet (75 mg total) by mouth once daily.    diclofenac sodium (VOLTAREN) 1 % Gel Apply 1 g topically 4 (four) times daily as needed (pain).    empagliflozin (JARDIANCE) 10 mg tablet Take 1 tablet (10 mg total) by mouth once daily.    enoxaparin (LOVENOX) 40 mg/0.4 mL Syrg Inject into the skin.    EScitalopram oxalate (LEXAPRO) 10 MG tablet Take 1 tablet (10 mg total) by mouth once daily.    fluticasone propionate (FLONASE) 50 mcg/actuation nasal spray 2 sprays (100 mcg total) by Each Nostril route once daily.    gabapentin (NEURONTIN) 300 MG capsule Take 3 capsules (900 mg total) by mouth every 6 (six) hours.    HYDROcodone-acetaminophen (NORCO)  mg per tablet Take 1 tablet by mouth every 6 (six) hours as needed for Pain (pain).    [START ON 1/21/2023] HYDROcodone-acetaminophen (NORCO)  mg per tablet Take 1 tablet by mouth every 6 (six) hours as needed for Pain (pain).    [START ON 2/20/2023] HYDROcodone-acetaminophen (NORCO)  mg per tablet Take 1 tablet by mouth every 6 (six) hours as needed for Pain (pain).    hydrOXYzine pamoate (VISTARIL) 25 MG Cap Take 1 capsule (25 mg total) by mouth once daily. Take nightly for sleep    insulin detemir U-100 (LEVEMIR FLEXTOUCH U-100  "INSULN) 100 unit/mL (3 mL) InPn pen Inject 10 units subcutaneously every morning, inject 20 units subcutaneously every evening.    LIDOcaine (LIDODERM) 5 % 1 patch once daily.    lisinopriL 10 MG tablet Take 1 tablet (10 mg total) by mouth once daily.    metoprolol succinate (TOPROL-XL) 25 MG 24 hr tablet Take 1 tablet (25 mg total) by mouth once daily.    nitroGLYCERIN (NITROSTAT) 0.4 MG SL tablet Place 1 tablet (0.4 mg total) under the tongue every 5 (five) minutes as needed for Chest pain.    pantoprazole (PROTONIX) 40 MG tablet Take 1 tablet (40 mg total) by mouth once daily.    rosuvastatin (CRESTOR) 20 MG tablet Take 1 tablet (20 mg total) by mouth every evening.    SENNA 8.6 mg tablet Take 2 tablets by mouth once daily.    SPIRIVA RESPIMAT 2.5 mcg/actuation inhaler Inhale 1 puff into the lungs once daily.    SURE COMFORT INSULIN SYRINGE 0.3 mL 31 gauge x 5/16" Syrg Inject 1 application into the skin 2 (two) times a day.    traZODone (DESYREL) 50 MG tablet Take 50 mg by mouth nightly as needed.       Review of patient's allergies indicates:  No Known Allergies    Past Medical History:   Diagnosis Date    Anemia     Anxiety     Arthritis     Atherosclerotic heart disease of native coronary artery with other forms of angina pectoris     Atrophic rhinitis     Carpal tunnel syndrome     Cellulitis of right lower extremity     COPD (chronic obstructive pulmonary disease)     Coronary arteriosclerosis     COVID 02/02/2022    Depression     Diabetic ulcer of right foot associated with diabetes mellitus due to underlying condition, with bone involvement without evidence of necrosis     Fall with significant injury, sequela 11/07/2022    GERD (gastroesophageal reflux disease)     Hyperlipidemia     Hypertension     Insomnia     MI (myocardial infarction)     Neuropathy     Peripheral vascular disease, unspecified     Right foot ulcer, with fat layer exposed 01/07/2015    S/p left hip fracture 11/08/2022    Sleep apnea  "    Type 2 diabetes mellitus      Past Surgical History:   Procedure Laterality Date    AMPUTATION      ANGIOPLASTY      CARDIAC CATHETERIZATION      CORONARY ARTERY BYPASS GRAFT      CORONARY STENT PLACEMENT      DEBRIDEMENT      Right foot debridment with hospital stay and IV Abx    DEBRIDEMENT OF LOWER EXTREMITY Right 06/27/2022    Procedure: DEBRIDEMENT, LOWER EXTREMITY;  Surgeon: Forrest Kiser MD;  Location: [facility] OR;  Service: General;  Laterality: Right;  right foot    HIP FRACTURE SURGERY Left     IRRIGATION AND DEBRIDEMENT OF LOWER EXTREMITY Right 09/08/2022    Procedure: IRRIGATION AND DEBRIDEMENT, LOWER EXTREMITY;  Surgeon: Selina Matos MD;  Location: [facility] OR;  Service: General;  Laterality: Right;    SHOULDER OPEN ROTATOR CUFF REPAIR Left     SPINE SURGERY      VASCULAR SURGERY       Family History       Problem Relation (Age of Onset)    Alcohol abuse Father    Arthritis Mother    COPD Paternal Grandfather    Cancer Sister    Diabetes Sister    Early death Father    Heart disease Father, Maternal Grandfather, Son    Hypertension Mother    Learning disabilities Mother          Tobacco Use    Smoking status: Some Days     Packs/day: 0.25     Types: Cigarettes    Smokeless tobacco: Never   Substance and Sexual Activity    Alcohol use: Not Currently    Drug use: Yes     Types: Oxycodone    Sexual activity: Yes     Partners: Female     Review of Systems   Constitutional: Negative.  Negative for appetite change, chills, fever and unexpected weight change.   HENT: Negative.  Negative for trouble swallowing.    Eyes: Negative.    Respiratory: Negative.  Negative for chest tightness and shortness of breath.    Cardiovascular: Negative.  Negative for chest pain.   Gastrointestinal: Negative.  Negative for abdominal distention, abdominal pain, blood in stool and nausea.   Endocrine: Negative.    Genitourinary: Negative.  Negative for hematuria.   Musculoskeletal: Negative.  Negative for back pain and  myalgias.   Skin: Negative.  Negative for color change.   Allergic/Immunologic: Negative.    Neurological: Negative.  Negative for dizziness, syncope, weakness and light-headedness.   Psychiatric/Behavioral: Negative.  Negative for agitation.    Objective:     Vital Signs (Most Recent):  Temp: 98 °F (36.7 °C) (01/05/23 0751)  Pulse: 73 (01/05/23 0751)  Resp: 18 (01/05/23 0751)  BP: (!) 141/54 (01/05/23 0751)  SpO2: (!) 94 % (01/05/23 0751)   Vital Signs (24h Range):  Temp:  [98 °F (36.7 °C)-100.3 °F (37.9 °C)] 98 °F (36.7 °C)  Pulse:  [] 73  Resp:  [15-20] 18  SpO2:  [84 %-97 %] 94 %  BP: (114-160)/(54-70) 141/54     Weight: 77.1 kg (170 lb)  Body mass index is 25.85 kg/m².    Physical Exam  Constitutional:       General: He is not in acute distress.     Appearance: Normal appearance.   HENT:      Head: Normocephalic.   Cardiovascular:      Rate and Rhythm: Normal rate.   Pulmonary:      Effort: Pulmonary effort is normal. No respiratory distress.   Abdominal:      General: There is no distension.      Tenderness: There is no abdominal tenderness.   Musculoskeletal:         General: Normal range of motion.        Feet:    Feet:      Comments: Callus tissue with fluctuance and small opening with purulent drainage; surrounding erythema and tenderness to palpation  Skin:     General: Skin is warm.      Coloration: Skin is not jaundiced.   Neurological:      General: No focal deficit present.      Mental Status: He is alert and oriented to person, place, and time.      Cranial Nerves: No cranial nerve deficit.       Significant Labs:  I have reviewed all pertinent lab results within the past 24 hours.  CBC:   Recent Labs   Lab 01/05/23  0504   WBC 11.72*   RBC 3.71*   HGB 11.4*   HCT 35.0*      MCV 94.3   MCH 30.7   MCHC 32.6     BMP:   Recent Labs   Lab 01/05/23  0504   *      K 3.7      CO2 30   BUN 17   CREATININE 0.83   CALCIUM 9.0       Significant Diagnostics:  I have reviewed all  pertinent imaging results/findings within the past 24 hours.

## 2023-01-05 NOTE — ASSESSMENT & PLAN NOTE
Patient is RCRI class 3 and may proceed for planned I&D    Vanc and zosyn for antibiotic coverage.

## 2023-01-05 NOTE — ANESTHESIA PREPROCEDURE EVALUATION
01/05/2023  Navdeep Avery is a 62 y.o., male.      Pre-op Assessment    I have reviewed the Patient Summary Reports.     I have reviewed the Nursing Notes. I have reviewed the NPO Status.   I have reviewed the Medications.     Review of Systems  Anesthesia Hx:  No problems with previous Anesthesia  Denies Family Hx of Anesthesia complications.   Denies Personal Hx of Anesthesia complications.   Social:  Smoker, Social Alcohol Use    Cardiovascular:   Exercise tolerance: poor Hypertension Past MI CAD   Angina PVD hyperlipidemia ECG has been reviewed.    Pulmonary:   COPD Shortness of breath Sleep Apnea    Hepatic/GI:   GERD    Neurological:   Neuromuscular Disease,   Peripheral Neuropathy    Endocrine:   Diabetes, poorly controlled, type 2    Psych:   Psychiatric History anxiety depression          Physical Exam  General: Well nourished, Cooperative and Alert    Airway:  Mallampati: II   Mouth Opening: Normal  TM Distance: Normal  Tongue: Normal  Neck ROM: Normal ROM    Chest/Lungs:  Clear to auscultation, Normal Respiratory Rate    Heart:  Rate: Normal  Rhythm: Regular Rhythm        Chemistry        Component Value Date/Time     01/05/2023 0504    K 3.7 01/05/2023 0504     01/05/2023 0504    CO2 30 01/05/2023 0504    BUN 17 01/05/2023 0504    CREATININE 0.83 01/05/2023 0504     (H) 01/05/2023 0504        Component Value Date/Time    CALCIUM 9.0 01/05/2023 0504    ALKPHOS 111 01/04/2023 2155    AST 10 (L) 01/04/2023 2155    ALT 10 (L) 01/04/2023 2155    BILITOT 0.4 01/04/2023 2155    ESTGFRAFRICA 130 01/25/2021 1000    EGFRNONAA 66 06/29/2022 0227        Lab Results   Component Value Date    WBC 11.72 (H) 01/05/2023    HGB 11.4 (L) 01/05/2023    HCT 35.0 (L) 01/05/2023     01/05/2023     Results for orders placed or performed during the hospital encounter of 01/04/23   EKG 12-lead     Collection Time: 01/05/23  1:06 AM    Narrative    Test Reason : Z01.818,    Vent. Rate : 080 BPM     Atrial Rate : 000 BPM     P-R Int : 154 ms          QRS Dur : 106 ms      QT Int : 378 ms       P-R-T Axes : 066 006 114 degrees     QTc Int : 413 ms    Sinus rhythm  Inferior infarct - age undetermined  Possible anterior infarct - age undetermined  Lateral T wave abnormality  may be due to myocardial ischemia  Abnormal ECG      Referred By: [email or web address]   SELF           Confirmed By:          Anesthesia Plan  Type of Anesthesia, risks & benefits discussed:    Anesthesia Type: MAC  Intra-op Monitoring Plan: Standard ASA Monitors  Post Op Pain Control Plan: multimodal analgesia  Induction:  IV  Informed Consent: Informed consent signed with the Patient and all parties understand the risks and agree with anesthesia plan.  All questions answered.   ASA Score: 3  Day of Surgery Review of History & Physical: H&P Update referred to the surgeon/provider.I have interviewed and examined the patient. I have reviewed the patient's H&P dated: There are no significant changes.     Ready For Surgery From Anesthesia Perspective.     .

## 2023-01-05 NOTE — CONSULTS
[facility] - Short Stay Unit  General Surgery  Consult Note    Patient Name: Navdeep Avery  MRN: [id number]  Code Status: Full Code  Admission Date: 1/4/2023  Hospital Length of Stay: 1 days  Attending Physician: Allen Edward Jr., MD  Primary Care Provider: Marquez Huff MD    Patient information was obtained from patient and ER records.     Inpatient consult to General Surgery  Consult performed by: Evelio Chiu DO  Consult ordered by: Walter Johnson MD        Subjective:     Principal Problem: Abscess of right foot    History of Present Illness: 62-year-old  male presented to the ER with complaints of right foot pain for the past several days; also complaining of fever, some drainage and foul odor to the right foot.  Patient has a history multiple toe amputations; past medical history of hypertension, diabetes, COPD, GERD, myocardial infarction with multiple stents placed and triple vessel bypass several years ago; currently on Plavix.  Patient admitted for IV antibiotics and surgical consult.  Still complain of pain in the right foot and drainage.  Denies any chest pain or shortness of breath, nausea/vomiting/diarrhea.      No current facility-administered medications on file prior to encounter.     Current Outpatient Medications on File Prior to Encounter   Medication Sig    acetaminophen (TYLENOL) 325 MG tablet Take 650 mg by mouth every 6 (six) hours.    albuterol (PROVENTIL) 2.5 mg /3 mL (0.083 %) nebulizer solution USE 1 VIAL IN NEBULIZER 3 TIMES DAILY    albuterol (PROVENTIL/VENTOLIN HFA) 90 mcg/actuation inhaler Inhale 2 puffs into the lungs 2 (two) times daily as needed for Wheezing.    amLODIPine (NORVASC) 10 MG tablet Take 1 tablet (10 mg total) by mouth once daily.    aspirin 81 MG Chew Take 81 mg by mouth once daily.    atorvastatin (LIPITOR) 80 MG tablet Take 80 mg by mouth every evening.    clopidogreL (PLAVIX) 75 mg tablet Take 1 tablet (75 mg total) by mouth  once daily.    diclofenac sodium (VOLTAREN) 1 % Gel Apply 1 g topically 4 (four) times daily as needed (pain).    empagliflozin (JARDIANCE) 10 mg tablet Take 1 tablet (10 mg total) by mouth once daily.    enoxaparin (LOVENOX) 40 mg/0.4 mL Syrg Inject into the skin.    EScitalopram oxalate (LEXAPRO) 10 MG tablet Take 1 tablet (10 mg total) by mouth once daily.    fluticasone propionate (FLONASE) 50 mcg/actuation nasal spray 2 sprays (100 mcg total) by Each Nostril route once daily.    gabapentin (NEURONTIN) 300 MG capsule Take 3 capsules (900 mg total) by mouth every 6 (six) hours.    HYDROcodone-acetaminophen (NORCO)  mg per tablet Take 1 tablet by mouth every 6 (six) hours as needed for Pain (pain).    [START ON 1/21/2023] HYDROcodone-acetaminophen (NORCO)  mg per tablet Take 1 tablet by mouth every 6 (six) hours as needed for Pain (pain).    [START ON 2/20/2023] HYDROcodone-acetaminophen (NORCO)  mg per tablet Take 1 tablet by mouth every 6 (six) hours as needed for Pain (pain).    hydrOXYzine pamoate (VISTARIL) 25 MG Cap Take 1 capsule (25 mg total) by mouth once daily. Take nightly for sleep    insulin detemir U-100 (LEVEMIR FLEXTOUCH U-100 INSULN) 100 unit/mL (3 mL) InPn pen Inject 10 units subcutaneously every morning, inject 20 units subcutaneously every evening.    LIDOcaine (LIDODERM) 5 % 1 patch once daily.    lisinopriL 10 MG tablet Take 1 tablet (10 mg total) by mouth once daily.    metoprolol succinate (TOPROL-XL) 25 MG 24 hr tablet Take 1 tablet (25 mg total) by mouth once daily.    nitroGLYCERIN (NITROSTAT) 0.4 MG SL tablet Place 1 tablet (0.4 mg total) under the tongue every 5 (five) minutes as needed for Chest pain.    pantoprazole (PROTONIX) 40 MG tablet Take 1 tablet (40 mg total) by mouth once daily.    rosuvastatin (CRESTOR) 20 MG tablet Take 1 tablet (20 mg total) by mouth every evening.    SENNA 8.6 mg tablet Take 2 tablets by mouth once daily.    SPIRIVA  "RESPIMAT 2.5 mcg/actuation inhaler Inhale 1 puff into the lungs once daily.    SURE COMFORT INSULIN SYRINGE 0.3 mL 31 gauge x 5/16" Syrg Inject 1 application into the skin 2 (two) times a day.    traZODone (DESYREL) 50 MG tablet Take 50 mg by mouth nightly as needed.       Review of patient's allergies indicates:  No Known Allergies    Past Medical History:   Diagnosis Date    Anemia     Anxiety     Arthritis     Atherosclerotic heart disease of native coronary artery with other forms of angina pectoris     Atrophic rhinitis     Carpal tunnel syndrome     Cellulitis of right lower extremity     COPD (chronic obstructive pulmonary disease)     Coronary arteriosclerosis     COVID 02/02/2022    Depression     Diabetic ulcer of right foot associated with diabetes mellitus due to underlying condition, with bone involvement without evidence of necrosis     Fall with significant injury, sequela 11/07/2022    GERD (gastroesophageal reflux disease)     Hyperlipidemia     Hypertension     Insomnia     MI (myocardial infarction)     Neuropathy     Peripheral vascular disease, unspecified     Right foot ulcer, with fat layer exposed 01/07/2015    S/p left hip fracture 11/08/2022    Sleep apnea     Type 2 diabetes mellitus      Past Surgical History:   Procedure Laterality Date    AMPUTATION      ANGIOPLASTY      CARDIAC CATHETERIZATION      CORONARY ARTERY BYPASS GRAFT      CORONARY STENT PLACEMENT      DEBRIDEMENT      Right foot debridment with hospital stay and IV Abx    DEBRIDEMENT OF LOWER EXTREMITY Right 06/27/2022    Procedure: DEBRIDEMENT, LOWER EXTREMITY;  Surgeon: Forrest Kiser MD;  Location: [facility] OR;  Service: General;  Laterality: Right;  right foot    HIP FRACTURE SURGERY Left     IRRIGATION AND DEBRIDEMENT OF LOWER EXTREMITY Right 09/08/2022    Procedure: IRRIGATION AND DEBRIDEMENT, LOWER EXTREMITY;  Surgeon: Selina Matos MD;  Location: [facility] OR;  Service: General; "  Laterality: Right;    SHOULDER OPEN ROTATOR CUFF REPAIR Left     SPINE SURGERY      VASCULAR SURGERY       Family History       Problem Relation (Age of Onset)    Alcohol abuse Father    Arthritis Mother    COPD Paternal Grandfather    Cancer Sister    Diabetes Sister    Early death Father    Heart disease Father, Maternal Grandfather, Son    Hypertension Mother    Learning disabilities Mother          Tobacco Use    Smoking status: Some Days     Packs/day: 0.25     Types: Cigarettes    Smokeless tobacco: Never   Substance and Sexual Activity    Alcohol use: Not Currently    Drug use: Yes     Types: Oxycodone    Sexual activity: Yes     Partners: Female     Review of Systems   Constitutional: Negative.  Negative for appetite change, chills, fever and unexpected weight change.   HENT: Negative.  Negative for trouble swallowing.    Eyes: Negative.    Respiratory: Negative.  Negative for chest tightness and shortness of breath.    Cardiovascular: Negative.  Negative for chest pain.   Gastrointestinal: Negative.  Negative for abdominal distention, abdominal pain, blood in stool and nausea.   Endocrine: Negative.    Genitourinary: Negative.  Negative for hematuria.   Musculoskeletal: Negative.  Negative for back pain and myalgias.   Skin: Negative.  Negative for color change.   Allergic/Immunologic: Negative.    Neurological: Negative.  Negative for dizziness, syncope, weakness and light-headedness.   Psychiatric/Behavioral: Negative.  Negative for agitation.    Objective:     Vital Signs (Most Recent):  Temp: 98 °F (36.7 °C) (01/05/23 0751)  Pulse: 73 (01/05/23 0751)  Resp: 18 (01/05/23 0751)  BP: (!) 141/54 (01/05/23 0751)  SpO2: (!) 94 % (01/05/23 0751)   Vital Signs (24h Range):  Temp:  [98 °F (36.7 °C)-100.3 °F (37.9 °C)] 98 °F (36.7 °C)  Pulse:  [] 73  Resp:  [15-20] 18  SpO2:  [84 %-97 %] 94 %  BP: (114-160)/(54-70) 141/54     Weight: 77.1 kg (170 lb)  Body mass index is 25.85 kg/m².    Physical  Exam  Constitutional:       General: He is not in acute distress.     Appearance: Normal appearance.   HENT:      Head: Normocephalic.   Cardiovascular:      Rate and Rhythm: Normal rate.   Pulmonary:      Effort: Pulmonary effort is normal. No respiratory distress.   Abdominal:      General: There is no distension.      Tenderness: There is no abdominal tenderness.   Musculoskeletal:         General: Normal range of motion.        Feet:    Feet:      Comments: Callus tissue with fluctuance and small opening with purulent drainage; surrounding erythema and tenderness to palpation  Skin:     General: Skin is warm.      Coloration: Skin is not jaundiced.   Neurological:      General: No focal deficit present.      Mental Status: He is alert and oriented to person, place, and time.      Cranial Nerves: No cranial nerve deficit.       Significant Labs:  I have reviewed all pertinent lab results within the past 24 hours.  CBC:   Recent Labs   Lab 01/05/23  0504   WBC 11.72*   RBC 3.71*   HGB 11.4*   HCT 35.0*      MCV 94.3   MCH 30.7   MCHC 32.6     BMP:   Recent Labs   Lab 01/05/23  0504   *      K 3.7      CO2 30   BUN 17   CREATININE 0.83   CALCIUM 9.0       Significant Diagnostics:  I have reviewed all pertinent imaging results/findings within the past 24 hours.      Assessment/Plan:     * Abscess of right foot  To the OR for incision and drainage/debridement of right foot abscess.      Risks and benefits explained with the patient including risks for infection, bleeding, injury to surrounding structures, hematoma/seroma formation with need for possible evacuation, possible open.  The patient verbalized understanding, agrees and wishes to proceed with surgery.      VTE Risk Mitigation (From admission, onward)         Ordered     IP VTE LOW RISK PATIENT  Once         01/05/23 0005     Place sequential compression device  Until discontinued         01/05/23 0005                Thank you for  your consult. I will follow-up with patient. Please contact us if you have any additional questions.    Evelio Razo, DO  General Surgery  [facility] - Short Stay Unit

## 2023-01-05 NOTE — TRANSFER OF CARE
"Anesthesia Transfer of Care Note    Patient: Navdeep Avery    Procedure(s) Performed: Procedure(s) (LRB):  IRRIGATION AND DEBRIDEMENT, LOWER EXTREMITY (Right)    Patient location: PACU    Anesthesia Type: general    Transport from OR: Transported from OR on 6-10 L/min O2 by face mask with adequate spontaneous ventilation    Post pain: adequate analgesia    Post assessment: no apparent anesthetic complications    Post vital signs: stable    Level of consciousness: awake and alert    Nausea/Vomiting: no nausea/vomiting    Complications: none    Transfer of care protocol was followed      Last vitals:   Visit Vitals  BP (!) 145/66   Pulse 84   Temp 36.7 °C (98 °F)   Resp 18   Ht 5' 7" (1.702 m)   Wt 77.1 kg (170 lb)   SpO2 95%   BMI 26.63 kg/m²     "

## 2023-01-05 NOTE — ASSESSMENT & PLAN NOTE
Xray right foot pending official reading  Start IV Vancomycin and Zosyn  Pre op blood work ordered  EKG and chest xray pending  General surgery consulted

## 2023-01-05 NOTE — ASSESSMENT & PLAN NOTE
Xray right foot pending official reading  Blood culture pending  Start IV Vancomycin and Zosyn  Hold Aspirin and Plavix  Pre op blood work ordered  EKG and chest xray pending  General surgery consulted

## 2023-01-05 NOTE — OR NURSING
1350 Rec'd pt to PACU drowsy but arousable to verbal stimuli. VSS. No signs of distress noted, respirations even and unlabored. Right foot dressing C/D/I, cap refill less than 3 seconds, posterior tibialis pulse 2+, able to wiggle toes on command. Denies pain/needs. Will continue to monitor.     1425 Out of PACU. VSS. No signs of bleeding/distress noted.     1435 Pt to room 431 awake and alert with no distress noted, respirations even and unlabored. Moved to regular bed with safety precautions in place, SCDs intact. Family at bedside. Bedside report given to KAYDEN Mata RN. Right foot dressing C/D/I, cap refill less than 3 seconds, able to wiggle toes on command, posterior tibial pulse 2+. C/o slight pain, denies other needs. /66, P 72, R 16, O2 100% 2L NC, T 98.0 oral.

## 2023-01-06 VITALS
HEART RATE: 74 BPM | HEIGHT: 67 IN | WEIGHT: 170 LBS | RESPIRATION RATE: 18 BRPM | OXYGEN SATURATION: 93 % | TEMPERATURE: 98 F | DIASTOLIC BLOOD PRESSURE: 79 MMHG | SYSTOLIC BLOOD PRESSURE: 143 MMHG | BODY MASS INDEX: 26.68 KG/M2

## 2023-01-06 LAB
GLUCOSE SERPL-MCNC: 117 MG/DL (ref 70–105)
GLUCOSE SERPL-MCNC: 219 MG/DL (ref 70–105)

## 2023-01-06 PROCEDURE — 63600175 PHARM REV CODE 636 W HCPCS: Performed by: STUDENT IN AN ORGANIZED HEALTH CARE EDUCATION/TRAINING PROGRAM

## 2023-01-06 PROCEDURE — 99900035 HC TECH TIME PER 15 MIN (STAT)

## 2023-01-06 PROCEDURE — 99232 PR SUBSEQUENT HOSPITAL CARE,LEVL II: ICD-10-PCS | Mod: ,,, | Performed by: INTERNAL MEDICINE

## 2023-01-06 PROCEDURE — 94640 AIRWAY INHALATION TREATMENT: CPT

## 2023-01-06 PROCEDURE — 25000003 PHARM REV CODE 250: Performed by: STUDENT IN AN ORGANIZED HEALTH CARE EDUCATION/TRAINING PROGRAM

## 2023-01-06 PROCEDURE — 82962 GLUCOSE BLOOD TEST: CPT

## 2023-01-06 PROCEDURE — 99232 SBSQ HOSP IP/OBS MODERATE 35: CPT | Mod: ,,, | Performed by: INTERNAL MEDICINE

## 2023-01-06 PROCEDURE — 25000242 PHARM REV CODE 250 ALT 637 W/ HCPCS: Performed by: STUDENT IN AN ORGANIZED HEALTH CARE EDUCATION/TRAINING PROGRAM

## 2023-01-06 PROCEDURE — 94761 N-INVAS EAR/PLS OXIMETRY MLT: CPT

## 2023-01-06 RX ORDER — AMOXICILLIN AND CLAVULANATE POTASSIUM 875; 125 MG/1; MG/1
1 TABLET, FILM COATED ORAL 2 TIMES DAILY
Qty: 20 TABLET | Refills: 0 | Status: SHIPPED | OUTPATIENT
Start: 2023-01-06 | End: 2023-01-16

## 2023-01-06 RX ADMIN — INSULIN ASPART 2 UNITS: 100 INJECTION, SOLUTION INTRAVENOUS; SUBCUTANEOUS at 12:01

## 2023-01-06 RX ADMIN — HYDROCODONE BITARTRATE AND ACETAMINOPHEN 1 TABLET: 10; 325 TABLET ORAL at 02:01

## 2023-01-06 RX ADMIN — MUPIROCIN: 20 OINTMENT TOPICAL at 09:01

## 2023-01-06 RX ADMIN — LISINOPRIL 10 MG: 10 TABLET ORAL at 08:01

## 2023-01-06 RX ADMIN — GABAPENTIN 900 MG: 300 CAPSULE ORAL at 05:01

## 2023-01-06 RX ADMIN — PIPERACILLIN AND TAZOBACTAM 4.5 G: 4; .5 INJECTION, POWDER, LYOPHILIZED, FOR SOLUTION INTRAVENOUS; PARENTERAL at 11:01

## 2023-01-06 RX ADMIN — HYDROCODONE BITARTRATE AND ACETAMINOPHEN 1 TABLET: 10; 325 TABLET ORAL at 12:01

## 2023-01-06 RX ADMIN — IPRATROPIUM BROMIDE AND ALBUTEROL SULFATE 3 ML: .5; 3 SOLUTION RESPIRATORY (INHALATION) at 12:01

## 2023-01-06 RX ADMIN — PIPERACILLIN AND TAZOBACTAM 4.5 G: 4; .5 INJECTION, POWDER, LYOPHILIZED, FOR SOLUTION INTRAVENOUS; PARENTERAL at 03:01

## 2023-01-06 RX ADMIN — HYDROXYZINE PAMOATE 25 MG: 25 CAPSULE ORAL at 08:01

## 2023-01-06 RX ADMIN — GABAPENTIN 900 MG: 300 CAPSULE ORAL at 12:01

## 2023-01-06 RX ADMIN — GABAPENTIN 900 MG: 300 CAPSULE ORAL at 11:01

## 2023-01-06 RX ADMIN — METOPROLOL SUCCINATE 25 MG: 25 TABLET, FILM COATED, EXTENDED RELEASE ORAL at 08:01

## 2023-01-06 RX ADMIN — PANTOPRAZOLE SODIUM 40 MG: 40 TABLET, DELAYED RELEASE ORAL at 08:01

## 2023-01-06 RX ADMIN — HYDROCODONE BITARTRATE AND ACETAMINOPHEN 1 TABLET: 10; 325 TABLET ORAL at 08:01

## 2023-01-06 RX ADMIN — ESCITALOPRAM OXALATE 10 MG: 10 TABLET ORAL at 08:01

## 2023-01-06 RX ADMIN — IPRATROPIUM BROMIDE AND ALBUTEROL SULFATE 3 ML: .5; 3 SOLUTION RESPIRATORY (INHALATION) at 08:01

## 2023-01-06 RX ADMIN — AMLODIPINE BESYLATE 10 MG: 10 TABLET ORAL at 08:01

## 2023-01-06 RX ADMIN — VANCOMYCIN HYDROCHLORIDE 1500 MG: 1 INJECTION, POWDER, LYOPHILIZED, FOR SOLUTION INTRAVENOUS at 02:01

## 2023-01-06 NOTE — ASSESSMENT & PLAN NOTE
Continue home Amlodipine 10mg, Lisinopril 10mg and Toprol XL 25mg.    01/06--resume home meds and monitoring

## 2023-01-06 NOTE — SUBJECTIVE & OBJECTIVE
Interval History: Seen early this morning sleeping soundly. Family present.  Plans are for surgery this am.     Review of Systems   Unable to perform ROS: Other   Objective:     Vital Signs (Most Recent):  Temp: 98.6 °F (37 °C) (01/05/23 1600)  Pulse: 89 (01/05/23 1600)  Resp: 18 (01/05/23 1747)  BP: (!) 153/71 (01/05/23 1600)  SpO2: (!) 94 % (01/05/23 1600)   Vital Signs (24h Range):  Temp:  [97.8 °F (36.6 °C)-100.3 °F (37.9 °C)] 98.6 °F (37 °C)  Pulse:  [] 89  Resp:  [11-21] 18  SpO2:  [84 %-100 %] 94 %  BP: ()/(54-77) 153/71     Weight: 77.1 kg (170 lb)  Body mass index is 26.63 kg/m².    Intake/Output Summary (Last 24 hours) at 1/5/2023 1917  Last data filed at 1/5/2023 1420  Gross per 24 hour   Intake 25 ml   Output --   Net 25 ml      Physical Exam  Vitals reviewed.   Constitutional:       General: He is not in acute distress.     Appearance: He is not toxic-appearing.   HENT:      Head: Normocephalic and atraumatic.      Right Ear: External ear normal.      Left Ear: External ear normal.   Eyes:      General: No scleral icterus.  Cardiovascular:      Rate and Rhythm: Normal rate and regular rhythm.      Heart sounds: Normal heart sounds. No murmur heard.  Pulmonary:      Effort: Pulmonary effort is normal. No respiratory distress.      Breath sounds: Normal breath sounds.   Abdominal:      General: Abdomen is flat. Bowel sounds are normal. There is no distension.      Palpations: Abdomen is soft.   Musculoskeletal:         General: Normal range of motion.   Skin:     General: Skin is warm and dry.       Significant Labs: All pertinent labs within the past 24 hours have been reviewed.  BMP:   Recent Labs   Lab 01/05/23  0504   *      K 3.7      CO2 30   BUN 17   CREATININE 0.83   CALCIUM 9.0     CBC:   Recent Labs   Lab 01/04/23  2155 01/05/23  0504   WBC 13.88* 11.72*   HGB 12.1* 11.4*   HCT 36.2* 35.0*    158       Significant Imaging: I have reviewed all pertinent  imaging results/findings within the past 24 hours.

## 2023-01-06 NOTE — NURSING
Called wife and updated on plan to discharge patient. Stated she will be able to come by around 3:30.

## 2023-01-06 NOTE — ASSESSMENT & PLAN NOTE
Patient's FSGs are controlled on current medication regimen.  Last A1c reviewed-   Lab Results   Component Value Date    HGBA1C 7.2 (H) 10/21/2022     Most recent fingerstick glucose reviewed- No results for input(s): POCTGLUCOSE in the last 24 hours.  Current correctional scale  Low  Maintain anti-hyperglycemic dose as follows-   Antihyperglycemics (From admission, onward)    Start     Stop Route Frequency Ordered    01/05/23 0100  insulin detemir U-100 injection 15 Units         -- SubQ Nightly 01/05/23 0005    01/05/23 0059  insulin aspart U-100 injection 0-5 Units         -- SubQ Before meals & nightly PRN 01/05/23 0005        Hold Oral hypoglycemics while patient is in the hospital.    01/06--resume home medications and monitoring

## 2023-01-06 NOTE — PROGRESS NOTES
[facility] - Short Stay [facility] Medicine  Progress Note    Patient Name: Navdeep Avery  MRN: [id number]  Patient Class: IP- Inpatient   Admission Date: 1/4/2023  Length of Stay: 2 days  Attending Physician: Allen Bobby MD  Primary Care Provider: Marquez Huff MD        Subjective:     Principal Problem:Abscess of right foot        HPI:  62-year-old  male presented to the ER with complaints of right foot pain for the past several days; also complaining of fever, some drainage and foul odor to the right foot.  Patient has a history multiple toe amputations; past medical history of hypertension, diabetes, COPD, GERD, myocardial infarction with multiple stents placed and triple vessel bypass several years ago; currently on Plavix.  Patient admitted for IV antibiotics and surgical consult.  Still complain of pain in the right foot and drainage.  Denies any chest pain or shortness of breath, nausea/vomiting/diarrhea.      Overview/Hospital Course:  No notes on file    Subjective & objective note cannot be loaded without a specified hospital service.    01/06/2023   PATIENT IS AWAKE ALERT PATIENT IS IN NO ACUTE DISTRESS OVERALL PATIENT IS DOING WELL.  PATIENT PRESENTED WITH AN ABSCESS TO THE RIGHT FOOT HE IS STATUS POST I&D.  HE HAS NO COMPLAINTS TODAY.  THE PATIENT IS AFEBRILE AND HEMODYNAMICALLY STABLE.    LUNGS ARE CLEAR NO CRACKLES OR WHEEZING CARDIOVASCULAR S1-S2 REGULAR RATE RHYTHM ABDOMEN IS SOFT POSITIVE BOWEL SOUNDS NONTENDER EXTREMITIES NONEDEMATOUS NEURO NONFOCAL   PATIENT WITH ABSCESS TO THE RIGHT FOOT STATUS POST I AND D, HYPERTENSION, TYPE 2 DIABETES MELLITUS  CONTINUE BROAD-SPECTRUM IV ANTIBIOTICS, BLOOD PRESSURE CONTROL, WOUND CARE, AND CONTROL OF HIS DIABETES.  Assessment/Plan:      Assessment & plan notes cannot be loaded without a specified hospital service.    VTE Risk Mitigation (From admission, onward)           Ordered     IP VTE LOW RISK PATIENT  Once          01/05/23 0005     Place sequential compression device  Until discontinued         01/05/23 0005                    Discharge Planning   MARGARITO:      Code Status: Full Code   Is the patient medically ready for discharge?:     Reason for patient still in hospital (select all that apply): Treatment  Discharge Plan A: Home                  Allen Bobby MD  Department of Hospital Medicine   [facility] Short Stay Unit

## 2023-01-06 NOTE — DISCHARGE SUMMARY
"[facility] Stay [facility]  Hospital Medicine  Discharge Summary      Patient Name: Navdeep Avery  MRN: [id number]  LETA: [id number]  Patient Class: IP- Inpatient  Admission Date: 1/4/2023  Hospital Length of Stay: 2 days  Discharge Date and Time:  01/06/2023 2:52 PM  Attending Physician: Allen Bobby MD   Discharging Provider: GIANA Bahena  Primary Care Provider: Marquez Huff MD    Primary Care Team: Networked reference to record PCT     HPI:   Patient is a 63 y/o male with PMH of HTN, DM2, MI (8 stents and triple vessel bypass), COPD and GERD who presents to [facility] with complaint of a wound on the bottom of his right foot, left hip pain, and fever for the last 2 days. Spouse in the room stated that the wound was first noticed last night and the wound has an odor and it is swelling. Patient states that the right foot pain radiates to his right knee. Patient also states that he recently had a left hip fracture that required an ORIF at [facility]. He states that he had a fall 3 days ago and since the fall, ambulation is very painful. Patient denies chest pain, headache, bowel or urinary habit changes.    In the ED vital signs were remarkable for /63 and SpO2 93%. Blood work positive for elevated WBC 13.8. Lactic acid 1.3. EKG sinus rhythm. Patient was given Morphine, Zofran and Zosyn. Patient will be admitted for further management.       Procedure(s) (LRB):  IRRIGATION AND DEBRIDEMENT, LOWER EXTREMITY (Right)      Hospital Course:   01/06/2023   "PATIENT IS AWAKE ALERT PATIENT IS IN NO ACUTE DISTRESS OVERALL PATIENT IS DOING WELL.  PATIENT PRESENTED WITH AN ABSCESS TO THE RIGHT FOOT HE IS STATUS POST I&D.  HE HAS NO COMPLAINTS TODAY.  THE PATIENT IS AFEBRILE AND HEMODYNAMICALLY STABLE.    LUNGS ARE CLEAR NO CRACKLES OR WHEEZING CARDIOVASCULAR S1-S2 REGULAR RATE RHYTHM ABDOMEN IS SOFT POSITIVE BOWEL SOUNDS NONTENDER EXTREMITIES NONEDEMATOUS NEURO NONFOCAL   PATIENT " "WITH ABSCESS TO THE RIGHT FOOT STATUS POST I AND D, HYPERTENSION, TYPE 2 DIABETES MELLITUS  CONTINUE BROAD-SPECTRUM IV ANTIBIOTICS, BLOOD PRESSURE CONTROL, WOUND CARE, AND CONTROL OF HIS DIABETES."    --update--Pt with no new issues or concerns. Is ok to d/c from a surgery standpoint.  States feels well enough to be discharged home today.  Received Augmentin script and wound care instructions from surgery.  States wife will assist with wound care.  Follow up with Dr. Garvin in 2 weeks, follow up with pcp as needed.      Patient counseled on the importance of keeping all hospital follow up appointments and compliance with medications, therapies, or devices as prescribed in order to provide for the best health outcomes.   Additionally, patient given written literature regarding their current disease processes and home care recommendations.   Patient given opportunity to ask questions and verbalized understanding of all information discussed.          Goals of Care Treatment Preferences:  Code Status: Full Code      Consults:   Consults (From admission, onward)        Status Ordering Provider     Inpatient consult to General Surgery  Once        Provider:  Evelio J Defatta, DO    Completed AJ, MIN S     Pharmacy to dose Vancomycin consult  Once        Provider:  (Not yet assigned)    MARCELA Degroot          * Abscess of right foot  Patient is RCRI class 3 and may proceed for planned I&D    Vanc and zosyn for antibiotic coverage.     01/06--d/c with gen surg guidelines see below:  " Patient doing well.  Stable for discharge from a surgical standpoint.  We will prescribe Augmentin and wait for sensitivities; this sensitivities come back different, we will prescribe accordingly.  Patient to follow-up in clinic in 2 weeks."          Diabetes mellitus, type 2  Patient's FSGs are controlled on current medication regimen.  Last A1c reviewed-   Lab Results   Component Value Date    HGBA1C 7.2 (H) 10/21/2022     Most " recent fingerstick glucose reviewed- No results for input(s): POCTGLUCOSE in the last 24 hours.  Current correctional scale  Low  Maintain anti-hyperglycemic dose as follows-   Antihyperglycemics (From admission, onward)    Start     Stop Route Frequency Ordered    01/05/23 0100  insulin detemir U-100 injection 15 Units         -- SubQ Nightly 01/05/23 0005    01/05/23 0059  insulin aspart U-100 injection 0-5 Units         -- SubQ Before meals & nightly PRN 01/05/23 0005        Hold Oral hypoglycemics while patient is in the hospital.    01/06--resume home medications and monitoring     Major depressive disorder  Continue home Escitalopram        GERD (gastroesophageal reflux disease)  Continue home PPI    COPD (chronic obstructive pulmonary disease)  Continue home Ipratropium nebulizer      Mixed hyperlipidemia  Lipid panel pending  Pt takes Crestor 20mg at home. Will swap to Atorvastatin 80mg while in the hospital.    01/06--pt stated that he thinks his crestor was stopped, continue Atorvastatin       Benign hypertension  Continue home Amlodipine 10mg, Lisinopril 10mg and Toprol XL 25mg.    01/06--resume home meds and monitoring        Final Active Diagnoses:    Diagnosis Date Noted POA    PRINCIPAL PROBLEM:  Abscess of right foot [L02.611] 01/05/2023 Yes    GERD (gastroesophageal reflux disease) [K21.9] 01/05/2023 Yes    Major depressive disorder [F32.9] 01/05/2023 Yes    Diabetes mellitus, type 2 [E11.9] 01/05/2023 Yes    COPD (chronic obstructive pulmonary disease) [J44.9] 06/24/2022 Yes    Mixed hyperlipidemia [E78.2] 03/23/2021 Yes    Benign hypertension [I10] 03/23/2021 Yes      Problems Resolved During this Admission:       Discharged Condition: stable    Disposition: Home or Self Care    Follow Up:   Follow-up Information     Evelio Razo DO. Schedule an appointment as soon as possible for a visit in 2 week(s).    Specialties: General Surgery, Surgery  Contact information:  [address]  [place]  [facility] [address]  [phone]             Marquez Huff MD Follow up.    Specialty: Family Medicine  Why: As needed  Contact information:  [address]  [facility] [address]  [phone]                       Patient Instructions:      Diet diabetic     Notify your health care provider if you experience any of the following:  temperature >100.4     Notify your health care provider if you experience any of the following:  persistent nausea and vomiting or diarrhea     Notify your health care provider if you experience any of the following:  severe uncontrolled pain     Notify your health care provider if you experience any of the following:  redness, tenderness, or signs of infection (pain, swelling, redness, odor or green/yellow discharge around incision site)     Notify your health care provider if you experience any of the following:  difficulty breathing or increased cough     Notify your health care provider if you experience any of the following:  severe persistent headache     Notify your health care provider if you experience any of the following:  worsening rash     Notify your health care provider if you experience any of the following:  persistent dizziness, light-headedness, or visual disturbances     Notify your health care provider if you experience any of the following:  increased confusion or weakness     Change dressing (specify)   Order Comments: Dressing change: Remove about 1 in of packing out of the wound and cut and leave a little tag; do this daily until all the packing is removed.  Rinse wound with soap and water/wound cleanser and pat dry; then rinsed with Vashe solution and dry; do this daily.  Follow-up in clinic in 2 weeks.  Tried not to walk on right foot over the next 6 weeks.     Activity as tolerated       Significant Diagnostic Studies: Labs:   BMP:   Recent Labs   Lab 01/04/23  8354  01/05/23  0504   * 165*    140   K 3.8 3.7    102   CO2 31 30   BUN 18 17   CREATININE 0.82 0.83   CALCIUM 8.6 9.0    and CBC   Recent Labs   Lab 01/04/23  2155 01/05/23  0504   WBC 13.88* 11.72*   HGB 12.1* 11.4*   HCT 36.2* 35.0*    158       Pending Diagnostic Studies:     Procedure Component Value Units Date/Time    EKG 12-lead [[id number]] Collected: 01/05/23 0106    Order Status: Sent Lab Status: In process Updated: 01/05/23 0547    Narrative:      Test Reason : Z01.818,    Vent. Rate : 080 BPM     Atrial Rate : 000 BPM     P-R Int : 154 ms          QRS Dur : 106 ms      QT Int : 378 ms       P-R-T Axes : 066 006 114 degrees     QTc Int : 413 ms    Sinus rhythm  Inferior infarct - age undetermined  Possible anterior infarct - age undetermined  Lateral T wave abnormality  may be due to myocardial ischemia  Abnormal ECG      Referred By: [email or web address]   SELF           Confirmed By:          Medications:  Reconciled Home Medications:      Medication List      START taking these medications    amoxicillin-clavulanate 875-125mg 875-125 mg per tablet  Commonly known as: AUGMENTIN  Take 1 tablet by mouth 2 (two) times daily. for 10 days        CONTINUE taking these medications    acetaminophen 325 MG tablet  Commonly known as: TYLENOL  Take 650 mg by mouth every 6 (six) hours.     * albuterol 2.5 mg /3 mL (0.083 %) nebulizer solution  Commonly known as: PROVENTIL  USE 1 VIAL IN NEBULIZER 3 TIMES DAILY     * albuterol 90 mcg/actuation inhaler  Commonly known as: PROVENTIL/VENTOLIN HFA  Inhale 2 puffs into the lungs 2 (two) times daily as needed for Wheezing.     amLODIPine 10 MG tablet  Commonly known as: NORVASC  Take 1 tablet (10 mg total) by mouth once daily.     aspirin 81 MG Chew  Take 81 mg by mouth once daily.     atorvastatin 80 MG tablet  Commonly known as: LIPITOR  Take 80 mg by mouth every evening.     clopidogreL 75 mg tablet  Commonly known as: PLAVIX  Take 1 tablet (75 mg total) by  mouth once daily.     diclofenac sodium 1 % Gel  Commonly known as: VOLTAREN  Apply 1 g topically 4 (four) times daily as needed (pain).     EScitalopram oxalate 10 MG tablet  Commonly known as: LEXAPRO  Take 1 tablet (10 mg total) by mouth once daily.     fluticasone propionate 50 mcg/actuation nasal spray  Commonly known as: FLONASE  2 sprays (100 mcg total) by Each Nostril route once daily.     gabapentin 300 MG capsule  Commonly known as: NEURONTIN  Take 3 capsules (900 mg total) by mouth every 6 (six) hours.     * HYDROcodone-acetaminophen  mg per tablet  Commonly known as: NORCO  Take 1 tablet by mouth every 6 (six) hours as needed for Pain (pain).     * HYDROcodone-acetaminophen  mg per tablet  Commonly known as: NORCO  Take 1 tablet by mouth every 6 (six) hours as needed for Pain (pain).  Start taking on: January 21, 2023     * HYDROcodone-acetaminophen  mg per tablet  Commonly known as: NORCO  Take 1 tablet by mouth every 6 (six) hours as needed for Pain (pain).  Start taking on: February 20, 2023     hydrOXYzine pamoate 25 MG Cap  Commonly known as: VISTARIL  Take 1 capsule (25 mg total) by mouth once daily. Take nightly for sleep     JARDIANCE 10 mg tablet  Generic drug: empagliflozin  Take 1 tablet (10 mg total) by mouth once daily.     LEVEMIR FLEXTOUCH U-100 INSULN 100 unit/mL (3 mL) Inpn pen  Generic drug: insulin detemir U-100  Inject 10 units subcutaneously every morning, inject 20 units subcutaneously every evening.     LIDOcaine 5 %  Commonly known as: LIDODERM  1 patch once daily.     lisinopriL 10 MG tablet  Take 1 tablet (10 mg total) by mouth once daily.     metoprolol succinate 25 MG 24 hr tablet  Commonly known as: TOPROL-XL  Take 1 tablet (25 mg total) by mouth once daily.     nitroGLYCERIN 0.4 MG SL tablet  Commonly known as: NITROSTAT  Place 1 tablet (0.4 mg total) under the tongue every 5 (five) minutes as needed for Chest pain.     pantoprazole 40 MG tablet  Commonly  "known as: PROTONIX  Take 1 tablet (40 mg total) by mouth once daily.     SENNA 8.6 mg tablet  Generic drug: senna  Take 2 tablets by mouth once daily.     SPIRIVA RESPIMAT 2.5 mcg/actuation inhaler  Generic drug: tiotropium bromide  Inhale 1 puff into the lungs once daily.     SURE COMFORT INSULIN SYRINGE 0.3 mL 31 gauge x 5/16" Syrg  Generic drug: insulin syringe-needle U-100  Inject 1 application into the skin 2 (two) times a day.     traZODone 50 MG tablet  Commonly known as: DESYREL  Take 50 mg by mouth nightly as needed.         * This list has 5 medication(s) that are the same as other medications prescribed for you. Read the directions carefully, and ask your doctor or other care provider to review them with you.            STOP taking these medications    enoxaparin 40 mg/0.4 mL Syrg  Commonly known as: LOVENOX     rosuvastatin 20 MG tablet  Commonly known as: CRESTOR            Indwelling Lines/Drains at time of discharge:   Lines/Drains/Airways     None                 Time spent on the discharge of patient: >30 minutes         GIANA Bahena  Department of Hospital Medicine  [facility]  "

## 2023-01-06 NOTE — HOSPITAL COURSE
"01/06/2023   "PATIENT IS AWAKE ALERT PATIENT IS IN NO ACUTE DISTRESS OVERALL PATIENT IS DOING WELL.  PATIENT PRESENTED WITH AN ABSCESS TO THE RIGHT FOOT HE IS STATUS POST I&D.  HE HAS NO COMPLAINTS TODAY.  THE PATIENT IS AFEBRILE AND HEMODYNAMICALLY STABLE.    LUNGS ARE CLEAR NO CRACKLES OR WHEEZING CARDIOVASCULAR S1-S2 REGULAR RATE RHYTHM ABDOMEN IS SOFT POSITIVE BOWEL SOUNDS NONTENDER EXTREMITIES NONEDEMATOUS NEURO NONFOCAL   PATIENT WITH ABSCESS TO THE RIGHT FOOT STATUS POST I AND D, HYPERTENSION, TYPE 2 DIABETES MELLITUS  CONTINUE BROAD-SPECTRUM IV ANTIBIOTICS, BLOOD PRESSURE CONTROL, WOUND CARE, AND CONTROL OF HIS DIABETES."    --update--Pt with no new issues or concerns. Is ok to d/c from a surgery standpoint.  States feels well enough to be discharged home today.  Received Augmentin script and wound care instructions from surgery.  States wife will assist with wound care.  Follow up with Dr. Garvin in 2 weeks, follow up with pcp as needed.      Patient counseled on the importance of keeping all hospital follow up appointments and compliance with medications, therapies, or devices as prescribed in order to provide for the best health outcomes.   Additionally, patient given written literature regarding their current disease processes and home care recommendations.   Patient given opportunity to ask questions and verbalized understanding of all information discussed.     "

## 2023-01-06 NOTE — DISCHARGE INSTRUCTIONS
Remove about 1 in of packing out of the wound and cut and leave a little tag; do this daily until all the packing is removed.  Rinse wound with soap and water/wound cleanser and pat dry; then rinsed with Vashe solution and dry; do this daily.  Follow-up in clinic in 2 weeks.  Tried not to walk on right foot over the next 6 weeks.

## 2023-01-06 NOTE — PROGRESS NOTES
[facility] Medicine  Progress Note    Patient Name: Navdeep Avery  MRN: [id number]  Patient Class: IP- Inpatient   Admission Date: 1/4/2023  Length of Stay: 1 days  Attending Physician: Allen Edward Jr., MD  Primary Care Provider: Marquez Huff MD        Subjective:     Principal Problem:Abscess of right foot        HPI:  Patient is a 61 y/o male with PMH of HTN, DM2, MI (8 stents and triple vessel bypass), COPD and GERD who presents to [facility] with complaint of a wound on the bottom of his right foot, left hip pain, and fever for the last 2 days. Spouse in the room stated that the wound was first noticed last night and the wound has an odor and it is swelling. Patient states that the right foot pain radiates to his right knee. Patient also states that he recently had a left hip fracture that required an ORIF at [facility]. He states that he had a fall 3 days ago and since the fall, ambulation is very painful. Patient denies chest pain, headache, bowel or urinary habit changes.    In the ED vital signs were remarkable for /63 and SpO2 93%. Blood work positive for elevated WBC 13.8. Lactic acid 1.3. EKG sinus rhythm. Patient was given Morphine, Zofran and Zosyn. Patient will be admitted for further management.       Overview/Hospital Course:  No notes on file    Interval History: Seen early this morning sleeping soundly. Family present.  Plans are for surgery this am.     Review of Systems   Unable to perform ROS: Other   Objective:     Vital Signs (Most Recent):  Temp: 98.6 °F (37 °C) (01/05/23 1600)  Pulse: 89 (01/05/23 1600)  Resp: 18 (01/05/23 1747)  BP: (!) 153/71 (01/05/23 1600)  SpO2: (!) 94 % (01/05/23 1600)   Vital Signs (24h Range):  Temp:  [97.8 °F (36.6 °C)-100.3 °F (37.9 °C)] 98.6 °F (37 °C)  Pulse:  [] 89  Resp:  [11-21] 18  SpO2:  [84 %-100 %] 94 %  BP: ()/(54-77) 153/71     Weight: 77.1 kg (170 lb)  Body mass index is 26.63  kg/m².    Intake/Output Summary (Last 24 hours) at 1/5/2023 1917  Last data filed at 1/5/2023 1420  Gross per 24 hour   Intake 25 ml   Output --   Net 25 ml      Physical Exam  Vitals reviewed.   Constitutional:       General: He is not in acute distress.     Appearance: He is not toxic-appearing.   HENT:      Head: Normocephalic and atraumatic.      Right Ear: External ear normal.      Left Ear: External ear normal.   Eyes:      General: No scleral icterus.  Cardiovascular:      Rate and Rhythm: Normal rate and regular rhythm.      Heart sounds: Normal heart sounds. No murmur heard.  Pulmonary:      Effort: Pulmonary effort is normal. No respiratory distress.      Breath sounds: Normal breath sounds.   Abdominal:      General: Abdomen is flat. Bowel sounds are normal. There is no distension.      Palpations: Abdomen is soft.   Musculoskeletal:         General: Normal range of motion.   Skin:     General: Skin is warm and dry.       Significant Labs: All pertinent labs within the past 24 hours have been reviewed.  BMP:   Recent Labs   Lab 01/05/23  0504   *      K 3.7      CO2 30   BUN 17   CREATININE 0.83   CALCIUM 9.0     CBC:   Recent Labs   Lab 01/04/23  2155 01/05/23  0504   WBC 13.88* 11.72*   HGB 12.1* 11.4*   HCT 36.2* 35.0*    158       Significant Imaging: I have reviewed all pertinent imaging results/findings within the past 24 hours.      Assessment/Plan:      * Abscess of right foot  Patient is RCRI class 3 and may proceed for planned I&D    Vanc and zosyn for antibiotic coverage.     Benign hypertension  Continue home Amlodipine 10mg, Lisinopril 10mg and Toprol XL 25mg.      Diabetes mellitus, type 2  Patient's FSGs are controlled on current medication regimen.  Last A1c reviewed-   Lab Results   Component Value Date    HGBA1C 7.2 (H) 10/21/2022     Most recent fingerstick glucose reviewed- No results for input(s): POCTGLUCOSE in the last 24 hours.  Current correctional scale   Low  Maintain anti-hyperglycemic dose as follows-   Antihyperglycemics (From admission, onward)    Start     Stop Route Frequency Ordered    01/05/23 0100  insulin detemir U-100 injection 15 Units         -- SubQ Nightly 01/05/23 0005    01/05/23 0059  insulin aspart U-100 injection 0-5 Units         -- SubQ Before meals & nightly PRN 01/05/23 0005        Hold Oral hypoglycemics while patient is in the hospital.    Major depressive disorder  Continue home Escitalopram        GERD (gastroesophageal reflux disease)  Continue home PPI    COPD (chronic obstructive pulmonary disease)  Continue home Ipratropium nebulizer      Mixed hyperlipidemia  Lipid panel pending  Pt takes Crestor 20mg at home. Will swap to Atorvastatin 80mg while in the hospital.        VTE Risk Mitigation (From admission, onward)         Ordered     IP VTE LOW RISK PATIENT  Once         01/05/23 0005     Place sequential compression device  Until discontinued         01/05/23 0005                Discharge Planning   MARGARITO:      Code Status: Full Code   Is the patient medically ready for discharge?:     Reason for patient still in hospital (select all that apply): Treatment  Discharge Plan A: Home                  Allen Edward Jr, MD  Department of Hospital Medicine   [facility]

## 2023-01-06 NOTE — SUBJECTIVE & OBJECTIVE
Interval History:  Stable, no acute events overnight.  Pain controlled.    Medications:  Continuous Infusions:  Scheduled Meds:   albuterol-ipratropium  3 mL Nebulization Q6H    amLODIPine  10 mg Oral Daily    atorvastatin  80 mg Oral QHS    EScitalopram oxalate  10 mg Oral Daily    gabapentin  900 mg Oral Q6H    hydrOXYzine pamoate  25 mg Oral Daily    insulin detemir U-100  15 Units Subcutaneous QHS    lisinopriL  10 mg Oral Daily    metoprolol succinate  25 mg Oral Daily    mupirocin   Nasal BID    pantoprazole  40 mg Oral Daily    piperacillin-tazobactam (ZOSYN) IVPB  4.5 g Intravenous Q8H    polyethylene glycol  17 g Oral Daily    vancomycin (VANCOCIN) IVPB  1,500 mg Intravenous Q18H     PRN Meds:acetaminophen, albuterol-ipratropium, dextrose 10 % in water (D10W), dextrose 10 % in water (D10W), glucagon (human recombinant), glucose, glucose, HYDROcodone-acetaminophen, insulin aspart U-100, naloxone, ondansetron, simethicone, sodium chloride 0.9%, traZODone, vancomycin - pharmacy to dose     Review of patient's allergies indicates:  No Known Allergies  Objective:     Vital Signs (Most Recent):  Temp: 97.4 °F (36.3 °C) (01/06/23 0800)  Pulse: 73 (01/06/23 0824)  Resp: 18 (01/06/23 0824)  BP: 139/69 (01/06/23 0800)  SpO2: (!) 94 % (01/06/23 0800)   Vital Signs (24h Range):  Temp:  [97.4 °F (36.3 °C)-98.6 °F (37 °C)] 97.4 °F (36.3 °C)  Pulse:  [60-89] 73  Resp:  [11-21] 18  SpO2:  [93 %-100 %] 94 %  BP: ()/(54-77) 139/69     Weight: 77.1 kg (170 lb)  Body mass index is 26.63 kg/m².    Intake/Output - Last 3 Shifts         01/04 0700  01/05 0659 01/05 0700  01/06 0659 01/06 0700 01/07 0659    I.V. (mL/kg)  25 (0.3)     Total Intake(mL/kg)  25 (0.3)     Net  +25                    Physical Exam  Constitutional:       General: He is not in acute distress.     Appearance: Normal appearance.   HENT:      Head: Normocephalic.   Cardiovascular:      Rate and Rhythm: Normal rate.   Pulmonary:      Effort: Pulmonary  effort is normal. No respiratory distress.   Abdominal:      General: There is no distension.      Tenderness: There is no abdominal tenderness.   Musculoskeletal:         General: Normal range of motion.   Feet:      Comments: Right foot dressing clean dry and intact  Skin:     General: Skin is warm.      Coloration: Skin is not jaundiced.   Neurological:      General: No focal deficit present.      Mental Status: He is alert and oriented to person, place, and time.      Cranial Nerves: No cranial nerve deficit.       Significant Labs:  I have reviewed all pertinent lab results within the past 24 hours.  CBC:   Recent Labs   Lab 01/05/23  0504   WBC 11.72*   RBC 3.71*   HGB 11.4*   HCT 35.0*      MCV 94.3   MCH 30.7   MCHC 32.6     BMP:   Recent Labs   Lab 01/05/23  0504   *      K 3.7      CO2 30   BUN 17   CREATININE 0.83   CALCIUM 9.0       Significant Diagnostics:  I have reviewed all pertinent imaging results/findings within the past 24 hours.

## 2023-01-06 NOTE — PLAN OF CARE
Problem: Adult Inpatient Plan of Care  Goal: Plan of Care Review  Outcome: Ongoing, Progressing  Goal: Patient-Specific Goal (Individualized)  Outcome: Ongoing, Progressing  Goal: Absence of Hospital-Acquired Illness or Injury  Outcome: Ongoing, Progressing  Goal: Optimal Comfort and Wellbeing  Outcome: Ongoing, Progressing  Goal: Readiness for Transition of Care  Outcome: Ongoing, Progressing     Problem: Diabetes Comorbidity  Goal: Blood Glucose Level Within Targeted Range  Outcome: Ongoing, Progressing     Problem: Impaired Wound Healing  Goal: Optimal Wound Healing  Outcome: Ongoing, Progressing     Problem: Skin Injury Risk Increased  Goal: Skin Health and Integrity  Outcome: Ongoing, Progressing     Problem: Gas Exchange Impaired  Goal: Optimal Gas Exchange  Outcome: Ongoing, Progressing

## 2023-01-06 NOTE — ASSESSMENT & PLAN NOTE
Lipid panel pending  Pt takes Crestor 20mg at home. Will swap to Atorvastatin 80mg while in the hospital.    01/06--pt stated that he thinks his crestor was stopped, continue Atorvastatin

## 2023-01-06 NOTE — PROGRESS NOTES
[facility] - Short Stay Unit  General Surgery  Progress Note    Subjective:     History of Present Illness:  62-year-old  male presented to the ER with complaints of right foot pain for the past several days; also complaining of fever, some drainage and foul odor to the right foot.  Patient has a history multiple toe amputations; past medical history of hypertension, diabetes, COPD, GERD, myocardial infarction with multiple stents placed and triple vessel bypass several years ago; currently on Plavix.  Patient admitted for IV antibiotics and surgical consult.  Still complain of pain in the right foot and drainage.  Denies any chest pain or shortness of breath, nausea/vomiting/diarrhea.      Post-Op Info:  Procedure(s) (LRB):  IRRIGATION AND DEBRIDEMENT, LOWER EXTREMITY (Right)   1 Day Post-Op     Interval History:  Stable, no acute events overnight.  Pain controlled.    Medications:  Continuous Infusions:  Scheduled Meds:   albuterol-ipratropium  3 mL Nebulization Q6H    amLODIPine  10 mg Oral Daily    atorvastatin  80 mg Oral QHS    EScitalopram oxalate  10 mg Oral Daily    gabapentin  900 mg Oral Q6H    hydrOXYzine pamoate  25 mg Oral Daily    insulin detemir U-100  15 Units Subcutaneous QHS    lisinopriL  10 mg Oral Daily    metoprolol succinate  25 mg Oral Daily    mupirocin   Nasal BID    pantoprazole  40 mg Oral Daily    piperacillin-tazobactam (ZOSYN) IVPB  4.5 g Intravenous Q8H    polyethylene glycol  17 g Oral Daily    vancomycin (VANCOCIN) IVPB  1,500 mg Intravenous Q18H     PRN Meds:acetaminophen, albuterol-ipratropium, dextrose 10 % in water (D10W), dextrose 10 % in water (D10W), glucagon (human recombinant), glucose, glucose, HYDROcodone-acetaminophen, insulin aspart U-100, naloxone, ondansetron, simethicone, sodium chloride 0.9%, traZODone, vancomycin - pharmacy to dose     Review of patient's allergies indicates:  No Known Allergies  Objective:     Vital Signs (Most  Recent):  Temp: 97.4 °F (36.3 °C) (01/06/23 0800)  Pulse: 73 (01/06/23 0824)  Resp: 18 (01/06/23 0824)  BP: 139/69 (01/06/23 0800)  SpO2: (!) 94 % (01/06/23 0800)   Vital Signs (24h Range):  Temp:  [97.4 °F (36.3 °C)-98.6 °F (37 °C)] 97.4 °F (36.3 °C)  Pulse:  [60-89] 73  Resp:  [11-21] 18  SpO2:  [93 %-100 %] 94 %  BP: ()/(54-77) 139/69     Weight: 77.1 kg (170 lb)  Body mass index is 26.63 kg/m².    Intake/Output - Last 3 Shifts         01/04 0700  01/05 0659 01/05 0700 01/06 0659 01/06 0700 01/07 0659    I.V. (mL/kg)  25 (0.3)     Total Intake(mL/kg)  25 (0.3)     Net  +25                    Physical Exam  Constitutional:       General: He is not in acute distress.     Appearance: Normal appearance.   HENT:      Head: Normocephalic.   Cardiovascular:      Rate and Rhythm: Normal rate.   Pulmonary:      Effort: Pulmonary effort is normal. No respiratory distress.   Abdominal:      General: There is no distension.      Tenderness: There is no abdominal tenderness.   Musculoskeletal:         General: Normal range of motion.   Feet:      Comments: Right foot dressing clean dry and intact  Skin:     General: Skin is warm.      Coloration: Skin is not jaundiced.   Neurological:      General: No focal deficit present.      Mental Status: He is alert and oriented to person, place, and time.      Cranial Nerves: No cranial nerve deficit.       Significant Labs:  I have reviewed all pertinent lab results within the past 24 hours.  CBC:   Recent Labs   Lab 01/05/23  0504   WBC 11.72*   RBC 3.71*   HGB 11.4*   HCT 35.0*      MCV 94.3   MCH 30.7   MCHC 32.6     BMP:   Recent Labs   Lab 01/05/23  0504   *      K 3.7      CO2 30   BUN 17   CREATININE 0.83   CALCIUM 9.0       Significant Diagnostics:  I have reviewed all pertinent imaging results/findings within the past 24 hours.    Assessment/Plan:     * Abscess of right foot  To the OR for incision and drainage/debridement of right foot  abscess.  1/5    Risks and benefits explained with the patient including risks for infection, bleeding, injury to surrounding structures, hematoma/seroma formation with need for possible evacuation, possible open.  The patient verbalized understanding, agrees and wishes to proceed with surgery.    1/6:  Patient doing well.  Stable for discharge from a surgical standpoint.  We will prescribe Augmentin and wait for sensitivities; this sensitivities come back different, we will prescribe accordingly.  Patient to follow-up in clinic in 2 weeks.        Evelio aRzo, DO  General Surgery  [facility] Stay [facility]

## 2023-01-06 NOTE — ASSESSMENT & PLAN NOTE
"Patient is RCRI class 3 and may proceed for planned I&D    Vanc and zosyn for antibiotic coverage.     01/06--d/c with gen surg guidelines see below:  " Patient doing well.  Stable for discharge from a surgical standpoint.  We will prescribe Augmentin and wait for sensitivities; this sensitivities come back different, we will prescribe accordingly.  Patient to follow-up in clinic in 2 weeks."        "

## 2023-01-06 NOTE — ASSESSMENT & PLAN NOTE
To the OR for incision and drainage/debridement of right foot abscess.  1/5    Risks and benefits explained with the patient including risks for infection, bleeding, injury to surrounding structures, hematoma/seroma formation with need for possible evacuation, possible open.  The patient verbalized understanding, agrees and wishes to proceed with surgery.    1/6:  Patient doing well.  Stable for discharge from a surgical standpoint.  We will prescribe Augmentin and wait for sensitivities; this sensitivities come back different, we will prescribe accordingly.  Patient to follow-up in clinic in 2 weeks.

## 2023-01-07 LAB — MICROORGANISM SPEC CULT: ABNORMAL

## 2023-01-08 LAB
MICROORGANISM SPEC CULT: ABNORMAL
MICROORGANISM SPEC CULT: ABNORMAL

## 2023-01-09 ENCOUNTER — TELEPHONE (OUTPATIENT)
Dept: FAMILY MEDICINE | Facility: CLINIC | Age: 63
End: 2023-01-09

## 2023-01-09 ENCOUNTER — TELEPHONE (OUTPATIENT)
Dept: SURGERY | Facility: CLINIC | Age: 63
End: 2023-01-09
Payer: MEDICARE

## 2023-01-09 LAB
BACTERIA SPEC ANAEROBE CULT: ABNORMAL
BACTERIA SPEC ANAEROBE CULT: ABNORMAL

## 2023-01-09 RX ORDER — SULFAMETHOXAZOLE AND TRIMETHOPRIM 800; 160 MG/1; MG/1
1 TABLET ORAL 2 TIMES DAILY
Qty: 28 TABLET | Refills: 0 | Status: SHIPPED | OUTPATIENT
Start: 2023-01-09 | End: 2023-01-23

## 2023-01-09 NOTE — TELEPHONE ENCOUNTER
Spoke with pt's wife for TCC call. Wife reports pt is doing okay, states he is sleeping a lot. She reports she does daily wound care. She reports all of the packing came out 2 days ago. Instructed to notify Dr Chiu. She vu. Wife reports pt has a swelling at IV site. Denies area with increased redness, pain, pus or red streaks. Advised to use a cold compress and notify pt's dr if site worsens. She vu. Wife reports pt's blood sugar levels range in low 100's. She vu of diabetic diet. Wife vu of all f/u appts. Pt is not current with HH. He has all needed DME. Meds reviewed. Wife denies any medication needs. Instructed to reports new or worsening s/s or problems.

## 2023-01-09 NOTE — PLAN OF CARE
[facility] Short Stay [facility]  Discharge Final Note    Primary Care Provider: Marquez Huff MD    Expected Discharge Date: 1/6/2023    Final Discharge Note (most recent)       Final Note - 01/09/23 0807          Final Note    Assessment Type Final Discharge Note     Anticipated Discharge Disposition Home or Self Care                     Important Message from Medicare  Important Message from Medicare regarding Discharge Appeal Rights: Given to patient/caregiver, Explained to patient/caregiver, Signed/date by patient/caregiver     Date IMM was signed: 01/05/23  Time IMM was signed: 1015    Contact Info       Evelio Razo DO   Specialty: General Surgery, Surgery    [address] [facility] [address]   Phone: [phone]       Next Steps: Schedule an appointment as soon as possible for a visit on 1/20/2023    Instructions: Call on Monday and make a follow up appointment with Dr. Razo for 2 weeks post hospitalization.    Marquez Huff MD   Specialty: Family Medicine   Relationship: PCP - General    [address] [place] [address].  [facility] [address]   Phone: [phone]       Next Steps: Follow up    Instructions: As needed        Patient discharged home with wife. IM done.

## 2023-01-09 NOTE — TELEPHONE ENCOUNTER
----- Message from Evelio Chiu DO sent at 1/9/2023  7:35 AM CST -----  Notify patient that he needs to switch antibiotics to Bactrim for 2 weeks; I sent the prescription to his pharmacy in [place].  ----- Message -----  From: Background User Lab  Sent: 1/7/2023   7:55 AM CST  To: Evelio Chiu DO      Spoke with patients wife. Notified of new abx. Verbalized understanding. Made f/u appt

## 2023-01-10 LAB
BACTERIA BLD CULT: NORMAL
BACTERIA BLD CULT: NORMAL

## 2023-01-20 ENCOUNTER — OFFICE VISIT (OUTPATIENT)
Dept: FAMILY MEDICINE | Facility: CLINIC | Age: 63
End: 2023-01-20
Payer: MEDICARE

## 2023-01-20 VITALS
OXYGEN SATURATION: 97 % | DIASTOLIC BLOOD PRESSURE: 72 MMHG | HEART RATE: 65 BPM | BODY MASS INDEX: 26.68 KG/M2 | WEIGHT: 170 LBS | RESPIRATION RATE: 20 BRPM | HEIGHT: 67 IN | TEMPERATURE: 98 F | SYSTOLIC BLOOD PRESSURE: 132 MMHG

## 2023-01-20 DIAGNOSIS — K21.9 GASTROESOPHAGEAL REFLUX DISEASE, UNSPECIFIED WHETHER ESOPHAGITIS PRESENT: ICD-10-CM

## 2023-01-20 DIAGNOSIS — E78.5 HYPERLIPIDEMIA, UNSPECIFIED HYPERLIPIDEMIA TYPE: ICD-10-CM

## 2023-01-20 DIAGNOSIS — Z79.4 TYPE 2 DIABETES MELLITUS WITH DIABETIC PERIPHERAL ANGIOPATHY WITHOUT GANGRENE, WITH LONG-TERM CURRENT USE OF INSULIN: ICD-10-CM

## 2023-01-20 DIAGNOSIS — I10 ESSENTIAL HYPERTENSION: ICD-10-CM

## 2023-01-20 DIAGNOSIS — E11.9 DIABETES MELLITUS WITHOUT COMPLICATION: ICD-10-CM

## 2023-01-20 DIAGNOSIS — J44.9 CHRONIC OBSTRUCTIVE PULMONARY DISEASE, UNSPECIFIED COPD TYPE: Primary | ICD-10-CM

## 2023-01-20 DIAGNOSIS — E11.51 TYPE 2 DIABETES MELLITUS WITH DIABETIC PERIPHERAL ANGIOPATHY WITHOUT GANGRENE, WITH LONG-TERM CURRENT USE OF INSULIN: ICD-10-CM

## 2023-01-20 PROCEDURE — 99495 TCM SERVICES (MODERATE COMPLEXITY): ICD-10-PCS | Mod: ,,, | Performed by: FAMILY MEDICINE

## 2023-01-20 PROCEDURE — 99495 TRANSJ CARE MGMT MOD F2F 14D: CPT | Mod: ,,, | Performed by: FAMILY MEDICINE

## 2023-01-20 RX ORDER — AMLODIPINE BESYLATE 10 MG/1
10 TABLET ORAL DAILY
Qty: 90 TABLET | Refills: 1 | Status: SHIPPED | OUTPATIENT
Start: 2023-01-20 | End: 2023-05-17 | Stop reason: SDUPTHER

## 2023-01-20 RX ORDER — LISINOPRIL 10 MG/1
10 TABLET ORAL DAILY
Qty: 90 TABLET | Refills: 1 | Status: SHIPPED | OUTPATIENT
Start: 2023-01-20 | End: 2023-05-17 | Stop reason: SDUPTHER

## 2023-01-20 RX ORDER — METOPROLOL SUCCINATE 25 MG/1
25 TABLET, EXTENDED RELEASE ORAL DAILY
Qty: 90 TABLET | Refills: 1 | Status: CANCELLED | OUTPATIENT
Start: 2023-01-20

## 2023-01-20 RX ORDER — CLOPIDOGREL BISULFATE 75 MG/1
75 TABLET ORAL DAILY
Qty: 90 TABLET | Refills: 1 | Status: CANCELLED | OUTPATIENT
Start: 2023-01-20

## 2023-01-20 RX ORDER — LISINOPRIL 10 MG/1
10 TABLET ORAL DAILY
Qty: 90 TABLET | Refills: 1 | Status: CANCELLED | OUTPATIENT
Start: 2023-01-20

## 2023-01-20 RX ORDER — CLOPIDOGREL BISULFATE 75 MG/1
75 TABLET ORAL DAILY
Qty: 90 TABLET | Refills: 1 | Status: SHIPPED | OUTPATIENT
Start: 2023-01-20 | End: 2023-05-17 | Stop reason: SDUPTHER

## 2023-01-20 RX ORDER — PANTOPRAZOLE SODIUM 40 MG/1
40 TABLET, DELAYED RELEASE ORAL DAILY
Qty: 90 TABLET | Refills: 1 | Status: CANCELLED | OUTPATIENT
Start: 2023-01-20

## 2023-01-20 RX ORDER — PANTOPRAZOLE SODIUM 40 MG/1
40 TABLET, DELAYED RELEASE ORAL DAILY
Qty: 90 TABLET | Refills: 1 | Status: SHIPPED | OUTPATIENT
Start: 2023-01-20 | End: 2023-05-17 | Stop reason: SDUPTHER

## 2023-01-20 RX ORDER — METOPROLOL SUCCINATE 25 MG/1
25 TABLET, EXTENDED RELEASE ORAL DAILY
Qty: 90 TABLET | Refills: 1 | Status: SHIPPED | OUTPATIENT
Start: 2023-01-20 | End: 2023-05-17 | Stop reason: SDUPTHER

## 2023-01-20 RX ORDER — NITROGLYCERIN 0.4 MG/1
0.4 TABLET SUBLINGUAL EVERY 5 MIN PRN
Qty: 30 TABLET | Refills: 2 | Status: SHIPPED | OUTPATIENT
Start: 2023-01-20 | End: 2024-01-24 | Stop reason: SDUPTHER

## 2023-01-20 RX ORDER — INSULIN DETEMIR 100 [IU]/ML
INJECTION, SOLUTION SUBCUTANEOUS
Qty: 3 EACH | Refills: 2 | Status: SHIPPED | OUTPATIENT
Start: 2023-01-20

## 2023-01-20 RX ORDER — AMLODIPINE BESYLATE 10 MG/1
10 TABLET ORAL DAILY
Qty: 90 TABLET | Refills: 1 | Status: CANCELLED | OUTPATIENT
Start: 2023-01-20

## 2023-01-20 RX ORDER — ATORVASTATIN CALCIUM 80 MG/1
80 TABLET, FILM COATED ORAL NIGHTLY
Qty: 90 TABLET | Refills: 1 | Status: SHIPPED | OUTPATIENT
Start: 2023-01-20 | End: 2023-05-17 | Stop reason: SDUPTHER

## 2023-01-20 RX ORDER — ALBUTEROL SULFATE 90 UG/1
2 AEROSOL, METERED RESPIRATORY (INHALATION) 2 TIMES DAILY PRN
Qty: 18 G | Refills: 2 | Status: SHIPPED | OUTPATIENT
Start: 2023-01-20 | End: 2024-03-14 | Stop reason: SDUPTHER

## 2023-01-20 RX ORDER — TIOTROPIUM BROMIDE INHALATION SPRAY 3.12 UG/1
1 SPRAY, METERED RESPIRATORY (INHALATION) DAILY
Qty: 4 G | Refills: 5 | Status: SHIPPED | OUTPATIENT
Start: 2023-01-20

## 2023-01-20 NOTE — PROGRESS NOTES
Navdeep Avery is a 62 y.o. male seen today for a TCC visit for a wound infection in his foot.  Patient reports he is improved and afebrile and is already scheduled for wound center treatment.  He denies any chest pain and has had no shortness of breath above his baseline.  We discussed coming in in 2 weeks with all of his medications at home to make sure that the proper medications are being administered.  For now we are confirming his medications based on his discharge summary.  I have asked the patient return to clinic with all his home medications in 2 weeks and also to bring his blood sugar log at that visit.  Today the patient did not bring in all of his home medications.  His A1c in the hospital was 7.2.    Past Medical History:   Diagnosis Date    Anemia     Anxiety     Arthritis     Atherosclerotic heart disease of native coronary artery with other forms of angina pectoris     Atrophic rhinitis     Carpal tunnel syndrome     Cellulitis of right lower extremity     COPD (chronic obstructive pulmonary disease)     Coronary arteriosclerosis     COVID 02/02/2022    Depression     Diabetic ulcer of right foot associated with diabetes mellitus due to underlying condition, with bone involvement without evidence of necrosis     Fall with significant injury, sequela 11/07/2022    GERD (gastroesophageal reflux disease)     Hyperlipidemia     Hypertension     Insomnia     MI (myocardial infarction)     Neuropathy     Peripheral vascular disease, unspecified     Right foot ulcer, with fat layer exposed 01/07/2015    S/p left hip fracture 11/08/2022    Sleep apnea     Type 2 diabetes mellitus      Family History   Problem Relation Age of Onset    Arthritis Mother     Hypertension Mother     Learning disabilities Mother     Alcohol abuse Father     Early death Father     Heart disease Father     Cancer Sister     Diabetes Sister     Heart disease Maternal Grandfather     COPD Paternal Grandfather     Heart disease Son   "    Current Outpatient Medications on File Prior to Visit   Medication Sig Dispense Refill    acetaminophen (TYLENOL) 325 MG tablet Take 650 mg by mouth every 6 (six) hours.      albuterol (PROVENTIL) 2.5 mg /3 mL (0.083 %) nebulizer solution USE 1 VIAL IN NEBULIZER 3 TIMES DAILY 100 mL 11    aspirin 81 MG Chew Take 81 mg by mouth once daily.      diclofenac sodium (VOLTAREN) 1 % Gel Apply 1 g topically 4 (four) times daily as needed (pain). 20 g 1    EScitalopram oxalate (LEXAPRO) 10 MG tablet Take 1 tablet (10 mg total) by mouth once daily. 30 tablet 1    fluticasone propionate (FLONASE) 50 mcg/actuation nasal spray 2 sprays (100 mcg total) by Each Nostril route once daily. (Patient taking differently: 2 sprays by Each Nostril route daily as needed.) 16 g 2    gabapentin (NEURONTIN) 300 MG capsule Take 3 capsules (900 mg total) by mouth every 6 (six) hours. 360 capsule 2    HYDROcodone-acetaminophen (NORCO)  mg per tablet Take 1 tablet by mouth every 6 (six) hours as needed for Pain (pain). 120 tablet 0    [START ON 1/21/2023] HYDROcodone-acetaminophen (NORCO)  mg per tablet Take 1 tablet by mouth every 6 (six) hours as needed for Pain (pain). 120 tablet 0    [START ON 2/20/2023] HYDROcodone-acetaminophen (NORCO)  mg per tablet Take 1 tablet by mouth every 6 (six) hours as needed for Pain (pain). 120 tablet 0    hydrOXYzine pamoate (VISTARIL) 25 MG Cap Take 1 capsule (25 mg total) by mouth once daily. Take nightly for sleep 90 capsule 1    LIDOcaine (LIDODERM) 5 % 1 patch once daily.      SENNA 8.6 mg tablet Take 2 tablets by mouth once daily.      sulfamethoxazole-trimethoprim 800-160mg (BACTRIM DS) 800-160 mg Tab Take 1 tablet by mouth 2 (two) times daily. for 14 days 28 tablet 0    SURE COMFORT INSULIN SYRINGE 0.3 mL 31 gauge x 5/16" Syrg Inject 1 application into the skin 2 (two) times a day.      traZODone (DESYREL) 50 MG tablet Take 50 mg by mouth nightly as needed.      [DISCONTINUED] " albuterol (PROVENTIL/VENTOLIN HFA) 90 mcg/actuation inhaler Inhale 2 puffs into the lungs 2 (two) times daily as needed for Wheezing. 18 g 2    [DISCONTINUED] amLODIPine (NORVASC) 10 MG tablet Take 1 tablet (10 mg total) by mouth once daily. 90 tablet 1    [DISCONTINUED] atorvastatin (LIPITOR) 80 MG tablet Take 80 mg by mouth every evening.      [DISCONTINUED] clopidogreL (PLAVIX) 75 mg tablet Take 1 tablet (75 mg total) by mouth once daily. 90 tablet 1    [DISCONTINUED] empagliflozin (JARDIANCE) 10 mg tablet Take 1 tablet (10 mg total) by mouth once daily. 30 tablet 6    [DISCONTINUED] insulin detemir U-100 (LEVEMIR FLEXTOUCH U-100 INSULN) 100 unit/mL (3 mL) InPn pen Inject 10 units subcutaneously every morning, inject 20 units subcutaneously every evening. 3 each 2    [DISCONTINUED] lisinopriL 10 MG tablet Take 1 tablet (10 mg total) by mouth once daily. 90 tablet 1    [DISCONTINUED] metoprolol succinate (TOPROL-XL) 25 MG 24 hr tablet Take 1 tablet (25 mg total) by mouth once daily. 90 tablet 1    [DISCONTINUED] pantoprazole (PROTONIX) 40 MG tablet Take 1 tablet (40 mg total) by mouth once daily. 90 tablet 1    [DISCONTINUED] SPIRIVA RESPIMAT 2.5 mcg/actuation inhaler Inhale 1 puff into the lungs once daily. 4 g 5    [DISCONTINUED] nitroGLYCERIN (NITROSTAT) 0.4 MG SL tablet Place 1 tablet (0.4 mg total) under the tongue every 5 (five) minutes as needed for Chest pain. 30 tablet 2     No current facility-administered medications on file prior to visit.     Immunization History   Administered Date(s) Administered    Influenza - Quadrivalent - PF *Preferred* (6 months and older) 09/14/2021, 10/21/2022    Pneumococcal Conjugate - 13 Valent 10/25/2017    Pneumococcal Polysaccharide - 23 Valent 10/19/2016    Tdap 12/20/2019       Review of Systems   Constitutional:  Negative for fever, malaise/fatigue and weight loss.   Eyes:  Negative for blurred vision.   Respiratory:  Positive for shortness of breath.     Cardiovascular:  Negative for chest pain and palpitations.   Gastrointestinal:  Negative for nausea and vomiting.   Musculoskeletal:  Positive for back pain, falls, joint pain and myalgias.        Patient is recovering from a fractured in since reconstructed hip on the left after a fall from a ladder in December   Psychiatric/Behavioral:  Negative for depression.       Vitals:    01/20/23 0911   BP: 132/72   Pulse: 65   Resp: 20   Temp: 98.3 °F (36.8 °C)       Physical Exam  Vitals reviewed.   Constitutional:       Appearance: Normal appearance.   HENT:      Head: Normocephalic.   Eyes:      Extraocular Movements: Extraocular movements intact.      Conjunctiva/sclera: Conjunctivae normal.      Pupils: Pupils are equal, round, and reactive to light.   Neck:      Thyroid: No thyroid mass or thyromegaly.   Cardiovascular:      Rate and Rhythm: Normal rate and regular rhythm.      Heart sounds: Normal heart sounds. No murmur heard.    No gallop.   Pulmonary:      Effort: Pulmonary effort is normal. No respiratory distress.      Breath sounds: Decreased air movement present. Decreased breath sounds present. No wheezing or rales.   Skin:     General: Skin is warm and dry.      Coloration: Skin is not jaundiced or pale.   Neurological:      Mental Status: He is alert.   Psychiatric:         Mood and Affect: Mood normal.         Behavior: Behavior normal.         Thought Content: Thought content normal.         Judgment: Judgment normal.        Assessment and Plan  Chronic obstructive pulmonary disease, unspecified COPD type  -     SPIRIVA RESPIMAT 2.5 mcg/actuation inhaler; Inhale 1 puff into the lungs once daily.  Dispense: 4 g; Refill: 5  -     albuterol (PROVENTIL/VENTOLIN HFA) 90 mcg/actuation inhaler; Inhale 2 puffs into the lungs 2 (two) times daily as needed for Wheezing.  Dispense: 18 g; Refill: 2    Diabetes mellitus without complication  -     empagliflozin (JARDIANCE) 10 mg tablet; Take 1 tablet (10 mg total)  by mouth once daily.  Dispense: 30 tablet; Refill: 6    Essential hypertension  -     amLODIPine (NORVASC) 10 MG tablet; Take 1 tablet (10 mg total) by mouth once daily.  Dispense: 90 tablet; Refill: 1  -     lisinopriL 10 MG tablet; Take 1 tablet (10 mg total) by mouth once daily.  Dispense: 90 tablet; Refill: 1  -     metoprolol succinate (TOPROL-XL) 25 MG 24 hr tablet; Take 1 tablet (25 mg total) by mouth once daily.  Dispense: 90 tablet; Refill: 1    Hyperlipidemia, unspecified hyperlipidemia type  -     atorvastatin (LIPITOR) 80 MG tablet; Take 1 tablet (80 mg total) by mouth every evening.  Dispense: 90 tablet; Refill: 1  -     clopidogreL (PLAVIX) 75 mg tablet; Take 1 tablet (75 mg total) by mouth once daily.  Dispense: 90 tablet; Refill: 1  -     nitroGLYCERIN (NITROSTAT) 0.4 MG SL tablet; Place 1 tablet (0.4 mg total) under the tongue every 5 (five) minutes as needed for Chest pain.  Dispense: 30 tablet; Refill: 2    Gastroesophageal reflux disease, unspecified whether esophagitis present  -     pantoprazole (PROTONIX) 40 MG tablet; Take 1 tablet (40 mg total) by mouth once daily.  Dispense: 90 tablet; Refill: 1    Type 2 diabetes mellitus with diabetic peripheral angiopathy without gangrene, with long-term current use of insulin  -     insulin detemir U-100 (LEVEMIR FLEXTOUCH U-100 INSULN) 100 unit/mL (3 mL) InPn pen; Inject 10 units subcutaneously every morning, inject 20 units subcutaneously every evening.  Dispense: 3 each; Refill: 2            Return to clinic in 2 weeks with all medications.  Patient will be set up for home health evaluation to help with medication management wound care PT and OT evaluations for a recent hip fracture.    Health Maintenance Topics with due status: Not Due       Topic Last Completion Date    TETANUS VACCINE 12/20/2019    Diabetes Urine Screening 04/06/2022    Lipid Panel 01/05/2023    Low Dose Statin 01/20/2023

## 2023-01-23 ENCOUNTER — OFFICE VISIT (OUTPATIENT)
Dept: SURGERY | Facility: CLINIC | Age: 63
End: 2023-01-23
Payer: MEDICARE

## 2023-01-23 DIAGNOSIS — L97.413 DIABETIC ULCER OF RIGHT MIDFOOT ASSOCIATED WITH DIABETES MELLITUS DUE TO UNDERLYING CONDITION, WITH NECROSIS OF MUSCLE: Primary | ICD-10-CM

## 2023-01-23 DIAGNOSIS — E08.621 DIABETIC ULCER OF RIGHT MIDFOOT ASSOCIATED WITH DIABETES MELLITUS DUE TO UNDERLYING CONDITION, WITH NECROSIS OF MUSCLE: Primary | ICD-10-CM

## 2023-01-23 PROCEDURE — 99214 OFFICE O/P EST MOD 30 MIN: CPT | Mod: PBBFAC | Performed by: STUDENT IN AN ORGANIZED HEALTH CARE EDUCATION/TRAINING PROGRAM

## 2023-01-23 PROCEDURE — 99024 POSTOP FOLLOW-UP VISIT: CPT | Mod: ,,, | Performed by: STUDENT IN AN ORGANIZED HEALTH CARE EDUCATION/TRAINING PROGRAM

## 2023-01-23 PROCEDURE — 99024 PR POST-OP FOLLOW-UP VISIT: ICD-10-PCS | Mod: ,,, | Performed by: STUDENT IN AN ORGANIZED HEALTH CARE EDUCATION/TRAINING PROGRAM

## 2023-01-23 NOTE — PROGRESS NOTES
Subjective:       Patient ID: Navdeep Avery is a 62 y.o. male.    Chief Complaint: Follow-up (2 wk f/u, pt complaint of his foot is bothering him)    62-year-old  male presents to the clinic for 2 week evaluation status post debridement of right lower foot; patient had a right foot abscess with necrotic muscle extending down to bone the plantar surface of the right foot adjacent to the 3rd metatarsophalangeal joint.  Patient's spouse has been taking great care of the wound and the wound is healing.  No complaints.  Denies any chest pain/shortness of breath, nausea/vomiting/diarrhea, fever/chills.    Review of Systems   Constitutional: Negative.    HENT: Negative.     Eyes: Negative.    Respiratory:  Negative for shortness of breath.    Cardiovascular: Negative.    Gastrointestinal:  Negative for abdominal pain, blood in stool, change in bowel habit, vomiting and change in bowel habit.   Endocrine: Negative.    Genitourinary: Negative.  Negative for hematuria.   Musculoskeletal:  Negative for back pain and myalgias.   Integumentary:  Negative.   Allergic/Immunologic: Negative.    Neurological:  Negative for dizziness, weakness and light-headedness.   Psychiatric/Behavioral: Negative.         Objective:      Physical Exam  Constitutional:       General: He is not in acute distress.     Appearance: Normal appearance.   HENT:      Head: Normocephalic.   Cardiovascular:      Rate and Rhythm: Normal rate.   Pulmonary:      Effort: Pulmonary effort is normal. No respiratory distress.   Abdominal:      General: There is no distension.      Tenderness: There is no abdominal tenderness.   Musculoskeletal:         General: Normal range of motion.        Feet:    Feet:      Comments: 1.5 x 1.5 x 1 cm circular wound extending down to the 3rd metatarsophalangeal joint with bone exposed; granulation tissue has filled approximately 40%; areas clean, no purulent drainage, no signs of infection  Skin:     General: Skin is  warm.      Coloration: Skin is not jaundiced.   Neurological:      General: No focal deficit present.      Mental Status: He is alert and oriented to person, place, and time.      Cranial Nerves: No cranial nerve deficit.       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, with necrosis of muscle    -  Primary              Plan:       Continue to washed with Vashe in place appears ointment daily.  Follow-up in 3 weeks.  We will apply for PurRossyy XT

## 2023-02-13 NOTE — PATIENT INSTRUCTIONS
- Approved by the transplant selection committee for a Génesis 200  - Underwent consent signing with Dr. Casas 1/20/2023  - Admitted for Génesis 200 Auto on 2/1/22  - Tolerated chemotherapy on 2/2 with no issues  - Received 3 bags with a total CD34 dose of 3.13 x10^6/kg on 2/3 without incident  - Today is day +10                Planned conditioning regimen:  Melphalan on Day -1     Antimicrobial Prophylaxis:  Acyclovir starting on Day -1  Levofloxacin starting on Day -1  Fluconazole starting on Day -1     Growth Factor Support:  Neupogen starting on Day +7      Caregiver: daughter in law and son  Post-transplant discharge plans: Patricia Alanis   Clean wounds with baby shampoo and water    Apply hydrofera blue  to wound    Cover with dry gauze,wrap with rachel,paper tape    Change every other day and as needed    Elevate feet as much as possible    No prolong standing

## 2023-02-14 ENCOUNTER — OFFICE VISIT (OUTPATIENT)
Dept: SURGERY | Facility: CLINIC | Age: 63
End: 2023-02-14
Payer: MEDICARE

## 2023-02-14 DIAGNOSIS — L97.413 DIABETIC ULCER OF RIGHT MIDFOOT ASSOCIATED WITH DIABETES MELLITUS DUE TO UNDERLYING CONDITION, WITH NECROSIS OF MUSCLE: Primary | ICD-10-CM

## 2023-02-14 DIAGNOSIS — E08.621 DIABETIC ULCER OF RIGHT MIDFOOT ASSOCIATED WITH DIABETES MELLITUS DUE TO UNDERLYING CONDITION, WITH NECROSIS OF MUSCLE: Primary | ICD-10-CM

## 2023-02-14 PROCEDURE — 99499 UNLISTED E&M SERVICE: CPT | Mod: S$PBB,,, | Performed by: STUDENT IN AN ORGANIZED HEALTH CARE EDUCATION/TRAINING PROGRAM

## 2023-02-14 PROCEDURE — 15275 PR SKIN SUB GRAFT FACE/NK/HF/G UP TO 100 SQCM: ICD-10-PCS | Mod: S$PBB,,, | Performed by: STUDENT IN AN ORGANIZED HEALTH CARE EDUCATION/TRAINING PROGRAM

## 2023-02-14 PROCEDURE — 15275 SKIN SUB GRAFT FACE/NK/HF/G: CPT | Mod: S$PBB,,, | Performed by: STUDENT IN AN ORGANIZED HEALTH CARE EDUCATION/TRAINING PROGRAM

## 2023-02-14 PROCEDURE — 99212 OFFICE O/P EST SF 10 MIN: CPT | Mod: PBBFAC | Performed by: STUDENT IN AN ORGANIZED HEALTH CARE EDUCATION/TRAINING PROGRAM

## 2023-02-14 PROCEDURE — 15275 SKIN SUB GRAFT FACE/NK/HF/G: CPT | Mod: PBBFAC | Performed by: STUDENT IN AN ORGANIZED HEALTH CARE EDUCATION/TRAINING PROGRAM

## 2023-02-14 PROCEDURE — 99499 NO LOS: ICD-10-PCS | Mod: S$PBB,,, | Performed by: STUDENT IN AN ORGANIZED HEALTH CARE EDUCATION/TRAINING PROGRAM

## 2023-02-14 NOTE — PROGRESS NOTES
Subjective:       Patient ID: Navdeep Avery is a 62 y.o. male.    Chief Complaint: Wound Check    62-year-old  male presents to the clinic for 2 week evaluation status post debridement of right lower foot; patient had a right foot abscess with necrotic muscle extending down to bone the plantar surface of the right foot adjacent to the 3rd metatarsophalangeal joint.  Patient's spouse has been taking great care of the wound and the wound is healing.  No complaints.  Denies any chest pain/shortness of breath, nausea/vomiting/diarrhea, fever/chills.    2/14:  Presents today for wound re-evaluation and placement of PuraPly XT.  No significant changes from previous visit    Review of Systems   Constitutional: Negative.    HENT: Negative.     Eyes: Negative.    Respiratory:  Negative for shortness of breath.    Cardiovascular: Negative.    Gastrointestinal:  Negative for abdominal pain, blood in stool, change in bowel habit, vomiting and change in bowel habit.   Endocrine: Negative.    Genitourinary: Negative.  Negative for hematuria.   Musculoskeletal:  Negative for back pain and myalgias.   Integumentary:  Negative.   Allergic/Immunologic: Negative.    Neurological:  Negative for dizziness, weakness and light-headedness.   Psychiatric/Behavioral: Negative.         Objective:      Physical Exam  Constitutional:       General: He is not in acute distress.     Appearance: Normal appearance.   HENT:      Head: Normocephalic.   Cardiovascular:      Rate and Rhythm: Normal rate.   Pulmonary:      Effort: Pulmonary effort is normal. No respiratory distress.   Abdominal:      General: There is no distension.      Tenderness: There is no abdominal tenderness.   Musculoskeletal:         General: Normal range of motion.        Feet:    Feet:      Comments: 1.5 x 1.5 x 0.5cm  with 100% coverage withgranulation tissue; no bone exposure; areas clean, no purulent drainage, no signs of infection; some macerated tissue  circumferentially  Skin:     General: Skin is warm.      Coloration: Skin is not jaundiced.   Neurological:      General: No focal deficit present.      Mental Status: He is alert and oriented to person, place, and time.      Cranial Nerves: No cranial nerve deficit.       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, with necrosis of muscle    -  Primary              Plan:       We will apply for PuraPly XT    Patient Name: Navdeep Avery  Patient MRN: [id number]  Patient YOB: 1960  CSN: [id number]  Date: 02/14/2023  Provider:   Evelio Razo    Application for Assessment: Right plantar foot diabetic ulcer  Skin Substitute: PuraPly XT  Performed By: Evelio Razo  Time-out Taken: Yes  Location:     [x] Genitalia/Hands/Feet/Multiple Digits    [] Scalp/Face/Neck/Ears    []Trunk/Arms/Legs  Application Area: (sq cm): 2.25   Product Applied: (sq cm): 24  Product Waste (sq cm): no  Fenestrated: Yes   Instrument:    [] Blade [x] Curette  []Forceps  []Nippers    [] Rongeur [x] Scissors  []Others:   Secured: yes   Secured with: Steri-strips  Dressing Applied: Yes  Response to Treatment:Well tolerated by patient.  Note:     Lot number [id number]  Expiration date 10/10/2023

## 2023-02-15 ENCOUNTER — EXTERNAL HOME HEALTH (OUTPATIENT)
Dept: HOME HEALTH SERVICES | Facility: HOSPITAL | Age: 63
End: 2023-02-15
Payer: MEDICARE

## 2023-02-17 ENCOUNTER — OFFICE VISIT (OUTPATIENT)
Dept: FAMILY MEDICINE | Facility: CLINIC | Age: 63
End: 2023-02-17
Payer: MEDICARE

## 2023-02-17 VITALS
RESPIRATION RATE: 20 BRPM | WEIGHT: 170 LBS | BODY MASS INDEX: 26.68 KG/M2 | HEIGHT: 67 IN | SYSTOLIC BLOOD PRESSURE: 128 MMHG | DIASTOLIC BLOOD PRESSURE: 62 MMHG | HEART RATE: 66 BPM | OXYGEN SATURATION: 95 %

## 2023-02-17 DIAGNOSIS — Z23 NEED FOR VACCINATION AGAINST STREPTOCOCCUS PNEUMONIAE: Primary | ICD-10-CM

## 2023-02-17 DIAGNOSIS — F41.9 ANXIETY: ICD-10-CM

## 2023-02-17 PROCEDURE — 99213 PR OFFICE/OUTPT VISIT, EST, LEVL III, 20-29 MIN: ICD-10-PCS | Mod: ,,, | Performed by: FAMILY MEDICINE

## 2023-02-17 PROCEDURE — 90677 PCV20 VACCINE IM: CPT | Mod: ,,, | Performed by: FAMILY MEDICINE

## 2023-02-17 PROCEDURE — 99213 OFFICE O/P EST LOW 20 MIN: CPT | Mod: ,,, | Performed by: FAMILY MEDICINE

## 2023-02-17 PROCEDURE — 90677 PNEUMOCOCCAL CONJUGATE VACCINE 20-VALENT: ICD-10-PCS | Mod: ,,, | Performed by: FAMILY MEDICINE

## 2023-02-17 PROCEDURE — G0009 ADMIN PNEUMOCOCCAL VACCINE: HCPCS | Mod: ,,, | Performed by: FAMILY MEDICINE

## 2023-02-17 PROCEDURE — G0009 PNEUMOCOCCAL CONJUGATE VACCINE 20-VALENT: ICD-10-PCS | Mod: ,,, | Performed by: FAMILY MEDICINE

## 2023-02-17 RX ORDER — ESCITALOPRAM OXALATE 10 MG/1
10 TABLET ORAL DAILY
Qty: 30 TABLET | Refills: 1 | Status: SHIPPED | OUTPATIENT
Start: 2023-02-17 | End: 2023-05-17 | Stop reason: SDUPTHER

## 2023-02-17 NOTE — PROGRESS NOTES
Navdeep Avery is a 62 y.o. male seen today for follow-up on his diabetes.  His A1c was 7.2 but his LDL cholesterol was still mildly elevated at 86.  We discussed working on his diet and he will follow-up in 3 months for follow-up blood work.  Overall he is stable with no new complaints and has had a recent revision on the amputation site to his right foot.  He reports this is healing well.  Patient is due for his pneumonia vaccine today.      Past Medical History:   Diagnosis Date    Anemia     Anxiety     Arthritis     Atherosclerotic heart disease of native coronary artery with other forms of angina pectoris     Atrophic rhinitis     Carpal tunnel syndrome     Cellulitis of right lower extremity     COPD (chronic obstructive pulmonary disease)     Coronary arteriosclerosis     COVID 02/02/2022    Depression     Diabetic ulcer of right foot associated with diabetes mellitus due to underlying condition, with bone involvement without evidence of necrosis     Fall with significant injury, sequela 11/07/2022    GERD (gastroesophageal reflux disease)     Hyperlipidemia     Hypertension     Insomnia     MI (myocardial infarction)     Neuropathy     Peripheral vascular disease, unspecified     Right foot ulcer, with fat layer exposed 01/07/2015    S/p left hip fracture 11/08/2022    Sleep apnea     Type 2 diabetes mellitus      Family History   Problem Relation Age of Onset    Arthritis Mother     Hypertension Mother     Learning disabilities Mother     Alcohol abuse Father     Early death Father     Heart disease Father     Cancer Sister     Diabetes Sister     Heart disease Maternal Grandfather     COPD Paternal Grandfather     Heart disease Son      Current Outpatient Medications on File Prior to Visit   Medication Sig Dispense Refill    acetaminophen (TYLENOL) 325 MG tablet Take 650 mg by mouth every 6 (six) hours.      albuterol (PROVENTIL) 2.5 mg /3 mL (0.083 %) nebulizer solution USE 1 VIAL IN NEBULIZER 3 TIMES  DAILY 100 mL 11    albuterol (PROVENTIL/VENTOLIN HFA) 90 mcg/actuation inhaler Inhale 2 puffs into the lungs 2 (two) times daily as needed for Wheezing. 18 g 2    amLODIPine (NORVASC) 10 MG tablet Take 1 tablet (10 mg total) by mouth once daily. 90 tablet 1    aspirin 81 MG Chew Take 81 mg by mouth once daily.      atorvastatin (LIPITOR) 80 MG tablet Take 1 tablet (80 mg total) by mouth every evening. 90 tablet 1    clopidogreL (PLAVIX) 75 mg tablet Take 1 tablet (75 mg total) by mouth once daily. 90 tablet 1    diclofenac sodium (VOLTAREN) 1 % Gel Apply 1 g topically 4 (four) times daily as needed (pain). 20 g 1    empagliflozin (JARDIANCE) 10 mg tablet Take 1 tablet (10 mg total) by mouth once daily. 30 tablet 6    fluticasone propionate (FLONASE) 50 mcg/actuation nasal spray 2 sprays (100 mcg total) by Each Nostril route once daily. (Patient taking differently: 2 sprays by Each Nostril route daily as needed.) 16 g 2    gabapentin (NEURONTIN) 300 MG capsule Take 3 capsules (900 mg total) by mouth every 6 (six) hours. 360 capsule 2    [START ON 2/20/2023] HYDROcodone-acetaminophen (NORCO)  mg per tablet Take 1 tablet by mouth every 6 (six) hours as needed for Pain (pain). 120 tablet 0    hydrOXYzine pamoate (VISTARIL) 25 MG Cap Take 1 capsule (25 mg total) by mouth once daily. Take nightly for sleep 90 capsule 1    insulin detemir U-100 (LEVEMIR FLEXTOUCH U-100 INSULN) 100 unit/mL (3 mL) InPn pen Inject 10 units subcutaneously every morning, inject 20 units subcutaneously every evening. 3 each 2    LIDOcaine (LIDODERM) 5 % 1 patch once daily.      lisinopriL 10 MG tablet Take 1 tablet (10 mg total) by mouth once daily. 90 tablet 1    metoprolol succinate (TOPROL-XL) 25 MG 24 hr tablet Take 1 tablet (25 mg total) by mouth once daily. 90 tablet 1    nitroGLYCERIN (NITROSTAT) 0.4 MG SL tablet Place 1 tablet (0.4 mg total) under the tongue every 5 (five) minutes as needed for Chest pain. 30 tablet 2     "pantoprazole (PROTONIX) 40 MG tablet Take 1 tablet (40 mg total) by mouth once daily. 90 tablet 1    SENNA 8.6 mg tablet Take 2 tablets by mouth once daily.      SPIRIVA RESPIMAT 2.5 mcg/actuation inhaler Inhale 1 puff into the lungs once daily. 4 g 5    SURE COMFORT INSULIN SYRINGE 0.3 mL 31 gauge x 5/16" Syrg Inject 1 application into the skin 2 (two) times a day.      traZODone (DESYREL) 50 MG tablet Take 50 mg by mouth nightly as needed.      HYDROcodone-acetaminophen (NORCO)  mg per tablet Take 1 tablet by mouth every 6 (six) hours as needed for Pain (pain). 120 tablet 0    [DISCONTINUED] EScitalopram oxalate (LEXAPRO) 10 MG tablet Take 1 tablet (10 mg total) by mouth once daily. 30 tablet 1     No current facility-administered medications on file prior to visit.     Immunization History   Administered Date(s) Administered    Influenza - Quadrivalent - PF *Preferred* (6 months and older) 09/14/2021, 10/21/2022    Pneumococcal Conjugate - 13 Valent 10/25/2017    Pneumococcal Polysaccharide - 23 Valent 10/19/2016    Tdap 12/20/2019       Review of Systems   Constitutional:  Negative for fever, malaise/fatigue and weight loss.   Respiratory:  Negative for shortness of breath.    Cardiovascular:  Negative for chest pain and palpitations.   Gastrointestinal:  Negative for nausea and vomiting.   Musculoskeletal:  Positive for joint pain and myalgias.   Psychiatric/Behavioral:  Negative for depression. The patient is nervous/anxious.       Vitals:    02/17/23 0951   BP: 128/62   Pulse: 66   Resp: 20       Physical Exam  Vitals reviewed.   Constitutional:       Appearance: Normal appearance.   HENT:      Head: Normocephalic.   Eyes:      Extraocular Movements: Extraocular movements intact.      Conjunctiva/sclera: Conjunctivae normal.      Pupils: Pupils are equal, round, and reactive to light.   Neck:      Thyroid: No thyroid mass or thyromegaly.   Cardiovascular:      Rate and Rhythm: Normal rate and regular " rhythm.      Heart sounds: Normal heart sounds. No murmur heard.    No gallop.   Pulmonary:      Effort: Pulmonary effort is normal. No respiratory distress.      Breath sounds: Normal breath sounds. No wheezing or rales.   Skin:     General: Skin is warm and dry.      Coloration: Skin is not jaundiced or pale.   Neurological:      Mental Status: He is alert.   Psychiatric:         Mood and Affect: Mood normal.         Behavior: Behavior normal.         Thought Content: Thought content normal.         Judgment: Judgment normal.        Assessment and Plan  Need for vaccination against Streptococcus pneumoniae  -     (In Office Administered) Pneumococcal Conjugate Vaccine (20 Valent) (IM)    Anxiety  -     EScitalopram oxalate (LEXAPRO) 10 MG tablet; Take 1 tablet (10 mg total) by mouth once daily.  Dispense: 30 tablet; Refill: 1            Return to clinic in 3 months for follow-up or as needed.    Health Maintenance Topics with due status: Not Due       Topic Last Completion Date    TETANUS VACCINE 12/20/2019    Lipid Panel 01/05/2023    Low Dose Statin 01/23/2023

## 2023-02-21 ENCOUNTER — OFFICE VISIT (OUTPATIENT)
Dept: SURGERY | Facility: CLINIC | Age: 63
End: 2023-02-21
Payer: MEDICARE

## 2023-02-21 DIAGNOSIS — E08.621 DIABETIC ULCER OF RIGHT MIDFOOT ASSOCIATED WITH DIABETES MELLITUS DUE TO UNDERLYING CONDITION, WITH NECROSIS OF MUSCLE: Primary | ICD-10-CM

## 2023-02-21 DIAGNOSIS — L97.413 DIABETIC ULCER OF RIGHT MIDFOOT ASSOCIATED WITH DIABETES MELLITUS DUE TO UNDERLYING CONDITION, WITH NECROSIS OF MUSCLE: Primary | ICD-10-CM

## 2023-02-21 PROCEDURE — 99499 NO LOS: ICD-10-PCS | Mod: S$PBB,,, | Performed by: STUDENT IN AN ORGANIZED HEALTH CARE EDUCATION/TRAINING PROGRAM

## 2023-02-21 PROCEDURE — 15275 SKIN SUB GRAFT FACE/NK/HF/G: CPT | Mod: S$PBB,,, | Performed by: STUDENT IN AN ORGANIZED HEALTH CARE EDUCATION/TRAINING PROGRAM

## 2023-02-21 PROCEDURE — 99499 UNLISTED E&M SERVICE: CPT | Mod: S$PBB,,, | Performed by: STUDENT IN AN ORGANIZED HEALTH CARE EDUCATION/TRAINING PROGRAM

## 2023-02-21 PROCEDURE — 15275 SKIN SUB GRAFT FACE/NK/HF/G: CPT | Mod: PBBFAC | Performed by: STUDENT IN AN ORGANIZED HEALTH CARE EDUCATION/TRAINING PROGRAM

## 2023-02-21 PROCEDURE — 15275 PR SKIN SUB GRAFT FACE/NK/HF/G UP TO 100 SQCM: ICD-10-PCS | Mod: S$PBB,,, | Performed by: STUDENT IN AN ORGANIZED HEALTH CARE EDUCATION/TRAINING PROGRAM

## 2023-02-21 PROCEDURE — 99214 OFFICE O/P EST MOD 30 MIN: CPT | Mod: PBBFAC | Performed by: STUDENT IN AN ORGANIZED HEALTH CARE EDUCATION/TRAINING PROGRAM

## 2023-02-21 NOTE — PROGRESS NOTES
Subjective:       Patient ID: Navdeep Avery is a 62 y.o. male.    Chief Complaint: Wound Check    62-year-old  male presents to the clinic for 2 week evaluation status post debridement of right lower foot; patient had a right foot abscess with necrotic muscle extending down to bone the plantar surface of the right foot adjacent to the 3rd metatarsophalangeal joint.  Patient's spouse has been taking great care of the wound and the wound is healing.  No complaints.  Denies any chest pain/shortness of breath, nausea/vomiting/diarrhea, fever/chills.    2/14:  Presents today for wound re-evaluation and placement of PuraPly XT.  No significant changes from previous visit    2/21: Presents today for wound re-evaluation and placement of PuraPly XT.  No significant changes from previous visit      Review of Systems   Constitutional: Negative.    HENT: Negative.     Eyes: Negative.    Respiratory:  Negative for shortness of breath.    Cardiovascular: Negative.    Gastrointestinal:  Negative for abdominal pain, blood in stool, change in bowel habit, vomiting and change in bowel habit.   Endocrine: Negative.    Genitourinary: Negative.  Negative for hematuria.   Musculoskeletal:  Negative for back pain and myalgias.   Integumentary:  Negative.   Allergic/Immunologic: Negative.    Neurological:  Negative for dizziness, weakness and light-headedness.   Psychiatric/Behavioral: Negative.         Objective:      Physical Exam  Constitutional:       General: He is not in acute distress.     Appearance: Normal appearance.   HENT:      Head: Normocephalic.   Cardiovascular:      Rate and Rhythm: Normal rate.   Pulmonary:      Effort: Pulmonary effort is normal. No respiratory distress.   Abdominal:      General: There is no distension.      Tenderness: There is no abdominal tenderness.   Musculoskeletal:         General: Normal range of motion.        Feet:    Feet:      Comments: 1.5 x 1.5 x 0.3cm  with 100% coverage  withgranulation tissue; no bone exposure; areas clean, no purulent drainage, no signs of infection; some macerated tissue circumferentially  Skin:     General: Skin is warm.      Coloration: Skin is not jaundiced.   Neurological:      General: No focal deficit present.      Mental Status: He is alert and oriented to person, place, and time.      Cranial Nerves: No cranial nerve deficit.       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, with necrosis of muscle    -  Primary              Plan:       We will apply for PuraPly XT    Patient Name: Navdeep Avery  Patient MRN: [id number]  Patient YOB: 1960  CSN: [id number]  Date: 02/21/2023  Provider:   Evelio Razo    Application for Assessment: Right plantar foot diabetic ulcer  Skin Substitute: PuraPly XT  Performed By: Evelio Razo  Time-out Taken: Yes  Location:     [x] Genitalia/Hands/Feet/Multiple Digits    [] Scalp/Face/Neck/Ears    []Trunk/Arms/Legs  Application Area: (sq cm): 2.25   Product Applied: (sq cm): 24  Product Waste (sq cm): no  Fenestrated: Yes   Instrument:    [] Blade [x] Curette  []Forceps  []Nippers    [] Rongeur [x] Scissors  []Others:   Secured: yes   Secured with: Steri-strips  Dressing Applied: Yes  Response to Treatment:Well tolerated by patient.  Note:     Lot number [id number]  Expiration date 10/10/2023

## 2023-02-28 ENCOUNTER — OFFICE VISIT (OUTPATIENT)
Dept: SURGERY | Facility: CLINIC | Age: 63
End: 2023-02-28
Payer: MEDICARE

## 2023-02-28 DIAGNOSIS — E08.621 DIABETIC ULCER OF RIGHT MIDFOOT ASSOCIATED WITH DIABETES MELLITUS DUE TO UNDERLYING CONDITION, WITH NECROSIS OF MUSCLE: Primary | ICD-10-CM

## 2023-02-28 DIAGNOSIS — L97.413 DIABETIC ULCER OF RIGHT MIDFOOT ASSOCIATED WITH DIABETES MELLITUS DUE TO UNDERLYING CONDITION, WITH NECROSIS OF MUSCLE: Primary | ICD-10-CM

## 2023-02-28 PROCEDURE — 15275 SKIN SUB GRAFT FACE/NK/HF/G: CPT | Mod: PBBFAC | Performed by: STUDENT IN AN ORGANIZED HEALTH CARE EDUCATION/TRAINING PROGRAM

## 2023-02-28 PROCEDURE — 15275 SKIN SUB GRAFT FACE/NK/HF/G: CPT | Mod: S$PBB,,, | Performed by: STUDENT IN AN ORGANIZED HEALTH CARE EDUCATION/TRAINING PROGRAM

## 2023-02-28 PROCEDURE — 99213 PR OFFICE/OUTPT VISIT, EST, LEVL III, 20-29 MIN: ICD-10-PCS | Mod: 25,S$PBB,, | Performed by: STUDENT IN AN ORGANIZED HEALTH CARE EDUCATION/TRAINING PROGRAM

## 2023-02-28 PROCEDURE — 99214 OFFICE O/P EST MOD 30 MIN: CPT | Mod: PBBFAC,25 | Performed by: STUDENT IN AN ORGANIZED HEALTH CARE EDUCATION/TRAINING PROGRAM

## 2023-02-28 PROCEDURE — 99213 OFFICE O/P EST LOW 20 MIN: CPT | Mod: 25,S$PBB,, | Performed by: STUDENT IN AN ORGANIZED HEALTH CARE EDUCATION/TRAINING PROGRAM

## 2023-02-28 PROCEDURE — 15275 PR SKIN SUB GRAFT FACE/NK/HF/G UP TO 100 SQCM: ICD-10-PCS | Mod: S$PBB,,, | Performed by: STUDENT IN AN ORGANIZED HEALTH CARE EDUCATION/TRAINING PROGRAM

## 2023-02-28 NOTE — PROGRESS NOTES
Subjective:       Patient ID: Navdeep Avery is a 62 y.o. male.    Chief Complaint: Wound Check    62-year-old  male presents to the clinic for 2 week evaluation status post debridement of right lower foot; patient had a right foot abscess with necrotic muscle extending down to bone the plantar surface of the right foot adjacent to the 3rd metatarsophalangeal joint.  Patient's spouse has been taking great care of the wound and the wound is healing.  No complaints.  Denies any chest pain/shortness of breath, nausea/vomiting/diarrhea, fever/chills.    2/14:  Presents today for wound re-evaluation and placement of PuraPly XT.  No significant changes from previous visit    2/21: Presents today for wound re-evaluation and placement of PuraPly XT.  No significant changes from previous visit    2/28: Presents today for wound re-evaluation and placement of PuraPly XT.  No significant changes from previous visit      Review of Systems   Constitutional: Negative.    HENT: Negative.     Eyes: Negative.    Respiratory:  Negative for shortness of breath.    Cardiovascular: Negative.    Gastrointestinal:  Negative for abdominal pain, blood in stool, change in bowel habit, vomiting and change in bowel habit.   Endocrine: Negative.    Genitourinary: Negative.  Negative for hematuria.   Musculoskeletal:  Negative for back pain and myalgias.   Integumentary:  Negative.   Allergic/Immunologic: Negative.    Neurological:  Negative for dizziness, weakness and light-headedness.   Psychiatric/Behavioral: Negative.         Objective:      Physical Exam  Constitutional:       General: He is not in acute distress.     Appearance: Normal appearance.   HENT:      Head: Normocephalic.   Cardiovascular:      Rate and Rhythm: Normal rate.   Pulmonary:      Effort: Pulmonary effort is normal. No respiratory distress.   Abdominal:      General: There is no distension.      Tenderness: There is no abdominal tenderness.   Musculoskeletal:          General: Normal range of motion.        Feet:    Feet:      Comments: 1.3 x 1.3 x 0.2cm  with 100% coverage withgranulation tissue; no bone exposure; areas clean, no purulent drainage, no signs of infection; some macerated tissue circumferentially  Skin:     General: Skin is warm.      Coloration: Skin is not jaundiced.   Neurological:      General: No focal deficit present.      Mental Status: He is alert and oriented to person, place, and time.      Cranial Nerves: No cranial nerve deficit.       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, with necrosis of muscle    -  Primary              Plan:       We will apply for PuraPly XT    Patient Name: Navdeep Avery  Patient MRN: [id number]  Patient YOB: 1960  CSN: [id number]  Date: 02/28/2023  Provider:   Evelio Razo    Application for Assessment: Right plantar foot diabetic ulcer  Skin Substitute: PuraPly XT  Performed By: Evelio Razo  Time-out Taken: Yes  Location:     [x] Genitalia/Hands/Feet/Multiple Digits    [] Scalp/Face/Neck/Ears    []Trunk/Arms/Legs  Application Area: (sq cm): 1.69  Product Applied: (sq cm): 24  Product Waste (sq cm): no  Fenestrated: Yes   Instrument:    [] Blade [x] Curette  []Forceps  []Nippers    [] Rongeur [x] Scissors  []Others:   Secured: yes   Secured with: Steri-strips  Dressing Applied: Yes  Response to Treatment:Well tolerated by patient.  Note:     Lot number [id number]  Expiration date 10/15/2023

## 2023-03-07 ENCOUNTER — OFFICE VISIT (OUTPATIENT)
Dept: SURGERY | Facility: CLINIC | Age: 63
End: 2023-03-07
Payer: MEDICARE

## 2023-03-07 DIAGNOSIS — L97.413 DIABETIC ULCER OF RIGHT MIDFOOT ASSOCIATED WITH DIABETES MELLITUS DUE TO UNDERLYING CONDITION, WITH NECROSIS OF MUSCLE: Primary | ICD-10-CM

## 2023-03-07 DIAGNOSIS — E08.621 DIABETIC ULCER OF RIGHT MIDFOOT ASSOCIATED WITH DIABETES MELLITUS DUE TO UNDERLYING CONDITION, WITH NECROSIS OF MUSCLE: Primary | ICD-10-CM

## 2023-03-07 PROCEDURE — 15275 PR SKIN SUB GRAFT FACE/NK/HF/G UP TO 100 SQCM: ICD-10-PCS | Mod: S$PBB,,, | Performed by: STUDENT IN AN ORGANIZED HEALTH CARE EDUCATION/TRAINING PROGRAM

## 2023-03-07 PROCEDURE — 99213 OFFICE O/P EST LOW 20 MIN: CPT | Mod: 25,S$PBB,, | Performed by: STUDENT IN AN ORGANIZED HEALTH CARE EDUCATION/TRAINING PROGRAM

## 2023-03-07 PROCEDURE — 15275 SKIN SUB GRAFT FACE/NK/HF/G: CPT | Mod: S$PBB,,, | Performed by: STUDENT IN AN ORGANIZED HEALTH CARE EDUCATION/TRAINING PROGRAM

## 2023-03-07 PROCEDURE — 99214 OFFICE O/P EST MOD 30 MIN: CPT | Mod: PBBFAC | Performed by: STUDENT IN AN ORGANIZED HEALTH CARE EDUCATION/TRAINING PROGRAM

## 2023-03-07 PROCEDURE — 15275 SKIN SUB GRAFT FACE/NK/HF/G: CPT | Mod: PBBFAC | Performed by: STUDENT IN AN ORGANIZED HEALTH CARE EDUCATION/TRAINING PROGRAM

## 2023-03-07 PROCEDURE — 99213 PR OFFICE/OUTPT VISIT, EST, LEVL III, 20-29 MIN: ICD-10-PCS | Mod: 25,S$PBB,, | Performed by: STUDENT IN AN ORGANIZED HEALTH CARE EDUCATION/TRAINING PROGRAM

## 2023-03-07 NOTE — PROGRESS NOTES
Subjective:       Patient ID: Navdeep Avery is a 62 y.o. male.    Chief Complaint: Wound Check    62-year-old  male presents to the clinic for 2 week evaluation status post debridement of right lower foot; patient had a right foot abscess with necrotic muscle extending down to bone the plantar surface of the right foot adjacent to the 3rd metatarsophalangeal joint.  Patient's spouse has been taking great care of the wound and the wound is healing.  No complaints.  Denies any chest pain/shortness of breath, nausea/vomiting/diarrhea, fever/chills.    2/14:  Presents today for wound re-evaluation and placement of PuraPly XT.  No significant changes from previous visit    2/21: Presents today for wound re-evaluation and placement of PuraPly XT.  No significant changes from previous visit    2/28: Presents today for wound re-evaluation and placement of PuraPly XT.  No significant changes from previous visit    3/7: Presents today for wound re-evaluation and placement of Affinity.  No significant changes from previous visit      Review of Systems   Constitutional: Negative.    HENT: Negative.     Eyes: Negative.    Respiratory:  Negative for shortness of breath.    Cardiovascular: Negative.    Gastrointestinal:  Negative for abdominal pain, blood in stool, change in bowel habit, vomiting and change in bowel habit.   Endocrine: Negative.    Genitourinary: Negative.  Negative for hematuria.   Musculoskeletal:  Negative for back pain and myalgias.   Integumentary:  Negative.   Allergic/Immunologic: Negative.    Neurological:  Negative for dizziness, weakness and light-headedness.   Psychiatric/Behavioral: Negative.         Objective:      Physical Exam  Constitutional:       General: He is not in acute distress.     Appearance: Normal appearance.   HENT:      Head: Normocephalic.   Cardiovascular:      Rate and Rhythm: Normal rate.   Pulmonary:      Effort: Pulmonary effort is normal. No respiratory distress.    Abdominal:      General: There is no distension.      Tenderness: There is no abdominal tenderness.   Musculoskeletal:         General: Normal range of motion.        Feet:    Feet:      Comments: 1.2 x 1.2 x 0.1cm  with 100% coverage withgranulation tissue; no bone exposure; areas clean, no purulent drainage, no signs of infection; some macerated tissue circumferentially  Skin:     General: Skin is warm.      Coloration: Skin is not jaundiced.   Neurological:      General: No focal deficit present.      Mental Status: He is alert and oriented to person, place, and time.      Cranial Nerves: No cranial nerve deficit.       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, with necrosis of muscle    -  Primary              Plan:       We will apply for Apligraf for next visit.    Patient Name: Navdeep Avery  Patient MRN: [id number]  Patient YOB: 1960  CSN: [id number]  Date: 03/07/2023  Provider:   Evelio Razo    Application for Assessment: Right plantar foot diabetic ulcer  Skin Substitute: Affinity  Performed By: Evelio Razo  Time-out Taken: Yes  Location:     [x] Genitalia/Hands/Feet/Multiple Digits    [] Scalp/Face/Neck/Ears    []Trunk/Arms/Legs  Application Area: (sq cm): 1.44  Product Applied: (sq cm): 6.25  Product Waste (sq cm): no  Fenestrated: Yes   Instrument:    [] Blade [] Curette  [x]Forceps  []Nippers    [] Rongeur [] Scissors  []Others:   Secured: yes   Secured with: Steri-strips  Dressing Applied: Yes  Response to Treatment:Well tolerated by patient.  Note:     ID# [id number]  Expiration date 3/7/2023

## 2023-03-13 NOTE — PROGRESS NOTES
Subjective:         Patient ID: [id number] Avery is a 62 y.o. male.    Chief Complaint: Hip Pain and Foot Pain        Pain  This is a chronic problem. The current episode started more than 1 year ago. The problem occurs daily. The problem has been unchanged. Associated symptoms include arthralgias and neck pain. Pertinent negatives include no anorexia, chest pain, chills, coughing, diaphoresis, fatigue, fever, sore throat, vertigo or vomiting.   Review of Systems   Constitutional:  Negative for activity change, chills, diaphoresis, fatigue, fever and unexpected weight change.   HENT:  Negative for drooling, ear discharge, ear pain, facial swelling, nosebleeds, sore throat, trouble swallowing, voice change and goiter.    Eyes:  Negative for photophobia, pain, discharge, redness and visual disturbance.   Respiratory:  Negative for apnea, cough, choking, chest tightness, shortness of breath, wheezing and stridor.    Cardiovascular:  Negative for chest pain, palpitations and leg swelling.   Gastrointestinal:  Negative for abdominal distention, anorexia, diarrhea, rectal pain, vomiting and fecal incontinence.   Endocrine: Negative for cold intolerance, heat intolerance, polydipsia, polyphagia and polyuria.   Genitourinary:  Negative for bladder incontinence, dysuria, flank pain and frequency.   Musculoskeletal:  Positive for arthralgias, back pain, leg pain, neck pain and neck stiffness.   Integumentary:  Negative for color change and pallor.   Neurological:  Negative for dizziness, vertigo, seizures, syncope, facial asymmetry, speech difficulty, light-headedness, coordination difficulties, memory loss and coordination difficulties.   Hematological:  Negative for adenopathy. Does not bruise/bleed easily.   Psychiatric/Behavioral:  Negative for agitation, behavioral problems, confusion, decreased concentration, dysphoric mood, hallucinations, self-injury and suicidal ideas. The patient is not nervous/anxious and is not  hyperactive.          Past Medical History:   Diagnosis Date    Anemia     Anxiety     Arthritis     Atherosclerotic heart disease of native coronary artery with other forms of angina pectoris     Atrophic rhinitis     Carpal tunnel syndrome     Cellulitis of right lower extremity     COPD (chronic obstructive pulmonary disease)     Coronary arteriosclerosis     COVID 02/02/2022    Depression     Diabetic ulcer of right foot associated with diabetes mellitus due to underlying condition, with bone involvement without evidence of necrosis     Fall with significant injury, sequela 11/07/2022    GERD (gastroesophageal reflux disease)     Hyperlipidemia     Hypertension     Insomnia     MI (myocardial infarction)     Neuropathy     Peripheral vascular disease, unspecified     Right foot ulcer, with fat layer exposed 01/07/2015    S/p left hip fracture 11/08/2022    Sleep apnea     Type 2 diabetes mellitus      Past Surgical History:   Procedure Laterality Date    AMPUTATION      ANGIOPLASTY      CARDIAC CATHETERIZATION      CORONARY ARTERY BYPASS GRAFT      CORONARY STENT PLACEMENT      DEBRIDEMENT      Right foot debridment with hospital stay and IV Abx    DEBRIDEMENT OF LOWER EXTREMITY Right 06/27/2022    Procedure: DEBRIDEMENT, LOWER EXTREMITY;  Surgeon: Forrest Kiser MD;  Location: [facility];  Service: General;  Laterality: Right;  right foot    HIP FRACTURE SURGERY Left     IRRIGATION AND DEBRIDEMENT OF LOWER EXTREMITY Right 09/08/2022    Procedure: IRRIGATION AND DEBRIDEMENT, LOWER EXTREMITY;  Surgeon: Selina Matos MD;  Location: [facility];  Service: General;  Laterality: Right;    IRRIGATION AND DEBRIDEMENT OF LOWER EXTREMITY Right 1/5/2023    Procedure: IRRIGATION AND DEBRIDEMENT, LOWER EXTREMITY;  Surgeon: Evelio Chiu DO;  Location: [facility];  Service: General;  Laterality: Right;    SHOULDER OPEN ROTATOR CUFF REPAIR Left     SPINE SURGERY      VASCULAR SURGERY       Social History      Socioeconomic History    Marital status:    Occupational History    Occupation: Retired   Tobacco Use    Smoking status: Some Days     Packs/day: 0.25     Types: Cigarettes    Smokeless tobacco: Never   Substance and Sexual Activity    Alcohol use: Not Currently    Drug use: Yes     Types: Oxycodone    Sexual activity: Yes     Partners: Female     Social Determinants of Health     Financial Resource Strain: Low Risk     Difficulty of Paying Living Expenses: Not very hard   Food Insecurity: No Food Insecurity    Worried About Running Out of Food in the Last Year: Never true    Ran Out of Food in the Last Year: Never true   Transportation Needs: No Transportation Needs    Lack of Transportation (Medical): No    Lack of Transportation (Non-Medical): No   Physical Activity: Inactive    Days of Exercise per Week: 0 days    Minutes of Exercise per Session: 0 min   Stress: No Stress Concern Present    Feeling of Stress : Only a little   Social Connections: Moderately Isolated    Frequency of Communication with Friends and Family: More than three times a week    Frequency of Social Gatherings with Friends and Family: More than three times a week    Attends [employer] Services: Never    Active Member of Clubs or Organizations: No    Attends Club or Organization Meetings: Never    Marital Status:    Housing Stability: Low Risk     Unable to Pay for Housing in the Last Year: No    Number of Places Lived in the Last Year: 1    Unstable Housing in the Last Year: No     Family History   Problem Relation Age of Onset    Arthritis Mother     Hypertension Mother     Learning disabilities Mother     Alcohol abuse Father     Early death Father     Heart disease Father     Cancer Sister     Diabetes Sister     Heart disease Maternal Grandfather     COPD Paternal Grandfather     Heart disease Son      Review of patient's allergies indicates:  No Known Allergies     Objective:  Vitals:    03/14/23 0755   BP: (!) 184/93  "  Pulse: 90   Resp: 20   Weight: 73 kg (161 lb)   Height: 5' 8" (1.727 m)   PainSc:   8         Physical Exam  Vitals and nursing note reviewed. Exam conducted with a chaperone present.   Constitutional:       General: He is awake. He is not in acute distress.     Appearance: Normal appearance. He is not ill-appearing, toxic-appearing or diaphoretic.   HENT:      Head: Normocephalic and atraumatic.      Nose: Nose normal.      Mouth/Throat:      Mouth: Mucous membranes are moist.      Pharynx: Oropharynx is clear.   Eyes:      Conjunctiva/sclera: Conjunctivae normal.      Pupils: Pupils are equal, round, and reactive to light.   Cardiovascular:      Rate and Rhythm: Normal rate.   Pulmonary:      Effort: Pulmonary effort is normal. No respiratory distress.   Abdominal:      Palpations: Abdomen is soft.   Musculoskeletal:         General: Normal range of motion.      Cervical back: Normal range of motion and neck supple.      Thoracic back: Tenderness present.      Lumbar back: Tenderness present.   Skin:     General: Skin is warm and dry.      Coloration: Skin is not jaundiced or pale.   Neurological:      General: No focal deficit present.      Mental Status: He is alert and oriented to person, place, and time. Mental status is at baseline.      Cranial Nerves: No cranial nerve deficit (II-XII).   Psychiatric:         Mood and Affect: Mood normal.         Behavior: Behavior normal. Behavior is cooperative.         Thought Content: Thought content normal.         X-Ray Foot Complete Right  Narrative: EXAMINATION:  XR FOOT COMPLETE 3 VIEW RIGHT    CLINICAL HISTORY:  Pain in unspecified foot    COMPARISON:  None available    FINDINGS:  No evidence of fracture seen.  Amputation in the 1st digit and distal 1st metatarsal with hypertrophic bone formation present at the amputation site.  The 3rd digit has been amputated previously.  The 2nd digit is subluxed dorsally.  Remaining alignment of the joints appears normal.  No " degenerative change is present.  No soft tissue abnormality is seen.  Impression: Second digit dislocation.  No other definite acute findings.    Electronically signed by: Francisco Felton  Date:    01/05/2023  Time:    07:53  X-Ray Hip 2 or 3 views Left (with Pelvis when performed)  Narrative: EXAMINATION:  XR HIP WITH PELVIS WHEN PERFORMED, 2 OR 3 VIEWS LEFT    CLINICAL HISTORY:  Left hip pain /sp fall;    COMPARISON:  1 November 2022    FINDINGS:  Acetabular fracture repair present with appearance similar to previous.  No other evidence of fracture seen.  The alignment of the joints appears normal.  No degenerative change is present.  No soft tissue abnormality is seen.  Impression: No definite acute findings.    Electronically signed by: Francisco Felton  Date:    01/05/2023  Time:    07:52  X-Ray Chest AP Portable  Narrative: EXAMINATION:  XR CHEST AP PORTABLE    CLINICAL HISTORY:  pre op history of COPD;    COMPARISON:  21 October 2022    FINDINGS:  The heart and mediastinum are stable in size and configuration.  The pulmonary vascularity is slightly increased with bilateral increased interstitial lung density.  No other lung infiltrates, effusions, pneumothorax or other abnormality is demonstrated.  Impression: Findings suggest mild cardiac decompensation and / or pneumonitis.    Electronically signed by: Francisco Felton  Date:    01/05/2023  Time:    07:50         Admission on 01/04/2023, Discharged on 01/06/2023   Component Date Value Ref Range Status    POC Glucose 01/04/2023 172 (H)  70 - 105 mg/dL Final    Sodium 01/04/2023 140  136 - 145 mmol/L Final    Potassium 01/04/2023 3.8  3.5 - 5.1 mmol/L Final    Chloride 01/04/2023 101  98 - 107 mmol/L Final    CO2 01/04/2023 31  21 - 32 mmol/L Final    Anion Gap 01/04/2023 12  7 - 16 mmol/L Final    Glucose 01/04/2023 160 (H)  74 - 106 mg/dL Final    BUN 01/04/2023 18  7 - 18 mg/dL Final    Creatinine 01/04/2023 0.82  0.70 - 1.30 mg/dL Final    BUN/Creatinine  Ratio 01/04/2023 22 (H)  6 - 20 Final    Calcium 01/04/2023 8.6  8.5 - 10.1 mg/dL Final    Total Protein 01/04/2023 7.0  6.4 - 8.2 g/dL Final    Albumin 01/04/2023 3.2 (L)  3.5 - 5.0 g/dL Final    Globulin 01/04/2023 3.8  2.0 - 4.0 g/dL Final    A/G Ratio 01/04/2023 0.8   Final    Bilirubin, Total 01/04/2023 0.4  >0.0 - 1.2 mg/dL Final    Alk Phos 01/04/2023 111  45 - 115 U/L Final    ALT 01/04/2023 10 (L)  16 - 61 U/L Final    AST 01/04/2023 10 (L)  15 - 37 U/L Final    eGFR 01/04/2023 98  >=60 mL/min/1.73m² Final    Culture, Blood 01/04/2023 No Growth at 5 days   Final    Culture, Blood 01/04/2023 No Growth at 5 days   Final    Lactic Acid 01/04/2023 1.3  0.4 - 2.0 mmol/L Final    WBC 01/04/2023 13.88 (H)  4.50 - 11.00 K/uL Final    RBC 01/04/2023 3.93 (L)  4.60 - 6.20 M/uL Final    Hemoglobin 01/04/2023 12.1 (L)  13.5 - 18.0 g/dL Final    Hematocrit 01/04/2023 36.2 (L)  40.0 - 54.0 % Final    MCV 01/04/2023 92.1  80.0 - 96.0 fL Final    MCH 01/04/2023 30.8  27.0 - 31.0 pg Final    MCHC 01/04/2023 33.4  32.0 - 36.0 g/dL Final    RDW 01/04/2023 12.7  11.5 - 14.5 % Final    Platelet Count 01/04/2023 163  150 - 400 K/uL Final    MPV 01/04/2023 10.1  9.4 - 12.4 fL Final    Neutrophils % 01/04/2023 77.6 (H)  53.0 - 65.0 % Final    Lymphocytes % 01/04/2023 13.0 (L)  27.0 - 41.0 % Final    Monocytes % 01/04/2023 8.4 (H)  2.0 - 6.0 % Final    Eosinophils % 01/04/2023 0.1 (L)  1.0 - 4.0 % Final    Basophils % 01/04/2023 0.3  0.0 - 1.0 % Final    Immature Granulocytes % 01/04/2023 0.6 (H)  0.0 - 0.4 % Final    nRBC, Auto 01/04/2023 0.0  <=0.0 % Final    Neutrophils, Abs 01/04/2023 10.77 (H)  1.80 - 7.70 K/uL Final    Lymphocytes, Absolute 01/04/2023 1.81  1.00 - 4.80 K/uL Final    Monocytes, Absolute 01/04/2023 1.16 (H)  0.00 - 0.80 K/uL Final    Eosinophils, Absolute 01/04/2023 0.02  0.00 - 0.50 K/uL Final    Basophils, Absolute 01/04/2023 0.04  0.00 - 0.20 K/uL Final    Immature Granulocytes, Absolute 01/04/2023 0.08 (H)   0.00 - 0.04 K/uL Final    nRBC, Absolute 01/04/2023 0.00  <=0.00 x10e3/uL Final    Diff Type 01/04/2023 Auto   Final    Sodium 01/05/2023 140  136 - 145 mmol/L Final    Potassium 01/05/2023 3.7  3.5 - 5.1 mmol/L Final    Chloride 01/05/2023 102  98 - 107 mmol/L Final    CO2 01/05/2023 30  21 - 32 mmol/L Final    Anion Gap 01/05/2023 12  7 - 16 mmol/L Final    Glucose 01/05/2023 165 (H)  74 - 106 mg/dL Final    BUN 01/05/2023 17  7 - 18 mg/dL Final    Creatinine 01/05/2023 0.83  0.70 - 1.30 mg/dL Final    BUN/Creatinine Ratio 01/05/2023 20  6 - 20 Final    Calcium 01/05/2023 9.0  8.5 - 10.1 mg/dL Final    eGFR 01/05/2023 98  >=60 mL/min/1.73m² Final    PT 01/05/2023 15.0 (H)  11.7 - 14.7 seconds Final    INR 01/05/2023 1.23  <=3.30 Final    PTT 01/05/2023 31.5  25.2 - 37.3 seconds Final    Group & Rh 01/05/2023 A POS   Final    Indirect Sanam 01/05/2023 NEG   Final    Triglycerides 01/05/2023 110  35 - 150 mg/dL Final    Cholesterol 01/05/2023 147  0 - 200 mg/dL Final    HDL Cholesterol 01/05/2023 39 (L)  40 - 60 mg/dL Final    Cholesterol/HDL Ratio (Risk Factor) 01/05/2023 3.8   Final    Non-HDL 01/05/2023 108  mg/dL Final    LDL Calculated 01/05/2023 86  mg/dL Final    LDL/HDL 01/05/2023 2.2   Final    VLDL 01/05/2023 22  mg/dL Final    POC Glucose 01/05/2023 178 (H)  70 - 105 mg/dL Final    POC Glucose 01/05/2023 180 (H)  70 - 105 mg/dL Final    Vancomycin, Trough 01/05/2023 19.6  10.0 - 20.0 µg/mL Final    WBC 01/05/2023 11.72 (H)  4.50 - 11.00 K/uL Final    RBC 01/05/2023 3.71 (L)  4.60 - 6.20 M/uL Final    Hemoglobin 01/05/2023 11.4 (L)  13.5 - 18.0 g/dL Final    Hematocrit 01/05/2023 35.0 (L)  40.0 - 54.0 % Final    MCV 01/05/2023 94.3  80.0 - 96.0 fL Final    MCH 01/05/2023 30.7  27.0 - 31.0 pg Final    MCHC 01/05/2023 32.6  32.0 - 36.0 g/dL Final    RDW 01/05/2023 12.8  11.5 - 14.5 % Final    Platelet Count 01/05/2023 158  150 - 400 K/uL Final    MPV 01/05/2023 10.2  9.4 - 12.4 fL Final    Neutrophils %  01/05/2023 69.8 (H)  53.0 - 65.0 % Final    Lymphocytes % 01/05/2023 21.8 (L)  27.0 - 41.0 % Final    Monocytes % 01/05/2023 7.2 (H)  2.0 - 6.0 % Final    Eosinophils % 01/05/2023 0.4 (L)  1.0 - 4.0 % Final    Basophils % 01/05/2023 0.4  0.0 - 1.0 % Final    Immature Granulocytes % 01/05/2023 0.4  0.0 - 0.4 % Final    nRBC, Auto 01/05/2023 0.0  <=0.0 % Final    Neutrophils, Abs 01/05/2023 8.17 (H)  1.80 - 7.70 K/uL Final    Lymphocytes, Absolute 01/05/2023 2.56  1.00 - 4.80 K/uL Final    Monocytes, Absolute 01/05/2023 0.84 (H)  0.00 - 0.80 K/uL Final    Eosinophils, Absolute 01/05/2023 0.05  0.00 - 0.50 K/uL Final    Basophils, Absolute 01/05/2023 0.05  0.00 - 0.20 K/uL Final    Immature Granulocytes, Absolute 01/05/2023 0.05 (H)  0.00 - 0.04 K/uL Final    nRBC, Absolute 01/05/2023 0.00  <=0.00 x10e3/uL Final    Diff Type 01/05/2023 Auto   Final    POC Glucose 01/05/2023 164 (H)  70 - 105 mg/dL Final    POC Glucose 01/05/2023 162 (H)  70 - 105 mg/dL Final    Culture, Wound/Abscess 01/05/2023 Heavy Growth Methicillin resistant Staphylococcus aureus (A)   Final    Culture, Wound/Abscess 01/05/2023 Light Growth Pseudomonas aeruginosa (A)   Final    Culture, Anaerobe 01/05/2023 Bacteroides uniformis (A)   Final    Culture, Wound/Abscess 01/05/2023 Heavy Growth Methicillin resistant Staphylococcus aureus (A)   Final    Culture, Anaerobe 01/05/2023 Finegoldia magna (A)   Final    POC Glucose 01/05/2023 152 (H)  70 - 105 mg/dL Final    POC Glucose 01/05/2023 231 (H)  70 - 105 mg/dL Final    POC Glucose 01/05/2023 247 (H)  70 - 105 mg/dL Final    POC Glucose 01/06/2023 117 (H)  70 - 105 mg/dL Final    POC Glucose 01/06/2023 219 (H)  70 - 105 mg/dL Final   Lab Visit on 12/14/2022   Component Date Value Ref Range Status    Total Protein 12/14/2022 7.6  6.4 - 8.2 g/dL Final    Albumin 12/14/2022 3.9  3.5 - 5.0 g/dL Final    Bilirubin, Total 12/14/2022 0.3  >0.0 - 1.2 mg/dL Final    Bilirubin, Direct 12/14/2022 <0.1  0.0 - 0.2  mg/dL Final    AST 12/14/2022 14 (L)  15 - 37 U/L Final    ALT 12/14/2022 26  16 - 61 U/L Final    Alk Phos 12/14/2022 134 (H)  45 - 115 U/L Final    GGT 12/14/2022 45  15 - 85 U/L Final   Office Visit on 12/14/2022   Component Date Value Ref Range Status    POC Amphetamines 12/14/2022 Negative  Negative, Inconclusive Final    POC Barbiturates 12/14/2022 Negative  Negative, Inconclusive Final    POC Benzodiazepines 12/14/2022 Negative  Negative, Inconclusive Final    POC Cocaine 12/14/2022 Negative  Negative, Inconclusive Final    POC THC 12/14/2022 Negative  Negative, Inconclusive Final    POC Methadone 12/14/2022 Negative  Negative, Inconclusive Final    POC Methamphetamine 12/14/2022 Negative  Negative, Inconclusive Final    POC Opiates 12/14/2022 Presumptive Positive (A)  Negative, Inconclusive Final    POC Oxycodone 12/14/2022 Negative  Negative, Inconclusive Final    POC Phencyclidine 12/14/2022 Negative  Negative, Inconclusive Final    POC Methylenedioxymethamphetamine * 12/14/2022 Negative  Negative, Inconclusive Final    POC Tricyclic Antidepressants 12/14/2022 Negative  Negative, Inconclusive Final    POC Buprenorphine 12/14/2022 Negative   Final     Acceptable 12/14/2022 Yes   Final    POC Temperature (Urine) 12/14/2022 92   Final   Admission on 11/01/2022, Discharged on 11/01/2022   Component Date Value Ref Range Status    Sodium 11/01/2022 141  136 - 145 mmol/L Final    Potassium 11/01/2022 4.2  3.5 - 5.1 mmol/L Final    Chloride 11/01/2022 104  98 - 107 mmol/L Final    CO2 11/01/2022 31  21 - 32 mmol/L Final    Anion Gap 11/01/2022 10  7 - 16 mmol/L Final    Glucose 11/01/2022 183 (H)  74 - 106 mg/dL Final    BUN 11/01/2022 13  7 - 18 mg/dL Final    Creatinine 11/01/2022 1.00  0.70 - 1.30 mg/dL Final    BUN/Creatinine Ratio 11/01/2022 13  6 - 20 Final    Calcium 11/01/2022 8.6  8.5 - 10.1 mg/dL Final    Total Protein 11/01/2022 7.8  6.4 - 8.2 g/dL Final    Albumin 11/01/2022 3.8  3.5 -  5.0 g/dL Final    Globulin 11/01/2022 4.0  2.0 - 4.0 g/dL Final    A/G Ratio 11/01/2022 1.0   Final    Bilirubin, Total 11/01/2022 0.3  >0.0 - 1.2 mg/dL Final    Alk Phos 11/01/2022 131 (H)  45 - 115 U/L Final    ALT 11/01/2022 23  16 - 61 U/L Final    AST 11/01/2022 16  15 - 37 U/L Final    eGFR 11/01/2022 84  >=60 mL/min/1.73m² Final    WBC 11/01/2022 10.25  4.50 - 11.00 K/uL Final    RBC 11/01/2022 4.16 (L)  4.60 - 6.20 M/uL Final    Hemoglobin 11/01/2022 12.9 (L)  13.5 - 18.0 g/dL Final    Hematocrit 11/01/2022 37.7 (L)  40.0 - 54.0 % Final    MCV 11/01/2022 90.6  80.0 - 96.0 fL Final    MCH 11/01/2022 31.0  27.0 - 31.0 pg Final    MCHC 11/01/2022 34.2  32.0 - 36.0 g/dL Final    RDW 11/01/2022 13.5  11.5 - 14.5 % Final    Platelet Count 11/01/2022 197  150 - 400 K/uL Final    MPV 11/01/2022 9.7  9.4 - 12.4 fL Final    Neutrophils % 11/01/2022 79.7 (H)  53.0 - 65.0 % Final    Lymphocytes % 11/01/2022 12.5 (L)  27.0 - 41.0 % Final    Neutrophils, Abs 11/01/2022 8.18 (H)  1.80 - 7.70 K/uL Final    Lymphocytes, Absolute 11/01/2022 1.28  1.00 - 4.80 K/uL Final    Diff Type 11/01/2022 Auto   Final    Monocytes % 11/01/2022 5.8  2.0 - 6.0 % Final    Eosinophils % 11/01/2022 1.7  1.0 - 4.0 % Final    Basophils % 11/01/2022 0.3  0.0 - 1.0 % Final    Monocytes, Absolute 11/01/2022 0.59  0.00 - 0.80 K/uL Final    Eosinophils, Absolute 11/01/2022 0.17  0.00 - 0.50 K/uL Final    Basophils, Absolute 11/01/2022 0.03  0.00 - 0.20 K/uL Final   Office Visit on 10/21/2022   Component Date Value Ref Range Status    EKG 12-Lead 10/21/2022 abnormal   Final    Ventricular Rate 10/21/2022 83bpm   Final    NE Interval 10/21/2022 136ms   Final    QRS Duration 10/21/2022 106ms   Final    QT/QTc 10/21/2022 382/422ms   Final    PRT Axes 10/21/2022 61 -11 95   Final    Sodium 10/21/2022 139  136 - 145 mmol/L Final    Potassium 10/21/2022 4.1  3.5 - 5.1 mmol/L Final    Chloride 10/21/2022 104  98 - 107 mmol/L Final    CO2 10/21/2022 28  21 -  32 mmol/L Final    Anion Gap 10/21/2022 11  7 - 16 mmol/L Final    Glucose 10/21/2022 267 (H)  74 - 106 mg/dL Final    BUN 10/21/2022 14  7 - 18 mg/dL Final    Creatinine 10/21/2022 0.94  0.70 - 1.30 mg/dL Final    BUN/Creatinine Ratio 10/21/2022 15  6 - 20 Final    Calcium 10/21/2022 8.7  8.5 - 10.1 mg/dL Final    Total Protein 10/21/2022 7.1  6.4 - 8.2 g/dL Final    Albumin 10/21/2022 3.4 (L)  3.5 - 5.0 g/dL Final    Globulin 10/21/2022 3.7  2.0 - 4.0 g/dL Final    A/G Ratio 10/21/2022 0.9   Final    Bilirubin, Total 10/21/2022 0.2  >0.0 - 1.2 mg/dL Final    Alk Phos 10/21/2022 118 (H)  45 - 115 U/L Final    ALT 10/21/2022 17  16 - 61 U/L Final    AST 10/21/2022 11 (L)  15 - 37 U/L Final    eGFR 10/21/2022 91  >=60 mL/min/1.73m² Final    Hemoglobin A1C 10/21/2022 7.2 (H)  4.5 - 6.6 % Final    Estimated Average Glucose 10/21/2022 154  mg/dL Final    WBC 10/21/2022 5.51  4.50 - 11.00 K/uL Final    RBC 10/21/2022 3.86 (L)  4.60 - 6.20 M/uL Final    Hemoglobin 10/21/2022 12.0 (L)  13.5 - 18.0 g/dL Final    Hematocrit 10/21/2022 36.1 (L)  40.0 - 54.0 % Final    MCV 10/21/2022 93.5  80.0 - 96.0 fL Final    MCH 10/21/2022 31.1 (H)  27.0 - 31.0 pg Final    MCHC 10/21/2022 33.2  32.0 - 36.0 g/dL Final    RDW 10/21/2022 13.5  11.5 - 14.5 % Final    Platelet Count 10/21/2022 255  150 - 400 K/uL Final    MPV 10/21/2022 10.3  9.4 - 12.4 fL Final    Neutrophils % 10/21/2022 53.3  53.0 - 65.0 % Final    Lymphocytes % 10/21/2022 34.7  27.0 - 41.0 % Final    Monocytes % 10/21/2022 7.3 (H)  2.0 - 6.0 % Final    Eosinophils % 10/21/2022 3.8  1.0 - 4.0 % Final    Basophils % 10/21/2022 0.7  0.0 - 1.0 % Final    Immature Granulocytes % 10/21/2022 0.2  0.0 - 0.4 % Final    nRBC, Auto 10/21/2022 0.0  <=0.0 % Final    Neutrophils, Abs 10/21/2022 2.94  1.80 - 7.70 K/uL Final    Lymphocytes, Absolute 10/21/2022 1.91  1.00 - 4.80 K/uL Final    Monocytes, Absolute 10/21/2022 0.40  0.00 - 0.80 K/uL Final    Eosinophils, Absolute 10/21/2022  0.21  0.00 - 0.50 K/uL Final    Basophils, Absolute 10/21/2022 0.04  0.00 - 0.20 K/uL Final    Immature Granulocytes, Absolute 10/21/2022 0.01  0.00 - 0.04 K/uL Final    nRBC, Absolute 10/21/2022 0.00  <=0.00 x10e3/uL Final    Diff Type 10/21/2022 Auto   Final   Office Visit on 09/16/2022   Component Date Value Ref Range Status    POC Amphetamines 09/16/2022 Negative  Negative, Inconclusive Final    POC Barbiturates 09/16/2022 Negative  Negative, Inconclusive Final    POC Benzodiazepines 09/16/2022 Negative  Negative, Inconclusive Final    POC Cocaine 09/16/2022 Negative  Negative, Inconclusive Final    POC THC 09/16/2022 Negative  Negative, Inconclusive Final    POC Methadone 09/16/2022 Negative  Negative, Inconclusive Final    POC Methamphetamine 09/16/2022 Negative  Negative, Inconclusive Final    POC Opiates 09/16/2022 Presumptive Positive (A)  Negative, Inconclusive Final    POC Oxycodone 09/16/2022 Negative  Negative, Inconclusive Final    POC Phencyclidine 09/16/2022 Negative  Negative, Inconclusive Final    POC Methylenedioxymethamphetamine * 09/16/2022 Negative  Negative, Inconclusive Final    POC Tricyclic Antidepressants 09/16/2022 Negative  Negative, Inconclusive Final    POC Buprenorphine 09/16/2022 Negative   Final     Acceptable 09/16/2022 Yes   Final    POC Temperature (Urine) 09/16/2022 90   Final         Orders Placed This Encounter   Procedures    POCT Urine Drug Screen Presump     Interpretive Information:     Negative:  No drug detected at the cut off level.   Positive:  This result represents presumptive positive for the   tested drug, other substances may yield a positive response other   than the analyte of interest. This result should be utilized for   diagnostic purpose only. Confirmation testing will be performed upon physician request only.          Requested Prescriptions     Signed Prescriptions Disp Refills    gabapentin (NEURONTIN) 300 MG capsule 360 capsule 2     Sig:  Take 3 capsules (900 mg total) by mouth every 6 (six) hours.    HYDROcodone-acetaminophen (NORCO)  mg per tablet 120 tablet 0     Sig: Take 1 tablet by mouth every 6 (six) hours as needed for Pain (pain).    HYDROcodone-acetaminophen (NORCO)  mg per tablet 120 tablet 0     Sig: Take 1 tablet by mouth every 6 (six) hours as needed for Pain (pain).    HYDROcodone-acetaminophen (NORCO)  mg per tablet 120 tablet 0     Sig: Take 1 tablet by mouth every 6 (six) hours as needed for Pain (pain).    naloxone (NARCAN) 4 mg/actuation Spry 1 each 0     Si spray (4 mg total) by Nasal route once. Call 911, One sprays alternate nostril, may repeat 2-3 minutes as needed for 1 dose       Assessment:     1. Ulcer of right foot with necrosis of muscle    2. Neuropathy    3. Peripheral vascular disease, unspecified    4. Encounter for long-term (current) use of other medications         A's of Opioid Risk Assessment  Activity:Patient can perform ADL.   Analgesia:Patients pain is partially controlled by current medication. Patient has tried OTC medications such as Tylenol and Ibuprofen with out relief.   Adverse Effects: Patient denies constipation or sedation.  Aberrant Behavior:  reviewed with no aberrant drug seeking/taking behavior.  Overdose reversal drug naloxone discussed    Drug screen reviewed      Plan:    Narcan 2023    Left total hip replacement    Following wound management diabetic ulcer right foot multiple surgeries pending skin graft [facility]     Pharmacy closed on  [facility] [facility] [address]    Any further inconsistent drug screens were no longer provide narcotics 2021 definitive drug screen inconsistent  positive for hydrocodone and oxycodone    He states current medications helping control his discomfort     He would like to continue conservative management    Continue home exercise program as tolerated     Continue current medication     Follow-up 3 months     Dr. Dobson, December  2023    Bring original prescription medication bottles/container/box with labels to each visit    Pill count    Physical therapy

## 2023-03-14 ENCOUNTER — OFFICE VISIT (OUTPATIENT)
Dept: PAIN MEDICINE | Facility: CLINIC | Age: 63
End: 2023-03-14
Payer: MEDICARE

## 2023-03-14 VITALS
RESPIRATION RATE: 20 BRPM | WEIGHT: 161 LBS | HEART RATE: 90 BPM | HEIGHT: 68 IN | DIASTOLIC BLOOD PRESSURE: 93 MMHG | BODY MASS INDEX: 24.4 KG/M2 | SYSTOLIC BLOOD PRESSURE: 184 MMHG

## 2023-03-14 DIAGNOSIS — L97.513 ULCER OF RIGHT FOOT WITH NECROSIS OF MUSCLE: Primary | Chronic | ICD-10-CM

## 2023-03-14 DIAGNOSIS — Z79.899 ENCOUNTER FOR LONG-TERM (CURRENT) USE OF OTHER MEDICATIONS: ICD-10-CM

## 2023-03-14 DIAGNOSIS — I73.9 PERIPHERAL VASCULAR DISEASE, UNSPECIFIED: Chronic | ICD-10-CM

## 2023-03-14 DIAGNOSIS — G62.9 NEUROPATHY: Chronic | ICD-10-CM

## 2023-03-14 PROCEDURE — 99214 OFFICE O/P EST MOD 30 MIN: CPT | Mod: S$PBB,,, | Performed by: PHYSICIAN ASSISTANT

## 2023-03-14 PROCEDURE — 99214 PR OFFICE/OUTPT VISIT, EST, LEVL IV, 30-39 MIN: ICD-10-PCS | Mod: S$PBB,,, | Performed by: PHYSICIAN ASSISTANT

## 2023-03-14 PROCEDURE — 99215 OFFICE O/P EST HI 40 MIN: CPT | Mod: PBBFAC | Performed by: PHYSICIAN ASSISTANT

## 2023-03-14 PROCEDURE — 80305 DRUG TEST PRSMV DIR OPT OBS: CPT | Mod: PBBFAC | Performed by: PHYSICIAN ASSISTANT

## 2023-03-14 RX ORDER — HYDROCODONE BITARTRATE AND ACETAMINOPHEN 10; 325 MG/1; MG/1
1 TABLET ORAL EVERY 6 HOURS PRN
Qty: 120 TABLET | Refills: 0 | Status: SHIPPED | OUTPATIENT
Start: 2023-04-21 | End: 2023-06-14 | Stop reason: SDUPTHER

## 2023-03-14 RX ORDER — GABAPENTIN 300 MG/1
900 CAPSULE ORAL EVERY 6 HOURS
Qty: 360 CAPSULE | Refills: 2 | Status: SHIPPED | OUTPATIENT
Start: 2023-03-14 | End: 2023-06-14 | Stop reason: SDUPTHER

## 2023-03-14 RX ORDER — HYDROCODONE BITARTRATE AND ACETAMINOPHEN 10; 325 MG/1; MG/1
1 TABLET ORAL EVERY 6 HOURS PRN
Qty: 120 TABLET | Refills: 0 | Status: SHIPPED | OUTPATIENT
Start: 2023-05-20 | End: 2023-06-14 | Stop reason: SDUPTHER

## 2023-03-14 RX ORDER — NALOXONE HYDROCHLORIDE 4 MG/.1ML
1 SPRAY NASAL ONCE
Qty: 1 EACH | Refills: 0 | Status: SHIPPED | OUTPATIENT
Start: 2023-03-14 | End: 2023-03-14

## 2023-03-14 RX ORDER — HYDROCODONE BITARTRATE AND ACETAMINOPHEN 10; 325 MG/1; MG/1
1 TABLET ORAL EVERY 6 HOURS PRN
Qty: 120 TABLET | Refills: 0 | Status: SHIPPED | OUTPATIENT
Start: 2023-03-22 | End: 2023-04-21

## 2023-03-20 ENCOUNTER — OFFICE VISIT (OUTPATIENT)
Dept: SURGERY | Facility: CLINIC | Age: 63
End: 2023-03-20
Payer: MEDICARE

## 2023-03-20 DIAGNOSIS — E08.621 DIABETIC ULCER OF RIGHT MIDFOOT ASSOCIATED WITH DIABETES MELLITUS DUE TO UNDERLYING CONDITION, WITH NECROSIS OF MUSCLE: Primary | ICD-10-CM

## 2023-03-20 DIAGNOSIS — L97.413 DIABETIC ULCER OF RIGHT MIDFOOT ASSOCIATED WITH DIABETES MELLITUS DUE TO UNDERLYING CONDITION, WITH NECROSIS OF MUSCLE: Primary | ICD-10-CM

## 2023-03-20 PROCEDURE — 15275 PR SKIN SUB GRAFT FACE/NK/HF/G UP TO 100 SQCM: ICD-10-PCS | Mod: S$PBB,,, | Performed by: STUDENT IN AN ORGANIZED HEALTH CARE EDUCATION/TRAINING PROGRAM

## 2023-03-20 PROCEDURE — 99499 UNLISTED E&M SERVICE: CPT | Mod: S$PBB,,, | Performed by: STUDENT IN AN ORGANIZED HEALTH CARE EDUCATION/TRAINING PROGRAM

## 2023-03-20 PROCEDURE — 99499 NO LOS: ICD-10-PCS | Mod: S$PBB,,, | Performed by: STUDENT IN AN ORGANIZED HEALTH CARE EDUCATION/TRAINING PROGRAM

## 2023-03-20 PROCEDURE — 15275 SKIN SUB GRAFT FACE/NK/HF/G: CPT | Mod: PBBFAC | Performed by: STUDENT IN AN ORGANIZED HEALTH CARE EDUCATION/TRAINING PROGRAM

## 2023-03-20 PROCEDURE — 15275 SKIN SUB GRAFT FACE/NK/HF/G: CPT | Mod: S$PBB,,, | Performed by: STUDENT IN AN ORGANIZED HEALTH CARE EDUCATION/TRAINING PROGRAM

## 2023-03-20 PROCEDURE — 99214 OFFICE O/P EST MOD 30 MIN: CPT | Mod: PBBFAC,25 | Performed by: STUDENT IN AN ORGANIZED HEALTH CARE EDUCATION/TRAINING PROGRAM

## 2023-03-20 NOTE — PROGRESS NOTES
Subjective:       Patient ID: Navdeep Avery is a 62 y.o. male.    Chief Complaint: Wound Check    62-year-old  male presents to the clinic for 2 week evaluation status post debridement of right lower foot; patient had a right foot abscess with necrotic muscle extending down to bone the plantar surface of the right foot adjacent to the 3rd metatarsophalangeal joint.  Patient's spouse has been taking great care of the wound and the wound is healing.  No complaints.  Denies any chest pain/shortness of breath, nausea/vomiting/diarrhea, fever/chills.    2/14:  Presents today for wound re-evaluation and placement of PuraPly XT.  No significant changes from previous visit    2/21: Presents today for wound re-evaluation and placement of PuraPly XT.  No significant changes from previous visit    2/28: Presents today for wound re-evaluation and placement of PuraPly XT.  No significant changes from previous visit    3/7: Presents today for wound re-evaluation and placement of Affinity.  No significant changes from previous visit    3/20: Presents today for wound re-evaluation and placement of Apligraf.  No significant changes from previous visit      Review of Systems   Constitutional: Negative.    HENT: Negative.     Eyes: Negative.    Respiratory:  Negative for shortness of breath.    Cardiovascular: Negative.    Gastrointestinal:  Negative for abdominal pain, blood in stool, change in bowel habit, vomiting and change in bowel habit.   Endocrine: Negative.    Genitourinary: Negative.  Negative for hematuria.   Musculoskeletal:  Negative for back pain and myalgias.   Integumentary:  Negative.   Allergic/Immunologic: Negative.    Neurological:  Negative for dizziness, weakness and light-headedness.   Psychiatric/Behavioral: Negative.         Objective:      Physical Exam  Constitutional:       General: He is not in acute distress.     Appearance: Normal appearance.   HENT:      Head: Normocephalic.    Cardiovascular:      Rate and Rhythm: Normal rate.   Pulmonary:      Effort: Pulmonary effort is normal. No respiratory distress.   Abdominal:      General: There is no distension.      Tenderness: There is no abdominal tenderness.   Musculoskeletal:         General: Normal range of motion.        Feet:    Feet:      Comments: 1.2 x 1.6 x 0.4cm  with 100% coverage withgranulation tissue; no bone exposure; areas clean, no purulent drainage, no signs of infection; scant macerated tissue circumferentially  Skin:     General: Skin is warm.      Coloration: Skin is not jaundiced.   Neurological:      General: No focal deficit present.      Mental Status: He is alert and oriented to person, place, and time.      Cranial Nerves: No cranial nerve deficit.       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, with necrosis of muscle    -  Primary              Plan:       We will apply for Apligraf for next visit.    Patient Name: Navdeep Avery  Patient MRN: [id number]  Patient YOB: 1960  CSN: [id number]  Date: 03/20/2023  Provider:   Evelio Razo    Application for Assessment: Right plantar foot diabetic ulcer  Skin Substitute: Apoligraf (44 sq cm)  Performed By: Evelio Razo  Time-out Taken: Yes  Location:     [x] Genitalia/Hands/Feet/Multiple Digits    [] Scalp/Face/Neck/Ears    []Trunk/Arms/Legs  Application Area: (sq cm): 1.92  Product Applied: (sq cm): 6  Product Waste (sq cm): 38  Fenestrated: Yes   Instrument:    [] Blade [] Curette  [x]Forceps  []Nippers    [] Rongeur [] Scissors  []Others:   Secured: yes   Secured with: Steri-strips  Dressing Applied: Yes  Response to Treatment:Well tolerated by patient.  Note:     ID# [id number]  Expiration date 3/28/2023

## 2023-03-27 ENCOUNTER — OFFICE VISIT (OUTPATIENT)
Dept: SURGERY | Facility: CLINIC | Age: 63
End: 2023-03-27
Payer: MEDICARE

## 2023-03-27 DIAGNOSIS — E08.621 DIABETIC ULCER OF RIGHT MIDFOOT ASSOCIATED WITH DIABETES MELLITUS DUE TO UNDERLYING CONDITION, WITH NECROSIS OF MUSCLE: Primary | ICD-10-CM

## 2023-03-27 DIAGNOSIS — L97.413 DIABETIC ULCER OF RIGHT MIDFOOT ASSOCIATED WITH DIABETES MELLITUS DUE TO UNDERLYING CONDITION, WITH NECROSIS OF MUSCLE: Primary | ICD-10-CM

## 2023-03-27 PROCEDURE — 99214 OFFICE O/P EST MOD 30 MIN: CPT | Mod: PBBFAC,25 | Performed by: STUDENT IN AN ORGANIZED HEALTH CARE EDUCATION/TRAINING PROGRAM

## 2023-03-27 PROCEDURE — 99499 UNLISTED E&M SERVICE: CPT | Mod: S$PBB,,, | Performed by: STUDENT IN AN ORGANIZED HEALTH CARE EDUCATION/TRAINING PROGRAM

## 2023-03-27 PROCEDURE — 15275 SKIN SUB GRAFT FACE/NK/HF/G: CPT | Mod: S$PBB,,, | Performed by: STUDENT IN AN ORGANIZED HEALTH CARE EDUCATION/TRAINING PROGRAM

## 2023-03-27 PROCEDURE — 15275 PR SKIN SUB GRAFT FACE/NK/HF/G UP TO 100 SQCM: ICD-10-PCS | Mod: S$PBB,,, | Performed by: STUDENT IN AN ORGANIZED HEALTH CARE EDUCATION/TRAINING PROGRAM

## 2023-03-27 PROCEDURE — 99499 NO LOS: ICD-10-PCS | Mod: S$PBB,,, | Performed by: STUDENT IN AN ORGANIZED HEALTH CARE EDUCATION/TRAINING PROGRAM

## 2023-03-27 PROCEDURE — 15275 SKIN SUB GRAFT FACE/NK/HF/G: CPT | Mod: PBBFAC | Performed by: STUDENT IN AN ORGANIZED HEALTH CARE EDUCATION/TRAINING PROGRAM

## 2023-03-27 NOTE — PROGRESS NOTES
Subjective:       Patient ID: Navdeep Avery is a 62 y.o. male.    Chief Complaint: Wound Check    62-year-old  male presents to the clinic for 2 week evaluation status post debridement of right lower foot; patient had a right foot abscess with necrotic muscle extending down to bone the plantar surface of the right foot adjacent to the 3rd metatarsophalangeal joint.  Patient's spouse has been taking great care of the wound and the wound is healing.  No complaints.  Denies any chest pain/shortness of breath, nausea/vomiting/diarrhea, fever/chills.    2/14:  Presents today for wound re-evaluation and placement of PuraPly XT.  No significant changes from previous visit    2/21: Presents today for wound re-evaluation and placement of PuraPly XT.  No significant changes from previous visit    2/28: Presents today for wound re-evaluation and placement of PuraPly XT.  No significant changes from previous visit    3/7: Presents today for wound re-evaluation and placement of Affinity.  No significant changes from previous visit    3/20: Presents today for wound re-evaluation and placement of Apligraf.  No significant changes from previous visit    3/27: Presents today for wound re-evaluation and placement of Apligraf.  No significant changes from previous visits      Review of Systems   Constitutional: Negative.    HENT: Negative.     Eyes: Negative.    Respiratory:  Negative for shortness of breath.    Cardiovascular: Negative.    Gastrointestinal:  Negative for abdominal pain, blood in stool, change in bowel habit, vomiting and change in bowel habit.   Endocrine: Negative.    Genitourinary: Negative.  Negative for hematuria.   Musculoskeletal:  Negative for back pain and myalgias.   Integumentary:  Negative.   Allergic/Immunologic: Negative.    Neurological:  Negative for dizziness, weakness and light-headedness.   Psychiatric/Behavioral: Negative.         Objective:      Physical Exam  Constitutional:        General: He is not in acute distress.     Appearance: Normal appearance.   HENT:      Head: Normocephalic.   Cardiovascular:      Rate and Rhythm: Normal rate.   Pulmonary:      Effort: Pulmonary effort is normal. No respiratory distress.   Abdominal:      General: There is no distension.      Tenderness: There is no abdominal tenderness.   Musculoskeletal:         General: Normal range of motion.        Feet:    Feet:      Comments: 0.8 x 1.1 x 0.3 cm with 100% coverage withgranulation tissue; no bone exposure; areas clean, no purulent drainage, no signs of infection; scant macerated tissue circumferentially  Skin:     General: Skin is warm.      Coloration: Skin is not jaundiced.   Neurological:      General: No focal deficit present.      Mental Status: He is alert and oriented to person, place, and time.      Cranial Nerves: No cranial nerve deficit.       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, with necrosis of muscle    -  Primary              Plan:       We will apply for Apligraf for next visit.    Patient Name: Navdeep Avery  Patient MRN: [id number]  Patient YOB: 1960  CSN: [id number]  Date: 03/27/2023  Provider:   Evelio Razo    Application for Assessment: Right plantar foot diabetic ulcer  Skin Substitute: Apoligraf (44 sq cm)  Performed By: Evelio Razo  Time-out Taken: Yes  Location:     [x] Genitalia/Hands/Feet/Multiple Digits    [] Scalp/Face/Neck/Ears    []Trunk/Arms/Legs  Application Area: (sq cm): 0.88  Product Applied: (sq cm): 6  Product Waste (sq cm): 38  Fenestrated: Yes   Instrument:    [] Blade [] Curette  [x]Forceps  []Nippers    [] Rongeur [] Scissors  []Others:   Secured: yes   Secured with: Steri-strips  Dressing Applied: Yes  Response to Treatment:Well tolerated by patient.  Note:     ID# [id number]  Expiration date  3/31/2023

## 2023-04-04 ENCOUNTER — OFFICE VISIT (OUTPATIENT)
Dept: SURGERY | Facility: CLINIC | Age: 63
End: 2023-04-04
Payer: MEDICARE

## 2023-04-04 DIAGNOSIS — L97.413 DIABETIC ULCER OF RIGHT MIDFOOT ASSOCIATED WITH DIABETES MELLITUS DUE TO UNDERLYING CONDITION, WITH NECROSIS OF MUSCLE: Primary | ICD-10-CM

## 2023-04-04 DIAGNOSIS — E08.621 DIABETIC ULCER OF RIGHT MIDFOOT ASSOCIATED WITH DIABETES MELLITUS DUE TO UNDERLYING CONDITION, WITH NECROSIS OF MUSCLE: Primary | ICD-10-CM

## 2023-04-04 PROCEDURE — 99499 UNLISTED E&M SERVICE: CPT | Mod: S$PBB,,, | Performed by: STUDENT IN AN ORGANIZED HEALTH CARE EDUCATION/TRAINING PROGRAM

## 2023-04-04 PROCEDURE — 99499 NO LOS: ICD-10-PCS | Mod: S$PBB,,, | Performed by: STUDENT IN AN ORGANIZED HEALTH CARE EDUCATION/TRAINING PROGRAM

## 2023-04-04 PROCEDURE — 15275 SKIN SUB GRAFT FACE/NK/HF/G: CPT | Mod: PBBFAC | Performed by: STUDENT IN AN ORGANIZED HEALTH CARE EDUCATION/TRAINING PROGRAM

## 2023-04-04 PROCEDURE — 99214 OFFICE O/P EST MOD 30 MIN: CPT | Mod: PBBFAC | Performed by: STUDENT IN AN ORGANIZED HEALTH CARE EDUCATION/TRAINING PROGRAM

## 2023-04-04 PROCEDURE — 15275 PR SKIN SUB GRAFT FACE/NK/HF/G UP TO 100 SQCM: ICD-10-PCS | Mod: S$PBB,,, | Performed by: STUDENT IN AN ORGANIZED HEALTH CARE EDUCATION/TRAINING PROGRAM

## 2023-04-04 PROCEDURE — 15275 SKIN SUB GRAFT FACE/NK/HF/G: CPT | Mod: S$PBB,,, | Performed by: STUDENT IN AN ORGANIZED HEALTH CARE EDUCATION/TRAINING PROGRAM

## 2023-04-04 NOTE — PROGRESS NOTES
Subjective:       Patient ID: Navdeep Avery is a 62 y.o. male.    Chief Complaint: Wound Check    62-year-old  male presents to the clinic for 2 week evaluation status post debridement of right lower foot; patient had a right foot abscess with necrotic muscle extending down to bone the plantar surface of the right foot adjacent to the 3rd metatarsophalangeal joint.  Patient's spouse has been taking great care of the wound and the wound is healing.  No complaints.  Denies any chest pain/shortness of breath, nausea/vomiting/diarrhea, fever/chills.    2/14:  Presents today for wound re-evaluation and placement of PuraPly XT.  No significant changes from previous visit    2/21: Presents today for wound re-evaluation and placement of PuraPly XT.  No significant changes from previous visit    2/28: Presents today for wound re-evaluation and placement of PuraPly XT.  No significant changes from previous visit    3/7: Presents today for wound re-evaluation and placement of Affinity.  No significant changes from previous visit    3/20: Presents today for wound re-evaluation and placement of Apligraf.  No significant changes from previous visit    3/27: Presents today for wound re-evaluation and placement of Apligraf.  No significant changes from previous visits    4/4: Presents today for wound re-evaluation and placement of Apligraf.  No significant changes from previous visit    Review of Systems   Constitutional: Negative.    HENT: Negative.     Eyes: Negative.    Respiratory:  Negative for shortness of breath.    Cardiovascular: Negative.    Gastrointestinal:  Negative for abdominal pain, blood in stool, change in bowel habit, vomiting and change in bowel habit.   Endocrine: Negative.    Genitourinary: Negative.  Negative for hematuria.   Musculoskeletal:  Negative for back pain and myalgias.   Integumentary:  Negative.   Allergic/Immunologic: Negative.    Neurological:  Negative for dizziness, weakness and  light-headedness.   Psychiatric/Behavioral: Negative.         Objective:      Physical Exam  Constitutional:       General: He is not in acute distress.     Appearance: Normal appearance.   HENT:      Head: Normocephalic.   Cardiovascular:      Rate and Rhythm: Normal rate.   Pulmonary:      Effort: Pulmonary effort is normal. No respiratory distress.   Abdominal:      General: There is no distension.      Tenderness: There is no abdominal tenderness.   Musculoskeletal:         General: Normal range of motion.        Feet:    Feet:      Comments: 0.8 x 1 x 0.1 cm with 100% coverage withgranulation tissue; no bone exposure; areas clean, no purulent drainage, no signs of infection; scant macerated tissue circumferentially  Skin:     General: Skin is warm.      Coloration: Skin is not jaundiced.   Neurological:      General: No focal deficit present.      Mental Status: He is alert and oriented to person, place, and time.      Cranial Nerves: No cranial nerve deficit.       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, with necrosis of muscle    -  Primary              Plan:       We will apply for Apligraf for next visit.    Patient Name: Navdeep Avery  Patient MRN: [id number]  Patient YOB: 1960  CSN: [id number]  Date: 04/04/2023  Provider:   Evelio Razo    Application for Assessment: Right plantar foot diabetic ulcer  Skin Substitute: Apoligraf (44 sq cm)  Performed By: Evelio Razo  Time-out Taken: Yes  Location:     [x] Genitalia/Hands/Feet/Multiple Digits    [] Scalp/Face/Neck/Ears    []Trunk/Arms/Legs  Application Area: (sq cm): 0.8  Product Applied: (sq cm): 6  Product Waste (sq cm): 38  Fenestrated: Yes   Instrument:    [] Blade [] Curette  [x]Forceps  []Nippers    [] Rongeur [] Scissors  []Others:   Secured: yes   Secured with: Steri-strips  Dressing  Applied: Yes  Response to Treatment:Well tolerated by patient.  Note:     ID# [id number]  Expiration date 4/13/2023

## 2023-04-10 ENCOUNTER — OFFICE VISIT (OUTPATIENT)
Dept: SURGERY | Facility: CLINIC | Age: 63
End: 2023-04-10
Payer: MEDICARE

## 2023-04-10 DIAGNOSIS — L97.413 DIABETIC ULCER OF RIGHT MIDFOOT ASSOCIATED WITH DIABETES MELLITUS DUE TO UNDERLYING CONDITION, WITH NECROSIS OF MUSCLE: Primary | ICD-10-CM

## 2023-04-10 DIAGNOSIS — E08.621 DIABETIC ULCER OF RIGHT MIDFOOT ASSOCIATED WITH DIABETES MELLITUS DUE TO UNDERLYING CONDITION, WITH NECROSIS OF MUSCLE: Primary | ICD-10-CM

## 2023-04-10 PROCEDURE — 99214 OFFICE O/P EST MOD 30 MIN: CPT | Mod: PBBFAC | Performed by: STUDENT IN AN ORGANIZED HEALTH CARE EDUCATION/TRAINING PROGRAM

## 2023-04-10 PROCEDURE — 15275 SKIN SUB GRAFT FACE/NK/HF/G: CPT | Mod: S$PBB,,, | Performed by: STUDENT IN AN ORGANIZED HEALTH CARE EDUCATION/TRAINING PROGRAM

## 2023-04-10 PROCEDURE — 15275 PR SKIN SUB GRAFT FACE/NK/HF/G UP TO 100 SQCM: ICD-10-PCS | Mod: S$PBB,,, | Performed by: STUDENT IN AN ORGANIZED HEALTH CARE EDUCATION/TRAINING PROGRAM

## 2023-04-10 PROCEDURE — 99499 NO LOS: ICD-10-PCS | Mod: S$PBB,,, | Performed by: STUDENT IN AN ORGANIZED HEALTH CARE EDUCATION/TRAINING PROGRAM

## 2023-04-10 PROCEDURE — 15275 SKIN SUB GRAFT FACE/NK/HF/G: CPT | Mod: PBBFAC | Performed by: STUDENT IN AN ORGANIZED HEALTH CARE EDUCATION/TRAINING PROGRAM

## 2023-04-10 PROCEDURE — 99499 UNLISTED E&M SERVICE: CPT | Mod: S$PBB,,, | Performed by: STUDENT IN AN ORGANIZED HEALTH CARE EDUCATION/TRAINING PROGRAM

## 2023-04-10 NOTE — PROGRESS NOTES
Subjective:       Patient ID: Navdeep Avery is a 62 y.o. male.    Chief Complaint: Wound Check    62-year-old  male presents to the clinic for 2 week evaluation status post debridement of right lower foot; patient had a right foot abscess with necrotic muscle extending down to bone the plantar surface of the right foot adjacent to the 3rd metatarsophalangeal joint.  Patient's spouse has been taking great care of the wound and the wound is healing.  No complaints.  Denies any chest pain/shortness of breath, nausea/vomiting/diarrhea, fever/chills.    2/14:  Presents today for wound re-evaluation and placement of PuraPly XT.  No significant changes from previous visit    2/21: Presents today for wound re-evaluation and placement of PuraPly XT.  No significant changes from previous visit    2/28: Presents today for wound re-evaluation and placement of PuraPly XT.  No significant changes from previous visit    3/7: Presents today for wound re-evaluation and placement of Affinity.  No significant changes from previous visit    3/20: Presents today for wound re-evaluation and placement of Apligraf.  No significant changes from previous visit    3/27: Presents today for wound re-evaluation and placement of Apligraf.  No significant changes from previous visits    4/4: Presents today for wound re-evaluation and placement of Apligraf.  No significant changes from previous visit    4/10: Presents today for wound re-evaluation and placement of Apligraf.  No significant changes from previous visit      Review of Systems   Constitutional: Negative.    HENT: Negative.     Eyes: Negative.    Respiratory:  Negative for shortness of breath.    Cardiovascular: Negative.    Gastrointestinal:  Negative for abdominal pain, blood in stool, change in bowel habit, vomiting and change in bowel habit.   Endocrine: Negative.    Genitourinary: Negative.  Negative for hematuria.   Musculoskeletal:  Negative for back pain and  myalgias.   Integumentary:  Negative.   Allergic/Immunologic: Negative.    Neurological:  Negative for dizziness, weakness and light-headedness.   Psychiatric/Behavioral: Negative.         Objective:      Physical Exam  Constitutional:       General: He is not in acute distress.     Appearance: Normal appearance.   HENT:      Head: Normocephalic.   Cardiovascular:      Rate and Rhythm: Normal rate.   Pulmonary:      Effort: Pulmonary effort is normal. No respiratory distress.   Abdominal:      General: There is no distension.      Tenderness: There is no abdominal tenderness.   Musculoskeletal:         General: Normal range of motion.        Feet:    Feet:      Comments: 0.5 x 1 x 0.1 cm with 100% coverage withgranulation tissue; no bone exposure; areas clean, no purulent drainage, no signs of infection; scant macerated tissue circumferentially  Skin:     General: Skin is warm.      Coloration: Skin is not jaundiced.   Neurological:      General: No focal deficit present.      Mental Status: He is alert and oriented to person, place, and time.      Cranial Nerves: No cranial nerve deficit.       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, with necrosis of muscle    -  Primary              Plan:       We will apply for Apligraf for next visit.    Patient Name: Navdeep Avery  Patient MRN: [id number]  Patient YOB: 1960  CSN: [id number]  Date: 04/10/2023  Provider:   Evelio Razo    Application for Assessment: Right plantar foot diabetic ulcer  Skin Substitute: Apoligraf (44 sq cm)  Performed By: Evelio Razo  Time-out Taken: Yes  Location:     [x] Genitalia/Hands/Feet/Multiple Digits    [] Scalp/Face/Neck/Ears    []Trunk/Arms/Legs  Application Area: (sq cm): 0.5  Product Applied: (sq cm): 6  Product Waste (sq cm): 38  Fenestrated: Yes   Instrument:    []  Blade [] Curette  [x]Forceps  []Nippers    [] Rongeur [] Scissors  []Others:   Secured: yes   Secured with: Steri-strips  Dressing Applied: Yes  Response to Treatment:Well tolerated by patient.  Note:     ID# [id number]  Expiration date 4/13/2023

## 2023-04-14 ENCOUNTER — DOCUMENT SCAN (OUTPATIENT)
Dept: HOME HEALTH SERVICES | Facility: HOSPITAL | Age: 63
End: 2023-04-14
Payer: MEDICARE

## 2023-04-17 ENCOUNTER — OFFICE VISIT (OUTPATIENT)
Dept: SURGERY | Facility: CLINIC | Age: 63
End: 2023-04-17
Payer: MEDICARE

## 2023-04-17 ENCOUNTER — PATIENT OUTREACH (OUTPATIENT)
Dept: HOME HEALTH SERVICES | Facility: HOSPITAL | Age: 63
End: 2023-04-17
Payer: MEDICARE

## 2023-04-17 DIAGNOSIS — L97.413 DIABETIC ULCER OF RIGHT MIDFOOT ASSOCIATED WITH DIABETES MELLITUS DUE TO UNDERLYING CONDITION, WITH NECROSIS OF MUSCLE: Primary | ICD-10-CM

## 2023-04-17 DIAGNOSIS — E08.621 DIABETIC ULCER OF RIGHT MIDFOOT ASSOCIATED WITH DIABETES MELLITUS DUE TO UNDERLYING CONDITION, WITH NECROSIS OF MUSCLE: Primary | ICD-10-CM

## 2023-04-17 PROCEDURE — 99499 NO LOS: ICD-10-PCS | Mod: S$PBB,,, | Performed by: STUDENT IN AN ORGANIZED HEALTH CARE EDUCATION/TRAINING PROGRAM

## 2023-04-17 PROCEDURE — 99499 UNLISTED E&M SERVICE: CPT | Mod: S$PBB,,, | Performed by: STUDENT IN AN ORGANIZED HEALTH CARE EDUCATION/TRAINING PROGRAM

## 2023-04-17 PROCEDURE — 15275 SKIN SUB GRAFT FACE/NK/HF/G: CPT | Mod: PBBFAC | Performed by: STUDENT IN AN ORGANIZED HEALTH CARE EDUCATION/TRAINING PROGRAM

## 2023-04-17 PROCEDURE — 15275 SKIN SUB GRAFT FACE/NK/HF/G: CPT | Mod: S$PBB,,, | Performed by: STUDENT IN AN ORGANIZED HEALTH CARE EDUCATION/TRAINING PROGRAM

## 2023-04-17 PROCEDURE — 15275 PR SKIN SUB GRAFT FACE/NK/HF/G UP TO 100 SQCM: ICD-10-PCS | Mod: S$PBB,,, | Performed by: STUDENT IN AN ORGANIZED HEALTH CARE EDUCATION/TRAINING PROGRAM

## 2023-04-17 PROCEDURE — 99214 OFFICE O/P EST MOD 30 MIN: CPT | Mod: PBBFAC,25 | Performed by: STUDENT IN AN ORGANIZED HEALTH CARE EDUCATION/TRAINING PROGRAM

## 2023-04-17 NOTE — PROGRESS NOTES
Subjective:       Patient ID: Navdeep Avery is a 62 y.o. male.    Chief Complaint: Wound Check (apligraf)    62-year-old  male presents to the clinic for 2 week evaluation status post debridement of right lower foot; patient had a right foot abscess with necrotic muscle extending down to bone the plantar surface of the right foot adjacent to the 3rd metatarsophalangeal joint.  Patient's spouse has been taking great care of the wound and the wound is healing.  No complaints.  Denies any chest pain/shortness of breath, nausea/vomiting/diarrhea, fever/chills.    2/14:  Presents today for wound re-evaluation and placement of PuraPly XT.  No significant changes from previous visit    2/21: Presents today for wound re-evaluation and placement of PuraPly XT.  No significant changes from previous visit    2/28: Presents today for wound re-evaluation and placement of PuraPly XT.  No significant changes from previous visit    3/7: Presents today for wound re-evaluation and placement of Affinity.  No significant changes from previous visit    3/20: Presents today for wound re-evaluation and placement of Apligraf.  No significant changes from previous visit    3/27: Presents today for wound re-evaluation and placement of Apligraf.  No significant changes from previous visits    4/4: Presents today for wound re-evaluation and placement of Apligraf.  No significant changes from previous visit    4/10: Presents today for wound re-evaluation and placement of Apligraf.  No significant changes from previous visit    4/17:  Presents today for wound re-evaluation and placement of Apligraf; no significant changes from previous visit    Review of Systems   Constitutional: Negative.    HENT: Negative.     Eyes: Negative.    Respiratory:  Negative for shortness of breath.    Cardiovascular: Negative.    Gastrointestinal:  Negative for abdominal pain, blood in stool, change in bowel habit, vomiting and change in bowel habit.    Endocrine: Negative.    Genitourinary: Negative.  Negative for hematuria.   Musculoskeletal:  Negative for back pain and myalgias.   Integumentary:  Negative.   Allergic/Immunologic: Negative.    Neurological:  Negative for dizziness, weakness and light-headedness.   Psychiatric/Behavioral: Negative.         Objective:      Physical Exam  Constitutional:       General: He is not in acute distress.     Appearance: Normal appearance.   HENT:      Head: Normocephalic.   Cardiovascular:      Rate and Rhythm: Normal rate.   Pulmonary:      Effort: Pulmonary effort is normal. No respiratory distress.   Abdominal:      General: There is no distension.      Tenderness: There is no abdominal tenderness.   Musculoskeletal:         General: Normal range of motion.        Feet:    Feet:      Comments: 0.6 x 0.6 x 0.1 cm with 100% coverage withgranulation tissue; no bone exposure; areas clean, no purulent drainage, no signs of infection; scant macerated tissue circumferentially  Skin:     General: Skin is warm.      Coloration: Skin is not jaundiced.   Neurological:      General: No focal deficit present.      Mental Status: He is alert and oriented to person, place, and time.      Cranial Nerves: No cranial nerve deficit.       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, with necrosis of muscle    -  Primary              Plan:       We will apply for Apligraf for next visit.    Patient Name: Navdeep Avery  Patient MRN: [id number]  Patient YOB: 1960  CSN: [id number]  Date: 04/17/2023  Provider:   Evelio Razo    Application for Assessment: Right plantar foot diabetic ulcer  Skin Substitute: Apoligraf (44 sq cm)  Performed By: Evelio Razo  Time-out Taken: Yes  Location:     [x] Genitalia/Hands/Feet/Multiple Digits    []  Scalp/Face/Neck/Ears    []Trunk/Arms/Legs  Application Area: (sq cm): 0.36  Product Applied: (sq cm): 6  Product Waste (sq cm): 38  Fenestrated: Yes   Instrument:    [] Blade [] Curette  [x]Forceps  []Nippers    [] Rongeur [] Scissors  []Others:   Secured: yes   Secured with: Steri-strips  Dressing Applied: Yes  Response to Treatment:Well tolerated by patient.  Note:     ID# [id number]  Expiration date 4/21/2023

## 2023-04-18 ENCOUNTER — EXTERNAL HOME HEALTH (OUTPATIENT)
Dept: HOME HEALTH SERVICES | Facility: HOSPITAL | Age: 63
End: 2023-04-18
Payer: MEDICARE

## 2023-04-25 ENCOUNTER — DOCUMENT SCAN (OUTPATIENT)
Dept: HOME HEALTH SERVICES | Facility: HOSPITAL | Age: 63
End: 2023-04-25
Payer: MEDICARE

## 2023-04-25 ENCOUNTER — OFFICE VISIT (OUTPATIENT)
Dept: SURGERY | Facility: CLINIC | Age: 63
End: 2023-04-25
Payer: MEDICARE

## 2023-04-25 DIAGNOSIS — L97.413 DIABETIC ULCER OF RIGHT MIDFOOT ASSOCIATED WITH DIABETES MELLITUS DUE TO UNDERLYING CONDITION, WITH NECROSIS OF MUSCLE: Primary | ICD-10-CM

## 2023-04-25 DIAGNOSIS — E08.621 DIABETIC ULCER OF RIGHT MIDFOOT ASSOCIATED WITH DIABETES MELLITUS DUE TO UNDERLYING CONDITION, WITH NECROSIS OF MUSCLE: Primary | ICD-10-CM

## 2023-04-25 PROCEDURE — 15275 SKIN SUB GRAFT FACE/NK/HF/G: CPT | Mod: PBBFAC | Performed by: STUDENT IN AN ORGANIZED HEALTH CARE EDUCATION/TRAINING PROGRAM

## 2023-04-25 PROCEDURE — 15275 SKIN SUB GRAFT FACE/NK/HF/G: CPT | Mod: S$PBB,,, | Performed by: STUDENT IN AN ORGANIZED HEALTH CARE EDUCATION/TRAINING PROGRAM

## 2023-04-25 PROCEDURE — 15275 PR SKIN SUB GRAFT FACE/NK/HF/G UP TO 100 SQCM: ICD-10-PCS | Mod: S$PBB,,, | Performed by: STUDENT IN AN ORGANIZED HEALTH CARE EDUCATION/TRAINING PROGRAM

## 2023-04-25 PROCEDURE — 99499 NO LOS: ICD-10-PCS | Mod: S$PBB,,, | Performed by: STUDENT IN AN ORGANIZED HEALTH CARE EDUCATION/TRAINING PROGRAM

## 2023-04-25 PROCEDURE — 99214 OFFICE O/P EST MOD 30 MIN: CPT | Mod: PBBFAC | Performed by: STUDENT IN AN ORGANIZED HEALTH CARE EDUCATION/TRAINING PROGRAM

## 2023-04-25 PROCEDURE — 99499 UNLISTED E&M SERVICE: CPT | Mod: S$PBB,,, | Performed by: STUDENT IN AN ORGANIZED HEALTH CARE EDUCATION/TRAINING PROGRAM

## 2023-04-25 NOTE — PROGRESS NOTES
Subjective:       Patient ID: Navdeep Avery is a 62 y.o. male.    Chief Complaint: Wound Check    62-year-old  male presents to the clinic for 2 week evaluation status post debridement of right lower foot; patient had a right foot abscess with necrotic muscle extending down to bone the plantar surface of the right foot adjacent to the 3rd metatarsophalangeal joint.  Patient's spouse has been taking great care of the wound and the wound is healing.  No complaints.  Denies any chest pain/shortness of breath, nausea/vomiting/diarrhea, fever/chills.    2/14:  Presents today for wound re-evaluation and placement of PuraPly XT.  No significant changes from previous visit    2/21: Presents today for wound re-evaluation and placement of PuraPly XT.  No significant changes from previous visit    2/28: Presents today for wound re-evaluation and placement of PuraPly XT.  No significant changes from previous visit    3/7: Presents today for wound re-evaluation and placement of Affinity.  No significant changes from previous visit    3/20: Presents today for wound re-evaluation and placement of Apligraf.  No significant changes from previous visit    3/27: Presents today for wound re-evaluation and placement of Apligraf.  No significant changes from previous visits    4/4: Presents today for wound re-evaluation and placement of Apligraf.  No significant changes from previous visit    4/10: Presents today for wound re-evaluation and placement of Apligraf.  No significant changes from previous visit    4/17:  Presents today for wound re-evaluation and placement of Apligraf; no significant changes from previous visit    4/25:  Presents today for wound re-evaluation and placement of Affinity; no significant changes from previous visit    Review of Systems   Constitutional: Negative.    HENT: Negative.     Eyes: Negative.    Respiratory:  Negative for shortness of breath.    Cardiovascular: Negative.    Gastrointestinal:   Negative for abdominal pain, blood in stool, change in bowel habit, vomiting and change in bowel habit.   Endocrine: Negative.    Genitourinary: Negative.  Negative for hematuria.   Musculoskeletal:  Negative for back pain and myalgias.   Integumentary:  Negative.   Allergic/Immunologic: Negative.    Neurological:  Negative for dizziness, weakness and light-headedness.   Psychiatric/Behavioral: Negative.         Objective:      Physical Exam  Constitutional:       General: He is not in acute distress.     Appearance: Normal appearance.   HENT:      Head: Normocephalic.   Cardiovascular:      Rate and Rhythm: Normal rate.   Pulmonary:      Effort: Pulmonary effort is normal. No respiratory distress.   Abdominal:      General: There is no distension.      Tenderness: There is no abdominal tenderness.   Musculoskeletal:         General: Normal range of motion.        Feet:    Feet:      Comments: 0.4 x 0.6cm with 100% coverage withgranulation tissue; no bone exposure; areas clean, no purulent drainage, no signs of infection; scant macerated tissue circumferentially  Skin:     General: Skin is warm.      Coloration: Skin is not jaundiced.   Neurological:      General: No focal deficit present.      Mental Status: He is alert and oriented to person, place, and time.      Cranial Nerves: No cranial nerve deficit.       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    Patient Name: Navdeep Avery  Patient MRN: [id number]  Patient YOB: 1960  CSN: [id number]  Date: 04/25/2023  Provider:   Evelio Razo    Application for Assessment: Right plantar foot diabetic ulcer  Skin Substitute: Affinity  Performed By: Evelio Razo  Time-out Taken: Yes  Location:     [x]  Genitalia/Hands/Feet/Multiple Digits    [] Scalp/Face/Neck/Ears    []Trunk/Arms/Legs  Application Area: (sq cm): 0.24  Product Applied: (sq cm): 6.25  Product Waste (sq cm): 0  Fenestrated: no   Instrument:    [] Blade [] Curette  [x]Forceps  []Nippers    [] Rongeur [] Scissors  []Others:   Secured: yes   Secured with: Steri-strips  Dressing Applied: Yes  Response to Treatment:Well tolerated by patient.  Note:     ID# [id number]  Expiration date 4/25/2023

## 2023-05-03 ENCOUNTER — OFFICE VISIT (OUTPATIENT)
Dept: SURGERY | Facility: CLINIC | Age: 63
End: 2023-05-03
Payer: MEDICARE

## 2023-05-03 DIAGNOSIS — L97.413 DIABETIC ULCER OF RIGHT MIDFOOT ASSOCIATED WITH DIABETES MELLITUS DUE TO UNDERLYING CONDITION, WITH NECROSIS OF MUSCLE: Primary | ICD-10-CM

## 2023-05-03 DIAGNOSIS — E08.621 DIABETIC ULCER OF RIGHT MIDFOOT ASSOCIATED WITH DIABETES MELLITUS DUE TO UNDERLYING CONDITION, WITH NECROSIS OF MUSCLE: Primary | ICD-10-CM

## 2023-05-03 PROCEDURE — 99499 UNLISTED E&M SERVICE: CPT | Mod: S$PBB,,, | Performed by: STUDENT IN AN ORGANIZED HEALTH CARE EDUCATION/TRAINING PROGRAM

## 2023-05-03 PROCEDURE — 15275 SKIN SUB GRAFT FACE/NK/HF/G: CPT | Mod: PBBFAC | Performed by: STUDENT IN AN ORGANIZED HEALTH CARE EDUCATION/TRAINING PROGRAM

## 2023-05-03 PROCEDURE — 15275 PR SKIN SUB GRAFT FACE/NK/HF/G UP TO 100 SQCM: ICD-10-PCS | Mod: S$PBB,,, | Performed by: STUDENT IN AN ORGANIZED HEALTH CARE EDUCATION/TRAINING PROGRAM

## 2023-05-03 PROCEDURE — 15275 SKIN SUB GRAFT FACE/NK/HF/G: CPT | Mod: S$PBB,,, | Performed by: STUDENT IN AN ORGANIZED HEALTH CARE EDUCATION/TRAINING PROGRAM

## 2023-05-03 PROCEDURE — 99214 OFFICE O/P EST MOD 30 MIN: CPT | Mod: PBBFAC,25 | Performed by: STUDENT IN AN ORGANIZED HEALTH CARE EDUCATION/TRAINING PROGRAM

## 2023-05-03 PROCEDURE — 99499 NO LOS: ICD-10-PCS | Mod: S$PBB,,, | Performed by: STUDENT IN AN ORGANIZED HEALTH CARE EDUCATION/TRAINING PROGRAM

## 2023-05-03 NOTE — PROGRESS NOTES
Subjective:       Patient ID: Navdeep Avery is a 62 y.o. male.    Chief Complaint: Wound Check    62-year-old  male presents to the clinic for 2 week evaluation status post debridement of right lower foot; patient had a right foot abscess with necrotic muscle extending down to bone the plantar surface of the right foot adjacent to the 3rd metatarsophalangeal joint.  Patient's spouse has been taking great care of the wound and the wound is healing.  No complaints.  Denies any chest pain/shortness of breath, nausea/vomiting/diarrhea, fever/chills.    2/14:  Presents today for wound re-evaluation and placement of PuraPly XT.  No significant changes from previous visit    2/21: Presents today for wound re-evaluation and placement of PuraPly XT.  No significant changes from previous visit    2/28: Presents today for wound re-evaluation and placement of PuraPly XT.  No significant changes from previous visit    3/7: Presents today for wound re-evaluation and placement of Affinity.  No significant changes from previous visit    3/20: Presents today for wound re-evaluation and placement of Apligraf.  No significant changes from previous visit    3/27: Presents today for wound re-evaluation and placement of Apligraf.  No significant changes from previous visits    4/4: Presents today for wound re-evaluation and placement of Apligraf.  No significant changes from previous visit    4/10: Presents today for wound re-evaluation and placement of Apligraf.  No significant changes from previous visit    4/17:  Presents today for wound re-evaluation and placement of Apligraf; no significant changes from previous visit    4/25:  Presents today for wound re-evaluation and placement of Affinity; no significant changes from previous visit    5/3: Presents today for wound re-evaluation and placement of Affinity; no significant changes from previous visit      Review of Systems   Constitutional: Negative.    HENT: Negative.      Eyes: Negative.    Respiratory:  Negative for shortness of breath.    Cardiovascular: Negative.    Gastrointestinal:  Negative for abdominal pain, blood in stool, change in bowel habit, vomiting and change in bowel habit.   Endocrine: Negative.    Genitourinary: Negative.  Negative for hematuria.   Musculoskeletal:  Negative for back pain and myalgias.   Integumentary:  Negative.   Allergic/Immunologic: Negative.    Neurological:  Negative for dizziness, weakness and light-headedness.   Psychiatric/Behavioral: Negative.         Objective:      Physical Exam  Constitutional:       General: He is not in acute distress.     Appearance: Normal appearance.   HENT:      Head: Normocephalic.   Cardiovascular:      Rate and Rhythm: Normal rate.   Pulmonary:      Effort: Pulmonary effort is normal. No respiratory distress.   Abdominal:      General: There is no distension.      Tenderness: There is no abdominal tenderness.   Musculoskeletal:         General: Normal range of motion.        Feet:    Feet:      Comments: 0.4 x 0.5cm with 100% coverage withgranulation tissue; no bone exposure; areas clean, no purulent drainage, no signs of infection; scant macerated tissue circumferentially  Skin:     General: Skin is warm.      Coloration: Skin is not jaundiced.   Neurological:      General: No focal deficit present.      Mental Status: He is alert and oriented to person, place, and time.      Cranial Nerves: No cranial nerve deficit.       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    Patient Name: Navdeep Avery  Patient MRN: [id number]  Patient YOB: 1960  CSN: [id number]  Date: 05/03/2023  Provider:   Evelio Razo    Application for Assessment: Right plantar foot  diabetic ulcer  Skin Substitute: Affinity  Performed By: Evelio Razo  Time-out Taken: Yes  Location:     [x] Genitalia/Hands/Feet/Multiple Digits    [] Scalp/Face/Neck/Ears    []Trunk/Arms/Legs  Application Area: (sq cm): 0.20  Product Applied: (sq cm): 6.25  Product Waste (sq cm): 0  Fenestrated: no   Instrument:    [] Blade [] Curette  [x]Forceps  []Nippers    [] Rongeur [] Scissors  []Others:   Secured: yes   Secured with: Steri-strips  Dressing Applied: Yes  Response to Treatment:Well tolerated by patient.  Note:     ID# [id number]  Expiration date 5/5/2023

## 2023-05-09 ENCOUNTER — OFFICE VISIT (OUTPATIENT)
Dept: SURGERY | Facility: CLINIC | Age: 63
End: 2023-05-09
Payer: MEDICARE

## 2023-05-09 DIAGNOSIS — Z71.89 COMPLEX CARE COORDINATION: ICD-10-CM

## 2023-05-09 DIAGNOSIS — E08.621 DIABETIC ULCER OF RIGHT MIDFOOT ASSOCIATED WITH DIABETES MELLITUS DUE TO UNDERLYING CONDITION, WITH NECROSIS OF MUSCLE: Primary | ICD-10-CM

## 2023-05-09 DIAGNOSIS — L97.413 DIABETIC ULCER OF RIGHT MIDFOOT ASSOCIATED WITH DIABETES MELLITUS DUE TO UNDERLYING CONDITION, WITH NECROSIS OF MUSCLE: Primary | ICD-10-CM

## 2023-05-09 PROCEDURE — 15275 SKIN SUB GRAFT FACE/NK/HF/G: CPT | Mod: S$PBB,,, | Performed by: STUDENT IN AN ORGANIZED HEALTH CARE EDUCATION/TRAINING PROGRAM

## 2023-05-09 PROCEDURE — 15275 SKIN SUB GRAFT FACE/NK/HF/G: CPT | Mod: PBBFAC | Performed by: STUDENT IN AN ORGANIZED HEALTH CARE EDUCATION/TRAINING PROGRAM

## 2023-05-09 PROCEDURE — 15275 PR SKIN SUB GRAFT FACE/NK/HF/G UP TO 100 SQCM: ICD-10-PCS | Mod: S$PBB,,, | Performed by: STUDENT IN AN ORGANIZED HEALTH CARE EDUCATION/TRAINING PROGRAM

## 2023-05-09 PROCEDURE — 99499 NO LOS: ICD-10-PCS | Mod: S$PBB,,, | Performed by: STUDENT IN AN ORGANIZED HEALTH CARE EDUCATION/TRAINING PROGRAM

## 2023-05-09 PROCEDURE — 99499 UNLISTED E&M SERVICE: CPT | Mod: S$PBB,,, | Performed by: STUDENT IN AN ORGANIZED HEALTH CARE EDUCATION/TRAINING PROGRAM

## 2023-05-09 PROCEDURE — 99214 OFFICE O/P EST MOD 30 MIN: CPT | Mod: PBBFAC,25 | Performed by: STUDENT IN AN ORGANIZED HEALTH CARE EDUCATION/TRAINING PROGRAM

## 2023-05-09 NOTE — PROGRESS NOTES
Subjective:       Patient ID: Navdeep Avery is a 62 y.o. male.    Chief Complaint: Follow-up (Affinity)    62-year-old  male presents to the clinic for 2 week evaluation status post debridement of right lower foot; patient had a right foot abscess with necrotic muscle extending down to bone the plantar surface of the right foot adjacent to the 3rd metatarsophalangeal joint.  Patient's spouse has been taking great care of the wound and the wound is healing.  No complaints.  Denies any chest pain/shortness of breath, nausea/vomiting/diarrhea, fever/chills.    2/14:  Presents today for wound re-evaluation and placement of PuraPly XT.  No significant changes from previous visit    2/21: Presents today for wound re-evaluation and placement of PuraPly XT.  No significant changes from previous visit    2/28: Presents today for wound re-evaluation and placement of PuraPly XT.  No significant changes from previous visit    3/7: Presents today for wound re-evaluation and placement of Affinity.  No significant changes from previous visit    3/20: Presents today for wound re-evaluation and placement of Apligraf.  No significant changes from previous visit    3/27: Presents today for wound re-evaluation and placement of Apligraf.  No significant changes from previous visits    4/4: Presents today for wound re-evaluation and placement of Apligraf.  No significant changes from previous visit    4/10: Presents today for wound re-evaluation and placement of Apligraf.  No significant changes from previous visit    4/17:  Presents today for wound re-evaluation and placement of Apligraf; no significant changes from previous visit    4/25:  Presents today for wound re-evaluation and placement of Affinity; no significant changes from previous visit    5/3: Presents today for wound re-evaluation and placement of Affinity; no significant changes from previous visit    5/9:  Presents today for wound re-evaluation and placed an  affinity; no significant changes from previous visit      Review of Systems   Constitutional: Negative.    HENT: Negative.     Eyes: Negative.    Respiratory:  Negative for shortness of breath.    Cardiovascular: Negative.    Gastrointestinal:  Negative for abdominal pain, blood in stool, change in bowel habit, vomiting and change in bowel habit.   Endocrine: Negative.    Genitourinary: Negative.  Negative for hematuria.   Musculoskeletal:  Negative for back pain and myalgias.   Integumentary:  Negative.   Allergic/Immunologic: Negative.    Neurological:  Negative for dizziness, weakness and light-headedness.   Psychiatric/Behavioral: Negative.         Objective:      Physical Exam  Constitutional:       General: He is not in acute distress.     Appearance: Normal appearance.   HENT:      Head: Normocephalic.   Cardiovascular:      Rate and Rhythm: Normal rate.   Pulmonary:      Effort: Pulmonary effort is normal. No respiratory distress.   Abdominal:      General: There is no distension.      Tenderness: There is no abdominal tenderness.   Musculoskeletal:         General: Normal range of motion.        Feet:    Feet:      Comments: 0.4 x 0.5cm with 100% coverage withgranulation tissue; no bone exposure; areas clean, no purulent drainage, no signs of infection; scant macerated tissue circumferentially  Skin:     General: Skin is warm.      Coloration: Skin is not jaundiced.   Neurological:      General: No focal deficit present.      Mental Status: He is alert and oriented to person, place, and time.      Cranial Nerves: No cranial nerve deficit.       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, with necrosis of muscle    -  Primary              Plan:       We will apply [facility] for next visit.    Patient Name: Navdeep Avery  Patient MRN: [id number]  Patient Date of Birth:  1960  CSN: [id number]  Date: 05/09/2023  Provider:   Evelio Razo    Application for Assessment: Right plantar foot diabetic ulcer  Skin Substitute: Affinity  Performed By: Evelio Razo  Time-out Taken: Yes  Location:     [x] Genitalia/Hands/Feet/Multiple Digits    [] Scalp/Face/Neck/Ears    []Trunk/Arms/Legs  Application Area: (sq cm): 0.20  Product Applied: (sq cm): 6.25  Product Waste (sq cm): 0  Fenestrated: no   Instrument:    [] Blade [] Curette  [x]Forceps  []Nippers    [] Rongeur [] Scissors  []Others:   Secured: yes   Secured with: Steri-strips  Dressing Applied: Yes  Response to Treatment:Well tolerated by patient.  Note:     ID# [id number]  Expiration date 5/11/2023

## 2023-05-16 ENCOUNTER — OFFICE VISIT (OUTPATIENT)
Dept: SURGERY | Facility: CLINIC | Age: 63
End: 2023-05-16
Payer: MEDICARE

## 2023-05-16 DIAGNOSIS — E08.621 DIABETIC ULCER OF RIGHT MIDFOOT ASSOCIATED WITH DIABETES MELLITUS DUE TO UNDERLYING CONDITION, WITH NECROSIS OF MUSCLE: Primary | ICD-10-CM

## 2023-05-16 DIAGNOSIS — L97.413 DIABETIC ULCER OF RIGHT MIDFOOT ASSOCIATED WITH DIABETES MELLITUS DUE TO UNDERLYING CONDITION, WITH NECROSIS OF MUSCLE: Primary | ICD-10-CM

## 2023-05-16 PROCEDURE — 99499 NO LOS: ICD-10-PCS | Mod: S$PBB,,, | Performed by: STUDENT IN AN ORGANIZED HEALTH CARE EDUCATION/TRAINING PROGRAM

## 2023-05-16 PROCEDURE — 99499 UNLISTED E&M SERVICE: CPT | Mod: S$PBB,,, | Performed by: STUDENT IN AN ORGANIZED HEALTH CARE EDUCATION/TRAINING PROGRAM

## 2023-05-16 PROCEDURE — 99214 OFFICE O/P EST MOD 30 MIN: CPT | Mod: PBBFAC | Performed by: STUDENT IN AN ORGANIZED HEALTH CARE EDUCATION/TRAINING PROGRAM

## 2023-05-16 PROCEDURE — 15275 SKIN SUB GRAFT FACE/NK/HF/G: CPT | Mod: S$PBB,,, | Performed by: STUDENT IN AN ORGANIZED HEALTH CARE EDUCATION/TRAINING PROGRAM

## 2023-05-16 PROCEDURE — 15275 PR SKIN SUB GRAFT FACE/NK/HF/G UP TO 100 SQCM: ICD-10-PCS | Mod: S$PBB,,, | Performed by: STUDENT IN AN ORGANIZED HEALTH CARE EDUCATION/TRAINING PROGRAM

## 2023-05-16 PROCEDURE — 15275 SKIN SUB GRAFT FACE/NK/HF/G: CPT | Mod: PBBFAC | Performed by: STUDENT IN AN ORGANIZED HEALTH CARE EDUCATION/TRAINING PROGRAM

## 2023-05-16 NOTE — PROGRESS NOTES
Subjective:       Patient ID: Navdeep Avery is a 62 y.o. male.    Chief Complaint: Wound Check    62-year-old  male presents to the clinic for 2 week evaluation status post debridement of right lower foot; patient had a right foot abscess with necrotic muscle extending down to bone the plantar surface of the right foot adjacent to the 3rd metatarsophalangeal joint.  Patient's spouse has been taking great care of the wound and the wound is healing.  No complaints.  Denies any chest pain/shortness of breath, nausea/vomiting/diarrhea, fever/chills.    2/14:  Presents today for wound re-evaluation and placement of PuraPly XT.  No significant changes from previous visit    2/21: Presents today for wound re-evaluation and placement of PuraPly XT.  No significant changes from previous visit    2/28: Presents today for wound re-evaluation and placement of PuraPly XT.  No significant changes from previous visit    3/7: Presents today for wound re-evaluation and placement of Affinity.  No significant changes from previous visit    3/20: Presents today for wound re-evaluation and placement of Apligraf.  No significant changes from previous visit    3/27: Presents today for wound re-evaluation and placement of Apligraf.  No significant changes from previous visits    4/4: Presents today for wound re-evaluation and placement of Apligraf.  No significant changes from previous visit    4/10: Presents today for wound re-evaluation and placement of Apligraf.  No significant changes from previous visit    4/17:  Presents today for wound re-evaluation and placement of Apligraf; no significant changes from previous visit    4/25:  Presents today for wound re-evaluation and placement of Affinity; no significant changes from previous visit    5/3: Presents today for wound re-evaluation and placement of Affinity; no significant changes from previous visit    5/9:  Presents today for wound re-evaluation and placed an affinity;  no significant changes from previous visit    5/16: Presents today for wound re-evaluation and placed an affinity; no significant changes from previous visit      Review of Systems   Constitutional: Negative.    HENT: Negative.     Eyes: Negative.    Respiratory:  Negative for shortness of breath.    Cardiovascular: Negative.    Gastrointestinal:  Negative for abdominal pain, blood in stool, change in bowel habit, vomiting and change in bowel habit.   Endocrine: Negative.    Genitourinary: Negative.  Negative for hematuria.   Musculoskeletal:  Negative for back pain and myalgias.   Integumentary:  Negative.   Allergic/Immunologic: Negative.    Neurological:  Negative for dizziness, weakness and light-headedness.   Psychiatric/Behavioral: Negative.         Objective:      Physical Exam  Constitutional:       General: He is not in acute distress.     Appearance: Normal appearance.   HENT:      Head: Normocephalic.   Cardiovascular:      Rate and Rhythm: Normal rate.   Pulmonary:      Effort: Pulmonary effort is normal. No respiratory distress.   Abdominal:      General: There is no distension.      Tenderness: There is no abdominal tenderness.   Musculoskeletal:         General: Normal range of motion.        Feet:    Feet:      Comments: 0.3 x 0.3cm with 100% coverage withgranulation tissue; no bone exposure; areas clean, no purulent drainage, no signs of infection; scant macerated tissue circumferentially  Skin:     General: Skin is warm.      Coloration: Skin is not jaundiced.   Neurological:      General: No focal deficit present.      Mental Status: He is alert and oriented to person, place, and time.      Cranial Nerves: No cranial nerve deficit.       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    Patient Name: Navdeep Avery  Patient MRN: [id number]  Patient YOB: 1960  CSN: [id number]  Date: 05/16/2023  Provider:   Evelio Razo    Application for Assessment: Right plantar foot diabetic ulcer  Skin Substitute: Affinity  Performed By: Evelio Razo  Time-out Taken: Yes  Location:     [x] Genitalia/Hands/Feet/Multiple Digits    [] Scalp/Face/Neck/Ears    []Trunk/Arms/Legs  Application Area: (sq cm): 0.09  Product Applied: (sq cm): 6.25  Product Waste (sq cm): 0  Fenestrated: no   Instrument:    [] Blade [] Curette  [x]Forceps  []Nippers    [] Rongeur [] Scissors  []Others:   Secured: yes   Secured with: Steri-strips  Dressing Applied: Yes  Response to Treatment:Well tolerated by patient.  Note:     ID# [id number]  Expiration date 5/16/2023

## 2023-05-17 ENCOUNTER — OFFICE VISIT (OUTPATIENT)
Dept: FAMILY MEDICINE | Facility: CLINIC | Age: 63
End: 2023-05-17
Payer: MEDICARE

## 2023-05-17 VITALS
HEIGHT: 68 IN | BODY MASS INDEX: 23.79 KG/M2 | DIASTOLIC BLOOD PRESSURE: 64 MMHG | TEMPERATURE: 98 F | SYSTOLIC BLOOD PRESSURE: 116 MMHG | HEART RATE: 60 BPM | WEIGHT: 157 LBS | RESPIRATION RATE: 18 BRPM | OXYGEN SATURATION: 95 %

## 2023-05-17 DIAGNOSIS — G47.00 INSOMNIA, UNSPECIFIED TYPE: ICD-10-CM

## 2023-05-17 DIAGNOSIS — I10 ESSENTIAL HYPERTENSION: ICD-10-CM

## 2023-05-17 DIAGNOSIS — F41.9 ANXIETY: ICD-10-CM

## 2023-05-17 DIAGNOSIS — E78.5 HYPERLIPIDEMIA, UNSPECIFIED HYPERLIPIDEMIA TYPE: ICD-10-CM

## 2023-05-17 DIAGNOSIS — E11.9 DIABETES MELLITUS WITHOUT COMPLICATION: Primary | ICD-10-CM

## 2023-05-17 DIAGNOSIS — K21.9 GASTROESOPHAGEAL REFLUX DISEASE, UNSPECIFIED WHETHER ESOPHAGITIS PRESENT: ICD-10-CM

## 2023-05-17 LAB
ALBUMIN SERPL BCP-MCNC: 3.7 G/DL (ref 3.5–5)
ALBUMIN/GLOB SERPL: 1 {RATIO}
ALP SERPL-CCNC: 107 U/L (ref 45–115)
ALT SERPL W P-5'-P-CCNC: 14 U/L (ref 16–61)
ANION GAP SERPL CALCULATED.3IONS-SCNC: 9 MMOL/L (ref 7–16)
AST SERPL W P-5'-P-CCNC: 7 U/L (ref 15–37)
BASOPHILS # BLD AUTO: 0.03 K/UL (ref 0–0.2)
BASOPHILS NFR BLD AUTO: 0.6 % (ref 0–1)
BILIRUB SERPL-MCNC: 0.3 MG/DL (ref ?–1.2)
BUN SERPL-MCNC: 23 MG/DL (ref 7–18)
BUN/CREAT SERPL: 24 (ref 6–20)
CALCIUM SERPL-MCNC: 9.3 MG/DL (ref 8.5–10.1)
CHLORIDE SERPL-SCNC: 103 MMOL/L (ref 98–107)
CO2 SERPL-SCNC: 27 MMOL/L (ref 21–32)
CREAT SERPL-MCNC: 0.97 MG/DL (ref 0.7–1.3)
CREAT UR-MCNC: 88 MG/DL (ref 39–259)
DIFFERENTIAL METHOD BLD: ABNORMAL
EGFR (NO RACE VARIABLE) (RUSH/TITUS): 87 ML/MIN/1.73M2
EOSINOPHIL # BLD AUTO: 0.25 K/UL (ref 0–0.5)
EOSINOPHIL NFR BLD AUTO: 4.8 % (ref 1–4)
ERYTHROCYTE [DISTWIDTH] IN BLOOD BY AUTOMATED COUNT: 13.2 % (ref 11.5–14.5)
EST. AVERAGE GLUCOSE BLD GHB EST-MCNC: 207 MG/DL
GLOBULIN SER-MCNC: 3.8 G/DL (ref 2–4)
GLUCOSE SERPL-MCNC: 238 MG/DL (ref 74–106)
HBA1C MFR BLD HPLC: 8.8 % (ref 4.5–6.6)
HCT VFR BLD AUTO: 42.1 % (ref 40–54)
HGB BLD-MCNC: 13.4 G/DL (ref 13.5–18)
IMM GRANULOCYTES # BLD AUTO: 0.01 K/UL (ref 0–0.04)
IMM GRANULOCYTES NFR BLD: 0.2 % (ref 0–0.4)
LYMPHOCYTES # BLD AUTO: 2.09 K/UL (ref 1–4.8)
LYMPHOCYTES NFR BLD AUTO: 40.5 % (ref 27–41)
MCH RBC QN AUTO: 29.6 PG (ref 27–31)
MCHC RBC AUTO-ENTMCNC: 31.8 G/DL (ref 32–36)
MCV RBC AUTO: 93.1 FL (ref 80–96)
MICROALBUMIN UR-MCNC: 12.7 MG/DL (ref 0–2.8)
MICROALBUMIN/CREAT RATIO PNL UR: 144.3 MG/G (ref 0–30)
MONOCYTES # BLD AUTO: 0.35 K/UL (ref 0–0.8)
MONOCYTES NFR BLD AUTO: 6.8 % (ref 2–6)
MPC BLD CALC-MCNC: 10.9 FL (ref 9.4–12.4)
NEUTROPHILS # BLD AUTO: 2.43 K/UL (ref 1.8–7.7)
NEUTROPHILS NFR BLD AUTO: 47.1 % (ref 53–65)
NRBC # BLD AUTO: 0 X10E3/UL
NRBC, AUTO (.00): 0 %
PLATELET # BLD AUTO: 181 K/UL (ref 150–400)
POTASSIUM SERPL-SCNC: 4.6 MMOL/L (ref 3.5–5.1)
PROT SERPL-MCNC: 7.5 G/DL (ref 6.4–8.2)
RBC # BLD AUTO: 4.52 M/UL (ref 4.6–6.2)
SODIUM SERPL-SCNC: 134 MMOL/L (ref 136–145)
WBC # BLD AUTO: 5.16 K/UL (ref 4.5–11)

## 2023-05-17 PROCEDURE — 99214 OFFICE O/P EST MOD 30 MIN: CPT | Mod: ,,, | Performed by: FAMILY MEDICINE

## 2023-05-17 PROCEDURE — 83036 HEMOGLOBIN A1C: ICD-10-PCS | Mod: ,,, | Performed by: CLINICAL MEDICAL LABORATORY

## 2023-05-17 PROCEDURE — 83036 HEMOGLOBIN GLYCOSYLATED A1C: CPT | Mod: ,,, | Performed by: CLINICAL MEDICAL LABORATORY

## 2023-05-17 PROCEDURE — 82043 MICROALBUMIN / CREATININE RATIO URINE: ICD-10-PCS | Mod: ,,, | Performed by: CLINICAL MEDICAL LABORATORY

## 2023-05-17 PROCEDURE — 80053 COMPREHENSIVE METABOLIC PANEL: ICD-10-PCS | Mod: ,,, | Performed by: CLINICAL MEDICAL LABORATORY

## 2023-05-17 PROCEDURE — 80053 COMPREHEN METABOLIC PANEL: CPT | Mod: ,,, | Performed by: CLINICAL MEDICAL LABORATORY

## 2023-05-17 PROCEDURE — 82570 MICROALBUMIN / CREATININE RATIO URINE: ICD-10-PCS | Mod: ,,, | Performed by: CLINICAL MEDICAL LABORATORY

## 2023-05-17 PROCEDURE — 85025 CBC WITH DIFFERENTIAL: ICD-10-PCS | Mod: ,,, | Performed by: CLINICAL MEDICAL LABORATORY

## 2023-05-17 PROCEDURE — 85025 COMPLETE CBC W/AUTO DIFF WBC: CPT | Mod: ,,, | Performed by: CLINICAL MEDICAL LABORATORY

## 2023-05-17 PROCEDURE — 99214 PR OFFICE/OUTPT VISIT, EST, LEVL IV, 30-39 MIN: ICD-10-PCS | Mod: ,,, | Performed by: FAMILY MEDICINE

## 2023-05-17 PROCEDURE — 82043 UR ALBUMIN QUANTITATIVE: CPT | Mod: ,,, | Performed by: CLINICAL MEDICAL LABORATORY

## 2023-05-17 PROCEDURE — 82570 ASSAY OF URINE CREATININE: CPT | Mod: ,,, | Performed by: CLINICAL MEDICAL LABORATORY

## 2023-05-17 RX ORDER — ATORVASTATIN CALCIUM 80 MG/1
80 TABLET, FILM COATED ORAL NIGHTLY
Qty: 90 TABLET | Refills: 1 | Status: SHIPPED | OUTPATIENT
Start: 2023-05-17

## 2023-05-17 RX ORDER — LISINOPRIL 10 MG/1
10 TABLET ORAL DAILY
Qty: 90 TABLET | Refills: 1 | Status: SHIPPED | OUTPATIENT
Start: 2023-05-17

## 2023-05-17 RX ORDER — MIRTAZAPINE 7.5 MG/1
7.5 TABLET, FILM COATED ORAL NIGHTLY
Qty: 90 TABLET | Refills: 1 | Status: SHIPPED | OUTPATIENT
Start: 2023-05-17 | End: 2024-03-11

## 2023-05-17 RX ORDER — METOPROLOL SUCCINATE 25 MG/1
25 TABLET, EXTENDED RELEASE ORAL DAILY
Qty: 90 TABLET | Refills: 1 | Status: SHIPPED | OUTPATIENT
Start: 2023-05-17 | End: 2024-01-24 | Stop reason: DRUGHIGH

## 2023-05-17 RX ORDER — CLOPIDOGREL BISULFATE 75 MG/1
75 TABLET ORAL DAILY
Qty: 90 TABLET | Refills: 1 | Status: SHIPPED | OUTPATIENT
Start: 2023-05-17

## 2023-05-17 RX ORDER — HYDROXYZINE PAMOATE 25 MG/1
25 CAPSULE ORAL DAILY
Qty: 90 CAPSULE | Refills: 1 | Status: SHIPPED | OUTPATIENT
Start: 2023-05-17

## 2023-05-17 RX ORDER — AMLODIPINE BESYLATE 10 MG/1
10 TABLET ORAL DAILY
Qty: 90 TABLET | Refills: 1 | Status: SHIPPED | OUTPATIENT
Start: 2023-05-17

## 2023-05-17 RX ORDER — PANTOPRAZOLE SODIUM 40 MG/1
40 TABLET, DELAYED RELEASE ORAL DAILY
Qty: 90 TABLET | Refills: 1 | Status: SHIPPED | OUTPATIENT
Start: 2023-05-17

## 2023-05-17 RX ORDER — ESCITALOPRAM OXALATE 10 MG/1
10 TABLET ORAL DAILY
Qty: 30 TABLET | Refills: 1 | Status: SHIPPED | OUTPATIENT
Start: 2023-05-17 | End: 2024-03-14 | Stop reason: ALTCHOICE

## 2023-05-17 NOTE — PROGRESS NOTES
Navdeep Avery is a 62 y.o. male seen today for follow-up on his diabetes.  Patient's lipids were close to goal when checked in October will plan on rechecking that was not over this year.  He denies any chest pain and has had no shortness of breath of above his baseline.  Patient reports the wound on his right foot is currently healed.  He still suffers from insomnia we discussed a trial of Remeron.  Patient defers colon cancer screening either through Cologuard or colonoscopy.     Past Medical History:   Diagnosis Date    Anemia     Anxiety     Arthritis     Atherosclerotic heart disease of native coronary artery with other forms of angina pectoris     Atrophic rhinitis     Carpal tunnel syndrome     Cellulitis of right lower extremity     COPD (chronic obstructive pulmonary disease)     Coronary arteriosclerosis     COVID 02/02/2022    Depression     Diabetic ulcer of right foot associated with diabetes mellitus due to underlying condition, with bone involvement without evidence of necrosis     Fall with significant injury, sequela 11/07/2022    GERD (gastroesophageal reflux disease)     Hyperlipidemia     Hypertension     Insomnia     MI (myocardial infarction)     Neuropathy     Peripheral vascular disease, unspecified     Right foot ulcer, with fat layer exposed 01/07/2015    S/p left hip fracture 11/08/2022    Sleep apnea     Type 2 diabetes mellitus      Family History   Problem Relation Age of Onset    Arthritis Mother     Hypertension Mother     Learning disabilities Mother     Alcohol abuse Father     Early death Father     Heart disease Father     Cancer Sister     Diabetes Sister     Heart disease Maternal Grandfather     COPD Paternal Grandfather     Heart disease Son      Current Outpatient Medications on File Prior to Visit   Medication Sig Dispense Refill    acetaminophen (TYLENOL) 325 MG tablet Take 650 mg by mouth every 6 (six) hours.      albuterol (PROVENTIL) 2.5 mg /3 mL (0.083 %) nebulizer  "solution USE 1 VIAL IN NEBULIZER 3 TIMES DAILY 90 each 11    albuterol (PROVENTIL/VENTOLIN HFA) 90 mcg/actuation inhaler Inhale 2 puffs into the lungs 2 (two) times daily as needed for Wheezing. 18 g 2    aspirin 81 MG Chew Take 81 mg by mouth once daily.      diclofenac sodium (VOLTAREN) 1 % Gel Apply 1 g topically 4 (four) times daily as needed (pain). 20 g 1    fluticasone propionate (FLONASE) 50 mcg/actuation nasal spray 2 sprays (100 mcg total) by Each Nostril route once daily. (Patient taking differently: 2 sprays by Each Nostril route daily as needed.) 16 g 2    gabapentin (NEURONTIN) 300 MG capsule Take 3 capsules (900 mg total) by mouth every 6 (six) hours. 360 capsule 2    HYDROcodone-acetaminophen (NORCO)  mg per tablet Take 1 tablet by mouth every 6 (six) hours as needed for Pain (pain). 120 tablet 0    [START ON 5/20/2023] HYDROcodone-acetaminophen (NORCO)  mg per tablet Take 1 tablet by mouth every 6 (six) hours as needed for Pain (pain). 120 tablet 0    insulin detemir U-100 (LEVEMIR FLEXTOUCH U-100 INSULN) 100 unit/mL (3 mL) InPn pen Inject 10 units subcutaneously every morning, inject 20 units subcutaneously every evening. 3 each 2    LIDOcaine (LIDODERM) 5 % 1 patch once daily.      nitroGLYCERIN (NITROSTAT) 0.4 MG SL tablet Place 1 tablet (0.4 mg total) under the tongue every 5 (five) minutes as needed for Chest pain. 30 tablet 2    SENNA 8.6 mg tablet Take 2 tablets by mouth once daily.      SPIRIVA RESPIMAT 2.5 mcg/actuation inhaler Inhale 1 puff into the lungs once daily. 4 g 5    SURE COMFORT INSULIN SYRINGE 0.3 mL 31 gauge x 5/16" Syrg Inject 1 application into the skin 2 (two) times a day.      [DISCONTINUED] amLODIPine (NORVASC) 10 MG tablet Take 1 tablet (10 mg total) by mouth once daily. 90 tablet 1    [DISCONTINUED] atorvastatin (LIPITOR) 80 MG tablet Take 1 tablet (80 mg total) by mouth every evening. 90 tablet 1    [DISCONTINUED] clopidogreL (PLAVIX) 75 mg tablet Take 1 " tablet (75 mg total) by mouth once daily. 90 tablet 1    [DISCONTINUED] empagliflozin (JARDIANCE) 10 mg tablet Take 1 tablet (10 mg total) by mouth once daily. 30 tablet 6    [DISCONTINUED] hydrOXYzine pamoate (VISTARIL) 25 MG Cap Take 1 capsule (25 mg total) by mouth once daily. Take nightly for sleep 90 capsule 1    [DISCONTINUED] lisinopriL 10 MG tablet Take 1 tablet (10 mg total) by mouth once daily. 90 tablet 1    [DISCONTINUED] metoprolol succinate (TOPROL-XL) 25 MG 24 hr tablet Take 1 tablet (25 mg total) by mouth once daily. 90 tablet 1    [DISCONTINUED] pantoprazole (PROTONIX) 40 MG tablet Take 1 tablet (40 mg total) by mouth once daily. 90 tablet 1    [DISCONTINUED] traZODone (DESYREL) 50 MG tablet Take 50 mg by mouth nightly as needed.      [DISCONTINUED] EScitalopram oxalate (LEXAPRO) 10 MG tablet Take 1 tablet (10 mg total) by mouth once daily. 30 tablet 1     No current facility-administered medications on file prior to visit.     Immunization History   Administered Date(s) Administered    Influenza - Quadrivalent - PF *Preferred* (6 months and older) 09/14/2021, 10/21/2022    Pneumococcal Conjugate - 13 Valent 10/25/2017    Pneumococcal Conjugate - 20 Valent 02/17/2023    Pneumococcal Polysaccharide - 23 Valent 10/19/2016    Tdap 12/20/2019       Review of Systems   Constitutional:  Negative for fever, malaise/fatigue and weight loss.   Respiratory:  Negative for shortness of breath.    Cardiovascular:  Negative for chest pain and palpitations.   Gastrointestinal:  Negative for nausea and vomiting.   Psychiatric/Behavioral:  Negative for depression.       Vitals:    05/17/23 1022   BP: 116/64   Pulse: 60   Resp: 18   Temp: 98.2 °F (36.8 °C)       Physical Exam  Vitals reviewed.   Constitutional:       Appearance: Normal appearance.   HENT:      Head: Normocephalic.   Eyes:      Extraocular Movements: Extraocular movements intact.      Conjunctiva/sclera: Conjunctivae normal.      Pupils: Pupils are  equal, round, and reactive to light.   Neck:      Thyroid: No thyroid mass or thyromegaly.   Cardiovascular:      Rate and Rhythm: Normal rate and regular rhythm.      Heart sounds: Normal heart sounds. No murmur heard.    No gallop.   Pulmonary:      Effort: Pulmonary effort is normal. No respiratory distress.      Breath sounds: Normal breath sounds. No wheezing or rales.   Skin:     General: Skin is warm and dry.      Coloration: Skin is not jaundiced or pale.   Neurological:      Mental Status: He is alert.   Psychiatric:         Mood and Affect: Mood normal.         Behavior: Behavior normal.         Thought Content: Thought content normal.         Judgment: Judgment normal.        Assessment and Plan  Diabetes mellitus without complication  -     Microalbumin/Creatinine Ratio, Urine  -     empagliflozin (JARDIANCE) 10 mg tablet; Take 1 tablet (10 mg total) by mouth once daily.  Dispense: 30 tablet; Refill: 6  -     Hemoglobin A1C; Future; Expected date: 05/17/2023    Essential hypertension  -     amLODIPine (NORVASC) 10 MG tablet; Take 1 tablet (10 mg total) by mouth once daily.  Dispense: 90 tablet; Refill: 1  -     lisinopriL 10 MG tablet; Take 1 tablet (10 mg total) by mouth once daily.  Dispense: 90 tablet; Refill: 1  -     metoprolol succinate (TOPROL-XL) 25 MG 24 hr tablet; Take 1 tablet (25 mg total) by mouth once daily.  Dispense: 90 tablet; Refill: 1  -     CBC Auto Differential; Future; Expected date: 05/17/2023  -     Comprehensive Metabolic Panel; Future; Expected date: 05/17/2023    Anxiety  -     EScitalopram oxalate (LEXAPRO) 10 MG tablet; Take 1 tablet (10 mg total) by mouth once daily.  Dispense: 30 tablet; Refill: 1  -     hydrOXYzine pamoate (VISTARIL) 25 MG Cap; Take 1 capsule (25 mg total) by mouth once daily. Take nightly for sleep  Dispense: 90 capsule; Refill: 1    Gastroesophageal reflux disease, unspecified whether esophagitis present  -     pantoprazole (PROTONIX) 40 MG tablet; Take  1 tablet (40 mg total) by mouth once daily.  Dispense: 90 tablet; Refill: 1    Hyperlipidemia, unspecified hyperlipidemia type  -     clopidogreL (PLAVIX) 75 mg tablet; Take 1 tablet (75 mg total) by mouth once daily.  Dispense: 90 tablet; Refill: 1  -     atorvastatin (LIPITOR) 80 MG tablet; Take 1 tablet (80 mg total) by mouth every evening.  Dispense: 90 tablet; Refill: 1    Insomnia, unspecified type  -     mirtazapine (REMERON) 7.5 MG Tab; Take 1 tablet (7.5 mg total) by mouth nightly.  Dispense: 90 tablet; Refill: 1            Return to clinic in 3 months or as needed once his lab work is in.    Health Maintenance Topics with due status: Not Due       Topic Last Completion Date    TETANUS VACCINE 12/20/2019    Lipid Panel 01/05/2023    Naloxone Prescription 03/14/2023    Low Dose Statin 05/17/2023    Foot Exam 05/17/2023

## 2023-05-19 ENCOUNTER — TELEPHONE (OUTPATIENT)
Dept: FAMILY MEDICINE | Facility: CLINIC | Age: 63
End: 2023-05-19
Payer: MEDICARE

## 2023-05-19 NOTE — TELEPHONE ENCOUNTER
Notified pt's wife of results, verbalized understanding. Transferred to  to schedule follow up appt for lab review

## 2023-05-19 NOTE — TELEPHONE ENCOUNTER
----- Message from Marquez Huff MD sent at 5/18/2023  7:58 AM CDT -----  Office visit for diabetes and microalbuminuria

## 2023-05-23 ENCOUNTER — OFFICE VISIT (OUTPATIENT)
Dept: SURGERY | Facility: CLINIC | Age: 63
End: 2023-05-23
Payer: MEDICARE

## 2023-05-23 DIAGNOSIS — E08.621 DIABETIC ULCER OF RIGHT MIDFOOT ASSOCIATED WITH DIABETES MELLITUS DUE TO UNDERLYING CONDITION, WITH NECROSIS OF MUSCLE: Primary | ICD-10-CM

## 2023-05-23 DIAGNOSIS — L97.413 DIABETIC ULCER OF RIGHT MIDFOOT ASSOCIATED WITH DIABETES MELLITUS DUE TO UNDERLYING CONDITION, WITH NECROSIS OF MUSCLE: Primary | ICD-10-CM

## 2023-05-23 PROCEDURE — 99213 PR OFFICE/OUTPT VISIT, EST, LEVL III, 20-29 MIN: ICD-10-PCS | Mod: S$PBB,,, | Performed by: STUDENT IN AN ORGANIZED HEALTH CARE EDUCATION/TRAINING PROGRAM

## 2023-05-23 PROCEDURE — 99214 OFFICE O/P EST MOD 30 MIN: CPT | Mod: PBBFAC | Performed by: STUDENT IN AN ORGANIZED HEALTH CARE EDUCATION/TRAINING PROGRAM

## 2023-05-23 PROCEDURE — 99213 OFFICE O/P EST LOW 20 MIN: CPT | Mod: S$PBB,,, | Performed by: STUDENT IN AN ORGANIZED HEALTH CARE EDUCATION/TRAINING PROGRAM

## 2023-05-23 NOTE — PROGRESS NOTES
Subjective:       Patient ID: Navdeep Avery is a 62 y.o. male.    Chief Complaint: Wound Check    62-year-old  male presents to the clinic for 2 week evaluation status post debridement of right lower foot; patient had a right foot abscess with necrotic muscle extending down to bone the plantar surface of the right foot adjacent to the 3rd metatarsophalangeal joint.  Patient's spouse has been taking great care of the wound and the wound is healing.  No complaints.  Denies any chest pain/shortness of breath, nausea/vomiting/diarrhea, fever/chills.    2/14:  Presents today for wound re-evaluation and placement of PuraPly XT.  No significant changes from previous visit    2/21: Presents today for wound re-evaluation and placement of PuraPly XT.  No significant changes from previous visit    2/28: Presents today for wound re-evaluation and placement of PuraPly XT.  No significant changes from previous visit    3/7: Presents today for wound re-evaluation and placement of Affinity.  No significant changes from previous visit    3/20: Presents today for wound re-evaluation and placement of Apligraf.  No significant changes from previous visit    3/27: Presents today for wound re-evaluation and placement of Apligraf.  No significant changes from previous visits    4/4: Presents today for wound re-evaluation and placement of Apligraf.  No significant changes from previous visit    4/10: Presents today for wound re-evaluation and placement of Apligraf.  No significant changes from previous visit    4/17:  Presents today for wound re-evaluation and placement of Apligraf; no significant changes from previous visit    4/25:  Presents today for wound re-evaluation and placement of Affinity; no significant changes from previous visit    5/3: Presents today for wound re-evaluation and placement of Affinity; no significant changes from previous visit    5/9:  Presents today for wound re-evaluation and placed an affinity;  no significant changes from previous visit    5/16: Presents today for wound re-evaluation and placed an affinity; no significant changes from previous visit    5/23:  Presents today for wound re-evaluation; no significant changes from previous visit      Review of Systems   Constitutional: Negative.    HENT: Negative.     Eyes: Negative.    Respiratory:  Negative for shortness of breath.    Cardiovascular: Negative.    Gastrointestinal:  Negative for abdominal pain, blood in stool, change in bowel habit, vomiting and change in bowel habit.   Endocrine: Negative.    Genitourinary: Negative.  Negative for hematuria.   Musculoskeletal:  Negative for back pain and myalgias.   Integumentary:  Negative.   Allergic/Immunologic: Negative.    Neurological:  Negative for dizziness, weakness and light-headedness.   Psychiatric/Behavioral: Negative.         Objective:      Physical Exam  Constitutional:       General: He is not in acute distress.     Appearance: Normal appearance.   HENT:      Head: Normocephalic.   Cardiovascular:      Rate and Rhythm: Normal rate.   Pulmonary:      Effort: Pulmonary effort is normal. No respiratory distress.   Abdominal:      General: There is no distension.      Tenderness: There is no abdominal tenderness.   Musculoskeletal:         General: Normal range of motion.        Feet:    Feet:      Comments: 100% coverage with epithelialization tissue; healed  Skin:     General: Skin is warm.      Coloration: Skin is not jaundiced.   Neurological:      General: No focal deficit present.      Mental Status: He is alert and oriented to person, place, and time.      Cranial Nerves: No cranial nerve deficit.       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, with necrosis of muscle    -  Primary              Plan:       Area completely healed.  Follow-up in 3  months for re-evaluation

## 2023-06-07 ENCOUNTER — OFFICE VISIT (OUTPATIENT)
Dept: FAMILY MEDICINE | Facility: CLINIC | Age: 63
End: 2023-06-07
Payer: MEDICARE

## 2023-06-07 VITALS
HEIGHT: 68 IN | DIASTOLIC BLOOD PRESSURE: 60 MMHG | OXYGEN SATURATION: 95 % | WEIGHT: 157 LBS | RESPIRATION RATE: 18 BRPM | HEART RATE: 72 BPM | BODY MASS INDEX: 23.79 KG/M2 | TEMPERATURE: 98 F | SYSTOLIC BLOOD PRESSURE: 110 MMHG

## 2023-06-07 DIAGNOSIS — Z79.4 TYPE 2 DIABETES MELLITUS WITH DIABETIC PERIPHERAL ANGIOPATHY WITHOUT GANGRENE, WITH LONG-TERM CURRENT USE OF INSULIN: Primary | ICD-10-CM

## 2023-06-07 DIAGNOSIS — E11.51 TYPE 2 DIABETES MELLITUS WITH DIABETIC PERIPHERAL ANGIOPATHY WITHOUT GANGRENE, WITH LONG-TERM CURRENT USE OF INSULIN: Primary | ICD-10-CM

## 2023-06-07 PROCEDURE — 99212 OFFICE O/P EST SF 10 MIN: CPT | Mod: ,,, | Performed by: FAMILY MEDICINE

## 2023-06-07 PROCEDURE — 99212 PR OFFICE/OUTPT VISIT, EST, LEVL II, 10-19 MIN: ICD-10-PCS | Mod: ,,, | Performed by: FAMILY MEDICINE

## 2023-06-07 NOTE — PROGRESS NOTES
Navdeep Avery is a 62 y.o. male seen today for follow-up on his recent lab work.  Patient's A1c was not to goal at 8.8 and we discussed a trial of Rybelsus 3 mg and discussed the side effects of the medication.  We also discussed the proper use of the medication.  Patient also has had an increase in his urine microalbumin although his GFR and creatinine are normal.  Again I have encouraged him to decrease his intake of carbohydrates and to improve his diabetes.  Patient reports his foot ulcer has finally healed.    Past Medical History:   Diagnosis Date    Anemia     Anxiety     Arthritis     Atherosclerotic heart disease of native coronary artery with other forms of angina pectoris     Atrophic rhinitis     Carpal tunnel syndrome     Cellulitis of right lower extremity     COPD (chronic obstructive pulmonary disease)     Coronary arteriosclerosis     COVID 02/02/2022    Depression     Diabetic ulcer of right foot associated with diabetes mellitus due to underlying condition, with bone involvement without evidence of necrosis     Fall with significant injury, sequela 11/07/2022    GERD (gastroesophageal reflux disease)     Hyperlipidemia     Hypertension     Insomnia     MI (myocardial infarction)     Neuropathy     Peripheral vascular disease, unspecified     Right foot ulcer, with fat layer exposed 01/07/2015    S/p left hip fracture 11/08/2022    Sleep apnea     Type 2 diabetes mellitus      Family History   Problem Relation Age of Onset    Arthritis Mother     Hypertension Mother     Learning disabilities Mother     Alcohol abuse Father     Early death Father     Heart disease Father     Cancer Sister     Diabetes Sister     Heart disease Maternal Grandfather     COPD Paternal Grandfather     Heart disease Son      Current Outpatient Medications on File Prior to Visit   Medication Sig Dispense Refill    semaglutide (RYBELSUS) 3 mg tablet Take 3 mg by mouth once daily.      acetaminophen (TYLENOL) 325 MG  tablet Take 650 mg by mouth every 6 (six) hours.      albuterol (PROVENTIL) 2.5 mg /3 mL (0.083 %) nebulizer solution USE 1 VIAL IN NEBULIZER 3 TIMES DAILY 90 each 11    albuterol (PROVENTIL/VENTOLIN HFA) 90 mcg/actuation inhaler Inhale 2 puffs into the lungs 2 (two) times daily as needed for Wheezing. 18 g 2    amLODIPine (NORVASC) 10 MG tablet Take 1 tablet (10 mg total) by mouth once daily. 90 tablet 1    aspirin 81 MG Chew Take 81 mg by mouth once daily.      atorvastatin (LIPITOR) 80 MG tablet Take 1 tablet (80 mg total) by mouth every evening. 90 tablet 1    clopidogreL (PLAVIX) 75 mg tablet Take 1 tablet (75 mg total) by mouth once daily. 90 tablet 1    diclofenac sodium (VOLTAREN) 1 % Gel Apply 1 g topically 4 (four) times daily as needed (pain). 20 g 1    empagliflozin (JARDIANCE) 10 mg tablet Take 1 tablet (10 mg total) by mouth once daily. 30 tablet 6    EScitalopram oxalate (LEXAPRO) 10 MG tablet Take 1 tablet (10 mg total) by mouth once daily. 30 tablet 1    fluticasone propionate (FLONASE) 50 mcg/actuation nasal spray 2 sprays (100 mcg total) by Each Nostril route once daily. (Patient taking differently: 2 sprays by Each Nostril route daily as needed.) 16 g 2    gabapentin (NEURONTIN) 300 MG capsule Take 3 capsules (900 mg total) by mouth every 6 (six) hours. 360 capsule 2    HYDROcodone-acetaminophen (NORCO)  mg per tablet Take 1 tablet by mouth every 6 (six) hours as needed for Pain (pain). 120 tablet 0    hydrOXYzine pamoate (VISTARIL) 25 MG Cap Take 1 capsule (25 mg total) by mouth once daily. Take nightly for sleep 90 capsule 1    insulin detemir U-100 (LEVEMIR FLEXTOUCH U-100 INSULN) 100 unit/mL (3 mL) InPn pen Inject 10 units subcutaneously every morning, inject 20 units subcutaneously every evening. 3 each 2    LIDOcaine (LIDODERM) 5 % 1 patch once daily.      lisinopriL 10 MG tablet Take 1 tablet (10 mg total) by mouth once daily. 90 tablet 1    metoprolol succinate (TOPROL-XL) 25 MG 24  "hr tablet Take 1 tablet (25 mg total) by mouth once daily. 90 tablet 1    mirtazapine (REMERON) 7.5 MG Tab Take 1 tablet (7.5 mg total) by mouth nightly. 90 tablet 1    nitroGLYCERIN (NITROSTAT) 0.4 MG SL tablet Place 1 tablet (0.4 mg total) under the tongue every 5 (five) minutes as needed for Chest pain. 30 tablet 2    pantoprazole (PROTONIX) 40 MG tablet Take 1 tablet (40 mg total) by mouth once daily. 90 tablet 1    SENNA 8.6 mg tablet Take 2 tablets by mouth once daily.      SPIRIVA RESPIMAT 2.5 mcg/actuation inhaler Inhale 1 puff into the lungs once daily. 4 g 5    SURE COMFORT INSULIN SYRINGE 0.3 mL 31 gauge x 5/16" Syrg Inject 1 application into the skin 2 (two) times a day.       No current facility-administered medications on file prior to visit.     Immunization History   Administered Date(s) Administered    Influenza - Quadrivalent - PF *Preferred* (6 months and older) 09/14/2021, 10/21/2022    Pneumococcal Conjugate - 13 Valent 10/25/2017    Pneumococcal Conjugate - 20 Valent 02/17/2023    Pneumococcal Polysaccharide - 23 Valent 10/19/2016    Tdap 12/20/2019       Review of Systems   Constitutional:  Negative for fever, malaise/fatigue and weight loss.   Respiratory:  Negative for shortness of breath.    Cardiovascular:  Negative for chest pain and palpitations.   Gastrointestinal:  Negative for nausea and vomiting.   Psychiatric/Behavioral:  Negative for depression.       Vitals:    06/07/23 1530   BP: 110/60   Pulse: 72   Resp: 18   Temp: 97.6 °F (36.4 °C)       Physical Exam  Eyes:      Conjunctiva/sclera: Conjunctivae normal.   Pulmonary:      Effort: Pulmonary effort is normal.   Neurological:      Mental Status: He is alert.   Psychiatric:         Mood and Affect: Mood normal.         Behavior: Behavior normal.         Thought Content: Thought content normal.         Judgment: Judgment normal.        Assessment and Plan  Type 2 diabetes mellitus with diabetic peripheral angiopathy without " gangrene, with long-term current use of insulin            Return to clinic in 1 month for follow-up with our nurse practitioner and he may need an increased dose to 7 mg if he is tolerating the medication well.    Health Maintenance Topics with due status: Not Due       Topic Last Completion Date    TETANUS VACCINE 12/20/2019    Lipid Panel 01/05/2023    Naloxone Prescription 03/14/2023    Low Dose Statin 05/17/2023    Diabetes Urine Screening 05/17/2023    Foot Exam 05/17/2023    Hemoglobin A1c 05/17/2023

## 2023-06-08 ENCOUNTER — EXTERNAL HOME HEALTH (OUTPATIENT)
Dept: HOME HEALTH SERVICES | Facility: HOSPITAL | Age: 63
End: 2023-06-08
Payer: MEDICARE

## 2023-06-14 NOTE — PROGRESS NOTES
Subjective:         Patient ID: [id number] Avery is a 62 y.o. male.    Chief Complaint: Low-back Pain, Hip Pain (left), and Leg Pain (bilateral)        Pain  This is a chronic problem. The current episode started more than 1 year ago. The problem occurs daily. The problem has been waxing and waning. Associated symptoms include arthralgias and neck pain. Pertinent negatives include no anorexia, chest pain, chills, coughing, diaphoresis, fever, sore throat, vertigo or vomiting.   Review of Systems   Constitutional:  Negative for activity change, chills, diaphoresis, fever and unexpected weight change.   HENT:  Negative for drooling, ear discharge, ear pain, facial swelling, nosebleeds, sore throat, trouble swallowing, voice change and goiter.    Eyes:  Negative for photophobia, pain, discharge, redness and visual disturbance.   Respiratory:  Negative for apnea, cough, choking, chest tightness, shortness of breath, wheezing and stridor.    Cardiovascular:  Negative for chest pain, palpitations and leg swelling.   Gastrointestinal:  Negative for abdominal distention, anorexia, diarrhea, rectal pain, vomiting and fecal incontinence.   Endocrine: Negative for cold intolerance, heat intolerance, polydipsia, polyphagia and polyuria.   Genitourinary:  Negative for bladder incontinence, dysuria, flank pain and frequency.   Musculoskeletal:  Positive for arthralgias, back pain, leg pain, neck pain and neck stiffness.   Integumentary:  Negative for color change and pallor.   Neurological:  Negative for dizziness, vertigo, seizures, syncope, facial asymmetry, speech difficulty, light-headedness, coordination difficulties, memory loss and coordination difficulties.   Hematological:  Negative for adenopathy. Does not bruise/bleed easily.   Psychiatric/Behavioral:  Negative for agitation, behavioral problems, confusion, decreased concentration, dysphoric mood, hallucinations, self-injury and suicidal ideas. The patient is not  nervous/anxious and is not hyperactive.          Past Medical History:   Diagnosis Date    Anemia     Anxiety     Arthritis     Atherosclerotic heart disease of native coronary artery with other forms of angina pectoris     Atrophic rhinitis     Carpal tunnel syndrome     Cellulitis of right lower extremity     COPD (chronic obstructive pulmonary disease)     Coronary arteriosclerosis     COVID 02/02/2022    Depression     Diabetic ulcer of right foot associated with diabetes mellitus due to underlying condition, with bone involvement without evidence of necrosis     Fall with significant injury, sequela 11/07/2022    GERD (gastroesophageal reflux disease)     Hyperlipidemia     Hypertension     Insomnia     MI (myocardial infarction)     Neuropathy     Peripheral vascular disease, unspecified     Right foot ulcer, with fat layer exposed 01/07/2015    S/p left hip fracture 11/08/2022    Sleep apnea     Type 2 diabetes mellitus      Past Surgical History:   Procedure Laterality Date    AMPUTATION      ANGIOPLASTY      CARDIAC CATHETERIZATION      CORONARY ARTERY BYPASS GRAFT      CORONARY STENT PLACEMENT      DEBRIDEMENT      Right foot debridment with hospital stay and IV Abx    DEBRIDEMENT OF LOWER EXTREMITY Right 06/27/2022    Procedure: DEBRIDEMENT, LOWER EXTREMITY;  Surgeon: Forrest Kiser MD;  Location: [facility] OR;  Service: General;  Laterality: Right;  right foot    HIP FRACTURE SURGERY Left     IRRIGATION AND DEBRIDEMENT OF LOWER EXTREMITY Right 09/08/2022    Procedure: IRRIGATION AND DEBRIDEMENT, LOWER EXTREMITY;  Surgeon: Selina Matos MD;  Location: [facility] OR;  Service: General;  Laterality: Right;    IRRIGATION AND DEBRIDEMENT OF LOWER EXTREMITY Right 1/5/2023    Procedure: IRRIGATION AND DEBRIDEMENT, LOWER EXTREMITY;  Surgeon: Evelio Chiu DO;  Location: [facility] OR;  Service: General;  Laterality: Right;    SHOULDER OPEN ROTATOR CUFF REPAIR Left     SPINE SURGERY      VASCULAR SURGERY        Social History     Socioeconomic History    Marital status:    Occupational History    Occupation: Retired   Tobacco Use    Smoking status: Every Day     Packs/day: 0.25     Types: Cigarettes    Smokeless tobacco: Never   Substance and Sexual Activity    Alcohol use: Not Currently    Drug use: Yes     Types: Oxycodone    Sexual activity: Yes     Partners: Female     Social Determinants of Health     Financial Resource Strain: Low Risk     Difficulty of Paying Living Expenses: Not very hard   Food Insecurity: No Food Insecurity    Worried About Running Out of Food in the Last Year: Never true    Ran Out of Food in the Last Year: Never true   Transportation Needs: No Transportation Needs    Lack of Transportation (Medical): No    Lack of Transportation (Non-Medical): No   Physical Activity: Inactive    Days of Exercise per Week: 0 days    Minutes of Exercise per Session: 0 min   Stress: No Stress Concern Present    Feeling of Stress : Only a little   Social Connections: Moderately Isolated    Frequency of Communication with Friends and Family: More than three times a week    Frequency of Social Gatherings with Friends and Family: More than three times a week    Attends Muslim Services: Never    Active Member of Clubs or Organizations: No    Attends Club or Organization Meetings: Never    Marital Status:    Housing Stability: Low Risk     Unable to Pay for Housing in the Last Year: No    Number of Places Lived in the Last Year: 1    Unstable Housing in the Last Year: No     Family History   Problem Relation Age of Onset    Arthritis Mother     Hypertension Mother     Learning disabilities Mother     Alcohol abuse Father     Early death Father     Heart disease Father     Cancer Sister     Diabetes Sister     Heart disease Maternal Grandfather     COPD Paternal Grandfather     Heart disease Son      Review of patient's allergies indicates:  No Known Allergies     Objective:  Vitals:    06/15/23 0814  "  BP: (!) 170/81   Pulse: 74   Resp: 18   Weight: 71.2 kg (157 lb)   Height: 5' 7" (1.702 m)   PainSc:   7         Physical Exam  Vitals and nursing note reviewed. Exam conducted with a chaperone present.   Constitutional:       General: He is awake. He is not in acute distress.     Appearance: Normal appearance. He is not ill-appearing, toxic-appearing or diaphoretic.   HENT:      Head: Normocephalic and atraumatic.      Nose: Nose normal.      Mouth/Throat:      Mouth: Mucous membranes are moist.      Pharynx: Oropharynx is clear.   Eyes:      Conjunctiva/sclera: Conjunctivae normal.      Pupils: Pupils are equal, round, and reactive to light.   Cardiovascular:      Rate and Rhythm: Normal rate.   Pulmonary:      Effort: Pulmonary effort is normal. No respiratory distress.   Abdominal:      Palpations: Abdomen is soft.   Musculoskeletal:         General: Normal range of motion.      Cervical back: Normal range of motion and neck supple.      Thoracic back: Tenderness present.      Lumbar back: Tenderness present.   Skin:     General: Skin is warm and dry.      Coloration: Skin is not jaundiced or pale.   Neurological:      General: No focal deficit present.      Mental Status: He is alert and oriented to person, place, and time. Mental status is at baseline.      Cranial Nerves: No cranial nerve deficit (II-XII).   Psychiatric:         Mood and Affect: Mood normal.         Behavior: Behavior normal. Behavior is cooperative.         Thought Content: Thought content normal.         X-Ray Foot Complete Right  Narrative: EXAMINATION:  XR FOOT COMPLETE 3 VIEW RIGHT    CLINICAL HISTORY:  Pain in unspecified foot    COMPARISON:  None available    FINDINGS:  No evidence of fracture seen.  Amputation in the 1st digit and distal 1st metatarsal with hypertrophic bone formation present at the amputation site.  The 3rd digit has been amputated previously.  The 2nd digit is subluxed dorsally.  Remaining alignment of the joints " appears normal.  No degenerative change is present.  No soft tissue abnormality is seen.  Impression: Second digit dislocation.  No other definite acute findings.    Electronically signed by: Francisco Felton  Date:    01/05/2023  Time:    07:53  X-Ray Hip 2 or 3 views Left (with Pelvis when performed)  Narrative: EXAMINATION:  XR HIP WITH PELVIS WHEN PERFORMED, 2 OR 3 VIEWS LEFT    CLINICAL HISTORY:  Left hip pain /sp fall;    COMPARISON:  1 November 2022    FINDINGS:  Acetabular fracture repair present with appearance similar to previous.  No other evidence of fracture seen.  The alignment of the joints appears normal.  No degenerative change is present.  No soft tissue abnormality is seen.  Impression: No definite acute findings.    Electronically signed by: Francisco Felton  Date:    01/05/2023  Time:    07:52  X-Ray Chest AP Portable  Narrative: EXAMINATION:  XR CHEST AP PORTABLE    CLINICAL HISTORY:  pre op history of COPD;    COMPARISON:  21 October 2022    FINDINGS:  The heart and mediastinum are stable in size and configuration.  The pulmonary vascularity is slightly increased with bilateral increased interstitial lung density.  No other lung infiltrates, effusions, pneumothorax or other abnormality is demonstrated.  Impression: Findings suggest mild cardiac decompensation and / or pneumonitis.    Electronically signed by: Francisco Felton  Date:    01/05/2023  Time:    07:50         Office Visit on 05/17/2023   Component Date Value Ref Range Status    Creatinine, Urine 05/17/2023 88  39 - 259 mg/dL Final    Microalbumin 05/17/2023 12.7 (H)  0.0 - 2.8 mg/dL Final    Microalbumin/Creatinine Ratio 05/17/2023 144.3 (H)  0.0 - 30.0 mg/g Final    Hemoglobin A1C 05/17/2023 8.8 (H)  4.5 - 6.6 % Final    Estimated Average Glucose 05/17/2023 207  mg/dL Final    Sodium 05/17/2023 134 (L)  136 - 145 mmol/L Final    Potassium 05/17/2023 4.6  3.5 - 5.1 mmol/L Final    Chloride 05/17/2023 103  98 - 107 mmol/L Final    CO2  05/17/2023 27  21 - 32 mmol/L Final    Anion Gap 05/17/2023 9  7 - 16 mmol/L Final    Glucose 05/17/2023 238 (H)  74 - 106 mg/dL Final    BUN 05/17/2023 23 (H)  7 - 18 mg/dL Final    Creatinine 05/17/2023 0.97  0.70 - 1.30 mg/dL Final    BUN/Creatinine Ratio 05/17/2023 24 (H)  6 - 20 Final    Calcium 05/17/2023 9.3  8.5 - 10.1 mg/dL Final    Total Protein 05/17/2023 7.5  6.4 - 8.2 g/dL Final    Albumin 05/17/2023 3.7  3.5 - 5.0 g/dL Final    Globulin 05/17/2023 3.8  2.0 - 4.0 g/dL Final    A/G Ratio 05/17/2023 1.0   Final    Bilirubin, Total 05/17/2023 0.3  >0.0 - 1.2 mg/dL Final    Alk Phos 05/17/2023 107  45 - 115 U/L Final    ALT 05/17/2023 14 (L)  16 - 61 U/L Final    AST 05/17/2023 7 (L)  15 - 37 U/L Final    eGFR 05/17/2023 87  >=60 mL/min/1.73m2 Final    WBC 05/17/2023 5.16  4.50 - 11.00 K/uL Final    RBC 05/17/2023 4.52 (L)  4.60 - 6.20 M/uL Final    Hemoglobin 05/17/2023 13.4 (L)  13.5 - 18.0 g/dL Final    Hematocrit 05/17/2023 42.1  40.0 - 54.0 % Final    MCV 05/17/2023 93.1  80.0 - 96.0 fL Final    MCH 05/17/2023 29.6  27.0 - 31.0 pg Final    MCHC 05/17/2023 31.8 (L)  32.0 - 36.0 g/dL Final    RDW 05/17/2023 13.2  11.5 - 14.5 % Final    Platelet Count 05/17/2023 181  150 - 400 K/uL Final    MPV 05/17/2023 10.9  9.4 - 12.4 fL Final    Neutrophils % 05/17/2023 47.1 (L)  53.0 - 65.0 % Final    Lymphocytes % 05/17/2023 40.5  27.0 - 41.0 % Final    Monocytes % 05/17/2023 6.8 (H)  2.0 - 6.0 % Final    Eosinophils % 05/17/2023 4.8 (H)  1.0 - 4.0 % Final    Basophils % 05/17/2023 0.6  0.0 - 1.0 % Final    Immature Granulocytes % 05/17/2023 0.2  0.0 - 0.4 % Final    nRBC, Auto 05/17/2023 0.0  <=0.0 % Final    Neutrophils, Abs 05/17/2023 2.43  1.80 - 7.70 K/uL Final    Lymphocytes, Absolute 05/17/2023 2.09  1.00 - 4.80 K/uL Final    Monocytes, Absolute 05/17/2023 0.35  0.00 - 0.80 K/uL Final    Eosinophils, Absolute 05/17/2023 0.25  0.00 - 0.50 K/uL Final    Basophils, Absolute 05/17/2023 0.03  0.00 - 0.20 K/uL  Final    Immature Granulocytes, Absolute 05/17/2023 0.01  0.00 - 0.04 K/uL Final    nRBC, Absolute 05/17/2023 0.00  <=0.00 x10e3/uL Final    Diff Type 05/17/2023 Auto   Final   Office Visit on 03/14/2023   Component Date Value Ref Range Status    POC Amphetamines 03/14/2023 Negative  Negative, Inconclusive Final    POC Barbiturates 03/14/2023 Negative  Negative, Inconclusive Final    POC Benzodiazepines 03/14/2023 Negative  Negative, Inconclusive Final    POC Cocaine 03/14/2023 Negative  Negative, Inconclusive Final    POC THC 03/14/2023 Negative  Negative, Inconclusive Final    POC Methadone 03/14/2023 Negative  Negative, Inconclusive Final    POC Methamphetamine 03/14/2023 Negative  Negative, Inconclusive Final    POC Opiates 03/14/2023 Presumptive Positive (A)  Negative, Inconclusive Final    POC Oxycodone 03/14/2023 Negative  Negative, Inconclusive Final    POC Phencyclidine 03/14/2023 Negative  Negative, Inconclusive Final    POC Methylenedioxymethamphetamine * 03/14/2023 Negative  Negative, Inconclusive Final    POC Tricyclic Antidepressants 03/14/2023 Negative  Negative, Inconclusive Final    POC Buprenorphine 03/14/2023 Negative   Final     Acceptable 03/14/2023 Yes   Final    POC Temperature (Urine) 03/14/2023 90   Final   Admission on 01/04/2023, Discharged on 01/06/2023   Component Date Value Ref Range Status    POC Glucose 01/04/2023 172 (H)  70 - 105 mg/dL Final    Sodium 01/04/2023 140  136 - 145 mmol/L Final    Potassium 01/04/2023 3.8  3.5 - 5.1 mmol/L Final    Chloride 01/04/2023 101  98 - 107 mmol/L Final    CO2 01/04/2023 31  21 - 32 mmol/L Final    Anion Gap 01/04/2023 12  7 - 16 mmol/L Final    Glucose 01/04/2023 160 (H)  74 - 106 mg/dL Final    BUN 01/04/2023 18  7 - 18 mg/dL Final    Creatinine 01/04/2023 0.82  0.70 - 1.30 mg/dL Final    BUN/Creatinine Ratio 01/04/2023 22 (H)  6 - 20 Final    Calcium 01/04/2023 8.6  8.5 - 10.1 mg/dL Final    Total Protein 01/04/2023 7.0  6.4 -  8.2 g/dL Final    Albumin 01/04/2023 3.2 (L)  3.5 - 5.0 g/dL Final    Globulin 01/04/2023 3.8  2.0 - 4.0 g/dL Final    A/G Ratio 01/04/2023 0.8   Final    Bilirubin, Total 01/04/2023 0.4  >0.0 - 1.2 mg/dL Final    Alk Phos 01/04/2023 111  45 - 115 U/L Final    ALT 01/04/2023 10 (L)  16 - 61 U/L Final    AST 01/04/2023 10 (L)  15 - 37 U/L Final    eGFR 01/04/2023 98  >=60 mL/min/1.73m² Final    Culture, Blood 01/04/2023 No Growth at 5 days   Final    Culture, Blood 01/04/2023 No Growth at 5 days   Final    Lactic Acid 01/04/2023 1.3  0.4 - 2.0 mmol/L Final    WBC 01/04/2023 13.88 (H)  4.50 - 11.00 K/uL Final    RBC 01/04/2023 3.93 (L)  4.60 - 6.20 M/uL Final    Hemoglobin 01/04/2023 12.1 (L)  13.5 - 18.0 g/dL Final    Hematocrit 01/04/2023 36.2 (L)  40.0 - 54.0 % Final    MCV 01/04/2023 92.1  80.0 - 96.0 fL Final    MCH 01/04/2023 30.8  27.0 - 31.0 pg Final    MCHC 01/04/2023 33.4  32.0 - 36.0 g/dL Final    RDW 01/04/2023 12.7  11.5 - 14.5 % Final    Platelet Count 01/04/2023 163  150 - 400 K/uL Final    MPV 01/04/2023 10.1  9.4 - 12.4 fL Final    Neutrophils % 01/04/2023 77.6 (H)  53.0 - 65.0 % Final    Lymphocytes % 01/04/2023 13.0 (L)  27.0 - 41.0 % Final    Monocytes % 01/04/2023 8.4 (H)  2.0 - 6.0 % Final    Eosinophils % 01/04/2023 0.1 (L)  1.0 - 4.0 % Final    Basophils % 01/04/2023 0.3  0.0 - 1.0 % Final    Immature Granulocytes % 01/04/2023 0.6 (H)  0.0 - 0.4 % Final    nRBC, Auto 01/04/2023 0.0  <=0.0 % Final    Neutrophils, Abs 01/04/2023 10.77 (H)  1.80 - 7.70 K/uL Final    Lymphocytes, Absolute 01/04/2023 1.81  1.00 - 4.80 K/uL Final    Monocytes, Absolute 01/04/2023 1.16 (H)  0.00 - 0.80 K/uL Final    Eosinophils, Absolute 01/04/2023 0.02  0.00 - 0.50 K/uL Final    Basophils, Absolute 01/04/2023 0.04  0.00 - 0.20 K/uL Final    Immature Granulocytes, Absolute 01/04/2023 0.08 (H)  0.00 - 0.04 K/uL Final    nRBC, Absolute 01/04/2023 0.00  <=0.00 x10e3/uL Final    Diff Type 01/04/2023 Auto   Final    Sodium  01/05/2023 140  136 - 145 mmol/L Final    Potassium 01/05/2023 3.7  3.5 - 5.1 mmol/L Final    Chloride 01/05/2023 102  98 - 107 mmol/L Final    CO2 01/05/2023 30  21 - 32 mmol/L Final    Anion Gap 01/05/2023 12  7 - 16 mmol/L Final    Glucose 01/05/2023 165 (H)  74 - 106 mg/dL Final    BUN 01/05/2023 17  7 - 18 mg/dL Final    Creatinine 01/05/2023 0.83  0.70 - 1.30 mg/dL Final    BUN/Creatinine Ratio 01/05/2023 20  6 - 20 Final    Calcium 01/05/2023 9.0  8.5 - 10.1 mg/dL Final    eGFR 01/05/2023 98  >=60 mL/min/1.73m² Final    PT 01/05/2023 15.0 (H)  11.7 - 14.7 seconds Final    INR 01/05/2023 1.23  <=3.30 Final    PTT 01/05/2023 31.5  25.2 - 37.3 seconds Final    Group & Rh 01/05/2023 A POS   Final    Indirect Sanam 01/05/2023 NEG   Final    Triglycerides 01/05/2023 110  35 - 150 mg/dL Final    Cholesterol 01/05/2023 147  0 - 200 mg/dL Final    HDL Cholesterol 01/05/2023 39 (L)  40 - 60 mg/dL Final    Cholesterol/HDL Ratio (Risk Factor) 01/05/2023 3.8   Final    Non-HDL 01/05/2023 108  mg/dL Final    LDL Calculated 01/05/2023 86  mg/dL Final    LDL/HDL 01/05/2023 2.2   Final    VLDL 01/05/2023 22  mg/dL Final    POC Glucose 01/05/2023 178 (H)  70 - 105 mg/dL Final    POC Glucose 01/05/2023 180 (H)  70 - 105 mg/dL Final    Vancomycin, Trough 01/05/2023 19.6  10.0 - 20.0 µg/mL Final    WBC 01/05/2023 11.72 (H)  4.50 - 11.00 K/uL Final    RBC 01/05/2023 3.71 (L)  4.60 - 6.20 M/uL Final    Hemoglobin 01/05/2023 11.4 (L)  13.5 - 18.0 g/dL Final    Hematocrit 01/05/2023 35.0 (L)  40.0 - 54.0 % Final    MCV 01/05/2023 94.3  80.0 - 96.0 fL Final    MCH 01/05/2023 30.7  27.0 - 31.0 pg Final    MCHC 01/05/2023 32.6  32.0 - 36.0 g/dL Final    RDW 01/05/2023 12.8  11.5 - 14.5 % Final    Platelet Count 01/05/2023 158  150 - 400 K/uL Final    MPV 01/05/2023 10.2  9.4 - 12.4 fL Final    Neutrophils % 01/05/2023 69.8 (H)  53.0 - 65.0 % Final    Lymphocytes % 01/05/2023 21.8 (L)  27.0 - 41.0 % Final    Monocytes % 01/05/2023 7.2  (H)  2.0 - 6.0 % Final    Eosinophils % 01/05/2023 0.4 (L)  1.0 - 4.0 % Final    Basophils % 01/05/2023 0.4  0.0 - 1.0 % Final    Immature Granulocytes % 01/05/2023 0.4  0.0 - 0.4 % Final    nRBC, Auto 01/05/2023 0.0  <=0.0 % Final    Neutrophils, Abs 01/05/2023 8.17 (H)  1.80 - 7.70 K/uL Final    Lymphocytes, Absolute 01/05/2023 2.56  1.00 - 4.80 K/uL Final    Monocytes, Absolute 01/05/2023 0.84 (H)  0.00 - 0.80 K/uL Final    Eosinophils, Absolute 01/05/2023 0.05  0.00 - 0.50 K/uL Final    Basophils, Absolute 01/05/2023 0.05  0.00 - 0.20 K/uL Final    Immature Granulocytes, Absolute 01/05/2023 0.05 (H)  0.00 - 0.04 K/uL Final    nRBC, Absolute 01/05/2023 0.00  <=0.00 x10e3/uL Final    Diff Type 01/05/2023 Auto   Final    POC Glucose 01/05/2023 164 (H)  70 - 105 mg/dL Final    POC Glucose 01/05/2023 162 (H)  70 - 105 mg/dL Final    Culture, Wound/Abscess 01/05/2023 Heavy Growth Methicillin resistant Staphylococcus aureus (A)   Final    Culture, Wound/Abscess 01/05/2023 Light Growth Pseudomonas aeruginosa (A)   Final    Culture, Anaerobe 01/05/2023 Bacteroides uniformis (A)   Final    Culture, Wound/Abscess 01/05/2023 Heavy Growth Methicillin resistant Staphylococcus aureus (A)   Final    Culture, Anaerobe 01/05/2023 Finegoldia magna (A)   Final    POC Glucose 01/05/2023 152 (H)  70 - 105 mg/dL Final    POC Glucose 01/05/2023 231 (H)  70 - 105 mg/dL Final    POC Glucose 01/05/2023 247 (H)  70 - 105 mg/dL Final    POC Glucose 01/06/2023 117 (H)  70 - 105 mg/dL Final    POC Glucose 01/06/2023 219 (H)  70 - 105 mg/dL Final         Orders Placed This Encounter   Procedures    POCT Urine Drug Screen Presump     Interpretive Information:     Negative:  No drug detected at the cut off level.   Positive:  This result represents presumptive positive for the   tested drug, other substances may yield a positive response other   than the analyte of interest. This result should be utilized for   diagnostic purpose only.  Confirmation testing will be performed upon physician request only.          Requested Prescriptions     Signed Prescriptions Disp Refills    HYDROcodone-acetaminophen (NORCO)  mg per tablet 120 tablet 0     Sig: Take 1 tablet by mouth every 6 (six) hours as needed for Pain (pain).    HYDROcodone-acetaminophen (NORCO)  mg per tablet 120 tablet 0     Sig: Take 1 tablet by mouth every 6 (six) hours as needed for Pain (pain).    gabapentin (NEURONTIN) 300 MG capsule 360 capsule 2     Sig: Take 3 capsules (900 mg total) by mouth every 6 (six) hours.    HYDROcodone-acetaminophen (NORCO)  mg per tablet 120 tablet 0     Sig: Take 1 tablet by mouth every 6 (six) hours as needed for Pain (pain).       Assessment:     1. Peripheral vascular disease, unspecified    2. Ulcer of right foot with necrosis of muscle    3. Neuropathy    4. Encounter for long-term (current) use of other medications         A's of Opioid Risk Assessment  Activity:Patient can perform ADL.   Analgesia:Patients pain is partially controlled by current medication. Patient has tried OTC medications such as Tylenol and Ibuprofen with out relief.   Adverse Effects: Patient denies constipation or sedation.  Aberrant Behavior:  reviewed with no aberrant drug seeking/taking behavior.  Overdose reversal drug naloxone discussed    Drug screen reviewed      Plan:    Narcan March 2023    Left acetabular reconstruction replacement [facility]     Chronic left hip pain left groin pain after left after acetabular reconstruction [facility]    Following wound management diabetic ulcer right foot multiple surgeries pending skin graft [facility]     Pharmacy closed on Sunday [facility].    Can pick his August prescription of August 19     Use August 20 refill date next visit    Any further inconsistent drug screens were no longer provide narcotics June 2021 definitive drug screen inconsistent  positive for hydrocodone and oxycodone    Office note [facility] Dr. Anders,  May 2, 2023 recommended steroid injection left intra-articular hip not into his trochanteric bursa    X-ray pelvis and hips [facility] January 5, 2023  Acetabular fracture repair present with appearance similar to previous.  No other evidence of fracture seen.  The alignment of the joints appears normal.  No degenerative change is present.  No soft tissue abnormality is seen.    He would like to continue conservative management    Continue home exercise program as tolerated     Continue current medication     Follow-up 3 months     Dr. Dobson, December 2023    Bring original prescription medication bottles/container/box with labels to each visit

## 2023-06-15 ENCOUNTER — OFFICE VISIT (OUTPATIENT)
Dept: PAIN MEDICINE | Facility: CLINIC | Age: 63
End: 2023-06-15
Payer: MEDICARE

## 2023-06-15 VITALS
HEART RATE: 74 BPM | RESPIRATION RATE: 18 BRPM | SYSTOLIC BLOOD PRESSURE: 170 MMHG | WEIGHT: 157 LBS | DIASTOLIC BLOOD PRESSURE: 81 MMHG | HEIGHT: 67 IN | BODY MASS INDEX: 24.64 KG/M2

## 2023-06-15 DIAGNOSIS — L97.513 ULCER OF RIGHT FOOT WITH NECROSIS OF MUSCLE: Chronic | ICD-10-CM

## 2023-06-15 DIAGNOSIS — Z79.899 ENCOUNTER FOR LONG-TERM (CURRENT) USE OF OTHER MEDICATIONS: ICD-10-CM

## 2023-06-15 DIAGNOSIS — M25.552 HIP PAIN, LEFT: Primary | Chronic | ICD-10-CM

## 2023-06-15 DIAGNOSIS — I73.9 PERIPHERAL VASCULAR DISEASE, UNSPECIFIED: Chronic | ICD-10-CM

## 2023-06-15 DIAGNOSIS — G62.9 NEUROPATHY: Chronic | ICD-10-CM

## 2023-06-15 PROCEDURE — 99214 PR OFFICE/OUTPT VISIT, EST, LEVL IV, 30-39 MIN: ICD-10-PCS | Mod: S$PBB,,, | Performed by: PHYSICIAN ASSISTANT

## 2023-06-15 PROCEDURE — 99214 OFFICE O/P EST MOD 30 MIN: CPT | Mod: S$PBB,,, | Performed by: PHYSICIAN ASSISTANT

## 2023-06-15 PROCEDURE — 99215 OFFICE O/P EST HI 40 MIN: CPT | Mod: PBBFAC | Performed by: PHYSICIAN ASSISTANT

## 2023-06-15 PROCEDURE — 80305 DRUG TEST PRSMV DIR OPT OBS: CPT | Mod: PBBFAC | Performed by: PHYSICIAN ASSISTANT

## 2023-06-15 RX ORDER — GABAPENTIN 300 MG/1
900 CAPSULE ORAL EVERY 6 HOURS
Qty: 360 CAPSULE | Refills: 2 | Status: SHIPPED | OUTPATIENT
Start: 2023-06-15 | End: 2023-09-13 | Stop reason: SDUPTHER

## 2023-06-15 RX ORDER — HYDROCODONE BITARTRATE AND ACETAMINOPHEN 10; 325 MG/1; MG/1
1 TABLET ORAL EVERY 6 HOURS PRN
Qty: 120 TABLET | Refills: 0 | Status: SHIPPED | OUTPATIENT
Start: 2023-07-21 | End: 2023-09-13 | Stop reason: SDUPTHER

## 2023-06-15 RX ORDER — HYDROCODONE BITARTRATE AND ACETAMINOPHEN 10; 325 MG/1; MG/1
1 TABLET ORAL EVERY 6 HOURS PRN
Qty: 120 TABLET | Refills: 0 | Status: SHIPPED | OUTPATIENT
Start: 2023-06-21 | End: 2023-07-21

## 2023-06-15 RX ORDER — HYDROCODONE BITARTRATE AND ACETAMINOPHEN 10; 325 MG/1; MG/1
1 TABLET ORAL EVERY 6 HOURS PRN
Qty: 120 TABLET | Refills: 0 | Status: SHIPPED | OUTPATIENT
Start: 2023-08-19 | End: 2023-09-13 | Stop reason: SDUPTHER

## 2023-06-27 ENCOUNTER — OFFICE VISIT (OUTPATIENT)
Dept: CARDIOLOGY | Facility: CLINIC | Age: 63
End: 2023-06-27
Payer: MEDICARE

## 2023-06-27 VITALS
DIASTOLIC BLOOD PRESSURE: 66 MMHG | OXYGEN SATURATION: 96 % | SYSTOLIC BLOOD PRESSURE: 118 MMHG | WEIGHT: 160.19 LBS | HEART RATE: 63 BPM | HEIGHT: 67 IN | BODY MASS INDEX: 25.14 KG/M2

## 2023-06-27 DIAGNOSIS — R07.9 CHEST PAIN, UNSPECIFIED TYPE: ICD-10-CM

## 2023-06-27 DIAGNOSIS — I10 BENIGN HYPERTENSION: ICD-10-CM

## 2023-06-27 DIAGNOSIS — E78.2 MIXED HYPERLIPIDEMIA: ICD-10-CM

## 2023-06-27 DIAGNOSIS — I20.89 ANGINAL EQUIVALENT: ICD-10-CM

## 2023-06-27 DIAGNOSIS — I25.10 CORONARY ARTERY DISEASE INVOLVING NATIVE CORONARY ARTERY OF NATIVE HEART, UNSPECIFIED WHETHER ANGINA PRESENT: ICD-10-CM

## 2023-06-27 DIAGNOSIS — I10 HYPERTENSION, UNSPECIFIED TYPE: Primary | ICD-10-CM

## 2023-06-27 PROCEDURE — 99214 OFFICE O/P EST MOD 30 MIN: CPT | Mod: S$PBB,,, | Performed by: NURSE PRACTITIONER

## 2023-06-27 PROCEDURE — 93010 ELECTROCARDIOGRAM REPORT: CPT | Mod: S$PBB,,, | Performed by: INTERNAL MEDICINE

## 2023-06-27 PROCEDURE — 99215 OFFICE O/P EST HI 40 MIN: CPT | Mod: PBBFAC | Performed by: NURSE PRACTITIONER

## 2023-06-27 PROCEDURE — 93005 ELECTROCARDIOGRAM TRACING: CPT | Mod: PBBFAC | Performed by: INTERNAL MEDICINE

## 2023-06-27 PROCEDURE — 93010 EKG 12-LEAD: ICD-10-PCS | Mod: S$PBB,,, | Performed by: INTERNAL MEDICINE

## 2023-06-27 PROCEDURE — 99214 PR OFFICE/OUTPT VISIT, EST, LEVL IV, 30-39 MIN: ICD-10-PCS | Mod: S$PBB,,, | Performed by: NURSE PRACTITIONER

## 2023-06-27 NOTE — PROGRESS NOTES
PCP: Marquez Huff MD    Referring Provider:     Subjective:   Navdeep Avery is a 62 y.o. male with hx of MICAD s/p CABG (2018), DM, type II, HTN, HLD,  SAMEERA and GERD, who presents for routine follow up. He has been lost to cardiology follow up since 5/26/2021. He reports that he is concerned regarding an episode of right scapular pain that occurred over the weekend.  He describes it as an aching pain to his right scapula that was continuous for 2 days.  He did not seek medical attention.  He does recognize this as his anginal equivalent prior to 1 of his heart attacks.  He is also had daily issues with indigestion that is also 1 of his anginal equivalent prior to a previous heart attack.        Fhx:  Family History   Problem Relation Age of Onset    Arthritis Mother     Hypertension Mother     Learning disabilities Mother     Alcohol abuse Father     Early death Father     Heart disease Father     Cancer Sister     Diabetes Sister     Heart disease Maternal Grandfather     COPD Paternal Grandfather     Heart disease Son      Shx:   Social History     Socioeconomic History    Marital status:    Occupational History    Occupation: Retired   Tobacco Use    Smoking status: Every Day     Packs/day: 0.25     Types: Cigarettes    Smokeless tobacco: Never   Substance and Sexual Activity    Alcohol use: Not Currently    Drug use: Yes     Types: Oxycodone    Sexual activity: Yes     Partners: Female     Social Determinants of Health     Financial Resource Strain: Low Risk     Difficulty of Paying Living Expenses: Not very hard   Food Insecurity: No Food Insecurity    Worried About Running Out of Food in the Last Year: Never true    Ran Out of Food in the Last Year: Never true   Transportation Needs: No Transportation Needs    Lack of Transportation (Medical): No    Lack of Transportation (Non-Medical): No   Physical Activity: Inactive    Days of Exercise per Week: 0 days    Minutes of Exercise per Session: 0  min   Stress: No Stress Concern Present    Feeling of Stress : Only a little   Social Connections: Moderately Isolated    Frequency of Communication with Friends and Family: More than three times a week    Frequency of Social Gatherings with Friends and Family: More than three times a week    Attends Restoration Services: Never    Active Member of Clubs or Organizations: No    Attends Club or Organization Meetings: Never    Marital Status:    Housing Stability: Low Risk     Unable to Pay for Housing in the Last Year: No    Number of Places Lived in the Last Year: 1    Unstable Housing in the Last Year: No       EKG  6/27/2023 sinus bradycardia; HR 58 bpm; minimal voltage criteria for LVH; inferior infarct; anterior infarct; lateral TWA    5/26/2021 RSR; HR 64 bpm;  inferior infarct; anterior infarct; lateral TWA     Echocardiogram 6/19/2019  1. Normal chamber size with hypokinetic mid and basal inferior wall and mid and apical anterior wall and LV apex. Overall LVSF appeared to be at the low end of nomral range with estimated LVEF 50-55%  2. Mild or mild to moderate MR   3. Aortic valve appears tricuspid and mildly calcified and sclerotic but maintained normal opening  4. Trivial aortic regurgitation  5. Trace TR with est RVSP 39 mmHg  6. Mild PI  7. Impaired LV relaxation           Heart cath- most recent 8/12/2019  1. Severe 3 vessel CAD  2. Normal LV size with apical akinesis and overall normal LVSF, EF 55%  3. Patent SVG to the r-PDA with patent jump graft to the OM branch of the LCx  4. Patent LIMA to the OM1  5. No significant MR  ECHO No results found for this or any previous visit.            Lab Results   Component Value Date     (L) 05/17/2023    K 4.6 05/17/2023     05/17/2023    CO2 27 05/17/2023    BUN 23 (H) 05/17/2023    CREATININE 0.97 05/17/2023    CALCIUM 9.3 05/17/2023    ANIONGAP 9 05/17/2023    ESTGFRAFRICA 130 01/25/2021    EGFRNONAA 66 06/29/2022       Lab Results   Component  Value Date    CHOL 147 01/05/2023    CHOL 161 07/06/2022    CHOL 223 (H) 04/15/2022     Lab Results   Component Value Date    HDL 39 (L) 01/05/2023    HDL 38 (L) 07/06/2022    HDL 36 (L) 04/15/2022     Lab Results   Component Value Date    LDLCALC 86 01/05/2023    LDLCALC 91 07/06/2022    LDLCALC 145 04/15/2022     Lab Results   Component Value Date    TRIG 110 01/05/2023    TRIG 161 (H) 07/06/2022    TRIG 208 (H) 04/15/2022     Lab Results   Component Value Date    CHOLHDL 3.8 01/05/2023    CHOLHDL 4.2 07/06/2022    CHOLHDL 6.2 04/15/2022       Lab Results   Component Value Date    WBC 5.16 05/17/2023    HGB 13.4 (L) 05/17/2023    HCT 42.1 05/17/2023    MCV 93.1 05/17/2023     05/17/2023           Current Outpatient Medications:     acetaminophen (TYLENOL) 325 MG tablet, Take 650 mg by mouth every 6 (six) hours., Disp: , Rfl:     albuterol (PROVENTIL) 2.5 mg /3 mL (0.083 %) nebulizer solution, USE 1 VIAL IN NEBULIZER 3 TIMES DAILY, Disp: 90 each, Rfl: 11    albuterol (PROVENTIL/VENTOLIN HFA) 90 mcg/actuation inhaler, Inhale 2 puffs into the lungs 2 (two) times daily as needed for Wheezing., Disp: 18 g, Rfl: 2    amLODIPine (NORVASC) 10 MG tablet, Take 1 tablet (10 mg total) by mouth once daily., Disp: 90 tablet, Rfl: 1    aspirin 81 MG Chew, Take 81 mg by mouth once daily., Disp: , Rfl:     atorvastatin (LIPITOR) 80 MG tablet, Take 1 tablet (80 mg total) by mouth every evening., Disp: 90 tablet, Rfl: 1    clopidogreL (PLAVIX) 75 mg tablet, Take 1 tablet (75 mg total) by mouth once daily., Disp: 90 tablet, Rfl: 1    diclofenac sodium (VOLTAREN) 1 % Gel, Apply 1 g topically 4 (four) times daily as needed (pain)., Disp: 20 g, Rfl: 1    empagliflozin (JARDIANCE) 10 mg tablet, Take 1 tablet (10 mg total) by mouth once daily., Disp: 30 tablet, Rfl: 6    EScitalopram oxalate (LEXAPRO) 10 MG tablet, Take 1 tablet (10 mg total) by mouth once daily., Disp: 30 tablet, Rfl: 1    gabapentin (NEURONTIN) 300 MG capsule,  "Take 3 capsules (900 mg total) by mouth every 6 (six) hours., Disp: 360 capsule, Rfl: 2    HYDROcodone-acetaminophen (NORCO)  mg per tablet, Take 1 tablet by mouth every 6 (six) hours as needed for Pain (pain)., Disp: 120 tablet, Rfl: 0    [START ON 7/21/2023] HYDROcodone-acetaminophen (NORCO)  mg per tablet, Take 1 tablet by mouth every 6 (six) hours as needed for Pain (pain)., Disp: 120 tablet, Rfl: 0    [START ON 8/19/2023] HYDROcodone-acetaminophen (NORCO)  mg per tablet, Take 1 tablet by mouth every 6 (six) hours as needed for Pain (pain)., Disp: 120 tablet, Rfl: 0    hydrOXYzine pamoate (VISTARIL) 25 MG Cap, Take 1 capsule (25 mg total) by mouth once daily. Take nightly for sleep, Disp: 90 capsule, Rfl: 1    insulin detemir U-100 (LEVEMIR FLEXTOUCH U-100 INSULN) 100 unit/mL (3 mL) InPn pen, Inject 10 units subcutaneously every morning, inject 20 units subcutaneously every evening., Disp: 3 each, Rfl: 2    lisinopriL 10 MG tablet, Take 1 tablet (10 mg total) by mouth once daily., Disp: 90 tablet, Rfl: 1    metoprolol succinate (TOPROL-XL) 25 MG 24 hr tablet, Take 1 tablet (25 mg total) by mouth once daily., Disp: 90 tablet, Rfl: 1    mirtazapine (REMERON) 7.5 MG Tab, Take 1 tablet (7.5 mg total) by mouth nightly., Disp: 90 tablet, Rfl: 1    nitroGLYCERIN (NITROSTAT) 0.4 MG SL tablet, Place 1 tablet (0.4 mg total) under the tongue every 5 (five) minutes as needed for Chest pain., Disp: 30 tablet, Rfl: 2    pantoprazole (PROTONIX) 40 MG tablet, Take 1 tablet (40 mg total) by mouth once daily., Disp: 90 tablet, Rfl: 1    semaglutide (RYBELSUS) 3 mg tablet, Take 3 mg by mouth once daily., Disp: , Rfl:     SPIRIVA RESPIMAT 2.5 mcg/actuation inhaler, Inhale 1 puff into the lungs once daily., Disp: 4 g, Rfl: 5    SURE COMFORT INSULIN SYRINGE 0.3 mL 31 gauge x 5/16" Syrg, Inject 1 application into the skin 2 (two) times a day., Disp: , Rfl:     fluticasone propionate (FLONASE) 50 mcg/actuation nasal " "spray, 2 sprays (100 mcg total) by Each Nostril route once daily. (Patient not taking: Reported on 6/27/2023), Disp: 16 g, Rfl: 2    LIDOcaine (LIDODERM) 5 %, 1 patch once daily., Disp: , Rfl:     SENNA 8.6 mg tablet, Take 2 tablets by mouth once daily., Disp: , Rfl:   Meds reviewed and reconciled.      Review of Systems   Respiratory:  Negative for shortness of breath.    Cardiovascular:  Positive for leg swelling. Negative for chest pain, palpitations, orthopnea, claudication and PND.        Anginal equivalent; right scapular pain; and indigestion   Neurological:  Negative for dizziness, loss of consciousness and weakness.         Objective:   /66 (BP Location: Left arm, Patient Position: Sitting)   Pulse 63   Ht 5' 7" (1.702 m)   Wt 72.7 kg (160 lb 3.2 oz)   SpO2 96%   BMI 25.09 kg/m²     Physical Exam  Vitals and nursing note reviewed.   Constitutional:       Appearance: Normal appearance. He is normal weight.      Comments: Walks with a cane   HENT:      Head: Normocephalic and atraumatic.   Neck:      Vascular: No carotid bruit.   Cardiovascular:      Rate and Rhythm: Normal rate and regular rhythm.      Pulses: Normal pulses.      Heart sounds: Normal heart sounds.   Pulmonary:      Effort: Pulmonary effort is normal.      Breath sounds: Normal breath sounds.   Abdominal:      Palpations: Abdomen is soft.   Musculoskeletal:      Cervical back: Neck supple.      Right lower leg: No edema.      Left lower leg: No edema.   Skin:     General: Skin is warm and dry.      Capillary Refill: Capillary refill takes less than 2 seconds.   Neurological:      Mental Status: He is alert.         Assessment:     1. Hypertension, unspecified type  EKG 12-lead    EKG 12-lead      2. Chest pain, unspecified type  Troponin I      3. Coronary artery disease involving native coronary artery of native heart, unspecified whether angina present  Troponin I      4. Anginal equivalent        5. Benign hypertension        6. " Mixed hyperlipidemia              Plan:   Anginal equivalent  Right scapular pain; aching type pain; continuous x 2 days; began Saturday while at rest. Patient states is c/w pain with one of his heart attacks. It is not reproducible with palpation. The patient denies any muscle strain or injury. He has also frequent issues with GERD which was also a symptom prior to one of his MI's.   Currently asymptomatic  EKG Is stable  Check troponin stat this am  IF troponin is WNL schedule Lexiscan/echo.The patient has an unsteady gait and is not a candidate for EST.  If troponin is abnormal, I will refer to the ED.    Troponin was abnormal. Schedule Ibis/echo    Benign hypertension  118/66 mmHg    Mixed hyperlipidemia  Lipid panel results reviewed from 1/5/2023  Trig 110 mg/dl  LDL 86 mg/dl  HDL 39 mg/dl  Lipitor 80 mg po daily  Lipids followed by PCP    Coronary artery disease involving native coronary artery of native heart  Most recent left heart catheterization 08/12/2019  1. Normal LV size apical akinesis and overall normal LV systolic function, ejection fraction 55%  2. Patent saphenous vein graft to the right PDA with patent jump graft to the OM branch left circumflex  3. Patent LIMA to the OM1  4. No significant mitral regurgitation     Continue ASA/Plavix/lipitor    RTC 2 months

## 2023-06-27 NOTE — ASSESSMENT & PLAN NOTE
Right scapular pain; aching type pain; continuous x 2 days; began Saturday while at rest. Patient states is c/w pain with one of his heart attacks. It is not reproducible with palpation. The patient denies any muscle strain or injury. He has also frequent issues with GERD which was also a symptom prior to one of his MI's.   Currently asymptomatic  EKG Is stable  Check troponin stat this am  IF troponin is WNL schedule Lexiscan/echo.The patient has an unsteady gait and is not a candidate for EST.  If troponin is abnormal, I will refer to the ED.    Troponin was abnormal. Schedule Ibis/echo

## 2023-06-27 NOTE — ASSESSMENT & PLAN NOTE
Lipid panel results reviewed from 1/5/2023  Trig 110 mg/dl  LDL 86 mg/dl  HDL 39 mg/dl  Lipitor 80 mg po daily  Lipids followed by PCP

## 2023-06-27 NOTE — ASSESSMENT & PLAN NOTE
Most recent left heart catheterization 08/12/2019  1. Normal LV size apical akinesis and overall normal LV systolic function, ejection fraction 55%  2. Patent saphenous vein graft to the right PDA with patent jump graft to the OM branch left circumflex  3. Patent LIMA to the OM1  4. No significant mitral regurgitation     Continue ASA/Plavix/lipitor

## 2023-06-27 NOTE — ASSESSMENT & PLAN NOTE
118/66 mmHg   [Subsequent Evaluation] : a subsequent evaluation for [Family Member] : family member [Patient Declined  Services] : - None: Patient declined  services [Source: ______] : History obtained from [unfilled] [FreeTextEntry2] : follow up for possible labyrinthitis, history Left SNHL and vertigo, s/p MRI Brain, VNG ordered as well, unsure if completed. [FreeTextEntry3] : Patient declined offer of translation service.  Patient preferred to use accompanying family member/friend for translation.  [TWNoteComboBox1] : Lebanese

## 2023-07-10 ENCOUNTER — TELEPHONE (OUTPATIENT)
Dept: CARDIOLOGY | Facility: CLINIC | Age: 63
End: 2023-07-10
Payer: MEDICARE

## 2023-07-10 DIAGNOSIS — R07.9 CHEST PAIN, UNSPECIFIED TYPE: Primary | ICD-10-CM

## 2023-07-10 NOTE — TELEPHONE ENCOUNTER
Pt wife notified waqar and echo 7/27/2023 @ 08:00am. Check in at 07:30a. NPO after midnight, no diabetic medications, no tobacco and caffeine, and no metoprolol for 24 hours before test.     Pt wife notified and verbalized understanding.

## 2023-07-12 ENCOUNTER — DOCUMENT SCAN (OUTPATIENT)
Dept: HOME HEALTH SERVICES | Facility: HOSPITAL | Age: 63
End: 2023-07-12
Payer: MEDICARE

## 2023-07-27 ENCOUNTER — HOSPITAL ENCOUNTER (OUTPATIENT)
Dept: RADIOLOGY | Facility: HOSPITAL | Age: 63
Discharge: HOME | End: 2023-07-27
Attending: NURSE PRACTITIONER
Payer: MEDICARE

## 2023-07-27 ENCOUNTER — HOSPITAL ENCOUNTER (OUTPATIENT)
Dept: CARDIOLOGY | Facility: HOSPITAL | Age: 63
Discharge: HOME | End: 2023-07-27
Attending: NURSE PRACTITIONER
Payer: MEDICARE

## 2023-07-27 VITALS
HEIGHT: 67 IN | BODY MASS INDEX: 25.06 KG/M2 | SYSTOLIC BLOOD PRESSURE: 191 MMHG | HEART RATE: 76 BPM | DIASTOLIC BLOOD PRESSURE: 89 MMHG

## 2023-07-27 VITALS — HEIGHT: 67 IN | BODY MASS INDEX: 25.11 KG/M2 | WEIGHT: 160 LBS

## 2023-07-27 DIAGNOSIS — R07.9 CHEST PAIN, UNSPECIFIED TYPE: ICD-10-CM

## 2023-07-27 PROCEDURE — A9500 TC99M SESTAMIBI: HCPCS

## 2023-07-27 PROCEDURE — 63600175 PHARM REV CODE 636 W HCPCS: Performed by: NURSE PRACTITIONER

## 2023-07-27 PROCEDURE — 93016 NUCLEAR STRESS TEST (CUPID ONLY): ICD-10-PCS | Mod: ,,, | Performed by: REGISTERED NURSE

## 2023-07-27 PROCEDURE — 93306 ECHO (CUPID ONLY): ICD-10-PCS | Mod: 26,,, | Performed by: INTERNAL MEDICINE

## 2023-07-27 PROCEDURE — 93306 TTE W/DOPPLER COMPLETE: CPT

## 2023-07-27 PROCEDURE — 93017 CV STRESS TEST TRACING ONLY: CPT

## 2023-07-27 PROCEDURE — 93018 NUCLEAR STRESS TEST (CUPID ONLY): ICD-10-PCS | Mod: ,,, | Performed by: INTERNAL MEDICINE

## 2023-07-27 PROCEDURE — 93018 CV STRESS TEST I&R ONLY: CPT | Mod: ,,, | Performed by: INTERNAL MEDICINE

## 2023-07-27 PROCEDURE — 93016 CV STRESS TEST SUPVJ ONLY: CPT | Mod: ,,, | Performed by: REGISTERED NURSE

## 2023-07-27 PROCEDURE — 78452 HT MUSCLE IMAGE SPECT MULT: CPT | Mod: TC

## 2023-07-27 PROCEDURE — 78452 HT MUSCLE IMAGE SPECT MULT: CPT | Mod: 26,,, | Performed by: INTERNAL MEDICINE

## 2023-07-27 PROCEDURE — 78452 NM MYOCARDIAL PERFUSION SPECT MULTI PHARM: ICD-10-PCS | Mod: 26,,, | Performed by: INTERNAL MEDICINE

## 2023-07-27 PROCEDURE — 93306 TTE W/DOPPLER COMPLETE: CPT | Mod: 26,,, | Performed by: INTERNAL MEDICINE

## 2023-07-27 RX ORDER — REGADENOSON 0.08 MG/ML
0.4 INJECTION, SOLUTION INTRAVENOUS ONCE
Status: COMPLETED | OUTPATIENT
Start: 2023-07-27 | End: 2023-07-27

## 2023-07-27 RX ADMIN — REGADENOSON 0.4 MG: 0.08 INJECTION, SOLUTION INTRAVENOUS at 10:07

## 2023-07-28 ENCOUNTER — HOSPITAL ENCOUNTER (EMERGENCY)
Facility: HOSPITAL | Age: 63
Discharge: HOME | End: 2023-07-28
Attending: EMERGENCY MEDICINE
Payer: MEDICARE

## 2023-07-28 VITALS
RESPIRATION RATE: 14 BRPM | BODY MASS INDEX: 24.4 KG/M2 | TEMPERATURE: 98 F | WEIGHT: 155.5 LBS | DIASTOLIC BLOOD PRESSURE: 72 MMHG | SYSTOLIC BLOOD PRESSURE: 121 MMHG | HEART RATE: 75 BPM | OXYGEN SATURATION: 96 % | HEIGHT: 67 IN

## 2023-07-28 DIAGNOSIS — S91.309A WOUND OF FOOT: ICD-10-CM

## 2023-07-28 LAB
ALBUMIN SERPL BCP-MCNC: 4 G/DL (ref 3.5–5)
ALBUMIN/GLOB SERPL: 1 {RATIO}
ALP SERPL-CCNC: 86 U/L (ref 45–115)
ALT SERPL W P-5'-P-CCNC: 16 U/L (ref 16–61)
ANION GAP SERPL CALCULATED.3IONS-SCNC: 8 MMOL/L (ref 7–16)
AST SERPL W P-5'-P-CCNC: 9 U/L (ref 15–37)
BASOPHILS # BLD AUTO: 0.04 K/UL (ref 0–0.2)
BASOPHILS NFR BLD AUTO: 0.6 % (ref 0–1)
BILIRUB SERPL-MCNC: 0.2 MG/DL (ref ?–1.2)
BUN SERPL-MCNC: 33 MG/DL (ref 7–18)
BUN/CREAT SERPL: 23 (ref 6–20)
CALCIUM SERPL-MCNC: 9.3 MG/DL (ref 8.5–10.1)
CHLORIDE SERPL-SCNC: 101 MMOL/L (ref 98–107)
CO2 SERPL-SCNC: 32 MMOL/L (ref 21–32)
CREAT SERPL-MCNC: 1.43 MG/DL (ref 0.7–1.3)
DIFFERENTIAL METHOD BLD: ABNORMAL
EGFR (NO RACE VARIABLE) (RUSH/TITUS): 55 ML/MIN/1.73M2
EOSINOPHIL # BLD AUTO: 0.19 K/UL (ref 0–0.5)
EOSINOPHIL NFR BLD AUTO: 3 % (ref 1–4)
ERYTHROCYTE [DISTWIDTH] IN BLOOD BY AUTOMATED COUNT: 12.2 % (ref 11.5–14.5)
GLOBULIN SER-MCNC: 4.1 G/DL (ref 2–4)
GLUCOSE SERPL-MCNC: 231 MG/DL (ref 74–106)
GLUCOSE SERPL-MCNC: 241 MG/DL (ref 70–105)
HCT VFR BLD AUTO: 40.9 % (ref 40–54)
HGB BLD-MCNC: 14 G/DL (ref 13.5–18)
IMM GRANULOCYTES # BLD AUTO: 0.02 K/UL (ref 0–0.04)
IMM GRANULOCYTES NFR BLD: 0.3 % (ref 0–0.4)
LYMPHOCYTES # BLD AUTO: 2.76 K/UL (ref 1–4.8)
LYMPHOCYTES NFR BLD AUTO: 43.4 % (ref 27–41)
MCH RBC QN AUTO: 31.2 PG (ref 27–31)
MCHC RBC AUTO-ENTMCNC: 34.2 G/DL (ref 32–36)
MCV RBC AUTO: 91.1 FL (ref 80–96)
MONOCYTES # BLD AUTO: 0.44 K/UL (ref 0–0.8)
MONOCYTES NFR BLD AUTO: 6.9 % (ref 2–6)
MPC BLD CALC-MCNC: 10.9 FL (ref 9.4–12.4)
NEUTROPHILS # BLD AUTO: 2.91 K/UL (ref 1.8–7.7)
NEUTROPHILS NFR BLD AUTO: 45.8 % (ref 53–65)
NRBC # BLD AUTO: 0 X10E3/UL
NRBC, AUTO (.00): 0 %
PLATELET # BLD AUTO: 168 K/UL (ref 150–400)
POTASSIUM SERPL-SCNC: 4 MMOL/L (ref 3.5–5.1)
PROT SERPL-MCNC: 8.1 G/DL (ref 6.4–8.2)
RBC # BLD AUTO: 4.49 M/UL (ref 4.6–6.2)
SODIUM SERPL-SCNC: 137 MMOL/L (ref 136–145)
WBC # BLD AUTO: 6.36 K/UL (ref 4.5–11)

## 2023-07-28 PROCEDURE — 99284 EMERGENCY DEPT VISIT MOD MDM: CPT | Mod: ,,, | Performed by: EMERGENCY MEDICINE

## 2023-07-28 PROCEDURE — 80053 COMPREHEN METABOLIC PANEL: CPT | Performed by: EMERGENCY MEDICINE

## 2023-07-28 PROCEDURE — 99284 EMERGENCY DEPT VISIT MOD MDM: CPT

## 2023-07-28 PROCEDURE — 25000003 PHARM REV CODE 250: Performed by: EMERGENCY MEDICINE

## 2023-07-28 PROCEDURE — 82962 GLUCOSE BLOOD TEST: CPT

## 2023-07-28 PROCEDURE — 87040 BLOOD CULTURE FOR BACTERIA: CPT | Performed by: EMERGENCY MEDICINE

## 2023-07-28 PROCEDURE — 99284 PR EMERGENCY DEPT VISIT,LEVEL IV: ICD-10-PCS | Mod: ,,, | Performed by: EMERGENCY MEDICINE

## 2023-07-28 PROCEDURE — 85025 COMPLETE CBC W/AUTO DIFF WBC: CPT | Performed by: EMERGENCY MEDICINE

## 2023-07-28 RX ORDER — SULFAMETHOXAZOLE AND TRIMETHOPRIM 800; 160 MG/1; MG/1
1 TABLET ORAL 2 TIMES DAILY
Qty: 14 TABLET | Refills: 0 | OUTPATIENT
Start: 2023-07-28 | End: 2023-08-03

## 2023-07-28 RX ORDER — SULFAMETHOXAZOLE AND TRIMETHOPRIM 800; 160 MG/1; MG/1
1 TABLET ORAL
Status: COMPLETED | OUTPATIENT
Start: 2023-07-28 | End: 2023-07-28

## 2023-07-28 RX ADMIN — SULFAMETHOXAZOLE AND TRIMETHOPRIM 1 TABLET: 800; 160 TABLET ORAL at 10:07

## 2023-07-29 NOTE — ED PROVIDER NOTES
Encounter Date: 7/28/2023    SCRIBE #1 NOTE: I, Polina Soriano, am scribing for, and in the presence of,  Edmund Scott MD. I have scribed the entire note.       History     Chief Complaint   Patient presents with    Wound Check     This is a 63 y/o white male,who presents to the ED for a wound check. He states he has a known hx of diabetes but does not take his insulin as he should. He notes today he noticed what appears to be an ulcer to the bottom of his right foot. His wife notes they noticed a slight odor to the wound as well. There is a bandage noted to the foot. He denies a fever, nausea or vomiting. He states he took a Norco for pain. There are no other complaints/pain in the ED at this time. He has a known hx of COPD, GERD, HTN, hyperlipidemia, and coronary arteriosclerosis. There is no surgical Hx on file. He is a current every day smoker. He has a family medical hx of COPD, diabetes and heart disease.    The history is provided by the patient. No  was used.     Review of patient's allergies indicates:  No Known Allergies  Past Medical History:   Diagnosis Date    Anemia     Anxiety     Arthritis     Atherosclerotic heart disease of native coronary artery with other forms of angina pectoris     Atrophic rhinitis     Carpal tunnel syndrome     Cellulitis of right lower extremity     COPD (chronic obstructive pulmonary disease)     Coronary arteriosclerosis     COVID 02/02/2022    Depression     Diabetic ulcer of right foot associated with diabetes mellitus due to underlying condition, with bone involvement without evidence of necrosis     Fall with significant injury, sequela 11/07/2022    GERD (gastroesophageal reflux disease)     Hyperlipidemia     Hypertension     Insomnia     MI (myocardial infarction)     Neuropathy     Peripheral vascular disease, unspecified     Right foot ulcer, with fat layer exposed 01/07/2015    S/p left hip fracture 11/08/2022    Sleep apnea     Type  2 diabetes mellitus      Past Surgical History:   Procedure Laterality Date    AMPUTATION      ANGIOPLASTY      CARDIAC CATHETERIZATION      CORONARY ARTERY BYPASS GRAFT      CORONARY STENT PLACEMENT      DEBRIDEMENT      Right foot debridment with hospital stay and IV Abx    DEBRIDEMENT OF LOWER EXTREMITY Right 06/27/2022    Procedure: DEBRIDEMENT, LOWER EXTREMITY;  Surgeon: Forrest Kiser MD;  Location: [facility] [place];  Service: General;  Laterality: Right;  right foot    HIP FRACTURE SURGERY Left     IRRIGATION AND DEBRIDEMENT OF LOWER EXTREMITY Right 09/08/2022    Procedure: IRRIGATION AND DEBRIDEMENT, LOWER EXTREMITY;  Surgeon: Selina Matos MD;  Location: [facility] OR;  Service: General;  Laterality: Right;    IRRIGATION AND DEBRIDEMENT OF LOWER EXTREMITY Right 1/5/2023    Procedure: IRRIGATION AND DEBRIDEMENT, LOWER EXTREMITY;  Surgeon: Evelio Chiu DO;  Location: [facility] OR;  Service: General;  Laterality: Right;    SHOULDER OPEN ROTATOR CUFF REPAIR Left     SPINE SURGERY      VASCULAR SURGERY       Family History   Problem Relation Age of Onset    Arthritis Mother     Hypertension Mother     Learning disabilities Mother     Alcohol abuse Father     Early death Father     Heart disease Father     Cancer Sister     Diabetes Sister     Heart disease Maternal Grandfather     COPD Paternal Grandfather     Heart disease Son      Social History     Tobacco Use    Smoking status: Every Day     Current packs/day: 0.25     Types: Cigarettes    Smokeless tobacco: Never   Substance Use Topics    Alcohol use: Not Currently    Drug use: Yes     Types: Oxycodone     Review of Systems   Constitutional:  Negative for fever.   Gastrointestinal:  Negative for nausea and vomiting.   Skin:  Positive for wound (Wound to the right foot.).   All other systems reviewed and are negative.      Physical Exam     Initial Vitals [07/28/23 1902]   BP Pulse Resp Temp SpO2   121/72 75 14 97.6 °F (36.4 °C) 96 %      MAP       --          Physical Exam    Nursing note and vitals reviewed.  Constitutional: He appears well-developed and well-nourished.   HENT:   Head: Normocephalic and atraumatic.   Eyes: EOM are normal. Pupils are equal, round, and reactive to light.   Neck: Neck supple. No thyromegaly present. No JVD present.   Normal range of motion.  Cardiovascular:  Normal rate, regular rhythm, normal heart sounds and intact distal pulses.           No murmur heard.  Pulmonary/Chest: Breath sounds normal. No stridor. No respiratory distress. He has no wheezes.   Abdominal: Abdomen is soft. Bowel sounds are normal. He exhibits no distension. There is no abdominal tenderness.   Musculoskeletal:         General: No edema. Normal range of motion.      Cervical back: Normal range of motion and neck supple.      Comments: There is a wound noted to the right foot which has an ulcer.      Lymphadenopathy:     He has no cervical adenopathy.   Neurological: He is alert and oriented to person, place, and time. He has normal strength. No cranial nerve deficit or sensory deficit. GCS score is 15. GCS eye subscore is 4. GCS verbal subscore is 5. GCS motor subscore is 6.   Skin: Skin is warm and dry. Capillary refill takes less than 2 seconds. No rash noted.   Psychiatric: He has a normal mood and affect.         ED Course   Procedures  Labs Reviewed   COMPREHENSIVE METABOLIC PANEL - Abnormal; Notable for the following components:       Result Value    Glucose 231 (*)     BUN 33 (*)     Creatinine 1.43 (*)     BUN/Creatinine Ratio 23 (*)     Globulin 4.1 (*)     AST 9 (*)     eGFR 55 (*)     All other components within normal limits   CBC WITH DIFFERENTIAL - Abnormal; Notable for the following components:    RBC 4.49 (*)     MCH 31.2 (*)     Neutrophils % 45.8 (*)     Lymphocytes % 43.4 (*)     Monocytes % 6.9 (*)     All other components within normal limits   POCT GLUCOSE MONITORING CONTINUOUS - Abnormal; Notable for the following components:    POC  Glucose 241 (*)     All other components within normal limits   CULTURE, BLOOD   CULTURE, BLOOD   CBC W/ AUTO DIFFERENTIAL    Narrative:     The following orders were created for panel order CBC auto differential.  Procedure                               Abnormality         Status                     ---------                               -----------         ------                     CBC with Differential[[id number]]        Abnormal            Final result                 Please view results for these tests on the individual orders.          Imaging Results              X-Ray Foot Complete Right (Final result)  Result time 07/28/23 19:59:20      Final result by Jose Rafael Moses DO (07/28/23 19:59:20)                   Impression:      Postsurgical changes to the right foot are unchanged from 01/04/2023.    There is sclerosis and bony resorption of the 5th metatarsal, grossly similar to comparison.    Soft tissue thickening adjacent to the 5th metatarsal, correlate with physical exam findings.      Electronically signed by: Jose Rafael Moses  Date:    07/28/2023  Time:    19:59               Narrative:    EXAMINATION:  XR FOOT COMPLETE 3 VIEW RIGHT    CLINICAL HISTORY:  Unspecified open wound, unspecified foot, initial encounter    TECHNIQUE:  XR FOOT COMPLETE 3 VIEW RIGHT    COMPARISON:  1/4/23    FINDINGS:  Postsurgical changes to the right foot are unchanged from 01/04/2023.    There is sclerosis and bony resorption of the 5th metatarsal, grossly similar to comparison.    Soft tissue thickening adjacent to the 5th metatarsal, correlate with physical exam findings.                                       Medications   sulfamethoxazole-trimethoprim 800-160mg per tablet 1 tablet (1 tablet Oral Given 7/28/23 2233)     Medical Decision Making:   ED Management:  MDM    Patient presents for emergent evaluation of acute diabetic foot wound that poses a threat to life and/or bodily function.    In the ED patient found to  have acute diabetic foot wound suspected low-grade cellulitis.    I ordered labs and personally reviewed them.  Labs significant for white count  I ordered X-rays and personally reviewed them and reviewed the radiologist interpretation.  Xray significant for Unchanged postsurgical changes compared to January.      Discharge MDM  I discussed the patient presentation and findings with the consultant for general surgery (speciality).    Patient was discharged in stable condition.  Detailed return precautions discussed.   See ED course note             ED Course as of 07/29/23 0246   Fri Jul 28, 2023 2024 Xray foot complete right:   Postsurgical changes to the right foot are unchanged from 01/04/2023.     There is sclerosis and bony resorption of the 5th metatarsal, grossly similar to comparison.     Soft tissue thickening adjacent to the 5th metatarsal, correlate with physical exam findings. [BW]   2211 Discussed with general surgeon on-call recommends follow-up with Dr. Chiu and oral antibiotics [PK]      ED Course User Index  [BW] Polina Soriano  [PK] Edmund Miranda MD                 Clinical Impression:   Final diagnoses:  [S91.309A] Wound of foot        ED Disposition Condition    Discharge Stable          ED Prescriptions       Medication Sig Dispense Start Date End Date Auth. Provider    sulfamethoxazole-trimethoprim 800-160mg (BACTRIM DS) 800-160 mg Tab Take 1 tablet by mouth 2 (two) times daily. for 7 days 14 tablet 7/28/2023 8/4/2023 Edmund Miranda MD          Follow-up Information       Follow up With Specialties Details Why Contact Info    Evelio Chiu, DO General Surgery, Surgery Schedule an appointment as soon as possible for a visit   [address] [facility] [address]  [phone]      [facility] - Emergency Department Emergency Medicine Go to  As needed, If symptoms worsen [address] [place] [address]  [id number]  [phone]             Edmund Miranda MD  07/29/23 0246

## 2023-07-29 NOTE — DISCHARGE INSTRUCTIONS
Follow-up with general surgeon    Keep weight off foot    Return the ER if symptoms are worsening or new symptoms develop    Start taking Bactrim

## 2023-07-29 NOTE — ED TRIAGE NOTES
Presents to the emergency department c/o odor to right foot wound that has been debrided by Dr. Razo within the last few months.

## 2023-08-03 ENCOUNTER — HOSPITAL ENCOUNTER (EMERGENCY)
Facility: HOSPITAL | Age: 63
Discharge: HOME | End: 2023-08-03
Payer: MEDICARE

## 2023-08-03 VITALS
SYSTOLIC BLOOD PRESSURE: 120 MMHG | OXYGEN SATURATION: 95 % | HEIGHT: 67 IN | RESPIRATION RATE: 17 BRPM | WEIGHT: 159.5 LBS | BODY MASS INDEX: 25.03 KG/M2 | HEART RATE: 74 BPM | DIASTOLIC BLOOD PRESSURE: 60 MMHG | TEMPERATURE: 98 F

## 2023-08-03 DIAGNOSIS — T14.8XXA WOUND DRAINAGE: ICD-10-CM

## 2023-08-03 DIAGNOSIS — S91.301A OPEN WOUND OF PLANTAR ASPECT OF RIGHT FOOT: Primary | ICD-10-CM

## 2023-08-03 LAB
ALBUMIN SERPL BCP-MCNC: 3.3 G/DL (ref 3.5–5)
ALBUMIN/GLOB SERPL: 0.8 {RATIO}
ALP SERPL-CCNC: 76 U/L (ref 45–115)
ALT SERPL W P-5'-P-CCNC: 16 U/L (ref 16–61)
ANION GAP SERPL CALCULATED.3IONS-SCNC: 7 MMOL/L (ref 7–16)
AST SERPL W P-5'-P-CCNC: 10 U/L (ref 15–37)
BACTERIA BLD CULT: NORMAL
BACTERIA BLD CULT: NORMAL
BASOPHILS # BLD AUTO: 0.03 K/UL (ref 0–0.2)
BASOPHILS NFR BLD AUTO: 0.3 % (ref 0–1)
BILIRUB SERPL-MCNC: 0.2 MG/DL (ref ?–1.2)
BUN SERPL-MCNC: 31 MG/DL (ref 7–18)
BUN/CREAT SERPL: 23 (ref 6–20)
CALCIUM SERPL-MCNC: 8.5 MG/DL (ref 8.5–10.1)
CHLORIDE SERPL-SCNC: 105 MMOL/L (ref 98–107)
CO2 SERPL-SCNC: 29 MMOL/L (ref 21–32)
CREAT SERPL-MCNC: 1.36 MG/DL (ref 0.7–1.3)
CRP SERPL-MCNC: 5.3 MG/DL (ref 0–0.8)
DIFFERENTIAL METHOD BLD: ABNORMAL
EGFR (NO RACE VARIABLE) (RUSH/TITUS): 58 ML/MIN/1.73M2
EOSINOPHIL # BLD AUTO: 0.19 K/UL (ref 0–0.5)
EOSINOPHIL NFR BLD AUTO: 2.1 % (ref 1–4)
ERYTHROCYTE [DISTWIDTH] IN BLOOD BY AUTOMATED COUNT: 12.4 % (ref 11.5–14.5)
GLOBULIN SER-MCNC: 3.9 G/DL (ref 2–4)
GLUCOSE SERPL-MCNC: 177 MG/DL (ref 74–106)
HCT VFR BLD AUTO: 35.3 % (ref 40–54)
HGB BLD-MCNC: 12.1 G/DL (ref 13.5–18)
IMM GRANULOCYTES # BLD AUTO: 0.03 K/UL (ref 0–0.04)
IMM GRANULOCYTES NFR BLD: 0.3 % (ref 0–0.4)
LYMPHOCYTES # BLD AUTO: 2 K/UL (ref 1–4.8)
LYMPHOCYTES NFR BLD AUTO: 21.8 % (ref 27–41)
MCH RBC QN AUTO: 31.2 PG (ref 27–31)
MCHC RBC AUTO-ENTMCNC: 34.3 G/DL (ref 32–36)
MCV RBC AUTO: 91 FL (ref 80–96)
MONOCYTES # BLD AUTO: 0.71 K/UL (ref 0–0.8)
MONOCYTES NFR BLD AUTO: 7.7 % (ref 2–6)
MPC BLD CALC-MCNC: 11 FL (ref 9.4–12.4)
NEUTROPHILS # BLD AUTO: 6.22 K/UL (ref 1.8–7.7)
NEUTROPHILS NFR BLD AUTO: 67.8 % (ref 53–65)
NRBC # BLD AUTO: 0 X10E3/UL
NRBC, AUTO (.00): 0 %
PLATELET # BLD AUTO: 148 K/UL (ref 150–400)
POTASSIUM SERPL-SCNC: 4.4 MMOL/L (ref 3.5–5.1)
PROT SERPL-MCNC: 7.2 G/DL (ref 6.4–8.2)
RBC # BLD AUTO: 3.88 M/UL (ref 4.6–6.2)
SODIUM SERPL-SCNC: 137 MMOL/L (ref 136–145)
WBC # BLD AUTO: 9.18 K/UL (ref 4.5–11)

## 2023-08-03 PROCEDURE — 85025 COMPLETE CBC W/AUTO DIFF WBC: CPT | Performed by: NURSE PRACTITIONER

## 2023-08-03 PROCEDURE — 99285 PR EMERGENCY DEPT VISIT,LEVEL V: ICD-10-PCS | Mod: ,,, | Performed by: NURSE PRACTITIONER

## 2023-08-03 PROCEDURE — 99285 EMERGENCY DEPT VISIT HI MDM: CPT | Mod: ,,, | Performed by: NURSE PRACTITIONER

## 2023-08-03 PROCEDURE — 86140 C-REACTIVE PROTEIN: CPT | Performed by: NURSE PRACTITIONER

## 2023-08-03 PROCEDURE — 80053 COMPREHEN METABOLIC PANEL: CPT | Performed by: NURSE PRACTITIONER

## 2023-08-03 PROCEDURE — 96374 THER/PROPH/DIAG INJ IV PUSH: CPT

## 2023-08-03 PROCEDURE — 96375 TX/PRO/DX INJ NEW DRUG ADDON: CPT

## 2023-08-03 PROCEDURE — 63600175 PHARM REV CODE 636 W HCPCS: Performed by: NURSE PRACTITIONER

## 2023-08-03 PROCEDURE — 99284 EMERGENCY DEPT VISIT MOD MDM: CPT | Mod: 25

## 2023-08-03 RX ORDER — ONDANSETRON 2 MG/ML
4 INJECTION INTRAMUSCULAR; INTRAVENOUS
Status: COMPLETED | OUTPATIENT
Start: 2023-08-03 | End: 2023-08-03

## 2023-08-03 RX ORDER — CLINDAMYCIN HYDROCHLORIDE 150 MG/1
300 CAPSULE ORAL 4 TIMES DAILY
Qty: 56 CAPSULE | Refills: 0 | Status: ON HOLD | OUTPATIENT
Start: 2023-08-03 | End: 2023-08-11 | Stop reason: HOSPADM

## 2023-08-03 RX ORDER — MORPHINE SULFATE 2 MG/ML
2 INJECTION, SOLUTION INTRAMUSCULAR; INTRAVENOUS
Status: COMPLETED | OUTPATIENT
Start: 2023-08-03 | End: 2023-08-03

## 2023-08-03 RX ADMIN — ONDANSETRON HYDROCHLORIDE 4 MG: 2 SOLUTION INTRAMUSCULAR; INTRAVENOUS at 10:08

## 2023-08-03 RX ADMIN — MORPHINE SULFATE 2 MG: 2 INJECTION, SOLUTION INTRAMUSCULAR; INTRAVENOUS at 10:08

## 2023-08-04 NOTE — DISCHARGE INSTRUCTIONS
Discontinue Bactrim; start Clindamycin. Follow up with PCP in 48-72 hours; return to ED if any new or worsening of symptoms occur.

## 2023-08-04 NOTE — ED PROVIDER NOTES
Encounter Date: 8/3/2023       History     Chief Complaint   Patient presents with    General Illness     Right foot wound. Has an appointment with Dr. Chiu on 8/15. Uses walker.      62-year-old male presents to ED with complaint of right foot pain and wound.  Patient states he was seen on 07/29 for ulceration to right foot and pain with associated drainage and odor.  Patient was referred to General surgery for further evaluation and placed on p.o. antibiotics.  Wife states patient will complete regimen on Saturday.  Patient/spouse reports increased smell to right foot as well as increased pain.  Denies fever; reports chills related to blood thinner use.  Denies chest pain, shortness of breath, weakness, dizziness.    The history is provided by the patient, the spouse and medical records.     Review of patient's allergies indicates:  No Known Allergies  Past Medical History:   Diagnosis Date    Anemia     Anxiety     Arthritis     Atherosclerotic heart disease of native coronary artery with other forms of angina pectoris     Atrophic rhinitis     Carpal tunnel syndrome     Cellulitis of right lower extremity     COPD (chronic obstructive pulmonary disease)     Coronary arteriosclerosis     COVID 02/02/2022    Depression     Diabetic ulcer of right foot associated with diabetes mellitus due to underlying condition, with bone involvement without evidence of necrosis     Fall with significant injury, sequela 11/07/2022    GERD (gastroesophageal reflux disease)     Hyperlipidemia     Hypertension     Insomnia     MI (myocardial infarction)     Neuropathy     Peripheral vascular disease, unspecified     Right foot ulcer, with fat layer exposed 01/07/2015    S/p left hip fracture 11/08/2022    Sleep apnea     Type 2 diabetes mellitus      Past Surgical History:   Procedure Laterality Date    AMPUTATION      ANGIOPLASTY      CARDIAC CATHETERIZATION      CORONARY ARTERY BYPASS GRAFT      CORONARY STENT PLACEMENT       DEBRIDEMENT      Right foot debridment with hospital stay and IV Abx    DEBRIDEMENT OF LOWER EXTREMITY Right 06/27/2022    Procedure: DEBRIDEMENT, LOWER EXTREMITY;  Surgeon: Forrest Kiser MD;  Location: [facility] OR;  Service: General;  Laterality: Right;  right foot    HIP FRACTURE SURGERY Left     IRRIGATION AND DEBRIDEMENT OF LOWER EXTREMITY Right 09/08/2022    Procedure: IRRIGATION AND DEBRIDEMENT, LOWER EXTREMITY;  Surgeon: Selina Matos MD;  Location: [facility] OR;  Service: General;  Laterality: Right;    IRRIGATION AND DEBRIDEMENT OF LOWER EXTREMITY Right 1/5/2023    Procedure: IRRIGATION AND DEBRIDEMENT, LOWER EXTREMITY;  Surgeon: Evelio Chiu DO;  Location: [facility] OR;  Service: General;  Laterality: Right;    SHOULDER OPEN ROTATOR CUFF REPAIR Left     SPINE SURGERY      VASCULAR SURGERY       Family History   Problem Relation Age of Onset    Arthritis Mother     Hypertension Mother     Learning disabilities Mother     Alcohol abuse Father     Early death Father     Heart disease Father     Cancer Sister     Diabetes Sister     Heart disease Maternal Grandfather     COPD Paternal Grandfather     Heart disease Son      Social History     Tobacco Use    Smoking status: Every Day     Current packs/day: 0.25     Types: Cigarettes    Smokeless tobacco: Never   Substance Use Topics    Alcohol use: Not Currently    Drug use: Yes     Types: Oxycodone     Review of Systems   Constitutional:  Positive for chills. Negative for fever.   HENT:  Negative for sinus pressure and sinus pain.    Eyes:  Negative for photophobia and visual disturbance.   Respiratory:  Negative for cough and shortness of breath.    Cardiovascular:  Negative for chest pain and palpitations.   Gastrointestinal:  Negative for nausea and vomiting.   Endocrine: Negative for cold intolerance and heat intolerance.   Genitourinary:  Negative for difficulty urinating and urgency.   Musculoskeletal:  Positive for arthralgias. Negative for  myalgias.   Skin:  Positive for wound. Negative for color change.   Allergic/Immunologic: Negative for environmental allergies and food allergies.   Neurological:  Negative for dizziness and weakness.   Hematological:  Negative for adenopathy. Does not bruise/bleed easily.   Psychiatric/Behavioral:  Negative for agitation and confusion.    All other systems reviewed and are negative.      Physical Exam     Initial Vitals [08/03/23 2054]   BP Pulse Resp Temp SpO2   (!) 117/53 76 18 98.2 °F (36.8 °C) 95 %      MAP       --         Physical Exam    Nursing note and vitals reviewed.  Constitutional: He appears well-developed and well-nourished.   HENT:   Head: Normocephalic and atraumatic.   Eyes: EOM are normal. Pupils are equal, round, and reactive to light.   Neck: Neck supple.   Normal range of motion.  Cardiovascular:  Normal rate and regular rhythm.           Pulmonary/Chest: He has no wheezes. He has no rhonchi.   Abdominal: Abdomen is soft. He exhibits no distension. There is no abdominal tenderness.   Musculoskeletal:         General: Tenderness and edema present.      Cervical back: Normal range of motion and neck supple.      Right foot: Swelling and tenderness present.     Lymphadenopathy:     He has no cervical adenopathy.   Neurological: He is alert and oriented to person, place, and time. No cranial nerve deficit or sensory deficit.   Skin: Skin is warm and dry. Capillary refill takes less than 2 seconds.   Psychiatric: He has a normal mood and affect. Thought content normal.               Medical Screening Exam   See Full Note    ED Course   Procedures  Labs Reviewed   COMPREHENSIVE METABOLIC PANEL - Abnormal; Notable for the following components:       Result Value    Glucose 177 (*)     BUN 31 (*)     Creatinine 1.36 (*)     BUN/Creatinine Ratio 23 (*)     Albumin 3.3 (*)     AST 10 (*)     eGFR 58 (*)     All other components within normal limits   C-REACTIVE PROTEIN - Abnormal; Notable for the  following components:    CRP 5.30 (*)     All other components within normal limits   CBC WITH DIFFERENTIAL - Abnormal; Notable for the following components:    RBC 3.88 (*)     Hemoglobin 12.1 (*)     Hematocrit 35.3 (*)     MCH 31.2 (*)     Platelet Count 148 (*)     Neutrophils % 67.8 (*)     Lymphocytes % 21.8 (*)     Monocytes % 7.7 (*)     All other components within normal limits   CBC W/ AUTO DIFFERENTIAL    Narrative:     The following orders were created for panel order CBC auto differential.  Procedure                               Abnormality         Status                     ---------                               -----------         ------                     CBC with Differential[[id number]]        Abnormal            Final result                 Please view results for these tests on the individual orders.   EXTRA TUBES    Narrative:     The following orders were created for panel order EXTRA TUBES.  Procedure                               Abnormality         Status                     ---------                               -----------         ------                     Light Blue Top Hold[[id number]]                              In process                 Gold Top Hold[[id number]]                                    In process                   Please view results for these tests on the individual orders.   LIGHT BLUE TOP HOLD   GOLD TOP HOLD          Imaging Results              X-Ray Foot Complete Right (In process)                      Medications   morphine injection 2 mg (2 mg Intravenous Given 8/3/23 2216)   ondansetron injection 4 mg (4 mg Intravenous Given 8/3/23 2216)     Medical Decision Making:   Initial Assessment:   Foot wound  Differential Diagnosis:   Wound infection  osteomyelitis  Clinical Tests:   Lab Tests: Ordered and Reviewed  Radiological Study: Ordered and Reviewed  ED Management:  MDM    Patient presents for emergent evaluation of acute right foot wound that poses a threat to  life and/or bodily function.    In the ED patient found to have acute wound drainage, open wound of plantar aspect of right foot.    I ordered labs and personally reviewed them.  Labs significant for CRP 5.30; glucose 177, BUN/Creat 1.36/31 (improved)  I ordered X-rays and personally reviewed them and reviewed the radiologist interpretation.  Xray significant for no acute processes; reviewed with Dr. Miranda.      Discharge MDM  I discussed the patient presentation and findings with Dr. Miranda  Patient was managed in the ED with IV Morphine, zofran .    The response to treatment was good.    Patient was discharged in stable condition.  Detailed return precautions discussed.  Discussed changing antibiotics and obtaining an earlier appointment for general surgery evaluation. Verbalizes understanding.                          Clinical Impression:   Final diagnoses:  [L24.A9] Wound drainage  [S91.301A] Open wound of plantar aspect of right foot (Primary)        ED Disposition Condition    Discharge Stable          ED Prescriptions       Medication Sig Dispense Start Date End Date Auth. Provider    clindamycin (CLEOCIN) 150 MG capsule Take 2 capsules (300 mg total) by mouth 4 (four) times daily. for 7 days 56 capsule 8/3/2023 8/10/2023 Sofia Damon, GIANA          Follow-up Information    None          Sofia Damon, GIANA  08/04/23 0001

## 2023-08-07 LAB
CV STRESS BASE HR: 76 BPM
DIASTOLIC BLOOD PRESSURE: 89 MMHG
OHS CV CPX 85 PERCENT MAX PREDICTED HEART RATE MALE: 133
OHS CV CPX MAX PREDICTED HEART RATE: 156
OHS CV CPX PATIENT IS FEMALE: 0
OHS CV CPX PATIENT IS MALE: 1
OHS CV CPX PEAK DIASTOLIC BLOOD PRESSURE: 89 MMHG
OHS CV CPX PEAK HEAR RATE: 93 BPM
OHS CV CPX PEAK RATE PRESSURE PRODUCT: NORMAL
OHS CV CPX PEAK SYSTOLIC BLOOD PRESSURE: 191 MMHG
OHS CV CPX PERCENT MAX PREDICTED HEART RATE ACHIEVED: 60
OHS CV CPX RATE PRESSURE PRODUCT PRESENTING: NORMAL
SYSTOLIC BLOOD PRESSURE: 191 MMHG

## 2023-08-09 ENCOUNTER — HOSPITAL ENCOUNTER (INPATIENT)
Facility: HOSPITAL | Age: 63
LOS: 1 days | Discharge: HOME | DRG: 624 | End: 2023-08-11
Attending: FAMILY MEDICINE | Admitting: HOSPITALIST
Payer: MEDICARE

## 2023-08-09 DIAGNOSIS — I10 PRIMARY HYPERTENSION: ICD-10-CM

## 2023-08-09 DIAGNOSIS — L97.509 DIABETIC FOOT ULCER: ICD-10-CM

## 2023-08-09 DIAGNOSIS — G47.33 OBSTRUCTIVE SLEEP APNEA SYNDROME: Primary | ICD-10-CM

## 2023-08-09 DIAGNOSIS — F41.8 INSOMNIA SECONDARY TO DEPRESSION WITH ANXIETY: ICD-10-CM

## 2023-08-09 DIAGNOSIS — I25.10 CORONARY ARTERY DISEASE INVOLVING NATIVE CORONARY ARTERY OF NATIVE HEART, UNSPECIFIED WHETHER ANGINA PRESENT: ICD-10-CM

## 2023-08-09 DIAGNOSIS — R07.9 CHEST PAIN: ICD-10-CM

## 2023-08-09 DIAGNOSIS — L97.416 DIABETIC ULCER OF RIGHT MIDFOOT ASSOCIATED WITH DIABETES MELLITUS DUE TO UNDERLYING CONDITION, WITH BONE INVOLVEMENT WITHOUT EVIDENCE OF NECROSIS: ICD-10-CM

## 2023-08-09 DIAGNOSIS — E08.621 DIABETIC ULCER OF RIGHT MIDFOOT ASSOCIATED WITH DIABETES MELLITUS DUE TO UNDERLYING CONDITION, WITH BONE INVOLVEMENT WITHOUT EVIDENCE OF NECROSIS: ICD-10-CM

## 2023-08-09 DIAGNOSIS — E11.621 DIABETIC FOOT ULCER: ICD-10-CM

## 2023-08-09 DIAGNOSIS — F51.05 INSOMNIA SECONDARY TO DEPRESSION WITH ANXIETY: ICD-10-CM

## 2023-08-09 DIAGNOSIS — B99.9 INFECTION: ICD-10-CM

## 2023-08-09 DIAGNOSIS — J43.9 PULMONARY EMPHYSEMA, UNSPECIFIED EMPHYSEMA TYPE: ICD-10-CM

## 2023-08-09 LAB
ALBUMIN SERPL BCP-MCNC: 3.6 G/DL (ref 3.5–5)
ALBUMIN/GLOB SERPL: 0.8 {RATIO}
ALP SERPL-CCNC: 98 U/L (ref 45–115)
ALT SERPL W P-5'-P-CCNC: 21 U/L (ref 16–61)
ANION GAP SERPL CALCULATED.3IONS-SCNC: 8 MMOL/L (ref 7–16)
AST SERPL W P-5'-P-CCNC: 10 U/L (ref 15–37)
BASOPHILS # BLD AUTO: 0.04 K/UL (ref 0–0.2)
BASOPHILS NFR BLD AUTO: 0.7 % (ref 0–1)
BILIRUB SERPL-MCNC: 0.2 MG/DL (ref ?–1.2)
BUN SERPL-MCNC: 28 MG/DL (ref 7–18)
BUN/CREAT SERPL: 23 (ref 6–20)
CALCIUM SERPL-MCNC: 10.5 MG/DL (ref 8.5–10.1)
CHLORIDE SERPL-SCNC: 102 MMOL/L (ref 98–107)
CO2 SERPL-SCNC: 31 MMOL/L (ref 21–32)
CREAT SERPL-MCNC: 1.21 MG/DL (ref 0.7–1.3)
DIFFERENTIAL METHOD BLD: ABNORMAL
EGFR (NO RACE VARIABLE) (RUSH/TITUS): 66 ML/MIN/1.73M2
EOSINOPHIL # BLD AUTO: 0.22 K/UL (ref 0–0.5)
EOSINOPHIL NFR BLD AUTO: 3.8 % (ref 1–4)
ERYTHROCYTE [DISTWIDTH] IN BLOOD BY AUTOMATED COUNT: 12.1 % (ref 11.5–14.5)
GLOBULIN SER-MCNC: 4.8 G/DL (ref 2–4)
GLUCOSE SERPL-MCNC: 219 MG/DL (ref 74–106)
HCT VFR BLD AUTO: 39.6 % (ref 40–54)
HGB BLD-MCNC: 13.8 G/DL (ref 13.5–18)
IMM GRANULOCYTES # BLD AUTO: 0.03 K/UL (ref 0–0.04)
IMM GRANULOCYTES NFR BLD: 0.5 % (ref 0–0.4)
LYMPHOCYTES # BLD AUTO: 2.06 K/UL (ref 1–4.8)
LYMPHOCYTES NFR BLD AUTO: 35.4 % (ref 27–41)
MCH RBC QN AUTO: 31.4 PG (ref 27–31)
MCHC RBC AUTO-ENTMCNC: 34.8 G/DL (ref 32–36)
MCV RBC AUTO: 90 FL (ref 80–96)
MONOCYTES # BLD AUTO: 0.42 K/UL (ref 0–0.8)
MONOCYTES NFR BLD AUTO: 7.2 % (ref 2–6)
MPC BLD CALC-MCNC: 9.4 FL (ref 9.4–12.4)
NEUTROPHILS # BLD AUTO: 3.05 K/UL (ref 1.8–7.7)
NEUTROPHILS NFR BLD AUTO: 52.4 % (ref 53–65)
NRBC # BLD AUTO: 0 X10E3/UL
NRBC, AUTO (.00): 0 %
PLATELET # BLD AUTO: 204 K/UL (ref 150–400)
POTASSIUM SERPL-SCNC: 4.7 MMOL/L (ref 3.5–5.1)
PROT SERPL-MCNC: 8.4 G/DL (ref 6.4–8.2)
RBC # BLD AUTO: 4.4 M/UL (ref 4.6–6.2)
SODIUM SERPL-SCNC: 136 MMOL/L (ref 136–145)
WBC # BLD AUTO: 5.82 K/UL (ref 4.5–11)

## 2023-08-09 PROCEDURE — 85025 COMPLETE CBC W/AUTO DIFF WBC: CPT | Performed by: FAMILY MEDICINE

## 2023-08-09 PROCEDURE — 96374 THER/PROPH/DIAG INJ IV PUSH: CPT

## 2023-08-09 PROCEDURE — 63600175 PHARM REV CODE 636 W HCPCS: Performed by: FAMILY MEDICINE

## 2023-08-09 PROCEDURE — 80053 COMPREHEN METABOLIC PANEL: CPT | Performed by: FAMILY MEDICINE

## 2023-08-09 PROCEDURE — 96375 TX/PRO/DX INJ NEW DRUG ADDON: CPT

## 2023-08-09 PROCEDURE — 99285 PR EMERGENCY DEPT VISIT,LEVEL V: ICD-10-PCS | Mod: ,,, | Performed by: FAMILY MEDICINE

## 2023-08-09 PROCEDURE — 99285 EMERGENCY DEPT VISIT HI MDM: CPT

## 2023-08-09 PROCEDURE — 99285 EMERGENCY DEPT VISIT HI MDM: CPT | Mod: ,,, | Performed by: FAMILY MEDICINE

## 2023-08-09 RX ORDER — ONDANSETRON 2 MG/ML
4 INJECTION INTRAMUSCULAR; INTRAVENOUS ONCE
Status: COMPLETED | OUTPATIENT
Start: 2023-08-09 | End: 2023-08-09

## 2023-08-09 RX ORDER — MORPHINE SULFATE 4 MG/ML
4 INJECTION, SOLUTION INTRAMUSCULAR; INTRAVENOUS
Status: COMPLETED | OUTPATIENT
Start: 2023-08-09 | End: 2023-08-09

## 2023-08-09 RX ADMIN — MORPHINE SULFATE 4 MG: 4 INJECTION, SOLUTION INTRAMUSCULAR; INTRAVENOUS at 09:08

## 2023-08-09 RX ADMIN — ONDANSETRON HYDROCHLORIDE 4 MG: 2 SOLUTION INTRAMUSCULAR; INTRAVENOUS at 09:08

## 2023-08-09 NOTE — ED TRIAGE NOTES
Pt presents to ed via pov c/o right foot wound that he has been seen for 3 times now. Pt states he cannot see dr leroy until 8/15/2023. Pt states he is concerned his foot is getting worse

## 2023-08-10 ENCOUNTER — ANESTHESIA (OUTPATIENT)
Dept: SURGERY | Facility: HOSPITAL | Age: 63
DRG: 624 | End: 2023-08-10
Payer: MEDICARE

## 2023-08-10 ENCOUNTER — ANESTHESIA EVENT (OUTPATIENT)
Dept: SURGERY | Facility: HOSPITAL | Age: 63
DRG: 624 | End: 2023-08-10
Payer: MEDICARE

## 2023-08-10 PROBLEM — I10 HTN (HYPERTENSION): Status: ACTIVE | Noted: 2023-08-10

## 2023-08-10 PROBLEM — F17.210 DEPENDENCE ON NICOTINE FROM CIGARETTES: Status: ACTIVE | Noted: 2023-08-10

## 2023-08-10 PROBLEM — F51.05 INSOMNIA SECONDARY TO DEPRESSION WITH ANXIETY: Status: ACTIVE | Noted: 2021-06-17

## 2023-08-10 PROBLEM — F41.8 INSOMNIA SECONDARY TO DEPRESSION WITH ANXIETY: Status: ACTIVE | Noted: 2021-06-17

## 2023-08-10 LAB
BASOPHILS # BLD AUTO: 0.04 K/UL (ref 0–0.2)
BASOPHILS NFR BLD AUTO: 0.7 % (ref 0–1)
CRP SERPL-MCNC: 0.89 MG/DL (ref 0–0.8)
DIFFERENTIAL METHOD BLD: ABNORMAL
EOSINOPHIL # BLD AUTO: 0.26 K/UL (ref 0–0.5)
EOSINOPHIL NFR BLD AUTO: 4.6 % (ref 1–4)
ERYTHROCYTE [DISTWIDTH] IN BLOOD BY AUTOMATED COUNT: 12.3 % (ref 11.5–14.5)
EST. AVERAGE GLUCOSE BLD GHB EST-MCNC: 237 MG/DL
GLUCOSE SERPL-MCNC: 131 MG/DL (ref 70–105)
GLUCOSE SERPL-MCNC: 157 MG/DL (ref 70–105)
GLUCOSE SERPL-MCNC: 277 MG/DL (ref 70–105)
HBA1C MFR BLD HPLC: 9.7 % (ref 4.5–6.6)
HCT VFR BLD AUTO: 38.5 % (ref 40–54)
HGB BLD-MCNC: 13.2 G/DL (ref 13.5–18)
IMM GRANULOCYTES # BLD AUTO: 0.02 K/UL (ref 0–0.04)
IMM GRANULOCYTES NFR BLD: 0.4 % (ref 0–0.4)
INDIRECT COOMBS: NORMAL
INR BLD: 0.95
LACTATE SERPL-SCNC: 1.2 MMOL/L (ref 0.4–2)
LYMPHOCYTES # BLD AUTO: 2.58 K/UL (ref 1–4.8)
LYMPHOCYTES NFR BLD AUTO: 45.4 % (ref 27–41)
MAGNESIUM SERPL-MCNC: 2.1 MG/DL (ref 1.7–2.3)
MCH RBC QN AUTO: 31.1 PG (ref 27–31)
MCHC RBC AUTO-ENTMCNC: 34.3 G/DL (ref 32–36)
MCV RBC AUTO: 90.6 FL (ref 80–96)
MONOCYTES # BLD AUTO: 0.44 K/UL (ref 0–0.8)
MONOCYTES NFR BLD AUTO: 7.7 % (ref 2–6)
MPC BLD CALC-MCNC: 9.3 FL (ref 9.4–12.4)
NEUTROPHILS # BLD AUTO: 2.34 K/UL (ref 1.8–7.7)
NEUTROPHILS NFR BLD AUTO: 41.2 % (ref 53–65)
NRBC # BLD AUTO: 0 X10E3/UL
NRBC, AUTO (.00): 0 %
NT-PROBNP SERPL-MCNC: 178 PG/ML (ref 1–125)
PLATELET # BLD AUTO: 212 K/UL (ref 150–400)
PROTHROMBIN TIME: 12.6 SECONDS (ref 11.7–14.7)
RBC # BLD AUTO: 4.25 M/UL (ref 4.6–6.2)
RH BLD: NORMAL
SARS-COV-2 RDRP RESP QL NAA+PROBE: NEGATIVE
SPECIMEN OUTDATE: NORMAL
WBC # BLD AUTO: 5.68 K/UL (ref 4.5–11)

## 2023-08-10 PROCEDURE — 85610 PROTHROMBIN TIME: CPT

## 2023-08-10 PROCEDURE — 25000242 PHARM REV CODE 250 ALT 637 W/ HCPCS

## 2023-08-10 PROCEDURE — 63600175 PHARM REV CODE 636 W HCPCS: Performed by: STUDENT IN AN ORGANIZED HEALTH CARE EDUCATION/TRAINING PROGRAM

## 2023-08-10 PROCEDURE — 11000001 HC ACUTE MED/SURG PRIVATE ROOM

## 2023-08-10 PROCEDURE — 83036 HEMOGLOBIN GLYCOSYLATED A1C: CPT

## 2023-08-10 PROCEDURE — 25000003 PHARM REV CODE 250: Performed by: HOSPITALIST

## 2023-08-10 PROCEDURE — D9220A PRA ANESTHESIA: ICD-10-PCS | Mod: ANES,,, | Performed by: ANESTHESIOLOGY

## 2023-08-10 PROCEDURE — 37000009 HC ANESTHESIA EA ADD 15 MINS: Performed by: STUDENT IN AN ORGANIZED HEALTH CARE EDUCATION/TRAINING PROGRAM

## 2023-08-10 PROCEDURE — C9113 INJ PANTOPRAZOLE SODIUM, VIA: HCPCS

## 2023-08-10 PROCEDURE — D9220A PRA ANESTHESIA: Mod: ANES,,, | Performed by: ANESTHESIOLOGY

## 2023-08-10 PROCEDURE — 83880 ASSAY OF NATRIURETIC PEPTIDE: CPT

## 2023-08-10 PROCEDURE — D9220A PRA ANESTHESIA: ICD-10-PCS | Mod: CRNA,,, | Performed by: ANESTHESIOLOGY

## 2023-08-10 PROCEDURE — 87070 CULTURE OTHR SPECIMN AEROBIC: CPT

## 2023-08-10 PROCEDURE — 11043 PR DEBRIDEMENT, SKIN, SUB-Q TISSUE,MUSCLE,=<20 SQ CM: ICD-10-PCS | Mod: ,,, | Performed by: STUDENT IN AN ORGANIZED HEALTH CARE EDUCATION/TRAINING PROGRAM

## 2023-08-10 PROCEDURE — 25000003 PHARM REV CODE 250: Performed by: STUDENT IN AN ORGANIZED HEALTH CARE EDUCATION/TRAINING PROGRAM

## 2023-08-10 PROCEDURE — 36000707: Performed by: STUDENT IN AN ORGANIZED HEALTH CARE EDUCATION/TRAINING PROGRAM

## 2023-08-10 PROCEDURE — 94640 AIRWAY INHALATION TREATMENT: CPT

## 2023-08-10 PROCEDURE — 99223 1ST HOSP IP/OBS HIGH 75: CPT | Mod: 25,,, | Performed by: STUDENT IN AN ORGANIZED HEALTH CARE EDUCATION/TRAINING PROGRAM

## 2023-08-10 PROCEDURE — 94761 N-INVAS EAR/PLS OXIMETRY MLT: CPT

## 2023-08-10 PROCEDURE — 11043 DBRDMT MUSC&/FSCA 1ST 20/<: CPT | Mod: ,,, | Performed by: STUDENT IN AN ORGANIZED HEALTH CARE EDUCATION/TRAINING PROGRAM

## 2023-08-10 PROCEDURE — 25000003 PHARM REV CODE 250

## 2023-08-10 PROCEDURE — 86140 C-REACTIVE PROTEIN: CPT

## 2023-08-10 PROCEDURE — 87635 SARS-COV-2 COVID-19 AMP PRB: CPT | Performed by: HOSPITALIST

## 2023-08-10 PROCEDURE — 83605 ASSAY OF LACTIC ACID: CPT

## 2023-08-10 PROCEDURE — 88304 TISSUE EXAM BY PATHOLOGIST: CPT | Mod: 26,,, | Performed by: PATHOLOGY

## 2023-08-10 PROCEDURE — 71000033 HC RECOVERY, INTIAL HOUR: Performed by: STUDENT IN AN ORGANIZED HEALTH CARE EDUCATION/TRAINING PROGRAM

## 2023-08-10 PROCEDURE — 83735 ASSAY OF MAGNESIUM: CPT

## 2023-08-10 PROCEDURE — 99222 PR INITIAL HOSPITAL CARE,LEVL II: ICD-10-PCS | Mod: AI,,, | Performed by: HOSPITALIST

## 2023-08-10 PROCEDURE — 63600175 PHARM REV CODE 636 W HCPCS: Performed by: HOSPITALIST

## 2023-08-10 PROCEDURE — 88304 SURGICAL PATHOLOGY: ICD-10-PCS | Mod: 26,,, | Performed by: PATHOLOGY

## 2023-08-10 PROCEDURE — 85025 COMPLETE CBC W/AUTO DIFF WBC: CPT

## 2023-08-10 PROCEDURE — 86900 BLOOD TYPING SEROLOGIC ABO: CPT

## 2023-08-10 PROCEDURE — 82962 GLUCOSE BLOOD TEST: CPT

## 2023-08-10 PROCEDURE — 25000003 PHARM REV CODE 250: Performed by: ANESTHESIOLOGY

## 2023-08-10 PROCEDURE — 36000706: Performed by: STUDENT IN AN ORGANIZED HEALTH CARE EDUCATION/TRAINING PROGRAM

## 2023-08-10 PROCEDURE — 37000008 HC ANESTHESIA 1ST 15 MINUTES: Performed by: STUDENT IN AN ORGANIZED HEALTH CARE EDUCATION/TRAINING PROGRAM

## 2023-08-10 PROCEDURE — 87040 BLOOD CULTURE FOR BACTERIA: CPT

## 2023-08-10 PROCEDURE — 99223 PR INITIAL HOSPITAL CARE,LEVL III: ICD-10-PCS | Mod: 25,,, | Performed by: STUDENT IN AN ORGANIZED HEALTH CARE EDUCATION/TRAINING PROGRAM

## 2023-08-10 PROCEDURE — 88304 TISSUE EXAM BY PATHOLOGIST: CPT | Mod: TC,SUR | Performed by: STUDENT IN AN ORGANIZED HEALTH CARE EDUCATION/TRAINING PROGRAM

## 2023-08-10 PROCEDURE — 63600175 PHARM REV CODE 636 W HCPCS: Performed by: ANESTHESIOLOGY

## 2023-08-10 PROCEDURE — D9220A PRA ANESTHESIA: Mod: CRNA,,, | Performed by: ANESTHESIOLOGY

## 2023-08-10 PROCEDURE — 63600175 PHARM REV CODE 636 W HCPCS

## 2023-08-10 PROCEDURE — 99222 1ST HOSP IP/OBS MODERATE 55: CPT | Mod: AI,,, | Performed by: HOSPITALIST

## 2023-08-10 RX ORDER — ONDANSETRON 2 MG/ML
4 INJECTION INTRAMUSCULAR; INTRAVENOUS EVERY 6 HOURS PRN
Status: DISCONTINUED | OUTPATIENT
Start: 2023-08-10 | End: 2023-08-11 | Stop reason: HOSPADM

## 2023-08-10 RX ORDER — MUPIROCIN 20 MG/G
OINTMENT TOPICAL 2 TIMES DAILY
Status: DISCONTINUED | OUTPATIENT
Start: 2023-08-10 | End: 2023-08-11 | Stop reason: HOSPADM

## 2023-08-10 RX ORDER — DIPHENHYDRAMINE HYDROCHLORIDE 50 MG/ML
25 INJECTION INTRAMUSCULAR; INTRAVENOUS EVERY 6 HOURS PRN
Status: DISCONTINUED | OUTPATIENT
Start: 2023-08-10 | End: 2023-08-10 | Stop reason: HOSPADM

## 2023-08-10 RX ORDER — ONDANSETRON 2 MG/ML
8 INJECTION INTRAMUSCULAR; INTRAVENOUS EVERY 6 HOURS PRN
Status: DISCONTINUED | OUTPATIENT
Start: 2023-08-10 | End: 2023-08-11 | Stop reason: HOSPADM

## 2023-08-10 RX ORDER — HYDROCODONE BITARTRATE AND ACETAMINOPHEN 10; 325 MG/1; MG/1
1 TABLET ORAL EVERY 6 HOURS PRN
Status: DISCONTINUED | OUTPATIENT
Start: 2023-08-10 | End: 2023-08-11 | Stop reason: HOSPADM

## 2023-08-10 RX ORDER — TRAZODONE HYDROCHLORIDE 50 MG/1
50 TABLET ORAL NIGHTLY PRN
Status: DISCONTINUED | OUTPATIENT
Start: 2023-08-10 | End: 2023-08-11 | Stop reason: HOSPADM

## 2023-08-10 RX ORDER — BUPIVACAINE HYDROCHLORIDE 2.5 MG/ML
INJECTION, SOLUTION EPIDURAL; INFILTRATION; INTRACAUDAL
Status: DISCONTINUED | OUTPATIENT
Start: 2023-08-10 | End: 2023-08-10 | Stop reason: HOSPADM

## 2023-08-10 RX ORDER — GUAIFENESIN/DEXTROMETHORPHAN 100-10MG/5
10 SYRUP ORAL EVERY 6 HOURS PRN
Status: DISCONTINUED | OUTPATIENT
Start: 2023-08-10 | End: 2023-08-11 | Stop reason: HOSPADM

## 2023-08-10 RX ORDER — PROCHLORPERAZINE EDISYLATE 5 MG/ML
5 INJECTION INTRAMUSCULAR; INTRAVENOUS EVERY 6 HOURS PRN
Status: DISCONTINUED | OUTPATIENT
Start: 2023-08-10 | End: 2023-08-11 | Stop reason: HOSPADM

## 2023-08-10 RX ORDER — HYDROMORPHONE HYDROCHLORIDE 2 MG/ML
0.5 INJECTION, SOLUTION INTRAMUSCULAR; INTRAVENOUS; SUBCUTANEOUS
Status: DISCONTINUED | OUTPATIENT
Start: 2023-08-10 | End: 2023-08-11 | Stop reason: HOSPADM

## 2023-08-10 RX ORDER — NALOXONE HCL 0.4 MG/ML
0.02 VIAL (ML) INJECTION
Status: DISCONTINUED | OUTPATIENT
Start: 2023-08-10 | End: 2023-08-11 | Stop reason: HOSPADM

## 2023-08-10 RX ORDER — IBUPROFEN 200 MG
24 TABLET ORAL
Status: DISCONTINUED | OUTPATIENT
Start: 2023-08-10 | End: 2023-08-11 | Stop reason: HOSPADM

## 2023-08-10 RX ORDER — PANTOPRAZOLE SODIUM 40 MG/10ML
40 INJECTION, POWDER, LYOPHILIZED, FOR SOLUTION INTRAVENOUS ONCE
Status: COMPLETED | OUTPATIENT
Start: 2023-08-10 | End: 2023-08-10

## 2023-08-10 RX ORDER — IBUPROFEN 200 MG
16 TABLET ORAL
Status: DISCONTINUED | OUTPATIENT
Start: 2023-08-10 | End: 2023-08-11 | Stop reason: HOSPADM

## 2023-08-10 RX ORDER — SIMETHICONE 80 MG
1 TABLET,CHEWABLE ORAL 3 TIMES DAILY PRN
Status: DISCONTINUED | OUTPATIENT
Start: 2023-08-10 | End: 2023-08-11 | Stop reason: HOSPADM

## 2023-08-10 RX ORDER — PROPOFOL 10 MG/ML
VIAL (ML) INTRAVENOUS
Status: DISCONTINUED | OUTPATIENT
Start: 2023-08-10 | End: 2023-08-10

## 2023-08-10 RX ORDER — HYDROCODONE BITARTRATE AND ACETAMINOPHEN 5; 325 MG/1; MG/1
1 TABLET ORAL EVERY 6 HOURS PRN
Status: DISCONTINUED | OUTPATIENT
Start: 2023-08-10 | End: 2023-08-11 | Stop reason: HOSPADM

## 2023-08-10 RX ORDER — SODIUM CHLORIDE 9 MG/ML
INJECTION, SOLUTION INTRAVENOUS CONTINUOUS
Status: DISCONTINUED | OUTPATIENT
Start: 2023-08-10 | End: 2023-08-11 | Stop reason: HOSPADM

## 2023-08-10 RX ORDER — OXYCODONE AND ACETAMINOPHEN 5; 325 MG/1; MG/1
1 TABLET ORAL EVERY 6 HOURS PRN
Status: DISCONTINUED | OUTPATIENT
Start: 2023-08-10 | End: 2023-08-11 | Stop reason: HOSPADM

## 2023-08-10 RX ORDER — ESCITALOPRAM OXALATE 10 MG/1
10 TABLET ORAL DAILY
Status: DISCONTINUED | OUTPATIENT
Start: 2023-08-10 | End: 2023-08-11 | Stop reason: HOSPADM

## 2023-08-10 RX ORDER — BISACODYL 5 MG
10 TABLET, DELAYED RELEASE (ENTERIC COATED) ORAL DAILY PRN
Status: DISCONTINUED | OUTPATIENT
Start: 2023-08-10 | End: 2023-08-11 | Stop reason: HOSPADM

## 2023-08-10 RX ORDER — GABAPENTIN 300 MG/1
900 CAPSULE ORAL EVERY 6 HOURS
Status: DISCONTINUED | OUTPATIENT
Start: 2023-08-10 | End: 2023-08-11 | Stop reason: HOSPADM

## 2023-08-10 RX ORDER — ACETAMINOPHEN 500 MG
1000 TABLET ORAL EVERY 6 HOURS PRN
Status: DISCONTINUED | OUTPATIENT
Start: 2023-08-10 | End: 2023-08-11 | Stop reason: HOSPADM

## 2023-08-10 RX ORDER — LISINOPRIL 10 MG/1
10 TABLET ORAL DAILY
Status: DISCONTINUED | OUTPATIENT
Start: 2023-08-10 | End: 2023-08-10

## 2023-08-10 RX ORDER — IPRATROPIUM BROMIDE AND ALBUTEROL SULFATE 2.5; .5 MG/3ML; MG/3ML
3 SOLUTION RESPIRATORY (INHALATION) EVERY 6 HOURS
Status: DISCONTINUED | OUTPATIENT
Start: 2023-08-10 | End: 2023-08-11 | Stop reason: HOSPADM

## 2023-08-10 RX ORDER — ATORVASTATIN CALCIUM 80 MG/1
80 TABLET, FILM COATED ORAL NIGHTLY
Status: DISCONTINUED | OUTPATIENT
Start: 2023-08-10 | End: 2023-08-11 | Stop reason: HOSPADM

## 2023-08-10 RX ORDER — AMLODIPINE BESYLATE 10 MG/1
10 TABLET ORAL DAILY
Status: DISCONTINUED | OUTPATIENT
Start: 2023-08-10 | End: 2023-08-11 | Stop reason: HOSPADM

## 2023-08-10 RX ORDER — METOPROLOL SUCCINATE 25 MG/1
25 TABLET, EXTENDED RELEASE ORAL DAILY
Status: DISCONTINUED | OUTPATIENT
Start: 2023-08-10 | End: 2023-08-11 | Stop reason: HOSPADM

## 2023-08-10 RX ORDER — GLUCAGON 1 MG
1 KIT INJECTION
Status: DISCONTINUED | OUTPATIENT
Start: 2023-08-10 | End: 2023-08-11 | Stop reason: HOSPADM

## 2023-08-10 RX ORDER — HYDROMORPHONE HYDROCHLORIDE 2 MG/ML
0.5 INJECTION, SOLUTION INTRAMUSCULAR; INTRAVENOUS; SUBCUTANEOUS EVERY 5 MIN PRN
Status: DISCONTINUED | OUTPATIENT
Start: 2023-08-10 | End: 2023-08-10 | Stop reason: HOSPADM

## 2023-08-10 RX ORDER — NAPROXEN SODIUM 220 MG/1
81 TABLET, FILM COATED ORAL DAILY
Status: DISCONTINUED | OUTPATIENT
Start: 2023-08-10 | End: 2023-08-11 | Stop reason: HOSPADM

## 2023-08-10 RX ORDER — PANTOPRAZOLE SODIUM 40 MG/1
40 TABLET, DELAYED RELEASE ORAL DAILY
Status: DISCONTINUED | OUTPATIENT
Start: 2023-08-10 | End: 2023-08-11 | Stop reason: HOSPADM

## 2023-08-10 RX ORDER — INSULIN ASPART 100 [IU]/ML
1-10 INJECTION, SOLUTION INTRAVENOUS; SUBCUTANEOUS
Status: DISCONTINUED | OUTPATIENT
Start: 2023-08-10 | End: 2023-08-11 | Stop reason: HOSPADM

## 2023-08-10 RX ORDER — MIDAZOLAM HYDROCHLORIDE 1 MG/ML
INJECTION INTRAMUSCULAR; INTRAVENOUS
Status: DISCONTINUED | OUTPATIENT
Start: 2023-08-10 | End: 2023-08-10

## 2023-08-10 RX ORDER — ONDANSETRON 2 MG/ML
4 INJECTION INTRAMUSCULAR; INTRAVENOUS DAILY PRN
Status: DISCONTINUED | OUTPATIENT
Start: 2023-08-10 | End: 2023-08-10 | Stop reason: HOSPADM

## 2023-08-10 RX ORDER — SODIUM CHLORIDE 0.9 % (FLUSH) 0.9 %
10 SYRINGE (ML) INJECTION EVERY 12 HOURS PRN
Status: DISCONTINUED | OUTPATIENT
Start: 2023-08-10 | End: 2023-08-11 | Stop reason: HOSPADM

## 2023-08-10 RX ORDER — MEPERIDINE HYDROCHLORIDE 25 MG/ML
25 INJECTION INTRAMUSCULAR; INTRAVENOUS; SUBCUTANEOUS EVERY 10 MIN PRN
Status: DISCONTINUED | OUTPATIENT
Start: 2023-08-10 | End: 2023-08-10 | Stop reason: HOSPADM

## 2023-08-10 RX ORDER — MORPHINE SULFATE 10 MG/ML
4 INJECTION INTRAMUSCULAR; INTRAVENOUS; SUBCUTANEOUS EVERY 5 MIN PRN
Status: DISCONTINUED | OUTPATIENT
Start: 2023-08-10 | End: 2023-08-10 | Stop reason: HOSPADM

## 2023-08-10 RX ORDER — LIDOCAINE HYDROCHLORIDE 20 MG/ML
INJECTION, SOLUTION EPIDURAL; INFILTRATION; INTRACAUDAL; PERINEURAL
Status: DISCONTINUED | OUTPATIENT
Start: 2023-08-10 | End: 2023-08-10

## 2023-08-10 RX ORDER — HYDROXYZINE PAMOATE 25 MG/1
25 CAPSULE ORAL NIGHTLY
Status: DISCONTINUED | OUTPATIENT
Start: 2023-08-10 | End: 2023-08-11 | Stop reason: HOSPADM

## 2023-08-10 RX ORDER — MIRTAZAPINE 7.5 MG/1
7.5 TABLET, FILM COATED ORAL NIGHTLY
Status: DISCONTINUED | OUTPATIENT
Start: 2023-08-10 | End: 2023-08-11 | Stop reason: HOSPADM

## 2023-08-10 RX ADMIN — HYDROCODONE BITARTRATE AND ACETAMINOPHEN 1 TABLET: 10; 325 TABLET ORAL at 04:08

## 2023-08-10 RX ADMIN — PIPERACILLIN AND TAZOBACTAM 4.5 G: 4; .5 INJECTION, POWDER, FOR SOLUTION INTRAVENOUS; PARENTERAL at 08:08

## 2023-08-10 RX ADMIN — TRAZODONE HYDROCHLORIDE 50 MG: 50 TABLET ORAL at 08:08

## 2023-08-10 RX ADMIN — MUPIROCIN: 20 OINTMENT TOPICAL at 08:08

## 2023-08-10 RX ADMIN — VANCOMYCIN HYDROCHLORIDE 1500 MG: 5 INJECTION, POWDER, LYOPHILIZED, FOR SOLUTION INTRAVENOUS at 05:08

## 2023-08-10 RX ADMIN — GABAPENTIN 900 MG: 300 CAPSULE ORAL at 07:08

## 2023-08-10 RX ADMIN — GABAPENTIN 900 MG: 300 CAPSULE ORAL at 06:08

## 2023-08-10 RX ADMIN — PIPERACILLIN AND TAZOBACTAM 4.5 G: 4; .5 INJECTION, POWDER, FOR SOLUTION INTRAVENOUS; PARENTERAL at 04:08

## 2023-08-10 RX ADMIN — PANTOPRAZOLE SODIUM 40 MG: 40 INJECTION, POWDER, FOR SOLUTION INTRAVENOUS at 04:08

## 2023-08-10 RX ADMIN — ATORVASTATIN CALCIUM 80 MG: 80 TABLET, FILM COATED ORAL at 08:08

## 2023-08-10 RX ADMIN — SODIUM CHLORIDE: 9 INJECTION, SOLUTION INTRAVENOUS at 08:08

## 2023-08-10 RX ADMIN — PANTOPRAZOLE SODIUM 40 MG: 40 TABLET, DELAYED RELEASE ORAL at 09:08

## 2023-08-10 RX ADMIN — MIDAZOLAM 2 MG: 1 INJECTION INTRAMUSCULAR; INTRAVENOUS at 01:08

## 2023-08-10 RX ADMIN — METOPROLOL SUCCINATE 25 MG: 25 TABLET, EXTENDED RELEASE ORAL at 11:08

## 2023-08-10 RX ADMIN — IPRATROPIUM BROMIDE AND ALBUTEROL SULFATE 3 ML: 2.5; .5 SOLUTION RESPIRATORY (INHALATION) at 07:08

## 2023-08-10 RX ADMIN — ESCITALOPRAM OXALATE 10 MG: 10 TABLET ORAL at 11:08

## 2023-08-10 RX ADMIN — MIRTAZAPINE 7.5 MG: 7.5 TABLET, FILM COATED ORAL at 08:08

## 2023-08-10 RX ADMIN — LIDOCAINE HYDROCHLORIDE 20 MG: 20 INJECTION, SOLUTION INTRAVENOUS at 02:08

## 2023-08-10 RX ADMIN — HYDROCODONE BITARTRATE AND ACETAMINOPHEN 1 TABLET: 10; 325 TABLET ORAL at 03:08

## 2023-08-10 RX ADMIN — PROPOFOL 100 MG: 10 INJECTION, EMULSION INTRAVENOUS at 02:08

## 2023-08-10 RX ADMIN — HYDROXYZINE PAMOATE 25 MG: 25 CAPSULE ORAL at 08:08

## 2023-08-10 RX ADMIN — HYDROCODONE BITARTRATE AND ACETAMINOPHEN 1 TABLET: 10; 325 TABLET ORAL at 09:08

## 2023-08-10 RX ADMIN — INSULIN ASPART 3 UNITS: 100 INJECTION, SOLUTION INTRAVENOUS; SUBCUTANEOUS at 08:08

## 2023-08-10 RX ADMIN — OXYCODONE HYDROCHLORIDE AND ACETAMINOPHEN 1 TABLET: 5; 325 TABLET ORAL at 09:08

## 2023-08-10 RX ADMIN — AMLODIPINE BESYLATE 10 MG: 10 TABLET ORAL at 09:08

## 2023-08-10 RX ADMIN — SODIUM CHLORIDE: 9 INJECTION, SOLUTION INTRAVENOUS at 02:08

## 2023-08-10 RX ADMIN — ATORVASTATIN CALCIUM 80 MG: 80 TABLET, FILM COATED ORAL at 03:08

## 2023-08-10 RX ADMIN — ASPIRIN 81 MG 81 MG: 81 TABLET ORAL at 09:08

## 2023-08-10 RX ADMIN — INSULIN ASPART 2 UNITS: 100 INJECTION, SOLUTION INTRAVENOUS; SUBCUTANEOUS at 05:08

## 2023-08-10 RX ADMIN — MUPIROCIN: 20 OINTMENT TOPICAL at 09:08

## 2023-08-10 RX ADMIN — PIPERACILLIN AND TAZOBACTAM 4.5 G: 4; .5 INJECTION, POWDER, FOR SOLUTION INTRAVENOUS; PARENTERAL at 02:08

## 2023-08-10 NOTE — OP NOTE
[facility] - Periop Services  Surgery Department  Operative Note    SUMMARY     Date of Procedure: 8/10/2023     Procedure: Procedure(s) (LRB):  DEBRIDEMENT, FOOT (Right)     Surgeon(s) and Role:     * Evelio Chiu DO - Primary    Assisting Surgeon: None    Pre-Operative Diagnosis: Infection [B99.9]  Diabetic foot ulcer [E11.621, L97.509]    Post-Operative Diagnosis: Post-Op Diagnosis Codes:     * Infection [B99.9]     * Diabetic foot ulcer [E11.621, L97.509]    Anesthesia: General    Operative Findings (including complications, if any):  Right foot plantar ulceration extending down to muscle with minimal muscle necrosis; no bone involvement    Description of Technical Procedures:  Patient was taken the operating room and placed on the stretcher in the supine position.  Patient underwent general anesthesia per the anesthesia team.  The right foot was then prepped and draped in usual sterile fashion.  We used a curette to debride and perform a sharp excisional debridement around the plantar surface of the right foot and found an ulcerated area measuring 2.3 x 1.8 x 0.3 cm that we debrided down to healthy muscle; there was some minimal muscle necrosis.  Bleeding controlled electrocautery.  We sent this to pathology; cultures had previously been taken the ER.  Patient also had a callus on the right medial foot and the callus was removed and there was healthy epidermis and skin at this site with no wound.  We placed bacitracin over the site and dressed with 4 x 4 gauze and Kerlix.  Patient was awakened, extubated and taken the PACU in stable condition.  Patient tolerated procedure well.        Estimated Blood Loss (EBL): 2cc           Implants: * No implants in log *    Specimens:   Specimen (24h ago, onward)       Start     Ordered    08/10/23 1432  Surgical Pathology  RELEASE UPON ORDERING         08/10/23 1432                            Condition: Good    Disposition: PACU - hemodynamically  stable.    Attestation: I was present and scrubbed for the entire procedure.

## 2023-08-10 NOTE — PLAN OF CARE
[facility] - Emergency Department  Initial Discharge Assessment       Primary Care Provider: Marquez Huff MD    Admission Diagnosis: Diabetic foot ulcer [E11.621, L97.509]    Admission Date: 8/9/2023  Expected Discharge Date:     Transition of Care Barriers: None    Payor: MEDICARE / Plan: MEDICARE PART A & B / Product Type: Government /     Extended Emergency Contact Information  Primary Emergency Contact: Charisma Avery  Mobile Phone: [phone]  Relation: Spouse  Preferred language: English   needed? No    Discharge Plan A: Home with family  Discharge Plan B: Home Health, Home with family       [facility] [place] [address]  [address]  [facility] [address]  Phone: [phone] Fax: [phone]     [facility] - [place] - [address]  [address]  [facility] [address]  Phone: [phone] Fax: [phone]    [facility] [address]  [address] [place] [address].  [address]  [facility] [address]  Phone: [phone] Fax: [phone]    [facility] [place] - [address]  [address] [facility] [address]  Phone: [phone] Fax: [phone]      Initial Assessment (most recent)       Adult Discharge Assessment - 08/10/23 1145          Discharge Assessment    Assessment Type Discharge Planning Assessment     Source of Information patient     Communicated MARGARITO with patient/caregiver Date not available/Unable to determine     People in Home spouse     Do you expect to return to your current living situation? Yes     Do you have help at home or someone to help you manage your care at home? Yes     Prior to hospitilization cognitive status: Unable to Assess     Current cognitive status: Alert/Oriented     Home Accessibility stairs to enter home     Number of Stairs, Main Entrance three     Home Layout Able to live on 1st floor     Equipment  Currently Used at Home crutches;oxygen;power chair;walker, rolling     Patient currently being followed by outpatient case management? No     Do you currently have service(s) that help you manage your care at home? No     Do you take prescription medications? Yes     Do you have prescription coverage? Yes     Coverage Medicare     Do you have any problems affording any of your prescribed medications? No     Is the patient taking medications as prescribed? yes     Who is going to help you get home at discharge? wife     How do you get to doctors appointments? family or friend will provide     Are you on dialysis? No     Do you take coumadin? No     Discharge Plan A Home with family     Discharge Plan B Home Health;Home with family     DME Needed Upon Discharge  none     Discharge Plan discussed with: Spouse/sig other     Name(s) and Number(s) Rosalia Avery     Transition of Care Barriers None        Physical Activity    On average, how many days per week do you engage in moderate to strenuous exercise (like a brisk walk)? 0 days     On average, how many minutes do you engage in exercise at this level? 0 min        Financial Resource Strain    How hard is it for you to pay for the very basics like food, housing, medical care, and heating? Not very hard        Housing Stability    In the last 12 months, was there a time when you were not able to pay the mortgage or rent on time? No     In the last 12 months, how many places have you lived? 1     In the last 12 months, was there a time when you did not have a steady place to sleep or slept in a shelter (including now)? No        Transportation Needs    In the past 12 months, has lack of transportation kept you from medical appointments or from getting medications? No     In the past 12 months, has lack of transportation kept you from meetings, work, or from getting things needed for daily living? No        Food Insecurity    Within the past 12 months, you worried that  your food would run out before you got the money to buy more. Never true     Within the past 12 months, the food you bought just didn't last and you didn't have money to get more. Never true        Stress    Do you feel stress - tense, restless, nervous, or anxious, or unable to sleep at night because your mind is troubled all the time - these days? Only a little        Social Connections    In a typical week, how many times do you talk on the phone with family, friends, or neighbors? More than three times a week     How often do you get together with friends or relatives? More than three times a week     How often do you attend Lutheran or Orthodox services? Never     Do you belong to any clubs or organizations such as Lutheran groups, unions, fraternal or athletic groups, or school groups? No     How often do you attend meetings of the clubs or organizations you belong to? Never     Are you , , , , never , or living with a partner?         Alcohol Use    Q1: How often do you have a drink containing alcohol? Never     Q2: How many drinks containing alcohol do you have on a typical day when you are drinking? Patient does not drink     Q3: How often do you have six or more drinks on one occasion? Never                      SS spoke with pt and his wife. Pt lives at home with wife. Pt is not current with home health. Discharge plan is to return home with wife and home health if needed. SDOH updated. SS will follow for possible discharge needs.

## 2023-08-10 NOTE — ANESTHESIA PROCEDURE NOTES
Intubation    Date/Time: 8/10/2023 2:11 PM    Performed by: Melony Aguilar CRNA  Authorized by: Zaire Matamoros MD    Intubation:     Induction:  Intravenous    Intubated:  Postinduction    Mask Ventilation:  Easy mask    Attempts:  1    Attempted By:  CRNA    Difficult Airway Encountered?: No      Complications:  None    Airway Device:  Supraglottic airway/LMA    Airway Device Size:  4.0    Style/Cuff Inflation:  Cuffed (inflated to minimal occlusive pressure)    Placement Verified By:  Capnometry    Complicating Factors:  None    Findings Post-Intubation:  BS equal bilateral and atraumatic/condition of teeth unchanged

## 2023-08-10 NOTE — ANESTHESIA PREPROCEDURE EVALUATION
08/10/2023  Navdeep Avery is a 63 y.o., male.      Pre-op Assessment    I have reviewed the Patient Summary Reports.    I have reviewed the NPO Status.   I have reviewed the Medications.     Review of Systems         Anesthesia Plan  Type of Anesthesia, risks & benefits discussed:    Anesthesia Type: Gen Supraglottic Airway  Intra-op Monitoring Plan: Standard ASA Monitors  Post Op Pain Control Plan: IV/PO Opioids PRN  Induction:  IV  Informed Consent: Informed consent signed with the Patient and all parties understand the risks and agree with anesthesia plan.  All questions answered.   ASA Score: 3    Ready For Surgery From Anesthesia Perspective.     .  NPO greater than 8 hours  NAC  NKDA     Hct 39  Ca 10.5  6/27/23 EKG: Sinus bradycardia; 58 bpm   Inferior infarct ,age undetermined   Anterior infarct ,age undetermined   T wave abnormality, consider lateral ischemia         Medical History   Diabetic ulcer of right foot associated with diabetes mellitus due to underlying condition, with bone involvement without evidence of necrosis Right foot ulcer, with fat layer exposed   Peripheral vascular disease, unspecified Atrophic rhinitis   Anemia Neuropathy   Hyperlipidemia Hypertension   Sleep apnea COPD (chronic obstructive pulmonary disease)   GERD (gastroesophageal reflux disease) Anxiety   Carpal tunnel syndrome Depression   Coronary arteriosclerosis Insomnia   Type 2 diabetes mellitus Atherosclerotic heart disease of native coronary artery with other forms of angina pectoris   COVID Cellulitis of right lower extremity   H/0 MI x4  H/o CABG (5 years ago)     Adequate ROM at neck.

## 2023-08-10 NOTE — H&P
[facility] - Emergency Department  [facility] Medicine  History & Physical    Patient Name: Navdeep Avery  MRN: [id number]  Patient Class: IP- Inpatient  Admission Date: 8/9/2023  Attending Physician: Kacie Yo MD   Primary Care Provider: Marquez Huff MD         Patient information was obtained from patient, past medical records and ER records.     Subjective:     Principal Problem:Diabetic ulcer of right foot associated with diabetes mellitus due to underlying condition, with bone involvement without evidence of necrosis    Chief Complaint:   Chief Complaint   Patient presents with    Wound Infection        HPI: 63 year old male presented to the ED with a wound. Patient has DM2 and has been having a chronic wound on the sole of the food at the base of the toes. The wound started draining purulent fluid and having a foul smell for the last a few weeks. He is also experiencing pain in the feet that is 8/10 and radiates up to the knee. He saw his PCP around a week ago who prescribed him some Clindamycin. HE reports he has been taking the clindamycin for almost 7 days with not much improvement. He sees Dr. Chiu outpatient regularly with last visit being 3 months ago and his next appointment is august 15th but patient did not feel safe and comfortable to wait until his appointment. Patient denies fever, chills, sweating, dizziness, chest pain, SOB.     Patient's A1c was 8.8 in mid may. He takes 10 units of long acting insulin in the morning and 20 units at night. Patient has CAD s/p triple bypass about a year ago at [facility] and 4-5 stents previously. Patient of Dr best and has not seen him for a year. He takes aspirin and plavix. Patient reports all meds were taken lastly around 2 days ago including plavix. Has COPD with around 130 pack year smoking history and is on home oxygen PRN. SAMEERA but does not wear CPAP. HTN and HLD.     ED course:  CBC did not show increased WBC  CMP showed  normal Cr.  Xray of the foot:  Findings suspicious for sequela of osteomyelitis in joint affection.       Past Medical History:   Diagnosis Date    Arthritis     Carpal tunnel syndrome     COPD (chronic obstructive pulmonary disease)     Coronary artery disease involving native coronary artery of native heart 06/27/2023    CABG 2018 (No OP report available)  Most recent left heart catheterization 08/12/2019 1. Normal LV size apical akinesis and overall normal LV systolic function, ejection fraction 55% 2. Patent saphenous vein graft to the right PDA with patent jump graft to the OM branch left circumflex 3. Patent LIMA to the OM1 4. No significant mitral regurgitation     COVID 02/02/2022    Diabetic peripheral neuropathy     Diabetic ulcer of right foot associated with diabetes mellitus due to underlying condition, with bone involvement without evidence of necrosis     GERD (gastroesophageal reflux disease)     Hyperlipidemia     Hypertension     Peripheral vascular disease, unspecified     Sleep apnea     Type 2 diabetes mellitus        Past Surgical History:   Procedure Laterality Date    CORONARY ARTERY BYPASS GRAFT      CORONARY STENT PLACEMENT      DEBRIDEMENT OF LOWER EXTREMITY Right 06/27/2022    Procedure: DEBRIDEMENT, LOWER EXTREMITY;  Surgeon: Forrest Kiser MD;  Location: [facility] OR;  Service: General;  Laterality: Right;  right foot    HIP FRACTURE SURGERY Left     IRRIGATION AND DEBRIDEMENT OF LOWER EXTREMITY Right 09/08/2022    Procedure: IRRIGATION AND DEBRIDEMENT, LOWER EXTREMITY;  Surgeon: Selina Matos MD;  Location: [facility] OR;  Service: General;  Laterality: Right;    IRRIGATION AND DEBRIDEMENT OF LOWER EXTREMITY Right 01/05/2023    Procedure: IRRIGATION AND DEBRIDEMENT, LOWER EXTREMITY;  Surgeon: Evelio Chiu DO;  Location: [facility] OR;  Service: General;  Laterality: Right;    SHOULDER OPEN ROTATOR CUFF REPAIR Left     SPINE SURGERY      VASCULAR SURGERY          Review of patient's allergies indicates:  No Known Allergies    No current facility-administered medications on file prior to encounter.     Current Outpatient Medications on File Prior to Encounter   Medication Sig    albuterol (PROVENTIL) 2.5 mg /3 mL (0.083 %) nebulizer solution USE 1 VIAL IN NEBULIZER 3 TIMES DAILY    albuterol (PROVENTIL/VENTOLIN HFA) 90 mcg/actuation inhaler Inhale 2 puffs into the lungs 2 (two) times daily as needed for Wheezing.    amLODIPine (NORVASC) 10 MG tablet Take 1 tablet (10 mg total) by mouth once daily.    aspirin 81 MG Chew Take 81 mg by mouth once daily.    atorvastatin (LIPITOR) 80 MG tablet Take 1 tablet (80 mg total) by mouth every evening.    clindamycin (CLEOCIN) 150 MG capsule Take 2 capsules (300 mg total) by mouth 4 (four) times daily. for 7 days    clopidogreL (PLAVIX) 75 mg tablet Take 1 tablet (75 mg total) by mouth once daily.    diclofenac sodium (VOLTAREN) 1 % Gel Apply 1 g topically 4 (four) times daily as needed (pain).    empagliflozin (JARDIANCE) 10 mg tablet Take 1 tablet (10 mg total) by mouth once daily.    EScitalopram oxalate (LEXAPRO) 10 MG tablet Take 1 tablet (10 mg total) by mouth once daily.    gabapentin (NEURONTIN) 300 MG capsule Take 3 capsules (900 mg total) by mouth every 6 (six) hours.    HYDROcodone-acetaminophen (NORCO)  mg per tablet Take 1 tablet by mouth every 6 (six) hours as needed for Pain (pain).    [START ON 8/19/2023] HYDROcodone-acetaminophen (NORCO)  mg per tablet Take 1 tablet by mouth every 6 (six) hours as needed for Pain (pain).    hydrOXYzine pamoate (VISTARIL) 25 MG Cap Take 1 capsule (25 mg total) by mouth once daily. Take nightly for sleep    insulin detemir U-100 (LEVEMIR FLEXTOUCH U-100 INSULN) 100 unit/mL (3 mL) InPn pen Inject 10 units subcutaneously every morning, inject 20 units subcutaneously every evening.    LIDOcaine (LIDODERM) 5 % 1 patch once daily.    lisinopriL 10 MG tablet  "Take 1 tablet (10 mg total) by mouth once daily.    metoprolol succinate (TOPROL-XL) 25 MG 24 hr tablet Take 1 tablet (25 mg total) by mouth once daily.    mirtazapine (REMERON) 7.5 MG Tab Take 1 tablet (7.5 mg total) by mouth nightly.    nitroGLYCERIN (NITROSTAT) 0.4 MG SL tablet Place 1 tablet (0.4 mg total) under the tongue every 5 (five) minutes as needed for Chest pain.    pantoprazole (PROTONIX) 40 MG tablet Take 1 tablet (40 mg total) by mouth once daily.    semaglutide (RYBELSUS) 3 mg tablet Take 3 mg by mouth once daily.    SENNA 8.6 mg tablet Take 2 tablets by mouth once daily.    SPIRIVA RESPIMAT 2.5 mcg/actuation inhaler Inhale 1 puff into the lungs once daily.    SURE COMFORT INSULIN SYRINGE 0.3 mL 31 gauge x 5/16" Syrg Inject 1 application into the skin 2 (two) times a day.     Family History       Problem Relation (Age of Onset)    Alcohol abuse Father    Arthritis Mother    COPD Paternal Grandfather    Cancer Sister    Diabetes Sister    Early death Father    Heart disease Father, Maternal Grandfather, Son    Hypertension Mother    Learning disabilities Mother          Tobacco Use    Smoking status: Every Day     Current packs/day: 2.50     Average packs/day: 2.5 packs/day for 52.0 years (130.0 ttl pk-yrs)     Types: Cigarettes     Start date: 8/10/1971    Smokeless tobacco: Never   Substance and Sexual Activity    Alcohol use: Not Currently    Drug use: Yes     Types: Oxycodone    Sexual activity: Yes     Partners: Female     Review of Systems   Constitutional:  Negative for activity change, chills, fatigue and unexpected weight change.   HENT:  Negative for congestion.    Respiratory:  Negative for chest tightness, shortness of breath, wheezing and stridor.    Cardiovascular:  Negative for chest pain, palpitations and leg swelling.   Gastrointestinal:  Negative for abdominal distention, abdominal pain, anal bleeding, blood in stool, constipation, diarrhea, nausea, rectal pain and " vomiting.   Genitourinary:  Negative for difficulty urinating, dysuria, enuresis and flank pain.   Musculoskeletal:  Positive for arthralgias. Negative for back pain and neck stiffness.   Skin:  Negative for color change and pallor.   Neurological:  Negative for dizziness, tremors, seizures, syncope, speech difficulty, weakness, numbness and headaches.   Hematological:  Negative for adenopathy.   Psychiatric/Behavioral:  Negative for agitation.      Objective:     Vital Signs (Most Recent):  Temp: 98.6 °F (37 °C) (08/09/23 1808)  Pulse: 62 (08/10/23 0128)  Resp: 18 (08/09/23 2156)  BP: 132/64 (08/10/23 0128)  SpO2: (!) 94 % (08/10/23 0128) Vital Signs (24h Range):  Temp:  [98.6 °F (37 °C)] 98.6 °F (37 °C)  Pulse:  [62-79] 62  Resp:  [18] 18  SpO2:  [94 %-96 %] 94 %  BP: (129-132)/(64-73) 132/64     Weight: 72.6 kg (160 lb)  Body mass index is 24.33 kg/m².     Physical Exam  Vitals and nursing note reviewed.   Constitutional:       General: He is not in acute distress.     Appearance: Normal appearance. He is normal weight. He is not ill-appearing or toxic-appearing.   HENT:      Head: Normocephalic and atraumatic.      Right Ear: External ear normal.      Left Ear: External ear normal.      Nose: Nose normal. No congestion or rhinorrhea.      Mouth/Throat:      Mouth: Mucous membranes are moist.   Eyes:      Conjunctiva/sclera: Conjunctivae normal.   Cardiovascular:      Rate and Rhythm: Normal rate and regular rhythm.      Pulses: Normal pulses.      Heart sounds: Normal heart sounds. No murmur heard.     No friction rub.   Pulmonary:      Effort: Pulmonary effort is normal. No respiratory distress.      Breath sounds: Wheezing present. No rales.   Abdominal:      General: Bowel sounds are normal. There is no distension.      Palpations: Abdomen is soft.      Tenderness: There is no abdominal tenderness. There is no guarding.   Musculoskeletal:         General: Tenderness, deformity and signs of injury present.  "Normal range of motion.      Cervical back: Neck supple. No tenderness.      Right lower leg: No edema.      Left lower leg: No edema.        Feet:    Skin:     General: Skin is warm.      Coloration: Skin is not jaundiced.      Findings: Lesion present. No bruising.   Neurological:      Mental Status: He is alert and oriented to person, place, and time.   Psychiatric:         Behavior: Behavior normal.                Significant Labs: All pertinent labs within the past 24 hours have been reviewed.    Significant Imaging: I have reviewed all pertinent imaging results/findings within the past 24 hours.    Assessment/Plan:     * Diabetic ulcer of right foot associated with diabetes mellitus due to underlying condition, with bone involvement without evidence of necrosis  No WBC increase, no systemic infectious symptoms but the wound has drainage and smells foul  Xray of the foot done and is available on the chart  Starting patient on Vanc and zosyn  Previously wound cultures grew MRSA and diabetic patient to cover pseudomonas  Wound culture pending  Blood culture pending  Wound care consulted  Surgery consulted  Pre Op labs and imaging ordered  NPO      Patient's FSGs are uncontrolled due to hyperglycemia on current medication regimen.  Last A1c reviewed-   Lab Results   Component Value Date    HGBA1C 8.8 (H) 05/17/2023     Most recent fingerstick glucose reviewed- No results for input(s): "POCTGLUCOSE" in the last 24 hours.  Current correctional scale  Medium  Maintain anti-hyperglycemic dose as follows-   Antihyperglycemics (From admission, onward)    Start     Stop Route Frequency Ordered    08/10/23 0325  insulin aspart U-100 injection 1-10 Units         -- SubQ Before meals & nightly PRN 08/10/23 0226        Hold Oral hypoglycemics while patient is in the hospital.    Coronary artery disease involving native coronary artery of native heart  Revised cardiac risk index was 2 points- class 3 risk    S/p bypass  Last " plavix was taken 2 days ago  Will hold plavix at this point for possible surgery tomorrow  Continue aspirin  Will hold lisinopril for surgery tomorrow  Continue Toprol XL  Continue Lipitor         HTN (hypertension)  Continue home norvasc and toprol XL  Will hold home lisinopril for possible surgery tomorrow     COPD (chronic obstructive pulmonary disease)    duoneb and oxygen as needed  Patient doesn't have a pulmonologist  He could benefit from one upon discharge  Xray of chest pending     Dependence on nicotine from cigarettes  Dangers of cigarette smoking were reviewed with patient in detail. Patient was Counseled for 3-10 minutes. Nicotine replacement options were discussed.     GERD (gastroesophageal reflux disease)  Continue home protonix      Sleep apnea    Declines CPAP  Oxygen as needed      Diabetic peripheral neuropathy  Continue home gabapentin    Insomnia secondary to depression with anxiety  Continue vistaril, and depression medications          VTE Risk Mitigation (From admission, onward)         Ordered     Reason for No Pharmacological VTE Prophylaxis  Once        Question:  Reasons:  Answer:  Physician Provided (leave comment)  Comment:  surgery    08/10/23 0226     IP VTE LOW RISK PATIENT  Once         08/10/23 0226     Place sequential compression device  Until discontinued         08/10/23 0226                           Vinnie Navarro MD  Department of Hospital Medicine  [facility] - Emergency Department

## 2023-08-10 NOTE — TRANSFER OF CARE
"Anesthesia Transfer of Care Note    Patient: Navdeep Avery    Procedure(s) Performed: Procedure(s) (LRB):  DEBRIDEMENT, FOOT (Right)    Patient location: PACU    Anesthesia Type: general    Transport from OR: Transported from OR on room air with adequate spontaneous ventilation    Post pain: adequate analgesia    Post assessment: no apparent anesthetic complications and tolerated procedure well    Post vital signs: stable    Level of consciousness: responds to stimulation    Nausea/Vomiting: no nausea/vomiting    Complications: none    Transfer of care protocol was followedComments: Report Given to PACU rn VSS      Last vitals:   Visit Vitals  /61 (BP Location: Left arm, Patient Position: Lying)   Pulse 62   Temp 36.6 °C (97.8 °F) (Oral)   Resp (!) 8   Ht 5' 8" (1.727 m)   Wt 72.6 kg (160 lb)   SpO2 95%   BMI 24.33 kg/m²     "

## 2023-08-10 NOTE — HPI
63-year-old  male presents to the ER with complaints of chronic wound to foot.  Familiar with this patient and we have been doing wound care to an ulcer under the right plantar surface near the 3rd metatarsophalangeal joint and this area had healed.  Patient states the wound started draining a few weeks ago and he is noticed a foul smell.  He was placed on clindamycin about a week ago.  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  Foot x-ray showed similar appearance from previous with erosions of the 2nd metatarsophalangeal joint and distal 3rd metatarsal head concerning for possible osteomyelitis, soft tissue swelling; overall appearance similar to previous x-ray.  Patient admitted to Medicine for IV antibiotics and surgical evaluation.

## 2023-08-10 NOTE — CONSULTS
[facility] - Emergency Department  General Surgery  Consult Note    Patient Name: Navdeep Avery  MRN: [id number]  Code Status: Full Code  Admission Date: 8/9/2023  Hospital Length of Stay: 0 days  Attending Physician: Terrence Choudhury MD  Primary Care Provider: Marquez Huff MD    Patient information was obtained from patient and ER records.     Inpatient consult to General Surgery  Consult performed by: Evelio Chiu DO  Consult ordered by: Kacie Yo MD        Subjective:     Principal Problem: Diabetic ulcer of right foot associated with diabetes mellitus due to underlying condition, with bone involvement without evidence of necrosis    History of Present Illness: 63-year-old  male presents to the ER with complaints of chronic wound to foot.  Familiar with this patient and we have been doing wound care to an ulcer under the right plantar surface near the 3rd metatarsophalangeal joint and this area had healed.  Patient states the wound started draining a few weeks ago and he is noticed a foul smell.  He was placed on clindamycin about a week ago.  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  Foot x-ray showed similar appearance from previous with erosions of the 2nd metatarsophalangeal joint and distal 3rd metatarsal head concerning for possible osteomyelitis, soft tissue swelling; overall appearance similar to previous x-ray.  Patient admitted to Medicine for IV antibiotics and surgical evaluation.            No current facility-administered medications on file prior to encounter.     Current Outpatient Medications on File Prior to Encounter   Medication Sig    albuterol (PROVENTIL) 2.5 mg /3 mL (0.083 %) nebulizer solution USE 1 VIAL IN NEBULIZER 3 TIMES DAILY    albuterol (PROVENTIL/VENTOLIN HFA) 90 mcg/actuation inhaler Inhale 2 puffs into the lungs 2 (two) times daily as needed for Wheezing.    amLODIPine  (NORVASC) 10 MG tablet Take 1 tablet (10 mg total) by mouth once daily.    aspirin 81 MG Chew Take 81 mg by mouth once daily.    atorvastatin (LIPITOR) 80 MG tablet Take 1 tablet (80 mg total) by mouth every evening.    clindamycin (CLEOCIN) 150 MG capsule Take 2 capsules (300 mg total) by mouth 4 (four) times daily. for 7 days    clopidogreL (PLAVIX) 75 mg tablet Take 1 tablet (75 mg total) by mouth once daily.    diclofenac sodium (VOLTAREN) 1 % Gel Apply 1 g topically 4 (four) times daily as needed (pain).    empagliflozin (JARDIANCE) 10 mg tablet Take 1 tablet (10 mg total) by mouth once daily.    EScitalopram oxalate (LEXAPRO) 10 MG tablet Take 1 tablet (10 mg total) by mouth once daily.    gabapentin (NEURONTIN) 300 MG capsule Take 3 capsules (900 mg total) by mouth every 6 (six) hours.    HYDROcodone-acetaminophen (NORCO)  mg per tablet Take 1 tablet by mouth every 6 (six) hours as needed for Pain (pain).    [START ON 8/19/2023] HYDROcodone-acetaminophen (NORCO)  mg per tablet Take 1 tablet by mouth every 6 (six) hours as needed for Pain (pain).    hydrOXYzine pamoate (VISTARIL) 25 MG Cap Take 1 capsule (25 mg total) by mouth once daily. Take nightly for sleep    insulin detemir U-100 (LEVEMIR FLEXTOUCH U-100 INSULN) 100 unit/mL (3 mL) InPn pen Inject 10 units subcutaneously every morning, inject 20 units subcutaneously every evening.    LIDOcaine (LIDODERM) 5 % 1 patch once daily.    lisinopriL 10 MG tablet Take 1 tablet (10 mg total) by mouth once daily.    metoprolol succinate (TOPROL-XL) 25 MG 24 hr tablet Take 1 tablet (25 mg total) by mouth once daily.    mirtazapine (REMERON) 7.5 MG Tab Take 1 tablet (7.5 mg total) by mouth nightly.    nitroGLYCERIN (NITROSTAT) 0.4 MG SL tablet Place 1 tablet (0.4 mg total) under the tongue every 5 (five) minutes as needed for Chest pain.    pantoprazole (PROTONIX) 40 MG tablet Take 1 tablet (40 mg total) by mouth once daily.     "semaglutide (RYBELSUS) 3 mg tablet Take 3 mg by mouth once daily.    SENNA 8.6 mg tablet Take 2 tablets by mouth once daily.    SPIRIVA RESPIMAT 2.5 mcg/actuation inhaler Inhale 1 puff into the lungs once daily.    SURE COMFORT INSULIN SYRINGE 0.3 mL 31 gauge x 5/16" Syrg Inject 1 application into the skin 2 (two) times a day.       Review of patient's allergies indicates:  No Known Allergies    Past Medical History:   Diagnosis Date    Arthritis     Carpal tunnel syndrome     COPD (chronic obstructive pulmonary disease)     Coronary artery disease involving native coronary artery of native heart 06/27/2023    CABG 2018 (No OP report available)  Most recent left heart catheterization 08/12/2019 1. Normal LV size apical akinesis and overall normal LV systolic function, ejection fraction 55% 2. Patent saphenous vein graft to the right PDA with patent jump graft to the OM branch left circumflex 3. Patent LIMA to the OM1 4. No significant mitral regurgitation     COVID 02/02/2022    Diabetic peripheral neuropathy     Diabetic ulcer of right foot associated with diabetes mellitus due to underlying condition, with bone involvement without evidence of necrosis     GERD (gastroesophageal reflux disease)     Hyperlipidemia     Hypertension     Peripheral vascular disease, unspecified     Sleep apnea     Type 2 diabetes mellitus      Past Surgical History:   Procedure Laterality Date    CORONARY ARTERY BYPASS GRAFT      CORONARY STENT PLACEMENT      DEBRIDEMENT OF LOWER EXTREMITY Right 06/27/2022    Procedure: DEBRIDEMENT, LOWER EXTREMITY;  Surgeon: Forrest Kiser MD;  Location: [facility] OR;  Service: General;  Laterality: Right;  right foot    HIP FRACTURE SURGERY Left     IRRIGATION AND DEBRIDEMENT OF LOWER EXTREMITY Right 09/08/2022    Procedure: IRRIGATION AND DEBRIDEMENT, LOWER EXTREMITY;  Surgeon: Selina Matos MD;  Location: [facility] OR;  Service: General;  Laterality: Right;    IRRIGATION " AND DEBRIDEMENT OF LOWER EXTREMITY Right 01/05/2023    Procedure: IRRIGATION AND DEBRIDEMENT, LOWER EXTREMITY;  Surgeon: Evelio Chiu DO;  Location: [facility];  Service: General;  Laterality: Right;    SHOULDER OPEN ROTATOR CUFF REPAIR Left     SPINE SURGERY      VASCULAR SURGERY       Family History       Problem Relation (Age of Onset)    Alcohol abuse Father    Arthritis Mother    COPD Paternal Grandfather    Cancer Sister    Diabetes Sister    Early death Father    Heart disease Father, Maternal Grandfather, Son    Hypertension Mother    Learning disabilities Mother          Tobacco Use    Smoking status: Every Day     Current packs/day: 2.50     Average packs/day: 2.5 packs/day for 52.0 years (130.0 ttl pk-yrs)     Types: Cigarettes     Start date: 8/10/1971    Smokeless tobacco: Never   Substance and Sexual Activity    Alcohol use: Not Currently    Drug use: Yes     Types: Oxycodone    Sexual activity: Yes     Partners: Female     Review of Systems   Constitutional: Negative.  Negative for appetite change, chills, fever and unexpected weight change.   HENT: Negative.  Negative for trouble swallowing.    Eyes: Negative.    Respiratory: Negative.  Negative for chest tightness and shortness of breath.    Cardiovascular: Negative.  Negative for chest pain.   Gastrointestinal: Negative.  Negative for abdominal distention, abdominal pain, blood in stool and nausea.   Endocrine: Negative.    Genitourinary: Negative.  Negative for hematuria.   Musculoskeletal: Negative.  Negative for back pain and myalgias.   Skin: Negative.  Negative for color change.   Allergic/Immunologic: Negative.    Neurological: Negative.  Negative for dizziness, syncope, weakness and light-headedness.   Psychiatric/Behavioral: Negative.  Negative for agitation.      Objective:     Vital Signs (Most Recent):  Temp: 98 °F (36.7 °C) (08/10/23 0533)  Pulse: 64 (08/10/23 0711)  Resp: 14 (08/10/23 0711)  BP: 124/62 (08/10/23  0533)  SpO2: 95 % (08/10/23 0533) Vital Signs (24h Range):  Temp:  [98 °F (36.7 °C)-98.6 °F (37 °C)] 98 °F (36.7 °C)  Pulse:  [62-79] 64  Resp:  [14-18] 14  SpO2:  [94 %-96 %] 95 %  BP: (124-132)/(62-73) 124/62     Weight: 72.6 kg (160 lb)  Body mass index is 24.33 kg/m².     Physical Exam  Constitutional:       General: He is not in acute distress.     Appearance: Normal appearance.   HENT:      Head: Normocephalic.   Cardiovascular:      Rate and Rhythm: Normal rate.   Pulmonary:      Effort: Pulmonary effort is normal. No respiratory distress.   Abdominal:      General: There is no distension.      Tenderness: There is no abdominal tenderness.   Musculoskeletal:         General: Normal range of motion.        Feet:    Feet:      Comments: Callus tissue with underlying necrotic muscle and purulent drainage on the right plantar surface; 1st and 2nd toe amputation previously  Skin:     General: Skin is warm.      Coloration: Skin is not jaundiced.   Neurological:      General: No focal deficit present.      Mental Status: He is alert and oriented to person, place, and time.      Cranial Nerves: No cranial nerve deficit.            I have reviewed all pertinent lab results within the past 24 hours.  CBC:   Recent Labs   Lab 08/10/23  0307   WBC 5.68   RBC 4.25*   HGB 13.2*   HCT 38.5*      MCV 90.6   MCH 31.1*   MCHC 34.3     BMP:   Recent Labs   Lab 08/09/23  2008 08/10/23  0307   *  --      --    K 4.7  --      --    CO2 31  --    BUN 28*  --    CREATININE 1.21  --    CALCIUM 10.5*  --    MG  --  2.1       Significant Diagnostics:  I have reviewed all pertinent imaging results/findings within the past 24 hours.      Assessment/Plan:     * Diabetic ulcer of right foot associated with diabetes mellitus due to underlying condition, with bone involvement without evidence of necrosis  Tissue necrosis on the plantar surface of the right foot with necrotic muscle present.      To the OR for  debridement of right foot.      Risks and benefits explained with the patient including risks for infection, bleeding, injury to surrounding structures, hematoma/seroma formation with need for possible evacuation, possible open.  The patient verbalized understanding, agrees and wishes to proceed with surgery.      VTE Risk Mitigation (From admission, onward)         Ordered     Reason for No Pharmacological VTE Prophylaxis  Once        Question:  Reasons:  Answer:  Physician Provided (leave comment)  Comment:  surgery    08/10/23 0226     IP VTE LOW RISK PATIENT  Once         08/10/23 0226     Place sequential compression device  Until discontinued         08/10/23 0226                Thank you for your consult. I will follow-up with patient. Please contact us if you have any additional questions.    Evelio Razo, DO  General Surgery  [facility] - Emergency Department

## 2023-08-10 NOTE — SUBJECTIVE & OBJECTIVE
Past Medical History:   Diagnosis Date    Arthritis     Carpal tunnel syndrome     COPD (chronic obstructive pulmonary disease)     Coronary artery disease involving native coronary artery of native heart 06/27/2023    CABG 2018 (No OP report available)  Most recent left heart catheterization 08/12/2019 1. Normal LV size apical akinesis and overall normal LV systolic function, ejection fraction 55% 2. Patent saphenous vein graft to the right PDA with patent jump graft to the OM branch left circumflex 3. Patent LIMA to the OM1 4. No significant mitral regurgitation     COVID 02/02/2022    Diabetic peripheral neuropathy     Diabetic ulcer of right foot associated with diabetes mellitus due to underlying condition, with bone involvement without evidence of necrosis     GERD (gastroesophageal reflux disease)     Hyperlipidemia     Hypertension     Peripheral vascular disease, unspecified     Sleep apnea     Type 2 diabetes mellitus        Past Surgical History:   Procedure Laterality Date    CORONARY ARTERY BYPASS GRAFT      CORONARY STENT PLACEMENT      DEBRIDEMENT OF LOWER EXTREMITY Right 06/27/2022    Procedure: DEBRIDEMENT, LOWER EXTREMITY;  Surgeon: Forrest Kiser MD;  Location: [facility];  Service: General;  Laterality: Right;  right foot    HIP FRACTURE SURGERY Left     IRRIGATION AND DEBRIDEMENT OF LOWER EXTREMITY Right 09/08/2022    Procedure: IRRIGATION AND DEBRIDEMENT, LOWER EXTREMITY;  Surgeon: Selina Matos MD;  Location: [facility] OR;  Service: General;  Laterality: Right;    IRRIGATION AND DEBRIDEMENT OF LOWER EXTREMITY Right 01/05/2023    Procedure: IRRIGATION AND DEBRIDEMENT, LOWER EXTREMITY;  Surgeon: Evleio Chiu DO;  Location: [facility];  Service: General;  Laterality: Right;    SHOULDER OPEN ROTATOR CUFF REPAIR Left     SPINE SURGERY      VASCULAR SURGERY         Review of patient's allergies indicates:  No Known Allergies    No current facility-administered medications on file prior  to encounter.     Current Outpatient Medications on File Prior to Encounter   Medication Sig    albuterol (PROVENTIL) 2.5 mg /3 mL (0.083 %) nebulizer solution USE 1 VIAL IN NEBULIZER 3 TIMES DAILY    albuterol (PROVENTIL/VENTOLIN HFA) 90 mcg/actuation inhaler Inhale 2 puffs into the lungs 2 (two) times daily as needed for Wheezing.    amLODIPine (NORVASC) 10 MG tablet Take 1 tablet (10 mg total) by mouth once daily.    aspirin 81 MG Chew Take 81 mg by mouth once daily.    atorvastatin (LIPITOR) 80 MG tablet Take 1 tablet (80 mg total) by mouth every evening.    clindamycin (CLEOCIN) 150 MG capsule Take 2 capsules (300 mg total) by mouth 4 (four) times daily. for 7 days    clopidogreL (PLAVIX) 75 mg tablet Take 1 tablet (75 mg total) by mouth once daily.    diclofenac sodium (VOLTAREN) 1 % Gel Apply 1 g topically 4 (four) times daily as needed (pain).    empagliflozin (JARDIANCE) 10 mg tablet Take 1 tablet (10 mg total) by mouth once daily.    EScitalopram oxalate (LEXAPRO) 10 MG tablet Take 1 tablet (10 mg total) by mouth once daily.    gabapentin (NEURONTIN) 300 MG capsule Take 3 capsules (900 mg total) by mouth every 6 (six) hours.    HYDROcodone-acetaminophen (NORCO)  mg per tablet Take 1 tablet by mouth every 6 (six) hours as needed for Pain (pain).    [START ON 8/19/2023] HYDROcodone-acetaminophen (NORCO)  mg per tablet Take 1 tablet by mouth every 6 (six) hours as needed for Pain (pain).    hydrOXYzine pamoate (VISTARIL) 25 MG Cap Take 1 capsule (25 mg total) by mouth once daily. Take nightly for sleep    insulin detemir U-100 (LEVEMIR FLEXTOUCH U-100 INSULN) 100 unit/mL (3 mL) InPn pen Inject 10 units subcutaneously every morning, inject 20 units subcutaneously every evening.    LIDOcaine (LIDODERM) 5 % 1 patch once daily.    lisinopriL 10 MG tablet Take 1 tablet (10 mg total) by mouth once daily.    metoprolol succinate (TOPROL-XL) 25 MG 24 hr tablet Take 1 tablet (25 mg total) by mouth once  "daily.    mirtazapine (REMERON) 7.5 MG Tab Take 1 tablet (7.5 mg total) by mouth nightly.    nitroGLYCERIN (NITROSTAT) 0.4 MG SL tablet Place 1 tablet (0.4 mg total) under the tongue every 5 (five) minutes as needed for Chest pain.    pantoprazole (PROTONIX) 40 MG tablet Take 1 tablet (40 mg total) by mouth once daily.    semaglutide (RYBELSUS) 3 mg tablet Take 3 mg by mouth once daily.    SENNA 8.6 mg tablet Take 2 tablets by mouth once daily.    SPIRIVA RESPIMAT 2.5 mcg/actuation inhaler Inhale 1 puff into the lungs once daily.    SURE COMFORT INSULIN SYRINGE 0.3 mL 31 gauge x 5/16" Syrg Inject 1 application into the skin 2 (two) times a day.     Family History       Problem Relation (Age of Onset)    Alcohol abuse Father    Arthritis Mother    COPD Paternal Grandfather    Cancer Sister    Diabetes Sister    Early death Father    Heart disease Father, Maternal Grandfather, Son    Hypertension Mother    Learning disabilities Mother          Tobacco Use    Smoking status: Every Day     Current packs/day: 2.50     Average packs/day: 2.5 packs/day for 52.0 years (130.0 ttl pk-yrs)     Types: Cigarettes     Start date: 8/10/1971    Smokeless tobacco: Never   Substance and Sexual Activity    Alcohol use: Not Currently    Drug use: Yes     Types: Oxycodone    Sexual activity: Yes     Partners: Female     Review of Systems   Constitutional:  Negative for activity change, chills, fatigue and unexpected weight change.   HENT:  Negative for congestion.    Respiratory:  Negative for chest tightness, shortness of breath, wheezing and stridor.    Cardiovascular:  Negative for chest pain, palpitations and leg swelling.   Gastrointestinal:  Negative for abdominal distention, abdominal pain, anal bleeding, blood in stool, constipation, diarrhea, nausea, rectal pain and vomiting.   Genitourinary:  Negative for difficulty urinating, dysuria, enuresis and flank pain.   Musculoskeletal:  Positive for arthralgias. Negative for back " pain and neck stiffness.   Skin:  Negative for color change and pallor.   Neurological:  Negative for dizziness, tremors, seizures, syncope, speech difficulty, weakness, numbness and headaches.   Hematological:  Negative for adenopathy.   Psychiatric/Behavioral:  Negative for agitation.      Objective:     Vital Signs (Most Recent):  Temp: 98.6 °F (37 °C) (08/09/23 1808)  Pulse: 62 (08/10/23 0128)  Resp: 18 (08/09/23 2156)  BP: 132/64 (08/10/23 0128)  SpO2: (!) 94 % (08/10/23 0128) Vital Signs (24h Range):  Temp:  [98.6 °F (37 °C)] 98.6 °F (37 °C)  Pulse:  [62-79] 62  Resp:  [18] 18  SpO2:  [94 %-96 %] 94 %  BP: (129-132)/(64-73) 132/64     Weight: 72.6 kg (160 lb)  Body mass index is 24.33 kg/m².     Physical Exam  Vitals and nursing note reviewed.   Constitutional:       General: He is not in acute distress.     Appearance: Normal appearance. He is normal weight. He is not ill-appearing or toxic-appearing.   HENT:      Head: Normocephalic and atraumatic.      Right Ear: External ear normal.      Left Ear: External ear normal.      Nose: Nose normal. No congestion or rhinorrhea.      Mouth/Throat:      Mouth: Mucous membranes are moist.   Eyes:      Conjunctiva/sclera: Conjunctivae normal.   Cardiovascular:      Rate and Rhythm: Normal rate and regular rhythm.      Pulses: Normal pulses.      Heart sounds: Normal heart sounds. No murmur heard.     No friction rub.   Pulmonary:      Effort: Pulmonary effort is normal. No respiratory distress.      Breath sounds: Wheezing present. No rales.   Abdominal:      General: Bowel sounds are normal. There is no distension.      Palpations: Abdomen is soft.      Tenderness: There is no abdominal tenderness. There is no guarding.   Musculoskeletal:         General: Tenderness, deformity and signs of injury present. Normal range of motion.      Cervical back: Neck supple. No tenderness.      Right lower leg: No edema.      Left lower leg: No edema.        Feet:    Skin:      General: Skin is warm.      Coloration: Skin is not jaundiced.      Findings: Lesion present. No bruising.   Neurological:      Mental Status: He is alert and oriented to person, place, and time.   Psychiatric:         Behavior: Behavior normal.                Significant Labs: All pertinent labs within the past 24 hours have been reviewed.    Significant Imaging: I have reviewed all pertinent imaging results/findings within the past 24 hours.

## 2023-08-10 NOTE — HPI
63 year old male presented to the ED with a wound. Patient has DM2 and has been having a chronic wound on the sole of the food at the base of the toes. The wound started draining purulent fluid and having a foul smell for the last a few weeks. He is also experiencing pain in the feet that is 8/10 and radiates up to the knee. He saw his PCP around a week ago who prescribed him some Clindamycin. HE reports he has been taking the clindamycin for almost 7 days with not much improvement. He sees Dr. Chiu outpatient regularly with last visit being 3 months ago and his next appointment is august 15th but patient did not feel safe and comfortable to wait until his appointment. Patient denies fever, chills, sweating, dizziness, chest pain, SOB.     Patient's A1c was 8.8 in mid may. He takes 10 units of long acting insulin in the morning and 20 units at night. Patient has CAD s/p triple bypass about a year ago at [facility] and 4-5 stents previously. Patient of Dr best and has not seen him for a year. He takes aspirin and plavix. Patient reports all meds were taken lastly around 2 days ago including plavix. Has COPD with around 130 pack year smoking history and is on home oxygen PRN. SAMEERA but does not wear CPAP. HTN and HLD.     ED course:  CBC did not show increased WBC  CMP showed normal Cr.  Xray of the foot:  Findings suspicious for sequela of osteomyelitis in joint affection.

## 2023-08-10 NOTE — ASSESSMENT & PLAN NOTE
Revised cardiac risk index was 2 points- class 3 risk    S/p bypass  Last plavix was taken 2 days ago  Will hold plavix at this point for possible surgery tomorrow  Continue aspirin  Will hold lisinopril for surgery tomorrow  Continue Toprol XL  Continue Lipitor

## 2023-08-10 NOTE — ASSESSMENT & PLAN NOTE
"No WBC increase, no systemic infectious symptoms but the wound has drainage and smells foul  Xray of the foot done and is available on the chart  Starting patient on Vanc and zosyn  Previously wound cultures grew MRSA and diabetic patient to cover pseudomonas  Wound culture pending  Blood culture pending  Wound care consulted  Surgery consulted  Pre Op labs and imaging ordered  NPO      Patient's FSGs are uncontrolled due to hyperglycemia on current medication regimen.  Last A1c reviewed-   Lab Results   Component Value Date    HGBA1C 8.8 (H) 05/17/2023     Most recent fingerstick glucose reviewed- No results for input(s): "POCTGLUCOSE" in the last 24 hours.  Current correctional scale  Medium  Maintain anti-hyperglycemic dose as follows-   Antihyperglycemics (From admission, onward)    Start     Stop Route Frequency Ordered    08/10/23 0325  insulin aspart U-100 injection 1-10 Units         -- SubQ Before meals & nightly PRN 08/10/23 0226        Hold Oral hypoglycemics while patient is in the hospital.  "

## 2023-08-10 NOTE — ED PROVIDER NOTES
Encounter Date: 8/9/2023    SCRIBE #1 NOTE: I, Cherri Moreno, am scribing for, and in the presence of,  Brent Clark MD. I have scribed the entire note.       History     Chief Complaint   Patient presents with    Wound Infection     63 y.o. male presents to the ED with complaints of a wound infection in his right foot. Spouse stated the infection started about 3 weeks ago. Patient tried to get in with Dr. Chiu but stated they were too full. No other symptoms were reported.     The history is provided by the patient and the spouse. No  was used.     Review of patient's allergies indicates:  No Known Allergies  Past Medical History:   Diagnosis Date    Arthritis     Carpal tunnel syndrome     COPD (chronic obstructive pulmonary disease)     Coronary artery disease involving native coronary artery of native heart 06/27/2023    CABG 2018 (No OP report available)  Most recent left heart catheterization 08/12/2019 1. Normal LV size apical akinesis and overall normal LV systolic function, ejection fraction 55% 2. Patent saphenous vein graft to the right PDA with patent jump graft to the OM branch left circumflex 3. Patent LIMA to the OM1 4. No significant mitral regurgitation     COVID 02/02/2022    Diabetic peripheral neuropathy     Diabetic ulcer of right foot associated with diabetes mellitus due to underlying condition, with bone involvement without evidence of necrosis     GERD (gastroesophageal reflux disease)     Hyperlipidemia     Hypertension     Peripheral vascular disease, unspecified     Sleep apnea     Type 2 diabetes mellitus      Past Surgical History:   Procedure Laterality Date    CORONARY ARTERY BYPASS GRAFT      CORONARY STENT PLACEMENT      DEBRIDEMENT OF FOOT Right 08/10/2023    Dr. Chiu    DEBRIDEMENT OF FOOT Right 8/10/2023    Procedure: DEBRIDEMENT, FOOT;  Surgeon: Evelio Chiu DO;  Location: [facility] OR;  Service: General;  Laterality: Right;     DEBRIDEMENT OF LOWER EXTREMITY Right 06/27/2022    Procedure: DEBRIDEMENT, LOWER EXTREMITY;  Surgeon: Forrest Kiser MD;  Location: [facility] OR;  Service: General;  Laterality: Right;  right foot    HIP FRACTURE SURGERY Left     IRRIGATION AND DEBRIDEMENT OF LOWER EXTREMITY Right 09/08/2022    Procedure: IRRIGATION AND DEBRIDEMENT, LOWER EXTREMITY;  Surgeon: Selina Matos MD;  Location: [facility] OR;  Service: General;  Laterality: Right;    IRRIGATION AND DEBRIDEMENT OF LOWER EXTREMITY Right 01/05/2023    Procedure: IRRIGATION AND DEBRIDEMENT, LOWER EXTREMITY;  Surgeon: Evelio Chiu DO;  Location: [facility] OR;  Service: General;  Laterality: Right;    SHOULDER OPEN ROTATOR CUFF REPAIR Left     SPINE SURGERY      VASCULAR SURGERY       Family History   Problem Relation Age of Onset    Arthritis Mother     Hypertension Mother     Learning disabilities Mother     Alcohol abuse Father     Early death Father     Heart disease Father     Cancer Sister     Diabetes Sister     Heart disease Maternal Grandfather     COPD Paternal Grandfather     Heart disease Son      Social History     Tobacco Use    Smoking status: Every Day     Current packs/day: 2.50     Average packs/day: 2.5 packs/day for 52.0 years (130.1 ttl pk-yrs)     Types: Cigarettes     Start date: 8/10/1971     Passive exposure: Current    Smokeless tobacco: Never   Substance Use Topics    Alcohol use: Not Currently    Drug use: Yes     Types: Oxycodone     Review of Systems   Constitutional: Negative.    HENT: Negative.     Eyes: Negative.    Respiratory: Negative.     Cardiovascular: Negative.    Gastrointestinal: Negative.    Endocrine: Negative.    Genitourinary: Negative.    Musculoskeletal: Negative.    Skin:  Positive for wound (right foot).   Allergic/Immunologic: Negative.    Neurological: Negative.    Hematological: Negative.    Psychiatric/Behavioral: Negative.     All other systems reviewed and are negative.      Physical Exam     Initial  Vitals [08/09/23 1808]   BP Pulse Resp Temp SpO2   129/73 79 18 98.6 °F (37 °C) 96 %      MAP       --         Physical Exam    Nursing note and vitals reviewed.  Constitutional: He appears well-developed and well-nourished.   Neck:   Normal range of motion.  Cardiovascular:  Normal rate, regular rhythm and normal heart sounds.           Pulmonary/Chest: Breath sounds normal.   Abdominal: Abdomen is soft.   Musculoskeletal:         General: Normal range of motion.      Cervical back: Normal range of motion.      Comments: Eschar and callus buildup over right distal foot with previous amputations to great toe and second toe.      Neurological: He is alert and oriented to person, place, and time.   Skin: Skin is warm.         ED Course   Procedures  Labs Reviewed   COMPREHENSIVE METABOLIC PANEL - Abnormal; Notable for the following components:       Result Value    Glucose 219 (*)     BUN 28 (*)     BUN/Creatinine Ratio 23 (*)     Calcium 10.5 (*)     Total Protein 8.4 (*)     Globulin 4.8 (*)     AST 10 (*)     All other components within normal limits   CBC WITH DIFFERENTIAL - Abnormal; Notable for the following components:    RBC 4.40 (*)     Hematocrit 39.6 (*)     MCH 31.4 (*)     Neutrophils % 52.4 (*)     Monocytes % 7.2 (*)     Immature Granulocytes % 0.5 (*)     All other components within normal limits   C-REACTIVE PROTEIN - Abnormal; Notable for the following components:    CRP 0.89 (*)     All other components within normal limits   HEMOGLOBIN A1C - Abnormal; Notable for the following components:    Hemoglobin A1C 9.7 (*)     All other components within normal limits   NT-PRO NATRIURETIC PEPTIDE - Abnormal; Notable for the following components:    ProBNP 178 (*)     All other components within normal limits   CBC WITH DIFFERENTIAL - Abnormal; Notable for the following components:    RBC 4.25 (*)     Hemoglobin 13.2 (*)     Hematocrit 38.5 (*)     MCH 31.1 (*)     MPV 9.3 (*)     Neutrophils % 41.2 (*)      Lymphocytes % 45.4 (*)     Monocytes % 7.7 (*)     Eosinophils % 4.6 (*)     All other components within normal limits   POCT GLUCOSE MONITORING CONTINUOUS - Abnormal; Notable for the following components:    POC Glucose 157 (*)     All other components within normal limits   SARS-COV-2 RNA AMPLIFICATION, QUAL - Normal    Narrative:     Negative SARS-CoV results should not be used as the sole basis for treatment or patient management decisions; negative results should be considered in the context of a patient's recent exposures, history and the presene of clinical signs and symptoms consistent with COVID-19.  Negative results should be treated as presumptive and confirmed by molecular assay, if necessary for patient management.   PROTIME-INR - Normal   LACTIC ACID, PLASMA - Normal   MAGNESIUM - Normal   CBC W/ AUTO DIFFERENTIAL    Narrative:     The following orders were created for panel order CBC Auto Differential.  Procedure                               Abnormality         Status                     ---------                               -----------         ------                     CBC with Differential[[id number]]        Abnormal            Final result                 Please view results for these tests on the individual orders.   CBC W/ AUTO DIFFERENTIAL    Narrative:     The following orders were created for panel order CBC Auto Differential.  Procedure                               Abnormality         Status                     ---------                               -----------         ------                     CBC with Differential[[id number]]        Abnormal            Final result                 Please view results for these tests on the individual orders.   EXTRA TUBES    Narrative:     The following orders were created for panel order EXTRA TUBES.  Procedure                               Abnormality         Status                     ---------                               -----------          ------                     Light Blue Top Hold[[id number]]                              In process                 Gold Top Hold[[id number]]                                    In process                   Please view results for these tests on the individual orders.   LIGHT BLUE TOP HOLD   GOLD TOP HOLD   EXTRA TUBES    Narrative:     The following orders were created for panel order EXTRA TUBES.  Procedure                               Abnormality         Status                     ---------                               -----------         ------                     Lavender Top Hold[[id number]]                                In process                   Please view results for these tests on the individual orders.   LAVENDER TOP HOLD   TYPE & SCREEN          Imaging Results              X-Ray Chest AP Portable (Final result)  Result time 08/10/23 07:48:48      Final result by Francisco Felton II, MD (08/10/23 07:48:48)                   Impression:      Findings suggest mild cardiac decompensation and / or pneumonitis.      Electronically signed by: Francisco Felton  Date:    08/10/2023  Time:    07:48               Narrative:    EXAMINATION:  XR CHEST AP PORTABLE    CLINICAL HISTORY:  pre op;    COMPARISON:  5 January 2023    TECHNIQUE:  XR CHEST AP PORTABLE    FINDINGS:  The heart and mediastinum are stable in size and configuration.  The pulmonary vascularity is slightly increased with bilateral increased interstitial lung density.  No other lung infiltrates, effusions, pneumothorax or other abnormality is demonstrated.                                       X-Ray Foot Complete Right (Final result)  Result time 08/09/23 20:03:18      Final result by Spencer Christensen DO (08/09/23 20:03:18)                   Impression:      As above.    Point of Service: [facility]      Electronically signed by: Spencer Christensen  Date:    08/09/2023  Time:    20:03               Narrative:    EXAMINATION:  XR FOOT  COMPLETE 3 VIEW RIGHT    CLINICAL HISTORY:  Unspecified infectious disease    COMPARISON:  Right foot x-ray August 3, 2023    TECHNIQUE:  Frontal, lateral, and oblique views of the right foot.    FINDINGS:  Irregularity and heterotopic ossification about the distal 1st metatarsal appears similar to prior.  Widening of the 2nd MTP joint with subtle erosive change of the 2nd metatarsal head appears similar to prior.  Findings suspicious for sequela of osteomyelitis in joint affection.  Similar subtle erosive change about the distal 3rd metatarsal head.  Prior amputation of the 1st digit to the distal metatarsal level.  Prior amputation of the 3rd digit to the metatarsal head level.  Chronic deformity of the distal 5th metatarsal.  Soft tissue swelling.  Overall similar appearance to comparison.                                       Medications   morphine injection 4 mg (4 mg Intravenous Given 8/9/23 2156)   ondansetron injection 4 mg (4 mg Intravenous Given 8/9/23 2156)   pantoprazole injection 40 mg (40 mg Intravenous Given 8/10/23 0403)     Medical Decision Making:   Initial Assessment:   Patient comes in with right foot pain.  He has large callus and possibly needs debridement.  He is tried to get into Dr. Best  his office but unable to== comes in with right foot pain  Differential Diagnosis:   Patient with diabetes in right foot pain with eschar and ulcerations to the right foot==  ED Management:  Will admit to Dr. Yo            Attending Attestation:           Physician Attestation for Scribe:  Physician Attestation Statement for Scribe #1: I, Brent Clark MD, reviewed documentation, as scribed by Cherri Moreno in my presence, and it is both accurate and complete.                          Clinical Impression:   Final diagnoses:  [B99.9] Infection  [E11.621, L97.509] Diabetic foot ulcer  [G47.33] Obstructive sleep apnea syndrome [G47.33] (Primary)  [F51.05, F41.8] Insomnia secondary to  depression with anxiety [F51.05, F41.8]  [I10] Primary hypertension [I10]  [J43.9] Pulmonary emphysema, unspecified emphysema type [J43.9]  [E08.621, L97.416] Diabetic ulcer of right midfoot associated with diabetes mellitus due to underlying condition, with bone involvement without evidence of necrosis [E08.621, L97.416]  [I25.10] Coronary artery disease involving native coronary artery of native heart, unspecified whether angina present [I25.10]        ED Disposition Condition    Admit                 Brent Clark,   08/25/23 1412

## 2023-08-10 NOTE — SUBJECTIVE & OBJECTIVE
No current facility-administered medications on file prior to encounter.     Current Outpatient Medications on File Prior to Encounter   Medication Sig    albuterol (PROVENTIL) 2.5 mg /3 mL (0.083 %) nebulizer solution USE 1 VIAL IN NEBULIZER 3 TIMES DAILY    albuterol (PROVENTIL/VENTOLIN HFA) 90 mcg/actuation inhaler Inhale 2 puffs into the lungs 2 (two) times daily as needed for Wheezing.    amLODIPine (NORVASC) 10 MG tablet Take 1 tablet (10 mg total) by mouth once daily.    aspirin 81 MG Chew Take 81 mg by mouth once daily.    atorvastatin (LIPITOR) 80 MG tablet Take 1 tablet (80 mg total) by mouth every evening.    clindamycin (CLEOCIN) 150 MG capsule Take 2 capsules (300 mg total) by mouth 4 (four) times daily. for 7 days    clopidogreL (PLAVIX) 75 mg tablet Take 1 tablet (75 mg total) by mouth once daily.    diclofenac sodium (VOLTAREN) 1 % Gel Apply 1 g topically 4 (four) times daily as needed (pain).    empagliflozin (JARDIANCE) 10 mg tablet Take 1 tablet (10 mg total) by mouth once daily.    EScitalopram oxalate (LEXAPRO) 10 MG tablet Take 1 tablet (10 mg total) by mouth once daily.    gabapentin (NEURONTIN) 300 MG capsule Take 3 capsules (900 mg total) by mouth every 6 (six) hours.    HYDROcodone-acetaminophen (NORCO)  mg per tablet Take 1 tablet by mouth every 6 (six) hours as needed for Pain (pain).    [START ON 8/19/2023] HYDROcodone-acetaminophen (NORCO)  mg per tablet Take 1 tablet by mouth every 6 (six) hours as needed for Pain (pain).    hydrOXYzine pamoate (VISTARIL) 25 MG Cap Take 1 capsule (25 mg total) by mouth once daily. Take nightly for sleep    insulin detemir U-100 (LEVEMIR FLEXTOUCH U-100 INSULN) 100 unit/mL (3 mL) InPn pen Inject 10 units subcutaneously every morning, inject 20 units subcutaneously every evening.    LIDOcaine (LIDODERM) 5 % 1 patch once daily.    lisinopriL 10 MG tablet Take 1 tablet (10 mg total) by mouth once daily.    metoprolol succinate (TOPROL-XL) 25 MG  "24 hr tablet Take 1 tablet (25 mg total) by mouth once daily.    mirtazapine (REMERON) 7.5 MG Tab Take 1 tablet (7.5 mg total) by mouth nightly.    nitroGLYCERIN (NITROSTAT) 0.4 MG SL tablet Place 1 tablet (0.4 mg total) under the tongue every 5 (five) minutes as needed for Chest pain.    pantoprazole (PROTONIX) 40 MG tablet Take 1 tablet (40 mg total) by mouth once daily.    semaglutide (RYBELSUS) 3 mg tablet Take 3 mg by mouth once daily.    SENNA 8.6 mg tablet Take 2 tablets by mouth once daily.    SPIRIVA RESPIMAT 2.5 mcg/actuation inhaler Inhale 1 puff into the lungs once daily.    SURE COMFORT INSULIN SYRINGE 0.3 mL 31 gauge x 5/16" Syrg Inject 1 application into the skin 2 (two) times a day.       Review of patient's allergies indicates:  No Known Allergies    Past Medical History:   Diagnosis Date    Arthritis     Carpal tunnel syndrome     COPD (chronic obstructive pulmonary disease)     Coronary artery disease involving native coronary artery of native heart 06/27/2023    CABG 2018 (No OP report available)  Most recent left heart catheterization 08/12/2019 1. Normal LV size apical akinesis and overall normal LV systolic function, ejection fraction 55% 2. Patent saphenous vein graft to the right PDA with patent jump graft to the OM branch left circumflex 3. Patent LIMA to the OM1 4. No significant mitral regurgitation     COVID 02/02/2022    Diabetic peripheral neuropathy     Diabetic ulcer of right foot associated with diabetes mellitus due to underlying condition, with bone involvement without evidence of necrosis     GERD (gastroesophageal reflux disease)     Hyperlipidemia     Hypertension     Peripheral vascular disease, unspecified     Sleep apnea     Type 2 diabetes mellitus      Past Surgical History:   Procedure Laterality Date    CORONARY ARTERY BYPASS GRAFT      CORONARY STENT PLACEMENT      DEBRIDEMENT OF LOWER EXTREMITY Right 06/27/2022    Procedure: DEBRIDEMENT, LOWER EXTREMITY;  Surgeon: " Forrest Kiser MD;  Location: [facility] OR;  Service: General;  Laterality: Right;  right foot    HIP FRACTURE SURGERY Left     IRRIGATION AND DEBRIDEMENT OF LOWER EXTREMITY Right 09/08/2022    Procedure: IRRIGATION AND DEBRIDEMENT, LOWER EXTREMITY;  Surgeon: Selina Matos MD;  Location: [facility] OR;  Service: General;  Laterality: Right;    IRRIGATION AND DEBRIDEMENT OF LOWER EXTREMITY Right 01/05/2023    Procedure: IRRIGATION AND DEBRIDEMENT, LOWER EXTREMITY;  Surgeon: Evelio Chiu DO;  Location: [facility] OR;  Service: General;  Laterality: Right;    SHOULDER OPEN ROTATOR CUFF REPAIR Left     SPINE SURGERY      VASCULAR SURGERY       Family History       Problem Relation (Age of Onset)    Alcohol abuse Father    Arthritis Mother    COPD Paternal Grandfather    Cancer Sister    Diabetes Sister    Early death Father    Heart disease Father, Maternal Grandfather, Son    Hypertension Mother    Learning disabilities Mother          Tobacco Use    Smoking status: Every Day     Current packs/day: 2.50     Average packs/day: 2.5 packs/day for 52.0 years (130.0 ttl pk-yrs)     Types: Cigarettes     Start date: 8/10/1971    Smokeless tobacco: Never   Substance and Sexual Activity    Alcohol use: Not Currently    Drug use: Yes     Types: Oxycodone    Sexual activity: Yes     Partners: Female     Review of Systems   Constitutional: Negative.  Negative for appetite change, chills, fever and unexpected weight change.   HENT: Negative.  Negative for trouble swallowing.    Eyes: Negative.    Respiratory: Negative.  Negative for chest tightness and shortness of breath.    Cardiovascular: Negative.  Negative for chest pain.   Gastrointestinal: Negative.  Negative for abdominal distention, abdominal pain, blood in stool and nausea.   Endocrine: Negative.    Genitourinary: Negative.  Negative for hematuria.   Musculoskeletal: Negative.  Negative for back pain and myalgias.   Skin: Negative.  Negative for color change.    Allergic/Immunologic: Negative.    Neurological: Negative.  Negative for dizziness, syncope, weakness and light-headedness.   Psychiatric/Behavioral: Negative.  Negative for agitation.      Objective:     Vital Signs (Most Recent):  Temp: 98 °F (36.7 °C) (08/10/23 0533)  Pulse: 64 (08/10/23 0711)  Resp: 14 (08/10/23 0711)  BP: 124/62 (08/10/23 0533)  SpO2: 95 % (08/10/23 0533) Vital Signs (24h Range):  Temp:  [98 °F (36.7 °C)-98.6 °F (37 °C)] 98 °F (36.7 °C)  Pulse:  [62-79] 64  Resp:  [14-18] 14  SpO2:  [94 %-96 %] 95 %  BP: (124-132)/(62-73) 124/62     Weight: 72.6 kg (160 lb)  Body mass index is 24.33 kg/m².     Physical Exam  Constitutional:       General: He is not in acute distress.     Appearance: Normal appearance.   HENT:      Head: Normocephalic.   Cardiovascular:      Rate and Rhythm: Normal rate.   Pulmonary:      Effort: Pulmonary effort is normal. No respiratory distress.   Abdominal:      General: There is no distension.      Tenderness: There is no abdominal tenderness.   Musculoskeletal:         General: Normal range of motion.        Feet:    Feet:      Comments: Callus tissue with underlying necrotic muscle and purulent drainage on the right plantar surface; 1st and 2nd toe amputation previously  Skin:     General: Skin is warm.      Coloration: Skin is not jaundiced.   Neurological:      General: No focal deficit present.      Mental Status: He is alert and oriented to person, place, and time.      Cranial Nerves: No cranial nerve deficit.            I have reviewed all pertinent lab results within the past 24 hours.  CBC:   Recent Labs   Lab 08/10/23  0307   WBC 5.68   RBC 4.25*   HGB 13.2*   HCT 38.5*      MCV 90.6   MCH 31.1*   MCHC 34.3     BMP:   Recent Labs   Lab 08/09/23  2008 08/10/23  0307   *  --      --    K 4.7  --      --    CO2 31  --    BUN 28*  --    CREATININE 1.21  --    CALCIUM 10.5*  --    MG  --  2.1       Significant Diagnostics:  I have reviewed  all pertinent imaging results/findings within the past 24 hours.

## 2023-08-10 NOTE — ASSESSMENT & PLAN NOTE
duoneb and oxygen as needed  Patient doesn't have a pulmonologist  He could benefit from one upon discharge  Xray of chest pending

## 2023-08-10 NOTE — ASSESSMENT & PLAN NOTE
Tissue necrosis on the plantar surface of the right foot with necrotic muscle present.      To the OR for debridement of right foot.      Risks and benefits explained with the patient including risks for infection, bleeding, injury to surrounding structures, hematoma/seroma formation with need for possible evacuation, possible open.  The patient verbalized understanding, agrees and wishes to proceed with surgery.

## 2023-08-10 NOTE — PROGRESS NOTES
Pharmacy consulted to assist with the management of vancomycin in this patient to treat: diabetic foot ulcer    Patient weight: 72.6 kg  Patient CrCl: ~60 ml/min    Doses given prior to consult: none    Will begin vancomycin: 1500mg every 24 hours    Vancomycin level ordered.     Pharmacy will continue to monitor and make adjustments as needed.      Thank you for the consult,    Ayanna Christopher, PharmD  [phone]

## 2023-08-10 NOTE — OR NURSING
1445-Pt rec'd to RR sedated, unresponsive to stimulation, oral airway present, on room air, NADN, SaO2-95%, resp even et unlabored, IV fluids infusing, dressing to right foot C/D/I, will con't to monitor, safety measures in effect.    1450-Blood sugar check was 131.    1455-ETCO2 applied.    1500-Pt awake but drowsy, oral airway removed, SaO2-94%, NADN, denies any c/o pain, wife called and given update on pt status.    1515-Pt slightly drowsy, easily aroused, SaO2-97% on room air, dressing remains C/D/I to right foot, orders to transfer to room 422.    1525-Pt care released to Sena(RN) pt awake and alert, vss temp 97.4 oral, hr-64, resp-14, SaO2-95% on room air, b/p 118/60.

## 2023-08-11 VITALS
HEIGHT: 68 IN | RESPIRATION RATE: 16 BRPM | DIASTOLIC BLOOD PRESSURE: 54 MMHG | HEART RATE: 58 BPM | TEMPERATURE: 98 F | BODY MASS INDEX: 24.25 KG/M2 | OXYGEN SATURATION: 94 % | WEIGHT: 160 LBS | SYSTOLIC BLOOD PRESSURE: 121 MMHG

## 2023-08-11 LAB
ALBUMIN SERPL BCP-MCNC: 2.9 G/DL (ref 3.5–5)
ALBUMIN/GLOB SERPL: 0.8 {RATIO}
ALP SERPL-CCNC: 80 U/L (ref 45–115)
ALT SERPL W P-5'-P-CCNC: 17 U/L (ref 16–61)
ANION GAP SERPL CALCULATED.3IONS-SCNC: 9 MMOL/L (ref 7–16)
AST SERPL W P-5'-P-CCNC: 14 U/L (ref 15–37)
BASOPHILS # BLD AUTO: 0.03 K/UL (ref 0–0.2)
BASOPHILS NFR BLD AUTO: 0.6 % (ref 0–1)
BILIRUB SERPL-MCNC: 0.2 MG/DL (ref ?–1.2)
BUN SERPL-MCNC: 20 MG/DL (ref 7–18)
BUN/CREAT SERPL: 19 (ref 6–20)
CALCIUM SERPL-MCNC: 8.7 MG/DL (ref 8.5–10.1)
CHLORIDE SERPL-SCNC: 108 MMOL/L (ref 98–107)
CO2 SERPL-SCNC: 27 MMOL/L (ref 21–32)
CREAT SERPL-MCNC: 1.05 MG/DL (ref 0.7–1.3)
DIFFERENTIAL METHOD BLD: ABNORMAL
EGFR (NO RACE VARIABLE) (RUSH/TITUS): 79 ML/MIN/1.73M2
EOSINOPHIL # BLD AUTO: 0.25 K/UL (ref 0–0.5)
EOSINOPHIL NFR BLD AUTO: 4.7 % (ref 1–4)
ERYTHROCYTE [DISTWIDTH] IN BLOOD BY AUTOMATED COUNT: 12.1 % (ref 11.5–14.5)
ESTROGEN SERPL-MCNC: NORMAL PG/ML
GLOBULIN SER-MCNC: 3.8 G/DL (ref 2–4)
GLUCOSE SERPL-MCNC: 196 MG/DL (ref 70–105)
GLUCOSE SERPL-MCNC: 207 MG/DL (ref 74–106)
GLUCOSE SERPL-MCNC: 210 MG/DL (ref 70–105)
GLUCOSE SERPL-MCNC: 266 MG/DL (ref 70–105)
HCT VFR BLD AUTO: 34.1 % (ref 40–54)
HGB BLD-MCNC: 11.7 G/DL (ref 13.5–18)
IMM GRANULOCYTES # BLD AUTO: 0.02 K/UL (ref 0–0.04)
IMM GRANULOCYTES NFR BLD: 0.4 % (ref 0–0.4)
INSULIN SERPL-ACNC: NORMAL U[IU]/ML
LAB AP GROSS DESCRIPTION: NORMAL
LAB AP LABORATORY NOTES: NORMAL
LYMPHOCYTES # BLD AUTO: 2.19 K/UL (ref 1–4.8)
LYMPHOCYTES NFR BLD AUTO: 40.9 % (ref 27–41)
MCH RBC QN AUTO: 31.1 PG (ref 27–31)
MCHC RBC AUTO-ENTMCNC: 34.3 G/DL (ref 32–36)
MCV RBC AUTO: 90.7 FL (ref 80–96)
MONOCYTES # BLD AUTO: 0.37 K/UL (ref 0–0.8)
MONOCYTES NFR BLD AUTO: 6.9 % (ref 2–6)
MPC BLD CALC-MCNC: 9.3 FL (ref 9.4–12.4)
NEUTROPHILS # BLD AUTO: 2.49 K/UL (ref 1.8–7.7)
NEUTROPHILS NFR BLD AUTO: 46.5 % (ref 53–65)
NRBC # BLD AUTO: 0 X10E3/UL
NRBC, AUTO (.00): 0 %
PLATELET # BLD AUTO: 169 K/UL (ref 150–400)
POTASSIUM SERPL-SCNC: 4 MMOL/L (ref 3.5–5.1)
PROT SERPL-MCNC: 6.7 G/DL (ref 6.4–8.2)
RBC # BLD AUTO: 3.76 M/UL (ref 4.6–6.2)
SODIUM SERPL-SCNC: 140 MMOL/L (ref 136–145)
T3RU NFR SERPL: NORMAL %
WBC # BLD AUTO: 5.35 K/UL (ref 4.5–11)

## 2023-08-11 PROCEDURE — 63600175 PHARM REV CODE 636 W HCPCS: Performed by: STUDENT IN AN ORGANIZED HEALTH CARE EDUCATION/TRAINING PROGRAM

## 2023-08-11 PROCEDURE — 85025 COMPLETE CBC W/AUTO DIFF WBC: CPT | Performed by: STUDENT IN AN ORGANIZED HEALTH CARE EDUCATION/TRAINING PROGRAM

## 2023-08-11 PROCEDURE — 94640 AIRWAY INHALATION TREATMENT: CPT

## 2023-08-11 PROCEDURE — 99232 SBSQ HOSP IP/OBS MODERATE 35: CPT | Mod: ,,, | Performed by: INTERNAL MEDICINE

## 2023-08-11 PROCEDURE — 25000003 PHARM REV CODE 250: Performed by: STUDENT IN AN ORGANIZED HEALTH CARE EDUCATION/TRAINING PROGRAM

## 2023-08-11 PROCEDURE — 99900035 HC TECH TIME PER 15 MIN (STAT)

## 2023-08-11 PROCEDURE — 25000003 PHARM REV CODE 250

## 2023-08-11 PROCEDURE — 80053 COMPREHEN METABOLIC PANEL: CPT | Performed by: STUDENT IN AN ORGANIZED HEALTH CARE EDUCATION/TRAINING PROGRAM

## 2023-08-11 PROCEDURE — 63600175 PHARM REV CODE 636 W HCPCS

## 2023-08-11 PROCEDURE — 25000242 PHARM REV CODE 250 ALT 637 W/ HCPCS

## 2023-08-11 PROCEDURE — 99232 PR SUBSEQUENT HOSPITAL CARE,LEVL II: ICD-10-PCS | Mod: ,,, | Performed by: INTERNAL MEDICINE

## 2023-08-11 PROCEDURE — 82962 GLUCOSE BLOOD TEST: CPT

## 2023-08-11 PROCEDURE — 94761 N-INVAS EAR/PLS OXIMETRY MLT: CPT

## 2023-08-11 RX ORDER — MUPIROCIN 20 MG/G
OINTMENT TOPICAL DAILY
Qty: 30 G | Refills: 12 | Status: SHIPPED | OUTPATIENT
Start: 2023-08-11 | End: 2024-02-27

## 2023-08-11 RX ORDER — AMOXICILLIN AND CLAVULANATE POTASSIUM 875; 125 MG/1; MG/1
1 TABLET, FILM COATED ORAL 2 TIMES DAILY
Qty: 14 TABLET | Refills: 0 | Status: SHIPPED | OUTPATIENT
Start: 2023-08-11 | End: 2023-08-21

## 2023-08-11 RX ADMIN — INSULIN ASPART 6 UNITS: 100 INJECTION, SOLUTION INTRAVENOUS; SUBCUTANEOUS at 12:08

## 2023-08-11 RX ADMIN — HYDROCODONE BITARTRATE AND ACETAMINOPHEN 1 TABLET: 5; 325 TABLET ORAL at 04:08

## 2023-08-11 RX ADMIN — ESCITALOPRAM OXALATE 10 MG: 10 TABLET ORAL at 09:08

## 2023-08-11 RX ADMIN — PANTOPRAZOLE SODIUM 40 MG: 40 TABLET, DELAYED RELEASE ORAL at 09:08

## 2023-08-11 RX ADMIN — GABAPENTIN 900 MG: 300 CAPSULE ORAL at 12:08

## 2023-08-11 RX ADMIN — IPRATROPIUM BROMIDE AND ALBUTEROL SULFATE 3 ML: 2.5; .5 SOLUTION RESPIRATORY (INHALATION) at 07:08

## 2023-08-11 RX ADMIN — AMLODIPINE BESYLATE 10 MG: 10 TABLET ORAL at 09:08

## 2023-08-11 RX ADMIN — PIPERACILLIN AND TAZOBACTAM 4.5 G: 4; .5 INJECTION, POWDER, FOR SOLUTION INTRAVENOUS; PARENTERAL at 04:08

## 2023-08-11 RX ADMIN — METOPROLOL SUCCINATE 25 MG: 25 TABLET, EXTENDED RELEASE ORAL at 09:08

## 2023-08-11 RX ADMIN — GABAPENTIN 900 MG: 300 CAPSULE ORAL at 05:08

## 2023-08-11 RX ADMIN — MUPIROCIN: 20 OINTMENT TOPICAL at 12:08

## 2023-08-11 RX ADMIN — ASPIRIN 81 MG 81 MG: 81 TABLET ORAL at 09:08

## 2023-08-11 RX ADMIN — VANCOMYCIN HYDROCHLORIDE 1500 MG: 5 INJECTION, POWDER, LYOPHILIZED, FOR SOLUTION INTRAVENOUS at 05:08

## 2023-08-11 RX ADMIN — IPRATROPIUM BROMIDE AND ALBUTEROL SULFATE 3 ML: 2.5; .5 SOLUTION RESPIRATORY (INHALATION) at 12:08

## 2023-08-11 NOTE — DISCHARGE SUMMARY
[facility] Medicine  Discharge Summary      Patient Name: Navdeep Avery  MRN: [id number]  LETA: [id number]  Patient Class: IP- Inpatient  Admission Date: 8/9/2023  Hospital Length of Stay: 1 days  Discharge Date and Time:  08/11/2023 12:12 PM  Attending Physician: Terrence Choudhury MD   Discharging Provider: Terrence Choudhury MD  Primary Care Provider: Marquez Huff MD    Primary Care Team: Networked reference to record PCT     HPI:   63 year old male presented to the ED with a wound. Patient has DM2 and has been having a chronic wound on the sole of the food at the base of the toes. The wound started draining purulent fluid and having a foul smell for the last a few weeks. He is also experiencing pain in the feet that is 8/10 and radiates up to the knee. He saw his PCP around a week ago who prescribed him some Clindamycin. HE reports he has been taking the clindamycin for almost 7 days with not much improvement. He sees Dr. Chiu outpatient regularly with last visit being 3 months ago and his next appointment is august 15th but patient did not feel safe and comfortable to wait until his appointment. Patient denies fever, chills, sweating, dizziness, chest pain, SOB.     Patient's A1c was 8.8 in mid may. He takes 10 units of long acting insulin in the morning and 20 units at night. Patient has CAD s/p triple bypass about a year ago at [facility] and 4-5 stents previously. Patient of Dr best and has not seen him for a year. He takes aspirin and plavix. Patient reports all meds were taken lastly around 2 days ago including plavix. Has COPD with around 130 pack year smoking history and is on home oxygen PRN. SAMEERA but does not wear CPAP. HTN and HLD.     ED course:  CBC did not show increased WBC  CMP showed normal Cr.  Xray of the foot:  Findings suspicious for sequela of osteomyelitis in joint affection.       Procedure(s) (LRB):  DEBRIDEMENT, FOOT (Right)      Hospital Course:    For a more detailed hospital course see IP notes briefly, pt was a/w Lower extremity foot wound, now s/p DEBRIDEMENT, FOOT (Right) with gen surg. Per Dr Allred, pt doing well and can go home on augmentin w/ outpatient follow up.    Dispo-home  Condition-HDS.        Goals of Care Treatment Preferences:  Code Status: Full Code      Consults:   Consults (From admission, onward)        Status Ordering Provider     Pharmacy to dose Vancomycin consult  Once        Provider:  (Not yet assigned)    Acknowledged ANNELIESE CHIU     Inpatient consult to General Surgery  Once        Provider:  Anneliese Chiu DO    Completed LEONIE RICO          No new Assessment & Plan notes have been filed under this hospital service since the last note was generated.  Service: Hospital Medicine    Final Active Diagnoses:    Diagnosis Date Noted POA    PRINCIPAL PROBLEM:  Diabetic ulcer of right foot associated with diabetes mellitus due to underlying condition, with bone involvement without evidence of necrosis [E08.621, L97.516]  Yes    Dependence on nicotine from cigarettes [F17.210] 08/10/2023 Yes    HTN (hypertension) [I10] 08/10/2023 Yes    Coronary artery disease involving native coronary artery of native heart [I25.10] 06/27/2023 Yes    GERD (gastroesophageal reflux disease) [K21.9] 01/05/2023 Yes    Sleep apnea [G47.30] 09/07/2022 Yes    Diabetic peripheral neuropathy [E11.42]  Yes    COPD (chronic obstructive pulmonary disease) [J44.9] 06/24/2022 Yes    Insomnia secondary to depression with anxiety [F51.05, F41.8] 06/17/2021 Yes      Problems Resolved During this Admission:       Discharged Condition: stable    Disposition: Home or Self Care    Follow Up:   Follow-up Information     Anneliese Chiu DO. Schedule an appointment as soon as possible for a visit in 2 week(s).    Specialties: General Surgery, Surgery  Contact information:  [address] [facility] [place]  [address]  [phone]             Marquez Huff MD Follow up in 1 week(s).    Specialty: Family Medicine  Contact information:  [address]  [facility] [address]  [phone]                       Patient Instructions:      Ambulatory referral/consult to Smoking Cessation Program   Standing Status: Future   Referral Priority: Routine Referral Type: Consultation   Referral Reason: Specialty Services Required   Requested Specialty: CTTS   Number of Visits Requested: 1     Diet diabetic     Notify your health care provider if you experience any of the following:  temperature >100.4     Notify your health care provider if you experience any of the following:  persistent nausea and vomiting or diarrhea     Notify your health care provider if you experience any of the following:  severe uncontrolled pain     Notify your health care provider if you experience any of the following:  difficulty breathing or increased cough     Notify your health care provider if you experience any of the following:  severe persistent headache     Notify your health care provider if you experience any of the following:  worsening rash     Notify your health care provider if you experience any of the following:  persistent dizziness, light-headedness, or visual disturbances     Notify your health care provider if you experience any of the following:  increased confusion or weakness     Reason for not Prescribing Nicotine Replacement     Order Specific Question Answer Comments   Reason for not Prescribing: Patient refused      Activity as tolerated       Significant Diagnostic Studies: N/A    Pending Diagnostic Studies:     Procedure Component Value Units Date/Time    EXTRA TUBES [[id number]] Collected: 08/10/23 0310    Order Status: Sent Lab Status: In process Updated: 08/10/23 0317    Specimen: Blood, Venous     Narrative:      The following orders were created for panel order EXTRA  TUBES.  Procedure                               Abnormality         Status                     ---------                               -----------         ------                     Lavender Top Hold[[id number]]                                In process                   Please view results for these tests on the individual orders.    EXTRA TUBES [[id number]] Collected: 08/09/23 2008    Order Status: Sent Lab Status: In process Updated: 08/09/23 2012    Specimen: Blood, Venous     Narrative:      The following orders were created for panel order EXTRA TUBES.  Procedure                               Abnormality         Status                     ---------                               -----------         ------                     Light Blue Top Hold[[id number]]                              In process                 Gold Top Hold[[id number]]                                    In process                   Please view results for these tests on the individual orders.    Microalbumin/Creatinine Ratio, Urine [[id number]] Collected: 08/10/23 0307    Order Status: Sent Lab Status: No result     Specimen: Urine     Urinalysis, Reflex to Urine Culture [[id number]]     Order Status: Sent Lab Status: No result     Specimen: Urine          Medications:  Reconciled Home Medications:      Medication List      START taking these medications    amoxicillin-clavulanate 875-125mg 875-125 mg per tablet  Commonly known as: AUGMENTIN  Take 1 tablet by mouth 2 (two) times daily. for 10 days     mupirocin 2 % ointment  Commonly known as: BACTROBAN  Apply topically once daily.        CONTINUE taking these medications    * albuterol 90 mcg/actuation inhaler  Commonly known as: PROVENTIL/VENTOLIN HFA  Inhale 2 puffs into the lungs 2 (two) times daily as needed for Wheezing.     * albuterol 2.5 mg /3 mL (0.083 %) nebulizer solution  Commonly known as: PROVENTIL  USE 1 VIAL IN NEBULIZER 3 TIMES DAILY     amLODIPine 10 MG tablet  Commonly known as:  NORVASC  Take 1 tablet (10 mg total) by mouth once daily.     aspirin 81 MG Chew  Take 81 mg by mouth once daily.     atorvastatin 80 MG tablet  Commonly known as: LIPITOR  Take 1 tablet (80 mg total) by mouth every evening.     clopidogreL 75 mg tablet  Commonly known as: PLAVIX  Take 1 tablet (75 mg total) by mouth once daily.     diclofenac sodium 1 % Gel  Commonly known as: VOLTAREN  Apply 1 g topically 4 (four) times daily as needed (pain).     empagliflozin 10 mg tablet  Commonly known as: JARDIANCE  Take 1 tablet (10 mg total) by mouth once daily.     EScitalopram oxalate 10 MG tablet  Commonly known as: LEXAPRO  Take 1 tablet (10 mg total) by mouth once daily.     gabapentin 300 MG capsule  Commonly known as: NEURONTIN  Take 3 capsules (900 mg total) by mouth every 6 (six) hours.     * HYDROcodone-acetaminophen  mg per tablet  Commonly known as: NORCO  Take 1 tablet by mouth every 6 (six) hours as needed for Pain (pain).     * HYDROcodone-acetaminophen  mg per tablet  Commonly known as: NORCO  Take 1 tablet by mouth every 6 (six) hours as needed for Pain (pain).  Start taking on: August 19, 2023     hydrOXYzine pamoate 25 MG Cap  Commonly known as: VISTARIL  Take 1 capsule (25 mg total) by mouth once daily. Take nightly for sleep     LEVEMIR FLEXTOUCH U100 INSULIN 100 unit/mL (3 mL) Inpn pen  Generic drug: insulin detemir U-100 (Levemir)  Inject 10 units subcutaneously every morning, inject 20 units subcutaneously every evening.     LIDOcaine 5 %  Commonly known as: LIDODERM  1 patch once daily.     lisinopriL 10 MG tablet  Take 1 tablet (10 mg total) by mouth once daily.     metoprolol succinate 25 MG 24 hr tablet  Commonly known as: TOPROL-XL  Take 1 tablet (25 mg total) by mouth once daily.     mirtazapine 7.5 MG Tab  Commonly known as: REMERON  Take 1 tablet (7.5 mg total) by mouth nightly.     nitroGLYCERIN 0.4 MG SL tablet  Commonly known as: NITROSTAT  Place 1 tablet (0.4 mg total) under  "the tongue every 5 (five) minutes as needed for Chest pain.     pantoprazole 40 MG tablet  Commonly known as: PROTONIX  Take 1 tablet (40 mg total) by mouth once daily.     semaglutide 3 mg tablet  Commonly known as: RYBELSUS  Take 3 mg by mouth once daily.     SENNA 8.6 mg tablet  Generic drug: senna  Take 2 tablets by mouth once daily.     SPIRIVA RESPIMAT 2.5 mcg/actuation inhaler  Generic drug: tiotropium bromide  Inhale 1 puff into the lungs once daily.     SURE COMFORT INSULIN SYRINGE 0.3 mL 31 gauge x 5/16" Syrg  Generic drug: insulin syringe-needle U-100  Inject 1 application into the skin 2 (two) times a day.         * This list has 4 medication(s) that are the same as other medications prescribed for you. Read the directions carefully, and ask your doctor or other care provider to review them with you.            STOP taking these medications    clindamycin 150 MG capsule  Commonly known as: CLEOCIN            Indwelling Lines/Drains at time of discharge:   Lines/Drains/Airways     None                 Time spent on the discharge of patient: >30 minutes         Rehmat KATHY Choudhury MD  Department of Hospital Medicine  [facility]  "

## 2023-08-11 NOTE — PLAN OF CARE
[facility]  Discharge Final Note    Primary Care Provider: Marquez Huff MD    Expected Discharge Date: 8/11/2023    Final Discharge Note (most recent)       Final Note - 08/11/23 1346          Final Note    Assessment Type Final Discharge Note     Anticipated Discharge Disposition Home or Self Care     What phone number can be called within the next 1-3 days to see how you are doing after discharge? [phone]        Post-Acute Status    Discharge Delays None known at this time                     Important Message from Medicare  Important Message from Medicare regarding Discharge Appeal Rights: Signed/date by patient/caregiver     Date IMM was signed: 08/11/23  Time IMM was signed: 1248    Contact Info       Evelio Chiu DO   Specialty: General Surgery, Surgery    [address] [facility] [address]   Phone: [phone]       Next Steps: Schedule an appointment as soon as possible for a visit in 2 week(s)    Marquez Huff MD   Specialty: Family Medicine   Relationship: PCP - General    [address] [place] [address].  [facility] [address]   Phone: [phone]       Next Steps: Follow up in 1 week(s)          Pt to dc home today, no needs.

## 2023-08-11 NOTE — NURSING
Patient and wife were reviewed over discharge instructions and prescriptions. Patient verbalized understanding of instructions.

## 2023-08-11 NOTE — ANESTHESIA POSTPROCEDURE EVALUATION
Anesthesia Post Evaluation    Patient: Navdeep Avery    Procedure(s) Performed: Procedure(s) (LRB):  DEBRIDEMENT, FOOT (Right)    Final Anesthesia Type: general      Patient location during evaluation: PACU  Post-procedure vital signs: reviewed and stable  Pain management: adequate  Airway patency: patent    PONV status at discharge: No PONV  Anesthetic complications: no      Cardiovascular status: hemodynamically stable  Respiratory status: unassisted  Hydration status: euvolemic  Follow-up not needed.          Vitals Value Taken Time   /63 08/10/23 1928   Temp 36.3 °C (97.4 °F) 08/10/23 1928   Pulse 59 08/10/23 1957   Resp 20 08/10/23 2109   SpO2 95 % 08/10/23 1957         Event Time   Out of Recovery 08/10/2023 15:15:00         Pain/Leda Score: Pain Rating Prior to Med Admin: 6 (8/10/2023  9:09 PM)  Pain Rating Post Med Admin: 3 (8/10/2023 10:09 PM)  Leda Score: 9 (8/10/2023  3:15 PM)

## 2023-08-11 NOTE — PROGRESS NOTES
Patient doing well.      Dressing clean dry and intact.      No bony involvement with wound; x-ray findings chronic with no changes.      Patient be discharged home on Augmentin to cover gram negative bacilli; we will follow-up wound cultures.  Mupirocin ointment daily to wound, cleanse daily with Vashe solution and keep covered.  Follow back up in clinic in 2 weeks.

## 2023-08-11 NOTE — HOSPITAL COURSE
For a more detailed hospital course see IP notes briefly, pt was a/w Lower extremity foot wound, now s/p DEBRIDEMENT, FOOT (Right) with gen surg. Per Dr Allred, pt doing well and can go home on augmentin w/ outpatient follow up.    Dispo-home  Condition-HDS.

## 2023-08-12 LAB — MICROORGANISM SPEC CULT: ABNORMAL

## 2023-08-14 ENCOUNTER — PATIENT OUTREACH (OUTPATIENT)
Dept: ADMINISTRATIVE | Facility: CLINIC | Age: 63
End: 2023-08-14
Payer: MEDICAID

## 2023-08-14 LAB
AORTIC ROOT ANNULUS: 2.74 CM
AORTIC VALVE CUSP SEPERATION: 2.13 CM
AV INDEX (PROSTH): 0.84
AV MEAN GRADIENT: 2 MMHG
AV PEAK GRADIENT: 5 MMHG
AV VALVE AREA BY VELOCITY RATIO: 2.37 CM²
AV VALVE AREA: 2.34 CM²
AV VELOCITY RATIO: 0.85
BSA FOR ECHO PROCEDURE: 1.85 M2
CV ECHO LV RWT: 0.4 CM
DOP CALC AO PEAK VEL: 1.09 M/S
DOP CALC AO VTI: 25.3 CM
DOP CALC LVOT AREA: 2.8 CM2
DOP CALC LVOT DIAMETER: 1.88 CM
DOP CALC LVOT PEAK VEL: 0.93 M/S
DOP CALC LVOT STROKE VOLUME: 59.1 CM3
DOP CALCLVOT PEAK VEL VTI: 21.3 CM
E WAVE DECELERATION TIME: 188.61 MSEC
E/A RATIO: 0.66
E/E' RATIO: 8.31 M/S
ECHO LV POSTERIOR WALL: 0.9 CM (ref 0.6–1.1)
EJECTION FRACTION: 60 %
FRACTIONAL SHORTENING: 31 % (ref 28–44)
INTERVENTRICULAR SEPTUM: 0.86 CM (ref 0.6–1.1)
IVC DIAMETER: 1.26 CM
LEFT ATRIUM SIZE: 2.13 CM
LEFT ATRIUM VOLUME INDEX MOD: 18.2 ML/M2
LEFT ATRIUM VOLUME MOD: 33.91 CM3
LEFT INTERNAL DIMENSION IN SYSTOLE: 3.12 CM (ref 2.1–4)
LEFT VENTRICLE DIASTOLIC VOLUME INDEX: 49.69 ML/M2
LEFT VENTRICLE DIASTOLIC VOLUME: 92.43 ML
LEFT VENTRICLE MASS INDEX: 69 G/M2
LEFT VENTRICLE SYSTOLIC VOLUME INDEX: 20.8 ML/M2
LEFT VENTRICLE SYSTOLIC VOLUME: 38.64 ML
LEFT VENTRICULAR INTERNAL DIMENSION IN DIASTOLE: 4.5 CM (ref 3.5–6)
LEFT VENTRICULAR MASS: 128.89 G
LV LATERAL E/E' RATIO: 6.75 M/S
LV SEPTAL E/E' RATIO: 10.8 M/S
LVOT MG: 1.6 MMHG
LVOT MV: 0.58 CM/S
MV PEAK A VEL: 0.82 M/S
MV PEAK E VEL: 0.54 M/S
MV STENOSIS PRESSURE HALF TIME: 54.7 MS
MV VALVE AREA P 1/2 METHOD: 4.02 CM2
PISA TR MAX VEL: 2.01 M/S
PV PEAK VELOCITY: 1.05 CM/S
RA VOL SYS: 37.2 ML
RIGHT ATRIAL AREA: 13.8 CM2
RIGHT VENTRICULAR END-DIASTOLIC DIMENSION: 3.8 CM
RIGHT VENTRICULAR LENGTH IN DIASTOLE (APICAL 4-CHAMBER VIEW): 5.96 CM
RV MID DIAMA: 2.5 CM
TDI LATERAL: 0.08 M/S
TDI SEPTAL: 0.05 M/S
TDI: 0.07 M/S
TR MAX PG: 16 MMHG
TRICUSPID ANNULAR PLANE SYSTOLIC EXCURSION: 1.7 CM
Z-SCORE OF LEFT VENTRICULAR DIMENSION IN END DIASTOLE: -1.4
Z-SCORE OF LEFT VENTRICULAR DIMENSION IN END SYSTOLE: -0.18

## 2023-08-14 NOTE — PROGRESS NOTES
C3 nurse spoke with Navdeep Avery's spouse, Charisma,  for a TCC post hospital discharge follow up call. The patient has a scheduled HOSFU appointment with Dr. Huff  on 08/17/2023 @ 2261.

## 2023-08-15 ENCOUNTER — OFFICE VISIT (OUTPATIENT)
Dept: SURGERY | Facility: CLINIC | Age: 63
End: 2023-08-15
Payer: MEDICARE

## 2023-08-15 DIAGNOSIS — E08.621 DIABETIC ULCER OF RIGHT MIDFOOT ASSOCIATED WITH DIABETES MELLITUS DUE TO UNDERLYING CONDITION, WITH NECROSIS OF MUSCLE: Primary | ICD-10-CM

## 2023-08-15 DIAGNOSIS — L97.413 DIABETIC ULCER OF RIGHT MIDFOOT ASSOCIATED WITH DIABETES MELLITUS DUE TO UNDERLYING CONDITION, WITH NECROSIS OF MUSCLE: Primary | ICD-10-CM

## 2023-08-15 LAB
BACTERIA BLD CULT: NORMAL
BACTERIA BLD CULT: NORMAL

## 2023-08-15 PROCEDURE — 99024 POSTOP FOLLOW-UP VISIT: CPT | Mod: ,,, | Performed by: STUDENT IN AN ORGANIZED HEALTH CARE EDUCATION/TRAINING PROGRAM

## 2023-08-15 PROCEDURE — 99024 PR POST-OP FOLLOW-UP VISIT: ICD-10-PCS | Mod: ,,, | Performed by: STUDENT IN AN ORGANIZED HEALTH CARE EDUCATION/TRAINING PROGRAM

## 2023-08-15 PROCEDURE — 99214 OFFICE O/P EST MOD 30 MIN: CPT | Mod: PBBFAC | Performed by: STUDENT IN AN ORGANIZED HEALTH CARE EDUCATION/TRAINING PROGRAM

## 2023-08-15 NOTE — PROGRESS NOTES
Subjective:       Patient ID: Navdeep Avery is a 63 y.o. male.    Chief Complaint: Follow-up    62-year-old  male presents to the clinic for 2 week evaluation status post debridement of right lower foot; patient had a right foot abscess with necrotic muscle extending down to bone the plantar surface of the right foot adjacent to the 3rd metatarsophalangeal joint.  Patient's spouse has been taking great care of the wound and the wound is healing.  No complaints.  Denies any chest pain/shortness of breath, nausea/vomiting/diarrhea, fever/chills.    2/14:  Presents today for wound re-evaluation and placement of PuraPly XT.  No significant changes from previous visit    2/21: Presents today for wound re-evaluation and placement of PuraPly XT.  No significant changes from previous visit    2/28: Presents today for wound re-evaluation and placement of PuraPly XT.  No significant changes from previous visit    3/7: Presents today for wound re-evaluation and placement of Affinity.  No significant changes from previous visit    3/20: Presents today for wound re-evaluation and placement of Apligraf.  No significant changes from previous visit    3/27: Presents today for wound re-evaluation and placement of Apligraf.  No significant changes from previous visits    4/4: Presents today for wound re-evaluation and placement of Apligraf.  No significant changes from previous visit    4/10: Presents today for wound re-evaluation and placement of Apligraf.  No significant changes from previous visit    4/17:  Presents today for wound re-evaluation and placement of Apligraf; no significant changes from previous visit    4/25:  Presents today for wound re-evaluation and placement of Affinity; no significant changes from previous visit    5/3: Presents today for wound re-evaluation and placement of Affinity; no significant changes from previous visit    5/9:  Presents today for wound re-evaluation and placed an affinity; no  significant changes from previous visit    5/16: Presents today for wound re-evaluation and placed an affinity; no significant changes from previous visit    5/23:  Presents today for wound re-evaluation; no significant changes from previous visit    8/15:  Patient presents for evaluation of right foot wound.  Patient was recently hospitalized and required debridement on August 10th for some ulceration with muscle necrosis on the right foot; no bony involvement; no changes on x-ray from previous.  Denies any chest pain/shortness of breath, nausea/vomiting/diarrhea, fever/chills.        Review of Systems   Constitutional: Negative.    HENT: Negative.     Eyes: Negative.    Respiratory:  Negative for shortness of breath.    Cardiovascular: Negative.    Gastrointestinal:  Negative for abdominal pain, blood in stool, change in bowel habit, vomiting and change in bowel habit.   Endocrine: Negative.    Genitourinary: Negative.  Negative for hematuria.   Musculoskeletal:  Negative for back pain and myalgias.   Integumentary:  Negative.   Allergic/Immunologic: Negative.    Neurological:  Negative for dizziness, weakness and light-headedness.   Psychiatric/Behavioral: Negative.           Objective:      Physical Exam  Constitutional:       General: He is not in acute distress.     Appearance: Normal appearance.   HENT:      Head: Normocephalic.   Cardiovascular:      Rate and Rhythm: Normal rate.   Pulmonary:      Effort: Pulmonary effort is normal. No respiratory distress.   Abdominal:      General: There is no distension.      Tenderness: There is no abdominal tenderness.   Musculoskeletal:         General: Normal range of motion.        Feet:    Feet:      Comments: New ulceration on the plantar surface adjacent to the 3rd metatarsophalangeal joint; measuring 2 x 1 x 0.3 cm; no necrosis; granulation tissue present; no bony involvement  Skin:     General: Skin is warm.      Coloration: Skin is not jaundiced.    Neurological:      General: No focal deficit present.      Mental Status: He is alert and oriented to person, place, and time.      Cranial Nerves: No cranial nerve deficit.         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, with necrosis of muscle    -  Primary              Plan:       We will reapply for Apligraf to place on wound.  Patient to follow back up in 3 weeks.  Continue wound care with Silvadene.

## 2023-08-17 ENCOUNTER — OFFICE VISIT (OUTPATIENT)
Dept: FAMILY MEDICINE | Facility: CLINIC | Age: 63
End: 2023-08-17
Payer: MEDICARE

## 2023-08-17 VITALS
SYSTOLIC BLOOD PRESSURE: 118 MMHG | BODY MASS INDEX: 24.4 KG/M2 | HEART RATE: 66 BPM | HEIGHT: 68 IN | WEIGHT: 161 LBS | DIASTOLIC BLOOD PRESSURE: 62 MMHG | OXYGEN SATURATION: 95 % | TEMPERATURE: 98 F | RESPIRATION RATE: 18 BRPM

## 2023-08-17 DIAGNOSIS — E11.51 TYPE 2 DIABETES MELLITUS WITH DIABETIC PERIPHERAL ANGIOPATHY WITHOUT GANGRENE, WITH LONG-TERM CURRENT USE OF INSULIN: Primary | ICD-10-CM

## 2023-08-17 DIAGNOSIS — Z79.4 TYPE 2 DIABETES MELLITUS WITH DIABETIC PERIPHERAL ANGIOPATHY WITHOUT GANGRENE, WITH LONG-TERM CURRENT USE OF INSULIN: Primary | ICD-10-CM

## 2023-08-17 PROCEDURE — 99495 TRANSJ CARE MGMT MOD F2F 14D: CPT | Mod: ,,, | Performed by: FAMILY MEDICINE

## 2023-08-17 PROCEDURE — 99495 TCM SERVICES (MODERATE COMPLEXITY): ICD-10-PCS | Mod: ,,, | Performed by: FAMILY MEDICINE

## 2023-08-17 RX ORDER — ORAL SEMAGLUTIDE 7 MG/1
7 TABLET ORAL DAILY
Qty: 30 TABLET | Refills: 5 | Status: ON HOLD | OUTPATIENT
Start: 2023-08-17 | End: 2024-03-06 | Stop reason: HOSPADM

## 2023-08-17 NOTE — PROGRESS NOTES
Navdeep Avery is a 63 y.o. male seen today for hospital follow-up for an infected diabetic ulcer affecting the plantar aspect of his right foot.  Patient is markedly improved and afebrile but inpatient his A1c was elevated at 8.8.  Also admits to increasing his intake of glucose and we discussed a more appropriate diet.  We also discussed increasing his Rybelsus to the 7 mg tablet and following up in 2 weeks with home blood glucose readings.      Past Medical History:   Diagnosis Date    Arthritis     Carpal tunnel syndrome     COPD (chronic obstructive pulmonary disease)     Coronary artery disease involving native coronary artery of native heart 06/27/2023    CABG 2018 (No OP report available)  Most recent left heart catheterization 08/12/2019 1. Normal LV size apical akinesis and overall normal LV systolic function, ejection fraction 55% 2. Patent saphenous vein graft to the right PDA with patent jump graft to the OM branch left circumflex 3. Patent LIMA to the OM1 4. No significant mitral regurgitation     COVID 02/02/2022    Diabetic peripheral neuropathy     Diabetic ulcer of right foot associated with diabetes mellitus due to underlying condition, with bone involvement without evidence of necrosis     GERD (gastroesophageal reflux disease)     Hyperlipidemia     Hypertension     Peripheral vascular disease, unspecified     Sleep apnea     Type 2 diabetes mellitus      Family History   Problem Relation Age of Onset    Arthritis Mother     Hypertension Mother     Learning disabilities Mother     Alcohol abuse Father     Early death Father     Heart disease Father     Cancer Sister     Diabetes Sister     Heart disease Maternal Grandfather     COPD Paternal Grandfather     Heart disease Son      Current Outpatient Medications on File Prior to Visit   Medication Sig Dispense Refill    albuterol (PROVENTIL) 2.5 mg /3 mL (0.083 %) nebulizer solution USE 1 VIAL IN NEBULIZER 3 TIMES DAILY 90 each 11    albuterol  (PROVENTIL/VENTOLIN HFA) 90 mcg/actuation inhaler Inhale 2 puffs into the lungs 2 (two) times daily as needed for Wheezing. 18 g 2    amLODIPine (NORVASC) 10 MG tablet Take 1 tablet (10 mg total) by mouth once daily. 90 tablet 1    amoxicillin-clavulanate 875-125mg (AUGMENTIN) 875-125 mg per tablet Take 1 tablet by mouth 2 (two) times daily. for 10 days 14 tablet 0    aspirin 81 MG Chew Take 81 mg by mouth once daily.      atorvastatin (LIPITOR) 80 MG tablet Take 1 tablet (80 mg total) by mouth every evening. 90 tablet 1    clopidogreL (PLAVIX) 75 mg tablet Take 1 tablet (75 mg total) by mouth once daily. 90 tablet 1    diclofenac sodium (VOLTAREN) 1 % Gel Apply 1 g topically 4 (four) times daily as needed (pain). 20 g 1    empagliflozin (JARDIANCE) 10 mg tablet Take 1 tablet (10 mg total) by mouth once daily. 30 tablet 6    EScitalopram oxalate (LEXAPRO) 10 MG tablet Take 1 tablet (10 mg total) by mouth once daily. 30 tablet 1    gabapentin (NEURONTIN) 300 MG capsule Take 3 capsules (900 mg total) by mouth every 6 (six) hours. 360 capsule 2    HYDROcodone-acetaminophen (NORCO)  mg per tablet Take 1 tablet by mouth every 6 (six) hours as needed for Pain (pain). 120 tablet 0    [START ON 8/19/2023] HYDROcodone-acetaminophen (NORCO)  mg per tablet Take 1 tablet by mouth every 6 (six) hours as needed for Pain (pain). 120 tablet 0    hydrOXYzine pamoate (VISTARIL) 25 MG Cap Take 1 capsule (25 mg total) by mouth once daily. Take nightly for sleep 90 capsule 1    insulin detemir U-100 (LEVEMIR FLEXTOUCH U-100 INSULN) 100 unit/mL (3 mL) InPn pen Inject 10 units subcutaneously every morning, inject 20 units subcutaneously every evening. 3 each 2    LIDOcaine (LIDODERM) 5 % 1 patch once daily.      lisinopriL 10 MG tablet Take 1 tablet (10 mg total) by mouth once daily. 90 tablet 1    metoprolol succinate (TOPROL-XL) 25 MG 24 hr tablet Take 1 tablet (25 mg total) by mouth once daily. 90 tablet 1    mirtazapine  "(REMERON) 7.5 MG Tab Take 1 tablet (7.5 mg total) by mouth nightly. 90 tablet 1    mupirocin (BACTROBAN) 2 % ointment Apply topically once daily. 30 g 12    nitroGLYCERIN (NITROSTAT) 0.4 MG SL tablet Place 1 tablet (0.4 mg total) under the tongue every 5 (five) minutes as needed for Chest pain. 30 tablet 2    pantoprazole (PROTONIX) 40 MG tablet Take 1 tablet (40 mg total) by mouth once daily. 90 tablet 1    SENNA 8.6 mg tablet Take 2 tablets by mouth once daily.      SPIRIVA RESPIMAT 2.5 mcg/actuation inhaler Inhale 1 puff into the lungs once daily. 4 g 5    SURE COMFORT INSULIN SYRINGE 0.3 mL 31 gauge x 5/16" Syrg Inject 1 application into the skin 2 (two) times a day.      [DISCONTINUED] semaglutide (RYBELSUS) 3 mg tablet Take 3 mg by mouth once daily.       No current facility-administered medications on file prior to visit.     Immunization History   Administered Date(s) Administered    Influenza - Quadrivalent - PF *Preferred* (6 months and older) 09/14/2021, 10/21/2022    Pneumococcal Conjugate - 13 Valent 10/25/2017    Pneumococcal Conjugate - 20 Valent 02/17/2023    Pneumococcal Polysaccharide - 23 Valent 10/19/2016    Tdap 12/20/2019       Review of Systems   Constitutional:  Negative for fever, malaise/fatigue and weight loss.   Respiratory:  Negative for shortness of breath.    Cardiovascular:  Negative for chest pain and palpitations.   Gastrointestinal:  Negative for nausea and vomiting.   Psychiatric/Behavioral:  Negative for depression.         Vitals:    08/17/23 0925   BP: 118/62   Pulse: 66   Resp: 18   Temp: 98.2 °F (36.8 °C)       Physical Exam  Vitals reviewed.   Constitutional:       Appearance: Normal appearance.   HENT:      Head: Normocephalic.   Eyes:      Extraocular Movements: Extraocular movements intact.      Conjunctiva/sclera: Conjunctivae normal.      Pupils: Pupils are equal, round, and reactive to light.   Neck:      Thyroid: No thyroid mass or thyromegaly.   Cardiovascular:      " Rate and Rhythm: Normal rate and regular rhythm.      Heart sounds: Normal heart sounds. No murmur heard.     No gallop.   Pulmonary:      Effort: Pulmonary effort is normal. No respiratory distress.      Breath sounds: Decreased air movement present. Decreased breath sounds present. No wheezing or rales.   Musculoskeletal:        Feet:    Feet:      Comments: Patient has a proximally 2.5 cm open wound to the affected area with some granulation tissue present.  There is some cellular debris at the base but no obvious purulent discharge is noted  Skin:     General: Skin is warm and dry.      Coloration: Skin is not jaundiced or pale.   Neurological:      Mental Status: He is alert.   Psychiatric:         Mood and Affect: Mood normal.         Behavior: Behavior normal.         Thought Content: Thought content normal.         Judgment: Judgment normal.          Assessment and Plan  Type 2 diabetes mellitus with diabetic peripheral angiopathy without gangrene, with long-term current use of insulin  -     semaglutide (RYBELSUS) 7 mg tablet; Take 1 tablet (7 mg total) by mouth once daily.  Dispense: 30 tablet; Refill: 5            Return to clinic in 2 weeks with home glucose log book or as needed.    Health Maintenance Topics with due status: Not Due       Topic Last Completion Date    TETANUS VACCINE 12/20/2019    Influenza Vaccine 10/21/2022    LDCT Lung Screen 11/02/2022    Lipid Panel 01/05/2023    Naloxone Prescription 03/14/2023    Diabetes Urine Screening 05/17/2023    Foot Exam 05/17/2023    Hemoglobin A1c 08/10/2023    High Dose Statin 08/15/2023

## 2023-08-28 ENCOUNTER — OUTPATIENT CASE MANAGEMENT (OUTPATIENT)
Dept: ADMINISTRATIVE | Facility: OTHER | Age: 63
End: 2023-08-28

## 2023-08-28 NOTE — LETTER
August 28, 2023             Dear Navdeep Avery:    Welcome to [facility] Complex Care Management [facility].  It was a pleasure talking with you today.  My name is Celina Pearl RN CCM and I look forward to being your Care Manager.  My goal is to help you function at the healthiest and highest level possible.  You can contact me directly at [phone].    As an [facility] patient, some of the services we may be able to provide include:     Development of an individualized care plan with a Registered Nurse   Connection with a   Connection with available resources and services    Coordinate communication among your care team members   Provide coaching and education   Help you understand your doctors treatment plan  Help you obtain information about your insurance coverage.     All services provided by [facility] Complex Care Managers and other care team members are coordinated with and communicated to your primary care team.      As part of your enrollment, you will be receiving education materials and more information about these services in your [facility] account, by phone or through the mail.  If you do not wish to participate or receive information, please contact our office at [phone].      Sincerely,          Celina Pearl RN CCM [facility]   Out-patient RN Complex Care Manager

## 2023-08-28 NOTE — PROGRESS NOTES
Outpatient Care Management  Initial Patient Assessment    Patient: Navdeep Avery  MRN: [id number]  Date of Service: 08/28/2023  Completed by: Celina Pearl RN  Referral Date: 08/11/2023  Program: High Risk  Status: Ongoing  Effective Dates: 8/28/2023 - present  Responsible Staff: Celina Pearl RN        Reason for Visit   Patient presents with    OPCM Enrollment Call    Nursing Assessment       Brief Summary:  Navdeep Avery was referred by Dr. Choudhury for Obstructive sleep apnea syndrome. Patient qualifies for program based on high risk score 81.9%. .   Active problem list, medical, surgical and social history reviewed. Active comorbidities include diabetic peripheral neuropathy, COPD, PVD, CAD, HTN, and diabetes . Areas of need identified by patient include diabetes.   Admitted to the hospital on 08/09/23 for right foot wound. Debridement of diabetic ulcer of right foot on 08/10/23. Discharged from the hospital on 08/11/23.   Office visit on 08/15/23 with general surgeon Dr. Chiu for 2 week follow up after debridement. No new medications.   Office visit on 08/17/23 with Dr. Huff, PCP who changed his semaglutide to 7 mg daily from 3 mg daily.   Called and assessment done with Navdeep Avery. He lives with his wife Charisma. He uses a walker, crutches or  cane for ambulation.Four falls in the last 12 months.  Wife is doing dressing changes on his diabetic ulcer of right foot . He has oxygen as needed 2L.  Pulse ox  91% taken during the assessment. Denies shortness of breath or chest pains.Wife takes his blood pressure daily.  Left hip fracture 9 months ago with surgery at [facility] in [place], [place].  Pain is a 5, hurts all over from arthritis, bilateral knee pain and Left hip fracture ( 9 months ago) and right foot from ulcer. He checks his CBG two times a day, CBG  was  249 today.A1C on  08/10/23 was 9.7.Weight is 161 pounds, no weight loss in last two months. He eats Strawberry candy for hypoglycemia  and keeps on his night stand and coffee table.  He had mashed potatoes cream corn, steak, coffee and regular milk for lunch, no bread.     Next steps: Follow up next week  Do Med Rec   Mail fruits, snacks, diabetic grocery list and long term complications with diabetes   Message to Dr. Evelio Chiu, surgeon for home health nurse for wound care to right foot diabetic ulcer and Physical Therapy for balance problems, four falls in the past twelve months.   Refer to  for  SDOH, glasses and dentures. [facility]     Disability Status  Is the patient alert and oriented (person, place, time, and situation)?: Alert and oriented x 4  Hearing Difficulty or Deaf: no  Visual Difficulty or Blind: yes  Visual and Hearing Needs Conclusion: needs to wear glasses but can not afford them. Refer to   Difficulty Concentrating, Remembering or Making Decisions: no  Communication Difficulty: no  Eating/Swallowing Difficulty: no  Walking or Climbing Stairs Difficulty: yes  Walking or Climbing Stairs: ambulation difficulty, requires equipment; stair climbing difficulty, requires equipment; transferring difficulty, requires equipment  Mobility Management: using a walker  Dressing/Bathing Difficulty: yes  Dressing/Bathing: bathing difficulty, requires equipment; bathing difficulty, assistance 1 person; dressing difficulty, dependent  Dressing/Bathing Management: right foot diabetic ulcer needs assistance with baths but not with dressing  Toileting : Independent  Continence : Continence - Not a problem  Difficulty Managing Errands Independently: yes  Errands Management: wife runs errands  Equipment Currently Used at Home: walker, rolling; crutches, axillary; cane, straight; shower chair; blood pressure machine; bedside commode; oxygen; glucometer  ADL Conclusion Statement: uses walker and wife assists him as needed  Change in Functional Status Since Onset of Current Illness/Injury: yes        Spiritual Beliefs  Spiritual, Cultural  Beliefs, Buddhism Practices, Values that Affect Care: no      Social History     Socioeconomic History    Marital status:    Occupational History    Occupation: Retired   Tobacco Use    Smoking status: Every Day     Current packs/day: 2.50     Average packs/day: 2.5 packs/day for 52.1 years (130.1 ttl pk-yrs)     Types: Cigarettes     Start date: 8/10/1971     Passive exposure: Current    Smokeless tobacco: Never   Substance and Sexual Activity    Alcohol use: Not Currently    Drug use: Yes     Types: Oxycodone    Sexual activity: Yes     Partners: Female     Social Determinants of Health     Financial Resource Strain: Low Risk  (8/10/2023)    Overall Financial Resource Strain (CARDIA)     Difficulty of Paying Living Expenses: Not very hard   Food Insecurity: No Food Insecurity (8/10/2023)    Hunger Vital Sign     Worried About Running Out of Food in the Last Year: Never true     Ran Out of Food in the Last Year: Never true   Transportation Needs: No Transportation Needs (8/10/2023)    PRAPARE - Transportation     Lack of Transportation (Medical): No     Lack of Transportation (Non-Medical): No   Physical Activity: Inactive (8/10/2023)    Exercise Vital Sign     Days of Exercise per Week: 0 days     Minutes of Exercise per Session: 0 min   Stress: No Stress Concern Present (8/10/2023)    [employer] - Occupational Stress Questionnaire     Feeling of Stress : Only a little   Social Connections: Moderately Isolated (8/10/2023)    Social Connection and Isolation Panel [NHANES]     Frequency of Communication with Friends and Family: More than three times a week     Frequency of Social Gatherings with Friends and Family: More than three times a week     Attends Buddhism Services: Never     Active Member of Clubs or Organizations: No     Attends Club or Organization Meetings: Never     Marital Status:    Housing Stability: Low Risk  (8/10/2023)    Housing Stability Vital Sign      Unable to Pay for Housing in the Last Year: No     Number of Places Lived in the Last Year: 1     Unstable Housing in the Last Year: No       Roles and Relationships  Primary Source of Support/Comfort: spouse  Name of Support/Comfort Primary Source: Charisma Avery      Advance Directives (For Healthcare)  Advance Directive  (If Adv Dir status is received, view document under Adv Dir in header or Chart Review Media tab): Patient does not have Advance Directive, declines information.        Patient Reported Insurance  Verified current insurance plan:: Medicare; Medicaid            8/28/2023     2:24 PM 8/17/2023     9:39 AM 6/7/2023     3:30 PM 5/17/2023    10:22 AM 2/17/2023     9:49 AM 12/6/2022    10:10 AM 8/30/2022     3:02 PM   Depression Patient Health Questionnaire   Over the last two weeks how often have you been bothered by little interest or pleasure in doing things Not at all Not at all Not at all Not at all Several days Not at all Not at all   Over the last two weeks how often have you been bothered by feeling down, depressed or hopeless Not at all Not at all Not at all Not at all Several days Not at all Not at all   PHQ-2 Total Score 0 0 0 0 2 0 0   Over the last two weeks how often have you been bothered by trouble falling or staying asleep, or sleeping too much      Not at all    Over the last two weeks how often have you been bothered by feeling tired or having little energy      Not at all    Over the last two weeks how often have you been bothered by a poor appetite or overeating      Not at all    Over the last two weeks how often have you been bothered by feeling bad about yourself - or that you are a failure or have let yourself or your family down      More than half the days    Over the last two weeks how often have you been bothered by trouble concentrating on things, such as reading the newspaper or watching television      Not at all    Over the last two weeks how often have you been  bothered by moving or speaking so slowly that other people could have noticed. Or the opposite - being so fidgety or restless that you have been moving around a lot more than usual.      Not at all    Over the last two weeks how often have you been bothered by thoughts that you would be better off dead, or of hurting yourself      Not at all    If you checked off any problems, how difficult have these problems made it for you to do your work, take care of things at home or get along with other people?      Not difficult at all    PHQ-9 Score      2    PHQ-9 Interpretation      Minimal or None        Learning Assessment       08/28/2023 1642 [facility] (8/28/2023 - Present)   Created by Celina Pearl, RN -  (Nurse) Status: Complete                 PRIMARY LEARNER     Primary Learner Name:  Navdeep Avery DM - 08/28/2023 1642    Relationship:  Patient DM - 08/28/2023 1642    Does the primary learner have any barriers to learning?:  No Barriers DM - 08/28/2023 1642    What is the preferred language of the primary learner?:  English DM - 08/28/2023 1642    Is an  required?:  No DM - 08/28/2023 1642    How does the primary learner prefer to learn new concepts?:  Listening, Reading DM - 08/28/2023 1642    How often do you need to have someone help you read instructions, pamphlets, or written material from your doctor or pharmacy?:  Sometimes DM - 08/28/2023 1642        CO-LEARNER #1     No question answered        CO-LEARNER #2     No question answered        SPECIAL TOPICS     No question answered        ANSWERED BY:     No question answered        Edit History       Celina Pearl, RN -  (Nurse)   08/28/2023 1642

## 2023-08-29 ENCOUNTER — OFFICE VISIT (OUTPATIENT)
Dept: CARDIOLOGY | Facility: CLINIC | Age: 63
End: 2023-08-29
Payer: MEDICARE

## 2023-08-29 VITALS
OXYGEN SATURATION: 94 % | SYSTOLIC BLOOD PRESSURE: 92 MMHG | DIASTOLIC BLOOD PRESSURE: 58 MMHG | HEIGHT: 67 IN | BODY MASS INDEX: 25.08 KG/M2 | HEART RATE: 64 BPM | WEIGHT: 159.81 LBS

## 2023-08-29 DIAGNOSIS — E78.2 MIXED HYPERLIPIDEMIA: ICD-10-CM

## 2023-08-29 DIAGNOSIS — I25.10 CORONARY ARTERY DISEASE INVOLVING NATIVE CORONARY ARTERY OF NATIVE HEART, UNSPECIFIED WHETHER ANGINA PRESENT: ICD-10-CM

## 2023-08-29 DIAGNOSIS — I10 PRIMARY HYPERTENSION: ICD-10-CM

## 2023-08-29 PROCEDURE — 99214 PR OFFICE/OUTPT VISIT, EST, LEVL IV, 30-39 MIN: ICD-10-PCS | Mod: S$PBB,,, | Performed by: NURSE PRACTITIONER

## 2023-08-29 PROCEDURE — 99214 OFFICE O/P EST MOD 30 MIN: CPT | Mod: S$PBB,,, | Performed by: NURSE PRACTITIONER

## 2023-08-29 PROCEDURE — 99215 OFFICE O/P EST HI 40 MIN: CPT | Mod: PBBFAC | Performed by: NURSE PRACTITIONER

## 2023-08-29 NOTE — PROGRESS NOTES
PCP: Marquez Huff MD    Referring Provider:     Subjective:   Navdeep Avery is a 63 y.o. male with hx of MICAD s/p CABG (2018), DM, type II, HTN, HLD,  SAMEERA and GERD, who presents for 2 month follow up. He deneis chest discomfort. He shane have SHANE which is chronic and stable. He feels SHANE is r/t anxiety. He had decreased his smoking to 2-3 cigarettes per day but is now back to smoking as much as he did before.     6/27/2023 presents for routine follow up. He has been lost to cardiology follow up since 5/26/2021. He reports that he is concerned regarding an episode of right scapular pain that occurred over the weekend.  He describes it as an aching pain to his right scapula that was continuous for 2 days.  He did not seek medical attention.  He does recognize this as his anginal equivalent prior to 1 of his heart attacks.  He is also had daily issues with indigestion that is also 1 of his anginal equivalent prior to a previous heart attack.        Fhx:  Family History   Problem Relation Age of Onset    Arthritis Mother     Hypertension Mother     Learning disabilities Mother     Alcohol abuse Father     Early death Father     Heart disease Father     Cancer Sister     Diabetes Sister     Heart disease Maternal Grandfather     COPD Paternal Grandfather     Heart disease Son      Shx:   Social History     Socioeconomic History    Marital status:    Occupational History    Occupation: Retired   Tobacco Use    Smoking status: Every Day     Current packs/day: 2.50     Average packs/day: 2.5 packs/day for 52.1 years (130.1 ttl pk-yrs)     Types: Cigarettes     Start date: 8/10/1971     Passive exposure: Current    Smokeless tobacco: Never   Substance and Sexual Activity    Alcohol use: Not Currently    Drug use: Yes     Types: Oxycodone    Sexual activity: Yes     Partners: Female     Social Determinants of Health     Financial Resource Strain: Low Risk  (8/10/2023)    Overall Financial Resource Strain  (CARDIA)     Difficulty of Paying Living Expenses: Not very hard   Food Insecurity: No Food Insecurity (8/10/2023)    Hunger Vital Sign     Worried About Running Out of Food in the Last Year: Never true     Ran Out of Food in the Last Year: Never true   Transportation Needs: No Transportation Needs (8/10/2023)    PRAPARE - Transportation     Lack of Transportation (Medical): No     Lack of Transportation (Non-Medical): No   Physical Activity: Inactive (8/10/2023)    Exercise Vital Sign     Days of Exercise per Week: 0 days     Minutes of Exercise per Session: 0 min   Stress: No Stress Concern Present (8/10/2023)    [employer] [place] [employer] - Occupational Stress Questionnaire     Feeling of Stress : Only a little   Social Connections: Moderately Isolated (8/10/2023)    Social Connection and Isolation Panel [NHANES]     Frequency of Communication with Friends and Family: More than three times a week     Frequency of Social Gatherings with Friends and Family: More than three times a week     Attends Catholic Services: Never     Active Member of Clubs or Organizations: No     Attends Club or Organization Meetings: Never     Marital Status:    Housing Stability: Low Risk  (8/10/2023)    Housing Stability Vital Sign     Unable to Pay for Housing in the Last Year: No     Number of Places Lived in the Last Year: 1     Unstable Housing in the Last Year: No       EKG  6/27/2023 sinus bradycardia; HR 58 bpm; minimal voltage criteria for LVH; inferior infarct; anterior infarct; lateral TWA    5/26/2021 RSR; HR 64 bpm;  inferior infarct; anterior infarct; lateral TWA     [place] 7/27/2023  Dense, modertae size apical perfusion defect c/w a scar but not ischemia  Normal LV size with apical septal hypokinesis and overall normal LVSF, EF 53%    Results for orders placed during the hospital encounter of 07/27/23    Echo Saline Bubble? No    Interpretation Summary  · The left ventricle is normal in size  with  · Atrial fibrillation not observed.  · Normal right ventricular size.  · Mild mitral regurgitation.   Echocardiogram 6/19/2019  1. Normal chamber size with hypokinetic mid and basal inferior wall and mid and apical anterior wall and LV apex. Overall LVSF appeared to be at the low end of nomral range with estimated LVEF 50-55%  2. Mild or mild to moderate MR   3. Aortic valve appears tricuspid and mildly calcified and sclerotic but maintained normal opening  4. Trivial aortic regurgitation  5. Trace TR with est RVSP 39 mmHg  6. Mild PI  7. Impaired LV relaxation           Heart cath- most recent 8/12/2019  1. Severe 3 vessel CAD  2. Normal LV size with apical akinesis and overall normal LVSF, EF 55%  3. Patent SVG to the r-PDA with patent jump graft to the OM branch of the LCx  4. Patent LIMA to the OM1  5. No significant MR  ECHO No results found for this or any previous visit.            Lab Results   Component Value Date     08/11/2023    K 4.0 08/11/2023     (H) 08/11/2023    CO2 27 08/11/2023    BUN 20 (H) 08/11/2023    CREATININE 1.05 08/11/2023    CALCIUM 8.7 08/11/2023    ANIONGAP 9 08/11/2023    ESTGFRAFRICA 130 01/25/2021    EGFRNONAA 66 06/29/2022       Lab Results   Component Value Date    CHOL 147 01/05/2023    CHOL 161 07/06/2022    CHOL 223 (H) 04/15/2022     Lab Results   Component Value Date    HDL 39 (L) 01/05/2023    HDL 38 (L) 07/06/2022    HDL 36 (L) 04/15/2022     Lab Results   Component Value Date    LDLCALC 86 01/05/2023    LDLCALC 91 07/06/2022    LDLCALC 145 04/15/2022     Lab Results   Component Value Date    TRIG 110 01/05/2023    TRIG 161 (H) 07/06/2022    TRIG 208 (H) 04/15/2022     Lab Results   Component Value Date    CHOLHDL 3.8 01/05/2023    CHOLHDL 4.2 07/06/2022    CHOLHDL 6.2 04/15/2022       Lab Results   Component Value Date    WBC 5.35 08/11/2023    HGB 11.7 (L) 08/11/2023    HCT 34.1 (L) 08/11/2023    MCV 90.7 08/11/2023     08/11/2023            Current Outpatient Medications:     albuterol (PROVENTIL) 2.5 mg /3 mL (0.083 %) nebulizer solution, USE 1 VIAL IN NEBULIZER 3 TIMES DAILY, Disp: 90 each, Rfl: 11    albuterol (PROVENTIL/VENTOLIN HFA) 90 mcg/actuation inhaler, Inhale 2 puffs into the lungs 2 (two) times daily as needed for Wheezing., Disp: 18 g, Rfl: 2    amLODIPine (NORVASC) 10 MG tablet, Take 1 tablet (10 mg total) by mouth once daily., Disp: 90 tablet, Rfl: 1    aspirin 81 MG Chew, Take 81 mg by mouth once daily., Disp: , Rfl:     atorvastatin (LIPITOR) 80 MG tablet, Take 1 tablet (80 mg total) by mouth every evening., Disp: 90 tablet, Rfl: 1    clopidogreL (PLAVIX) 75 mg tablet, Take 1 tablet (75 mg total) by mouth once daily., Disp: 90 tablet, Rfl: 1    diclofenac sodium (VOLTAREN) 1 % Gel, Apply 1 g topically 4 (four) times daily as needed (pain)., Disp: 20 g, Rfl: 1    empagliflozin (JARDIANCE) 10 mg tablet, Take 1 tablet (10 mg total) by mouth once daily., Disp: 30 tablet, Rfl: 6    EScitalopram oxalate (LEXAPRO) 10 MG tablet, Take 1 tablet (10 mg total) by mouth once daily., Disp: 30 tablet, Rfl: 1    gabapentin (NEURONTIN) 300 MG capsule, Take 3 capsules (900 mg total) by mouth every 6 (six) hours., Disp: 360 capsule, Rfl: 2    HYDROcodone-acetaminophen (NORCO)  mg per tablet, Take 1 tablet by mouth every 6 (six) hours as needed for Pain (pain)., Disp: 120 tablet, Rfl: 0    hydrOXYzine pamoate (VISTARIL) 25 MG Cap, Take 1 capsule (25 mg total) by mouth once daily. Take nightly for sleep, Disp: 90 capsule, Rfl: 1    insulin detemir U-100 (LEVEMIR FLEXTOUCH U-100 INSULN) 100 unit/mL (3 mL) InPn pen, Inject 10 units subcutaneously every morning, inject 20 units subcutaneously every evening., Disp: 3 each, Rfl: 2    LIDOcaine (LIDODERM) 5 %, 1 patch once daily., Disp: , Rfl:     lisinopriL 10 MG tablet, Take 1 tablet (10 mg total) by mouth once daily., Disp: 90 tablet, Rfl: 1    metoprolol succinate (TOPROL-XL) 25 MG 24 hr  "tablet, Take 1 tablet (25 mg total) by mouth once daily., Disp: 90 tablet, Rfl: 1    mirtazapine (REMERON) 7.5 MG Tab, Take 1 tablet (7.5 mg total) by mouth nightly., Disp: 90 tablet, Rfl: 1    mupirocin (BACTROBAN) 2 % ointment, Apply topically once daily., Disp: 30 g, Rfl: 12    nitroGLYCERIN (NITROSTAT) 0.4 MG SL tablet, Place 1 tablet (0.4 mg total) under the tongue every 5 (five) minutes as needed for Chest pain., Disp: 30 tablet, Rfl: 2    pantoprazole (PROTONIX) 40 MG tablet, Take 1 tablet (40 mg total) by mouth once daily., Disp: 90 tablet, Rfl: 1    semaglutide (RYBELSUS) 7 mg tablet, Take 1 tablet (7 mg total) by mouth once daily., Disp: 30 tablet, Rfl: 5    SENNA 8.6 mg tablet, Take 2 tablets by mouth once daily., Disp: , Rfl:     SPIRIVA RESPIMAT 2.5 mcg/actuation inhaler, Inhale 1 puff into the lungs once daily., Disp: 4 g, Rfl: 5    SURE COMFORT INSULIN SYRINGE 0.3 mL 31 gauge x 5/16" Syrg, Inject 1 application into the skin 2 (two) times a day., Disp: , Rfl:   Meds reviewed and reconciled.      Review of Systems   Respiratory:  Negative for shortness of breath.    Cardiovascular:  Negative for chest pain, palpitations, orthopnea, claudication, leg swelling and PND.        Anginal equivalent; right scapular pain; and indigestion   Neurological:  Negative for dizziness, loss of consciousness and weakness.           Objective:   BP (!) 92/58 (BP Location: Left arm, Patient Position: Sitting)   Pulse 64   Ht 5' 7" (1.702 m)   Wt 72.5 kg (159 lb 12.8 oz)   SpO2 (!) 94%   BMI 25.03 kg/m²     Physical Exam  Vitals and nursing note reviewed.   Constitutional:       Appearance: Normal appearance. He is normal weight.   HENT:      Head: Normocephalic and atraumatic.   Neck:      Vascular: No carotid bruit.   Cardiovascular:      Rate and Rhythm: Normal rate and regular rhythm.      Pulses: Normal pulses.      Heart sounds: Normal heart sounds.   Pulmonary:      Effort: Pulmonary effort is normal.      " Breath sounds: Normal breath sounds.   Abdominal:      Palpations: Abdomen is soft.   Musculoskeletal:      Cervical back: Neck supple.      Right lower leg: No edema.      Left lower leg: No edema.   Skin:     General: Skin is warm and dry.      Capillary Refill: Capillary refill takes less than 2 seconds.   Neurological:      Mental Status: He is alert.           Assessment:     1. Coronary artery disease involving native coronary artery of native heart, unspecified whether angina present        2. Primary hypertension        3. Mixed hyperlipidemia              Plan:   Coronary artery disease involving native coronary artery of native heart  Asymptomatic  Continue ASA/Plavix/ Lipitor    HTN (hypertension)  92/58 mmHg    Mixed hyperlipidemia  Lipid panel results from 1/5/2023 reviewed    Trig 110  HDL 39  LDL 86  Lipitor 80 mg po daily  Lipids followed by PCP  If LDL remains great than 70 mg/dl would recommend adding Zetia 10 mg po daily or Repatha injections every 2 weeks    RTC 6 months

## 2023-08-29 NOTE — ASSESSMENT & PLAN NOTE
Lipid panel results from 1/5/2023 reviewed    Trig 110  HDL 39  LDL 86  Lipitor 80 mg po daily  Lipids followed by PCP  If LDL remains great than 70 mg/dl would recommend adding Zetia 10 mg po daily or Repatha injections every 2 weeks

## 2023-08-31 ENCOUNTER — OFFICE VISIT (OUTPATIENT)
Dept: FAMILY MEDICINE | Facility: CLINIC | Age: 63
End: 2023-08-31
Payer: MEDICARE

## 2023-08-31 VITALS
TEMPERATURE: 98 F | DIASTOLIC BLOOD PRESSURE: 60 MMHG | HEIGHT: 67 IN | BODY MASS INDEX: 25.27 KG/M2 | RESPIRATION RATE: 19 BRPM | OXYGEN SATURATION: 95 % | HEART RATE: 73 BPM | SYSTOLIC BLOOD PRESSURE: 112 MMHG | WEIGHT: 161 LBS

## 2023-08-31 DIAGNOSIS — Z79.4 TYPE 2 DIABETES MELLITUS WITH DIABETIC PERIPHERAL ANGIOPATHY WITHOUT GANGRENE, WITH LONG-TERM CURRENT USE OF INSULIN: Primary | ICD-10-CM

## 2023-08-31 DIAGNOSIS — E11.51 TYPE 2 DIABETES MELLITUS WITH DIABETIC PERIPHERAL ANGIOPATHY WITHOUT GANGRENE, WITH LONG-TERM CURRENT USE OF INSULIN: Primary | ICD-10-CM

## 2023-08-31 PROCEDURE — 99212 PR OFFICE/OUTPT VISIT, EST, LEVL II, 10-19 MIN: ICD-10-PCS | Mod: ,,, | Performed by: FAMILY MEDICINE

## 2023-08-31 PROCEDURE — 99212 OFFICE O/P EST SF 10 MIN: CPT | Mod: ,,, | Performed by: FAMILY MEDICINE

## 2023-08-31 NOTE — PROGRESS NOTES
Navdeep Avery is a 63 y.o. male seen today for follow-up on his diabetes.  He reports he is tolerating the new dose of Rybelsus well and his blood sugar this morning was 151.  Patient reports his wound is healing and he denies any chest pain or shortness of breath.  We discussed a follow-up evaluation in 3 months fasting for lab work.      Past Medical History:   Diagnosis Date    Arthritis     Carpal tunnel syndrome     COPD (chronic obstructive pulmonary disease)     Coronary artery disease involving native coronary artery of native heart 06/27/2023    CABG 2018 (No OP report available)  Most recent left heart catheterization 08/12/2019 1. Normal LV size apical akinesis and overall normal LV systolic function, ejection fraction 55% 2. Patent saphenous vein graft to the right PDA with patent jump graft to the OM branch left circumflex 3. Patent LIMA to the OM1 4. No significant mitral regurgitation     COVID 02/02/2022    Diabetic peripheral neuropathy     Diabetic ulcer of right foot associated with diabetes mellitus due to underlying condition, with bone involvement without evidence of necrosis     GERD (gastroesophageal reflux disease)     Hyperlipidemia     Hypertension     Peripheral vascular disease, unspecified     Sleep apnea     Type 2 diabetes mellitus      Family History   Problem Relation Age of Onset    Arthritis Mother     Hypertension Mother     Learning disabilities Mother     Alcohol abuse Father     Early death Father     Heart disease Father     Cancer Sister     Diabetes Sister     Heart disease Maternal Grandfather     COPD Paternal Grandfather     Heart disease Son      Current Outpatient Medications on File Prior to Visit   Medication Sig Dispense Refill    albuterol (PROVENTIL) 2.5 mg /3 mL (0.083 %) nebulizer solution USE 1 VIAL IN NEBULIZER 3 TIMES DAILY 90 each 11    albuterol (PROVENTIL/VENTOLIN HFA) 90 mcg/actuation inhaler Inhale 2 puffs into the lungs 2 (two) times daily as needed  for Wheezing. 18 g 2    amLODIPine (NORVASC) 10 MG tablet Take 1 tablet (10 mg total) by mouth once daily. 90 tablet 1    aspirin 81 MG Chew Take 81 mg by mouth once daily.      atorvastatin (LIPITOR) 80 MG tablet Take 1 tablet (80 mg total) by mouth every evening. 90 tablet 1    clopidogreL (PLAVIX) 75 mg tablet Take 1 tablet (75 mg total) by mouth once daily. 90 tablet 1    diclofenac sodium (VOLTAREN) 1 % Gel Apply 1 g topically 4 (four) times daily as needed (pain). 20 g 1    empagliflozin (JARDIANCE) 10 mg tablet Take 1 tablet (10 mg total) by mouth once daily. 30 tablet 6    gabapentin (NEURONTIN) 300 MG capsule Take 3 capsules (900 mg total) by mouth every 6 (six) hours. 360 capsule 2    HYDROcodone-acetaminophen (NORCO)  mg per tablet Take 1 tablet by mouth every 6 (six) hours as needed for Pain (pain). 120 tablet 0    hydrOXYzine pamoate (VISTARIL) 25 MG Cap Take 1 capsule (25 mg total) by mouth once daily. Take nightly for sleep 90 capsule 1    insulin detemir U-100 (LEVEMIR FLEXTOUCH U-100 INSULN) 100 unit/mL (3 mL) InPn pen Inject 10 units subcutaneously every morning, inject 20 units subcutaneously every evening. 3 each 2    LIDOcaine (LIDODERM) 5 % 1 patch once daily.      lisinopriL 10 MG tablet Take 1 tablet (10 mg total) by mouth once daily. 90 tablet 1    metoprolol succinate (TOPROL-XL) 25 MG 24 hr tablet Take 1 tablet (25 mg total) by mouth once daily. 90 tablet 1    mirtazapine (REMERON) 7.5 MG Tab Take 1 tablet (7.5 mg total) by mouth nightly. 90 tablet 1    mupirocin (BACTROBAN) 2 % ointment Apply topically once daily. 30 g 12    nitroGLYCERIN (NITROSTAT) 0.4 MG SL tablet Place 1 tablet (0.4 mg total) under the tongue every 5 (five) minutes as needed for Chest pain. 30 tablet 2    pantoprazole (PROTONIX) 40 MG tablet Take 1 tablet (40 mg total) by mouth once daily. 90 tablet 1    semaglutide (RYBELSUS) 7 mg tablet Take 1 tablet (7 mg total) by mouth once daily. 30 tablet 5    SENNA 8.6  "mg tablet Take 2 tablets by mouth once daily.      SPIRIVA RESPIMAT 2.5 mcg/actuation inhaler Inhale 1 puff into the lungs once daily. 4 g 5    SURE COMFORT INSULIN SYRINGE 0.3 mL 31 gauge x 5/16" Syrg Inject 1 application into the skin 2 (two) times a day.      EScitalopram oxalate (LEXAPRO) 10 MG tablet Take 1 tablet (10 mg total) by mouth once daily. 30 tablet 1     No current facility-administered medications on file prior to visit.     Immunization History   Administered Date(s) Administered    Influenza - Quadrivalent - PF *Preferred* (6 months and older) 09/14/2021, 10/21/2022    Pneumococcal Conjugate - 13 Valent 10/25/2017    Pneumococcal Conjugate - 20 Valent 02/17/2023    Pneumococcal Polysaccharide - 23 Valent 10/19/2016    Tdap 12/20/2019       Review of Systems   Constitutional:  Negative for fever, malaise/fatigue and weight loss.   Respiratory:  Negative for shortness of breath.    Cardiovascular:  Negative for chest pain and palpitations.   Gastrointestinal:  Negative for nausea and vomiting.   Psychiatric/Behavioral:  Negative for depression.         Vitals:    08/31/23 1600   BP: 112/60   Pulse: 73   Resp: 19   Temp: 98.2 °F (36.8 °C)       Physical Exam  Vitals reviewed.   Eyes:      Conjunctiva/sclera: Conjunctivae normal.   Pulmonary:      Effort: Pulmonary effort is normal.   Neurological:      Mental Status: He is alert.   Psychiatric:         Mood and Affect: Mood normal.         Behavior: Behavior normal.         Thought Content: Thought content normal.         Judgment: Judgment normal.          Assessment and Plan  Type 2 diabetes mellitus with diabetic peripheral angiopathy without gangrene, with long-term current use of insulin            Return to clinic in 3 months for follow-up A1c CBC CMP and lipid and an office visit or as needed.    Health Maintenance Topics with due status: Not Due       Topic Last Completion Date    TETANUS VACCINE 12/20/2019    Influenza Vaccine 10/21/2022    " LDCT Lung Screen 11/02/2022    Lipid Panel 01/05/2023    Naloxone Prescription 03/14/2023    Diabetes Urine Screening 05/17/2023    Foot Exam 05/17/2023    Hemoglobin A1c 08/10/2023    High Dose Statin 08/17/2023

## 2023-09-06 ENCOUNTER — OUTPATIENT CASE MANAGEMENT (OUTPATIENT)
Dept: ADMINISTRATIVE | Facility: OTHER | Age: 63
End: 2023-09-06

## 2023-09-06 NOTE — LETTER
Navdeep Avery  [address]  [place] [address]      Dear Navdeep Avery,     I am writing from the Outpatient Complex Care Management Department at [facility].  I received a referral from SHIMA Patrick to contact you regarding any needs you may have. I was unable to reach you by phone. Please contact me for assistance.     I can be reached at [phone] Monday thru Friday from 8:00am to 4:30pm.      Additionally, [facility] also has a program with a nurse available 24/7 to answer questions or provide medical advice.  [facility] can be reached at [phone].      Sincerely,        Ayanna Vasquez LCSW  Outpatient Care Management

## 2023-09-06 NOTE — PATIENT INSTRUCTIONS
Clean with baby shampoo and water. Apply vashe moisten drawtex to wound,cover with dry gauze,wrap with rachel,paper tape. Change daily and as needed  Offloading shoe to right foot [facility]  Monitor closely for s/s of infection including fever, chills, increase in pain, odor from wound, and increased redness from foot. Go to ER if any complications develop.   Keep leg elevated and avoid pressure on wound.  Diabetes:  Monitor glucose closely. Check fasting glucose and 2 hours after meals. HgA1C goal <7, fasting glucose , and 2 hours after meals <180  Hypertension:  Check blood pressure twice daily, goal <120/80  Diet:   Increase protein intake, avoid fried, fatty foods and foods high in simple carbs.   Vitamins:  Take vitamin C 1000 mg, zinc 50mg, vitamin d 5000 units, and a daily multivitamin. Addy is a good source of protein and nutrients to aid in wound healing.      Rituxan Pregnancy And Lactation Text: This medication is Pregnancy Category C and it isn't know if it is safe during pregnancy. It is unknown if this medication is excreted in breast milk but similar antibodies are known to be excreted.

## 2023-09-11 ENCOUNTER — OFFICE VISIT (OUTPATIENT)
Dept: SURGERY | Facility: CLINIC | Age: 63
End: 2023-09-11
Payer: MEDICARE

## 2023-09-11 DIAGNOSIS — I73.9 PERIPHERAL VASCULAR DISEASE: ICD-10-CM

## 2023-09-11 DIAGNOSIS — L97.413 DIABETIC ULCER OF RIGHT MIDFOOT ASSOCIATED WITH DIABETES MELLITUS DUE TO UNDERLYING CONDITION, WITH NECROSIS OF MUSCLE: Primary | ICD-10-CM

## 2023-09-11 DIAGNOSIS — E08.621 DIABETIC ULCER OF RIGHT MIDFOOT ASSOCIATED WITH DIABETES MELLITUS DUE TO UNDERLYING CONDITION, WITH NECROSIS OF MUSCLE: Primary | ICD-10-CM

## 2023-09-11 PROCEDURE — 99214 OFFICE O/P EST MOD 30 MIN: CPT | Mod: S$PBB,,, | Performed by: STUDENT IN AN ORGANIZED HEALTH CARE EDUCATION/TRAINING PROGRAM

## 2023-09-11 PROCEDURE — 99214 PR OFFICE/OUTPT VISIT, EST, LEVL IV, 30-39 MIN: ICD-10-PCS | Mod: S$PBB,,, | Performed by: STUDENT IN AN ORGANIZED HEALTH CARE EDUCATION/TRAINING PROGRAM

## 2023-09-11 PROCEDURE — 99215 OFFICE O/P EST HI 40 MIN: CPT | Mod: PBBFAC | Performed by: STUDENT IN AN ORGANIZED HEALTH CARE EDUCATION/TRAINING PROGRAM

## 2023-09-11 NOTE — PROGRESS NOTES
Subjective:       Patient ID: Navdeep Avery is a 63 y.o. male.    Chief Complaint: Wound Check (Right foot ulcer ck, pain and swelling in foot, took pain med this a.m. for pain)    62-year-old  male presents to the clinic for 2 week evaluation status post debridement of right lower foot; patient had a right foot abscess with necrotic muscle extending down to bone the plantar surface of the right foot adjacent to the 3rd metatarsophalangeal joint.  Patient's spouse has been taking great care of the wound and the wound is healing.  No complaints.  Denies any chest pain/shortness of breath, nausea/vomiting/diarrhea, fever/chills.    2/14:  Presents today for wound re-evaluation and placement of PuraPly XT.  No significant changes from previous visit    2/21: Presents today for wound re-evaluation and placement of PuraPly XT.  No significant changes from previous visit    2/28: Presents today for wound re-evaluation and placement of PuraPly XT.  No significant changes from previous visit    3/7: Presents today for wound re-evaluation and placement of Affinity.  No significant changes from previous visit    3/20: Presents today for wound re-evaluation and placement of Apligraf.  No significant changes from previous visit    3/27: Presents today for wound re-evaluation and placement of Apligraf.  No significant changes from previous visits    4/4: Presents today for wound re-evaluation and placement of Apligraf.  No significant changes from previous visit    4/10: Presents today for wound re-evaluation and placement of Apligraf.  No significant changes from previous visit    4/17:  Presents today for wound re-evaluation and placement of Apligraf; no significant changes from previous visit    4/25:  Presents today for wound re-evaluation and placement of Affinity; no significant changes from previous visit    5/3: Presents today for wound re-evaluation and placement of Affinity; no significant changes from  previous visit    5/9:  Presents today for wound re-evaluation and placed an affinity; no significant changes from previous visit    5/16: Presents today for wound re-evaluation and placed an affinity; no significant changes from previous visit    5/23:  Presents today for wound re-evaluation; no significant changes from previous visit    8/15:  Patient presents for evaluation of right foot wound.  Patient was recently hospitalized and required debridement on August 10th for some ulceration with muscle necrosis on the right foot; no bony involvement; no changes on x-ray from previous.  Denies any chest pain/shortness of breath, nausea/vomiting/diarrhea, fever/chills.    9/11:  Patient presents for wound re-evaluation, debridement.  No changes since seen in clinic previously.        Review of Systems   Constitutional: Negative.    HENT: Negative.     Eyes: Negative.    Respiratory:  Negative for shortness of breath.    Cardiovascular: Negative.    Gastrointestinal:  Negative for abdominal pain, blood in stool, change in bowel habit, vomiting and change in bowel habit.   Endocrine: Negative.    Genitourinary: Negative.  Negative for hematuria.   Musculoskeletal:  Negative for back pain and myalgias.   Integumentary:  Negative.   Allergic/Immunologic: Negative.    Neurological:  Negative for dizziness, weakness and light-headedness.   Psychiatric/Behavioral: Negative.           Objective:      Physical Exam  Constitutional:       General: He is not in acute distress.     Appearance: Normal appearance.   HENT:      Head: Normocephalic.   Cardiovascular:      Rate and Rhythm: Normal rate.   Pulmonary:      Effort: Pulmonary effort is normal. No respiratory distress.   Abdominal:      General: There is no distension.      Tenderness: There is no abdominal tenderness.   Musculoskeletal:         General: Normal range of motion.        Feet:    Feet:      Comments: ulceration on the plantar surface adjacent to the 3rd  metatarsophalangeal joint; measuring 2 x 1.3 x 0.2 cm; no necrosis; granulation tissue present; no bony involvement; +macerated tissue  Skin:     General: Skin is warm.      Coloration: Skin is not jaundiced.   Neurological:      General: No focal deficit present.      Mental Status: He is alert and oriented to person, place, and time.      Cranial Nerves: No cranial nerve deficit.         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, with necrosis of muscle    -  Primary    Relevant Orders    Ambulatory referral/consult to Wound Clinic              Plan:       Patient not approved for organogenesis products.    We will get an arterial Doppler to evaluate blood flow down to the leg due to chronic, poor healing wound.  Patient will be set up to go see Wound Care weekly and follow back up to see myself in 3 weeks.

## 2023-09-12 NOTE — PROGRESS NOTES
Outpatient Care Management   - High Risk Patient Assessment    Patient: Navdeep Avery  MRN:  [id number]  Date of Service:  9/12/2023  Completed by:  Ayanna Vasquez LCSW  Referral Date: 08/11/2023    Reason for Visit   Patient presents with    Other     9/6/2023  1st attempt to complete Initial Assessment  for Outpatient Care Management, left message.  Will send letter via pt's portal with [facility] SW contact info      Social Work Assessment - High Risk       Brief Summary:  [facility] for the following patient identified psycho-social needs of pt needing financial assistance to obtain glasses and dentures. Care plan was created in collaboration with patient/caregiver input.  completed the SDOH questionnaire.

## 2023-09-13 NOTE — PROGRESS NOTES
Subjective:         Patient ID: [id number] Avery is a 63 y.o. male.    Chief Complaint: Hip Pain, Knee Pain, and Foot Pain        Pain  This is a chronic problem. The current episode started more than 1 year ago. The problem occurs daily. The problem has been unchanged. Associated symptoms include arthralgias and neck pain. Pertinent negatives include no anorexia, chest pain, chills, coughing, diaphoresis, fever, sore throat, vertigo or vomiting.     Review of Systems   Constitutional:  Negative for activity change, chills, diaphoresis, fever and unexpected weight change.   HENT:  Negative for drooling, ear discharge, ear pain, facial swelling, nosebleeds, sore throat, trouble swallowing, voice change and goiter.    Eyes:  Negative for photophobia, pain, discharge, redness and visual disturbance.   Respiratory:  Negative for apnea, cough, choking, chest tightness, shortness of breath, wheezing and stridor.    Cardiovascular:  Negative for chest pain, palpitations and leg swelling.   Gastrointestinal:  Negative for abdominal distention, anorexia, diarrhea, rectal pain, vomiting and fecal incontinence.   Endocrine: Negative for cold intolerance, heat intolerance, polydipsia, polyphagia and polyuria.   Genitourinary:  Negative for bladder incontinence, dysuria, flank pain and frequency.   Musculoskeletal:  Positive for arthralgias, back pain, leg pain, neck pain and neck stiffness.   Integumentary:  Negative for color change and pallor.   Neurological:  Negative for dizziness, vertigo, seizures, syncope, facial asymmetry, speech difficulty, light-headedness, coordination difficulties, memory loss and coordination difficulties.   Hematological:  Negative for adenopathy. Does not bruise/bleed easily.   Psychiatric/Behavioral:  Negative for agitation, behavioral problems, confusion, decreased concentration, dysphoric mood, hallucinations, self-injury and suicidal ideas. The patient is not nervous/anxious and is not  hyperactive.            Past Medical History:   Diagnosis Date    Arthritis     Carpal tunnel syndrome     COPD (chronic obstructive pulmonary disease)     Coronary artery disease involving native coronary artery of native heart 06/27/2023    CABG 2018 (No OP report available)  Most recent left heart catheterization 08/12/2019 1. Normal LV size apical akinesis and overall normal LV systolic function, ejection fraction 55% 2. Patent saphenous vein graft to the right PDA with patent jump graft to the OM branch left circumflex 3. Patent LIMA to the OM1 4. No significant mitral regurgitation     COVID 02/02/2022    Diabetic peripheral neuropathy     Diabetic ulcer of right foot associated with diabetes mellitus due to underlying condition, with bone involvement without evidence of necrosis     GERD (gastroesophageal reflux disease)     Hyperlipidemia     Hypertension     Peripheral vascular disease, unspecified     Sleep apnea     Type 2 diabetes mellitus      Past Surgical History:   Procedure Laterality Date    CORONARY ARTERY BYPASS GRAFT      CORONARY STENT PLACEMENT      DEBRIDEMENT OF FOOT Right 08/10/2023    Dr. Chiu    DEBRIDEMENT OF FOOT Right 8/10/2023    Procedure: DEBRIDEMENT, FOOT;  Surgeon: Evelio Chiu DO;  Location: [facility] OR;  Service: General;  Laterality: Right;    DEBRIDEMENT OF LOWER EXTREMITY Right 06/27/2022    Procedure: DEBRIDEMENT, LOWER EXTREMITY;  Surgeon: Forrest Kiser MD;  Location: [facility] OR;  Service: General;  Laterality: Right;  right foot    HIP FRACTURE SURGERY Left     IRRIGATION AND DEBRIDEMENT OF LOWER EXTREMITY Right 09/08/2022    Procedure: IRRIGATION AND DEBRIDEMENT, LOWER EXTREMITY;  Surgeon: Selina Matos MD;  Location: [facility] OR;  Service: General;  Laterality: Right;    IRRIGATION AND DEBRIDEMENT OF LOWER EXTREMITY Right 01/05/2023    Procedure: IRRIGATION AND DEBRIDEMENT, LOWER EXTREMITY;  Surgeon: Evelio Chiu DO;  Location: [facility] OR;   Service: General;  Laterality: Right;    SHOULDER OPEN ROTATOR CUFF REPAIR Left     SPINE SURGERY      VASCULAR SURGERY       Social History     Socioeconomic History    Marital status:    Occupational History    Occupation: Retired   Tobacco Use    Smoking status: Every Day     Current packs/day: 2.50     Average packs/day: 2.5 packs/day for 52.1 years (130.2 ttl pk-yrs)     Types: Cigarettes     Start date: 8/10/1971     Passive exposure: Current    Smokeless tobacco: Never   Substance and Sexual Activity    Alcohol use: Not Currently    Drug use: Yes     Types: Oxycodone    Sexual activity: Yes     Partners: Female     Social Determinants of Health     Financial Resource Strain: Medium Risk (9/12/2023)    Overall Financial Resource Strain (CARDIA)     Difficulty of Paying Living Expenses: Somewhat hard   Food Insecurity: No Food Insecurity (9/12/2023)    Hunger Vital Sign     Worried About Running Out of Food in the Last Year: Never true     Ran Out of Food in the Last Year: Never true   Transportation Needs: No Transportation Needs (9/12/2023)    PRAPARE - Transportation     Lack of Transportation (Medical): No     Lack of Transportation (Non-Medical): No   Physical Activity: Inactive (9/12/2023)    Exercise Vital Sign     Days of Exercise per Week: 0 days     Minutes of Exercise per Session: 0 min   Stress: No Stress Concern Present (9/12/2023)    [employer] [employer] - Occupational Stress Questionnaire     Feeling of Stress : Only a little   Social Connections: Socially Isolated (9/12/2023)    Social Connection and Isolation Panel [NHANES]     Frequency of Communication with Friends and Family: Once a week     Frequency of Social Gatherings with Friends and Family: Once a week     Attends [employer] Services: Never     Active Member of Clubs or Organizations: No     Attends Club or Organization Meetings: Never     Marital Status:    Housing Stability: Low Risk  (9/12/2023)     "Housing Stability Vital Sign     Unable to Pay for Housing in the Last Year: No     Number of Places Lived in the Last Year: 1     Unstable Housing in the Last Year: No     Family History   Problem Relation Age of Onset    Arthritis Mother     Hypertension Mother     Learning disabilities Mother     Alcohol abuse Father     Early death Father     Heart disease Father     Cancer Sister     Diabetes Sister     Heart disease Maternal Grandfather     COPD Paternal Grandfather     Heart disease Son      Review of patient's allergies indicates:  No Known Allergies     Objective:  Vitals:    09/14/23 0749   BP: 119/62   Pulse: 67   Resp: 18   Weight: 74.4 kg (164 lb)   Height: 5' 7" (1.702 m)   PainSc:   7         Physical Exam  Vitals and nursing note reviewed. Exam conducted with a chaperone present.   Constitutional:       General: He is awake. He is not in acute distress.     Appearance: Normal appearance. He is not ill-appearing, toxic-appearing or diaphoretic.   HENT:      Head: Normocephalic and atraumatic.      Nose: Nose normal.      Mouth/Throat:      Mouth: Mucous membranes are moist.      Pharynx: Oropharynx is clear.   Eyes:      Conjunctiva/sclera: Conjunctivae normal.      Pupils: Pupils are equal, round, and reactive to light.   Cardiovascular:      Rate and Rhythm: Normal rate.   Pulmonary:      Effort: Pulmonary effort is normal. No respiratory distress.   Abdominal:      Palpations: Abdomen is soft.   Musculoskeletal:         General: Normal range of motion.      Cervical back: Normal range of motion and neck supple.      Thoracic back: Tenderness present.      Lumbar back: Tenderness present.   Skin:     General: Skin is warm and dry.      Coloration: Skin is not jaundiced or pale.   Neurological:      General: No focal deficit present.      Mental Status: He is alert and oriented to person, place, and time. Mental status is at baseline.      Cranial Nerves: No cranial nerve deficit (II-XII). "   Psychiatric:         Mood and Affect: Mood normal.         Behavior: Behavior normal. Behavior is cooperative.         Thought Content: Thought content normal.           Echo Saline Bubble? No  · The left ventricle is normal in size with  · Atrial fibrillation not observed.  · Normal right ventricular size.  · Mild mitral regurgitation.            Admission on 08/09/2023, Discharged on 08/11/2023   Component Date Value Ref Range Status    Sodium 08/09/2023 136  136 - 145 mmol/L Final    Potassium 08/09/2023 4.7  3.5 - 5.1 mmol/L Final    Chloride 08/09/2023 102  98 - 107 mmol/L Final    CO2 08/09/2023 31  21 - 32 mmol/L Final    Anion Gap 08/09/2023 8  7 - 16 mmol/L Final    Glucose 08/09/2023 219 (H)  74 - 106 mg/dL Final    BUN 08/09/2023 28 (H)  7 - 18 mg/dL Final    Creatinine 08/09/2023 1.21  0.70 - 1.30 mg/dL Final    BUN/Creatinine Ratio 08/09/2023 23 (H)  6 - 20 Final    Calcium 08/09/2023 10.5 (H)  8.5 - 10.1 mg/dL Final    Total Protein 08/09/2023 8.4 (H)  6.4 - 8.2 g/dL Final    Albumin 08/09/2023 3.6  3.5 - 5.0 g/dL Final    Globulin 08/09/2023 4.8 (H)  2.0 - 4.0 g/dL Final    A/G Ratio 08/09/2023 0.8   Final    Bilirubin, Total 08/09/2023 0.2  >0.0 - 1.2 mg/dL Final    Alk Phos 08/09/2023 98  45 - 115 U/L Final    ALT 08/09/2023 21  16 - 61 U/L Final    AST 08/09/2023 10 (L)  15 - 37 U/L Final    eGFR 08/09/2023 66  >=60 mL/min/1.73m2 Final    WBC 08/09/2023 5.82  4.50 - 11.00 K/uL Final    RBC 08/09/2023 4.40 (L)  4.60 - 6.20 M/uL Final    Hemoglobin 08/09/2023 13.8  13.5 - 18.0 g/dL Final    Hematocrit 08/09/2023 39.6 (L)  40.0 - 54.0 % Final    MCV 08/09/2023 90.0  80.0 - 96.0 fL Final    MCH 08/09/2023 31.4 (H)  27.0 - 31.0 pg Final    MCHC 08/09/2023 34.8  32.0 - 36.0 g/dL Final    RDW 08/09/2023 12.1  11.5 - 14.5 % Final    Platelet Count 08/09/2023 204  150 - 400 K/uL Final    MPV 08/09/2023 9.4  9.4 - 12.4 fL Final    Neutrophils % 08/09/2023 52.4 (L)  53.0 - 65.0 % Final    Lymphocytes %  08/09/2023 35.4  27.0 - 41.0 % Final    Monocytes % 08/09/2023 7.2 (H)  2.0 - 6.0 % Final    Eosinophils % 08/09/2023 3.8  1.0 - 4.0 % Final    Basophils % 08/09/2023 0.7  0.0 - 1.0 % Final    Immature Granulocytes % 08/09/2023 0.5 (H)  0.0 - 0.4 % Final    nRBC, Auto 08/09/2023 0.0  <=0.0 % Final    Neutrophils, Abs 08/09/2023 3.05  1.80 - 7.70 K/uL Final    Lymphocytes, Absolute 08/09/2023 2.06  1.00 - 4.80 K/uL Final    Monocytes, Absolute 08/09/2023 0.42  0.00 - 0.80 K/uL Final    Eosinophils, Absolute 08/09/2023 0.22  0.00 - 0.50 K/uL Final    Basophils, Absolute 08/09/2023 0.04  0.00 - 0.20 K/uL Final    Immature Granulocytes, Absolute 08/09/2023 0.03  0.00 - 0.04 K/uL Final    nRBC, Absolute 08/09/2023 0.00  <=0.00 x10e3/uL Final    Diff Type 08/09/2023 Auto   Final    SARS COV-2 MOLECULAR 08/10/2023 Negative  Negative, Invalid Final    Culture, Wound/Abscess 08/10/2023 Heavy Growth Proteus mirabilis (A)   Final    PT 08/10/2023 12.6  11.7 - 14.7 seconds Final    INR 08/10/2023 0.95  <=3.30 Final    CRP 08/10/2023 0.89 (H)  0.00 - 0.80 mg/dL Final    Hemoglobin A1C 08/10/2023 9.7 (H)  4.5 - 6.6 % Final    Estimated Average Glucose 08/10/2023 237  mg/dL Final    Specimen Outdate 08/10/2023 08/13/2023 23:59   Final    Group & Rh 08/10/2023 A POS   Final    Indirect Sanam 08/10/2023 NEG   Final    ProBNP 08/10/2023 178 (H)  1 - 125 pg/mL Final    Culture, Blood 08/10/2023 No Growth at 5 days   Final    Culture, Blood 08/10/2023 No Growth at 5 days   Final    Lactic Acid 08/10/2023 1.2  0.4 - 2.0 mmol/L Final    Magnesium 08/10/2023 2.1  1.7 - 2.3 mg/dL Final    WBC 08/10/2023 5.68  4.50 - 11.00 K/uL Final    RBC 08/10/2023 4.25 (L)  4.60 - 6.20 M/uL Final    Hemoglobin 08/10/2023 13.2 (L)  13.5 - 18.0 g/dL Final    Hematocrit 08/10/2023 38.5 (L)  40.0 - 54.0 % Final    MCV 08/10/2023 90.6  80.0 - 96.0 fL Final    MCH 08/10/2023 31.1 (H)  27.0 - 31.0 pg Final    MCHC 08/10/2023 34.3  32.0 - 36.0 g/dL Final     RDW 08/10/2023 12.3  11.5 - 14.5 % Final    Platelet Count 08/10/2023 212  150 - 400 K/uL Final    MPV 08/10/2023 9.3 (L)  9.4 - 12.4 fL Final    Neutrophils % 08/10/2023 41.2 (L)  53.0 - 65.0 % Final    Lymphocytes % 08/10/2023 45.4 (H)  27.0 - 41.0 % Final    Monocytes % 08/10/2023 7.7 (H)  2.0 - 6.0 % Final    Eosinophils % 08/10/2023 4.6 (H)  1.0 - 4.0 % Final    Basophils % 08/10/2023 0.7  0.0 - 1.0 % Final    Immature Granulocytes % 08/10/2023 0.4  0.0 - 0.4 % Final    nRBC, Auto 08/10/2023 0.0  <=0.0 % Final    Neutrophils, Abs 08/10/2023 2.34  1.80 - 7.70 K/uL Final    Lymphocytes, Absolute 08/10/2023 2.58  1.00 - 4.80 K/uL Final    Monocytes, Absolute 08/10/2023 0.44  0.00 - 0.80 K/uL Final    Eosinophils, Absolute 08/10/2023 0.26  0.00 - 0.50 K/uL Final    Basophils, Absolute 08/10/2023 0.04  0.00 - 0.20 K/uL Final    Immature Granulocytes, Absolute 08/10/2023 0.02  0.00 - 0.04 K/uL Final    nRBC, Absolute 08/10/2023 0.00  <=0.00 x10e3/uL Final    Diff Type 08/10/2023 Auto   Final    POC Glucose 08/10/2023 157 (H)  70 - 105 mg/dL Final    Case Report 08/10/2023    Final                    Value:Surgical Pathology                                Case: [id number]                                   Authorizing Provider:  Evelio Chiu DO       Collected:           08/10/2023 02:29 PM          Ordering Location:     [facility] -     Received:            08/10/2023 02:57 PM                                 Periop Services                                                              Pathologist:           Eros Arnold MD                                                           Specimen:    Foot, Right, necrotic muscle, right foot wound                                             Final Diagnosis 08/10/2023    Final                    Value:This result contains rich text formatting which cannot be displayed here.    Gross Description 08/10/2023    Final                    Value:This result contains  rich text formatting which cannot be displayed here.    Microscopic Description 08/10/2023    Final                    Value:This result contains rich text formatting which cannot be displayed here.    Laboratory Notes 08/10/2023    Final                    Value:This result contains rich text formatting which cannot be displayed here.    POC Glucose 08/10/2023 131 (H)  70 - 105 mg/dL Final    POC Glucose 08/10/2023 277 (H)  70 - 105 mg/dL Final    POC Glucose 08/11/2023 210 (H)  70 - 105 mg/dL Final    Sodium 08/11/2023 140  136 - 145 mmol/L Final    Potassium 08/11/2023 4.0  3.5 - 5.1 mmol/L Final    Chloride 08/11/2023 108 (H)  98 - 107 mmol/L Final    CO2 08/11/2023 27  21 - 32 mmol/L Final    Anion Gap 08/11/2023 9  7 - 16 mmol/L Final    Glucose 08/11/2023 207 (H)  74 - 106 mg/dL Final    BUN 08/11/2023 20 (H)  7 - 18 mg/dL Final    Creatinine 08/11/2023 1.05  0.70 - 1.30 mg/dL Final    BUN/Creatinine Ratio 08/11/2023 19  6 - 20 Final    Calcium 08/11/2023 8.7  8.5 - 10.1 mg/dL Final    Total Protein 08/11/2023 6.7  6.4 - 8.2 g/dL Final    Albumin 08/11/2023 2.9 (L)  3.5 - 5.0 g/dL Final    Globulin 08/11/2023 3.8  2.0 - 4.0 g/dL Final    A/G Ratio 08/11/2023 0.8   Final    Bilirubin, Total 08/11/2023 0.2  >0.0 - 1.2 mg/dL Final    Alk Phos 08/11/2023 80  45 - 115 U/L Final    ALT 08/11/2023 17  16 - 61 U/L Final    AST 08/11/2023 14 (L)  15 - 37 U/L Final    eGFR 08/11/2023 79  >=60 mL/min/1.73m2 Final    WBC 08/11/2023 5.35  4.50 - 11.00 K/uL Final    RBC 08/11/2023 3.76 (L)  4.60 - 6.20 M/uL Final    Hemoglobin 08/11/2023 11.7 (L)  13.5 - 18.0 g/dL Final    Hematocrit 08/11/2023 34.1 (L)  40.0 - 54.0 % Final    MCV 08/11/2023 90.7  80.0 - 96.0 fL Final    MCH 08/11/2023 31.1 (H)  27.0 - 31.0 pg Final    MCHC 08/11/2023 34.3  32.0 - 36.0 g/dL Final    RDW 08/11/2023 12.1  11.5 - 14.5 % Final    Platelet Count 08/11/2023 169  150 - 400 K/uL Final    MPV 08/11/2023 9.3 (L)  9.4 - 12.4 fL Final    Neutrophils %  08/11/2023 46.5 (L)  53.0 - 65.0 % Final    Lymphocytes % 08/11/2023 40.9  27.0 - 41.0 % Final    Monocytes % 08/11/2023 6.9 (H)  2.0 - 6.0 % Final    Eosinophils % 08/11/2023 4.7 (H)  1.0 - 4.0 % Final    Basophils % 08/11/2023 0.6  0.0 - 1.0 % Final    Immature Granulocytes % 08/11/2023 0.4  0.0 - 0.4 % Final    nRBC, Auto 08/11/2023 0.0  <=0.0 % Final    Neutrophils, Abs 08/11/2023 2.49  1.80 - 7.70 K/uL Final    Lymphocytes, Absolute 08/11/2023 2.19  1.00 - 4.80 K/uL Final    Monocytes, Absolute 08/11/2023 0.37  0.00 - 0.80 K/uL Final    Eosinophils, Absolute 08/11/2023 0.25  0.00 - 0.50 K/uL Final    Basophils, Absolute 08/11/2023 0.03  0.00 - 0.20 K/uL Final    Immature Granulocytes, Absolute 08/11/2023 0.02  0.00 - 0.04 K/uL Final    nRBC, Absolute 08/11/2023 0.00  <=0.00 x10e3/uL Final    Diff Type 08/11/2023 Auto   Final    POC Glucose 08/11/2023 196 (H)  70 - 105 mg/dL Final    POC Glucose 08/11/2023 266 (H)  70 - 105 mg/dL Final   Admission on 08/03/2023, Discharged on 08/03/2023   Component Date Value Ref Range Status    Sodium 08/03/2023 137  136 - 145 mmol/L Final    Potassium 08/03/2023 4.4  3.5 - 5.1 mmol/L Final    Chloride 08/03/2023 105  98 - 107 mmol/L Final    CO2 08/03/2023 29  21 - 32 mmol/L Final    Anion Gap 08/03/2023 7  7 - 16 mmol/L Final    Glucose 08/03/2023 177 (H)  74 - 106 mg/dL Final    BUN 08/03/2023 31 (H)  7 - 18 mg/dL Final    Creatinine 08/03/2023 1.36 (H)  0.70 - 1.30 mg/dL Final    BUN/Creatinine Ratio 08/03/2023 23 (H)  6 - 20 Final    Calcium 08/03/2023 8.5  8.5 - 10.1 mg/dL Final    Total Protein 08/03/2023 7.2  6.4 - 8.2 g/dL Final    Albumin 08/03/2023 3.3 (L)  3.5 - 5.0 g/dL Final    Globulin 08/03/2023 3.9  2.0 - 4.0 g/dL Final    A/G Ratio 08/03/2023 0.8   Final    Bilirubin, Total 08/03/2023 0.2  >0.0 - 1.2 mg/dL Final    Alk Phos 08/03/2023 76  45 - 115 U/L Final    ALT 08/03/2023 16  16 - 61 U/L Final    AST 08/03/2023 10 (L)  15 - 37 U/L Final    eGFR 08/03/2023  58 (L)  >=60 mL/min/1.73m2 Final    CRP 08/03/2023 5.30 (H)  0.00 - 0.80 mg/dL Final    WBC 08/03/2023 9.18  4.50 - 11.00 K/uL Final    RBC 08/03/2023 3.88 (L)  4.60 - 6.20 M/uL Final    Hemoglobin 08/03/2023 12.1 (L)  13.5 - 18.0 g/dL Final    Hematocrit 08/03/2023 35.3 (L)  40.0 - 54.0 % Final    MCV 08/03/2023 91.0  80.0 - 96.0 fL Final    MCH 08/03/2023 31.2 (H)  27.0 - 31.0 pg Final    MCHC 08/03/2023 34.3  32.0 - 36.0 g/dL Final    RDW 08/03/2023 12.4  11.5 - 14.5 % Final    Platelet Count 08/03/2023 148 (L)  150 - 400 K/uL Final    MPV 08/03/2023 11.0  9.4 - 12.4 fL Final    Neutrophils % 08/03/2023 67.8 (H)  53.0 - 65.0 % Final    Lymphocytes % 08/03/2023 21.8 (L)  27.0 - 41.0 % Final    Monocytes % 08/03/2023 7.7 (H)  2.0 - 6.0 % Final    Eosinophils % 08/03/2023 2.1  1.0 - 4.0 % Final    Basophils % 08/03/2023 0.3  0.0 - 1.0 % Final    Immature Granulocytes % 08/03/2023 0.3  0.0 - 0.4 % Final    nRBC, Auto 08/03/2023 0.0  <=0.0 % Final    Neutrophils, Abs 08/03/2023 6.22  1.80 - 7.70 K/uL Final    Lymphocytes, Absolute 08/03/2023 2.00  1.00 - 4.80 K/uL Final    Monocytes, Absolute 08/03/2023 0.71  0.00 - 0.80 K/uL Final    Eosinophils, Absolute 08/03/2023 0.19  0.00 - 0.50 K/uL Final    Basophils, Absolute 08/03/2023 0.03  0.00 - 0.20 K/uL Final    Immature Granulocytes, Absolute 08/03/2023 0.03  0.00 - 0.04 K/uL Final    nRBC, Absolute 08/03/2023 0.00  <=0.00 x10e3/uL Final    Diff Type 08/03/2023 Auto   Final   Admission on 07/28/2023, Discharged on 07/28/2023   Component Date Value Ref Range Status    POC Glucose 07/28/2023 241 (H)  70 - 105 mg/dL Final    Sodium 07/28/2023 137  136 - 145 mmol/L Final    Potassium 07/28/2023 4.0  3.5 - 5.1 mmol/L Final    Chloride 07/28/2023 101  98 - 107 mmol/L Final    CO2 07/28/2023 32  21 - 32 mmol/L Final    Anion Gap 07/28/2023 8  7 - 16 mmol/L Final    Glucose 07/28/2023 231 (H)  74 - 106 mg/dL Final    BUN 07/28/2023 33 (H)  7 - 18 mg/dL Final    Creatinine  07/28/2023 1.43 (H)  0.70 - 1.30 mg/dL Final    BUN/Creatinine Ratio 07/28/2023 23 (H)  6 - 20 Final    Calcium 07/28/2023 9.3  8.5 - 10.1 mg/dL Final    Total Protein 07/28/2023 8.1  6.4 - 8.2 g/dL Final    Albumin 07/28/2023 4.0  3.5 - 5.0 g/dL Final    Globulin 07/28/2023 4.1 (H)  2.0 - 4.0 g/dL Final    A/G Ratio 07/28/2023 1.0   Final    Bilirubin, Total 07/28/2023 0.2  >0.0 - 1.2 mg/dL Final    Alk Phos 07/28/2023 86  45 - 115 U/L Final    ALT 07/28/2023 16  16 - 61 U/L Final    AST 07/28/2023 9 (L)  15 - 37 U/L Final    eGFR 07/28/2023 55 (L)  >=60 mL/min/1.73m2 Final    WBC 07/28/2023 6.36  4.50 - 11.00 K/uL Final    RBC 07/28/2023 4.49 (L)  4.60 - 6.20 M/uL Final    Hemoglobin 07/28/2023 14.0  13.5 - 18.0 g/dL Final    Hematocrit 07/28/2023 40.9  40.0 - 54.0 % Final    MCV 07/28/2023 91.1  80.0 - 96.0 fL Final    MCH 07/28/2023 31.2 (H)  27.0 - 31.0 pg Final    MCHC 07/28/2023 34.2  32.0 - 36.0 g/dL Final    RDW 07/28/2023 12.2  11.5 - 14.5 % Final    Platelet Count 07/28/2023 168  150 - 400 K/uL Final    MPV 07/28/2023 10.9  9.4 - 12.4 fL Final    Neutrophils % 07/28/2023 45.8 (L)  53.0 - 65.0 % Final    Lymphocytes % 07/28/2023 43.4 (H)  27.0 - 41.0 % Final    Monocytes % 07/28/2023 6.9 (H)  2.0 - 6.0 % Final    Eosinophils % 07/28/2023 3.0  1.0 - 4.0 % Final    Basophils % 07/28/2023 0.6  0.0 - 1.0 % Final    Immature Granulocytes % 07/28/2023 0.3  0.0 - 0.4 % Final    nRBC, Auto 07/28/2023 0.0  <=0.0 % Final    Neutrophils, Abs 07/28/2023 2.91  1.80 - 7.70 K/uL Final    Lymphocytes, Absolute 07/28/2023 2.76  1.00 - 4.80 K/uL Final    Monocytes, Absolute 07/28/2023 0.44  0.00 - 0.80 K/uL Final    Eosinophils, Absolute 07/28/2023 0.19  0.00 - 0.50 K/uL Final    Basophils, Absolute 07/28/2023 0.04  0.00 - 0.20 K/uL Final    Immature Granulocytes, Absolute 07/28/2023 0.02  0.00 - 0.04 K/uL Final    nRBC, Absolute 07/28/2023 0.00  <=0.00 x10e3/uL Final    Diff Type 07/28/2023 Auto   Final    Culture, Blood  07/28/2023 No Growth at 5 days   Final    Culture, Blood 07/28/2023 No Growth at 5 days   Final   Hospital Outpatient Visit on 07/27/2023   Component Date Value Ref Range Status    85% Max Predicted HR 07/27/2023 133   Final    Max Predicted HR 07/27/2023 156   Final    [facility] CV CPX PATIENT IS MALE 07/27/2023 1.0   Final    [facility] CV CPX PATIENT IS FEMALE 07/27/2023 0.0   Final    Systolic blood pressure 07/27/2023 191  mmHg Final    Diastolic blood pressure 07/27/2023 89  mmHg Final    HR at rest 07/27/2023 76  bpm Final    [facility]ak Systolic BP 07/27/2023 191  mmHg Final    [facility] Diatolic BP 07/27/2023 89  mmHg Final    [facility] HR 07/27/2023 93  bpm Final    % Max HR Achieved 07/27/2023 60   Final    RPP 07/27/2023 14,516   Final    Peak RPP 07/27/2023 17,763   Final   Hospital Outpatient Visit on 07/27/2023   Component Date Value Ref Range Status    BSA 07/27/2023 1.85  m2 Final    TDI SEPTAL 07/27/2023 0.05  m/s Final    LV LATERAL E/E' RATIO 07/27/2023 6.75  m/s Final    LV SEPTAL E/E' RATIO 07/27/2023 10.80  m/s Final    IVC diameter 07/27/2023 1.26  cm Final    RV mid diameter 07/27/2023 2.50  cm Final    Left Ventricular Outflow Tract Lanette* 07/27/2023 0.58  cm/s Final    Left Ventricular Outflow Tract Lanette* 07/27/2023 1.60  mmHg Final    AORTIC VALVE CUSP SEPERATION 07/27/2023 2.13  cm Final    TDI LATERAL 07/27/2023 0.08  m/s Final    PV PEAK VELOCITY 07/27/2023 1.05  cm/s Final    LVIDd 07/27/2023 4.50  3.5 - 6.0 cm Final    IVS 07/27/2023 0.86  0.6 - 1.1 cm Final    Posterior Wall 07/27/2023 0.90  0.6 - 1.1 cm Final    Ao root annulus 07/27/2023 2.74  cm Final    LVIDs 07/27/2023 3.12  2.1 - 4.0 cm Final    FS 07/27/2023 31  28 - 44 % Final    LV mass 07/27/2023 128.89  g Final    LA size 07/27/2023 2.13  cm Final    RVDD 07/27/2023 3.80  cm Final    TAPSE 07/27/2023 1.70  cm Final    Right ventricular length in diasto* 07/27/2023 5.96  cm Final    RA area 07/27/2023 13.8  cm2 Final    Left Ventricle Relative Wall Thick*  07/27/2023 0.40  cm Final    AV mean gradient 07/27/2023 2  mmHg Final    AV valve area 07/27/2023 2.34  cm² Final    AV Velocity Ratio 07/27/2023 0.85   Final    AV index (prosthetic) 07/27/2023 0.84   Final    MV valve area p 1/2 method 07/27/2023 4.02  cm2 Final    E/A ratio 07/27/2023 0.66   Final    Mean e' 07/27/2023 0.07  m/s Final    E wave deceleration time 07/27/2023 188.61  msec Final    LVOT diameter 07/27/2023 1.88  cm Final    LVOT area 07/27/2023 2.8  cm2 Final    LVOT peak surinder 07/27/2023 0.93  m/s Final    LVOT peak VTI 07/27/2023 21.30  cm Final    Ao peak surinder 07/27/2023 1.09  m/s Final    Ao VTI 07/27/2023 25.3  cm Final    LVOT stroke volume 07/27/2023 59.10  cm3 Final    AV peak gradient 07/27/2023 5  mmHg Final    E/E' ratio 07/27/2023 8.31  m/s Final    MV Peak E Surinder 07/27/2023 0.54  m/s Final    TR Max Surinder 07/27/2023 2.01  m/s Final    MV stenosis pressure 1/2 time 07/27/2023 54.70  ms Final    MV Peak A Surinder 07/27/2023 0.82  m/s Final    LV Systolic Volume 07/27/2023 38.64  mL Final    LV Systolic Volume Index 07/27/2023 20.8  mL/m2 Final    LV Diastolic Volume 07/27/2023 92.43  mL Final    LV Diastolic Volume Index 07/27/2023 49.69  mL/m2 Final    LV Mass Index 07/27/2023 69  g/m2 Final    Right Atrium Volume Systolic 07/27/2023 37.20  mL Final    Triscuspid Valve Regurgitation Pea* 07/27/2023 16  mmHg Final    LA Volume Index (Mod) 07/27/2023 18.2  mL/m2 Final    LA volume (mod) 07/27/2023 33.91  cm3 Final    ZLVIDD 07/27/2023 -1.40   Final    ZLVIDS 07/27/2023 -0.18   Final    HAILE by Velocity Ratio 07/27/2023 2.37  cm² Final    EF 07/27/2023 60  % Final   Lab Visit on 06/27/2023   Component Date Value Ref Range Status    Troponin I High Sensitivity 06/27/2023 7.4  <=60.4 pg/mL Final   Office Visit on 06/15/2023   Component Date Value Ref Range Status    POC Amphetamines 06/15/2023 Negative  Negative, Inconclusive Final    POC Barbiturates 06/15/2023 Negative  Negative, Inconclusive Final     POC Benzodiazepines 06/15/2023 Negative  Negative, Inconclusive Final    POC Cocaine 06/15/2023 Negative  Negative, Inconclusive Final    POC THC 06/15/2023 Negative  Negative, Inconclusive Final    POC Methadone 06/15/2023 Negative  Negative, Inconclusive Final    POC Methamphetamine 06/15/2023 Negative  Negative, Inconclusive Final    POC Opiates 06/15/2023 Presumptive Positive (A)  Negative, Inconclusive Final    POC Oxycodone 06/15/2023 Negative  Negative, Inconclusive Final    POC Phencyclidine 06/15/2023 Negative  Negative, Inconclusive Final    POC Methylenedioxymethamphetamine * 06/15/2023 Negative  Negative, Inconclusive Final    POC Tricyclic Antidepressants 06/15/2023 Negative  Negative, Inconclusive Final    POC Buprenorphine 06/15/2023 Negative   Final     Acceptable 06/15/2023 Yes   Final    POC Temperature (Urine) 06/15/2023 92   Final   Office Visit on 05/17/2023   Component Date Value Ref Range Status    Creatinine, Urine 05/17/2023 88  39 - 259 mg/dL Final    Microalbumin 05/17/2023 12.7 (H)  0.0 - 2.8 mg/dL Final    Microalbumin/Creatinine Ratio 05/17/2023 144.3 (H)  0.0 - 30.0 mg/g Final    Hemoglobin A1C 05/17/2023 8.8 (H)  4.5 - 6.6 % Final    Estimated Average Glucose 05/17/2023 207  mg/dL Final    Sodium 05/17/2023 134 (L)  136 - 145 mmol/L Final    Potassium 05/17/2023 4.6  3.5 - 5.1 mmol/L Final    Chloride 05/17/2023 103  98 - 107 mmol/L Final    CO2 05/17/2023 27  21 - 32 mmol/L Final    Anion Gap 05/17/2023 9  7 - 16 mmol/L Final    Glucose 05/17/2023 238 (H)  74 - 106 mg/dL Final    BUN 05/17/2023 23 (H)  7 - 18 mg/dL Final    Creatinine 05/17/2023 0.97  0.70 - 1.30 mg/dL Final    BUN/Creatinine Ratio 05/17/2023 24 (H)  6 - 20 Final    Calcium 05/17/2023 9.3  8.5 - 10.1 mg/dL Final    Total Protein 05/17/2023 7.5  6.4 - 8.2 g/dL Final    Albumin 05/17/2023 3.7  3.5 - 5.0 g/dL Final    Globulin 05/17/2023 3.8  2.0 - 4.0 g/dL Final    A/G Ratio 05/17/2023 1.0   Final     Bilirubin, Total 05/17/2023 0.3  >0.0 - 1.2 mg/dL Final    Alk Phos 05/17/2023 107  45 - 115 U/L Final    ALT 05/17/2023 14 (L)  16 - 61 U/L Final    AST 05/17/2023 7 (L)  15 - 37 U/L Final    eGFR 05/17/2023 87  >=60 mL/min/1.73m2 Final    WBC 05/17/2023 5.16  4.50 - 11.00 K/uL Final    RBC 05/17/2023 4.52 (L)  4.60 - 6.20 M/uL Final    Hemoglobin 05/17/2023 13.4 (L)  13.5 - 18.0 g/dL Final    Hematocrit 05/17/2023 42.1  40.0 - 54.0 % Final    MCV 05/17/2023 93.1  80.0 - 96.0 fL Final    MCH 05/17/2023 29.6  27.0 - 31.0 pg Final    MCHC 05/17/2023 31.8 (L)  32.0 - 36.0 g/dL Final    RDW 05/17/2023 13.2  11.5 - 14.5 % Final    Platelet Count 05/17/2023 181  150 - 400 K/uL Final    MPV 05/17/2023 10.9  9.4 - 12.4 fL Final    Neutrophils % 05/17/2023 47.1 (L)  53.0 - 65.0 % Final    Lymphocytes % 05/17/2023 40.5  27.0 - 41.0 % Final    Monocytes % 05/17/2023 6.8 (H)  2.0 - 6.0 % Final    Eosinophils % 05/17/2023 4.8 (H)  1.0 - 4.0 % Final    Basophils % 05/17/2023 0.6  0.0 - 1.0 % Final    Immature Granulocytes % 05/17/2023 0.2  0.0 - 0.4 % Final    nRBC, Auto 05/17/2023 0.0  <=0.0 % Final    Neutrophils, Abs 05/17/2023 2.43  1.80 - 7.70 K/uL Final    Lymphocytes, Absolute 05/17/2023 2.09  1.00 - 4.80 K/uL Final    Monocytes, Absolute 05/17/2023 0.35  0.00 - 0.80 K/uL Final    Eosinophils, Absolute 05/17/2023 0.25  0.00 - 0.50 K/uL Final    Basophils, Absolute 05/17/2023 0.03  0.00 - 0.20 K/uL Final    Immature Granulocytes, Absolute 05/17/2023 0.01  0.00 - 0.04 K/uL Final    nRBC, Absolute 05/17/2023 0.00  <=0.00 x10e3/uL Final    Diff Type 05/17/2023 Auto   Final         No orders of the defined types were placed in this encounter.      Requested Prescriptions     Pending Prescriptions Disp Refills    HYDROcodone-acetaminophen (NORCO)  mg per tablet 120 tablet 0     Sig: Take 1 tablet by mouth every 6 (six) hours as needed for Pain (pain).    HYDROcodone-acetaminophen (NORCO)  mg per tablet 120 tablet 0      Sig: Take 1 tablet by mouth every 6 (six) hours as needed for Pain (pain).    gabapentin (NEURONTIN) 300 MG capsule 360 capsule 2     Sig: Take 3 capsules (900 mg total) by mouth every 6 (six) hours.       Assessment:     1. Peripheral vascular disease, unspecified    2. Ulcer of right foot with necrosis of muscle    3. Neuropathy    4. Hip pain, left status post left hip replacement         A's of Opioid Risk Assessment  Activity:Patient can perform ADL.   Analgesia:Patients pain is partially controlled by current medication. Patient has tried OTC medications such as Tylenol and Ibuprofen with out relief.   Adverse Effects: Patient denies constipation or sedation.  Aberrant Behavior:  reviewed with no aberrant drug seeking/taking behavior.  Overdose reversal drug naloxone discussed    Drug screen reviewed      X-ray pelvis and hips [facility] January 5, 2023  Acetabular fracture repair present with appearance similar to previous.  No other evidence of fracture seen.  The alignment of the joints appears normal.  No degenerative change is present.  No soft tissue abnormality is seen.          Plan:    Narcan March 2023        Chronic left hip pain left groin pain after left after acetabular reconstruction right foot reconstruction [facility]    Following wound management nonhealing ulcer right foot multiple surgeries pending skin graft [facility]     Pharmacy closed on Sunday Mr. Discount 4.    Any further inconsistent drug screens were no longer provide narcotics June 2021 definitive drug screen inconsistent  positive for hydrocodone and oxycodone    He states current medications helping control his discomfort is having some increasing right lower extremity pain due to nonhealing lesion right foot following wound management    He would like to continue conservative management    Continue home exercise program as tolerated     Continue current medication     Follow-up 3 months     Dr. Dobson, December  2023    Bring original prescription medication bottles/container/box with labels to each visit

## 2023-09-14 ENCOUNTER — EXTERNAL HOME HEALTH (OUTPATIENT)
Dept: HOME HEALTH SERVICES | Facility: HOSPITAL | Age: 63
End: 2023-09-14
Payer: MEDICARE

## 2023-09-14 ENCOUNTER — OFFICE VISIT (OUTPATIENT)
Dept: PAIN MEDICINE | Facility: CLINIC | Age: 63
End: 2023-09-14
Payer: MEDICARE

## 2023-09-14 ENCOUNTER — OUTPATIENT CASE MANAGEMENT (OUTPATIENT)
Dept: ADMINISTRATIVE | Facility: OTHER | Age: 63
End: 2023-09-14

## 2023-09-14 VITALS
SYSTOLIC BLOOD PRESSURE: 119 MMHG | HEIGHT: 67 IN | WEIGHT: 164 LBS | BODY MASS INDEX: 25.74 KG/M2 | HEART RATE: 67 BPM | DIASTOLIC BLOOD PRESSURE: 62 MMHG | RESPIRATION RATE: 18 BRPM

## 2023-09-14 DIAGNOSIS — G62.9 NEUROPATHY: Chronic | ICD-10-CM

## 2023-09-14 DIAGNOSIS — M25.552 HIP PAIN, LEFT: Chronic | ICD-10-CM

## 2023-09-14 DIAGNOSIS — Z79.899 ENCOUNTER FOR LONG-TERM (CURRENT) USE OF OTHER MEDICATIONS: ICD-10-CM

## 2023-09-14 DIAGNOSIS — I73.9 PERIPHERAL VASCULAR DISEASE, UNSPECIFIED: Primary | Chronic | ICD-10-CM

## 2023-09-14 DIAGNOSIS — L97.513 ULCER OF RIGHT FOOT WITH NECROSIS OF MUSCLE: Chronic | ICD-10-CM

## 2023-09-14 PROCEDURE — 80305 DRUG TEST PRSMV DIR OPT OBS: CPT | Mod: PBBFAC | Performed by: PHYSICIAN ASSISTANT

## 2023-09-14 PROCEDURE — 99215 OFFICE O/P EST HI 40 MIN: CPT | Mod: PBBFAC | Performed by: PHYSICIAN ASSISTANT

## 2023-09-14 PROCEDURE — 99999PBSHW POCT URINE DRUG SCREEN PRESUMP: Mod: PBBFAC,,,

## 2023-09-14 PROCEDURE — 99999PBSHW POCT URINE DRUG SCREEN PRESUMP: ICD-10-PCS | Mod: PBBFAC,,,

## 2023-09-14 PROCEDURE — 99214 OFFICE O/P EST MOD 30 MIN: CPT | Mod: S$PBB,,, | Performed by: PHYSICIAN ASSISTANT

## 2023-09-14 PROCEDURE — 99214 PR OFFICE/OUTPT VISIT, EST, LEVL IV, 30-39 MIN: ICD-10-PCS | Mod: S$PBB,,, | Performed by: PHYSICIAN ASSISTANT

## 2023-09-14 RX ORDER — GABAPENTIN 300 MG/1
900 CAPSULE ORAL EVERY 6 HOURS
Qty: 360 CAPSULE | Refills: 2 | Status: SHIPPED | OUTPATIENT
Start: 2023-09-14 | End: 2023-12-13 | Stop reason: SDUPTHER

## 2023-09-14 RX ORDER — HYDROCODONE BITARTRATE AND ACETAMINOPHEN 10; 325 MG/1; MG/1
1 TABLET ORAL EVERY 6 HOURS PRN
Qty: 120 TABLET | Refills: 0 | Status: SHIPPED | OUTPATIENT
Start: 2023-09-21 | End: 2023-10-21

## 2023-09-14 RX ORDER — HYDROCODONE BITARTRATE AND ACETAMINOPHEN 10; 325 MG/1; MG/1
1 TABLET ORAL EVERY 6 HOURS PRN
Qty: 120 TABLET | Refills: 0 | Status: SHIPPED | OUTPATIENT
Start: 2023-11-20 | End: 2023-12-20

## 2023-09-14 RX ORDER — HYDROCODONE BITARTRATE AND ACETAMINOPHEN 10; 325 MG/1; MG/1
1 TABLET ORAL EVERY 6 HOURS PRN
Qty: 120 TABLET | Refills: 0 | Status: SHIPPED | OUTPATIENT
Start: 2023-10-21 | End: 2023-12-13 | Stop reason: SDUPTHER

## 2023-09-14 NOTE — PROGRESS NOTES
Outpatient Care Management  Plan of Care Follow Up Visit    Patient: Navdeep Avery  MRN: [id number]  Date of Service: 09/14/2023  Completed by: Celina Pearl RN  Referral Date: 08/11/2023    Reason for Visit   Patient presents with    OPCM RN Follow Up Call       Brief Summary: see care plan   Next Steps: Follow up in two weeks   Review his diet, blood glucose and wound care

## 2023-09-18 ENCOUNTER — OFFICE VISIT (OUTPATIENT)
Dept: WOUND CARE | Facility: CLINIC | Age: 63
End: 2023-09-18
Attending: FAMILY MEDICINE
Payer: MEDICARE

## 2023-09-18 VITALS
SYSTOLIC BLOOD PRESSURE: 159 MMHG | DIASTOLIC BLOOD PRESSURE: 76 MMHG | HEART RATE: 78 BPM | TEMPERATURE: 97 F | RESPIRATION RATE: 18 BRPM

## 2023-09-18 DIAGNOSIS — L97.413 DIABETIC ULCER OF RIGHT MIDFOOT ASSOCIATED WITH DIABETES MELLITUS DUE TO UNDERLYING CONDITION, WITH NECROSIS OF MUSCLE: Primary | ICD-10-CM

## 2023-09-18 DIAGNOSIS — L97.416 DIABETIC ULCER OF RIGHT MIDFOOT ASSOCIATED WITH DIABETES MELLITUS DUE TO UNDERLYING CONDITION, WITH BONE INVOLVEMENT WITHOUT EVIDENCE OF NECROSIS: ICD-10-CM

## 2023-09-18 DIAGNOSIS — I73.9 PERIPHERAL VASCULAR DISEASE, UNSPECIFIED: ICD-10-CM

## 2023-09-18 DIAGNOSIS — E08.621 DIABETIC ULCER OF RIGHT MIDFOOT ASSOCIATED WITH DIABETES MELLITUS DUE TO UNDERLYING CONDITION, WITH NECROSIS OF MUSCLE: Primary | ICD-10-CM

## 2023-09-18 DIAGNOSIS — E08.621 DIABETIC ULCER OF RIGHT MIDFOOT ASSOCIATED WITH DIABETES MELLITUS DUE TO UNDERLYING CONDITION, WITH BONE INVOLVEMENT WITHOUT EVIDENCE OF NECROSIS: ICD-10-CM

## 2023-09-18 PROCEDURE — 99499 NO LOS: ICD-10-PCS | Mod: S$PBB,,, | Performed by: NURSE PRACTITIONER

## 2023-09-18 PROCEDURE — 99499 UNLISTED E&M SERVICE: CPT | Mod: S$PBB,,, | Performed by: NURSE PRACTITIONER

## 2023-09-18 PROCEDURE — 11042 DEBRIDEMENT: ICD-10-PCS | Mod: S$PBB,,, | Performed by: NURSE PRACTITIONER

## 2023-09-18 PROCEDURE — 99215 OFFICE O/P EST HI 40 MIN: CPT | Mod: PBBFAC,25 | Performed by: NURSE PRACTITIONER

## 2023-09-18 PROCEDURE — 11042 DBRDMT SUBQ TIS 1ST 20SQCM/<: CPT | Mod: PBBFAC | Performed by: NURSE PRACTITIONER

## 2023-09-18 RX ORDER — GENTAMICIN SULFATE 1 MG/G
OINTMENT TOPICAL DAILY
Qty: 90 G | Refills: 2 | Status: SHIPPED | OUTPATIENT
Start: 2023-09-18 | End: 2024-02-27

## 2023-09-18 RX ORDER — MICONAZOLE NITRATE 2 %
POWDER (GRAM) TOPICAL
Qty: 85 G | Refills: 2 | Status: SHIPPED | OUTPATIENT
Start: 2023-09-18 | End: 2024-02-27

## 2023-09-18 NOTE — PROCEDURES
"Debridement    Date/Time: 9/18/2023 9:00 AM    Performed by: Marli Morales FNP  Authorized by: Marli Morales FNP    Time out: Immediately prior to procedure a "time out" was called to verify the correct patient, procedure, equipment, support staff and site/side marked as required.    Consent Done?:  Yes (Written)    Wound Details:    Location:  Right foot    Location:  Right Plantar    Type of Debridement:  Excisional       Length (cm):  1       Area (sq cm):  1.2       Width (cm):  1.2       Percent Debrided (%):  100       Depth (cm):  0.3       Total Area Debrided (sq cm):  1.2    Depth of debridement:  Subcutaneous tissue    Tissue debrided:  Adipose, Dermis, Epidermis and Subcutaneous    Devitalized tissue debrided:  Clots, Callus, Biofilm, Exudate and Fibrin    Instruments:  Curette  Bleeding:  Moderate  Hemostasis Achieved: Yes  Method Used:  Pressure  Patient tolerance:  Patient tolerated the procedure well with no immediate complications  1st Wound Pain Assessment: 0     Assistant VIRGINIE Waterman LPN    "

## 2023-09-18 NOTE — PROGRESS NOTES
Debridement Performed for Assessment: Wound# right plantar foot  Performed By: Provider: Marli Morales NP  Assistant:Marilin Rosa RN    Debridement: Surgical    Photo taken post procedure:    Time-Out Taken: Yes  Level: Skin/Subcutaneous Tissue  Post Debridement Measurements  Length: (cm) 1  Width: (cm) 1.2  Depth: (cm) 0.3      Area: (cm²) 1.2  Percent Debrided: 100%  Total Area Debrided: (sq cm) 1.2    Tissue and other material debrided:  Adipose, Dermis, Epidermis, Subcutaneous  Devitalized Tissue Debrided:Biofilm, Slough  Instrument: Curette  Bleeding: Moderate  Hemostasis Achieved: Pressure  Procedural Pain: Insensate  Post Procedural Pain: Insensate  Response to Treatment: Procedure was tolerated well    Devitalized materials/tissue Removed  the following was removed during debridement  subcutaneous      Post Debridement Diagnosis  Post debridement diagnosis  Same as Pre-operative debridement diagnosis, No Complications noted.      Grafts or implants applied  Was a graft or implant applied?  No      Procedure assistant  Procedure assisted by:  Assistant is the same as nurse listed above      Complications related to procedure  Did any complication occure during procedure?  No complications noted during or after procedure.      Specimen  Specimen collect during procedure?  No specimen collected      Anaesthesia:  Anesthesia used  None      Blood Loss:  Blood loss during procedure  less than 5 cc

## 2023-09-18 NOTE — PROGRESS NOTES
GIANA Moncada   [facility]  [address] [facility] [place] [address]  [phone]      PATIENT NAME: Navdeep Avery  : 1960  DATE: 23  MRN: [id number]      Billing Provider: GIANA Moncada  Level of Service:   Patient PCP Information       Provider PCP Type    Marquez Huff MD General            Reason for Visit / Chief Complaint: Diabetic ulcer of right midfoot associated with diabetes me       History of Present Illness / Problem Focused Workflow     Navdeep Avery is a 63 y.o. male who presents to clinic with chronic non healing wound to right plantar foot. He is status post debridement of right foot in August 10, 2023 per Dr. Razo. He is not currently on antibiotics but was on Augmentin post surgery. He has been applying silvadene to wound bed every other day and applying a dry dressing. Wound with granulation tissue, callus marcelo-wound. Bedside debridement today. Pertinent PMH includes hypertension, diabetes, peripheral vascular disease, and high cholesterol. Last HgA1C 9.7 in ,  diabeters managed by PCP. Last doppler , ABIs WNL. Arterial doppler scheduled for 2023. He is not wearing off-loading shoe today. Discussed importance of keeping pressure off foot, elevating legs when sitting, wearing off-loading shoe when ambulating, strict glycemic control, and high protein diet to enhance wound healing.   Wound healing is complicated by multiple co-morbidities, poor vascular supply, immunosuppression, diabetes, edema, heavy drainage, excessive wound moisture and macerated tissue, fragile skin, no protective sensation, and infection.           Review of Systems     Review of Systems   Constitutional:  Positive for activity change. Negative for chills and fever.   Respiratory:  Negative for chest tightness and shortness of breath.    Cardiovascular:  Positive for leg swelling. Negative for chest pain and  palpitations.   Musculoskeletal:  Positive for arthralgias and joint swelling.   Skin:  Positive for wound.        wound   Neurological:  Positive for weakness and numbness.   Psychiatric/Behavioral:  Negative for agitation, behavioral problems, confusion and self-injury.      Medical / Social / Family History     Past Medical History:   Diagnosis Date    Arthritis     Carpal tunnel syndrome     COPD (chronic obstructive pulmonary disease)     Coronary artery disease involving native coronary artery of native heart 06/27/2023    CABG 2018 (No OP report available)  Most recent left heart catheterization 08/12/2019 1. Normal LV size apical akinesis and overall normal LV systolic function, ejection fraction 55% 2. Patent saphenous vein graft to the right PDA with patent jump graft to the OM branch left circumflex 3. Patent LIMA to the OM1 4. No significant mitral regurgitation     COVID 02/02/2022    Diabetic peripheral neuropathy     Diabetic ulcer of right foot associated with diabetes mellitus due to underlying condition, with bone involvement without evidence of necrosis     GERD (gastroesophageal reflux disease)     Hyperlipidemia     Hypertension     Peripheral vascular disease, unspecified     Sleep apnea     Type 2 diabetes mellitus        Past Surgical History:   Procedure Laterality Date    CORONARY ARTERY BYPASS GRAFT      CORONARY STENT PLACEMENT      DEBRIDEMENT OF FOOT Right 08/10/2023    Dr. Chiu    DEBRIDEMENT OF FOOT Right 8/10/2023    Procedure: DEBRIDEMENT, FOOT;  Surgeon: Evelio Chiu DO;  Location: [facility] OR;  Service: General;  Laterality: Right;    DEBRIDEMENT OF LOWER EXTREMITY Right 06/27/2022    Procedure: DEBRIDEMENT, LOWER EXTREMITY;  Surgeon: Forrest Kiser MD;  Location: [facility] OR;  Service: General;  Laterality: Right;  right foot    HIP FRACTURE SURGERY Left     IRRIGATION AND DEBRIDEMENT OF LOWER EXTREMITY Right 09/08/2022    Procedure: IRRIGATION AND  DEBRIDEMENT, LOWER EXTREMITY;  Surgeon: Selina Matos MD;  Location: [facility] OR;  Service: General;  Laterality: Right;    IRRIGATION AND DEBRIDEMENT OF LOWER EXTREMITY Right 01/05/2023    Procedure: IRRIGATION AND DEBRIDEMENT, LOWER EXTREMITY;  Surgeon: Evelio Chiu DO;  Location: [facility] OR;  Service: General;  Laterality: Right;    SHOULDER OPEN ROTATOR CUFF REPAIR Left     SPINE SURGERY      VASCULAR SURGERY         Social History  Mr. Navdeep Avery  reports that he has been smoking cigarettes. He started smoking about 52 years ago. He has a 130.3 pack-year smoking history. He has been exposed to tobacco smoke. He has never used smokeless tobacco. He reports that he does not currently use alcohol. He reports current drug use. Drug: Oxycodone.    Family History  's Navdeep Avery family history includes Alcohol abuse in his father; Arthritis in his mother; COPD in his paternal grandfather; Cancer in his sister; Diabetes in his sister; Early death in his father; Heart disease in his father, maternal grandfather, and son; Hypertension in his mother; Learning disabilities in his mother.    Medications and Allergies     Medications  Outpatient Medications Marked as Taking for the 9/18/23 encounter (Office Visit) with Marli Morales FNP   Medication Sig Dispense Refill    albuterol (PROVENTIL) 2.5 mg /3 mL (0.083 %) nebulizer solution USE 1 VIAL IN NEBULIZER 3 TIMES DAILY 90 each 11    albuterol (PROVENTIL/VENTOLIN HFA) 90 mcg/actuation inhaler Inhale 2 puffs into the lungs 2 (two) times daily as needed for Wheezing. 18 g 2    amLODIPine (NORVASC) 10 MG tablet Take 1 tablet (10 mg total) by mouth once daily. 90 tablet 1    aspirin 81 MG Chew Take 81 mg by mouth once daily.      atorvastatin (LIPITOR) 80 MG tablet Take 1 tablet (80 mg total) by mouth every evening. 90 tablet 1    clopidogreL (PLAVIX) 75 mg tablet Take 1 tablet (75 mg total) by mouth once daily. 90 tablet 1     "diclofenac sodium (VOLTAREN) 1 % Gel Apply 1 g topically 4 (four) times daily as needed (pain). 20 g 1    empagliflozin (JARDIANCE) 10 mg tablet Take 1 tablet (10 mg total) by mouth once daily. 30 tablet 6    gabapentin (NEURONTIN) 300 MG capsule Take 3 capsules (900 mg total) by mouth every 6 (six) hours. 360 capsule 2    [START ON 9/21/2023] HYDROcodone-acetaminophen (NORCO)  mg per tablet Take 1 tablet by mouth every 6 (six) hours as needed for Pain (pain). 120 tablet 0    [START ON 10/21/2023] HYDROcodone-acetaminophen (NORCO)  mg per tablet Take 1 tablet by mouth every 6 (six) hours as needed for Pain (pain). 120 tablet 0    [START ON 11/20/2023] HYDROcodone-acetaminophen (NORCO)  mg per tablet Take 1 tablet by mouth every 6 (six) hours as needed for Pain (pain). 120 tablet 0    hydrOXYzine pamoate (VISTARIL) 25 MG Cap Take 1 capsule (25 mg total) by mouth once daily. Take nightly for sleep 90 capsule 1    insulin detemir U-100 (LEVEMIR FLEXTOUCH U-100 INSULN) 100 unit/mL (3 mL) InPn pen Inject 10 units subcutaneously every morning, inject 20 units subcutaneously every evening. 3 each 2    LIDOcaine (LIDODERM) 5 % 1 patch once daily.      lisinopriL 10 MG tablet Take 1 tablet (10 mg total) by mouth once daily. 90 tablet 1    metoprolol succinate (TOPROL-XL) 25 MG 24 hr tablet Take 1 tablet (25 mg total) by mouth once daily. 90 tablet 1    nitroGLYCERIN (NITROSTAT) 0.4 MG SL tablet Place 1 tablet (0.4 mg total) under the tongue every 5 (five) minutes as needed for Chest pain. 30 tablet 2    pantoprazole (PROTONIX) 40 MG tablet Take 1 tablet (40 mg total) by mouth once daily. 90 tablet 1    SENNA 8.6 mg tablet Take 2 tablets by mouth once daily.      SPIRIVA RESPIMAT 2.5 mcg/actuation inhaler Inhale 1 puff into the lungs once daily. 4 g 5    SURE COMFORT INSULIN SYRINGE 0.3 mL 31 gauge x 5/16" Syrg Inject 1 application into the skin 2 (two) times a day.         Allergies  Review " of patient's allergies indicates:  No Known Allergies    Physical Examination     Vitals:    09/18/23 0852   BP: (!) 159/76   Pulse: 78   Resp: 18   Temp: 97.3 °F (36.3 °C)     Physical Exam  Vitals and nursing note reviewed.   Constitutional:       Appearance: Normal appearance.   HENT:      Head: Normocephalic.   Cardiovascular:      Rate and Rhythm: Normal rate and regular rhythm.      Pulses: Normal pulses.      Heart sounds: Normal heart sounds.   Pulmonary:      Effort: Pulmonary effort is normal. No respiratory distress.   Chest:      Chest wall: No tenderness.   Musculoskeletal:         General: Swelling and tenderness present.      Right lower leg: Edema present.   Skin:     General: Skin is warm and dry.      Comments: See LDA for photos/measurements   Neurological:      General: No focal deficit present.      Mental Status: He is alert and oriented to person, place, and time. Mental status is at baseline.   Psychiatric:         Mood and Affect: Mood normal.         Behavior: Behavior normal.         Thought Content: Thought content normal.         Judgment: Judgment normal.     Assessment and Plan             Incision/Site 08/10/23 1433 Right Foot (Active)   08/10/23 1433   Present Prior to Hospital Arrival?:    Side: Right   Location: Foot   Orientation:    Incision Type:    Closure Method:    Additional Comments:    Removal Indication and Assessment:    Wound Outcome:    Removal Indications:    Wound Image   09/18/23 0948   Dressing Appearance Dried drainage 09/18/23 0854   Drainage Amount Moderate 09/18/23 0854   Drainage Characteristics/Odor Serosanguineous;No odor 09/18/23 0854   Appearance Pink;Red;Moist 09/18/23 0854   Black (%), Wound Tissue Color 0 % 09/18/23 0854   Red (%), Wound Tissue Color 100 % 09/18/23 0854   Yellow (%), Wound Tissue Color 0 % 09/18/23 0854   Periwound Area Macerated;Pale white 09/18/23 0854   Wound Edges Callused;Open 09/18/23 0854   Wound Length (cm) 1 cm 09/18/23 0948    Wound Width (cm) 1.2 cm 09/18/23 0948   Wound Depth (cm) 0.3 cm 09/18/23 0948   Wound Volume (cm^3) 0.36 cm^3 09/18/23 0948   Wound Surface Area (cm^2) 1.2 cm^2 09/18/23 0948   Care Cleansed with:;Soap and water;Applied:;Skin Barrier;Debrided 09/18/23 0854   Dressing Applied;Silver;Gauze;Rolled gauze 09/18/23 0854   Periwound Care Moisture barrier applied 09/18/23 0854   Off Loading Off loading shoe 09/18/23 0854   Dressing Change Due 09/19/23 09/18/23 0854     Problem List Items Addressed This Visit          Cardiac/Vascular    Peripheral vascular disease, unspecified (Chronic)       Endocrine    Diabetic ulcer of right foot associated with diabetes mellitus due to underlying condition, with bone involvement without evidence of necrosis    Overview                                  Current Assessment & Plan     Clean with baby shampoo and water or vashe.  Apply moisturizer to calluses on foot  Apply gentamycin to wound then cover with drawtex (cut slightly larger than wound)  Cover with dry gauze.  Wrap with kerlix/rachel.  Secure with paper tape.  Change daily and as needed for soilage.    Monitor closely for s/s of infection including fever, chills, increase in pain, odor from wound, and increased redness from foot. Go to ER if any complications develop.   Keep leg elevated and avoid pressure on wound.    Diabetes:  Monitor glucose closely. Check fasting glucose and 2 hours after meals. HgA1C goal <7, fasting glucose , and 2 hours after meals <180  Hypertension:  Check blood pressure twice daily, goal <120/80  Diet:   Increase protein intake, avoid fried, fatty foods and foods high in simple carbs.   Vitamins:  Take vitamin C 1000 mg, zinc 50mg, vitamin d 5000 units, and a daily multivitamin. Addy is a good source of protein and nutrients to aid in wound healing.           Other Visit Diagnoses       Diabetic ulcer of right midfoot associated with diabetes mellitus due to underlying condition, with  necrosis of muscle    -  Primary            Future Appointments   Date Time Provider Department Center   9/29/2023  9:30 AM [facility]   9/29/2023 10:00 AM Marli Morales FNP [facility]   10/2/2023 10:00 AM [facility]   [facility] 10[facility]   [facility]23  7:45 [facility]            Signature:  GIANA Moncada  [facility]  [address] [facility] [place] [address]  [phone]    Date of encounter: 9/18/23

## 2023-09-18 NOTE — PATIENT INSTRUCTIONS
Clean with baby shampoo and water or vashe.  Apply moisturizer to calluses on foot  Apply gentamycin to wound then cover with drawtex (cut slightly larger than wound)  Cover with dry gauze.  Wrap with kerlix/rachel.  Secure with paper tape.  Change daily and as needed for soilage.    Monitor closely for s/s of infection including fever, chills, increase in pain, odor from wound, and increased redness from foot. Go to ER if any complications develop.   Keep leg elevated and avoid pressure on wound.    Diabetes:  Monitor glucose closely. Check fasting glucose and 2 hours after meals. HgA1C goal <7, fasting glucose , and 2 hours after meals <180  Hypertension:  Check blood pressure twice daily, goal <120/80  Diet:   Increase protein intake, avoid fried, fatty foods and foods high in simple carbs.   Vitamins:  Take vitamin C 1000 mg, zinc 50mg, vitamin d 5000 units, and a daily multivitamin. Addy is a good source of protein and nutrients to aid in wound healing.      Lab Results   Component Value Date    EGFR 58 05/17/2022    EGFR 48 05/16/2022    EGFR 26 05/15/2022    CREATININE 0 89 05/17/2022    CREATININE 1 05 05/16/2022    CREATININE 1 71 (H) 05/15/2022

## 2023-09-18 NOTE — ASSESSMENT & PLAN NOTE
Clean with baby shampoo and water or vashe.  Apply moisturizer to calluses on foot  Apply gentamycin to wound then cover with drawtex (cut slightly larger than wound)  Cover with dry gauze.  Wrap with kerlix/rachel.  Secure with paper tape.  Change daily and as needed for soilage.    Monitor closely for s/s of infection including fever, chills, increase in pain, odor from wound, and increased redness from foot. Go to ER if any complications develop.   Keep leg elevated and avoid pressure on wound.    Diabetes:  Monitor glucose closely. Check fasting glucose and 2 hours after meals. HgA1C goal <7, fasting glucose , and 2 hours after meals <180  Hypertension:  Check blood pressure twice daily, goal <120/80  Diet:   Increase protein intake, avoid fried, fatty foods and foods high in simple carbs.   Vitamins:  Take vitamin C 1000 mg, zinc 50mg, vitamin d 5000 units, and a daily multivitamin. Addy is a good source of protein and nutrients to aid in wound healing.

## 2023-09-20 ENCOUNTER — OUTPATIENT CASE MANAGEMENT (OUTPATIENT)
Dept: ADMINISTRATIVE | Facility: OTHER | Age: 63
End: 2023-09-20

## 2023-09-20 ENCOUNTER — PATIENT MESSAGE (OUTPATIENT)
Dept: ADMINISTRATIVE | Facility: HOSPITAL | Age: 63
End: 2023-09-20

## 2023-09-29 ENCOUNTER — HOSPITAL ENCOUNTER (OUTPATIENT)
Dept: RADIOLOGY | Facility: HOSPITAL | Age: 63
Discharge: HOME | End: 2023-09-29
Attending: STUDENT IN AN ORGANIZED HEALTH CARE EDUCATION/TRAINING PROGRAM
Payer: MEDICARE

## 2023-09-29 ENCOUNTER — OFFICE VISIT (OUTPATIENT)
Dept: WOUND CARE | Facility: CLINIC | Age: 63
End: 2023-09-29
Attending: FAMILY MEDICINE
Payer: MEDICARE

## 2023-09-29 VITALS
RESPIRATION RATE: 20 BRPM | DIASTOLIC BLOOD PRESSURE: 70 MMHG | HEART RATE: 88 BPM | TEMPERATURE: 99 F | SYSTOLIC BLOOD PRESSURE: 145 MMHG

## 2023-09-29 DIAGNOSIS — I73.9 PERIPHERAL VASCULAR DISEASE, UNSPECIFIED: ICD-10-CM

## 2023-09-29 DIAGNOSIS — E11.610 CHARCOT FOOT DUE TO DIABETES MELLITUS: ICD-10-CM

## 2023-09-29 DIAGNOSIS — I73.9 PERIPHERAL VASCULAR DISEASE: ICD-10-CM

## 2023-09-29 DIAGNOSIS — E08.621 DIABETIC ULCER OF RIGHT MIDFOOT ASSOCIATED WITH DIABETES MELLITUS DUE TO UNDERLYING CONDITION, WITH BONE INVOLVEMENT WITHOUT EVIDENCE OF NECROSIS: ICD-10-CM

## 2023-09-29 DIAGNOSIS — L97.413 DIABETIC ULCER OF RIGHT MIDFOOT ASSOCIATED WITH DIABETES MELLITUS DUE TO UNDERLYING CONDITION, WITH NECROSIS OF MUSCLE: ICD-10-CM

## 2023-09-29 DIAGNOSIS — L97.416 DIABETIC ULCER OF RIGHT MIDFOOT ASSOCIATED WITH DIABETES MELLITUS DUE TO UNDERLYING CONDITION, WITH BONE INVOLVEMENT WITHOUT EVIDENCE OF NECROSIS: ICD-10-CM

## 2023-09-29 DIAGNOSIS — E11.42 DIABETIC PERIPHERAL NEUROPATHY: ICD-10-CM

## 2023-09-29 DIAGNOSIS — E08.621 DIABETIC ULCER OF RIGHT MIDFOOT ASSOCIATED WITH DIABETES MELLITUS DUE TO UNDERLYING CONDITION, WITH NECROSIS OF MUSCLE: Primary | ICD-10-CM

## 2023-09-29 DIAGNOSIS — E08.621 DIABETIC ULCER OF RIGHT MIDFOOT ASSOCIATED WITH DIABETES MELLITUS DUE TO UNDERLYING CONDITION, WITH NECROSIS OF MUSCLE: ICD-10-CM

## 2023-09-29 DIAGNOSIS — L97.413 DIABETIC ULCER OF RIGHT MIDFOOT ASSOCIATED WITH DIABETES MELLITUS DUE TO UNDERLYING CONDITION, WITH NECROSIS OF MUSCLE: Primary | ICD-10-CM

## 2023-09-29 PROCEDURE — 93922 US ARTERIAL LOWER EXTREMITY BILAT WITH ABI (XPD): ICD-10-PCS | Mod: 26,,, | Performed by: SURGERY

## 2023-09-29 PROCEDURE — 93925 LOWER EXTREMITY STUDY: CPT | Mod: TC

## 2023-09-29 PROCEDURE — 93925 US ARTERIAL LOWER EXTREMITY BILAT WITH ABI (XPD): ICD-10-PCS | Mod: 26,,, | Performed by: SURGERY

## 2023-09-29 PROCEDURE — 11042 DBRDMT SUBQ TIS 1ST 20SQCM/<: CPT | Mod: PBBFAC | Performed by: NURSE PRACTITIONER

## 2023-09-29 PROCEDURE — 11042 DEBRIDEMENT: ICD-10-PCS | Mod: S$PBB,,, | Performed by: NURSE PRACTITIONER

## 2023-09-29 PROCEDURE — 99213 OFFICE O/P EST LOW 20 MIN: CPT | Mod: PBBFAC,25 | Performed by: NURSE PRACTITIONER

## 2023-09-29 PROCEDURE — 93922 UPR/L XTREMITY ART 2 LEVELS: CPT | Mod: 26,,, | Performed by: SURGERY

## 2023-09-29 PROCEDURE — 99499 NO LOS: ICD-10-PCS | Mod: S$PBB,,, | Performed by: NURSE PRACTITIONER

## 2023-09-29 PROCEDURE — 99499 UNLISTED E&M SERVICE: CPT | Mod: S$PBB,,, | Performed by: NURSE PRACTITIONER

## 2023-09-29 PROCEDURE — 93925 LOWER EXTREMITY STUDY: CPT | Mod: 26,,, | Performed by: SURGERY

## 2023-09-29 NOTE — PROCEDURES
"Debridement    Date/Time: 9/29/2023 10:00 AM    Performed by: Marli Morales FNP  Authorized by: Marli Morales FNP    Time out: Immediately prior to procedure a "time out" was called to verify the correct patient, procedure, equipment, support staff and site/side marked as required.    Consent Done?:  Yes (Written)    Wound Details:    Location:  Right foot    Location:  Right Plantar    Type of Debridement:  Excisional       Length (cm):  1.3       Area (sq cm):  1.82       Width (cm):  1.4       Percent Debrided (%):  100       Depth (cm):  0.3       Total Area Debrided (sq cm):  1.82    Depth of debridement:  Subcutaneous tissue    Tissue debrided:  Adipose, Dermis, Epidermis and Subcutaneous    Devitalized tissue debrided:  Callus, Biofilm, Clots, Exudate and Fibrin    Instruments:  Curette  Bleeding:  Minimal  Hemostasis Achieved: Yes  Method Used:  Pressure  Patient tolerance:  Patient tolerated the procedure well with no immediate complications     Assistant PETRA Rosa RN    "

## 2023-09-29 NOTE — ASSESSMENT & PLAN NOTE
Inspect you feet each day with a mirror to see if there are any sores, blisters, or cuts.    Wear shoes that fit well and cover all parts of your feet. Wear off-loading shoe on foot with ulcer. Always wear soft cotton socks with your shoes.    Keep feet clean and dry. Apply diabetic moisturizing lotion daily.     Adhere to a diabetic diet to control your blood sugar to help prevent further damage.    Keep your blood sugar in good control.    Keep your blood pressure and cholesterol in normal limits.    If you smoke, stop smoking to prevent further damage.    Limit your intake of beer, wine, and mixed drinks (alcohol).    Wear compression hose or compression dressings as recommended

## 2023-09-29 NOTE — PROGRESS NOTES
Debridement Performed for Assessment: Wound# right foot  Performed By: Provider: Marli Morales NP  Assistant: Marilin Rosa RN    Debridement: Surgical    Photo taken post procedure:    Time-Out Taken: Yes  Level: Skin/Subcutaneous Tissue  Post Debridement Measurements  Length: (cm) 1.3  Width: (cm) 1.4  Depth: (cm) 0.3      Area: (cm²) 1.82  Percent Debrided: 100%  Total Area Debrided: (sq cm) 1.82    Tissue and other material debrided:  Adipose, Dermis, Epidermis, Subcutaneous  Devitalized Tissue Debrided:Biofilm, Slough, Eschar  Instrument: Curette  Bleeding: Moderate  Hemostasis Achieved: Pressure  Procedural Pain: Insensate  Post Procedural Pain: Insensate  Response to Treatment: Procedure was tolerated well    Devitalized materials/tissue Removed  the following was removed during debridement  subcutaneous      Post Debridement Diagnosis  Post debridement diagnosis  Same as Pre-operative debridement diagnosis, No Complications noted.      Grafts or implants applied  Was a graft or implant applied?  No      Procedure assistant  Procedure assisted by:  Assistant is the same as nurse listed above      Complications related to procedure  Did any complication occure during procedure?  No complications noted during or after procedure.      Specimen  Specimen collect during procedure?  No specimen collected      Anaesthesia:  Anesthesia used  None      Blood Loss:  Blood loss during procedure  less than 5 cc

## 2023-09-29 NOTE — PROGRESS NOTES
GIANA Moncada   [facility]  [address] [facility] [place] [address]  [phone]      PATIENT NAME: Navdeep Avery  : 1960  DATE: 23  MRN: [id number]      Billing Provider: GIANA Moncada  Level of Service:   Patient PCP Information       Provider PCP Type    Marquez Huff MD General            Reason for Visit / Chief Complaint: Diabetic ulcer of right midfoot associated with diabetes me       History of Present Illness / Problem Focused Workflow     Navdeep Avery is a 63 y.o. male who presents to clinic for follow up on chronic non healing wound to right plantar foot. Wound has improved with current plan of care. Callus marcelo-wound, bedside debridement today. Reports tenderness mid-foot. He is not wearing off -loading shoe. Discussed having CROW orthotic boot made, prescription given for [facility]. Patient to schedule appointment at [facility]. Reinforced importance of keeping pressure off foot.     Arterial doppler today, results pending.         HPI 2023:  63 y.o. male who presents to clinic with chronic non healing wound to right plantar foot. He is status post debridement of right foot in August 10, 2023 per Dr. Razo. He is not currently on antibiotics but was on Augmentin post surgery. He has been applying silvadene to wound bed every other day and applying a dry dressing. Wound with granulation tissue, callus marcelo-wound. Bedside debridement today. Pertinent PMH includes hypertension, diabetes, peripheral vascular disease, and high cholesterol. Last HgA1C 9.7 in ,  diabeters managed by PCP. Last doppler , ABIs WNL. Arterial doppler scheduled for 2023. He is not wearing off-loading shoe today. Discussed importance of keeping pressure off foot, elevating legs when sitting, wearing off-loading shoe when ambulating, strict glycemic control, and high protein diet to enhance wound healing.    Wound healing is complicated by multiple co-morbidities, poor vascular supply, immunosuppression, diabetes, edema, heavy drainage, excessive wound moisture and macerated tissue, fragile skin, no protective sensation, and infection.             Review of Systems     Review of Systems   Constitutional:  Positive for activity change. Negative for chills and fever.   Respiratory:  Negative for chest tightness and shortness of breath.    Cardiovascular:  Positive for leg swelling. Negative for chest pain and palpitations.   Musculoskeletal:  Positive for arthralgias and joint swelling.   Skin:  Positive for wound.        wound   Neurological:  Positive for weakness and numbness.   Psychiatric/Behavioral:  Negative for agitation, behavioral problems, confusion and self-injury.        Medical / Social / Family History     Past Medical History:   Diagnosis Date    Arthritis     Carpal tunnel syndrome     COPD (chronic obstructive pulmonary disease)     Coronary artery disease involving native coronary artery of native heart 06/27/2023    CABG 2018 (No OP report available)  Most recent left heart catheterization 08/12/2019 1. Normal LV size apical akinesis and overall normal LV systolic function, ejection fraction 55% 2. Patent saphenous vein graft to the right PDA with patent jump graft to the OM branch left circumflex 3. Patent LIMA to the OM1 4. No significant mitral regurgitation     COVID 02/02/2022    Diabetic peripheral neuropathy     Diabetic ulcer of right foot associated with diabetes mellitus due to underlying condition, with bone involvement without evidence of necrosis     GERD (gastroesophageal reflux disease)     Hyperlipidemia     Hypertension     Peripheral vascular disease, unspecified     Sleep apnea     Type 2 diabetes mellitus        Past Surgical History:   Procedure Laterality Date    CORONARY ARTERY BYPASS GRAFT      CORONARY STENT PLACEMENT      DEBRIDEMENT OF FOOT Right 08/10/2023    Dr. Chiu     DEBRIDEMENT OF FOOT Right 8/10/2023    Procedure: DEBRIDEMENT, FOOT;  Surgeon: Evelio Chiu DO;  Location: [facility] OR;  Service: General;  Laterality: Right;    DEBRIDEMENT OF LOWER EXTREMITY Right 06/27/2022    Procedure: DEBRIDEMENT, LOWER EXTREMITY;  Surgeon: Forrest Kiser MD;  Location: [facility] OR;  Service: General;  Laterality: Right;  right foot    HIP FRACTURE SURGERY Left     IRRIGATION AND DEBRIDEMENT OF LOWER EXTREMITY Right 09/08/2022    Procedure: IRRIGATION AND DEBRIDEMENT, LOWER EXTREMITY;  Surgeon: Selina Matos MD;  Location: [facility] OR;  Service: General;  Laterality: Right;    IRRIGATION AND DEBRIDEMENT OF LOWER EXTREMITY Right 01/05/2023    Procedure: IRRIGATION AND DEBRIDEMENT, LOWER EXTREMITY;  Surgeon: Evelio Chiu DO;  Location: [facility] OR;  Service: General;  Laterality: Right;    SHOULDER OPEN ROTATOR CUFF REPAIR Left     SPINE SURGERY      VASCULAR SURGERY         Social History  Mr. Navdeep Avery  reports that he has been smoking cigarettes. He started smoking about 52 years ago. He has a 130.3 pack-year smoking history. He has been exposed to tobacco smoke. He has never used smokeless tobacco. He reports that he does not currently use alcohol. He reports current drug use. Drug: Oxycodone.    Family History  'issac Avery family history includes Alcohol abuse in his father; Arthritis in his mother; COPD in his paternal grandfather; Cancer in his sister; Diabetes in his sister; Early death in his father; Heart disease in his father, maternal grandfather, and son; Hypertension in his mother; Learning disabilities in his mother.    Medications and Allergies     Medications  Outpatient Medications Marked as Taking for the 9/29/23 encounter (Office Visit) with Marli Morales FNP   Medication Sig Dispense Refill    albuterol (PROVENTIL) 2.5 mg /3 mL (0.083 %) nebulizer solution USE 1 VIAL IN NEBULIZER 3 TIMES DAILY 90 each 11    albuterol  (PROVENTIL/VENTOLIN HFA) 90 mcg/actuation inhaler Inhale 2 puffs into the lungs 2 (two) times daily as needed for Wheezing. 18 g 2    amLODIPine (NORVASC) 10 MG tablet Take 1 tablet (10 mg total) by mouth once daily. 90 tablet 1    aspirin 81 MG Chew Take 81 mg by mouth once daily.      atorvastatin (LIPITOR) 80 MG tablet Take 1 tablet (80 mg total) by mouth every evening. 90 tablet 1    clopidogreL (PLAVIX) 75 mg tablet Take 1 tablet (75 mg total) by mouth once daily. 90 tablet 1    diclofenac sodium (VOLTAREN) 1 % Gel Apply 1 g topically 4 (four) times daily as needed (pain). 20 g 1    empagliflozin (JARDIANCE) 10 mg tablet Take 1 tablet (10 mg total) by mouth once daily. 30 tablet 6    gabapentin (NEURONTIN) 300 MG capsule Take 3 capsules (900 mg total) by mouth every 6 (six) hours. 360 capsule 2    gentamicin (GARAMYCIN) 0.1 % ointment Apply topically Daily. 90 g 2    HYDROcodone-acetaminophen (NORCO)  mg per tablet Take 1 tablet by mouth every 6 (six) hours as needed for Pain (pain). 120 tablet 0    [START ON 10/21/2023] HYDROcodone-acetaminophen (NORCO)  mg per tablet Take 1 tablet by mouth every 6 (six) hours as needed for Pain (pain). 120 tablet 0    [START ON 11/20/2023] HYDROcodone-acetaminophen (NORCO)  mg per tablet Take 1 tablet by mouth every 6 (six) hours as needed for Pain (pain). 120 tablet 0    hydrOXYzine pamoate (VISTARIL) 25 MG Cap Take 1 capsule (25 mg total) by mouth once daily. Take nightly for sleep 90 capsule 1    insulin detemir U-100 (LEVEMIR FLEXTOUCH U-100 INSULN) 100 unit/mL (3 mL) InPn pen Inject 10 units subcutaneously every morning, inject 20 units subcutaneously every evening. 3 each 2    LIDOcaine (LIDODERM) 5 % 1 patch once daily.      lisinopriL 10 MG tablet Take 1 tablet (10 mg total) by mouth once daily. 90 tablet 1    metoprolol succinate (TOPROL-XL) 25 MG 24 hr tablet Take 1 tablet (25 mg total) by mouth once daily. 90 tablet 1    miconazole NITRATE 2 %  (MICOTIN) 2 % top powder Apply topically as needed for Itching (apply between toes with dressing changes). 85 g 2    mirtazapine (REMERON) 7.5 MG Tab Take 1 tablet (7.5 mg total) by mouth nightly. 90 tablet 1    mupirocin (BACTROBAN) 2 % ointment Apply topically once daily. 30 g 12    nitroGLYCERIN (NITROSTAT) 0.4 MG SL tablet Place 1 tablet (0.4 mg total) under the tongue every 5 (five) minutes as needed for Chest pain. 30 tablet 2    pantoprazole (PROTONIX) 40 MG tablet Take 1 tablet (40 mg total) by mouth once daily. 90 tablet 1    SENNA 8.6 mg tablet Take 2 tablets by mouth once daily.      SPIRIVA RESPIMAT 2.5 mcg/actuation inhaler Inhale 1 puff into the lungs once daily. 4 g 5       Allergies  Review of patient's allergies indicates:  No Known Allergies    Physical Examination     Vitals:    09/29/23 1041   BP: (!) 145/70   Pulse: 88   Resp: 20   Temp: 98.6 °F (37 °C)     Physical Exam  Vitals and nursing note reviewed.   Constitutional:       Appearance: Normal appearance.   HENT:      Head: Normocephalic.   Cardiovascular:      Rate and Rhythm: Normal rate and regular rhythm.      Pulses: Normal pulses.      Heart sounds: Normal heart sounds.   Pulmonary:      Effort: Pulmonary effort is normal. No respiratory distress.   Chest:      Chest wall: No tenderness.   Musculoskeletal:         General: Swelling and tenderness present.      Right lower leg: Edema present.   Skin:     General: Skin is warm and dry.      Comments: See LDA for photos/measurements   Neurological:      General: No focal deficit present.      Mental Status: He is alert and oriented to person, place, and time. Mental status is at baseline.   Psychiatric:         Mood and Affect: Mood normal.         Behavior: Behavior normal.         Thought Content: Thought content normal.         Judgment: Judgment normal.         Assessment and Plan             Incision/Site 08/10/23 1433 Right Foot (Active)   08/10/23 1433   Present Prior to Hospital  Arrival?:    Side: Right   Location: Foot   Orientation:    Incision Type:    Closure Method:    Additional Comments:    Removal Indication and Assessment:    Wound Outcome:    Removal Indications:    Wound Image    09/29/23 1026   Dressing Appearance Dried drainage 09/29/23 1026   Drainage Amount Moderate 09/29/23 1026   Drainage Characteristics/Odor Serosanguineous;No odor 09/29/23 1026   Appearance Pink;Red;Granulating 09/29/23 1026   Black (%), Wound Tissue Color 0 % 09/29/23 1026   Red (%), Wound Tissue Color 100 % 09/29/23 1026   Yellow (%), Wound Tissue Color 0 % 09/29/23 1026   Periwound Area Macerated;Moist 09/29/23 1026   Wound Edges Callused;Open 09/29/23 1026   Wound Length (cm) 1.3 cm 09/29/23 1026   Wound Width (cm) 1.4 cm 09/29/23 1026   Wound Depth (cm) 0.3 cm 09/29/23 1026   Wound Volume (cm^3) 0.546 cm^3 09/29/23 1026   Wound Surface Area (cm^2) 1.82 cm^2 09/29/23 1026   Care Cleansed with:;Antimicrobial agent;Applied:;Skin Barrier;Debrided 09/29/23 1026   Dressing Applied;Gauze, wet to dry;Gauze;Rolled gauze 09/29/23 1026   Periwound Care Moisture barrier applied 09/29/23 1026   Off Loading Off loading shoe 09/29/23 1026   Dressing Change Due 09/30/23 09/29/23 1026     Problem List Items Addressed This Visit          Neuro    Diabetic peripheral neuropathy    Current Assessment & Plan     Inspect you feet each day with a mirror to see if there are any sores, blisters, or cuts.    Wear shoes that fit well and cover all parts of your feet. Wear off-loading shoe on foot with ulcer. Always wear soft cotton socks with your shoes.    Keep feet clean and dry. Apply diabetic moisturizing lotion daily.     Adhere to a diabetic diet to control your blood sugar to help prevent further damage.    Keep your blood sugar in good control.    Keep your blood pressure and cholesterol in normal limits.    If you smoke, stop smoking to prevent further damage.    Limit your intake of beer, wine, and mixed drinks  (alcohol).    Wear compression hose or compression dressings as recommended                  Cardiac/Vascular    Peripheral vascular disease, unspecified (Chronic)    Overview     Arterial doppler today, results pending         Current Assessment & Plan     Heart Healthy Diet-reduce sodium intake to 1,500  mg/day and limit alcohol  Smoking Cessation - if smoke set goals to quit  Exercise daily  Weight loss -  Maintain healthy weight.  If overweight, set goal to  lose about 5% to 10% of baseline weight within six months  Keep blood pressure well controlled - take medications as prescribed   Ensure diabetes is controlled - diabetic diet, medications as prescribed, check blood glucose routinely               Endocrine    Diabetic ulcer of right foot associated with diabetes mellitus due to underlying condition, with bone involvement without evidence of necrosis    Overview                                  Current Assessment & Plan     Clean with baby shampoo and water or vashe.  Apply moisturizer to calluses on foot  Apply gentamycin to wound then cover with drawtex (cut slightly larger than wound)  Cover with dry gauze.  Wrap with kerlix/rachel.  Secure with paper tape.  Change daily and as needed for soilage.     Monitor closely for s/s of infection including fever, chills, increase in pain, odor from wound, and increased redness from foot. Go to ER if any complications develop.   Keep leg elevated and avoid pressure on wound.     Diabetes:  Monitor glucose closely. Check fasting glucose and 2 hours after meals. HgA1C goal <7, fasting glucose , and 2 hours after meals <180  Hypertension:  Check blood pressure twice daily, goal <120/80  Diet:   Increase protein intake, avoid fried, fatty foods and foods high in simple carbs.   Vitamins:  Take vitamin C 1000 mg, zinc 50mg, vitamin d 5000 units, and a daily multivitamin. Addy is a good source of protein and nutrients to aid in wound healing.             Orthopedic     Charcot foot due to diabetes mellitus    Overview     Prescription for CROW orthotic boot given today          Other Visit Diagnoses       Diabetic ulcer of right midfoot associated with diabetes mellitus due to underlying condition, with necrosis of muscle    -  Primary            Future Appointments   Date Time Provider Department Center   10/2/2023 10:00 AM Evelio Chiu,  [facility]   10/20/2023  9:00 AM [facility]ales [facility]    11/30/2023 10:45 AM Marquez Huff MD [facility]   12/19/2023  7:45 AM Larry Samson PA [facility]            Signature:  GIANA Moncada  [facility]  [address] [facility] [place] [address]  [phone]    Date of encounter: 9/29/23

## 2023-10-02 ENCOUNTER — OFFICE VISIT (OUTPATIENT)
Dept: SURGERY | Facility: CLINIC | Age: 63
End: 2023-10-02
Payer: MEDICARE

## 2023-10-02 DIAGNOSIS — E08.621 DIABETIC ULCER OF RIGHT MIDFOOT ASSOCIATED WITH DIABETES MELLITUS DUE TO UNDERLYING CONDITION, WITH NECROSIS OF MUSCLE: Primary | ICD-10-CM

## 2023-10-02 DIAGNOSIS — L97.413 DIABETIC ULCER OF RIGHT MIDFOOT ASSOCIATED WITH DIABETES MELLITUS DUE TO UNDERLYING CONDITION, WITH NECROSIS OF MUSCLE: Primary | ICD-10-CM

## 2023-10-02 PROCEDURE — 99212 OFFICE O/P EST SF 10 MIN: CPT | Mod: PBBFAC | Performed by: STUDENT IN AN ORGANIZED HEALTH CARE EDUCATION/TRAINING PROGRAM

## 2023-10-02 PROCEDURE — 99213 PR OFFICE/OUTPT VISIT, EST, LEVL III, 20-29 MIN: ICD-10-PCS | Mod: S$PBB,,, | Performed by: STUDENT IN AN ORGANIZED HEALTH CARE EDUCATION/TRAINING PROGRAM

## 2023-10-02 PROCEDURE — 99213 OFFICE O/P EST LOW 20 MIN: CPT | Mod: S$PBB,,, | Performed by: STUDENT IN AN ORGANIZED HEALTH CARE EDUCATION/TRAINING PROGRAM

## 2023-10-04 ENCOUNTER — OUTPATIENT CASE MANAGEMENT (OUTPATIENT)
Dept: ADMINISTRATIVE | Facility: OTHER | Age: 63
End: 2023-10-04

## 2023-10-04 NOTE — PROGRESS NOTES
Subjective:       Patient ID: Navdeep Avery is a 63 y.o. male.    Chief Complaint: Follow-up (Follow up for Right foot wound check)    62-year-old  male presents to the clinic for 2 week evaluation status post debridement of right lower foot; patient had a right foot abscess with necrotic muscle extending down to bone the plantar surface of the right foot adjacent to the 3rd metatarsophalangeal joint.  Patient's spouse has been taking great care of the wound and the wound is healing.  No complaints.  Denies any chest pain/shortness of breath, nausea/vomiting/diarrhea, fever/chills.    2/14:  Presents today for wound re-evaluation and placement of PuraPly XT.  No significant changes from previous visit    2/21: Presents today for wound re-evaluation and placement of PuraPly XT.  No significant changes from previous visit    2/28: Presents today for wound re-evaluation and placement of PuraPly XT.  No significant changes from previous visit    3/7: Presents today for wound re-evaluation and placement of Affinity.  No significant changes from previous visit    3/20: Presents today for wound re-evaluation and placement of Apligraf.  No significant changes from previous visit    3/27: Presents today for wound re-evaluation and placement of Apligraf.  No significant changes from previous visits    4/4: Presents today for wound re-evaluation and placement of Apligraf.  No significant changes from previous visit    4/10: Presents today for wound re-evaluation and placement of Apligraf.  No significant changes from previous visit    4/17:  Presents today for wound re-evaluation and placement of Apligraf; no significant changes from previous visit    4/25:  Presents today for wound re-evaluation and placement of Affinity; no significant changes from previous visit    5/3: Presents today for wound re-evaluation and placement of Affinity; no significant changes from previous visit    5/9:  Presents today for wound  re-evaluation and placed an affinity; no significant changes from previous visit    5/16: Presents today for wound re-evaluation and placed an affinity; no significant changes from previous visit    5/23:  Presents today for wound re-evaluation; no significant changes from previous visit    8/15:  Patient presents for evaluation of right foot wound.  Patient was recently hospitalized and required debridement on August 10th for some ulceration with muscle necrosis on the right foot; no bony involvement; no changes on x-ray from previous.  Denies any chest pain/shortness of breath, nausea/vomiting/diarrhea, fever/chills.    9/11:  Patient presents for wound re-evaluation, debridement.  No changes since seen in clinic previously.    10/2: presents for wound re-evaluation; has been going to wound clinic weekly        Review of Systems   Constitutional: Negative.    HENT: Negative.     Eyes: Negative.    Respiratory:  Negative for shortness of breath.    Cardiovascular: Negative.    Gastrointestinal:  Negative for abdominal pain, blood in stool, change in bowel habit and vomiting.   Endocrine: Negative.    Genitourinary: Negative.  Negative for hematuria.   Musculoskeletal:  Negative for back pain and myalgias.   Integumentary:  Negative.   Allergic/Immunologic: Negative.    Neurological:  Negative for dizziness, weakness and light-headedness.   Psychiatric/Behavioral: Negative.           Objective:      Physical Exam  Constitutional:       General: He is not in acute distress.     Appearance: Normal appearance.   HENT:      Head: Normocephalic.   Cardiovascular:      Rate and Rhythm: Normal rate.   Pulmonary:      Effort: Pulmonary effort is normal. No respiratory distress.   Abdominal:      General: There is no distension.      Tenderness: There is no abdominal tenderness.   Musculoskeletal:         General: Normal range of motion.        Feet:    Feet:      Comments: ulceration on the plantar surface adjacent to the  3rd metatarsophalangeal joint; measuring 2 x 1 x 0.2 cm; no necrosis; granulation tissue present; no bony involvement.    New ulceration at previously healed wound on plantar surface adjacent to 1st and 2nd toe MTP joint; skin necrosis and drainage; granulation tissue present underneath; measures 3.5x1.5x0.4cm  Skin:     General: Skin is warm.      Coloration: Skin is not jaundiced.   Neurological:      General: No focal deficit present.      Mental Status: He is alert and oriented to person, place, and time.      Cranial Nerves: No cranial nerve deficit.               Assessment:       Problem List Items Addressed This Visit    None        Plan:       Will reapply for [employer] product with new wound.    Arterial doppler showed no significant stenosis to RLE.  Continue wound care; f/u in 3 weeks

## 2023-10-04 NOTE — PROGRESS NOTES
Outpatient Care Management  Plan of Care Follow Up Visit    Patient: Navdeep Avery  MRN: [id number]  Date of Service: 10/04/2023  Completed by: Celina Pearl RN  Referral Date: 08/11/2023    Reason for Visit   Patient presents with    OPCM RN Follow Up Call       Brief Summary: Wound care on 09/18/23 with GIANA Briceno for diabetic ulcer of right midfoot associated with diabetes. Debridement done at the clinic with dressing applied.   U/S Lower Extremities Arteries Bilateral Doppler test done on 09/29/23. Wound care appt on 09/29/23 with debridement and dressing change. Appt with Dr. Chiu, general surgery on 10/02/23 with debridement and two diabetic ulcers of right midfoot with dressing applied. Arterial doppler showed no significant stenosis to RLE.  Called and spoke to Charisma, wife. Navdeep received a RX for a crow walker boot but they do not have $65.00 to buy a boot. Wounds are healing slowly. Foot is tender from debridement.  Blood glucose was 120 yesterday. Home health nurse came yesterday for dressing change. He continues to smoke. Reviewed diabetic diet and eating a healthy diet.  Discussed importance of wearing off-loading shoe when ambulating and strict glycemic control to enhance wound healing and reduce smoking.   Called [facility] in [place], [place] at   [phone] and they do not have Crow walker boots in stock. DX- [phone]  Called [facility] In [place], [place] at [phone] and spoke to Janessa. They have the Crow walker boot in stock. They would like the Rx faxed for  them to run the insurance and make sure the correct DX and Procedure codes are on the Rx so patient would not have to pay for the boot.  FAX- [phone]      Next Steps: Follow up next week   Message to GIANA Moncada for crow walker boot Rx be faxed to [facility]

## 2023-10-10 ENCOUNTER — OUTPATIENT CASE MANAGEMENT (OUTPATIENT)
Dept: ADMINISTRATIVE | Facility: OTHER | Age: 63
End: 2023-10-10

## 2023-10-12 ENCOUNTER — OUTPATIENT CASE MANAGEMENT (OUTPATIENT)
Dept: ADMINISTRATIVE | Facility: OTHER | Age: 63
End: 2023-10-12

## 2023-10-12 NOTE — PROGRESS NOTES
Outpatient Care Management  Plan of Care Follow Up Visit    Patient: Navdeep Avrey  MRN: [id number]  Date of Service: 10/12/2023  Completed by: Celina Pearl RN  Referral Date: 08/11/2023    No chief complaint on file.      Brief Summary: Blood glucoses running from 130-250.  Called [facility] In [place], [place] at [phone] and spoke to Janessa. They have not received  prescription for the crow walker boot from [facility].   Next Steps:  Patient agrees to follow up call with in 2 weeks on or around 10/26/23  Message to GIANA Moncada and Dr. Evelio Chiu for crow walker boot Rx be faxed to [facility]   Review what does he eat in a typical day   set target A1C

## 2023-10-13 ENCOUNTER — OUTPATIENT CASE MANAGEMENT (OUTPATIENT)
Dept: ADMINISTRATIVE | Facility: OTHER | Age: 63
End: 2023-10-13

## 2023-10-13 NOTE — PROGRESS NOTES
10/13/23-Called [facility] In [place], [place] at [phone] and spoke to Ahmet.They have not received a prescription for the Crow walker boot from a provider. FAX- [phone] Will send a message to providers including fax number. .

## 2023-10-16 ENCOUNTER — OUTPATIENT CASE MANAGEMENT (OUTPATIENT)
Dept: ADMINISTRATIVE | Facility: OTHER | Age: 63
End: 2023-10-16

## 2023-10-16 NOTE — PROGRESS NOTES
10/16/23-Received call from Dr. Chiu's office from [place] who is going to send the message about getting a RX for a crow boot to Nelly, his nurse.

## 2023-10-20 ENCOUNTER — OFFICE VISIT (OUTPATIENT)
Dept: WOUND CARE | Facility: CLINIC | Age: 63
End: 2023-10-20
Attending: FAMILY MEDICINE
Payer: MEDICARE

## 2023-10-20 VITALS
HEART RATE: 97 BPM | RESPIRATION RATE: 20 BRPM | SYSTOLIC BLOOD PRESSURE: 167 MMHG | TEMPERATURE: 99 F | DIASTOLIC BLOOD PRESSURE: 80 MMHG

## 2023-10-20 DIAGNOSIS — I73.9 PERIPHERAL VASCULAR DISEASE, UNSPECIFIED: ICD-10-CM

## 2023-10-20 DIAGNOSIS — E08.621 DIABETIC ULCER OF RIGHT MIDFOOT ASSOCIATED WITH DIABETES MELLITUS DUE TO UNDERLYING CONDITION, WITH NECROSIS OF MUSCLE: Primary | ICD-10-CM

## 2023-10-20 DIAGNOSIS — E08.621 DIABETIC ULCER OF RIGHT MIDFOOT ASSOCIATED WITH DIABETES MELLITUS DUE TO UNDERLYING CONDITION, WITH BONE INVOLVEMENT WITHOUT EVIDENCE OF NECROSIS: ICD-10-CM

## 2023-10-20 DIAGNOSIS — L97.413 DIABETIC ULCER OF RIGHT MIDFOOT ASSOCIATED WITH DIABETES MELLITUS DUE TO UNDERLYING CONDITION, WITH NECROSIS OF MUSCLE: Primary | ICD-10-CM

## 2023-10-20 DIAGNOSIS — L97.416 DIABETIC ULCER OF RIGHT MIDFOOT ASSOCIATED WITH DIABETES MELLITUS DUE TO UNDERLYING CONDITION, WITH BONE INVOLVEMENT WITHOUT EVIDENCE OF NECROSIS: ICD-10-CM

## 2023-10-20 PROCEDURE — 99499 UNLISTED E&M SERVICE: CPT | Mod: S$PBB,,, | Performed by: NURSE PRACTITIONER

## 2023-10-20 PROCEDURE — 11042 DBRDMT SUBQ TIS 1ST 20SQCM/<: CPT | Mod: PBBFAC | Performed by: NURSE PRACTITIONER

## 2023-10-20 PROCEDURE — 99213 OFFICE O/P EST LOW 20 MIN: CPT | Mod: PBBFAC,25 | Performed by: NURSE PRACTITIONER

## 2023-10-20 PROCEDURE — 99499 NO LOS: ICD-10-PCS | Mod: S$PBB,,, | Performed by: NURSE PRACTITIONER

## 2023-10-20 PROCEDURE — 11042 DEBRIDEMENT: ICD-10-PCS | Mod: S$PBB,,, | Performed by: NURSE PRACTITIONER

## 2023-10-20 NOTE — PROGRESS NOTES
GIANA Moncada   [facility]  [address] [facility] [place] [address]  [phone]      PATIENT NAME: Navdeep Avery  : 1960  DATE: 10/20/23  MRN: [id number]      Billing Provider: GIANA Moncada  Level of Service:   Patient PCP Information       Provider PCP Type    Marquez Huff MD General            Reason for Visit / Chief Complaint: Diabetic ulcer of right midfoot associated with diabetes me       History of Present Illness / Problem Focused Workflow     Navdeep Avery is a 63 y.o. male who presents to clinic for follow up on chronic non healing wound to right plantar foot. Wound with maceration and callus marcelo-wound, bedside debridement today. He is taking prescription to [facility] today for CROW orthotic boot made. Reports that Home health is now using drawtex brand as ordered with gentamicin and since initiating new dressing wound has become more macerated. Sample of drawtex given today and orders to  not to substitute brands. Reinforced importance of keeping pressure off foot.     Arterial doppler , Impression:     Right lower extremity no significant disease.  Left lower extremity mild disease with area of doubling of velocities in the proximal SFA suggesting 50-99% stenosis with the JACQUELIN 0.87    HPI 2023:  63 y.o. male who presents to clinic with chronic non healing wound to right plantar foot. He is status post debridement of right foot in August 10, 2023 per Dr. Razo. He is not currently on antibiotics but was on Augmentin post surgery. He has been applying silvadene to wound bed every other day and applying a dry dressing. Wound with granulation tissue, callus marcelo-wound. Bedside debridement today. Pertinent PMH includes hypertension, diabetes, peripheral vascular disease, and high cholesterol. Last HgA1C 9.7 in ,  diabeters managed by PCP. Last doppler , ABIs WNL. Arterial doppler scheduled for  9/29/2023. He is not wearing off-loading shoe today. Discussed importance of keeping pressure off foot, elevating legs when sitting, wearing off-loading shoe when ambulating, strict glycemic control, and high protein diet to enhance wound healing.   Wound healing is complicated by multiple co-morbidities, poor vascular supply, immunosuppression, diabetes, edema, heavy drainage, excessive wound moisture and macerated tissue, fragile skin, no protective sensation, and infection.           Review of Systems     Review of Systems   Constitutional:  Positive for activity change. Negative for chills and fever.   Respiratory:  Negative for chest tightness and shortness of breath.    Cardiovascular:  Positive for leg swelling. Negative for chest pain and palpitations.   Musculoskeletal:  Positive for arthralgias and joint swelling.   Skin:  Positive for wound.        wound   Neurological:  Positive for weakness and numbness.   Psychiatric/Behavioral:  Negative for agitation, behavioral problems, confusion and self-injury.        Medical / Social / Family History     Past Medical History:   Diagnosis Date    Arthritis     Carpal tunnel syndrome     COPD (chronic obstructive pulmonary disease)     Coronary artery disease involving native coronary artery of native heart 06/27/2023    CABG 2018 (No OP report available)  Most recent left heart catheterization 08/12/2019 1. Normal LV size apical akinesis and overall normal LV systolic function, ejection fraction 55% 2. Patent saphenous vein graft to the right PDA with patent jump graft to the OM branch left circumflex 3. Patent LIMA to the OM1 4. No significant mitral regurgitation     COVID 02/02/2022    Diabetic peripheral neuropathy     Diabetic ulcer of right foot associated with diabetes mellitus due to underlying condition, with bone involvement without evidence of necrosis     GERD (gastroesophageal reflux disease)     Hyperlipidemia     Hypertension      Peripheral vascular disease, unspecified     Sleep apnea     Type 2 diabetes mellitus        Past Surgical History:   Procedure Laterality Date    CORONARY ARTERY BYPASS GRAFT      CORONARY STENT PLACEMENT      DEBRIDEMENT OF FOOT Right 08/10/2023    Dr. Chiu    DEBRIDEMENT OF FOOT Right 8/10/2023    Procedure: DEBRIDEMENT, FOOT;  Surgeon: Evelio Chiu DO;  Location: [facility];  Service: General;  Laterality: Right;    DEBRIDEMENT OF LOWER EXTREMITY Right 06/27/2022    Procedure: DEBRIDEMENT, LOWER EXTREMITY;  Surgeon: Forrest Kiser MD;  Location: [facility] OR;  Service: General;  Laterality: Right;  right foot    HIP FRACTURE SURGERY Left     IRRIGATION AND DEBRIDEMENT OF LOWER EXTREMITY Right 09/08/2022    Procedure: IRRIGATION AND DEBRIDEMENT, LOWER EXTREMITY;  Surgeon: Selina Matos MD;  Location: [facility] OR;  Service: General;  Laterality: Right;    IRRIGATION AND DEBRIDEMENT OF LOWER EXTREMITY Right 01/05/2023    Procedure: IRRIGATION AND DEBRIDEMENT, LOWER EXTREMITY;  Surgeon: Evelio Chiu DO;  Location: [facility];  Service: General;  Laterality: Right;    SHOULDER OPEN ROTATOR CUFF REPAIR Left     SPINE SURGERY      VASCULAR SURGERY         Social History  Mr. Navdeep Avery  reports that he has been smoking cigarettes. He started smoking about 52 years ago. He has a 130.5 pack-year smoking history. He has been exposed to tobacco smoke. He has never used smokeless tobacco. He reports that he does not currently use alcohol. He reports current drug use. Drug: Oxycodone.    Family History  Mr.'s Navdeep Avery family history includes Alcohol abuse in his father; Arthritis in his mother; COPD in his paternal grandfather; Cancer in his sister; Diabetes in his sister; Early death in his father; Heart disease in his father, maternal grandfather, and son; Hypertension in his mother; Learning disabilities in his mother.    Medications and Allergies     Medications  Outpatient  Medications Marked as Taking for the 10/20/23 encounter (Office Visit) with Marli Morales FNP   Medication Sig Dispense Refill    albuterol (PROVENTIL) 2.5 mg /3 mL (0.083 %) nebulizer solution USE 1 VIAL IN NEBULIZER 3 TIMES DAILY 90 each 11    albuterol (PROVENTIL/VENTOLIN HFA) 90 mcg/actuation inhaler Inhale 2 puffs into the lungs 2 (two) times daily as needed for Wheezing. 18 g 2    amLODIPine (NORVASC) 10 MG tablet Take 1 tablet (10 mg total) by mouth once daily. 90 tablet 1    aspirin 81 MG Chew Take 81 mg by mouth once daily.      atorvastatin (LIPITOR) 80 MG tablet Take 1 tablet (80 mg total) by mouth every evening. 90 tablet 1    clopidogreL (PLAVIX) 75 mg tablet Take 1 tablet (75 mg total) by mouth once daily. 90 tablet 1    diclofenac sodium (VOLTAREN) 1 % Gel Apply 1 g topically 4 (four) times daily as needed (pain). 20 g 1    empagliflozin (JARDIANCE) 10 mg tablet Take 1 tablet (10 mg total) by mouth once daily. 30 tablet 6    gabapentin (NEURONTIN) 300 MG capsule Take 3 capsules (900 mg total) by mouth every 6 (six) hours. 360 capsule 2    gentamicin (GARAMYCIN) 0.1 % ointment Apply topically Daily. 90 g 2    HYDROcodone-acetaminophen (NORCO)  mg per tablet Take 1 tablet by mouth every 6 (six) hours as needed for Pain (pain). 120 tablet 0    [START ON 10/21/2023] HYDROcodone-acetaminophen (NORCO)  mg per tablet Take 1 tablet by mouth every 6 (six) hours as needed for Pain (pain). 120 tablet 0    [START ON 11/20/2023] HYDROcodone-acetaminophen (NORCO)  mg per tablet Take 1 tablet by mouth every 6 (six) hours as needed for Pain (pain). 120 tablet 0    hydrOXYzine pamoate (VISTARIL) 25 MG Cap Take 1 capsule (25 mg total) by mouth once daily. Take nightly for sleep 90 capsule 1    insulin detemir U-100 (LEVEMIR FLEXTOUCH U-100 INSULN) 100 unit/mL (3 mL) InPn pen Inject 10 units subcutaneously every morning, inject 20 units subcutaneously every evening. 3 each 2     "lisinopriL 10 MG tablet Take 1 tablet (10 mg total) by mouth once daily. 90 tablet 1    metoprolol succinate (TOPROL-XL) 25 MG 24 hr tablet Take 1 tablet (25 mg total) by mouth once daily. 90 tablet 1    miconazole NITRATE 2 % (MICOTIN) 2 % top powder Apply topically as needed for Itching (apply between toes with dressing changes). 85 g 2    mirtazapine (REMERON) 7.5 MG Tab Take 1 tablet (7.5 mg total) by mouth nightly. 90 tablet 1    mupirocin (BACTROBAN) 2 % ointment Apply topically once daily. 30 g 12    nitroGLYCERIN (NITROSTAT) 0.4 MG SL tablet Place 1 tablet (0.4 mg total) under the tongue every 5 (five) minutes as needed for Chest pain. 30 tablet 2    pantoprazole (PROTONIX) 40 MG tablet Take 1 tablet (40 mg total) by mouth once daily. 90 tablet 1    SENNA 8.6 mg tablet Take 2 tablets by mouth once daily.      SPIRIVA RESPIMAT 2.5 mcg/actuation inhaler Inhale 1 puff into the lungs once daily. 4 g 5    SURE COMFORT INSULIN SYRINGE 0.3 mL 31 gauge x 5/16" Syrg Inject 1 application into the skin 2 (two) times a day.         Allergies  Review of patient's allergies indicates:  No Known Allergies    Physical Examination     Vitals:    10/20/23 0944   BP: (!) 167/80   Pulse: 97   Resp: 20   Temp: 98.6 °F (37 °C)     Physical Exam  Vitals and nursing note reviewed.   Constitutional:       Appearance: Normal appearance.   HENT:      Head: Normocephalic.   Cardiovascular:      Rate and Rhythm: Normal rate and regular rhythm.      Pulses: Normal pulses.      Heart sounds: Normal heart sounds.   Pulmonary:      Effort: Pulmonary effort is normal. No respiratory distress.   Chest:      Chest wall: No tenderness.   Musculoskeletal:         General: Swelling and tenderness present.      Right lower leg: Edema present.   Skin:     General: Skin is warm and dry.      Comments: See LDA for photos/measurements   Neurological:      General: No focal deficit present.      Mental Status: He is alert and oriented to " person, place, and time. Mental status is at baseline.   Psychiatric:         Mood and Affect: Mood normal.         Behavior: Behavior normal.         Thought Content: Thought content normal.         Judgment: Judgment normal.       Assessment and Plan             Incision/Site 08/10/23 1433 Right Foot (Active)   08/10/23 1433   Present Prior to Hospital Arrival?:    Side: Right   Location: Foot   Orientation:    Incision Type:    Closure Method:    Additional Comments:    Removal Indication and Assessment:    Wound Outcome:    Removal Indications:    Wound Image   10/20/23 1020   Wound Length (cm) 1.3 cm 10/20/23 1020   Wound Width (cm) 1.3 cm 10/20/23 1020   Wound Depth (cm) 0.3 cm 10/20/23 1020   Wound Volume (cm^3) 0.507 cm^3 10/20/23 1020   Wound Surface Area (cm^2) 1.69 cm^2 10/20/23 1020     Problem List Items Addressed This Visit          Cardiac/Vascular    Peripheral vascular disease, unspecified (Chronic)    Overview     Arterial doppler today, results pending            Endocrine    Diabetic ulcer of right foot associated with diabetes mellitus due to underlying condition, with bone involvement without evidence of necrosis    Overview                                          Current Assessment & Plan     Clean with baby shampoo and water or vashe.  Apply moisturizer to calluses on foot  Apply gentamycin to wound then cover with drawtex (cut slightly larger than wound)  Cover with dry gauze.  Wrap with kerlix/rachel.  Secure with paper tape.  Change daily and as needed for soilage.     Monitor closely for s/s of infection including fever, chills, increase in pain, odor from wound, and increased redness from foot. Go to ER if any complications develop.   Keep leg elevated and avoid pressure on wound.     Diabetes:  Monitor glucose closely. Check fasting glucose and 2 hours after meals. HgA1C goal <7, fasting glucose , and 2 hours after meals <180  Hypertension:  Check blood pressure twice daily, goal  <120/80  Diet:   Increase protein intake, avoid fried, fatty foods and foods high in simple carbs.   Vitamins:  Take vitamin C 1000 mg, zinc 50mg, vitamin d 5000 units, and a daily multivitamin. Addy is a good source of protein and nutrients to aid in wound healing.           Other Visit Diagnoses       Diabetic ulcer of right midfoot associated with diabetes mellitus due to underlying condition, with necrosis of muscle    -  Primary            Future Appointments   Date Time Provider Department Center   10/24/2023  9:30 AM Evelio Chiu,  [facility]   11/10/2023  9:00 AM Marli Morales FNP [facility]    11/30/2023 10:45 [facility]   12/19/2023  7:45 AM Larry Samson PA [facility]            Signature:  GIANA Moncada  [facility]  [address] [facility] [place] [address]  [phone]    Date of encounter: 10/20/23

## 2023-10-20 NOTE — PROCEDURES
"Debridement    Date/Time: 10/20/2023 9:00 AM    Performed by: Maril Morales FNP  Authorized by: Marli Morales FNP    Time out: Immediately prior to procedure a "time out" was called to verify the correct patient, procedure, equipment, support staff and site/side marked as required.    Consent Done?:  Yes (Written)    Wound Details:    Location:  Right foot    Location:  Right Midfoot    Type of Debridement:  Excisional       Length (cm):  1.3       Area (sq cm):  1.69       Width (cm):  1.3       Percent Debrided (%):  100       Depth (cm):  0.3       Total Area Debrided (sq cm):  1.69    Depth of debridement:  Subcutaneous tissue    Tissue debrided:  Adipose, Dermis, Epidermis and Subcutaneous    Devitalized tissue debrided:  Biofilm, Callus, Clots, Exudate and Fibrin    Instruments:  Curette  Bleeding:  Minimal  Hemostasis Achieved: Yes  Method Used:  Pressure  Patient tolerance:  Patient tolerated the procedure well with no immediate complications  1st Wound Pain Assessment: 0     Assistant PETRA Rosa RN    "

## 2023-10-24 ENCOUNTER — OFFICE VISIT (OUTPATIENT)
Dept: SURGERY | Facility: CLINIC | Age: 63
End: 2023-10-24
Payer: MEDICARE

## 2023-10-24 DIAGNOSIS — L97.413 DIABETIC ULCER OF RIGHT MIDFOOT ASSOCIATED WITH DIABETES MELLITUS DUE TO UNDERLYING CONDITION, WITH NECROSIS OF MUSCLE: Primary | ICD-10-CM

## 2023-10-24 DIAGNOSIS — E08.621 DIABETIC ULCER OF RIGHT MIDFOOT ASSOCIATED WITH DIABETES MELLITUS DUE TO UNDERLYING CONDITION, WITH NECROSIS OF MUSCLE: Primary | ICD-10-CM

## 2023-10-24 PROCEDURE — 15275 SKIN SUB GRAFT FACE/NK/HF/G: CPT | Mod: S$PBB,,, | Performed by: STUDENT IN AN ORGANIZED HEALTH CARE EDUCATION/TRAINING PROGRAM

## 2023-10-24 PROCEDURE — 99499 UNLISTED E&M SERVICE: CPT | Mod: S$PBB,,, | Performed by: STUDENT IN AN ORGANIZED HEALTH CARE EDUCATION/TRAINING PROGRAM

## 2023-10-24 PROCEDURE — 15275 PR SKIN SUB GRAFT FACE/NK/HF/G UP TO 100 SQCM: ICD-10-PCS | Mod: S$PBB,,, | Performed by: STUDENT IN AN ORGANIZED HEALTH CARE EDUCATION/TRAINING PROGRAM

## 2023-10-24 PROCEDURE — 99499 NO LOS: ICD-10-PCS | Mod: S$PBB,,, | Performed by: STUDENT IN AN ORGANIZED HEALTH CARE EDUCATION/TRAINING PROGRAM

## 2023-10-24 PROCEDURE — 99999PBSHW PR PBB SHADOW TECHNICAL ONLY FILED TO HB: Mod: PBBFAC,,,

## 2023-10-24 PROCEDURE — 99214 OFFICE O/P EST MOD 30 MIN: CPT | Mod: PBBFAC | Performed by: STUDENT IN AN ORGANIZED HEALTH CARE EDUCATION/TRAINING PROGRAM

## 2023-10-24 PROCEDURE — 15275 SKIN SUB GRAFT FACE/NK/HF/G: CPT | Mod: PBBFAC | Performed by: STUDENT IN AN ORGANIZED HEALTH CARE EDUCATION/TRAINING PROGRAM

## 2023-10-24 PROCEDURE — 99999PBSHW PR PBB SHADOW TECHNICAL ONLY FILED TO HB: ICD-10-PCS | Mod: PBBFAC,,,

## 2023-10-25 NOTE — PROGRESS NOTES
Subjective:       Patient ID: Navdeep Avery is a 63 y.o. male.    Chief Complaint: Follow-up (Wound ck (R) foot)    62-year-old  male presents to the clinic for 2 week evaluation status post debridement of right lower foot; patient had a right foot abscess with necrotic muscle extending down to bone the plantar surface of the right foot adjacent to the 3rd metatarsophalangeal joint.  Patient's spouse has been taking great care of the wound and the wound is healing.  No complaints.  Denies any chest pain/shortness of breath, nausea/vomiting/diarrhea, fever/chills.    2/14:  Presents today for wound re-evaluation and placement of PuraPly XT.  No significant changes from previous visit    2/21: Presents today for wound re-evaluation and placement of PuraPly XT.  No significant changes from previous visit    2/28: Presents today for wound re-evaluation and placement of PuraPly XT.  No significant changes from previous visit    3/7: Presents today for wound re-evaluation and placement of Affinity.  No significant changes from previous visit    3/20: Presents today for wound re-evaluation and placement of Apligraf.  No significant changes from previous visit    3/27: Presents today for wound re-evaluation and placement of Apligraf.  No significant changes from previous visits    4/4: Presents today for wound re-evaluation and placement of Apligraf.  No significant changes from previous visit    4/10: Presents today for wound re-evaluation and placement of Apligraf.  No significant changes from previous visit    4/17:  Presents today for wound re-evaluation and placement of Apligraf; no significant changes from previous visit    4/25:  Presents today for wound re-evaluation and placement of Affinity; no significant changes from previous visit    5/3: Presents today for wound re-evaluation and placement of Affinity; no significant changes from previous visit    5/9:  Presents today for wound re-evaluation and  placed an affinity; no significant changes from previous visit    5/16: Presents today for wound re-evaluation and placed an affinity; no significant changes from previous visit    5/23:  Presents today for wound re-evaluation; no significant changes from previous visit    8/15:  Patient presents for evaluation of right foot wound.  Patient was recently hospitalized and required debridement on August 10th for some ulceration with muscle necrosis on the right foot; no bony involvement; no changes on x-ray from previous.  Denies any chest pain/shortness of breath, nausea/vomiting/diarrhea, fever/chills.    9/11:  Patient presents for wound re-evaluation, debridement.  No changes since seen in clinic previously.    10/2: presents for wound re-evaluation; has been going to wound clinic weekly    10/24:  Presents for wound re-evaluation; application of PuraPly.  No changes since seen in clinic previously.        Review of Systems   Constitutional: Negative.    HENT: Negative.     Eyes: Negative.    Respiratory:  Negative for shortness of breath.    Cardiovascular: Negative.    Gastrointestinal:  Negative for abdominal pain, blood in stool, change in bowel habit and vomiting.   Endocrine: Negative.    Genitourinary: Negative.  Negative for hematuria.   Musculoskeletal:  Negative for back pain and myalgias.   Integumentary:  Negative.   Allergic/Immunologic: Negative.    Neurological:  Negative for dizziness, weakness and light-headedness.   Psychiatric/Behavioral: Negative.           Objective:      Physical Exam  Constitutional:       General: He is not in acute distress.     Appearance: Normal appearance.   HENT:      Head: Normocephalic.   Cardiovascular:      Rate and Rhythm: Normal rate.   Pulmonary:      Effort: Pulmonary effort is normal. No respiratory distress.   Abdominal:      General: There is no distension.      Tenderness: There is no abdominal tenderness.   Musculoskeletal:         General: Normal range of  motion.        Feet:    Feet:      Comments: ulceration on the plantar surface adjacent to the 3rd metatarsophalangeal joint; measuring 1.5x1.5x0.1 cm; no necrosis; granulation tissue present; no bony involvement.  Slight maceration of tissue    Previous new ulcer scabbed over  Skin:     General: Skin is warm.      Coloration: Skin is not jaundiced.   Neurological:      General: No focal deficit present.      Mental Status: He is alert and oriented to person, place, and time.      Cranial Nerves: No cranial nerve deficit.                 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, with necrosis of muscle    -  Primary              Plan:       Patient Name: Navdeep Avery  Patient MRN: [id number]  Patient YOB: 1960  CSN: [id number]  Date: 10/25/2023  Provider:   Evelio Razo    Application for Assessment: Right foot ulcerationg  Skin Substitute: Puraply XT  Performed By: Evelio Razo  Time-out Taken: Yes  Location:     [x] Genitalia/Hands/Feet/Multiple Digits    [] Scalp/Face/Neck/Ears    []Trunk/Arms/Legs  Application Area: (sq cm): 2.25   Product Applied: (sq cm): 24.1  Product Waste (sq cm): 21.85  Fenestrated: Yes   Instrument:    [] Blade [] Curette  []Forceps  []Nippers    [] Rongeur [x] Scissors  []Others:   Secured: Yes   Secured with: Steri-strips  Dressing Applied: Yes  Response to Treatment:Well tolerated by patient.  Note:     Product number: [id number]  Expiration date:  06/25/2024

## 2023-10-26 ENCOUNTER — OUTPATIENT CASE MANAGEMENT (OUTPATIENT)
Dept: ADMINISTRATIVE | Facility: OTHER | Age: 63
End: 2023-10-26

## 2023-10-26 NOTE — LETTER
Navdeep Avery  [address]  [place] [address]          Dear Navdeep Avery,     I am your nurse with [facility]s Outpatient Care Management Department. I have been unsuccessful at reaching you since we spoke on 10/12/2023.  At your earliest convenience, I would like to discuss your healthcare progress.      Please contact me at [phone] from 8:00AM to 4:30 PM on Monday thru Friday.     As you know, [facility] is a program offered to you through [facility] where a nurse is available 24/7 to answer questions or provide medical advice, their number is [phone].    Thanks,        Celina Pearl RN [facility]   Outpatient Care Management

## 2023-10-26 NOTE — PROGRESS NOTES
10/26/2023  1st attempt to complete Follow-Up  for Outpatient Care Management, left message.  Will mail unable to assess letter.

## 2023-10-31 ENCOUNTER — OFFICE VISIT (OUTPATIENT)
Dept: SURGERY | Facility: CLINIC | Age: 63
End: 2023-10-31
Payer: MEDICARE

## 2023-10-31 DIAGNOSIS — L97.413 DIABETIC ULCER OF RIGHT MIDFOOT ASSOCIATED WITH DIABETES MELLITUS DUE TO UNDERLYING CONDITION, WITH NECROSIS OF MUSCLE: Primary | ICD-10-CM

## 2023-10-31 DIAGNOSIS — E08.621 DIABETIC ULCER OF RIGHT MIDFOOT ASSOCIATED WITH DIABETES MELLITUS DUE TO UNDERLYING CONDITION, WITH NECROSIS OF MUSCLE: Primary | ICD-10-CM

## 2023-10-31 PROCEDURE — 99499 UNLISTED E&M SERVICE: CPT | Mod: S$PBB,,, | Performed by: STUDENT IN AN ORGANIZED HEALTH CARE EDUCATION/TRAINING PROGRAM

## 2023-10-31 PROCEDURE — 15275 PR SKIN SUB GRAFT FACE/NK/HF/G UP TO 100 SQCM: ICD-10-PCS | Mod: S$PBB,,, | Performed by: STUDENT IN AN ORGANIZED HEALTH CARE EDUCATION/TRAINING PROGRAM

## 2023-10-31 PROCEDURE — 15275 SKIN SUB GRAFT FACE/NK/HF/G: CPT | Mod: PBBFAC | Performed by: STUDENT IN AN ORGANIZED HEALTH CARE EDUCATION/TRAINING PROGRAM

## 2023-10-31 PROCEDURE — 99499 NO LOS: ICD-10-PCS | Mod: S$PBB,,, | Performed by: STUDENT IN AN ORGANIZED HEALTH CARE EDUCATION/TRAINING PROGRAM

## 2023-10-31 PROCEDURE — 99999PBSHW PR PBB SHADOW TECHNICAL ONLY FILED TO HB: ICD-10-PCS | Mod: PBBFAC,,,

## 2023-10-31 PROCEDURE — 99213 OFFICE O/P EST LOW 20 MIN: CPT | Mod: PBBFAC | Performed by: STUDENT IN AN ORGANIZED HEALTH CARE EDUCATION/TRAINING PROGRAM

## 2023-10-31 PROCEDURE — 99999PBSHW PR PBB SHADOW TECHNICAL ONLY FILED TO HB: Mod: PBBFAC,,,

## 2023-10-31 PROCEDURE — 15275 SKIN SUB GRAFT FACE/NK/HF/G: CPT | Mod: S$PBB,,, | Performed by: STUDENT IN AN ORGANIZED HEALTH CARE EDUCATION/TRAINING PROGRAM

## 2023-10-31 NOTE — PROGRESS NOTES
Subjective:       Patient ID: Navdeep Avery is a 63 y.o. male.    Chief Complaint: Follow-up    62-year-old  male presents to the clinic for 2 week evaluation status post debridement of right lower foot; patient had a right foot abscess with necrotic muscle extending down to bone the plantar surface of the right foot adjacent to the 3rd metatarsophalangeal joint.  Patient's spouse has been taking great care of the wound and the wound is healing.  No complaints.  Denies any chest pain/shortness of breath, nausea/vomiting/diarrhea, fever/chills.    2/14:  Presents today for wound re-evaluation and placement of PuraPly XT.  No significant changes from previous visit    2/21: Presents today for wound re-evaluation and placement of PuraPly XT.  No significant changes from previous visit    2/28: Presents today for wound re-evaluation and placement of PuraPly XT.  No significant changes from previous visit    3/7: Presents today for wound re-evaluation and placement of Affinity.  No significant changes from previous visit    3/20: Presents today for wound re-evaluation and placement of Apligraf.  No significant changes from previous visit    3/27: Presents today for wound re-evaluation and placement of Apligraf.  No significant changes from previous visits    4/4: Presents today for wound re-evaluation and placement of Apligraf.  No significant changes from previous visit    4/10: Presents today for wound re-evaluation and placement of Apligraf.  No significant changes from previous visit    4/17:  Presents today for wound re-evaluation and placement of Apligraf; no significant changes from previous visit    4/25:  Presents today for wound re-evaluation and placement of Affinity; no significant changes from previous visit    5/3: Presents today for wound re-evaluation and placement of Affinity; no significant changes from previous visit    5/9:  Presents today for wound re-evaluation and placed an affinity; no  significant changes from previous visit    5/16: Presents today for wound re-evaluation and placed an affinity; no significant changes from previous visit    5/23:  Presents today for wound re-evaluation; no significant changes from previous visit    8/15:  Patient presents for evaluation of right foot wound.  Patient was recently hospitalized and required debridement on August 10th for some ulceration with muscle necrosis on the right foot; no bony involvement; no changes on x-ray from previous.  Denies any chest pain/shortness of breath, nausea/vomiting/diarrhea, fever/chills.    9/11:  Patient presents for wound re-evaluation, debridement.  No changes since seen in clinic previously.    10/2: presents for wound re-evaluation; has been going to wound clinic weekly    10/24:  Presents for wound re-evaluation; application of PuraPly.  No changes since seen in clinic previously.    10/31: Presents for wound re-evaluation; application of Affinity.  No changes since seen in clinic previously.        Review of Systems   Constitutional: Negative.    HENT: Negative.     Eyes: Negative.    Respiratory:  Negative for shortness of breath.    Cardiovascular: Negative.    Gastrointestinal:  Negative for abdominal pain, blood in stool, change in bowel habit and vomiting.   Endocrine: Negative.    Genitourinary: Negative.  Negative for hematuria.   Musculoskeletal:  Negative for back pain and myalgias.   Integumentary:  Negative.   Allergic/Immunologic: Negative.    Neurological:  Negative for dizziness, weakness and light-headedness.   Psychiatric/Behavioral: Negative.           Objective:      Physical Exam  Constitutional:       General: He is not in acute distress.     Appearance: Normal appearance.   HENT:      Head: Normocephalic.   Cardiovascular:      Rate and Rhythm: Normal rate.   Pulmonary:      Effort: Pulmonary effort is normal. No respiratory distress.   Abdominal:      General: There is no distension.       Tenderness: There is no abdominal tenderness.   Musculoskeletal:         General: Normal range of motion.        Feet:    Feet:      Comments: ulceration on the plantar surface adjacent to the 3rd metatarsophalangeal joint; measuring 1.3x1.1x0.1 cm; no necrosis; granulation tissue present; no bony involvement.  Slight maceration of tissue    Previous new ulcer scabbed over  Skin:     General: Skin is warm.      Coloration: Skin is not jaundiced.   Neurological:      General: No focal deficit present.      Mental Status: He is alert and oriented to person, place, and time.      Cranial Nerves: No cranial nerve deficit.                   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, with necrosis of muscle    -  Primary              Plan:       Patient Name: Navdeep Avery  Patient MRN: [id number]  Patient YOB: 1960  CSN: [id number]  Date: 10/31/2023  Provider:   Evelio Razo    Application for Assessment: Right foot ulcerationg  Skin Substitute: Affinity  Performed By: Evelio Razo  Time-out Taken: Yes  Location:     [x] Genitalia/Hands/Feet/Multiple Digits    [] Scalp/Face/Neck/Ears    []Trunk/Arms/Legs  Application Area: (sq cm): 1.43  Product Applied: (sq cm): 6.25  Product Waste (sq cm): 4.82  Fenestrated: no   Instrument:    [] Blade [] Curette  [x]Forceps  []Nippers    [] Rongeur [] Scissors  []Others:   Secured: Yes   Secured with: Steri-strips  Dressing Applied: Yes  Response to Treatment:Well tolerated by patient.  Note:     Product number:  [id number]  Expiration date:  11/02/2023

## 2023-11-06 ENCOUNTER — OUTPATIENT CASE MANAGEMENT (OUTPATIENT)
Dept: ADMINISTRATIVE | Facility: OTHER | Age: 63
End: 2023-11-06

## 2023-11-06 NOTE — PROGRESS NOTES
11/6/2023  3rd attempt to complete Follow-Up  for Outpatient Care Management, left message. Will dis-enroll from [facility]. OPCM Case Closure.

## 2023-11-07 ENCOUNTER — OFFICE VISIT (OUTPATIENT)
Dept: SURGERY | Facility: CLINIC | Age: 63
End: 2023-11-07
Payer: MEDICARE

## 2023-11-07 DIAGNOSIS — E08.621 DIABETIC ULCER OF RIGHT MIDFOOT ASSOCIATED WITH DIABETES MELLITUS DUE TO UNDERLYING CONDITION, WITH NECROSIS OF MUSCLE: Primary | ICD-10-CM

## 2023-11-07 DIAGNOSIS — L97.413 DIABETIC ULCER OF RIGHT MIDFOOT ASSOCIATED WITH DIABETES MELLITUS DUE TO UNDERLYING CONDITION, WITH NECROSIS OF MUSCLE: Primary | ICD-10-CM

## 2023-11-07 PROCEDURE — 99213 OFFICE O/P EST LOW 20 MIN: CPT | Mod: PBBFAC | Performed by: STUDENT IN AN ORGANIZED HEALTH CARE EDUCATION/TRAINING PROGRAM

## 2023-11-07 PROCEDURE — 99999PBSHW PR PBB SHADOW TECHNICAL ONLY FILED TO HB: ICD-10-PCS | Mod: PBBFAC,,,

## 2023-11-07 PROCEDURE — 15275 SKIN SUB GRAFT FACE/NK/HF/G: CPT | Mod: PBBFAC | Performed by: STUDENT IN AN ORGANIZED HEALTH CARE EDUCATION/TRAINING PROGRAM

## 2023-11-07 PROCEDURE — 15275 SKIN SUB GRAFT FACE/NK/HF/G: CPT | Mod: S$PBB,,, | Performed by: STUDENT IN AN ORGANIZED HEALTH CARE EDUCATION/TRAINING PROGRAM

## 2023-11-07 PROCEDURE — 99213 OFFICE O/P EST LOW 20 MIN: CPT | Mod: S$PBB,25,, | Performed by: STUDENT IN AN ORGANIZED HEALTH CARE EDUCATION/TRAINING PROGRAM

## 2023-11-07 PROCEDURE — 99213 PR OFFICE/OUTPT VISIT, EST, LEVL III, 20-29 MIN: ICD-10-PCS | Mod: S$PBB,25,, | Performed by: STUDENT IN AN ORGANIZED HEALTH CARE EDUCATION/TRAINING PROGRAM

## 2023-11-07 PROCEDURE — 99999PBSHW PR PBB SHADOW TECHNICAL ONLY FILED TO HB: Mod: PBBFAC,,,

## 2023-11-07 PROCEDURE — 15275 PR SKIN SUB GRAFT FACE/NK/HF/G UP TO 100 SQCM: ICD-10-PCS | Mod: S$PBB,,, | Performed by: STUDENT IN AN ORGANIZED HEALTH CARE EDUCATION/TRAINING PROGRAM

## 2023-11-08 NOTE — PROGRESS NOTES
Subjective:       Patient ID: Navdeep Avery is a 63 y.o. male.    Chief Complaint: No chief complaint on file.    62-year-old  male presents to the clinic for 2 week evaluation status post debridement of right lower foot; patient had a right foot abscess with necrotic muscle extending down to bone the plantar surface of the right foot adjacent to the 3rd metatarsophalangeal joint.  Patient's spouse has been taking great care of the wound and the wound is healing.  No complaints.  Denies any chest pain/shortness of breath, nausea/vomiting/diarrhea, fever/chills.    2/14:  Presents today for wound re-evaluation and placement of PuraPly XT.  No significant changes from previous visit    2/21: Presents today for wound re-evaluation and placement of PuraPly XT.  No significant changes from previous visit    2/28: Presents today for wound re-evaluation and placement of PuraPly XT.  No significant changes from previous visit    3/7: Presents today for wound re-evaluation and placement of Affinity.  No significant changes from previous visit    3/20: Presents today for wound re-evaluation and placement of Apligraf.  No significant changes from previous visit    3/27: Presents today for wound re-evaluation and placement of Apligraf.  No significant changes from previous visits    4/4: Presents today for wound re-evaluation and placement of Apligraf.  No significant changes from previous visit    4/10: Presents today for wound re-evaluation and placement of Apligraf.  No significant changes from previous visit    4/17:  Presents today for wound re-evaluation and placement of Apligraf; no significant changes from previous visit    4/25:  Presents today for wound re-evaluation and placement of Affinity; no significant changes from previous visit    5/3: Presents today for wound re-evaluation and placement of Affinity; no significant changes from previous visit    5/9:  Presents today for wound re-evaluation and  placed an affinity; no significant changes from previous visit    5/16: Presents today for wound re-evaluation and placed an affinity; no significant changes from previous visit    5/23:  Presents today for wound re-evaluation; no significant changes from previous visit    8/15:  Patient presents for evaluation of right foot wound.  Patient was recently hospitalized and required debridement on August 10th for some ulceration with muscle necrosis on the right foot; no bony involvement; no changes on x-ray from previous.  Denies any chest pain/shortness of breath, nausea/vomiting/diarrhea, fever/chills.    9/11:  Patient presents for wound re-evaluation, debridement.  No changes since seen in clinic previously.    10/2: presents for wound re-evaluation; has been going to wound clinic weekly    10/24:  Presents for wound re-evaluation; application of PuraPly.  No changes since seen in clinic previously.    10/31: Presents for wound re-evaluation; application of Affinity.  No changes since seen in clinic previously.    11/7: Presents for wound re-evaluation; application of Affinity.  No changes since seen in clinic previously.        Review of Systems   Constitutional: Negative.    HENT: Negative.     Eyes: Negative.    Respiratory:  Negative for shortness of breath.    Cardiovascular: Negative.    Gastrointestinal:  Negative for abdominal pain, blood in stool, change in bowel habit and vomiting.   Endocrine: Negative.    Genitourinary: Negative.  Negative for hematuria.   Musculoskeletal:  Negative for back pain and myalgias.   Integumentary:  Negative.   Allergic/Immunologic: Negative.    Neurological:  Negative for dizziness, weakness and light-headedness.   Psychiatric/Behavioral: Negative.           Objective:      Physical Exam  Constitutional:       General: He is not in acute distress.     Appearance: Normal appearance.   HENT:      Head: Normocephalic.   Cardiovascular:      Rate and Rhythm: Normal rate.    Pulmonary:      Effort: Pulmonary effort is normal. No respiratory distress.   Abdominal:      General: There is no distension.      Tenderness: There is no abdominal tenderness.   Musculoskeletal:         General: Normal range of motion.        Feet:    Feet:      Comments: ulceration on the plantar surface adjacent to the 3rd metatarsophalangeal joint; measuring 1.3x0.8x0.1 cm; no necrosis; granulation tissue present; no bony involvement.  Slight maceration of tissue    Skin:     General: Skin is warm.      Coloration: Skin is not jaundiced.   Neurological:      General: No focal deficit present.      Mental Status: He is alert and oriented to person, place, and time.      Cranial Nerves: No cranial nerve deficit.                     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, with necrosis of muscle    -  Primary              Plan:       Patient Name: Navdeep Avery  Patient MRN: [id number]  Patient YOB: 1960  CSN: [id number]  Date: 11/08/2023  Provider:   Evelio Razo    Application for Assessment: Right foot ulcerationg  Skin Substitute: Affinity  Performed By: Evelio Razo  Time-out Taken: Yes  Location:     [x] Genitalia/Hands/Feet/Multiple Digits    [] Scalp/Face/Neck/Ears    []Trunk/Arms/Legs  Application Area: (sq cm): 1.04  Product Applied: (sq cm): 6.25  Product Waste (sq cm): 5.21  Fenestrated: no   Instrument:    [] Blade [] Curette  [x]Forceps  []Nippers    [] Rongeur [] Scissors  []Others:   Secured: Yes   Secured with: Steri-strips  Dressing Applied: Yes  Response to Treatment:Well tolerated by patient.  Note:     Product number:  [id number]  Expiration date:  11/09/2023

## 2023-11-14 ENCOUNTER — OFFICE VISIT (OUTPATIENT)
Dept: SURGERY | Facility: CLINIC | Age: 63
End: 2023-11-14
Payer: MEDICARE

## 2023-11-14 DIAGNOSIS — L97.413 DIABETIC ULCER OF RIGHT MIDFOOT ASSOCIATED WITH DIABETES MELLITUS DUE TO UNDERLYING CONDITION, WITH NECROSIS OF MUSCLE: Primary | ICD-10-CM

## 2023-11-14 DIAGNOSIS — E08.621 DIABETIC ULCER OF RIGHT MIDFOOT ASSOCIATED WITH DIABETES MELLITUS DUE TO UNDERLYING CONDITION, WITH NECROSIS OF MUSCLE: Primary | ICD-10-CM

## 2023-11-14 PROCEDURE — 15275 SKIN SUB GRAFT FACE/NK/HF/G: CPT | Mod: S$PBB,,, | Performed by: STUDENT IN AN ORGANIZED HEALTH CARE EDUCATION/TRAINING PROGRAM

## 2023-11-14 PROCEDURE — 99999PBSHW PR PBB SHADOW TECHNICAL ONLY FILED TO HB: Mod: PBBFAC,,,

## 2023-11-14 PROCEDURE — 15275 PR SKIN SUB GRAFT FACE/NK/HF/G UP TO 100 SQCM: ICD-10-PCS | Mod: S$PBB,,, | Performed by: STUDENT IN AN ORGANIZED HEALTH CARE EDUCATION/TRAINING PROGRAM

## 2023-11-14 PROCEDURE — 99499 NO LOS: ICD-10-PCS | Mod: S$PBB,,, | Performed by: STUDENT IN AN ORGANIZED HEALTH CARE EDUCATION/TRAINING PROGRAM

## 2023-11-14 PROCEDURE — 99213 OFFICE O/P EST LOW 20 MIN: CPT | Mod: PBBFAC,25 | Performed by: STUDENT IN AN ORGANIZED HEALTH CARE EDUCATION/TRAINING PROGRAM

## 2023-11-14 PROCEDURE — 15275 SKIN SUB GRAFT FACE/NK/HF/G: CPT | Mod: PBBFAC | Performed by: STUDENT IN AN ORGANIZED HEALTH CARE EDUCATION/TRAINING PROGRAM

## 2023-11-14 PROCEDURE — 99499 UNLISTED E&M SERVICE: CPT | Mod: S$PBB,,, | Performed by: STUDENT IN AN ORGANIZED HEALTH CARE EDUCATION/TRAINING PROGRAM

## 2023-11-14 PROCEDURE — 99999PBSHW PR PBB SHADOW TECHNICAL ONLY FILED TO HB: ICD-10-PCS | Mod: PBBFAC,,,

## 2023-11-14 NOTE — PROGRESS NOTES
Subjective:       Patient ID: Navdeep Avery is a 63 y.o. male.    Chief Complaint: No chief complaint on file.    62-year-old  male presents to the clinic for 2 week evaluation status post debridement of right lower foot; patient had a right foot abscess with necrotic muscle extending down to bone the plantar surface of the right foot adjacent to the 3rd metatarsophalangeal joint.  Patient's spouse has been taking great care of the wound and the wound is healing.  No complaints.  Denies any chest pain/shortness of breath, nausea/vomiting/diarrhea, fever/chills.    2/14:  Presents today for wound re-evaluation and placement of PuraPly XT.  No significant changes from previous visit    2/21: Presents today for wound re-evaluation and placement of PuraPly XT.  No significant changes from previous visit    2/28: Presents today for wound re-evaluation and placement of PuraPly XT.  No significant changes from previous visit    3/7: Presents today for wound re-evaluation and placement of Affinity.  No significant changes from previous visit    3/20: Presents today for wound re-evaluation and placement of Apligraf.  No significant changes from previous visit    3/27: Presents today for wound re-evaluation and placement of Apligraf.  No significant changes from previous visits    4/4: Presents today for wound re-evaluation and placement of Apligraf.  No significant changes from previous visit    4/10: Presents today for wound re-evaluation and placement of Apligraf.  No significant changes from previous visit    4/17:  Presents today for wound re-evaluation and placement of Apligraf; no significant changes from previous visit    4/25:  Presents today for wound re-evaluation and placement of Affinity; no significant changes from previous visit    5/3: Presents today for wound re-evaluation and placement of Affinity; no significant changes from previous visit    5/9:  Presents today for wound re-evaluation and  placed an affinity; no significant changes from previous visit    5/16: Presents today for wound re-evaluation and placed an affinity; no significant changes from previous visit    5/23:  Presents today for wound re-evaluation; no significant changes from previous visit    8/15:  Patient presents for evaluation of right foot wound.  Patient was recently hospitalized and required debridement on August 10th for some ulceration with muscle necrosis on the right foot; no bony involvement; no changes on x-ray from previous.  Denies any chest pain/shortness of breath, nausea/vomiting/diarrhea, fever/chills.    9/11:  Patient presents for wound re-evaluation, debridement.  No changes since seen in clinic previously.    10/2: presents for wound re-evaluation; has been going to wound clinic weekly    10/24:  Presents for wound re-evaluation; application of PuraPly.  No changes since seen in clinic previously.    10/31: Presents for wound re-evaluation; application of Affinity.  No changes since seen in clinic previously.    11/7: Presents for wound re-evaluation; application of Affinity.  No changes since seen in clinic previously.    11/14:  Presents for wound re-evaluation; application of Affinity.  No changes since seen in clinic previously        Review of Systems   Constitutional: Negative.    HENT: Negative.     Eyes: Negative.    Respiratory:  Negative for shortness of breath.    Cardiovascular: Negative.    Gastrointestinal:  Negative for abdominal pain, blood in stool, change in bowel habit and vomiting.   Endocrine: Negative.    Genitourinary: Negative.  Negative for hematuria.   Musculoskeletal:  Negative for back pain and myalgias.   Integumentary:  Negative.   Allergic/Immunologic: Negative.    Neurological:  Negative for dizziness, weakness and light-headedness.   Psychiatric/Behavioral: Negative.           Objective:      Physical Exam  Constitutional:       General: He is not in acute distress.      Appearance: Normal appearance.   HENT:      Head: Normocephalic.   Cardiovascular:      Rate and Rhythm: Normal rate.   Pulmonary:      Effort: Pulmonary effort is normal. No respiratory distress.   Abdominal:      General: There is no distension.      Tenderness: There is no abdominal tenderness.   Musculoskeletal:         General: Normal range of motion.        Feet:    Feet:      Comments: ulceration on the plantar surface adjacent to the 3rd metatarsophalangeal joint; measuring 1.3x0.8x0.1 cm; no necrosis; granulation tissue present; no bony involvement.  Slight maceration of tissue    Skin:     General: Skin is warm.      Coloration: Skin is not jaundiced.   Neurological:      General: No focal deficit present.      Mental Status: He is alert and oriented to person, place, and time.      Cranial Nerves: No cranial nerve deficit.                     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, with necrosis of muscle    -  Primary              Plan:       Patient Name: Navdeep Avery  Patient MRN: [id number]  Patient YOB: 1960  CSN: [id number]  Date: 11/14/2023  Provider:   Evelio Razo    Application for Assessment: Right foot ulcerationg  Skin Substitute: Affinity  Performed By: Evelio Razo  Time-out Taken: Yes  Location:     [x] Genitalia/Hands/Feet/Multiple Digits    [] Scalp/Face/Neck/Ears    []Trunk/Arms/Legs  Application Area: (sq cm): 1.04  Product Applied: (sq cm): 6.25  Product Waste (sq cm): 5.21  Fenestrated: no   Instrument:    [] Blade [] Curette  [x]Forceps  []Nippers    [] Rongeur [] Scissors  []Others:   Secured: Yes   Secured with: Steri-strips  Dressing Applied: Yes  Response to Treatment:Well tolerated by patient.  Note:     Product number:  [id number]  Expiration date:  11/17/2023

## 2023-11-15 ENCOUNTER — EXTERNAL HOME HEALTH (OUTPATIENT)
Dept: HOME HEALTH SERVICES | Facility: HOSPITAL | Age: 63
End: 2023-11-15
Payer: MEDICARE

## 2023-11-21 ENCOUNTER — OFFICE VISIT (OUTPATIENT)
Dept: SURGERY | Facility: CLINIC | Age: 63
End: 2023-11-21
Payer: MEDICARE

## 2023-11-21 DIAGNOSIS — L97.413 DIABETIC ULCER OF RIGHT MIDFOOT ASSOCIATED WITH DIABETES MELLITUS DUE TO UNDERLYING CONDITION, WITH NECROSIS OF MUSCLE: Primary | ICD-10-CM

## 2023-11-21 DIAGNOSIS — E08.621 DIABETIC ULCER OF RIGHT MIDFOOT ASSOCIATED WITH DIABETES MELLITUS DUE TO UNDERLYING CONDITION, WITH NECROSIS OF MUSCLE: Primary | ICD-10-CM

## 2023-11-21 PROCEDURE — 99213 PR OFFICE/OUTPT VISIT, EST, LEVL III, 20-29 MIN: ICD-10-PCS | Mod: S$PBB,25,, | Performed by: STUDENT IN AN ORGANIZED HEALTH CARE EDUCATION/TRAINING PROGRAM

## 2023-11-21 PROCEDURE — 15275 SKIN SUB GRAFT FACE/NK/HF/G: CPT | Mod: PBBFAC | Performed by: STUDENT IN AN ORGANIZED HEALTH CARE EDUCATION/TRAINING PROGRAM

## 2023-11-21 PROCEDURE — 15275 SKIN SUB GRAFT FACE/NK/HF/G: CPT | Mod: S$PBB,,, | Performed by: STUDENT IN AN ORGANIZED HEALTH CARE EDUCATION/TRAINING PROGRAM

## 2023-11-21 PROCEDURE — 99999PBSHW PR PBB SHADOW TECHNICAL ONLY FILED TO HB: Mod: PBBFAC,,,

## 2023-11-21 PROCEDURE — 99214 OFFICE O/P EST MOD 30 MIN: CPT | Mod: PBBFAC,25 | Performed by: STUDENT IN AN ORGANIZED HEALTH CARE EDUCATION/TRAINING PROGRAM

## 2023-11-21 PROCEDURE — 99213 OFFICE O/P EST LOW 20 MIN: CPT | Mod: S$PBB,25,, | Performed by: STUDENT IN AN ORGANIZED HEALTH CARE EDUCATION/TRAINING PROGRAM

## 2023-11-21 PROCEDURE — 15275 PR SKIN SUB GRAFT FACE/NK/HF/G UP TO 100 SQCM: ICD-10-PCS | Mod: S$PBB,,, | Performed by: STUDENT IN AN ORGANIZED HEALTH CARE EDUCATION/TRAINING PROGRAM

## 2023-11-21 PROCEDURE — 99999PBSHW PR PBB SHADOW TECHNICAL ONLY FILED TO HB: ICD-10-PCS | Mod: PBBFAC,,,

## 2023-11-25 NOTE — PROGRESS NOTES
Subjective:       Patient ID: Navdeep Avery is a 63 y.o. male.    Chief Complaint: Follow-up (1wk f/u wound ck (graft))    62-year-old  male presents to the clinic for 2 week evaluation status post debridement of right lower foot; patient had a right foot abscess with necrotic muscle extending down to bone the plantar surface of the right foot adjacent to the 3rd metatarsophalangeal joint.  Patient's spouse has been taking great care of the wound and the wound is healing.  No complaints.  Denies any chest pain/shortness of breath, nausea/vomiting/diarrhea, fever/chills.    2/14:  Presents today for wound re-evaluation and placement of PuraPly XT.  No significant changes from previous visit    2/21: Presents today for wound re-evaluation and placement of PuraPly XT.  No significant changes from previous visit    2/28: Presents today for wound re-evaluation and placement of PuraPly XT.  No significant changes from previous visit    3/7: Presents today for wound re-evaluation and placement of Affinity.  No significant changes from previous visit    3/20: Presents today for wound re-evaluation and placement of Apligraf.  No significant changes from previous visit    3/27: Presents today for wound re-evaluation and placement of Apligraf.  No significant changes from previous visits    4/4: Presents today for wound re-evaluation and placement of Apligraf.  No significant changes from previous visit    4/10: Presents today for wound re-evaluation and placement of Apligraf.  No significant changes from previous visit    4/17:  Presents today for wound re-evaluation and placement of Apligraf; no significant changes from previous visit    4/25:  Presents today for wound re-evaluation and placement of Affinity; no significant changes from previous visit    5/3: Presents today for wound re-evaluation and placement of Affinity; no significant changes from previous visit    5/9:  Presents today for wound re-evaluation  and placed an affinity; no significant changes from previous visit    5/16: Presents today for wound re-evaluation and placed an affinity; no significant changes from previous visit    5/23:  Presents today for wound re-evaluation; no significant changes from previous visit    8/15:  Patient presents for evaluation of right foot wound.  Patient was recently hospitalized and required debridement on August 10th for some ulceration with muscle necrosis on the right foot; no bony involvement; no changes on x-ray from previous.  Denies any chest pain/shortness of breath, nausea/vomiting/diarrhea, fever/chills.    9/11:  Patient presents for wound re-evaluation, debridement.  No changes since seen in clinic previously.    10/2: presents for wound re-evaluation; has been going to wound clinic weekly    10/24:  Presents for wound re-evaluation; application of PuraPly.  No changes since seen in clinic previously.    10/31: Presents for wound re-evaluation; application of Affinity.  No changes since seen in clinic previously.    11/7: Presents for wound re-evaluation; application of Affinity.  No changes since seen in clinic previously.    11/14:  Presents for wound re-evaluation; application of Affinity.  No changes since seen in clinic previously    11/25: Presents for wound re-evaluation; application of Affinity.  No changes since seen in clinic previously            Review of Systems   Constitutional: Negative.    HENT: Negative.     Eyes: Negative.    Respiratory:  Negative for shortness of breath.    Cardiovascular: Negative.    Gastrointestinal:  Negative for abdominal pain, blood in stool, change in bowel habit and vomiting.   Endocrine: Negative.    Genitourinary: Negative.  Negative for hematuria.   Musculoskeletal:  Negative for back pain and myalgias.   Integumentary:  Negative.   Allergic/Immunologic: Negative.    Neurological:  Negative for dizziness, weakness and light-headedness.   Psychiatric/Behavioral:  Negative.           Objective:      Physical Exam  Constitutional:       General: He is not in acute distress.     Appearance: Normal appearance.   HENT:      Head: Normocephalic.   Cardiovascular:      Rate and Rhythm: Normal rate.   Pulmonary:      Effort: Pulmonary effort is normal. No respiratory distress.   Abdominal:      General: There is no distension.      Tenderness: There is no abdominal tenderness.   Musculoskeletal:         General: Normal range of motion.        Feet:    Feet:      Comments: ulceration on the plantar surface adjacent to the 3rd metatarsophalangeal joint; measuring 1.3x0.7x0.1 cm; no necrosis; granulation tissue present; no bony involvement.  Slight maceration of tissue    Skin:     General: Skin is warm.      Coloration: Skin is not jaundiced.   Neurological:      General: No focal deficit present.      Mental Status: He is alert and oriented to person, place, and time.      Cranial Nerves: No cranial nerve deficit.                     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, with necrosis of muscle    -  Primary              Plan:       Patient Name: Navdeep Avery  Patient MRN: [id number]  Patient YOB: 1960  CSN: [id number]  Date: 11/25/2023  Provider:   Evelio Razo    Application for Assessment: Right foot ulcerationg  Skin Substitute: Affinity  Performed By: Evelio Razo  Time-out Taken: Yes  Location:     [x] Genitalia/Hands/Feet/Multiple Digits    [] Scalp/Face/Neck/Ears    []Trunk/Arms/Legs  Application Area: (sq cm): 1.04  Product Applied: (sq cm): 6.25  Product Waste (sq cm): 5.21  Fenestrated: no   Instrument:    [] Blade [] Curette  [x]Forceps  []Nippers    [] Rongeur [] Scissors  []Others:   Secured: Yes   Secured with: Steri-strips  Dressing Applied: Yes  Response to Treatment:Well tolerated by patient.  Note:      Product number:  [id number]  Expiration date:  11/24/2023

## 2023-11-29 ENCOUNTER — OFFICE VISIT (OUTPATIENT)
Dept: SURGERY | Facility: CLINIC | Age: 63
End: 2023-11-29
Payer: MEDICARE

## 2023-11-29 DIAGNOSIS — L97.413 DIABETIC ULCER OF RIGHT MIDFOOT ASSOCIATED WITH DIABETES MELLITUS DUE TO UNDERLYING CONDITION, WITH NECROSIS OF MUSCLE: Primary | ICD-10-CM

## 2023-11-29 DIAGNOSIS — E08.621 DIABETIC ULCER OF RIGHT MIDFOOT ASSOCIATED WITH DIABETES MELLITUS DUE TO UNDERLYING CONDITION, WITH NECROSIS OF MUSCLE: Primary | ICD-10-CM

## 2023-11-29 PROCEDURE — 99213 PR OFFICE/OUTPT VISIT, EST, LEVL III, 20-29 MIN: ICD-10-PCS | Mod: S$PBB,25,, | Performed by: STUDENT IN AN ORGANIZED HEALTH CARE EDUCATION/TRAINING PROGRAM

## 2023-11-29 PROCEDURE — 99213 OFFICE O/P EST LOW 20 MIN: CPT | Mod: S$PBB,25,, | Performed by: STUDENT IN AN ORGANIZED HEALTH CARE EDUCATION/TRAINING PROGRAM

## 2023-11-29 PROCEDURE — 15275 SKIN SUB GRAFT FACE/NK/HF/G: CPT | Mod: PBBFAC | Performed by: STUDENT IN AN ORGANIZED HEALTH CARE EDUCATION/TRAINING PROGRAM

## 2023-11-29 PROCEDURE — 15275 PR SKIN SUB GRAFT FACE/NK/HF/G UP TO 100 SQCM: ICD-10-PCS | Mod: S$PBB,,, | Performed by: STUDENT IN AN ORGANIZED HEALTH CARE EDUCATION/TRAINING PROGRAM

## 2023-11-29 PROCEDURE — 99999PBSHW PR PBB SHADOW TECHNICAL ONLY FILED TO HB: Mod: PBBFAC,,,

## 2023-11-29 PROCEDURE — 15275 SKIN SUB GRAFT FACE/NK/HF/G: CPT | Mod: S$PBB,,, | Performed by: STUDENT IN AN ORGANIZED HEALTH CARE EDUCATION/TRAINING PROGRAM

## 2023-11-29 PROCEDURE — 99999PBSHW PR PBB SHADOW TECHNICAL ONLY FILED TO HB: ICD-10-PCS | Mod: PBBFAC,,,

## 2023-11-29 PROCEDURE — 99214 OFFICE O/P EST MOD 30 MIN: CPT | Mod: PBBFAC | Performed by: STUDENT IN AN ORGANIZED HEALTH CARE EDUCATION/TRAINING PROGRAM

## 2023-11-29 NOTE — PROGRESS NOTES
Subjective:       Patient ID: Navdeep Avery is a 63 y.o. male.    Chief Complaint: Follow-up (Follow up wound ck foot (graft))    62-year-old  male presents to the clinic for 2 week evaluation status post debridement of right lower foot; patient had a right foot abscess with necrotic muscle extending down to bone the plantar surface of the right foot adjacent to the 3rd metatarsophalangeal joint.  Patient's spouse has been taking great care of the wound and the wound is healing.  No complaints.  Denies any chest pain/shortness of breath, nausea/vomiting/diarrhea, fever/chills.    2/14:  Presents today for wound re-evaluation and placement of PuraPly XT.  No significant changes from previous visit    2/21: Presents today for wound re-evaluation and placement of PuraPly XT.  No significant changes from previous visit    2/28: Presents today for wound re-evaluation and placement of PuraPly XT.  No significant changes from previous visit    3/7: Presents today for wound re-evaluation and placement of Affinity.  No significant changes from previous visit    3/20: Presents today for wound re-evaluation and placement of Apligraf.  No significant changes from previous visit    3/27: Presents today for wound re-evaluation and placement of Apligraf.  No significant changes from previous visits    4/4: Presents today for wound re-evaluation and placement of Apligraf.  No significant changes from previous visit    4/10: Presents today for wound re-evaluation and placement of Apligraf.  No significant changes from previous visit    4/17:  Presents today for wound re-evaluation and placement of Apligraf; no significant changes from previous visit    4/25:  Presents today for wound re-evaluation and placement of Affinity; no significant changes from previous visit    5/3: Presents today for wound re-evaluation and placement of Affinity; no significant changes from previous visit    5/9:  Presents today for wound  re-evaluation and placed an affinity; no significant changes from previous visit    5/16: Presents today for wound re-evaluation and placed an affinity; no significant changes from previous visit    5/23:  Presents today for wound re-evaluation; no significant changes from previous visit    8/15:  Patient presents for evaluation of right foot wound.  Patient was recently hospitalized and required debridement on August 10th for some ulceration with muscle necrosis on the right foot; no bony involvement; no changes on x-ray from previous.  Denies any chest pain/shortness of breath, nausea/vomiting/diarrhea, fever/chills.    9/11:  Patient presents for wound re-evaluation, debridement.  No changes since seen in clinic previously.    10/2: presents for wound re-evaluation; has been going to wound clinic weekly    10/24:  Presents for wound re-evaluation; application of PuraPly.  No changes since seen in clinic previously.    10/31: Presents for wound re-evaluation; application of Affinity.  No changes since seen in clinic previously.    11/7: Presents for wound re-evaluation; application of Affinity.  No changes since seen in clinic previously.    11/14:  Presents for wound re-evaluation; application of Affinity.  No changes since seen in clinic previously    11/21: Presents for wound re-evaluation; application of Affinity.  No changes since seen in clinic previously    11/29: Presents for wound re-evaluation; application of Affinity.  No changes since seen in clinic previously            Review of Systems   Constitutional: Negative.    HENT: Negative.     Eyes: Negative.    Respiratory:  Negative for shortness of breath.    Cardiovascular: Negative.    Gastrointestinal:  Negative for abdominal pain, blood in stool, change in bowel habit and vomiting.   Endocrine: Negative.    Genitourinary: Negative.  Negative for hematuria.   Musculoskeletal:  Negative for back pain and myalgias.   Integumentary:  Negative.    Allergic/Immunologic: Negative.    Neurological:  Negative for dizziness, weakness and light-headedness.   Psychiatric/Behavioral: Negative.           Objective:      Physical Exam  Constitutional:       General: He is not in acute distress.     Appearance: Normal appearance.   HENT:      Head: Normocephalic.   Cardiovascular:      Rate and Rhythm: Normal rate.   Pulmonary:      Effort: Pulmonary effort is normal. No respiratory distress.   Abdominal:      General: There is no distension.      Tenderness: There is no abdominal tenderness.   Musculoskeletal:         General: Normal range of motion.        Feet:    Feet:      Comments: ulceration on the plantar surface adjacent to the 3rd metatarsophalangeal joint; measuring 1x0.5x0.1 cm; no necrosis; granulation tissue present; no bony involvement.  Slight maceration of tissue    Small opening of the 1st metatarsophalangeal joint measured 0.5 x 0.3 x 0.3 cm    Skin:     General: Skin is warm.      Coloration: Skin is not jaundiced.   Neurological:      General: No focal deficit present.      Mental Status: He is alert and oriented to person, place, and time.      Cranial Nerves: No cranial nerve deficit.                     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, with necrosis of muscle    -  Primary              Plan:       Patient Name: Navdeep Avery  Patient MRN: [id number]  Patient YOB: 1960  CSN: [id number]  Date: 11/29/2023  Provider:   Evelio Razo    Application for Assessment: Right foot ulcerationg  Skin Substitute: Affinity  Performed By: Evelio Razo  Time-out Taken: Yes  Location:     [x] Genitalia/Hands/Feet/Multiple Digits    [] Scalp/Face/Neck/Ears    []Trunk/Arms/Legs  Application Area: (sq cm): 0.5  Product Applied: (sq cm): 6.25  Product Waste (sq cm): 5.75  Fenestrated:  no   Instrument:    [] Blade [] Curette  [x]Forceps  []Nippers    [] Rongeur [] Scissors  []Others:   Secured: Yes   Secured with: Steri-strips  Dressing Applied: Yes  Response to Treatment:Well tolerated by patient.  Note:     Product number:  [id number]  Expiration date:  11/30/2023

## 2023-12-05 ENCOUNTER — OFFICE VISIT (OUTPATIENT)
Dept: SURGERY | Facility: CLINIC | Age: 63
End: 2023-12-05
Payer: MEDICARE

## 2023-12-05 DIAGNOSIS — E08.621 DIABETIC ULCER OF RIGHT MIDFOOT ASSOCIATED WITH DIABETES MELLITUS DUE TO UNDERLYING CONDITION, WITH NECROSIS OF MUSCLE: Primary | ICD-10-CM

## 2023-12-05 DIAGNOSIS — L97.413 DIABETIC ULCER OF RIGHT MIDFOOT ASSOCIATED WITH DIABETES MELLITUS DUE TO UNDERLYING CONDITION, WITH NECROSIS OF MUSCLE: Primary | ICD-10-CM

## 2023-12-05 PROCEDURE — 99213 PR OFFICE/OUTPT VISIT, EST, LEVL III, 20-29 MIN: ICD-10-PCS | Mod: S$PBB,,, | Performed by: STUDENT IN AN ORGANIZED HEALTH CARE EDUCATION/TRAINING PROGRAM

## 2023-12-05 PROCEDURE — 99213 OFFICE O/P EST LOW 20 MIN: CPT | Mod: S$PBB,,, | Performed by: STUDENT IN AN ORGANIZED HEALTH CARE EDUCATION/TRAINING PROGRAM

## 2023-12-05 PROCEDURE — 99213 OFFICE O/P EST LOW 20 MIN: CPT | Mod: PBBFAC | Performed by: STUDENT IN AN ORGANIZED HEALTH CARE EDUCATION/TRAINING PROGRAM

## 2023-12-05 NOTE — PROGRESS NOTES
Subjective:       Patient ID: Navdeep Avery is a 63 y.o. male.    Chief Complaint: No chief complaint on file.    62-year-old  male presents to the clinic for 2 week evaluation status post debridement of right lower foot; patient had a right foot abscess with necrotic muscle extending down to bone the plantar surface of the right foot adjacent to the 3rd metatarsophalangeal joint.  Patient's spouse has been taking great care of the wound and the wound is healing.  No complaints.  Denies any chest pain/shortness of breath, nausea/vomiting/diarrhea, fever/chills.    2/14:  Presents today for wound re-evaluation and placement of PuraPly XT.  No significant changes from previous visit    2/21: Presents today for wound re-evaluation and placement of PuraPly XT.  No significant changes from previous visit    2/28: Presents today for wound re-evaluation and placement of PuraPly XT.  No significant changes from previous visit    3/7: Presents today for wound re-evaluation and placement of Affinity.  No significant changes from previous visit    3/20: Presents today for wound re-evaluation and placement of Apligraf.  No significant changes from previous visit    3/27: Presents today for wound re-evaluation and placement of Apligraf.  No significant changes from previous visits    4/4: Presents today for wound re-evaluation and placement of Apligraf.  No significant changes from previous visit    4/10: Presents today for wound re-evaluation and placement of Apligraf.  No significant changes from previous visit    4/17:  Presents today for wound re-evaluation and placement of Apligraf; no significant changes from previous visit    4/25:  Presents today for wound re-evaluation and placement of Affinity; no significant changes from previous visit    5/3: Presents today for wound re-evaluation and placement of Affinity; no significant changes from previous visit    5/9:  Presents today for wound re-evaluation and  placed an affinity; no significant changes from previous visit    5/16: Presents today for wound re-evaluation and placed an affinity; no significant changes from previous visit    5/23:  Presents today for wound re-evaluation; no significant changes from previous visit    8/15:  Patient presents for evaluation of right foot wound.  Patient was recently hospitalized and required debridement on August 10th for some ulceration with muscle necrosis on the right foot; no bony involvement; no changes on x-ray from previous.  Denies any chest pain/shortness of breath, nausea/vomiting/diarrhea, fever/chills.    9/11:  Patient presents for wound re-evaluation, debridement.  No changes since seen in clinic previously.    10/2: presents for wound re-evaluation; has been going to wound clinic weekly    10/24:  Presents for wound re-evaluation; application of PuraPly.  No changes since seen in clinic previously.    10/31: Presents for wound re-evaluation; application of Affinity.  No changes since seen in clinic previously.    11/7: Presents for wound re-evaluation; application of Affinity.  No changes since seen in clinic previously.    11/14:  Presents for wound re-evaluation; application of Affinity.  No changes since seen in clinic previously    11/21: Presents for wound re-evaluation; application of Affinity.  No changes since seen in clinic previously    11/29: Presents for wound re-evaluation; application of Affinity.  No changes since seen in clinic previously    12/5/23: Presents for wound re-evaluation.  No changes since seen in clinic previously          Review of Systems   Constitutional: Negative.    HENT: Negative.     Eyes: Negative.    Respiratory:  Negative for shortness of breath.    Cardiovascular: Negative.    Gastrointestinal:  Negative for abdominal pain, blood in stool, change in bowel habit and vomiting.   Endocrine: Negative.    Genitourinary: Negative.  Negative for hematuria.   Musculoskeletal:   Negative for back pain and myalgias.   Integumentary:  Negative.   Allergic/Immunologic: Negative.    Neurological:  Negative for dizziness, weakness and light-headedness.   Psychiatric/Behavioral: Negative.           Objective:      Physical Exam  Constitutional:       General: He is not in acute distress.     Appearance: Normal appearance.   HENT:      Head: Normocephalic.   Cardiovascular:      Rate and Rhythm: Normal rate.   Pulmonary:      Effort: Pulmonary effort is normal. No respiratory distress.   Abdominal:      General: There is no distension.      Tenderness: There is no abdominal tenderness.   Musculoskeletal:         General: Normal range of motion.        Feet:    Feet:      Comments: ulceration on the plantar surface adjacent to the 3rd metatarsophalangeal joint; measuring 0.8x0.4x0.1 cm; no necrosis; granulation tissue present; no bony involvement.  Slight maceration of tissue    Small opening of the 1st metatarsophalangeal joint now closed and healed    Skin:     General: Skin is warm.      Coloration: Skin is not jaundiced.   Neurological:      General: No focal deficit present.      Mental Status: He is alert and oriented to person, place, and time.      Cranial Nerves: No cranial nerve deficit.                   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  Do this for 3 weeks.  Follow back up at that time for possible Affinity application

## 2023-12-09 DIAGNOSIS — Z71.89 COMPLEX CARE COORDINATION: ICD-10-CM

## 2023-12-13 NOTE — PROGRESS NOTES
Subjective:         Patient ID: [id number] Avery is a 63 y.o. male.    Chief Complaint: Shoulder Pain, Low-back Pain, Hip Pain, Foot Pain, and Knee Pain        Pain  This is a chronic problem. The current episode started more than 1 year ago. The problem occurs daily. The problem has been waxing and waning. Associated symptoms include arthralgias and neck pain. Pertinent negatives include no anorexia, chest pain, chills, coughing, diaphoresis, fever, sore throat, vertigo or vomiting.     Review of Systems   Constitutional:  Negative for activity change, chills, diaphoresis, fever and unexpected weight change.   HENT:  Negative for drooling, ear discharge, ear pain, facial swelling, nosebleeds, sore throat, trouble swallowing, voice change and goiter.    Eyes:  Negative for photophobia, pain, discharge, redness and visual disturbance.   Respiratory:  Negative for apnea, cough, choking, chest tightness, shortness of breath, wheezing and stridor.    Cardiovascular:  Negative for chest pain, palpitations and leg swelling.   Gastrointestinal:  Negative for abdominal distention, anorexia, diarrhea, rectal pain, vomiting and fecal incontinence.   Endocrine: Negative for cold intolerance, heat intolerance, polydipsia, polyphagia and polyuria.   Genitourinary:  Negative for bladder incontinence, dysuria, flank pain and frequency.   Musculoskeletal:  Positive for arthralgias, back pain, leg pain, neck pain and neck stiffness.   Integumentary:  Negative for color change and pallor.   Neurological:  Negative for dizziness, vertigo, seizures, syncope, facial asymmetry, speech difficulty, light-headedness, memory loss and coordination difficulties.   Hematological:  Negative for adenopathy. Does not bruise/bleed easily.   Psychiatric/Behavioral:  Negative for agitation, behavioral problems, confusion, decreased concentration, dysphoric mood, hallucinations, self-injury and suicidal ideas. The patient is not nervous/anxious and  is not hyperactive.            Past Medical History:   Diagnosis Date    Arthritis     Carpal tunnel syndrome     COPD (chronic obstructive pulmonary disease)     Coronary artery disease involving native coronary artery of native heart 06/27/2023    CABG 2018 (No OP report available)  Most recent left heart catheterization 08/12/2019 1. Normal LV size apical akinesis and overall normal LV systolic function, ejection fraction 55% 2. Patent saphenous vein graft to the right PDA with patent jump graft to the OM branch left circumflex 3. Patent LIMA to the OM1 4. No significant mitral regurgitation     COVID 02/02/2022    Diabetic peripheral neuropathy     Diabetic ulcer of right foot associated with diabetes mellitus due to underlying condition, with bone involvement without evidence of necrosis     GERD (gastroesophageal reflux disease)     Hyperlipidemia     Hypertension     Peripheral vascular disease, unspecified     Sleep apnea     Type 2 diabetes mellitus      Past Surgical History:   Procedure Laterality Date    CORONARY ARTERY BYPASS GRAFT      CORONARY STENT PLACEMENT      DEBRIDEMENT OF FOOT Right 08/10/2023    Dr. Chiu    DEBRIDEMENT OF FOOT Right 8/10/2023    Procedure: DEBRIDEMENT, FOOT;  Surgeon: Evelio Chiu DO;  Location: [facility] OR;  Service: General;  Laterality: Right;    DEBRIDEMENT OF LOWER EXTREMITY Right 06/27/2022    Procedure: DEBRIDEMENT, LOWER EXTREMITY;  Surgeon: Forrest Kiser MD;  Location: [facility] OR;  Service: General;  Laterality: Right;  right foot    HIP FRACTURE SURGERY Left     IRRIGATION AND DEBRIDEMENT OF LOWER EXTREMITY Right 09/08/2022    Procedure: IRRIGATION AND DEBRIDEMENT, LOWER EXTREMITY;  Surgeon: Selina Matos MD;  Location: [facility] OR;  Service: General;  Laterality: Right;    IRRIGATION AND DEBRIDEMENT OF LOWER EXTREMITY Right 01/05/2023    Procedure: IRRIGATION AND DEBRIDEMENT, LOWER EXTREMITY;  Surgeon: Evelio Chiu DO;  Location: [facility] OR;   Service: General;  Laterality: Right;    SHOULDER OPEN ROTATOR CUFF REPAIR Left     SPINE SURGERY      VASCULAR SURGERY       Social History     Socioeconomic History    Marital status:    Occupational History    Occupation: Retired   Tobacco Use    Smoking status: Every Day     Current packs/day: 2.50     Average packs/day: 2.5 packs/day for 52.4 years (130.9 ttl pk-yrs)     Types: Cigarettes     Start date: 8/10/1971     Passive exposure: Current    Smokeless tobacco: Never   Substance and Sexual Activity    Alcohol use: Not Currently    Drug use: Yes     Types: Oxycodone    Sexual activity: Yes     Partners: Female     Social Determinants of Health     Financial Resource Strain: Medium Risk (9/12/2023)    Overall Financial Resource Strain (CARDIA)     Difficulty of Paying Living Expenses: Somewhat hard   Food Insecurity: No Food Insecurity (9/12/2023)    Hunger Vital Sign     Worried About Running Out of Food in the Last Year: Never true     Ran Out of Food in the Last Year: Never true   Transportation Needs: No Transportation Needs (9/12/2023)    PRAPARE - Transportation     Lack of Transportation (Medical): No     Lack of Transportation (Non-Medical): No   Physical Activity: Inactive (9/12/2023)    Exercise Vital Sign     Days of Exercise per Week: 0 days     Minutes of Exercise per Session: 0 min   Stress: No Stress Concern Present (9/12/2023)    [employer] [place] [employer] - Occupational Stress Questionnaire     Feeling of Stress : Only a little   Social Connections: Socially Isolated (9/12/2023)    Social Connection and Isolation Panel [NHANES]     Frequency of Communication with Friends and Family: Once a week     Frequency of Social Gatherings with Friends and Family: Once a week     Attends [employer] Services: Never     Active Member of Clubs or Organizations: No     Attends Club or Organization Meetings: Never     Marital Status:    Housing Stability: Low Risk  (9/12/2023)     "Housing Stability Vital Sign     Unable to Pay for Housing in the Last Year: No     Number of Places Lived in the Last Year: 1     Unstable Housing in the Last Year: No     Family History   Problem Relation Age of Onset    Arthritis Mother     Hypertension Mother     Learning disabilities Mother     Alcohol abuse Father     Early death Father     Heart disease Father     Cancer Sister     Diabetes Sister     Heart disease Maternal Grandfather     COPD Paternal Grandfather     Heart disease Son      Review of patient's allergies indicates:  No Known Allergies     Objective:  Vitals:    12/19/23 0808   BP: (!) 160/88   Pulse: 86   Resp: 18   Weight: 73.9 kg (163 lb)   Height: 5' 8" (1.727 m)   PainSc:   8         Physical Exam  Vitals and nursing note reviewed. Exam conducted with a chaperone present.   Constitutional:       General: He is awake. He is not in acute distress.     Appearance: Normal appearance. He is not ill-appearing, toxic-appearing or diaphoretic.   HENT:      Head: Normocephalic and atraumatic.      Nose: Nose normal.      Mouth/Throat:      Mouth: Mucous membranes are moist.      Pharynx: Oropharynx is clear.   Eyes:      Conjunctiva/sclera: Conjunctivae normal.      Pupils: Pupils are equal, round, and reactive to light.   Cardiovascular:      Rate and Rhythm: Normal rate.   Pulmonary:      Effort: Pulmonary effort is normal. No respiratory distress.   Abdominal:      Palpations: Abdomen is soft.   Musculoskeletal:         General: Normal range of motion.      Cervical back: Normal range of motion and neck supple.      Thoracic back: Tenderness present.      Lumbar back: Tenderness present.   Skin:     General: Skin is warm and dry.      Coloration: Skin is not jaundiced or pale.   Neurological:      General: No focal deficit present.      Mental Status: He is alert and oriented to person, place, and time. Mental status is at baseline.      Cranial Nerves: No cranial nerve deficit (II-XII). "   Psychiatric:         Mood and Affect: Mood normal.         Behavior: Behavior normal. Behavior is cooperative.         Thought Content: Thought content normal.           US Lower Extrem Arteries Bilat with JACQUELIN (xpd)  Narrative: EXAMINATION:  US ARTERIAL LOWER EXTREMITY BILAT WITH JACQUELIN (XPD)    CLINICAL HISTORY:  Diabetes mellitus due to underlying condition with foot ulcer    TECHNIQUE:  Color-flow Doppler utilized    COMPARISON:  None    FINDINGS:  Left common femoral artery peak systolic velocity 102 centimeters/second, biphasic waveforms, profundus femora is 97 centimeters/second biphasic waveform, SFA proximal 92 centimeters/second biphasic waveforms is area of stenosis with a peak systolic velocity of 200 centimeters/second which is focal doubling of velocities, mid SFA 52 centimeters/second biphasic waveform, distal SFA 87 centimeters/second biphasic waveforms, popliteal 44 centimeters/second biphasic waveform, posterior tibial 46 centimeters/second biphasic waveform, dorsalis pedis 10 centimeters/second monophasic waveforms    Right brachial pressure 139 mm Hg, JACQUELIN 0.99    Left brachial pressure 153 mm Hg, JACQUELIN 0.87  Impression: Right lower extremity no significant disease.  Left lower extremity mild disease with area of doubling of velocities in the proximal SFA suggesting 50-99% stenosis with the JACQUELIN 0.87    TECHNOLOGIST: Elizabeth France (RT) (VS) RVT    Electronically signed by: Selina Matos  Date:    10/03/2023  Time:    10:06         Office Visit on 09/14/2023   Component Date Value Ref Range Status    POC Amphetamines 09/14/2023 Negative  Negative, Inconclusive Final    POC Barbiturates 09/14/2023 Negative  Negative, Inconclusive Final    POC Benzodiazepines 09/14/2023 Negative  Negative, Inconclusive Final    POC Cocaine 09/14/2023 Negative  Negative, Inconclusive Final    POC THC 09/14/2023 Negative  Negative, Inconclusive Final    POC Methadone 09/14/2023 Negative  Negative, Inconclusive Final    POC  Methamphetamine 09/14/2023 Negative  Negative, Inconclusive Final    POC Opiates 09/14/2023 Presumptive Positive (A)  Negative, Inconclusive Final    POC Oxycodone 09/14/2023 Negative  Negative, Inconclusive Final    POC Phencyclidine 09/14/2023 Negative  Negative, Inconclusive Final    POC Methylenedioxymethamphetamine * 09/14/2023 Negative  Negative, Inconclusive Final    POC Tricyclic Antidepressants 09/14/2023 Negative  Negative, Inconclusive Final    POC Buprenorphine 09/14/2023 Negative   Final     Acceptable 09/14/2023 Yes   Final    POC Temperature (Urine) 09/14/2023 92   Final   Admission on 08/09/2023, Discharged on 08/11/2023   Component Date Value Ref Range Status    Sodium 08/09/2023 136  136 - 145 mmol/L Final    Potassium 08/09/2023 4.7  3.5 - 5.1 mmol/L Final    Chloride 08/09/2023 102  98 - 107 mmol/L Final    CO2 08/09/2023 31  21 - 32 mmol/L Final    Anion Gap 08/09/2023 8  7 - 16 mmol/L Final    Glucose 08/09/2023 219 (H)  74 - 106 mg/dL Final    BUN 08/09/2023 28 (H)  7 - 18 mg/dL Final    Creatinine 08/09/2023 1.21  0.70 - 1.30 mg/dL Final    BUN/Creatinine Ratio 08/09/2023 23 (H)  6 - 20 Final    Calcium 08/09/2023 10.5 (H)  8.5 - 10.1 mg/dL Final    Total Protein 08/09/2023 8.4 (H)  6.4 - 8.2 g/dL Final    Albumin 08/09/2023 3.6  3.5 - 5.0 g/dL Final    Globulin 08/09/2023 4.8 (H)  2.0 - 4.0 g/dL Final    A/G Ratio 08/09/2023 0.8   Final    Bilirubin, Total 08/09/2023 0.2  >0.0 - 1.2 mg/dL Final    Alk Phos 08/09/2023 98  45 - 115 U/L Final    ALT 08/09/2023 21  16 - 61 U/L Final    AST 08/09/2023 10 (L)  15 - 37 U/L Final    eGFR 08/09/2023 66  >=60 mL/min/1.73m2 Final    WBC 08/09/2023 5.82  4.50 - 11.00 K/uL Final    RBC 08/09/2023 4.40 (L)  4.60 - 6.20 M/uL Final    Hemoglobin 08/09/2023 13.8  13.5 - 18.0 g/dL Final    Hematocrit 08/09/2023 39.6 (L)  40.0 - 54.0 % Final    MCV 08/09/2023 90.0  80.0 - 96.0 fL Final    MCH 08/09/2023 31.4 (H)  27.0 - 31.0 pg Final    MCHC  08/09/2023 34.8  32.0 - 36.0 g/dL Final    RDW 08/09/2023 12.1  11.5 - 14.5 % Final    Platelet Count 08/09/2023 204  150 - 400 K/uL Final    MPV 08/09/2023 9.4  9.4 - 12.4 fL Final    Neutrophils % 08/09/2023 52.4 (L)  53.0 - 65.0 % Final    Lymphocytes % 08/09/2023 35.4  27.0 - 41.0 % Final    Monocytes % 08/09/2023 7.2 (H)  2.0 - 6.0 % Final    Eosinophils % 08/09/2023 3.8  1.0 - 4.0 % Final    Basophils % 08/09/2023 0.7  0.0 - 1.0 % Final    Immature Granulocytes % 08/09/2023 0.5 (H)  0.0 - 0.4 % Final    nRBC, Auto 08/09/2023 0.0  <=0.0 % Final    Neutrophils, Abs 08/09/2023 3.05  1.80 - 7.70 K/uL Final    Lymphocytes, Absolute 08/09/2023 2.06  1.00 - 4.80 K/uL Final    Monocytes, Absolute 08/09/2023 0.42  0.00 - 0.80 K/uL Final    Eosinophils, Absolute 08/09/2023 0.22  0.00 - 0.50 K/uL Final    Basophils, Absolute 08/09/2023 0.04  0.00 - 0.20 K/uL Final    Immature Granulocytes, Absolute 08/09/2023 0.03  0.00 - 0.04 K/uL Final    nRBC, Absolute 08/09/2023 0.00  <=0.00 x10e3/uL Final    Diff Type 08/09/2023 Auto   Final    SARS COV-2 MOLECULAR 08/10/2023 Negative  Negative, Invalid Final    Culture, Wound/Abscess 08/10/2023 Heavy Growth Proteus mirabilis (A)   Final    PT 08/10/2023 12.6  11.7 - 14.7 seconds Final    INR 08/10/2023 0.95  <=3.30 Final    CRP 08/10/2023 0.89 (H)  0.00 - 0.80 mg/dL Final    Hemoglobin A1C 08/10/2023 9.7 (H)  4.5 - 6.6 % Final    Estimated Average Glucose 08/10/2023 237  mg/dL Final    Specimen Outdate 08/10/2023 08/13/2023 23:59   Final    Group & Rh 08/10/2023 A POS   Final    Indirect Sanam 08/10/2023 NEG   Final    ProBNP 08/10/2023 178 (H)  1 - 125 pg/mL Final    Culture, Blood 08/10/2023 No Growth at 5 days   Final    Culture, Blood 08/10/2023 No Growth at 5 days   Final    Lactic Acid 08/10/2023 1.2  0.4 - 2.0 mmol/L Final    Magnesium 08/10/2023 2.1  1.7 - 2.3 mg/dL Final    WBC 08/10/2023 5.68  4.50 - 11.00 K/uL Final    RBC 08/10/2023 4.25 (L)  4.60 - 6.20 M/uL Final     Hemoglobin 08/10/2023 13.2 (L)  13.5 - 18.0 g/dL Final    Hematocrit 08/10/2023 38.5 (L)  40.0 - 54.0 % Final    MCV 08/10/2023 90.6  80.0 - 96.0 fL Final    MCH 08/10/2023 31.1 (H)  27.0 - 31.0 pg Final    MCHC 08/10/2023 34.3  32.0 - 36.0 g/dL Final    RDW 08/10/2023 12.3  11.5 - 14.5 % Final    Platelet Count 08/10/2023 212  150 - 400 K/uL Final    MPV 08/10/2023 9.3 (L)  9.4 - 12.4 fL Final    Neutrophils % 08/10/2023 41.2 (L)  53.0 - 65.0 % Final    Lymphocytes % 08/10/2023 45.4 (H)  27.0 - 41.0 % Final    Monocytes % 08/10/2023 7.7 (H)  2.0 - 6.0 % Final    Eosinophils % 08/10/2023 4.6 (H)  1.0 - 4.0 % Final    Basophils % 08/10/2023 0.7  0.0 - 1.0 % Final    Immature Granulocytes % 08/10/2023 0.4  0.0 - 0.4 % Final    nRBC, Auto 08/10/2023 0.0  <=0.0 % Final    Neutrophils, Abs 08/10/2023 2.34  1.80 - 7.70 K/uL Final    Lymphocytes, Absolute 08/10/2023 2.58  1.00 - 4.80 K/uL Final    Monocytes, Absolute 08/10/2023 0.44  0.00 - 0.80 K/uL Final    Eosinophils, Absolute 08/10/2023 0.26  0.00 - 0.50 K/uL Final    Basophils, Absolute 08/10/2023 0.04  0.00 - 0.20 K/uL Final    Immature Granulocytes, Absolute 08/10/2023 0.02  0.00 - 0.04 K/uL Final    nRBC, Absolute 08/10/2023 0.00  <=0.00 x10e3/uL Final    Diff Type 08/10/2023 Auto   Final    POC Glucose 08/10/2023 157 (H)  70 - 105 mg/dL Final    Case Report 08/10/2023    Final                    Value:Surgical Pathology                                Case: [id number]                                   Authorizing Provider:  Evelio Chiu DO       Collected:           08/10/2023 02:29 PM          Ordering Location:     [facility] -     Received:            08/10/2023 02:57 PM                                 Periop Services                                                              Pathologist:           Eros Arnold MD                                                           Specimen:    Foot, Right, necrotic muscle, right foot wound                                              Final Diagnosis 08/10/2023    Final                    Value:This result contains rich text formatting which cannot be displayed here.    Gross Description 08/10/2023    Final                    Value:This result contains rich text formatting which cannot be displayed here.    Microscopic Description 08/10/2023    Final                    Value:This result contains rich text formatting which cannot be displayed here.    Laboratory Notes 08/10/2023    Final                    Value:This result contains rich text formatting which cannot be displayed here.    POC Glucose 08/10/2023 131 (H)  70 - 105 mg/dL Final    POC Glucose 08/10/2023 277 (H)  70 - 105 mg/dL Final    POC Glucose 08/11/2023 210 (H)  70 - 105 mg/dL Final    Sodium 08/11/2023 140  136 - 145 mmol/L Final    Potassium 08/11/2023 4.0  3.5 - 5.1 mmol/L Final    Chloride 08/11/2023 108 (H)  98 - 107 mmol/L Final    CO2 08/11/2023 27  21 - 32 mmol/L Final    Anion Gap 08/11/2023 9  7 - 16 mmol/L Final    Glucose 08/11/2023 207 (H)  74 - 106 mg/dL Final    BUN 08/11/2023 20 (H)  7 - 18 mg/dL Final    Creatinine 08/11/2023 1.05  0.70 - 1.30 mg/dL Final    BUN/Creatinine Ratio 08/11/2023 19  6 - 20 Final    Calcium 08/11/2023 8.7  8.5 - 10.1 mg/dL Final    Total Protein 08/11/2023 6.7  6.4 - 8.2 g/dL Final    Albumin 08/11/2023 2.9 (L)  3.5 - 5.0 g/dL Final    Globulin 08/11/2023 3.8  2.0 - 4.0 g/dL Final    A/G Ratio 08/11/2023 0.8   Final    Bilirubin, Total 08/11/2023 0.2  >0.0 - 1.2 mg/dL Final    Alk Phos 08/11/2023 80  45 - 115 U/L Final    ALT 08/11/2023 17  16 - 61 U/L Final    AST 08/11/2023 14 (L)  15 - 37 U/L Final    eGFR 08/11/2023 79  >=60 mL/min/1.73m2 Final    WBC 08/11/2023 5.35  4.50 - 11.00 K/uL Final    RBC 08/11/2023 3.76 (L)  4.60 - 6.20 M/uL Final    Hemoglobin 08/11/2023 11.7 (L)  13.5 - 18.0 g/dL Final    Hematocrit 08/11/2023 34.1 (L)  40.0 - 54.0 % Final    MCV 08/11/2023 90.7  80.0 - 96.0 fL Final    MCH  08/11/2023 31.1 (H)  27.0 - 31.0 pg Final    MCHC 08/11/2023 34.3  32.0 - 36.0 g/dL Final    RDW 08/11/2023 12.1  11.5 - 14.5 % Final    Platelet Count 08/11/2023 169  150 - 400 K/uL Final    MPV 08/11/2023 9.3 (L)  9.4 - 12.4 fL Final    Neutrophils % 08/11/2023 46.5 (L)  53.0 - 65.0 % Final    Lymphocytes % 08/11/2023 40.9  27.0 - 41.0 % Final    Monocytes % 08/11/2023 6.9 (H)  2.0 - 6.0 % Final    Eosinophils % 08/11/2023 4.7 (H)  1.0 - 4.0 % Final    Basophils % 08/11/2023 0.6  0.0 - 1.0 % Final    Immature Granulocytes % 08/11/2023 0.4  0.0 - 0.4 % Final    nRBC, Auto 08/11/2023 0.0  <=0.0 % Final    Neutrophils, Abs 08/11/2023 2.49  1.80 - 7.70 K/uL Final    Lymphocytes, Absolute 08/11/2023 2.19  1.00 - 4.80 K/uL Final    Monocytes, Absolute 08/11/2023 0.37  0.00 - 0.80 K/uL Final    Eosinophils, Absolute 08/11/2023 0.25  0.00 - 0.50 K/uL Final    Basophils, Absolute 08/11/2023 0.03  0.00 - 0.20 K/uL Final    Immature Granulocytes, Absolute 08/11/2023 0.02  0.00 - 0.04 K/uL Final    nRBC, Absolute 08/11/2023 0.00  <=0.00 x10e3/uL Final    Diff Type 08/11/2023 Auto   Final    POC Glucose 08/11/2023 196 (H)  70 - 105 mg/dL Final    POC Glucose 08/11/2023 266 (H)  70 - 105 mg/dL Final   Admission on 08/03/2023, Discharged on 08/03/2023   Component Date Value Ref Range Status    Sodium 08/03/2023 137  136 - 145 mmol/L Final    Potassium 08/03/2023 4.4  3.5 - 5.1 mmol/L Final    Chloride 08/03/2023 105  98 - 107 mmol/L Final    CO2 08/03/2023 29  21 - 32 mmol/L Final    Anion Gap 08/03/2023 7  7 - 16 mmol/L Final    Glucose 08/03/2023 177 (H)  74 - 106 mg/dL Final    BUN 08/03/2023 31 (H)  7 - 18 mg/dL Final    Creatinine 08/03/2023 1.36 (H)  0.70 - 1.30 mg/dL Final    BUN/Creatinine Ratio 08/03/2023 23 (H)  6 - 20 Final    Calcium 08/03/2023 8.5  8.5 - 10.1 mg/dL Final    Total Protein 08/03/2023 7.2  6.4 - 8.2 g/dL Final    Albumin 08/03/2023 3.3 (L)  3.5 - 5.0 g/dL Final    Globulin 08/03/2023 3.9  2.0 - 4.0  g/dL Final    A/G Ratio 08/03/2023 0.8   Final    Bilirubin, Total 08/03/2023 0.2  >0.0 - 1.2 mg/dL Final    Alk Phos 08/03/2023 76  45 - 115 U/L Final    ALT 08/03/2023 16  16 - 61 U/L Final    AST 08/03/2023 10 (L)  15 - 37 U/L Final    eGFR 08/03/2023 58 (L)  >=60 mL/min/1.73m2 Final    CRP 08/03/2023 5.30 (H)  0.00 - 0.80 mg/dL Final    WBC 08/03/2023 9.18  4.50 - 11.00 K/uL Final    RBC 08/03/2023 3.88 (L)  4.60 - 6.20 M/uL Final    Hemoglobin 08/03/2023 12.1 (L)  13.5 - 18.0 g/dL Final    Hematocrit 08/03/2023 35.3 (L)  40.0 - 54.0 % Final    MCV 08/03/2023 91.0  80.0 - 96.0 fL Final    MCH 08/03/2023 31.2 (H)  27.0 - 31.0 pg Final    MCHC 08/03/2023 34.3  32.0 - 36.0 g/dL Final    RDW 08/03/2023 12.4  11.5 - 14.5 % Final    Platelet Count 08/03/2023 148 (L)  150 - 400 K/uL Final    MPV 08/03/2023 11.0  9.4 - 12.4 fL Final    Neutrophils % 08/03/2023 67.8 (H)  53.0 - 65.0 % Final    Lymphocytes % 08/03/2023 21.8 (L)  27.0 - 41.0 % Final    Monocytes % 08/03/2023 7.7 (H)  2.0 - 6.0 % Final    Eosinophils % 08/03/2023 2.1  1.0 - 4.0 % Final    Basophils % 08/03/2023 0.3  0.0 - 1.0 % Final    Immature Granulocytes % 08/03/2023 0.3  0.0 - 0.4 % Final    nRBC, Auto 08/03/2023 0.0  <=0.0 % Final    Neutrophils, Abs 08/03/2023 6.22  1.80 - 7.70 K/uL Final    Lymphocytes, Absolute 08/03/2023 2.00  1.00 - 4.80 K/uL Final    Monocytes, Absolute 08/03/2023 0.71  0.00 - 0.80 K/uL Final    Eosinophils, Absolute 08/03/2023 0.19  0.00 - 0.50 K/uL Final    Basophils, Absolute 08/03/2023 0.03  0.00 - 0.20 K/uL Final    Immature Granulocytes, Absolute 08/03/2023 0.03  0.00 - 0.04 K/uL Final    nRBC, Absolute 08/03/2023 0.00  <=0.00 x10e3/uL Final    Diff Type 08/03/2023 Auto   Final   Admission on 07/28/2023, Discharged on 07/28/2023   Component Date Value Ref Range Status    POC Glucose 07/28/2023 241 (H)  70 - 105 mg/dL Final    Sodium 07/28/2023 137  136 - 145 mmol/L Final    Potassium 07/28/2023 4.0  3.5 - 5.1 mmol/L Final     Chloride 07/28/2023 101  98 - 107 mmol/L Final    CO2 07/28/2023 32  21 - 32 mmol/L Final    Anion Gap 07/28/2023 8  7 - 16 mmol/L Final    Glucose 07/28/2023 231 (H)  74 - 106 mg/dL Final    BUN 07/28/2023 33 (H)  7 - 18 mg/dL Final    Creatinine 07/28/2023 1.43 (H)  0.70 - 1.30 mg/dL Final    BUN/Creatinine Ratio 07/28/2023 23 (H)  6 - 20 Final    Calcium 07/28/2023 9.3  8.5 - 10.1 mg/dL Final    Total Protein 07/28/2023 8.1  6.4 - 8.2 g/dL Final    Albumin 07/28/2023 4.0  3.5 - 5.0 g/dL Final    Globulin 07/28/2023 4.1 (H)  2.0 - 4.0 g/dL Final    A/G Ratio 07/28/2023 1.0   Final    Bilirubin, Total 07/28/2023 0.2  >0.0 - 1.2 mg/dL Final    Alk Phos 07/28/2023 86  45 - 115 U/L Final    ALT 07/28/2023 16  16 - 61 U/L Final    AST 07/28/2023 9 (L)  15 - 37 U/L Final    eGFR 07/28/2023 55 (L)  >=60 mL/min/1.73m2 Final    WBC 07/28/2023 6.36  4.50 - 11.00 K/uL Final    RBC 07/28/2023 4.49 (L)  4.60 - 6.20 M/uL Final    Hemoglobin 07/28/2023 14.0  13.5 - 18.0 g/dL Final    Hematocrit 07/28/2023 40.9  40.0 - 54.0 % Final    MCV 07/28/2023 91.1  80.0 - 96.0 fL Final    MCH 07/28/2023 31.2 (H)  27.0 - 31.0 pg Final    MCHC 07/28/2023 34.2  32.0 - 36.0 g/dL Final    RDW 07/28/2023 12.2  11.5 - 14.5 % Final    Platelet Count 07/28/2023 168  150 - 400 K/uL Final    MPV 07/28/2023 10.9  9.4 - 12.4 fL Final    Neutrophils % 07/28/2023 45.8 (L)  53.0 - 65.0 % Final    Lymphocytes % 07/28/2023 43.4 (H)  27.0 - 41.0 % Final    Monocytes % 07/28/2023 6.9 (H)  2.0 - 6.0 % Final    Eosinophils % 07/28/2023 3.0  1.0 - 4.0 % Final    Basophils % 07/28/2023 0.6  0.0 - 1.0 % Final    Immature Granulocytes % 07/28/2023 0.3  0.0 - 0.4 % Final    nRBC, Auto 07/28/2023 0.0  <=0.0 % Final    Neutrophils, Abs 07/28/2023 2.91  1.80 - 7.70 K/uL Final    Lymphocytes, Absolute 07/28/2023 2.76  1.00 - 4.80 K/uL Final    Monocytes, Absolute 07/28/2023 0.44  0.00 - 0.80 K/uL Final    Eosinophils, Absolute 07/28/2023 0.19  0.00 - 0.50 K/uL Final     Basophils, Absolute 07/28/2023 0.04  0.00 - 0.20 K/uL Final    Immature Granulocytes, Absolute 07/28/2023 0.02  0.00 - 0.04 K/uL Final    nRBC, Absolute 07/28/2023 0.00  <=0.00 x10e3/uL Final    Diff Type 07/28/2023 Auto   Final    Culture, Blood 07/28/2023 No Growth at 5 days   Final    Culture, Blood 07/28/2023 No Growth at 5 days   Final   Hospital Outpatient Visit on 07/27/2023   Component Date Value Ref Range Status    85% Max Predicted HR 07/27/2023 133   Final    Max Predicted HR 07/27/2023 156   Final    [facility] CV CPX PATIENT IS MALE 07/27/2023 1.0   Final    [facility] CV CPX PATIENT IS FEMALE 07/27/2023 0.0   Final    Systolic blood pressure 07/27/2023 191  mmHg Final    Diastolic blood pressure 07/27/2023 89  mmHg Final    HR at rest 07/27/2023 76  bpm Final    Peak Systolic BP 07/27/2023 191  mmHg Final    Peak Diatolic BP 07/27/2023 89  mmHg Final    Peak HR 07/27/2023 93  bpm Final    % Max HR Achieved 07/27/2023 60   Final    RPP 07/27/2023 14,516   Final    Peak RPP 07/27/2023 17,763   Final   Hospital Outpatient Visit on 07/27/2023   Component Date Value Ref Range Status    BSA 07/27/2023 1.85  m2 Final    TDI SEPTAL 07/27/2023 0.05  m/s Final    LV LATERAL E/E' RATIO 07/27/2023 6.75  m/s Final    LV SEPTAL E/E' RATIO 07/27/2023 10.80  m/s Final    IVC diameter 07/27/2023 1.26  cm Final    RV mid diameter 07/27/2023 2.50  cm Final    Left Ventricular Outflow Tract Lanette* 07/27/2023 0.58  cm/s Final    Left Ventricular Outflow Tract Lanette* 07/27/2023 1.60  mmHg Final    AORTIC VALVE CUSP SEPERATION 07/27/2023 2.13  cm Final    TDI LATERAL 07/27/2023 0.08  m/s Final    PV PEAK VELOCITY 07/27/2023 1.05  cm/s Final    LVIDd 07/27/2023 4.50  3.5 - 6.0 cm Final    IVS 07/27/2023 0.86  0.6 - 1.1 cm Final    Posterior Wall 07/27/2023 0.90  0.6 - 1.1 cm Final    Ao root annulus 07/27/2023 2.74  cm Final    LVIDs 07/27/2023 3.12  2.1 - 4.0 cm Final    FS 07/27/2023 31  28 - 44 % Final    LV mass 07/27/2023 128.89  g  Final    LA size 07/27/2023 2.13  cm Final    RVDD 07/27/2023 3.80  cm Final    TAPSE 07/27/2023 1.70  cm Final    Right ventricular length in diasto* 07/27/2023 5.96  cm Final    RA area 07/27/2023 13.8  cm2 Final    Left Ventricle Relative Wall Thick* 07/27/2023 0.40  cm Final    AV mean gradient 07/27/2023 2  mmHg Final    AV valve area 07/27/2023 2.34  cm² Final    AV Velocity Ratio 07/27/2023 0.85   Final    AV index (prosthetic) 07/27/2023 0.84   Final    MV valve area p 1/2 method 07/27/2023 4.02  cm2 Final    E/A ratio 07/27/2023 0.66   Final    Mean e' 07/27/2023 0.07  m/s Final    E wave deceleration time 07/27/2023 188.61  msec Final    LVOT diameter 07/27/2023 1.88  cm Final    LVOT area 07/27/2023 2.8  cm2 Final    LVOT peak surinder 07/27/2023 0.93  m/s Final    LVOT peak VTI 07/27/2023 21.30  cm Final    Ao peak surinder 07/27/2023 1.09  m/s Final    Ao VTI 07/27/2023 25.3  cm Final    LVOT stroke volume 07/27/2023 59.10  cm3 Final    AV peak gradient 07/27/2023 5  mmHg Final    E/E' ratio 07/27/2023 8.31  m/s Final    MV Peak E Surinder 07/27/2023 0.54  m/s Final    TR Max Surinder 07/27/2023 2.01  m/s Final    MV stenosis pressure 1/2 time 07/27/2023 54.70  ms Final    MV Peak A Surinder 07/27/2023 0.82  m/s Final    LV Systolic Volume 07/27/2023 38.64  mL Final    LV Systolic Volume Index 07/27/2023 20.8  mL/m2 Final    LV Diastolic Volume 07/27/2023 92.43  mL Final    LV Diastolic Volume Index 07/27/2023 49.69  mL/m2 Final    LV Mass Index 07/27/2023 69  g/m2 Final    Right Atrium Volume Systolic 07/27/2023 37.20  mL Final    Triscuspid Valve Regurgitation Pea* 07/27/2023 16  mmHg Final    LA Volume Index (Mod) 07/27/2023 18.2  mL/m2 Final    LA volume (mod) 07/27/2023 33.91  cm3 Final    ZLVIDD 07/27/2023 -1.40   Final    ZLVIDS 07/27/2023 -0.18   Final    HAILE by Velocity Ratio 07/27/2023 2.37  cm² Final    EF 07/27/2023 60  % Final   Lab Visit on 06/27/2023   Component Date Value Ref Range Status    Troponin I High  Sensitivity 06/27/2023 7.4  <=60.4 pg/mL Final         Orders Placed This Encounter   Procedures    POCT Urine Drug Screen Presump     Interpretive Information:     Negative:  No drug detected at the cut off level.   Positive:  This result represents presumptive positive for the   tested drug, other substances may yield a positive response other   than the analyte of interest. This result should be utilized for   diagnostic purpose only. Confirmation testing will be performed upon physician request only.          Requested Prescriptions     Signed Prescriptions Disp Refills    gabapentin (NEURONTIN) 300 MG capsule 360 capsule 2     Sig: Take 3 capsules (900 mg total) by mouth every 6 (six) hours.    HYDROcodone-acetaminophen (NORCO)  mg per tablet 60 tablet 0     Sig: Take 1 tablet by mouth every 12 (twelve) hours as needed for Pain (pain).    fentaNYL (DURAGESIC) 25 mcg/hr 10 patch 0     Sig: Place 1 patch onto the skin every 72 hours.    fentaNYL (DURAGESIC) 25 mcg/hr 10 patch 0     Sig: Place 1 patch onto the skin every 72 hours.    HYDROcodone-acetaminophen (NORCO)  mg per tablet 60 tablet 0     Sig: Take 1 tablet by mouth every 12 (twelve) hours as needed for Pain (pain).       Assessment:     1. Peripheral vascular disease, unspecified    2. Ulcer of right foot with necrosis of muscle    3. Neuropathy    4. Hip pain, left status post left hip replacement    5. Encounter for long-term (current) use of other medications         A's of Opioid Risk Assessment  Activity:Patient can perform ADL.   Analgesia:Patients pain is partially controlled by current medication. Patient has tried OTC medications such as Tylenol and Ibuprofen with out relief.   Adverse Effects: Patient denies constipation or sedation.  Aberrant Behavior:  reviewed with no aberrant drug seeking/taking behavior.  Overdose reversal drug naloxone discussed    Drug screen reviewed      X-ray pelvis and hips [facility] January 5,  2023  Acetabular fracture repair present with appearance similar to previous.  No other evidence of fracture seen.  The alignment of the joints appears normal.  No degenerative change is present.  No soft tissue abnormality is seen.          Plan:    Narcan March 2023        Chronic left hip pain left groin pain after left after acetabular reconstruction right foot reconstruction [facility]    Was recommended further surgery for his left hip due to comorbidities surgery was postpone     Following wound management nonhealing ulcer right foot multiple surgeries multiple skin grafts multiple amputations    Requesting assistance with pain medication due to increasing pain no surgical option due to comorbidities     Fentanyl 25 mcg topically q.72 hours     Norco 10 1 p.o. q.12h number 60 tablets    Continue home exercise program as tolerated     Continue current medication     Follow-up 2 months     Dr. Dobson, December 2023    Bring original prescription medication bottles/container/box with labels to each visit

## 2023-12-19 ENCOUNTER — OFFICE VISIT (OUTPATIENT)
Dept: PAIN MEDICINE | Facility: CLINIC | Age: 63
End: 2023-12-19
Payer: MEDICARE

## 2023-12-19 VITALS
SYSTOLIC BLOOD PRESSURE: 160 MMHG | WEIGHT: 163 LBS | HEART RATE: 86 BPM | HEIGHT: 68 IN | DIASTOLIC BLOOD PRESSURE: 88 MMHG | BODY MASS INDEX: 24.71 KG/M2 | RESPIRATION RATE: 18 BRPM

## 2023-12-19 DIAGNOSIS — L97.513 ULCER OF RIGHT FOOT WITH NECROSIS OF MUSCLE: Chronic | ICD-10-CM

## 2023-12-19 DIAGNOSIS — G62.9 NEUROPATHY: Chronic | ICD-10-CM

## 2023-12-19 DIAGNOSIS — Z79.899 ENCOUNTER FOR LONG-TERM (CURRENT) USE OF OTHER MEDICATIONS: ICD-10-CM

## 2023-12-19 DIAGNOSIS — I73.9 PERIPHERAL VASCULAR DISEASE, UNSPECIFIED: Primary | Chronic | ICD-10-CM

## 2023-12-19 DIAGNOSIS — M25.552 HIP PAIN, LEFT: Chronic | ICD-10-CM

## 2023-12-19 PROCEDURE — 99214 PR OFFICE/OUTPT VISIT, EST, LEVL IV, 30-39 MIN: ICD-10-PCS | Mod: S$PBB,,, | Performed by: PHYSICIAN ASSISTANT

## 2023-12-19 PROCEDURE — 99214 OFFICE O/P EST MOD 30 MIN: CPT | Mod: S$PBB,,, | Performed by: PHYSICIAN ASSISTANT

## 2023-12-19 PROCEDURE — 99999PBSHW POCT URINE DRUG SCREEN PRESUMP: ICD-10-PCS | Mod: PBBFAC,,,

## 2023-12-19 PROCEDURE — 99999PBSHW POCT URINE DRUG SCREEN PRESUMP: Mod: PBBFAC,,,

## 2023-12-19 PROCEDURE — 80305 DRUG TEST PRSMV DIR OPT OBS: CPT | Mod: PBBFAC | Performed by: PHYSICIAN ASSISTANT

## 2023-12-19 PROCEDURE — 99215 OFFICE O/P EST HI 40 MIN: CPT | Mod: PBBFAC | Performed by: PHYSICIAN ASSISTANT

## 2023-12-19 RX ORDER — GABAPENTIN 300 MG/1
900 CAPSULE ORAL EVERY 6 HOURS
Qty: 360 CAPSULE | Refills: 2 | Status: SHIPPED | OUTPATIENT
Start: 2023-12-19 | End: 2024-02-18 | Stop reason: SDUPTHER

## 2023-12-19 RX ORDER — HYDROCODONE BITARTRATE AND ACETAMINOPHEN 10; 325 MG/1; MG/1
1 TABLET ORAL EVERY 6 HOURS PRN
Qty: 120 TABLET | Refills: 0 | Status: CANCELLED | OUTPATIENT
Start: 2023-12-19 | End: 2024-01-18

## 2023-12-19 RX ORDER — FENTANYL 25 UG/1
1 PATCH TRANSDERMAL
Qty: 10 PATCH | Refills: 0 | Status: SHIPPED | OUTPATIENT
Start: 2024-01-18 | End: 2024-01-24

## 2023-12-19 RX ORDER — HYDROCODONE BITARTRATE AND ACETAMINOPHEN 10; 325 MG/1; MG/1
1 TABLET ORAL EVERY 12 HOURS PRN
Qty: 60 TABLET | Refills: 0 | Status: SHIPPED | OUTPATIENT
Start: 2024-01-20 | End: 2024-01-24 | Stop reason: SDUPTHER

## 2023-12-19 RX ORDER — HYDROCODONE BITARTRATE AND ACETAMINOPHEN 10; 325 MG/1; MG/1
1 TABLET ORAL EVERY 12 HOURS PRN
Qty: 60 TABLET | Refills: 0 | Status: SHIPPED | OUTPATIENT
Start: 2023-12-21 | End: 2024-01-20

## 2023-12-19 RX ORDER — FENTANYL 25 UG/1
1 PATCH TRANSDERMAL
Qty: 10 PATCH | Refills: 0 | Status: SHIPPED | OUTPATIENT
Start: 2023-12-19 | End: 2024-01-18

## 2023-12-30 NOTE — PROGRESS NOTES
[facility] Medicine  Progress Note    Patient Name: Navdeep Avery  MRN: [id number]  Patient Class: IP- Inpatient   Admission Date: 6/24/2022  Length of Stay: 2 days  Attending Physician: Allen Bobby MD  Primary Care Provider: Marquez Huff MD        Subjective:     Principal Problem:Ulcer of right foot with necrosis of muscle        HPI:  62 y/o male who is admitted today from [facility] due to non healing wound of right foot. Upon questioning the patient reports he has chronic wounds to right foot for at least 2 years with past debridement (). Reports that he was at his son's house recently walking up and down stairs and foot started hurting afterwards. He reports pain from his foot to his knee that now extends up to thigh. His foot is swollen and tender to palpation. He endorses subjective fever, occasional SOB worse than normal with exertion.There is no reported chest pain, vomiting, diarrhea, diaphoresis, vision change, numbness or recent illness. Patent is a smoker of 1/2 a pack of cigarretes for > 50 years. Reports having Covid.   Past Medical History: Anxiety, Depression, CAD, CABG (2 years ago [facility]), COPD, HTN, HLD, COVID, SAMEERA, DM 1, GERD, PVD, Neuropathy, Tobacco use             Overview/Hospital Course:  No notes on file    Interval History: No acute events overnight. Denies sob or chest pain. Swelling and erythema improved to right leg. Debridement tomorrow.    Review of Systems   Constitutional:  Negative for chills and diaphoresis.   HENT:  Negative for sinus pain.    Eyes:  Negative for visual disturbance.   Respiratory:  Negative for cough and chest tightness.    Cardiovascular:  Positive for leg swelling. Negative for chest pain and palpitations.   Gastrointestinal:  Negative for abdominal pain, blood in stool, constipation, diarrhea, nausea and vomiting.   Genitourinary:  Negative for flank pain.   Musculoskeletal:  Positive for gait problem.    Skin:  Positive for wound.   Neurological:  Negative for dizziness, syncope and headaches.   All other systems reviewed and are negative.  Objective:     Vital Signs (Most Recent):  Temp: 98.3 °F (36.8 °C) (06/26/22 0800)  Pulse: (!) 58 (06/26/22 0815)  Resp: 18 (06/26/22 0815)  BP: 123/65 (06/26/22 0800)  SpO2: 96 % (06/26/22 0815)   Vital Signs (24h Range):  Temp:  [97.8 °F (36.6 °C)-98.6 °F (37 °C)] 98.3 °F (36.8 °C)  Pulse:  [58-75] 58  Resp:  [18-20] 18  SpO2:  [95 %-100 %] 96 %  BP: (114-187)/(60-86) 123/65     Weight: 73.2 kg (161 lb 6 oz)  Body mass index is 24.54 kg/m².    Intake/Output Summary (Last 24 hours) at 6/26/2022 0905  Last data filed at 6/25/2022 1842  Gross per 24 hour   Intake 1010 ml   Output --   Net 1010 ml      Physical Exam  Vitals and nursing note reviewed.   Constitutional:       General: He is not in acute distress.     Appearance: He is normal weight.   HENT:      Head: Normocephalic and atraumatic.      Mouth/Throat:      Mouth: Mucous membranes are moist.   Eyes:      Extraocular Movements: Extraocular movements intact.      Pupils: Pupils are equal, round, and reactive to light.   Cardiovascular:      Rate and Rhythm: Normal rate.      Pulses: Normal pulses.      Heart sounds: Normal heart sounds.   Pulmonary:      Effort: Pulmonary effort is normal. No respiratory distress.      Breath sounds: Normal breath sounds.   Abdominal:      General: Bowel sounds are normal. There is no distension.      Palpations: Abdomen is soft.      Tenderness: There is no abdominal tenderness. There is no guarding.   Musculoskeletal:         General: Normal range of motion.      Cervical back: Normal range of motion and neck supple.      Right lower leg: Edema present.        Feet:    Feet:      Right foot:      Skin integrity: Callus present.      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       Capillary Refill: Capillary refill takes less than 2 seconds.      Findings: Lesion present.   Neurological:      General: No focal deficit present.      Mental Status: He is alert and oriented to person, place, and time.      Cranial Nerves: No cranial nerve deficit.      Sensory: No sensory deficit.   Psychiatric:         Mood and Affect: Mood normal.         Behavior: Behavior normal.       Significant Labs: All pertinent labs within the past 24 hours have been reviewed.    Significant Imaging: I have reviewed all pertinent imaging results/findings within the past 24 hours.      Assessment/Plan:      * Ulcer of right foot with necrosis of muscle  * Routine vitals    * CBC, CMP, UA, Lactic acid,  Ptt, Inr  * BC x 2, Lactic acid   * Right foot xr:     * US of lower legs with betzy   * Daily Cbc, Bmp    * Crp, ESR   * Zosyn Pharmacy to dose    * Vancomycin pharmacy to dose    * Wound care consult   * Surgery consult. Debridement planned for Monday 6/27/22. Plavix held  * Turn q 2 hours and place waffle boots on bilateral feet.   * DVT ppx:  Heparin sc 5000 units q 8 hours  * Protonix 40 mg daily.   6/25  - Vanco/ Zosyn day # 2  - wcx:    Culture, Wound/Abscess Light Growth Gram-negative Bacilli Abnormal  P  Culture, Wound/Abscess Heavy Growth Staphylococcus aureus Abnormal  P      6/26:  - Vanco/ Zosyn day # 3  - surgery tomorrow  - Npo after miodnight  - Heparin stopped. Resume when ok with surgery  - Swelling and erythema improved to right leg. Debridement tomorrow.           COPD (chronic obstructive pulmonary disease)  Chronic resume home medication          Type 1 diabetes mellitus  - Moderate SSI  - BS AC/HS  - Detemir 15 units daily  - A1C  6/25  - Detemir increased 25 units daily  -  - 320 range overnight    Neuropathy  Chronic resume home medication      Dependence on nicotine from cigarettes  Cessation education      Mixed hyperlipidemia  Chronic resume home medication          Benign  hypertension  Chronic resume home medication            VTE Risk Mitigation (From admission, onward)         Ordered     heparin (porcine) injection 5,000 Units  Every 8 hours         06/24/22 1535     IP VTE HIGH RISK PATIENT  Once         06/24/22 1535     Place sequential compression device  Until discontinued         06/24/22 1535                Discharge Planning   MARGARITO:      Code Status: Full Code   Is the patient medically ready for discharge?:     Reason for patient still in hospital (select all that apply): Treatment                     STELLA Weston  Department of Hospital Medicine   [facility]   bed rails

## 2024-01-02 ENCOUNTER — OFFICE VISIT (OUTPATIENT)
Dept: SURGERY | Facility: CLINIC | Age: 64
End: 2024-01-02
Attending: SURGERY
Payer: MEDICARE

## 2024-01-02 DIAGNOSIS — L97.413 DIABETIC ULCER OF RIGHT MIDFOOT ASSOCIATED WITH DIABETES MELLITUS DUE TO UNDERLYING CONDITION, WITH NECROSIS OF MUSCLE: Primary | ICD-10-CM

## 2024-01-02 DIAGNOSIS — E08.621 DIABETIC ULCER OF RIGHT MIDFOOT ASSOCIATED WITH DIABETES MELLITUS DUE TO UNDERLYING CONDITION, WITH NECROSIS OF MUSCLE: Primary | ICD-10-CM

## 2024-01-02 PROCEDURE — 15275 SKIN SUB GRAFT FACE/NK/HF/G: CPT | Mod: PBBFAC | Performed by: STUDENT IN AN ORGANIZED HEALTH CARE EDUCATION/TRAINING PROGRAM

## 2024-01-02 PROCEDURE — 15275 SKIN SUB GRAFT FACE/NK/HF/G: CPT | Mod: S$PBB,,, | Performed by: STUDENT IN AN ORGANIZED HEALTH CARE EDUCATION/TRAINING PROGRAM

## 2024-01-02 PROCEDURE — 99999PBSHW PR PBB SHADOW TECHNICAL ONLY FILED TO HB: Mod: PBBFAC,,,

## 2024-01-02 PROCEDURE — 99212 OFFICE O/P EST SF 10 MIN: CPT | Mod: PBBFAC | Performed by: STUDENT IN AN ORGANIZED HEALTH CARE EDUCATION/TRAINING PROGRAM

## 2024-01-02 PROCEDURE — 99213 OFFICE O/P EST LOW 20 MIN: CPT | Mod: 25,S$PBB,, | Performed by: STUDENT IN AN ORGANIZED HEALTH CARE EDUCATION/TRAINING PROGRAM

## 2024-01-03 NOTE — PROGRESS NOTES
Subjective:       Patient ID: Navdeep Avery is a 63 y.o. male.    Chief Complaint: No chief complaint on file.    62-year-old  male presents to the clinic for 2 week evaluation status post debridement of right lower foot; patient had a right foot abscess with necrotic muscle extending down to bone the plantar surface of the right foot adjacent to the 3rd metatarsophalangeal joint.  Patient's spouse has been taking great care of the wound and the wound is healing.  No complaints.  Denies any chest pain/shortness of breath, nausea/vomiting/diarrhea, fever/chills.    2/14:  Presents today for wound re-evaluation and placement of PuraPly XT.  No significant changes from previous visit    2/21: Presents today for wound re-evaluation and placement of PuraPly XT.  No significant changes from previous visit    2/28: Presents today for wound re-evaluation and placement of PuraPly XT.  No significant changes from previous visit    3/7: Presents today for wound re-evaluation and placement of Affinity.  No significant changes from previous visit    3/20: Presents today for wound re-evaluation and placement of Apligraf.  No significant changes from previous visit    3/27: Presents today for wound re-evaluation and placement of Apligraf.  No significant changes from previous visits    4/4: Presents today for wound re-evaluation and placement of Apligraf.  No significant changes from previous visit    4/10: Presents today for wound re-evaluation and placement of Apligraf.  No significant changes from previous visit    4/17:  Presents today for wound re-evaluation and placement of Apligraf; no significant changes from previous visit    4/25:  Presents today for wound re-evaluation and placement of Affinity; no significant changes from previous visit    5/3: Presents today for wound re-evaluation and placement of Affinity; no significant changes from previous visit    5/9:  Presents today for wound re-evaluation and  placed an affinity; no significant changes from previous visit    5/16: Presents today for wound re-evaluation and placed an affinity; no significant changes from previous visit    5/23:  Presents today for wound re-evaluation; no significant changes from previous visit    8/15:  Patient presents for evaluation of right foot wound.  Patient was recently hospitalized and required debridement on August 10th for some ulceration with muscle necrosis on the right foot; no bony involvement; no changes on x-ray from previous.  Denies any chest pain/shortness of breath, nausea/vomiting/diarrhea, fever/chills.    9/11:  Patient presents for wound re-evaluation, debridement.  No changes since seen in clinic previously.    10/2: presents for wound re-evaluation; has been going to wound clinic weekly    10/24:  Presents for wound re-evaluation; application of PuraPly.  No changes since seen in clinic previously.    10/31: Presents for wound re-evaluation; application of Affinity.  No changes since seen in clinic previously.    11/7: Presents for wound re-evaluation; application of Affinity.  No changes since seen in clinic previously.    11/14:  Presents for wound re-evaluation; application of Affinity.  No changes since seen in clinic previously    11/21: Presents for wound re-evaluation; application of Affinity.  No changes since seen in clinic previously    11/29: Presents for wound re-evaluation; application of Affinity.  No changes since seen in clinic previously    12/5/23: Presents for wound re-evaluation.  No changes since seen in clinic previously    1/2/24: Presents for wound re-evaluation; application of Affinity.  No changes since seen in clinic previously            Review of Systems   Constitutional: Negative.    HENT: Negative.     Eyes: Negative.    Respiratory:  Negative for shortness of breath.    Cardiovascular: Negative.    Gastrointestinal:  Negative for abdominal pain, blood in stool, change in bowel habit  and vomiting.   Endocrine: Negative.    Genitourinary: Negative.  Negative for hematuria.   Musculoskeletal:  Negative for back pain and myalgias.   Integumentary:  Negative.   Allergic/Immunologic: Negative.    Neurological:  Negative for dizziness, weakness and light-headedness.   Psychiatric/Behavioral: Negative.           Objective:      Physical Exam  Constitutional:       General: He is not in acute distress.     Appearance: Normal appearance.   HENT:      Head: Normocephalic.   Cardiovascular:      Rate and Rhythm: Normal rate.   Pulmonary:      Effort: Pulmonary effort is normal. No respiratory distress.   Abdominal:      General: There is no distension.      Tenderness: There is no abdominal tenderness.   Musculoskeletal:         General: Normal range of motion.        Feet:    Feet:      Comments: ulceration on the plantar surface adjacent to the 3rd metatarsophalangeal joint; measuring 1x1x0.1 cm; no necrosis; granulation tissue present; no bony involvement.  Slight maceration of tissue    Small opening adjacent to the 1st metatarsophalangeal joint measures 0.5x0.5x0.1    Skin:     General: Skin is warm.      Coloration: Skin is not jaundiced.   Neurological:      General: No focal deficit present.      Mental Status: He is alert and oriented to person, place, and time.      Cranial Nerves: No cranial nerve deficit.                     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, with necrosis of muscle    -  Primary              Plan:     Patient Name: Navdeep Avery  Patient MRN: [id number]  Patient YOB: 1960  CSN: [id number]  Date: 01/03/2024  Provider:   Evelio Razo    Application for Assessment: foot wound  Skin Substitute: Affinity  Performed By: Evelio Razo  Time-out Taken: Yes  Location:     [x] Genitalia/Hands/Feet/Multiple Digits    []  Scalp/Face/Neck/Ears    []Trunk/Arms/Legs  Application Area: (sq cm): 1   Product Applied: (sq cm): 6.25  Product Waste (sq cm): 5.25  Fenestrated: No   Instrument:    [] Blade [] Curette  [x]Forceps  []Nippers    [] Rongeur [] Scissors  []Others:   Secured: Yes   Secured with: Steri-strips  Dressing Applied: Yes  Response to Treatment:Well tolerated by patient.  Note:     ID number:  [id number]  Expiration date 01/09/2024

## 2024-01-09 ENCOUNTER — OFFICE VISIT (OUTPATIENT)
Dept: SURGERY | Facility: CLINIC | Age: 64
End: 2024-01-09
Payer: MEDICARE

## 2024-01-09 DIAGNOSIS — E08.621 DIABETIC ULCER OF RIGHT MIDFOOT ASSOCIATED WITH DIABETES MELLITUS DUE TO UNDERLYING CONDITION, WITH NECROSIS OF MUSCLE: Primary | ICD-10-CM

## 2024-01-09 DIAGNOSIS — L97.413 DIABETIC ULCER OF RIGHT MIDFOOT ASSOCIATED WITH DIABETES MELLITUS DUE TO UNDERLYING CONDITION, WITH NECROSIS OF MUSCLE: Primary | ICD-10-CM

## 2024-01-09 PROCEDURE — 15275 SKIN SUB GRAFT FACE/NK/HF/G: CPT | Mod: S$PBB,,, | Performed by: STUDENT IN AN ORGANIZED HEALTH CARE EDUCATION/TRAINING PROGRAM

## 2024-01-09 PROCEDURE — 99213 OFFICE O/P EST LOW 20 MIN: CPT | Mod: 25,S$PBB,, | Performed by: STUDENT IN AN ORGANIZED HEALTH CARE EDUCATION/TRAINING PROGRAM

## 2024-01-09 PROCEDURE — 99999PBSHW PR PBB SHADOW TECHNICAL ONLY FILED TO HB: Mod: PBBFAC,,,

## 2024-01-09 PROCEDURE — 99212 OFFICE O/P EST SF 10 MIN: CPT | Mod: PBBFAC,25 | Performed by: STUDENT IN AN ORGANIZED HEALTH CARE EDUCATION/TRAINING PROGRAM

## 2024-01-09 PROCEDURE — 15275 SKIN SUB GRAFT FACE/NK/HF/G: CPT | Mod: PBBFAC | Performed by: STUDENT IN AN ORGANIZED HEALTH CARE EDUCATION/TRAINING PROGRAM

## 2024-01-11 ENCOUNTER — EXTERNAL HOME HEALTH (OUTPATIENT)
Dept: HOME HEALTH SERVICES | Facility: HOSPITAL | Age: 64
End: 2024-01-11
Payer: MEDICARE

## 2024-01-12 NOTE — PROGRESS NOTES
Subjective:       Patient ID: Navdeep Avery is a 63 y.o. male.    Chief Complaint: Follow-up ((Graft))    62-year-old  male presents to the clinic for 2 week evaluation status post debridement of right lower foot; patient had a right foot abscess with necrotic muscle extending down to bone the plantar surface of the right foot adjacent to the 3rd metatarsophalangeal joint.  Patient's spouse has been taking great care of the wound and the wound is healing.  No complaints.  Denies any chest pain/shortness of breath, nausea/vomiting/diarrhea, fever/chills.    2/14:  Presents today for wound re-evaluation and placement of PuraPly XT.  No significant changes from previous visit    2/21: Presents today for wound re-evaluation and placement of PuraPly XT.  No significant changes from previous visit    2/28: Presents today for wound re-evaluation and placement of PuraPly XT.  No significant changes from previous visit    3/7: Presents today for wound re-evaluation and placement of Affinity.  No significant changes from previous visit    3/20: Presents today for wound re-evaluation and placement of Apligraf.  No significant changes from previous visit    3/27: Presents today for wound re-evaluation and placement of Apligraf.  No significant changes from previous visits    4/4: Presents today for wound re-evaluation and placement of Apligraf.  No significant changes from previous visit    4/10: Presents today for wound re-evaluation and placement of Apligraf.  No significant changes from previous visit    4/17:  Presents today for wound re-evaluation and placement of Apligraf; no significant changes from previous visit    4/25:  Presents today for wound re-evaluation and placement of Affinity; no significant changes from previous visit    5/3: Presents today for wound re-evaluation and placement of Affinity; no significant changes from previous visit    5/9:  Presents today for wound re-evaluation and placed an  affinity; no significant changes from previous visit    5/16: Presents today for wound re-evaluation and placed an affinity; no significant changes from previous visit    5/23:  Presents today for wound re-evaluation; no significant changes from previous visit    8/15:  Patient presents for evaluation of right foot wound.  Patient was recently hospitalized and required debridement on August 10th for some ulceration with muscle necrosis on the right foot; no bony involvement; no changes on x-ray from previous.  Denies any chest pain/shortness of breath, nausea/vomiting/diarrhea, fever/chills.    9/11:  Patient presents for wound re-evaluation, debridement.  No changes since seen in clinic previously.    10/2: presents for wound re-evaluation; has been going to wound clinic weekly    10/24:  Presents for wound re-evaluation; application of PuraPly.  No changes since seen in clinic previously.    10/31: Presents for wound re-evaluation; application of Affinity.  No changes since seen in clinic previously.    11/7: Presents for wound re-evaluation; application of Affinity.  No changes since seen in clinic previously.    11/14:  Presents for wound re-evaluation; application of Affinity.  No changes since seen in clinic previously    11/21: Presents for wound re-evaluation; application of Affinity.  No changes since seen in clinic previously    11/29: Presents for wound re-evaluation; application of Affinity.  No changes since seen in clinic previously    12/5/23: Presents for wound re-evaluation.  No changes since seen in clinic previously    1/2/24: Presents for wound re-evaluation; application of Affinity.  No changes since seen in clinic previously    1/9/24: Presents for wound re-evaluation; application of Affinity.  No changes since seen in clinic previously            Review of Systems   Constitutional: Negative.    HENT: Negative.     Eyes: Negative.    Respiratory:  Negative for shortness of breath.     Cardiovascular: Negative.    Gastrointestinal:  Negative for abdominal pain, blood in stool, change in bowel habit and vomiting.   Endocrine: Negative.    Genitourinary: Negative.  Negative for hematuria.   Musculoskeletal:  Negative for back pain and myalgias.   Integumentary:  Negative.   Allergic/Immunologic: Negative.    Neurological:  Negative for dizziness, weakness and light-headedness.   Psychiatric/Behavioral: Negative.           Objective:      Physical Exam  Constitutional:       General: He is not in acute distress.     Appearance: Normal appearance.   HENT:      Head: Normocephalic.   Cardiovascular:      Rate and Rhythm: Normal rate.   Pulmonary:      Effort: Pulmonary effort is normal. No respiratory distress.   Abdominal:      General: There is no distension.      Tenderness: There is no abdominal tenderness.   Musculoskeletal:         General: Normal range of motion.        Feet:    Feet:      Comments: ulceration on the plantar surface adjacent to the 3rd metatarsophalangeal joint; measuring 1x0.7x0.1 cm; no necrosis; granulation tissue present; no bony involvement.  Slight maceration of tissue    Small opening adjacent to the 1st metatarsophalangeal joint scabbed over    Skin:     General: Skin is warm.      Coloration: Skin is not jaundiced.   Neurological:      General: No focal deficit present.      Mental Status: He is alert and oriented to person, place, and time.      Cranial Nerves: No cranial nerve deficit.                     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, with necrosis of muscle    -  Primary              Plan:     Patient Name: Navdeep Avery  Patient MRN: [id number]  Patient YOB: 1960  CSN: [id number]  Date: 01/12/2024  Provider:   Evelio Razo    Application for Assessment: foot wound  Skin Substitute:  Affinity  Performed By: Evelio Razo  Time-out Taken: Yes  Location:     [x] Genitalia/Hands/Feet/Multiple Digits    [] Scalp/Face/Neck/Ears    []Trunk/Arms/Legs  Application Area: (sq cm): 0.7  Product Applied: (sq cm): 6.25  Product Waste (sq cm): 5.55  Fenestrated: No   Instrument:    [] Blade [] Curette  [x]Forceps  []Nippers    [] Rongeur [] Scissors  []Others:   Secured: Yes   Secured with: Steri-strips  Dressing Applied: Yes  Response to Treatment:Well tolerated by patient.  Note:     ID number:  [id number]  Expiration date 01/12/2024

## 2024-01-16 ENCOUNTER — OFFICE VISIT (OUTPATIENT)
Dept: SURGERY | Facility: CLINIC | Age: 64
End: 2024-01-16
Payer: MEDICARE

## 2024-01-16 DIAGNOSIS — L97.413 DIABETIC ULCER OF RIGHT MIDFOOT ASSOCIATED WITH DIABETES MELLITUS DUE TO UNDERLYING CONDITION, WITH NECROSIS OF MUSCLE: Primary | ICD-10-CM

## 2024-01-16 DIAGNOSIS — E08.621 DIABETIC ULCER OF RIGHT MIDFOOT ASSOCIATED WITH DIABETES MELLITUS DUE TO UNDERLYING CONDITION, WITH NECROSIS OF MUSCLE: Primary | ICD-10-CM

## 2024-01-16 PROCEDURE — 99999PBSHW PR PBB SHADOW TECHNICAL ONLY FILED TO HB: Mod: PBBFAC,,,

## 2024-01-16 PROCEDURE — 15275 SKIN SUB GRAFT FACE/NK/HF/G: CPT | Mod: S$PBB,,, | Performed by: STUDENT IN AN ORGANIZED HEALTH CARE EDUCATION/TRAINING PROGRAM

## 2024-01-16 PROCEDURE — 99212 OFFICE O/P EST SF 10 MIN: CPT | Mod: PBBFAC | Performed by: STUDENT IN AN ORGANIZED HEALTH CARE EDUCATION/TRAINING PROGRAM

## 2024-01-16 PROCEDURE — 99213 OFFICE O/P EST LOW 20 MIN: CPT | Mod: 25,S$PBB,, | Performed by: STUDENT IN AN ORGANIZED HEALTH CARE EDUCATION/TRAINING PROGRAM

## 2024-01-16 PROCEDURE — 15275 SKIN SUB GRAFT FACE/NK/HF/G: CPT | Mod: PBBFAC | Performed by: STUDENT IN AN ORGANIZED HEALTH CARE EDUCATION/TRAINING PROGRAM

## 2024-01-16 NOTE — PROGRESS NOTES
Subjective:       Patient ID: Navdeep Avery is a 63 y.o. male.    Chief Complaint: Follow-up (Graft f/u)    62-year-old  male presents to the clinic for 2 week evaluation status post debridement of right lower foot; patient had a right foot abscess with necrotic muscle extending down to bone the plantar surface of the right foot adjacent to the 3rd metatarsophalangeal joint.  Patient's spouse has been taking great care of the wound and the wound is healing.  No complaints.  Denies any chest pain/shortness of breath, nausea/vomiting/diarrhea, fever/chills.    2/14:  Presents today for wound re-evaluation and placement of PuraPly XT.  No significant changes from previous visit    2/21: Presents today for wound re-evaluation and placement of PuraPly XT.  No significant changes from previous visit    2/28: Presents today for wound re-evaluation and placement of PuraPly XT.  No significant changes from previous visit    3/7: Presents today for wound re-evaluation and placement of Affinity.  No significant changes from previous visit    3/20: Presents today for wound re-evaluation and placement of Apligraf.  No significant changes from previous visit    3/27: Presents today for wound re-evaluation and placement of Apligraf.  No significant changes from previous visits    4/4: Presents today for wound re-evaluation and placement of Apligraf.  No significant changes from previous visit    4/10: Presents today for wound re-evaluation and placement of Apligraf.  No significant changes from previous visit    4/17:  Presents today for wound re-evaluation and placement of Apligraf; no significant changes from previous visit    4/25:  Presents today for wound re-evaluation and placement of Affinity; no significant changes from previous visit    5/3: Presents today for wound re-evaluation and placement of Affinity; no significant changes from previous visit    5/9:  Presents today for wound re-evaluation and placed an  affinity; no significant changes from previous visit    5/16: Presents today for wound re-evaluation and placed an affinity; no significant changes from previous visit    5/23:  Presents today for wound re-evaluation; no significant changes from previous visit    8/15:  Patient presents for evaluation of right foot wound.  Patient was recently hospitalized and required debridement on August 10th for some ulceration with muscle necrosis on the right foot; no bony involvement; no changes on x-ray from previous.  Denies any chest pain/shortness of breath, nausea/vomiting/diarrhea, fever/chills.    9/11:  Patient presents for wound re-evaluation, debridement.  No changes since seen in clinic previously.    10/2: presents for wound re-evaluation; has been going to wound clinic weekly    10/24:  Presents for wound re-evaluation; application of PuraPly.  No changes since seen in clinic previously.    10/31: Presents for wound re-evaluation; application of Affinity.  No changes since seen in clinic previously.    11/7: Presents for wound re-evaluation; application of Affinity.  No changes since seen in clinic previously.    11/14:  Presents for wound re-evaluation; application of Affinity.  No changes since seen in clinic previously    11/21: Presents for wound re-evaluation; application of Affinity.  No changes since seen in clinic previously    11/29: Presents for wound re-evaluation; application of Affinity.  No changes since seen in clinic previously    12/5/23: Presents for wound re-evaluation.  No changes since seen in clinic previously    1/2/24: Presents for wound re-evaluation; application of Affinity.  No changes since seen in clinic previously    1/9/24: Presents for wound re-evaluation; application of Affinity.  No changes since seen in clinic previously    1/16/24: Presents for wound re-evaluation; application of Affinity.  No changes since seen in clinic previously        Review of Systems   Constitutional:  Negative.    HENT: Negative.     Eyes: Negative.    Respiratory:  Negative for shortness of breath.    Cardiovascular: Negative.    Gastrointestinal:  Negative for abdominal pain, blood in stool, change in bowel habit and vomiting.   Endocrine: Negative.    Genitourinary: Negative.  Negative for hematuria.   Musculoskeletal:  Negative for back pain and myalgias.   Integumentary:  Negative.   Allergic/Immunologic: Negative.    Neurological:  Negative for dizziness, weakness and light-headedness.   Psychiatric/Behavioral: Negative.           Objective:      Physical Exam  Constitutional:       General: He is not in acute distress.     Appearance: Normal appearance.   HENT:      Head: Normocephalic.   Cardiovascular:      Rate and Rhythm: Normal rate.   Pulmonary:      Effort: Pulmonary effort is normal. No respiratory distress.   Abdominal:      General: There is no distension.      Tenderness: There is no abdominal tenderness.   Musculoskeletal:         General: Normal range of motion.        Feet:    Feet:      Comments: ulceration on the plantar surface adjacent to the 3rd metatarsophalangeal joint; measuring 0.6x0.5x0.1 cm; no necrosis; granulation tissue present; no bony involvement.  Slight maceration of tissue    Small opening adjacent to the 1st metatarsophalangeal joint scabbed over    Skin:     General: Skin is warm.      Coloration: Skin is not jaundiced.   Neurological:      General: No focal deficit present.      Mental Status: He is alert and oriented to person, place, and time.      Cranial Nerves: No cranial nerve deficit.                     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: [id number]  Date:  01/16/2024  Provider:   Evelio Razo    Application for Assessment: foot wound  Skin Substitute: Affinity  Performed By: Evelio Razo  Time-out Taken: Yes  Location:     [x] Genitalia/Hands/Feet/Multiple Digits    [] Scalp/Face/Neck/Ears    []Trunk/Arms/Legs  Application Area: (sq cm): 0.3  Product Applied: (sq cm): 6.25  Product Waste (sq cm): 5.95  Fenestrated: No   Instrument:    [] Blade [] Curette  [x]Forceps  []Nippers    [] Rongeur [] Scissors  []Others:   Secured: Yes   Secured with: Steri-strips  Dressing Applied: Yes  Response to Treatment:Well tolerated by patient.  Note:     ID number:  [id number]  Expiration date 01/18/2024

## 2024-01-23 ENCOUNTER — OFFICE VISIT (OUTPATIENT)
Dept: SURGERY | Facility: CLINIC | Age: 64
End: 2024-01-23
Payer: MEDICARE

## 2024-01-23 DIAGNOSIS — E08.621 DIABETIC ULCER OF RIGHT MIDFOOT ASSOCIATED WITH DIABETES MELLITUS DUE TO UNDERLYING CONDITION, WITH NECROSIS OF MUSCLE: Primary | ICD-10-CM

## 2024-01-23 DIAGNOSIS — L97.413 DIABETIC ULCER OF RIGHT MIDFOOT ASSOCIATED WITH DIABETES MELLITUS DUE TO UNDERLYING CONDITION, WITH NECROSIS OF MUSCLE: Primary | ICD-10-CM

## 2024-01-23 PROCEDURE — 15275 SKIN SUB GRAFT FACE/NK/HF/G: CPT | Mod: S$PBB,,, | Performed by: STUDENT IN AN ORGANIZED HEALTH CARE EDUCATION/TRAINING PROGRAM

## 2024-01-23 PROCEDURE — 99214 OFFICE O/P EST MOD 30 MIN: CPT | Mod: PBBFAC | Performed by: STUDENT IN AN ORGANIZED HEALTH CARE EDUCATION/TRAINING PROGRAM

## 2024-01-23 PROCEDURE — 15275 SKIN SUB GRAFT FACE/NK/HF/G: CPT | Mod: PBBFAC | Performed by: STUDENT IN AN ORGANIZED HEALTH CARE EDUCATION/TRAINING PROGRAM

## 2024-01-23 PROCEDURE — 99213 OFFICE O/P EST LOW 20 MIN: CPT | Mod: 25,S$PBB,, | Performed by: STUDENT IN AN ORGANIZED HEALTH CARE EDUCATION/TRAINING PROGRAM

## 2024-01-23 PROCEDURE — 99999PBSHW PR PBB SHADOW TECHNICAL ONLY FILED TO HB: Mod: PBBFAC,,,

## 2024-01-24 ENCOUNTER — OFFICE VISIT (OUTPATIENT)
Dept: CARDIOLOGY | Facility: CLINIC | Age: 64
End: 2024-01-24
Payer: MEDICARE

## 2024-01-24 VITALS
HEART RATE: 75 BPM | WEIGHT: 167.19 LBS | DIASTOLIC BLOOD PRESSURE: 60 MMHG | OXYGEN SATURATION: 95 % | HEIGHT: 67 IN | SYSTOLIC BLOOD PRESSURE: 132 MMHG | BODY MASS INDEX: 26.24 KG/M2

## 2024-01-24 DIAGNOSIS — L97.513 ULCER OF RIGHT FOOT WITH NECROSIS OF MUSCLE: Chronic | ICD-10-CM

## 2024-01-24 DIAGNOSIS — R55 SYNCOPE AND COLLAPSE: ICD-10-CM

## 2024-01-24 DIAGNOSIS — G62.9 NEUROPATHY: Chronic | ICD-10-CM

## 2024-01-24 DIAGNOSIS — I73.9 PERIPHERAL VASCULAR DISEASE, UNSPECIFIED: Chronic | ICD-10-CM

## 2024-01-24 DIAGNOSIS — E78.2 MIXED HYPERLIPIDEMIA: ICD-10-CM

## 2024-01-24 DIAGNOSIS — M25.552 HIP PAIN, LEFT: Chronic | ICD-10-CM

## 2024-01-24 DIAGNOSIS — I20.89 STABLE ANGINA: ICD-10-CM

## 2024-01-24 DIAGNOSIS — I20.9 ANGINA PECTORIS: ICD-10-CM

## 2024-01-24 DIAGNOSIS — E78.5 HYPERLIPIDEMIA, UNSPECIFIED HYPERLIPIDEMIA TYPE: ICD-10-CM

## 2024-01-24 DIAGNOSIS — I25.118 CORONARY ARTERY DISEASE OF NATIVE ARTERY OF NATIVE HEART WITH STABLE ANGINA PECTORIS: ICD-10-CM

## 2024-01-24 DIAGNOSIS — I10 HYPERTENSION, UNSPECIFIED TYPE: Primary | ICD-10-CM

## 2024-01-24 PROCEDURE — 93005 ELECTROCARDIOGRAM TRACING: CPT | Mod: PBBFAC | Performed by: INTERNAL MEDICINE

## 2024-01-24 PROCEDURE — 99214 OFFICE O/P EST MOD 30 MIN: CPT | Mod: S$PBB,,, | Performed by: NURSE PRACTITIONER

## 2024-01-24 PROCEDURE — 99215 OFFICE O/P EST HI 40 MIN: CPT | Mod: PBBFAC | Performed by: NURSE PRACTITIONER

## 2024-01-24 PROCEDURE — 93010 ELECTROCARDIOGRAM REPORT: CPT | Mod: S$PBB,,, | Performed by: INTERNAL MEDICINE

## 2024-01-24 RX ORDER — METOPROLOL SUCCINATE 50 MG/1
50 TABLET, EXTENDED RELEASE ORAL DAILY
Qty: 30 TABLET | Refills: 11 | Status: SHIPPED | OUTPATIENT
Start: 2024-01-24 | End: 2025-01-23

## 2024-01-24 RX ORDER — ISOSORBIDE MONONITRATE 30 MG/1
30 TABLET, EXTENDED RELEASE ORAL DAILY
Qty: 30 TABLET | Refills: 11 | Status: SHIPPED | OUTPATIENT
Start: 2024-01-24 | End: 2025-01-23

## 2024-01-24 RX ORDER — HYDROCODONE BITARTRATE AND ACETAMINOPHEN 10; 325 MG/1; MG/1
1 TABLET ORAL EVERY 6 HOURS PRN
Qty: 60 TABLET | Refills: 0 | Status: SHIPPED | OUTPATIENT
Start: 2024-02-06 | End: 2024-02-21

## 2024-01-24 RX ORDER — NITROGLYCERIN 0.4 MG/1
0.4 TABLET SUBLINGUAL EVERY 5 MIN PRN
Qty: 30 TABLET | Refills: 2 | Status: SHIPPED | OUTPATIENT
Start: 2024-01-24 | End: 2025-01-23

## 2024-01-24 NOTE — TELEPHONE ENCOUNTER
CHAI CARPIO   01/24/2024   3:22 PM   Patient here today requesting to resume Norco 10 every 6 hours, patient stated he could not breath at night with patches on. Will send in 60 tabs for remainder of prescription can  on 2/6/24

## 2024-01-24 NOTE — ASSESSMENT & PLAN NOTE
Patient reports a syncopal episode 3 months ago. He was standing up using the remote control for the TV and the next thing he knew he was hitting his head on the floor. He awakened alert and oriented. He did not seek medical attention or check his glucose. He and his wife suspect that this was r/t hypoglycemia but they did not check his glucose or his BP. He has had no further issues. He had an unremarkable echo and Lexiscan 6 months ago. He is to seek medical attention or at least check his glucose and bp if this occurs again.

## 2024-01-24 NOTE — ASSESSMENT & PLAN NOTE
With non-healing wounds of lower ext.  Patient's wife reports that he has an infection that is being treated by Dr. Chiu

## 2024-01-24 NOTE — PROGRESS NOTES
PCP: Marquez Huff MD    Referring Provider:     Subjective:   Navdeep Avery is a 63 y.o. male with hx of MICAD s/p CABG (2018), DM, type II, HTN, HLD,  SAMEERA and GERD, who presents for routine follow up. Patient reports 4 episodes of stable angina in the last month. He also reports reports a syncopal episode 3 months ago. He was standing up using the remote control for the TV and the next thing he knew he was hitting his head on the floor. He awakened alert and oriented. He did not seek medical attention or check his glucose. He and his wife suspect that this was r/t hypoglycemia but they did not check his glucose or his BP. He has had no further issues. He had an unremarkable echo and Lexiscan 6 months ago. He is to seek medical attention or at least check his glucose and bp if this occurs again.     8/19/2023 presents for 2 month follow up. He deneis chest discomfort. He shane have SHANE which is chronic and stable. He feels SHANE is r/t anxiety. He had decreased his smoking to 2-3 cigarettes per day but is now back to smoking as much as he did before.     6/27/2023 presents for routine follow up. He has been lost to cardiology follow up since 5/26/2021. He reports that he is concerned regarding an episode of right scapular pain that occurred over the weekend.  He describes it as an aching pain to his right scapula that was continuous for 2 days.  He did not seek medical attention.  He does recognize this as his anginal equivalent prior to 1 of his heart attacks.  He is also had daily issues with indigestion that is also 1 of his anginal equivalent prior to a previous heart attack.      Fhx:  Family History   Problem Relation Age of Onset    Arthritis Mother     Hypertension Mother     Learning disabilities Mother     Alcohol abuse Father     Early death Father     Heart disease Father     Cancer Sister     Diabetes Sister     Heart disease Maternal Grandfather     COPD Paternal Grandfather     Heart disease  Son      Shx:   Social History     Socioeconomic History    Marital status:    Occupational History    Occupation: Retired   Tobacco Use    Smoking status: Every Day     Current packs/day: 2.50     Average packs/day: 2.5 packs/day for 52.5 years (131.1 ttl pk-yrs)     Types: Cigarettes     Start date: 8/10/1971     Passive exposure: Current    Smokeless tobacco: Never   Substance and Sexual Activity    Alcohol use: Not Currently    Drug use: Yes     Types: Oxycodone    Sexual activity: Yes     Partners: Female     Social Determinants of Health     Financial Resource Strain: Medium Risk (9/12/2023)    Overall Financial Resource Strain (CARDIA)     Difficulty of Paying Living Expenses: Somewhat hard   Food Insecurity: No Food Insecurity (9/12/2023)    Hunger Vital Sign     Worried About Running Out of Food in the Last Year: Never true     Ran Out of Food in the Last Year: Never true   Transportation Needs: No Transportation Needs (9/12/2023)    PRAPARE - Transportation     Lack of Transportation (Medical): No     Lack of Transportation (Non-Medical): No   Physical Activity: Inactive (9/12/2023)    Exercise Vital Sign     Days of Exercise per Week: 0 days     Minutes of Exercise per Session: 0 min   Stress: No Stress Concern Present (9/12/2023)    [employer] - Occupational Stress Questionnaire     Feeling of Stress : Only a little   Social Connections: Socially Isolated (9/12/2023)    Social Connection and Isolation Panel [NHANES]     Frequency of Communication with Friends and Family: Once a week     Frequency of Social Gatherings with Friends and Family: Once a week     Attends Christian Services: Never     Active Member of Clubs or Organizations: No     Attends Club or Organization Meetings: Never     Marital Status:    Housing Stability: Low Risk  (9/12/2023)    Housing Stability Vital Sign     Unable to Pay for Housing in the Last Year: No     Number of Places Lived in  the Last Year: 1     Unstable Housing in the Last Year: No       EKG    1/24/2024 RSR with HR 74 bpm; NSTWA   6/27/2023 sinus bradycardia; HR 58 bpm; minimal voltage criteria for LVH; inferior infarct; anterior infarct; lateral TWA    5/26/2021 RSR; HR 64 bpm;  inferior infarct; anterior infarct; lateral TWA     Lexican 7/27/2023  Dense, modertae size apical perfusion defect c/w a scar but not ischemia  Normal LV size with apical septal hypokinesis and overall normal LVSF, EF 53%    Results for orders placed during the hospital encounter of 07/27/23    Echo Saline Bubble? No    Interpretation Summary  · The left ventricle is normal in size with  · Atrial fibrillation not observed.  · Normal right ventricular size.  · Mild mitral regurgitation.     Echocardiogram 6/19/2019  1. Normal chamber size with hypokinetic mid and basal inferior wall and mid and apical anterior wall and LV apex. Overall LVSF appeared to be at the low end of nomral range with estimated LVEF 50-55%  2. Mild or mild to moderate MR   3. Aortic valve appears tricuspid and mildly calcified and sclerotic but maintained normal opening  4. Trivial aortic regurgitation  5. Trace TR with est RVSP 39 mmHg  6. Mild PI  7. Impaired LV relaxation           Heart cath- most recent 8/12/2019  1. Severe 3 vessel CAD  2. Normal LV size with apical akinesis and overall normal LVSF, EF 55%  3. Patent SVG to the r-PDA with patent jump graft to the OM branch of the LCx  4. Patent LIMA to the OM1  5. No significant MR  ECHO No results found for this or any previous visit.            Lab Results   Component Value Date     08/11/2023    K 4.0 08/11/2023     (H) 08/11/2023    CO2 27 08/11/2023    BUN 20 (H) 08/11/2023    CREATININE 1.05 08/11/2023    CALCIUM 8.7 08/11/2023    ANIONGAP 9 08/11/2023    ESTGFRAFRICA 130 01/25/2021    EGFRNONAA 66 06/29/2022       Lab Results   Component Value Date    CHOL 147 01/05/2023    CHOL 161 07/06/2022    CHOL 223 (H)  04/15/2022     Lab Results   Component Value Date    HDL 39 (L) 01/05/2023    HDL 38 (L) 07/06/2022    HDL 36 (L) 04/15/2022     Lab Results   Component Value Date    LDLCALC 86 01/05/2023    LDLCALC 91 07/06/2022    LDLCALC 145 04/15/2022     Lab Results   Component Value Date    TRIG 110 01/05/2023    TRIG 161 (H) 07/06/2022    TRIG 208 (H) 04/15/2022     Lab Results   Component Value Date    CHOLHDL 3.8 01/05/2023    CHOLHDL 4.2 07/06/2022    CHOLHDL 6.2 04/15/2022       Lab Results   Component Value Date    WBC 5.35 08/11/2023    HGB 11.7 (L) 08/11/2023    HCT 34.1 (L) 08/11/2023    MCV 90.7 08/11/2023     08/11/2023           Current Outpatient Medications:     albuterol (PROVENTIL) 2.5 mg /3 mL (0.083 %) nebulizer solution, USE 1 VIAL IN NEBULIZER 3 TIMES DAILY, Disp: 90 each, Rfl: 11    albuterol (PROVENTIL/VENTOLIN HFA) 90 mcg/actuation inhaler, Inhale 2 puffs into the lungs 2 (two) times daily as needed for Wheezing., Disp: 18 g, Rfl: 2    amLODIPine (NORVASC) 10 MG tablet, Take 1 tablet (10 mg total) by mouth once daily., Disp: 90 tablet, Rfl: 1    aspirin 81 MG Chew, Take 81 mg by mouth once daily., Disp: , Rfl:     atorvastatin (LIPITOR) 80 MG tablet, Take 1 tablet (80 mg total) by mouth every evening., Disp: 90 tablet, Rfl: 1    clopidogreL (PLAVIX) 75 mg tablet, Take 1 tablet (75 mg total) by mouth once daily., Disp: 90 tablet, Rfl: 1    diclofenac sodium (VOLTAREN) 1 % Gel, Apply 1 g topically 4 (four) times daily as needed (pain)., Disp: 20 g, Rfl: 1    empagliflozin (JARDIANCE) 10 mg tablet, Take 1 tablet (10 mg total) by mouth once daily., Disp: 30 tablet, Rfl: 6    EScitalopram oxalate (LEXAPRO) 10 MG tablet, Take 1 tablet (10 mg total) by mouth once daily., Disp: 30 tablet, Rfl: 1    fentaNYL (DURAGESIC) 25 mcg/hr, Place 1 patch onto the skin every 72 hours., Disp: 10 patch, Rfl: 0    gabapentin (NEURONTIN) 300 MG capsule, Take 3 capsules (900 mg total) by mouth every 6 (six) hours., Disp:  360 capsule, Rfl: 2    gentamicin (GARAMYCIN) 0.1 % ointment, Apply topically Daily., Disp: 90 g, Rfl: 2    HYDROcodone-acetaminophen (NORCO)  mg per tablet, Take 1 tablet by mouth every 12 (twelve) hours as needed for Pain (pain)., Disp: 60 tablet, Rfl: 0    hydrOXYzine pamoate (VISTARIL) 25 MG Cap, Take 1 capsule (25 mg total) by mouth once daily. Take nightly for sleep, Disp: 90 capsule, Rfl: 1    insulin detemir U-100 (LEVEMIR FLEXTOUCH U-100 INSULN) 100 unit/mL (3 mL) InPn pen, Inject 10 units subcutaneously every morning, inject 20 units subcutaneously every evening., Disp: 3 each, Rfl: 2    isosorbide mononitrate (IMDUR) 30 MG 24 hr tablet, Take 1 tablet (30 mg total) by mouth once daily., Disp: 30 tablet, Rfl: 11    LIDOcaine (LIDODERM) 5 %, 1 patch once daily., Disp: , Rfl:     lisinopriL 10 MG tablet, Take 1 tablet (10 mg total) by mouth once daily., Disp: 90 tablet, Rfl: 1    metoprolol succinate (TOPROL-XL) 50 MG 24 hr tablet, Take 1 tablet (50 mg total) by mouth once daily., Disp: 30 tablet, Rfl: 11    miconazole NITRATE 2 % (MICOTIN) 2 % top powder, Apply topically as needed for Itching (apply between toes with dressing changes)., Disp: 85 g, Rfl: 2    mirtazapine (REMERON) 7.5 MG Tab, Take 1 tablet (7.5 mg total) by mouth nightly., Disp: 90 tablet, Rfl: 1    mupirocin (BACTROBAN) 2 % ointment, Apply topically once daily., Disp: 30 g, Rfl: 12    nitroGLYCERIN (NITROSTAT) 0.4 MG SL tablet, Place 1 tablet (0.4 mg total) under the tongue every 5 (five) minutes as needed for Chest pain., Disp: 30 tablet, Rfl: 2    pantoprazole (PROTONIX) 40 MG tablet, Take 1 tablet (40 mg total) by mouth once daily., Disp: 90 tablet, Rfl: 1    semaglutide (RYBELSUS) 7 mg tablet, Take 1 tablet (7 mg total) by mouth once daily. (Patient not taking: Reported on 9/14/2023), Disp: 30 tablet, Rfl: 5    SENNA 8.6 mg tablet, Take 2 tablets by mouth once daily., Disp: , Rfl:     SPIRIVA RESPIMAT 2.5 mcg/actuation inhaler,  "Inhale 1 puff into the lungs once daily., Disp: 4 g, Rfl: 5    SURE COMFORT INSULIN SYRINGE 0.3 mL 31 gauge x 5/16" Syrg, Inject 1 application into the skin 2 (two) times a day., Disp: , Rfl:   Meds reviewed but not reconciled. He did not bring his meds today.      Review of Systems   Respiratory:  Negative for shortness of breath.    Cardiovascular:  Positive for chest pain. Negative for palpitations, orthopnea, claudication, leg swelling and PND.   Neurological:  Positive for loss of consciousness. Negative for dizziness and weakness.           Objective:   /60 (BP Location: Left arm, Patient Position: Sitting)   Pulse 75   Ht 5' 7" (1.702 m)   Wt 75.8 kg (167 lb 3.2 oz)   SpO2 95%   BMI 26.19 kg/m²     Physical Exam  Vitals and nursing note reviewed.   Constitutional:       Appearance: Normal appearance. He is normal weight.   HENT:      Head: Normocephalic and atraumatic.   Neck:      Vascular: No carotid bruit.   Cardiovascular:      Rate and Rhythm: Normal rate and regular rhythm.      Pulses: Normal pulses.      Heart sounds: Normal heart sounds.   Pulmonary:      Effort: Pulmonary effort is normal.      Breath sounds: Normal breath sounds.   Abdominal:      Palpations: Abdomen is soft.   Musculoskeletal:      Cervical back: Neck supple.      Right lower leg: No edema.      Left lower leg: No edema.   Skin:     General: Skin is warm and dry.      Capillary Refill: Capillary refill takes less than 2 seconds.   Neurological:      Mental Status: He is alert.           Assessment:     1. Hypertension, unspecified type  EKG 12-lead    EKG 12-lead      2. Hyperlipidemia, unspecified hyperlipidemia type  nitroGLYCERIN (NITROSTAT) 0.4 MG SL tablet      3. Angina pectoris  metoprolol succinate (TOPROL-XL) 50 MG 24 hr tablet    isosorbide mononitrate (IMDUR) 30 MG 24 hr tablet      4. Stable angina        5. Syncope and collapse        6. Mixed hyperlipidemia        7. Peripheral vascular disease, " unspecified              Plan:   Stable angina  4 episodes of stable angina in the last month  He has not required ntg.    Increase Metoprolol to 50 mg po daily  Add Imdur 30 mg po daily  RTC: 6  months    HTN (hypertension)  132/60 mmHg    Syncope and collapse  Patient reports a syncopal episode 3 months ago. He was standing up using the remote control for the TV and the next thing he knew he was hitting his head on the floor. He awakened alert and oriented. He did not seek medical attention or check his glucose. He and his wife suspect that this was r/t hypoglycemia but they did not check his glucose or his BP. He has had no further issues. He had an unremarkable echo and Lexiscan 6 months ago. He is to seek medical attention or at least check his glucose and bp if this occurs again.     Mixed hyperlipidemia  Lab Results   Component Value Date    CHOL 147 01/05/2023    CHOL 161 07/06/2022    CHOL 223 (H) 04/15/2022     Lab Results   Component Value Date    HDL 39 (L) 01/05/2023    HDL 38 (L) 07/06/2022    HDL 36 (L) 04/15/2022     Lab Results   Component Value Date    LDLCALC 86 01/05/2023    LDLCALC 91 07/06/2022    LDLCALC 145 04/15/2022     Lab Results   Component Value Date    TRIG 110 01/05/2023    TRIG 161 (H) 07/06/2022    TRIG 208 (H) 04/15/2022       Lab Results   Component Value Date    CHOLHDL 3.8 01/05/2023    CHOLHDL 4.2 07/06/2022    CHOLHDL 6.2 04/15/2022     Lipitor 80 mg po daily  Lipids followed by PCP    Peripheral vascular disease, unspecified  With non-healing wounds of lower ext.  Patient's wife reports that he has an infection that is being treated by Dr. Chiu    RTC 6 months

## 2024-01-24 NOTE — ASSESSMENT & PLAN NOTE
4 episodes of stable angina in the last month  He has not required ntg.    Increase Metoprolol to 50 mg po daily  Add Imdur 30 mg po daily  RTC: 6  months

## 2024-01-24 NOTE — ASSESSMENT & PLAN NOTE
Lab Results   Component Value Date    CHOL 147 01/05/2023    CHOL 161 07/06/2022    CHOL 223 (H) 04/15/2022     Lab Results   Component Value Date    HDL 39 (L) 01/05/2023    HDL 38 (L) 07/06/2022    HDL 36 (L) 04/15/2022     Lab Results   Component Value Date    LDLCALC 86 01/05/2023    LDLCALC 91 07/06/2022    LDLCALC 145 04/15/2022     Lab Results   Component Value Date    TRIG 110 01/05/2023    TRIG 161 (H) 07/06/2022    TRIG 208 (H) 04/15/2022       Lab Results   Component Value Date    CHOLHDL 3.8 01/05/2023    CHOLHDL 4.2 07/06/2022    CHOLHDL 6.2 04/15/2022     Lipitor 80 mg po daily  Lipids followed by PCP

## 2024-01-25 NOTE — PROGRESS NOTES
Subjective:       Patient ID: Navdeep Avery is a 63 y.o. male.    Chief Complaint: Follow-up (1wk f/u wound ck (graft))    62-year-old  male presents to the clinic for 2 week evaluation status post debridement of right lower foot; patient had a right foot abscess with necrotic muscle extending down to bone the plantar surface of the right foot adjacent to the 3rd metatarsophalangeal joint.  Patient's spouse has been taking great care of the wound and the wound is healing.  No complaints.  Denies any chest pain/shortness of breath, nausea/vomiting/diarrhea, fever/chills.    2/14:  Presents today for wound re-evaluation and placement of PuraPly XT.  No significant changes from previous visit    2/21: Presents today for wound re-evaluation and placement of PuraPly XT.  No significant changes from previous visit    2/28: Presents today for wound re-evaluation and placement of PuraPly XT.  No significant changes from previous visit    3/7: Presents today for wound re-evaluation and placement of Affinity.  No significant changes from previous visit    3/20: Presents today for wound re-evaluation and placement of Apligraf.  No significant changes from previous visit    3/27: Presents today for wound re-evaluation and placement of Apligraf.  No significant changes from previous visits    4/4: Presents today for wound re-evaluation and placement of Apligraf.  No significant changes from previous visit    4/10: Presents today for wound re-evaluation and placement of Apligraf.  No significant changes from previous visit    4/17:  Presents today for wound re-evaluation and placement of Apligraf; no significant changes from previous visit    4/25:  Presents today for wound re-evaluation and placement of Affinity; no significant changes from previous visit    5/3: Presents today for wound re-evaluation and placement of Affinity; no significant changes from previous visit    5/9:  Presents today for wound re-evaluation  and placed an affinity; no significant changes from previous visit    5/16: Presents today for wound re-evaluation and placed an affinity; no significant changes from previous visit    5/23:  Presents today for wound re-evaluation; no significant changes from previous visit    8/15:  Patient presents for evaluation of right foot wound.  Patient was recently hospitalized and required debridement on August 10th for some ulceration with muscle necrosis on the right foot; no bony involvement; no changes on x-ray from previous.  Denies any chest pain/shortness of breath, nausea/vomiting/diarrhea, fever/chills.    9/11:  Patient presents for wound re-evaluation, debridement.  No changes since seen in clinic previously.    10/2: presents for wound re-evaluation; has been going to wound clinic weekly    10/24:  Presents for wound re-evaluation; application of PuraPly.  No changes since seen in clinic previously.    10/31: Presents for wound re-evaluation; application of Affinity.  No changes since seen in clinic previously.    11/7: Presents for wound re-evaluation; application of Affinity.  No changes since seen in clinic previously.    11/14:  Presents for wound re-evaluation; application of Affinity.  No changes since seen in clinic previously    11/21: Presents for wound re-evaluation; application of Affinity.  No changes since seen in clinic previously    11/29: Presents for wound re-evaluation; application of Affinity.  No changes since seen in clinic previously    12/5/23: Presents for wound re-evaluation.  No changes since seen in clinic previously    1/2/24: Presents for wound re-evaluation; application of Affinity.  No changes since seen in clinic previously    1/9/24: Presents for wound re-evaluation; application of Affinity.  No changes since seen in clinic previously    1/16/24: Presents for wound re-evaluation; application of Affinity.  No changes since seen in clinic previously    1/23/24: Presents for wound  re-evaluation; application of Affinity.  No changes since seen in clinic previously        Review of Systems   Constitutional: Negative.    HENT: Negative.     Eyes: Negative.    Respiratory:  Negative for shortness of breath.    Cardiovascular: Negative.    Gastrointestinal:  Negative for abdominal pain, blood in stool, change in bowel habit and vomiting.   Endocrine: Negative.    Genitourinary: Negative.  Negative for hematuria.   Musculoskeletal:  Negative for back pain and myalgias.   Integumentary:  Negative.   Allergic/Immunologic: Negative.    Neurological:  Negative for dizziness, weakness and light-headedness.   Psychiatric/Behavioral: Negative.           Objective:      Physical Exam  Constitutional:       General: He is not in acute distress.     Appearance: Normal appearance.   HENT:      Head: Normocephalic.   Cardiovascular:      Rate and Rhythm: Normal rate.   Pulmonary:      Effort: Pulmonary effort is normal. No respiratory distress.   Abdominal:      General: There is no distension.      Tenderness: There is no abdominal tenderness.   Musculoskeletal:         General: Normal range of motion.        Feet:    Feet:      Comments: ulceration on the plantar surface adjacent to the 3rd metatarsophalangeal joint; measuring 0.5x0.5x0.1 cm; no necrosis; granulation tissue present; no bony involvement.  Slight maceration of tissue    Small opening adjacent to the 1st metatarsophalangeal joint scabbed over    Skin:     General: Skin is warm.      Coloration: Skin is not jaundiced.   Neurological:      General: No focal deficit present.      Mental Status: He is alert and oriented to person, place, and time.      Cranial Nerves: No cranial nerve deficit.                     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, with necrosis of muscle    -  Primary               Plan:     Patient Name: Navdeep Avery  Patient MRN: [id number]  Patient YOB: 1960  CSN: [id number]  Date: 01/25/2024  Provider:   Evelio Razo    Application for Assessment: foot wound  Skin Substitute: Affinity  Performed By: Evelio Razo  Time-out Taken: Yes  Location:     [x] Genitalia/Hands/Feet/Multiple Digits    [] Scalp/Face/Neck/Ears    []Trunk/Arms/Legs  Application Area: (sq cm): 0.25  Product Applied: (sq cm): 6.25  Product Waste (sq cm): 6  Fenestrated: No   Instrument:    [] Blade [] Curette  [x]Forceps  []Nippers    [] Rongeur [] Scissors  []Others:   Secured: Yes   Secured with: Steri-strips  Dressing Applied: Yes  Response to Treatment:Well tolerated by patient.  Note:     ID number:  [id number]  Expiration date 01/24/2024

## 2024-02-01 ENCOUNTER — OFFICE VISIT (OUTPATIENT)
Dept: SURGERY | Facility: CLINIC | Age: 64
End: 2024-02-01
Payer: MEDICARE

## 2024-02-01 DIAGNOSIS — E08.621 DIABETIC ULCER OF RIGHT MIDFOOT ASSOCIATED WITH DIABETES MELLITUS DUE TO UNDERLYING CONDITION, WITH NECROSIS OF MUSCLE: Primary | ICD-10-CM

## 2024-02-01 DIAGNOSIS — L97.413 DIABETIC ULCER OF RIGHT MIDFOOT ASSOCIATED WITH DIABETES MELLITUS DUE TO UNDERLYING CONDITION, WITH NECROSIS OF MUSCLE: Primary | ICD-10-CM

## 2024-02-01 PROCEDURE — 99214 OFFICE O/P EST MOD 30 MIN: CPT | Mod: PBBFAC,25 | Performed by: STUDENT IN AN ORGANIZED HEALTH CARE EDUCATION/TRAINING PROGRAM

## 2024-02-01 PROCEDURE — 99499 UNLISTED E&M SERVICE: CPT | Mod: S$PBB,,, | Performed by: STUDENT IN AN ORGANIZED HEALTH CARE EDUCATION/TRAINING PROGRAM

## 2024-02-01 PROCEDURE — 99999PBSHW PR PBB SHADOW TECHNICAL ONLY FILED TO HB: Mod: PBBFAC,,,

## 2024-02-01 PROCEDURE — 15275 SKIN SUB GRAFT FACE/NK/HF/G: CPT | Mod: S$PBB,,, | Performed by: STUDENT IN AN ORGANIZED HEALTH CARE EDUCATION/TRAINING PROGRAM

## 2024-02-01 PROCEDURE — 15275 SKIN SUB GRAFT FACE/NK/HF/G: CPT | Mod: PBBFAC | Performed by: STUDENT IN AN ORGANIZED HEALTH CARE EDUCATION/TRAINING PROGRAM

## 2024-02-02 NOTE — PROGRESS NOTES
Subjective:       Patient ID: Navdeep Avery is a 63 y.o. male.    Chief Complaint: Follow-up (1wk f/u wound ck (Graft))    62-year-old  male presents to the clinic for 2 week evaluation status post debridement of right lower foot; patient had a right foot abscess with necrotic muscle extending down to bone the plantar surface of the right foot adjacent to the 3rd metatarsophalangeal joint.  Patient's spouse has been taking great care of the wound and the wound is healing.  No complaints.  Denies any chest pain/shortness of breath, nausea/vomiting/diarrhea, fever/chills.    2/14:  Presents today for wound re-evaluation and placement of PuraPly XT.  No significant changes from previous visit    2/21: Presents today for wound re-evaluation and placement of PuraPly XT.  No significant changes from previous visit    2/28: Presents today for wound re-evaluation and placement of PuraPly XT.  No significant changes from previous visit    3/7: Presents today for wound re-evaluation and placement of Affinity.  No significant changes from previous visit    3/20: Presents today for wound re-evaluation and placement of Apligraf.  No significant changes from previous visit    3/27: Presents today for wound re-evaluation and placement of Apligraf.  No significant changes from previous visits    4/4: Presents today for wound re-evaluation and placement of Apligraf.  No significant changes from previous visit    4/10: Presents today for wound re-evaluation and placement of Apligraf.  No significant changes from previous visit    4/17:  Presents today for wound re-evaluation and placement of Apligraf; no significant changes from previous visit    4/25:  Presents today for wound re-evaluation and placement of Affinity; no significant changes from previous visit    5/3: Presents today for wound re-evaluation and placement of Affinity; no significant changes from previous visit    5/9:  Presents today for wound re-evaluation  and placed an affinity; no significant changes from previous visit    5/16: Presents today for wound re-evaluation and placed an affinity; no significant changes from previous visit    5/23:  Presents today for wound re-evaluation; no significant changes from previous visit    8/15:  Patient presents for evaluation of right foot wound.  Patient was recently hospitalized and required debridement on August 10th for some ulceration with muscle necrosis on the right foot; no bony involvement; no changes on x-ray from previous.  Denies any chest pain/shortness of breath, nausea/vomiting/diarrhea, fever/chills.    9/11:  Patient presents for wound re-evaluation, debridement.  No changes since seen in clinic previously.    10/2: presents for wound re-evaluation; has been going to wound clinic weekly    10/24:  Presents for wound re-evaluation; application of PuraPly.  No changes since seen in clinic previously.    10/31: Presents for wound re-evaluation; application of Affinity.  No changes since seen in clinic previously.    11/7: Presents for wound re-evaluation; application of Affinity.  No changes since seen in clinic previously.    11/14:  Presents for wound re-evaluation; application of Affinity.  No changes since seen in clinic previously    11/21: Presents for wound re-evaluation; application of Affinity.  No changes since seen in clinic previously    11/29: Presents for wound re-evaluation; application of Affinity.  No changes since seen in clinic previously    12/5/23: Presents for wound re-evaluation.  No changes since seen in clinic previously    1/2/24: Presents for wound re-evaluation; application of Affinity.  No changes since seen in clinic previously    1/9/24: Presents for wound re-evaluation; application of Affinity.  No changes since seen in clinic previously    1/16/24: Presents for wound re-evaluation; application of Affinity.  No changes since seen in clinic previously    1/23/24: Presents for wound  re-evaluation; application of Affinity.  No changes since seen in clinic previously    2/1: Presents for wound re-evaluation; application of Affinity.  No changes since seen in clinic previously        Review of Systems   Constitutional: Negative.    HENT: Negative.     Eyes: Negative.    Respiratory:  Negative for shortness of breath.    Cardiovascular: Negative.    Gastrointestinal:  Negative for abdominal pain, blood in stool, change in bowel habit and vomiting.   Endocrine: Negative.    Genitourinary: Negative.  Negative for hematuria.   Musculoskeletal:  Negative for back pain and myalgias.   Integumentary:  Negative.   Allergic/Immunologic: Negative.    Neurological:  Negative for dizziness, weakness and light-headedness.   Psychiatric/Behavioral: Negative.           Objective:      Physical Exam  Constitutional:       General: He is not in acute distress.     Appearance: Normal appearance.   HENT:      Head: Normocephalic.   Cardiovascular:      Rate and Rhythm: Normal rate.   Pulmonary:      Effort: Pulmonary effort is normal. No respiratory distress.   Abdominal:      General: There is no distension.      Tenderness: There is no abdominal tenderness.   Musculoskeletal:         General: Normal range of motion.        Feet:    Feet:      Comments: ulceration on the plantar surface adjacent to the 3rd metatarsophalangeal joint; measuring 0.9x0.7x0.2 cm; no necrosis; granulation tissue present; no bony involvement.  Slight maceration of tissue    Small opening adjacent to the 1st metatarsophalangeal joint scabbed over    Skin:     General: Skin is warm.      Coloration: Skin is not jaundiced.   Neurological:      General: No focal deficit present.      Mental Status: He is alert and oriented to person, place, and time.      Cranial Nerves: No cranial nerve deficit.               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, with necrosis of muscle    -  Primary              Plan:     Patient Name: Navdeep Avery  Patient MRN: [id number]  Patient YOB: 1960  CSN: [id number]  Date: 2024  Provider:   Evelio Razo    Application for Assessment: foot wound  Skin Substitute: Affinity  Performed By: Evelio Razo  Time-out Taken: Yes  Location:     [x] Genitalia/Hands/Feet/Multiple Digits    [] Scalp/Face/Neck/Ears    []Trunk/Arms/Legs  Application Area: (sq cm): 0.63  Product Applied: (sq cm): 6.25  Product Waste (sq cm): 5.62  Fenestrated: No   Instrument:    [] Blade [] Curette  [x]Forceps  []Nippers    [] Rongeur [] Scissors  []Others:   Secured: Yes   Secured with: Steri-strips  Dressing Applied: Yes  Response to Treatment:Well tolerated by patient.  Note:     ID number:  [id number]  Expiration date 2024        I did notify the patient that the product had  with a expiration date on ; patient was okay with us placing this back on, being that this is just the covering over the wound and not incorporating.  Patient verbalized understanding and wants to proceed with application

## 2024-02-06 ENCOUNTER — OFFICE VISIT (OUTPATIENT)
Dept: SURGERY | Facility: CLINIC | Age: 64
End: 2024-02-06
Payer: MEDICARE

## 2024-02-06 DIAGNOSIS — L97.413 DIABETIC ULCER OF RIGHT MIDFOOT ASSOCIATED WITH DIABETES MELLITUS DUE TO UNDERLYING CONDITION, WITH NECROSIS OF MUSCLE: Primary | ICD-10-CM

## 2024-02-06 DIAGNOSIS — E08.621 DIABETIC ULCER OF RIGHT MIDFOOT ASSOCIATED WITH DIABETES MELLITUS DUE TO UNDERLYING CONDITION, WITH NECROSIS OF MUSCLE: Primary | ICD-10-CM

## 2024-02-06 PROCEDURE — 99213 OFFICE O/P EST LOW 20 MIN: CPT | Mod: S$PBB,,, | Performed by: STUDENT IN AN ORGANIZED HEALTH CARE EDUCATION/TRAINING PROGRAM

## 2024-02-06 PROCEDURE — 99214 OFFICE O/P EST MOD 30 MIN: CPT | Mod: PBBFAC | Performed by: STUDENT IN AN ORGANIZED HEALTH CARE EDUCATION/TRAINING PROGRAM

## 2024-02-07 NOTE — PROGRESS NOTES
Subjective:       Patient ID: Navdeep Avery is a 63 y.o. male.    Chief Complaint: Follow-up (1wk f/u (graft))    62-year-old  male presents to the clinic for 2 week evaluation status post debridement of right lower foot; patient had a right foot abscess with necrotic muscle extending down to bone the plantar surface of the right foot adjacent to the 3rd metatarsophalangeal joint.  Patient's spouse has been taking great care of the wound and the wound is healing.  No complaints.  Denies any chest pain/shortness of breath, nausea/vomiting/diarrhea, fever/chills.    2/14:  Presents today for wound re-evaluation and placement of PuraPly XT.  No significant changes from previous visit    2/21: Presents today for wound re-evaluation and placement of PuraPly XT.  No significant changes from previous visit    2/28: Presents today for wound re-evaluation and placement of PuraPly XT.  No significant changes from previous visit    3/7: Presents today for wound re-evaluation and placement of Affinity.  No significant changes from previous visit    3/20: Presents today for wound re-evaluation and placement of Apligraf.  No significant changes from previous visit    3/27: Presents today for wound re-evaluation and placement of Apligraf.  No significant changes from previous visits    4/4: Presents today for wound re-evaluation and placement of Apligraf.  No significant changes from previous visit    4/10: Presents today for wound re-evaluation and placement of Apligraf.  No significant changes from previous visit    4/17:  Presents today for wound re-evaluation and placement of Apligraf; no significant changes from previous visit    4/25:  Presents today for wound re-evaluation and placement of Affinity; no significant changes from previous visit    5/3: Presents today for wound re-evaluation and placement of Affinity; no significant changes from previous visit    5/9:  Presents today for wound re-evaluation and  placed an affinity; no significant changes from previous visit    5/16: Presents today for wound re-evaluation and placed an affinity; no significant changes from previous visit    5/23:  Presents today for wound re-evaluation; no significant changes from previous visit    8/15:  Patient presents for evaluation of right foot wound.  Patient was recently hospitalized and required debridement on August 10th for some ulceration with muscle necrosis on the right foot; no bony involvement; no changes on x-ray from previous.  Denies any chest pain/shortness of breath, nausea/vomiting/diarrhea, fever/chills.    9/11:  Patient presents for wound re-evaluation, debridement.  No changes since seen in clinic previously.    10/2: presents for wound re-evaluation; has been going to wound clinic weekly    10/24:  Presents for wound re-evaluation; application of PuraPly.  No changes since seen in clinic previously.    10/31: Presents for wound re-evaluation; application of Affinity.  No changes since seen in clinic previously.    11/7: Presents for wound re-evaluation; application of Affinity.  No changes since seen in clinic previously.    11/14:  Presents for wound re-evaluation; application of Affinity.  No changes since seen in clinic previously    11/21: Presents for wound re-evaluation; application of Affinity.  No changes since seen in clinic previously    11/29: Presents for wound re-evaluation; application of Affinity.  No changes since seen in clinic previously    12/5/23: Presents for wound re-evaluation.  No changes since seen in clinic previously    1/2/24: Presents for wound re-evaluation; application of Affinity.  No changes since seen in clinic previously    1/9/24: Presents for wound re-evaluation; application of Affinity.  No changes since seen in clinic previously    1/16/24: Presents for wound re-evaluation; application of Affinity.  No changes since seen in clinic previously    1/23/24: Presents for wound  re-evaluation; application of Affinity.  No changes since seen in clinic previously    2/1: Presents for wound re-evaluation; application of Affinity.  No changes since seen in clinic previously    2/6:  Presents for wound re-evaluation.  Completed applications      Review of Systems   Constitutional: Negative.    HENT: Negative.     Eyes: Negative.    Respiratory:  Negative for shortness of breath.    Cardiovascular: Negative.    Gastrointestinal:  Negative for abdominal pain, blood in stool, change in bowel habit and vomiting.   Endocrine: Negative.    Genitourinary: Negative.  Negative for hematuria.   Musculoskeletal:  Negative for back pain and myalgias.   Integumentary:  Negative.   Allergic/Immunologic: Negative.    Neurological:  Negative for dizziness, weakness and light-headedness.   Psychiatric/Behavioral: Negative.           Objective:      Physical Exam  Constitutional:       General: He is not in acute distress.     Appearance: Normal appearance.   HENT:      Head: Normocephalic.   Cardiovascular:      Rate and Rhythm: Normal rate.   Pulmonary:      Effort: Pulmonary effort is normal. No respiratory distress.   Abdominal:      General: There is no distension.      Tenderness: There is no abdominal tenderness.   Musculoskeletal:         General: Normal range of motion.        Feet:    Feet:      Comments: ulceration on the plantar surface adjacent to the 3rd metatarsophalangeal joint; measuring 1x0.7x0.2 cm; no necrosis; granulation tissue present; no bony involvement.  Slight maceration of tissue    Small opening adjacent to the 1st metatarsophalangeal joint scabbed over    Skin:     General: Skin is warm.      Coloration: Skin is not jaundiced.   Neurological:      General: No focal deficit present.      Mental Status: He is alert and oriented to person, place, and time.      Cranial Nerves: No cranial nerve deficit.                   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

## 2024-02-13 ENCOUNTER — OFFICE VISIT (OUTPATIENT)
Dept: SURGERY | Facility: CLINIC | Age: 64
End: 2024-02-13
Payer: MEDICARE

## 2024-02-13 DIAGNOSIS — L97.413 DIABETIC ULCER OF RIGHT MIDFOOT ASSOCIATED WITH DIABETES MELLITUS DUE TO UNDERLYING CONDITION, WITH NECROSIS OF MUSCLE: Primary | ICD-10-CM

## 2024-02-13 DIAGNOSIS — E08.621 DIABETIC ULCER OF RIGHT MIDFOOT ASSOCIATED WITH DIABETES MELLITUS DUE TO UNDERLYING CONDITION, WITH NECROSIS OF MUSCLE: Primary | ICD-10-CM

## 2024-02-13 PROCEDURE — 99213 OFFICE O/P EST LOW 20 MIN: CPT | Mod: PBBFAC | Performed by: STUDENT IN AN ORGANIZED HEALTH CARE EDUCATION/TRAINING PROGRAM

## 2024-02-13 PROCEDURE — 99213 OFFICE O/P EST LOW 20 MIN: CPT | Mod: S$PBB,,, | Performed by: STUDENT IN AN ORGANIZED HEALTH CARE EDUCATION/TRAINING PROGRAM

## 2024-02-14 NOTE — PROGRESS NOTES
Subjective:       Patient ID: Navdeep Avery is a 63 y.o. male.    Chief Complaint: Follow-up (1 week follow up )    62-year-old  male presents to the clinic for 2 week evaluation status post debridement of right lower foot; patient had a right foot abscess with necrotic muscle extending down to bone the plantar surface of the right foot adjacent to the 3rd metatarsophalangeal joint.  Patient's spouse has been taking great care of the wound and the wound is healing.  No complaints.  Denies any chest pain/shortness of breath, nausea/vomiting/diarrhea, fever/chills.    2/14:  Presents today for wound re-evaluation and placement of PuraPly XT.  No significant changes from previous visit    2/21: Presents today for wound re-evaluation and placement of PuraPly XT.  No significant changes from previous visit    2/28: Presents today for wound re-evaluation and placement of PuraPly XT.  No significant changes from previous visit    3/7: Presents today for wound re-evaluation and placement of Affinity.  No significant changes from previous visit    3/20: Presents today for wound re-evaluation and placement of Apligraf.  No significant changes from previous visit    3/27: Presents today for wound re-evaluation and placement of Apligraf.  No significant changes from previous visits    4/4: Presents today for wound re-evaluation and placement of Apligraf.  No significant changes from previous visit    4/10: Presents today for wound re-evaluation and placement of Apligraf.  No significant changes from previous visit    4/17:  Presents today for wound re-evaluation and placement of Apligraf; no significant changes from previous visit    4/25:  Presents today for wound re-evaluation and placement of Affinity; no significant changes from previous visit    5/3: Presents today for wound re-evaluation and placement of Affinity; no significant changes from previous visit    5/9:  Presents today for wound re-evaluation and  placed an affinity; no significant changes from previous visit    5/16: Presents today for wound re-evaluation and placed an affinity; no significant changes from previous visit    5/23:  Presents today for wound re-evaluation; no significant changes from previous visit    8/15:  Patient presents for evaluation of right foot wound.  Patient was recently hospitalized and required debridement on August 10th for some ulceration with muscle necrosis on the right foot; no bony involvement; no changes on x-ray from previous.  Denies any chest pain/shortness of breath, nausea/vomiting/diarrhea, fever/chills.    9/11:  Patient presents for wound re-evaluation, debridement.  No changes since seen in clinic previously.    10/2: presents for wound re-evaluation; has been going to wound clinic weekly    10/24:  Presents for wound re-evaluation; application of PuraPly.  No changes since seen in clinic previously.    10/31: Presents for wound re-evaluation; application of Affinity.  No changes since seen in clinic previously.    11/7: Presents for wound re-evaluation; application of Affinity.  No changes since seen in clinic previously.    11/14:  Presents for wound re-evaluation; application of Affinity.  No changes since seen in clinic previously    11/21: Presents for wound re-evaluation; application of Affinity.  No changes since seen in clinic previously    11/29: Presents for wound re-evaluation; application of Affinity.  No changes since seen in clinic previously    12/5/23: Presents for wound re-evaluation.  No changes since seen in clinic previously    1/2/24: Presents for wound re-evaluation; application of Affinity.  No changes since seen in clinic previously    1/9/24: Presents for wound re-evaluation; application of Affinity.  No changes since seen in clinic previously    1/16/24: Presents for wound re-evaluation; application of Affinity.  No changes since seen in clinic previously    1/23/24: Presents for wound  re-evaluation; application of Affinity.  No changes since seen in clinic previously    2/1: Presents for wound re-evaluation; application of Affinity.  No changes since seen in clinic previously    2/6:  Presents for wound re-evaluation.  Completed applications    2/14: Presents for wound re-evaluation.  Completed applications        Review of Systems   Constitutional: Negative.    HENT: Negative.     Eyes: Negative.    Respiratory:  Negative for shortness of breath.    Cardiovascular: Negative.    Gastrointestinal:  Negative for abdominal pain, blood in stool, change in bowel habit and vomiting.   Endocrine: Negative.    Genitourinary: Negative.  Negative for hematuria.   Musculoskeletal:  Negative for back pain and myalgias.   Integumentary:  Negative.   Allergic/Immunologic: Negative.    Neurological:  Negative for dizziness, weakness and light-headedness.   Psychiatric/Behavioral: Negative.           Objective:      Physical Exam  Constitutional:       General: He is not in acute distress.     Appearance: Normal appearance.   HENT:      Head: Normocephalic.   Cardiovascular:      Rate and Rhythm: Normal rate.   Pulmonary:      Effort: Pulmonary effort is normal. No respiratory distress.   Abdominal:      General: There is no distension.      Tenderness: There is no abdominal tenderness.   Musculoskeletal:         General: Normal range of motion.        Feet:    Feet:      Comments: ulceration on the plantar surface adjacent to the 3rd metatarsophalangeal joint; measuring 1x0.7x0.1 cm; no necrosis; granulation tissue present; no bony involvement.  Slight maceration of tissue        Skin:     General: Skin is warm.      Coloration: Skin is not jaundiced.   Neurological:      General: No focal deficit present.      Mental Status: He is alert and oriented to person, place, and time.      Cranial Nerves: No cranial nerve deficit.                     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

## 2024-02-18 NOTE — PROGRESS NOTES
She Disclaimer: This note has been generated using voice-recognition software. There may be typographical errors that have been missed during proof-reading        Patient ID: Navdeep Avery is a 63 y.o. male.      Chief Complaint: Shoulder Pain (Pain ion shoulder,L hip,Bilateral knees, Right foot pain)      63-year-old male returns for re-evaluation of chronic pain involving a poor healing  right foot wound, diabetic neuropathy and left hip pain.  The left hip pain started after falling from roof and requiring an ORIF at [facility].  He developed tolerance to oral opiates and fentanyl was prescribed for severe pain.  Unfortunately he developed shortness of breath and discontinued use of  the fentanyl patches.  He returned to patches to our clinic today and the patches were destroyed in presence of patient and witnessed by Stan Harrington LPN.  Patient  is not a candidate for nerve block procedures  due to hyperglycemia and open wound ulcerations.  I will restart Norco every 6 hours for severe pain and continue Neurontin at 600 mg a day for neuropathy.              Pain Assessment  Pain Assessment: 0-10  Pain Score:   7  Pain Location: Shoulder  Pain Orientation: Right, Left  Pain Radiating Towards: Left hip, Bilateral knes,and Right foot pain  Pain Descriptors: Aching, Sharp  Pain Frequency: Continuous  Pain Onset: Awakened from sleep  Aggravating Factors: Walking, Bending  Pain Intervention(s): Medication (See eMAR), Heat applied      A's of Opioid Risk Assessment  Activity:Patient can not perform ADL.   Analgesia:Patients pain is partially controlled by current medication.   Adverse Effects: Patient denies constipation or sedation.  Aberrant Behavior:  reviewed with no aberrant drug seeking/taking behavior.      Patient denies any suicidal or homicidal ideations    Physical Therapy/Home Exercise: yes      US Lower Extrem Arteries Bilat with JACQUELIN (xpd)  Narrative: EXAMINATION:  US ARTERIAL LOWER EXTREMITY BILAT WITH JACQUELIN  (XPD)    CLINICAL HISTORY:  Diabetes mellitus due to underlying condition with foot ulcer    TECHNIQUE:  Color-flow Doppler utilized    COMPARISON:  None    FINDINGS:  Left common femoral artery peak systolic velocity 102 centimeters/second, biphasic waveforms, profundus femora is 97 centimeters/second biphasic waveform, SFA proximal 92 centimeters/second biphasic waveforms is area of stenosis with a peak systolic velocity of 200 centimeters/second which is focal doubling of velocities, mid SFA 52 centimeters/second biphasic waveform, distal SFA 87 centimeters/second biphasic waveforms, popliteal 44 centimeters/second biphasic waveform, posterior tibial 46 centimeters/second biphasic waveform, dorsalis pedis 10 centimeters/second monophasic waveforms    Right brachial pressure 139 mm Hg, JACQUELIN 0.99    Left brachial pressure 153 mm Hg, JACQUELIN 0.87  Impression: Right lower extremity no significant disease.  Left lower extremity mild disease with area of doubling of velocities in the proximal SFA suggesting 50-99% stenosis with the JACQUELIN 0.87    TECHNOLOGIST: Elizabeth France (RT) (VS) RVT    Electronically signed by: Selina Matos  Date:    10/03/2023  Time:    10:06      Review of Systems   Constitutional: Negative.    HENT: Negative.     Eyes: Negative.    Respiratory: Negative.     Cardiovascular: Negative.    Gastrointestinal: Negative.    Endocrine: Negative.    Genitourinary: Negative.    Musculoskeletal:  Positive for arthralgias (Left hip) and gait problem. Joint swelling: right foot.  Integumentary:  Negative.   Allergic/Immunologic: Negative.    Hematological: Negative.    Psychiatric/Behavioral:  Positive for sleep disturbance.              Past Medical History:   Diagnosis Date    Arthritis     Carpal tunnel syndrome     COPD (chronic obstructive pulmonary disease)     Coronary artery disease involving native coronary artery of native heart 06/27/2023    CABG 2018 (No OP report available)  Most recent left heart  catheterization 08/12/2019 1. Normal LV size apical akinesis and overall normal LV systolic function, ejection fraction 55% 2. Patent saphenous vein graft to the right PDA with patent jump graft to the OM branch left circumflex 3. Patent LIMA to the OM1 4. No significant mitral regurgitation     COVID 02/02/2022    Diabetic peripheral neuropathy     Diabetic ulcer of right foot associated with diabetes mellitus due to underlying condition, with bone involvement without evidence of necrosis     GERD (gastroesophageal reflux disease)     Hyperlipidemia     Hypertension     Peripheral vascular disease, unspecified     Sleep apnea     Type 2 diabetes mellitus      Past Surgical History:   Procedure Laterality Date    CORONARY ARTERY BYPASS GRAFT      CORONARY STENT PLACEMENT      DEBRIDEMENT OF FOOT Right 08/10/2023    Dr. Chiu    DEBRIDEMENT OF FOOT Right 8/10/2023    Procedure: DEBRIDEMENT, FOOT;  Surgeon: Evelio Chiu DO;  Location: [facility] OR;  Service: General;  Laterality: Right;    DEBRIDEMENT OF LOWER EXTREMITY Right 06/27/2022    Procedure: DEBRIDEMENT, LOWER EXTREMITY;  Surgeon: Forrest Kiser MD;  Location: [facility] OR;  Service: General;  Laterality: Right;  right foot    HIP FRACTURE SURGERY Left     IRRIGATION AND DEBRIDEMENT OF LOWER EXTREMITY Right 09/08/2022    Procedure: IRRIGATION AND DEBRIDEMENT, LOWER EXTREMITY;  Surgeon: Selina Matos MD;  Location: [facility] OR;  Service: General;  Laterality: Right;    IRRIGATION AND DEBRIDEMENT OF LOWER EXTREMITY Right 01/05/2023    Procedure: IRRIGATION AND DEBRIDEMENT, LOWER EXTREMITY;  Surgeon: Evelio Chiu DO;  Location: [facility];  Service: General;  Laterality: Right;    SHOULDER OPEN ROTATOR CUFF REPAIR Left     SPINE SURGERY      VASCULAR SURGERY       Social History     Socioeconomic History    Marital status:    Occupational History    Occupation: Retired   Tobacco Use    Smoking status: Every Day     Current packs/day:  2.50     Average packs/day: 2.5 packs/day for 52.5 years (131.3 ttl pk-yrs)     Types: Cigarettes     Start date: 8/10/1971     Passive exposure: Current    Smokeless tobacco: Never   Substance and Sexual Activity    Alcohol use: Not Currently    Drug use: Yes     Types: Oxycodone    Sexual activity: Yes     Partners: Female     Social Determinants of Health     Financial Resource Strain: Medium Risk (9/12/2023)    Overall Financial Resource Strain (CARDIA)     Difficulty of Paying Living Expenses: Somewhat hard   Food Insecurity: No Food Insecurity (9/12/2023)    Hunger Vital Sign     Worried About Running Out of Food in the Last Year: Never true     Ran Out of Food in the Last Year: Never true   Transportation Needs: No Transportation Needs (9/12/2023)    PRAPARE - Transportation     Lack of Transportation (Medical): No     Lack of Transportation (Non-Medical): No   Physical Activity: Inactive (9/12/2023)    Exercise Vital Sign     Days of Exercise per Week: 0 days     Minutes of Exercise per Session: 0 min   Stress: No Stress Concern Present (9/12/2023)    [employer] - Occupational Stress Questionnaire     Feeling of Stress : Only a little   Social Connections: Socially Isolated (9/12/2023)    Social Connection and Isolation Panel [NHANES]     Frequency of Communication with Friends and Family: Once a week     Frequency of Social Gatherings with Friends and Family: Once a week     Attends Yazidism Services: Never     Active Member of Clubs or Organizations: No     Attends Club or Organization Meetings: Never     Marital Status:    Housing Stability: Low Risk  (9/12/2023)    Housing Stability Vital Sign     Unable to Pay for Housing in the Last Year: No     Number of Places Lived in the Last Year: 1     Unstable Housing in the Last Year: No     Family History   Problem Relation Age of Onset    Arthritis Mother     Hypertension Mother     Learning disabilities Mother      Alcohol abuse Father     Early death Father     Heart disease Father     Cancer Sister     Diabetes Sister     Heart disease Maternal Grandfather     COPD Paternal Grandfather     Heart disease Son      Review of patient's allergies indicates:  No Known Allergies  has a current medication list which includes the following prescription(s): albuterol, albuterol, amlodipine, aspirin, atorvastatin, clopidogrel, diclofenac sodium, empagliflozin, gentamicin, hydrocodone-acetaminophen, hydroxyzine pamoate, levemir flextouch u100 insulin, isosorbide mononitrate, lidocaine, lisinopril, metoprolol succinate, miconazole nitrate 2 %, mirtazapine, mupirocin, nitroglycerin, pantoprazole, rybelsus, senna, spiriva respimat, sure comfort insulin syringe, escitalopram oxalate, gabapentin, hydrocodone-acetaminophen, and [START ON 3/20/2024] hydrocodone-acetaminophen.      Objective:  Vitals:    02/19/24 0820   BP: (!) 146/76   Pulse: 77          Physical Exam  Vitals and nursing note reviewed. Chaperone present: Accompanied by his wife.   Constitutional:       General: He is not in acute distress.     Appearance: Normal appearance. He is not ill-appearing, toxic-appearing or diaphoretic.   HENT:      Head: Normocephalic and atraumatic.      Nose: Nose normal.      Mouth/Throat:      Mouth: Mucous membranes are moist.   Eyes:      Extraocular Movements: Extraocular movements intact.      Pupils: Pupils are equal, round, and reactive to light.   Cardiovascular:      Rate and Rhythm: Normal rate and regular rhythm.      Heart sounds: Normal heart sounds.   Pulmonary:      Effort: Pulmonary effort is normal. No respiratory distress.      Breath sounds: Normal breath sounds. No stridor. No wheezing or rhonchi.   Abdominal:      General: Bowel sounds are normal.      Palpations: Abdomen is soft.   Musculoskeletal:         General: No swelling or deformity.      Cervical back: Normal and normal range of motion. No spasms or tenderness. No  pain with movement. Normal range of motion.      Thoracic back: Normal.      Lumbar back: No spasms, tenderness or bony tenderness. Normal range of motion. Negative right straight leg raise test and negative left straight leg raise test. No scoliosis.      Left hip: Laceration (Well-healed incisional scar of the left hip) and tenderness present. Decreased range of motion.      Right lower leg: No edema.      Left lower leg: No edema.      Right foot: Swelling and tenderness present.      Comments: Right foot ulceration   Skin:     General: Skin is warm.   Neurological:      General: No focal deficit present.      Mental Status: He is alert and oriented to person, place, and time. Mental status is at baseline.      Cranial Nerves: No cranial nerve deficit.      Sensory: Sensation is intact. No sensory deficit.      Motor: No weakness.      Coordination: Coordination normal.      Gait: Gait abnormal (Uses a walker for assistance with mobility).      Deep Tendon Reflexes: Reflexes are normal and symmetric.   Psychiatric:         Mood and Affect: Mood normal.         Behavior: Behavior normal.           Assessment:      1. Ulcer of right foot with necrosis of muscle    2. Neuropathy    3. Peripheral vascular disease, unspecified    4. Hip pain, left status post left hip replacement          Plan:  1. reviewed  2.Addiction, Dependency, Tolerance, Opioid abuse-misuse, Death, Diversion Discussed. Overdose reversal drug Naloxone discussed. Patient is prescribed opiates for chronic nonmalignant pain pathology.  Patient is receiving opiates which require greater than a 72 hour supply of therapy.  Patient was educated on potential dependency associated with long-term opioid use as well as decreasing efficacy with prolonged use.  Patient was advised of risks, benefits and side effects and how to utilize each medication.  Patient was also informed that any deviation from therapy protocol will  lead to discontinuation of  opiates.  It is reasonable to prescribe opioid analgesics for patient based on positive response to opioid medications, lack of side effects and  limited aberrant behavior.    3.Refill/Continue medications for pain control and function       Requested Prescriptions     Signed Prescriptions Disp Refills    HYDROcodone-acetaminophen (NORCO)  mg per tablet 120 tablet 0     Sig: Take 1 tablet by mouth every 6 (six) hours as needed for Pain.    HYDROcodone-acetaminophen (NORCO)  mg per tablet 120 tablet 0     Sig: Take 1 tablet by mouth every 6 (six) hours as needed for Pain.    gabapentin (NEURONTIN) 300 MG capsule 360 capsule 2     Sig: Take 3 capsules (900 mg total) by mouth every 6 (six) hours.       4.Follow with AQUILINO Richards in 2 months for re-evaluation and medication refill  5. Patient is not a candidate for nerve block injections         report:  Reviewed and consistent with medication use as prescribed.      The total time spent for evaluation and management on 02/19/2024 including reviewing separately obtained history, performing a medically appropriate exam and evaluation, documenting clinical information in the health record, independently interpreting results and communicating them to the patient/family/caregiver, and ordering medications/tests/procedures was between 15-29 minutes.    The above plan and management options were discussed at length with patient. Patient is in agreement with the above and verbalized understanding. It will be communicated with the referring physician via electronic record, fax, or mail.

## 2024-02-19 ENCOUNTER — OFFICE VISIT (OUTPATIENT)
Dept: PAIN MEDICINE | Facility: CLINIC | Age: 64
End: 2024-02-19
Payer: MEDICARE

## 2024-02-19 VITALS
HEART RATE: 77 BPM | HEIGHT: 68 IN | WEIGHT: 164 LBS | SYSTOLIC BLOOD PRESSURE: 146 MMHG | BODY MASS INDEX: 24.86 KG/M2 | DIASTOLIC BLOOD PRESSURE: 76 MMHG

## 2024-02-19 DIAGNOSIS — L97.513 ULCER OF RIGHT FOOT WITH NECROSIS OF MUSCLE: Primary | Chronic | ICD-10-CM

## 2024-02-19 DIAGNOSIS — G62.9 NEUROPATHY: Chronic | ICD-10-CM

## 2024-02-19 DIAGNOSIS — I73.9 PERIPHERAL VASCULAR DISEASE, UNSPECIFIED: Chronic | ICD-10-CM

## 2024-02-19 DIAGNOSIS — M25.552 HIP PAIN, LEFT: Chronic | ICD-10-CM

## 2024-02-19 PROCEDURE — 99214 OFFICE O/P EST MOD 30 MIN: CPT | Mod: S$PBB,,, | Performed by: PAIN MEDICINE

## 2024-02-19 PROCEDURE — 99215 OFFICE O/P EST HI 40 MIN: CPT | Mod: PBBFAC | Performed by: PAIN MEDICINE

## 2024-02-19 RX ORDER — HYDROCODONE BITARTRATE AND ACETAMINOPHEN 10; 325 MG/1; MG/1
1 TABLET ORAL EVERY 6 HOURS PRN
Qty: 120 TABLET | Refills: 0 | Status: ON HOLD | OUTPATIENT
Start: 2024-03-20 | End: 2024-02-27

## 2024-02-19 RX ORDER — HYDROCODONE BITARTRATE AND ACETAMINOPHEN 10; 325 MG/1; MG/1
1 TABLET ORAL EVERY 6 HOURS PRN
Qty: 120 TABLET | Refills: 0 | Status: SHIPPED | OUTPATIENT
Start: 2024-02-19 | End: 2024-04-10 | Stop reason: SDUPTHER

## 2024-02-19 RX ORDER — GABAPENTIN 300 MG/1
900 CAPSULE ORAL EVERY 6 HOURS
Qty: 360 CAPSULE | Refills: 2 | Status: SHIPPED | OUTPATIENT
Start: 2024-02-19 | End: 2024-05-19

## 2024-02-27 ENCOUNTER — ANESTHESIA EVENT (OUTPATIENT)
Dept: SURGERY | Facility: HOSPITAL | Age: 64
DRG: 240 | End: 2024-02-27
Payer: MEDICARE

## 2024-02-27 ENCOUNTER — HOSPITAL ENCOUNTER (INPATIENT)
Facility: HOSPITAL | Age: 64
LOS: 8 days | Discharge: HOME HEALTH | DRG: 240 | End: 2024-03-06
Attending: STUDENT IN AN ORGANIZED HEALTH CARE EDUCATION/TRAINING PROGRAM | Admitting: STUDENT IN AN ORGANIZED HEALTH CARE EDUCATION/TRAINING PROGRAM
Payer: MEDICARE

## 2024-02-27 ENCOUNTER — ANESTHESIA (OUTPATIENT)
Dept: SURGERY | Facility: HOSPITAL | Age: 64
DRG: 240 | End: 2024-02-27
Payer: MEDICARE

## 2024-02-27 DIAGNOSIS — E11.65 TYPE 2 DIABETES MELLITUS WITH HYPERGLYCEMIA, UNSPECIFIED WHETHER LONG TERM INSULIN USE: ICD-10-CM

## 2024-02-27 DIAGNOSIS — E08.621 DIABETIC ULCER OF TOE OF RIGHT FOOT ASSOCIATED WITH DIABETES MELLITUS DUE TO UNDERLYING CONDITION, WITH NECROSIS OF MUSCLE: ICD-10-CM

## 2024-02-27 DIAGNOSIS — L97.513 DIABETIC ULCER OF TOE OF RIGHT FOOT ASSOCIATED WITH DIABETES MELLITUS DUE TO UNDERLYING CONDITION, WITH NECROSIS OF MUSCLE: ICD-10-CM

## 2024-02-27 DIAGNOSIS — L97.513 DIABETIC ULCER OF TOE OF RIGHT FOOT ASSOCIATED WITH DIABETES MELLITUS DUE TO UNDERLYING CONDITION, WITH NECROSIS OF MUSCLE: Primary | ICD-10-CM

## 2024-02-27 DIAGNOSIS — E78.2 MIXED HYPERLIPIDEMIA: ICD-10-CM

## 2024-02-27 DIAGNOSIS — E08.621 DIABETIC ULCER OF TOE OF RIGHT FOOT ASSOCIATED WITH DIABETES MELLITUS DUE TO UNDERLYING CONDITION, WITH NECROSIS OF MUSCLE: Primary | ICD-10-CM

## 2024-02-27 DIAGNOSIS — S91.309A WOUND OF FOOT: ICD-10-CM

## 2024-02-27 DIAGNOSIS — I25.810 CORONARY ARTERY DISEASE INVOLVING CORONARY BYPASS GRAFT OF NATIVE HEART, UNSPECIFIED WHETHER ANGINA PRESENT: ICD-10-CM

## 2024-02-27 DIAGNOSIS — J43.9 PULMONARY EMPHYSEMA, UNSPECIFIED EMPHYSEMA TYPE: ICD-10-CM

## 2024-02-27 LAB
ANION GAP SERPL CALCULATED.3IONS-SCNC: 9 MMOL/L (ref 7–16)
APTT PPP: 21.6 SECONDS (ref 25.2–37.3)
BASOPHILS # BLD AUTO: 0.05 K/UL (ref 0–0.2)
BASOPHILS NFR BLD AUTO: 0.4 % (ref 0–1)
BUN SERPL-MCNC: 24 MG/DL (ref 7–18)
BUN/CREAT SERPL: 24 (ref 6–20)
CALCIUM SERPL-MCNC: 9 MG/DL (ref 8.5–10.1)
CHLORIDE SERPL-SCNC: 99 MMOL/L (ref 98–107)
CO2 SERPL-SCNC: 30 MMOL/L (ref 21–32)
CREAT SERPL-MCNC: 0.99 MG/DL (ref 0.7–1.3)
DIFFERENTIAL METHOD BLD: ABNORMAL
EGFR (NO RACE VARIABLE) (RUSH/TITUS): 85 ML/MIN/1.73M2
EOSINOPHIL # BLD AUTO: 0.11 K/UL (ref 0–0.5)
EOSINOPHIL NFR BLD AUTO: 0.9 % (ref 1–4)
ERYTHROCYTE [DISTWIDTH] IN BLOOD BY AUTOMATED COUNT: 11.9 % (ref 11.5–14.5)
GLUCOSE SERPL-MCNC: 152 MG/DL (ref 70–105)
GLUCOSE SERPL-MCNC: 192 MG/DL (ref 70–105)
GLUCOSE SERPL-MCNC: 233 MG/DL (ref 74–106)
HCT VFR BLD AUTO: 36.9 % (ref 40–54)
HGB BLD-MCNC: 12.9 G/DL (ref 13.5–18)
IMM GRANULOCYTES # BLD AUTO: 0.06 K/UL (ref 0–0.04)
IMM GRANULOCYTES NFR BLD: 0.5 % (ref 0–0.4)
INR BLD: 1.05
LYMPHOCYTES # BLD AUTO: 1.22 K/UL (ref 1–4.8)
LYMPHOCYTES NFR BLD AUTO: 10.5 % (ref 27–41)
MCH RBC QN AUTO: 31.3 PG (ref 27–31)
MCHC RBC AUTO-ENTMCNC: 35 G/DL (ref 32–36)
MCV RBC AUTO: 89.6 FL (ref 80–96)
MONOCYTES # BLD AUTO: 0.89 K/UL (ref 0–0.8)
MONOCYTES NFR BLD AUTO: 7.7 % (ref 2–6)
MPC BLD CALC-MCNC: 10.6 FL (ref 9.4–12.4)
NEUTROPHILS # BLD AUTO: 9.29 K/UL (ref 1.8–7.7)
NEUTROPHILS NFR BLD AUTO: 80 % (ref 53–65)
NRBC # BLD AUTO: 0 X10E3/UL
NRBC, AUTO (.00): 0 %
PLATELET # BLD AUTO: 186 K/UL (ref 150–400)
PLATELET MORPHOLOGY: ABNORMAL
POTASSIUM SERPL-SCNC: 4 MMOL/L (ref 3.5–5.1)
PROTHROMBIN TIME: 13.6 SECONDS (ref 11.7–14.7)
RBC # BLD AUTO: 4.12 M/UL (ref 4.6–6.2)
RBC MORPH BLD: NORMAL
SODIUM SERPL-SCNC: 134 MMOL/L (ref 136–145)
WBC # BLD AUTO: 11.62 K/UL (ref 4.5–11)

## 2024-02-27 PROCEDURE — 27000177 HC AIRWAY, LARYNGEAL MASK: Performed by: ANESTHESIOLOGY

## 2024-02-27 PROCEDURE — 99900035 HC TECH TIME PER 15 MIN (STAT)

## 2024-02-27 PROCEDURE — 99223 1ST HOSP IP/OBS HIGH 75: CPT | Mod: AI,,, | Performed by: STUDENT IN AN ORGANIZED HEALTH CARE EDUCATION/TRAINING PROGRAM

## 2024-02-27 PROCEDURE — 27000655: Performed by: ANESTHESIOLOGY

## 2024-02-27 PROCEDURE — 99285 EMERGENCY DEPT VISIT HI MDM: CPT | Mod: 25

## 2024-02-27 PROCEDURE — 94761 N-INVAS EAR/PLS OXIMETRY MLT: CPT

## 2024-02-27 PROCEDURE — 88305 TISSUE EXAM BY PATHOLOGIST: CPT | Mod: TC,SUR | Performed by: STUDENT IN AN ORGANIZED HEALTH CARE EDUCATION/TRAINING PROGRAM

## 2024-02-27 PROCEDURE — 96375 TX/PRO/DX INJ NEW DRUG ADDON: CPT

## 2024-02-27 PROCEDURE — D9220A PRA ANESTHESIA: Mod: CRNA,,, | Performed by: NURSE ANESTHETIST, CERTIFIED REGISTERED

## 2024-02-27 PROCEDURE — 63600175 PHARM REV CODE 636 W HCPCS: Performed by: NURSE PRACTITIONER

## 2024-02-27 PROCEDURE — 11000001 HC ACUTE MED/SURG PRIVATE ROOM

## 2024-02-27 PROCEDURE — 63600175 PHARM REV CODE 636 W HCPCS: Performed by: NURSE ANESTHETIST, CERTIFIED REGISTERED

## 2024-02-27 PROCEDURE — 82962 GLUCOSE BLOOD TEST: CPT

## 2024-02-27 PROCEDURE — 0HBMXZZ EXCISION OF RIGHT FOOT SKIN, EXTERNAL APPROACH: ICD-10-PCS | Performed by: STUDENT IN AN ORGANIZED HEALTH CARE EDUCATION/TRAINING PROGRAM

## 2024-02-27 PROCEDURE — 25000003 PHARM REV CODE 250: Performed by: HOSPITALIST

## 2024-02-27 PROCEDURE — 25000003 PHARM REV CODE 250: Performed by: STUDENT IN AN ORGANIZED HEALTH CARE EDUCATION/TRAINING PROGRAM

## 2024-02-27 PROCEDURE — 96374 THER/PROPH/DIAG INJ IV PUSH: CPT

## 2024-02-27 PROCEDURE — 37000009 HC ANESTHESIA EA ADD 15 MINS: Performed by: STUDENT IN AN ORGANIZED HEALTH CARE EDUCATION/TRAINING PROGRAM

## 2024-02-27 PROCEDURE — 94640 AIRWAY INHALATION TREATMENT: CPT

## 2024-02-27 PROCEDURE — 85610 PROTHROMBIN TIME: CPT | Performed by: NURSE PRACTITIONER

## 2024-02-27 PROCEDURE — 25000242 PHARM REV CODE 250 ALT 637 W/ HCPCS: Performed by: HOSPITALIST

## 2024-02-27 PROCEDURE — 88305 TISSUE EXAM BY PATHOLOGIST: CPT | Mod: 26,,, | Performed by: PATHOLOGY

## 2024-02-27 PROCEDURE — 28820 AMPUTATION OF TOE: CPT | Mod: T9,,, | Performed by: STUDENT IN AN ORGANIZED HEALTH CARE EDUCATION/TRAINING PROGRAM

## 2024-02-27 PROCEDURE — 63600175 PHARM REV CODE 636 W HCPCS: Performed by: ANESTHESIOLOGY

## 2024-02-27 PROCEDURE — 85025 COMPLETE CBC W/AUTO DIFF WBC: CPT | Performed by: NURSE PRACTITIONER

## 2024-02-27 PROCEDURE — 80048 BASIC METABOLIC PNL TOTAL CA: CPT | Performed by: NURSE PRACTITIONER

## 2024-02-27 PROCEDURE — D9220A PRA ANESTHESIA: Mod: ANES,,, | Performed by: ANESTHESIOLOGY

## 2024-02-27 PROCEDURE — 0Y6X0Z0 DETACHMENT AT RIGHT 5TH TOE, COMPLETE, OPEN APPROACH: ICD-10-PCS | Performed by: STUDENT IN AN ORGANIZED HEALTH CARE EDUCATION/TRAINING PROGRAM

## 2024-02-27 PROCEDURE — 27000221 HC OXYGEN, UP TO 24 HOURS

## 2024-02-27 PROCEDURE — 87070 CULTURE OTHR SPECIMN AEROBIC: CPT | Performed by: STUDENT IN AN ORGANIZED HEALTH CARE EDUCATION/TRAINING PROGRAM

## 2024-02-27 PROCEDURE — 87075 CULTR BACTERIA EXCEPT BLOOD: CPT | Performed by: STUDENT IN AN ORGANIZED HEALTH CARE EDUCATION/TRAINING PROGRAM

## 2024-02-27 PROCEDURE — 85730 THROMBOPLASTIN TIME PARTIAL: CPT | Performed by: NURSE PRACTITIONER

## 2024-02-27 PROCEDURE — 36000707: Performed by: STUDENT IN AN ORGANIZED HEALTH CARE EDUCATION/TRAINING PROGRAM

## 2024-02-27 PROCEDURE — 27000716 HC OXISENSOR PROBE, ANY SIZE: Performed by: ANESTHESIOLOGY

## 2024-02-27 PROCEDURE — 99285 EMERGENCY DEPT VISIT HI MDM: CPT | Mod: ,,, | Performed by: NURSE PRACTITIONER

## 2024-02-27 PROCEDURE — 99900031 HC PATIENT EDUCATION (STAT)

## 2024-02-27 PROCEDURE — 27000284 HC CANNULA NASAL: Performed by: ANESTHESIOLOGY

## 2024-02-27 PROCEDURE — 37000008 HC ANESTHESIA 1ST 15 MINUTES: Performed by: STUDENT IN AN ORGANIZED HEALTH CARE EDUCATION/TRAINING PROGRAM

## 2024-02-27 PROCEDURE — 71000033 HC RECOVERY, INTIAL HOUR: Performed by: STUDENT IN AN ORGANIZED HEALTH CARE EDUCATION/TRAINING PROGRAM

## 2024-02-27 PROCEDURE — 71000039 HC RECOVERY, EACH ADD'L HOUR: Performed by: STUDENT IN AN ORGANIZED HEALTH CARE EDUCATION/TRAINING PROGRAM

## 2024-02-27 PROCEDURE — 36000706: Performed by: STUDENT IN AN ORGANIZED HEALTH CARE EDUCATION/TRAINING PROGRAM

## 2024-02-27 PROCEDURE — 99222 1ST HOSP IP/OBS MODERATE 55: CPT | Mod: ,,, | Performed by: HOSPITALIST

## 2024-02-27 PROCEDURE — 27000510 HC BLANKET BAIR HUGGER ANY SIZE: Performed by: ANESTHESIOLOGY

## 2024-02-27 PROCEDURE — 88311 DECALCIFY TISSUE: CPT | Mod: 26,,, | Performed by: PATHOLOGY

## 2024-02-27 PROCEDURE — 25000003 PHARM REV CODE 250: Performed by: NURSE ANESTHETIST, CERTIFIED REGISTERED

## 2024-02-27 RX ORDER — INSULIN ASPART 100 [IU]/ML
0-10 INJECTION, SOLUTION INTRAVENOUS; SUBCUTANEOUS
Status: DISCONTINUED | OUTPATIENT
Start: 2024-02-27 | End: 2024-03-05

## 2024-02-27 RX ORDER — GABAPENTIN 300 MG/1
300 CAPSULE ORAL 3 TIMES DAILY
Status: DISCONTINUED | OUTPATIENT
Start: 2024-02-27 | End: 2024-03-06 | Stop reason: HOSPADM

## 2024-02-27 RX ORDER — MORPHINE SULFATE 4 MG/ML
4 INJECTION, SOLUTION INTRAMUSCULAR; INTRAVENOUS
Status: COMPLETED | OUTPATIENT
Start: 2024-02-27 | End: 2024-02-27

## 2024-02-27 RX ORDER — GLUCAGON 1 MG
1 KIT INJECTION
Status: DISCONTINUED | OUTPATIENT
Start: 2024-02-27 | End: 2024-03-06 | Stop reason: HOSPADM

## 2024-02-27 RX ORDER — SODIUM CHLORIDE 0.9 % (FLUSH) 0.9 %
10 SYRINGE (ML) INJECTION
Status: DISCONTINUED | OUTPATIENT
Start: 2024-02-27 | End: 2024-03-06 | Stop reason: HOSPADM

## 2024-02-27 RX ORDER — SODIUM CHLORIDE, SODIUM LACTATE, POTASSIUM CHLORIDE, CALCIUM CHLORIDE 600; 310; 30; 20 MG/100ML; MG/100ML; MG/100ML; MG/100ML
INJECTION, SOLUTION INTRAVENOUS CONTINUOUS
Status: DISCONTINUED | OUTPATIENT
Start: 2024-02-27 | End: 2024-03-06 | Stop reason: HOSPADM

## 2024-02-27 RX ORDER — HYDROCODONE BITARTRATE AND ACETAMINOPHEN 10; 325 MG/1; MG/1
1 TABLET ORAL EVERY 4 HOURS PRN
Status: DISCONTINUED | OUTPATIENT
Start: 2024-02-27 | End: 2024-03-06 | Stop reason: HOSPADM

## 2024-02-27 RX ORDER — MORPHINE SULFATE 4 MG/ML
4 INJECTION, SOLUTION INTRAMUSCULAR; INTRAVENOUS EVERY 4 HOURS PRN
Status: DISCONTINUED | OUTPATIENT
Start: 2024-02-27 | End: 2024-03-06 | Stop reason: HOSPADM

## 2024-02-27 RX ORDER — MEPERIDINE HYDROCHLORIDE 25 MG/ML
25 INJECTION INTRAMUSCULAR; INTRAVENOUS; SUBCUTANEOUS EVERY 10 MIN PRN
Status: DISCONTINUED | OUTPATIENT
Start: 2024-02-27 | End: 2024-02-27 | Stop reason: HOSPADM

## 2024-02-27 RX ORDER — ISOSORBIDE MONONITRATE 30 MG/1
30 TABLET, EXTENDED RELEASE ORAL DAILY
Status: DISCONTINUED | OUTPATIENT
Start: 2024-02-28 | End: 2024-03-06 | Stop reason: HOSPADM

## 2024-02-27 RX ORDER — METOPROLOL SUCCINATE 50 MG/1
50 TABLET, EXTENDED RELEASE ORAL DAILY
Status: DISCONTINUED | OUTPATIENT
Start: 2024-02-28 | End: 2024-03-06 | Stop reason: HOSPADM

## 2024-02-27 RX ORDER — IBUPROFEN 200 MG
16 TABLET ORAL
Status: DISCONTINUED | OUTPATIENT
Start: 2024-02-27 | End: 2024-03-06 | Stop reason: HOSPADM

## 2024-02-27 RX ORDER — ALUMINUM HYDROXIDE, MAGNESIUM HYDROXIDE, AND SIMETHICONE 1200; 120; 1200 MG/30ML; MG/30ML; MG/30ML
30 SUSPENSION ORAL 4 TIMES DAILY PRN
Status: DISCONTINUED | OUTPATIENT
Start: 2024-02-27 | End: 2024-03-06 | Stop reason: HOSPADM

## 2024-02-27 RX ORDER — ONDANSETRON HYDROCHLORIDE 2 MG/ML
4 INJECTION, SOLUTION INTRAVENOUS EVERY 8 HOURS PRN
Status: DISCONTINUED | OUTPATIENT
Start: 2024-02-27 | End: 2024-03-06 | Stop reason: HOSPADM

## 2024-02-27 RX ORDER — PANTOPRAZOLE SODIUM 40 MG/1
40 TABLET, DELAYED RELEASE ORAL DAILY
Status: DISCONTINUED | OUTPATIENT
Start: 2024-02-28 | End: 2024-03-06 | Stop reason: HOSPADM

## 2024-02-27 RX ORDER — BUDESONIDE 0.5 MG/2ML
0.5 INHALANT ORAL EVERY 12 HOURS
Status: DISCONTINUED | OUTPATIENT
Start: 2024-02-27 | End: 2024-03-06 | Stop reason: HOSPADM

## 2024-02-27 RX ORDER — DIPHENHYDRAMINE HYDROCHLORIDE 50 MG/ML
25 INJECTION INTRAMUSCULAR; INTRAVENOUS EVERY 6 HOURS PRN
Status: DISCONTINUED | OUTPATIENT
Start: 2024-02-27 | End: 2024-02-27 | Stop reason: HOSPADM

## 2024-02-27 RX ORDER — AMLODIPINE BESYLATE 10 MG/1
10 TABLET ORAL DAILY
Status: DISCONTINUED | OUTPATIENT
Start: 2024-02-28 | End: 2024-03-06 | Stop reason: HOSPADM

## 2024-02-27 RX ORDER — ATORVASTATIN CALCIUM 80 MG/1
80 TABLET, FILM COATED ORAL NIGHTLY
Status: DISCONTINUED | OUTPATIENT
Start: 2024-02-27 | End: 2024-03-06 | Stop reason: HOSPADM

## 2024-02-27 RX ORDER — ACETAMINOPHEN 325 MG/1
650 TABLET ORAL EVERY 8 HOURS PRN
Status: DISCONTINUED | OUTPATIENT
Start: 2024-02-27 | End: 2024-03-06 | Stop reason: HOSPADM

## 2024-02-27 RX ORDER — NAPROXEN SODIUM 220 MG/1
81 TABLET, FILM COATED ORAL DAILY
Status: DISCONTINUED | OUTPATIENT
Start: 2024-02-28 | End: 2024-03-06 | Stop reason: HOSPADM

## 2024-02-27 RX ORDER — PHENYLEPHRINE HYDROCHLORIDE 10 MG/ML
INJECTION INTRAVENOUS
Status: DISCONTINUED | OUTPATIENT
Start: 2024-02-27 | End: 2024-02-27

## 2024-02-27 RX ORDER — PROCHLORPERAZINE EDISYLATE 5 MG/ML
5 INJECTION INTRAMUSCULAR; INTRAVENOUS EVERY 6 HOURS PRN
Status: DISCONTINUED | OUTPATIENT
Start: 2024-02-27 | End: 2024-03-06 | Stop reason: HOSPADM

## 2024-02-27 RX ORDER — NITROGLYCERIN 0.4 MG/1
0.4 TABLET SUBLINGUAL EVERY 5 MIN PRN
Status: DISCONTINUED | OUTPATIENT
Start: 2024-02-27 | End: 2024-03-06 | Stop reason: HOSPADM

## 2024-02-27 RX ORDER — PROPOFOL 10 MG/ML
INJECTION, EMULSION INTRAVENOUS
Status: DISCONTINUED | OUTPATIENT
Start: 2024-02-27 | End: 2024-02-27

## 2024-02-27 RX ORDER — IBUPROFEN 200 MG
24 TABLET ORAL
Status: DISCONTINUED | OUTPATIENT
Start: 2024-02-27 | End: 2024-03-06 | Stop reason: HOSPADM

## 2024-02-27 RX ORDER — TALC
6 POWDER (GRAM) TOPICAL NIGHTLY PRN
Status: DISCONTINUED | OUTPATIENT
Start: 2024-02-27 | End: 2024-03-06 | Stop reason: HOSPADM

## 2024-02-27 RX ORDER — NALOXONE HCL 0.4 MG/ML
0.02 VIAL (ML) INJECTION
Status: DISCONTINUED | OUTPATIENT
Start: 2024-02-27 | End: 2024-03-06 | Stop reason: HOSPADM

## 2024-02-27 RX ORDER — HYDROMORPHONE HYDROCHLORIDE 2 MG/ML
0.5 INJECTION, SOLUTION INTRAMUSCULAR; INTRAVENOUS; SUBCUTANEOUS EVERY 5 MIN PRN
Status: DISCONTINUED | OUTPATIENT
Start: 2024-02-27 | End: 2024-02-27 | Stop reason: HOSPADM

## 2024-02-27 RX ORDER — ONDANSETRON HYDROCHLORIDE 2 MG/ML
4 INJECTION, SOLUTION INTRAVENOUS
Status: COMPLETED | OUTPATIENT
Start: 2024-02-27 | End: 2024-02-27

## 2024-02-27 RX ORDER — ONDANSETRON HYDROCHLORIDE 2 MG/ML
INJECTION, SOLUTION INTRAVENOUS
Status: DISCONTINUED | OUTPATIENT
Start: 2024-02-27 | End: 2024-02-27

## 2024-02-27 RX ORDER — LIDOCAINE HYDROCHLORIDE 20 MG/ML
INJECTION, SOLUTION EPIDURAL; INFILTRATION; INTRACAUDAL; PERINEURAL
Status: DISCONTINUED | OUTPATIENT
Start: 2024-02-27 | End: 2024-02-27

## 2024-02-27 RX ORDER — ESCITALOPRAM OXALATE 10 MG/1
10 TABLET ORAL DAILY
Status: DISCONTINUED | OUTPATIENT
Start: 2024-02-28 | End: 2024-03-06 | Stop reason: HOSPADM

## 2024-02-27 RX ORDER — HYDROXYZINE PAMOATE 25 MG/1
25 CAPSULE ORAL DAILY
Status: DISCONTINUED | OUTPATIENT
Start: 2024-02-28 | End: 2024-03-06 | Stop reason: HOSPADM

## 2024-02-27 RX ORDER — LISINOPRIL 10 MG/1
10 TABLET ORAL DAILY
Status: DISCONTINUED | OUTPATIENT
Start: 2024-02-28 | End: 2024-03-06 | Stop reason: HOSPADM

## 2024-02-27 RX ORDER — MIRTAZAPINE 7.5 MG/1
7.5 TABLET, FILM COATED ORAL NIGHTLY
Status: DISCONTINUED | OUTPATIENT
Start: 2024-02-27 | End: 2024-03-06 | Stop reason: HOSPADM

## 2024-02-27 RX ORDER — HYDROCODONE BITARTRATE AND ACETAMINOPHEN 7.5; 325 MG/1; MG/1
1 TABLET ORAL EVERY 6 HOURS PRN
Status: DISCONTINUED | OUTPATIENT
Start: 2024-02-27 | End: 2024-03-06 | Stop reason: HOSPADM

## 2024-02-27 RX ORDER — ACETAMINOPHEN 325 MG/1
650 TABLET ORAL EVERY 4 HOURS PRN
Status: DISCONTINUED | OUTPATIENT
Start: 2024-02-27 | End: 2024-03-06 | Stop reason: HOSPADM

## 2024-02-27 RX ORDER — ACETAMINOPHEN 500 MG
1000 TABLET ORAL EVERY 8 HOURS PRN
Status: DISCONTINUED | OUTPATIENT
Start: 2024-02-27 | End: 2024-03-06 | Stop reason: HOSPADM

## 2024-02-27 RX ORDER — CLOPIDOGREL BISULFATE 75 MG/1
75 TABLET ORAL DAILY
Status: DISCONTINUED | OUTPATIENT
Start: 2024-02-28 | End: 2024-03-06 | Stop reason: HOSPADM

## 2024-02-27 RX ORDER — MORPHINE SULFATE 10 MG/ML
4 INJECTION INTRAMUSCULAR; INTRAVENOUS; SUBCUTANEOUS EVERY 5 MIN PRN
Status: DISCONTINUED | OUTPATIENT
Start: 2024-02-27 | End: 2024-02-27 | Stop reason: HOSPADM

## 2024-02-27 RX ORDER — IPRATROPIUM BROMIDE AND ALBUTEROL SULFATE 2.5; .5 MG/3ML; MG/3ML
3 SOLUTION RESPIRATORY (INHALATION) EVERY 6 HOURS
Status: DISCONTINUED | OUTPATIENT
Start: 2024-02-27 | End: 2024-03-06 | Stop reason: HOSPADM

## 2024-02-27 RX ORDER — ONDANSETRON HYDROCHLORIDE 2 MG/ML
4 INJECTION, SOLUTION INTRAVENOUS DAILY PRN
Status: DISCONTINUED | OUTPATIENT
Start: 2024-02-27 | End: 2024-02-27 | Stop reason: HOSPADM

## 2024-02-27 RX ADMIN — PHENYLEPHRINE HYDROCHLORIDE 100 MCG: 10 INJECTION INTRAVENOUS at 02:02

## 2024-02-27 RX ADMIN — ATORVASTATIN CALCIUM 80 MG: 80 TABLET, FILM COATED ORAL at 09:02

## 2024-02-27 RX ADMIN — MORPHINE SULFATE 4 MG: 4 INJECTION, SOLUTION INTRAMUSCULAR; INTRAVENOUS at 10:02

## 2024-02-27 RX ADMIN — IPRATROPIUM BROMIDE AND ALBUTEROL SULFATE 3 ML: .5; 3 SOLUTION RESPIRATORY (INHALATION) at 07:02

## 2024-02-27 RX ADMIN — HYDROMORPHONE HYDROCHLORIDE 0.5 MG: 2 INJECTION INTRAMUSCULAR; INTRAVENOUS; SUBCUTANEOUS at 03:02

## 2024-02-27 RX ADMIN — MIRTAZAPINE 7.5 MG: 7.5 TABLET, FILM COATED ORAL at 09:02

## 2024-02-27 RX ADMIN — HYDROCODONE BITARTRATE AND ACETAMINOPHEN 1 TABLET: 10; 325 TABLET ORAL at 09:02

## 2024-02-27 RX ADMIN — LIDOCAINE HYDROCHLORIDE 60 MG: 20 INJECTION, SOLUTION INTRAVENOUS at 02:02

## 2024-02-27 RX ADMIN — BUDESONIDE INHALATION 0.5 MG: 0.5 SUSPENSION RESPIRATORY (INHALATION) at 07:02

## 2024-02-27 RX ADMIN — SODIUM CHLORIDE, POTASSIUM CHLORIDE, SODIUM LACTATE AND CALCIUM CHLORIDE: 600; 310; 30; 20 INJECTION, SOLUTION INTRAVENOUS at 03:02

## 2024-02-27 RX ADMIN — SODIUM CHLORIDE, POTASSIUM CHLORIDE, SODIUM LACTATE AND CALCIUM CHLORIDE: 600; 310; 30; 20 INJECTION, SOLUTION INTRAVENOUS at 12:02

## 2024-02-27 RX ADMIN — SODIUM CHLORIDE 2 G: 9 INJECTION, SOLUTION INTRAVENOUS at 02:02

## 2024-02-27 RX ADMIN — GABAPENTIN 300 MG: 300 CAPSULE ORAL at 09:02

## 2024-02-27 RX ADMIN — PROPOFOL 150 MG: 10 INJECTION, EMULSION INTRAVENOUS at 02:02

## 2024-02-27 RX ADMIN — ONDANSETRON 4 MG: 2 INJECTION INTRAMUSCULAR; INTRAVENOUS at 10:02

## 2024-02-27 RX ADMIN — ONDANSETRON 4 MG: 2 INJECTION INTRAMUSCULAR; INTRAVENOUS at 02:02

## 2024-02-27 NOTE — ASSESSMENT & PLAN NOTE
To the OR for Right 5th toe amputation, right foot debridement    Risks and benefits explained with the patient including risks for infection, bleeding, injury to surrounding structures, hematoma/seroma formation with need for possible evacuation, possible open.  The patient verbalized understanding, agrees and wishes to proceed with surgery.    Patient will be admitted for IV antibiotics to allow the area to heal; patient will need at least a right transmetatarsal amputation if the infection improved; if there has no improvement, patient will need a right below-the-knee amputation.      Consult hospitalist for medical management

## 2024-02-27 NOTE — ED PROVIDER NOTES
Encounter Date: 2/27/2024       History     Chief Complaint   Patient presents with    Wound Infection     Patient states a wound infection on the bottom side of right foot. Has been seeing Dr. Chiu for this     63-year-old male presents to the emergency department to be evaluated for right foot pain and swelling.  He has had a wound on his right foot for about 3 years.  He reports that the foot began to get red and swollen a couple of days ago and became painful.  Denies any fever or chills.  He has had 2 toes amputated on the right foot in the past.    The history is provided by the patient.     Review of patient's allergies indicates:  No Known Allergies  Past Medical History:   Diagnosis Date    Arthritis     Carpal tunnel syndrome     Charcot foot due to diabetes mellitus 09/29/2023    Prescription for CROW orthotic boot given today    COPD (chronic obstructive pulmonary disease)     Coronary artery disease involving native coronary artery of native heart 06/27/2023    CABG 2018 (No OP report available)  Most recent left heart catheterization 08/12/2019 1. Normal LV size apical akinesis and overall normal LV systolic function, ejection fraction 55% 2. Patent saphenous vein graft to the right PDA with patent jump graft to the OM branch left circumflex 3. Patent LIMA to the OM1 4. No significant mitral regurgitation     COVID 02/02/2022    Diabetic peripheral neuropathy     Essential (primary) hypertension     GERD (gastroesophageal reflux disease)     Insomnia secondary to depression with anxiety 06/17/2021    Mixed hyperlipidemia 03/23/2021    Nicotine dependence     Peripheral vascular disease, unspecified     Sleep apnea     Type 2 diabetes mellitus      Past Surgical History:   Procedure Laterality Date    CORONARY ARTERY BYPASS GRAFT      CORONARY STENT PLACEMENT      DEBRIDEMENT OF FOOT Right 08/10/2023    Dr. Chiu    DEBRIDEMENT OF FOOT Right 8/10/2023    Procedure: DEBRIDEMENT, FOOT;  Surgeon:  Evelio Chiu DO;  Location: [facility] OR;  Service: General;  Laterality: Right;    DEBRIDEMENT OF LOWER EXTREMITY Right 06/27/2022    Procedure: DEBRIDEMENT, LOWER EXTREMITY;  Surgeon: Forrest Kiser MD;  Location: [facility] OR;  Service: General;  Laterality: Right;  right foot    HIP FRACTURE SURGERY Left     IRRIGATION AND DEBRIDEMENT OF LOWER EXTREMITY Right 09/08/2022    Procedure: IRRIGATION AND DEBRIDEMENT, LOWER EXTREMITY;  Surgeon: Selina Matos MD;  Location: [facility] OR;  Service: General;  Laterality: Right;    IRRIGATION AND DEBRIDEMENT OF LOWER EXTREMITY Right 01/05/2023    Procedure: IRRIGATION AND DEBRIDEMENT, LOWER EXTREMITY;  Surgeon: Evelio Chiu DO;  Location: [facility] OR;  Service: General;  Laterality: Right;    SHOULDER OPEN ROTATOR CUFF REPAIR Left     SPINE SURGERY      VASCULAR SURGERY       Family History   Problem Relation Age of Onset    Arthritis Mother     Hypertension Mother     Learning disabilities Mother     Alcohol abuse Father     Early death Father     Heart disease Father     Cancer Sister     Diabetes Sister     Heart disease Maternal Grandfather     COPD Paternal Grandfather     Heart disease Son      Social History     Tobacco Use    Smoking status: Every Day     Current packs/day: 2.50     Average packs/day: 2.5 packs/day for 52.6 years (131.4 ttl pk-yrs)     Types: Cigarettes     Start date: 8/10/1971     Passive exposure: Current    Smokeless tobacco: Never   Substance Use Topics    Alcohol use: Not Currently    Drug use: Yes     Types: Oxycodone     Review of Systems   Constitutional:  Negative for chills and fever.   Gastrointestinal:  Negative for nausea and vomiting.   Skin:  Positive for wound.   All other systems reviewed and are negative.      Physical Exam     Initial Vitals [02/27/24 0947]   BP Pulse Resp Temp SpO2   (!) 146/84 89 18 98 °F (36.7 °C) (!) 94 %      MAP       --         Physical Exam    Vitals reviewed.  Constitutional: He appears  well-developed and well-nourished.   Neck: Neck supple.   Cardiovascular:  Normal rate and regular rhythm.           Pulses:       Dorsalis pedis pulses are 2+ on the right side and 2+ on the left side.        Posterior tibial pulses are 2+ on the right side and 2+ on the left side.   Pulmonary/Chest: Breath sounds normal.   Musculoskeletal:         General: Edema (1+ edema to the right lower extremity) present. Normal range of motion.      Cervical back: Neck supple.     Neurological: He is alert and oriented to person, place, and time. He has normal strength. GCS score is 15. GCS eye subscore is 4. GCS verbal subscore is 5. GCS motor subscore is 6.   Skin: Skin is warm and dry. Capillary refill takes less than 2 seconds. There is erythema (Right foot and lower leg are erythematous).   Wound on the plantar aspect of the right foot, no drainage.  See pictures   Psychiatric: He has a normal mood and affect.               Medical Screening Exam   See Full Note    ED Course   Procedures  Labs Reviewed   BASIC METABOLIC PANEL - Abnormal; Notable for the following components:       Result Value    Sodium 134 (*)     Glucose 233 (*)     BUN 24 (*)     BUN/Creatinine Ratio 24 (*)     All other components within normal limits   CBC WITH DIFFERENTIAL - Abnormal; Notable for the following components:    WBC 11.62 (*)     RBC 4.12 (*)     Hemoglobin 12.9 (*)     Hematocrit 36.9 (*)     MCH 31.3 (*)     Neutrophils % 80.0 (*)     Lymphocytes % 10.5 (*)     Monocytes % 7.7 (*)     Eosinophils % 0.9 (*)     Immature Granulocytes % 0.5 (*)     Neutrophils, Abs 9.29 (*)     Monocytes, Absolute 0.89 (*)     Immature Granulocytes, Absolute 0.06 (*)     All other components within normal limits   APTT - Abnormal; Notable for the following components:    PTT 21.6 (*)     All other components within normal limits   CBC MORPHOLOGY - Abnormal; Notable for the following components:    Platelet Morphology Few Large Platelets (*)     All  other components within normal limits   POCT GLUCOSE MONITORING CONTINUOUS - Abnormal; Notable for the following components:    POC Glucose 192 (*)     All other components within normal limits   PROTIME-INR - Normal   CBC W/ AUTO DIFFERENTIAL    Narrative:     The following orders were created for panel order CBC auto differential.  Procedure                               Abnormality         Status                     ---------                               -----------         ------                     CBC with Differential[[id number]]       Abnormal            Final result                 Please view results for these tests on the individual orders.   POCT GLUCOSE MONITORING CONTINUOUS          Imaging Results              X-Ray Foot Complete Right (Final result)  Result time 02/27/24 10:27:24      Final result by Francisco Felton II, MD (02/27/24 10:27:24)                   Impression:      No definite findings of acute osteomyelitis demonstrated      Electronically signed by: Francisco Felton  Date:    02/27/2024  Time:    10:27               Narrative:    EXAMINATION:  XR FOOT COMPLETE 3 VIEW RIGHT    CLINICAL HISTORY:  Unspecified open wound, unspecified foot, initial encounter    COMPARISON:  9 August 2023    TECHNIQUE:  XR FOOT 3 VIEW RIGHT    FINDINGS:  No evidence of fracture seen.  Previous 1st and 3rd digit amputation changes and partial amputation distal 1st metatarsal present.  Appearance appears similar to previous.  The alignment of the joints appears normal.  Mild degenerative change is present.  No definite soft tissue abnormality is seen.                                       Medications   lactated ringers infusion ( Intravenous New Bag 2/27/24 0559)   sodium chloride 0.9% flush 10 mL (has no administration in time range)   melatonin tablet 6 mg (has no administration in time range)   ondansetron injection 4 mg (has no administration in time range)   acetaminophen tablet 650 mg (has no  administration in time range)   aluminum-magnesium hydroxide-simethicone 200-200-20 mg/5 mL suspension 30 mL (has no administration in time range)   acetaminophen tablet 650 mg (has no administration in time range)   acetaminophen tablet 1,000 mg (has no administration in time range)   naloxone 0.4 mg/mL injection 0.02 mg (has no administration in time range)   glucose chewable tablet 16 g (has no administration in time range)   glucose chewable tablet 24 g (has no administration in time range)   glucagon (human recombinant) injection 1 mg (has no administration in time range)   morphine injection 4 mg (has no administration in time range)   HYDROcodone-acetaminophen 7.5-325 mg per tablet 1 tablet (has no administration in time range)   prochlorperazine injection Soln 5 mg (has no administration in time range)   insulin aspart U-100 injection 0-10 Units (2 Units Subcutaneous Given 2/28/24 0128)   dextrose 10% bolus 125 mL 125 mL (has no administration in time range)   dextrose 10% bolus 250 mL 250 mL (has no administration in time range)   amLODIPine tablet 10 mg (has no administration in time range)   aspirin chewable tablet 81 mg (has no administration in time range)   atorvastatin tablet 80 mg (80 mg Oral Given 2/27/24 2125)   clopidogreL tablet 75 mg (has no administration in time range)   EScitalopram oxalate tablet 10 mg (has no administration in time range)   gabapentin capsule 300 mg (300 mg Oral Given 2/27/24 2125)   hydrOXYzine pamoate capsule 25 mg (has no administration in time range)   HYDROcodone-acetaminophen  mg per tablet 1 tablet (1 tablet Oral Given 2/28/24 0452)   isosorbide mononitrate 24 hr tablet 30 mg (has no administration in time range)   lisinopriL tablet 10 mg (has no administration in time range)   metoprolol succinate (TOPROL-XL) 24 hr tablet 50 mg (has no administration in time range)   mirtazapine tablet 7.5 mg (7.5 mg Oral Given 2/27/24 2125)   nitroGLYCERIN SL tablet 0.4 mg  (has no administration in time range)   pantoprazole EC tablet 40 mg (has no administration in time range)   albuterol-ipratropium 2.5 mg-0.5 mg/3 mL nebulizer solution 3 mL (3 mLs Nebulization Given 2/28/24 0018)   budesonide nebulizer solution 0.5 mg (0.5 mg Nebulization Given 2/27/24 1944)   morphine injection 4 mg (4 mg Intravenous Given 2/27/24 1031)   ondansetron injection 4 mg (4 mg Intravenous Given 2/27/24 1030)     Medical Decision Making  63-year-old male presents to the emergency department to be evaluated for right foot pain and swelling.  He has had a wound on his right foot for about 3 years.  He reports that the foot began to get red and swollen a couple of days ago and became painful.  Denies any fever or chills.  He has had 2 toes amputated on the right foot in the past.  Labs ordered and reviewed  X-rays ordered, films reviewed as well as the radiologist's interpretation significant for No definite findings of acute osteomyelitis demonstrated  Diagnosis: Diabetic foot wound  Discussed with general surgery NP on call, Jf Navas NP; will admit    Amount and/or Complexity of Data Reviewed  Labs: ordered.  Radiology: ordered.    Risk  Prescription drug management.  Decision regarding hospitalization.                                      Clinical Impression:   Final diagnoses:  [S91.309A] Wound of foot  [E08.621, L97.513] Diabetic ulcer of toe of right foot associated with diabetes mellitus due to underlying condition, with necrosis of muscle [E08.621, L97.513] (Primary)  [J43.9] Pulmonary emphysema, unspecified emphysema type [J43.9]  [E78.2] Mixed hyperlipidemia [E78.2]  [I25.810] Coronary artery disease involving coronary bypass graft of native heart, unspecified whether angina present [I25.810]  [E11.65] Type 2 diabetes mellitus with hyperglycemia, unspecified whether long term insulin use [E11.65]        ED Disposition Condition    Admit                 Lindsay Pepper FNP  02/28/24  0812       Lindsay Pepper, [facility]  02/28/24 0819

## 2024-02-27 NOTE — OP NOTE
[facility] - Periop Services  Surgery Department  Operative Note    SUMMARY     Date of Procedure: 2/27/2024     Procedure: Procedure(s) (LRB):  AMPUTATION, TOE (Right)  DEBRIDEMENT, FOOT (Right)     Surgeon(s) and Role:     * Evelio Chiu, DO - Primary    Assisting Surgeon: None    Pre-Operative Diagnosis: Wound of foot [S91.309A]    Post-Operative Diagnosis: Post-Op Diagnosis Codes:     * Wound of foot [S91.309A]    Anesthesia: General    Operative Findings (including complications, if any):  Necrotic right 5th toe with necrosis and purulent drainage in the right 5th metatarsophalangeal joint    Description of Technical Procedures:  Patient was taken the operating room and placed on the operating table in the supine position.  Patient underwent general anesthesia per the anesthesia team.  The right foot was prepped and draped in usual sterile fashion.  We used a electrocautery to make an incision around the right 5th toe and the ulcer around the metatarsophalangeal joint and excise the skin.  We dissected down to the joint and there was purulent drainage inside the joint; this was cultured and sent to microbiology.  We then  the joint and excise the toe and sent this to pathology.  We then irrigated the cavity and suctioned clear, bleeding controlled with electrocautery.  The wound was packed wet-to-dry and sterile dressing applied.  Patient tolerated procedure well.  Patient was awakened, extubated and taken the PACU in stable condition        Estimated Blood Loss (EBL):5cc           Implants: * No implants in log *    Specimens:   Specimen (24h ago, onward)       Start     Ordered    02/27/24 1421  Surgical Pathology  RELEASE UPON ORDERING         02/27/24 1421                            Condition: Good    Disposition: PACU - hemodynamically stable.    Attestation: I was present and scrubbed for the entire procedure.

## 2024-02-27 NOTE — ANESTHESIA PREPROCEDURE EVALUATION
02/27/2024  Navdeep Avery is a 63 y.o., male.      Pre-op Assessment    I have reviewed the Patient Summary Reports.    I have reviewed the NPO Status.   I have reviewed the Medications.     Review of Systems         Anesthesia Plan  Type of Anesthesia, risks & benefits discussed:    Anesthesia Type: Gen Supraglottic Airway  Intra-op Monitoring Plan: Standard ASA Monitors  Post Op Pain Control Plan: IV/PO Opioids PRN  Induction:  IV  Informed Consent: Informed consent signed with the Patient and all parties understand the risks and agree with anesthesia plan.  All questions answered.   ASA Score: 3    Ready For Surgery From Anesthesia Perspective.     .  NPO greater than 8 hours  No anesthetic complications  NKDA    Hct 37  1/24/24 EKG: Normal sinus rhythm 74 bpm  Nonspecific T wave abnormality   7/23/23 Echo: trace AR; mild MR; EF 60%    Medical History   Diabetic ulcer of right foot associated with diabetes mellitus due to underlying condition, with bone involvement without evidence of necrosis Peripheral vascular disease, unspecified   Hyperlipidemia Hypertension   Sleep apnea COPD (chronic obstructive pulmonary disease)   GERD (gastroesophageal reflux disease) Carpal tunnel syndrome   Type 2 diabetes mellitus COVID   Arthritis Coronary artery disease involving native coronary artery of native heart   Diabetic peripheral neuropathy    H/o CABG    Airway exam deferred (COVID precautions); adequate ROM at neck.

## 2024-02-27 NOTE — PHARMACY MED REC
"Admission Medication History     The home medication history was taken by Sadie Santoro.    You may go to "Admission" then "Reconcile Home Medications" tabs to review and/or act upon these items.     The home medication list has been updated by the Pharmacy department.   Please read ALL comments highlighted in yellow.   Please address this information as you see fit.    Feel free to contact us if you have any questions or require assistance.  Medications Added:  Toujeo Solostar  Patient doesn't consistently use his insulin, according to spouse      The medications listed below were removed from the home medication list. Please reorder if appropriate:  Gentamicin ointment  Mupirocin ointment  Miconazole topical powder  Senna 8.6 mg    Patient reports no longer taking the following medication(s):  Jardiance 10 mg  Rybelsus 7 mg  Mirtazapine 7.5 mg    Medications listed below were obtained from: Patient/family and Analytic software- [employer]  (Not in a hospital admission)        Current Outpatient Medications on File Prior to Encounter   Medication Sig Dispense Refill Last Dose    albuterol (PROVENTIL) 2.5 mg /3 mL (0.083 %) nebulizer solution USE 1 VIAL IN NEBULIZER 3 TIMES DAILY 90 each 11     albuterol (PROVENTIL/VENTOLIN HFA) 90 mcg/actuation inhaler Inhale 2 puffs into the lungs 2 (two) times daily as needed for Wheezing. 18 g 2 Past Week    amLODIPine (NORVASC) 10 MG tablet Take 1 tablet (10 mg total) by mouth once daily. 90 tablet 1 Past Week    aspirin 81 MG Chew Take 81 mg by mouth once daily.   Past Week    atorvastatin (LIPITOR) 80 MG tablet Take 1 tablet (80 mg total) by mouth every evening. 90 tablet 1 Past Week    clopidogreL (PLAVIX) 75 mg tablet Take 1 tablet (75 mg total) by mouth once daily. 90 tablet 1 Past Week    diclofenac sodium (VOLTAREN) 1 % Gel Apply 1 g topically 4 (four) times daily as needed (pain). 20 g 1 Past Week    EScitalopram oxalate (LEXAPRO) 10 MG tablet Take 1 tablet (10 mg total) by " "mouth once daily. 30 tablet 1 Past Week    gabapentin (NEURONTIN) 300 MG capsule Take 3 capsules (900 mg total) by mouth every 6 (six) hours. 360 capsule 2 Past Week    HYDROcodone-acetaminophen (NORCO)  mg per tablet Take 1 tablet by mouth every 6 (six) hours as needed for Pain. 120 tablet 0 Past Week    hydrOXYzine pamoate (VISTARIL) 25 MG Cap Take 1 capsule (25 mg total) by mouth once daily. Take nightly for sleep 90 capsule 1 Past Week    insulin detemir U-100 (LEVEMIR FLEXTOUCH U-100 INSULN) 100 unit/mL (3 mL) InPn pen Inject 10 units subcutaneously every morning, inject 20 units subcutaneously every evening. 3 each 2 Past Month    insulin glargine,hum.rec.anlog (TOUJEO SOLOSTAR U-300 INSULIN SUBQ) Inject 7-8 Units into the skin once daily.   Past Week    isosorbide mononitrate (IMDUR) 30 MG 24 hr tablet Take 1 tablet (30 mg total) by mouth once daily. 30 tablet 11 Past Week    LIDOcaine (LIDODERM) 5 % 1 patch once daily.       lisinopriL 10 MG tablet Take 1 tablet (10 mg total) by mouth once daily. 90 tablet 1 Past Week    metoprolol succinate (TOPROL-XL) 50 MG 24 hr tablet Take 1 tablet (50 mg total) by mouth once daily. 30 tablet 11 Past Week    pantoprazole (PROTONIX) 40 MG tablet Take 1 tablet (40 mg total) by mouth once daily. 90 tablet 1 Past Week    SPIRIVA RESPIMAT 2.5 mcg/actuation inhaler Inhale 1 puff into the lungs once daily. 4 g 5 Past Week    SURE COMFORT INSULIN SYRINGE 0.3 mL 31 gauge x 5/16" Syrg Inject 1 application into the skin 2 (two) times a day.   Past Week    empagliflozin (JARDIANCE) 10 mg tablet Take 1 tablet (10 mg total) by mouth once daily. 30 tablet 6     [START ON 3/20/2024] HYDROcodone-acetaminophen (NORCO)  mg per tablet Take 1 tablet by mouth every 6 (six) hours as needed for Pain. 120 tablet 0     mirtazapine (REMERON) 7.5 MG Tab Take 1 tablet (7.5 mg total) by mouth nightly. 90 tablet 1     nitroGLYCERIN (NITROSTAT) 0.4 MG SL tablet Place 1 tablet (0.4 mg total) " under the tongue every 5 (five) minutes as needed for Chest pain. 30 tablet 2     semaglutide (RYBELSUS) 7 mg tablet Take 1 tablet (7 mg total) by mouth once daily. 30 tablet 5     [DISCONTINUED] gentamicin (GARAMYCIN) 0.1 % ointment Apply topically Daily. 90 g 2     [DISCONTINUED] miconazole NITRATE 2 % (MICOTIN) 2 % top powder Apply topically as needed for Itching (apply between toes with dressing changes). 85 g 2     [DISCONTINUED] mupirocin (BACTROBAN) 2 % ointment Apply topically once daily. 30 g 12     [DISCONTINUED] SENNA 8.6 mg tablet Take 2 tablets by mouth once daily.            Potential issues to be addressed PRIOR TO DISCHARGE  Patient reported not taking the following medications: (Jardiance 10 mg, Mirtazapine 7.5 mg, Rybelsus 7 mg). These medications remain on the home medication list. Please address accordingly.   Please discuss with the patient barriers to adherence with medication treatment plans    Sadie Santoro  Pharmacy Tech Specialist - Medication History  EXT. [phone]    .

## 2024-02-27 NOTE — NURSING
Pt recd from PACU via stretcher,pt to bed without difficulty, pt noted awake alert , resp slow easy and regular,O2@ 2 LITERS nbp intact, Right foot noted with dressing clean dry and intact. Iv intact no ss  inf noted intact and dry, Side rails up call bell in easy reach.

## 2024-02-27 NOTE — OR NURSING
1437-Pt rec'd to PACU, sedated, but responds to stimulation, NADN, resp unlabored, on 2L O2 via n/c, SaO2-97%, IV fluids infusing, dressing C/D/I to right foot, denies any c/o numbness/tingling at present, no c/o pain, will con't to monitor, safety measures in effect.    1439-ETCO2 applied.    1440-Blood glucose 152.    1505-Pt resting, NADN, no c/o pain, will con't to monitor.    1550-Pt c/o pain to right foot, pain rated 06/10, dilaudid 0.5mg given, will con't to monitor.    1600-Pain improves to a 3/10, NADN, will con't to monitor.    1615-Pt awake/alert, no c/o pain, dressing remains C/D/I to right foot, orders to transfer to room 467.    1630-Pt care released to Darwin(RN), pt awake/alert, vss hr-71, oral temp 98.1, resp-13, SaO2-98% on 2L O2 via n/c, b/p 138/76.

## 2024-02-27 NOTE — SUBJECTIVE & OBJECTIVE
No current facility-administered medications on file prior to encounter.     Current Outpatient Medications on File Prior to Encounter   Medication Sig    albuterol (PROVENTIL) 2.5 mg /3 mL (0.083 %) nebulizer solution USE 1 VIAL IN NEBULIZER 3 TIMES DAILY    albuterol (PROVENTIL/VENTOLIN HFA) 90 mcg/actuation inhaler Inhale 2 puffs into the lungs 2 (two) times daily as needed for Wheezing.    amLODIPine (NORVASC) 10 MG tablet Take 1 tablet (10 mg total) by mouth once daily.    aspirin 81 MG Chew Take 81 mg by mouth once daily.    atorvastatin (LIPITOR) 80 MG tablet Take 1 tablet (80 mg total) by mouth every evening.    clopidogreL (PLAVIX) 75 mg tablet Take 1 tablet (75 mg total) by mouth once daily.    diclofenac sodium (VOLTAREN) 1 % Gel Apply 1 g topically 4 (four) times daily as needed (pain).    EScitalopram oxalate (LEXAPRO) 10 MG tablet Take 1 tablet (10 mg total) by mouth once daily.    gabapentin (NEURONTIN) 300 MG capsule Take 3 capsules (900 mg total) by mouth every 6 (six) hours.    HYDROcodone-acetaminophen (NORCO)  mg per tablet Take 1 tablet by mouth every 6 (six) hours as needed for Pain.    hydrOXYzine pamoate (VISTARIL) 25 MG Cap Take 1 capsule (25 mg total) by mouth once daily. Take nightly for sleep    insulin detemir U-100 (LEVEMIR FLEXTOUCH U-100 INSULN) 100 unit/mL (3 mL) InPn pen Inject 10 units subcutaneously every morning, inject 20 units subcutaneously every evening.    insulin glargine,hum.rec.anlog (TOUJEO SOLOSTAR U-300 INSULIN SUBQ) Inject 7-8 Units into the skin once daily.    isosorbide mononitrate (IMDUR) 30 MG 24 hr tablet Take 1 tablet (30 mg total) by mouth once daily.    LIDOcaine (LIDODERM) 5 % 1 patch once daily.    lisinopriL 10 MG tablet Take 1 tablet (10 mg total) by mouth once daily.    metoprolol succinate (TOPROL-XL) 50 MG 24 hr tablet Take 1 tablet (50 mg total) by mouth once daily.    pantoprazole (PROTONIX) 40 MG tablet Take 1 tablet (40 mg total) by mouth once  "daily.    SPIRIVA RESPIMAT 2.5 mcg/actuation inhaler Inhale 1 puff into the lungs once daily.    SURE COMFORT INSULIN SYRINGE 0.3 mL 31 gauge x 5/16" Syrg Inject 1 application into the skin 2 (two) times a day.    empagliflozin (JARDIANCE) 10 mg tablet Take 1 tablet (10 mg total) by mouth once daily.    [START ON 3/20/2024] HYDROcodone-acetaminophen (NORCO)  mg per tablet Take 1 tablet by mouth every 6 (six) hours as needed for Pain.    mirtazapine (REMERON) 7.5 MG Tab Take 1 tablet (7.5 mg total) by mouth nightly.    nitroGLYCERIN (NITROSTAT) 0.4 MG SL tablet Place 1 tablet (0.4 mg total) under the tongue every 5 (five) minutes as needed for Chest pain.    semaglutide (RYBELSUS) 7 mg tablet Take 1 tablet (7 mg total) by mouth once daily.    [DISCONTINUED] gentamicin (GARAMYCIN) 0.1 % ointment Apply topically Daily.    [DISCONTINUED] miconazole NITRATE 2 % (MICOTIN) 2 % top powder Apply topically as needed for Itching (apply between toes with dressing changes).    [DISCONTINUED] mupirocin (BACTROBAN) 2 % ointment Apply topically once daily.    [DISCONTINUED] SENNA 8.6 mg tablet Take 2 tablets by mouth once daily.       Review of patient's allergies indicates:  No Known Allergies    Past Medical History:   Diagnosis Date    Arthritis     Carpal tunnel syndrome     COPD (chronic obstructive pulmonary disease)     Coronary artery disease involving native coronary artery of native heart 06/27/2023    CABG 2018 (No OP report available)  Most recent left heart catheterization 08/12/2019 1. Normal LV size apical akinesis and overall normal LV systolic function, ejection fraction 55% 2. Patent saphenous vein graft to the right PDA with patent jump graft to the OM branch left circumflex 3. Patent LIMA to the OM1 4. No significant mitral regurgitation     COVID 02/02/2022    Diabetic peripheral neuropathy     Diabetic ulcer of right foot associated with diabetes mellitus due to underlying condition, with bone " involvement without evidence of necrosis     GERD (gastroesophageal reflux disease)     Hyperlipidemia     Hypertension     Peripheral vascular disease, unspecified     Sleep apnea     Type 2 diabetes mellitus      Past Surgical History:   Procedure Laterality Date    CORONARY ARTERY BYPASS GRAFT      CORONARY STENT PLACEMENT      DEBRIDEMENT OF FOOT Right 08/10/2023    Dr. Chiu    DEBRIDEMENT OF FOOT Right 8/10/2023    Procedure: DEBRIDEMENT, FOOT;  Surgeon: Evelio Chiu DO;  Location: [facility];  Service: General;  Laterality: Right;    DEBRIDEMENT OF LOWER EXTREMITY Right 06/27/2022    Procedure: DEBRIDEMENT, LOWER EXTREMITY;  Surgeon: Forrest Kiser MD;  Location: [facility] OR;  Service: General;  Laterality: Right;  right foot    HIP FRACTURE SURGERY Left     IRRIGATION AND DEBRIDEMENT OF LOWER EXTREMITY Right 09/08/2022    Procedure: IRRIGATION AND DEBRIDEMENT, LOWER EXTREMITY;  Surgeon: Selina Matos MD;  Location: [facility] OR;  Service: General;  Laterality: Right;    IRRIGATION AND DEBRIDEMENT OF LOWER EXTREMITY Right 01/05/2023    Procedure: IRRIGATION AND DEBRIDEMENT, LOWER EXTREMITY;  Surgeon: Evelio Chiu DO;  Location: [facility];  Service: General;  Laterality: Right;    SHOULDER OPEN ROTATOR CUFF REPAIR Left     SPINE SURGERY      VASCULAR SURGERY       Family History       Problem Relation (Age of Onset)    Alcohol abuse Father    Arthritis Mother    COPD Paternal Grandfather    Cancer Sister    Diabetes Sister    Early death Father    Heart disease Father, Maternal Grandfather, Son    Hypertension Mother    Learning disabilities Mother          Tobacco Use    Smoking status: Every Day     Current packs/day: 2.50     Average packs/day: 2.5 packs/day for 52.5 years (131.4 ttl pk-yrs)     Types: Cigarettes     Start date: 8/10/1971     Passive exposure: Current    Smokeless tobacco: Never   Substance and Sexual Activity    Alcohol use: Not Currently    Drug use: Yes     Types:  Oxycodone    Sexual activity: Yes     Partners: Female     Review of Systems   Constitutional: Negative.  Negative for appetite change, chills, fever and unexpected weight change.   HENT: Negative.  Negative for trouble swallowing.    Eyes: Negative.    Respiratory: Negative.  Negative for chest tightness and shortness of breath.    Cardiovascular: Negative.  Negative for chest pain.   Gastrointestinal: Negative.  Negative for abdominal distention, abdominal pain, blood in stool and nausea.   Endocrine: Negative.    Genitourinary: Negative.  Negative for hematuria.   Musculoskeletal: Negative.  Negative for back pain and myalgias.   Skin: Negative.  Negative for color change.   Allergic/Immunologic: Negative.    Neurological: Negative.  Negative for dizziness, syncope, weakness and light-headedness.   Psychiatric/Behavioral: Negative.  Negative for agitation.      Objective:     Vital Signs (Most Recent):  Temp: 98 °F (36.7 °C) (02/27/24 0947)  Pulse: 89 (02/27/24 0947)  Resp: 18 (02/27/24 1031)  BP: (!) 146/84 (02/27/24 0947)  SpO2: (!) 94 % (02/27/24 0947) Vital Signs (24h Range):  Temp:  [98 °F (36.7 °C)] 98 °F (36.7 °C)  Pulse:  [89] 89  Resp:  [18] 18  SpO2:  [94 %] 94 %  BP: (146)/(84) 146/84     Weight: 73.5 kg (162 lb)  Body mass index is 24.63 kg/m².     Physical Exam  Constitutional:       General: He is not in acute distress.     Appearance: Normal appearance.   HENT:      Head: Normocephalic.   Cardiovascular:      Rate and Rhythm: Normal rate.   Pulmonary:      Effort: Pulmonary effort is normal. No respiratory distress.   Abdominal:      General: There is no distension.      Tenderness: There is no abdominal tenderness.   Musculoskeletal:         General: Normal range of motion.        Feet:    Feet:      Comments: Ulcer to the right plantar surface in between the 5th and 4th metatarsophalangeal joint with necrosis; right 5th toe with color change and necrosis at the base; there is erythema, edema with  tenderness to palpation across the dorsal surface of the right foot and ankle  Skin:     General: Skin is warm.      Coloration: Skin is not jaundiced.   Neurological:      General: No focal deficit present.      Mental Status: He is alert and oriented to person, place, and time.      Cranial Nerves: No cranial nerve deficit.            I have reviewed all pertinent lab results within the past 24 hours.  CBC:   Recent Labs   Lab 02/27/24  1045   WBC 11.62*   RBC 4.12*   HGB 12.9*   HCT 36.9*      MCV 89.6   MCH 31.3*   MCHC 35.0     BMP:   Recent Labs   Lab 02/27/24  1045   *   *   K 4.0   CL 99   CO2 30   BUN 24*   CREATININE 0.99   CALCIUM 9.0       Significant Diagnostics:  I have reviewed all pertinent imaging results/findings within the past 24 hours.

## 2024-02-27 NOTE — ANESTHESIA PROCEDURE NOTES
Intubation    Date/Time: 2/27/2024 2:03 PM    Performed by: Chepe Dobson Jr., CRNA  Authorized by: Zaire Matamoros MD    Intubation:     Induction:  Intravenous    Intubated:  Postinduction    Mask Ventilation:  N/a    Attempts:  1    Attempted By:  CRNA    Method of Intubation:  Direct    Difficult Airway Encountered?: No      Complications:  None    Airway Device:  Intubating LMA    Airway Device Size:  4.0    Style/Cuff Inflation:  Cuffed (inflated to minimal occlusive pressure)    Secured at:  The lips    Placement Verified By:  Capnometry    Complicating Factors:  None    Findings Post-Intubation:  Atraumatic/condition of teeth unchanged

## 2024-02-27 NOTE — ASSESSMENT & PLAN NOTE
Right Arterial doppler 7/14/22:  Monophasic waveform of the right posterior tibial and right dorsalis pedis arteries. Monophasic waveform of the left dorsalis pedis artery. Right superficial femoral artery stent is patent.  Normal JACQUELIN.    We will get an arterial Doppler of the right lower extremity to re-evaluate

## 2024-02-27 NOTE — TRANSFER OF CARE
"Anesthesia Transfer of Care Note    Patient: Navdeep Avery    Procedure(s) Performed: Procedure(s) (LRB):  AMPUTATION, TOE (Right)  DEBRIDEMENT, FOOT (Right)    Patient location: PACU    Anesthesia Type: general    Transport from OR: Transported from OR on 2-3 L/min O2 by NC with adequate spontaneous ventilation    Post pain: adequate analgesia    Post assessment: no apparent anesthetic complications and tolerated procedure well    Post vital signs: stable    Level of consciousness: alert and responds to stimulation    Nausea/Vomiting: no nausea/vomiting    Complications: none    Transfer of care protocol was followed      Last vitals: Visit Vitals  BP (!) 157/74   Pulse 80   Temp 36.9 °C (98.4 °F) (Oral)   Resp 13   Ht 5' 8" (1.727 m)   Wt 73.5 kg (162 lb)   SpO2 97%   BMI 24.63 kg/m²     "

## 2024-02-27 NOTE — H&P
[facility] - Emergency Department  General Surgery  History & Physical    Patient Name: Navdeep Avery  MRN: [id number]  Admission Date: 2/27/2024  Attending Physician: No att. providers found   Primary Care Provider: Marquez Huff MD    Patient information was obtained from patient, spouse/SO, and ER records.     Subjective:     Chief Complaint/Reason for Admission:  Right foot infection    History of Present Illness: 63-year-old  male presents the ER with complaints of pain and swelling to the right foot.  Patient has a chronic wound on the right foot that has been treated by myself in clinic multiple times.  The wound was healing over with certain skin products, but patient states recently started having severe pain at the site with radiation to the right 5th toe.  He has had redness across the foot swelling.  X-rays performed showed no evidence of osteomyelitis.  Past medical history of peripheral vascular disease with multiple amputations to bilateral feet.  Diabetic ulcers chronic, diabetic peripheral neuropathy, COPD/emphysema, hypertension, hyperlipidemia, coronary artery disease    No current facility-administered medications on file prior to encounter.     Current Outpatient Medications on File Prior to Encounter   Medication Sig    albuterol (PROVENTIL) 2.5 mg /3 mL (0.083 %) nebulizer solution USE 1 VIAL IN NEBULIZER 3 TIMES DAILY    albuterol (PROVENTIL/VENTOLIN HFA) 90 mcg/actuation inhaler Inhale 2 puffs into the lungs 2 (two) times daily as needed for Wheezing.    amLODIPine (NORVASC) 10 MG tablet Take 1 tablet (10 mg total) by mouth once daily.    aspirin 81 MG Chew Take 81 mg by mouth once daily.    atorvastatin (LIPITOR) 80 MG tablet Take 1 tablet (80 mg total) by mouth every evening.    clopidogreL (PLAVIX) 75 mg tablet Take 1 tablet (75 mg total) by mouth once daily.    diclofenac sodium (VOLTAREN) 1 % Gel Apply 1 g topically 4 (four) times daily as needed (pain).     "EScitalopram oxalate (LEXAPRO) 10 MG tablet Take 1 tablet (10 mg total) by mouth once daily.    gabapentin (NEURONTIN) 300 MG capsule Take 3 capsules (900 mg total) by mouth every 6 (six) hours.    HYDROcodone-acetaminophen (NORCO)  mg per tablet Take 1 tablet by mouth every 6 (six) hours as needed for Pain.    hydrOXYzine pamoate (VISTARIL) 25 MG Cap Take 1 capsule (25 mg total) by mouth once daily. Take nightly for sleep    insulin detemir U-100 (LEVEMIR FLEXTOUCH U-100 INSULN) 100 unit/mL (3 mL) InPn pen Inject 10 units subcutaneously every morning, inject 20 units subcutaneously every evening.    insulin glargine,hum.rec.anlog (TOUJEO SOLOSTAR U-300 INSULIN SUBQ) Inject 7-8 Units into the skin once daily.    isosorbide mononitrate (IMDUR) 30 MG 24 hr tablet Take 1 tablet (30 mg total) by mouth once daily.    LIDOcaine (LIDODERM) 5 % 1 patch once daily.    lisinopriL 10 MG tablet Take 1 tablet (10 mg total) by mouth once daily.    metoprolol succinate (TOPROL-XL) 50 MG 24 hr tablet Take 1 tablet (50 mg total) by mouth once daily.    pantoprazole (PROTONIX) 40 MG tablet Take 1 tablet (40 mg total) by mouth once daily.    SPIRIVA RESPIMAT 2.5 mcg/actuation inhaler Inhale 1 puff into the lungs once daily.    SURE COMFORT INSULIN SYRINGE 0.3 mL 31 gauge x 5/16" Syrg Inject 1 application into the skin 2 (two) times a day.    empagliflozin (JARDIANCE) 10 mg tablet Take 1 tablet (10 mg total) by mouth once daily.    [START ON 3/20/2024] HYDROcodone-acetaminophen (NORCO)  mg per tablet Take 1 tablet by mouth every 6 (six) hours as needed for Pain.    mirtazapine (REMERON) 7.5 MG Tab Take 1 tablet (7.5 mg total) by mouth nightly.    nitroGLYCERIN (NITROSTAT) 0.4 MG SL tablet Place 1 tablet (0.4 mg total) under the tongue every 5 (five) minutes as needed for Chest pain.    semaglutide (RYBELSUS) 7 mg tablet Take 1 tablet (7 mg total) by mouth once daily.    [DISCONTINUED] gentamicin (GARAMYCIN) 0.1 % ointment " Apply topically Daily.    [DISCONTINUED] miconazole NITRATE 2 % (MICOTIN) 2 % top powder Apply topically as needed for Itching (apply between toes with dressing changes).    [DISCONTINUED] mupirocin (BACTROBAN) 2 % ointment Apply topically once daily.    [DISCONTINUED] SENNA 8.6 mg tablet Take 2 tablets by mouth once daily.       Review of patient's allergies indicates:  No Known Allergies    Past Medical History:   Diagnosis Date    Arthritis     Carpal tunnel syndrome     COPD (chronic obstructive pulmonary disease)     Coronary artery disease involving native coronary artery of native heart 06/27/2023    CABG 2018 (No OP report available)  Most recent left heart catheterization 08/12/2019 1. Normal LV size apical akinesis and overall normal LV systolic function, ejection fraction 55% 2. Patent saphenous vein graft to the right PDA with patent jump graft to the OM branch left circumflex 3. Patent LIMA to the OM1 4. No significant mitral regurgitation     COVID 02/02/2022    Diabetic peripheral neuropathy     Diabetic ulcer of right foot associated with diabetes mellitus due to underlying condition, with bone involvement without evidence of necrosis     GERD (gastroesophageal reflux disease)     Hyperlipidemia     Hypertension     Peripheral vascular disease, unspecified     Sleep apnea     Type 2 diabetes mellitus      Past Surgical History:   Procedure Laterality Date    CORONARY ARTERY BYPASS GRAFT      CORONARY STENT PLACEMENT      DEBRIDEMENT OF FOOT Right 08/10/2023    Dr. Chiu    DEBRIDEMENT OF FOOT Right 8/10/2023    Procedure: DEBRIDEMENT, FOOT;  Surgeon: Evelio Chiu DO;  Location: [facility] OR;  Service: General;  Laterality: Right;    DEBRIDEMENT OF LOWER EXTREMITY Right 06/27/2022    Procedure: DEBRIDEMENT, LOWER EXTREMITY;  Surgeon: Forrest Kiser MD;  Location: [facility] OR;  Service: General;  Laterality: Right;  right foot    HIP FRACTURE SURGERY Left     IRRIGATION AND DEBRIDEMENT OF  LOWER EXTREMITY Right 09/08/2022    Procedure: IRRIGATION AND DEBRIDEMENT, LOWER EXTREMITY;  Surgeon: Selina Matos MD;  Location: [facility] OR;  Service: General;  Laterality: Right;    IRRIGATION AND DEBRIDEMENT OF LOWER EXTREMITY Right 01/05/2023    Procedure: IRRIGATION AND DEBRIDEMENT, LOWER EXTREMITY;  Surgeon: Evelio Chiu DO;  Location: [facility] OR;  Service: General;  Laterality: Right;    SHOULDER OPEN ROTATOR CUFF REPAIR Left     SPINE SURGERY      VASCULAR SURGERY       Family History       Problem Relation (Age of Onset)    Alcohol abuse Father    Arthritis Mother    COPD Paternal Grandfather    Cancer Sister    Diabetes Sister    Early death Father    Heart disease Father, Maternal Grandfather, Son    Hypertension Mother    Learning disabilities Mother          Tobacco Use    Smoking status: Every Day     Current packs/day: 2.50     Average packs/day: 2.5 packs/day for 52.5 years (131.4 ttl pk-yrs)     Types: Cigarettes     Start date: 8/10/1971     Passive exposure: Current    Smokeless tobacco: Never   Substance and Sexual Activity    Alcohol use: Not Currently    Drug use: Yes     Types: Oxycodone    Sexual activity: Yes     Partners: Female     Review of Systems   Constitutional: Negative.  Negative for appetite change, chills, fever and unexpected weight change.   HENT: Negative.  Negative for trouble swallowing.    Eyes: Negative.    Respiratory: Negative.  Negative for chest tightness and shortness of breath.    Cardiovascular: Negative.  Negative for chest pain.   Gastrointestinal: Negative.  Negative for abdominal distention, abdominal pain, blood in stool and nausea.   Endocrine: Negative.    Genitourinary: Negative.  Negative for hematuria.   Musculoskeletal: Negative.  Negative for back pain and myalgias.   Skin: Negative.  Negative for color change.   Allergic/Immunologic: Negative.    Neurological: Negative.  Negative for dizziness, syncope, weakness and light-headedness.    Psychiatric/Behavioral: Negative.  Negative for agitation.      Objective:     Vital Signs (Most Recent):  Temp: 98 °F (36.7 °C) (02/27/24 0947)  Pulse: 89 (02/27/24 0947)  Resp: 18 (02/27/24 1031)  BP: (!) 146/84 (02/27/24 0947)  SpO2: (!) 94 % (02/27/24 0947) Vital Signs (24h Range):  Temp:  [98 °F (36.7 °C)] 98 °F (36.7 °C)  Pulse:  [89] 89  Resp:  [18] 18  SpO2:  [94 %] 94 %  BP: (146)/(84) 146/84     Weight: 73.5 kg (162 lb)  Body mass index is 24.63 kg/m².     Physical Exam  Constitutional:       General: He is not in acute distress.     Appearance: Normal appearance.   HENT:      Head: Normocephalic.   Cardiovascular:      Rate and Rhythm: Normal rate.   Pulmonary:      Effort: Pulmonary effort is normal. No respiratory distress.   Abdominal:      General: There is no distension.      Tenderness: There is no abdominal tenderness.   Musculoskeletal:         General: Normal range of motion.        Feet:    Feet:      Comments: Ulcer to the right plantar surface in between the 5th and 4th metatarsophalangeal joint with necrosis; right 5th toe with color change and necrosis at the base; there is erythema, edema with tenderness to palpation across the dorsal surface of the right foot and ankle  Skin:     General: Skin is warm.      Coloration: Skin is not jaundiced.   Neurological:      General: No focal deficit present.      Mental Status: He is alert and oriented to person, place, and time.      Cranial Nerves: No cranial nerve deficit.            I have reviewed all pertinent lab results within the past 24 hours.  CBC:   Recent Labs   Lab 02/27/24  1045   WBC 11.62*   RBC 4.12*   HGB 12.9*   HCT 36.9*      MCV 89.6   MCH 31.3*   MCHC 35.0     BMP:   Recent Labs   Lab 02/27/24  1045   *   *   K 4.0   CL 99   CO2 30   BUN 24*   CREATININE 0.99   CALCIUM 9.0       Significant Diagnostics:  I have reviewed all pertinent imaging results/findings within the past 24 hours.    Assessment/Plan:      Diabetic ulcer of toe associated with diabetes mellitus due to underlying condition, with necrosis of muscle                To the OR for Right 5th toe amputation, right foot debridement    Risks and benefits explained with the patient including risks for infection, bleeding, injury to surrounding structures, hematoma/seroma formation with need for possible evacuation, possible open.  The patient verbalized understanding, agrees and wishes to proceed with surgery.    Patient will be admitted for IV antibiotics to allow the area to heal; patient will need at least a right transmetatarsal amputation if the infection improved; if there has no improvement, patient will need a right below-the-knee amputation.      Consult hospitalist for medical management    Peripheral vascular disease, unspecified  Right Arterial doppler 7/14/22:  Monophasic waveform of the right posterior tibial and right dorsalis pedis arteries. Monophasic waveform of the left dorsalis pedis artery. Right superficial femoral artery stent is patent.  Normal JACQUELIN.    We will get an arterial Doppler of the right lower extremity to re-evaluate      VTE Risk Mitigation (From admission, onward)      None            Evelio Razo, DO  General Surgery  [facility] - Emergency Department

## 2024-02-28 PROBLEM — F51.05 INSOMNIA SECONDARY TO DEPRESSION WITH ANXIETY: Status: RESOLVED | Noted: 2021-06-17 | Resolved: 2024-02-28

## 2024-02-28 PROBLEM — E11.65 TYPE 2 DIABETES MELLITUS WITH HYPERGLYCEMIA: Status: ACTIVE | Noted: 2024-02-28

## 2024-02-28 PROBLEM — K21.9 GERD (GASTROESOPHAGEAL REFLUX DISEASE): Status: RESOLVED | Noted: 2023-01-05 | Resolved: 2024-02-28

## 2024-02-28 PROBLEM — F41.8 INSOMNIA SECONDARY TO DEPRESSION WITH ANXIETY: Status: RESOLVED | Noted: 2021-06-17 | Resolved: 2024-02-28

## 2024-02-28 PROBLEM — E78.2 MIXED HYPERLIPIDEMIA: Status: RESOLVED | Noted: 2021-03-23 | Resolved: 2024-02-28

## 2024-02-28 LAB
ANION GAP SERPL CALCULATED.3IONS-SCNC: 5 MMOL/L (ref 7–16)
BASOPHILS # BLD AUTO: 0.03 K/UL (ref 0–0.2)
BASOPHILS NFR BLD AUTO: 0.4 % (ref 0–1)
BUN SERPL-MCNC: 23 MG/DL (ref 7–18)
BUN/CREAT SERPL: 27 (ref 6–20)
CALCIUM SERPL-MCNC: 8.8 MG/DL (ref 8.5–10.1)
CHLORIDE SERPL-SCNC: 104 MMOL/L (ref 98–107)
CO2 SERPL-SCNC: 32 MMOL/L (ref 21–32)
CREAT SERPL-MCNC: 0.84 MG/DL (ref 0.7–1.3)
DIFFERENTIAL METHOD BLD: ABNORMAL
EGFR (NO RACE VARIABLE) (RUSH/TITUS): 97 ML/MIN/1.73M2
EOSINOPHIL # BLD AUTO: 0.17 K/UL (ref 0–0.5)
EOSINOPHIL NFR BLD AUTO: 2.2 % (ref 1–4)
ERYTHROCYTE [DISTWIDTH] IN BLOOD BY AUTOMATED COUNT: 11.6 % (ref 11.5–14.5)
GLUCOSE SERPL-MCNC: 129 MG/DL (ref 70–105)
GLUCOSE SERPL-MCNC: 141 MG/DL (ref 74–106)
GLUCOSE SERPL-MCNC: 213 MG/DL (ref 70–105)
GLUCOSE SERPL-MCNC: 253 MG/DL (ref 70–105)
HCT VFR BLD AUTO: 33.6 % (ref 40–54)
HGB BLD-MCNC: 11.5 G/DL (ref 13.5–18)
IMM GRANULOCYTES # BLD AUTO: 0.03 K/UL (ref 0–0.04)
IMM GRANULOCYTES NFR BLD: 0.4 % (ref 0–0.4)
LYMPHOCYTES # BLD AUTO: 1.97 K/UL (ref 1–4.8)
LYMPHOCYTES NFR BLD AUTO: 25.8 % (ref 27–41)
MCH RBC QN AUTO: 31.3 PG (ref 27–31)
MCHC RBC AUTO-ENTMCNC: 34.2 G/DL (ref 32–36)
MCV RBC AUTO: 91.3 FL (ref 80–96)
MONOCYTES # BLD AUTO: 0.53 K/UL (ref 0–0.8)
MONOCYTES NFR BLD AUTO: 6.9 % (ref 2–6)
MPC BLD CALC-MCNC: 10.5 FL (ref 9.4–12.4)
NEUTROPHILS # BLD AUTO: 4.9 K/UL (ref 1.8–7.7)
NEUTROPHILS NFR BLD AUTO: 64.3 % (ref 53–65)
NRBC # BLD AUTO: 0 X10E3/UL
NRBC, AUTO (.00): 0 %
PLATELET # BLD AUTO: 196 K/UL (ref 150–400)
POTASSIUM SERPL-SCNC: 4.2 MMOL/L (ref 3.5–5.1)
RBC # BLD AUTO: 3.68 M/UL (ref 4.6–6.2)
SODIUM SERPL-SCNC: 137 MMOL/L (ref 136–145)
WBC # BLD AUTO: 7.63 K/UL (ref 4.5–11)

## 2024-02-28 PROCEDURE — 11000001 HC ACUTE MED/SURG PRIVATE ROOM

## 2024-02-28 PROCEDURE — 25000242 PHARM REV CODE 250 ALT 637 W/ HCPCS: Performed by: HOSPITALIST

## 2024-02-28 PROCEDURE — 63600175 PHARM REV CODE 636 W HCPCS: Performed by: HOSPITALIST

## 2024-02-28 PROCEDURE — 99900035 HC TECH TIME PER 15 MIN (STAT)

## 2024-02-28 PROCEDURE — 82962 GLUCOSE BLOOD TEST: CPT

## 2024-02-28 PROCEDURE — 85025 COMPLETE CBC W/AUTO DIFF WBC: CPT | Performed by: STUDENT IN AN ORGANIZED HEALTH CARE EDUCATION/TRAINING PROGRAM

## 2024-02-28 PROCEDURE — 25000003 PHARM REV CODE 250: Performed by: HOSPITALIST

## 2024-02-28 PROCEDURE — 25000003 PHARM REV CODE 250: Performed by: STUDENT IN AN ORGANIZED HEALTH CARE EDUCATION/TRAINING PROGRAM

## 2024-02-28 PROCEDURE — 63600175 PHARM REV CODE 636 W HCPCS: Performed by: STUDENT IN AN ORGANIZED HEALTH CARE EDUCATION/TRAINING PROGRAM

## 2024-02-28 PROCEDURE — 94640 AIRWAY INHALATION TREATMENT: CPT

## 2024-02-28 PROCEDURE — 27000221 HC OXYGEN, UP TO 24 HOURS

## 2024-02-28 PROCEDURE — 99232 SBSQ HOSP IP/OBS MODERATE 35: CPT | Mod: ,,, | Performed by: HOSPITALIST

## 2024-02-28 PROCEDURE — 80048 BASIC METABOLIC PNL TOTAL CA: CPT | Performed by: STUDENT IN AN ORGANIZED HEALTH CARE EDUCATION/TRAINING PROGRAM

## 2024-02-28 RX ADMIN — PIPERACILLIN AND TAZOBACTAM 4.5 G: 4; .5 INJECTION, POWDER, FOR SOLUTION INTRAVENOUS; PARENTERAL at 08:02

## 2024-02-28 RX ADMIN — MIRTAZAPINE 7.5 MG: 7.5 TABLET, FILM COATED ORAL at 08:02

## 2024-02-28 RX ADMIN — AMLODIPINE BESYLATE 10 MG: 10 TABLET ORAL at 10:02

## 2024-02-28 RX ADMIN — PIPERACILLIN AND TAZOBACTAM 4.5 G: 4; .5 INJECTION, POWDER, FOR SOLUTION INTRAVENOUS; PARENTERAL at 01:02

## 2024-02-28 RX ADMIN — ATORVASTATIN CALCIUM 80 MG: 80 TABLET, FILM COATED ORAL at 08:02

## 2024-02-28 RX ADMIN — METOPROLOL SUCCINATE 50 MG: 50 TABLET, EXTENDED RELEASE ORAL at 10:02

## 2024-02-28 RX ADMIN — HYDROCODONE BITARTRATE AND ACETAMINOPHEN 1 TABLET: 10; 325 TABLET ORAL at 09:02

## 2024-02-28 RX ADMIN — GABAPENTIN 300 MG: 300 CAPSULE ORAL at 10:02

## 2024-02-28 RX ADMIN — HYDROCODONE BITARTRATE AND ACETAMINOPHEN 1 TABLET: 10; 325 TABLET ORAL at 04:02

## 2024-02-28 RX ADMIN — CLOPIDOGREL BISULFATE 75 MG: 75 TABLET ORAL at 10:02

## 2024-02-28 RX ADMIN — ISOSORBIDE MONONITRATE 30 MG: 30 TABLET, EXTENDED RELEASE ORAL at 10:02

## 2024-02-28 RX ADMIN — INSULIN ASPART 2 UNITS: 100 INJECTION, SOLUTION INTRAVENOUS; SUBCUTANEOUS at 01:02

## 2024-02-28 RX ADMIN — ASPIRIN 81 MG CHEWABLE TABLET 81 MG: 81 TABLET CHEWABLE at 10:02

## 2024-02-28 RX ADMIN — HYDROXYZINE PAMOATE 25 MG: 25 CAPSULE ORAL at 10:02

## 2024-02-28 RX ADMIN — HYDROCODONE BITARTRATE AND ACETAMINOPHEN 1 TABLET: 10; 325 TABLET ORAL at 10:02

## 2024-02-28 RX ADMIN — GABAPENTIN 300 MG: 300 CAPSULE ORAL at 03:02

## 2024-02-28 RX ADMIN — IPRATROPIUM BROMIDE AND ALBUTEROL SULFATE 3 ML: .5; 3 SOLUTION RESPIRATORY (INHALATION) at 12:02

## 2024-02-28 RX ADMIN — VANCOMYCIN HYDROCHLORIDE 1500 MG: 1 INJECTION, POWDER, LYOPHILIZED, FOR SOLUTION INTRAVENOUS at 06:02

## 2024-02-28 RX ADMIN — ESCITALOPRAM OXALATE 10 MG: 10 TABLET ORAL at 10:02

## 2024-02-28 RX ADMIN — PANTOPRAZOLE SODIUM 40 MG: 40 TABLET, DELAYED RELEASE ORAL at 10:02

## 2024-02-28 RX ADMIN — LISINOPRIL 10 MG: 10 TABLET ORAL at 10:02

## 2024-02-28 RX ADMIN — GABAPENTIN 300 MG: 300 CAPSULE ORAL at 08:02

## 2024-02-28 RX ADMIN — SODIUM CHLORIDE, POTASSIUM CHLORIDE, SODIUM LACTATE AND CALCIUM CHLORIDE: 600; 310; 30; 20 INJECTION, SOLUTION INTRAVENOUS at 10:02

## 2024-02-28 NOTE — ANESTHESIA POSTPROCEDURE EVALUATION
Anesthesia Post Evaluation    Patient: Navdeep Avery    Procedure(s) Performed: Procedure(s) (LRB):  AMPUTATION, TOE (Right)  DEBRIDEMENT, FOOT (Right)    Final Anesthesia Type: general      Patient location during evaluation: PACU  Post-procedure vital signs: reviewed and stable  Pain management: adequate  Airway patency: patent    PONV status at discharge: No PONV  Anesthetic complications: no      Cardiovascular status: hemodynamically stable  Respiratory status: unassisted  Hydration status: euvolemic  Follow-up not needed.              Vitals Value Taken Time   /76 02/27/24 1630   Temp 36.7 °C (98.1 °F) 02/27/24 1630   Pulse 71 02/27/24 1630   Resp 13 02/27/24 1630   SpO2 98 % 02/27/24 1630         Event Time   Out of Recovery 16:15:00         Pain/Leda Score: Pain Rating Prior to Med Admin: 6 (2/27/2024  3:50 PM)  Pain Rating Post Med Admin: 7 (2/27/2024 11:10 AM)  Leda Score: 9 (2/27/2024  4:15 PM)

## 2024-02-28 NOTE — HPI
Patient is a 63 year old male that had his Right great toe amputated by Dr. Chiu today. He has had reported wound issues on his feet, likely a complication from his poorly controlled diabetes and atherosclerosis . His wounds have been managed by wound care and seen in clinic by Dr. Chiu in the past. He stated that he has had redness across the foot and swelling. X-rays performed showed no definite evidence of osteomyelitis. After further evaluation it was determined that the right 5th toe necrotic and had purulent drainage in the right 5th metatarsophalangeal joint. A joint decision was made for amputation.    Hospital medicine team was consulted to manage the patient's other chronic health care related issues.

## 2024-02-28 NOTE — ASSESSMENT & PLAN NOTE
Dad reports Jannette is a high school [employer] - school pulled Jannette out of class and sent a letter to home regarding Meningitis vaccine - informed Dad, the Meningitis booster is required for high school seniors.  Jannette can receive this vaccine this Summer when she comes in for her yearly well visit.  HPV is available but not required by the school.  FYI to Dr. Pinto.   On SSI: BS ok; will add agents as needed; will monitor BS levels

## 2024-02-28 NOTE — CONSULTS
[facility]  [facility] Medicine  Consult Note    Patient Name: Navdeep Avery  MRN: [id number]  Admission Date: 2/27/2024  Hospital Length of Stay: 0 days  Attending Physician: Evelio Chiu DO   Primary Care Provider: Marquez Huff MD           Patient information was obtained from patient and past medical records.     Consults  Subjective:     Principal Problem: Diabetic ulcer of toe associated with diabetes mellitus due to underlying condition, with necrosis of muscle    Chief Complaint:   Chief Complaint   Patient presents with    Wound Infection     Patient states a wound infection on the bottom side of right foot. Has been seeing Dr. Chiu for this        HPI: Patient is a 63 year old male that had his Right great toe amputated by Dr. Chiu today. He has had reported wound issues on his feet, likely a complication from his poorly controlled diabetes and atherosclerosis . His wounds have been managed by wound care and seen in clinic by Dr. Chiu in the past. He stated that he has had redness across the foot and swelling. X-rays performed showed no definite evidence of osteomyelitis. After further evaluation it was determined that the right 5th toe necrotic and had purulent drainage in the right 5th metatarsophalangeal joint. A joint decision was made for amputation.    Hospital medicine team was consulted to manage the patient's other chronic health care related issues.    Past Medical History:   Diagnosis Date    Arthritis     Carpal tunnel syndrome     Charcot foot due to diabetes mellitus 09/29/2023    Prescription for CROW orthotic boot given today    COPD (chronic obstructive pulmonary disease)     Coronary artery disease involving native coronary artery of native heart 06/27/2023    CABG 2018 (No OP report available)  Most recent left heart catheterization 08/12/2019 1. Normal LV size apical akinesis and overall normal LV systolic function, ejection fraction 55% 2.  Patent saphenous vein graft to the right PDA with patent jump graft to the OM branch left circumflex 3. Patent LIMA to the OM1 4. No significant mitral regurgitation     COVID 02/02/2022    Diabetic peripheral neuropathy     Essential (primary) hypertension     GERD (gastroesophageal reflux disease)     Insomnia secondary to depression with anxiety 06/17/2021    Mixed hyperlipidemia 03/23/2021    Nicotine dependence     Peripheral vascular disease, unspecified     Sleep apnea     Type 2 diabetes mellitus        Past Surgical History:   Procedure Laterality Date    CORONARY ARTERY BYPASS GRAFT      CORONARY STENT PLACEMENT      DEBRIDEMENT OF FOOT Right 08/10/2023    Dr. Chiu    DEBRIDEMENT OF FOOT Right 8/10/2023    Procedure: DEBRIDEMENT, FOOT;  Surgeon: Evelio Chiu DO;  Location: [facility] OR;  Service: General;  Laterality: Right;    DEBRIDEMENT OF LOWER EXTREMITY Right 06/27/2022    Procedure: DEBRIDEMENT, LOWER EXTREMITY;  Surgeon: Forrest Kiser MD;  Location: [facility] OR;  Service: General;  Laterality: Right;  right foot    HIP FRACTURE SURGERY Left     IRRIGATION AND DEBRIDEMENT OF LOWER EXTREMITY Right 09/08/2022    Procedure: IRRIGATION AND DEBRIDEMENT, LOWER EXTREMITY;  Surgeon: Selina Matos MD;  Location: [facility] OR;  Service: General;  Laterality: Right;    IRRIGATION AND DEBRIDEMENT OF LOWER EXTREMITY Right 01/05/2023    Procedure: IRRIGATION AND DEBRIDEMENT, LOWER EXTREMITY;  Surgeon: Evelio Chiu DO;  Location: [facility];  Service: General;  Laterality: Right;    SHOULDER OPEN ROTATOR CUFF REPAIR Left     SPINE SURGERY      VASCULAR SURGERY         Review of patient's allergies indicates:  No Known Allergies    No current facility-administered medications on file prior to encounter.     Current Outpatient Medications on File Prior to Encounter   Medication Sig    albuterol (PROVENTIL) 2.5 mg /3 mL (0.083 %) nebulizer solution USE 1 VIAL IN NEBULIZER 3 TIMES DAILY    albuterol  "(PROVENTIL/VENTOLIN HFA) 90 mcg/actuation inhaler Inhale 2 puffs into the lungs 2 (two) times daily as needed for Wheezing.    amLODIPine (NORVASC) 10 MG tablet Take 1 tablet (10 mg total) by mouth once daily.    aspirin 81 MG Chew Take 81 mg by mouth once daily.    atorvastatin (LIPITOR) 80 MG tablet Take 1 tablet (80 mg total) by mouth every evening.    clopidogreL (PLAVIX) 75 mg tablet Take 1 tablet (75 mg total) by mouth once daily.    diclofenac sodium (VOLTAREN) 1 % Gel Apply 1 g topically 4 (four) times daily as needed (pain).    EScitalopram oxalate (LEXAPRO) 10 MG tablet Take 1 tablet (10 mg total) by mouth once daily.    gabapentin (NEURONTIN) 300 MG capsule Take 3 capsules (900 mg total) by mouth every 6 (six) hours.    HYDROcodone-acetaminophen (NORCO)  mg per tablet Take 1 tablet by mouth every 6 (six) hours as needed for Pain.    hydrOXYzine pamoate (VISTARIL) 25 MG Cap Take 1 capsule (25 mg total) by mouth once daily. Take nightly for sleep    insulin detemir U-100 (LEVEMIR FLEXTOUCH U-100 INSULN) 100 unit/mL (3 mL) InPn pen Inject 10 units subcutaneously every morning, inject 20 units subcutaneously every evening.    insulin glargine,hum.rec.anlog (TOUJEO SOLOSTAR U-300 INSULIN SUBQ) Inject 7-8 Units into the skin once daily.    isosorbide mononitrate (IMDUR) 30 MG 24 hr tablet Take 1 tablet (30 mg total) by mouth once daily.    LIDOcaine (LIDODERM) 5 % 1 patch once daily.    lisinopriL 10 MG tablet Take 1 tablet (10 mg total) by mouth once daily.    metoprolol succinate (TOPROL-XL) 50 MG 24 hr tablet Take 1 tablet (50 mg total) by mouth once daily.    pantoprazole (PROTONIX) 40 MG tablet Take 1 tablet (40 mg total) by mouth once daily.    SPIRIVA RESPIMAT 2.5 mcg/actuation inhaler Inhale 1 puff into the lungs once daily.    SURE COMFORT INSULIN SYRINGE 0.3 mL 31 gauge x 5/16" Syrg Inject 1 application into the skin 2 (two) times a day.    mirtazapine (REMERON) 7.5 MG Tab Take 1 tablet (7.5 mg " total) by mouth nightly.    nitroGLYCERIN (NITROSTAT) 0.4 MG SL tablet Place 1 tablet (0.4 mg total) under the tongue every 5 (five) minutes as needed for Chest pain.    semaglutide (RYBELSUS) 7 mg tablet Take 1 tablet (7 mg total) by mouth once daily.    [DISCONTINUED] empagliflozin (JARDIANCE) 10 mg tablet Take 1 tablet (10 mg total) by mouth once daily.    [DISCONTINUED] gentamicin (GARAMYCIN) 0.1 % ointment Apply topically Daily.    [DISCONTINUED] HYDROcodone-acetaminophen (NORCO)  mg per tablet Take 1 tablet by mouth every 6 (six) hours as needed for Pain.    [DISCONTINUED] miconazole NITRATE 2 % (MICOTIN) 2 % top powder Apply topically as needed for Itching (apply between toes with dressing changes).    [DISCONTINUED] mupirocin (BACTROBAN) 2 % ointment Apply topically once daily.    [DISCONTINUED] SENNA 8.6 mg tablet Take 2 tablets by mouth once daily.     Family History       Problem Relation (Age of Onset)    Alcohol abuse Father    Arthritis Mother    COPD Paternal Grandfather    Cancer Sister    Diabetes Sister    Early death Father    Heart disease Father, Maternal Grandfather, Son    Hypertension Mother    Learning disabilities Mother          Tobacco Use    Smoking status: Every Day     Current packs/day: 2.50     Average packs/day: 2.5 packs/day for 52.5 years (131.4 ttl pk-yrs)     Types: Cigarettes     Start date: 8/10/1971     Passive exposure: Current    Smokeless tobacco: Never   Substance and Sexual Activity    Alcohol use: Not Currently    Drug use: Yes     Types: Oxycodone    Sexual activity: Yes     Partners: Female     Review of Systems   Constitutional: Negative.  Negative for appetite change, chills, fever and unexpected weight change.   HENT: Negative.  Negative for sneezing, sore throat, tinnitus, trouble swallowing and voice change.    Eyes: Negative.    Respiratory:  Positive for wheezing. Negative for cough, chest tightness, shortness of breath and stridor.    Cardiovascular:  Negative.  Negative for chest pain and leg swelling.   Gastrointestinal: Negative.  Negative for abdominal distention, abdominal pain, blood in stool and nausea.   Endocrine: Negative.    Genitourinary: Negative.  Negative for difficulty urinating, dysuria, enuresis, flank pain and hematuria.   Musculoskeletal: Negative.  Negative for back pain and myalgias.   Skin:  Positive for color change and wound.   Allergic/Immunologic: Negative.    Neurological: Negative.  Negative for dizziness, syncope, weakness and light-headedness.   Psychiatric/Behavioral: Negative.  Negative for agitation, behavioral problems, confusion, decreased concentration, dysphoric mood and hallucinations.    All other systems reviewed and are negative.    Objective:     Vital Signs (Most Recent):  Temp: 98.5 °F (36.9 °C) (02/27/24 2212)  Pulse: 70 (02/27/24 2212)  Resp: 18 (02/27/24 2125)  BP: (!) 147/77 (02/27/24 1937)  SpO2: 99 % (02/27/24 2212) Vital Signs (24h Range):  Temp:  [98 °F (36.7 °C)-98.5 °F (36.9 °C)] 98.5 °F (36.9 °C)  Pulse:  [69-89] 70  Resp:  [10-20] 18  SpO2:  [88 %-99 %] 99 %  BP: (129-177)/(50-89) 147/77     Weight: 73.5 kg (162 lb)  Body mass index is 24.63 kg/m².     Physical Exam  Constitutional:       General: He is not in acute distress.     Appearance: Normal appearance.   HENT:      Head: Normocephalic and atraumatic.      Mouth/Throat:      Mouth: Mucous membranes are dry.      Pharynx: Oropharynx is clear. No oropharyngeal exudate.   Eyes:      Conjunctiva/sclera: Conjunctivae normal.   Cardiovascular:      Rate and Rhythm: Normal rate and regular rhythm.      Pulses: Normal pulses.      Heart sounds: Normal heart sounds.   Pulmonary:      Effort: Pulmonary effort is normal. No respiratory distress.      Breath sounds: Wheezing present.   Abdominal:      General: There is no distension.      Tenderness: There is no abdominal tenderness.   Musculoskeletal:         General: Normal range of motion.      Right lower  "leg: No edema.      Left lower leg: No edema.        Feet:    Feet:      Comments: Ulcer to the right plantar surface in between the 5th and 4th metatarsophalangeal joint with necrosis; right 5th toe with color change and necrosis at the base; there is erythema, edema with tenderness to palpation across the dorsal surface of the right foot and ankle  Skin:     General: Skin is warm and dry.      Coloration: Skin is not jaundiced.   Neurological:      General: No focal deficit present.      Mental Status: He is alert and oriented to person, place, and time.      Cranial Nerves: No cranial nerve deficit.   Psychiatric:         Mood and Affect: Mood normal.         Behavior: Behavior normal.          Significant Labs: All pertinent labs within the past 24 hours have been reviewed.    Significant Imaging: I have reviewed all pertinent imaging results/findings within the past 24 hours.  Assessment/Plan:     * Diabetic ulcer of toe associated with diabetes mellitus due to underlying condition, with necrosis of muscle  Patient's FSGs are uncontrolled due to hyperglycemia on current medication regimen.  Last A1c reviewed-   Lab Results   Component Value Date    HGBA1C 9.7 (H) 08/10/2023     Most recent fingerstick glucose reviewed- No results for input(s): "POCTGLUCOSE" in the last 24 hours.  Current correctional scale  Low  Maintain anti-hyperglycemic dose as follows-   Antihyperglycemics (From admission, onward)      Start     Stop Route Frequency Ordered    02/27/24 1411  insulin aspart U-100 injection 0-10 Units         -- SubQ Before meals & nightly PRN 02/27/24 1314          Hold Oral hypoglycemics while patient is in the hospital.    Defer to primary team for wound management     Coronary artery disease involving coronary bypass graft of native heart  Patient with known CAD s/p stent placement and CABG, which is controlled Will continue ASA, Plavix, and Statin and monitor for S/Sx of angina/ACS. Continue to monitor " on telemetry.     COPD (chronic obstructive pulmonary disease)  Patient's COPD is controlled currently.  Patient is currently off COPD Pathway. Continue scheduled inhalers Steroids and Supplemental oxygen and monitor respiratory status closely.   Budesonide and Duonebs    Mixed hyperlipidemia  Continue Statin therapy        VTE Risk Mitigation (From admission, onward)           Ordered     Place sequential compression device  Until discontinued         02/27/24 9139                        Thank you for your consult. I will follow-up with patient. Please contact us if you have any additional questions.    Edmond Alcantar MD  Department of Hospital Medicine   [facility]

## 2024-02-28 NOTE — HOSPITAL COURSE
63 year old male with a PMH of DM, HTN, COPD, and  CAD s/p CABG who presents to the Vascular Surgery Service for pseudoaneurysm repair.  Hospitalist was consulted for medical management.  Patient denies chest pain, shortness of breath, nausea, vomiting, or diarrhea.      03/05/2024   Patient feels fine with no new complaints.    Status post right foot wound debridement /amputation  On IV antibiotics   Patient's vital signs are stable, she is afebrile.    White blood cell count 9 no recent labs  Wound culture growing fine Finegolida Magna and MRSA.    She is on Zosyn and vancomycin.    We will repeat labs in a.m..    Her blood glucose level is acceptable.    We will check hemoglobin A1c.    We will continue to follow     03/06/2024   Patient has no new complaints, feels fine.    Vital signs stable   White blood cell count 5 hemoglobin 10 creatinine 0.9 glucose 164, hemoglobin A1c 9.1, encouraged to be more compliant with medications and medical followups.  Patient is cleared for discharge today.    Medically stable from my standpoint P

## 2024-02-28 NOTE — PROGRESS NOTES
"Pharmacokinetic Initial Assessment: IV Vancomycin    Assessment/Plan:    Initiate intravenous vancomycin 1500 mg every 18 hours   Desired empiric serum trough concentration is 10 to 15 mcg/mL  Draw vancomycin trough level 30 min prior to fourth dose on 3/1 at approximately 1930  Pharmacy will continue to follow and monitor vancomycin.      Please contact pharmacy at extension [phone] with any questions regarding this assessment.     Thank you for the consult,   Catalina Chaitanya       Patient brief summary:  Navdeep Avery is a 63 y.o. male initiated on antimicrobial therapy with IV Vancomycin for treatment of suspected skin & soft tissue infection    Drug Allergies:   Review of patient's allergies indicates:  No Known Allergies    Actual Body Weight:   73.5 kg     Renal Function:   Estimated Creatinine Clearance: 84.8 mL/min (based on SCr of 0.84 mg/dL).,     Dialysis Method (if applicable):  N/A    CBC (last 72 hours):  Recent Labs   Lab Result Units 02/27/24  1045 02/28/24  0400   WBC K/uL 11.62* 7.63   Hemoglobin g/dL 12.9* 11.5*   Hematocrit % 36.9* 33.6*   Platelet Count K/uL 186 196   Lymphocytes % % 10.5* 25.8*   Monocytes % % 7.7* 6.9*   Eosinophils % % 0.9* 2.2   Basophils % % 0.4 0.4   Diff Type  Scan Smear Auto       Metabolic Panel (last 72 hours):  Recent Labs   Lab Result Units 02/27/24  1045 02/28/24  0400   Sodium mmol/L 134* 137   Potassium mmol/L 4.0 4.2   Chloride mmol/L 99 104   CO2 mmol/L 30 32   Glucose mg/dL 233* 141*   BUN mg/dL 24* 23*   Creatinine mg/dL 0.99 0.84       Drug levels (last 3 results):  No results for input(s): "VANCOMYCINRA", "VANCORANDOM", "VANCOMYCINPE", "VANCOPEAK", "VANCOMYCINTR", "VANCOTROUGH" in the last 72 hours.    Microbiologic Results:  Microbiology Results (last 7 days)       Procedure Component Value Units Date/Time    Culture, Wound [[id number]] Collected: 02/27/24 1432    Order Status: Completed Specimen: Wound from Foot, Right Updated: 02/28/24 0827     Culture, " Wound/Abscess Culture In Progress    Culture, Anaerobe [[id number]] Collected: 02/27/24 1432    Order Status: Sent Specimen: Wound from Foot, Right Updated: 02/27/24 1440

## 2024-02-28 NOTE — ASSESSMENT & PLAN NOTE
Patient with known CAD s/p stent placement and CABG, which is controlled Will continue ASA, Plavix, and Statin and monitor for S/Sx of angina/ACS. Continue to monitor on telemetry.

## 2024-02-28 NOTE — SUBJECTIVE & OBJECTIVE
Past Medical History:   Diagnosis Date    Arthritis     Carpal tunnel syndrome     Charcot foot due to diabetes mellitus 09/29/2023    Prescription for CROW orthotic boot given today    COPD (chronic obstructive pulmonary disease)     Coronary artery disease involving native coronary artery of native heart 06/27/2023    CABG 2018 (No OP report available)  Most recent left heart catheterization 08/12/2019 1. Normal LV size apical akinesis and overall normal LV systolic function, ejection fraction 55% 2. Patent saphenous vein graft to the right PDA with patent jump graft to the OM branch left circumflex 3. Patent LIMA to the OM1 4. No significant mitral regurgitation     COVID 02/02/2022    Diabetic peripheral neuropathy     Essential (primary) hypertension     GERD (gastroesophageal reflux disease)     Insomnia secondary to depression with anxiety 06/17/2021    Mixed hyperlipidemia 03/23/2021    Nicotine dependence     Peripheral vascular disease, unspecified     Sleep apnea     Type 2 diabetes mellitus        Past Surgical History:   Procedure Laterality Date    CORONARY ARTERY BYPASS GRAFT      CORONARY STENT PLACEMENT      DEBRIDEMENT OF FOOT Right 08/10/2023    Dr. Chiu    DEBRIDEMENT OF FOOT Right 8/10/2023    Procedure: DEBRIDEMENT, FOOT;  Surgeon: Evelio Chiu DO;  Location: [facility] OR;  Service: General;  Laterality: Right;    DEBRIDEMENT OF LOWER EXTREMITY Right 06/27/2022    Procedure: DEBRIDEMENT, LOWER EXTREMITY;  Surgeon: Forrest Kiser MD;  Location: [facility] OR;  Service: General;  Laterality: Right;  right foot    HIP FRACTURE SURGERY Left     IRRIGATION AND DEBRIDEMENT OF LOWER EXTREMITY Right 09/08/2022    Procedure: IRRIGATION AND DEBRIDEMENT, LOWER EXTREMITY;  Surgeon: Selina Matos MD;  Location: [facility] OR;  Service: General;  Laterality: Right;    IRRIGATION AND DEBRIDEMENT OF LOWER EXTREMITY Right 01/05/2023    Procedure: IRRIGATION AND DEBRIDEMENT, LOWER EXTREMITY;  Surgeon:  Evelio Chiu, DO;  Location: [facility] OR;  Service: General;  Laterality: Right;    SHOULDER OPEN ROTATOR CUFF REPAIR Left     SPINE SURGERY      VASCULAR SURGERY         Review of patient's allergies indicates:  No Known Allergies    No current facility-administered medications on file prior to encounter.     Current Outpatient Medications on File Prior to Encounter   Medication Sig    albuterol (PROVENTIL) 2.5 mg /3 mL (0.083 %) nebulizer solution USE 1 VIAL IN NEBULIZER 3 TIMES DAILY    albuterol (PROVENTIL/VENTOLIN HFA) 90 mcg/actuation inhaler Inhale 2 puffs into the lungs 2 (two) times daily as needed for Wheezing.    amLODIPine (NORVASC) 10 MG tablet Take 1 tablet (10 mg total) by mouth once daily.    aspirin 81 MG Chew Take 81 mg by mouth once daily.    atorvastatin (LIPITOR) 80 MG tablet Take 1 tablet (80 mg total) by mouth every evening.    clopidogreL (PLAVIX) 75 mg tablet Take 1 tablet (75 mg total) by mouth once daily.    diclofenac sodium (VOLTAREN) 1 % Gel Apply 1 g topically 4 (four) times daily as needed (pain).    EScitalopram oxalate (LEXAPRO) 10 MG tablet Take 1 tablet (10 mg total) by mouth once daily.    gabapentin (NEURONTIN) 300 MG capsule Take 3 capsules (900 mg total) by mouth every 6 (six) hours.    HYDROcodone-acetaminophen (NORCO)  mg per tablet Take 1 tablet by mouth every 6 (six) hours as needed for Pain.    hydrOXYzine pamoate (VISTARIL) 25 MG Cap Take 1 capsule (25 mg total) by mouth once daily. Take nightly for sleep    insulin detemir U-100 (LEVEMIR FLEXTOUCH U-100 INSULN) 100 unit/mL (3 mL) InPn pen Inject 10 units subcutaneously every morning, inject 20 units subcutaneously every evening.    insulin glargine,hum.rec.anlog (TOUJEO SOLOSTAR U-300 INSULIN SUBQ) Inject 7-8 Units into the skin once daily.    isosorbide mononitrate (IMDUR) 30 MG 24 hr tablet Take 1 tablet (30 mg total) by mouth once daily.    LIDOcaine (LIDODERM) 5 % 1 patch once daily.    lisinopriL 10 MG  "tablet Take 1 tablet (10 mg total) by mouth once daily.    metoprolol succinate (TOPROL-XL) 50 MG 24 hr tablet Take 1 tablet (50 mg total) by mouth once daily.    pantoprazole (PROTONIX) 40 MG tablet Take 1 tablet (40 mg total) by mouth once daily.    SPIRIVA RESPIMAT 2.5 mcg/actuation inhaler Inhale 1 puff into the lungs once daily.    SURE COMFORT INSULIN SYRINGE 0.3 mL 31 gauge x 5/16" Syrg Inject 1 application into the skin 2 (two) times a day.    mirtazapine (REMERON) 7.5 MG Tab Take 1 tablet (7.5 mg total) by mouth nightly.    nitroGLYCERIN (NITROSTAT) 0.4 MG SL tablet Place 1 tablet (0.4 mg total) under the tongue every 5 (five) minutes as needed for Chest pain.    semaglutide (RYBELSUS) 7 mg tablet Take 1 tablet (7 mg total) by mouth once daily.    [DISCONTINUED] empagliflozin (JARDIANCE) 10 mg tablet Take 1 tablet (10 mg total) by mouth once daily.    [DISCONTINUED] gentamicin (GARAMYCIN) 0.1 % ointment Apply topically Daily.    [DISCONTINUED] HYDROcodone-acetaminophen (NORCO)  mg per tablet Take 1 tablet by mouth every 6 (six) hours as needed for Pain.    [DISCONTINUED] miconazole NITRATE 2 % (MICOTIN) 2 % top powder Apply topically as needed for Itching (apply between toes with dressing changes).    [DISCONTINUED] mupirocin (BACTROBAN) 2 % ointment Apply topically once daily.    [DISCONTINUED] SENNA 8.6 mg tablet Take 2 tablets by mouth once daily.     Family History       Problem Relation (Age of Onset)    Alcohol abuse Father    Arthritis Mother    COPD Paternal Grandfather    Cancer Sister    Diabetes Sister    Early death Father    Heart disease Father, Maternal Grandfather, Son    Hypertension Mother    Learning disabilities Mother          Tobacco Use    Smoking status: Every Day     Current packs/day: 2.50     Average packs/day: 2.5 packs/day for 52.5 years (131.4 ttl pk-yrs)     Types: Cigarettes     Start date: 8/10/1971     Passive exposure: Current    Smokeless tobacco: Never   Substance " and Sexual Activity    Alcohol use: Not Currently    Drug use: Yes     Types: Oxycodone    Sexual activity: Yes     Partners: Female     Review of Systems   Constitutional: Negative.  Negative for appetite change, chills, fever and unexpected weight change.   HENT: Negative.  Negative for sneezing, sore throat, tinnitus, trouble swallowing and voice change.    Eyes: Negative.    Respiratory:  Positive for wheezing. Negative for cough, chest tightness, shortness of breath and stridor.    Cardiovascular: Negative.  Negative for chest pain and leg swelling.   Gastrointestinal: Negative.  Negative for abdominal distention, abdominal pain, blood in stool and nausea.   Endocrine: Negative.    Genitourinary: Negative.  Negative for difficulty urinating, dysuria, enuresis, flank pain and hematuria.   Musculoskeletal: Negative.  Negative for back pain and myalgias.   Skin:  Positive for color change and wound.   Allergic/Immunologic: Negative.    Neurological: Negative.  Negative for dizziness, syncope, weakness and light-headedness.   Psychiatric/Behavioral: Negative.  Negative for agitation, behavioral problems, confusion, decreased concentration, dysphoric mood and hallucinations.    All other systems reviewed and are negative.    Objective:     Vital Signs (Most Recent):  Temp: 98.5 °F (36.9 °C) (02/27/24 2212)  Pulse: 70 (02/27/24 2212)  Resp: 18 (02/27/24 2125)  BP: (!) 147/77 (02/27/24 1937)  SpO2: 99 % (02/27/24 2212) Vital Signs (24h Range):  Temp:  [98 °F (36.7 °C)-98.5 °F (36.9 °C)] 98.5 °F (36.9 °C)  Pulse:  [69-89] 70  Resp:  [10-20] 18  SpO2:  [88 %-99 %] 99 %  BP: (129-177)/(50-89) 147/77     Weight: 73.5 kg (162 lb)  Body mass index is 24.63 kg/m².     Physical Exam  Constitutional:       General: He is not in acute distress.     Appearance: Normal appearance.   HENT:      Head: Normocephalic and atraumatic.      Mouth/Throat:      Mouth: Mucous membranes are dry.      Pharynx: Oropharynx is clear. No  oropharyngeal exudate.   Eyes:      Conjunctiva/sclera: Conjunctivae normal.   Cardiovascular:      Rate and Rhythm: Normal rate and regular rhythm.      Pulses: Normal pulses.      Heart sounds: Normal heart sounds.   Pulmonary:      Effort: Pulmonary effort is normal. No respiratory distress.      Breath sounds: Wheezing present.   Abdominal:      General: There is no distension.      Tenderness: There is no abdominal tenderness.   Musculoskeletal:         General: Normal range of motion.      Right lower leg: No edema.      Left lower leg: No edema.        Feet:    Feet:      Comments: Ulcer to the right plantar surface in between the 5th and 4th metatarsophalangeal joint with necrosis; right 5th toe with color change and necrosis at the base; there is erythema, edema with tenderness to palpation across the dorsal surface of the right foot and ankle  Skin:     General: Skin is warm and dry.      Coloration: Skin is not jaundiced.   Neurological:      General: No focal deficit present.      Mental Status: He is alert and oriented to person, place, and time.      Cranial Nerves: No cranial nerve deficit.   Psychiatric:         Mood and Affect: Mood normal.         Behavior: Behavior normal.          Significant Labs: All pertinent labs within the past 24 hours have been reviewed.    Significant Imaging: I have reviewed all pertinent imaging results/findings within the past 24 hours.

## 2024-02-28 NOTE — ASSESSMENT & PLAN NOTE
To the OR for Right 5th toe amputation, right foot debridement    Risks and benefits explained with the patient including risks for infection, bleeding, injury to surrounding structures, hematoma/seroma formation with need for possible evacuation, possible open.  The patient verbalized understanding, agrees and wishes to proceed with surgery.    Patient will be admitted for IV antibiotics to allow the area to heal; patient will need at least a right transmetatarsal amputation if the infection improved; if there has no improvement, patient will need a right below-the-knee amputation.      Consult hospitalist for medical management  2/28:  Status post debridement right foot wound.  Plan for OR Friday for amputation of 5th metatarsal.  Wound cultures pending.  Afebrile, white count 7.  Antibiotics managed by hospitalist

## 2024-02-28 NOTE — SUBJECTIVE & OBJECTIVE
Past Medical History:   Diagnosis Date    Arthritis     Carpal tunnel syndrome     Charcot foot due to diabetes mellitus 09/29/2023    Prescription for CROW orthotic boot given today    COPD (chronic obstructive pulmonary disease)     Coronary artery disease involving native coronary artery of native heart 06/27/2023    CABG 2018 (No OP report available)  Most recent left heart catheterization 08/12/2019 1. Normal LV size apical akinesis and overall normal LV systolic function, ejection fraction 55% 2. Patent saphenous vein graft to the right PDA with patent jump graft to the OM branch left circumflex 3. Patent LIMA to the OM1 4. No significant mitral regurgitation     COVID 02/02/2022    Diabetic peripheral neuropathy     Essential (primary) hypertension     GERD (gastroesophageal reflux disease)     Insomnia secondary to depression with anxiety 06/17/2021    Mixed hyperlipidemia 03/23/2021    Nicotine dependence     Peripheral vascular disease, unspecified     Sleep apnea     Type 2 diabetes mellitus        Past Surgical History:   Procedure Laterality Date    CORONARY ARTERY BYPASS GRAFT      CORONARY STENT PLACEMENT      DEBRIDEMENT OF FOOT Right 08/10/2023    Dr. Chiu    DEBRIDEMENT OF FOOT Right 8/10/2023    Procedure: DEBRIDEMENT, FOOT;  Surgeon: Evelio Chiu DO;  Location: [facility] OR;  Service: General;  Laterality: Right;    DEBRIDEMENT OF FOOT Right 2/27/2024    Procedure: DEBRIDEMENT, FOOT;  Surgeon: Evelio Chiu DO;  Location: [facility] OR;  Service: General;  Laterality: Right;    DEBRIDEMENT OF LOWER EXTREMITY Right 06/27/2022    Procedure: DEBRIDEMENT, LOWER EXTREMITY;  Surgeon: Forrest Kiser MD;  Location: [facility] OR;  Service: General;  Laterality: Right;  right foot    HIP FRACTURE SURGERY Left     IRRIGATION AND DEBRIDEMENT OF LOWER EXTREMITY Right 09/08/2022    Procedure: IRRIGATION AND DEBRIDEMENT, LOWER EXTREMITY;  Surgeon: Selina Matos MD;  Location: [facility] OR;   Service: General;  Laterality: Right;    IRRIGATION AND DEBRIDEMENT OF LOWER EXTREMITY Right 01/05/2023    Procedure: IRRIGATION AND DEBRIDEMENT, LOWER EXTREMITY;  Surgeon: Evelio Chiu DO;  Location: [facility] OR;  Service: General;  Laterality: Right;    SHOULDER OPEN ROTATOR CUFF REPAIR Left     SPINE SURGERY      TOE AMPUTATION Right 2/27/2024    Procedure: AMPUTATION, TOE;  Surgeon: Evelio Chiu DO;  Location: [facility] OR;  Service: General;  Laterality: Right;  RIGHT 5 TH TOE AND DEBRIDEMENT    VASCULAR SURGERY         Review of patient's allergies indicates:  No Known Allergies    No current facility-administered medications on file prior to encounter.     Current Outpatient Medications on File Prior to Encounter   Medication Sig    albuterol (PROVENTIL) 2.5 mg /3 mL (0.083 %) nebulizer solution USE 1 VIAL IN NEBULIZER 3 TIMES DAILY    albuterol (PROVENTIL/VENTOLIN HFA) 90 mcg/actuation inhaler Inhale 2 puffs into the lungs 2 (two) times daily as needed for Wheezing.    amLODIPine (NORVASC) 10 MG tablet Take 1 tablet (10 mg total) by mouth once daily.    aspirin 81 MG Chew Take 81 mg by mouth once daily.    atorvastatin (LIPITOR) 80 MG tablet Take 1 tablet (80 mg total) by mouth every evening.    clopidogreL (PLAVIX) 75 mg tablet Take 1 tablet (75 mg total) by mouth once daily.    diclofenac sodium (VOLTAREN) 1 % Gel Apply 1 g topically 4 (four) times daily as needed (pain).    EScitalopram oxalate (LEXAPRO) 10 MG tablet Take 1 tablet (10 mg total) by mouth once daily.    gabapentin (NEURONTIN) 300 MG capsule Take 3 capsules (900 mg total) by mouth every 6 (six) hours.    HYDROcodone-acetaminophen (NORCO)  mg per tablet Take 1 tablet by mouth every 6 (six) hours as needed for Pain.    hydrOXYzine pamoate (VISTARIL) 25 MG Cap Take 1 capsule (25 mg total) by mouth once daily. Take nightly for sleep    insulin detemir U-100 (LEVEMIR FLEXTOUCH U-100 INSULN) 100 unit/mL (3 mL) InPn pen Inject 10  "units subcutaneously every morning, inject 20 units subcutaneously every evening.    insulin glargine,hum.rec.anlog (TOUJEO SOLOSTAR U-300 INSULIN SUBQ) Inject 7-8 Units into the skin once daily.    isosorbide mononitrate (IMDUR) 30 MG 24 hr tablet Take 1 tablet (30 mg total) by mouth once daily.    LIDOcaine (LIDODERM) 5 % 1 patch once daily.    lisinopriL 10 MG tablet Take 1 tablet (10 mg total) by mouth once daily.    metoprolol succinate (TOPROL-XL) 50 MG 24 hr tablet Take 1 tablet (50 mg total) by mouth once daily.    pantoprazole (PROTONIX) 40 MG tablet Take 1 tablet (40 mg total) by mouth once daily.    SPIRIVA RESPIMAT 2.5 mcg/actuation inhaler Inhale 1 puff into the lungs once daily.    SURE COMFORT INSULIN SYRINGE 0.3 mL 31 gauge x 5/16" Syrg Inject 1 application into the skin 2 (two) times a day.    mirtazapine (REMERON) 7.5 MG Tab Take 1 tablet (7.5 mg total) by mouth nightly.    nitroGLYCERIN (NITROSTAT) 0.4 MG SL tablet Place 1 tablet (0.4 mg total) under the tongue every 5 (five) minutes as needed for Chest pain.    semaglutide (RYBELSUS) 7 mg tablet Take 1 tablet (7 mg total) by mouth once daily.     Family History       Problem Relation (Age of Onset)    Alcohol abuse Father    Arthritis Mother    COPD Paternal Grandfather    Cancer Sister    Diabetes Sister    Early death Father    Heart disease Father, Maternal Grandfather, Son    Hypertension Mother    Learning disabilities Mother          Tobacco Use    Smoking status: Every Day     Current packs/day: 2.50     Average packs/day: 2.5 packs/day for 52.6 years (131.4 ttl pk-yrs)     Types: Cigarettes     Start date: 8/10/1971     Passive exposure: Current    Smokeless tobacco: Never   Substance and Sexual Activity    Alcohol use: Not Currently    Drug use: Yes     Types: Oxycodone    Sexual activity: Yes     Partners: Female     Review of Systems   All other systems reviewed and are negative.    Objective:     Vital Signs (Most Recent):  Temp: 97.2 " °F (36.2 °C) (02/28/24 1009)  Pulse: 67 (02/28/24 1009)  Resp: 18 (02/28/24 1009)  BP: (!) 143/70 (02/28/24 1009)  SpO2: (!) 94 % (02/28/24 1009) Vital Signs (24h Range):  Temp:  [97.2 °F (36.2 °C)-98.7 °F (37.1 °C)] 97.2 °F (36.2 °C)  Pulse:  [64-88] 67  Resp:  [10-20] 18  SpO2:  [86 %-100 %] 94 %  BP: (129-177)/(50-89) 143/70         PHYSICAL EXAM:    GEN: NAD; alert and oriented x 3  ENT: hearing intact; no oral lesions  CVS: regular rate and rhythm; no murmurs  RESP: clear to auscultation bilaterally; no rhonchi, rales, or wheezes noted  GI: soft, non-tender, non-distended; + bowel sounds  EXTR: no clubbing, cyanosis, or edema on the left; s/p surgery on the right foot--wrapped  NEURO: no focal or motor deficits; CN II-XII appear to be intact  PSYCH: normal affect  SKIN: no rashes or lesions noted; s/p surgery on R foot--wrapped

## 2024-02-28 NOTE — SUBJECTIVE & OBJECTIVE
Interval History:  Afebrile, no acute events overnight.  Plan for amputation of transmetatarsal Friday    Medications:  Continuous Infusions:   lactated ringers 100 mL/hr at 02/28/24 1013     Scheduled Meds:   albuterol-ipratropium  3 mL Nebulization Q6H    amLODIPine  10 mg Oral Daily    aspirin  81 mg Oral Daily    atorvastatin  80 mg Oral QHS    budesonide  0.5 mg Nebulization Q12H    clopidogreL  75 mg Oral Daily    EScitalopram oxalate  10 mg Oral Daily    gabapentin  300 mg Oral TID    hydrOXYzine pamoate  25 mg Oral Daily    isosorbide mononitrate  30 mg Oral Daily    lisinopriL  10 mg Oral Daily    metoprolol succinate  50 mg Oral Daily    mirtazapine  7.5 mg Oral Nightly    pantoprazole  40 mg Oral Daily    piperacillin-tazobactam (Zosyn) IV (PEDS and ADULTS) (extended infusion is not appropriate)  4.5 g Intravenous Q8H    vancomycin (VANCOCIN) IV (PEDS and ADULTS)  20 mg/kg Intravenous Q18H     PRN Meds:acetaminophen, acetaminophen, acetaminophen, aluminum-magnesium hydroxide-simethicone, dextrose 10%, dextrose 10%, glucagon (human recombinant), glucose, glucose, HYDROcodone-acetaminophen, HYDROcodone-acetaminophen, insulin aspart U-100, melatonin, morphine, naloxone, nitroGLYCERIN, ondansetron, prochlorperazine, sodium chloride 0.9%     Review of patient's allergies indicates:  No Known Allergies  Objective:     Vital Signs (Most Recent):  Temp: 97.9 °F (36.6 °C) (02/28/24 1419)  Pulse: 62 (02/28/24 1456)  Resp: 17 (02/28/24 1419)  BP: (!) 130/54 (02/28/24 1456)  SpO2: (!) 93 % (02/28/24 1456) Vital Signs (24h Range):  Temp:  [97.2 °F (36.2 °C)-98.7 °F (37.1 °C)] 97.9 °F (36.6 °C)  Pulse:  [61-84] 62  Resp:  [16-20] 17  SpO2:  [86 %-100 %] 93 %  BP: (102-147)/(33-77) 130/54     Weight: 73.5 kg (162 lb)  Body mass index is 24.63 kg/m².    Intake/Output - Last 3 Shifts         02/26 0700 02/27 0659 02/27 0700 02/28 0659 02/28 0700 02/29 0659    P.O.  240     I.V. (mL/kg)  300 (4.1)     IV Piggyback  10      Total Intake(mL/kg)  550 (7.5)     Urine (mL/kg/hr)  850 600 (0.8)    Total Output  850 600    Net  -300 -600                    Physical Exam  Constitutional:       General: He is not in acute distress.     Appearance: Normal appearance.   HENT:      Head: Normocephalic.   Cardiovascular:      Rate and Rhythm: Normal rate.   Pulmonary:      Effort: Pulmonary effort is normal. No respiratory distress.   Abdominal:      General: There is no distension.      Tenderness: There is no abdominal tenderness.   Musculoskeletal:         General: Normal range of motion.        Feet:    Feet:      Comments: Dressing right foot  Skin:     General: Skin is warm.      Coloration: Skin is not jaundiced.   Neurological:      General: No focal deficit present.      Mental Status: He is alert and oriented to person, place, and time.      Cranial Nerves: No cranial nerve deficit.          Significant Labs:  I have reviewed all pertinent lab results within the past 24 hours.    Significant Diagnostics:  I have reviewed all pertinent imaging results/findings within the past 24 hours.

## 2024-02-28 NOTE — PROGRESS NOTES
[facility]  General Surgery  Progress Note    Subjective:     History of Present Illness:  63-year-old  male presents the ER with complaints of pain and swelling to the right foot.  Patient has a chronic wound on the right foot that has been treated by myself in clinic multiple times.  The wound was healing over with certain skin products, but patient states recently started having severe pain at the site with radiation to the right 5th toe.  He has had redness across the foot swelling.  X-rays performed showed no evidence of osteomyelitis.  Past medical history of peripheral vascular disease with multiple amputations to bilateral feet.  Diabetic ulcers chronic, diabetic peripheral neuropathy, COPD/emphysema, hypertension, hyperlipidemia, coronary artery disease    Post-Op Info:  Procedure(s) (LRB):  AMPUTATION, TOE (Right)  DEBRIDEMENT, FOOT (Right)   1 Day Post-Op     Interval History:  Afebrile, no acute events overnight.  Plan for amputation of transmetatarsal Friday    Medications:  Continuous Infusions:   lactated ringers 100 mL/hr at 02/28/24 1013     Scheduled Meds:   albuterol-ipratropium  3 mL Nebulization Q6H    amLODIPine  10 mg Oral Daily    aspirin  81 mg Oral Daily    atorvastatin  80 mg Oral QHS    budesonide  0.5 mg Nebulization Q12H    clopidogreL  75 mg Oral Daily    EScitalopram oxalate  10 mg Oral Daily    gabapentin  300 mg Oral TID    hydrOXYzine pamoate  25 mg Oral Daily    isosorbide mononitrate  30 mg Oral Daily    lisinopriL  10 mg Oral Daily    metoprolol succinate  50 mg Oral Daily    mirtazapine  7.5 mg Oral Nightly    pantoprazole  40 mg Oral Daily    piperacillin-tazobactam (Zosyn) IV (PEDS and ADULTS) (extended infusion is not appropriate)  4.5 g Intravenous Q8H    vancomycin (VANCOCIN) IV (PEDS and ADULTS)  20 mg/kg Intravenous Q18H     PRN Meds:acetaminophen, acetaminophen, acetaminophen, aluminum-magnesium hydroxide-simethicone, dextrose 10%,  dextrose 10%, glucagon (human recombinant), glucose, glucose, HYDROcodone-acetaminophen, HYDROcodone-acetaminophen, insulin aspart U-100, melatonin, morphine, naloxone, nitroGLYCERIN, ondansetron, prochlorperazine, sodium chloride 0.9%     Review of patient's allergies indicates:  No Known Allergies  Objective:     Vital Signs (Most Recent):  Temp: 97.9 °F (36.6 °C) (02/28/24 1419)  Pulse: 62 (02/28/24 1456)  Resp: 17 (02/28/24 1419)  BP: (!) 130/54 (02/28/24 1456)  SpO2: (!) 93 % (02/28/24 1456) Vital Signs (24h Range):  Temp:  [97.2 °F (36.2 °C)-98.7 °F (37.1 °C)] 97.9 °F (36.6 °C)  Pulse:  [61-84] 62  Resp:  [16-20] 17  SpO2:  [86 %-100 %] 93 %  BP: (102-147)/(33-77) 130/54     Weight: 73.5 kg (162 lb)  Body mass index is 24.63 kg/m².    Intake/Output - Last 3 Shifts         02/26 0700  02/27 0659 02/27 0700  02/28 0659 02/28 0700  02/29 0659    P.O.  240     I.V. (mL/kg)  300 (4.1)     IV Piggyback  10     Total Intake(mL/kg)  550 (7.5)     Urine (mL/kg/hr)  850 600 (0.8)    Total Output  850 600    Net  -300 -600                    Physical Exam  Constitutional:       General: He is not in acute distress.     Appearance: Normal appearance.   HENT:      Head: Normocephalic.   Cardiovascular:      Rate and Rhythm: Normal rate.   Pulmonary:      Effort: Pulmonary effort is normal. No respiratory distress.   Abdominal:      General: There is no distension.      Tenderness: There is no abdominal tenderness.   Musculoskeletal:         General: Normal range of motion.        Feet:    Feet:      Comments: Dressing right foot  Skin:     General: Skin is warm.      Coloration: Skin is not jaundiced.   Neurological:      General: No focal deficit present.      Mental Status: He is alert and oriented to person, place, and time.      Cranial Nerves: No cranial nerve deficit.          Significant Labs:  I have reviewed all pertinent lab results within the past 24 hours.    Significant Diagnostics:  I have reviewed all  pertinent imaging results/findings within the past 24 hours.  Assessment/Plan:     * Diabetic ulcer of toe associated with diabetes mellitus due to underlying condition, with necrosis of muscle                To the OR for Right 5th toe amputation, right foot debridement    Risks and benefits explained with the patient including risks for infection, bleeding, injury to surrounding structures, hematoma/seroma formation with need for possible evacuation, possible open.  The patient verbalized understanding, agrees and wishes to proceed with surgery.    Patient will be admitted for IV antibiotics to allow the area to heal; patient will need at least a right transmetatarsal amputation if the infection improved; if there has no improvement, patient will need a right below-the-knee amputation.      Consult hospitalist for medical management  2/28:  Status post debridement right foot wound.  Plan for OR Friday for amputation of 5th metatarsal.  Wound cultures pending.  Afebrile, white count 7.  Antibiotics managed by hospitalist        STELLA Weston  General Surgery  [facility]

## 2024-02-28 NOTE — ASSESSMENT & PLAN NOTE
Patient's COPD is controlled currently.  Patient is currently off COPD Pathway. Continue scheduled inhalers Steroids and Supplemental oxygen and monitor respiratory status closely.   Budesonide and Duonebs

## 2024-02-28 NOTE — PROGRESS NOTES
[facility]  [facility]  Progress Note    Patient Name: Navdeep Avery  MRN: [id number]  Patient Class: IP- Inpatient   Admission Date: 2/27/2024  Length of Stay: 1 days  Attending Physician: Evelio Chiu DO  Primary Care Provider: Marquez Huff MD        Subjective:     Principal Problem:Diabetic ulcer of toe associated with diabetes mellitus due to underlying condition, with necrosis of muscle        HPI:  Patient is a 63 year old male that had his Right great toe amputated by Dr. Chiu today. He has had reported wound issues on his feet, likely a complication from his poorly controlled diabetes and atherosclerosis . His wounds have been managed by wound care and seen in clinic by Dr. Chiu in the past. He stated that he has had redness across the foot and swelling. X-rays performed showed no definite evidence of osteomyelitis. After further evaluation it was determined that the right 5th toe necrotic and had purulent drainage in the right 5th metatarsophalangeal joint. A joint decision was made for amputation.    Hospital medicine team was consulted to manage the patient's other chronic health care related issues.    Overview/Hospital Course:  63 year old male with a PMH of DM, HTN, COPD, and  CAD s/p CABG who presents to the Vascular Surgery Service for pseudoaneurysm repair.  Hospitalist was consulted for medical management.  Patient denies chest pain, shortness of breath, nausea, vomiting, or diarrhea.      Objective:     Vital Signs (Most Recent):  Temp: 97.2 °F (36.2 °C) (02/28/24 1009)  Pulse: 67 (02/28/24 1009)  Resp: 18 (02/28/24 1009)  BP: (!) 143/70 (02/28/24 1009)  SpO2: (!) 94 % (02/28/24 1009) Vital Signs (24h Range):  Temp:  [97.2 °F (36.2 °C)-98.7 °F (37.1 °C)] 97.2 °F (36.2 °C)  Pulse:  [64-88] 67  Resp:  [10-20] 18  SpO2:  [86 %-100 %] 94 %  BP: (129-177)/(50-89) 143/70         PHYSICAL EXAM:    GEN: NAD; alert and oriented x 3  ENT: hearing intact; no  oral lesions  CVS: regular rate and rhythm; no murmurs  RESP: clear to auscultation bilaterally; no rhonchi, rales, or wheezes noted  GI: soft, non-tender, non-distended; + bowel sounds  EXTR: no clubbing, cyanosis, or edema on the left; s/p surgery on the right foot--wrapped  NEURO: no focal or motor deficits; CN II-XII appear to be intact  PSYCH: normal affect  SKIN: no rashes or lesions noted; s/p surgery on R foot--wrapped      Assessment/Plan:      * Diabetic ulcer of toe associated with diabetes mellitus due to underlying condition, with necrosis of muscle  S/P surgery; will place on Zosyn until cultures result    Type 2 diabetes mellitus with hyperglycemia  On SSI: BS ok; will add agents as needed; will monitor BS levels      COPD (chronic obstructive pulmonary disease)  On nebs    Coronary artery disease involving coronary bypass graft of native heart  Continue home meds; BP stable    Sleep apnea  Will order home CPAP            Sushila Patrick DO  Department of Hospital Medicine   [facility]

## 2024-02-28 NOTE — ASSESSMENT & PLAN NOTE
"Patient's FSGs are uncontrolled due to hyperglycemia on current medication regimen.  Last A1c reviewed-   Lab Results   Component Value Date    HGBA1C 9.7 (H) 08/10/2023     Most recent fingerstick glucose reviewed- No results for input(s): "POCTGLUCOSE" in the last 24 hours.  Current correctional scale  Low  Maintain anti-hyperglycemic dose as follows-   Antihyperglycemics (From admission, onward)      Start     Stop Route Frequency Ordered    02/27/24 1411  insulin aspart U-100 injection 0-10 Units         -- SubQ Before meals & nightly PRN 02/27/24 1314          Hold Oral hypoglycemics while patient is in the hospital.    Defer to primary team for wound management   "

## 2024-02-28 NOTE — PLAN OF CARE
[facility]  Initial Discharge Assessment       Primary Care Provider: Marquez Huff MD    Admission Diagnosis: Wound of foot [S91.309A]    Admission Date: 2/27/2024  Expected Discharge Date:     Transition of Care Barriers: None    Payor: MEDICARE / Plan: MEDICARE PART A & B / Product Type: Government /     Extended Emergency Contact Information  Primary Emergency Contact: Charisma Avery  Mobile Phone: [phone]  Relation: Spouse  Preferred language: English   needed? No    Discharge Plan A: Home Health, Home with family  Discharge Plan B: Home with family, Home Health       [facility] [place] [address]  [address]  [facility] [address]  Phone: [phone] Fax: [phone]     [facility] - [place] - [address]  [address]  [facility] [address]  Phone: [phone] Fax: [phone]    [facility] [address]  [address] [place] [address] Blvd.  [place] [id number]  Phone: [phone] Fax: [phone]    [facility] [place] - [address]  [address] [facility] [address]  Phone: [phone] Fax: [phone]      Initial Assessment (most recent)       Adult Discharge Assessment - 02/28/24 1451          Discharge Assessment    Assessment Type Discharge Planning Assessment     Source of Information family     People in Home spouse     Do you expect to return to your current living situation? Yes     Do you have help at home or someone to help you manage your care at home? Yes     Who are your caregiver(s) and their phone number(s)? Charisma Avery- Spouse [phone]     Prior to hospitilization cognitive status: Unable to Assess     Current cognitive status: Unable to Assess     Walking or Climbing Stairs Difficulty no     Dressing/Bathing Difficulty no     Home Accessibility stairs to enter home     Number of Stairs,  Main Entrance four     Equipment Currently Used at Home cane, straight;walker, rolling     Readmission within 30 days? No     Patient currently being followed by outpatient case management? No     Do you currently have service(s) that help you manage your care at home? Yes     Name and Contact number of agency [facility]     Is the pt/caregiver preference to resume services with current agency Yes     Do you take prescription medications? Yes     Do you have prescription coverage? Yes     Coverage Medicare     Do you have any problems affording any of your prescribed medications? No     Is the patient taking medications as prescribed? yes     Who is going to help you get home at discharge? Spouse     How do you get to doctors appointments? family or friend will provide     Are you on dialysis? No     Do you take coumadin? No     Discharge Plan A Home Health;Home with family     Discharge Plan B Home with family;Home Health     DME Needed Upon Discharge  none     Discharge Plan discussed with: Spouse/sig other     Name(s) and Number(s) Charisma Avery- Spouse     Transition of Care Barriers None        Physical Activity    On average, how many days per week do you engage in moderate to strenuous exercise (like a brisk walk)? 0 days     On average, how many minutes do you engage in exercise at this level? 0 min        Financial Resource Strain    How hard is it for you to pay for the very basics like food, housing, medical care, and heating? Not very hard        Housing Stability    In the last 12 months, was there a time when you were not able to pay the mortgage or rent on time? No     In the last 12 months, was there a time when you did not have a steady place to sleep or slept in a shelter (including now)? No        Transportation Needs    In the past 12 months, has lack of transportation kept you from medical appointments or from getting medications? No     In the past 12 months, has lack of transportation kept  you from meetings, work, or from getting things needed for daily living? No        Food Insecurity    Within the past 12 months, you worried that your food would run out before you got the money to buy more. Never true        Stress    Do you feel stress - tense, restless, nervous, or anxious, or unable to sleep at night because your mind is troubled all the time - these days? Rather much        Social Connections    In a typical week, how many times do you talk on the phone with family, friends, or neighbors? More than three times a week     How often do you get together with friends or relatives? More than three times a week     How often do you attend Tenriism or Presybeterian services? Never     Do you belong to any clubs or organizations such as Tenriism groups, unions, fraternal or athletic groups, or school groups? No     How often do you attend meetings of the clubs or organizations you belong to? Never     Are you , , , , never , or living with a partner?         Alcohol Use    Q1: How often do you have a drink containing alcohol? Never     Q2: How many drinks containing alcohol do you have on a typical day when you are drinking? Patient does not drink     Q3: How often do you have six or more drinks on one occasion? Never                   Pt asleep, spouse Charisma at bedside. Per spouse, pt lives at home with her. [facility] and the plan is for pt to return at ID, choice obtained. Pt uses and cand and rw. MARIBEL faxed initial [facility] referral to [facility]. SDOH questions completed and I.M. obtained. MARIBEL will cont to follow for ID needs.

## 2024-02-29 LAB
ANION GAP SERPL CALCULATED.3IONS-SCNC: 8 MMOL/L (ref 7–16)
BASOPHILS # BLD AUTO: 0.04 K/UL (ref 0–0.2)
BASOPHILS NFR BLD AUTO: 0.7 % (ref 0–1)
BUN SERPL-MCNC: 22 MG/DL (ref 7–18)
BUN/CREAT SERPL: 25 (ref 6–20)
CALCIUM SERPL-MCNC: 8.6 MG/DL (ref 8.5–10.1)
CHLORIDE SERPL-SCNC: 106 MMOL/L (ref 98–107)
CO2 SERPL-SCNC: 31 MMOL/L (ref 21–32)
CREAT SERPL-MCNC: 0.88 MG/DL (ref 0.7–1.3)
DIFFERENTIAL METHOD BLD: ABNORMAL
EGFR (NO RACE VARIABLE) (RUSH/TITUS): 95 ML/MIN/1.73M2
EOSINOPHIL # BLD AUTO: 0.21 K/UL (ref 0–0.5)
EOSINOPHIL NFR BLD AUTO: 3.7 % (ref 1–4)
ERYTHROCYTE [DISTWIDTH] IN BLOOD BY AUTOMATED COUNT: 11.8 % (ref 11.5–14.5)
GLUCOSE SERPL-MCNC: 153 MG/DL (ref 74–106)
GLUCOSE SERPL-MCNC: 161 MG/DL (ref 70–105)
GLUCOSE SERPL-MCNC: 191 MG/DL (ref 70–105)
GLUCOSE SERPL-MCNC: 216 MG/DL (ref 70–105)
GLUCOSE SERPL-MCNC: 308 MG/DL (ref 70–105)
HCT VFR BLD AUTO: 34.4 % (ref 40–54)
HGB BLD-MCNC: 11.6 G/DL (ref 13.5–18)
IMM GRANULOCYTES # BLD AUTO: 0.01 K/UL (ref 0–0.04)
IMM GRANULOCYTES NFR BLD: 0.2 % (ref 0–0.4)
LYMPHOCYTES # BLD AUTO: 1.81 K/UL (ref 1–4.8)
LYMPHOCYTES NFR BLD AUTO: 31.5 % (ref 27–41)
MCH RBC QN AUTO: 31.1 PG (ref 27–31)
MCHC RBC AUTO-ENTMCNC: 33.7 G/DL (ref 32–36)
MCV RBC AUTO: 92.2 FL (ref 80–96)
MONOCYTES # BLD AUTO: 0.41 K/UL (ref 0–0.8)
MONOCYTES NFR BLD AUTO: 7.1 % (ref 2–6)
MPC BLD CALC-MCNC: 10.3 FL (ref 9.4–12.4)
NEUTROPHILS # BLD AUTO: 3.27 K/UL (ref 1.8–7.7)
NEUTROPHILS NFR BLD AUTO: 56.8 % (ref 53–65)
NRBC # BLD AUTO: 0 X10E3/UL
NRBC, AUTO (.00): 0 %
PLATELET # BLD AUTO: 211 K/UL (ref 150–400)
POTASSIUM SERPL-SCNC: 4.6 MMOL/L (ref 3.5–5.1)
RBC # BLD AUTO: 3.73 M/UL (ref 4.6–6.2)
SODIUM SERPL-SCNC: 140 MMOL/L (ref 136–145)
WBC # BLD AUTO: 5.75 K/UL (ref 4.5–11)

## 2024-02-29 PROCEDURE — 25000242 PHARM REV CODE 250 ALT 637 W/ HCPCS: Performed by: HOSPITALIST

## 2024-02-29 PROCEDURE — 63600175 PHARM REV CODE 636 W HCPCS: Performed by: STUDENT IN AN ORGANIZED HEALTH CARE EDUCATION/TRAINING PROGRAM

## 2024-02-29 PROCEDURE — 94640 AIRWAY INHALATION TREATMENT: CPT

## 2024-02-29 PROCEDURE — 27000221 HC OXYGEN, UP TO 24 HOURS

## 2024-02-29 PROCEDURE — 25000003 PHARM REV CODE 250: Performed by: HOSPITALIST

## 2024-02-29 PROCEDURE — 99900035 HC TECH TIME PER 15 MIN (STAT)

## 2024-02-29 PROCEDURE — 99499 UNLISTED E&M SERVICE: CPT | Mod: ,,, | Performed by: FAMILY MEDICINE

## 2024-02-29 PROCEDURE — 82962 GLUCOSE BLOOD TEST: CPT

## 2024-02-29 PROCEDURE — 94761 N-INVAS EAR/PLS OXIMETRY MLT: CPT

## 2024-02-29 PROCEDURE — 25000003 PHARM REV CODE 250: Performed by: STUDENT IN AN ORGANIZED HEALTH CARE EDUCATION/TRAINING PROGRAM

## 2024-02-29 PROCEDURE — 80048 BASIC METABOLIC PNL TOTAL CA: CPT | Performed by: STUDENT IN AN ORGANIZED HEALTH CARE EDUCATION/TRAINING PROGRAM

## 2024-02-29 PROCEDURE — 85025 COMPLETE CBC W/AUTO DIFF WBC: CPT | Performed by: STUDENT IN AN ORGANIZED HEALTH CARE EDUCATION/TRAINING PROGRAM

## 2024-02-29 PROCEDURE — 63600175 PHARM REV CODE 636 W HCPCS: Performed by: HOSPITALIST

## 2024-02-29 PROCEDURE — 11000001 HC ACUTE MED/SURG PRIVATE ROOM

## 2024-02-29 RX ADMIN — HYDROCODONE BITARTRATE AND ACETAMINOPHEN 1 TABLET: 10; 325 TABLET ORAL at 09:02

## 2024-02-29 RX ADMIN — CLOPIDOGREL BISULFATE 75 MG: 75 TABLET ORAL at 08:02

## 2024-02-29 RX ADMIN — PANTOPRAZOLE SODIUM 40 MG: 40 TABLET, DELAYED RELEASE ORAL at 08:02

## 2024-02-29 RX ADMIN — IPRATROPIUM BROMIDE AND ALBUTEROL SULFATE 3 ML: .5; 3 SOLUTION RESPIRATORY (INHALATION) at 06:02

## 2024-02-29 RX ADMIN — IPRATROPIUM BROMIDE AND ALBUTEROL SULFATE 3 ML: .5; 3 SOLUTION RESPIRATORY (INHALATION) at 12:02

## 2024-02-29 RX ADMIN — VANCOMYCIN HYDROCHLORIDE 1500 MG: 1 INJECTION, POWDER, LYOPHILIZED, FOR SOLUTION INTRAVENOUS at 08:02

## 2024-02-29 RX ADMIN — PIPERACILLIN AND TAZOBACTAM 4.5 G: 4; .5 INJECTION, POWDER, FOR SOLUTION INTRAVENOUS; PARENTERAL at 12:02

## 2024-02-29 RX ADMIN — ATORVASTATIN CALCIUM 80 MG: 80 TABLET, FILM COATED ORAL at 08:02

## 2024-02-29 RX ADMIN — INSULIN ASPART 8 UNITS: 100 INJECTION, SOLUTION INTRAVENOUS; SUBCUTANEOUS at 12:02

## 2024-02-29 RX ADMIN — ESCITALOPRAM OXALATE 10 MG: 10 TABLET ORAL at 08:02

## 2024-02-29 RX ADMIN — MIRTAZAPINE 7.5 MG: 7.5 TABLET, FILM COATED ORAL at 08:02

## 2024-02-29 RX ADMIN — LISINOPRIL 10 MG: 10 TABLET ORAL at 08:02

## 2024-02-29 RX ADMIN — ISOSORBIDE MONONITRATE 30 MG: 30 TABLET, EXTENDED RELEASE ORAL at 08:02

## 2024-02-29 RX ADMIN — PIPERACILLIN AND TAZOBACTAM 4.5 G: 4; .5 INJECTION, POWDER, FOR SOLUTION INTRAVENOUS; PARENTERAL at 08:02

## 2024-02-29 RX ADMIN — METOPROLOL SUCCINATE 50 MG: 50 TABLET, EXTENDED RELEASE ORAL at 08:02

## 2024-02-29 RX ADMIN — IPRATROPIUM BROMIDE AND ALBUTEROL SULFATE 3 ML: .5; 3 SOLUTION RESPIRATORY (INHALATION) at 07:02

## 2024-02-29 RX ADMIN — BUDESONIDE INHALATION 0.5 MG: 0.5 SUSPENSION RESPIRATORY (INHALATION) at 08:02

## 2024-02-29 RX ADMIN — PIPERACILLIN AND TAZOBACTAM 4.5 G: 4; .5 INJECTION, POWDER, FOR SOLUTION INTRAVENOUS; PARENTERAL at 05:02

## 2024-02-29 RX ADMIN — SODIUM CHLORIDE, POTASSIUM CHLORIDE, SODIUM LACTATE AND CALCIUM CHLORIDE: 600; 310; 30; 20 INJECTION, SOLUTION INTRAVENOUS at 11:02

## 2024-02-29 RX ADMIN — GABAPENTIN 300 MG: 300 CAPSULE ORAL at 08:02

## 2024-02-29 RX ADMIN — HYDROCODONE BITARTRATE AND ACETAMINOPHEN 1 TABLET: 10; 325 TABLET ORAL at 02:02

## 2024-02-29 RX ADMIN — HYDROCODONE BITARTRATE AND ACETAMINOPHEN 1 TABLET: 10; 325 TABLET ORAL at 08:02

## 2024-02-29 RX ADMIN — BUDESONIDE INHALATION 0.5 MG: 0.5 SUSPENSION RESPIRATORY (INHALATION) at 07:02

## 2024-02-29 RX ADMIN — ASPIRIN 81 MG CHEWABLE TABLET 81 MG: 81 TABLET CHEWABLE at 08:02

## 2024-02-29 RX ADMIN — INSULIN ASPART 4 UNITS: 100 INJECTION, SOLUTION INTRAVENOUS; SUBCUTANEOUS at 04:02

## 2024-02-29 RX ADMIN — AMLODIPINE BESYLATE 10 MG: 10 TABLET ORAL at 08:02

## 2024-02-29 RX ADMIN — SODIUM CHLORIDE, POTASSIUM CHLORIDE, SODIUM LACTATE AND CALCIUM CHLORIDE: 600; 310; 30; 20 INJECTION, SOLUTION INTRAVENOUS at 01:02

## 2024-02-29 RX ADMIN — GABAPENTIN 300 MG: 300 CAPSULE ORAL at 02:02

## 2024-02-29 RX ADMIN — HYDROXYZINE PAMOATE 25 MG: 25 CAPSULE ORAL at 08:02

## 2024-02-29 NOTE — ASSESSMENT & PLAN NOTE
To the OR for Right 5th toe amputation, right foot debridement    Risks and benefits explained with the patient including risks for infection, bleeding, injury to surrounding structures, hematoma/seroma formation with need for possible evacuation, possible open.  The patient verbalized understanding, agrees and wishes to proceed with surgery.    Patient will be admitted for IV antibiotics to allow the area to heal; patient will need at least a right transmetatarsal amputation if the infection improved; if there has no improvement, patient will need a right below-the-knee amputation.      Consult hospitalist for medical management  2/28:  Status post debridement right foot wound.  Plan for OR Friday for amputation of 5th metatarsal.  Wound cultures pending.  Afebrile, white count 7.  Antibiotics managed by hospitalist  2/29:  Afebrile, white count 5.  Wound cultures grew staph aureus.  Currently covered with Zosyn and vanco.  OR tomorrow for right TMA.  Discharge planning started plan for home health

## 2024-02-29 NOTE — PLAN OF CARE
Problem: Diabetes Comorbidity  Goal: Blood Glucose Level Within Targeted Range  2/29/2024 0404 by Elvie Newton RN  Outcome: Ongoing, Progressing  2/29/2024 0404 by Elvie Newton RN  Outcome: Ongoing, Progressing     Problem: Skin Injury Risk Increased  Goal: Skin Health and Integrity  2/29/2024 0404 by Elvie Newton RN  Outcome: Ongoing, Progressing  2/29/2024 0404 by Elvie Newton RN  Outcome: Ongoing, Progressing

## 2024-02-29 NOTE — SUBJECTIVE & OBJECTIVE
Interval History:     No significant events overnight. To OR today for further amputation. Will consult case management for discharge planning as will benefit from SWB vs home health.    Review of Systems   Constitutional: Negative.  Negative for chills and fever.   HENT: Negative.     Respiratory:  Negative for cough, chest tightness and shortness of breath.    Cardiovascular:  Negative for chest pain and palpitations.   Gastrointestinal:  Negative for abdominal pain, diarrhea, nausea and vomiting.   Endocrine: Negative.    Genitourinary: Negative.    Musculoskeletal:  Negative for arthralgias and myalgias.   Skin:  Negative for rash.   Allergic/Immunologic: Negative.    Neurological:  Negative for dizziness, syncope, weakness, numbness and headaches.   Psychiatric/Behavioral:  Negative for confusion and sleep disturbance.      Objective:     Vital Signs (Most Recent):  Temp: 98 °F (36.7 °C) (02/29/24 1412)  Pulse: 69 (02/29/24 1412)  Resp: 18 (02/29/24 1412)  BP: 117/67 (02/29/24 1412)  SpO2: (!) 93 % (02/29/24 1412) Vital Signs (24h Range):  Temp:  [98 °F (36.7 °C)-98.9 °F (37.2 °C)] 98 °F (36.7 °C)  Pulse:  [62-74] 69  Resp:  [12-18] 18  SpO2:  [91 %-94 %] 93 %  BP: (113-136)/(45-67) 117/67     Weight: 73.5 kg (162 lb)  Body mass index is 24.63 kg/m².    Intake/Output Summary (Last 24 hours) at 2/29/2024 1511  Last data filed at 2/29/2024 0744  Gross per 24 hour   Intake 1800 ml   Output 250 ml   Net 1550 ml         Physical Exam  Constitutional:       General: He is not in acute distress.     Appearance: Normal appearance. He is not ill-appearing.   HENT:      Head: Normocephalic and atraumatic.   Cardiovascular:      Rate and Rhythm: Normal rate and regular rhythm.   Pulmonary:      Effort: Pulmonary effort is normal. No respiratory distress.   Musculoskeletal:      Cervical back: No rigidity.      Comments: R LE surgical dressing clean, dry, intact   Skin:     Coloration: Skin is not jaundiced or pale.       Findings: No rash.   Neurological:      Mental Status: He is alert. Mental status is at baseline.   Psychiatric:         Behavior: Behavior normal.         Thought Content: Thought content normal.             Significant Labs: All pertinent labs within the past 24 hours have been reviewed.    Significant Imaging: I have reviewed all pertinent imaging results/findings within the past 24 hours.

## 2024-02-29 NOTE — SUBJECTIVE & OBJECTIVE
Interval History:  No acute events overnight.  Plan for right TMA tomorrow.    Medications:  Continuous Infusions:   lactated ringers 100 mL/hr at 02/29/24 1122     Scheduled Meds:   albuterol-ipratropium  3 mL Nebulization Q6H    amLODIPine  10 mg Oral Daily    aspirin  81 mg Oral Daily    atorvastatin  80 mg Oral QHS    budesonide  0.5 mg Nebulization Q12H    clopidogreL  75 mg Oral Daily    EScitalopram oxalate  10 mg Oral Daily    gabapentin  300 mg Oral TID    hydrOXYzine pamoate  25 mg Oral Daily    isosorbide mononitrate  30 mg Oral Daily    lisinopriL  10 mg Oral Daily    metoprolol succinate  50 mg Oral Daily    mirtazapine  7.5 mg Oral Nightly    pantoprazole  40 mg Oral Daily    piperacillin-tazobactam (Zosyn) IV (PEDS and ADULTS) (extended infusion is not appropriate)  4.5 g Intravenous Q8H    vancomycin (VANCOCIN) IV (PEDS and ADULTS)  20 mg/kg Intravenous Q18H     PRN Meds:acetaminophen, acetaminophen, acetaminophen, aluminum-magnesium hydroxide-simethicone, dextrose 10%, dextrose 10%, glucagon (human recombinant), glucose, glucose, HYDROcodone-acetaminophen, HYDROcodone-acetaminophen, insulin aspart U-100, melatonin, morphine, naloxone, nitroGLYCERIN, ondansetron, prochlorperazine, sodium chloride 0.9%, vancomycin - pharmacy to dose     Review of patient's allergies indicates:  No Known Allergies  Objective:     Vital Signs (Most Recent):  Temp: 98.9 °F (37.2 °C) (02/29/24 1032)  Pulse: 67 (02/29/24 1032)  Resp: 18 (02/29/24 1032)  BP: (!) 126/45 (02/29/24 1032)  SpO2: (!) 91 % (02/29/24 1032) Vital Signs (24h Range):  Temp:  [97.9 °F (36.6 °C)-98.9 °F (37.2 °C)] 98.9 °F (37.2 °C)  Pulse:  [61-74] 67  Resp:  [12-18] 18  SpO2:  [91 %-93 %] 91 %  BP: (102-136)/(33-66) 126/45     Weight: 73.5 kg (162 lb)  Body mass index is 24.63 kg/m².    Intake/Output - Last 3 Shifts         02/27 0700 02/28 0659 02/28 0700 02/29 0659 02/29 0700 03/01 0659    P.O. 240 600     I.V. (mL/kg) 300 (4.1) 1100 (15)     IV  Piggyback 10 100     Total Intake(mL/kg) 550 (7.5) 1800 (24.5)     Urine (mL/kg/hr) 850 600 (0.3) 250 (0.7)    Total Output 850 600 250    Net -300 +1200 -250           Urine Occurrence  2 x              Physical Exam  Constitutional:       General: He is not in acute distress.     Appearance: Normal appearance.   HENT:      Head: Normocephalic.   Cardiovascular:      Rate and Rhythm: Normal rate.   Pulmonary:      Effort: Pulmonary effort is normal. No respiratory distress.   Abdominal:      General: There is no distension.      Tenderness: There is no abdominal tenderness.   Musculoskeletal:         General: Normal range of motion.        Feet:    Feet:      Comments: Dressing right foot  Skin:     General: Skin is warm.      Coloration: Skin is not jaundiced.   Neurological:      General: No focal deficit present.      Mental Status: He is alert and oriented to person, place, and time.      Cranial Nerves: No cranial nerve deficit.          Significant Labs:  I have reviewed all pertinent lab results within the past 24 hours.    Significant Diagnostics:  I have reviewed all pertinent imaging results/findings within the past 24 hours.

## 2024-02-29 NOTE — PROGRESS NOTES
[facility] Orth[facility]  General Surgery  Progress Note    Subjective:     History of Present Illness:  63-year-old  male presents the ER with complaints of pain and swelling to the right foot.  Patient has a chronic wound on the right foot that has been treated by myself in clinic multiple times.  The wound was healing over with certain skin products, but patient states recently started having severe pain at the site with radiation to the right 5th toe.  He has had redness across the foot swelling.  X-rays performed showed no evidence of osteomyelitis.  Past medical history of peripheral vascular disease with multiple amputations to bilateral feet.  Diabetic ulcers chronic, diabetic peripheral neuropathy, COPD/emphysema, hypertension, hyperlipidemia, coronary artery disease    Post-Op Info:  Procedure(s) (LRB):  AMPUTATION, TOE (Right)  DEBRIDEMENT, FOOT (Right)   2 Days Post-Op     Interval History:  No acute events overnight.  Plan for right TMA tomorrow.    Medications:  Continuous Infusions:   lactated ringers 100 mL/hr at 02/29/24 1122     Scheduled Meds:   albuterol-ipratropium  3 mL Nebulization Q6H    amLODIPine  10 mg Oral Daily    aspirin  81 mg Oral Daily    atorvastatin  80 mg Oral QHS    budesonide  0.5 mg Nebulization Q12H    clopidogreL  75 mg Oral Daily    EScitalopram oxalate  10 mg Oral Daily    gabapentin  300 mg Oral TID    hydrOXYzine pamoate  25 mg Oral Daily    isosorbide mononitrate  30 mg Oral Daily    lisinopriL  10 mg Oral Daily    metoprolol succinate  50 mg Oral Daily    mirtazapine  7.5 mg Oral Nightly    pantoprazole  40 mg Oral Daily    piperacillin-tazobactam (Zosyn) IV (PEDS and ADULTS) (extended infusion is not appropriate)  4.5 g Intravenous Q8H    vancomycin (VANCOCIN) IV (PEDS and ADULTS)  20 mg/kg Intravenous Q18H     PRN Meds:acetaminophen, acetaminophen, acetaminophen, aluminum-magnesium hydroxide-simethicone, dextrose 10%, dextrose 10%, glucagon (human  recombinant), glucose, glucose, HYDROcodone-acetaminophen, HYDROcodone-acetaminophen, insulin aspart U-100, melatonin, morphine, naloxone, nitroGLYCERIN, ondansetron, prochlorperazine, sodium chloride 0.9%, vancomycin - pharmacy to dose     Review of patient's allergies indicates:  No Known Allergies  Objective:     Vital Signs (Most Recent):  Temp: 98.9 °F (37.2 °C) (02/29/24 1032)  Pulse: 67 (02/29/24 1032)  Resp: 18 (02/29/24 1032)  BP: (!) 126/45 (02/29/24 1032)  SpO2: (!) 91 % (02/29/24 1032) Vital Signs (24h Range):  Temp:  [97.9 °F (36.6 °C)-98.9 °F (37.2 °C)] 98.9 °F (37.2 °C)  Pulse:  [61-74] 67  Resp:  [12-18] 18  SpO2:  [91 %-93 %] 91 %  BP: (102-136)/(33-66) 126/45     Weight: 73.5 kg (162 lb)  Body mass index is 24.63 kg/m².    Intake/Output - Last 3 Shifts         02/27 0700  02/28 0659 02/28 0700  02/29 0659 02/29 0700  03/01 0659    P.O. 240 600     I.V. (mL/kg) 300 (4.1) 1100 (15)     IV Piggyback 10 100     Total Intake(mL/kg) 550 (7.5) 1800 (24.5)     Urine (mL/kg/hr) 850 600 (0.3) 250 (0.7)    Total Output 850 600 250    Net -300 +1200 -250           Urine Occurrence  2 x              Physical Exam  Constitutional:       General: He is not in acute distress.     Appearance: Normal appearance.   HENT:      Head: Normocephalic.   Cardiovascular:      Rate and Rhythm: Normal rate.   Pulmonary:      Effort: Pulmonary effort is normal. No respiratory distress.   Abdominal:      General: There is no distension.      Tenderness: There is no abdominal tenderness.   Musculoskeletal:         General: Normal range of motion.        Feet:    Feet:      Comments: Dressing right foot  Skin:     General: Skin is warm.      Coloration: Skin is not jaundiced.   Neurological:      General: No focal deficit present.      Mental Status: He is alert and oriented to person, place, and time.      Cranial Nerves: No cranial nerve deficit.          Significant Labs:  I have reviewed all pertinent lab results within the  past 24 hours.    Significant Diagnostics:  I have reviewed all pertinent imaging results/findings within the past 24 hours.  Assessment/Plan:     * Diabetic ulcer of toe associated with diabetes mellitus due to underlying condition, with necrosis of muscle                To the OR for Right 5th toe amputation, right foot debridement    Risks and benefits explained with the patient including risks for infection, bleeding, injury to surrounding structures, hematoma/seroma formation with need for possible evacuation, possible open.  The patient verbalized understanding, agrees and wishes to proceed with surgery.    Patient will be admitted for IV antibiotics to allow the area to heal; patient will need at least a right transmetatarsal amputation if the infection improved; if there has no improvement, patient will need a right below-the-knee amputation.      Consult hospitalist for medical management  2/28:  Status post debridement right foot wound.  Plan for OR Friday for amputation of 5th metatarsal.  Wound cultures pending.  Afebrile, white count 7.  Antibiotics managed by hospitalist  2/29:  Afebrile, white count 5.  Wound cultures grew staph aureus.  Currently covered with Zosyn and vanco.  OR tomorrow for right TMA.  Discharge planning started plan for home health        STELLA Weston  General Surgery  [facility]

## 2024-03-01 ENCOUNTER — ANESTHESIA EVENT (OUTPATIENT)
Dept: SURGERY | Facility: HOSPITAL | Age: 64
DRG: 240 | End: 2024-03-01
Payer: MEDICARE

## 2024-03-01 ENCOUNTER — ANESTHESIA (OUTPATIENT)
Dept: SURGERY | Facility: HOSPITAL | Age: 64
DRG: 240 | End: 2024-03-01
Payer: MEDICARE

## 2024-03-01 LAB
ANION GAP SERPL CALCULATED.3IONS-SCNC: 8 MMOL/L (ref 7–16)
BASOPHILS # BLD AUTO: 0.02 K/UL (ref 0–0.2)
BASOPHILS NFR BLD AUTO: 0.4 % (ref 0–1)
BUN SERPL-MCNC: 18 MG/DL (ref 7–18)
BUN/CREAT SERPL: 19 (ref 6–20)
CALCIUM SERPL-MCNC: 8.3 MG/DL (ref 8.5–10.1)
CHLORIDE SERPL-SCNC: 106 MMOL/L (ref 98–107)
CO2 SERPL-SCNC: 30 MMOL/L (ref 21–32)
CREAT SERPL-MCNC: 0.97 MG/DL (ref 0.7–1.3)
DIFFERENTIAL METHOD BLD: ABNORMAL
EGFR (NO RACE VARIABLE) (RUSH/TITUS): 87 ML/MIN/1.73M2
EOSINOPHIL # BLD AUTO: 0.21 K/UL (ref 0–0.5)
EOSINOPHIL NFR BLD AUTO: 4.2 % (ref 1–4)
ERYTHROCYTE [DISTWIDTH] IN BLOOD BY AUTOMATED COUNT: 11.6 % (ref 11.5–14.5)
ESTROGEN SERPL-MCNC: NORMAL PG/ML
GLUCOSE SERPL-MCNC: 243 MG/DL (ref 70–105)
GLUCOSE SERPL-MCNC: 250 MG/DL (ref 74–106)
GLUCOSE SERPL-MCNC: 276 MG/DL (ref 70–105)
GLUCOSE SERPL-MCNC: 279 MG/DL (ref 70–105)
GLUCOSE SERPL-MCNC: 298 MG/DL (ref 70–105)
GLUCOSE SERPL-MCNC: 300 MG/DL (ref 70–105)
HCT VFR BLD AUTO: 33.6 % (ref 40–54)
HGB BLD-MCNC: 11 G/DL (ref 13.5–18)
IMM GRANULOCYTES # BLD AUTO: 0.02 K/UL (ref 0–0.04)
IMM GRANULOCYTES NFR BLD: 0.4 % (ref 0–0.4)
INSULIN SERPL-ACNC: NORMAL U[IU]/ML
LAB AP GROSS DESCRIPTION: NORMAL
LAB AP LABORATORY NOTES: NORMAL
LYMPHOCYTES # BLD AUTO: 1.59 K/UL (ref 1–4.8)
LYMPHOCYTES NFR BLD AUTO: 31.9 % (ref 27–41)
MCH RBC QN AUTO: 30.9 PG (ref 27–31)
MCHC RBC AUTO-ENTMCNC: 32.7 G/DL (ref 32–36)
MCV RBC AUTO: 94.4 FL (ref 80–96)
MICROORGANISM SPEC CULT: ABNORMAL
MONOCYTES # BLD AUTO: 0.38 K/UL (ref 0–0.8)
MONOCYTES NFR BLD AUTO: 7.6 % (ref 2–6)
MPC BLD CALC-MCNC: 10.2 FL (ref 9.4–12.4)
NEUTROPHILS # BLD AUTO: 2.77 K/UL (ref 1.8–7.7)
NEUTROPHILS NFR BLD AUTO: 55.5 % (ref 53–65)
NRBC # BLD AUTO: 0 X10E3/UL
NRBC, AUTO (.00): 0 %
PLATELET # BLD AUTO: 214 K/UL (ref 150–400)
POTASSIUM SERPL-SCNC: 4.1 MMOL/L (ref 3.5–5.1)
RBC # BLD AUTO: 3.56 M/UL (ref 4.6–6.2)
SODIUM SERPL-SCNC: 140 MMOL/L (ref 136–145)
T3RU NFR SERPL: NORMAL %
VANCOMYCIN TROUGH SERPL-MCNC: 9.5 ΜG/ML (ref 10–20)
WBC # BLD AUTO: 4.99 K/UL (ref 4.5–11)

## 2024-03-01 PROCEDURE — 37000009 HC ANESTHESIA EA ADD 15 MINS: Performed by: STUDENT IN AN ORGANIZED HEALTH CARE EDUCATION/TRAINING PROGRAM

## 2024-03-01 PROCEDURE — 25000003 PHARM REV CODE 250: Performed by: NURSE ANESTHETIST, CERTIFIED REGISTERED

## 2024-03-01 PROCEDURE — 63600175 PHARM REV CODE 636 W HCPCS: Performed by: NURSE ANESTHETIST, CERTIFIED REGISTERED

## 2024-03-01 PROCEDURE — 71000033 HC RECOVERY, INTIAL HOUR: Performed by: STUDENT IN AN ORGANIZED HEALTH CARE EDUCATION/TRAINING PROGRAM

## 2024-03-01 PROCEDURE — 25000003 PHARM REV CODE 250: Performed by: STUDENT IN AN ORGANIZED HEALTH CARE EDUCATION/TRAINING PROGRAM

## 2024-03-01 PROCEDURE — 27201423 OPTIME MED/SURG SUP & DEVICES STERILE SUPPLY: Performed by: STUDENT IN AN ORGANIZED HEALTH CARE EDUCATION/TRAINING PROGRAM

## 2024-03-01 PROCEDURE — 94640 AIRWAY INHALATION TREATMENT: CPT

## 2024-03-01 PROCEDURE — D9220A PRA ANESTHESIA: Mod: ANES,,, | Performed by: ANESTHESIOLOGY

## 2024-03-01 PROCEDURE — 80048 BASIC METABOLIC PNL TOTAL CA: CPT | Performed by: STUDENT IN AN ORGANIZED HEALTH CARE EDUCATION/TRAINING PROGRAM

## 2024-03-01 PROCEDURE — 25000003 PHARM REV CODE 250: Performed by: HOSPITALIST

## 2024-03-01 PROCEDURE — 27000716 HC OXISENSOR PROBE, ANY SIZE: Performed by: NURSE ANESTHETIST, CERTIFIED REGISTERED

## 2024-03-01 PROCEDURE — 0Y6M0Z0 DETACHMENT AT RIGHT FOOT, COMPLETE, OPEN APPROACH: ICD-10-PCS | Performed by: STUDENT IN AN ORGANIZED HEALTH CARE EDUCATION/TRAINING PROGRAM

## 2024-03-01 PROCEDURE — 11000001 HC ACUTE MED/SURG PRIVATE ROOM

## 2024-03-01 PROCEDURE — 88305 TISSUE EXAM BY PATHOLOGIST: CPT | Mod: 26,,, | Performed by: PATHOLOGY

## 2024-03-01 PROCEDURE — 27000177 HC AIRWAY, LARYNGEAL MASK: Performed by: NURSE ANESTHETIST, CERTIFIED REGISTERED

## 2024-03-01 PROCEDURE — 36000707: Performed by: STUDENT IN AN ORGANIZED HEALTH CARE EDUCATION/TRAINING PROGRAM

## 2024-03-01 PROCEDURE — D9220A PRA ANESTHESIA: Mod: CRNA,,, | Performed by: NURSE ANESTHETIST, CERTIFIED REGISTERED

## 2024-03-01 PROCEDURE — 36000706: Performed by: STUDENT IN AN ORGANIZED HEALTH CARE EDUCATION/TRAINING PROGRAM

## 2024-03-01 PROCEDURE — 82962 GLUCOSE BLOOD TEST: CPT

## 2024-03-01 PROCEDURE — 63600175 PHARM REV CODE 636 W HCPCS: Performed by: FAMILY MEDICINE

## 2024-03-01 PROCEDURE — 27000510 HC BLANKET BAIR HUGGER ANY SIZE: Performed by: NURSE ANESTHETIST, CERTIFIED REGISTERED

## 2024-03-01 PROCEDURE — 99232 SBSQ HOSP IP/OBS MODERATE 35: CPT | Mod: ,,, | Performed by: FAMILY MEDICINE

## 2024-03-01 PROCEDURE — 85025 COMPLETE CBC W/AUTO DIFF WBC: CPT | Performed by: STUDENT IN AN ORGANIZED HEALTH CARE EDUCATION/TRAINING PROGRAM

## 2024-03-01 PROCEDURE — 88305 TISSUE EXAM BY PATHOLOGIST: CPT | Mod: TC,SUR | Performed by: STUDENT IN AN ORGANIZED HEALTH CARE EDUCATION/TRAINING PROGRAM

## 2024-03-01 PROCEDURE — 63600175 PHARM REV CODE 636 W HCPCS: Performed by: STUDENT IN AN ORGANIZED HEALTH CARE EDUCATION/TRAINING PROGRAM

## 2024-03-01 PROCEDURE — 28820 AMPUTATION OF TOE: CPT | Mod: RT,,, | Performed by: STUDENT IN AN ORGANIZED HEALTH CARE EDUCATION/TRAINING PROGRAM

## 2024-03-01 PROCEDURE — 37000008 HC ANESTHESIA 1ST 15 MINUTES: Performed by: STUDENT IN AN ORGANIZED HEALTH CARE EDUCATION/TRAINING PROGRAM

## 2024-03-01 PROCEDURE — 25000242 PHARM REV CODE 250 ALT 637 W/ HCPCS: Performed by: HOSPITALIST

## 2024-03-01 PROCEDURE — 99900035 HC TECH TIME PER 15 MIN (STAT)

## 2024-03-01 PROCEDURE — 94761 N-INVAS EAR/PLS OXIMETRY MLT: CPT

## 2024-03-01 PROCEDURE — 63600175 PHARM REV CODE 636 W HCPCS: Performed by: HOSPITALIST

## 2024-03-01 PROCEDURE — 80202 ASSAY OF VANCOMYCIN: CPT | Performed by: STUDENT IN AN ORGANIZED HEALTH CARE EDUCATION/TRAINING PROGRAM

## 2024-03-01 RX ORDER — PHENYLEPHRINE HYDROCHLORIDE 10 MG/ML
INJECTION INTRAVENOUS
Status: DISCONTINUED | OUTPATIENT
Start: 2024-03-01 | End: 2024-03-01

## 2024-03-01 RX ORDER — PROPOFOL 10 MG/ML
VIAL (ML) INTRAVENOUS
Status: DISCONTINUED | OUTPATIENT
Start: 2024-03-01 | End: 2024-03-01

## 2024-03-01 RX ORDER — EPHEDRINE SULFATE 50 MG/ML
INJECTION, SOLUTION INTRAVENOUS
Status: DISCONTINUED | OUTPATIENT
Start: 2024-03-01 | End: 2024-03-01

## 2024-03-01 RX ORDER — SODIUM CHLORIDE 9 MG/ML
INJECTION, SOLUTION INTRAVENOUS CONTINUOUS PRN
Status: DISCONTINUED | OUTPATIENT
Start: 2024-03-01 | End: 2024-03-01

## 2024-03-01 RX ORDER — CEFAZOLIN SODIUM 1 G/3ML
INJECTION, POWDER, FOR SOLUTION INTRAMUSCULAR; INTRAVENOUS
Status: DISCONTINUED | OUTPATIENT
Start: 2024-03-01 | End: 2024-03-01

## 2024-03-01 RX ORDER — LIDOCAINE HYDROCHLORIDE 20 MG/ML
INJECTION, SOLUTION EPIDURAL; INFILTRATION; INTRACAUDAL; PERINEURAL
Status: DISCONTINUED | OUTPATIENT
Start: 2024-03-01 | End: 2024-03-01

## 2024-03-01 RX ORDER — MUPIROCIN 20 MG/G
OINTMENT TOPICAL 2 TIMES DAILY
Status: DISCONTINUED | OUTPATIENT
Start: 2024-03-02 | End: 2024-03-06 | Stop reason: HOSPADM

## 2024-03-01 RX ORDER — MIDAZOLAM HYDROCHLORIDE 1 MG/ML
INJECTION INTRAMUSCULAR; INTRAVENOUS
Status: DISCONTINUED | OUTPATIENT
Start: 2024-03-01 | End: 2024-03-01

## 2024-03-01 RX ADMIN — EPHEDRINE SULFATE 50 MG: 50 INJECTION INTRAVENOUS at 11:03

## 2024-03-01 RX ADMIN — IPRATROPIUM BROMIDE AND ALBUTEROL SULFATE 3 ML: .5; 3 SOLUTION RESPIRATORY (INHALATION) at 12:03

## 2024-03-01 RX ADMIN — ESCITALOPRAM OXALATE 10 MG: 10 TABLET ORAL at 08:03

## 2024-03-01 RX ADMIN — PIPERACILLIN AND TAZOBACTAM 4.5 G: 4; .5 INJECTION, POWDER, FOR SOLUTION INTRAVENOUS; PARENTERAL at 01:03

## 2024-03-01 RX ADMIN — BUDESONIDE INHALATION 0.5 MG: 0.5 SUSPENSION RESPIRATORY (INHALATION) at 09:03

## 2024-03-01 RX ADMIN — GABAPENTIN 300 MG: 300 CAPSULE ORAL at 08:03

## 2024-03-01 RX ADMIN — CLOPIDOGREL BISULFATE 75 MG: 75 TABLET ORAL at 08:03

## 2024-03-01 RX ADMIN — PANTOPRAZOLE SODIUM 40 MG: 40 TABLET, DELAYED RELEASE ORAL at 08:03

## 2024-03-01 RX ADMIN — IPRATROPIUM BROMIDE AND ALBUTEROL SULFATE 3 ML: .5; 3 SOLUTION RESPIRATORY (INHALATION) at 07:03

## 2024-03-01 RX ADMIN — PHENYLEPHRINE HYDROCHLORIDE 200 MCG: 10 INJECTION INTRAVENOUS at 11:03

## 2024-03-01 RX ADMIN — SODIUM CHLORIDE: 9 INJECTION, SOLUTION INTRAVENOUS at 10:03

## 2024-03-01 RX ADMIN — AMLODIPINE BESYLATE 10 MG: 10 TABLET ORAL at 08:03

## 2024-03-01 RX ADMIN — PIPERACILLIN AND TAZOBACTAM 4.5 G: 4; .5 INJECTION, POWDER, FOR SOLUTION INTRAVENOUS; PARENTERAL at 05:03

## 2024-03-01 RX ADMIN — PIPERACILLIN AND TAZOBACTAM 4.5 G: 4; .5 INJECTION, POWDER, FOR SOLUTION INTRAVENOUS; PARENTERAL at 08:03

## 2024-03-01 RX ADMIN — PROPOFOL 100 MG: 10 INJECTION, EMULSION INTRAVENOUS at 10:03

## 2024-03-01 RX ADMIN — LISINOPRIL 10 MG: 10 TABLET ORAL at 08:03

## 2024-03-01 RX ADMIN — MORPHINE SULFATE 4 MG: 4 INJECTION, SOLUTION INTRAMUSCULAR; INTRAVENOUS at 08:03

## 2024-03-01 RX ADMIN — VANCOMYCIN HYDROCHLORIDE 1500 MG: 1 INJECTION, POWDER, LYOPHILIZED, FOR SOLUTION INTRAVENOUS at 01:03

## 2024-03-01 RX ADMIN — BUDESONIDE INHALATION 0.5 MG: 0.5 SUSPENSION RESPIRATORY (INHALATION) at 07:03

## 2024-03-01 RX ADMIN — MIDAZOLAM 2 MG: 1 INJECTION INTRAMUSCULAR; INTRAVENOUS at 10:03

## 2024-03-01 RX ADMIN — IPRATROPIUM BROMIDE AND ALBUTEROL SULFATE 3 ML: .5; 3 SOLUTION RESPIRATORY (INHALATION) at 09:03

## 2024-03-01 RX ADMIN — PHENYLEPHRINE HYDROCHLORIDE 200 MCG: 10 INJECTION INTRAVENOUS at 10:03

## 2024-03-01 RX ADMIN — MIRTAZAPINE 7.5 MG: 7.5 TABLET, FILM COATED ORAL at 08:03

## 2024-03-01 RX ADMIN — INSULIN DETEMIR 8 UNITS: 100 INJECTION, SOLUTION SUBCUTANEOUS at 08:03

## 2024-03-01 RX ADMIN — GABAPENTIN 300 MG: 300 CAPSULE ORAL at 03:03

## 2024-03-01 RX ADMIN — LIDOCAINE HYDROCHLORIDE 75 MG: 20 INJECTION, SOLUTION INTRAVENOUS at 10:03

## 2024-03-01 RX ADMIN — INSULIN ASPART 6 UNITS: 100 INJECTION, SOLUTION INTRAVENOUS; SUBCUTANEOUS at 07:03

## 2024-03-01 RX ADMIN — HYDROCODONE BITARTRATE AND ACETAMINOPHEN 1 TABLET: 10; 325 TABLET ORAL at 08:03

## 2024-03-01 RX ADMIN — HYDROCODONE BITARTRATE AND ACETAMINOPHEN 1 TABLET: 10; 325 TABLET ORAL at 01:03

## 2024-03-01 RX ADMIN — HYDROCODONE BITARTRATE AND ACETAMINOPHEN 1 TABLET: 10; 325 TABLET ORAL at 06:03

## 2024-03-01 RX ADMIN — SODIUM CHLORIDE, POTASSIUM CHLORIDE, SODIUM LACTATE AND CALCIUM CHLORIDE: 600; 310; 30; 20 INJECTION, SOLUTION INTRAVENOUS at 12:03

## 2024-03-01 RX ADMIN — VANCOMYCIN HYDROCHLORIDE 1500 MG: 1 INJECTION, POWDER, LYOPHILIZED, FOR SOLUTION INTRAVENOUS at 08:03

## 2024-03-01 RX ADMIN — METOPROLOL SUCCINATE 50 MG: 50 TABLET, EXTENDED RELEASE ORAL at 08:03

## 2024-03-01 RX ADMIN — CEFAZOLIN 2 G: 1 INJECTION, POWDER, FOR SOLUTION INTRAMUSCULAR; INTRAVENOUS; PARENTERAL at 10:03

## 2024-03-01 RX ADMIN — HYDROXYZINE PAMOATE 25 MG: 25 CAPSULE ORAL at 08:03

## 2024-03-01 RX ADMIN — ATORVASTATIN CALCIUM 80 MG: 80 TABLET, FILM COATED ORAL at 08:03

## 2024-03-01 RX ADMIN — ISOSORBIDE MONONITRATE 30 MG: 30 TABLET, EXTENDED RELEASE ORAL at 08:03

## 2024-03-01 RX ADMIN — ASPIRIN 81 MG CHEWABLE TABLET 81 MG: 81 TABLET CHEWABLE at 08:03

## 2024-03-01 NOTE — PROGRESS NOTES
To the OR for right foot transmetatarsal amputation.      Risks and benefits explained with the patient including risks for infection, bleeding, injury to surrounding structures, hematoma/seroma formation with need for possible evacuation, possible open.  The patient verbalized understanding, agrees and wishes to proceed with surgery.

## 2024-03-01 NOTE — OP NOTE
[facility]  Surgery Department  Operative Note    SUMMARY     Date of Procedure: 3/1/2024     Procedure: Procedure(s) (LRB):  LISFRANC AMPUTATION (Right)     Surgeon(s) and Role:     * Evelio Chiu, DO - Primary    Assisting Surgeon: None    Pre-Operative Diagnosis: Wound of foot [S91.309A]  Diabetic ulcer of toe of right foot associated with diabetes mellitus due to underlying condition, with necrosis of muscle [E08.621, L97.513]    Post-Operative Diagnosis: Post-Op Diagnosis Codes:     * Wound of foot [S91.309A]     * Diabetic ulcer of toe of right foot associated with diabetes mellitus due to underlying condition, with necrosis of muscle [E08.621, L97.513]    Anesthesia: General    Operative Findings (including complications, if any):  Due to amount of skin being somewhat ischemic and involved in the wound on the plantar surface, we performed a Lisfranc amputation of the right foot to allow for adequate skin coverage and closure    Description of Technical Procedures:  Patient was taken the operating room and placed on the operating table in the supine position.  Patient underwent general anesthesia per the anesthesia team.  The right leg was prepped and draped in usual sterile fashion.  A tourniquet was placed on the calf and inflated to 300 mm mercury pressure.  We then made an incision with a 15 blade scalpel across the dorsal surface of the foot with a fishmouth shaped incision extending to the plantar surface; we did exclude the skin that was involved in the wound and previous wounds on the foot that had not healed well.  Due to this, we decided to perform a Lisfranc amputation to allow for adequate coverage.  We used electrocautery to dissect down through the subcutaneous tissue and down to the muscle and down to the bone.  We then lifted the dorsal flap of skin all the way to the tarsal metatarsal joint and opened up those joint spaces and freed up the metatarsals.  We used  electrocautery to dissect posteriorly and inferiorly underneath the metatarsals and completely transected the end of the foot and sent this to pathology.  We took the tourniquet off at 20 minutes; bleeding controlled with electrocautery and 2-0 silk stick ties.  We irrigated the cavity and suctioned clear.  We then rotated the posterior flap over an approximated to the dorsal surface with 2-0 Vicryl simple interrupted sutures.  Skin edges approximated with skin staples.  2-0 nylon horizontal mattress sutures placed for retention sutures.  The area was cleaned and dried, sterile dressings applied.  Patient was awakened, extubated and taken the PACU in stable condition.  Patient tolerated procedure well.        Estimated Blood Loss (EBL): 50 mL           Implants: * No implants in log *    Specimens:   Specimen (24h ago, onward)       Start     Ordered    03/01/24 1133  Surgical Pathology  RELEASE UPON ORDERING         03/01/24 1133                            Condition: Good    Disposition: PACU - hemodynamically stable.    Attestation: I was present and scrubbed for the entire procedure.

## 2024-03-01 NOTE — ANESTHESIA PREPROCEDURE EVALUATION
03/01/2024  Navdeep Avery is a 63 y.o., male.      Pre-op Assessment    I have reviewed the Patient Summary Reports.     I have reviewed the Nursing Notes. I have reviewed the NPO Status.   I have reviewed the Medications.     Review of Systems  Anesthesia Hx:  No problems with previous Anesthesia             Denies Family Hx of Anesthesia complications.    Denies Personal Hx of Anesthesia complications.                    Social:  Smoker, Social Alcohol Use       Cardiovascular:  Exercise tolerance: poor   Hypertension  Past MI CAD      Angina    PVD hyperlipidemia   ECG has been reviewed.                          Pulmonary:   COPD   Shortness of breath  Sleep Apnea                Hepatic/GI:     GERD             Musculoskeletal:     Right foot - infected diabetic foot wound            Neurological:    Neuromuscular Disease,             Peripheral Neuropathy                          Endocrine:  Diabetes, poorly controlled, type 2           Psych:  Psychiatric History anxiety depression                Physical Exam  General: Well nourished, Cooperative and Alert    Airway:  Mallampati: II   Mouth Opening: Normal  TM Distance: Normal  Tongue: Normal  Neck ROM: Normal ROM    Chest/Lungs:  Clear to auscultation, Normal Respiratory Rate    Heart:  Rate: Normal  Rhythm: Regular Rhythm        Chemistry        Component Value Date/Time     03/01/2024 0310    K 4.1 03/01/2024 0310     03/01/2024 0310    CO2 30 03/01/2024 0310    BUN 18 03/01/2024 0310    CREATININE 0.97 03/01/2024 0310     (H) 03/01/2024 0310        Component Value Date/Time    CALCIUM 8.3 (L) 03/01/2024 0310    ALKPHOS 80 08/11/2023 0219    AST 14 (L) 08/11/2023 0219    ALT 17 08/11/2023 0219    BILITOT 0.2 08/11/2023 0219    ESTGFRAFRICA 130 01/25/2021 1000    EGFRNONAA 66 06/29/2022 0227        Lab Results   Component Value  Date    WBC 4.99 03/01/2024    HGB 11.0 (L) 03/01/2024    HCT 33.6 (L) 03/01/2024     03/01/2024     Results for orders placed or performed in visit on 01/24/24   EKG 12-lead    Collection Time: 01/24/24  1:48 PM    Narrative    Test Reason : I10,    Vent. Rate : 074 BPM     Atrial Rate : 074 BPM     P-R Int : 146 ms          QRS Dur : 100 ms      QT Int : 384 ms       P-R-T Axes : 071 070 110 degrees     QTc Int : 426 ms    Normal sinus rhythm  Nonspecific T wave abnormality  Abnormal ECG  When compared with ECG of 27-JUN-2023 08:58,  Criteria for Anterior infarct are no longer Present  Questionable change in The axis  Non-specific change in ST segment in Anterior leads  Confirmed by Db Ibarra MD ([id number]) on 1/25/2024 8:45:10 AM    Referred By:             Confirmed By:Db Ibarra MD         Anesthesia Plan  Type of Anesthesia, risks & benefits discussed:    Anesthesia Type: Gen Supraglottic Airway  Intra-op Monitoring Plan: Standard ASA Monitors  Post Op Pain Control Plan: multimodal analgesia  Induction:  IV  Informed Consent: Informed consent signed with the Patient and all parties understand the risks and agree with anesthesia plan.  All questions answered.   ASA Score: 3  Day of Surgery Review of History & Physical: H&P Update referred to the surgeon/provider.I have interviewed and examined the patient. I have reviewed the patient's H&P dated: There are no significant changes.     Ready For Surgery From Anesthesia Perspective.     .

## 2024-03-01 NOTE — PLAN OF CARE
My Care Plans  ReportAdult Inpatient Plan of Care     Diabetes Comorbidity     Gas Exchange Impaired     Skin Injury Risk Increased     Fall Injury Risk

## 2024-03-01 NOTE — TRANSFER OF CARE
"Anesthesia Transfer of Care Note    Patient: Navdeep Avery    Procedure(s) Performed: Procedure(s) (LRB):  AMPUTATION, FOOT, TRANSMETATARSAL; RIGHT LISFRANK AMPUTATION (Right)    Patient location: PACU    Anesthesia Type: general    Transport from OR: Transported from OR on 100% O2 by closed face mask with adequate spontaneous ventilation    Post pain: adequate analgesia    Post assessment: no apparent anesthetic complications    Post vital signs: stable    Level of consciousness: responds to stimulation    Nausea/Vomiting: no nausea/vomiting    Complications: none    Transfer of care protocol was followed      Last vitals: Visit Vitals  BP (!) 128/57 (BP Location: Right arm, Patient Position: Lying)   Pulse 75   Temp 37 °C (98.6 °F) (Oral)   Resp 10   Ht 5' 8" (1.727 m)   Wt 73.5 kg (162 lb)   SpO2 (!) 93%   BMI 24.63 kg/m²     "

## 2024-03-01 NOTE — PLAN OF CARE
Chart reviewed, pt went to surgery for transmetatarsal amputation today. SW will cont to follow for dc needs.

## 2024-03-01 NOTE — ANESTHESIA PROCEDURE NOTES
Intubation    Date/Time: 3/1/2024 10:37 AM    Performed by: Cristi Rosen CRNA  Authorized by: Arun Augustin DO    Intubation:     Induction:  Intravenous    Intubated:  Postinduction    Mask Ventilation:  Easy mask    Attempts:  1    Attempted By:  CRNA    Difficult Airway Encountered?: No      Complications:  None    Airway Device:  Supraglottic airway/LMA    Airway Device Size:  4.0    Style/Cuff Inflation:  Cuffed (inflated to minimal occlusive pressure)    Placement Verified By:  Capnometry    Complicating Factors:  None    Findings Post-Intubation:  BS equal bilateral

## 2024-03-02 LAB
BACTERIA SPEC ANAEROBE CULT: ABNORMAL
GLUCOSE SERPL-MCNC: 197 MG/DL (ref 70–105)
GLUCOSE SERPL-MCNC: 209 MG/DL (ref 70–105)
GLUCOSE SERPL-MCNC: 280 MG/DL (ref 70–105)
GLUCOSE SERPL-MCNC: 293 MG/DL (ref 70–105)

## 2024-03-02 PROCEDURE — 94640 AIRWAY INHALATION TREATMENT: CPT

## 2024-03-02 PROCEDURE — 63600175 PHARM REV CODE 636 W HCPCS: Performed by: FAMILY MEDICINE

## 2024-03-02 PROCEDURE — 11000001 HC ACUTE MED/SURG PRIVATE ROOM

## 2024-03-02 PROCEDURE — 25000003 PHARM REV CODE 250: Performed by: STUDENT IN AN ORGANIZED HEALTH CARE EDUCATION/TRAINING PROGRAM

## 2024-03-02 PROCEDURE — 63600175 PHARM REV CODE 636 W HCPCS: Performed by: STUDENT IN AN ORGANIZED HEALTH CARE EDUCATION/TRAINING PROGRAM

## 2024-03-02 PROCEDURE — 99900035 HC TECH TIME PER 15 MIN (STAT)

## 2024-03-02 PROCEDURE — 82962 GLUCOSE BLOOD TEST: CPT

## 2024-03-02 PROCEDURE — 25000003 PHARM REV CODE 250: Performed by: HOSPITALIST

## 2024-03-02 PROCEDURE — 63600175 PHARM REV CODE 636 W HCPCS: Performed by: HOSPITALIST

## 2024-03-02 PROCEDURE — 25000242 PHARM REV CODE 250 ALT 637 W/ HCPCS: Performed by: STUDENT IN AN ORGANIZED HEALTH CARE EDUCATION/TRAINING PROGRAM

## 2024-03-02 PROCEDURE — 25000003 PHARM REV CODE 250: Performed by: FAMILY MEDICINE

## 2024-03-02 PROCEDURE — 94761 N-INVAS EAR/PLS OXIMETRY MLT: CPT

## 2024-03-02 PROCEDURE — 25000242 PHARM REV CODE 250 ALT 637 W/ HCPCS: Performed by: HOSPITALIST

## 2024-03-02 PROCEDURE — 99232 SBSQ HOSP IP/OBS MODERATE 35: CPT | Mod: ,,, | Performed by: FAMILY MEDICINE

## 2024-03-02 RX ORDER — DIPHENHYDRAMINE HYDROCHLORIDE 50 MG/ML
25 INJECTION INTRAMUSCULAR; INTRAVENOUS EVERY 6 HOURS PRN
Status: DISCONTINUED | OUTPATIENT
Start: 2024-03-02 | End: 2024-03-06 | Stop reason: HOSPADM

## 2024-03-02 RX ORDER — IPRATROPIUM BROMIDE AND ALBUTEROL SULFATE 2.5; .5 MG/3ML; MG/3ML
3 SOLUTION RESPIRATORY (INHALATION) ONCE AS NEEDED
Status: DISCONTINUED | OUTPATIENT
Start: 2024-03-02 | End: 2024-03-06 | Stop reason: HOSPADM

## 2024-03-02 RX ORDER — MORPHINE SULFATE 10 MG/ML
4 INJECTION INTRAMUSCULAR; INTRAVENOUS; SUBCUTANEOUS EVERY 5 MIN PRN
Status: DISCONTINUED | OUTPATIENT
Start: 2024-03-02 | End: 2024-03-02

## 2024-03-02 RX ORDER — ONDANSETRON HYDROCHLORIDE 2 MG/ML
4 INJECTION, SOLUTION INTRAVENOUS DAILY PRN
Status: DISCONTINUED | OUTPATIENT
Start: 2024-03-02 | End: 2024-03-02

## 2024-03-02 RX ORDER — ROPINIROLE 0.25 MG/1
0.25 TABLET, FILM COATED ORAL ONCE
Status: DISCONTINUED | OUTPATIENT
Start: 2024-03-02 | End: 2024-03-02

## 2024-03-02 RX ORDER — HYDROMORPHONE HYDROCHLORIDE 2 MG/ML
0.5 INJECTION, SOLUTION INTRAMUSCULAR; INTRAVENOUS; SUBCUTANEOUS EVERY 5 MIN PRN
Status: DISCONTINUED | OUTPATIENT
Start: 2024-03-02 | End: 2024-03-02

## 2024-03-02 RX ORDER — MEPERIDINE HYDROCHLORIDE 25 MG/ML
25 INJECTION INTRAMUSCULAR; INTRAVENOUS; SUBCUTANEOUS ONCE AS NEEDED
Status: ACTIVE | OUTPATIENT
Start: 2024-03-02 | End: 2024-03-03

## 2024-03-02 RX ORDER — ROPINIROLE 0.25 MG/1
0.25 TABLET, FILM COATED ORAL NIGHTLY
Status: DISCONTINUED | OUTPATIENT
Start: 2024-03-02 | End: 2024-03-02

## 2024-03-02 RX ADMIN — LISINOPRIL 10 MG: 10 TABLET ORAL at 08:03

## 2024-03-02 RX ADMIN — BUDESONIDE INHALATION 0.5 MG: 0.5 SUSPENSION RESPIRATORY (INHALATION) at 08:03

## 2024-03-02 RX ADMIN — ROPINIROLE 0.25 MG: 0.25 TABLET, FILM COATED ORAL at 05:03

## 2024-03-02 RX ADMIN — MUPIROCIN: 20 OINTMENT TOPICAL at 10:03

## 2024-03-02 RX ADMIN — IPRATROPIUM BROMIDE AND ALBUTEROL SULFATE 3 ML: .5; 3 SOLUTION RESPIRATORY (INHALATION) at 07:03

## 2024-03-02 RX ADMIN — INSULIN ASPART 4 UNITS: 100 INJECTION, SOLUTION INTRAVENOUS; SUBCUTANEOUS at 06:03

## 2024-03-02 RX ADMIN — VANCOMYCIN HYDROCHLORIDE 1500 MG: 1 INJECTION, POWDER, LYOPHILIZED, FOR SOLUTION INTRAVENOUS at 03:03

## 2024-03-02 RX ADMIN — ASPIRIN 81 MG CHEWABLE TABLET 81 MG: 81 TABLET CHEWABLE at 08:03

## 2024-03-02 RX ADMIN — PIPERACILLIN AND TAZOBACTAM 4.5 G: 4; .5 INJECTION, POWDER, FOR SOLUTION INTRAVENOUS; PARENTERAL at 06:03

## 2024-03-02 RX ADMIN — BUDESONIDE INHALATION 0.5 MG: 0.5 SUSPENSION RESPIRATORY (INHALATION) at 07:03

## 2024-03-02 RX ADMIN — HYDROXYZINE PAMOATE 25 MG: 25 CAPSULE ORAL at 08:03

## 2024-03-02 RX ADMIN — MORPHINE SULFATE 4 MG: 4 INJECTION, SOLUTION INTRAMUSCULAR; INTRAVENOUS at 03:03

## 2024-03-02 RX ADMIN — CLOPIDOGREL BISULFATE 75 MG: 75 TABLET ORAL at 08:03

## 2024-03-02 RX ADMIN — INSULIN DETEMIR 8 UNITS: 100 INJECTION, SOLUTION SUBCUTANEOUS at 09:03

## 2024-03-02 RX ADMIN — INSULIN DETEMIR 8 UNITS: 100 INJECTION, SOLUTION SUBCUTANEOUS at 10:03

## 2024-03-02 RX ADMIN — INSULIN ASPART 2 UNITS: 100 INJECTION, SOLUTION INTRAVENOUS; SUBCUTANEOUS at 12:03

## 2024-03-02 RX ADMIN — ESCITALOPRAM OXALATE 10 MG: 10 TABLET ORAL at 08:03

## 2024-03-02 RX ADMIN — GABAPENTIN 300 MG: 300 CAPSULE ORAL at 08:03

## 2024-03-02 RX ADMIN — IPRATROPIUM BROMIDE AND ALBUTEROL SULFATE 3 ML: .5; 3 SOLUTION RESPIRATORY (INHALATION) at 01:03

## 2024-03-02 RX ADMIN — GABAPENTIN 300 MG: 300 CAPSULE ORAL at 10:03

## 2024-03-02 RX ADMIN — ISOSORBIDE MONONITRATE 30 MG: 30 TABLET, EXTENDED RELEASE ORAL at 08:03

## 2024-03-02 RX ADMIN — HYDROCODONE BITARTRATE AND ACETAMINOPHEN 1 TABLET: 10; 325 TABLET ORAL at 12:03

## 2024-03-02 RX ADMIN — HYDROCODONE BITARTRATE AND ACETAMINOPHEN 1 TABLET: 10; 325 TABLET ORAL at 06:03

## 2024-03-02 RX ADMIN — PIPERACILLIN AND TAZOBACTAM 4.5 G: 4; .5 INJECTION, POWDER, FOR SOLUTION INTRAVENOUS; PARENTERAL at 10:03

## 2024-03-02 RX ADMIN — AMLODIPINE BESYLATE 10 MG: 10 TABLET ORAL at 08:03

## 2024-03-02 RX ADMIN — GABAPENTIN 300 MG: 300 CAPSULE ORAL at 02:03

## 2024-03-02 RX ADMIN — SODIUM CHLORIDE, POTASSIUM CHLORIDE, SODIUM LACTATE AND CALCIUM CHLORIDE: 600; 310; 30; 20 INJECTION, SOLUTION INTRAVENOUS at 05:03

## 2024-03-02 RX ADMIN — PIPERACILLIN AND TAZOBACTAM 4.5 G: 4; .5 INJECTION, POWDER, FOR SOLUTION INTRAVENOUS; PARENTERAL at 01:03

## 2024-03-02 RX ADMIN — ATORVASTATIN CALCIUM 80 MG: 80 TABLET, FILM COATED ORAL at 10:03

## 2024-03-02 RX ADMIN — HYDROCODONE BITARTRATE AND ACETAMINOPHEN 1 TABLET: 10; 325 TABLET ORAL at 04:03

## 2024-03-02 RX ADMIN — IPRATROPIUM BROMIDE AND ALBUTEROL SULFATE 3 ML: .5; 3 SOLUTION RESPIRATORY (INHALATION) at 08:03

## 2024-03-02 RX ADMIN — MIRTAZAPINE 7.5 MG: 7.5 TABLET, FILM COATED ORAL at 10:03

## 2024-03-02 RX ADMIN — MUPIROCIN: 20 OINTMENT TOPICAL at 08:03

## 2024-03-02 RX ADMIN — HYDROCODONE BITARTRATE AND ACETAMINOPHEN 1 TABLET: 10; 325 TABLET ORAL at 10:03

## 2024-03-02 RX ADMIN — METOPROLOL SUCCINATE 50 MG: 50 TABLET, EXTENDED RELEASE ORAL at 08:03

## 2024-03-02 RX ADMIN — PANTOPRAZOLE SODIUM 40 MG: 40 TABLET, DELAYED RELEASE ORAL at 08:03

## 2024-03-02 NOTE — PROGRESS NOTES
[facility]  [facility]  Progress Note    Patient Name: Navdeep Avery  MRN: [id number]  Patient Class: IP- Inpatient   Admission Date: 2/27/2024  Length of Stay: 3 days  Attending Physician: Evelio Chiu DO  Primary Care Provider: Marquez Huff MD        Subjective:     Principal Problem:Diabetic ulcer of toe associated with diabetes mellitus due to underlying condition, with necrosis of muscle        HPI:  Patient is a 63 year old male that had his Right great toe amputated by Dr. Chiu today. He has had reported wound issues on his feet, likely a complication from his poorly controlled diabetes and atherosclerosis . His wounds have been managed by wound care and seen in clinic by Dr. Chiu in the past. He stated that he has had redness across the foot and swelling. X-rays performed showed no definite evidence of osteomyelitis. After further evaluation it was determined that the right 5th toe necrotic and had purulent drainage in the right 5th metatarsophalangeal joint. A joint decision was made for amputation.    Hospital medicine team was consulted to manage the patient's other chronic health care related issues.    Overview/Hospital Course:  63 year old male with a PMH of DM, HTN, COPD, and  CAD s/p CABG who presents to the Vascular Surgery Service for pseudoaneurysm repair.  Hospitalist was consulted for medical management.  Patient denies chest pain, shortness of breath, nausea, vomiting, or diarrhea.      Interval History:     No significant events overnight. To OR today for further amputation. Will consult case management for discharge planning as will benefit from SWB vs home health.    Review of Systems   Constitutional: Negative.  Negative for chills and fever.   HENT: Negative.     Respiratory:  Negative for cough, chest tightness and shortness of breath.    Cardiovascular:  Negative for chest pain and palpitations.   Gastrointestinal:  Negative for abdominal  pain, diarrhea, nausea and vomiting.   Endocrine: Negative.    Genitourinary: Negative.    Musculoskeletal:  Negative for arthralgias and myalgias.   Skin:  Negative for rash.   Allergic/Immunologic: Negative.    Neurological:  Negative for dizziness, syncope, weakness, numbness and headaches.   Psychiatric/Behavioral:  Negative for confusion and sleep disturbance.      Objective:     Vital Signs (Most Recent):  Temp: 98 °F (36.7 °C) (02/29/24 1412)  Pulse: 69 (02/29/24 1412)  Resp: 18 (02/29/24 1412)  BP: 117/67 (02/29/24 1412)  SpO2: (!) 93 % (02/29/24 1412) Vital Signs (24h Range):  Temp:  [98 °F (36.7 °C)-98.9 °F (37.2 °C)] 98 °F (36.7 °C)  Pulse:  [62-74] 69  Resp:  [12-18] 18  SpO2:  [91 %-94 %] 93 %  BP: (113-136)/(45-67) 117/67     Weight: 73.5 kg (162 lb)  Body mass index is 24.63 kg/m².    Intake/Output Summary (Last 24 hours) at 2/29/2024 1511  Last data filed at 2/29/2024 0744  Gross per 24 hour   Intake 1800 ml   Output 250 ml   Net 1550 ml         Physical Exam  Constitutional:       General: He is not in acute distress.     Appearance: Normal appearance. He is not ill-appearing.   HENT:      Head: Normocephalic and atraumatic.   Cardiovascular:      Rate and Rhythm: Normal rate and regular rhythm.   Pulmonary:      Effort: Pulmonary effort is normal. No respiratory distress.   Musculoskeletal:      Cervical back: No rigidity.      Comments: R LE surgical dressing clean, dry, intact   Skin:     Coloration: Skin is not jaundiced or pale.      Findings: No rash.   Neurological:      Mental Status: He is alert. Mental status is at baseline.   Psychiatric:         Behavior: Behavior normal.         Thought Content: Thought content normal.             Significant Labs: All pertinent labs within the past 24 hours have been reviewed.    Significant Imaging: I have reviewed all pertinent imaging results/findings within the past 24 hours.    Assessment/Plan:      * Diabetic ulcer of toe associated with diabetes  "mellitus due to underlying condition, with necrosis of muscle  Wound cultures with MRSA only, anaerobic cultures pending. Continue vancomycin.     To OR today for amputation.     Type 2 diabetes mellitus with hyperglycemia  Patient's FSGs are uncontrolled due to hyperglycemia on current medication regimen.  Last A1c reviewed-   Lab Results   Component Value Date    HGBA1C 9.7 (H) 08/10/2023     Most recent fingerstick glucose reviewed- No results for input(s): "POCTGLUCOSE" in the last 24 hours.  Current correctional scale  Medium  Increase anti-hyperglycemic dose as follows-   Antihyperglycemics (From admission, onward)      Start     Stop Route Frequency Ordered    02/27/24 1411  insulin aspart U-100 injection 0-10 Units         -- SubQ Before meals & nightly PRN 02/27/24 1314          Hold Oral hypoglycemics while patient is in the hospital.      Coronary artery disease involving coronary bypass graft of native heart  Continue home meds; BP stable    Sleep apnea  Will order home CPAP      COPD (chronic obstructive pulmonary disease)  Patient's COPD is controlled currently.  Patient is currently off COPD Pathway. Continue scheduled inhalers  supplemental O2 PRN  and monitor respiratory status closely.       VTE Risk Mitigation (From admission, onward)           Ordered     Place sequential compression device  Until discontinued         02/27/24 1314                    Discharge Planning   MARGARITO:      Code Status: Full Code   Is the patient medically ready for discharge?:     Reason for patient still in hospital (select all that apply): Treatment  Discharge Plan A: Home Health, Home with family                  Lillian Rose,   Department of Hospital Medicine   [facility]    "

## 2024-03-02 NOTE — SUBJECTIVE & OBJECTIVE
Interval History:     No significant events overnight, no new complaints this morning. Post-op #1 Lisfranc amputation. PT/OT ordered.     Review of Systems   Constitutional: Negative.  Negative for chills and fever.   HENT: Negative.     Respiratory:  Negative for cough, chest tightness and shortness of breath.    Cardiovascular:  Negative for chest pain and palpitations.   Gastrointestinal:  Negative for abdominal pain, diarrhea, nausea and vomiting.   Endocrine: Negative.    Genitourinary: Negative.    Musculoskeletal:  Negative for arthralgias and myalgias.   Skin:  Negative for rash.   Allergic/Immunologic: Negative.    Neurological:  Negative for dizziness, syncope, weakness, numbness and headaches.   Psychiatric/Behavioral:  Negative for confusion and sleep disturbance.      Objective:     Vital Signs (Most Recent):  Temp: 97.5 °F (36.4 °C) (03/02/24 1425)  Pulse: 63 (03/02/24 1425)  Resp: 18 (03/02/24 1500)  BP: 117/65 (03/02/24 1425)  SpO2: 95 % (03/02/24 1425) Vital Signs (24h Range):  Temp:  [97.5 °F (36.4 °C)-98.2 °F (36.8 °C)] 97.5 °F (36.4 °C)  Pulse:  [56-70] 63  Resp:  [6-20] 18  SpO2:  [93 %-97 %] 95 %  BP: (109-135)/(53-74) 117/65     Weight: 73.5 kg (162 lb)  Body mass index is 24.63 kg/m².  No intake or output data in the 24 hours ending 03/02/24 1542        Physical Exam  Constitutional:       General: He is not in acute distress.     Appearance: Normal appearance. He is not ill-appearing.   HENT:      Head: Normocephalic and atraumatic.   Cardiovascular:      Rate and Rhythm: Normal rate and regular rhythm.   Pulmonary:      Effort: Pulmonary effort is normal. No respiratory distress.   Musculoskeletal:      Cervical back: No rigidity.      Comments: R LE surgical dressing clean, dry, intact   Skin:     Coloration: Skin is not jaundiced or pale.      Findings: No rash.   Neurological:      Mental Status: He is alert. Mental status is at baseline.   Psychiatric:         Behavior: Behavior normal.          Thought Content: Thought content normal.             Significant Labs: All pertinent labs within the past 24 hours have been reviewed.    Significant Imaging: I have reviewed all pertinent imaging results/findings within the past 24 hours.

## 2024-03-02 NOTE — ASSESSMENT & PLAN NOTE
Patient's COPD is controlled currently.  Patient is currently off COPD Pathway. Continue scheduled inhalers  supplemental O2 PRN  and monitor respiratory status closely.

## 2024-03-02 NOTE — PROGRESS NOTES
[facility]  General Surgery  Progress Note    Subjective:     Interval History:  Patient doing well no acute events overnight.    Post-Op Info:  Procedure(s) (LRB):  AMPUTATION, FOOT, TRANSMETATARSAL; RIGHT LISFRANK AMPUTATION (Right)   1 Day Post-Op      Medications:  Continuous Infusions:   lactated ringers 100 mL/hr at 03/01/24 0023     Scheduled Meds:   albuterol-ipratropium  3 mL Nebulization Q6H    amLODIPine  10 mg Oral Daily    aspirin  81 mg Oral Daily    atorvastatin  80 mg Oral QHS    budesonide  0.5 mg Nebulization Q12H    clopidogreL  75 mg Oral Daily    EScitalopram oxalate  10 mg Oral Daily    gabapentin  300 mg Oral TID    hydrOXYzine pamoate  25 mg Oral Daily    insulin detemir U-100  8 Units Subcutaneous BID    isosorbide mononitrate  30 mg Oral Daily    lisinopriL  10 mg Oral Daily    metoprolol succinate  50 mg Oral Daily    mirtazapine  7.5 mg Oral Nightly    mupirocin   Nasal BID    pantoprazole  40 mg Oral Daily    piperacillin-tazobactam (Zosyn) IV (PEDS and ADULTS) (extended infusion is not appropriate)  4.5 g Intravenous Q8H    vancomycin (VANCOCIN) IV (PEDS and ADULTS)  20 mg/kg Intravenous Q18H     PRN Meds:acetaminophen, acetaminophen, acetaminophen, albuterol-ipratropium, aluminum-magnesium hydroxide-simethicone, dextrose 10%, dextrose 10%, diphenhydrAMINE, glucagon (human recombinant), glucose, glucose, HYDROcodone-acetaminophen, HYDROcodone-acetaminophen, HYDROmorphone, insulin aspart U-100, melatonin, meperidine, morphine, naloxone, nitroGLYCERIN, ondansetron, prochlorperazine, sodium chloride 0.9%, vancomycin - pharmacy to dose     Objective:     Vital Signs (Most Recent):  Temp: 98.1 °F (36.7 °C) (03/02/24 1039)  Pulse: 60 (checked manually left radial) (03/02/24 1100)  Resp: 20 (03/02/24 1039)  BP: 135/74 (03/02/24 1039)  SpO2: 95 % (03/02/24 1039) Vital Signs (24h Range):  Temp:  [97.8 °F (36.6 °C)-98.6 °F (37 °C)] 98.1 °F (36.7 °C)  Pulse:  [56-76]  "60  Resp:  [6-20] 20  SpO2:  [90 %-98 %] 95 %  BP: (109-159)/(42-78) 135/74       Intake/Output Summary (Last 24 hours) at 3/2/2024 1114  Last data filed at 3/1/2024 1158  Gross per 24 hour   Intake --   Output 50 ml   Net -50 ml       Physical Exam  Constitutional:       General: He is not in acute distress.  HENT:      Head: Normocephalic.   Cardiovascular:      Rate and Rhythm: Normal rate and regular rhythm.      Pulses: Normal pulses.   Pulmonary:      Effort: Pulmonary effort is normal. No respiratory distress.      Breath sounds: Normal breath sounds.   Abdominal:      General: Abdomen is flat. There is no distension.      Palpations: Abdomen is soft.      Tenderness: There is no abdominal tenderness.   Musculoskeletal:         General: Normal range of motion.   Skin:     General: Skin is warm.      Findings: Lesion (Dressing clean dry and) present.   Neurological:      General: No focal deficit present.      Mental Status: He is oriented to person, place, and time.         Significant Labs:  Cardiac markers: No results for input(s): "CKMB", "CPKMB", "TROPONINT", "TROPONINI", "MYOGLOBIN" in the last 48 hours.  CBC:   Recent Labs   Lab 03/01/24  0310   WBC 4.99   RBC 3.56*   HGB 11.0*   HCT 33.6*      MCV 94.4   MCH 30.9   MCHC 32.7     CMP:   Recent Labs   Lab 03/01/24  0310   *   CALCIUM 8.3*      K 4.1   CO2 30      BUN 18   CREATININE 0.97     Coagulation: No results for input(s): "PT", "INR", "APTT" in the last 48 hours.  LFTs: No results for input(s): "ALT", "AST", "ALKPHOS", "BILITOT", "PROT", "ALBUMIN" in the last 48 hours.    Significant Diagnostics:  None    Assessment/Plan:     Active Diagnoses:    Diagnosis Date Noted POA    PRINCIPAL PROBLEM:  Diabetic ulcer of toe associated with diabetes mellitus due to underlying condition, with necrosis of muscle [E08.621, L97.503]  Yes    Type 2 diabetes mellitus with hyperglycemia [E11.65] 02/28/2024 Yes    Coronary artery disease " involving coronary bypass graft of native heart [I25.810] 06/27/2023 Yes    Sleep apnea [G47.30] 09/07/2022 Yes    COPD (chronic obstructive pulmonary disease) [J44.9] 06/24/2022 Yes      Problems Resolved During this Admission:    Diagnosis Date Noted Date Resolved POA    Mixed hyperlipidemia [E78.2] 03/23/2021 02/28/2024 Yes     Will look at the dressing on Monday hopefully discharge then      Forrest Kiser MD  General Surgery  [facility]

## 2024-03-02 NOTE — ASSESSMENT & PLAN NOTE
"Patient's FSGs are uncontrolled due to hyperglycemia on current medication regimen.  Last A1c reviewed-   Lab Results   Component Value Date    HGBA1C 9.7 (H) 08/10/2023     Most recent fingerstick glucose reviewed- No results for input(s): "POCTGLUCOSE" in the last 24 hours.  Current correctional scale  Medium  Increase anti-hyperglycemic dose as follows-   Antihyperglycemics (From admission, onward)      Start     Stop Route Frequency Ordered    03/02/24 0900  insulin detemir U-100 injection 8 Units         -- SubQ 2 times daily 03/02/24 0847    02/27/24 1411  insulin aspart U-100 injection 0-10 Units         -- SubQ Before meals & nightly PRN 02/27/24 1314          Hold Oral hypoglycemics while patient is in the hospital.    "

## 2024-03-02 NOTE — PROGRESS NOTES
[facility]  [facility] Medicine  Progress Note    Patient Name: Navdeep Avery  MRN: [id number]  Patient Class: IP- Inpatient   Admission Date: 2/27/2024  Length of Stay: 4 days  Attending Physician: Evelio Chiu DO  Primary Care Provider: Marquez Huff MD        Subjective:     Principal Problem:Diabetic ulcer of toe associated with diabetes mellitus due to underlying condition, with necrosis of muscle        HPI:  Patient is a 63 year old male that had his Right great toe amputated by Dr. Chui today. He has had reported wound issues on his feet, likely a complication from his poorly controlled diabetes and atherosclerosis . His wounds have been managed by wound care and seen in clinic by Dr. Chiu in the past. He stated that he has had redness across the foot and swelling. X-rays performed showed no definite evidence of osteomyelitis. After further evaluation it was determined that the right 5th toe necrotic and had purulent drainage in the right 5th metatarsophalangeal joint. A joint decision was made for amputation.    Hospital medicine team was consulted to manage the patient's other chronic health care related issues.    Overview/Hospital Course:  63 year old male with a PMH of DM, HTN, COPD, and  CAD s/p CABG who presents to the Vascular Surgery Service for pseudoaneurysm repair.  Hospitalist was consulted for medical management.  Patient denies chest pain, shortness of breath, nausea, vomiting, or diarrhea.      Interval History:     No significant events overnight, no new complaints this morning. Post-op #1 Lisfranc amputation. PT/OT ordered.     Review of Systems   Constitutional: Negative.  Negative for chills and fever.   HENT: Negative.     Respiratory:  Negative for cough, chest tightness and shortness of breath.    Cardiovascular:  Negative for chest pain and palpitations.   Gastrointestinal:  Negative for abdominal pain, diarrhea, nausea and vomiting.    Endocrine: Negative.    Genitourinary: Negative.    Musculoskeletal:  Negative for arthralgias and myalgias.   Skin:  Negative for rash.   Allergic/Immunologic: Negative.    Neurological:  Negative for dizziness, syncope, weakness, numbness and headaches.   Psychiatric/Behavioral:  Negative for confusion and sleep disturbance.      Objective:     Vital Signs (Most Recent):  Temp: 97.5 °F (36.4 °C) (03/02/24 1425)  Pulse: 63 (03/02/24 1425)  Resp: 18 (03/02/24 1500)  BP: 117/65 (03/02/24 1425)  SpO2: 95 % (03/02/24 1425) Vital Signs (24h Range):  Temp:  [97.5 °F (36.4 °C)-98.2 °F (36.8 °C)] 97.5 °F (36.4 °C)  Pulse:  [56-70] 63  Resp:  [6-20] 18  SpO2:  [93 %-97 %] 95 %  BP: (109-135)/(53-74) 117/65     Weight: 73.5 kg (162 lb)  Body mass index is 24.63 kg/m².  No intake or output data in the 24 hours ending 03/02/24 1542        Physical Exam  Constitutional:       General: He is not in acute distress.     Appearance: Normal appearance. He is not ill-appearing.   HENT:      Head: Normocephalic and atraumatic.   Cardiovascular:      Rate and Rhythm: Normal rate and regular rhythm.   Pulmonary:      Effort: Pulmonary effort is normal. No respiratory distress.   Musculoskeletal:      Cervical back: No rigidity.      Comments: R LE surgical dressing clean, dry, intact   Skin:     Coloration: Skin is not jaundiced or pale.      Findings: No rash.   Neurological:      Mental Status: He is alert. Mental status is at baseline.   Psychiatric:         Behavior: Behavior normal.         Thought Content: Thought content normal.             Significant Labs: All pertinent labs within the past 24 hours have been reviewed.    Significant Imaging: I have reviewed all pertinent imaging results/findings within the past 24 hours.    Assessment/Plan:      * Diabetic ulcer of toe associated with diabetes mellitus due to underlying condition, with necrosis of muscle  Now status-post Lisfranc amputation. Case management assist with  "discharge planning, SWB vs home health.    Wound cultures with MRSA only, anaerobic cultures pending. Continue vancomycin.         Type 2 diabetes mellitus with hyperglycemia  Patient's FSGs are uncontrolled due to hyperglycemia on current medication regimen.  Last A1c reviewed-   Lab Results   Component Value Date    HGBA1C 9.7 (H) 08/10/2023     Most recent fingerstick glucose reviewed- No results for input(s): "POCTGLUCOSE" in the last 24 hours.  Current correctional scale  Medium  Increase anti-hyperglycemic dose as follows-   Antihyperglycemics (From admission, onward)      Start     Stop Route Frequency Ordered    03/02/24 0900  insulin detemir U-100 injection 8 Units         -- SubQ 2 times daily 03/02/24 0847    02/27/24 1411  insulin aspart U-100 injection 0-10 Units         -- SubQ Before meals & nightly PRN 02/27/24 1314          Hold Oral hypoglycemics while patient is in the hospital.      Coronary artery disease involving coronary bypass graft of native heart  Patient with known CAD s/p CABG, which is controlled Will continue ASA, Plavix, and Statin and monitor for S/Sx of angina/ACS. Continue to monitor on telemetry.     Sleep apnea  Will order home CPAP      COPD (chronic obstructive pulmonary disease)  Patient's COPD is controlled currently.  Patient is currently off COPD Pathway. Continue scheduled inhalers  supplemental O2 PRN  and monitor respiratory status closely.       VTE Risk Mitigation (From admission, onward)           Ordered     Place sequential compression device  Until discontinued         02/27/24 1314                    Discharge Planning   MARGARITO:      Code Status: Full Code   Is the patient medically ready for discharge?:     Reason for patient still in hospital (select all that apply): Treatment  Discharge Plan A: Home Health, Home with family                  Lillian Rose,   Department of Hospital Medicine   [facility]    "

## 2024-03-02 NOTE — PLAN OF CARE
Problem: Adult Inpatient Plan of Care  Goal: Absence of Hospital-Acquired Illness or Injury  Outcome: Ongoing, Progressing     Problem: Adult Inpatient Plan of Care  Goal: Optimal Comfort and Wellbeing  Outcome: Ongoing, Progressing     Problem: Diabetes Comorbidity  Goal: Blood Glucose Level Within Targeted Range  Outcome: Ongoing, Progressing     Problem: Skin Injury Risk Increased  Goal: Skin Health and Integrity  Outcome: Ongoing, Progressing     Problem: Fall Injury Risk  Goal: Absence of Fall and Fall-Related Injury  Outcome: Ongoing, Progressing

## 2024-03-02 NOTE — ASSESSMENT & PLAN NOTE
Now status-post Lisfranc amputation. Case management assist with discharge planning, SWB vs home health.    Wound cultures with MRSA only, anaerobic cultures pending. Continue vancomycin.

## 2024-03-02 NOTE — ASSESSMENT & PLAN NOTE
"Patient's FSGs are uncontrolled due to hyperglycemia on current medication regimen.  Last A1c reviewed-   Lab Results   Component Value Date    HGBA1C 9.7 (H) 08/10/2023     Most recent fingerstick glucose reviewed- No results for input(s): "POCTGLUCOSE" in the last 24 hours.  Current correctional scale  Medium  Increase anti-hyperglycemic dose as follows-   Antihyperglycemics (From admission, onward)      Start     Stop Route Frequency Ordered    02/27/24 1411  insulin aspart U-100 injection 0-10 Units         -- SubQ Before meals & nightly PRN 02/27/24 1314          Hold Oral hypoglycemics while patient is in the hospital.    "

## 2024-03-02 NOTE — ASSESSMENT & PLAN NOTE
Wound cultures with MRSA only, anaerobic cultures pending. Continue vancomycin.     To OR today for amputation.

## 2024-03-03 LAB
BUN SERPL-MCNC: 12 MG/DL (ref 7–18)
BUN/CREAT SERPL: 13 (ref 6–20)
CREAT SERPL-MCNC: 0.92 MG/DL (ref 0.7–1.3)
EGFR (NO RACE VARIABLE) (RUSH/TITUS): 92 ML/MIN/1.73M2
GLUCOSE SERPL-MCNC: 145 MG/DL (ref 70–105)
GLUCOSE SERPL-MCNC: 165 MG/DL (ref 70–105)
GLUCOSE SERPL-MCNC: 200 MG/DL (ref 70–105)
GLUCOSE SERPL-MCNC: 248 MG/DL (ref 70–105)
GLUCOSE SERPL-MCNC: 375 MG/DL (ref 70–105)
VANCOMYCIN TROUGH SERPL-MCNC: 11.7 ΜG/ML (ref 10–20)

## 2024-03-03 PROCEDURE — 25000003 PHARM REV CODE 250: Performed by: STUDENT IN AN ORGANIZED HEALTH CARE EDUCATION/TRAINING PROGRAM

## 2024-03-03 PROCEDURE — 63600175 PHARM REV CODE 636 W HCPCS: Performed by: STUDENT IN AN ORGANIZED HEALTH CARE EDUCATION/TRAINING PROGRAM

## 2024-03-03 PROCEDURE — 63600175 PHARM REV CODE 636 W HCPCS: Performed by: FAMILY MEDICINE

## 2024-03-03 PROCEDURE — 97161 PT EVAL LOW COMPLEX 20 MIN: CPT

## 2024-03-03 PROCEDURE — 11000001 HC ACUTE MED/SURG PRIVATE ROOM

## 2024-03-03 PROCEDURE — 99900035 HC TECH TIME PER 15 MIN (STAT)

## 2024-03-03 PROCEDURE — 94761 N-INVAS EAR/PLS OXIMETRY MLT: CPT

## 2024-03-03 PROCEDURE — 97165 OT EVAL LOW COMPLEX 30 MIN: CPT

## 2024-03-03 PROCEDURE — 82565 ASSAY OF CREATININE: CPT | Performed by: STUDENT IN AN ORGANIZED HEALTH CARE EDUCATION/TRAINING PROGRAM

## 2024-03-03 PROCEDURE — 80202 ASSAY OF VANCOMYCIN: CPT | Performed by: REGISTERED NURSE

## 2024-03-03 PROCEDURE — 82962 GLUCOSE BLOOD TEST: CPT

## 2024-03-03 PROCEDURE — 99232 SBSQ HOSP IP/OBS MODERATE 35: CPT | Mod: ,,, | Performed by: FAMILY MEDICINE

## 2024-03-03 PROCEDURE — 25000242 PHARM REV CODE 250 ALT 637 W/ HCPCS: Performed by: STUDENT IN AN ORGANIZED HEALTH CARE EDUCATION/TRAINING PROGRAM

## 2024-03-03 PROCEDURE — 94640 AIRWAY INHALATION TREATMENT: CPT

## 2024-03-03 RX ADMIN — HYDROCODONE BITARTRATE AND ACETAMINOPHEN 1 TABLET: 7.5; 325 TABLET ORAL at 02:03

## 2024-03-03 RX ADMIN — GABAPENTIN 300 MG: 300 CAPSULE ORAL at 08:03

## 2024-03-03 RX ADMIN — HYDROCODONE BITARTRATE AND ACETAMINOPHEN 1 TABLET: 7.5; 325 TABLET ORAL at 08:03

## 2024-03-03 RX ADMIN — IPRATROPIUM BROMIDE AND ALBUTEROL SULFATE 3 ML: .5; 3 SOLUTION RESPIRATORY (INHALATION) at 07:03

## 2024-03-03 RX ADMIN — INSULIN ASPART 5 UNITS: 100 INJECTION, SOLUTION INTRAVENOUS; SUBCUTANEOUS at 08:03

## 2024-03-03 RX ADMIN — BUDESONIDE INHALATION 0.5 MG: 0.5 SUSPENSION RESPIRATORY (INHALATION) at 08:03

## 2024-03-03 RX ADMIN — MIRTAZAPINE 7.5 MG: 7.5 TABLET, FILM COATED ORAL at 08:03

## 2024-03-03 RX ADMIN — INSULIN DETEMIR 8 UNITS: 100 INJECTION, SOLUTION SUBCUTANEOUS at 08:03

## 2024-03-03 RX ADMIN — INSULIN ASPART 2 UNITS: 100 INJECTION, SOLUTION INTRAVENOUS; SUBCUTANEOUS at 06:03

## 2024-03-03 RX ADMIN — PIPERACILLIN AND TAZOBACTAM 4.5 G: 4; .5 INJECTION, POWDER, FOR SOLUTION INTRAVENOUS; PARENTERAL at 04:03

## 2024-03-03 RX ADMIN — ESCITALOPRAM OXALATE 10 MG: 10 TABLET ORAL at 08:03

## 2024-03-03 RX ADMIN — ASPIRIN 81 MG CHEWABLE TABLET 81 MG: 81 TABLET CHEWABLE at 08:03

## 2024-03-03 RX ADMIN — PIPERACILLIN AND TAZOBACTAM 4.5 G: 4; .5 INJECTION, POWDER, FOR SOLUTION INTRAVENOUS; PARENTERAL at 01:03

## 2024-03-03 RX ADMIN — SODIUM CHLORIDE, POTASSIUM CHLORIDE, SODIUM LACTATE AND CALCIUM CHLORIDE: 600; 310; 30; 20 INJECTION, SOLUTION INTRAVENOUS at 06:03

## 2024-03-03 RX ADMIN — GABAPENTIN 300 MG: 300 CAPSULE ORAL at 02:03

## 2024-03-03 RX ADMIN — PANTOPRAZOLE SODIUM 40 MG: 40 TABLET, DELAYED RELEASE ORAL at 08:03

## 2024-03-03 RX ADMIN — LISINOPRIL 10 MG: 10 TABLET ORAL at 08:03

## 2024-03-03 RX ADMIN — IPRATROPIUM BROMIDE AND ALBUTEROL SULFATE 3 ML: .5; 3 SOLUTION RESPIRATORY (INHALATION) at 01:03

## 2024-03-03 RX ADMIN — MUPIROCIN: 20 OINTMENT TOPICAL at 08:03

## 2024-03-03 RX ADMIN — ISOSORBIDE MONONITRATE 30 MG: 30 TABLET, EXTENDED RELEASE ORAL at 08:03

## 2024-03-03 RX ADMIN — AMLODIPINE BESYLATE 10 MG: 10 TABLET ORAL at 08:03

## 2024-03-03 RX ADMIN — HYDROXYZINE PAMOATE 25 MG: 25 CAPSULE ORAL at 08:03

## 2024-03-03 RX ADMIN — INSULIN ASPART 2 UNITS: 100 INJECTION, SOLUTION INTRAVENOUS; SUBCUTANEOUS at 01:03

## 2024-03-03 RX ADMIN — PIPERACILLIN AND TAZOBACTAM 4.5 G: 4; .5 INJECTION, POWDER, FOR SOLUTION INTRAVENOUS; PARENTERAL at 08:03

## 2024-03-03 RX ADMIN — VANCOMYCIN HYDROCHLORIDE 1500 MG: 1 INJECTION, POWDER, LYOPHILIZED, FOR SOLUTION INTRAVENOUS at 08:03

## 2024-03-03 RX ADMIN — MUPIROCIN: 20 OINTMENT TOPICAL at 09:03

## 2024-03-03 RX ADMIN — ATORVASTATIN CALCIUM 80 MG: 80 TABLET, FILM COATED ORAL at 08:03

## 2024-03-03 RX ADMIN — BUDESONIDE INHALATION 0.5 MG: 0.5 SUSPENSION RESPIRATORY (INHALATION) at 07:03

## 2024-03-03 RX ADMIN — CLOPIDOGREL BISULFATE 75 MG: 75 TABLET ORAL at 08:03

## 2024-03-03 RX ADMIN — IPRATROPIUM BROMIDE AND ALBUTEROL SULFATE 3 ML: .5; 3 SOLUTION RESPIRATORY (INHALATION) at 12:03

## 2024-03-03 RX ADMIN — METOPROLOL SUCCINATE 50 MG: 50 TABLET, EXTENDED RELEASE ORAL at 08:03

## 2024-03-03 NOTE — PT/OT/SLP EVAL
"Physical Therapy Evaluation and Treatment    Patient Name: Navdeep Avery   MRN: [id number]  Recent Surgery: Procedure(s) (LRB):  AMPUTATION, FOOT, TRANSMETATARSAL; RIGHT LISFRANK AMPUTATION (Right) 2 Days Post-Op    Recommendations:     Discharge Recommendations: Low Intensity Therapy   Discharge Equipment Recommendations: wheelchair (knee scooter)   Barriers to discharge: Increased level of assist and Ongoing medical treatment    Assessment:     Navdeep Avery is a 63 y.o. male admitted with a medical diagnosis of Diabetic ulcer of toe associated with diabetes mellitus due to underlying condition, with necrosis of muscle. He presents with the following impairments/functional limitations: impaired endurance, impaired self care skills, impaired functional mobility, gait instability, impaired balance, decreased lower extremity function, pain, decreased ROM, impaired skin.   Patient s/p Lisfranc amputation due to significant infection R 5th toe, midfoot, and poor vascular supply. No WB restriction on the chart but pt advised to minimize WB as much as possible to promote healing. Patient with chronic impairments B shoulders and will have difficulty offloading R LE with RW for household distances consistently. Patient will benefit from a wheelchair to safely complete ADLs. Anticipate home with spouse and HHPT once medically stable.    Rehab Prognosis: Good; patient would benefit from acute PT services to address these deficits and reach maximum level of function.    Plan:     During this hospitalization, patient to be seen 5 x/week to address the above listed problems via gait training, therapeutic activities, therapeutic exercises    Plan of Care Expires: 04/03/24    Subjective     Chief Complaint: right foot wound/osteo s/p Lisfranc amputation  Patient Comments/Goals: "Both my shoulders are bad. There is no way I can use crutches. I had a knee scooter but I don't need it anymore so I donated it"  Pt willing to use " "RW but will need an alternate method for longer household distances and ADLs. W/c requested from ; spouse looking for a used knee scooter.  Pain/Comfort:  Pain Rating 1: 6/10  Location - Side 1: Right  Location 1: foot  Pain Addressed 1: Pre-medicate for activity, Reposition, Distraction, Cessation of Activity  Pain Rating Post-Intervention 1: 6/10    Social History:  Living Environment: Patient lives with their spouse, grandson in a single story home with number of outside stair(s): 4/rail  Other family members live close by.  Prior Level of Function: Prior to admission, patient was modified independent with ADLs using rolling walker or SPC for mobility  Equipment Used at Home: cane, straight, walker, rolling  DME owned (not currently used): none  Assistance Upon Discharge: family and home health; patient reports no desire to go to swing bed    Objective:     Communicated with Sushma Cloud RN prior to session. Patient found supine with peripheral IV upon PT entry to room.    General Precautions: Standard, fall   Orthopedic Precautions:  (no WB restriction on chart but Lisfranc amp therefore recommended minimizing WB as much as possible)   Braces: N/A    Respiratory Status: Room air    Exams:  Cognition: Patient is oriented to Person, Place, Time, Situation  RLE ROM: Deficits: hip WFL; knee WFL; ankle DF limtied by dressing and recent amputation  RLE Strength: Deficits: hip WFL; knee WFL; ankle no MMT  LLE ROM: Deficits: hip flexion to 90 in sitting- pt reports chronic "crush injury" to left hip but has not investigated THR yet; knee WFL; ankle WFL.  LLE Strength: Deficits: hip 4/5; knee 4+/5; ankle 5/5  Sensation:    -       Impaired  tingling bilateral fingers and distal to ankles B but proprioception intact  Skin Integrity/Edema:     -       Skin integrity: Lisfranc amp right foot - dressed    Functional Mobility:  Gait belt applied - Yes  Bed Mobility  Supine to Sit: modified independence   Sit to " Stand: contact guard assistance with rolling walker and with cues for hand placement, foot placement, and patient preferred NWB right due to pain  Bed to Chair: contact guard assistance with rolling walker and with cues for hand placement and foot placement using short shallow hops WB on L LE; pt preferred NWB R LE due to pain  Gait  Patient ambulated bed to chair only with CGA and RW  Balance  Sitting: independence  Standing: contact guard assistance and RW      Therapeutic Activities and Exercises:   Patient educated on role of acute care PT and PT POC, safety while in hospital including calling nurse for mobility, and call light usage  Patient educated about importance of OOB mobility and remaining up in chair most of the day.  Minimize WB R LE to promote healing event though there is no WB restriction on the chart    AM-PAC 6 CLICK MOBILITY  Total Score:20    Patient left up in chair with all lines intact, call button in reach, and RN notified.    GOALS:   Multidisciplinary Problems       Physical Therapy Goals          Problem: Physical Therapy    Goal Priority Disciplines Outcome Goal Variances Interventions   Physical Therapy Goal     PT, PT/OT Ongoing, Progressing     Description: Short Term Goals to be met by: 3/17/2024    Patient will increase functional independence with mobility by performin. Supine to sit with independently  2. Sit to stand transfer with independently using Rolling walker  3. Bed to chair transfer with independently using Rolling walker and minimal WB R LE via heel  4. Gait  x 100 feet with independently using Rolling walker and minimal WB R LE via heel  5. Lower extremity exercise program x30 reps per handout, with assistance as needed    Long Term Goals to be met by: 5/3/2024    Pt will regain full independent functional mobility with lowest level of assistive device to return to home situation and prior activities of daily living.                        History:     Past  Medical History:   Diagnosis Date    Arthritis     Carpal tunnel syndrome     Charcot foot due to diabetes mellitus 09/29/2023    Prescription for CROW orthotic boot given today    COPD (chronic obstructive pulmonary disease)     Coronary artery disease involving native coronary artery of native heart 06/27/2023    CABG 2018 (No OP report available)  Most recent left heart catheterization 08/12/2019 1. Normal LV size apical akinesis and overall normal LV systolic function, ejection fraction 55% 2. Patent saphenous vein graft to the right PDA with patent jump graft to the OM branch left circumflex 3. Patent LIMA to the OM1 4. No significant mitral regurgitation     COVID 02/02/2022    Diabetic peripheral neuropathy     Essential (primary) hypertension     GERD (gastroesophageal reflux disease)     Insomnia secondary to depression with anxiety 06/17/2021    Mixed hyperlipidemia 03/23/2021    Nicotine dependence     Peripheral vascular disease, unspecified     Sleep apnea     Type 2 diabetes mellitus        Past Surgical History:   Procedure Laterality Date    CORONARY ARTERY BYPASS GRAFT      CORONARY STENT PLACEMENT      DEBRIDEMENT OF FOOT Right 08/10/2023    Dr. Chiu    DEBRIDEMENT OF FOOT Right 8/10/2023    Procedure: DEBRIDEMENT, FOOT;  Surgeon: Evelio Chiu DO;  Location: [facility] OR;  Service: General;  Laterality: Right;    DEBRIDEMENT OF FOOT Right 2/27/2024    Procedure: DEBRIDEMENT, FOOT;  Surgeon: Evelio Chiu DO;  Location: [facility] OR;  Service: General;  Laterality: Right;    DEBRIDEMENT OF LOWER EXTREMITY Right 06/27/2022    Procedure: DEBRIDEMENT, LOWER EXTREMITY;  Surgeon: Forrest Kiser MD;  Location: [facility] OR;  Service: General;  Laterality: Right;  right foot    HIP FRACTURE SURGERY Left     IRRIGATION AND DEBRIDEMENT OF LOWER EXTREMITY Right 09/08/2022    Procedure: IRRIGATION AND DEBRIDEMENT, LOWER EXTREMITY;  Surgeon: Selina Matos MD;  Location: [facility] OR;  Service:  General;  Laterality: Right;    IRRIGATION AND DEBRIDEMENT OF LOWER EXTREMITY Right 01/05/2023    Procedure: IRRIGATION AND DEBRIDEMENT, LOWER EXTREMITY;  Surgeon: Evelio Chiu DO;  Location: [facility] OR;  Service: General;  Laterality: Right;    SHOULDER OPEN ROTATOR CUFF REPAIR Left     SPINE SURGERY      TOE AMPUTATION Right 2/27/2024    Procedure: AMPUTATION, TOE;  Surgeon: Evelio Chiu DO;  Location: [facility] OR;  Service: General;  Laterality: Right;  RIGHT 5 TH TOE AND DEBRIDEMENT    VASCULAR SURGERY         Time Tracking:     PT Received On: 03/03/24  PT Start Time: 0824  PT Stop Time: 0853  PT Total Time (min): 29 min     Billable Minutes: Evaluation Low complexity    3/3/2024

## 2024-03-03 NOTE — PLAN OF CARE
SW received consult for wheelchair.  Spoke with pt's wife, Charisma, who states it is okay to order wheelchair from [facility].  [facility].  SS following.

## 2024-03-03 NOTE — PROGRESS NOTES
[facility]  [facility] Medicine  Progress Note    Patient Name: Navdeep Avery  MRN: [id number]  Patient Class: IP- Inpatient   Admission Date: 2/27/2024  Length of Stay: 5 days  Attending Physician: Evelio Chiu DO  Primary Care Provider: Marquez Huff MD        Subjective:     Principal Problem:Diabetic ulcer of toe associated with diabetes mellitus due to underlying condition, with necrosis of muscle        HPI:  Patient is a 63 year old male that had his Right great toe amputated by Dr. Chiu today. He has had reported wound issues on his feet, likely a complication from his poorly controlled diabetes and atherosclerosis . His wounds have been managed by wound care and seen in clinic by Dr. Chiu in the past. He stated that he has had redness across the foot and swelling. X-rays performed showed no definite evidence of osteomyelitis. After further evaluation it was determined that the right 5th toe necrotic and had purulent drainage in the right 5th metatarsophalangeal joint. A joint decision was made for amputation.    Hospital medicine team was consulted to manage the patient's other chronic health care related issues.    Overview/Hospital Course:  63 year old male with a PMH of DM, HTN, COPD, and  CAD s/p CABG who presents to the Vascular Surgery Service for pseudoaneurysm repair.  Hospitalist was consulted for medical management.  Patient denies chest pain, shortness of breath, nausea, vomiting, or diarrhea.      Interval History:     No significant events overnight, no new complaints this morning. Post-op #2 Lisfranc amputation. Did well with therapy, will likely discharge home.     Review of Systems   Constitutional: Negative.  Negative for chills and fever.   HENT: Negative.     Respiratory:  Negative for cough, chest tightness and shortness of breath.    Cardiovascular:  Negative for chest pain and palpitations.   Gastrointestinal:  Negative for abdominal pain,  diarrhea, nausea and vomiting.   Endocrine: Negative.    Genitourinary: Negative.    Musculoskeletal:  Negative for arthralgias and myalgias.   Skin:  Negative for rash.   Allergic/Immunologic: Negative.    Neurological:  Negative for dizziness, syncope, weakness, numbness and headaches.   Psychiatric/Behavioral:  Negative for confusion and sleep disturbance.      Objective:     Vital Signs (Most Recent):  Temp: 98.1 °F (36.7 °C) (03/03/24 1443)  Pulse: 63 (03/03/24 1443)  Resp: 16 (03/03/24 1449)  BP: 130/62 (03/03/24 1443)  SpO2: (!) 94 % (03/03/24 1443) Vital Signs (24h Range):  Temp:  [97.6 °F (36.4 °C)-98.1 °F (36.7 °C)] 98.1 °F (36.7 °C)  Pulse:  [61-81] 63  Resp:  [16-20] 16  SpO2:  [92 %-96 %] 94 %  BP: (124-156)/(58-77) 130/62     Weight: 73.5 kg (162 lb)  Body mass index is 24.63 kg/m².    Intake/Output Summary (Last 24 hours) at 3/3/2024 1523  Last data filed at 3/3/2024 0800  Gross per 24 hour   Intake --   Output 900 ml   Net -900 ml           Physical Exam  Constitutional:       General: He is not in acute distress.     Appearance: Normal appearance. He is not ill-appearing.   HENT:      Head: Normocephalic and atraumatic.   Cardiovascular:      Rate and Rhythm: Normal rate and regular rhythm.   Pulmonary:      Effort: Pulmonary effort is normal. No respiratory distress.   Musculoskeletal:      Cervical back: No rigidity.      Comments: R LE surgical dressing clean, dry, intact   Skin:     Coloration: Skin is not jaundiced or pale.      Findings: No rash.   Neurological:      Mental Status: He is alert. Mental status is at baseline.   Psychiatric:         Behavior: Behavior normal.         Thought Content: Thought content normal.             Significant Labs: All pertinent labs within the past 24 hours have been reviewed.    Significant Imaging: I have reviewed all pertinent imaging results/findings within the past 24 hours.    Assessment/Plan:      * Diabetic ulcer of toe associated with diabetes  "mellitus due to underlying condition, with necrosis of muscle  Now status-post Lisfranc amputation. Case management assisting with discharge planning and DME. Anticipate discharge home with wheelchair per therapy recommendations.     Wound cultures with MRSA, anaerobic cultures with Fine[place]ia. Continue IV antibiotics.          Type 2 diabetes mellitus with hyperglycemia  Glucose improving. Continue current therapy for now.    Patient's FSGs are uncontrolled due to hyperglycemia on current medication regimen.  Last A1c reviewed-   Lab Results   Component Value Date    HGBA1C 9.7 (H) 08/10/2023     Most recent fingerstick glucose reviewed- No results for input(s): "POCTGLUCOSE" in the last 24 hours.  Current correctional scale  Medium  Maintain anti-hyperglycemic dose as follows-   Antihyperglycemics (From admission, onward)      Start     Stop Route Frequency Ordered    03/02/24 0900  insulin detemir U-100 injection 8 Units         -- SubQ 2 times daily 03/02/24 0847    02/27/24 1411  insulin aspart U-100 injection 0-10 Units         -- SubQ Before meals & nightly PRN 02/27/24 1314          Hold Oral hypoglycemics while patient is in the hospital.      Coronary artery disease involving coronary bypass graft of native heart  Patient with known CAD s/p CABG, which is controlled Will continue ASA, Plavix, and Statin and monitor for S/Sx of angina/ACS. Continue to monitor on telemetry.     Sleep apnea  Will order home CPAP      COPD (chronic obstructive pulmonary disease)  Patient's COPD is controlled currently.  Patient is currently off COPD Pathway. Continue scheduled inhalers  supplemental O2 PRN  and monitor respiratory status closely.       VTE Risk Mitigation (From admission, onward)           Ordered     Place sequential compression device  Until discontinued         02/27/24 1314                    Discharge Planning   MARGARITO:      Code Status: Full Code   Is the patient medically ready for discharge?:     Reason " for patient still in hospital (select all that apply): Treatment  Discharge Plan A: Home Health, Home with family                  Lillian Rose DO  Department of Hospital Medicine   [facility]

## 2024-03-03 NOTE — PLAN OF CARE
Problem: Occupational Therapy  Goal: Occupational Therapy Goal  Description: STG:  Pt will be (I) with grooming  Pt will bathe with (I)  Pt will perform UE dressing with (I)  Pt will perform LE dressing with (I)  Pt will transfer bed/chair/bsc with Mod I with RW  Pt will perform standing task x 1 min with Mod I   Pt will tolerate 15 minutes of tx without fatigue      LT.Restore to max I with self care and mobility.     Outcome: Ongoing, Progressing

## 2024-03-03 NOTE — PLAN OF CARE
Problem: Adult Inpatient Plan of Care  Goal: Plan of Care Review  Outcome: Ongoing, Progressing  Goal: Patient-Specific Goal (Individualized)  Outcome: Ongoing, Progressing  Goal: Absence of Hospital-Acquired Illness or Injury  Outcome: Ongoing, Progressing  Goal: Optimal Comfort and Wellbeing  Outcome: Ongoing, Progressing  Goal: Readiness for Transition of Care  Outcome: Ongoing, Progressing     Problem: Diabetes Comorbidity  Goal: Blood Glucose Level Within Targeted Range  Outcome: Ongoing, Progressing     Problem: Gas Exchange Impaired  Goal: Optimal Gas Exchange  Outcome: Ongoing, Progressing     Problem: Skin Injury Risk Increased  Goal: Skin Health and Integrity  Outcome: Ongoing, Progressing     Problem: Fall Injury Risk  Goal: Absence of Fall and Fall-Related Injury  Outcome: Ongoing, Progressing

## 2024-03-03 NOTE — PROGRESS NOTES
Vancomycin trough = 11.7 (desired 10-15)    Plan is to continue Vancomycin 1500mg IV q18hrs.      Renal function stable.      Continue to monitor daily.

## 2024-03-03 NOTE — PLAN OF CARE
Problem: Physical Therapy  Goal: Physical Therapy Goal  Description: Short Term Goals to be met by: 3/17/2024    Patient will increase functional independence with mobility by performin. Supine to sit with independently  2. Sit to stand transfer with independently using Rolling walker  3. Bed to chair transfer with independently using Rolling walker and minimal WB R LE via heel  4. Gait  x 100 feet with independently using Rolling walker and minimal WB R LE via heel  5. Lower extremity exercise program x30 reps per handout, with assistance as needed    Long Term Goals to be met by: 5/3/2024    Pt will regain full independent functional mobility with lowest level of assistive device to return to home situation and prior activities of daily living.   Outcome: Ongoing, Progressing     Patient s/p Lisfranc amputation due to significant infection R 5th toe, midfoot, and poor vascular supply. No WB restriction on the chart but pt advised to minimize WB as much as possible to promote healing. Patient with chronic impairments B shoulders and will have difficulty offloading R LE with RW for household distances consistently. Patient will benefit from a wheelchair to safely complete ADLs. Anticipate home with spouse and HHPT once medically stable.

## 2024-03-03 NOTE — SUBJECTIVE & OBJECTIVE
Interval History:     No significant events overnight, no new complaints this morning. Post-op #2 Lisfranc amputation. Did well with therapy, will likely discharge home.     Review of Systems   Constitutional: Negative.  Negative for chills and fever.   HENT: Negative.     Respiratory:  Negative for cough, chest tightness and shortness of breath.    Cardiovascular:  Negative for chest pain and palpitations.   Gastrointestinal:  Negative for abdominal pain, diarrhea, nausea and vomiting.   Endocrine: Negative.    Genitourinary: Negative.    Musculoskeletal:  Negative for arthralgias and myalgias.   Skin:  Negative for rash.   Allergic/Immunologic: Negative.    Neurological:  Negative for dizziness, syncope, weakness, numbness and headaches.   Psychiatric/Behavioral:  Negative for confusion and sleep disturbance.      Objective:     Vital Signs (Most Recent):  Temp: 98.1 °F (36.7 °C) (03/03/24 1443)  Pulse: 63 (03/03/24 1443)  Resp: 16 (03/03/24 1449)  BP: 130/62 (03/03/24 1443)  SpO2: (!) 94 % (03/03/24 1443) Vital Signs (24h Range):  Temp:  [97.6 °F (36.4 °C)-98.1 °F (36.7 °C)] 98.1 °F (36.7 °C)  Pulse:  [61-81] 63  Resp:  [16-20] 16  SpO2:  [92 %-96 %] 94 %  BP: (124-156)/(58-77) 130/62     Weight: 73.5 kg (162 lb)  Body mass index is 24.63 kg/m².    Intake/Output Summary (Last 24 hours) at 3/3/2024 1523  Last data filed at 3/3/2024 0800  Gross per 24 hour   Intake --   Output 900 ml   Net -900 ml           Physical Exam  Constitutional:       General: He is not in acute distress.     Appearance: Normal appearance. He is not ill-appearing.   HENT:      Head: Normocephalic and atraumatic.   Cardiovascular:      Rate and Rhythm: Normal rate and regular rhythm.   Pulmonary:      Effort: Pulmonary effort is normal. No respiratory distress.   Musculoskeletal:      Cervical back: No rigidity.      Comments: R LE surgical dressing clean, dry, intact   Skin:     Coloration: Skin is not jaundiced or pale.      Findings: No  rash.   Neurological:      Mental Status: He is alert. Mental status is at baseline.   Psychiatric:         Behavior: Behavior normal.         Thought Content: Thought content normal.             Significant Labs: All pertinent labs within the past 24 hours have been reviewed.    Significant Imaging: I have reviewed all pertinent imaging results/findings within the past 24 hours.

## 2024-03-03 NOTE — PROGRESS NOTES
[facility]  General Surgery  Progress Note    Subjective:     Interval History:  Patient no acute events overnight.  Dressing intact    Post-Op Info:  Procedure(s) (LRB):  AMPUTATION, FOOT, TRANSMETATARSAL; RIGHT LISFRANK AMPUTATION (Right)   2 Days Post-Op      Medications:  Continuous Infusions:   lactated ringers 100 mL/hr at 03/03/24 0631     Scheduled Meds:   albuterol-ipratropium  3 mL Nebulization Q6H    amLODIPine  10 mg Oral Daily    aspirin  81 mg Oral Daily    atorvastatin  80 mg Oral QHS    budesonide  0.5 mg Nebulization Q12H    clopidogreL  75 mg Oral Daily    EScitalopram oxalate  10 mg Oral Daily    gabapentin  300 mg Oral TID    hydrOXYzine pamoate  25 mg Oral Daily    insulin detemir U-100  8 Units Subcutaneous BID    isosorbide mononitrate  30 mg Oral Daily    lisinopriL  10 mg Oral Daily    metoprolol succinate  50 mg Oral Daily    mirtazapine  7.5 mg Oral Nightly    mupirocin   Nasal BID    pantoprazole  40 mg Oral Daily    piperacillin-tazobactam (Zosyn) IV (PEDS and ADULTS) (extended infusion is not appropriate)  4.5 g Intravenous Q8H    vancomycin (VANCOCIN) IV (PEDS and ADULTS)  20 mg/kg Intravenous Q18H     PRN Meds:acetaminophen, acetaminophen, acetaminophen, albuterol-ipratropium, aluminum-magnesium hydroxide-simethicone, dextrose 10%, dextrose 10%, diphenhydrAMINE, glucagon (human recombinant), glucose, glucose, HYDROcodone-acetaminophen, HYDROcodone-acetaminophen, insulin aspart U-100, melatonin, morphine, naloxone, nitroGLYCERIN, ondansetron, prochlorperazine, sodium chloride 0.9%, vancomycin - pharmacy to dose     Objective:     Vital Signs (Most Recent):  Temp: 98 °F (36.7 °C) (03/03/24 0623)  Pulse: 81 (03/03/24 0821)  Resp: 16 (03/03/24 0824)  BP: (!) 152/77 (03/03/24 0807)  SpO2: (!) 93 % (03/03/24 0821) Vital Signs (24h Range):  Temp:  [97.5 °F (36.4 °C)-98.1 °F (36.7 °C)] 98 °F (36.7 °C)  Pulse:  [56-81] 81  Resp:  [16-20] 16  SpO2:  [92 %-96 %] 93 %  BP:  "117152)/58-77 152/77       Intake/Output Summary (Last 24 hours) at 3/3/2024 0943  Last data filed at 3/3/2024 0800  Gross per 24 hour   Intake --   Output 900 ml   Net -900 ml       Physical Exam  Constitutional:       General: He is not in acute distress.  HENT:      Head: Normocephalic.   Cardiovascular:      Rate and Rhythm: Normal rate and regular rhythm.      Pulses: Normal pulses.   Pulmonary:      Effort: Pulmonary effort is normal. No respiratory distress.      Breath sounds: Normal breath sounds.   Abdominal:      General: Abdomen is flat. There is no distension.      Palpations: Abdomen is soft.      Tenderness: There is no abdominal tenderness.   Musculoskeletal:         General: Normal range of motion.   Skin:     General: Skin is warm.      Findings: Lesion present.   Neurological:      General: No focal deficit present.      Mental Status: He is oriented to person, place, and time.         Significant Labs:  Lactic Acid: No results for input(s): "LACTATE" in the last 48 hours.  LFTs: No results for input(s): "ALT", "AST", "ALKPHOS", "BILITOT", "PROT", "ALBUMIN" in the last 48 hours.    Significant Diagnostics:  None    Assessment/Plan:     Active Diagnoses:    Diagnosis Date Noted POA    PRINCIPAL PROBLEM:  Diabetic ulcer of toe associated with diabetes mellitus due to underlying condition, with necrosis of muscle [E08.621, L97.503]  Yes    Type 2 diabetes mellitus with hyperglycemia [E11.65] 02/28/2024 Yes    Coronary artery disease involving coronary bypass graft of native heart [I25.810] 06/27/2023 Yes    Sleep apnea [G47.30] 09/07/2022 Yes    COPD (chronic obstructive pulmonary disease) [J44.9] 06/24/2022 Yes      Problems Resolved During this Admission:    Diagnosis Date Noted Date Resolved POA    Mixed hyperlipidemia [E78.2] 03/23/2021 02/28/2024 Yes     DC tomorrow after looking at the wound.    Forrest Kiser MD  General Surgery  [facility]  "

## 2024-03-03 NOTE — ASSESSMENT & PLAN NOTE
"Glucose improving. Continue current therapy for now.    Patient's FSGs are uncontrolled due to hyperglycemia on current medication regimen.  Last A1c reviewed-   Lab Results   Component Value Date    HGBA1C 9.7 (H) 08/10/2023     Most recent fingerstick glucose reviewed- No results for input(s): "POCTGLUCOSE" in the last 24 hours.  Current correctional scale  Medium  Maintain anti-hyperglycemic dose as follows-   Antihyperglycemics (From admission, onward)      Start     Stop Route Frequency Ordered    03/02/24 0900  insulin detemir U-100 injection 8 Units         -- SubQ 2 times daily 03/02/24 0847    02/27/24 1411  insulin aspart U-100 injection 0-10 Units         -- SubQ Before meals & nightly PRN 02/27/24 1314          Hold Oral hypoglycemics while patient is in the hospital.    "

## 2024-03-03 NOTE — PT/OT/SLP EVAL
Occupational Therapy   Evaluation    Name: Navdeep Aveyr  MRN: [id number]  Admitting Diagnosis: Diabetic ulcer of toe associated with diabetes mellitus due to underlying condition, with necrosis of muscle  Recent Surgery: Procedure(s) (LRB):  AMPUTATION, FOOT, TRANSMETATARSAL; RIGHT LISFRANK AMPUTATION (Right) 2 Days Post-Op    Recommendations:     Discharge Recommendations: Low Intensity Therapy  Discharge Equipment Recommendations:   (to be determined)  Barriers to discharge:  None    Assessment:     Navdeep Avery is a 63 y.o. male with a medical diagnosis of Diabetic ulcer of toe associated with diabetes mellitus due to underlying condition, with necrosis of muscle.  He presents with complaint of (R) Foot pain. Pt agreed to OT evaluation.. Performance deficits affecting function: weakness, impaired endurance, impaired sensation, impaired self care skills, impaired functional mobility, gait instability, impaired balance, decreased lower extremity function, pain.      Rehab Prognosis: Good; patient would benefit from acute skilled OT services to address these deficits and reach maximum level of function.       Plan:     Patient to be seen 5 x/week to address the above listed problems via self-care/home management, therapeutic activities, therapeutic exercises  Plan of Care Expires: 04/07/24  Plan of Care Reviewed with: patient    Subjective     Chief Complaint: (R) Foot pain  Patient/Family Comments/goals: plan is to return home    Occupational Profile:  Living Environment: Pt lives with spouse in 1 story home with 3 steps and hand rail  Previous level of function: Pt reports wife assist with self care prior  Roles and Routines: pt able to perform self care with wife's assistance  Equipment Used at Home: cane, straight, walker, rolling  Assistance upon Discharge: Wife    Pain/Comfort:  Pain Rating 1: 6/10  Location - Side 1: Right  Location 1: foot  Pain Addressed 1: Pre-medicate for activity, Reposition,  Distraction  Pain Rating Post-Intervention 1: 6/10    Patients cultural, spiritual, Christian conflicts given the current situation: no    Objective:     Communicated with: SHIMA Cloud prior to session.  Patient found HOB elevated with peripheral IV upon OT entry to room.    General Precautions: Standard, fall  Orthopedic Precautions: N/A (Pt with recent amputation on (R) foot)  Braces: N/A  Respiratory Status: Room air    Occupational Performance:    Bed Mobility:    Patient completed Rolling/Turning to Left with  modified independence  Patient completed Supine to Sit with modified independence    Functional Mobility/Transfers:  Patient completed Sit <> Stand Transfer with contact guard assistance  with  gait belt to stand to RW   Patient completed Bed <> Chair Transfer using Step Transfer technique with contact guard assistance with rolling walker  Functional Mobility: CGA with RW with NWB on (R) LE    Activities of Daily Living:  Upper Body Dressing: min a gown snapped around IVs    Lower Body Dressing: independence donning (R) shoe cover    Cognitive/Visual Perceptual:  Cognitive/Psychosocial Skills:     -       Oriented to: Person, Place, and Time   -       Follows Commands/attention:Follows one-step commands  -       Communication: clear/fluent  Visual/Perceptual:      -Hearing WFL. Pt reports needing glasses      Physical Exam:  Balance:    -       (S) with EOB sitting, CGA with mobility with RW  NWB on (R) LE  Skin integrity: Visible skin intact  Edema:  None noted  Sensation:    -       Pt reports numbness/tingling (B) hands, but is able to locate touch  Motor Planning:    -       WFL  Dominant hand:    -       Ambidextrous  Upper Extremity Range of Motion:     -       Right Upper Extremity: shoulder is impaired, otherwise wfl  -       Left Upper Extremity: shoulder is impaired, otherwise wfl  Upper Extremity Strength:    -       Right Upper Extremity: WFL  -       Left Upper Extremity: WFL    Strength:    -       Right Upper Extremity: WFL  -       Left Upper Extremity: WFL    AMPAC 6 Click ADL:  AMPAC Total Score: 19    Treatment & Education:  OT evaluation completed. See eval for details. Pt presents with decline in status due to (R)Transmetatarsal Amputation. Tx plan to focus on increasing (I) with self care and mobility.  Pt educated on OT role/POC.   Importance of OOB activity.  Importance of sitting up in the chair.  Safety during functional t/f and mobility with use of RW  Importance of assisting with self-care activities   All questions/concerns answered within OT scope of practice      Patient left up in chair with all lines intact, call button in reach, and nurse notified    GOALS:   Multidisciplinary Problems       Occupational Therapy Goals          Problem: Occupational Therapy    Goal Priority Disciplines Outcome Interventions   Occupational Therapy Goal     OT, PT/OT Ongoing, Progressing    Description: STG:  Pt will be (I) with grooming  Pt will bathe with min a  Pt will perform UE dressing with (I)  Pt will perform LE dressing with CGA  Pt will transfer bed/chair/bsc with Mod I with RW  Pt will perform standing task x 1 min with Mod I   Pt will tolerate 15 minutes of tx without fatigue      LT.Restore to max I with self care and mobility.                          History:     Past Medical History:   Diagnosis Date    Arthritis     Carpal tunnel syndrome     Charcot foot due to diabetes mellitus 2023    Prescription for CROW orthotic boot given today    COPD (chronic obstructive pulmonary disease)     Coronary artery disease involving native coronary artery of native heart 2023    CABG  (No OP report available)  Most recent left heart catheterization 2019 1. Normal LV size apical akinesis and overall normal LV systolic function, ejection fraction 55% 2. Patent saphenous vein graft to the right PDA with patent jump graft to the OM branch left circumflex 3. Patent  LIMA to the OM1 4. No significant mitral regurgitation     COVID 02/02/2022    Diabetic peripheral neuropathy     Essential (primary) hypertension     GERD (gastroesophageal reflux disease)     Insomnia secondary to depression with anxiety 06/17/2021    Mixed hyperlipidemia 03/23/2021    Nicotine dependence     Peripheral vascular disease, unspecified     Sleep apnea     Type 2 diabetes mellitus          Past Surgical History:   Procedure Laterality Date    CORONARY ARTERY BYPASS GRAFT      CORONARY STENT PLACEMENT      DEBRIDEMENT OF FOOT Right 08/10/2023    Dr. Chiu    DEBRIDEMENT OF FOOT Right 8/10/2023    Procedure: DEBRIDEMENT, FOOT;  Surgeon: Evelio Chiu DO;  Location: [facility];  Service: General;  Laterality: Right;    DEBRIDEMENT OF FOOT Right 2/27/2024    Procedure: DEBRIDEMENT, FOOT;  Surgeon: Evelio Chiu DO;  Location: [facility] OR;  Service: General;  Laterality: Right;    DEBRIDEMENT OF LOWER EXTREMITY Right 06/27/2022    Procedure: DEBRIDEMENT, LOWER EXTREMITY;  Surgeon: Forrest Kiser MD;  Location: [facility] OR;  Service: General;  Laterality: Right;  right foot    HIP FRACTURE SURGERY Left     IRRIGATION AND DEBRIDEMENT OF LOWER EXTREMITY Right 09/08/2022    Procedure: IRRIGATION AND DEBRIDEMENT, LOWER EXTREMITY;  Surgeon: Selina Matos MD;  Location: [facility];  Service: General;  Laterality: Right;    IRRIGATION AND DEBRIDEMENT OF LOWER EXTREMITY Right 01/05/2023    Procedure: IRRIGATION AND DEBRIDEMENT, LOWER EXTREMITY;  Surgeon: Evelio Chiu DO;  Location: [facility];  Service: General;  Laterality: Right;    SHOULDER OPEN ROTATOR CUFF REPAIR Left     SPINE SURGERY      TOE AMPUTATION Right 2/27/2024    Procedure: AMPUTATION, TOE;  Surgeon: Evelio Chiu DO;  Location: [facility];  Service: General;  Laterality: Right;  RIGHT 5 TH TOE AND DEBRIDEMENT    VASCULAR SURGERY         Time Tracking:     OT Date of Treatment: 03/03/24  OT Start Time: 0824  OT Stop  Time: 0848  OT Total Time (min): 24 min    Billable Minutes:Evaluation 24    3/3/2024

## 2024-03-04 LAB
GLUCOSE SERPL-MCNC: 119 MG/DL (ref 70–105)
GLUCOSE SERPL-MCNC: 229 MG/DL (ref 70–105)
GLUCOSE SERPL-MCNC: 245 MG/DL (ref 70–105)
GLUCOSE SERPL-MCNC: 287 MG/DL (ref 70–105)

## 2024-03-04 PROCEDURE — 82962 GLUCOSE BLOOD TEST: CPT

## 2024-03-04 PROCEDURE — 25000242 PHARM REV CODE 250 ALT 637 W/ HCPCS: Performed by: STUDENT IN AN ORGANIZED HEALTH CARE EDUCATION/TRAINING PROGRAM

## 2024-03-04 PROCEDURE — 25000003 PHARM REV CODE 250: Performed by: STUDENT IN AN ORGANIZED HEALTH CARE EDUCATION/TRAINING PROGRAM

## 2024-03-04 PROCEDURE — 97116 GAIT TRAINING THERAPY: CPT

## 2024-03-04 PROCEDURE — 97110 THERAPEUTIC EXERCISES: CPT

## 2024-03-04 PROCEDURE — 63600175 PHARM REV CODE 636 W HCPCS: Performed by: STUDENT IN AN ORGANIZED HEALTH CARE EDUCATION/TRAINING PROGRAM

## 2024-03-04 PROCEDURE — 99900035 HC TECH TIME PER 15 MIN (STAT)

## 2024-03-04 PROCEDURE — 11000001 HC ACUTE MED/SURG PRIVATE ROOM

## 2024-03-04 PROCEDURE — 94640 AIRWAY INHALATION TREATMENT: CPT

## 2024-03-04 PROCEDURE — 97530 THERAPEUTIC ACTIVITIES: CPT

## 2024-03-04 PROCEDURE — 94761 N-INVAS EAR/PLS OXIMETRY MLT: CPT

## 2024-03-04 PROCEDURE — 63600175 PHARM REV CODE 636 W HCPCS: Performed by: FAMILY MEDICINE

## 2024-03-04 PROCEDURE — 99232 SBSQ HOSP IP/OBS MODERATE 35: CPT | Mod: ,,, | Performed by: FAMILY MEDICINE

## 2024-03-04 RX ADMIN — PIPERACILLIN AND TAZOBACTAM 4.5 G: 4; .5 INJECTION, POWDER, FOR SOLUTION INTRAVENOUS; PARENTERAL at 12:03

## 2024-03-04 RX ADMIN — MUPIROCIN: 20 OINTMENT TOPICAL at 10:03

## 2024-03-04 RX ADMIN — LISINOPRIL 10 MG: 10 TABLET ORAL at 09:03

## 2024-03-04 RX ADMIN — ISOSORBIDE MONONITRATE 30 MG: 30 TABLET, EXTENDED RELEASE ORAL at 09:03

## 2024-03-04 RX ADMIN — VANCOMYCIN HYDROCHLORIDE 1500 MG: 1 INJECTION, POWDER, LYOPHILIZED, FOR SOLUTION INTRAVENOUS at 08:03

## 2024-03-04 RX ADMIN — GABAPENTIN 300 MG: 300 CAPSULE ORAL at 04:03

## 2024-03-04 RX ADMIN — MUPIROCIN: 20 OINTMENT TOPICAL at 08:03

## 2024-03-04 RX ADMIN — IPRATROPIUM BROMIDE AND ALBUTEROL SULFATE 3 ML: .5; 3 SOLUTION RESPIRATORY (INHALATION) at 07:03

## 2024-03-04 RX ADMIN — BUDESONIDE INHALATION 0.5 MG: 0.5 SUSPENSION RESPIRATORY (INHALATION) at 08:03

## 2024-03-04 RX ADMIN — ATORVASTATIN CALCIUM 80 MG: 80 TABLET, FILM COATED ORAL at 08:03

## 2024-03-04 RX ADMIN — CLOPIDOGREL BISULFATE 75 MG: 75 TABLET ORAL at 09:03

## 2024-03-04 RX ADMIN — GABAPENTIN 300 MG: 300 CAPSULE ORAL at 09:03

## 2024-03-04 RX ADMIN — ESCITALOPRAM OXALATE 10 MG: 10 TABLET ORAL at 09:03

## 2024-03-04 RX ADMIN — INSULIN DETEMIR 8 UNITS: 100 INJECTION, SOLUTION SUBCUTANEOUS at 08:03

## 2024-03-04 RX ADMIN — GABAPENTIN 300 MG: 300 CAPSULE ORAL at 08:03

## 2024-03-04 RX ADMIN — MORPHINE SULFATE 4 MG: 4 INJECTION, SOLUTION INTRAMUSCULAR; INTRAVENOUS at 07:03

## 2024-03-04 RX ADMIN — MIRTAZAPINE 7.5 MG: 7.5 TABLET, FILM COATED ORAL at 08:03

## 2024-03-04 RX ADMIN — IPRATROPIUM BROMIDE AND ALBUTEROL SULFATE 3 ML: .5; 3 SOLUTION RESPIRATORY (INHALATION) at 12:03

## 2024-03-04 RX ADMIN — METOPROLOL SUCCINATE 50 MG: 50 TABLET, EXTENDED RELEASE ORAL at 09:03

## 2024-03-04 RX ADMIN — PIPERACILLIN AND TAZOBACTAM 4.5 G: 4; .5 INJECTION, POWDER, FOR SOLUTION INTRAVENOUS; PARENTERAL at 08:03

## 2024-03-04 RX ADMIN — BUDESONIDE INHALATION 0.5 MG: 0.5 SUSPENSION RESPIRATORY (INHALATION) at 07:03

## 2024-03-04 RX ADMIN — MORPHINE SULFATE 4 MG: 4 INJECTION, SOLUTION INTRAMUSCULAR; INTRAVENOUS at 12:03

## 2024-03-04 RX ADMIN — HYDROCODONE BITARTRATE AND ACETAMINOPHEN 1 TABLET: 10; 325 TABLET ORAL at 10:03

## 2024-03-04 RX ADMIN — VANCOMYCIN HYDROCHLORIDE 1500 MG: 1 INJECTION, POWDER, LYOPHILIZED, FOR SOLUTION INTRAVENOUS at 02:03

## 2024-03-04 RX ADMIN — IPRATROPIUM BROMIDE AND ALBUTEROL SULFATE 3 ML: .5; 3 SOLUTION RESPIRATORY (INHALATION) at 02:03

## 2024-03-04 RX ADMIN — MORPHINE SULFATE 4 MG: 4 INJECTION, SOLUTION INTRAMUSCULAR; INTRAVENOUS at 02:03

## 2024-03-04 RX ADMIN — PIPERACILLIN AND TAZOBACTAM 4.5 G: 4; .5 INJECTION, POWDER, FOR SOLUTION INTRAVENOUS; PARENTERAL at 04:03

## 2024-03-04 RX ADMIN — HYDROCODONE BITARTRATE AND ACETAMINOPHEN 1 TABLET: 10; 325 TABLET ORAL at 05:03

## 2024-03-04 RX ADMIN — PANTOPRAZOLE SODIUM 40 MG: 40 TABLET, DELAYED RELEASE ORAL at 09:03

## 2024-03-04 RX ADMIN — HYDROCODONE BITARTRATE AND ACETAMINOPHEN 1 TABLET: 7.5; 325 TABLET ORAL at 05:03

## 2024-03-04 RX ADMIN — INSULIN DETEMIR 8 UNITS: 100 INJECTION, SOLUTION SUBCUTANEOUS at 09:03

## 2024-03-04 RX ADMIN — AMLODIPINE BESYLATE 10 MG: 10 TABLET ORAL at 09:03

## 2024-03-04 RX ADMIN — IPRATROPIUM BROMIDE AND ALBUTEROL SULFATE 3 ML: .5; 3 SOLUTION RESPIRATORY (INHALATION) at 08:03

## 2024-03-04 RX ADMIN — ASPIRIN 81 MG CHEWABLE TABLET 81 MG: 81 TABLET CHEWABLE at 09:03

## 2024-03-04 RX ADMIN — HYDROXYZINE PAMOATE 25 MG: 25 CAPSULE ORAL at 09:03

## 2024-03-04 NOTE — ASSESSMENT & PLAN NOTE
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  Small hematoma evacuated with Q-tip.  Dressing applied.  Continue IV antibiotics.  We will evaluate for another 48 hours; anticipate possible discharge on Wednesday

## 2024-03-04 NOTE — SUBJECTIVE & OBJECTIVE
Interval History:  Stable, no acute events overnight.  Patient did get dressing saturated with urine on accident, dressing changed by nursing staff over the weekend    Medications:  Continuous Infusions:   lactated ringers 100 mL/hr at 03/03/24 0631     Scheduled Meds:   albuterol-ipratropium  3 mL Nebulization Q6H    amLODIPine  10 mg Oral Daily    aspirin  81 mg Oral Daily    atorvastatin  80 mg Oral QHS    budesonide  0.5 mg Nebulization Q12H    clopidogreL  75 mg Oral Daily    EScitalopram oxalate  10 mg Oral Daily    gabapentin  300 mg Oral TID    hydrOXYzine pamoate  25 mg Oral Daily    insulin detemir U-100  8 Units Subcutaneous BID    isosorbide mononitrate  30 mg Oral Daily    lisinopriL  10 mg Oral Daily    metoprolol succinate  50 mg Oral Daily    mirtazapine  7.5 mg Oral Nightly    mupirocin   Nasal BID    pantoprazole  40 mg Oral Daily    piperacillin-tazobactam (Zosyn) IV (PEDS and ADULTS) (extended infusion is not appropriate)  4.5 g Intravenous Q8H    vancomycin (VANCOCIN) IV (PEDS and ADULTS)  20 mg/kg Intravenous Q18H     PRN Meds:acetaminophen, acetaminophen, acetaminophen, albuterol-ipratropium, aluminum-magnesium hydroxide-simethicone, dextrose 10%, dextrose 10%, diphenhydrAMINE, glucagon (human recombinant), glucose, glucose, HYDROcodone-acetaminophen, HYDROcodone-acetaminophen, insulin aspart U-100, melatonin, morphine, naloxone, nitroGLYCERIN, ondansetron, prochlorperazine, sodium chloride 0.9%, vancomycin - pharmacy to dose     Review of patient's allergies indicates:  No Known Allergies  Objective:     Vital Signs (Most Recent):  Temp: 98.1 °F (36.7 °C) (03/04/24 0650)  Pulse: (!) 55 (03/04/24 0650)  Resp: 16 (03/04/24 0650)  BP: (!) 146/63 (03/04/24 0650)  SpO2: 96 % (03/04/24 0650) Vital Signs (24h Range):  Temp:  [97.7 °F (36.5 °C)-98.2 °F (36.8 °C)] 98.1 °F (36.7 °C)  Pulse:  [53-89] 55  Resp:  [16-20] 16  SpO2:  [92 %-98 %] 96 %  BP: (122-156)/(61-77) 146/63     Weight: 73.5 kg (162  lb)  Body mass index is 24.63 kg/m².    Intake/Output - Last 3 Shifts         03/02 0700  03/03 0659 03/03 0700  03/04 0659 03/04 0700  03/05 0659    Urine (mL/kg/hr) 400 (0.2) 500 (0.3)     Blood       Total Output 400 500     Net -400 -500                     Physical Exam  Constitutional:       General: He is not in acute distress.     Appearance: Normal appearance.   HENT:      Head: Normocephalic.   Cardiovascular:      Rate and Rhythm: Normal rate.   Pulmonary:      Effort: Pulmonary effort is normal. No respiratory distress.   Abdominal:      General: There is no distension.      Tenderness: There is no abdominal tenderness.   Musculoskeletal:         General: Normal range of motion.        Feet:    Feet:      Comments: Foot amputation intact, small area of skin with dark skin edges; edema to foot  Skin:     General: Skin is warm.      Coloration: Skin is not jaundiced.   Neurological:      General: No focal deficit present.      Mental Status: He is alert and oriented to person, place, and time.      Cranial Nerves: No cranial nerve deficit.          Significant Labs:  I have reviewed all pertinent lab results within the past 24 hours.  CBC:   Recent Labs   Lab 03/01/24 0310   WBC 4.99   RBC 3.56*   HGB 11.0*   HCT 33.6*      MCV 94.4   MCH 30.9   MCHC 32.7     BMP:   Recent Labs   Lab 03/01/24 0310 03/03/24  0654   *  --      --    K 4.1  --      --    CO2 30  --    BUN 18 12   CREATININE 0.97 0.92   CALCIUM 8.3*  --        Significant Diagnostics:  I have reviewed all pertinent imaging results/findings within the past 24 hours.

## 2024-03-04 NOTE — PLAN OF CARE
Chart reviewed, Sutures release and few staples have been removed. Pt with a small Hematoma evacuated with Q-tip. Dressing applied, Cont iv abx. Pt to be evaluated for another 48 hours. Anticipate possible dc on Wednesday. MARIBEL left message with Dr. Chiu re: documentation that is needed for insurance to pay for wc. SW following.     1400: Documentation needed for wc, faxed to [facility]. SW following.

## 2024-03-04 NOTE — NURSING
Patient called out and stated that he had an accident and that urine had gotten on his dressing to his right foot. Upon assessment the dressing was found to be saturated with urine down to the gauze. Dr. Kiser notified and ordered the dressing to be unwrapped and redressed lightly. Staples intact and a small amount of dried blood noted on the old dressing. Cleaned with vashe and rewrapped with kerlex. Patient tolerated well and medicated for pain.

## 2024-03-04 NOTE — ASSESSMENT & PLAN NOTE
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  Small hematoma evacuated with Q-tip.  Dressing applied.  Continue IV antibiotics.  We will evaluate for another 48 hours; anticipate possible discharge on Wednesday    Patient has mobility limitation which impairs patients ability for ADL's such as feeding, toileting, bathing, dressing, and grooming within the home. A wheel chair will significantly improve patient's ability to perform mobility related ADL's. Limitation cannot be sufficiently resolved by the use of a cane or walker due to patient being a fall risk and having unsteady gait. Pt is willing and has sufficient upper extremity function to self propel in a wheelchair.    Consult social work for wheelchair    3/5: continue wound care and another day of IV antibiotics.  Anticipate D/C home tomorrow.  Wheelchair and walker at bedside.  Home on Bactrim

## 2024-03-04 NOTE — PT/OT/SLP PROGRESS
Occupational Therapy   Treatment    Name: Navdeep Avery  MRN: [id number]  Admitting Diagnosis:  Diabetic ulcer of toe associated with diabetes mellitus due to underlying condition, with necrosis of muscle  3 Days Post-Op    Recommendations:     Discharge Recommendations: Low Intensity Therapy  Discharge Equipment Recommendations:   (to be determined)  Barriers to discharge:       Assessment:     Navdeep Avery is a 63 y.o. male with a medical diagnosis of Diabetic ulcer of toe associated with diabetes mellitus due to underlying condition, with necrosis of muscle.  He presents with weakness. Performance deficits affecting function are weakness, impaired endurance, impaired sensation, impaired self care skills, impaired functional mobility, gait instability, impaired balance, decreased lower extremity function, pain.     Rehab Prognosis:  Good; patient would benefit from acute skilled OT services to address these deficits and reach maximum level of function.       Plan:     Patient to be seen 5 x/week to address the above listed problems via self-care/home management, therapeutic activities, therapeutic exercises  Plan of Care Expires: 04/07/24  Plan of Care Reviewed with: patient    Subjective     Chief Complaint: Pt had no complaints  Patient/Family Comments/goals: return home  Pain/Comfort:       Objective:     Communicated with: SHIMA Osman prior to session.  Patient found supine with   upon OT entry to room.    General Precautions: Standard, fall    Orthopedic Precautions:N/A (Pt with recent amputation on (R) foot)  Braces: N/A  Respiratory Status: Room air     Occupational Performance:     Bed Mobility:    Patient completed Supine to Sit with independence     Functional Mobility/Transfers:  Patient completed Sit <> Stand Transfer with stand by assistance  with  rolling walker   Patient completed Bed <> Chair Transfer using Step Transfer technique with stand by assistance with rolling walker  Functional Mobility:  Pt took 5-6 steps to transfer bed>chair    Activities of Daily Living:        [facility] 6 Click ADL:      Treatment & Education:  Pt completed BUE elbow flex, chest press, sh flex, ABD/ADD with 2# wt, sh IR/ER with red Tband, and hand gripper with 3 bands 3x15 ea ex to increase strength and endurance for improved performance in self care skills.    Patient left up in chair with all lines intact, call button in reach, and wife present    GOALS:   Multidisciplinary Problems       Occupational Therapy Goals          Problem: Occupational Therapy    Goal Priority Disciplines Outcome Interventions   Occupational Therapy Goal     OT, PT/OT Ongoing, Progressing    Description: STG:  Pt will be (I) with grooming  Pt will bathe with min a  Pt will perform UE dressing with (I)  Pt will perform LE dressing with CGA  Pt will transfer bed/chair/bsc with Mod I with RW  Pt will perform standing task x 1 min with Mod I   Pt will tolerate 15 minutes of tx without fatigue      LT.Restore to max I with self care and mobility.                          Time Tracking:     OT Date of Treatment: 24  OT Start Time: 1057  OT Stop Time: 1120  OT Total Time (min): 23 min    Billable Minutes:Therapeutic Activity 10  Therapeutic Exercise 13               3/4/2024

## 2024-03-04 NOTE — PROGRESS NOTES
[facility] - Orthopedic  General Surgery  Progress Note    Subjective:     History of Present Illness:  63-year-old  male presents the ER with complaints of pain and swelling to the right foot.  Patient has a chronic wound on the right foot that has been treated by myself in clinic multiple times.  The wound was healing over with certain skin products, but patient states recently started having severe pain at the site with radiation to the right 5th toe.  He has had redness across the foot swelling.  X-rays performed showed no evidence of osteomyelitis.  Past medical history of peripheral vascular disease with multiple amputations to bilateral feet.  Diabetic ulcers chronic, diabetic peripheral neuropathy, COPD/emphysema, hypertension, hyperlipidemia, coronary artery disease    Post-Op Info:  Procedure(s) (LRB):  AMPUTATION, FOOT, TRANSMETATARSAL; RIGHT LISFRANK AMPUTATION (Right)   3 Days Post-Op     Interval History:  Stable, no acute events overnight.  Patient did get dressing saturated with urine on accident, dressing changed by nursing staff over the weekend    Medications:  Continuous Infusions:   lactated ringers 100 mL/hr at 03/03/24 0631     Scheduled Meds:   albuterol-ipratropium  3 mL Nebulization Q6H    amLODIPine  10 mg Oral Daily    aspirin  81 mg Oral Daily    atorvastatin  80 mg Oral QHS    budesonide  0.5 mg Nebulization Q12H    clopidogreL  75 mg Oral Daily    EScitalopram oxalate  10 mg Oral Daily    gabapentin  300 mg Oral TID    hydrOXYzine pamoate  25 mg Oral Daily    insulin detemir U-100  8 Units Subcutaneous BID    isosorbide mononitrate  30 mg Oral Daily    lisinopriL  10 mg Oral Daily    metoprolol succinate  50 mg Oral Daily    mirtazapine  7.5 mg Oral Nightly    mupirocin   Nasal BID    pantoprazole  40 mg Oral Daily    piperacillin-tazobactam (Zosyn) IV (PEDS and ADULTS) (extended infusion is not appropriate)  4.5 g Intravenous Q8H    vancomycin (VANCOCIN) IV (PEDS  and ADULTS)  20 mg/kg Intravenous Q18H     PRN Meds:acetaminophen, acetaminophen, acetaminophen, albuterol-ipratropium, aluminum-magnesium hydroxide-simethicone, dextrose 10%, dextrose 10%, diphenhydrAMINE, glucagon (human recombinant), glucose, glucose, HYDROcodone-acetaminophen, HYDROcodone-acetaminophen, insulin aspart U-100, melatonin, morphine, naloxone, nitroGLYCERIN, ondansetron, prochlorperazine, sodium chloride 0.9%, vancomycin - pharmacy to dose     Review of patient's allergies indicates:  No Known Allergies  Objective:     Vital Signs (Most Recent):  Temp: 98.1 °F (36.7 °C) (03/04/24 0650)  Pulse: (!) 55 (03/04/24 0650)  Resp: 16 (03/04/24 0650)  BP: (!) 146/63 (03/04/24 0650)  SpO2: 96 % (03/04/24 0650) Vital Signs (24h Range):  Temp:  [97.7 °F (36.5 °C)-98.2 °F (36.8 °C)] 98.1 °F (36.7 °C)  Pulse:  [53-89] 55  Resp:  [16-20] 16  SpO2:  [92 %-98 %] 96 %  BP: (122-156)/(61-77) 146/63     Weight: 73.5 kg (162 lb)  Body mass index is 24.63 kg/m².    Intake/Output - Last 3 Shifts         03/02 0700  03/03 0659 03/03 0700  03/04 0659 03/04 0700  03/05 0659    Urine (mL/kg/hr) 400 (0.2) 500 (0.3)     Blood       Total Output 400 500     Net -400 -500                     Physical Exam  Constitutional:       General: He is not in acute distress.     Appearance: Normal appearance.   HENT:      Head: Normocephalic.   Cardiovascular:      Rate and Rhythm: Normal rate.   Pulmonary:      Effort: Pulmonary effort is normal. No respiratory distress.   Abdominal:      General: There is no distension.      Tenderness: There is no abdominal tenderness.   Musculoskeletal:         General: Normal range of motion.        Feet:    Feet:      Comments: Foot amputation intact, small area of skin with dark skin edges; edema to foot  Skin:     General: Skin is warm.      Coloration: Skin is not jaundiced.   Neurological:      General: No focal deficit present.      Mental Status: He is alert and oriented to person, place, and  time.      Cranial Nerves: No cranial nerve deficit.          Significant Labs:  I have reviewed all pertinent lab results within the past 24 hours.  CBC:   Recent Labs   Lab 03/01/24  0310   WBC 4.99   RBC 3.56*   HGB 11.0*   HCT 33.6*      MCV 94.4   MCH 30.9   MCHC 32.7     BMP:   Recent Labs   Lab 03/01/24  0310 03/03/24  0654   *  --      --    K 4.1  --      --    CO2 30  --    BUN 18 12   CREATININE 0.97 0.92   CALCIUM 8.3*  --        Significant Diagnostics:  I have reviewed all pertinent imaging results/findings within the past 24 hours.  Assessment/Plan:     * Diabetic ulcer of toe associated with diabetes mellitus due to underlying condition, with necrosis of muscle  S/p Right Lisfranc Amputation 3/1    3/4: sutures released and a few staples removed.  Small hematoma evacuated with Q-tip.  Dressing applied.  Continue IV antibiotics.  We will evaluate for another 48 hours; anticipate possible discharge on Wednesday    Patient has mobility limitation which impairs patients ability for ADL's such as feeding, toileting, bathing, dressing, and grooming within the home. A wheel chair will significantly improve patient's ability to perform mobility related ADL's. Limitation cannot be sufficiently resolved by the use of a cane or walker due to patient being a fall risk and having unsteady gait. Pt is willing and has sufficient upper extremity function to self propel in a wheelchair.    Consult social work for wheelchair        Evelio Razo, DO  General Surgery  [facility]

## 2024-03-05 LAB
EST. AVERAGE GLUCOSE BLD GHB EST-MCNC: 214 MG/DL
GLUCOSE SERPL-MCNC: 114 MG/DL (ref 70–105)
GLUCOSE SERPL-MCNC: 150 MG/DL (ref 70–105)
GLUCOSE SERPL-MCNC: 203 MG/DL (ref 70–105)
GLUCOSE SERPL-MCNC: 225 MG/DL (ref 70–105)
GLUCOSE SERPL-MCNC: 307 MG/DL (ref 70–105)
HBA1C MFR BLD HPLC: 9.1 % (ref 4.5–6.6)

## 2024-03-05 PROCEDURE — 63600175 PHARM REV CODE 636 W HCPCS: Mod: JZ,JG | Performed by: STUDENT IN AN ORGANIZED HEALTH CARE EDUCATION/TRAINING PROGRAM

## 2024-03-05 PROCEDURE — 94761 N-INVAS EAR/PLS OXIMETRY MLT: CPT

## 2024-03-05 PROCEDURE — 11000001 HC ACUTE MED/SURG PRIVATE ROOM

## 2024-03-05 PROCEDURE — 97116 GAIT TRAINING THERAPY: CPT

## 2024-03-05 PROCEDURE — 25000242 PHARM REV CODE 250 ALT 637 W/ HCPCS: Performed by: STUDENT IN AN ORGANIZED HEALTH CARE EDUCATION/TRAINING PROGRAM

## 2024-03-05 PROCEDURE — 94640 AIRWAY INHALATION TREATMENT: CPT

## 2024-03-05 PROCEDURE — 99900035 HC TECH TIME PER 15 MIN (STAT)

## 2024-03-05 PROCEDURE — 97530 THERAPEUTIC ACTIVITIES: CPT

## 2024-03-05 PROCEDURE — 63600175 PHARM REV CODE 636 W HCPCS: Performed by: INTERNAL MEDICINE

## 2024-03-05 PROCEDURE — 63600175 PHARM REV CODE 636 W HCPCS: Performed by: STUDENT IN AN ORGANIZED HEALTH CARE EDUCATION/TRAINING PROGRAM

## 2024-03-05 PROCEDURE — 82962 GLUCOSE BLOOD TEST: CPT

## 2024-03-05 PROCEDURE — 25000003 PHARM REV CODE 250: Performed by: STUDENT IN AN ORGANIZED HEALTH CARE EDUCATION/TRAINING PROGRAM

## 2024-03-05 PROCEDURE — 99232 SBSQ HOSP IP/OBS MODERATE 35: CPT | Mod: ,,, | Performed by: INTERNAL MEDICINE

## 2024-03-05 PROCEDURE — 97110 THERAPEUTIC EXERCISES: CPT

## 2024-03-05 PROCEDURE — 63600175 PHARM REV CODE 636 W HCPCS: Performed by: FAMILY MEDICINE

## 2024-03-05 PROCEDURE — 83036 HEMOGLOBIN GLYCOSYLATED A1C: CPT | Performed by: INTERNAL MEDICINE

## 2024-03-05 RX ORDER — INSULIN ASPART 100 [IU]/ML
0-10 INJECTION, SOLUTION INTRAVENOUS; SUBCUTANEOUS EVERY 6 HOURS PRN
Status: DISCONTINUED | OUTPATIENT
Start: 2024-03-05 | End: 2024-03-06 | Stop reason: HOSPADM

## 2024-03-05 RX ADMIN — CLOPIDOGREL BISULFATE 75 MG: 75 TABLET ORAL at 08:03

## 2024-03-05 RX ADMIN — MIRTAZAPINE 7.5 MG: 7.5 TABLET, FILM COATED ORAL at 09:03

## 2024-03-05 RX ADMIN — HYDROCODONE BITARTRATE AND ACETAMINOPHEN 1 TABLET: 10; 325 TABLET ORAL at 03:03

## 2024-03-05 RX ADMIN — LISINOPRIL 10 MG: 10 TABLET ORAL at 08:03

## 2024-03-05 RX ADMIN — PANTOPRAZOLE SODIUM 40 MG: 40 TABLET, DELAYED RELEASE ORAL at 08:03

## 2024-03-05 RX ADMIN — INSULIN ASPART 2 UNITS: 100 INJECTION, SOLUTION INTRAVENOUS; SUBCUTANEOUS at 01:03

## 2024-03-05 RX ADMIN — HYDROCODONE BITARTRATE AND ACETAMINOPHEN 1 TABLET: 10; 325 TABLET ORAL at 01:03

## 2024-03-05 RX ADMIN — BUDESONIDE INHALATION 0.5 MG: 0.5 SUSPENSION RESPIRATORY (INHALATION) at 07:03

## 2024-03-05 RX ADMIN — INSULIN ASPART 4 UNITS: 100 INJECTION, SOLUTION INTRAVENOUS; SUBCUTANEOUS at 08:03

## 2024-03-05 RX ADMIN — MORPHINE SULFATE 4 MG: 4 INJECTION, SOLUTION INTRAMUSCULAR; INTRAVENOUS at 03:03

## 2024-03-05 RX ADMIN — AMLODIPINE BESYLATE 10 MG: 10 TABLET ORAL at 08:03

## 2024-03-05 RX ADMIN — GABAPENTIN 300 MG: 300 CAPSULE ORAL at 03:03

## 2024-03-05 RX ADMIN — ISOSORBIDE MONONITRATE 30 MG: 30 TABLET, EXTENDED RELEASE ORAL at 08:03

## 2024-03-05 RX ADMIN — ASPIRIN 81 MG CHEWABLE TABLET 81 MG: 81 TABLET CHEWABLE at 08:03

## 2024-03-05 RX ADMIN — MORPHINE SULFATE 4 MG: 4 INJECTION, SOLUTION INTRAMUSCULAR; INTRAVENOUS at 06:03

## 2024-03-05 RX ADMIN — ATORVASTATIN CALCIUM 80 MG: 80 TABLET, FILM COATED ORAL at 09:03

## 2024-03-05 RX ADMIN — IPRATROPIUM BROMIDE AND ALBUTEROL SULFATE 3 ML: .5; 3 SOLUTION RESPIRATORY (INHALATION) at 07:03

## 2024-03-05 RX ADMIN — PIPERACILLIN AND TAZOBACTAM 4.5 G: 4; .5 INJECTION, POWDER, FOR SOLUTION INTRAVENOUS; PARENTERAL at 12:03

## 2024-03-05 RX ADMIN — IPRATROPIUM BROMIDE AND ALBUTEROL SULFATE 3 ML: .5; 3 SOLUTION RESPIRATORY (INHALATION) at 12:03

## 2024-03-05 RX ADMIN — ESCITALOPRAM OXALATE 10 MG: 10 TABLET ORAL at 08:03

## 2024-03-05 RX ADMIN — INSULIN DETEMIR 10 UNITS: 100 INJECTION, SOLUTION SUBCUTANEOUS at 08:03

## 2024-03-05 RX ADMIN — MUPIROCIN: 20 OINTMENT TOPICAL at 09:03

## 2024-03-05 RX ADMIN — METOPROLOL SUCCINATE 50 MG: 50 TABLET, EXTENDED RELEASE ORAL at 08:03

## 2024-03-05 RX ADMIN — PIPERACILLIN AND TAZOBACTAM 4.5 G: 4; .5 INJECTION, POWDER, FOR SOLUTION INTRAVENOUS; PARENTERAL at 09:03

## 2024-03-05 RX ADMIN — GABAPENTIN 300 MG: 300 CAPSULE ORAL at 09:03

## 2024-03-05 RX ADMIN — GABAPENTIN 300 MG: 300 CAPSULE ORAL at 08:03

## 2024-03-05 RX ADMIN — VANCOMYCIN HYDROCHLORIDE 1500 MG: 1 INJECTION, POWDER, LYOPHILIZED, FOR SOLUTION INTRAVENOUS at 02:03

## 2024-03-05 RX ADMIN — HYDROCODONE BITARTRATE AND ACETAMINOPHEN 1 TABLET: 10; 325 TABLET ORAL at 09:03

## 2024-03-05 RX ADMIN — HYDROXYZINE PAMOATE 25 MG: 25 CAPSULE ORAL at 08:03

## 2024-03-05 RX ADMIN — PIPERACILLIN AND TAZOBACTAM 4.5 G: 4; .5 INJECTION, POWDER, FOR SOLUTION INTRAVENOUS; PARENTERAL at 05:03

## 2024-03-05 RX ADMIN — INSULIN DETEMIR 8 UNITS: 100 INJECTION, SOLUTION SUBCUTANEOUS at 09:03

## 2024-03-05 NOTE — PLAN OF CARE
Problem: Adult Inpatient Plan of Care  Goal: Plan of Care Review  Outcome: Ongoing, Progressing  Goal: Patient-Specific Goal (Individualized)  Outcome: Ongoing, Progressing  Goal: Absence of Hospital-Acquired Illness or Injury  Outcome: Ongoing, Progressing     Problem: Diabetes Comorbidity  Goal: Blood Glucose Level Within Targeted Range  Outcome: Ongoing, Progressing     Problem: Skin Injury Risk Increased  Goal: Skin Health and Integrity  Outcome: Ongoing, Progressing     Problem: Fall Injury Risk  Goal: Absence of Fall and Fall-Related Injury  Outcome: Ongoing, Progressing

## 2024-03-05 NOTE — PT/OT/SLP PROGRESS
Physical Therapy Treatment    Patient Name:  Navdeep Avery   MRN:  [id number]    Recommendations:     Discharge Recommendations: Low Intensity Therapy  Discharge Equipment Recommendations: wheelchair (knee scooter)  Barriers to discharge: None    Assessment:     Navdeep Avery is a 63 y.o. male admitted with a medical diagnosis of Diabetic ulcer of toe associated with diabetes mellitus due to underlying condition, with necrosis of muscle.  He presents with the following impairments/functional limitations: impaired endurance, impaired self care skills, impaired functional mobility, gait instability, impaired balance, decreased lower extremity function, pain, decreased ROM, impaired skin Patient with good use of walker nwb right le.    Rehab Prognosis: Good; patient would benefit from acute skilled PT services to address these deficits and reach maximum level of function.    Recent Surgery: Procedure(s) (LRB):  AMPUTATION, FOOT, TRANSMETATARSAL; RIGHT LISFRANK AMPUTATION (Right) 4 Days Post-Op    Plan:     During this hospitalization, patient to be seen 5 x/week to address the identified rehab impairments via gait training, therapeutic activities, therapeutic exercises and progress toward the following goals:    Plan of Care Expires:  04/03/24    Subjective     Chief Complaint: right foot pain  Patient/Family Comments/goals: Patient plans on dc home in next day or two.  Pain/Comfort:  Pain Rating 1: 4/10  Location - Side 1: Right  Location 1: foot  Pain Addressed 1: Pre-medicate for activity, Cessation of Activity      Objective:     Communicated with nurse prior to session.  Patient found supine with   upon PT entry to room.     General Precautions: Standard, fall  Orthopedic Precautions:  (no WB restriction on chart but Lisfranc amp therefore recommended minimizing WB as much as possible)  Braces: N/A  Respiratory Status: Room air     Functional Mobility:  Transfers:     Sit to Stand:  contact guard assistance  with no AD  Bed to Chair: contact guard assistance with  no AD  using  Stand Pivot  Gait: ambulated 90 feet with rolling walker cga, nwb right le      AM-PAC 6 CLICK MOBILITY  Turning over in bed (including adjusting bedclothes, sheets and blankets)?: 3  Sitting down on and standing up from a chair with arms (e.g., wheelchair, bedside commode, etc.): 3  Moving from lying on back to sitting on the side of the bed?: 3  Moving to and from a bed to a chair (including a wheelchair)?: 3  Need to walk in hospital room?: 3  Climbing 3-5 steps with a railing?: 3  Basic Mobility Total Score: 18       Treatment & Education:  Nwb right le    Patient left up in chair with all lines intact..    GOALS:   Multidisciplinary Problems       Physical Therapy Goals          Problem: Physical Therapy    Goal Priority Disciplines Outcome Goal Variances Interventions   Physical Therapy Goal     PT, PT/OT Ongoing, Progressing     Description: Short Term Goals to be met by: 3/17/2024    Patient will increase functional independence with mobility by performin. Supine to sit with independently  2. Sit to stand transfer with independently using Rolling walker  3. Bed to chair transfer with independently using Rolling walker and minimal WB R LE via heel  4. Gait  x 100 feet with independently using Rolling walker and minimal WB R LE via heel  5. Lower extremity exercise program x30 reps per handout, with assistance as needed    Long Term Goals to be met by: 5/3/2024    Pt will regain full independent functional mobility with lowest level of assistive device to return to home situation and prior activities of daily living.                        Time Tracking:     PT Received On: 24  PT Start Time: 1110     PT Stop Time: 1120  PT Total Time (min): 10 min     Billable Minutes: Gait Training 10    Treatment Type: Treatment  PT/PTA: PT     Number of PTA visits since last PT visit: 0     2024

## 2024-03-05 NOTE — PROGRESS NOTES
[facility]  General Surgery  Progress Note    Subjective:     History of Present Illness:  63-year-old  male presents the ER with complaints of pain and swelling to the right foot.  Patient has a chronic wound on the right foot that has been treated by myself in clinic multiple times.  The wound was healing over with certain skin products, but patient states recently started having severe pain at the site with radiation to the right 5th toe.  He has had redness across the foot swelling.  X-rays performed showed no evidence of osteomyelitis.  Past medical history of peripheral vascular disease with multiple amputations to bilateral feet.  Diabetic ulcers chronic, diabetic peripheral neuropathy, COPD/emphysema, hypertension, hyperlipidemia, coronary artery disease    Post-Op Info:  Procedure(s) (LRB):  AMPUTATION, FOOT, TRANSMETATARSAL; RIGHT LISFRANK AMPUTATION (Right)   4 Days Post-Op     Interval History:  Stable, no acute events overnight.      Medications:  Continuous Infusions:   lactated ringers 100 mL/hr at 03/03/24 0631     Scheduled Meds:   albuterol-ipratropium  3 mL Nebulization Q6H    amLODIPine  10 mg Oral Daily    aspirin  81 mg Oral Daily    atorvastatin  80 mg Oral QHS    budesonide  0.5 mg Nebulization Q12H    clopidogreL  75 mg Oral Daily    EScitalopram oxalate  10 mg Oral Daily    gabapentin  300 mg Oral TID    hydrOXYzine pamoate  25 mg Oral Daily    insulin detemir U-100  10 Units Subcutaneous Daily    insulin detemir U-100  8 Units Subcutaneous QHS    isosorbide mononitrate  30 mg Oral Daily    lisinopriL  10 mg Oral Daily    metoprolol succinate  50 mg Oral Daily    mirtazapine  7.5 mg Oral Nightly    mupirocin   Nasal BID    pantoprazole  40 mg Oral Daily    piperacillin-tazobactam (Zosyn) IV (PEDS and ADULTS) (extended infusion is not appropriate)  4.5 g Intravenous Q8H    vancomycin (VANCOCIN) IV (PEDS and ADULTS)  20 mg/kg Intravenous Q18H     PRN  Meds:acetaminophen, acetaminophen, acetaminophen, albuterol-ipratropium, aluminum-magnesium hydroxide-simethicone, dextrose 10%, dextrose 10%, diphenhydrAMINE, glucagon (human recombinant), glucose, glucose, HYDROcodone-acetaminophen, HYDROcodone-acetaminophen, insulin aspart U-100, melatonin, morphine, naloxone, nitroGLYCERIN, ondansetron, prochlorperazine, sodium chloride 0.9%, vancomycin - pharmacy to dose     Review of patient's allergies indicates:  No Known Allergies  Objective:     Vital Signs (Most Recent):  Temp: 97.7 °F (36.5 °C) (03/05/24 0636)  Pulse: 61 (03/05/24 0747)  Resp: 20 (03/05/24 0747)  BP: 138/62 (03/05/24 0636)  SpO2: (!) 93 % (03/05/24 0747) Vital Signs (24h Range):  Temp:  [97.5 °F (36.4 °C)-98 °F (36.7 °C)] 97.7 °F (36.5 °C)  Pulse:  [57-68] 61  Resp:  [12-20] 20  SpO2:  [89 %-99 %] 93 %  BP: (110-138)/(55-67) 138/62     Weight: 73.5 kg (162 lb)  Body mass index is 24.63 kg/m².    Intake/Output - Last 3 Shifts         03/03 0700  03/04 0659 03/04 0700  03/05 0659 03/05 0700  03/06 0659    Urine (mL/kg/hr) 500 (0.3) 700 (0.4)     Total Output 500 700     Net -500 -700                    Physical Exam  Constitutional:       General: He is not in acute distress.     Appearance: Normal appearance.   HENT:      Head: Normocephalic.   Cardiovascular:      Rate and Rhythm: Normal rate.   Pulmonary:      Effort: Pulmonary effort is normal. No respiratory distress.   Abdominal:      General: There is no distension.      Tenderness: There is no abdominal tenderness.   Musculoskeletal:         General: Normal range of motion.        Feet:    Feet:      Comments: Foot amputation intact, small area of skin with dark skin edges; edema to foot  Skin:     General: Skin is warm.      Coloration: Skin is not jaundiced.   Neurological:      General: No focal deficit present.      Mental Status: He is alert and oriented to person, place, and time.      Cranial Nerves: No cranial nerve deficit.                 Significant Labs:  I have reviewed all pertinent lab results within the past 24 hours.  CBC:   Recent Labs   Lab 03/01/24 0310   WBC 4.99   RBC 3.56*   HGB 11.0*   HCT 33.6*      MCV 94.4   MCH 30.9   MCHC 32.7       BMP:   Recent Labs   Lab 03/01/24 0310 03/03/24  0654   *  --      --    K 4.1  --      --    CO2 30  --    BUN 18 12   CREATININE 0.97 0.92   CALCIUM 8.3*  --          Significant Diagnostics:  I have reviewed all pertinent imaging results/findings within the past 24 hours.  Assessment/Plan:     * Diabetic ulcer of toe associated with diabetes mellitus due to underlying condition, with necrosis of muscle  S/p Right Lisfranc Amputation 3/1    3/4: sutures released and a few staples removed.  Small hematoma evacuated with Q-tip.  Dressing applied.  Continue IV antibiotics.  We will evaluate for another 48 hours; anticipate possible discharge on Wednesday    Patient has mobility limitation which impairs patients ability for ADL's such as feeding, toileting, bathing, dressing, and grooming within the home. A wheel chair will significantly improve patient's ability to perform mobility related ADL's. Limitation cannot be sufficiently resolved by the use of a cane or walker due to patient being a fall risk and having unsteady gait. Pt is willing and has sufficient upper extremity function to self propel in a wheelchair.    Consult social work for wheelchair    3/5: continue wound care and another day of IV antibiotics.  Anticipate D/C home tomorrow.  Wheelchair and walker at bedside.  Home on Bactrim        Evelio Razo,   General Surgery  [facility]

## 2024-03-05 NOTE — PHYSICIAN QUERY
PT Name: Navdeep Avery  MR #: [id number]     Documentation Clarification      CDS:  Megha DIMAS, RN   Contact information:  [email or web address]  This form is a permanent document in the medical record.     Query Date: March 5, 2024    By submitting this query, we are merely seeking further clarification of documentation. Please utilize your independent clinical judgment when addressing the question(s) below.    The Medical Record reflects the following:    Supporting Clinical Findings Location in Medical Record    63-year-old  male presents the ER with complaints of pain and swelling to the right foot.  Patient has a chronic wound on the right foot that has been treated by myself in clinic multiple times.  The wound was healing over with certain skin products, but patient states recently started having severe pain at the site with radiation to the right 5th toe.  He has had redness across the foot swelling.  X-rays performed showed no evidence of osteomyelitis.  Past medical history of peripheral vascular disease with multiple amputations to bilateral feet.  Diabetic ulcers chronic, diabetic peripheral neuropathy, COPD/emphysema, hypertension, hyperlipidemia, coronary artery disease    Assessment/Plan:  Diabetic ulcer of toe associated with diabetes mellitus due to underlying condition, with necrosis of muscle   H&P 2/27/24   Procedure:  AMPUTATION, TOE (Right)  DEBRIDEMENT, FOOT (Right)     Operative Findings (including complications, if any):    Necrotic right 5th toe with necrosis and purulent drainage in the right 5th metatarsophalangeal joint Op note 2/27/24                                                                            Provider, please further specify the Necrotic right 5th toe with necrosis diagnosis  associated with above clinical findings.    [   x     ] Necrotic right 5th toe with necrosis and gangrene   [       ] Necrotic right 5th toe with necrosis without  gangrene   [       ] Other (please specify): ____________

## 2024-03-05 NOTE — PLAN OF CARE
Problem: Adult Inpatient Plan of Care  Goal: Absence of Hospital-Acquired Illness or Injury  Outcome: Ongoing, Progressing     Problem: Adult Inpatient Plan of Care  Goal: Optimal Comfort and Wellbeing  Outcome: Ongoing, Progressing     Problem: Diabetes Comorbidity  Goal: Blood Glucose Level Within Targeted Range  Outcome: Ongoing, Progressing     Problem: Gas Exchange Impaired  Goal: Optimal Gas Exchange  Outcome: Ongoing, Progressing     Problem: Skin Injury Risk Increased  Goal: Skin Health and Integrity  Outcome: Ongoing, Progressing     Problem: Fall Injury Risk  Goal: Absence of Fall and Fall-Related Injury  Outcome: Ongoing, Progressing

## 2024-03-05 NOTE — ASSESSMENT & PLAN NOTE
Now status-post Lisfranc amputation. Case management assisting with discharge planning and DME. Anticipate discharge home with wheelchair per therapy recommendations.     Wound cultures with MRSA, anaerobic cultures with Finegoldia. Continue IV antibiotics.

## 2024-03-05 NOTE — PT/OT/SLP PROGRESS
Occupational Therapy   Treatment    Name: Navdeep Avery  MRN: [id number]  Admitting Diagnosis:  Diabetic ulcer of toe associated with diabetes mellitus due to underlying condition, with necrosis of muscle  4 Days Post-Op    Recommendations:     Discharge Recommendations: Low Intensity Therapy  Discharge Equipment Recommendations:   (to be determined)  Barriers to discharge:       Assessment:     Navdeep Avery is a 63 y.o. male with a medical diagnosis of Diabetic ulcer of toe associated with diabetes mellitus due to underlying condition, with necrosis of muscle.  He presents with weakness. Performance deficits affecting function are weakness, impaired endurance, impaired sensation, impaired self care skills, impaired functional mobility, gait instability, impaired balance, decreased lower extremity function, pain.     Rehab Prognosis:  Good; patient would benefit from acute skilled OT services to address these deficits and reach maximum level of function.       Plan:     Patient to be seen 5 x/week to address the above listed problems via self-care/home management, therapeutic activities, therapeutic exercises  Plan of Care Expires: 04/07/24  Plan of Care Reviewed with: patient    Subjective     Chief Complaint: Pt had no complaints  Patient/Family Comments/goals: return home  Pain/Comfort:       Objective:     Communicated with: RN prior to session.  Patient found HOB elevated with   upon OT entry to room.    General Precautions: Standard, fall    Orthopedic Precautions:N/A (Pt with recent amputation on (R) foot)  Braces: N/A  Respiratory Status: Room air     Occupational Performance:     Bed Mobility:    Patient completed Supine to Sit with supervision  Patient completed Sit to Supine with supervision     Functional Mobility/Transfers:  Patient completed Sit <> Stand Transfer with modified independence  with  rolling walker   Functional Mobility: Pt took 3 steps toward HOB    Activities of Daily Living:  Lower  Body Dressing: supervision Pt donned L sock sitting unsupported EOB      [facility] 6 Click ADL:      Treatment & Education:  Pt completed BUE elbow flex, chest press, and Sh ABD/ADD with 2 # wt and sh IR/ER and scappulls with red Tband 2x15 ea ex while sitting unsupported EOB. Pt had no LOB while sitting    Patient left HOB elevated with all lines intact and call button in reach    GOALS:   Multidisciplinary Problems       Occupational Therapy Goals          Problem: Occupational Therapy    Goal Priority Disciplines Outcome Interventions   Occupational Therapy Goal     OT, PT/OT Ongoing, Progressing    Description: STG:  Pt will be (I) with grooming  Pt will bathe with min a  Pt will perform UE dressing with (I)  Pt will perform LE dressing with CGA  Pt will transfer bed/chair/bsc with Mod I with RW  Pt will perform standing task x 1 min with Mod I   Pt will tolerate 15 minutes of tx without fatigue      LT.Restore to max I with self care and mobility.                          Time Tracking:     OT Date of Treatment: 24  OT Start Time: 1108  OT Stop Time: 1132  OT Total Time (min): 24 min    Billable Minutes:Therapeutic Activity 10  Therapeutic Exercise 14               3/5/2024

## 2024-03-05 NOTE — SUBJECTIVE & OBJECTIVE
Interval History:     No significant events overnight, no new complaints this morning. Post-op #3 Lisfranc amputation.    Review of Systems   Constitutional: Negative.  Negative for chills and fever.   HENT: Negative.     Respiratory:  Negative for cough, chest tightness and shortness of breath.    Cardiovascular:  Negative for chest pain and palpitations.   Gastrointestinal:  Negative for abdominal pain, diarrhea, nausea and vomiting.   Endocrine: Negative.    Genitourinary: Negative.    Musculoskeletal:  Negative for arthralgias and myalgias.   Skin:  Negative for rash.   Allergic/Immunologic: Negative.    Neurological:  Negative for dizziness, syncope, weakness, numbness and headaches.   Psychiatric/Behavioral:  Negative for confusion and sleep disturbance.      Objective:     Vital Signs (Most Recent):  Temp: 97.6 °F (36.4 °C) (03/04/24 1850)  Pulse: (!) 58 (03/04/24 1946)  Resp: 18 (03/04/24 1946)  BP: (!) 110/57 (03/04/24 1850)  SpO2: 99 % (03/04/24 1946) Vital Signs (24h Range):  Temp:  [97.5 °F (36.4 °C)-98.2 °F (36.8 °C)] 97.6 °F (36.4 °C)  Pulse:  [53-89] 58  Resp:  [12-20] 18  SpO2:  [93 %-99 %] 99 %  BP: (110-146)/(57-70) 110/57     Weight: 73.5 kg (162 lb)  Body mass index is 24.63 kg/m².    Intake/Output Summary (Last 24 hours) at 3/4/2024 2206  Last data filed at 3/4/2024 0735  Gross per 24 hour   Intake --   Output 700 ml   Net -700 ml           Physical Exam  Constitutional:       General: He is not in acute distress.     Appearance: Normal appearance. He is not ill-appearing.   HENT:      Head: Normocephalic and atraumatic.   Cardiovascular:      Rate and Rhythm: Normal rate and regular rhythm.   Pulmonary:      Effort: Pulmonary effort is normal. No respiratory distress.   Musculoskeletal:      Cervical back: No rigidity.      Comments: R LE surgical dressing clean, dry, intact   Skin:     Coloration: Skin is not jaundiced or pale.      Findings: No rash.   Neurological:      Mental Status: He is  alert. Mental status is at baseline.   Psychiatric:         Behavior: Behavior normal.         Thought Content: Thought content normal.             Significant Labs: All pertinent labs within the past 24 hours have been reviewed.    Significant Imaging: I have reviewed all pertinent imaging results/findings within the past 24 hours.

## 2024-03-05 NOTE — PROGRESS NOTES
[facility]  Adult Nutrition  First Assessment Note         Reason for Assessment  Reason For Assessment: length of stay   Nutrition Risk Screen: no indicators present    Assessment and Plan  Patient is a 64yo male admitted 2/27 for diabetic ulcer of toe associated with DM. He is assessed for length of stay.     Patient is 162 pounds with a BMI of 24.63 and is within his ideal body weight range. He is currently on a 2000kcal Consistent Carbohydrate Diet and tolerating well. Intake 100% per flowsheet. Recommend continue current diet as tolerated. Encourage good PO intakes.     Last BM 2/28 per flowsheet. Recommend consider bowel regimen as constipation can affect diet tolerance.     Medications/labs reviewed. RD following.           Learning Needs/Social Determinants of Health  Learning Assessment       02/28/2024 0612 [facility] (2/27/2024 - Present)   Created by Kiana Mike, RN - RN (Nurse) Status: Complete                 PRIMARY LEARNER     Primary Learner Name:  Navdeep Avery  - 02/28/2024 0612    Relationship:  Patient  - 02/28/2024 0612    Does the primary learner have any barriers to learning?:  No Barriers  - 02/28/2024 0612    What is the preferred language of the primary learner?:  English  - 02/28/2024 0612    Is an  required?:  No  - 02/28/2024 0612    How does the primary learner prefer to learn new concepts?:  Listening  - 02/28/2024 0612    How often do you need to have someone help you read instructions, pamphlets, or written material from your doctor or pharmacy?:  Never  - 02/28/2024 0612        CO-LEARNER #1     No question answered        CO-LEARNER #2     No question answered        SPECIAL TOPICS     No question answered        ANSWERED BY:     No question answered        Edit History       Kiana Mike, RN - RN (Nurse)   02/28/2024 0612                           Social Determinants of Health     Tobacco Use: High Risk  (3/4/2024)    Patient History     Smoking Tobacco Use: Every Day     Smokeless Tobacco Use: Never     Passive Exposure: Current   Alcohol Use: Not At Risk (2/28/2024)    AUDIT-C     Frequency of Alcohol Consumption: Never     Average Number of Drinks: Patient does not drink     Frequency of Binge Drinking: Never   Financial Resource Strain: Low Risk  (2/29/2024)    Overall Financial Resource Strain (CARDIA)     Difficulty of Paying Living Expenses: Not very hard   Food Insecurity: No Food Insecurity (2/29/2024)    Hunger Vital Sign     Worried About Running Out of Food in the Last Year: Never true     Ran Out of Food in the Last Year: Never true   Transportation Needs: No Transportation Needs (2/29/2024)    PRAPARE - Transportation     Lack of Transportation (Medical): No     Lack of Transportation (Non-Medical): No   Physical Activity: Inactive (2/28/2024)    Exercise Vital Sign     Days of Exercise per Week: 0 days     Minutes of Exercise per Session: 0 min   Stress: No Stress Concern Present (2/29/2024)    [employer] - Occupational Stress Questionnaire     Feeling of Stress : Only a little   Recent Concern: Stress - Stress Concern Present (2/28/2024)    [employer] - Occupational Stress Questionnaire     Feeling of Stress : Rather much   Social Connections: Moderately Isolated (2/28/2024)    Social Connection and Isolation Panel [NHANES]     Frequency of Communication with Friends and Family: More than three times a week     Frequency of Social Gatherings with Friends and Family: More than three times a week     Attends Caodaism Services: Never     Active Member of Clubs or Organizations: No     Attends Club or Organization Meetings: Never     Marital Status:    Housing Stability: Low Risk  (2/29/2024)    Housing Stability Vital Sign     Unable to Pay for Housing in the Last Year: No     Number of Places Lived in the Last Year: 1     Unstable  Housing in the Last Year: No   Depression: Low Risk  (2/13/2024)    Depression     Last PHQ-4: Flowsheet Data: 0            Malnutrition  Is Patient Malnourished: No    Nutrition Diagnosis  Altered nutrition related laboratory values related to Diabetes Mellitus as evidenced by elevated BGs      Recent Labs   Lab 03/05/24  0753   POCGLU 225*     Comments on Glucose: Glucose elevated. Likely exacerbated by stress response to wounds.     Nutrition Prescription / Recommendations  Recommendation/Intervention: Recommend continue current diet as tolerated. Encourage PO intakes.  Goals: Weight maintenance during admission, intake % of meals during admission  Nutrition Goal Status: new  Current Diet Order: 2000kcl Consistent Carbohydrate DIet.  Chewing or Swallowing Difficulty?: No Chewing or swallowing difficulty  Recommended Diet: Consistent Carbohydrate 2000 (75g Carbs)  Recommended Oral Supplement: No Oral Supplements  Is Nutrition Support Recommended: [facility] Support: No  Is Nutrition Education Recommended: No    Monitor and Evaluation  % current Intake: P.O. intake of 75 - 100 %  % intake to meet estimated needs: 75 - 100 %  Food and Nutrient Intake: food and beverage intake  Food and Nutrient Adminstration: diet order  Anthropometric Measurements: weight change, weight  Biochemical Data, Medical Tests and Procedures: electrolyte and renal panel, gastrointestinal profile, glucose/endocrine profile, inflammatory profile, lipid profile       Current Medical Diagnosis and Past Medical History  Diagnosis: other (see comments)  Past Medical History:   Diagnosis Date    Arthritis     Carpal tunnel syndrome     Charcot foot due to diabetes mellitus 09/29/2023    Prescription for CROW orthotic boot given today    COPD (chronic obstructive pulmonary disease)     Coronary artery disease involving native coronary artery of native heart 06/27/2023    CABG 2018 (No OP report available)  Most recent left heart  catheterization 08/12/2019 1. Normal LV size apical akinesis and overall normal LV systolic function, ejection fraction 55% 2. Patent saphenous vein graft to the right PDA with patent jump graft to the OM branch left circumflex 3. Patent LIMA to the OM1 4. No significant mitral regurgitation     COVID 02/02/2022    Diabetic peripheral neuropathy     Essential (primary) hypertension     GERD (gastroesophageal reflux disease)     Insomnia secondary to depression with anxiety 06/17/2021    Mixed hyperlipidemia 03/23/2021    Nicotine dependence     Peripheral vascular disease, unspecified     Sleep apnea     Type 2 diabetes mellitus        Nutrition/Diet History  Spiritual, Cultural Beliefs, Confucianist Practices, Values that Affect Care: no  Food Allergies: NKFA    Lab/Procedures/Meds  Recent Labs   Lab 03/03/24  0654   BUN 12   CREATININE 0.92     Last A1c:   Lab Results   Component Value Date    HGBA1C 9.7 (H) 08/10/2023    HGBA1C 7.6 (A) 01/19/2021   Note: HbA1c elevated. PMH DM2 with chronic wound  Lab Results   Component Value Date    RBC 3.56 (L) 03/01/2024    HGB 11.0 (L) 03/01/2024    HCT 33.6 (L) 03/01/2024    MCV 94.4 03/01/2024    MCH 30.9 03/01/2024    MCHC 32.7 03/01/2024    TIBC 343 01/18/2021   Note: H&H low    Pertinent Labs Reviewed: reviewed  Pertinent Medications Reviewed: reviewed  Scheduled Meds:   albuterol-ipratropium  3 mL Nebulization Q6H    amLODIPine  10 mg Oral Daily    aspirin  81 mg Oral Daily    atorvastatin  80 mg Oral QHS    budesonide  0.5 mg Nebulization Q12H    clopidogreL  75 mg Oral Daily    EScitalopram oxalate  10 mg Oral Daily    gabapentin  300 mg Oral TID    hydrOXYzine pamoate  25 mg Oral Daily    insulin detemir U-100  10 Units Subcutaneous Daily    insulin detemir U-100  8 Units Subcutaneous QHS    isosorbide mononitrate  30 mg Oral Daily    lisinopriL  10 mg Oral Daily    metoprolol succinate  50 mg Oral Daily    mirtazapine  7.5 mg Oral Nightly    mupirocin   Nasal BID     "pantoprazole  40 mg Oral Daily    piperacillin-tazobactam (Zosyn) IV (PEDS and ADULTS) (extended infusion is not appropriate)  4.5 g Intravenous Q8H    vancomycin (VANCOCIN) IV (PEDS and ADULTS)  20 mg/kg Intravenous Q18H     Continuous Infusions:   lactated ringers 100 mL/hr at 03/03/24 0631     PRN Meds:.acetaminophen, acetaminophen, acetaminophen, albuterol-ipratropium, aluminum-magnesium hydroxide-simethicone, dextrose 10%, dextrose 10%, diphenhydrAMINE, glucagon (human recombinant), glucose, glucose, HYDROcodone-acetaminophen, HYDROcodone-acetaminophen, insulin aspart U-100, melatonin, morphine, naloxone, nitroGLYCERIN, ondansetron, prochlorperazine, sodium chloride 0.9%, vancomycin - pharmacy to dose    Anthropometrics  Temp: 98 °F (36.7 °C)  Height Method: Stated  Height: 5' 8" (172.7 cm)  Height (inches): 68 in  Weight Method: Bed Scale  Weight: 73.5 kg (162 lb)  Weight (lb): 162 lb  Ideal Body Weight (IBW), Male: 154 lb  % Ideal Body Weight, Male (lb): 105.19 %  BMI (Calculated): 24.6       Estimated/Assessed Needs  RMR (LaMoure-St. Jeor Equation): 1494.33     Temp: 98 °F (36.7 °C)Oral  Weight Used For Calorie Calculations: 73.5 kg (162 lb 0.6 oz)     Energy Calorie Requirements (kcal): 1837-2205kcal (25-30kcal/kg)  Weight Used For Protein Calculations: 73.5 kg (162 lb 0.6 oz)  Protein Requirements: 74-88g (1.0-1.2g/kg0       RDA Method (mL): 1837       Nutrition by Nursing     Intake (%): 100%        Last Bowel Movement: 02/28/24                Nutrition Follow-Up  RD Follow-up?: Yes      Nutrition Discharge Planning: per CM, plan to discharge home with HH. Will benefit from liberalized diet on discharge.          Terri Tavares, MS, RD, LD  Available via Secure Chat  "

## 2024-03-05 NOTE — PHYSICIAN QUERY
PT Name: Navdeep Avery  MR #: [id number]    DOCUMENTATION CLARIFICATION     CDS:  Megha DIMAS, RN   Contact information:  [email or web address]  This form is a permanent document in the medical record.     Query Date: March 5, 2024    By submitting this query, we are merely seeking further clarification of documentation.  Please utilize your independent clinical judgment when addressing the question(s) below.    The medical record contains the following:  Pathology Findings Location in Medical Record    Right 5th toe, amputation:  - Skin and subcutaneous tissue with acute inflammation and necrosis  - Bone with purulent osteomyelitis  - There is acute inflammation and necrosis of the surgical margin   Path report 2/27/24       Please clarify the pathology findings.    [    x   ] Pathology findings noted above are ruled in/confirmed as diagnoses   [      ] Pathology findings noted above are not confirmed as diagnoses   [      ] Pathology findings noted above are incidental   [      ] Other diagnosis (please specify): ___________   [  ] Clinically Undetermined     Please document in your progress notes daily for the duration of treatment until resolved and include in your discharge summary.    Form No. [id number]

## 2024-03-05 NOTE — PT/OT/SLP PROGRESS
Physical Therapy Treatment    Patient Name:  Navdeep Avery   MRN:  [id number]    Recommendations:     Discharge Recommendations: Low Intensity Therapy  Discharge Equipment Recommendations: wheelchair (knee scooter)  Barriers to discharge: None    Assessment:     Navdeep Avery is a 63 y.o. male admitted with a medical diagnosis of Diabetic ulcer of toe associated with diabetes mellitus due to underlying condition, with necrosis of muscle.  He presents with the following impairments/functional limitations: impaired endurance, impaired self care skills, impaired functional mobility, gait instability, impaired balance, decreased lower extremity function, pain, decreased ROM, impaired skin Patient with good use of walker nwb right le.    Rehab Prognosis: Good; patient would benefit from acute skilled PT services to address these deficits and reach maximum level of function.    Recent Surgery: Procedure(s) (LRB):  AMPUTATION, FOOT, TRANSMETATARSAL; RIGHT LISFRANK AMPUTATION (Right) 3 Days Post-Op    Plan:     During this hospitalization, patient to be seen 5 x/week to address the identified rehab impairments via gait training, therapeutic activities, therapeutic exercises and progress toward the following goals:    Plan of Care Expires:  04/03/24    Subjective     Chief Complaint: right foot pain  Patient/Family Comments/goals: Patient plans on dc home in next day or two.  Pain/Comfort:  Pain Rating 1: 4/10  Location - Side 1: Right  Location 1: foot  Pain Addressed 1: Cessation of Activity      Objective:     Communicated with nurse prior to session.  Patient found supine with   upon PT entry to room.     General Precautions: Standard, fall  Orthopedic Precautions:  (no WB restriction on chart but Lisfranc amp therefore recommended minimizing WB as much as possible)  Braces: N/A  Respiratory Status: Room air     Functional Mobility:  Transfers:     Sit to Stand:  contact guard assistance with no AD  Bed to Chair:  contact guard assistance with  no AD  using  Stand Pivot  Gait: ambulated 90 feet with rolling walker cga, nwb right le      AM-PAC 6 CLICK MOBILITY  Turning over in bed (including adjusting bedclothes, sheets and blankets)?: 4  Sitting down on and standing up from a chair with arms (e.g., wheelchair, bedside commode, etc.): 4  Moving from lying on back to sitting on the side of the bed?: 4  Moving to and from a bed to a chair (including a wheelchair)?: 4  Need to walk in hospital room?: 3  Climbing 3-5 steps with a railing?: 1  Basic Mobility Total Score: 20       Treatment & Education:  Nwb right le    Patient left up in chair with all lines intact..    GOALS:   Multidisciplinary Problems       Physical Therapy Goals          Problem: Physical Therapy    Goal Priority Disciplines Outcome Goal Variances Interventions   Physical Therapy Goal     PT, PT/OT Ongoing, Progressing     Description: Short Term Goals to be met by: 3/17/2024    Patient will increase functional independence with mobility by performin. Supine to sit with independently  2. Sit to stand transfer with independently using Rolling walker  3. Bed to chair transfer with independently using Rolling walker and minimal WB R LE via heel  4. Gait  x 100 feet with independently using Rolling walker and minimal WB R LE via heel  5. Lower extremity exercise program x30 reps per handout, with assistance as needed    Long Term Goals to be met by: 5/3/2024    Pt will regain full independent functional mobility with lowest level of assistive device to return to home situation and prior activities of daily living.                        Time Tracking:     PT Received On:    PT Start Time: 0945     PT Stop Time: 1000  PT Total Time (min): 15 min     Billable Minutes: Gait Training 10    Treatment Type: Treatment  PT/PTA: PT     Number of PTA visits since last PT visit: 0     2024

## 2024-03-05 NOTE — PROGRESS NOTES
[facility]  [facility] Medicine  Progress Note    Patient Name: Navdeep Avery  MRN: [id number]  Patient Class: IP- Inpatient   Admission Date: 2/27/2024  Length of Stay: 6 days  Attending Physician: Evelio Chiu DO  Primary Care Provider: Marquez Huff MD        Subjective:     Principal Problem:Diabetic ulcer of toe associated with diabetes mellitus due to underlying condition, with necrosis of muscle        HPI:  Patient is a 63 year old male that had his Right great toe amputated by Dr. Cihu today. He has had reported wound issues on his feet, likely a complication from his poorly controlled diabetes and atherosclerosis . His wounds have been managed by wound care and seen in clinic by Dr. Chiu in the past. He stated that he has had redness across the foot and swelling. X-rays performed showed no definite evidence of osteomyelitis. After further evaluation it was determined that the right 5th toe necrotic and had purulent drainage in the right 5th metatarsophalangeal joint. A joint decision was made for amputation.    Hospital medicine team was consulted to manage the patient's other chronic health care related issues.    Overview/Hospital Course:  63 year old male with a PMH of DM, HTN, COPD, and  CAD s/p CABG who presents to the Vascular Surgery Service for pseudoaneurysm repair.  Hospitalist was consulted for medical management.  Patient denies chest pain, shortness of breath, nausea, vomiting, or diarrhea.      Interval History:     No significant events overnight, no new complaints this morning. Post-op #3 Lisfranc amputation.    Review of Systems   Constitutional: Negative.  Negative for chills and fever.   HENT: Negative.     Respiratory:  Negative for cough, chest tightness and shortness of breath.    Cardiovascular:  Negative for chest pain and palpitations.   Gastrointestinal:  Negative for abdominal pain, diarrhea, nausea and vomiting.   Endocrine: Negative.     Genitourinary: Negative.    Musculoskeletal:  Negative for arthralgias and myalgias.   Skin:  Negative for rash.   Allergic/Immunologic: Negative.    Neurological:  Negative for dizziness, syncope, weakness, numbness and headaches.   Psychiatric/Behavioral:  Negative for confusion and sleep disturbance.      Objective:     Vital Signs (Most Recent):  Temp: 97.6 °F (36.4 °C) (03/04/24 1850)  Pulse: (!) 58 (03/04/24 1946)  Resp: 18 (03/04/24 1946)  BP: (!) 110/57 (03/04/24 1850)  SpO2: 99 % (03/04/24 1946) Vital Signs (24h Range):  Temp:  [97.5 °F (36.4 °C)-98.2 °F (36.8 °C)] 97.6 °F (36.4 °C)  Pulse:  [53-89] 58  Resp:  [12-20] 18  SpO2:  [93 %-99 %] 99 %  BP: (110-146)/(57-70) 110/57     Weight: 73.5 kg (162 lb)  Body mass index is 24.63 kg/m².    Intake/Output Summary (Last 24 hours) at 3/4/2024 2206  Last data filed at 3/4/2024 0735  Gross per 24 hour   Intake --   Output 700 ml   Net -700 ml           Physical Exam  Constitutional:       General: He is not in acute distress.     Appearance: Normal appearance. He is not ill-appearing.   HENT:      Head: Normocephalic and atraumatic.   Cardiovascular:      Rate and Rhythm: Normal rate and regular rhythm.   Pulmonary:      Effort: Pulmonary effort is normal. No respiratory distress.   Musculoskeletal:      Cervical back: No rigidity.      Comments: R LE surgical dressing clean, dry, intact   Skin:     Coloration: Skin is not jaundiced or pale.      Findings: No rash.   Neurological:      Mental Status: He is alert. Mental status is at baseline.   Psychiatric:         Behavior: Behavior normal.         Thought Content: Thought content normal.             Significant Labs: All pertinent labs within the past 24 hours have been reviewed.    Significant Imaging: I have reviewed all pertinent imaging results/findings within the past 24 hours.    Assessment/Plan:      * Diabetic ulcer of toe associated with diabetes mellitus due to underlying condition, with necrosis of  "muscle  Now status-post Lisfranc amputation. Case management assisting with discharge planning and DME. Anticipate discharge home with wheelchair per therapy recommendations.     Wound cultures with MRSA, anaerobic cultures with [place]ia. Continue IV antibiotics.          Type 2 diabetes mellitus with hyperglycemia  Patient's FSGs are uncontrolled due to hyperglycemia on current medication regimen.  Last A1c reviewed-   Lab Results   Component Value Date    HGBA1C 9.7 (H) 08/10/2023     Most recent fingerstick glucose reviewed- No results for input(s): "POCTGLUCOSE" in the last 24 hours.  Current correctional scale  Medium  Increase anti-hyperglycemic dose as follows-   Antihyperglycemics (From admission, onward)      Start     Stop Route Frequency Ordered    03/05/24 2100  insulin detemir U-100 injection 8 Units         -- SubQ Nightly 03/04/24 2208 03/05/24 0900  insulin detemir U-100 injection 10 Units         -- SubQ Daily 03/04/24 2208 02/27/24 1411  insulin aspart U-100 injection 0-10 Units         -- SubQ Before meals & nightly PRN 02/27/24 1314          Hold Oral hypoglycemics while patient is in the hospital.      Coronary artery disease involving coronary bypass graft of native heart  Patient with known CAD s/p CABG, which is controlled Will continue ASA, Plavix, and Statin and monitor for S/Sx of angina/ACS. Continue to monitor on telemetry.     Sleep apnea  Will order home CPAP      COPD (chronic obstructive pulmonary disease)  Patient's COPD is controlled currently.  Patient is currently off COPD Pathway. Continue scheduled inhalers  supplemental O2 PRN  and monitor respiratory status closely.       VTE Risk Mitigation (From admission, onward)           Ordered     Place sequential compression device  Until discontinued         02/27/24 1314                    Discharge Planning   MARGARITO:      Code Status: Full Code   Is the patient medically ready for discharge?:     Reason for patient still in " hospital (select all that apply): Treatment  Discharge Plan A: Home Health, Home with family                  Lillian Rose DO  Department of Hospital Medicine   [facility]     Yes - the patient is able to be screened

## 2024-03-05 NOTE — ASSESSMENT & PLAN NOTE
"Patient's FSGs are uncontrolled due to hyperglycemia on current medication regimen.  Last A1c reviewed-   Lab Results   Component Value Date    HGBA1C 9.7 (H) 08/10/2023     Most recent fingerstick glucose reviewed- No results for input(s): "POCTGLUCOSE" in the last 24 hours.  Current correctional scale  Medium  Increase anti-hyperglycemic dose as follows-   Antihyperglycemics (From admission, onward)      Start     Stop Route Frequency Ordered    03/05/24 2100  insulin detemir U-100 injection 8 Units         -- SubQ Nightly 03/04/24 2208 03/05/24 0900  insulin detemir U-100 injection 10 Units         -- SubQ Daily 03/04/24 2208    02/27/24 1411  insulin aspart U-100 injection 0-10 Units         -- SubQ Before meals & nightly PRN 02/27/24 1314          Hold Oral hypoglycemics while patient is in the hospital.    "

## 2024-03-05 NOTE — PROGRESS NOTES
[facility]  [facility] Medicine  Progress Note    Patient Name: Navdeep Avery  MRN: [id number]  Patient Class: IP- Inpatient   Admission Date: 2/27/2024  Length of Stay: 7 days  Attending Physician: Evelio Chiu DO  Primary Care Provider: Marquez Huff MD        Subjective:     Principal Problem:Diabetic ulcer of toe associated with diabetes mellitus due to underlying condition, with necrosis of muscle        HPI:  Patient is a 63 year old male that had his Right great toe amputated by Dr. Chiu today. He has had reported wound issues on his feet, likely a complication from his poorly controlled diabetes and atherosclerosis . His wounds have been managed by wound care and seen in clinic by Dr. Chiu in the past. He stated that he has had redness across the foot and swelling. X-rays performed showed no definite evidence of osteomyelitis. After further evaluation it was determined that the right 5th toe necrotic and had purulent drainage in the right 5th metatarsophalangeal joint. A joint decision was made for amputation.    Hospital medicine team was consulted to manage the patient's other chronic health care related issues.    Overview/Hospital Course:  63 year old male with a PMH of DM, HTN, COPD, and  CAD s/p CABG who presents to the Vascular Surgery Service for pseudoaneurysm repair.  Hospitalist was consulted for medical management.  Patient denies chest pain, shortness of breath, nausea, vomiting, or diarrhea.      03/05/2024   Patient feels fine with no new complaints.    Status post right foot wound debridement /amputation  On IV antibiotics   Patient's vital signs are stable, she is afebrile.    White blood cell count 9 no recent labs  Wound culture growing fine Finegolida Magna and MRSA.    She is on Zosyn and vancomycin.    We will repeat labs in a.m..    Her blood glucose level is acceptable.    We will check hemoglobin A1c.    We will continue to follow P    Interval  History:     No significant events overnight, no new complaints this morning. Post-op #3 Lisfranc amputation.    Review of Systems   Constitutional: Negative.  Negative for chills and fever.   HENT: Negative.     Respiratory:  Negative for cough, chest tightness and shortness of breath.    Cardiovascular:  Negative for chest pain and palpitations.   Gastrointestinal:  Negative for abdominal pain, diarrhea, nausea and vomiting.   Endocrine: Negative.    Genitourinary: Negative.    Musculoskeletal:  Negative for arthralgias and myalgias.   Skin:  Negative for rash.   Allergic/Immunologic: Negative.    Neurological:  Negative for dizziness, syncope, weakness, numbness and headaches.   Psychiatric/Behavioral:  Negative for confusion and sleep disturbance.      Objective:     Vital Signs (Most Recent):  Temp: 97.7 °F (36.5 °C) (03/05/24 0636)  Pulse: 61 (03/05/24 0752)  Resp: 20 (03/05/24 0922)  BP: 138/62 (03/05/24 0636)  SpO2: (!) 93 % (03/05/24 0752) Vital Signs (24h Range):  Temp:  [97.5 °F (36.4 °C)-98 °F (36.7 °C)] 97.7 °F (36.5 °C)  Pulse:  [57-68] 61  Resp:  [12-20] 20  SpO2:  [89 %-99 %] 93 %  BP: (110-138)/(55-67) 138/62     Weight: 73.5 kg (162 lb)  Body mass index is 24.63 kg/m².    Intake/Output Summary (Last 24 hours) at 3/5/2024 1001  Last data filed at 3/5/2024 0927  Gross per 24 hour   Intake 360 ml   Output 400 ml   Net -40 ml           Physical Exam  Constitutional:       General: He is not in acute distress.     Appearance: Normal appearance. He is not ill-appearing.   HENT:      Head: Normocephalic and atraumatic.   Cardiovascular:      Rate and Rhythm: Normal rate and regular rhythm.   Pulmonary:      Effort: Pulmonary effort is normal. No respiratory distress.   Musculoskeletal:      Cervical back: No rigidity.      Comments: R LE surgical dressing clean, dry, intact   Skin:     Coloration: Skin is not jaundiced or pale.      Findings: No rash.   Neurological:      Mental Status: He is alert.  "Mental status is at baseline.   Psychiatric:         Behavior: Behavior normal.         Thought Content: Thought content normal.             Significant Labs: All pertinent labs within the past 24 hours have been reviewed.    Significant Imaging: I have reviewed all pertinent imaging results/findings within the past 24 hours.    Assessment/Plan:      * Diabetic ulcer of toe associated with diabetes mellitus due to underlying condition, with necrosis of muscle  Now status-post Lisfranc amputation. Case management assisting with discharge planning and DME. Anticipate discharge home with wheelchair per therapy recommendations.     Wound cultures with MRSA, anaerobic cultures with Finegoldia. Continue IV antibiotics.          Type 2 diabetes mellitus with hyperglycemia  Patient's FSGs are uncontrolled due to hyperglycemia on current medication regimen.  Last A1c reviewed-   Lab Results   Component Value Date    HGBA1C 9.7 (H) 08/10/2023     Most recent fingerstick glucose reviewed- No results for input(s): "POCTGLUCOSE" in the last 24 hours.  Current correctional scale  Medium  Increase anti-hyperglycemic dose as follows-   Antihyperglycemics (From admission, onward)      Start     Stop Route Frequency Ordered    03/05/24 2100  insulin detemir U-100 injection 8 Units         -- SubQ Nightly 03/04/24 2208    03/05/24 0900  insulin detemir U-100 injection 10 Units         -- SubQ Daily 03/04/24 2208    02/27/24 1411  insulin aspart U-100 injection 0-10 Units         -- SubQ Before meals & nightly PRN 02/27/24 1314          Hold Oral hypoglycemics while patient is in the hospital.      Coronary artery disease involving coronary bypass graft of native heart  Patient with known CAD s/p CABG, which is controlled Will continue ASA, Plavix, and Statin and monitor for S/Sx of angina/ACS. Continue to monitor on telemetry.     Sleep apnea  Will order home CPAP      COPD (chronic obstructive pulmonary disease)  Patient's COPD is " controlled currently.  Patient is currently off COPD Pathway. Continue scheduled inhalers  supplemental O2 PRN  and monitor respiratory status closely.       VTE Risk Mitigation (From admission, onward)           Ordered     Place sequential compression device  Until discontinued         02/27/24 1314                    Discharge Planning   MARGARITO:      Code Status: Full Code   Is the patient medically ready for discharge?:     Reason for patient still in hospital (select all that apply): Treatment  Discharge Plan A: Home Health, Home with family                  Frank Alejandro MD  Department of Hospital Medicine   [facility]

## 2024-03-05 NOTE — SUBJECTIVE & OBJECTIVE
Interval History:  Stable, no acute events overnight.      Medications:  Continuous Infusions:   lactated ringers 100 mL/hr at 03/03/24 0631     Scheduled Meds:   albuterol-ipratropium  3 mL Nebulization Q6H    amLODIPine  10 mg Oral Daily    aspirin  81 mg Oral Daily    atorvastatin  80 mg Oral QHS    budesonide  0.5 mg Nebulization Q12H    clopidogreL  75 mg Oral Daily    EScitalopram oxalate  10 mg Oral Daily    gabapentin  300 mg Oral TID    hydrOXYzine pamoate  25 mg Oral Daily    insulin detemir U-100  10 Units Subcutaneous Daily    insulin detemir U-100  8 Units Subcutaneous QHS    isosorbide mononitrate  30 mg Oral Daily    lisinopriL  10 mg Oral Daily    metoprolol succinate  50 mg Oral Daily    mirtazapine  7.5 mg Oral Nightly    mupirocin   Nasal BID    pantoprazole  40 mg Oral Daily    piperacillin-tazobactam (Zosyn) IV (PEDS and ADULTS) (extended infusion is not appropriate)  4.5 g Intravenous Q8H    vancomycin (VANCOCIN) IV (PEDS and ADULTS)  20 mg/kg Intravenous Q18H     PRN Meds:acetaminophen, acetaminophen, acetaminophen, albuterol-ipratropium, aluminum-magnesium hydroxide-simethicone, dextrose 10%, dextrose 10%, diphenhydrAMINE, glucagon (human recombinant), glucose, glucose, HYDROcodone-acetaminophen, HYDROcodone-acetaminophen, insulin aspart U-100, melatonin, morphine, naloxone, nitroGLYCERIN, ondansetron, prochlorperazine, sodium chloride 0.9%, vancomycin - pharmacy to dose     Review of patient's allergies indicates:  No Known Allergies  Objective:     Vital Signs (Most Recent):  Temp: 97.7 °F (36.5 °C) (03/05/24 0636)  Pulse: 61 (03/05/24 0747)  Resp: 20 (03/05/24 0747)  BP: 138/62 (03/05/24 0636)  SpO2: (!) 93 % (03/05/24 0747) Vital Signs (24h Range):  Temp:  [97.5 °F (36.4 °C)-98 °F (36.7 °C)] 97.7 °F (36.5 °C)  Pulse:  [57-68] 61  Resp:  [12-20] 20  SpO2:  [89 %-99 %] 93 %  BP: (110-138)/(55-67) 138/62     Weight: 73.5 kg (162 lb)  Body mass index is 24.63 kg/m².    Intake/Output - Last 3  Shifts         03/03 0700  03/04 0659 03/04 0700  03/05 0659 03/05 0700  03/06 0659    Urine (mL/kg/hr) 500 (0.3) 700 (0.4)     Total Output 500 700     Net -500 -700                     Physical Exam  Constitutional:       General: He is not in acute distress.     Appearance: Normal appearance.   HENT:      Head: Normocephalic.   Cardiovascular:      Rate and Rhythm: Normal rate.   Pulmonary:      Effort: Pulmonary effort is normal. No respiratory distress.   Abdominal:      General: There is no distension.      Tenderness: There is no abdominal tenderness.   Musculoskeletal:         General: Normal range of motion.        Feet:    Feet:      Comments: Foot amputation intact, small area of skin with dark skin edges; edema to foot  Skin:     General: Skin is warm.      Coloration: Skin is not jaundiced.   Neurological:      General: No focal deficit present.      Mental Status: He is alert and oriented to person, place, and time.      Cranial Nerves: No cranial nerve deficit.                Significant Labs:  I have reviewed all pertinent lab results within the past 24 hours.  CBC:   Recent Labs   Lab 03/01/24  0310   WBC 4.99   RBC 3.56*   HGB 11.0*   HCT 33.6*      MCV 94.4   MCH 30.9   MCHC 32.7       BMP:   Recent Labs   Lab 03/01/24  0310 03/03/24  0654   *  --      --    K 4.1  --      --    CO2 30  --    BUN 18 12   CREATININE 0.97 0.92   CALCIUM 8.3*  --          Significant Diagnostics:  I have reviewed all pertinent imaging results/findings within the past 24 hours.

## 2024-03-06 VITALS
DIASTOLIC BLOOD PRESSURE: 62 MMHG | HEIGHT: 68 IN | TEMPERATURE: 98 F | RESPIRATION RATE: 18 BRPM | HEART RATE: 57 BPM | WEIGHT: 162 LBS | OXYGEN SATURATION: 92 % | BODY MASS INDEX: 24.55 KG/M2 | SYSTOLIC BLOOD PRESSURE: 125 MMHG

## 2024-03-06 LAB
ANION GAP SERPL CALCULATED.3IONS-SCNC: 6 MMOL/L (ref 7–16)
BASOPHILS # BLD AUTO: 0.04 K/UL (ref 0–0.2)
BASOPHILS NFR BLD AUTO: 0.8 % (ref 0–1)
BUN SERPL-MCNC: 18 MG/DL (ref 7–18)
BUN/CREAT SERPL: 20 (ref 6–20)
CALCIUM SERPL-MCNC: 8.4 MG/DL (ref 8.5–10.1)
CHLORIDE SERPL-SCNC: 107 MMOL/L (ref 98–107)
CO2 SERPL-SCNC: 33 MMOL/L (ref 21–32)
CREAT SERPL-MCNC: 0.9 MG/DL (ref 0.7–1.3)
DIFFERENTIAL METHOD BLD: ABNORMAL
EGFR (NO RACE VARIABLE) (RUSH/TITUS): 95 ML/MIN/1.73M2
EOSINOPHIL # BLD AUTO: 0.27 K/UL (ref 0–0.5)
EOSINOPHIL NFR BLD AUTO: 5.1 % (ref 1–4)
ERYTHROCYTE [DISTWIDTH] IN BLOOD BY AUTOMATED COUNT: 11.6 % (ref 11.5–14.5)
ESTROGEN SERPL-MCNC: NORMAL PG/ML
GLUCOSE SERPL-MCNC: 150 MG/DL (ref 74–106)
GLUCOSE SERPL-MCNC: 164 MG/DL (ref 70–105)
GLUCOSE SERPL-MCNC: 202 MG/DL (ref 70–105)
HCT VFR BLD AUTO: 30.9 % (ref 40–54)
HGB BLD-MCNC: 10.5 G/DL (ref 13.5–18)
IMM GRANULOCYTES # BLD AUTO: 0.01 K/UL (ref 0–0.04)
IMM GRANULOCYTES NFR BLD: 0.2 % (ref 0–0.4)
INSULIN SERPL-ACNC: NORMAL U[IU]/ML
LAB AP GROSS DESCRIPTION: NORMAL
LAB AP LABORATORY NOTES: NORMAL
LYMPHOCYTES # BLD AUTO: 1.73 K/UL (ref 1–4.8)
LYMPHOCYTES NFR BLD AUTO: 32.5 % (ref 27–41)
MCH RBC QN AUTO: 31 PG (ref 27–31)
MCHC RBC AUTO-ENTMCNC: 34 G/DL (ref 32–36)
MCV RBC AUTO: 91.2 FL (ref 80–96)
MONOCYTES # BLD AUTO: 0.47 K/UL (ref 0–0.8)
MONOCYTES NFR BLD AUTO: 8.8 % (ref 2–6)
MPC BLD CALC-MCNC: 9.5 FL (ref 9.4–12.4)
NEUTROPHILS # BLD AUTO: 2.81 K/UL (ref 1.8–7.7)
NEUTROPHILS NFR BLD AUTO: 52.6 % (ref 53–65)
NRBC # BLD AUTO: 0 X10E3/UL
NRBC, AUTO (.00): 0 %
PLATELET # BLD AUTO: 264 K/UL (ref 150–400)
POTASSIUM SERPL-SCNC: 3.8 MMOL/L (ref 3.5–5.1)
RBC # BLD AUTO: 3.39 M/UL (ref 4.6–6.2)
SODIUM SERPL-SCNC: 142 MMOL/L (ref 136–145)
T3RU NFR SERPL: NORMAL %
VANCOMYCIN TROUGH SERPL-MCNC: 14.2 ΜG/ML (ref 10–20)
WBC # BLD AUTO: 5.33 K/UL (ref 4.5–11)

## 2024-03-06 PROCEDURE — 63600175 PHARM REV CODE 636 W HCPCS: Performed by: INTERNAL MEDICINE

## 2024-03-06 PROCEDURE — 80202 ASSAY OF VANCOMYCIN: CPT | Performed by: STUDENT IN AN ORGANIZED HEALTH CARE EDUCATION/TRAINING PROGRAM

## 2024-03-06 PROCEDURE — 25000242 PHARM REV CODE 250 ALT 637 W/ HCPCS: Performed by: STUDENT IN AN ORGANIZED HEALTH CARE EDUCATION/TRAINING PROGRAM

## 2024-03-06 PROCEDURE — 25000003 PHARM REV CODE 250: Performed by: STUDENT IN AN ORGANIZED HEALTH CARE EDUCATION/TRAINING PROGRAM

## 2024-03-06 PROCEDURE — 94761 N-INVAS EAR/PLS OXIMETRY MLT: CPT

## 2024-03-06 PROCEDURE — 27000221 HC OXYGEN, UP TO 24 HOURS

## 2024-03-06 PROCEDURE — 94640 AIRWAY INHALATION TREATMENT: CPT

## 2024-03-06 PROCEDURE — 63600175 PHARM REV CODE 636 W HCPCS: Performed by: STUDENT IN AN ORGANIZED HEALTH CARE EDUCATION/TRAINING PROGRAM

## 2024-03-06 PROCEDURE — 99232 SBSQ HOSP IP/OBS MODERATE 35: CPT | Mod: ,,, | Performed by: INTERNAL MEDICINE

## 2024-03-06 PROCEDURE — 80048 BASIC METABOLIC PNL TOTAL CA: CPT | Performed by: INTERNAL MEDICINE

## 2024-03-06 PROCEDURE — 99239 HOSP IP/OBS DSCHRG MGMT >30: CPT | Mod: ,,, | Performed by: STUDENT IN AN ORGANIZED HEALTH CARE EDUCATION/TRAINING PROGRAM

## 2024-03-06 PROCEDURE — 85025 COMPLETE CBC W/AUTO DIFF WBC: CPT | Performed by: INTERNAL MEDICINE

## 2024-03-06 PROCEDURE — 99900035 HC TECH TIME PER 15 MIN (STAT)

## 2024-03-06 PROCEDURE — 63600175 PHARM REV CODE 636 W HCPCS: Performed by: FAMILY MEDICINE

## 2024-03-06 PROCEDURE — 82962 GLUCOSE BLOOD TEST: CPT

## 2024-03-06 RX ORDER — SULFAMETHOXAZOLE AND TRIMETHOPRIM 800; 160 MG/1; MG/1
1 TABLET ORAL 2 TIMES DAILY
Qty: 28 TABLET | Refills: 0 | Status: SHIPPED | OUTPATIENT
Start: 2024-03-06 | End: 2024-03-20

## 2024-03-06 RX ORDER — HYDROCODONE BITARTRATE AND ACETAMINOPHEN 10; 325 MG/1; MG/1
1 TABLET ORAL EVERY 6 HOURS PRN
Qty: 28 TABLET | Refills: 0 | Status: SHIPPED | OUTPATIENT
Start: 2024-03-06 | End: 2024-04-10 | Stop reason: SDUPTHER

## 2024-03-06 RX ORDER — GABAPENTIN 300 MG/1
300 CAPSULE ORAL 3 TIMES DAILY
Qty: 90 CAPSULE | Refills: 11 | Status: SHIPPED | OUTPATIENT
Start: 2024-03-06 | End: 2025-03-06

## 2024-03-06 RX ORDER — DOCUSATE SODIUM 100 MG/1
100 CAPSULE, LIQUID FILLED ORAL 2 TIMES DAILY
Qty: 28 CAPSULE | Refills: 0 | Status: SHIPPED | OUTPATIENT
Start: 2024-03-06

## 2024-03-06 RX ADMIN — MORPHINE SULFATE 4 MG: 4 INJECTION, SOLUTION INTRAMUSCULAR; INTRAVENOUS at 01:03

## 2024-03-06 RX ADMIN — BUDESONIDE INHALATION 0.5 MG: 0.5 SUSPENSION RESPIRATORY (INHALATION) at 07:03

## 2024-03-06 RX ADMIN — ESCITALOPRAM OXALATE 10 MG: 10 TABLET ORAL at 08:03

## 2024-03-06 RX ADMIN — HYDROCODONE BITARTRATE AND ACETAMINOPHEN 1 TABLET: 10; 325 TABLET ORAL at 08:03

## 2024-03-06 RX ADMIN — IPRATROPIUM BROMIDE AND ALBUTEROL SULFATE 3 ML: .5; 3 SOLUTION RESPIRATORY (INHALATION) at 12:03

## 2024-03-06 RX ADMIN — IPRATROPIUM BROMIDE AND ALBUTEROL SULFATE 3 ML: .5; 3 SOLUTION RESPIRATORY (INHALATION) at 07:03

## 2024-03-06 RX ADMIN — ASPIRIN 81 MG CHEWABLE TABLET 81 MG: 81 TABLET CHEWABLE at 08:03

## 2024-03-06 RX ADMIN — ISOSORBIDE MONONITRATE 30 MG: 30 TABLET, EXTENDED RELEASE ORAL at 08:03

## 2024-03-06 RX ADMIN — MUPIROCIN: 20 OINTMENT TOPICAL at 08:03

## 2024-03-06 RX ADMIN — PIPERACILLIN AND TAZOBACTAM 4.5 G: 4; .5 INJECTION, POWDER, FOR SOLUTION INTRAVENOUS; PARENTERAL at 05:03

## 2024-03-06 RX ADMIN — METOPROLOL SUCCINATE 50 MG: 50 TABLET, EXTENDED RELEASE ORAL at 08:03

## 2024-03-06 RX ADMIN — INSULIN ASPART 2 UNITS: 100 INJECTION, SOLUTION INTRAVENOUS; SUBCUTANEOUS at 08:03

## 2024-03-06 RX ADMIN — CLOPIDOGREL BISULFATE 75 MG: 75 TABLET ORAL at 08:03

## 2024-03-06 RX ADMIN — GABAPENTIN 300 MG: 300 CAPSULE ORAL at 08:03

## 2024-03-06 RX ADMIN — HYDROXYZINE PAMOATE 25 MG: 25 CAPSULE ORAL at 08:03

## 2024-03-06 RX ADMIN — AMLODIPINE BESYLATE 10 MG: 10 TABLET ORAL at 08:03

## 2024-03-06 RX ADMIN — PANTOPRAZOLE SODIUM 40 MG: 40 TABLET, DELAYED RELEASE ORAL at 08:03

## 2024-03-06 RX ADMIN — INSULIN DETEMIR 10 UNITS: 100 INJECTION, SOLUTION SUBCUTANEOUS at 08:03

## 2024-03-06 RX ADMIN — MORPHINE SULFATE 4 MG: 4 INJECTION, SOLUTION INTRAMUSCULAR; INTRAVENOUS at 09:03

## 2024-03-06 RX ADMIN — INSULIN ASPART 2 UNITS: 100 INJECTION, SOLUTION INTRAVENOUS; SUBCUTANEOUS at 01:03

## 2024-03-06 RX ADMIN — VANCOMYCIN HYDROCHLORIDE 1500 MG: 1 INJECTION, POWDER, LYOPHILIZED, FOR SOLUTION INTRAVENOUS at 08:03

## 2024-03-06 RX ADMIN — LISINOPRIL 10 MG: 10 TABLET ORAL at 08:03

## 2024-03-06 NOTE — SUBJECTIVE & OBJECTIVE
Interval History:     No significant events overnight, no new complaints this morning. Post-op Lisfranc amputation.    Review of Systems   Constitutional: Negative.  Negative for chills and fever.   HENT: Negative.     Respiratory:  Negative for cough, chest tightness and shortness of breath.    Cardiovascular:  Negative for chest pain and palpitations.   Gastrointestinal:  Negative for abdominal pain, diarrhea, nausea and vomiting.   Endocrine: Negative.    Genitourinary: Negative.    Musculoskeletal:  Negative for arthralgias and myalgias.   Skin:  Negative for rash.   Allergic/Immunologic: Negative.    Neurological:  Negative for dizziness, syncope, weakness, numbness and headaches.   Psychiatric/Behavioral:  Negative for confusion and sleep disturbance.      Objective:     Vital Signs (Most Recent):  Temp: 97.6 °F (36.4 °C) (03/06/24 1024)  Pulse: (!) 57 (03/06/24 1024)  Resp: 18 (03/06/24 1024)  BP: 125/62 (03/06/24 1024)  SpO2: (!) 92 % (03/06/24 1024) Vital Signs (24h Range):  Temp:  [97.5 °F (36.4 °C)-98.4 °F (36.9 °C)] 97.6 °F (36.4 °C)  Pulse:  [54-63] 57  Resp:  [16-20] 18  SpO2:  [92 %-98 %] 92 %  BP: (124-141)/(53-76) 125/62     Weight: 73.5 kg (162 lb)  Body mass index is 24.63 kg/m².    Intake/Output Summary (Last 24 hours) at 3/6/2024 1108  Last data filed at 3/6/2024 0134  Gross per 24 hour   Intake 462 ml   Output 1000 ml   Net -538 ml           Physical Exam  Constitutional:       General: He is not in acute distress.     Appearance: Normal appearance. He is not ill-appearing.   HENT:      Head: Normocephalic and atraumatic.   Cardiovascular:      Rate and Rhythm: Normal rate and regular rhythm.   Pulmonary:      Effort: Pulmonary effort is normal. No respiratory distress.   Musculoskeletal:      Cervical back: No rigidity.      Comments: R LE surgical dressing clean, dry, intact   Skin:     Coloration: Skin is not jaundiced or pale.      Findings: No rash.   Neurological:      Mental Status: He  is alert. Mental status is at baseline.   Psychiatric:         Behavior: Behavior normal.         Thought Content: Thought content normal.             Significant Labs: All pertinent labs within the past 24 hours have been reviewed.    Significant Imaging: I have reviewed all pertinent imaging results/findings within the past 24 hours.

## 2024-03-06 NOTE — DISCHARGE SUMMARY
[facility]  General Surgery  Discharge Summary      Patient Name: Navdeep Avery  MRN: [id number]  Admission Date: 2/27/2024  Hospital Length of Stay: 8 days  Discharge Date and Time:  03/06/2024 8:08 AM  Attending Physician: Evelio Chiu DO   Discharging Provider: Evelio Chiu DO  Primary Care Provider: Marquez Huff MD    HPI:   63-year-old  male presents the ER with complaints of pain and swelling to the right foot.  Patient has a chronic wound on the right foot that has been treated by myself in clinic multiple times.  The wound was healing over with certain skin products, but patient states recently started having severe pain at the site with radiation to the right 5th toe.  He has had redness across the foot swelling.  X-rays performed showed no evidence of osteomyelitis.  Past medical history of peripheral vascular disease with multiple amputations to bilateral feet.  Diabetic ulcers chronic, diabetic peripheral neuropathy, COPD/emphysema, hypertension, hyperlipidemia, coronary artery disease    Procedure(s) (LRB):  AMPUTATION, FOOT, TRANSMETATARSAL; RIGHT LISFRANK AMPUTATION (Right)      Indwelling Lines/Drains at time of discharge:   Lines/Drains/Airways       None                 Hospital Course: 63-year-old  male presented to the hospital with necrotic right 5th toe extending to the metatarsophalangeal joint.  Patient was taken the operating room for right 5th toe amputation; cultures came back positive for MRSA.  Due to previous wounds and ulcers on the foot, patient underwent a Lisfranc amputation to allow for skin closure of the right foot.  Postoperatively, patient has been doing well.  A few staples had to be removed due to some skin ischemia.  Erythema/cellulitis has drastically improved.  Patient has a wheelchair and walker set up by the medical supply store; home health set up for dressing changes.  We will discharge the patient home on  antibiotics by mouth to follow up in clinic in 2 weeks.    Goals of Care Treatment Preferences:  Code Status: Full Code      Consults:   Consults (From admission, onward)          Status Ordering Provider     Inpatient consult to Social Work  Once        Provider:  (Not yet assigned)    Completed ANNELIESE CHIU     Pharmacy to dose Vancomycin consult  Once        Provider:  (Not yet assigned)    Acknowledged ANNELIESE CHIU     Inpatient consult to Hospital Medicine  Once        Provider:  Edmond Alcantar MD    Acknowledged ANNELIESE CHIU            Significant Diagnostic Studies: N/A    Pending Diagnostic Studies:       Procedure Component Value Units Date/Time    BUN & Creatinine [[id number]]     Order Status: Sent Lab Status: No result     Specimen: Blood     VANCOMYCIN, TROUGH [[id number]] Collected: 03/06/24 0712    Order Status: Sent Lab Status: In process Updated: 03/06/24 0717    Specimen: Blood           Final Active Diagnoses:    Diagnosis Date Noted POA    PRINCIPAL PROBLEM:  Diabetic ulcer of toe associated with diabetes mellitus due to underlying condition, with necrosis of muscle [E08.621, L97.503]  Yes    Type 2 diabetes mellitus with hyperglycemia [E11.65] 02/28/2024 Yes    Coronary artery disease involving coronary bypass graft of native heart [I25.810] 06/27/2023 Yes    Sleep apnea [G47.30] 09/07/2022 Yes    COPD (chronic obstructive pulmonary disease) [J44.9] 06/24/2022 Yes      Problems Resolved During this Admission:    Diagnosis Date Noted Date Resolved POA    Mixed hyperlipidemia [E78.2] 03/23/2021 02/28/2024 Yes      Discharged Condition: good    Disposition: Home or Self Care    Follow Up:   Contact information for follow-up providers       Anneliese Chiu, DO. Schedule an appointment as soon as possible for a visit in 2 week(s).    Specialties: General Surgery, Surgery  Contact information:  [address] [facility]  [facility] [address]  [phone]                        Contact information for after-discharge care       Home Medical Care       [facility] .    Service: Home Health Services  Contact information:  [address]  [address]  [place] [address]  [phone]                                 Patient Instructions:      Diet diabetic     Remove dressing in 24 hours   Order Comments: Change the dressing daily.  Remove dressing, rinse wound with Vashe and then covered with 4 x 4 gauze, Kerlix wrap.  Okay to shower daily and soap and water rinse over the area with dressing off in the shower.  Make sure to dry adequately.  No tub baths or swimming     Activity as tolerated     Weight bearing restrictions (specify):   Order Comments: Use walker and/or wheelchair.  No weight-bearing on the right foot for the next 2 weeks if all possible     Medications:  Reconciled Home Medications:      Medication List        START taking these medications      docusate sodium 100 MG capsule  Commonly known as: COLACE  Take 1 capsule (100 mg total) by mouth 2 (two) times daily.     sulfamethoxazole-trimethoprim 800-160mg 800-160 mg Tab  Commonly known as: BACTRIM DS  Take 1 tablet by mouth 2 (two) times daily. for 14 days            CHANGE how you take these medications      * gabapentin 300 MG capsule  Commonly known as: NEURONTIN  Take 3 capsules (900 mg total) by mouth every 6 (six) hours.  What changed: Another medication with the same name was added. Make sure you understand how and when to take each.     * gabapentin 300 MG capsule  Commonly known as: NEURONTIN  Take 1 capsule (300 mg total) by mouth 3 (three) times daily.  What changed: You were already taking a medication with the same name, and this prescription was added. Make sure you understand how and when to take each.     * HYDROcodone-acetaminophen  mg per tablet  Commonly known as: NORCO  Take 1 tablet by mouth every 6 (six) hours as needed for Pain.  What changed: Another medication  with the same name was added. Make sure you understand how and when to take each.     * HYDROcodone-acetaminophen  mg per tablet  Commonly known as: NORCO  Take 1 tablet by mouth every 6 (six) hours as needed for Pain.  What changed: You were already taking a medication with the same name, and this prescription was added. Make sure you understand how and when to take each.           * This list has 4 medication(s) that are the same as other medications prescribed for you. Read the directions carefully, and ask your doctor or other care provider to review them with you.                CONTINUE taking these medications      * albuterol 90 mcg/actuation inhaler  Commonly known as: PROVENTIL/VENTOLIN HFA  Inhale 2 puffs into the lungs 2 (two) times daily as needed for Wheezing.     * albuterol 2.5 mg /3 mL (0.083 %) nebulizer solution  Commonly known as: PROVENTIL  USE 1 VIAL IN NEBULIZER 3 TIMES DAILY     amLODIPine 10 MG tablet  Commonly known as: NORVASC  Take 1 tablet (10 mg total) by mouth once daily.     aspirin 81 MG Chew  Take 81 mg by mouth once daily.     atorvastatin 80 MG tablet  Commonly known as: LIPITOR  Take 1 tablet (80 mg total) by mouth every evening.     clopidogreL 75 mg tablet  Commonly known as: PLAVIX  Take 1 tablet (75 mg total) by mouth once daily.     diclofenac sodium 1 % Gel  Commonly known as: VOLTAREN  Apply 1 g topically 4 (four) times daily as needed (pain).     EScitalopram oxalate 10 MG tablet  Commonly known as: LEXAPRO  Take 1 tablet (10 mg total) by mouth once daily.     hydrOXYzine pamoate 25 MG Cap  Commonly known as: VISTARIL  Take 1 capsule (25 mg total) by mouth once daily. Take nightly for sleep     isosorbide mononitrate 30 MG 24 hr tablet  Commonly known as: IMDUR  Take 1 tablet (30 mg total) by mouth once daily.     LEVEMIR FLEXTOUCH U100 INSULIN 100 unit/mL (3 mL) Inpn pen  Generic drug: insulin detemir U-100 (Levemir)  Inject 10 units subcutaneously every morning,  "inject 20 units subcutaneously every evening.     LIDOcaine 5 %  Commonly known as: LIDODERM  1 patch once daily.     lisinopriL 10 MG tablet  Take 1 tablet (10 mg total) by mouth once daily.     metoprolol succinate 50 MG 24 hr tablet  Commonly known as: TOPROL-XL  Take 1 tablet (50 mg total) by mouth once daily.     mirtazapine 7.5 MG Tab  Commonly known as: REMERON  Take 1 tablet (7.5 mg total) by mouth nightly.     nitroGLYCERIN 0.4 MG SL tablet  Commonly known as: NITROSTAT  Place 1 tablet (0.4 mg total) under the tongue every 5 (five) minutes as needed for Chest pain.     pantoprazole 40 MG tablet  Commonly known as: PROTONIX  Take 1 tablet (40 mg total) by mouth once daily.     SPIRIVA RESPIMAT 2.5 mcg/actuation inhaler  Generic drug: tiotropium bromide  Inhale 1 puff into the lungs once daily.     SURE COMFORT INSULIN SYRINGE 0.3 mL 31 gauge x 5/16" Syrg  Generic drug: insulin syringe-needle U-100  Inject 1 application into the skin 2 (two) times a day.     TOUJEO SOLOSTAR U-300 INSULIN SUBQ  Inject 7-8 Units into the skin once daily.           * This list has 2 medication(s) that are the same as other medications prescribed for you. Read the directions carefully, and ask your doctor or other care provider to review them with you.                STOP taking these medications      RYBELSUS 7 mg tablet  Generic drug: semaglutide            Time spent on the discharge of patient: 31 minutes    Evelio Razo, DO  General Surgery  [facility]  "

## 2024-03-06 NOTE — PROGRESS NOTES
[facility]  [facility] Medicine  Progress Note    Patient Name: Navdeep Avery  MRN: [id number]  Patient Class: IP- Inpatient   Admission Date: 2/27/2024  Length of Stay: 8 days  Attending Physician: Evelio Chiu DO  Primary Care Provider: Marquez Huff MD        Subjective:     Principal Problem:Diabetic ulcer of toe associated with diabetes mellitus due to underlying condition, with necrosis of muscle        HPI:  Patient is a 63 year old male that had his Right great toe amputated by Dr. Chiu today. He has had reported wound issues on his feet, likely a complication from his poorly controlled diabetes and atherosclerosis . His wounds have been managed by wound care and seen in clinic by Dr. Chiu in the past. He stated that he has had redness across the foot and swelling. X-rays performed showed no definite evidence of osteomyelitis. After further evaluation it was determined that the right 5th toe necrotic and had purulent drainage in the right 5th metatarsophalangeal joint. A joint decision was made for amputation.    Hospital medicine team was consulted to manage the patient's other chronic health care related issues.    Overview/Hospital Course:  63 year old male with a PMH of DM, HTN, COPD, and  CAD s/p CABG who presents to the Vascular Surgery Service for pseudoaneurysm repair.  Hospitalist was consulted for medical management.  Patient denies chest pain, shortness of breath, nausea, vomiting, or diarrhea.      03/05/2024   Patient feels fine with no new complaints.    Status post right foot wound debridement /amputation  On IV antibiotics   Patient's vital signs are stable, she is afebrile.    White blood cell count 9 no recent labs  Wound culture growing fine Finegolida Magna and MRSA.    She is on Zosyn and vancomycin.    We will repeat labs in a.m..    Her blood glucose level is acceptable.    We will check hemoglobin A1c.    We will continue to follow      03/06/2024   Patient has no new complaints, feels fine.    Vital signs stable   White blood cell count 5 hemoglobin 10 creatinine 0.9 glucose 164, hemoglobin A1c 9.1, encouraged to be more compliant with medications and medical followups.  Patient is cleared for discharge today.    Medically stable from my standpoint P    Interval History:     No significant events overnight, no new complaints this morning. Post-op Lisfranc amputation.    Review of Systems   Constitutional: Negative.  Negative for chills and fever.   HENT: Negative.     Respiratory:  Negative for cough, chest tightness and shortness of breath.    Cardiovascular:  Negative for chest pain and palpitations.   Gastrointestinal:  Negative for abdominal pain, diarrhea, nausea and vomiting.   Endocrine: Negative.    Genitourinary: Negative.    Musculoskeletal:  Negative for arthralgias and myalgias.   Skin:  Negative for rash.   Allergic/Immunologic: Negative.    Neurological:  Negative for dizziness, syncope, weakness, numbness and headaches.   Psychiatric/Behavioral:  Negative for confusion and sleep disturbance.      Objective:     Vital Signs (Most Recent):  Temp: 97.6 °F (36.4 °C) (03/06/24 1024)  Pulse: (!) 57 (03/06/24 1024)  Resp: 18 (03/06/24 1024)  BP: 125/62 (03/06/24 1024)  SpO2: (!) 92 % (03/06/24 1024) Vital Signs (24h Range):  Temp:  [97.5 °F (36.4 °C)-98.4 °F (36.9 °C)] 97.6 °F (36.4 °C)  Pulse:  [54-63] 57  Resp:  [16-20] 18  SpO2:  [92 %-98 %] 92 %  BP: (124-141)/(53-76) 125/62     Weight: 73.5 kg (162 lb)  Body mass index is 24.63 kg/m².    Intake/Output Summary (Last 24 hours) at 3/6/2024 1108  Last data filed at 3/6/2024 0134  Gross per 24 hour   Intake 462 ml   Output 1000 ml   Net -538 ml           Physical Exam  Constitutional:       General: He is not in acute distress.     Appearance: Normal appearance. He is not ill-appearing.   HENT:      Head: Normocephalic and atraumatic.   Cardiovascular:      Rate and Rhythm: Normal rate  "and regular rhythm.   Pulmonary:      Effort: Pulmonary effort is normal. No respiratory distress.   Musculoskeletal:      Cervical back: No rigidity.      Comments: R LE surgical dressing clean, dry, intact   Skin:     Coloration: Skin is not jaundiced or pale.      Findings: No rash.   Neurological:      Mental Status: He is alert. Mental status is at baseline.   Psychiatric:         Behavior: Behavior normal.         Thought Content: Thought content normal.             Significant Labs: All pertinent labs within the past 24 hours have been reviewed.    Significant Imaging: I have reviewed all pertinent imaging results/findings within the past 24 hours.    Assessment/Plan:      * Diabetic ulcer of toe associated with diabetes mellitus due to underlying condition, with necrosis of muscle  Now status-post Lisfranc amputation. Case management assisting with discharge planning and DME. Anticipate discharge home with wheelchair per therapy recommendations.     Wound cultures with MRSA, anaerobic cultures with Finegoldia. Continue IV antibiotics.          Type 2 diabetes mellitus with hyperglycemia  Patient's FSGs are uncontrolled due to hyperglycemia on current medication regimen.  Last A1c reviewed-   Lab Results   Component Value Date    HGBA1C 9.7 (H) 08/10/2023     Most recent fingerstick glucose reviewed- No results for input(s): "POCTGLUCOSE" in the last 24 hours.  Current correctional scale  Medium  Increase anti-hyperglycemic dose as follows-   Antihyperglycemics (From admission, onward)      Start     Stop Route Frequency Ordered    03/05/24 2100  insulin detemir U-100 injection 8 Units         -- SubQ Nightly 03/04/24 2208 03/05/24 0900  insulin detemir U-100 injection 10 Units         -- SubQ Daily 03/04/24 2208 02/27/24 1411  insulin aspart U-100 injection 0-10 Units         -- SubQ Before meals & nightly PRN 02/27/24 1314          Hold Oral hypoglycemics while patient is in the " hospital.      Coronary artery disease involving coronary bypass graft of native heart  Patient with known CAD s/p CABG, which is controlled Will continue ASA, Plavix, and Statin and monitor for S/Sx of angina/ACS. Continue to monitor on telemetry.     Sleep apnea  Will order home CPAP      COPD (chronic obstructive pulmonary disease)  Patient's COPD is controlled currently.  Patient is currently off COPD Pathway. Continue scheduled inhalers  supplemental O2 PRN  and monitor respiratory status closely.       VTE Risk Mitigation (From admission, onward)           Ordered     Place sequential compression device  Until discontinued         02/27/24 1314                    Discharge Planning   MARGARITO: 3/6/2024     Code Status: Full Code   Is the patient medically ready for discharge?:     Reason for patient still in hospital (select all that apply): Treatment  Discharge Plan A: Home Health, Home with family   Discharge Delays: None known at this time              Frank Alejandro MD  Department of Hospital Medicine   [facility]

## 2024-03-06 NOTE — NURSING
Discharge instructions reviewed with patient and spouse; and copy given to patient. Patient and spouse voiced understanding regarding:meds, appt., signs and symptoms to report to physician.   Activity instructions  Remove dressing in 24 hours  Change the dressing daily.  Remove dressing, rinse wound with Vashe and then covered with 4 x 4 gauze, Kerlix wrap.  Okay to shower daily and soap and water rinse over the area with dressing off in the shower.  Make sure to dry adequately.  No tub baths or swimming    Diet instructions  Diet diabetic     Other instructions  Activity as tolerated  Weight bearing restrictions (specify):  Use walker and/or wheelchair.  No weight-bearing on the right foot for the next 2 weeks if all possible    *Notify your healthcare provider if you experience any of the following: temperature >100.4  * Notify your healthcare provider if you experience any of the following: redness, tenderness.    *Notify your healthcare provider if you experience any of the following: difficulty breathing or     increased cough.  *Notify your physician if you experience any persistent nausea, vomiting, or diarrhea or headache  *Notify your physician if you experience any of the following:severe uncontrolled pain;worsening rash, increased confusion or weakness; dizziness, lightheadedness or visual disturbances

## 2024-03-06 NOTE — HOSPITAL COURSE
63-year-old  male presented to the hospital with necrotic right 5th toe extending to the metatarsophalangeal joint.  Patient was taken the operating room for right 5th toe amputation; cultures came back positive for MRSA.  Due to previous wounds and ulcers on the foot, patient underwent a Lisfranc amputation to allow for skin closure of the right foot.  Postoperatively, patient has been doing well.  A few staples had to be removed due to some skin ischemia.  Erythema/cellulitis has drastically improved.  Patient has a wheelchair and walker set up by the medical supply store; home health set up for dressing changes.  We will discharge the patient home on antibiotics by mouth to follow up in clinic in 2 weeks.

## 2024-03-06 NOTE — PLAN OF CARE
Problem: Adult Inpatient Plan of Care  Goal: Plan of Care Review  Outcome: Met  Goal: Patient-Specific Goal (Individualized)  Outcome: Met  Goal: Absence of Hospital-Acquired Illness or Injury  Outcome: Met  Goal: Optimal Comfort and Wellbeing  Outcome: Met  Goal: Readiness for Transition of Care  Outcome: Met     Problem: Diabetes Comorbidity  Goal: Blood Glucose Level Within Targeted Range  Outcome: Met     Problem: Gas Exchange Impaired  Goal: Optimal Gas Exchange  Outcome: Met     Problem: Skin Injury Risk Increased  Goal: Skin Health and Integrity  Outcome: Met     Problem: Fall Injury Risk  Goal: Absence of Fall and Fall-Related Injury  Outcome: Met

## 2024-03-06 NOTE — DISCHARGE INSTRUCTIONS
) No driving until follow up appointment.  2) May shower on day that dressing removed. (No tub Bathe).  3) Notify your healthcare provider if you experience any of the following: temperature >100.4  4) Notify your healthcare provider if you experience any of the following: redness, tenderness, or      Or signs of infection (pain, swelling, redness, odor or green/ yellow discharge around incision site.)  5) Notify your healthcare provider if you experience any of the following: difficulty breathing or     increased cough.  6)Clean incision daily with antibacterial soap/water, then pat dry.   7)Notify your physician if you experience any persistent nausea, vomiting, or diarrhea or headache  8)Notify your physician if you experience any of the following:severe uncontrolled pain;worsening rash, increased confusion or weakness; dizziness, lightheadedness or visual disturbances

## 2024-03-06 NOTE — PLAN OF CARE
[facility]  Discharge Final Note    Primary Care Provider: Marquez Huff MD    Expected Discharge Date: 3/6/2024    Final Discharge Note (most recent)       Final Note - 03/06/24 0929          Final Note    Assessment Type Final Discharge Note     Anticipated Discharge Disposition Home-Health Care Sv     What phone number can be called within the next 1-3 days to see how you are doing after discharge? [phone]        Post-Acute Status    Post-Acute Authorization HME;Home Health     HME Status Set-up Complete/Auth obtained     Home Health Status Set-up Complete/Auth obtained     Patient choice form signed by patient/caregiver List with quality metrics by geographic area provided;List from CMS Compare     Discharge Delays None known at this time                     Important Message from Medicare  Important Message from Medicare regarding Discharge Appeal Rights: Explained to patient/caregiver, Signed/date by patient/caregiver     Date IMM was signed: 03/05/24  Time IMM was signed: 1120     Follow-up providers       Evelio Chiu DO   Specialty: General Surgery, Surgery    [address] [facility]  [place] [address]   Phone: [phone]       Next Steps: Schedule an appointment as soon as possible for a visit in 2 week(s)    Instructions: Please follow up on March 19 at 1:15              After-discharge care                Durable Medical Equipment       [facility]   Service: Durable Medical Equipment    [address] [address]   Phone: [phone]                 Home Medical Care       [facility]   Service: Home Health Services    [address]  [address] [facility] [address]   Phone: [phone]                             Pt to OR home today. SW faxed dc orders to Leonora with [facility] and informed Leonora at OR today. Wc to be delivered to pts room today. No other needs.

## 2024-03-06 NOTE — ASSESSMENT & PLAN NOTE
Now status-post Lisfranc amputation. Case management assisting with discharge planning and DME. Anticipate discharge home with wheelchair per therapy recommendations.     Wound cultures with MRSA, anaerobic cultures with Finegoldia. Continue IV antibiotics.         Patient is not pregnant (male or female)

## 2024-03-07 ENCOUNTER — TELEPHONE (OUTPATIENT)
Dept: ORTHOPEDICS | Facility: HOSPITAL | Age: 64
End: 2024-03-07
Payer: MEDICARE

## 2024-03-07 ENCOUNTER — PATIENT OUTREACH (OUTPATIENT)
Dept: ADMINISTRATIVE | Facility: CLINIC | Age: 64
End: 2024-03-07

## 2024-03-07 ENCOUNTER — HOSPITAL ENCOUNTER (EMERGENCY)
Facility: HOSPITAL | Age: 64
Discharge: HOME | End: 2024-03-07
Payer: MEDICARE

## 2024-03-07 VITALS
WEIGHT: 162 LBS | HEART RATE: 66 BPM | TEMPERATURE: 99 F | BODY MASS INDEX: 24.55 KG/M2 | HEIGHT: 68 IN | OXYGEN SATURATION: 93 % | DIASTOLIC BLOOD PRESSURE: 63 MMHG | SYSTOLIC BLOOD PRESSURE: 130 MMHG | RESPIRATION RATE: 14 BRPM

## 2024-03-07 DIAGNOSIS — M96.830 POSTOPERATIVE HEMORRHAGE OF MUSCULOSKELETAL STRUCTURE FOLLOWING MUSCULOSKELETAL PROCEDURE: Primary | ICD-10-CM

## 2024-03-07 PROCEDURE — 99283 EMERGENCY DEPT VISIT LOW MDM: CPT | Mod: ,,, | Performed by: NURSE PRACTITIONER

## 2024-03-07 PROCEDURE — 99281 EMR DPT VST MAYX REQ PHY/QHP: CPT

## 2024-03-07 NOTE — PROGRESS NOTES
C3 nurse attempted to contact Navdeep Avery  for a TCC post hospital discharge follow up call. No answer. Left voicemail with callback information. The patient has a scheduled HOSFU appointment with Marquez Huff MD  on 3/14/24 @ 215.

## 2024-03-07 NOTE — TELEPHONE ENCOUNTER
Patient states no drainage, slight redness where staple was removed,no fever, no swelling at surgery site,. Patient states doing well and will keep follow up appt as scheduled

## 2024-03-08 NOTE — ED TRIAGE NOTES
Presents to the emergency department c/o bleeding to right partial foot amputation site. States that he was trying to get out of his chair and bumped his foot. Reports that Dr. Razo took two of the staples out of the wound to help relieve pressure to wound prior to being discharged yesterday. Small amount of bleeding noted to wound - controlled with mild pressure.

## 2024-03-08 NOTE — ED PROVIDER NOTES
Encounter Date: 3/7/2024       History     Chief Complaint   Patient presents with    Post-op Problem     63-year-old male presents to ED with complaint of bleeding from postop site.  Patient states that he was trying to turn around and lost his balance and fell bumping his foot.  Patient had amputation on 03/01 with repeat amputation on last Friday and being discharged on yesterday according to spouse.  Patient is on Plavix and aspirin.  Patient reports staples were removed from amputation site prior to discharge.     The history is provided by the patient, the spouse and medical records.     Review of patient's allergies indicates:  No Known Allergies  Past Medical History:   Diagnosis Date    Arthritis     Carpal tunnel syndrome     Charcot foot due to diabetes mellitus 09/29/2023    Prescription for CROW orthotic boot given today    COPD (chronic obstructive pulmonary disease)     Coronary artery disease involving coronary bypass graft of native heart 06/27/2023    CABG 2018 (No OP report available)     Most recent left heart catheterization 08/12/2019  1. Normal LV size apical akinesis and overall normal LV systolic function, ejection fraction 55%  2. Patent saphenous vein graft to the right PDA with patent jump graft to the OM branch left circumflex  3. Patent LIMA to the OM1  4. No significant mitral regurgitation     COVID 02/02/2022    Diabetic peripheral neuropathy     Essential (primary) hypertension     GERD (gastroesophageal reflux disease)     Insomnia secondary to depression with anxiety 06/17/2021    Mixed hyperlipidemia 03/23/2021    Nicotine dependence     Peripheral vascular disease, unspecified     Sleep apnea     Type 2 diabetes mellitus      Past Surgical History:   Procedure Laterality Date    CORONARY ARTERY BYPASS GRAFT      CORONARY STENT PLACEMENT      DEBRIDEMENT OF FOOT Right 08/10/2023    Dr. Chiu    DEBRIDEMENT OF FOOT Right 8/10/2023    Procedure: DEBRIDEMENT, FOOT;  Surgeon:  Evelio Chiu DO;  Location: [facility] OR;  Service: General;  Laterality: Right;    DEBRIDEMENT OF FOOT Right 2/27/2024    Procedure: DEBRIDEMENT, FOOT;  Surgeon: Evelio Chiu DO;  Location: [facility] OR;  Service: General;  Laterality: Right;    DEBRIDEMENT OF LOWER EXTREMITY Right 06/27/2022    Procedure: DEBRIDEMENT, LOWER EXTREMITY;  Surgeon: Forrest Kiser MD;  Location: [facility];  Service: General;  Laterality: Right;  right foot    FOOT AMPUTATION THROUGH METATARSAL Right 3/1/2024    Procedure: AMPUTATION, FOOT, TRANSMETATARSAL; RIGHT LISFRANK AMPUTATION;  Surgeon: Evelio Chiu DO;  Location: [facility] OR;  Service: General;  Laterality: Right;    HIP FRACTURE SURGERY Left     IRRIGATION AND DEBRIDEMENT OF LOWER EXTREMITY Right 09/08/2022    Procedure: IRRIGATION AND DEBRIDEMENT, LOWER EXTREMITY;  Surgeon: Selina Matos MD;  Location: [facility];  Service: General;  Laterality: Right;    IRRIGATION AND DEBRIDEMENT OF LOWER EXTREMITY Right 01/05/2023    Procedure: IRRIGATION AND DEBRIDEMENT, LOWER EXTREMITY;  Surgeon: Evelio Chiu DO;  Location: [facility] OR;  Service: General;  Laterality: Right;    SHOULDER OPEN ROTATOR CUFF REPAIR Left     SPINE SURGERY      TOE AMPUTATION Right 2/27/2024    Procedure: AMPUTATION, TOE;  Surgeon: Evelio Chiu DO;  Location: [facility];  Service: General;  Laterality: Right;  RIGHT 5 TH TOE AND DEBRIDEMENT    VASCULAR SURGERY       Family History   Problem Relation Age of Onset    Arthritis Mother     Hypertension Mother     Learning disabilities Mother     Alcohol abuse Father     Early death Father     Heart disease Father     Cancer Sister     Diabetes Sister     Heart disease Maternal Grandfather     COPD Paternal Grandfather     Heart disease Son      Social History     Tobacco Use    Smoking status: Every Day     Current packs/day: 2.50     Average packs/day: 2.5 packs/day for 52.6 years (131.5 ttl pk-yrs)     Types: Cigarettes      Start date: 8/10/1971     Passive exposure: Current    Smokeless tobacco: Never   Substance Use Topics    Alcohol use: Not Currently    Drug use: Yes     Types: Oxycodone     Review of Systems   Constitutional:  Negative for chills and fever.   Eyes:  Negative for photophobia and visual disturbance.   Respiratory:  Negative for cough and shortness of breath.    Cardiovascular:  Negative for chest pain and palpitations.   Gastrointestinal:  Negative for nausea and vomiting.   Musculoskeletal:  Positive for arthralgias and gait problem.   Skin:  Positive for color change and wound.   Neurological:  Negative for dizziness and weakness.   Hematological:  Negative for adenopathy. Does not bruise/bleed easily.   Psychiatric/Behavioral:  Negative for agitation and confusion.    All other systems reviewed and are negative.      Physical Exam     Initial Vitals   BP Pulse Resp Temp SpO2   03/07/24 1803 03/07/24 1803 03/07/24 1803 03/07/24 1803 03/07/24 1803   130/63 66 14 98.9 °F (37.2 °C) (!) 93 %      MAP       --                Physical Exam    Nursing note and vitals reviewed.  Constitutional: He appears well-developed and well-nourished.   HENT:   Head: Normocephalic and atraumatic.   Eyes: EOM are normal. Pupils are equal, round, and reactive to light.   Neck: Neck supple.   Normal range of motion.  Cardiovascular:  Normal rate and regular rhythm.           No murmur heard.  Pulmonary/Chest: He has no wheezes. He has no rhonchi.   Abdominal: Abdomen is soft. He exhibits no distension. There is no abdominal tenderness.   Musculoskeletal:         General: Tenderness and edema present.      Cervical back: Normal range of motion and neck supple.        Feet:      Lymphadenopathy:     He has no cervical adenopathy.   Neurological: He is alert and oriented to person, place, and time. No cranial nerve deficit or sensory deficit.   Skin: Skin is warm and dry. Capillary refill takes less than 2 seconds. There is erythema.    Psychiatric: He has a normal mood and affect. Thought content normal.         Medical Screening Exam   See Full Note    ED Course   Procedures  Labs Reviewed - No data to display       Imaging Results    None          Medications - No data to display  Medical Decision Making  63-year-old male presents to ED with complaint of bleeding from postop site.  Patient states that he was trying to turn around and lost his balance and fell bumping his foot.  Patient had amputation on 03/01 with repeat amputation on last Friday and being discharged on yesterday according to spouse.  Patient is on Plavix and aspirin.  Patient reports staples were removed from amputation site prior to discharge    Dressing changed with surgicel and dressing reapplied. Patient has home health who spouse states is coming to see patient on tomorrow to monitor dressing. F/u with general surgery discussed. V/u      Amount and/or Complexity of Data Reviewed  External Data Reviewed: notes.     Details: Gen surgery notes reviewed prior to discharge. Patient with cellulitis that is being treated outpatient. Staples removed prior to discharge to mid incision line                                      Clinical Impression:   Final diagnoses:  [M96.830] Postoperative hemorrhage of musculoskeletal structure following musculoskeletal procedure (Primary)        ED Disposition Condition    Discharge Stable          ED Prescriptions    None       Follow-up Information    None          Sofia Damon, GIANA  03/12/24 0905

## 2024-03-08 NOTE — PROGRESS NOTES
C3 nurse spoke with wife Charisma Avery for a TCC post hospital discharge follow up call. The patient has a scheduled [facility] appointment with Marquez Huff MD  on 3/14/24 @ 215.  Wife reports pt takes Jardiance 10mg daily (not listed on AVS or in Epic med summary), not taking Toujeo  or mirtazapine. Gabapentin is listed twice with 2 different doses, wife reports pt is taking as ordered prior to admission and will discuss with PCP.

## 2024-03-08 NOTE — PROGRESS NOTES
C3 nurse attempted to contact Navdeep Avery  for a TCC post hospital discharge follow up call. Spoke with wife. The patient is unable to conduct the call @ this time. The wife requested a callback.    The patient has a scheduled HOS appointment with Marquez Huff MD  on 3/14/24 @ 215.

## 2024-03-08 NOTE — ED NOTES
Wound dressed with absorbable hemostat dressing and then wrapeed with kerlix and coban for a pressure dressing to assist with stopping the bleeding;  FNP made aware and in room to check dressing status and approval given

## 2024-03-11 ENCOUNTER — TELEPHONE (OUTPATIENT)
Dept: ORTHOPEDICS | Facility: HOSPITAL | Age: 64
End: 2024-03-11
Payer: MEDICARE

## 2024-03-11 ENCOUNTER — TELEPHONE (OUTPATIENT)
Dept: FAMILY MEDICINE | Facility: CLINIC | Age: 64
End: 2024-03-11
Payer: MEDICARE

## 2024-03-11 NOTE — TELEPHONE ENCOUNTER
Patient states no drainage, no redness, no swelling at surgery site,. Patient states doing well and will keep follow up appt as scheduled

## 2024-03-13 ENCOUNTER — EXTERNAL HOME HEALTH (OUTPATIENT)
Dept: HOME HEALTH SERVICES | Facility: HOSPITAL | Age: 64
End: 2024-03-13
Payer: MEDICARE

## 2024-03-14 ENCOUNTER — OFFICE VISIT (OUTPATIENT)
Dept: FAMILY MEDICINE | Facility: CLINIC | Age: 64
End: 2024-03-14
Payer: MEDICARE

## 2024-03-14 VITALS
HEIGHT: 68 IN | RESPIRATION RATE: 18 BRPM | DIASTOLIC BLOOD PRESSURE: 62 MMHG | WEIGHT: 160 LBS | SYSTOLIC BLOOD PRESSURE: 124 MMHG | HEART RATE: 61 BPM | BODY MASS INDEX: 24.25 KG/M2 | OXYGEN SATURATION: 96 % | TEMPERATURE: 98 F

## 2024-03-14 DIAGNOSIS — F32.A DEPRESSION, UNSPECIFIED DEPRESSION TYPE: ICD-10-CM

## 2024-03-14 DIAGNOSIS — M25.50 ARTHRALGIA, UNSPECIFIED JOINT: ICD-10-CM

## 2024-03-14 DIAGNOSIS — E11.3213 TYPE 2 DIABETES MELLITUS WITH BOTH EYES AFFECTED BY MILD NONPROLIFERATIVE RETINOPATHY AND MACULAR EDEMA, WITH LONG-TERM CURRENT USE OF INSULIN: ICD-10-CM

## 2024-03-14 DIAGNOSIS — M86.671 OTHER CHRONIC OSTEOMYELITIS, RIGHT ANKLE AND FOOT: Primary | ICD-10-CM

## 2024-03-14 DIAGNOSIS — J44.9 CHRONIC OBSTRUCTIVE PULMONARY DISEASE, UNSPECIFIED COPD TYPE: ICD-10-CM

## 2024-03-14 DIAGNOSIS — Z79.4 TYPE 2 DIABETES MELLITUS WITH BOTH EYES AFFECTED BY MILD NONPROLIFERATIVE RETINOPATHY AND MACULAR EDEMA, WITH LONG-TERM CURRENT USE OF INSULIN: ICD-10-CM

## 2024-03-14 PROCEDURE — 96372 THER/PROPH/DIAG INJ SC/IM: CPT | Mod: ,,, | Performed by: FAMILY MEDICINE

## 2024-03-14 PROCEDURE — 99213 OFFICE O/P EST LOW 20 MIN: CPT | Mod: ,,, | Performed by: FAMILY MEDICINE

## 2024-03-14 RX ORDER — ALBUTEROL SULFATE 90 UG/1
2 AEROSOL, METERED RESPIRATORY (INHALATION) 2 TIMES DAILY PRN
Qty: 18 G | Refills: 2 | Status: SHIPPED | OUTPATIENT
Start: 2024-03-14 | End: 2024-05-30 | Stop reason: SDUPTHER

## 2024-03-14 RX ORDER — FLASH GLUCOSE SENSOR
KIT MISCELLANEOUS
Qty: 1 KIT | Refills: 11 | Status: SHIPPED | OUTPATIENT
Start: 2024-03-14 | End: 2024-03-18 | Stop reason: SDUPTHER

## 2024-03-14 RX ORDER — DULOXETIN HYDROCHLORIDE 30 MG/1
30 CAPSULE, DELAYED RELEASE ORAL DAILY
Qty: 30 CAPSULE | Refills: 1 | Status: SHIPPED | OUTPATIENT
Start: 2024-03-14 | End: 2024-04-08 | Stop reason: SDUPTHER

## 2024-03-14 RX ORDER — DICLOFENAC SODIUM 10 MG/G
1 GEL TOPICAL 4 TIMES DAILY PRN
Qty: 20 G | Refills: 1 | Status: SHIPPED | OUTPATIENT
Start: 2024-03-14

## 2024-03-14 RX ORDER — FLASH GLUCOSE SCANNING READER
EACH MISCELLANEOUS
Qty: 1 EACH | Refills: 1 | Status: SHIPPED | OUTPATIENT
Start: 2024-03-14 | End: 2024-03-18 | Stop reason: SDUPTHER

## 2024-03-14 RX ORDER — KETOROLAC TROMETHAMINE 30 MG/ML
30 INJECTION, SOLUTION INTRAMUSCULAR; INTRAVENOUS
Status: COMPLETED | OUTPATIENT
Start: 2024-03-14 | End: 2024-03-14

## 2024-03-14 RX ADMIN — KETOROLAC TROMETHAMINE 30 MG: 30 INJECTION, SOLUTION INTRAMUSCULAR; INTRAVENOUS at 03:03

## 2024-03-14 NOTE — PROGRESS NOTES
Navdeep Avery is a 63 y.o. male seen today for hospital follow-up for peripheral vascular disease with amputation.  Patient had necrosis of the 5th toe on the right that required amputation to the metatarsal.  Patient is recovering well but we discussed a prosthetic should help him with ambulation.  Patient's A1c was not to goal over 9 and we discussed changing his Jardiance to Synjardy to help with his insulin resistance.  He is requesting a pain shot today for his right foot and reports increased symptoms of depression lately after his amputation.  We discussed a trial of Cymbalta instead of Lexapro which may help him with his neuropathy as well.    Past Medical History:   Diagnosis Date    Arthritis     Carpal tunnel syndrome     Charcot foot due to diabetes mellitus 09/29/2023    Prescription for CROW orthotic boot given today    COPD (chronic obstructive pulmonary disease)     Coronary artery disease involving coronary bypass graft of native heart 06/27/2023    CABG 2018 (No OP report available)     Most recent left heart catheterization 08/12/2019  1. Normal LV size apical akinesis and overall normal LV systolic function, ejection fraction 55%  2. Patent saphenous vein graft to the right PDA with patent jump graft to the OM branch left circumflex  3. Patent LIMA to the OM1  4. No significant mitral regurgitation     COVID 02/02/2022    Diabetic peripheral neuropathy     Essential (primary) hypertension     GERD (gastroesophageal reflux disease)     Insomnia secondary to depression with anxiety 06/17/2021    Mixed hyperlipidemia 03/23/2021    Nicotine dependence     Peripheral vascular disease, unspecified     Sleep apnea     Type 2 diabetes mellitus      Family History   Problem Relation Age of Onset    Arthritis Mother     Hypertension Mother     Learning disabilities Mother     Alcohol abuse Father     Early death Father     Heart disease Father     Cancer Sister     Diabetes Sister     Heart disease  Maternal Grandfather     COPD Paternal Grandfather     Heart disease Son      Current Outpatient Medications on File Prior to Visit   Medication Sig Dispense Refill    albuterol (PROVENTIL) 2.5 mg /3 mL (0.083 %) nebulizer solution USE 1 VIAL IN NEBULIZER 3 TIMES DAILY 90 each 11    amLODIPine (NORVASC) 10 MG tablet Take 1 tablet (10 mg total) by mouth once daily. 90 tablet 1    aspirin 81 MG Chew Take 81 mg by mouth once daily.      atorvastatin (LIPITOR) 80 MG tablet Take 1 tablet (80 mg total) by mouth every evening. 90 tablet 1    clopidogreL (PLAVIX) 75 mg tablet Take 1 tablet (75 mg total) by mouth once daily. 90 tablet 1    docusate sodium (COLACE) 100 MG capsule Take 1 capsule (100 mg total) by mouth 2 (two) times daily. 28 capsule 0    HYDROcodone-acetaminophen (NORCO)  mg per tablet Take 1 tablet by mouth every 6 (six) hours as needed for Pain. 120 tablet 0    HYDROcodone-acetaminophen (NORCO)  mg per tablet Take 1 tablet by mouth every 6 (six) hours as needed for Pain. 28 tablet 0    hydrOXYzine pamoate (VISTARIL) 25 MG Cap Take 1 capsule (25 mg total) by mouth once daily. Take nightly for sleep 90 capsule 1    insulin detemir U-100 (LEVEMIR FLEXTOUCH U-100 INSULN) 100 unit/mL (3 mL) InPn pen Inject 10 units subcutaneously every morning, inject 20 units subcutaneously every evening. 3 each 2    isosorbide mononitrate (IMDUR) 30 MG 24 hr tablet Take 1 tablet (30 mg total) by mouth once daily. 30 tablet 11    lisinopriL 10 MG tablet Take 1 tablet (10 mg total) by mouth once daily. 90 tablet 1    metoprolol succinate (TOPROL-XL) 50 MG 24 hr tablet Take 1 tablet (50 mg total) by mouth once daily. 30 tablet 11    nitroGLYCERIN (NITROSTAT) 0.4 MG SL tablet Place 1 tablet (0.4 mg total) under the tongue every 5 (five) minutes as needed for Chest pain. 30 tablet 2    pantoprazole (PROTONIX) 40 MG tablet Take 1 tablet (40 mg total) by mouth once daily. 90 tablet 1    SPIRIVA RESPIMAT 2.5 mcg/actuation  "inhaler Inhale 1 puff into the lungs once daily. 4 g 5    sulfamethoxazole-trimethoprim 800-160mg (BACTRIM DS) 800-160 mg Tab Take 1 tablet by mouth 2 (two) times daily. for 14 days 28 tablet 0    SURE COMFORT INSULIN SYRINGE 0.3 mL 31 gauge x 5/16" Syrg Inject 1 application into the skin 2 (two) times a day.      [DISCONTINUED] albuterol (PROVENTIL/VENTOLIN HFA) 90 mcg/actuation inhaler Inhale 2 puffs into the lungs 2 (two) times daily as needed for Wheezing. 18 g 2    [DISCONTINUED] diclofenac sodium (VOLTAREN) 1 % Gel Apply 1 g topically 4 (four) times daily as needed (pain). 20 g 1    [DISCONTINUED] empagliflozin (JARDIANCE) 10 mg tablet Take 10 mg by mouth once daily.      empagliflozin-metformin 10-1,000 mg TBph Take 1 tablet by mouth Daily.      gabapentin (NEURONTIN) 300 MG capsule Take 3 capsules (900 mg total) by mouth every 6 (six) hours. 360 capsule 2    gabapentin (NEURONTIN) 300 MG capsule Take 1 capsule (300 mg total) by mouth 3 (three) times daily. (Patient not taking: Reported on 3/8/2024) 90 capsule 11    LIDOcaine (LIDODERM) 5 % 1 patch once daily.      [DISCONTINUED] EScitalopram oxalate (LEXAPRO) 10 MG tablet Take 1 tablet (10 mg total) by mouth once daily. 30 tablet 1     No current facility-administered medications on file prior to visit.     Immunization History   Administered Date(s) Administered    Influenza - Quadrivalent - PF *Preferred* (6 months and older) 09/14/2021, 10/21/2022    Pneumococcal Conjugate - 13 Valent 10/25/2017    Pneumococcal Conjugate - 20 Valent 02/17/2023    Pneumococcal Polysaccharide - 23 Valent 10/19/2016    Tdap 12/20/2019       Review of Systems   Constitutional:  Negative for fever, malaise/fatigue and weight loss.   Respiratory:  Negative for shortness of breath.    Cardiovascular:  Negative for chest pain and palpitations.   Gastrointestinal:  Negative for nausea and vomiting.   Musculoskeletal:  Positive for joint pain and myalgias.   Psychiatric/Behavioral: "  Positive for depression. The patient is nervous/anxious.         Vitals:    03/14/24 1431   BP: 124/62   Pulse: 61   Resp: 18   Temp: 97.6 °F (36.4 °C)       Physical Exam  Vitals reviewed.   Constitutional:       Appearance: Normal appearance.   HENT:      Head: Normocephalic.   Eyes:      Extraocular Movements: Extraocular movements intact.      Conjunctiva/sclera: Conjunctivae normal.      Pupils: Pupils are equal, round, and reactive to light.   Neck:      Thyroid: No thyroid mass or thyromegaly.   Cardiovascular:      Rate and Rhythm: Normal rate and regular rhythm.      Heart sounds: Normal heart sounds. No murmur heard.     No gallop.   Pulmonary:      Effort: Pulmonary effort is normal. No respiratory distress.      Breath sounds: Normal breath sounds. No wheezing or rales.   Skin:     General: Skin is warm and dry.      Coloration: Skin is not jaundiced or pale.   Neurological:      Mental Status: He is alert.   Psychiatric:         Mood and Affect: Mood normal.         Behavior: Behavior normal.         Thought Content: Thought content normal.         Judgment: Judgment normal.          Assessment and Plan  1. Other chronic osteomyelitis, right ankle and foot  Assessment & Plan:  Patient will continue current medications follow-up with General surgery      2. Chronic obstructive pulmonary disease, unspecified COPD type  -     albuterol (PROVENTIL/VENTOLIN HFA) 90 mcg/actuation inhaler; Inhale 2 puffs into the lungs 2 (two) times daily as needed for Wheezing.  Dispense: 18 g; Refill: 2    3. Arthralgia, unspecified joint  -     diclofenac sodium (VOLTAREN) 1 % Gel; Apply 1 g topically 4 (four) times daily as needed (pain).  Dispense: 20 g; Refill: 1  -     ketorolac injection 30 mg    4. Type 2 diabetes mellitus with both eyes affected by mild nonproliferative retinopathy and macular edema, with long-term current use of insulin  Assessment & Plan:  We will continue current plan and add metformin to his  regimen.    Orders:  -     flash glucose sensor (FREESTYLE CT 2 SENSOR) Kit; Use to monitor blood sugar and replace unit every 2 weeks  Dispense: 1 kit; Refill: 11  -     flash glucose scanning reader (FREESTYLE CT 2 READER) Misc; Use to monitor blood glucose  Dispense: 1 each; Refill: 1    5. Depression, unspecified depression type  -     DULoxetine (CYMBALTA) 30 MG capsule; Take 1 capsule (30 mg total) by mouth once daily.  Dispense: 30 capsule; Refill: 1             Return to clinic in 3 weeks with home glucose log or as needed if he has any difficulty with his Ct system or medications.    Health Maintenance Topics with due status: Not Due       Topic Last Completion Date    TETANUS VACCINE 12/20/2019    Diabetes Urine Screening 05/17/2023    Foot Exam 05/17/2023    Hemoglobin A1c 03/05/2024    High Dose Statin 03/08/2024    Aspirin/Antiplatelet Therapy 03/08/2024

## 2024-03-14 NOTE — PROGRESS NOTES
Synjardy xr 10/100mg samples given to patient in clinic today per Dr Huff. Three boxes with lot number [id number] and expiration date of 3/2025 signed out in sample log book. Instructed patient to take medication with food once daily per Dr Huff orders. Patient voiced understanding. Advised to contact clinic for any GI side effects.

## 2024-03-18 DIAGNOSIS — E11.3213 TYPE 2 DIABETES MELLITUS WITH BOTH EYES AFFECTED BY MILD NONPROLIFERATIVE RETINOPATHY AND MACULAR EDEMA, WITH LONG-TERM CURRENT USE OF INSULIN: ICD-10-CM

## 2024-03-18 DIAGNOSIS — Z79.4 TYPE 2 DIABETES MELLITUS WITH BOTH EYES AFFECTED BY MILD NONPROLIFERATIVE RETINOPATHY AND MACULAR EDEMA, WITH LONG-TERM CURRENT USE OF INSULIN: ICD-10-CM

## 2024-03-18 RX ORDER — FLASH GLUCOSE SCANNING READER
EACH MISCELLANEOUS
Qty: 1 EACH | Refills: 1 | Status: SHIPPED | OUTPATIENT
Start: 2024-03-18

## 2024-03-18 RX ORDER — FLASH GLUCOSE SENSOR
KIT MISCELLANEOUS
Qty: 1 KIT | Refills: 11 | Status: SHIPPED | OUTPATIENT
Start: 2024-03-18

## 2024-03-19 ENCOUNTER — OFFICE VISIT (OUTPATIENT)
Dept: SURGERY | Facility: CLINIC | Age: 64
End: 2024-03-19
Payer: MEDICARE

## 2024-03-19 DIAGNOSIS — Z89.431 HISTORY OF LISFRANC AMPUTATION OF RIGHT FOOT: ICD-10-CM

## 2024-03-19 DIAGNOSIS — Z09 POSTOP CHECK: Primary | ICD-10-CM

## 2024-03-19 PROCEDURE — 99214 OFFICE O/P EST MOD 30 MIN: CPT | Mod: PBBFAC | Performed by: STUDENT IN AN ORGANIZED HEALTH CARE EDUCATION/TRAINING PROGRAM

## 2024-03-19 PROCEDURE — 99024 POSTOP FOLLOW-UP VISIT: CPT | Mod: ,,, | Performed by: STUDENT IN AN ORGANIZED HEALTH CARE EDUCATION/TRAINING PROGRAM

## 2024-03-19 NOTE — PROGRESS NOTES
63-year-old  male presents the clinic for postop evaluation status post Lisfranc amputation of the right foot.  Patient apparently fell at home as he was walking outside and his crutch got stuck in a hole.  Slight dehiscence of wound; staples still intact.  Denies any chest pain/shortness of breath, nausea/vomiting/diarrhea, fever/chills.    Staples in place.  Slight opening on the midportion of the incision line; area cleaned and healthy tissue present, no necrosis no signs of infection; 3x1x0.2cm      Cleaned with Vashe daily and apply Silvadene ointment.  Follow-up in 1 week for staple removal.  We will apply for organogenesis products to help close wound

## 2024-03-21 ENCOUNTER — TELEPHONE (OUTPATIENT)
Dept: SURGERY | Facility: CLINIC | Age: 64
End: 2024-03-21
Payer: MEDICARE

## 2024-03-21 RX ORDER — SILVER SULFADIAZINE 10 G/1000G
CREAM TOPICAL DAILY
Qty: 1000 G | Refills: 11 | Status: CANCELLED | OUTPATIENT
Start: 2024-03-21

## 2024-03-22 ENCOUNTER — CLINICAL SUPPORT (OUTPATIENT)
Dept: FAMILY MEDICINE | Facility: CLINIC | Age: 64
End: 2024-03-22
Payer: MEDICARE

## 2024-03-22 DIAGNOSIS — Z79.4 TYPE 2 DIABETES MELLITUS WITH DIABETIC NEUROPATHY, WITH LONG-TERM CURRENT USE OF INSULIN: Primary | ICD-10-CM

## 2024-03-22 DIAGNOSIS — E11.40 TYPE 2 DIABETES MELLITUS WITH DIABETIC NEUROPATHY, WITH LONG-TERM CURRENT USE OF INSULIN: Primary | ICD-10-CM

## 2024-03-22 NOTE — PROGRESS NOTES
Nurse visit only. Educated pt and spouse on how to start up the freestyle zainab scanner/reader and to apply the sensor. New sensor placed on posterior left upper tricep area. Pt tolerated well.

## 2024-03-26 ENCOUNTER — OFFICE VISIT (OUTPATIENT)
Dept: SURGERY | Facility: CLINIC | Age: 64
End: 2024-03-26
Payer: MEDICARE

## 2024-03-26 DIAGNOSIS — Z89.431 HISTORY OF LISFRANC AMPUTATION OF RIGHT FOOT: Primary | ICD-10-CM

## 2024-03-26 PROCEDURE — 99214 OFFICE O/P EST MOD 30 MIN: CPT | Mod: PBBFAC | Performed by: STUDENT IN AN ORGANIZED HEALTH CARE EDUCATION/TRAINING PROGRAM

## 2024-03-26 PROCEDURE — 99213 OFFICE O/P EST LOW 20 MIN: CPT | Mod: S$PBB,,, | Performed by: STUDENT IN AN ORGANIZED HEALTH CARE EDUCATION/TRAINING PROGRAM

## 2024-03-26 NOTE — PROGRESS NOTES
63-year-old  male presents the clinic for postop evaluation status post Lisfranc amputation of the right foot.  Patient apparently fell at home as he was walking outside and his crutch got stuck in a hole.  Slight dehiscence of wound; staples still intact.  Denies any chest pain/shortness of breath, nausea/vomiting/diarrhea, fever/chills.    3/26:  Patient presents for wound evaluation.  Patient doing well, other complaints.  Denies any chest pain/shortness of breath, nausea/vomiting/diarrhea, fever/chills.    Staples partially in place.  Slight opening on the midportion of the incision line; area cleaned and healthy tissue present, no necrosis no signs of infection; 3x1x0.2cm      Clean with Vashe daily and apply Silvadene ointment.  Follow-up in 2 weeks for organogenesis products to help close wound.  Staples removed, Steri-Strips placed

## 2024-04-05 ENCOUNTER — CLINICAL SUPPORT (OUTPATIENT)
Dept: FAMILY MEDICINE | Facility: CLINIC | Age: 64
End: 2024-04-05
Payer: MEDICARE

## 2024-04-05 ENCOUNTER — DOCUMENT SCAN (OUTPATIENT)
Dept: HOME HEALTH SERVICES | Facility: HOSPITAL | Age: 64
End: 2024-04-05
Payer: MEDICARE

## 2024-04-05 DIAGNOSIS — Z79.4 TYPE 2 DIABETES MELLITUS WITH BOTH EYES AFFECTED BY MILD NONPROLIFERATIVE RETINOPATHY AND MACULAR EDEMA, WITH LONG-TERM CURRENT USE OF INSULIN: Primary | ICD-10-CM

## 2024-04-05 DIAGNOSIS — E11.3213 TYPE 2 DIABETES MELLITUS WITH BOTH EYES AFFECTED BY MILD NONPROLIFERATIVE RETINOPATHY AND MACULAR EDEMA, WITH LONG-TERM CURRENT USE OF INSULIN: Primary | ICD-10-CM

## 2024-04-08 ENCOUNTER — OFFICE VISIT (OUTPATIENT)
Dept: FAMILY MEDICINE | Facility: CLINIC | Age: 64
End: 2024-04-08
Payer: MEDICARE

## 2024-04-08 ENCOUNTER — TELEPHONE (OUTPATIENT)
Dept: SURGERY | Facility: CLINIC | Age: 64
End: 2024-04-08
Payer: MEDICARE

## 2024-04-08 VITALS
OXYGEN SATURATION: 93 % | SYSTOLIC BLOOD PRESSURE: 116 MMHG | HEART RATE: 63 BPM | RESPIRATION RATE: 18 BRPM | BODY MASS INDEX: 23.04 KG/M2 | TEMPERATURE: 98 F | HEIGHT: 68 IN | WEIGHT: 152 LBS | DIASTOLIC BLOOD PRESSURE: 60 MMHG

## 2024-04-08 DIAGNOSIS — R09.81 NASAL CONGESTION: Primary | ICD-10-CM

## 2024-04-08 DIAGNOSIS — F32.A DEPRESSION, UNSPECIFIED DEPRESSION TYPE: ICD-10-CM

## 2024-04-08 DIAGNOSIS — Z79.4 TYPE 2 DIABETES MELLITUS WITH BOTH EYES AFFECTED BY MILD NONPROLIFERATIVE RETINOPATHY AND MACULAR EDEMA, WITH LONG-TERM CURRENT USE OF INSULIN: ICD-10-CM

## 2024-04-08 DIAGNOSIS — E11.3213 TYPE 2 DIABETES MELLITUS WITH BOTH EYES AFFECTED BY MILD NONPROLIFERATIVE RETINOPATHY AND MACULAR EDEMA, WITH LONG-TERM CURRENT USE OF INSULIN: ICD-10-CM

## 2024-04-08 DIAGNOSIS — R05.9 COUGH, UNSPECIFIED TYPE: ICD-10-CM

## 2024-04-08 DIAGNOSIS — U07.1 COVID: ICD-10-CM

## 2024-04-08 LAB
CTP QC/QA: YES
CTP QC/QA: YES
FLUAV AG NPH QL: NEGATIVE
FLUBV AG NPH QL: NEGATIVE
SARS-COV-2 RDRP RESP QL NAA+PROBE: POSITIVE

## 2024-04-08 PROCEDURE — 87502 INFLUENZA DNA AMP PROBE: CPT | Mod: QW,RHCUB | Performed by: FAMILY MEDICINE

## 2024-04-08 PROCEDURE — 99213 OFFICE O/P EST LOW 20 MIN: CPT | Mod: ,,, | Performed by: FAMILY MEDICINE

## 2024-04-08 PROCEDURE — 87635 SARS-COV-2 COVID-19 AMP PRB: CPT | Mod: RHCUB | Performed by: FAMILY MEDICINE

## 2024-04-08 RX ORDER — AZITHROMYCIN 250 MG/1
TABLET, FILM COATED ORAL
Qty: 6 TABLET | Refills: 0 | Status: SHIPPED | OUTPATIENT
Start: 2024-04-08 | End: 2024-04-13

## 2024-04-08 RX ORDER — DULOXETIN HYDROCHLORIDE 30 MG/1
30 CAPSULE, DELAYED RELEASE ORAL DAILY
Qty: 90 CAPSULE | Refills: 1 | Status: SHIPPED | OUTPATIENT
Start: 2024-04-08 | End: 2025-04-08

## 2024-04-08 NOTE — PROGRESS NOTES
Navdeep Avery is a 63 y.o. male seen today for follow-up on his depression and diabetes.  Patient's blood sugars have markedly improved often less than 100.  He is tolerating the medication well and has had no chest pain.  Patient has had worsening congestion cough over the last 2 days and was positive for COVID today.  Patient is not a candidate for Paxlovid secondary to his Plavix and we discussed a Z-Galdino increasing his fluid intake rest Mucinex b.i.d. and zinc and vitamin D.   patient reports he does notice an improvement in his depression with his Cymbalta and we will continue this dose for now.    Past Medical History:   Diagnosis Date    Arthritis     Carpal tunnel syndrome     Charcot foot due to diabetes mellitus 09/29/2023    Prescription for CROW orthotic boot given today    COPD (chronic obstructive pulmonary disease)     Coronary artery disease involving coronary bypass graft of native heart 06/27/2023    CABG 2018 (No OP report available)     Most recent left heart catheterization 08/12/2019  1. Normal LV size apical akinesis and overall normal LV systolic function, ejection fraction 55%  2. Patent saphenous vein graft to the right PDA with patent jump graft to the OM branch left circumflex  3. Patent LIMA to the OM1  4. No significant mitral regurgitation     COVID 02/02/2022    Diabetic peripheral neuropathy     Essential (primary) hypertension     GERD (gastroesophageal reflux disease)     Insomnia secondary to depression with anxiety 06/17/2021    Mixed hyperlipidemia 03/23/2021    Nicotine dependence     Peripheral vascular disease, unspecified     Sleep apnea     Type 2 diabetes mellitus      Family History   Problem Relation Age of Onset    Arthritis Mother     Hypertension Mother     Learning disabilities Mother     Alcohol abuse Father     Early death Father     Heart disease Father     Cancer Sister     Diabetes Sister     Heart disease Maternal Grandfather     COPD Paternal Grandfather      Heart disease Son      Current Outpatient Medications on File Prior to Visit   Medication Sig Dispense Refill    albuterol (PROVENTIL) 2.5 mg /3 mL (0.083 %) nebulizer solution USE 1 VIAL IN NEBULIZER 3 TIMES DAILY 90 each 11    albuterol (PROVENTIL/VENTOLIN HFA) 90 mcg/actuation inhaler Inhale 2 puffs into the lungs 2 (two) times daily as needed for Wheezing. 18 g 2    aspirin 81 MG Chew Take 81 mg by mouth once daily.      diclofenac sodium (VOLTAREN) 1 % Gel Apply 1 g topically 4 (four) times daily as needed (pain). 20 g 1    docusate sodium (COLACE) 100 MG capsule Take 1 capsule (100 mg total) by mouth 2 (two) times daily. 28 capsule 0    flash glucose scanning reader (FREESTYLE CT 2 READER) [facility] Use to monitor blood glucose 1 each 1    flash glucose sensor (FREESTYLE CT 2 SENSOR) Kit Use to monitor blood sugar and replace unit every 2 weeks 1 kit 11    gabapentin (NEURONTIN) 300 MG capsule Take 1 capsule (300 mg total) by mouth 3 (three) times daily. 90 capsule 11    HYDROcodone-acetaminophen (NORCO)  mg per tablet Take 1 tablet by mouth every 6 (six) hours as needed for Pain. 28 tablet 0    insulin detemir U-100 (LEVEMIR FLEXTOUCH U-100 INSULN) 100 unit/mL (3 mL) InPn pen Inject 10 units subcutaneously every morning, inject 20 units subcutaneously every evening. 3 each 2    isosorbide mononitrate (IMDUR) 30 MG 24 hr tablet Take 1 tablet (30 mg total) by mouth once daily. 30 tablet 11    LIDOcaine (LIDODERM) 5 % 1 patch once daily.      metoprolol succinate (TOPROL-XL) 50 MG 24 hr tablet Take 1 tablet (50 mg total) by mouth once daily. 30 tablet 11    nitroGLYCERIN (NITROSTAT) 0.4 MG SL tablet Place 1 tablet (0.4 mg total) under the tongue every 5 (five) minutes as needed for Chest pain. 30 tablet 2    pantoprazole (PROTONIX) 40 MG tablet Take 1 tablet (40 mg total) by mouth once daily. 90 tablet 1    SPIRIVA RESPIMAT 2.5 mcg/actuation inhaler Inhale 1 puff into the lungs once daily. 4 g 5    SURE  "COMFORT INSULIN SYRINGE 0.3 mL 31 gauge x 5/16" Syrg Inject 1 application into the skin 2 (two) times a day.      [DISCONTINUED] DULoxetine (CYMBALTA) 30 MG capsule Take 1 capsule (30 mg total) by mouth once daily. 30 capsule 1    [DISCONTINUED] empagliflozin-metformin 10-1,000 mg TBph Take 1 tablet by mouth Daily.      amLODIPine (NORVASC) 10 MG tablet Take 1 tablet (10 mg total) by mouth once daily. (Patient not taking: Reported on 4/8/2024) 90 tablet 1    atorvastatin (LIPITOR) 80 MG tablet Take 1 tablet (80 mg total) by mouth every evening. (Patient not taking: Reported on 4/8/2024) 90 tablet 1    clopidogreL (PLAVIX) 75 mg tablet Take 1 tablet (75 mg total) by mouth once daily. (Patient not taking: Reported on 4/8/2024) 90 tablet 1    gabapentin (NEURONTIN) 300 MG capsule Take 3 capsules (900 mg total) by mouth every 6 (six) hours. (Patient not taking: Reported on 4/8/2024) 360 capsule 2    hydrOXYzine pamoate (VISTARIL) 25 MG Cap Take 1 capsule (25 mg total) by mouth once daily. Take nightly for sleep (Patient not taking: Reported on 4/8/2024) 90 capsule 1    lisinopriL 10 MG tablet Take 1 tablet (10 mg total) by mouth once daily. (Patient not taking: Reported on 4/8/2024) 90 tablet 1     No current facility-administered medications on file prior to visit.     Immunization History   Administered Date(s) Administered    Influenza - Quadrivalent - PF *Preferred* (6 months and older) 09/14/2021, 10/21/2022    Pneumococcal Conjugate - 13 Valent 10/25/2017    Pneumococcal Conjugate - 20 Valent 02/17/2023    Pneumococcal Polysaccharide - 23 Valent 10/19/2016    Tdap 12/20/2019       Review of Systems   Constitutional:  Positive for malaise/fatigue. Negative for fever and weight loss.   HENT:  Positive for congestion.    Respiratory:  Positive for cough. Negative for shortness of breath.    Cardiovascular:  Negative for chest pain and palpitations.   Gastrointestinal:  Negative for nausea and vomiting. "   Musculoskeletal:  Positive for joint pain and myalgias.   Psychiatric/Behavioral:  Negative for depression.         Vitals:    04/08/24 1006   BP: 116/60   Pulse: 63   Resp: 18   Temp: 97.7 °F (36.5 °C)       Physical Exam  Vitals reviewed.   Constitutional:       Appearance: Normal appearance.   HENT:      Head: Normocephalic.   Eyes:      Extraocular Movements: Extraocular movements intact.      Conjunctiva/sclera: Conjunctivae normal.      Pupils: Pupils are equal, round, and reactive to light.   Neck:      Thyroid: No thyroid mass or thyromegaly.   Cardiovascular:      Rate and Rhythm: Normal rate and regular rhythm.      Heart sounds: Normal heart sounds. No murmur heard.     No gallop.   Pulmonary:      Effort: Pulmonary effort is normal. No respiratory distress.      Breath sounds: Wheezing present. No rales.      Comments: Patient has end-expiratory wheezes the left base  Skin:     General: Skin is warm and dry.      Coloration: Skin is not jaundiced or pale.   Neurological:      Mental Status: He is alert.   Psychiatric:         Mood and Affect: Mood normal.         Behavior: Behavior normal.         Thought Content: Thought content normal.         Judgment: Judgment normal.          Assessment and Plan  1. Nasal congestion  -     POCT COVID-19 Rapid Screening  -     POCT Influenza A/B    2. Type 2 diabetes mellitus with both eyes affected by mild nonproliferative retinopathy and macular edema, with long-term current use of insulin  -     empagliflozin-metformin 10-1,000 mg TBph; Take 1 tablet by mouth Daily.  Dispense: 90 tablet; Refill: 1  -     POCT COVID-19 Rapid Screening    3. Depression, unspecified depression type  -     DULoxetine (CYMBALTA) 30 MG capsule; Take 1 capsule (30 mg total) by mouth once daily.  Dispense: 90 capsule; Refill: 1  -     POCT COVID-19 Rapid Screening    4. Cough, unspecified type    5. COVID  -     azithromycin (Z-FRANDY) 250 MG tablet; Take 2 tablets by mouth on day 1; Take  1 tablet by mouth on days 2-5  Dispense: 6 tablet; Refill: 0             Return to clinic in 1 week for follow-up or as needed if symptoms worsen or to the ER for severe symptoms.    Health Maintenance Topics with due status: Not Due       Topic Last Completion Date    TETANUS VACCINE 12/20/2019    Diabetes Urine Screening 05/17/2023    Hemoglobin A1c 03/05/2024    High Dose Statin 03/26/2024    Aspirin/Antiplatelet Therapy 03/26/2024

## 2024-04-08 NOTE — TELEPHONE ENCOUNTER
----- Message from Liss Arita sent at 4/8/2024 11:32 AM CDT -----  Regarding: RETURN CALL  PATIENT WENT TO HIS PRIMARY PHYSICIAN AND HE TESTED POSTIVIE FOR COVID AND WANT TO KNOW IF HE STILL NEEDS TO COME SEE DR CHIU ON 04/09/24, CALL BACK NUMBER [phone]    Spoke with wife (Charisma), states Mr. Navdeep Avery tested positive for covid on 4/7/24. Informed wife that pt must wear a mask to appointment with Dr. Chiu on 4/9/24 per covid-19 policy. Wife voiced understanding.

## 2024-04-09 ENCOUNTER — OFFICE VISIT (OUTPATIENT)
Dept: SURGERY | Facility: CLINIC | Age: 64
End: 2024-04-09
Payer: MEDICARE

## 2024-04-09 DIAGNOSIS — Z89.431 HISTORY OF LISFRANC AMPUTATION OF RIGHT FOOT: Primary | ICD-10-CM

## 2024-04-09 DIAGNOSIS — Z09 POSTOP CHECK: ICD-10-CM

## 2024-04-09 PROCEDURE — 15275 SKIN SUB GRAFT FACE/NK/HF/G: CPT | Mod: S$PBB,,, | Performed by: STUDENT IN AN ORGANIZED HEALTH CARE EDUCATION/TRAINING PROGRAM

## 2024-04-09 PROCEDURE — 99999PBSHW PR PBB SHADOW TECHNICAL ONLY FILED TO HB: Mod: PBBFAC,,,

## 2024-04-09 PROCEDURE — 15275 SKIN SUB GRAFT FACE/NK/HF/G: CPT | Mod: PBBFAC | Performed by: STUDENT IN AN ORGANIZED HEALTH CARE EDUCATION/TRAINING PROGRAM

## 2024-04-09 PROCEDURE — 99213 OFFICE O/P EST LOW 20 MIN: CPT | Mod: 25,S$PBB,, | Performed by: STUDENT IN AN ORGANIZED HEALTH CARE EDUCATION/TRAINING PROGRAM

## 2024-04-09 PROCEDURE — 99214 OFFICE O/P EST MOD 30 MIN: CPT | Mod: PBBFAC,25 | Performed by: STUDENT IN AN ORGANIZED HEALTH CARE EDUCATION/TRAINING PROGRAM

## 2024-04-09 NOTE — PROGRESS NOTES
Subjective     Patient ID: Navdeep Avery is a 63 y.o. male.    Chief Complaint: Follow-up    4/9:  Presents the clinic for wound evaluation and application of PuraPly XT.  Patient doing well.  He has developed a cough and was tested positive for COVID yesterday.  Denies any chest pain/shortness of breath, nausea/vomiting/diarrhea, fever/chills.      Review of Systems   Constitutional: Negative.    HENT: Negative.     Eyes: Negative.    Respiratory:  Negative for shortness of breath.    Cardiovascular: Negative.    Gastrointestinal:  Negative for abdominal pain, blood in stool, change in bowel habit and vomiting.   Endocrine: Negative.    Genitourinary: Negative.  Negative for hematuria.   Musculoskeletal:  Negative for back pain and myalgias.   Integumentary:  Negative.   Allergic/Immunologic: Negative.    Neurological:  Negative for dizziness, weakness and light-headedness.   Psychiatric/Behavioral: Negative.            Objective     Physical Exam  Constitutional:       General: He is not in acute distress.     Appearance: Normal appearance.   HENT:      Head: Normocephalic.   Cardiovascular:      Rate and Rhythm: Normal rate.   Pulmonary:      Effort: Pulmonary effort is normal. No respiratory distress.   Abdominal:      General: There is no distension.      Tenderness: There is no abdominal tenderness.   Musculoskeletal:         General: Normal range of motion.        Feet:    Feet:      Comments: 6 x 1.5 x 0.3 cm opening on right Lisfranc amputation site; cleaned with granulation tissue growing and some biofilm.  No signs of infection or drainage.  Skin:     General: Skin is warm.      Coloration: Skin is not jaundiced.   Neurological:      General: No focal deficit present.      Mental Status: He is alert and oriented to person, place, and time.      Cranial Nerves: No cranial nerve deficit.            Assessment and Plan     1. History of Lisfranc amputation of right foot    2. Postop check        Patient  Name: Navdeep Avery  Patient MRN: [id number]  Patient YOB: 1960  CSN: [id number]  Date: 04/09/2024  Provider:   Evelio Razo    Application for Assessment: right Lisfranc amputation site  Skin Substitute: Puraply XT  Performed By: Evelio Razo  Time-out Taken: Yes  Location:     [x] Genitalia/Hands/Feet/Multiple Digits    [] Scalp/Face/Neck/Ears    []Trunk/Arms/Legs  Application Area: (sq cm): 9   Product Applied: (sq cm): 24.1  Product Waste (sq cm): 15.1  Fenestrated: Yes   Instrument:    [] Blade [] Curette  [x]Forceps  []Nippers    [] Rongeur [x] Scissors  []Others:   Secured: Yes   Secured with: Steri-strips  Dressing Applied: Yes  Response to Treatment:Well tolerated by patient.  Note:     Expiration date 10/08/2024  Lot number [id number]         No follow-ups on file.

## 2024-04-10 NOTE — PROGRESS NOTES
Subjective:         Patient ID: [id number] Avery is a 63 y.o. male.    Chief Complaint: Back Pain, Knee Pain (Bilateral ), and Foot Pain (Right )        Pain  This is a chronic problem. The current episode started more than 1 year ago. The problem occurs daily. The problem has been unchanged. Associated symptoms include arthralgias and neck pain. Pertinent negatives include no anorexia, chest pain, chills, coughing, diaphoresis, fever, sore throat, vertigo or vomiting.     Review of Systems   Constitutional:  Negative for activity change, chills, diaphoresis, fever and unexpected weight change.   HENT:  Negative for drooling, ear discharge, ear pain, facial swelling, nosebleeds, sore throat, trouble swallowing, voice change and goiter.    Eyes:  Negative for photophobia, pain, discharge, redness and visual disturbance.   Respiratory:  Negative for apnea, cough, choking, chest tightness, shortness of breath, wheezing and stridor.    Cardiovascular:  Negative for chest pain, palpitations and leg swelling.   Gastrointestinal:  Negative for abdominal distention, anorexia, diarrhea, rectal pain, vomiting and fecal incontinence.   Endocrine: Negative for cold intolerance, heat intolerance, polydipsia, polyphagia and polyuria.   Genitourinary:  Negative for bladder incontinence, dysuria, flank pain and frequency.   Musculoskeletal:  Positive for arthralgias, back pain, leg pain, neck pain and neck stiffness.   Integumentary:  Negative for color change and pallor.   Neurological:  Negative for dizziness, vertigo, seizures, syncope, facial asymmetry, speech difficulty, light-headedness, memory loss and coordination difficulties.   Hematological:  Negative for adenopathy. Does not bruise/bleed easily.   Psychiatric/Behavioral:  Negative for agitation, behavioral problems, confusion, decreased concentration, dysphoric mood, hallucinations, self-injury and suicidal ideas. The patient is not nervous/anxious and is not  hyperactive.            Past Medical History:   Diagnosis Date    Arthritis     Carpal tunnel syndrome     Charcot foot due to diabetes mellitus 09/29/2023    Prescription for CROW orthotic boot given today    COPD (chronic obstructive pulmonary disease)     Coronary artery disease involving coronary bypass graft of native heart 06/27/2023    CABG 2018 (No OP report available)     Most recent left heart catheterization 08/12/2019  1. Normal LV size apical akinesis and overall normal LV systolic function, ejection fraction 55%  2. Patent saphenous vein graft to the right PDA with patent jump graft to the OM branch left circumflex  3. Patent LIMA to the OM1  4. No significant mitral regurgitation     COVID 02/02/2022    Diabetic peripheral neuropathy     Essential (primary) hypertension     GERD (gastroesophageal reflux disease)     Insomnia secondary to depression with anxiety 06/17/2021    Mixed hyperlipidemia 03/23/2021    Nicotine dependence     Peripheral vascular disease, unspecified     Sleep apnea     Type 2 diabetes mellitus      Past Surgical History:   Procedure Laterality Date    CORONARY ARTERY BYPASS GRAFT      CORONARY STENT PLACEMENT      DEBRIDEMENT OF FOOT Right 08/10/2023    Dr. hCiu    DEBRIDEMENT OF FOOT Right 8/10/2023    Procedure: DEBRIDEMENT, FOOT;  Surgeon: Evelio Chiu DO;  Location: [facility], [place];  Service: General;  Laterality: Right;    DEBRIDEMENT OF FOOT Right 2/27/2024    Procedure: DEBRIDEMENT, FOOT;  Surgeon: Evelio Chiu DO;  Location: [facility] OR;  Service: General;  Laterality: Right;    DEBRIDEMENT OF LOWER EXTREMITY Right 06/27/2022    Procedure: DEBRIDEMENT, LOWER EXTREMITY;  Surgeon: Forrest Kiser MD;  Location: [facility] OR;  Service: General;  Laterality: Right;  right foot    FOOT AMPUTATION THROUGH METATARSAL Right 3/1/2024    Procedure: AMPUTATION, FOOT, TRANSMETATARSAL; RIGHT LISFRANK AMPUTATION;  Surgeon: Evelio Chiu DO;  Location: [facility] OR;   Service: General;  Laterality: Right;    HIP FRACTURE SURGERY Left     IRRIGATION AND DEBRIDEMENT OF LOWER EXTREMITY Right 09/08/2022    Procedure: IRRIGATION AND DEBRIDEMENT, LOWER EXTREMITY;  Surgeon: Selina Matos MD;  Location: [facility] OR;  Service: General;  Laterality: Right;    IRRIGATION AND DEBRIDEMENT OF LOWER EXTREMITY Right 01/05/2023    Procedure: IRRIGATION AND DEBRIDEMENT, LOWER EXTREMITY;  Surgeon: Evelio Chiu DO;  Location: [facility] OR;  Service: General;  Laterality: Right;    SHOULDER OPEN ROTATOR CUFF REPAIR Left     SPINE SURGERY      TOE AMPUTATION Right 2/27/2024    Procedure: AMPUTATION, TOE;  Surgeon: Evelio Chiu DO;  Location: [facility] OR;  Service: General;  Laterality: Right;  RIGHT 5 TH TOE AND DEBRIDEMENT    VASCULAR SURGERY       Social History     Socioeconomic History    Marital status:    Occupational History    Occupation: Retired   Tobacco Use    Smoking status: Every Day     Current packs/day: 2.50     Average packs/day: 2.5 packs/day for 52.7 years (131.7 ttl pk-yrs)     Types: Cigarettes     Start date: 8/10/1971     Passive exposure: Current    Smokeless tobacco: Never   Substance and Sexual Activity    Alcohol use: Not Currently    Drug use: Yes     Types: Oxycodone    Sexual activity: Yes     Partners: Female     Social Determinants of Health     Financial Resource Strain: Low Risk  (2/29/2024)    Overall Financial Resource Strain (CARDIA)     Difficulty of Paying Living Expenses: Not very hard   Food Insecurity: No Food Insecurity (2/29/2024)    Hunger Vital Sign     Worried About Running Out of Food in the Last Year: Never true     Ran Out of Food in the Last Year: Never true   Transportation Needs: No Transportation Needs (2/29/2024)    PRAPARE - Transportation     Lack of Transportation (Medical): No     Lack of Transportation (Non-Medical): No   Physical Activity: Inactive (2/28/2024)    Exercise Vital Sign     Days of Exercise per Week: 0 days  "    Minutes of Exercise per Session: 0 min   Stress: No Stress Concern Present (2/29/2024)    [employer] - Occupational Stress Questionnaire     Feeling of Stress : Only a little   Recent Concern: Stress - Stress Concern Present (2/28/2024)    [employer] - Occupational Stress Questionnaire     Feeling of Stress : Rather much   Social Connections: Moderately Isolated (2/28/2024)    Social Connection and Isolation Panel [NHANES]     Frequency of Communication with Friends and Family: More than three times a week     Frequency of Social Gatherings with Friends and Family: More than three times a week     Attends Taoism Services: Never     Active Member of Clubs or Organizations: No     Attends Club or Organization Meetings: Never     Marital Status:    Housing Stability: Low Risk  (2/29/2024)    Housing Stability Vital Sign     Unable to Pay for Housing in the Last Year: No     Number of Places Lived in the Last Year: 1     Unstable Housing in the Last Year: No     Family History   Problem Relation Name Age of Onset    Arthritis Mother      Hypertension Mother      Learning disabilities Mother      Alcohol abuse Father      Early death Father      Heart disease Father      Cancer Sister      Diabetes Sister      Heart disease Maternal Grandfather      COPD Paternal Grandfather      Heart disease Son       Review of patient's allergies indicates:  No Known Allergies     Objective:  Vitals:    04/18/24 0835   BP: (!) 128/54   Pulse: 61   Resp: 18   Weight: 72.1 kg (159 lb)   Height: 5' 8" (1.727 m)   PainSc:   7         Physical Exam  Vitals and nursing note reviewed. Exam conducted with a chaperone present.   Constitutional:       General: He is awake. He is not in acute distress.     Appearance: Normal appearance. He is not ill-appearing, toxic-appearing or diaphoretic.   HENT:      Head: Normocephalic and atraumatic.      Nose: Nose normal.      " Mouth/Throat:      Mouth: Mucous membranes are moist.      Pharynx: Oropharynx is clear.   Eyes:      Conjunctiva/sclera: Conjunctivae normal.      Pupils: Pupils are equal, round, and reactive to light.   Cardiovascular:      Rate and Rhythm: Normal rate.   Pulmonary:      Effort: Pulmonary effort is normal. No respiratory distress.   Abdominal:      Palpations: Abdomen is soft.   Musculoskeletal:         General: Normal range of motion.      Cervical back: Normal range of motion and neck supple.      Thoracic back: Tenderness present.      Lumbar back: Tenderness present.   Skin:     General: Skin is warm and dry.      Coloration: Skin is not jaundiced or pale.   Neurological:      General: No focal deficit present.      Mental Status: He is alert and oriented to person, place, and time. Mental status is at baseline.      Cranial Nerves: No cranial nerve deficit (II-XII).   Psychiatric:         Mood and Affect: Mood normal.         Behavior: Behavior normal. Behavior is cooperative.         Thought Content: Thought content normal.           X-Ray Foot Complete Right  Narrative: EXAMINATION:  XR FOOT COMPLETE 3 VIEW RIGHT    CLINICAL HISTORY:  Unspecified open wound, unspecified foot, initial encounter    COMPARISON:  9 August 2023    TECHNIQUE:  XR FOOT 3 VIEW RIGHT    FINDINGS:  No evidence of fracture seen.  Previous 1st and 3rd digit amputation changes and partial amputation distal 1st metatarsal present.  Appearance appears similar to previous.  The alignment of the joints appears normal.  Mild degenerative change is present.  No definite soft tissue abnormality is seen.  Impression: No definite findings of acute osteomyelitis demonstrated    Electronically signed by: Francisco Felton  Date:    02/27/2024  Time:    10:27         Office Visit on 04/08/2024   Component Date Value Ref Range Status    POC Rapid COVID 04/08/2024 Positive (A)  Negative Final     Acceptable 04/08/2024 Yes   Final     Rapid Influenza A Ag 04/08/2024 Negative  Negative Final    Rapid Influenza B Ag 04/08/2024 Negative  Negative Final     Acceptable 04/08/2024 Yes   Final   No results displayed because visit has over 200 results.      Office Visit on 12/19/2023   Component Date Value Ref Range Status    POC Amphetamines 12/19/2023 Negative  Negative, Inconclusive Final    POC Barbiturates 12/19/2023 Negative  Negative, Inconclusive Final    POC Benzodiazepines 12/19/2023 Negative  Negative, Inconclusive Final    POC Cocaine 12/19/2023 Negative  Negative, Inconclusive Final    POC THC 12/19/2023 Negative  Negative, Inconclusive Final    POC Methadone 12/19/2023 Negative  Negative, Inconclusive Final    POC Methamphetamine 12/19/2023 Negative  Negative, Inconclusive Final    POC Opiates 12/19/2023 Presumptive Positive (A)  Negative, Inconclusive Final    POC Oxycodone 12/19/2023 Negative  Negative, Inconclusive Final    POC Phencyclidine 12/19/2023 Negative  Negative, Inconclusive Final    POC Methylenedioxymethamphetamine * 12/19/2023 Negative  Negative, Inconclusive Final    POC Tricyclic Antidepressants 12/19/2023 Negative  Negative, Inconclusive Final    POC Buprenorphine 12/19/2023 Negative   Final     Acceptable 12/19/2023 Yes   Final    POC Temperature (Urine) 12/19/2023 90   Final         Orders Placed This Encounter   Procedures    POCT Urine Drug Screen Presump     Interpretive Information:     Negative:  No drug detected at the cut off level.   Positive:  This result represents presumptive positive for the   tested drug, other substances may yield a positive response other   than the analyte of interest. This result should be utilized for   diagnostic purpose only. Confirmation testing will be performed upon physician request only.          Requested Prescriptions     Signed Prescriptions Disp Refills    HYDROcodone-acetaminophen (NORCO)  mg per tablet 28 tablet 0     Sig: Take 1 tablet  by mouth every 6 (six) hours as needed for Pain.    HYDROcodone-acetaminophen (NORCO)  mg per tablet 120 tablet 0     Sig: Take 1 tablet by mouth every 6 (six) hours as needed for Pain.    HYDROcodone-acetaminophen (NORCO)  mg per tablet 28 tablet 0     Sig: Take 1 tablet by mouth every 6 (six) hours as needed for Pain.       Assessment:     1. Peripheral vascular disease, unspecified    2. Hip pain, left status post left hip replacement    3. Ulcer of right foot with necrosis of muscle    4. Neuropathy    5. Encounter for long-term (current) use of other medications         A's of Opioid Risk Assessment  Activity:Patient can perform ADL.   Analgesia:Patients pain is partially controlled by current medication. Patient has tried OTC medications such as Tylenol and Ibuprofen with out relief.   Adverse Effects: Patient denies constipation or sedation.  Aberrant Behavior:  reviewed with no aberrant drug seeking/taking behavior.  Overdose reversal drug naloxone discussed    Drug screen reviewed      X-ray pelvis and hips [facility] January 5, 2023  Acetabular fracture repair present with appearance similar to previous.  No other evidence of fracture seen.  The alignment of the joints appears normal.  No degenerative change is present.  No soft tissue abnormality is seen.          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 Dobson, January 2025    Bring original prescription medication bottles/container/box with labels to each visit

## 2024-04-15 ENCOUNTER — OFFICE VISIT (OUTPATIENT)
Dept: FAMILY MEDICINE | Facility: CLINIC | Age: 64
End: 2024-04-15
Payer: MEDICARE

## 2024-04-15 VITALS
OXYGEN SATURATION: 94 % | TEMPERATURE: 99 F | HEIGHT: 68 IN | DIASTOLIC BLOOD PRESSURE: 64 MMHG | RESPIRATION RATE: 18 BRPM | WEIGHT: 152 LBS | BODY MASS INDEX: 23.04 KG/M2 | HEART RATE: 64 BPM | SYSTOLIC BLOOD PRESSURE: 124 MMHG

## 2024-04-15 DIAGNOSIS — Z09 FOLLOW-UP EXAM: Primary | ICD-10-CM

## 2024-04-15 PROCEDURE — 99214 OFFICE O/P EST MOD 30 MIN: CPT | Mod: ,,, | Performed by: NURSE PRACTITIONER

## 2024-04-15 NOTE — PROGRESS NOTES
[facility]    Chief Complaint      Chief Complaint   Patient presents with    Sinus Problem     X1 week , Fu from Covid, per Doc     Patient reports he is feeling better than last week     [facility] that [facility] his Metformin because of cost.     Patient glucose is 197 in office today patient reports that is better than it has been.        History of Present Illness      Navdeep Avery is a 63 y.o. male. He  has a past medical history of Arthritis, Carpal tunnel syndrome, Charcot foot due to diabetes mellitus (09/29/2023), COPD (chronic obstructive pulmonary disease), Coronary artery disease involving coronary bypass graft of native heart (06/27/2023), COVID (02/02/2022), Diabetic peripheral neuropathy, Essential (primary) hypertension, GERD (gastroesophageal reflux disease), Insomnia secondary to depression with anxiety (06/17/2021), Mixed hyperlipidemia (03/23/2021), Nicotine dependence, Peripheral vascular disease, unspecified, Sleep apnea, and Type 2 diabetes mellitus., who presents today for f/u of Covid.  States his symptoms are improved from last week.     He also reports the [facility] has filled his first month of Xigduo but told him going forward he would not be able to due to cost- he wanting to find an alternative medication.     Past Medical History:  Past Medical History:   Diagnosis Date    Arthritis     Carpal tunnel syndrome     Charcot foot due to diabetes mellitus 09/29/2023    Prescription for CROW orthotic boot given today    COPD (chronic obstructive pulmonary disease)     Coronary artery disease involving coronary bypass graft of native heart 06/27/2023    CABG 2018 (No OP report available)     Most recent left heart catheterization 08/12/2019  1. Normal LV size apical akinesis and overall normal LV systolic function, ejection fraction 55%  2. Patent saphenous vein graft to the right PDA with patent jump graft to the OM branch left circumflex   3. Patent LIMA to the OM1  4. No significant mitral regurgitation     COVID 02/02/2022    Diabetic peripheral neuropathy     Essential (primary) hypertension     GERD (gastroesophageal reflux disease)     Insomnia secondary to depression with anxiety 06/17/2021    Mixed hyperlipidemia 03/23/2021    Nicotine dependence     Peripheral vascular disease, unspecified     Sleep apnea     Type 2 diabetes mellitus        Past Surgical History:   has a past surgical history that includes Spine surgery; Shoulder open rotator cuff repair (Left); Coronary stent placement; Vascular surgery; Debridement of lower extremity (Right, 06/27/2022); Coronary artery bypass graft; Irrigation and debridement of lower extremity (Right, 09/08/2022); Hip fracture surgery (Left); Irrigation and debridement of lower extremity (Right, 01/05/2023); Debridement of foot (Right, 08/10/2023); Debridement of foot (Right, 8/10/2023); Toe amputation (Right, 2/27/2024); Debridement of foot (Right, 2/27/2024); and Foot amputation through metatarsal (Right, 3/1/2024).    Social History:  Social History     Tobacco Use    Smoking status: Every Day     Current packs/day: 2.50     Average packs/day: 2.5 packs/day for 52.7 years (131.7 ttl pk-yrs)     Types: Cigarettes     Start date: 8/10/1971     Passive exposure: Current    Smokeless tobacco: Never   Substance Use Topics    Alcohol use: Not Currently    Drug use: Yes     Types: Oxycodone       I personally reviewed all past medical, surgical, and social.     Review of Systems   Constitutional:  Negative for fatigue and unexpected weight change.   HENT:  Positive for congestion. Negative for sore throat.    Respiratory:  Positive for cough. Negative for shortness of breath.    Cardiovascular: Negative.    Gastrointestinal:  Negative for abdominal pain, diarrhea, nausea and vomiting.   Musculoskeletal:  Positive for gait problem. Negative for myalgias.        Ortho boot R foot r/t recent wound of amputation    Skin: Negative.    Neurological:  Negative for dizziness, light-headedness and headaches.        Medications:  Outpatient Encounter Medications as of 4/15/2024   Medication Sig Note Dispense Refill    albuterol (PROVENTIL) 2.5 mg /3 mL (0.083 %) nebulizer solution USE 1 VIAL IN NEBULIZER 3 TIMES DAILY 2/27/2024: prn 90 each 11    albuterol (PROVENTIL/VENTOLIN HFA) 90 mcg/actuation inhaler Inhale 2 puffs into the lungs 2 (two) times daily as needed for Wheezing.  18 g 2    aspirin 81 MG Chew Take 81 mg by mouth once daily.       diclofenac sodium (VOLTAREN) 1 % Gel Apply 1 g topically 4 (four) times daily as needed (pain).  20 g 1    docusate sodium (COLACE) 100 MG capsule Take 1 capsule (100 mg total) by mouth 2 (two) times daily.  28 capsule 0    DULoxetine (CYMBALTA) 30 MG capsule Take 1 capsule (30 mg total) by mouth once daily.  90 capsule 1    empagliflozin-metformin 10-1,000 mg TBph Take 1 tablet by mouth Daily.  90 tablet 1    flash glucose scanning reader (FREESTYLE CT 2 READER) [facility] Use to monitor blood glucose  1 each 1    flash glucose sensor (FREESTYLE CT 2 SENSOR) Kit Use to monitor blood sugar and replace unit every 2 weeks  1 kit 11    gabapentin (NEURONTIN) 300 MG capsule Take 3 capsules (900 mg total) by mouth every 6 (six) hours.  360 capsule 2    gabapentin (NEURONTIN) 300 MG capsule Take 1 capsule (300 mg total) by mouth 3 (three) times daily.  90 capsule 11    HYDROcodone-acetaminophen (NORCO)  mg per tablet Take 1 tablet by mouth every 6 (six) hours as needed for Pain.  28 tablet 0    insulin detemir U-100 (LEVEMIR FLEXTOUCH U-100 INSULN) 100 unit/mL (3 mL) InPn pen Inject 10 units subcutaneously every morning, inject 20 units subcutaneously every evening.  3 each 2    isosorbide mononitrate (IMDUR) 30 MG 24 hr tablet Take 1 tablet (30 mg total) by mouth once daily.  30 tablet 11    LIDOcaine (LIDODERM) 5 % 1 patch once daily. 8/14/2023: Patient reports using as needed       "metoprolol succinate (TOPROL-XL) 50 MG 24 hr tablet Take 1 tablet (50 mg total) by mouth once daily.  30 tablet 11    nitroGLYCERIN (NITROSTAT) 0.4 MG SL tablet Place 1 tablet (0.4 mg total) under the tongue every 5 (five) minutes as needed for Chest pain.  30 tablet 2    pantoprazole (PROTONIX) 40 MG tablet Take 1 tablet (40 mg total) by mouth once daily.  90 tablet 1    SPIRIVA RESPIMAT 2.5 mcg/actuation inhaler Inhale 1 puff into the lungs once daily.  4 g 5    SURE COMFORT INSULIN SYRINGE 0.3 mL 31 gauge x 5/16" Syrg Inject 1 application into the skin 2 (two) times a day.       amLODIPine (NORVASC) 10 MG tablet Take 1 tablet (10 mg total) by mouth once daily. (Patient not taking: Reported on 2024)  90 tablet 1    atorvastatin (LIPITOR) 80 MG tablet Take 1 tablet (80 mg total) by mouth every evening. (Patient not taking: Reported on 2024)  90 tablet 1    [] azithromycin (Z-FRANDY) 250 MG tablet Take 2 tablets by mouth on day 1; Take 1 tablet by mouth on days 2-5  6 tablet 0    clopidogreL (PLAVIX) 75 mg tablet Take 1 tablet (75 mg total) by mouth once daily. (Patient not taking: Reported on 2024)  90 tablet 1    hydrOXYzine pamoate (VISTARIL) 25 MG Cap Take 1 capsule (25 mg total) by mouth once daily. Take nightly for sleep (Patient not taking: Reported on 2024)  90 capsule 1    lisinopriL 10 MG tablet Take 1 tablet (10 mg total) by mouth once daily. (Patient not taking: Reported on 2024)  90 tablet 1     No facility-administered encounter medications on file as of 4/15/2024.       Allergies:  Review of patient's allergies indicates:  No Known Allergies    Health Maintenance:  Immunization History   Administered Date(s) Administered    Influenza - Quadrivalent - PF *Preferred* (6 months and older) 2021, 10/21/2022    Pneumococcal Conjugate - 13 Valent 10/25/2017    Pneumococcal Conjugate - 20 Valent 2023    Pneumococcal Polysaccharide - 23 Valent 10/19/2016    Tdap 2019 " "     Health Maintenance   Topic Date Due    Hepatitis C Screening  Never done    Shingles Vaccine (1 of 2) Never done    Colorectal Cancer Screening  04/29/2019    Eye Exam  10/05/2021    Abdominal Aortic Aneurysm Screening  Never done    LDCT Lung Screen  11/02/2023    Lipid Panel  01/05/2024    Foot Exam  05/17/2024    Hemoglobin A1c  06/05/2024    High Dose Statin  04/08/2025    Aspirin/Antiplatelet Therapy  04/15/2025    TETANUS VACCINE  12/20/2029        Physical Exam      Vital Signs  Temp: 98.7 °F (37.1 °C)  Temp Source: Oral  Pulse: 64  Resp: 18  SpO2: (!) 94 %  BP: 124/64  BP Location: Right arm  Patient Position: Sitting  Pain Score: 0-No pain  Height and Weight  Height: 5' 8" (172.7 cm)  Weight: 68.9 kg (152 lb)  BSA (Calculated - sq m): 1.82 sq meters  BMI (Calculated): 23.1  Weight in (lb) to have BMI = 25: 164.1]    Physical Exam  Vitals reviewed.   Constitutional:       General: He is not in acute distress.     Appearance: Normal appearance.   HENT:      Head: Normocephalic.      Right Ear: Hearing normal.      Left Ear: Hearing normal.      Nose: Nose normal.   Eyes:      General: Lids are normal.      Conjunctiva/sclera: Conjunctivae normal.   Neck:      Thyroid: No thyromegaly.   Cardiovascular:      Rate and Rhythm: Normal rate.   Pulmonary:      Effort: Pulmonary effort is normal.      Breath sounds: Normal breath sounds. No wheezing.   Musculoskeletal:      Cervical back: Normal range of motion and neck supple.      Comments: Ortho boot to R foot   Skin:     General: Skin is warm and dry.   Neurological:      General: No focal deficit present.      Mental Status: He is alert and oriented to person, place, and time.      Gait: Gait is intact.          Laboratory:  CBC:  Recent Labs   Lab 02/29/24  0433 03/01/24  0310 03/06/24  0355   WBC 5.75 4.99 5.33   RBC 3.73 L 3.56 L 3.39 L   Hemoglobin 11.6 L 11.0 L 10.5 L   Hematocrit 34.4 L 33.6 L 30.9 L   Platelet Count 211 214 264   MCV 92.2 94.4 91.2 "   MCH 31.1 H 30.9 31.0   MCHC 33.7 32.7 34.0     CMP:  Recent Labs   Lab 08/03/23  2209 08/09/23 2008 08/11/23  0219 02/27/24  1045 03/06/24  0355   Glucose 177 H 219 H 207 H   < > 150 H   Calcium 8.5 10.5 H 8.7   < > 8.4 L   Albumin 3.3 L 3.6 2.9 L  --   --    Total Protein 7.2 8.4 H 6.7  --   --    Sodium 137 136 140   < > 142   Potassium 4.4 4.7 4.0   < > 3.8   CO2 29 31 27   < > 33 H   Chloride 105 102 108 H   < > 107   BUN 31 H 28 H 20 H   < > 18   Alk Phos 76 98 80  --   --    ALT 16 21 17  --   --    AST 10 L 10 L 14 L  --   --    Bilirubin, Total 0.2 0.2 0.2  --   --     < > = values in this interval not displayed.     LIPIDS:  Recent Labs   Lab 04/15/22  0900 07/06/22  1115 01/05/23  0504   HDL Cholesterol 36 L 38 L 39 L   Cholesterol 223 H 161 147   Triglycerides 208 H 161 H 110   LDL Calculated 145 91 86   Cholesterol/HDL Ratio (Risk Factor) 6.2 4.2 3.8   Non- 123 108     TSH:      A1C:  Recent Labs   Lab 09/20/21  0900 04/15/22  0900 07/20/22  1720 10/21/22  1358 05/17/23  1100 08/10/23  0307 03/05/24  1105   Hemoglobin A1C 9.2 H 9.6 H 9.6 H 7.2 H 8.8 H 9.7 H 9.1 H       Assessment/Plan     Navdeep Avery is a 63 y.o.male with:     1. Follow-up exam       Will discuss with Dr. Huff about Xigduo since patient states the pharmacist stated he could not fill it going forward    Total time spent face-to-face and non-face-to-face coordinating care for this encounter was: 20 minutes     Chronic conditions status updated as per HPI.  Other than changes above, cont current medications and maintain follow up with specialists.  Return to clinic prn if symptoms worsen or fail to improve.    Anna Kerns, SALINASP  [facility]

## 2024-04-16 ENCOUNTER — OFFICE VISIT (OUTPATIENT)
Dept: SURGERY | Facility: CLINIC | Age: 64
End: 2024-04-16
Payer: MEDICARE

## 2024-04-16 DIAGNOSIS — Z89.431 HISTORY OF LISFRANC AMPUTATION OF RIGHT FOOT: Primary | ICD-10-CM

## 2024-04-16 PROCEDURE — 99999PBSHW PR PBB SHADOW TECHNICAL ONLY FILED TO HB: Mod: PBBFAC,,,

## 2024-04-16 PROCEDURE — 15275 SKIN SUB GRAFT FACE/NK/HF/G: CPT | Mod: PBBFAC | Performed by: STUDENT IN AN ORGANIZED HEALTH CARE EDUCATION/TRAINING PROGRAM

## 2024-04-16 PROCEDURE — 15275 SKIN SUB GRAFT FACE/NK/HF/G: CPT | Mod: S$PBB,,, | Performed by: STUDENT IN AN ORGANIZED HEALTH CARE EDUCATION/TRAINING PROGRAM

## 2024-04-16 PROCEDURE — 99499 UNLISTED E&M SERVICE: CPT | Mod: S$PBB,,, | Performed by: STUDENT IN AN ORGANIZED HEALTH CARE EDUCATION/TRAINING PROGRAM

## 2024-04-16 PROCEDURE — 99214 OFFICE O/P EST MOD 30 MIN: CPT | Mod: PBBFAC | Performed by: STUDENT IN AN ORGANIZED HEALTH CARE EDUCATION/TRAINING PROGRAM

## 2024-04-17 NOTE — PROGRESS NOTES
Subjective     Patient ID: Navdeep Avery is a 63 y.o. male.    Chief Complaint: Follow-up (1wk f/u wound ck (graft))    4/9:  Presents the clinic for wound evaluation and application of PuraPly XT.  Patient doing well.  He has developed a cough and was tested positive for COVID yesterday.  Denies any chest pain/shortness of breath, nausea/vomiting/diarrhea, fever/chills.    4/16: Presents the clinic for wound evaluation and application of PuraPly XT.  Patient doing well; feeling better after COVID.  Denies any chest pain/shortness of breath, nausea/vomiting/diarrhea, fever/chills.      Review of Systems   Constitutional: Negative.    HENT: Negative.     Eyes: Negative.    Respiratory:  Negative for shortness of breath.    Cardiovascular: Negative.    Gastrointestinal:  Negative for abdominal pain, blood in stool, change in bowel habit and vomiting.   Endocrine: Negative.    Genitourinary: Negative.  Negative for hematuria.   Musculoskeletal:  Negative for back pain and myalgias.   Integumentary:  Negative.   Allergic/Immunologic: Negative.    Neurological:  Negative for dizziness, weakness and light-headedness.   Psychiatric/Behavioral: Negative.            Objective     Physical Exam  Constitutional:       General: He is not in acute distress.     Appearance: Normal appearance.   HENT:      Head: Normocephalic.   Cardiovascular:      Rate and Rhythm: Normal rate.   Pulmonary:      Effort: Pulmonary effort is normal. No respiratory distress.   Abdominal:      General: There is no distension.      Tenderness: There is no abdominal tenderness.   Musculoskeletal:         General: Normal range of motion.        Feet:    Feet:      Comments: 6 x 1.5 x 0.3 cm opening on right Lisfranc amputation site; cleaned with granulation tissue growing and some biofilm.  No signs of infection or drainage.  Skin:     General: Skin is warm.      Coloration: Skin is not jaundiced.   Neurological:      General: No focal deficit  present.      Mental Status: He is alert and oriented to person, place, and time.      Cranial Nerves: No cranial nerve deficit.            Assessment and Plan     1. History of Lisfranc amputation of right foot        Patient Name: Navdeep Avery  Patient MRN: [id number]  Patient YOB: 1960  CSN: [id number]  Date: 04/17/2024  Provider:   Evelio Razo    Application for Assessment: right Lisfranc amputation site  Skin Substitute: Puraply XT  Performed By: Evelio Razo  Time-out Taken: Yes  Location:     [x] Genitalia/Hands/Feet/Multiple Digits    [] Scalp/Face/Neck/Ears    []Trunk/Arms/Legs  Application Area: (sq cm): 9   Product Applied: (sq cm): 24.1  Product Waste (sq cm): 15.1  Fenestrated: Yes   Instrument:    [] Blade [] Curette  [x]Forceps  []Nippers    [] Rongeur [x] Scissors  []Others:   Secured: Yes   Secured with: Steri-strips  Dressing Applied: Yes  Response to Treatment:Well tolerated by patient.  Note:     Expiration date 10/14/2024  Lot number [id number]         No follow-ups on file.

## 2024-04-18 ENCOUNTER — OFFICE VISIT (OUTPATIENT)
Dept: PAIN MEDICINE | Facility: CLINIC | Age: 64
End: 2024-04-18
Payer: MEDICARE

## 2024-04-18 VITALS
SYSTOLIC BLOOD PRESSURE: 128 MMHG | RESPIRATION RATE: 18 BRPM | HEIGHT: 68 IN | WEIGHT: 159 LBS | DIASTOLIC BLOOD PRESSURE: 54 MMHG | HEART RATE: 61 BPM | BODY MASS INDEX: 24.1 KG/M2

## 2024-04-18 DIAGNOSIS — Z79.899 ENCOUNTER FOR LONG-TERM (CURRENT) USE OF OTHER MEDICATIONS: ICD-10-CM

## 2024-04-18 DIAGNOSIS — I73.9 PERIPHERAL VASCULAR DISEASE, UNSPECIFIED: Primary | Chronic | ICD-10-CM

## 2024-04-18 DIAGNOSIS — M25.552 HIP PAIN, LEFT: Chronic | ICD-10-CM

## 2024-04-18 DIAGNOSIS — L97.513 ULCER OF RIGHT FOOT WITH NECROSIS OF MUSCLE: Chronic | ICD-10-CM

## 2024-04-18 DIAGNOSIS — G62.9 NEUROPATHY: Chronic | ICD-10-CM

## 2024-04-18 PROCEDURE — 99215 OFFICE O/P EST HI 40 MIN: CPT | Mod: PBBFAC | Performed by: PHYSICIAN ASSISTANT

## 2024-04-18 PROCEDURE — 99999PBSHW POCT URINE DRUG SCREEN PRESUMP: Mod: PBBFAC,,,

## 2024-04-18 PROCEDURE — 80305 DRUG TEST PRSMV DIR OPT OBS: CPT | Mod: PBBFAC | Performed by: PHYSICIAN ASSISTANT

## 2024-04-18 PROCEDURE — 99214 OFFICE O/P EST MOD 30 MIN: CPT | Mod: S$PBB,,, | Performed by: PHYSICIAN ASSISTANT

## 2024-04-18 RX ORDER — HYDROCODONE BITARTRATE AND ACETAMINOPHEN 10; 325 MG/1; MG/1
1 TABLET ORAL EVERY 6 HOURS PRN
Qty: 120 TABLET | Refills: 0 | Status: SHIPPED | OUTPATIENT
Start: 2024-04-18 | End: 2024-05-18

## 2024-04-18 RX ORDER — HYDROCODONE BITARTRATE AND ACETAMINOPHEN 10; 325 MG/1; MG/1
1 TABLET ORAL EVERY 6 HOURS PRN
Qty: 28 TABLET | Refills: 0 | Status: SHIPPED | OUTPATIENT
Start: 2024-06-17 | End: 2024-06-17 | Stop reason: SDUPTHER

## 2024-04-18 RX ORDER — HYDROCODONE BITARTRATE AND ACETAMINOPHEN 10; 325 MG/1; MG/1
1 TABLET ORAL EVERY 6 HOURS PRN
Qty: 28 TABLET | Refills: 0 | Status: SHIPPED | OUTPATIENT
Start: 2024-05-18

## 2024-04-23 ENCOUNTER — OFFICE VISIT (OUTPATIENT)
Dept: SURGERY | Facility: CLINIC | Age: 64
End: 2024-04-23
Payer: MEDICARE

## 2024-04-23 DIAGNOSIS — Z89.431 HISTORY OF LISFRANC AMPUTATION OF RIGHT FOOT: Primary | ICD-10-CM

## 2024-04-23 PROCEDURE — 15275 SKIN SUB GRAFT FACE/NK/HF/G: CPT | Mod: S$PBB,,, | Performed by: STUDENT IN AN ORGANIZED HEALTH CARE EDUCATION/TRAINING PROGRAM

## 2024-04-23 PROCEDURE — 99214 OFFICE O/P EST MOD 30 MIN: CPT | Mod: PBBFAC | Performed by: STUDENT IN AN ORGANIZED HEALTH CARE EDUCATION/TRAINING PROGRAM

## 2024-04-23 PROCEDURE — 99499 UNLISTED E&M SERVICE: CPT | Mod: S$PBB,,, | Performed by: STUDENT IN AN ORGANIZED HEALTH CARE EDUCATION/TRAINING PROGRAM

## 2024-04-23 PROCEDURE — 15275 SKIN SUB GRAFT FACE/NK/HF/G: CPT | Mod: PBBFAC | Performed by: STUDENT IN AN ORGANIZED HEALTH CARE EDUCATION/TRAINING PROGRAM

## 2024-04-23 PROCEDURE — 99999PBSHW PR PBB SHADOW TECHNICAL ONLY FILED TO HB: Mod: PBBFAC,,,

## 2024-04-24 ENCOUNTER — OFFICE VISIT (OUTPATIENT)
Dept: CARDIOLOGY | Facility: CLINIC | Age: 64
End: 2024-04-24
Payer: MEDICARE

## 2024-04-24 VITALS
BODY MASS INDEX: 25.11 KG/M2 | HEIGHT: 67 IN | SYSTOLIC BLOOD PRESSURE: 102 MMHG | DIASTOLIC BLOOD PRESSURE: 60 MMHG | OXYGEN SATURATION: 97 % | HEART RATE: 62 BPM | WEIGHT: 160 LBS

## 2024-04-24 DIAGNOSIS — I10 BENIGN HYPERTENSION: ICD-10-CM

## 2024-04-24 DIAGNOSIS — E78.2 MIXED HYPERLIPIDEMIA: ICD-10-CM

## 2024-04-24 DIAGNOSIS — Z95.1 S/P CABG (CORONARY ARTERY BYPASS GRAFT): Primary | Chronic | ICD-10-CM

## 2024-04-24 DIAGNOSIS — R42 DIZZINESS: ICD-10-CM

## 2024-04-24 PROCEDURE — 99215 OFFICE O/P EST HI 40 MIN: CPT | Mod: PBBFAC | Performed by: NURSE PRACTITIONER

## 2024-04-24 PROCEDURE — 99214 OFFICE O/P EST MOD 30 MIN: CPT | Mod: S$PBB,,, | Performed by: NURSE PRACTITIONER

## 2024-04-24 NOTE — ASSESSMENT & PLAN NOTE
No issues with syncope or near syncope  + dizziness at times  Bp 90/40s yesterday  Admits that he drinks 2-3 pots of coffee per day and no water.    Patient has agreed to drink 3-4 bottles of water per day

## 2024-04-24 NOTE — PROGRESS NOTES
PCP: Marquez Huff MD    Referring Provider:     Subjective:   Navdeep Avery is a 63 y.o. male with hx of MICAD s/p CABG (2018), DM, type II, HTN, HLD,  SAMEERA and GERD, who presents for 3 month follow up. He has had no syncopal or near syncopal issues. He dose get dizzy at times and his BP yesterday as 90/40 mmHg. He admits that he drinks 2-3 POTS of coffee per day and no water.     1/24/2024 presents for routine follow up. Patient reports 4 episodes of stable angina in the last month. He also reports reports a syncopal episode 3 months ago. He was standing up using the remote control for the TV and the next thing he knew he was hitting his head on the floor. He awakened alert and oriented. He did not seek medical attention or check his glucose. He and his wife suspect that this was r/t hypoglycemia but they did not check his glucose or his BP. He has had no further issues. He had an unremarkable echo and Lexiscan 6 months ago. He is to seek medical attention or at least check his glucose and bp if this occurs again.     8/19/2023 presents for 2 month follow up. He deneis chest discomfort. He shane have SHANE which is chronic and stable. He feels SHANE is r/t anxiety. He had decreased his smoking to 2-3 cigarettes per day but is now back to smoking as much as he did before.     6/27/2023 presents for routine follow up. He has been lost to cardiology follow up since 5/26/2021. He reports that he is concerned regarding an episode of right scapular pain that occurred over the weekend.  He describes it as an aching pain to his right scapula that was continuous for 2 days.  He did not seek medical attention.  He does recognize this as his anginal equivalent prior to 1 of his heart attacks.  He is also had daily issues with indigestion that is also 1 of his anginal equivalent prior to a previous heart attack.      Fhx:  Family History   Problem Relation Name Age of Onset    Arthritis Mother      Hypertension Mother       Learning disabilities Mother      Alcohol abuse Father      Early death Father      Heart disease Father      Cancer Sister      Diabetes Sister      Heart disease Maternal Grandfather      COPD Paternal Grandfather      Heart disease Son       Shx:   Social History     Socioeconomic History    Marital status:    Occupational History    Occupation: Retired   Tobacco Use    Smoking status: Every Day     Current packs/day: 2.50     Average packs/day: 2.5 packs/day for 52.7 years (131.8 ttl pk-yrs)     Types: Cigarettes     Start date: 8/10/1971     Passive exposure: Current    Smokeless tobacco: Never   Substance and Sexual Activity    Alcohol use: Not Currently    Drug use: Yes     Types: Oxycodone    Sexual activity: Yes     Partners: Female     Social Determinants of Health     Financial Resource Strain: Low Risk  (2/29/2024)    Overall Financial Resource Strain (CARDIA)     Difficulty of Paying Living Expenses: Not very hard   Food Insecurity: No Food Insecurity (2/29/2024)    Hunger Vital Sign     Worried About Running Out of Food in the Last Year: Never true     Ran Out of Food in the Last Year: Never true   Transportation Needs: No Transportation Needs (2/29/2024)    PRAPARE - Transportation     Lack of Transportation (Medical): No     Lack of Transportation (Non-Medical): No   Physical Activity: Inactive (2/28/2024)    Exercise Vital Sign     Days of Exercise per Week: 0 days     Minutes of Exercise per Session: 0 min   Stress: No Stress Concern Present (2/29/2024)    [employer] - Occupational Stress Questionnaire     Feeling of Stress : Only a little   Recent Concern: Stress - Stress Concern Present (2/28/2024)    [employer] - Occupational Stress Questionnaire     Feeling of Stress : Rather much   Social Connections: Moderately Isolated (2/28/2024)    Social Connection and Isolation Panel [NHANES]     Frequency of Communication with Friends  and Family: More than three times a week     Frequency of Social Gatherings with Friends and Family: More than three times a week     Attends Religion Services: Never     Active Member of Clubs or Organizations: No     Attends Club or Organization Meetings: Never     Marital Status:    Housing Stability: Low Risk  (2024)    Housing Stability Vital Sign     Unable to Pay for Housing in the Last Year: No     Number of Places Lived in the Last Year: 1     Unstable Housing in the Last Year: No       EK2024 RSR with HR 74 bpm; NSTWA   2023 sinus bradycardia; HR 58 bpm; minimal voltage criteria for LVH; inferior infarct; anterior infarct; lateral TWA    2021 RSR; HR 64 bpm;  inferior infarct; anterior infarct; lateral TWA     Lexican 2023  Dense, modertae size apical perfusion defect c/w a scar but not ischemia  Normal LV size with apical septal hypokinesis and overall normal LVSF, EF 53%    Results for orders placed during the hospital encounter of 23    Echo Saline Bubble? No    Interpretation Summary  · The left ventricle is normal in size with  · Atrial fibrillation not observed.  · Normal right ventricular size.  · Mild mitral regurgitation.     Echocardiogram 2019  1. Normal chamber size with hypokinetic mid and basal inferior wall and mid and apical anterior wall and LV apex. Overall LVSF appeared to be at the low end of nomral range with estimated LVEF 50-55%  2. Mild or mild to moderate MR   3. Aortic valve appears tricuspid and mildly calcified and sclerotic but maintained normal opening  4. Trivial aortic regurgitation  5. Trace TR with est RVSP 39 mmHg  6. Mild PI  7. Impaired LV relaxation           Heart cath- most recent 2019  1. Severe 3 vessel CAD  2. Normal LV size with apical akinesis and overall normal LVSF, EF 55%  3. Patent SVG to the r-PDA with patent jump graft to the OM branch of the LCx  4. Patent LIMA to the OM1  5. No significant MR  ECHO No  results found for this or any previous visit.            Lab Results   Component Value Date     03/06/2024    K 3.8 03/06/2024     03/06/2024    CO2 33 (H) 03/06/2024    BUN 18 03/06/2024    CREATININE 0.90 03/06/2024    CALCIUM 8.4 (L) 03/06/2024    ANIONGAP 6 (L) 03/06/2024    ESTGFRAFRICA 130 01/25/2021    EGFRNONAA 66 06/29/2022       Lab Results   Component Value Date    CHOL 147 01/05/2023    CHOL 161 07/06/2022    CHOL 223 (H) 04/15/2022     Lab Results   Component Value Date    HDL 39 (L) 01/05/2023    HDL 38 (L) 07/06/2022    HDL 36 (L) 04/15/2022     Lab Results   Component Value Date    LDLCALC 86 01/05/2023    LDLCALC 91 07/06/2022    LDLCALC 145 04/15/2022     Lab Results   Component Value Date    TRIG 110 01/05/2023    TRIG 161 (H) 07/06/2022    TRIG 208 (H) 04/15/2022     Lab Results   Component Value Date    CHOLHDL 3.8 01/05/2023    CHOLHDL 4.2 07/06/2022    CHOLHDL 6.2 04/15/2022       Lab Results   Component Value Date    WBC 5.33 03/06/2024    HGB 10.5 (L) 03/06/2024    HCT 30.9 (L) 03/06/2024    MCV 91.2 03/06/2024     03/06/2024           Current Outpatient Medications:     albuterol (PROVENTIL) 2.5 mg /3 mL (0.083 %) nebulizer solution, USE 1 VIAL IN NEBULIZER 3 TIMES DAILY, Disp: 90 each, Rfl: 11    albuterol (PROVENTIL/VENTOLIN HFA) 90 mcg/actuation inhaler, Inhale 2 puffs into the lungs 2 (two) times daily as needed for Wheezing., Disp: 18 g, Rfl: 2    amLODIPine (NORVASC) 10 MG tablet, Take 1 tablet (10 mg total) by mouth once daily., Disp: 90 tablet, Rfl: 1    aspirin 81 MG Chew, Take 81 mg by mouth once daily., Disp: , Rfl:     atorvastatin (LIPITOR) 80 MG tablet, Take 1 tablet (80 mg total) by mouth every evening., Disp: 90 tablet, Rfl: 1    clopidogreL (PLAVIX) 75 mg tablet, Take 1 tablet (75 mg total) by mouth once daily., Disp: 90 tablet, Rfl: 1    DULoxetine (CYMBALTA) 30 MG capsule, Take 1 capsule (30 mg total) by mouth once daily., Disp: 90 capsule, Rfl: 1     empagliflozin-metformin 10-1,000 mg TBph, Take 1 tablet by mouth Daily., Disp: 90 tablet, Rfl: 1    flash glucose scanning reader (FREESTYLE CT 2 READER) [facility], Use to monitor blood glucose, Disp: 1 each, Rfl: 1    flash glucose sensor (FREESTYLE CT 2 SENSOR) Kit, Use to monitor blood sugar and replace unit every 2 weeks, Disp: 1 kit, Rfl: 11    gabapentin (NEURONTIN) 300 MG capsule, Take 3 capsules (900 mg total) by mouth every 6 (six) hours., Disp: 360 capsule, Rfl: 2    [START ON 5/18/2024] HYDROcodone-acetaminophen (NORCO)  mg per tablet, Take 1 tablet by mouth every 6 (six) hours as needed for Pain., Disp: 28 tablet, Rfl: 0    HYDROcodone-acetaminophen (NORCO)  mg per tablet, Take 1 tablet by mouth every 6 (six) hours as needed for Pain., Disp: 120 tablet, Rfl: 0    [START ON 6/17/2024] HYDROcodone-acetaminophen (NORCO)  mg per tablet, Take 1 tablet by mouth every 6 (six) hours as needed for Pain., Disp: 28 tablet, Rfl: 0    hydrOXYzine pamoate (VISTARIL) 25 MG Cap, Take 1 capsule (25 mg total) by mouth once daily. Take nightly for sleep, Disp: 90 capsule, Rfl: 1    insulin detemir U-100 (LEVEMIR FLEXTOUCH U-100 INSULN) 100 unit/mL (3 mL) InPn pen, Inject 10 units subcutaneously every morning, inject 20 units subcutaneously every evening., Disp: 3 each, Rfl: 2    isosorbide mononitrate (IMDUR) 30 MG 24 hr tablet, Take 1 tablet (30 mg total) by mouth once daily., Disp: 30 tablet, Rfl: 11    lisinopriL 10 MG tablet, Take 1 tablet (10 mg total) by mouth once daily., Disp: 90 tablet, Rfl: 1    metoprolol succinate (TOPROL-XL) 50 MG 24 hr tablet, Take 1 tablet (50 mg total) by mouth once daily., Disp: 30 tablet, Rfl: 11    nitroGLYCERIN (NITROSTAT) 0.4 MG SL tablet, Place 1 tablet (0.4 mg total) under the tongue every 5 (five) minutes as needed for Chest pain., Disp: 30 tablet, Rfl: 2    pantoprazole (PROTONIX) 40 MG tablet, Take 1 tablet (40 mg total) by mouth once daily., Disp: 90 tablet,  "Rfl: 1    SURE COMFORT INSULIN SYRINGE 0.3 mL 31 gauge x 5/16" Syrg, Inject 1 application into the skin 2 (two) times a day., Disp: , Rfl:     diclofenac sodium (VOLTAREN) 1 % Gel, Apply 1 g topically 4 (four) times daily as needed (pain). (Patient not taking: Reported on 4/24/2024), Disp: 20 g, Rfl: 1    docusate sodium (COLACE) 100 MG capsule, Take 1 capsule (100 mg total) by mouth 2 (two) times daily. (Patient not taking: Reported on 4/24/2024), Disp: 28 capsule, Rfl: 0    gabapentin (NEURONTIN) 300 MG capsule, Take 1 capsule (300 mg total) by mouth 3 (three) times daily. (Patient not taking: Reported on 4/24/2024), Disp: 90 capsule, Rfl: 11    LIDOcaine (LIDODERM) 5 %, 1 patch once daily. (Patient not taking: Reported on 4/24/2024), Disp: , Rfl:     SPIRIVA RESPIMAT 2.5 mcg/actuation inhaler, Inhale 1 puff into the lungs once daily. (Patient not taking: Reported on 4/24/2024), Disp: 4 g, Rfl: 5  Meds reviewed and reconciled.       Review of Systems   Respiratory:  Negative for shortness of breath.    Cardiovascular:  Negative for chest pain, palpitations, orthopnea, claudication, leg swelling and PND.   Neurological:  Positive for dizziness. Negative for loss of consciousness and weakness.           Objective:   /60 (BP Location: Left arm, Patient Position: Sitting)   Pulse 62   Ht 5' 7" (1.702 m)   Wt 72.6 kg (160 lb)   SpO2 97%   BMI 25.06 kg/m²     Physical Exam  Vitals and nursing note reviewed.   Constitutional:       Appearance: Normal appearance. He is normal weight.   HENT:      Head: Normocephalic and atraumatic.   Neck:      Vascular: No carotid bruit.   Cardiovascular:      Rate and Rhythm: Normal rate and regular rhythm.      Pulses: Normal pulses.      Heart sounds: Normal heart sounds.   Pulmonary:      Effort: Pulmonary effort is normal.      Breath sounds: Normal breath sounds.   Abdominal:      Palpations: Abdomen is soft.   Musculoskeletal:      Cervical back: Neck supple.      Right " lower leg: No edema.      Left lower leg: No edema.   Skin:     General: Skin is warm and dry.      Capillary Refill: Capillary refill takes less than 2 seconds.   Neurological:      Mental Status: He is alert.           Assessment:     1. S/P CABG (coronary artery bypass graft)        2. Mixed hyperlipidemia        3. Benign hypertension        4. Dizziness              Plan:   Coronary artery disease involving coronary bypass graft of native heart  Stable angina  Continue ASA/Plavix/Lipitor    S/P CABG (coronary artery bypass graft)  CABG 2018 (no OP note available)    Mixed hyperlipidemia  Lab Results   Component Value Date    CHOL 147 01/05/2023    CHOL 161 07/06/2022    CHOL 223 (H) 04/15/2022     Lab Results   Component Value Date    HDL 39 (L) 01/05/2023    HDL 38 (L) 07/06/2022    HDL 36 (L) 04/15/2022     Lab Results   Component Value Date    LDLCALC 86 01/05/2023    LDLCALC 91 07/06/2022    LDLCALC 145 04/15/2022     Lab Results   Component Value Date    TRIG 110 01/05/2023    TRIG 161 (H) 07/06/2022    TRIG 208 (H) 04/15/2022     Lipitor 80 mg po daily  Lipids followed by PCP    Lab Results   Component Value Date    CHOLHDL 3.8 01/05/2023    CHOLHDL 4.2 07/06/2022    CHOLHDL 6.2 04/15/2022         Benign hypertension  102/60 mmHg    Dizziness  No issues with syncope or near syncope  + dizziness at times  Bp 90/40s yesterday  Admits that he drinks 2-3 pots of coffee per day and no water.    Patient has agreed to drink 3-4 bottles of water per day    RTC 6 months

## 2024-04-24 NOTE — ASSESSMENT & PLAN NOTE
Lab Results   Component Value Date    CHOL 147 01/05/2023    CHOL 161 07/06/2022    CHOL 223 (H) 04/15/2022     Lab Results   Component Value Date    HDL 39 (L) 01/05/2023    HDL 38 (L) 07/06/2022    HDL 36 (L) 04/15/2022     Lab Results   Component Value Date    LDLCALC 86 01/05/2023    LDLCALC 91 07/06/2022    LDLCALC 145 04/15/2022     Lab Results   Component Value Date    TRIG 110 01/05/2023    TRIG 161 (H) 07/06/2022    TRIG 208 (H) 04/15/2022     Lipitor 80 mg po daily  Lipids followed by PCP    Lab Results   Component Value Date    CHOLHDL 3.8 01/05/2023    CHOLHDL 4.2 07/06/2022    CHOLHDL 6.2 04/15/2022

## 2024-04-25 NOTE — PROGRESS NOTES
Subjective     Patient ID: Navdeep Avery is a 63 y.o. male.    Chief Complaint: Follow-up    4/9:  Presents the clinic for wound evaluation and application of PuraPly XT.  Patient doing well.  He has developed a cough and was tested positive for COVID yesterday.  Denies any chest pain/shortness of breath, nausea/vomiting/diarrhea, fever/chills.    4/16: Presents the clinic for wound evaluation and application of PuraPly XT.  Patient doing well; feeling better after COVID.  Denies any chest pain/shortness of breath, nausea/vomiting/diarrhea, fever/chills.    4/23: Presents the clinic for wound evaluation and application of Apligraf.  Patient doing well; feeling better after COVID.  Denies any chest pain/shortness of breath, nausea/vomiting/diarrhea, fever/chills.        Review of Systems   Constitutional: Negative.    HENT: Negative.     Eyes: Negative.    Respiratory:  Negative for shortness of breath.    Cardiovascular: Negative.    Gastrointestinal:  Negative for abdominal pain, blood in stool, change in bowel habit and vomiting.   Endocrine: Negative.    Genitourinary: Negative.  Negative for hematuria.   Musculoskeletal:  Negative for back pain and myalgias.   Integumentary:  Negative.   Allergic/Immunologic: Negative.    Neurological:  Negative for dizziness, weakness and light-headedness.   Psychiatric/Behavioral: Negative.            Objective     Physical Exam  Constitutional:       General: He is not in acute distress.     Appearance: Normal appearance.   HENT:      Head: Normocephalic.   Cardiovascular:      Rate and Rhythm: Normal rate.   Pulmonary:      Effort: Pulmonary effort is normal. No respiratory distress.   Abdominal:      General: There is no distension.      Tenderness: There is no abdominal tenderness.   Musculoskeletal:         General: Normal range of motion.        Feet:    Feet:      Comments: 6 x 1.5 x 0.3 cm opening on right Lisfranc amputation site; cleaned with granulation tissue  growing and some biofilm.  No signs of infection or drainage.  Skin:     General: Skin is warm.      Coloration: Skin is not jaundiced.   Neurological:      General: No focal deficit present.      Mental Status: He is alert and oriented to person, place, and time.      Cranial Nerves: No cranial nerve deficit.            Assessment and Plan     1. History of Lisfranc amputation of right foot        Patient Name: Navdeep Avery  Patient MRN: [id number]  Patient YOB: 1960  CSN: [id number]  Date: 04/25/2024  Provider:   Evelio Razo    Application for Assessment: right Lisfranc amputation site  Skin Substitute: Apligraf  Performed By: Evelio Razo  Time-out Taken: Yes  Location:     [x] Genitalia/Hands/Feet/Multiple Digits    [] Scalp/Face/Neck/Ears    []Trunk/Arms/Legs  Application Area: (sq cm): 9   Product Applied: (sq cm): 24.1  Product Waste (sq cm): 15.1  Fenestrated: Yes   Instrument:    [] Blade [] Curette  [x]Forceps  []Nippers    [] Rongeur [x] Scissors  []Others:   Secured: Yes   Secured with: Steri-strips  Dressing Applied: Yes  Response to Treatment:Well tolerated by patient.  Note:     Expiration date 4/30/24  Lot number [id number]         No follow-ups on file.

## 2024-04-26 PROCEDURE — G0179 MD RECERTIFICATION HHA PT: HCPCS | Mod: ,,, | Performed by: STUDENT IN AN ORGANIZED HEALTH CARE EDUCATION/TRAINING PROGRAM

## 2024-04-30 ENCOUNTER — OFFICE VISIT (OUTPATIENT)
Dept: SURGERY | Facility: CLINIC | Age: 64
End: 2024-04-30
Payer: MEDICARE

## 2024-04-30 DIAGNOSIS — Z09 POSTOP CHECK: Primary | ICD-10-CM

## 2024-04-30 PROCEDURE — 15275 SKIN SUB GRAFT FACE/NK/HF/G: CPT | Mod: PBBFAC | Performed by: STUDENT IN AN ORGANIZED HEALTH CARE EDUCATION/TRAINING PROGRAM

## 2024-04-30 PROCEDURE — 15275 SKIN SUB GRAFT FACE/NK/HF/G: CPT | Mod: S$PBB,,, | Performed by: STUDENT IN AN ORGANIZED HEALTH CARE EDUCATION/TRAINING PROGRAM

## 2024-04-30 PROCEDURE — 99999PBSHW PR PBB SHADOW TECHNICAL ONLY FILED TO HB: Mod: PBBFAC,,,

## 2024-04-30 PROCEDURE — 99214 OFFICE O/P EST MOD 30 MIN: CPT | Mod: PBBFAC | Performed by: STUDENT IN AN ORGANIZED HEALTH CARE EDUCATION/TRAINING PROGRAM

## 2024-04-30 PROCEDURE — 99499 UNLISTED E&M SERVICE: CPT | Mod: S$PBB,,, | Performed by: STUDENT IN AN ORGANIZED HEALTH CARE EDUCATION/TRAINING PROGRAM

## 2024-05-02 NOTE — PROGRESS NOTES
Subjective     Patient ID: Navdeep Avery is a 63 y.o. male.    Chief Complaint: Follow-up (1wk f/u wound ck )    4/9:  Presents the clinic for wound evaluation and application of PuraPly XT.  Patient doing well.  He has developed a cough and was tested positive for COVID yesterday.  Denies any chest pain/shortness of breath, nausea/vomiting/diarrhea, fever/chills.    4/16: Presents the clinic for wound evaluation and application of PuraPly XT.  Patient doing well; feeling better after COVID.  Denies any chest pain/shortness of breath, nausea/vomiting/diarrhea, fever/chills.    4/23: Presents the clinic for wound evaluation and application of Apligraf.  Patient doing well; feeling better after COVID.  Denies any chest pain/shortness of breath, nausea/vomiting/diarrhea, fever/chills.    4/30: Presents the clinic for wound evaluation and application of Apligraf.  Denies any chest pain/shortness of breath, nausea/vomiting/diarrhea, fever/chills.      Review of Systems   Constitutional: Negative.    HENT: Negative.     Eyes: Negative.    Respiratory:  Negative for shortness of breath.    Cardiovascular: Negative.    Gastrointestinal:  Negative for abdominal pain, blood in stool, change in bowel habit and vomiting.   Endocrine: Negative.    Genitourinary: Negative.  Negative for hematuria.   Musculoskeletal:  Negative for back pain and myalgias.   Integumentary:  Negative.   Allergic/Immunologic: Negative.    Neurological:  Negative for dizziness, weakness and light-headedness.   Psychiatric/Behavioral: Negative.            Objective     Physical Exam  Constitutional:       General: He is not in acute distress.     Appearance: Normal appearance.   HENT:      Head: Normocephalic.   Cardiovascular:      Rate and Rhythm: Normal rate.   Pulmonary:      Effort: Pulmonary effort is normal. No respiratory distress.   Abdominal:      General: There is no distension.      Tenderness: There is no abdominal tenderness.    Musculoskeletal:         General: Normal range of motion.        Feet:    Feet:      Comments: 5 x 1 x 0.2 cm opening on right Lisfranc amputation site; cleaned with granulation tissue growing and some biofilm.  No signs of infection or drainage.  Skin:     General: Skin is warm.      Coloration: Skin is not jaundiced.   Neurological:      General: No focal deficit present.      Mental Status: He is alert and oriented to person, place, and time.      Cranial Nerves: No cranial nerve deficit.            Assessment and Plan     1. Postop check        Patient Name: Navdeep Avery  Patient MRN: [id number]  Patient YOB: 1960  CSN: [id number]  Date: 05/02/2024  Provider:   Evelio Razo    Application for Assessment: right Lisfranc amputation site  Skin Substitute: Apligraf  Performed By: Evelio Razo  Time-out Taken: Yes  Location:     [x] Genitalia/Hands/Feet/Multiple Digits    [] Scalp/Face/Neck/Ears    []Trunk/Arms/Legs  Application Area: (sq cm): 5   Product Applied: (sq cm): 24.1  Product Waste (sq cm): 19.1  Fenestrated: Yes   Instrument:    [] Blade [] Curette  [x]Forceps  []Nippers    [] Rongeur [x] Scissors  []Others:   Secured: Yes   Secured with: Steri-strips  Dressing Applied: Yes  Response to Treatment:Well tolerated by patient.  Note:     Expiration date 4/30/24  Lot number [id number]         No follow-ups on file.

## 2024-05-07 ENCOUNTER — OFFICE VISIT (OUTPATIENT)
Dept: SURGERY | Facility: CLINIC | Age: 64
End: 2024-05-07
Payer: MEDICARE

## 2024-05-07 DIAGNOSIS — Z89.431 HISTORY OF LISFRANC AMPUTATION OF RIGHT FOOT: Primary | ICD-10-CM

## 2024-05-07 PROCEDURE — 15275 SKIN SUB GRAFT FACE/NK/HF/G: CPT | Mod: S$PBB,,, | Performed by: STUDENT IN AN ORGANIZED HEALTH CARE EDUCATION/TRAINING PROGRAM

## 2024-05-07 PROCEDURE — 99499 UNLISTED E&M SERVICE: CPT | Mod: S$PBB,,, | Performed by: STUDENT IN AN ORGANIZED HEALTH CARE EDUCATION/TRAINING PROGRAM

## 2024-05-07 PROCEDURE — 15275 SKIN SUB GRAFT FACE/NK/HF/G: CPT | Mod: PBBFAC | Performed by: STUDENT IN AN ORGANIZED HEALTH CARE EDUCATION/TRAINING PROGRAM

## 2024-05-07 PROCEDURE — 99214 OFFICE O/P EST MOD 30 MIN: CPT | Mod: PBBFAC,25 | Performed by: STUDENT IN AN ORGANIZED HEALTH CARE EDUCATION/TRAINING PROGRAM

## 2024-05-07 PROCEDURE — 99999PBSHW PR PBB SHADOW TECHNICAL ONLY FILED TO HB: Mod: PBBFAC,,,

## 2024-05-08 NOTE — PROGRESS NOTES
Subjective     Patient ID: Navdeep Avery is a 63 y.o. male.    Chief Complaint: Follow-up (1wk f/u wound ck (R) foot )    4/9:  Presents the clinic for wound evaluation and application of PuraPly XT.  Patient doing well.  He has developed a cough and was tested positive for COVID yesterday.  Denies any chest pain/shortness of breath, nausea/vomiting/diarrhea, fever/chills.    4/16: Presents the clinic for wound evaluation and application of PuraPly XT.  Patient doing well; feeling better after COVID.  Denies any chest pain/shortness of breath, nausea/vomiting/diarrhea, fever/chills.    4/23: Presents the clinic for wound evaluation and application of Apligraf.  Patient doing well; feeling better after COVID.  Denies any chest pain/shortness of breath, nausea/vomiting/diarrhea, fever/chills.    4/30: Presents the clinic for wound evaluation and application of Apligraf.  Denies any chest pain/shortness of breath, nausea/vomiting/diarrhea, fever/chills.    5/7: Presents the clinic for wound evaluation and application of Apligraf.  Denies any chest pain/shortness of breath, nausea/vomiting/diarrhea, fever/chills.          Review of Systems   Constitutional: Negative.    HENT: Negative.     Eyes: Negative.    Respiratory:  Negative for shortness of breath.    Cardiovascular: Negative.    Gastrointestinal:  Negative for abdominal pain, blood in stool, change in bowel habit and vomiting.   Endocrine: Negative.    Genitourinary: Negative.  Negative for hematuria.   Musculoskeletal:  Negative for back pain and myalgias.   Integumentary:  Negative.   Allergic/Immunologic: Negative.    Neurological:  Negative for dizziness, weakness and light-headedness.   Psychiatric/Behavioral: Negative.            Objective     Physical Exam  Constitutional:       General: He is not in acute distress.     Appearance: Normal appearance.   HENT:      Head: Normocephalic.   Cardiovascular:      Rate and Rhythm: Normal rate.   Pulmonary:       Effort: Pulmonary effort is normal. No respiratory distress.   Abdominal:      General: There is no distension.      Tenderness: There is no abdominal tenderness.   Musculoskeletal:         General: Normal range of motion.        Feet:    Feet:      Comments: 4 x 1 x 0.2 cm opening on right Lisfranc amputation site; cleaned with granulation tissue growing and some biofilm.  No signs of infection or drainage.  Skin:     General: Skin is warm.      Coloration: Skin is not jaundiced.   Neurological:      General: No focal deficit present.      Mental Status: He is alert and oriented to person, place, and time.      Cranial Nerves: No cranial nerve deficit.            Assessment and Plan     1. History of Lisfranc amputation of right foot        Patient Name: Navdeep Avery  Patient MRN: [id number]  Patient YOB: 1960  CSN: [id number]  Date: 05/08/2024  Provider:   Evelio Razo    Application for Assessment: right Lisfranc amputation site  Skin Substitute: Apligraf  Performed By: Evelio Razo  Time-out Taken: Yes  Location:     [x] Genitalia/Hands/Feet/Multiple Digits    [] Scalp/Face/Neck/Ears    []Trunk/Arms/Legs  Application Area: (sq cm): 4   Product Applied: (sq cm): 24.1  Product Waste (sq cm): 20.1  Fenestrated: Yes   Instrument:    [] Blade [] Curette  [x]Forceps  []Nippers    [] Rongeur [x] Scissors  []Others:   Secured: Yes   Secured with: Steri-strips  Dressing Applied: Yes  Response to Treatment:Well tolerated by patient.  Note:     Expiration date 5/16/24  Lot number [id number]         No follow-ups on file.

## 2024-05-15 ENCOUNTER — OFFICE VISIT (OUTPATIENT)
Dept: SURGERY | Facility: CLINIC | Age: 64
End: 2024-05-15
Payer: MEDICARE

## 2024-05-15 DIAGNOSIS — Z89.431 HISTORY OF LISFRANC AMPUTATION OF RIGHT FOOT: ICD-10-CM

## 2024-05-15 DIAGNOSIS — Z09 POSTOP CHECK: Primary | ICD-10-CM

## 2024-05-15 PROCEDURE — 15275 SKIN SUB GRAFT FACE/NK/HF/G: CPT | Mod: PBBFAC | Performed by: STUDENT IN AN ORGANIZED HEALTH CARE EDUCATION/TRAINING PROGRAM

## 2024-05-15 PROCEDURE — 99214 OFFICE O/P EST MOD 30 MIN: CPT | Mod: PBBFAC | Performed by: STUDENT IN AN ORGANIZED HEALTH CARE EDUCATION/TRAINING PROGRAM

## 2024-05-15 PROCEDURE — 15275 SKIN SUB GRAFT FACE/NK/HF/G: CPT | Mod: S$PBB,,, | Performed by: STUDENT IN AN ORGANIZED HEALTH CARE EDUCATION/TRAINING PROGRAM

## 2024-05-15 PROCEDURE — 99999PBSHW PR PBB SHADOW TECHNICAL ONLY FILED TO HB: Mod: PBBFAC,,,

## 2024-05-15 PROCEDURE — 99499 UNLISTED E&M SERVICE: CPT | Mod: S$PBB,,, | Performed by: STUDENT IN AN ORGANIZED HEALTH CARE EDUCATION/TRAINING PROGRAM

## 2024-05-15 NOTE — PROGRESS NOTES
Subjective     Patient ID: Navdeep Avery is a 63 y.o. male.    Chief Complaint: Follow-up (1wk f/u wound ck (graft) )    4/9:  Presents the clinic for wound evaluation and application of PuraPly XT.  Patient doing well.  He has developed a cough and was tested positive for COVID yesterday.  Denies any chest pain/shortness of breath, nausea/vomiting/diarrhea, fever/chills.    4/16: Presents the clinic for wound evaluation and application of PuraPly XT.  Patient doing well; feeling better after COVID.  Denies any chest pain/shortness of breath, nausea/vomiting/diarrhea, fever/chills.    4/23: Presents the clinic for wound evaluation and application of Apligraf.  Patient doing well; feeling better after COVID.  Denies any chest pain/shortness of breath, nausea/vomiting/diarrhea, fever/chills.    4/30: Presents the clinic for wound evaluation and application of Apligraf.  Denies any chest pain/shortness of breath, nausea/vomiting/diarrhea, fever/chills.    5/7: Presents the clinic for wound evaluation and application of Apligraf.  Denies any chest pain/shortness of breath, nausea/vomiting/diarrhea, fever/chills.    5/15: Presents the clinic for wound evaluation and application of Apligraf.  Denies any chest pain/shortness of breath, nausea/vomiting/diarrhea, fever/chills.            Review of Systems   Constitutional: Negative.    HENT: Negative.     Eyes: Negative.    Respiratory:  Negative for shortness of breath.    Cardiovascular: Negative.    Gastrointestinal:  Negative for abdominal pain, blood in stool, change in bowel habit and vomiting.   Endocrine: Negative.    Genitourinary: Negative.  Negative for hematuria.   Musculoskeletal:  Negative for back pain and myalgias.   Integumentary:  Negative.   Allergic/Immunologic: Negative.    Neurological:  Negative for dizziness, weakness and light-headedness.   Psychiatric/Behavioral: Negative.            Objective     Physical Exam  Constitutional:       General: He  is not in acute distress.     Appearance: Normal appearance.   HENT:      Head: Normocephalic.   Cardiovascular:      Rate and Rhythm: Normal rate.   Pulmonary:      Effort: Pulmonary effort is normal. No respiratory distress.   Abdominal:      General: There is no distension.      Tenderness: There is no abdominal tenderness.   Musculoskeletal:         General: Normal range of motion.        Feet:    Feet:      Comments: 4 x 1 x 0.2 cm opening on right Lisfranc amputation site; cleaned with granulation tissue growing and some biofilm.  No signs of infection or drainage.  Skin:     General: Skin is warm.      Coloration: Skin is not jaundiced.   Neurological:      General: No focal deficit present.      Mental Status: He is alert and oriented to person, place, and time.      Cranial Nerves: No cranial nerve deficit.            Assessment and Plan     1. Postop check    2. History of Lisfranc amputation of right foot        Patient Name: Navdeep Avery  Patient MRN: [id number]  Patient YOB: 1960  CSN: [id number]  Date: 05/15/2024  Provider:   Evelio Razo    Application for Assessment: right Lisfranc amputation site  Skin Substitute: Apligraf  Performed By: Evelio Razo  Time-out Taken: Yes  Location:     [x] Genitalia/Hands/Feet/Multiple Digits    [] Scalp/Face/Neck/Ears    []Trunk/Arms/Legs  Application Area: (sq cm): 4   Product Applied: (sq cm): 24.1  Product Waste (sq cm): 20.1  Fenestrated: Yes   Instrument:    [] Blade [] Curette  [x]Forceps  []Nippers    [] Rongeur [x] Scissors  []Others:   Secured: Yes   Secured with: Steri-strips  Dressing Applied: Yes  Response to Treatment:Well tolerated by patient.  Note:     Expiration date 5/24/24  Lot number [id number]         No follow-ups on file.

## 2024-05-16 DIAGNOSIS — Z79.4 TYPE 2 DIABETES MELLITUS WITH DIABETIC PERIPHERAL ANGIOPATHY WITHOUT GANGRENE, WITH LONG-TERM CURRENT USE OF INSULIN: ICD-10-CM

## 2024-05-16 DIAGNOSIS — E11.51 TYPE 2 DIABETES MELLITUS WITH DIABETIC PERIPHERAL ANGIOPATHY WITHOUT GANGRENE, WITH LONG-TERM CURRENT USE OF INSULIN: ICD-10-CM

## 2024-05-16 RX ORDER — INSULIN DETEMIR 100 [IU]/ML
INJECTION, SOLUTION SUBCUTANEOUS
Qty: 9 ML | Refills: 2 | Status: SHIPPED | OUTPATIENT
Start: 2024-05-16 | End: 2024-05-30 | Stop reason: SDUPTHER

## 2024-05-20 ENCOUNTER — EXTERNAL HOME HEALTH (OUTPATIENT)
Dept: HOME HEALTH SERVICES | Facility: HOSPITAL | Age: 64
End: 2024-05-20
Payer: MEDICARE

## 2024-05-21 ENCOUNTER — OFFICE VISIT (OUTPATIENT)
Dept: SURGERY | Facility: CLINIC | Age: 64
End: 2024-05-21
Payer: MEDICARE

## 2024-05-21 DIAGNOSIS — Z89.431 HISTORY OF LISFRANC AMPUTATION OF RIGHT FOOT: Primary | ICD-10-CM

## 2024-05-21 PROCEDURE — 99999PBSHW PR PBB SHADOW TECHNICAL ONLY FILED TO HB: Mod: PBBFAC,,,

## 2024-05-21 PROCEDURE — 99214 OFFICE O/P EST MOD 30 MIN: CPT | Mod: PBBFAC,25 | Performed by: STUDENT IN AN ORGANIZED HEALTH CARE EDUCATION/TRAINING PROGRAM

## 2024-05-21 PROCEDURE — 15275 SKIN SUB GRAFT FACE/NK/HF/G: CPT | Mod: S$PBB,,, | Performed by: STUDENT IN AN ORGANIZED HEALTH CARE EDUCATION/TRAINING PROGRAM

## 2024-05-21 PROCEDURE — 99499 UNLISTED E&M SERVICE: CPT | Mod: S$PBB,,, | Performed by: STUDENT IN AN ORGANIZED HEALTH CARE EDUCATION/TRAINING PROGRAM

## 2024-05-21 PROCEDURE — 15275 SKIN SUB GRAFT FACE/NK/HF/G: CPT | Mod: PBBFAC | Performed by: STUDENT IN AN ORGANIZED HEALTH CARE EDUCATION/TRAINING PROGRAM

## 2024-05-23 NOTE — PROGRESS NOTES
Subjective     Patient ID: Navdeep Avery is a 63 y.o. male.    Chief Complaint: Follow-up (1wk f/u )    4/9:  Presents the clinic for wound evaluation and application of PuraPly XT.  Patient doing well.  He has developed a cough and was tested positive for COVID yesterday.  Denies any chest pain/shortness of breath, nausea/vomiting/diarrhea, fever/chills.    4/16: Presents the clinic for wound evaluation and application of PuraPly XT.  Patient doing well; feeling better after COVID.  Denies any chest pain/shortness of breath, nausea/vomiting/diarrhea, fever/chills.    4/23: Presents the clinic for wound evaluation and application of Apligraf.  Patient doing well; feeling better after COVID.  Denies any chest pain/shortness of breath, nausea/vomiting/diarrhea, fever/chills.    4/30: Presents the clinic for wound evaluation and application of Apligraf.  Denies any chest pain/shortness of breath, nausea/vomiting/diarrhea, fever/chills.    5/7: Presents the clinic for wound evaluation and application of Apligraf.  Denies any chest pain/shortness of breath, nausea/vomiting/diarrhea, fever/chills.    5/15: Presents the clinic for wound evaluation and application of Apligraf.  Denies any chest pain/shortness of breath, nausea/vomiting/diarrhea, fever/chills.    5/21: Presents the clinic for wound evaluation and application of Apligraf.  Denies any chest pain/shortness of breath, nausea/vomiting/diarrhea, fever/chills.            Review of Systems   Constitutional: Negative.    HENT: Negative.     Eyes: Negative.    Respiratory:  Negative for shortness of breath.    Cardiovascular: Negative.    Gastrointestinal:  Negative for abdominal pain, blood in stool, change in bowel habit and vomiting.   Endocrine: Negative.    Genitourinary: Negative.  Negative for hematuria.   Musculoskeletal:  Negative for back pain and myalgias.   Integumentary:  Negative.   Allergic/Immunologic: Negative.    Neurological:  Negative for  dizziness, weakness and light-headedness.   Psychiatric/Behavioral: Negative.            Objective     Physical Exam  Constitutional:       General: He is not in acute distress.     Appearance: Normal appearance.   HENT:      Head: Normocephalic.   Cardiovascular:      Rate and Rhythm: Normal rate.   Pulmonary:      Effort: Pulmonary effort is normal. No respiratory distress.   Abdominal:      General: There is no distension.      Tenderness: There is no abdominal tenderness.   Musculoskeletal:         General: Normal range of motion.        Feet:    Feet:      Comments: 3.5x0.8x0.2 cm opening on right Lisfranc amputation site; cleaned with granulation tissue growing and some biofilm.  No signs of infection or drainage.  Skin:     General: Skin is warm.      Coloration: Skin is not jaundiced.   Neurological:      General: No focal deficit present.      Mental Status: He is alert and oriented to person, place, and time.      Cranial Nerves: No cranial nerve deficit.            Assessment and Plan     1. History of Lisfranc amputation of right foot        Patient Name: Navdeep Avery  Patient MRN: [id number]  Patient YOB: 1960  CSN: [id number]  Date: 05/23/2024  Provider:   Evelio Razo    Application for Assessment: right Lisfranc amputation site  Skin Substitute: Apligraf  Performed By: Evelio Razo  Time-out Taken: Yes  Location:     [x] Genitalia/Hands/Feet/Multiple Digits    [] Scalp/Face/Neck/Ears    []Trunk/Arms/Legs  Application Area: (sq cm): 2.8  Product Applied: (sq cm): 44  Product Waste (sq cm): 42  Fenestrated: Yes   Instrument:    [] Blade [] Curette  [x]Forceps  []Nippers    [] Rongeur [x] Scissors  []Others:   Secured: Yes   Secured with: Steri-strips  Dressing Applied: Yes  Response to Treatment:Well tolerated by patient.  Note:     Expiration date 5/30/24  Lot number [id number]         No follow-ups on file.

## 2024-05-28 ENCOUNTER — TELEPHONE (OUTPATIENT)
Dept: SURGERY | Facility: CLINIC | Age: 64
End: 2024-05-28
Payer: MEDICARE

## 2024-05-28 NOTE — TELEPHONE ENCOUNTER
----- Message from Nikole Virgen sent at 5/28/2024  8:07 AM CDT -----  Who Called: Navdeep Avery    Caller is requesting assistance/information from provider's office.    Symptoms (please be specific): Was he to have an appt for his foot toady. Wife not sure.   How long has patient had these symptoms:    List of preferred pharmacies on file (remove unneeded): [unfilled]  If different, enter pharmacy into here including location and phone number:       Preferred Method of Contact: Phone Call  Patient's Preferred Phone Number on File: [phone]   Best Call Back Number, if different:  Additional Information:    Informed pt wife (Charisma) that Mr. Avery appointment is 5/29/24 at 0900. Pt wife voiced understanding.

## 2024-05-29 ENCOUNTER — OFFICE VISIT (OUTPATIENT)
Dept: SURGERY | Facility: CLINIC | Age: 64
End: 2024-05-29
Payer: MEDICARE

## 2024-05-29 DIAGNOSIS — Z89.431 HISTORY OF LISFRANC AMPUTATION OF RIGHT FOOT: Primary | ICD-10-CM

## 2024-05-29 PROCEDURE — 15275 SKIN SUB GRAFT FACE/NK/HF/G: CPT | Mod: PBBFAC | Performed by: STUDENT IN AN ORGANIZED HEALTH CARE EDUCATION/TRAINING PROGRAM

## 2024-05-29 PROCEDURE — 99214 OFFICE O/P EST MOD 30 MIN: CPT | Mod: PBBFAC,25 | Performed by: STUDENT IN AN ORGANIZED HEALTH CARE EDUCATION/TRAINING PROGRAM

## 2024-05-29 PROCEDURE — 99999PBSHW PR PBB SHADOW TECHNICAL ONLY FILED TO HB: Mod: PBBFAC,,,

## 2024-05-29 NOTE — PROGRESS NOTES
Subjective     Patient ID: Navdeep Avery is a 63 y.o. male.    Chief Complaint: Follow-up (1wk f/u wound ck )    4/9:  Presents the clinic for wound evaluation and application of PuraPly XT.  Patient doing well.  He has developed a cough and was tested positive for COVID yesterday.  Denies any chest pain/shortness of breath, nausea/vomiting/diarrhea, fever/chills.    4/16: Presents the clinic for wound evaluation and application of PuraPly XT.  Patient doing well; feeling better after COVID.  Denies any chest pain/shortness of breath, nausea/vomiting/diarrhea, fever/chills.    4/23: Presents the clinic for wound evaluation and application of Apligraf.  Patient doing well; feeling better after COVID.  Denies any chest pain/shortness of breath, nausea/vomiting/diarrhea, fever/chills.    4/30: Presents the clinic for wound evaluation and application of Apligraf.  Denies any chest pain/shortness of breath, nausea/vomiting/diarrhea, fever/chills.    5/7: Presents the clinic for wound evaluation and application of Apligraf.  Denies any chest pain/shortness of breath, nausea/vomiting/diarrhea, fever/chills.    5/15: Presents the clinic for wound evaluation and application of Apligraf.  Denies any chest pain/shortness of breath, nausea/vomiting/diarrhea, fever/chills.    5/21: Presents the clinic for wound evaluation and application of Apligraf.  Denies any chest pain/shortness of breath, nausea/vomiting/diarrhea, fever/chills.    5/29: Presents the clinic for wound evaluation and application of Affinity.  Denies any chest pain/shortness of breath, nausea/vomiting/diarrhea, fever/chills.      Review of Systems   Constitutional: Negative.    HENT: Negative.     Eyes: Negative.    Respiratory:  Negative for shortness of breath.    Cardiovascular: Negative.    Gastrointestinal:  Negative for abdominal pain, blood in stool, change in bowel habit and vomiting.   Endocrine: Negative.    Genitourinary: Negative.  Negative for  hematuria.   Musculoskeletal:  Negative for back pain and myalgias.   Integumentary:  Negative.   Allergic/Immunologic: Negative.    Neurological:  Negative for dizziness, weakness and light-headedness.   Psychiatric/Behavioral: Negative.            Objective     Physical Exam  Constitutional:       General: He is not in acute distress.     Appearance: Normal appearance.   HENT:      Head: Normocephalic.   Cardiovascular:      Rate and Rhythm: Normal rate.   Pulmonary:      Effort: Pulmonary effort is normal. No respiratory distress.   Abdominal:      General: There is no distension.      Tenderness: There is no abdominal tenderness.   Musculoskeletal:         General: Normal range of motion.        Feet:    Feet:      Comments: 3x0.8x0.2 cm opening on right Lisfranc amputation site; cleaned with granulation tissue growing and some biofilm.  No signs of infection or drainage.  Skin:     General: Skin is warm.      Coloration: Skin is not jaundiced.   Neurological:      General: No focal deficit present.      Mental Status: He is alert and oriented to person, place, and time.      Cranial Nerves: No cranial nerve deficit.          Assessment and Plan     1. History of Lisfranc amputation of right foot        Patient Name: Navdeep Avery  Patient MRN: [id number]  Patient YOB: 1960  CSN: [id number]  Date: 05/29/2024  Provider:   Evelio Razo    Application for Assessment: right Lisfranc amputation site  Skin Substitute: Affinity  Performed By: Evelio Razo  Time-out Taken: Yes  Location:     [x] Genitalia/Hands/Feet/Multiple Digits    [] Scalp/Face/Neck/Ears    []Trunk/Arms/Legs  Application Area: (sq cm): 2.4  Product Applied: (sq cm): 6.25  Product Waste (sq cm): 3.85  Fenestrated: Yes   Instrument:    [] Blade [] Curette  [x]Forceps  []Nippers    [] Rongeur [x] Scissors  []Others:   Secured: Yes   Secured with: Steri-strips  Dressing Applied: Yes  Response to Treatment:Well tolerated by  patient.  Note:     Expiration date 5/30/24  ID number [id number]         No follow-ups on file.

## 2024-05-30 ENCOUNTER — OFFICE VISIT (OUTPATIENT)
Dept: FAMILY MEDICINE | Facility: CLINIC | Age: 64
End: 2024-05-30
Payer: MEDICARE

## 2024-05-30 VITALS
DIASTOLIC BLOOD PRESSURE: 62 MMHG | SYSTOLIC BLOOD PRESSURE: 130 MMHG | BODY MASS INDEX: 24.17 KG/M2 | RESPIRATION RATE: 18 BRPM | TEMPERATURE: 98 F | HEIGHT: 67 IN | WEIGHT: 154 LBS | OXYGEN SATURATION: 95 % | HEART RATE: 67 BPM

## 2024-05-30 DIAGNOSIS — H53.9 ABNORMAL VISION: Primary | ICD-10-CM

## 2024-05-30 DIAGNOSIS — Z79.4 TYPE 2 DIABETES MELLITUS WITH BOTH EYES AFFECTED BY MILD NONPROLIFERATIVE RETINOPATHY AND MACULAR EDEMA, WITH LONG-TERM CURRENT USE OF INSULIN: Primary | ICD-10-CM

## 2024-05-30 DIAGNOSIS — E11.3213 TYPE 2 DIABETES MELLITUS WITH BOTH EYES AFFECTED BY MILD NONPROLIFERATIVE RETINOPATHY AND MACULAR EDEMA, WITH LONG-TERM CURRENT USE OF INSULIN: Primary | ICD-10-CM

## 2024-05-30 DIAGNOSIS — J44.9 CHRONIC OBSTRUCTIVE PULMONARY DISEASE, UNSPECIFIED COPD TYPE: ICD-10-CM

## 2024-05-30 DIAGNOSIS — F41.9 ANXIETY: ICD-10-CM

## 2024-05-30 PROCEDURE — 99213 OFFICE O/P EST LOW 20 MIN: CPT | Mod: ,,, | Performed by: NURSE PRACTITIONER

## 2024-05-30 RX ORDER — FLUTICASONE PROPIONATE AND SALMETEROL XINAFOATE 115; 21 UG/1; UG/1
2 AEROSOL, METERED RESPIRATORY (INHALATION) EVERY 12 HOURS
Qty: 12 G | Refills: 2 | Status: SHIPPED | OUTPATIENT
Start: 2024-05-30 | End: 2025-05-30

## 2024-05-30 RX ORDER — ACETAMINOPHEN 160 MG/5ML
SUSPENSION, ORAL (FINAL DOSE FORM) ORAL
Qty: 100 EACH | Refills: 5 | Status: SHIPPED | OUTPATIENT
Start: 2024-05-30

## 2024-05-30 RX ORDER — HYDROXYZINE PAMOATE 25 MG/1
25 CAPSULE ORAL DAILY
Qty: 90 CAPSULE | Refills: 1 | Status: SHIPPED | OUTPATIENT
Start: 2024-05-30

## 2024-05-30 RX ORDER — ALBUTEROL SULFATE 90 UG/1
2 AEROSOL, METERED RESPIRATORY (INHALATION) 2 TIMES DAILY PRN
Qty: 18 G | Refills: 2 | Status: SHIPPED | OUTPATIENT
Start: 2024-05-30

## 2024-05-30 RX ORDER — INSULIN DEGLUDEC 200 U/ML
20 INJECTION, SOLUTION SUBCUTANEOUS NIGHTLY
Qty: 3 PEN | Refills: 1 | Status: SHIPPED | OUTPATIENT
Start: 2024-05-30

## 2024-05-30 NOTE — PROGRESS NOTES
[facility]    Chief Complaint      Chief Complaint   Patient presents with    Diabetes     Would like freestyle zainab sample, refills on insulin and pen needles     Medication Refill     Generic vistaril     Health Maintenance     Defers all listed care gaps today, request referral to ophthalmology due to abnormal vision in left eye, est with pain tx        History of Present Illness      Navdeep Avery is a 63 y.o. male. He  has a past medical history of Arthritis, Carpal tunnel syndrome, Charcot foot due to diabetes mellitus (09/29/2023), COPD (chronic obstructive pulmonary disease), Coronary artery disease involving coronary bypass graft of native heart (06/27/2023), COVID (02/02/2022), Diabetic peripheral neuropathy, Essential (primary) hypertension, GERD (gastroesophageal reflux disease), Insomnia secondary to depression with anxiety (06/17/2021), Mixed hyperlipidemia (03/23/2021), Nicotine dependence, Peripheral vascular disease, unspecified, Sleep apnea, and Type 2 diabetes mellitus., who presents today for routine f/u his diabetic medications- states he also had some issues with his Vistaril.    Past Medical History:  Past Medical History:   Diagnosis Date    Arthritis     Carpal tunnel syndrome     Charcot foot due to diabetes mellitus 09/29/2023    Prescription for CROW orthotic boot given today    COPD (chronic obstructive pulmonary disease)     Coronary artery disease involving coronary bypass graft of native heart 06/27/2023    CABG 2018 (No OP report available)     Most recent left heart catheterization 08/12/2019  1. Normal LV size apical akinesis and overall normal LV systolic function, ejection fraction 55%  2. Patent saphenous vein graft to the right PDA with patent jump graft to the OM branch left circumflex  3. Patent LIMA to the OM1  4. No significant mitral regurgitation     COVID 02/02/2022    Diabetic peripheral neuropathy     Essential (primary) hypertension     GERD  (gastroesophageal reflux disease)     Insomnia secondary to depression with anxiety 06/17/2021    Mixed hyperlipidemia 03/23/2021    Nicotine dependence     Peripheral vascular disease, unspecified     Sleep apnea     Type 2 diabetes mellitus        Past Surgical History:   has a past surgical history that includes Spine surgery; Shoulder open rotator cuff repair (Left); Coronary stent placement; Vascular surgery; Debridement of lower extremity (Right, 06/27/2022); Coronary artery bypass graft; Irrigation and debridement of lower extremity (Right, 09/08/2022); Hip fracture surgery (Left); Irrigation and debridement of lower extremity (Right, 01/05/2023); Debridement of foot (Right, 08/10/2023); Debridement of foot (Right, 8/10/2023); Toe amputation (Right, 2/27/2024); Debridement of foot (Right, 2/27/2024); and Foot amputation through metatarsal (Right, 3/1/2024).    Social History:  Social History     Tobacco Use    Smoking status: Every Day     Current packs/day: 2.50     Average packs/day: 2.5 packs/day for 52.8 years (132.0 ttl pk-yrs)     Types: Cigarettes     Start date: 8/10/1971     Passive exposure: Current    Smokeless tobacco: Never   Substance Use Topics    Alcohol use: Not Currently    Drug use: Yes     Types: Oxycodone       I personally reviewed all past medical, surgical, and social.     Review of Systems   Constitutional:  Negative for fatigue and unexpected weight change.   HENT:  Negative for sore throat.    Eyes:  Negative for visual disturbance.   Respiratory:  Negative for shortness of breath.    Cardiovascular: Negative.    Gastrointestinal:  Negative for abdominal pain, constipation, diarrhea, nausea and vomiting.   Genitourinary:  Negative for difficulty urinating.   Musculoskeletal:  Negative for gait problem.   Skin: Negative.    Neurological:  Negative for dizziness, light-headedness and headaches.        Medications:  Outpatient Encounter Medications as of 5/30/2024   Medication Sig Note  Dispense Refill    albuterol (PROVENTIL) 2.5 mg /3 mL (0.083 %) nebulizer solution USE 1 VIAL IN NEBULIZER 3 TIMES DAILY 2/27/2024: prn 90 each 11    amLODIPine (NORVASC) 10 MG tablet Take 1 tablet (10 mg total) by mouth once daily.  90 tablet 1    aspirin 81 MG Chew Take 81 mg by mouth once daily.       atorvastatin (LIPITOR) 80 MG tablet Take 1 tablet (80 mg total) by mouth every evening.  90 tablet 1    clopidogreL (PLAVIX) 75 mg tablet Take 1 tablet (75 mg total) by mouth once daily.  90 tablet 1    DULoxetine (CYMBALTA) 30 MG capsule Take 1 capsule (30 mg total) by mouth once daily.  90 capsule 1    empagliflozin-metformin 10-1,000 mg TBph Take 1 tablet by mouth Daily.  90 tablet 1    flash glucose scanning reader (FREESTYLE CT 2 READER) [facility] Use to monitor blood glucose  1 each 1    flash glucose sensor (FREESTYLE CT 2 SENSOR) Kit Use to monitor blood sugar and replace unit every 2 weeks  1 kit 11    gabapentin (NEURONTIN) 300 MG capsule Take 1 capsule (300 mg total) by mouth 3 (three) times daily. (Patient taking differently: Take 300 mg by mouth every 6 (six) hours.)  90 capsule 11    HYDROcodone-acetaminophen (NORCO)  mg per tablet Take 1 tablet by mouth every 6 (six) hours as needed for Pain.  28 tablet 0    [START ON 6/17/2024] HYDROcodone-acetaminophen (NORCO)  mg per tablet Take 1 tablet by mouth every 6 (six) hours as needed for Pain.  28 tablet 0    isosorbide mononitrate (IMDUR) 30 MG 24 hr tablet Take 1 tablet (30 mg total) by mouth once daily.  30 tablet 11    lisinopriL 10 MG tablet Take 1 tablet (10 mg total) by mouth once daily.  90 tablet 1    metoprolol succinate (TOPROL-XL) 50 MG 24 hr tablet Take 1 tablet (50 mg total) by mouth once daily.  30 tablet 11    nitroGLYCERIN (NITROSTAT) 0.4 MG SL tablet Place 1 tablet (0.4 mg total) under the tongue every 5 (five) minutes as needed for Chest pain.  30 tablet 2    pantoprazole (PROTONIX) 40 MG tablet Take 1 tablet (40 mg total)  "by mouth once daily.  90 tablet 1    [DISCONTINUED] albuterol (PROVENTIL/VENTOLIN HFA) 90 mcg/actuation inhaler Inhale 2 puffs into the lungs 2 (two) times daily as needed for Wheezing.  18 g 2    [DISCONTINUED] hydrOXYzine pamoate (VISTARIL) 25 MG Cap Take 1 capsule (25 mg total) by mouth once daily. Take nightly for sleep  90 capsule 1    [DISCONTINUED] LEVEMIR FLEXTOUCH U100 INSULIN 100 unit/mL (3 mL) InPn pen Inject 10 units subcutaneously every morning, inject 20 units subcutaneously every evening.  9 mL 2    [DISCONTINUED] SPIRIVA RESPIMAT 2.5 mcg/actuation inhaler Inhale 1 puff into the lungs once daily.  4 g 5    [DISCONTINUED] SURE COMFORT INSULIN SYRINGE 0.3 mL 31 gauge x 5/16" Syrg Inject 1 application into the skin 2 (two) times a day.       albuterol (PROVENTIL/VENTOLIN HFA) 90 mcg/actuation inhaler Inhale 2 puffs into the lungs 2 (two) times daily as needed for Wheezing.  18 g 2    diclofenac sodium (VOLTAREN) 1 % Gel Apply 1 g topically 4 (four) times daily as needed (pain). (Patient not taking: Reported on 4/24/2024)  20 g 1    docusate sodium (COLACE) 100 MG capsule Take 1 capsule (100 mg total) by mouth 2 (two) times daily. (Patient not taking: Reported on 4/24/2024)  28 capsule 0    fluticasone propion-salmeterol 115-21 mcg/dose (ADVAIR HFA) 115-21 mcg/actuation HFAA inhaler Inhale 2 puffs into the lungs every 12 (twelve) hours. Controller  12 g 2    hydrOXYzine pamoate (VISTARIL) 25 MG Cap Take 1 capsule (25 mg total) by mouth once daily. Take nightly for sleep  90 capsule 1    insulin degludec (TRESIBA FLEXTOUCH U-200) 200 unit/mL (3 mL) insulin pen Inject 20 Units into the skin every evening.  3 pen 1    insulin syringe-needle U-100 (BD INSULIN SYRINGE ULTRA-FINE) 0.3 mL 30 gauge x 1/2" Syrg Use one syringe twice daily with insulin injection for diabetes  100 each 5    LIDOcaine (LIDODERM) 5 % 1 patch once daily. (Patient not taking: Reported on 4/24/2024) 8/14/2023: Patient reports using as " "needed       No facility-administered encounter medications on file as of 5/30/2024.       Allergies:  Review of patient's allergies indicates:  No Known Allergies    Health Maintenance:  Immunization History   Administered Date(s) Administered    Influenza - Quadrivalent - PF *Preferred* (6 months and older) 09/14/2021, 10/21/2022    Pneumococcal Conjugate - 13 Valent 10/25/2017    Pneumococcal Conjugate - 20 Valent 02/17/2023    Pneumococcal Polysaccharide - 23 Valent 10/19/2016    Tdap 12/20/2019      Health Maintenance   Topic Date Due    Hepatitis C Screening  Never done    Shingles Vaccine (1 of 2) Never done    Colorectal Cancer Screening  04/29/2019    Eye Exam  10/05/2021    Abdominal Aortic Aneurysm Screening  Never done    LDCT Lung Screen  11/02/2023    Lipid Panel  01/05/2024    Foot Exam  05/17/2024    Hemoglobin A1c  06/05/2024    High Dose Statin  05/30/2025    Aspirin/Antiplatelet Therapy  05/30/2025    TETANUS VACCINE  12/20/2029        Physical Exam      Vital Signs  Temp: 97.6 °F (36.4 °C)  Temp Source: Oral  Pulse: 67  Resp: 18  SpO2: 95 %  BP: 130/62  BP Location: Left arm  Patient Position: Sitting  Pain Score: 0-No pain  Height and Weight  Height: 5' 7" (170.2 cm)  Weight: 69.9 kg (154 lb)  BSA (Calculated - sq m): 1.82 sq meters  BMI (Calculated): 24.1  Weight in (lb) to have BMI = 25: 159.3]    Physical Exam  Vitals and nursing note reviewed.   Constitutional:       Appearance: Normal appearance.   HENT:      Head: Normocephalic.      Right Ear: Hearing normal.      Left Ear: Hearing normal.      Nose: Nose normal.   Eyes:      General: Lids are normal.      Conjunctiva/sclera: Conjunctivae normal.   Neck:      Thyroid: No thyromegaly.   Cardiovascular:      Rate and Rhythm: Normal rate and regular rhythm.      Heart sounds: Normal heart sounds.   Pulmonary:      Effort: Pulmonary effort is normal.      Breath sounds: Normal breath sounds.   Musculoskeletal:         General: Normal range of " motion.      Cervical back: Normal range of motion and neck supple.      Right lower leg: No edema.      Left lower leg: No edema.      Right Lower Extremity: Right leg is amputated below ankle.   Skin:     General: Skin is warm and dry.   Neurological:      General: No focal deficit present.      Mental Status: He is alert and oriented to person, place, and time.      Gait: Gait is intact.          Laboratory:  CBC:  Recent Labs   Lab 02/29/24  0433 03/01/24  0310 03/06/24  0355   WBC 5.75 4.99 5.33   RBC 3.73 L 3.56 L 3.39 L   Hemoglobin 11.6 L 11.0 L 10.5 L   Hematocrit 34.4 L 33.6 L 30.9 L   Platelet Count 211 214 264   MCV 92.2 94.4 91.2   MCH 31.1 H 30.9 31.0   MCHC 33.7 32.7 34.0     CMP:  Recent Labs   Lab 08/03/23  2209 08/09/23 2008 08/11/23  0219 02/27/24  1045 03/06/24  0355   Glucose 177 H 219 H 207 H   < > 150 H   Calcium 8.5 10.5 H 8.7   < > 8.4 L   Albumin 3.3 L 3.6 2.9 L  --   --    Total Protein 7.2 8.4 H 6.7  --   --    Sodium 137 136 140   < > 142   Potassium 4.4 4.7 4.0   < > 3.8   CO2 29 31 27   < > 33 H   Chloride 105 102 108 H   < > 107   BUN 31 H 28 H 20 H   < > 18   Alk Phos 76 98 80  --   --    ALT 16 21 17  --   --    AST 10 L 10 L 14 L  --   --    Bilirubin, Total 0.2 0.2 0.2  --   --     < > = values in this interval not displayed.     LIPIDS:  Recent Labs   Lab 04/15/22  0900 07/06/22  1115 01/05/23  0504   HDL Cholesterol 36 L 38 L 39 L   Cholesterol 223 H 161 147   Triglycerides 208 H 161 H 110   LDL Calculated 145 91 86   Cholesterol/HDL Ratio (Risk Factor) 6.2 4.2 3.8   Non- 123 108     TSH:      A1C:  Recent Labs   Lab 09/20/21  0900 04/15/22  0900 07/20/22  1720 10/21/22  1358 05/17/23  1100 08/10/23  0307 03/05/24  1105   Hemoglobin A1C 9.2 H 9.6 H 9.6 H 7.2 H 8.8 H 9.7 H 9.1 H       Assessment/Plan     Navdeep Avery is a 63 y.o.male with:     1. Type 2 diabetes mellitus with both eyes affected by mild nonproliferative retinopathy and macular edema, with long-term  "current use of insulin  -     Cancel: Ambulatory referral/consult to Ophthalmology; Future; Expected date: 06/06/2024  -     insulin syringe-needle U-100 (BD INSULIN SYRINGE ULTRA-FINE) 0.3 mL 30 gauge x 1/2" Syrg; Use one syringe twice daily with insulin injection for diabetes  Dispense: 100 each; Refill: 5  -     insulin degludec (TRESIBA FLEXTOUCH U-200) 200 unit/mL (3 mL) insulin pen; Inject 20 Units into the skin every evening.  Dispense: 3 pen ; Refill: 1    2. Anxiety  -     hydrOXYzine pamoate (VISTARIL) 25 MG Cap; Take 1 capsule (25 mg total) by mouth once daily. Take nightly for sleep  Dispense: 90 capsule; Refill: 1    3. Chronic obstructive pulmonary disease, unspecified COPD type  -     albuterol (PROVENTIL/VENTOLIN HFA) 90 mcg/actuation inhaler; Inhale 2 puffs into the lungs 2 (two) times daily as needed for Wheezing.  Dispense: 18 g; Refill: 2  -     fluticasone propion-salmeterol 115-21 mcg/dose (ADVAIR HFA) 115-21 mcg/actuation HFAA inhaler; Inhale 2 puffs into the lungs every 12 (twelve) hours. Controller  Dispense: 12 g; Refill: 2         Total time spent face-to-face and non-face-to-face coordinating care for this encounter was: 20 minutes     Chronic conditions status updated as per HPI.  Other than changes above, cont current medications and maintain follow up with specialists.  Return to clinic prn if symptoms worsen or fail to improve.    GIANA Fernandez  [facility]  Reviewed on 06/10/2024 by Marquez Huff MD  "

## 2024-06-07 ENCOUNTER — OFFICE VISIT (OUTPATIENT)
Dept: SURGERY | Facility: CLINIC | Age: 64
End: 2024-06-07
Payer: MEDICARE

## 2024-06-07 DIAGNOSIS — L97.413 DIABETIC ULCER OF RIGHT MIDFOOT ASSOCIATED WITH DIABETES MELLITUS DUE TO UNDERLYING CONDITION, WITH NECROSIS OF MUSCLE: Primary | ICD-10-CM

## 2024-06-07 DIAGNOSIS — E08.621 DIABETIC ULCER OF RIGHT MIDFOOT ASSOCIATED WITH DIABETES MELLITUS DUE TO UNDERLYING CONDITION, WITH NECROSIS OF MUSCLE: Primary | ICD-10-CM

## 2024-06-07 PROCEDURE — 15275 SKIN SUB GRAFT FACE/NK/HF/G: CPT | Mod: PBBFAC | Performed by: STUDENT IN AN ORGANIZED HEALTH CARE EDUCATION/TRAINING PROGRAM

## 2024-06-07 PROCEDURE — 99214 OFFICE O/P EST MOD 30 MIN: CPT | Mod: PBBFAC | Performed by: STUDENT IN AN ORGANIZED HEALTH CARE EDUCATION/TRAINING PROGRAM

## 2024-06-07 PROCEDURE — 99999PBSHW PR PBB SHADOW TECHNICAL ONLY FILED TO HB: Mod: PBBFAC,,,

## 2024-06-07 NOTE — PROGRESS NOTES
Subjective     Patient ID: Navdeep Avery is a 63 y.o. male.    Chief Complaint: Follow-up (1wk f/u wound ck (graft))    4/9:  Presents the clinic for wound evaluation and application of PuraPly XT.  Patient doing well.  He has developed a cough and was tested positive for COVID yesterday.  Denies any chest pain/shortness of breath, nausea/vomiting/diarrhea, fever/chills.    4/16: Presents the clinic for wound evaluation and application of PuraPly XT.  Patient doing well; feeling better after COVID.  Denies any chest pain/shortness of breath, nausea/vomiting/diarrhea, fever/chills.    4/23: Presents the clinic for wound evaluation and application of Apligraf.  Patient doing well; feeling better after COVID.  Denies any chest pain/shortness of breath, nausea/vomiting/diarrhea, fever/chills.    4/30: Presents the clinic for wound evaluation and application of Apligraf.  Denies any chest pain/shortness of breath, nausea/vomiting/diarrhea, fever/chills.    5/7: Presents the clinic for wound evaluation and application of Apligraf.  Denies any chest pain/shortness of breath, nausea/vomiting/diarrhea, fever/chills.    5/15: Presents the clinic for wound evaluation and application of Apligraf.  Denies any chest pain/shortness of breath, nausea/vomiting/diarrhea, fever/chills.    5/21: Presents the clinic for wound evaluation and application of Apligraf.  Denies any chest pain/shortness of breath, nausea/vomiting/diarrhea, fever/chills.    5/29: Presents the clinic for wound evaluation and application of Affinity.  Denies any chest pain/shortness of breath, nausea/vomiting/diarrhea, fever/chills.    6/7: Presents the clinic for wound evaluation and application of Affinity.  Denies any chest pain/shortness of breath, nausea/vomiting/diarrhea, fever/chills.        Review of Systems   Constitutional: Negative.    HENT: Negative.     Eyes: Negative.    Respiratory:  Negative for shortness of breath.    Cardiovascular:  Negative.    Gastrointestinal:  Negative for abdominal pain, blood in stool, change in bowel habit and vomiting.   Endocrine: Negative.    Genitourinary: Negative.  Negative for hematuria.   Musculoskeletal:  Negative for back pain and myalgias.   Integumentary:  Negative.   Allergic/Immunologic: Negative.    Neurological:  Negative for dizziness, weakness and light-headedness.   Psychiatric/Behavioral: Negative.            Objective     Physical Exam  Constitutional:       General: He is not in acute distress.     Appearance: Normal appearance.   HENT:      Head: Normocephalic.   Cardiovascular:      Rate and Rhythm: Normal rate.   Pulmonary:      Effort: Pulmonary effort is normal. No respiratory distress.   Abdominal:      General: There is no distension.      Tenderness: There is no abdominal tenderness.   Musculoskeletal:         General: Normal range of motion.        Feet:    Feet:      Comments: 2x0.5x0.2 cm opening on right Lisfranc amputation site; cleaned with granulation tissue growing and some biofilm.  No signs of infection or drainage.  Skin:     General: Skin is warm.      Coloration: Skin is not jaundiced.   Neurological:      General: No focal deficit present.      Mental Status: He is alert and oriented to person, place, and time.      Cranial Nerves: No cranial nerve deficit.            Assessment and Plan     1. Diabetic ulcer of right midfoot associated with diabetes mellitus due to underlying condition, with necrosis of muscle        Patient Name: Navdeep Avery  Patient MRN: [id number]  Patient YOB: 1960  CSN: [id number]  Date: 06/07/2024  Provider:   Evelio Razo    Application for Assessment: right Lisfranc amputation site  Skin Substitute: Affinity  Performed By: Evelio Razo  Time-out Taken: Yes  Location:     [x] Genitalia/Hands/Feet/Multiple Digits    [] Scalp/Face/Neck/Ears    []Trunk/Arms/Legs  Application Area: (sq cm): 1  Product Applied: (sq cm): 6.25  Product  Waste (sq cm): 5.25  Fenestrated: Yes   Instrument:    [] Blade [] Curette  [x]Forceps  []Nippers    [] Rongeur [x] Scissors  []Others:   Secured: Yes   Secured with: Steri-strips  Dressing Applied: Yes  Response to Treatment:Well tolerated by patient.  Note:     Expiration date 6/7/24  ID number [id number]         No follow-ups on file.

## 2024-06-17 ENCOUNTER — OFFICE VISIT (OUTPATIENT)
Dept: SURGERY | Facility: CLINIC | Age: 64
End: 2024-06-17
Payer: MEDICARE

## 2024-06-17 DIAGNOSIS — L97.413 DIABETIC ULCER OF RIGHT MIDFOOT ASSOCIATED WITH DIABETES MELLITUS DUE TO UNDERLYING CONDITION, WITH NECROSIS OF MUSCLE: Primary | ICD-10-CM

## 2024-06-17 DIAGNOSIS — G62.9 NEUROPATHY: Chronic | ICD-10-CM

## 2024-06-17 DIAGNOSIS — E08.621 DIABETIC ULCER OF RIGHT MIDFOOT ASSOCIATED WITH DIABETES MELLITUS DUE TO UNDERLYING CONDITION, WITH NECROSIS OF MUSCLE: Primary | ICD-10-CM

## 2024-06-17 DIAGNOSIS — L97.513 ULCER OF RIGHT FOOT WITH NECROSIS OF MUSCLE: Chronic | ICD-10-CM

## 2024-06-17 DIAGNOSIS — M25.552 HIP PAIN, LEFT: Chronic | ICD-10-CM

## 2024-06-17 DIAGNOSIS — I73.9 PERIPHERAL VASCULAR DISEASE, UNSPECIFIED: Chronic | ICD-10-CM

## 2024-06-17 PROCEDURE — 99999 PR PBB SHADOW E&M-EST. PATIENT-LVL IV: CPT | Mod: PBBFAC,,, | Performed by: STUDENT IN AN ORGANIZED HEALTH CARE EDUCATION/TRAINING PROGRAM

## 2024-06-17 PROCEDURE — 99214 OFFICE O/P EST MOD 30 MIN: CPT | Mod: PBBFAC | Performed by: STUDENT IN AN ORGANIZED HEALTH CARE EDUCATION/TRAINING PROGRAM

## 2024-06-17 PROCEDURE — 99999PBSHW PR PBB SHADOW TECHNICAL ONLY FILED TO HB: Mod: PBBFAC,,,

## 2024-06-17 PROCEDURE — 15275 SKIN SUB GRAFT FACE/NK/HF/G: CPT | Mod: PBBFAC | Performed by: STUDENT IN AN ORGANIZED HEALTH CARE EDUCATION/TRAINING PROGRAM

## 2024-06-17 RX ORDER — HYDROCODONE BITARTRATE AND ACETAMINOPHEN 10; 325 MG/1; MG/1
1 TABLET ORAL EVERY 6 HOURS PRN
Qty: 120 TABLET | Refills: 0 | Status: SHIPPED | OUTPATIENT
Start: 2024-06-17 | End: 2024-07-17

## 2024-06-17 NOTE — PROGRESS NOTES
Subjective     Patient ID: Navdeep Avery is a 63 y.o. male.    Chief Complaint: Follow-up (2wk f/u wound ck (graft) )    4/9:  Presents the clinic for wound evaluation and application of PuraPly XT.  Patient doing well.  He has developed a cough and was tested positive for COVID yesterday.  Denies any chest pain/shortness of breath, nausea/vomiting/diarrhea, fever/chills.    4/16: Presents the clinic for wound evaluation and application of PuraPly XT.  Patient doing well; feeling better after COVID.  Denies any chest pain/shortness of breath, nausea/vomiting/diarrhea, fever/chills.    4/23: Presents the clinic for wound evaluation and application of Apligraf.  Patient doing well; feeling better after COVID.  Denies any chest pain/shortness of breath, nausea/vomiting/diarrhea, fever/chills.    4/30: Presents the clinic for wound evaluation and application of Apligraf.  Denies any chest pain/shortness of breath, nausea/vomiting/diarrhea, fever/chills.    5/7: Presents the clinic for wound evaluation and application of Apligraf.  Denies any chest pain/shortness of breath, nausea/vomiting/diarrhea, fever/chills.    5/15: Presents the clinic for wound evaluation and application of Apligraf.  Denies any chest pain/shortness of breath, nausea/vomiting/diarrhea, fever/chills.    5/21: Presents the clinic for wound evaluation and application of Apligraf.  Denies any chest pain/shortness of breath, nausea/vomiting/diarrhea, fever/chills.    5/29: Presents the clinic for wound evaluation and application of Affinity.  Denies any chest pain/shortness of breath, nausea/vomiting/diarrhea, fever/chills.    6/7: Presents the clinic for wound evaluation and application of Affinity.  Denies any chest pain/shortness of breath, nausea/vomiting/diarrhea, fever/chills.    6/17: Presents the clinic for wound evaluation and application of Affinity.  Denies any chest pain/shortness of breath, nausea/vomiting/diarrhea,  fever/chills.            Review of Systems   Constitutional: Negative.    HENT: Negative.     Eyes: Negative.    Respiratory:  Negative for shortness of breath.    Cardiovascular: Negative.    Gastrointestinal:  Negative for abdominal pain, blood in stool, change in bowel habit and vomiting.   Endocrine: Negative.    Genitourinary: Negative.  Negative for hematuria.   Musculoskeletal:  Negative for back pain and myalgias.   Integumentary:  Negative.   Allergic/Immunologic: Negative.    Neurological:  Negative for dizziness, weakness and light-headedness.   Psychiatric/Behavioral: Negative.            Objective     Physical Exam  Constitutional:       General: He is not in acute distress.     Appearance: Normal appearance.   HENT:      Head: Normocephalic.   Cardiovascular:      Rate and Rhythm: Normal rate.   Pulmonary:      Effort: Pulmonary effort is normal. No respiratory distress.   Abdominal:      General: There is no distension.      Tenderness: There is no abdominal tenderness.   Musculoskeletal:         General: Normal range of motion.        Feet:    Feet:      Comments: 1.5x0.3x0.1 cm opening on right Lisfranc amputation site; cleaned with granulation tissue growing and some biofilm.  No signs of infection or drainage.  Skin:     General: Skin is warm.      Coloration: Skin is not jaundiced.   Neurological:      General: No focal deficit present.      Mental Status: He is alert and oriented to person, place, and time.      Cranial Nerves: No cranial nerve deficit.            Assessment and Plan     1. Diabetic ulcer of right midfoot associated with diabetes mellitus due to underlying condition, with necrosis of muscle        Patient Name: Navdeep Avery  Patient MRN: [id number]  Patient YOB: 1960  CSN: [id number]  Date: 06/17/2024  Provider:   Evelio Razo    Application for Assessment: right Lisfranc amputation site  Skin Substitute: Affinity  Performed By: Evelio Razo  Time-out Taken:  Yes  Location:     [x] Genitalia/Hands/Feet/Multiple Digits    [] Scalp/Face/Neck/Ears    []Trunk/Arms/Legs  Application Area: (sq cm): 0.45  Product Applied: (sq cm): 6.25  Product Waste (sq cm): 5.8  Fenestrated: Yes   Instrument:    [] Blade [] Curette  [x]Forceps  []Nippers    [] Rongeur [x] Scissors  []Others:   Secured: Yes   Secured with: Steri-strips  Dressing Applied: Yes  Response to Treatment:Well tolerated by patient.  Note:     Expiration date 6/20/24  ID number [id number]         No follow-ups on file.

## 2024-06-25 PROCEDURE — G0179 MD RECERTIFICATION HHA PT: HCPCS | Mod: ,,, | Performed by: STUDENT IN AN ORGANIZED HEALTH CARE EDUCATION/TRAINING PROGRAM

## 2024-07-01 ENCOUNTER — OFFICE VISIT (OUTPATIENT)
Dept: SURGERY | Facility: CLINIC | Age: 64
End: 2024-07-01
Payer: MEDICARE

## 2024-07-01 DIAGNOSIS — L97.413 DIABETIC ULCER OF RIGHT MIDFOOT ASSOCIATED WITH DIABETES MELLITUS DUE TO UNDERLYING CONDITION, WITH NECROSIS OF MUSCLE: Primary | ICD-10-CM

## 2024-07-01 DIAGNOSIS — E08.621 DIABETIC ULCER OF RIGHT MIDFOOT ASSOCIATED WITH DIABETES MELLITUS DUE TO UNDERLYING CONDITION, WITH NECROSIS OF MUSCLE: Primary | ICD-10-CM

## 2024-07-01 PROCEDURE — 99999 PR PBB SHADOW E&M-EST. PATIENT-LVL IV: CPT | Mod: PBBFAC,,, | Performed by: STUDENT IN AN ORGANIZED HEALTH CARE EDUCATION/TRAINING PROGRAM

## 2024-07-01 PROCEDURE — 99214 OFFICE O/P EST MOD 30 MIN: CPT | Mod: PBBFAC | Performed by: STUDENT IN AN ORGANIZED HEALTH CARE EDUCATION/TRAINING PROGRAM

## 2024-07-09 DIAGNOSIS — Z71.89 COMPLEX CARE COORDINATION: ICD-10-CM

## 2024-07-10 DIAGNOSIS — E78.5 HYPERLIPIDEMIA, UNSPECIFIED HYPERLIPIDEMIA TYPE: ICD-10-CM

## 2024-07-10 RX ORDER — CLOPIDOGREL BISULFATE 75 MG/1
75 TABLET ORAL
Qty: 90 TABLET | Refills: 1 | Status: SHIPPED | OUTPATIENT
Start: 2024-07-10

## 2024-07-12 DIAGNOSIS — E11.3213 TYPE 2 DIABETES MELLITUS WITH BOTH EYES AFFECTED BY MILD NONPROLIFERATIVE RETINOPATHY AND MACULAR EDEMA, WITH LONG-TERM CURRENT USE OF INSULIN: ICD-10-CM

## 2024-07-12 DIAGNOSIS — Z79.4 TYPE 2 DIABETES MELLITUS WITH BOTH EYES AFFECTED BY MILD NONPROLIFERATIVE RETINOPATHY AND MACULAR EDEMA, WITH LONG-TERM CURRENT USE OF INSULIN: ICD-10-CM

## 2024-07-12 NOTE — TELEPHONE ENCOUNTER
----- Message from Minnie Lema sent at 7/12/2024 11:27 AM CDT -----  Regarding: Move medication to a different pharmacy.  Contact: Patient's spouse  Pt needs: empagliflozin-metformin 10-1,000 mg Tbph moved from Mr Rodriguez.    Pharmacy: [facility]    Phone #: [phone]

## 2024-07-16 NOTE — PROGRESS NOTES
She Disclaimer: This note has been generated using voice-recognition software. There may be typographical errors that have been missed during proof-reading        Patient ID: Navdeep Avery is a 63 y.o. male.      Chief Complaint: Hip Pain (left), Shoulder Pain (bilateral), Knee Pain (bilateral), and Foot Pain (right)      63-year-old male returns for re-evaluation chronic pain involving the left hip, bilateral lower extremities and right foot.  He is status post amputation of the right foot secondary to diabetic poor wound heel ulceration and neuropathy.  He also complains of ongoing left hip pain following an ORIF at [facility].  He continues complain of chronic pain and has developed a tolerance to Norco.  Fentanyl patch was prescribed but he developed shortness of breath in the medication was discontinued.  He has responded poorly to previous nerve block injections but experienced significant hyperglycemia following the procedure therefore injections have been abandoned.  Norco q.6 hours and Neurontin 900 mg t.i.d. providing partial relief.  I will increase Cymbalta to twice a day since he is unable to receive NSAIDs.  I also discussed Butrans patch for drug holiday due to opioid tolerance and dependency in the near future.        63-year-old male returns for re-evaluation of chronic pain involving a poor healing  right foot wound, diabetic neuropathy and left hip pain.  The left hip pain started after falling from roof and requiring an ORIF at [facility].  He developed tolerance to oral opiates and fentanyl was prescribed for severe pain.  Unfortunately he developed shortness of breath and discontinued use of  the fentanyl patches.  He returned to patches to our clinic today and the patches were destroyed in presence of patient and witnessed by Stan Harrington LPN.  Patient  is not a candidate for nerve block procedures  due to hyperglycemia and open wound ulcerations.  I will restart Norco every 6 hours for severe pain and  continue Neurontin at 600 mg a day for neuropathy.            Pain Assessment  Pain Assessment: 0-10  Pain Score:   7  Pain Location: Foot  Pain Orientation: Right  Pain Descriptors: Dull  Pain Frequency: Intermittent  Pain Onset: Awakened from sleep  Clinical Progression: Not changed  Aggravating Factors: Standing  Pain Intervention(s): Medication (See eMAR), Home medication      A's of Opioid Risk Assessment  Activity:Patient can not perform ADL.   Analgesia:Patients pain is partially controlled by current medication.   Adverse Effects: Patient denies constipation or sedation.  Aberrant Behavior:  reviewed with no aberrant drug seeking/taking behavior.      Patient denies any suicidal or homicidal ideations    Physical Therapy/Home Exercise: yes      X-Ray Foot Complete Right  Narrative: EXAMINATION:  XR FOOT COMPLETE 3 VIEW RIGHT    CLINICAL HISTORY:  Unspecified open wound, unspecified foot, initial encounter    COMPARISON:  9 August 2023    TECHNIQUE:  XR FOOT 3 VIEW RIGHT    FINDINGS:  No evidence of fracture seen.  Previous 1st and 3rd digit amputation changes and partial amputation distal 1st metatarsal present.  Appearance appears similar to previous.  The alignment of the joints appears normal.  Mild degenerative change is present.  No definite soft tissue abnormality is seen.  Impression: No definite findings of acute osteomyelitis demonstrated    Electronically signed by: Francisco Felton  Date:    02/27/2024  Time:    10:27      Review of Systems   Constitutional: Negative.    HENT: Negative.     Eyes: Negative.    Respiratory: Negative.     Cardiovascular: Negative.    Gastrointestinal: Negative.    Endocrine: Negative.    Genitourinary: Negative.    Musculoskeletal:  Positive for arthralgias (Left hip), back pain, gait problem, leg pain and joint deformity. Joint swelling: right foot.  Integumentary:  Negative.   Allergic/Immunologic: Negative.    Neurological:  Positive for weakness (Bilateral  lower extremities) and numbness (Bilateral lower extremity).   Hematological: Negative.    Psychiatric/Behavioral:  Positive for sleep disturbance.              Past Medical History:   Diagnosis Date    Arthritis     Carpal tunnel syndrome     Charcot foot due to diabetes mellitus 09/29/2023    Prescription for CROW orthotic boot given today    COPD (chronic obstructive pulmonary disease)     Coronary artery disease involving coronary bypass graft of native heart 06/27/2023    CABG 2018 (No OP report available)     Most recent left heart catheterization 08/12/2019  1. Normal LV size apical akinesis and overall normal LV systolic function, ejection fraction 55%  2. Patent saphenous vein graft to the right PDA with patent jump graft to the OM branch left circumflex  3. Patent LIMA to the OM1  4. No significant mitral regurgitation     COVID 02/02/2022    Diabetic peripheral neuropathy     Essential (primary) hypertension     GERD (gastroesophageal reflux disease)     Insomnia secondary to depression with anxiety 06/17/2021    Mixed hyperlipidemia 03/23/2021    Nicotine dependence     Peripheral vascular disease, unspecified     Sleep apnea     Type 2 diabetes mellitus      Past Surgical History:   Procedure Laterality Date    CORONARY ARTERY BYPASS GRAFT      CORONARY STENT PLACEMENT      DEBRIDEMENT OF FOOT Right 08/10/2023    Dr. Chiu    DEBRIDEMENT OF FOOT Right 8/10/2023    Procedure: DEBRIDEMENT, FOOT;  Surgeon: Evelio Chiu DO;  Location: [facility] OR;  Service: General;  Laterality: Right;    DEBRIDEMENT OF FOOT Right 2/27/2024    Procedure: DEBRIDEMENT, FOOT;  Surgeon: Evelio Chiu DO;  Location: [facility] OR;  Service: General;  Laterality: Right;    DEBRIDEMENT OF LOWER EXTREMITY Right 06/27/2022    Procedure: DEBRIDEMENT, LOWER EXTREMITY;  Surgeon: Forrest Kiser MD;  Location: [facility] OR;  Service: General;  Laterality: Right;  right foot    FOOT AMPUTATION THROUGH  METATARSAL Right 3/1/2024    Procedure: AMPUTATION, FOOT, TRANSMETATARSAL; RIGHT LISFRANK AMPUTATION;  Surgeon: Evelio Chiu DO;  Location: [facility] OR;  Service: General;  Laterality: Right;    HIP FRACTURE SURGERY Left     IRRIGATION AND DEBRIDEMENT OF LOWER EXTREMITY Right 09/08/2022    Procedure: IRRIGATION AND DEBRIDEMENT, LOWER EXTREMITY;  Surgeon: Selina Matos MD;  Location: [facility] OR;  Service: General;  Laterality: Right;    IRRIGATION AND DEBRIDEMENT OF LOWER EXTREMITY Right 01/05/2023    Procedure: IRRIGATION AND DEBRIDEMENT, LOWER EXTREMITY;  Surgeon: Evelio Chiu DO;  Location: [facility] OR;  Service: General;  Laterality: Right;    SHOULDER OPEN ROTATOR CUFF REPAIR Left     SPINE SURGERY      TOE AMPUTATION Right 2/27/2024    Procedure: AMPUTATION, TOE;  Surgeon: Evelio Chiu DO;  Location: [facility] OR;  Service: General;  Laterality: Right;  RIGHT 5 TH TOE AND DEBRIDEMENT    VASCULAR SURGERY       Social History     Socioeconomic History    Marital status:    Occupational History    Occupation: Retired   Tobacco Use    Smoking status: Every Day     Current packs/day: 2.50     Average packs/day: 2.5 packs/day for 52.9 years (132.3 ttl pk-yrs)     Types: Cigarettes     Start date: 8/10/1971     Passive exposure: Current    Smokeless tobacco: Never   Substance and Sexual Activity    Alcohol use: Not Currently    Drug use: Yes     Types: Oxycodone    Sexual activity: Yes     Partners: Female     Social Determinants of Health     Financial Resource Strain: Low Risk  (2/29/2024)    Overall Financial Resource Strain (CARDIA)     Difficulty of Paying Living Expenses: Not very hard   Food Insecurity: No Food Insecurity (2/29/2024)    Hunger Vital Sign     Worried About Running Out of Food in the Last Year: Never true     Ran Out of Food in the Last Year: Never true   Transportation Needs: No Transportation Needs (2/29/2024)    PRAPARE - Transportation     Lack of  Transportation (Medical): No     Lack of Transportation (Non-Medical): No   Physical Activity: Inactive (2/28/2024)    Exercise Vital Sign     Days of Exercise per Week: 0 days     Minutes of Exercise per Session: 0 min   Stress: No Stress Concern Present (2/29/2024)    [employer] - Occupational Stress Questionnaire     Feeling of Stress : Only a little   Recent Concern: Stress - Stress Concern Present (2/28/2024)    [employer] - Occupational Stress Questionnaire     Feeling of Stress : Rather much   Housing Stability: Low Risk  (2/29/2024)    Housing Stability Vital Sign     Unable to Pay for Housing in the Last Year: No     Number of Places Lived in the Last Year: 1     Unstable Housing in the Last Year: No     Family History   Problem Relation Name Age of Onset    Arthritis Mother      Hypertension Mother      Learning disabilities Mother      Alcohol abuse Father      Early death Father      Heart disease Father      Cancer Sister      Diabetes Sister      Heart disease Maternal Grandfather      COPD Paternal Grandfather      Heart disease Son       Review of patient's allergies indicates:  No Known Allergies  has a current medication list which includes the following prescription(s): albuterol, amlodipine, aspirin, atorvastatin, clopidogrel, empagliflozin-metformin, freestyle zainab 2 reader, freestyle zainab 2 sensor, fluticasone propion-salmeterol 115-21 mcg/dose, hydrocodone-acetaminophen, hydrocodone-acetaminophen, hydroxyzine pamoate, tresiba flextouch u-200, insulin syringe-needle u-100, isosorbide mononitrate, lisinopril, metoprolol succinate, nitroglycerin, pantoprazole, albuterol, diclofenac sodium, docusate sodium, duloxetine, gabapentin, hydrocodone-acetaminophen, [START ON 8/16/2024] hydrocodone-acetaminophen, [START ON 9/14/2024] hydrocodone-acetaminophen, lidocaine, and [DISCONTINUED] levemir flextouch u100  insulin.      Objective:  Vitals:    07/17/24 0938   BP: (!) 110/56   Pulse: 63   Resp: 18            Physical Exam  Vitals and nursing note reviewed. Chaperone present: Accompanied by his wife.   Constitutional:       General: He is not in acute distress.     Appearance: Normal appearance. He is not ill-appearing, toxic-appearing or diaphoretic.   HENT:      Head: Normocephalic and atraumatic.      Nose: Nose normal.      Mouth/Throat:      Mouth: Mucous membranes are moist.   Eyes:      Extraocular Movements: Extraocular movements intact.      Pupils: Pupils are equal, round, and reactive to light.   Cardiovascular:      Rate and Rhythm: Normal rate and regular rhythm.      Heart sounds: Normal heart sounds.   Pulmonary:      Effort: Pulmonary effort is normal. No respiratory distress.      Breath sounds: Normal breath sounds. No stridor. No wheezing or rhonchi.   Abdominal:      General: Bowel sounds are normal.      Palpations: Abdomen is soft.   Musculoskeletal:         General: No swelling or deformity.      Cervical back: Normal and normal range of motion. No spasms or tenderness. No pain with movement. Normal range of motion.      Thoracic back: Normal.      Lumbar back: No spasms, tenderness or bony tenderness. Normal range of motion. Negative right straight leg raise test and negative left straight leg raise test. No scoliosis.      Left hip: Laceration (Well-healed incisional scar of the left hip) and tenderness present. Decreased range of motion.      Right lower leg: No edema.      Left lower leg: No edema.      Right foot: Swelling and tenderness present.      Comments: Right foot ulceration      Right Lower Extremity: (Right foot)  Skin:     General: Skin is warm.   Neurological:      General: No focal deficit present.      Mental Status: He is alert and oriented to person, place, and time. Mental status is at baseline.      Cranial Nerves: No cranial nerve deficit.      Sensory: Sensation is intact.  No sensory deficit.      Motor: No weakness.      Coordination: Coordination normal.      Gait: Gait abnormal (Uses a walker for assistance with mobility).      Deep Tendon Reflexes: Reflexes are normal and symmetric.   Psychiatric:         Mood and Affect: Mood normal.         Behavior: Behavior normal.         Assessment:      1. Neuropathy    2. Peripheral vascular disease, unspecified    3. Hip pain, left status post left hip replacement    4. Encounter for long-term (current) use of other medications    5. Depression, unspecified depression type            Plan:  1. reviewed  2.Addiction, Dependency, Tolerance, Opioid abuse-misuse, Death, Diversion Discussed. Overdose reversal drug Naloxone discussed. Patient is prescribed opiates for chronic nonmalignant pain pathology.  Patient is receiving opiates which require greater than a 72 hour supply of therapy.  Patient was educated on potential dependency associated with long-term opioid use as well as decreasing efficacy with prolonged use.  Patient was advised of risks, benefits and side effects and how to utilize each medication.  Patient was also informed that any deviation from therapy protocol will  lead to discontinuation of opiates.  It is reasonable to prescribe opioid analgesics for patient based on positive response to opioid medications, lack of side effects and  limited aberrant behavior.    3. Continue Norco 10/325 q.i.d. 9 ID and increase Cymbalta to 30 mg b.i.d.       Requested Prescriptions     Signed Prescriptions Disp Refills    HYDROcodone-acetaminophen (NORCO)  mg per tablet 120 tablet 0     Sig: Take 1 tablet by mouth every 6 (six) hours as needed for Pain (pain).    HYDROcodone-acetaminophen (NORCO)  mg per tablet 120 tablet 0     Sig: Take 1 tablet by mouth every 6 (six) hours as needed for Pain (pain).    HYDROcodone-acetaminophen (NORCO)  mg per tablet 120 tablet 0     Sig: Take 1 tablet by mouth every 6 (six) hours  as needed for Pain (pain).    gabapentin (NEURONTIN) 300 MG capsule 90 capsule 2     Sig: Take 1 capsule (300 mg total) by mouth 3 (three) times daily.    DULoxetine (CYMBALTA) 30 MG capsule 60 capsule 1     Sig: Take 1 capsule (30 mg total) by mouth 2 (two) times daily.       4.Follow with AQUILINO Richards in 3 months for re-evaluation and medication refill  5. Patient is not a candidate for nerve block injections  6. Consider drug holiday with Butrans patch for tolerance and dependency         report:  Reviewed and consistent with medication use as prescribed.      The total time spent for evaluation and management on 07/17/2024 including reviewing separately obtained history, performing a medically appropriate exam and evaluation, documenting clinical information in the health record, independently interpreting results and communicating them to the patient/family/caregiver, and ordering medications/tests/procedures was between 15-29 minutes.    The above plan and management options were discussed at length with patient. Patient is in agreement with the above and verbalized understanding. It will be communicated with the referring physician via electronic record, fax, or mail.

## 2024-07-17 ENCOUNTER — OFFICE VISIT (OUTPATIENT)
Dept: PAIN MEDICINE | Facility: CLINIC | Age: 64
End: 2024-07-17
Payer: MEDICARE

## 2024-07-17 VITALS
HEIGHT: 67 IN | WEIGHT: 160 LBS | RESPIRATION RATE: 18 BRPM | HEART RATE: 63 BPM | SYSTOLIC BLOOD PRESSURE: 110 MMHG | BODY MASS INDEX: 25.11 KG/M2 | DIASTOLIC BLOOD PRESSURE: 56 MMHG

## 2024-07-17 DIAGNOSIS — F32.A DEPRESSION, UNSPECIFIED DEPRESSION TYPE: ICD-10-CM

## 2024-07-17 DIAGNOSIS — M25.552 HIP PAIN, LEFT: Chronic | ICD-10-CM

## 2024-07-17 DIAGNOSIS — I73.9 PERIPHERAL VASCULAR DISEASE, UNSPECIFIED: Chronic | ICD-10-CM

## 2024-07-17 DIAGNOSIS — Z79.899 ENCOUNTER FOR LONG-TERM (CURRENT) USE OF OTHER MEDICATIONS: ICD-10-CM

## 2024-07-17 DIAGNOSIS — G62.9 NEUROPATHY: Primary | Chronic | ICD-10-CM

## 2024-07-17 PROCEDURE — 99215 OFFICE O/P EST HI 40 MIN: CPT | Mod: PBBFAC | Performed by: PAIN MEDICINE

## 2024-07-17 PROCEDURE — 99214 OFFICE O/P EST MOD 30 MIN: CPT | Mod: S$PBB,,, | Performed by: PAIN MEDICINE

## 2024-07-17 PROCEDURE — 99999PBSHW POCT URINE DRUG SCREEN PRESUMP: Mod: PBBFAC,,,

## 2024-07-17 PROCEDURE — 99999 PR PBB SHADOW E&M-EST. PATIENT-LVL V: CPT | Mod: PBBFAC,,, | Performed by: PAIN MEDICINE

## 2024-07-17 PROCEDURE — 80305 DRUG TEST PRSMV DIR OPT OBS: CPT | Mod: PBBFAC | Performed by: PAIN MEDICINE

## 2024-07-17 RX ORDER — HYDROCODONE BITARTRATE AND ACETAMINOPHEN 10; 325 MG/1; MG/1
1 TABLET ORAL EVERY 6 HOURS PRN
Qty: 120 TABLET | Refills: 0 | Status: SHIPPED | OUTPATIENT
Start: 2024-08-16 | End: 2024-09-15

## 2024-07-17 RX ORDER — HYDROCODONE BITARTRATE AND ACETAMINOPHEN 10; 325 MG/1; MG/1
1 TABLET ORAL EVERY 6 HOURS PRN
Qty: 120 TABLET | Refills: 0 | Status: SHIPPED | OUTPATIENT
Start: 2024-07-17 | End: 2024-08-16

## 2024-07-17 RX ORDER — HYDROCODONE BITARTRATE AND ACETAMINOPHEN 10; 325 MG/1; MG/1
1 TABLET ORAL EVERY 6 HOURS PRN
Qty: 120 TABLET | Refills: 0 | Status: SHIPPED | OUTPATIENT
Start: 2024-09-14 | End: 2024-10-14

## 2024-07-17 RX ORDER — GABAPENTIN 300 MG/1
300 CAPSULE ORAL 3 TIMES DAILY
Qty: 90 CAPSULE | Refills: 2 | Status: SHIPPED | OUTPATIENT
Start: 2024-07-17 | End: 2024-07-18

## 2024-07-17 RX ORDER — DULOXETIN HYDROCHLORIDE 30 MG/1
30 CAPSULE, DELAYED RELEASE ORAL 2 TIMES DAILY
Qty: 60 CAPSULE | Refills: 1 | Status: SHIPPED | OUTPATIENT
Start: 2024-07-17 | End: 2024-09-15

## 2024-07-18 ENCOUNTER — TELEPHONE (OUTPATIENT)
Dept: PAIN MEDICINE | Facility: CLINIC | Age: 64
End: 2024-07-18
Payer: MEDICARE

## 2024-07-18 RX ORDER — GABAPENTIN 300 MG/1
CAPSULE ORAL
Qty: 360 CAPSULE | Refills: 2 | Status: SHIPPED | OUTPATIENT
Start: 2024-07-18

## 2024-07-18 NOTE — TELEPHONE ENCOUNTER
----- Message from Angelica Dawson sent at 7/18/2024  1:02 PM CDT -----  Regarding: rx  Please call pt back regarding med gabapentin pt states the meds is wrote different than usual please call pt back at [phone]    Patient's Gabapentin has been corrected and patient is notified. Patient is notified and voices understanding. NEYDA

## 2024-07-19 ENCOUNTER — EXTERNAL HOME HEALTH (OUTPATIENT)
Dept: HOME HEALTH SERVICES | Facility: HOSPITAL | Age: 64
End: 2024-07-19
Payer: MEDICARE

## 2024-08-01 ENCOUNTER — APPOINTMENT (OUTPATIENT)
Dept: RADIOLOGY | Facility: CLINIC | Age: 64
End: 2024-08-01
Attending: FAMILY MEDICINE
Payer: MEDICARE

## 2024-08-01 ENCOUNTER — OFFICE VISIT (OUTPATIENT)
Dept: FAMILY MEDICINE | Facility: CLINIC | Age: 64
End: 2024-08-01
Payer: MEDICARE

## 2024-08-01 VITALS
OXYGEN SATURATION: 96 % | BODY MASS INDEX: 24.52 KG/M2 | HEIGHT: 67 IN | SYSTOLIC BLOOD PRESSURE: 117 MMHG | DIASTOLIC BLOOD PRESSURE: 83 MMHG | TEMPERATURE: 97 F | HEART RATE: 76 BPM | WEIGHT: 156.25 LBS

## 2024-08-01 DIAGNOSIS — F32.A DEPRESSION, UNSPECIFIED DEPRESSION TYPE: ICD-10-CM

## 2024-08-01 DIAGNOSIS — E11.3213 TYPE 2 DIABETES MELLITUS WITH BOTH EYES AFFECTED BY MILD NONPROLIFERATIVE RETINOPATHY AND MACULAR EDEMA, WITH LONG-TERM CURRENT USE OF INSULIN: Primary | ICD-10-CM

## 2024-08-01 DIAGNOSIS — F41.9 ANXIETY: ICD-10-CM

## 2024-08-01 DIAGNOSIS — J44.1 COPD EXACERBATION: ICD-10-CM

## 2024-08-01 DIAGNOSIS — Z79.4 TYPE 2 DIABETES MELLITUS WITH BOTH EYES AFFECTED BY MILD NONPROLIFERATIVE RETINOPATHY AND MACULAR EDEMA, WITH LONG-TERM CURRENT USE OF INSULIN: Primary | ICD-10-CM

## 2024-08-01 LAB
BASOPHILS # BLD AUTO: 0.06 K/UL (ref 0–0.2)
BASOPHILS NFR BLD AUTO: 1 % (ref 0–1)
DIFFERENTIAL METHOD BLD: ABNORMAL
EOSINOPHIL # BLD AUTO: 0.35 K/UL (ref 0–0.5)
EOSINOPHIL NFR BLD AUTO: 6.1 % (ref 1–4)
ERYTHROCYTE [DISTWIDTH] IN BLOOD BY AUTOMATED COUNT: 13.9 % (ref 11.5–14.5)
EST. AVERAGE GLUCOSE BLD GHB EST-MCNC: 192 MG/DL
HBA1C MFR BLD HPLC: 8.3 % (ref 4.5–6.6)
HCT VFR BLD AUTO: 43 % (ref 40–54)
HGB BLD-MCNC: 13.7 G/DL (ref 13.5–18)
IMM GRANULOCYTES # BLD AUTO: 0.02 K/UL (ref 0–0.04)
IMM GRANULOCYTES NFR BLD: 0.3 % (ref 0–0.4)
LYMPHOCYTES # BLD AUTO: 2.41 K/UL (ref 1–4.8)
LYMPHOCYTES NFR BLD AUTO: 42.1 % (ref 27–41)
MCH RBC QN AUTO: 30.9 PG (ref 27–31)
MCHC RBC AUTO-ENTMCNC: 31.9 G/DL (ref 32–36)
MCV RBC AUTO: 97.1 FL (ref 80–96)
MONOCYTES # BLD AUTO: 0.39 K/UL (ref 0–0.8)
MONOCYTES NFR BLD AUTO: 6.8 % (ref 2–6)
MPC BLD CALC-MCNC: 11.5 FL (ref 9.4–12.4)
NEUTROPHILS # BLD AUTO: 2.5 K/UL (ref 1.8–7.7)
NEUTROPHILS NFR BLD AUTO: 43.7 % (ref 53–65)
NRBC # BLD AUTO: 0 X10E3/UL
NRBC, AUTO (.00): 0 %
PLATELET # BLD AUTO: 186 K/UL (ref 150–400)
RBC # BLD AUTO: 4.43 M/UL (ref 4.6–6.2)
WBC # BLD AUTO: 5.73 K/UL (ref 4.5–11)

## 2024-08-01 PROCEDURE — 85025 COMPLETE CBC W/AUTO DIFF WBC: CPT | Mod: ,,, | Performed by: CLINICAL MEDICAL LABORATORY

## 2024-08-01 PROCEDURE — 80053 COMPREHEN METABOLIC PANEL: CPT | Mod: ,,, | Performed by: CLINICAL MEDICAL LABORATORY

## 2024-08-01 PROCEDURE — 71046 X-RAY EXAM CHEST 2 VIEWS: CPT | Mod: 26,,, | Performed by: RADIOLOGY

## 2024-08-01 PROCEDURE — 71046 X-RAY EXAM CHEST 2 VIEWS: CPT | Mod: TC,RHCUB | Performed by: FAMILY MEDICINE

## 2024-08-01 PROCEDURE — 83036 HEMOGLOBIN GLYCOSYLATED A1C: CPT | Mod: ,,, | Performed by: CLINICAL MEDICAL LABORATORY

## 2024-08-01 RX ORDER — HYDROXYZINE PAMOATE 25 MG/1
25 CAPSULE ORAL DAILY
Qty: 90 CAPSULE | Refills: 1 | Status: SHIPPED | OUTPATIENT
Start: 2024-08-01

## 2024-08-01 RX ORDER — CEFTRIAXONE 1 G/1
1 INJECTION, POWDER, FOR SOLUTION INTRAMUSCULAR; INTRAVENOUS
Status: COMPLETED | OUTPATIENT
Start: 2024-08-01 | End: 2024-08-01

## 2024-08-01 RX ORDER — AZITHROMYCIN 250 MG/1
TABLET, FILM COATED ORAL
Qty: 6 TABLET | Refills: 0 | Status: SHIPPED | OUTPATIENT
Start: 2024-08-01 | End: 2024-08-06

## 2024-08-01 RX ADMIN — CEFTRIAXONE 1 G: 1 INJECTION, POWDER, FOR SOLUTION INTRAMUSCULAR; INTRAVENOUS at 10:08

## 2024-08-01 NOTE — PROGRESS NOTES
Navdeep Avery is a 63 y.o. male seen today for follow-up on his diabetes insomnia with a worsening now productive cough productive of green sputum over the last several days.  Patient has had some night sweats but no fevers.      Past Medical History:   Diagnosis Date    Arthritis     Carpal tunnel syndrome     Charcot foot due to diabetes mellitus 09/29/2023    Prescription for CROW orthotic boot given today    COPD (chronic obstructive pulmonary disease)     Coronary artery disease involving coronary bypass graft of native heart 06/27/2023    CABG 2018 (No OP report available)     Most recent left heart catheterization 08/12/2019  1. Normal LV size apical akinesis and overall normal LV systolic function, ejection fraction 55%  2. Patent saphenous vein graft to the right PDA with patent jump graft to the OM branch left circumflex  3. Patent LIMA to the OM1  4. No significant mitral regurgitation     COVID 02/02/2022    Diabetic peripheral neuropathy     Essential (primary) hypertension     GERD (gastroesophageal reflux disease)     Insomnia secondary to depression with anxiety 06/17/2021    Mixed hyperlipidemia 03/23/2021    Nicotine dependence     Peripheral vascular disease, unspecified     Sleep apnea     Type 2 diabetes mellitus      Family History   Problem Relation Name Age of Onset    Arthritis Mother      Hypertension Mother      Learning disabilities Mother      Alcohol abuse Father      Early death Father      Heart disease Father      Cancer Sister      Diabetes Sister      Heart disease Maternal Grandfather      COPD Paternal Grandfather      Heart disease Son       Current Outpatient Medications on File Prior to Visit   Medication Sig Dispense Refill    albuterol (PROVENTIL) 2.5 mg /3 mL (0.083 %) nebulizer solution USE 1 VIAL IN NEBULIZER 3 TIMES DAILY 90 each 11    albuterol (PROVENTIL/VENTOLIN HFA) 90 mcg/actuation inhaler Inhale 2 puffs into the lungs 2 (two) times daily as needed for Wheezing.  "18 g 2    amLODIPine (NORVASC) 10 MG tablet Take 1 tablet (10 mg total) by mouth once daily. 90 tablet 1    aspirin 81 MG Chew Take 81 mg by mouth once daily.      atorvastatin (LIPITOR) 80 MG tablet Take 1 tablet (80 mg total) by mouth every evening. 90 tablet 1    clopidogreL (PLAVIX) 75 mg tablet TAKE 1 TABLET BY MOUTH once DAILY 90 tablet 1    DULoxetine (CYMBALTA) 30 MG capsule Take 1 capsule (30 mg total) by mouth 2 (two) times daily. 60 capsule 1    empagliflozin-metformin 10-1,000 mg TBph Take 1 tablet by mouth Daily. 90 tablet 1    flash glucose scanning reader (FREESTYLE CT 2 READER) [facility] Use to monitor blood glucose 1 each 1    flash glucose sensor (FREESTYLE CT 2 SENSOR) Kit Use to monitor blood sugar and replace unit every 2 weeks 1 kit 11    fluticasone propion-salmeterol 115-21 mcg/dose (ADVAIR HFA) 115-21 mcg/actuation HFAA inhaler Inhale 2 puffs into the lungs every 12 (twelve) hours. Controller 12 g 2    gabapentin (NEURONTIN) 300 MG capsule 900 mg  q.i.d. or 300 mg 3 tablets q.i.d. 360 capsule 2    HYDROcodone-acetaminophen (NORCO)  mg per tablet Take 1 tablet by mouth every 6 (six) hours as needed for Pain. 28 tablet 0    HYDROcodone-acetaminophen (NORCO)  mg per tablet Take 1 tablet by mouth every 6 (six) hours as needed for Pain (pain). 120 tablet 0    [START ON 8/16/2024] HYDROcodone-acetaminophen (NORCO)  mg per tablet Take 1 tablet by mouth every 6 (six) hours as needed for Pain (pain). 120 tablet 0    [START ON 9/14/2024] HYDROcodone-acetaminophen (NORCO)  mg per tablet Take 1 tablet by mouth every 6 (six) hours as needed for Pain (pain). 120 tablet 0    insulin degludec (TRESIBA FLEXTOUCH U-200) 200 unit/mL (3 mL) insulin pen Inject 20 Units into the skin every evening. 3 pen 1    insulin syringe-needle U-100 (BD INSULIN SYRINGE ULTRA-FINE) 0.3 mL 30 gauge x 1/2" Syrg Use one syringe twice daily with insulin injection for diabetes 100 each 5    isosorbide " mononitrate (IMDUR) 30 MG 24 hr tablet Take 1 tablet (30 mg total) by mouth once daily. 30 tablet 11    lisinopriL 10 MG tablet Take 1 tablet (10 mg total) by mouth once daily. 90 tablet 1    metoprolol succinate (TOPROL-XL) 50 MG 24 hr tablet Take 1 tablet (50 mg total) by mouth once daily. 30 tablet 11    nitroGLYCERIN (NITROSTAT) 0.4 MG SL tablet Place 1 tablet (0.4 mg total) under the tongue every 5 (five) minutes as needed for Chest pain. 30 tablet 2    pantoprazole (PROTONIX) 40 MG tablet Take 1 tablet (40 mg total) by mouth once daily. 90 tablet 1    [DISCONTINUED] hydrOXYzine pamoate (VISTARIL) 25 MG Cap Take 1 capsule (25 mg total) by mouth once daily. Take nightly for sleep 90 capsule 1    diclofenac sodium (VOLTAREN) 1 % Gel Apply 1 g topically 4 (four) times daily as needed (pain). (Patient not taking: Reported on 4/24/2024) 20 g 1    docusate sodium (COLACE) 100 MG capsule Take 1 capsule (100 mg total) by mouth 2 (two) times daily. (Patient not taking: Reported on 4/24/2024) 28 capsule 0    LIDOcaine (LIDODERM) 5 % 1 patch once daily. (Patient not taking: Reported on 4/24/2024)      [DISCONTINUED] LEVEMIR FLEXTOUCH U100 INSULIN 100 unit/mL (3 mL) InPn pen Inject 10 units subcutaneously every morning, inject 20 units subcutaneously every evening. 9 mL 2     No current facility-administered medications on file prior to visit.     Immunization History   Administered Date(s) Administered    Influenza - Quadrivalent - PF *Preferred* (6 months and older) 09/14/2021, 10/21/2022    Pneumococcal Conjugate - 13 Valent 10/25/2017    Pneumococcal Conjugate - 20 Valent 02/17/2023    Pneumococcal Polysaccharide - 23 Valent 10/19/2016    Tdap 12/20/2019       Review of Systems   Constitutional:  Negative for fever, malaise/fatigue and weight loss.   Respiratory:  Negative for shortness of breath.    Cardiovascular:  Negative for chest pain and palpitations.   Gastrointestinal:  Negative for nausea and vomiting.    Musculoskeletal:  Positive for back pain, joint pain and myalgias.   Psychiatric/Behavioral:  Negative for depression.         Vitals:    08/01/24 0944   BP: 117/83   Pulse: 76   Temp: 97.4 °F (36.3 °C)       Physical Exam  Vitals reviewed.   Constitutional:       Appearance: Normal appearance.   HENT:      Head: Normocephalic.   Eyes:      Extraocular Movements: Extraocular movements intact.      Conjunctiva/sclera: Conjunctivae normal.      Pupils: Pupils are equal, round, and reactive to light.   Neck:      Thyroid: No thyroid mass or thyromegaly.   Cardiovascular:      Rate and Rhythm: Normal rate and regular rhythm.      Heart sounds: Normal heart sounds. No murmur heard.     No gallop.   Pulmonary:      Effort: Pulmonary effort is normal. No respiratory distress.      Breath sounds: Decreased air movement present. Decreased breath sounds and wheezing present. No rales.   Skin:     General: Skin is warm and dry.      Coloration: Skin is not jaundiced or pale.   Neurological:      Mental Status: He is alert.   Psychiatric:         Mood and Affect: Mood normal.         Behavior: Behavior normal.         Thought Content: Thought content normal.         Judgment: Judgment normal.          Assessment and Plan  1. Type 2 diabetes mellitus with both eyes affected by mild nonproliferative retinopathy and macular edema, with long-term current use of insulin  -     CBC Auto Differential; Future; Expected date: 08/01/2024  -     Comprehensive Metabolic Panel; Future; Expected date: 08/01/2024  -     Hemoglobin A1C; Future; Expected date: 08/01/2024  -     CBC Auto Differential; Future; Expected date: 08/01/2024  -     Comprehensive Metabolic Panel; Future; Expected date: 08/01/2024  -     Hemoglobin A1C; Future; Expected date: 08/01/2024    2. Depression, unspecified depression type    3. Anxiety  -     hydrOXYzine pamoate (VISTARIL) 25 MG Cap; Take 1 capsule (25 mg total) by mouth once daily. Take nightly for sleep   Dispense: 90 capsule; Refill: 1    4. COPD exacerbation  -     X-Ray Chest PA And Lateral; Future; Expected date: 08/01/2024                 Chest x-ray appears to be clear but the report is pending.                    Return to clinic in 2 weeks for follow-up fasting or as needed if symptoms worsen.    Health Maintenance Topics with due status: Not Due       Topic Last Completion Date    TETANUS VACCINE 12/20/2019    Influenza Vaccine 03/14/2024    Foot Exam 05/30/2024    High Dose Statin 07/17/2024    Aspirin/Antiplatelet Therapy 07/17/2024

## 2024-08-02 ENCOUNTER — PATIENT MESSAGE (OUTPATIENT)
Dept: FAMILY MEDICINE | Facility: CLINIC | Age: 64
End: 2024-08-02
Payer: MEDICARE

## 2024-08-02 LAB
ALBUMIN SERPL BCP-MCNC: 4.2 G/DL (ref 3.5–5)
ALBUMIN/GLOB SERPL: 1.1 {RATIO}
ALP SERPL-CCNC: 95 U/L (ref 45–115)
ALT SERPL W P-5'-P-CCNC: 32 U/L (ref 16–61)
ANION GAP SERPL CALCULATED.3IONS-SCNC: 9 MMOL/L (ref 7–16)
AST SERPL W P-5'-P-CCNC: 17 U/L (ref 15–37)
BILIRUB SERPL-MCNC: 0.4 MG/DL (ref ?–1.2)
BUN SERPL-MCNC: 32 MG/DL (ref 7–18)
BUN/CREAT SERPL: 29 (ref 6–20)
CALCIUM SERPL-MCNC: 10.2 MG/DL (ref 8.5–10.1)
CHLORIDE SERPL-SCNC: 104 MMOL/L (ref 98–107)
CO2 SERPL-SCNC: 29 MMOL/L (ref 21–32)
CREAT SERPL-MCNC: 1.09 MG/DL (ref 0.7–1.3)
EGFR (NO RACE VARIABLE) (RUSH/TITUS): 75 ML/MIN/1.73M2
GLOBULIN SER-MCNC: 3.8 G/DL (ref 2–4)
GLUCOSE SERPL-MCNC: 207 MG/DL (ref 74–106)
POTASSIUM SERPL-SCNC: 4.6 MMOL/L (ref 3.5–5.1)
PROT SERPL-MCNC: 8 G/DL (ref 6.4–8.2)
SODIUM SERPL-SCNC: 137 MMOL/L (ref 136–145)

## 2024-08-06 ENCOUNTER — PATIENT MESSAGE (OUTPATIENT)
Dept: FAMILY MEDICINE | Facility: CLINIC | Age: 64
End: 2024-08-06
Payer: MEDICARE

## 2024-08-12 ENCOUNTER — OFFICE VISIT (OUTPATIENT)
Dept: SURGERY | Facility: CLINIC | Age: 64
End: 2024-08-12
Payer: MEDICARE

## 2024-08-12 VITALS — DIASTOLIC BLOOD PRESSURE: 52 MMHG | HEART RATE: 58 BPM | RESPIRATION RATE: 94 BRPM | SYSTOLIC BLOOD PRESSURE: 101 MMHG

## 2024-08-12 DIAGNOSIS — Z89.431 HISTORY OF LISFRANC AMPUTATION OF RIGHT FOOT: Primary | ICD-10-CM

## 2024-08-12 PROCEDURE — 99213 OFFICE O/P EST LOW 20 MIN: CPT | Mod: S$PBB,,, | Performed by: STUDENT IN AN ORGANIZED HEALTH CARE EDUCATION/TRAINING PROGRAM

## 2024-08-12 PROCEDURE — 99213 OFFICE O/P EST LOW 20 MIN: CPT | Mod: PBBFAC | Performed by: STUDENT IN AN ORGANIZED HEALTH CARE EDUCATION/TRAINING PROGRAM

## 2024-08-12 PROCEDURE — 99999 PR PBB SHADOW E&M-EST. PATIENT-LVL III: CPT | Mod: PBBFAC,,, | Performed by: STUDENT IN AN ORGANIZED HEALTH CARE EDUCATION/TRAINING PROGRAM

## 2024-08-12 NOTE — PROGRESS NOTES
Subjective     Patient ID: Navdeep Avery is a 64 y.o. male.    Chief Complaint: Follow-up (Follow up for right foot )    4/9:  Presents the clinic for wound evaluation and application of PuraPly XT.  Patient doing well.  He has developed a cough and was tested positive for COVID yesterday.  Denies any chest pain/shortness of breath, nausea/vomiting/diarrhea, fever/chills.    4/16: Presents the clinic for wound evaluation and application of PuraPly XT.  Patient doing well; feeling better after COVID.  Denies any chest pain/shortness of breath, nausea/vomiting/diarrhea, fever/chills.    4/23: Presents the clinic for wound evaluation and application of Apligraf.  Patient doing well; feeling better after COVID.  Denies any chest pain/shortness of breath, nausea/vomiting/diarrhea, fever/chills.    4/30: Presents the clinic for wound evaluation and application of Apligraf.  Denies any chest pain/shortness of breath, nausea/vomiting/diarrhea, fever/chills.    5/7: Presents the clinic for wound evaluation and application of Apligraf.  Denies any chest pain/shortness of breath, nausea/vomiting/diarrhea, fever/chills.    5/15: Presents the clinic for wound evaluation and application of Apligraf.  Denies any chest pain/shortness of breath, nausea/vomiting/diarrhea, fever/chills.    5/21: Presents the clinic for wound evaluation and application of Apligraf.  Denies any chest pain/shortness of breath, nausea/vomiting/diarrhea, fever/chills.    5/29: Presents the clinic for wound evaluation and application of Affinity.  Denies any chest pain/shortness of breath, nausea/vomiting/diarrhea, fever/chills.    6/7: Presents the clinic for wound evaluation and application of Affinity.  Denies any chest pain/shortness of breath, nausea/vomiting/diarrhea, fever/chills.    6/17: Presents the clinic for wound evaluation and application of Affinity.  Denies any chest pain/shortness of breath, nausea/vomiting/diarrhea,  fever/chills.    7/1:  Patient presents for re-evaluation of the wound of his right foot.  Denies any chest pain/shortness of breath, nausea/vomiting/diarrhea, fever/chills.    8/12:  Patient presents for re-evaluation of the wound in his right foot.  Denies any chest pain/shortness of breath, nausea/vomiting/diarrhea, fever/chills.            Review of Systems   Constitutional: Negative.    HENT: Negative.     Eyes: Negative.    Respiratory:  Negative for shortness of breath.    Cardiovascular: Negative.    Gastrointestinal:  Negative for abdominal pain, blood in stool, change in bowel habit and vomiting.   Endocrine: Negative.    Genitourinary: Negative.  Negative for hematuria.   Musculoskeletal:  Negative for back pain and myalgias.   Integumentary:  Negative.   Allergic/Immunologic: Negative.    Neurological:  Negative for dizziness, weakness and light-headedness.   Psychiatric/Behavioral: Negative.            Objective     Physical Exam  Constitutional:       General: He is not in acute distress.     Appearance: Normal appearance.   HENT:      Head: Normocephalic.   Cardiovascular:      Rate and Rhythm: Normal rate.   Pulmonary:      Effort: Pulmonary effort is normal. No respiratory distress.   Abdominal:      General: There is no distension.      Tenderness: There is no abdominal tenderness.   Musculoskeletal:         General: Normal range of motion.        Feet:    Feet:      Comments: Wound on right Lisfranc amputation site completely healed.  Skin:     General: Skin is warm.      Coloration: Skin is not jaundiced.   Neurological:      General: No focal deficit present.      Mental Status: He is alert and oriented to person, place, and time.      Cranial Nerves: No cranial nerve deficit.            Assessment and Plan     1. History of Lisfranc amputation of right foot      Patient doing well.  Area healed.  No need for further follow-up     No follow-ups on file.

## 2024-08-15 ENCOUNTER — OFFICE VISIT (OUTPATIENT)
Dept: FAMILY MEDICINE | Facility: CLINIC | Age: 64
End: 2024-08-15
Payer: MEDICARE

## 2024-08-15 VITALS
SYSTOLIC BLOOD PRESSURE: 108 MMHG | HEART RATE: 55 BPM | DIASTOLIC BLOOD PRESSURE: 56 MMHG | BODY MASS INDEX: 24.48 KG/M2 | TEMPERATURE: 98 F | OXYGEN SATURATION: 95 % | WEIGHT: 156 LBS | HEIGHT: 67 IN | RESPIRATION RATE: 16 BRPM

## 2024-08-15 DIAGNOSIS — Z79.4 TYPE 2 DIABETES MELLITUS WITH BOTH EYES AFFECTED BY MILD NONPROLIFERATIVE RETINOPATHY AND MACULAR EDEMA, WITH LONG-TERM CURRENT USE OF INSULIN: Primary | ICD-10-CM

## 2024-08-15 DIAGNOSIS — E11.3213 TYPE 2 DIABETES MELLITUS WITH BOTH EYES AFFECTED BY MILD NONPROLIFERATIVE RETINOPATHY AND MACULAR EDEMA, WITH LONG-TERM CURRENT USE OF INSULIN: Primary | ICD-10-CM

## 2024-08-15 PROCEDURE — 99213 OFFICE O/P EST LOW 20 MIN: CPT | Mod: ,,, | Performed by: FAMILY MEDICINE

## 2024-08-15 RX ORDER — INSULIN DEGLUDEC 200 U/ML
10 INJECTION, SOLUTION SUBCUTANEOUS DAILY
Qty: 3 PEN | Refills: 2 | Status: SHIPPED | OUTPATIENT
Start: 2024-08-15 | End: 2025-08-15

## 2024-08-15 NOTE — PROGRESS NOTES
Navdeep Avery is a 64 y.o. male seen today for his diabetic foot exam for lab follow-up.  His A1c is markedly improved at 8.3 although still not to goal.  Patient reports he has not been using his insulin at all with blood sugars that it dipped recently.  He reports his morning blood sugars are in the 130s to 140s but he can be in the 170s or higher in the evening.  I recommended restarting his Tresiba insulin but only 10 units in the morning with breakfast.  The patient is also complaining of symptoms of vertigo not lightheadedness.  He is having no chest pain or shortness for breath currently.  Patient will need an order for a new prosthesis involving his right foot.  Patient has history of peripheral vascular disease diabetic neuropathy with a history of diabetic ulcers affecting his right foot.  Currently, the patient is status post an amputation of the right forefoot due to poor circulation peripheral neuropathy and history of type 2 diabetes.    Past Medical History:   Diagnosis Date    Arthritis     Carpal tunnel syndrome     Charcot foot due to diabetes mellitus 09/29/2023    Prescription for CROW orthotic boot given today    COPD (chronic obstructive pulmonary disease)     Coronary artery disease involving coronary bypass graft of native heart 06/27/2023    CABG 2018 (No OP report available)     Most recent left heart catheterization 08/12/2019  1. Normal LV size apical akinesis and overall normal LV systolic function, ejection fraction 55%  2. Patent saphenous vein graft to the right PDA with patent jump graft to the OM branch left circumflex  3. Patent LIMA to the OM1  4. No significant mitral regurgitation     COVID 02/02/2022    Diabetic peripheral neuropathy     Essential (primary) hypertension     GERD (gastroesophageal reflux disease)     Insomnia secondary to depression with anxiety 06/17/2021    Mixed hyperlipidemia 03/23/2021    Nicotine dependence     Peripheral vascular disease, unspecified      Sleep apnea     Type 2 diabetes mellitus      Family History   Problem Relation Name Age of Onset    Arthritis Mother      Hypertension Mother      Learning disabilities Mother      Alcohol abuse Father      Early death Father      Heart disease Father      Cancer Sister      Diabetes Sister      Heart disease Maternal Grandfather      COPD Paternal Grandfather      Heart disease Son       Current Outpatient Medications on File Prior to Visit   Medication Sig Dispense Refill    albuterol (PROVENTIL) 2.5 mg /3 mL (0.083 %) nebulizer solution USE 1 VIAL IN NEBULIZER 3 TIMES DAILY 90 each 11    albuterol (PROVENTIL/VENTOLIN HFA) 90 mcg/actuation inhaler Inhale 2 puffs into the lungs 2 (two) times daily as needed for Wheezing. 18 g 2    amLODIPine (NORVASC) 10 MG tablet Take 1 tablet (10 mg total) by mouth once daily. 90 tablet 1    aspirin 81 MG Chew Take 81 mg by mouth once daily.      atorvastatin (LIPITOR) 80 MG tablet Take 1 tablet (80 mg total) by mouth every evening. 90 tablet 1    clopidogreL (PLAVIX) 75 mg tablet TAKE 1 TABLET BY MOUTH once DAILY 90 tablet 1    DULoxetine (CYMBALTA) 30 MG capsule Take 1 capsule (30 mg total) by mouth 2 (two) times daily. 60 capsule 1    empagliflozin-metformin 10-1,000 mg TBph Take 1 tablet by mouth Daily. 90 tablet 1    flash glucose scanning reader (FREESTYLE CT 2 READER) [facility] [place] Use to monitor blood glucose 1 each 1    flash glucose sensor (FREESTYLE CT 2 SENSOR) Kit Use to monitor blood sugar and replace unit every 2 weeks 1 kit 11    fluticasone propion-salmeterol 115-21 mcg/dose (ADVAIR HFA) 115-21 mcg/actuation HFAA inhaler Inhale 2 puffs into the lungs every 12 (twelve) hours. Controller 12 g 2    gabapentin (NEURONTIN) 300 MG capsule 900 mg  q.i.d. or 300 mg 3 tablets q.i.d. 360 capsule 2    HYDROcodone-acetaminophen (NORCO)  mg per tablet Take 1 tablet by mouth every 6 (six) hours as needed for Pain. 28 tablet 0    HYDROcodone-acetaminophen (NORCO)   "mg per tablet Take 1 tablet by mouth every 6 (six) hours as needed for Pain (pain). 120 tablet 0    [START ON 8/16/2024] HYDROcodone-acetaminophen (NORCO)  mg per tablet Take 1 tablet by mouth every 6 (six) hours as needed for Pain (pain). 120 tablet 0    [START ON 9/14/2024] HYDROcodone-acetaminophen (NORCO)  mg per tablet Take 1 tablet by mouth every 6 (six) hours as needed for Pain (pain). 120 tablet 0    hydrOXYzine pamoate (VISTARIL) 25 MG Cap Take 1 capsule (25 mg total) by mouth once daily. Take nightly for sleep 90 capsule 1    insulin syringe-needle U-100 (BD INSULIN SYRINGE ULTRA-FINE) 0.3 mL 30 gauge x 1/2" Syrg Use one syringe twice daily with insulin injection for diabetes 100 each 5    isosorbide mononitrate (IMDUR) 30 MG 24 hr tablet Take 1 tablet (30 mg total) by mouth once daily. 30 tablet 11    LIDOcaine (LIDODERM) 5 % 1 patch once daily.      lisinopriL 10 MG tablet Take 1 tablet (10 mg total) by mouth once daily. 90 tablet 1    metoprolol succinate (TOPROL-XL) 50 MG 24 hr tablet Take 1 tablet (50 mg total) by mouth once daily. 30 tablet 11    nitroGLYCERIN (NITROSTAT) 0.4 MG SL tablet Place 1 tablet (0.4 mg total) under the tongue every 5 (five) minutes as needed for Chest pain. 30 tablet 2    pantoprazole (PROTONIX) 40 MG tablet Take 1 tablet (40 mg total) by mouth once daily. 90 tablet 1    diclofenac sodium (VOLTAREN) 1 % Gel Apply 1 g topically 4 (four) times daily as needed (pain). (Patient not taking: Reported on 4/24/2024) 20 g 1    docusate sodium (COLACE) 100 MG capsule Take 1 capsule (100 mg total) by mouth 2 (two) times daily. (Patient not taking: Reported on 4/24/2024) 28 capsule 0    [DISCONTINUED] insulin degludec (TRESIBA FLEXTOUCH U-200) 200 unit/mL (3 mL) insulin pen Inject 20 Units into the skin every evening. (Patient not taking: Reported on 8/15/2024) 3 pen 1    [DISCONTINUED] LEVEMIR FLEXTOUCH U100 INSULIN 100 unit/mL (3 mL) InPn pen Inject 10 units subcutaneously " every morning, inject 20 units subcutaneously every evening. 9 mL 2     No current facility-administered medications on file prior to visit.     Immunization History   Administered Date(s) Administered    Influenza - Quadrivalent - PF *Preferred* (6 months and older) 09/14/2021, 10/21/2022    Pneumococcal Conjugate - 13 Valent 10/25/2017    Pneumococcal Conjugate - 20 Valent 02/17/2023    Pneumococcal Polysaccharide - 23 Valent 10/19/2016    Tdap 12/20/2019       Review of Systems   Constitutional:  Negative for fever, malaise/fatigue and weight loss.   Respiratory:  Negative for shortness of breath.    Cardiovascular:  Negative for chest pain and palpitations.   Gastrointestinal:  Negative for nausea and vomiting.   Neurological:  Positive for dizziness, sensory change and weakness.   Psychiatric/Behavioral:  Negative for depression.         Vitals:    08/15/24 1027   BP: (!) 108/56   Pulse: (!) 55   Resp: 16   Temp: 97.6 °F (36.4 °C)       Physical Exam  Vitals reviewed.   Constitutional:       Appearance: Normal appearance.   HENT:      Head: Normocephalic.      Right Ear: Hearing, tympanic membrane and ear canal normal.      Left Ear: Hearing and ear canal normal. Tympanic membrane is injected and retracted.   Eyes:      Extraocular Movements: Extraocular movements intact.      Conjunctiva/sclera: Conjunctivae normal.      Pupils: Pupils are equal, round, and reactive to light.   Neck:      Thyroid: No thyroid mass or thyromegaly.   Cardiovascular:      Rate and Rhythm: Normal rate and regular rhythm.      Heart sounds: Normal heart sounds. No murmur heard.     No gallop.   Pulmonary:      Effort: Pulmonary effort is normal. No respiratory distress.      Breath sounds: Normal breath sounds. No wheezing or rales.   Musculoskeletal:      Right foot: Decreased range of motion. Deformity present.   Feet:      Right foot:      Protective Sensation: 8 sites tested.  8 sites sensed.      Skin integrity: Callus present.  No ulcer, blister, skin breakdown or warmth.      Comments: Patient has a forefoot amputation affecting the right foot.  Skin:     General: Skin is warm and dry.      Coloration: Skin is not jaundiced or pale.   Neurological:      General: No focal deficit present.      Mental Status: He is alert.      Cranial Nerves: No cranial nerve deficit.   Psychiatric:         Mood and Affect: Mood normal.         Behavior: Behavior normal.         Thought Content: Thought content normal.         Judgment: Judgment normal.          Assessment and Plan  1. Type 2 diabetes mellitus with both eyes affected by mild nonproliferative retinopathy and macular edema, with long-term current use of insulin  -     insulin degludec (TRESIBA FLEXTOUCH U-200) 200 unit/mL (3 mL) insulin pen; Inject 10 Units into the skin once daily.  Dispense: 3 pen ; Refill: 2             Return to clinic in three weeks to review his glucose readings are as needed.  For his vertigo I recommended restarting his Flonase daily.  We will also follow-up on his vertigo in 3 weeks.    Health Maintenance Topics with due status: Not Due       Topic Last Completion Date    TETANUS VACCINE 12/20/2019    Influenza Vaccine 03/14/2024    Foot Exam 05/30/2024    Hemoglobin A1c 08/01/2024    High Dose Statin 08/12/2024    Aspirin/Antiplatelet Therapy 08/12/2024

## 2024-08-29 ENCOUNTER — OFFICE VISIT (OUTPATIENT)
Dept: FAMILY MEDICINE | Facility: CLINIC | Age: 64
End: 2024-08-29
Payer: MEDICARE

## 2024-08-29 ENCOUNTER — HOSPITAL ENCOUNTER (EMERGENCY)
Facility: HOSPITAL | Age: 64
Discharge: HOME | End: 2024-08-30
Payer: MEDICARE

## 2024-08-29 VITALS
WEIGHT: 159 LBS | HEIGHT: 67 IN | DIASTOLIC BLOOD PRESSURE: 60 MMHG | BODY MASS INDEX: 24.96 KG/M2 | RESPIRATION RATE: 19 BRPM | HEART RATE: 71 BPM | TEMPERATURE: 99 F | SYSTOLIC BLOOD PRESSURE: 104 MMHG | OXYGEN SATURATION: 96 %

## 2024-08-29 DIAGNOSIS — Z79.4 TYPE 2 DIABETES MELLITUS WITH BOTH EYES AFFECTED BY MILD NONPROLIFERATIVE RETINOPATHY AND MACULAR EDEMA, WITH LONG-TERM CURRENT USE OF INSULIN: Primary | ICD-10-CM

## 2024-08-29 DIAGNOSIS — S69.92XA INJURY OF LEFT THUMB, INITIAL ENCOUNTER: ICD-10-CM

## 2024-08-29 DIAGNOSIS — E11.3213 TYPE 2 DIABETES MELLITUS WITH BOTH EYES AFFECTED BY MILD NONPROLIFERATIVE RETINOPATHY AND MACULAR EDEMA, WITH LONG-TERM CURRENT USE OF INSULIN: Primary | ICD-10-CM

## 2024-08-29 DIAGNOSIS — M25.532 WRIST PAIN, ACUTE, LEFT: Primary | ICD-10-CM
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
## 2024-08-29 PROCEDURE — 99212 OFFICE O/P EST SF 10 MIN: CPT | Mod: ,,, | Performed by: FAMILY MEDICINE

## 2024-08-29 PROCEDURE — 29125 APPL SHORT ARM SPLINT STATIC: CPT | Mod: LT

## 2024-08-29 PROCEDURE — 99284 EMERGENCY DEPT VISIT MOD MDM: CPT | Mod: 25

## 2024-08-29 RX ORDER — KETOROLAC TROMETHAMINE 30 MG/ML
30 INJECTION, SOLUTION INTRAMUSCULAR; INTRAVENOUS
Status: COMPLETED | OUTPATIENT
Start: 2024-08-30 | End: 2024-08-30

## 2024-08-29 NOTE — PROGRESS NOTES
Navdeep Avery is a 64 y.o. male seen today for follow-up on his vertigo and diabetes.  With the Flonase, his vertigo symptoms have resolved.  Also his blood sugars are markedly improved on the Lantus in the morning with no hypoglycemic episodes.  We discussed following up for lab work in proximally 3 months.      Past Medical History:   Diagnosis Date    Arthritis     Carpal tunnel syndrome     Charcot foot due to diabetes mellitus 09/29/2023    Prescription for CROW orthotic boot given today    COPD (chronic obstructive pulmonary disease)     Coronary artery disease involving coronary bypass graft of native heart 06/27/2023    CABG 2018 (No OP report available)     Most recent left heart catheterization 08/12/2019  1. Normal LV size apical akinesis and overall normal LV systolic function, ejection fraction 55%  2. Patent saphenous vein graft to the right PDA with patent jump graft to the OM branch left circumflex  3. Patent LIMA to the OM1  4. No significant mitral regurgitation     COVID 02/02/2022    Diabetic peripheral neuropathy     Essential (primary) hypertension     GERD (gastroesophageal reflux disease)     Insomnia secondary to depression with anxiety 06/17/2021    Mixed hyperlipidemia 03/23/2021    Nicotine dependence     Peripheral vascular disease, unspecified     Sleep apnea     Type 2 diabetes mellitus      Family History   Problem Relation Name Age of Onset    Arthritis Mother      Hypertension Mother      Learning disabilities Mother      Alcohol abuse Father      Early death Father      Heart disease Father      Cancer Sister      Diabetes Sister      Heart disease Maternal Grandfather      COPD Paternal Grandfather      Heart disease Son       Current Outpatient Medications on File Prior to Visit   Medication Sig Dispense Refill    albuterol (PROVENTIL) 2.5 mg /3 mL (0.083 %) nebulizer solution USE 1 VIAL IN NEBULIZER 3 TIMES DAILY 90 each 11    albuterol (PROVENTIL/VENTOLIN HFA) 90  mcg/actuation inhaler Inhale 2 puffs into the lungs 2 (two) times daily as needed for Wheezing. 18 g 2    amLODIPine (NORVASC) 10 MG tablet Take 1 tablet (10 mg total) by mouth once daily. 90 tablet 1    aspirin 81 MG Chew Take 81 mg by mouth once daily.      atorvastatin (LIPITOR) 80 MG tablet Take 1 tablet (80 mg total) by mouth every evening. 90 tablet 1    clopidogreL (PLAVIX) 75 mg tablet TAKE 1 TABLET BY MOUTH once DAILY 90 tablet 1    diclofenac sodium (VOLTAREN) 1 % Gel Apply 1 g topically 4 (four) times daily as needed (pain). 20 g 1    DULoxetine (CYMBALTA) 30 MG capsule Take 1 capsule (30 mg total) by mouth 2 (two) times daily. 60 capsule 1    empagliflozin-metformin 10-1,000 mg TBph Take 1 tablet by mouth Daily. 90 tablet 1    flash glucose scanning reader (FREESTYLE CT 2 READER) [facility] [place] Use to monitor blood glucose 1 each 1    flash glucose sensor (FREESTYLE CT 2 SENSOR) Kit Use to monitor blood sugar and replace unit every 2 weeks 1 kit 11    fluticasone propion-salmeterol 115-21 mcg/dose (ADVAIR HFA) 115-21 mcg/actuation HFAA inhaler Inhale 2 puffs into the lungs every 12 (twelve) hours. Controller 12 g 2    gabapentin (NEURONTIN) 300 MG capsule 900 mg  q.i.d. or 300 mg 3 tablets q.i.d. 360 capsule 2    HYDROcodone-acetaminophen (NORCO)  mg per tablet Take 1 tablet by mouth every 6 (six) hours as needed for Pain. 28 tablet 0    HYDROcodone-acetaminophen (NORCO)  mg per tablet Take 1 tablet by mouth every 6 (six) hours as needed for Pain (pain). 120 tablet 0    [START ON 9/14/2024] HYDROcodone-acetaminophen (NORCO)  mg per tablet Take 1 tablet by mouth every 6 (six) hours as needed for Pain (pain). 120 tablet 0    hydrOXYzine pamoate (VISTARIL) 25 MG Cap Take 1 capsule (25 mg total) by mouth once daily. Take nightly for sleep 90 capsule 1    insulin degludec (TRESIBA FLEXTOUCH U-200) 200 unit/mL (3 mL) insulin pen Inject 10 Units into the skin once daily. 3 pen 2    insulin  "syringe-needle U-100 (BD INSULIN SYRINGE ULTRA-FINE) 0.3 mL 30 gauge x 1/2" Syrg Use one syringe twice daily with insulin injection for diabetes 100 each 5    isosorbide mononitrate (IMDUR) 30 MG 24 hr tablet Take 1 tablet (30 mg total) by mouth once daily. 30 tablet 11    LIDOcaine (LIDODERM) 5 % 1 patch once daily.      lisinopriL 10 MG tablet Take 1 tablet (10 mg total) by mouth once daily. 90 tablet 1    metoprolol succinate (TOPROL-XL) 50 MG 24 hr tablet Take 1 tablet (50 mg total) by mouth once daily. 30 tablet 11    nitroGLYCERIN (NITROSTAT) 0.4 MG SL tablet Place 1 tablet (0.4 mg total) under the tongue every 5 (five) minutes as needed for Chest pain. 30 tablet 2    pantoprazole (PROTONIX) 40 MG tablet Take 1 tablet (40 mg total) by mouth once daily. 90 tablet 1    docusate sodium (COLACE) 100 MG capsule Take 1 capsule (100 mg total) by mouth 2 (two) times daily. (Patient not taking: Reported on 4/24/2024) 28 capsule 0    [DISCONTINUED] LEVEMIR FLEXTOUCH U100 INSULIN 100 unit/mL (3 mL) InPn pen Inject 10 units subcutaneously every morning, inject 20 units subcutaneously every evening. 9 mL 2     No current facility-administered medications on file prior to visit.     Immunization History   Administered Date(s) Administered    Influenza - Quadrivalent - PF *Preferred* (6 months and older) 09/14/2021, 10/21/2022    Pneumococcal Conjugate - 13 Valent 10/25/2017    Pneumococcal Conjugate - 20 Valent 02/17/2023    Pneumococcal Polysaccharide - 23 Valent 10/19/2016    Tdap 12/20/2019       Review of Systems   Constitutional:  Negative for fever, malaise/fatigue and weight loss.   Respiratory:  Negative for shortness of breath.    Cardiovascular:  Negative for chest pain and palpitations.   Gastrointestinal:  Negative for nausea and vomiting.   Musculoskeletal:  Positive for back pain, falls, joint pain and myalgias.        Patient failed no significant injury yesterday due to his foot deformity and I recommended " starting to use his cane again until his prosthesis arrives.   Psychiatric/Behavioral:  Negative for depression.         Vitals:    08/29/24 0901   BP: 104/60   Pulse: 71   Resp: 19   Temp: 98.6 °F (37 °C)       Physical Exam  Vitals reviewed.   Eyes:      Conjunctiva/sclera: Conjunctivae normal.   Pulmonary:      Effort: Pulmonary effort is normal.   Neurological:      Mental Status: He is alert.   Psychiatric:         Mood and Affect: Mood normal.         Behavior: Behavior normal.         Thought Content: Thought content normal.         Judgment: Judgment normal.          Assessment and Plan  1. Type 2 diabetes mellitus with both eyes affected by mild nonproliferative retinopathy and macular edema, with long-term current use of insulin             Return to clinic in 3 months for fasting lab work and an office visit    Health Maintenance Topics with due status: Not Due       Topic Last Completion Date    TETANUS VACCINE 12/20/2019    Influenza Vaccine 03/14/2024    Foot Exam 05/30/2024    Hemoglobin A1c 08/01/2024    High Dose Statin 08/15/2024    Aspirin/Antiplatelet Therapy 08/15/2024

## 2024-08-30 ENCOUNTER — TELEPHONE (OUTPATIENT)
Dept: EMERGENCY MEDICINE | Facility: HOSPITAL | Age: 64
End: 2024-08-30
Payer: MEDICARE

## 2024-08-30 ENCOUNTER — PATIENT OUTREACH (OUTPATIENT)
Facility: HOSPITAL | Age: 64
End: 2024-08-30
Payer: MEDICARE

## 2024-08-30 VITALS
DIASTOLIC BLOOD PRESSURE: 67 MMHG | WEIGHT: 160 LBS | BODY MASS INDEX: 24.25 KG/M2 | TEMPERATURE: 99 F | RESPIRATION RATE: 17 BRPM | HEART RATE: 71 BPM | HEIGHT: 68 IN | SYSTOLIC BLOOD PRESSURE: 127 MMHG | OXYGEN SATURATION: 96 %

## 2024-08-30 PROCEDURE — 63600175 PHARM REV CODE 636 W HCPCS: Performed by: NURSE PRACTITIONER

## 2024-08-30 PROCEDURE — 96372 THER/PROPH/DIAG INJ SC/IM: CPT | Performed by: NURSE PRACTITIONER

## 2024-08-30 RX ADMIN — KETOROLAC TROMETHAMINE 30 MG: 30 INJECTION, SOLUTION INTRAMUSCULAR at 12:08

## 2024-08-30 NOTE — ED PROVIDER NOTES
Encounter Date: 8/29/2024       History     Chief Complaint   Patient presents with    Wrist Pain     Pt reports he tripped and fell, landing on his left wrist. Reports numbness to tip of fingers. Edema present. Initially wrapped with ace bandage. Pt reports he took a Narco 10mg 3 hours ago but the pain is worse now. Denies hitting his head or LOC     64 year old male presents to ED with complaint of left wrist pain. Patient states he was walking and his shoe buckled due to him having amputation of his foot and fell forward with his hand outstretched. He states incident occurred 6 hours ago.         Review of patient's allergies indicates:  No Known Allergies  Past Medical History:   Diagnosis Date    Arthritis     Carpal tunnel syndrome     Charcot foot due to diabetes mellitus 09/29/2023    Prescription for CROW orthotic boot given today    COPD (chronic obstructive pulmonary disease)     Coronary artery disease involving coronary bypass graft of native heart 06/27/2023    CABG 2018 (No OP report available)     Most recent left heart catheterization 08/12/2019  1. Normal LV size apical akinesis and overall normal LV systolic function, ejection fraction 55%  2. Patent saphenous vein graft to the right PDA with patent jump graft to the OM branch left circumflex  3. Patent LIMA to the OM1  4. No significant mitral regurgitation     COVID 02/02/2022    Diabetic peripheral neuropathy     Essential (primary) hypertension     GERD (gastroesophageal reflux disease)     Insomnia secondary to depression with anxiety 06/17/2021    Mixed hyperlipidemia 03/23/2021    Nicotine dependence     Peripheral vascular disease, unspecified     Sleep apnea     Type 2 diabetes mellitus      Past Surgical History:   Procedure Laterality Date    CORONARY ARTERY BYPASS GRAFT      CORONARY STENT PLACEMENT      DEBRIDEMENT OF FOOT Right 08/10/2023    Dr. Chiu    DEBRIDEMENT OF FOOT Right 8/10/2023    Procedure: DEBRIDEMENT, FOOT;  Surgeon:  Evelio Chiu DO;  Location: [facility] OR;  Service: General;  Laterality: Right;    DEBRIDEMENT OF FOOT Right 2/27/2024    Procedure: DEBRIDEMENT, FOOT;  Surgeon: Evelio Chiu DO;  Location: [facility] OR;  Service: General;  Laterality: Right;    DEBRIDEMENT OF LOWER EXTREMITY Right 06/27/2022    Procedure: DEBRIDEMENT, LOWER EXTREMITY;  Surgeon: Forrest Kiser MD;  Location: [facility];  Service: General;  Laterality: Right;  right foot    FOOT AMPUTATION THROUGH METATARSAL Right 3/1/2024    Procedure: AMPUTATION, FOOT, TRANSMETATARSAL; RIGHT LISFRANK AMPUTATION;  Surgeon: Evelio Chiu DO;  Location: [facility] OR;  Service: General;  Laterality: Right;    HIP FRACTURE SURGERY Left     IRRIGATION AND DEBRIDEMENT OF LOWER EXTREMITY Right 09/08/2022    Procedure: IRRIGATION AND DEBRIDEMENT, LOWER EXTREMITY;  Surgeon: Selina Matos MD;  Location: [facility];  Service: General;  Laterality: Right;    IRRIGATION AND DEBRIDEMENT OF LOWER EXTREMITY Right 01/05/2023    Procedure: IRRIGATION AND DEBRIDEMENT, LOWER EXTREMITY;  Surgeon: Evelio Chiu DO;  Location: [facility];  Service: General;  Laterality: Right;    SHOULDER OPEN ROTATOR CUFF REPAIR Left     SPINE SURGERY      TOE AMPUTATION Right 2/27/2024    Procedure: AMPUTATION, TOE;  Surgeon: Evelio Chiu DO;  Location: [facility];  Service: General;  Laterality: Right;  RIGHT 5 TH TOE AND DEBRIDEMENT    VASCULAR SURGERY       Family History   Problem Relation Name Age of Onset    Arthritis Mother      Hypertension Mother      Learning disabilities Mother      Alcohol abuse Father      Early death Father      Heart disease Father      Cancer Sister      Diabetes Sister      Heart disease Maternal Grandfather      COPD Paternal Grandfather      Heart disease Son       Social History     Tobacco Use    Smoking status: Every Day     Current packs/day: 2.50     Average packs/day: 2.5 packs/day for 53.1 years (132.7 ttl pk-yrs)     Types:  Cigarettes     Start date: 8/10/1971     Passive exposure: Current    Smokeless tobacco: Never   Substance Use Topics    Alcohol use: Not Currently    Drug use: Yes     Types: Oxycodone     Review of Systems   Constitutional:  Negative for chills and fever.   HENT:  Negative for sinus pressure and sinus pain.    Eyes:  Negative for photophobia and visual disturbance.   Respiratory:  Negative for cough and shortness of breath.    Cardiovascular:  Negative for chest pain and palpitations.   Gastrointestinal:  Negative for nausea and vomiting.   Musculoskeletal:  Positive for arthralgias and joint swelling.   Skin:  Negative for color change and wound.   Neurological:  Negative for dizziness and weakness.   Hematological:  Negative for adenopathy. Does not bruise/bleed easily.   Psychiatric/Behavioral:  Negative for agitation and confusion.    All other systems reviewed and are negative.      Physical Exam     Initial Vitals [08/29/24 2239]   BP Pulse Resp Temp SpO2   134/66 78 16 98.2 °F (36.8 °C) 95 %      MAP       --         Physical Exam    Nursing note and vitals reviewed.  Constitutional: He appears well-developed and well-nourished.   HENT:   Head: Normocephalic and atraumatic.   Eyes: EOM are normal. Pupils are equal, round, and reactive to light.   Neck:   Normal range of motion.  Cardiovascular:  Normal rate and regular rhythm.           No murmur heard.  Pulmonary/Chest: He has no wheezes. He has no rhonchi.   Abdominal: Abdomen is soft. He exhibits no distension. There is no abdominal tenderness.   Musculoskeletal:         General: Tenderness and edema present.      Left wrist: Tenderness present. Decreased range of motion.        Hands:       Cervical back: Normal range of motion.     Lymphadenopathy:     He has no cervical adenopathy.   Neurological: He is alert and oriented to person, place, and time. No cranial nerve deficit or sensory deficit.   Skin: Skin is warm and dry. Capillary refill takes less  than 2 seconds.   Psychiatric: He has a normal mood and affect. Thought content normal.         Medical Screening Exam   See Full Note    ED Course   Procedures  Labs Reviewed - No data to display       Imaging Results              X-Ray Wrist Complete Left (Final result)  Result time 08/30/24 07:44:28      Final result by Francisco Felton II, MD (08/30/24 07:44:28)                   Impression:      No findings of acute injury.      Electronically signed by: Francisco Felton  Date:    08/30/2024  Time:    07:44               Narrative:    EXAMINATION:  XR WRIST COMPLETE 3 VIEWS LEFT    CLINICAL HISTORY:  Unspecified fall, initial encounter    COMPARISON:  None available    TECHNIQUE:  XR WRIST 3 VIEWS LEFT    FINDINGS:  No evidence of fracture seen.  The alignment of the joints appears normal.  Mild wrist degenerative change is present.  No soft tissue abnormality is seen.                                       Medications   ketorolac injection 30 mg (30 mg Intramuscular Given 8/30/24 0029)     Medical Decision Making  64 year old male presents to ED with complaint of left wrist pain. Patient states he was walking and his shoe buckled due to him having amputation of his foot and fell forward with his hand outstretched. He states incident occurred 6 hours ago.     Diagnostic obtained and reviewed. Splint ordered and applied by nursing. IM Toradol administered.  reviewed. Current prescription of Norco. Ortho referral placed    Amount and/or Complexity of Data Reviewed  Radiology: ordered and independent interpretation performed.     Details: Possible fracture to first metacarpal area. Reviewed with dr. Hauser    Risk  Prescription drug management.                                      Clinical Impression:   Final diagnoses:  [W19.XXXA] Fall with injury  [M25.532] Wrist pain, acute, left (Primary)  [S69.92XA] Injury of left thumb, initial encounter        ED Disposition Condition    Discharge Stable          ED  Prescriptions    None       Follow-up Information    None          Sofia Damon, [facility]  09/05/24 [id number]

## 2024-08-30 NOTE — LETTER
AUTHORIZATION FOR RELEASE OF   CONFIDENTIAL INFORMATION    Dear [facility],    We are seeing Navdeep Avery, date of birth 1960, in the clinic at [facility]. Marquez Huff MD is the patient's PCP. Navdeep Avery has an outstanding lab/procedure at the time we reviewed his chart. In order to help keep his health information updated, he has authorized us to request the following medical record(s):                                          ( X )  EYE EXAM                  Please fax records to [facility], [place], Edward D, MD, [phone]     If you have any questions, please contact Gilbert Abrams at [phone].           Patient Name: Navdeep Avery  : 1960  Patient Phone #: [phone]

## 2024-08-30 NOTE — PROGRESS NOTES

## 2024-09-27 LAB
LEFT EYE DM RETINOPATHY: POSITIVE
RIGHT EYE DM RETINOPATHY: POSITIVE

## 2024-10-09 NOTE — PROGRESS NOTES
Subjective:         Patient ID: [id number] Avery is a 64 y.o. male.    Chief Complaint: Back Pain, Pain, and Joint Pain        Pain  This is a chronic problem. The current episode started more than 1 year ago. The problem occurs daily. The problem has been waxing and waning. Associated symptoms include arthralgias and neck pain. Pertinent negatives include no anorexia, chest pain, chills, coughing, diaphoresis, fever, sore throat, vertigo or vomiting.     Review of Systems   Constitutional:  Negative for activity change, chills, diaphoresis, fever and unexpected weight change.   HENT:  Negative for drooling, ear discharge, ear pain, facial swelling, nosebleeds, sore throat, trouble swallowing, voice change and goiter.    Eyes:  Negative for photophobia, pain, discharge, redness and visual disturbance.   Respiratory:  Negative for apnea, cough, choking, chest tightness, shortness of breath, wheezing and stridor.    Cardiovascular:  Negative for chest pain, palpitations and leg swelling.   Gastrointestinal:  Negative for abdominal distention, anorexia, diarrhea, rectal pain, vomiting and fecal incontinence.   Endocrine: Negative for cold intolerance, heat intolerance, polydipsia, polyphagia and polyuria.   Genitourinary:  Negative for bladder incontinence, dysuria, flank pain and frequency.   Musculoskeletal:  Positive for arthralgias, back pain, leg pain, neck pain and neck stiffness.   Integumentary:  Negative for color change and pallor.   Neurological:  Negative for dizziness, vertigo, seizures, syncope, facial asymmetry, speech difficulty, light-headedness, memory loss and coordination difficulties.   Hematological:  Negative for adenopathy. Does not bruise/bleed easily.   Psychiatric/Behavioral:  Negative for agitation, behavioral problems, confusion, decreased concentration, dysphoric mood, hallucinations, self-injury and suicidal ideas. The patient is not nervous/anxious and is not hyperactive.             Past Medical History:   Diagnosis Date    Arthritis     Carpal tunnel syndrome     Charcot foot due to diabetes mellitus 09/29/2023    Prescription for CROW orthotic boot given today    COPD (chronic obstructive pulmonary disease)     Coronary artery disease involving coronary bypass graft of native heart 06/27/2023    CABG 2018 (No OP report available)     Most recent left heart catheterization 08/12/2019  1. Normal LV size apical akinesis and overall normal LV systolic function, ejection fraction 55%  2. Patent saphenous vein graft to the right PDA with patent jump graft to the OM branch left circumflex  3. Patent LIMA to the OM1  4. No significant mitral regurgitation     COVID 02/02/2022    Diabetic peripheral neuropathy     Essential (primary) hypertension     GERD (gastroesophageal reflux disease)     Insomnia secondary to depression with anxiety 06/17/2021    Mixed hyperlipidemia 03/23/2021    Nicotine dependence     Peripheral vascular disease, unspecified     Sleep apnea     Type 2 diabetes mellitus      Past Surgical History:   Procedure Laterality Date    CORONARY ARTERY BYPASS GRAFT      CORONARY STENT PLACEMENT      DEBRIDEMENT OF FOOT Right 08/10/2023    Dr. Chiu    DEBRIDEMENT OF FOOT Right 8/10/2023    Procedure: DEBRIDEMENT, FOOT;  Surgeon: Evelio Chiu DO;  Location: [facility] OR;  Service: General;  Laterality: Right;    DEBRIDEMENT OF FOOT Right 2/27/2024    Procedure: DEBRIDEMENT, FOOT;  Surgeon: Evelio Chiu DO;  Location: [facility] OR;  Service: General;  Laterality: Right;    DEBRIDEMENT OF LOWER EXTREMITY Right 06/27/2022    Procedure: DEBRIDEMENT, LOWER EXTREMITY;  Surgeon: Forrest Kiser MD;  Location: [facility] OR;  Service: General;  Laterality: Right;  right foot    FOOT AMPUTATION THROUGH METATARSAL Right 3/1/2024    Procedure: AMPUTATION, FOOT, TRANSMETATARSAL; RIGHT LISFRANK AMPUTATION;  Surgeon: Evelio Chiu DO;  Location: [facility] OR;  Service: General;   Laterality: Right;    HIP FRACTURE SURGERY Left     IRRIGATION AND DEBRIDEMENT OF LOWER EXTREMITY Right 09/08/2022    Procedure: IRRIGATION AND DEBRIDEMENT, LOWER EXTREMITY;  Surgeon: Selina Matos MD;  Location: [facility];  Service: General;  Laterality: Right;    IRRIGATION AND DEBRIDEMENT OF LOWER EXTREMITY Right 01/05/2023    Procedure: IRRIGATION AND DEBRIDEMENT, LOWER EXTREMITY;  Surgeon: Evelio Chiu DO;  Location: [facility] OR;  Service: General;  Laterality: Right;    SHOULDER OPEN ROTATOR CUFF REPAIR Left     SPINE SURGERY      TOE AMPUTATION Right 2/27/2024    Procedure: AMPUTATION, TOE;  Surgeon: Evelio Chiu DO;  Location: [facility] OR;  Service: General;  Laterality: Right;  RIGHT 5 TH TOE AND DEBRIDEMENT    VASCULAR SURGERY       Social History     Socioeconomic History    Marital status:    Occupational History    Occupation: Retired   Tobacco Use    Smoking status: Every Day     Current packs/day: 2.50     Average packs/day: 2.5 packs/day for 53.2 years (133.0 ttl pk-yrs)     Types: Cigarettes     Start date: 8/10/1971     Passive exposure: Current    Smokeless tobacco: Never   Substance and Sexual Activity    Alcohol use: Not Currently    Drug use: Yes     Types: Oxycodone    Sexual activity: Yes     Partners: Female     Social Drivers of Health     Financial Resource Strain: Low Risk  (2/29/2024)    Overall Financial Resource Strain (CARDIA)     Difficulty of Paying Living Expenses: Not very hard   Food Insecurity: No Food Insecurity (2/29/2024)    Hunger Vital Sign     Worried About Running Out of Food in the Last Year: Never true     Ran Out of Food in the Last Year: Never true   Transportation Needs: No Transportation Needs (2/29/2024)    PRAPARE - Transportation     Lack of Transportation (Medical): No     Lack of Transportation (Non-Medical): No   Physical Activity: Inactive (2/28/2024)    Exercise Vital Sign     Days of Exercise per Week: 0 days     Minutes of Exercise  "per Session: 0 min   Stress: No Stress Concern Present (2/29/2024)    [employer] - Occupational Stress Questionnaire     Feeling of Stress : Only a little   Recent Concern: Stress - Stress Concern Present (2/28/2024)    [employer] - Occupational Stress Questionnaire     Feeling of Stress : Rather much   Housing Stability: Low Risk  (2/29/2024)    Housing Stability Vital Sign     Unable to Pay for Housing in the Last Year: No     Number of Places Lived in the Last Year: 1     Unstable Housing in the Last Year: No     Family History   Problem Relation Name Age of Onset    Arthritis Mother      Hypertension Mother      Learning disabilities Mother      Alcohol abuse Father      Early death Father      Heart disease Father      Cancer Sister      Diabetes Sister      Heart disease Maternal Grandfather      COPD Paternal Grandfather      Heart disease Son       Review of patient's allergies indicates:  No Known Allergies     Objective:  Vitals:    10/17/24 0835   BP: (!) 177/82   Pulse: 70   Resp: 18   Weight: 73.9 kg (163 lb)   Height: 5' 8" (1.727 m)   PainSc:   8           Physical Exam  Vitals and nursing note reviewed. Exam conducted with a chaperone present.   Constitutional:       General: He is awake. He is not in acute distress.     Appearance: Normal appearance. He is not ill-appearing, toxic-appearing or diaphoretic.   HENT:      Head: Normocephalic and atraumatic.      Nose: Nose normal.      Mouth/Throat:      Mouth: Mucous membranes are moist.      Pharynx: Oropharynx is clear.   Eyes:      Conjunctiva/sclera: Conjunctivae normal.      Pupils: Pupils are equal, round, and reactive to light.   Cardiovascular:      Rate and Rhythm: Normal rate.   Pulmonary:      Effort: Pulmonary effort is normal. No respiratory distress.   Abdominal:      Palpations: Abdomen is soft.   Musculoskeletal:         General: Normal range of motion.      Cervical back: " Normal range of motion and neck supple.      Thoracic back: Tenderness present.      Lumbar back: Tenderness present.   Skin:     General: Skin is warm and dry.      Coloration: Skin is not jaundiced or pale.   Neurological:      General: No focal deficit present.      Mental Status: He is alert and oriented to person, place, and time. Mental status is at baseline.      Cranial Nerves: No cranial nerve deficit (II-XII).   Psychiatric:         Mood and Affect: Mood normal.         Behavior: Behavior normal. Behavior is cooperative.         Thought Content: Thought content normal.           X-Ray Wrist Complete Left  Narrative: EXAMINATION:  XR WRIST COMPLETE 3 VIEWS LEFT    CLINICAL HISTORY:  Unspecified fall, initial encounter    COMPARISON:  None available    TECHNIQUE:  XR WRIST 3 VIEWS LEFT    FINDINGS:  No evidence of fracture seen.  The alignment of the joints appears normal.  Mild wrist degenerative change is present.  No soft tissue abnormality is seen.  Impression: No findings of acute injury.    Electronically signed by: Francisco Felton  Date:    08/30/2024  Time:    07:44         Office Visit on 08/01/2024   Component Date Value Ref Range Status    Sodium 08/01/2024 137  136 - 145 mmol/L Final    Potassium 08/01/2024 4.6  3.5 - 5.1 mmol/L Final    Chloride 08/01/2024 104  98 - 107 mmol/L Final    CO2 08/01/2024 29  21 - 32 mmol/L Final    Anion Gap 08/01/2024 9  7 - 16 mmol/L Final    Glucose 08/01/2024 207 (H)  74 - 106 mg/dL Final    BUN 08/01/2024 32 (H)  7 - 18 mg/dL Final    Creatinine 08/01/2024 1.09  0.70 - 1.30 mg/dL Final    BUN/Creatinine Ratio 08/01/2024 29 (H)  6 - 20 Final    Calcium 08/01/2024 10.2 (H)  8.5 - 10.1 mg/dL Final    Total Protein 08/01/2024 8.0  6.4 - 8.2 g/dL Final    Albumin 08/01/2024 4.2  3.5 - 5.0 g/dL Final    Globulin 08/01/2024 3.8  2.0 - 4.0 g/dL Final    A/G Ratio 08/01/2024 1.1   Final    Bilirubin, Total 08/01/2024 0.4  >0.0 - 1.2 mg/dL Final    Alk Phos 08/01/2024 95   45 - 115 U/L Final    ALT 08/01/2024 32  16 - 61 U/L Final    AST 08/01/2024 17  15 - 37 U/L Final    eGFR 08/01/2024 75  >=60 mL/min/1.73m2 Final    Hemoglobin A1C 08/01/2024 8.3 (H)  4.5 - 6.6 % Final    Estimated Average Glucose 08/01/2024 192  mg/dL Final    WBC 08/01/2024 5.73  4.50 - 11.00 K/uL Final    RBC 08/01/2024 4.43 (L)  4.60 - 6.20 M/uL Final    Hemoglobin 08/01/2024 13.7  13.5 - 18.0 g/dL Final    Hematocrit 08/01/2024 43.0  40.0 - 54.0 % Final    MCV 08/01/2024 97.1 (H)  80.0 - 96.0 fL Final    MCH 08/01/2024 30.9  27.0 - 31.0 pg Final    MCHC 08/01/2024 31.9 (L)  32.0 - 36.0 g/dL Final    RDW 08/01/2024 13.9  11.5 - 14.5 % Final    Platelet Count 08/01/2024 186  150 - 400 K/uL Final    MPV 08/01/2024 11.5  9.4 - 12.4 fL Final    Neutrophils % 08/01/2024 43.7 (L)  53.0 - 65.0 % Final    Lymphocytes % 08/01/2024 42.1 (H)  27.0 - 41.0 % Final    Monocytes % 08/01/2024 6.8 (H)  2.0 - 6.0 % Final    Eosinophils % 08/01/2024 6.1 (H)  1.0 - 4.0 % Final    Basophils % 08/01/2024 1.0  0.0 - 1.0 % Final    Immature Granulocytes % 08/01/2024 0.3  0.0 - 0.4 % Final    nRBC, Auto 08/01/2024 0.0  <=0.0 % Final    Neutrophils, Abs 08/01/2024 2.50  1.80 - 7.70 K/uL Final    Lymphocytes, Absolute 08/01/2024 2.41  1.00 - 4.80 K/uL Final    Monocytes, Absolute 08/01/2024 0.39  0.00 - 0.80 K/uL Final    Eosinophils, Absolute 08/01/2024 0.35  0.00 - 0.50 K/uL Final    Basophils, Absolute 08/01/2024 0.06  0.00 - 0.20 K/uL Final    Immature Granulocytes, Absolute 08/01/2024 0.02  0.00 - 0.04 K/uL Final    nRBC, Absolute 08/01/2024 0.00  <=0.00 x10e3/uL Final    Diff Type 08/01/2024 Auto   Final   Office Visit on 07/17/2024   Component Date Value Ref Range Status    POC Amphetamines 07/17/2024 Negative  Negative, Inconclusive Final    POC Barbiturates 07/17/2024 Negative  Negative, Inconclusive Final    POC Benzodiazepines 07/17/2024 Negative  Negative, Inconclusive Final    POC Cocaine 07/17/2024 Negative  Negative,  Inconclusive Final    POC THC 07/17/2024 Negative  Negative, Inconclusive Final    POC Methadone 07/17/2024 Negative  Negative, Inconclusive Final    POC Methamphetamine 07/17/2024 Negative  Negative, Inconclusive Final    POC Opiates 07/17/2024 Presumptive Positive (A)  Negative, Inconclusive Final    POC Oxycodone 07/17/2024 Negative  Negative, Inconclusive Final    POC Phencyclidine 07/17/2024 Negative  Negative, Inconclusive Final    POC Methylenedioxymethamphetamine * 07/17/2024 Negative  Negative, Inconclusive Final    POC Tricyclic Antidepressants 07/17/2024 Negative  Negative, Inconclusive Final    POC Buprenorphine 07/17/2024 Negative   Final     Acceptable 07/17/2024 Yes   Final    POC Temperature (Urine) 07/17/2024 90   Final   Office Visit on 04/18/2024   Component Date Value Ref Range Status    POC Amphetamines 04/18/2024 Negative  Negative, Inconclusive Final    POC Barbiturates 04/18/2024 Negative  Negative, Inconclusive Final    POC Benzodiazepines 04/18/2024 Negative  Negative, Inconclusive Final    POC Cocaine 04/18/2024 Negative  Negative, Inconclusive Final    POC THC 04/18/2024 Negative  Negative, Inconclusive Final    POC Methadone 04/18/2024 Negative  Negative, Inconclusive Final    POC Methamphetamine 04/18/2024 Negative  Negative, Inconclusive Final    POC Opiates 04/18/2024 Presumptive Positive (A)  Negative, Inconclusive Final    POC Oxycodone 04/18/2024 Negative  Negative, Inconclusive Final    POC Phencyclidine 04/18/2024 Negative  Negative, Inconclusive Final    POC Methylenedioxymethamphetamine * 04/18/2024 Negative  Negative, Inconclusive Final    POC Tricyclic Antidepressants 04/18/2024 Negative  Negative, Inconclusive Final    POC Buprenorphine 04/18/2024 Negative   Final     Acceptable 04/18/2024 Yes   Final    POC Temperature (Urine) 04/18/2024 94   Final         Orders Placed This Encounter   Procedures    POCT Urine Drug Screen Presump      Interpretive Information:     Negative:  No drug detected at the cut off level.   Positive:  This result represents presumptive positive for the   tested drug, other substances may yield a positive response other   than the analyte of interest. This result should be utilized for   diagnostic purpose only. Confirmation testing will be performed upon physician request only.          Requested Prescriptions     Signed Prescriptions Disp Refills    DULoxetine (CYMBALTA) 30 MG capsule 60 capsule 2     Sig: Take 1 capsule (30 mg total) by mouth 2 (two) times daily.    gabapentin (NEURONTIN) 400 MG capsule 180 capsule 2     Si mg, 2 tablets by mouth q.6 hours    HYDROcodone-acetaminophen (NORCO)  mg per tablet 120 tablet 0     Sig: Take 1 tablet by mouth every 6 (six) hours as needed for Pain (pain).    HYDROcodone-acetaminophen (NORCO)  mg per tablet 120 tablet 0     Sig: Take 1 tablet by mouth every 6 (six) hours as needed for Pain.    HYDROcodone-acetaminophen (NORCO)  mg per tablet 120 tablet 0     Sig: Take 1 tablet by mouth every 6 (six) hours as needed for Pain (pain).       Assessment:     1. Neuropathy    2. Depression, unspecified depression type    3. Peripheral vascular disease, unspecified    4. Hip pain, left status post left hip replacement    5. Ulcer of right foot with necrosis of muscle    6. Encounter for long-term (current) use of medications           A's of Opioid Risk Assessment  Activity:Patient can perform ADL.   Analgesia:Patients pain is partially controlled by current medication. Patient has tried OTC medications such as Tylenol and Ibuprofen with out relief.   Adverse Effects: Patient denies constipation or sedation.  Aberrant Behavior:  reviewed with no aberrant drug seeking/taking behavior.  Overdose reversal drug naloxone discussed    Drug screen reviewed      X-ray pelvis and hips [facility] 2023  Acetabular fracture repair present with appearance  similar to previous.  No other evidence of fracture seen.  The alignment of the joints appears normal.  No degenerative change is present.  No soft tissue abnormality is seen.          Plan:    Narcan March 2023    Can not tolerate Lyrica      Chronic left hip pain left groin pain after left after acetabular reconstruction right foot reconstruction [facility]    Was recommended further surgery for his left hip due to comorbidities surgery was postpone       Complaint of increasing burning numbness needle sensation bilateral feet diabetic neuropathy requesting to increase gabapentin     Increase gabapentin 4 mg 2 tablets by mouth q.6 hours    Patient verbalized understanding of this does not improve his discomfort will need to consider sympathetic nerve block/spinal cord stimulator    Continue home exercise program as tolerated     Continue current medication     Follow-up 3 months     Dr. Dboson, July 2025    Bring original prescription medication bottles/container/box with labels to each visit

## 2024-10-14 ENCOUNTER — TELEPHONE (OUTPATIENT)
Dept: PAIN MEDICINE | Facility: CLINIC | Age: 64
End: 2024-10-14
Payer: MEDICARE

## 2024-10-14 RX ORDER — GABAPENTIN 300 MG/1
CAPSULE ORAL
Qty: 360 CAPSULE | Refills: 2 | Status: SHIPPED | OUTPATIENT
Start: 2024-10-14

## 2024-10-14 NOTE — TELEPHONE ENCOUNTER
Called patient to let him know that a refill on his Gabapentin was sent into the pharmacy. No answer, left voicemail.

## 2024-10-17 ENCOUNTER — OFFICE VISIT (OUTPATIENT)
Dept: PAIN MEDICINE | Facility: CLINIC | Age: 64
End: 2024-10-17
Payer: MEDICARE

## 2024-10-17 VITALS
HEIGHT: 68 IN | DIASTOLIC BLOOD PRESSURE: 82 MMHG | SYSTOLIC BLOOD PRESSURE: 177 MMHG | WEIGHT: 163 LBS | BODY MASS INDEX: 24.71 KG/M2 | RESPIRATION RATE: 18 BRPM | HEART RATE: 70 BPM

## 2024-10-17 DIAGNOSIS — I73.9 PERIPHERAL VASCULAR DISEASE, UNSPECIFIED: Chronic | ICD-10-CM

## 2024-10-17 DIAGNOSIS — M25.552 HIP PAIN, LEFT: Chronic | ICD-10-CM

## 2024-10-17 DIAGNOSIS — L97.513 ULCER OF RIGHT FOOT WITH NECROSIS OF MUSCLE: Chronic | ICD-10-CM

## 2024-10-17 DIAGNOSIS — Z79.899 ENCOUNTER FOR LONG-TERM (CURRENT) USE OF MEDICATIONS: ICD-10-CM

## 2024-10-17 DIAGNOSIS — F32.A DEPRESSION, UNSPECIFIED DEPRESSION TYPE: ICD-10-CM

## 2024-10-17 DIAGNOSIS — G62.9 NEUROPATHY: Primary | Chronic | ICD-10-CM

## 2024-10-17 PROCEDURE — 99999 PR PBB SHADOW E&M-EST. PATIENT-LVL V: CPT | Mod: PBBFAC,,, | Performed by: PHYSICIAN ASSISTANT

## 2024-10-17 PROCEDURE — 99214 OFFICE O/P EST MOD 30 MIN: CPT | Mod: S$PBB,,, | Performed by: PHYSICIAN ASSISTANT

## 2024-10-17 PROCEDURE — 99999PBSHW POCT URINE DRUG SCREEN PRESUMP: Mod: PBBFAC,,,

## 2024-10-17 PROCEDURE — 99215 OFFICE O/P EST HI 40 MIN: CPT | Mod: PBBFAC | Performed by: PHYSICIAN ASSISTANT

## 2024-10-17 PROCEDURE — 80305 DRUG TEST PRSMV DIR OPT OBS: CPT | Mod: PBBFAC | Performed by: PHYSICIAN ASSISTANT

## 2024-10-17 RX ORDER — DULOXETIN HYDROCHLORIDE 30 MG/1
30 CAPSULE, DELAYED RELEASE ORAL 2 TIMES DAILY
Qty: 60 CAPSULE | Refills: 2 | Status: SHIPPED | OUTPATIENT
Start: 2024-10-17

## 2024-10-17 RX ORDER — GABAPENTIN 400 MG/1
CAPSULE ORAL
Qty: 180 CAPSULE | Refills: 2 | Status: SHIPPED | OUTPATIENT
Start: 2024-10-17

## 2024-10-17 RX ORDER — HYDROCODONE BITARTRATE AND ACETAMINOPHEN 10; 325 MG/1; MG/1
1 TABLET ORAL EVERY 6 HOURS PRN
Qty: 120 TABLET | Refills: 0 | Status: SHIPPED | OUTPATIENT
Start: 2024-12-18 | End: 2025-01-17

## 2024-10-17 RX ORDER — HYDROCODONE BITARTRATE AND ACETAMINOPHEN 10; 325 MG/1; MG/1
1 TABLET ORAL EVERY 6 HOURS PRN
Qty: 120 TABLET | Refills: 0 | Status: SHIPPED | OUTPATIENT
Start: 2024-10-19

## 2024-10-17 RX ORDER — HYDROCODONE BITARTRATE AND ACETAMINOPHEN 10; 325 MG/1; MG/1
1 TABLET ORAL EVERY 6 HOURS PRN
Qty: 120 TABLET | Refills: 0 | Status: SHIPPED | OUTPATIENT
Start: 2024-11-18 | End: 2024-12-18

## 2024-11-05 DIAGNOSIS — I20.9 ANGINA PECTORIS: ICD-10-CM

## 2024-11-05 DIAGNOSIS — I10 ESSENTIAL HYPERTENSION: ICD-10-CM

## 2024-11-05 DIAGNOSIS — Z79.4 TYPE 2 DIABETES MELLITUS WITH BOTH EYES AFFECTED BY MILD NONPROLIFERATIVE RETINOPATHY AND MACULAR EDEMA, WITH LONG-TERM CURRENT USE OF INSULIN: ICD-10-CM

## 2024-11-05 DIAGNOSIS — E11.3213 TYPE 2 DIABETES MELLITUS WITH BOTH EYES AFFECTED BY MILD NONPROLIFERATIVE RETINOPATHY AND MACULAR EDEMA, WITH LONG-TERM CURRENT USE OF INSULIN: ICD-10-CM

## 2024-11-05 RX ORDER — FLASH GLUCOSE SENSOR
KIT MISCELLANEOUS
Qty: 1 KIT | Refills: 6 | Status: SHIPPED | OUTPATIENT
Start: 2024-11-05

## 2024-11-05 RX ORDER — METOPROLOL SUCCINATE 50 MG/1
50 TABLET, EXTENDED RELEASE ORAL DAILY
Qty: 90 TABLET | Refills: 1 | Status: SHIPPED | OUTPATIENT
Start: 2024-11-05 | End: 2025-11-05

## 2024-11-05 RX ORDER — LISINOPRIL 10 MG/1
10 TABLET ORAL DAILY
Qty: 90 TABLET | Refills: 1 | Status: SHIPPED | OUTPATIENT
Start: 2024-11-05

## 2024-11-05 NOTE — TELEPHONE ENCOUNTER
----- Message from Minnie sent at 11/5/2024 12:10 PM CST -----  Regarding: Med Refill  Contact: Patient  Pt needs: Matt 2, metoprolol succinate (TOPROL-XL) 50 MG 24 hr tablet, lisinopriL 10 MG tablet    Pharmacy: [facility]    Phone #: [phone] (I)

## 2024-11-27 ENCOUNTER — OFFICE VISIT (OUTPATIENT)
Dept: FAMILY MEDICINE | Facility: CLINIC | Age: 64
End: 2024-11-27
Payer: MEDICARE

## 2024-11-27 VITALS
TEMPERATURE: 97 F | SYSTOLIC BLOOD PRESSURE: 183 MMHG | BODY MASS INDEX: 24.25 KG/M2 | HEART RATE: 62 BPM | HEIGHT: 68 IN | OXYGEN SATURATION: 96 % | DIASTOLIC BLOOD PRESSURE: 80 MMHG | WEIGHT: 160 LBS

## 2024-11-27 DIAGNOSIS — E11.51 TYPE 2 DIABETES MELLITUS WITH DIABETIC PERIPHERAL ANGIOPATHY WITHOUT GANGRENE, WITH LONG-TERM CURRENT USE OF INSULIN: Primary | ICD-10-CM

## 2024-11-27 DIAGNOSIS — Z23 NEED FOR IMMUNIZATION AGAINST INFLUENZA: ICD-10-CM

## 2024-11-27 DIAGNOSIS — E78.5 HYPERLIPIDEMIA, UNSPECIFIED HYPERLIPIDEMIA TYPE: ICD-10-CM

## 2024-11-27 DIAGNOSIS — J44.9 CHRONIC OBSTRUCTIVE PULMONARY DISEASE, UNSPECIFIED COPD TYPE: ICD-10-CM

## 2024-11-27 DIAGNOSIS — Z79.4 TYPE 2 DIABETES MELLITUS WITH DIABETIC PERIPHERAL ANGIOPATHY WITHOUT GANGRENE, WITH LONG-TERM CURRENT USE OF INSULIN: Primary | ICD-10-CM

## 2024-11-27 DIAGNOSIS — I10 ESSENTIAL HYPERTENSION: ICD-10-CM

## 2024-11-27 PROCEDURE — 90653 IIV ADJUVANT VACCINE IM: CPT | Mod: ,,, | Performed by: FAMILY MEDICINE

## 2024-11-27 PROCEDURE — 99214 OFFICE O/P EST MOD 30 MIN: CPT | Mod: ,,, | Performed by: FAMILY MEDICINE

## 2024-11-27 PROCEDURE — G0008 ADMIN INFLUENZA VIRUS VAC: HCPCS | Mod: ,,, | Performed by: FAMILY MEDICINE

## 2024-11-27 RX ORDER — ALBUTEROL SULFATE 90 UG/1
2 INHALANT RESPIRATORY (INHALATION) 2 TIMES DAILY PRN
Qty: 18 G | Refills: 2 | Status: SHIPPED | OUTPATIENT
Start: 2024-11-27

## 2024-11-27 RX ORDER — ALBUTEROL SULFATE 0.83 MG/ML
2.5 SOLUTION RESPIRATORY (INHALATION) EVERY 6 HOURS PRN
Qty: 90 EACH | Refills: 11 | Status: SHIPPED | OUTPATIENT
Start: 2024-11-27

## 2024-11-27 NOTE — PROGRESS NOTES
Navdeep Avery is a 64 y.o. male seen today for follow-up on his type 2 diabetes.  Unfortunately labs not available today and the patient will return to the clinic fasting for lab work next week.  Patient denies any chest pain and has had no shortness a breath above baseline.  He is due for his flu vaccination today.  The patient is overdue for his diabetic eye exam which I have ordered today.    Past Medical History:   Diagnosis Date    Arthritis     Carpal tunnel syndrome     Charcot foot due to diabetes mellitus 09/29/2023    Prescription for CROW orthotic boot given today    COPD (chronic obstructive pulmonary disease)     Coronary artery disease involving coronary bypass graft of native heart 06/27/2023    CABG 2018 (No OP report available)     Most recent left heart catheterization 08/12/2019  1. Normal LV size apical akinesis and overall normal LV systolic function, ejection fraction 55%  2. Patent saphenous vein graft to the right PDA with patent jump graft to the OM branch left circumflex  3. Patent LIMA to the OM1  4. No significant mitral regurgitation     COVID 02/02/2022    Diabetic peripheral neuropathy     Essential (primary) hypertension     GERD (gastroesophageal reflux disease)     Insomnia secondary to depression with anxiety 06/17/2021    Mixed hyperlipidemia 03/23/2021    Nicotine dependence     Peripheral vascular disease, unspecified     Sleep apnea     Type 2 diabetes mellitus      Family History   Problem Relation Name Age of Onset    Arthritis Mother      Hypertension Mother      Learning disabilities Mother      Alcohol abuse Father      Early death Father      Heart disease Father      Cancer Sister      Diabetes Sister      Heart disease Maternal Grandfather      COPD Paternal Grandfather      Heart disease Son       Current Outpatient Medications on File Prior to Visit   Medication Sig Dispense Refill    amLODIPine (NORVASC) 10 MG tablet Take 1 tablet (10 mg total) by mouth once  "daily. 90 tablet 1    aspirin 81 MG Chew Take 81 mg by mouth once daily.      atorvastatin (LIPITOR) 80 MG tablet Take 1 tablet (80 mg total) by mouth every evening. 90 tablet 1    clopidogreL (PLAVIX) 75 mg tablet TAKE 1 TABLET BY MOUTH once DAILY 90 tablet 1    diclofenac sodium (VOLTAREN) 1 % Gel Apply 1 g topically 4 (four) times daily as needed (pain). 20 g 1    DULoxetine (CYMBALTA) 30 MG capsule Take 1 capsule (30 mg total) by mouth 2 (two) times daily. 60 capsule 2    empagliflozin-metformin 10-1,000 mg TBph Take 1 tablet by mouth Daily. 90 tablet 1    flash glucose scanning reader (FREESTYLE CT 2 READER) [facility] Use to monitor blood glucose 1 each 1    flash glucose sensor (FREESTYLE CT 2 SENSOR) Kit Use to monitor blood sugar and replace unit every 2 weeks 1 kit 6    fluticasone propion-salmeterol 115-21 mcg/dose (ADVAIR HFA) 115-21 mcg/actuation HFAA inhaler Inhale 2 puffs into the lungs every 12 (twelve) hours. Controller 12 g 2    gabapentin (NEURONTIN) 300 MG capsule 900 mg  q.i.d. or 300 mg 3 tablets q.i.d. 360 capsule 2    gabapentin (NEURONTIN) 400 MG capsule 400 mg, 2 tablets by mouth q.6 hours 180 capsule 2    HYDROcodone-acetaminophen (NORCO)  mg per tablet Take 1 tablet by mouth every 6 (six) hours as needed for Pain (pain). 120 tablet 0    HYDROcodone-acetaminophen (NORCO)  mg per tablet Take 1 tablet by mouth every 6 (six) hours as needed for Pain. 120 tablet 0    [START ON 12/18/2024] HYDROcodone-acetaminophen (NORCO)  mg per tablet Take 1 tablet by mouth every 6 (six) hours as needed for Pain (pain). 120 tablet 0    hydrOXYzine pamoate (VISTARIL) 25 MG Cap Take 1 capsule (25 mg total) by mouth once daily. Take nightly for sleep 90 capsule 1    insulin degludec (TRESIBA FLEXTOUCH U-200) 200 unit/mL (3 mL) insulin pen Inject 10 Units into the skin once daily. 3 pen 2    insulin syringe-needle U-100 (BD INSULIN SYRINGE ULTRA-FINE) 0.3 mL 30 gauge x 1/2" Syrg Use one syringe " twice daily with insulin injection for diabetes 100 each 5    isosorbide mononitrate (IMDUR) 30 MG 24 hr tablet Take 1 tablet (30 mg total) by mouth once daily. 30 tablet 11    LIDOcaine (LIDODERM) 5 % 1 patch once daily.      lisinopriL 10 MG tablet Take 1 tablet (10 mg total) by mouth once daily. 90 tablet 1    metoprolol succinate (TOPROL-XL) 50 MG 24 hr tablet Take 1 tablet (50 mg total) by mouth once daily. 90 tablet 1    nitroGLYCERIN (NITROSTAT) 0.4 MG SL tablet Place 1 tablet (0.4 mg total) under the tongue every 5 (five) minutes as needed for Chest pain. 30 tablet 2    pantoprazole (PROTONIX) 40 MG tablet Take 1 tablet (40 mg total) by mouth once daily. 90 tablet 1    [DISCONTINUED] albuterol (PROVENTIL) 2.5 mg /3 mL (0.083 %) nebulizer solution USE 1 VIAL IN NEBULIZER 3 TIMES DAILY 90 each 11    [DISCONTINUED] albuterol (PROVENTIL/VENTOLIN HFA) 90 mcg/actuation inhaler Inhale 2 puffs into the lungs 2 (two) times daily as needed for Wheezing. 18 g 2    docusate sodium (COLACE) 100 MG capsule Take 1 capsule (100 mg total) by mouth 2 (two) times daily. (Patient not taking: Reported on 4/24/2024) 28 capsule 0    [DISCONTINUED] LEVEMIR FLEXTOUCH U100 INSULIN 100 unit/mL (3 mL) InPn pen Inject 10 units subcutaneously every morning, inject 20 units subcutaneously every evening. 9 mL 2     No current facility-administered medications on file prior to visit.     Immunization History   Administered Date(s) Administered    Influenza - Quadrivalent - PF *Preferred* (6 months and older) 09/14/2021, 10/21/2022    Pneumococcal Conjugate - 13 Valent 10/25/2017    Pneumococcal Conjugate - 20 Valent 02/17/2023    Pneumococcal Polysaccharide - 23 Valent 10/19/2016    Tdap 12/20/2019       Review of Systems   Constitutional:  Negative for fever, malaise/fatigue and weight loss.   Respiratory:  Positive for shortness of breath.    Cardiovascular:  Negative for chest pain and palpitations.   Gastrointestinal:  Negative for  nausea and vomiting.   Psychiatric/Behavioral:  Negative for depression.         Vitals:    11/27/24 0814   BP: (!) 183/80   Pulse: 62   Temp: 97.4 °F (36.3 °C)       Physical Exam  Vitals reviewed.   Constitutional:       Appearance: Normal appearance.   HENT:      Head: Normocephalic.   Eyes:      Extraocular Movements: Extraocular movements intact.      Conjunctiva/sclera: Conjunctivae normal.      Pupils: Pupils are equal, round, and reactive to light.   Neck:      Thyroid: No thyroid mass or thyromegaly.   Cardiovascular:      Rate and Rhythm: Normal rate and regular rhythm.      Heart sounds: Normal heart sounds. No murmur heard.     No gallop.   Pulmonary:      Effort: Pulmonary effort is normal. No respiratory distress.      Breath sounds: Decreased air movement present. Decreased breath sounds present. No wheezing or rales.   Skin:     General: Skin is warm and dry.      Coloration: Skin is not jaundiced or pale.   Neurological:      Mental Status: He is alert.   Psychiatric:         Mood and Affect: Mood normal.         Behavior: Behavior normal.         Thought Content: Thought content normal.         Judgment: Judgment normal.          Assessment and Plan  1. Type 2 diabetes mellitus with diabetic peripheral angiopathy without gangrene, with long-term current use of insulin  -     Hemoglobin A1C; Future; Expected date: 12/04/2024  -     Ambulatory referral/consult to Ophthalmology; Future; Expected date: 12/04/2024    2. Chronic obstructive pulmonary disease, unspecified COPD type  -     albuterol (PROVENTIL/VENTOLIN HFA) 90 mcg/actuation inhaler; Inhale 2 puffs into the lungs 2 (two) times daily as needed for Wheezing.  Dispense: 18 g; Refill: 2  -     albuterol (PROVENTIL) 2.5 mg /3 mL (0.083 %) nebulizer solution; Take 3 mLs (2.5 mg total) by nebulization every 6 (six) hours as needed for Wheezing or Shortness of Breath. Rescue  Dispense: 90 each; Refill: 11    3. Need for immunization against  influenza  -     influenza (adjuvanted) (Fluad) 45 mcg/0.5 mL IM vaccine (> or = 66 yo) 0.5 mL    4. Essential hypertension  -     CBC Auto Differential; Future; Expected date: 12/04/2024  -     Comprehensive Metabolic Panel; Future; Expected date: 12/04/2024    5. Hyperlipidemia, unspecified hyperlipidemia type  -     Lipid Panel; Future; Expected date: 12/04/2024             Return to clinic in 3 months or as needed once his blood work is in.    Health Maintenance Topics with due status: Not Due       Topic Last Completion Date    TETANUS VACCINE 12/20/2019    Foot Exam 05/30/2024    High Dose Statin 11/27/2024    Aspirin/Antiplatelet Therapy 11/27/2024

## 2024-12-04 ENCOUNTER — TELEPHONE (OUTPATIENT)
Dept: FAMILY MEDICINE | Facility: CLINIC | Age: 64
End: 2024-12-04
Payer: MEDICARE

## 2024-12-04 DIAGNOSIS — D64.9 ANEMIA, UNSPECIFIED TYPE: Primary | ICD-10-CM

## 2024-12-04 NOTE — TELEPHONE ENCOUNTER
----- Message from Marquez Huff MD sent at 12/4/2024  8:08 AM CST -----  Please add iron studies for worsening anemia.  Office visit for diabetes and LDL

## 2024-12-09 ENCOUNTER — OFFICE VISIT (OUTPATIENT)
Dept: CARDIOLOGY | Facility: CLINIC | Age: 64
End: 2024-12-09
Payer: MEDICARE

## 2024-12-09 VITALS
DIASTOLIC BLOOD PRESSURE: 72 MMHG | OXYGEN SATURATION: 96 % | HEIGHT: 67 IN | SYSTOLIC BLOOD PRESSURE: 150 MMHG | WEIGHT: 161.38 LBS | BODY MASS INDEX: 25.33 KG/M2 | HEART RATE: 72 BPM

## 2024-12-09 DIAGNOSIS — Z95.1 S/P CABG (CORONARY ARTERY BYPASS GRAFT): Chronic | ICD-10-CM

## 2024-12-09 DIAGNOSIS — I25.708 CORONARY ARTERY DISEASE OF BYPASS GRAFT OF NATIVE HEART WITH STABLE ANGINA PECTORIS: ICD-10-CM

## 2024-12-09 DIAGNOSIS — I10 BENIGN HYPERTENSION: ICD-10-CM

## 2024-12-09 DIAGNOSIS — E78.2 MIXED HYPERLIPIDEMIA: ICD-10-CM

## 2024-12-09 DIAGNOSIS — I25.10 CORONARY ARTERY DISEASE, UNSPECIFIED VESSEL OR LESION TYPE, UNSPECIFIED WHETHER ANGINA PRESENT, UNSPECIFIED WHETHER NATIVE OR TRANSPLANTED HEART: Primary | ICD-10-CM

## 2024-12-09 DIAGNOSIS — I20.89 STABLE ANGINA: ICD-10-CM

## 2024-12-09 PROCEDURE — 99214 OFFICE O/P EST MOD 30 MIN: CPT | Mod: S$PBB,,, | Performed by: NURSE PRACTITIONER

## 2024-12-09 PROCEDURE — 99999 PR PBB SHADOW E&M-EST. PATIENT-LVL IV: CPT | Mod: PBBFAC,,, | Performed by: NURSE PRACTITIONER

## 2024-12-09 PROCEDURE — 99214 OFFICE O/P EST MOD 30 MIN: CPT | Mod: PBBFAC | Performed by: NURSE PRACTITIONER

## 2024-12-09 NOTE — PROGRESS NOTES
PCP: Marquez Huff MD    Referring Provider:   Chief Complaint   Patient presents with    Edema     Goes down at night.    Coronary Artery Disease       Subjective:   Navdeep Avery is a 64 y.o. male with hx of MICAD s/p CABG (2018), DM, type II, HTN, HLD,  SAMEERA and GERD, who presents for routine follow up. He has no cardiac complaints other than mild BLE that resolves at HS.     4/24/2024 presents for 3 month follow up. He has had no syncopal or near syncopal issues. He dose get dizzy at times and his BP yesterday as 90/40 mmHg. He admits that he drinks 2-3 POTS of coffee per day and no water.     1/24/2024 presents for routine follow up. Patient reports 4 episodes of stable angina in the last month. He also reports reports a syncopal episode 3 months ago. He was standing up using the remote control for the TV and the next thing he knew he was hitting his head on the floor. He awakened alert and oriented. He did not seek medical attention or check his glucose. He and his wife suspect that this was r/t hypoglycemia but they did not check his glucose or his BP. He has had no further issues. He had an unremarkable echo and Lexiscan 6 months ago. He is to seek medical attention or at least check his glucose and bp if this occurs again.     8/19/2023 presents for 2 month follow up. He deneis chest discomfort. He shane have SHANE which is chronic and stable. He feels SHANE is r/t anxiety. He had decreased his smoking to 2-3 cigarettes per day but is now back to smoking as much as he did before.     6/27/2023 presents for routine follow up. He has been lost to cardiology follow up since 5/26/2021. He reports that he is concerned regarding an episode of right scapular pain that occurred over the weekend.  He describes it as an aching pain to his right scapula that was continuous for 2 days.  He did not seek medical attention.  He does recognize this as his anginal equivalent prior to 1 of his heart attacks.  He is also  had daily issues with indigestion that is also 1 of his anginal equivalent prior to a previous heart attack.      Fhx:  Family History   Problem Relation Name Age of Onset    Arthritis Mother      Hypertension Mother      Learning disabilities Mother      Alcohol abuse Father      Early death Father      Heart disease Father      Cancer Sister      Diabetes Sister      Heart disease Maternal Grandfather      COPD Paternal Grandfather      Heart disease Son       Shx:   Social History     Socioeconomic History    Marital status:    Occupational History    Occupation: Retired   Tobacco Use    Smoking status: Every Day     Current packs/day: 2.50     Average packs/day: 2.5 packs/day for 53.3 years (133.3 ttl pk-yrs)     Types: Cigarettes     Start date: 8/10/1971     Passive exposure: Current    Smokeless tobacco: Never   Substance and Sexual Activity    Alcohol use: Not Currently    Drug use: Yes     Types: Oxycodone    Sexual activity: Yes     Partners: Female     Social Drivers of Health     Financial Resource Strain: Low Risk  (2/29/2024)    Overall Financial Resource Strain (CARDIA)     Difficulty of Paying Living Expenses: Not very hard   Food Insecurity: No Food Insecurity (2/29/2024)    Hunger Vital Sign     Worried About Running Out of Food in the Last Year: Never true     Ran Out of Food in the Last Year: Never true   Transportation Needs: No Transportation Needs (2/29/2024)    PRAPARE - Transportation     Lack of Transportation (Medical): No     Lack of Transportation (Non-Medical): No   Physical Activity: Inactive (2/28/2024)    Exercise Vital Sign     Days of Exercise per Week: 0 days     Minutes of Exercise per Session: 0 min   Stress: No Stress Concern Present (2/29/2024)    [employer] - Occupational Stress Questionnaire     Feeling of Stress : Only a little   Recent Concern: Stress - Stress Concern Present (2/28/2024)    [employer] -  Occupational Stress Questionnaire     Feeling of Stress : Rather much   Housing Stability: Low Risk  (2024)    Housing Stability Vital Sign     Unable to Pay for Housing in the Last Year: No     Number of Places Lived in the Last Year: 1     Unstable Housing in the Last Year: No       EK2024 RSR with HR 70 bpm; inferior infarct sited on or before 2021; anterolateral infarct sited on or before 2021; no significant change compared to EKG 2024 RSR with HR 74 bpm; NSTWA   2023 sinus bradycardia; HR 58 bpm; minimal voltage criteria for LVH; inferior infarct; anterior infarct; lateral TWA    2021 RSR; HR 64 bpm;  inferior infarct; anterior infarct; lateral TWA     Lexican 2023  Dense, modertae size apical perfusion defect c/w a scar but not ischemia  Normal LV size with apical septal hypokinesis and overall normal LVSF, EF 53%    Results for orders placed during the hospital encounter of 23    Echo Saline Bubble? No    Interpretation Summary  · The left ventricle is normal in size with  · Atrial fibrillation not observed.  · Normal right ventricular size.  · Mild mitral regurgitation.     Echocardiogram 2019  1. Normal chamber size with hypokinetic mid and basal inferior wall and mid and apical anterior wall and LV apex. Overall LVSF appeared to be at the low end of nomral range with estimated LVEF 50-55%  2. Mild or mild to moderate MR   3. Aortic valve appears tricuspid and mildly calcified and sclerotic but maintained normal opening  4. Trivial aortic regurgitation  5. Trace TR with est RVSP 39 mmHg  6. Mild PI  7. Impaired LV relaxation           Heart cath- most recent 2019  1. Severe 3 vessel CAD  2. Normal LV size with apical akinesis and overall normal LVSF, EF 55%  3. Patent SVG to the r-PDA with patent jump graft to the OM branch of the LCx  4. Patent LIMA to the OM1  5. No significant MR  ECHO No results found for this or any previous  visit.            Lab Results   Component Value Date     12/03/2024    K 4.3 12/03/2024     12/03/2024    CO2 29 12/03/2024    BUN 14 12/03/2024    CREATININE 0.88 12/03/2024    CALCIUM 9.2 12/03/2024    ANIONGAP 12 12/03/2024    ESTGFRAFRICA 130 01/25/2021    EGFRNONAA 66 06/29/2022       Lab Results   Component Value Date    CHOL 184 12/03/2024    CHOL 147 01/05/2023    CHOL 161 07/06/2022     Lab Results   Component Value Date    HDL 34 (L) 12/03/2024    HDL 39 (L) 01/05/2023    HDL 38 (L) 07/06/2022     Lab Results   Component Value Date    LDLCALC 91 12/03/2024    LDLCALC 86 01/05/2023    LDLCALC 91 07/06/2022     Lab Results   Component Value Date    TRIG 293 (H) 12/03/2024    TRIG 110 01/05/2023    TRIG 161 (H) 07/06/2022     Lab Results   Component Value Date    CHOLHDL 5.4 12/03/2024    CHOLHDL 3.8 01/05/2023    CHOLHDL 4.2 07/06/2022       Lab Results   Component Value Date    WBC 5.64 12/03/2024    HGB 13.3 (L) 12/03/2024    HCT 42.5 12/03/2024    MCV 98.6 (H) 12/03/2024     (L) 12/03/2024           Current Outpatient Medications:     albuterol (PROVENTIL) 2.5 mg /3 mL (0.083 %) nebulizer solution, Take 3 mLs (2.5 mg total) by nebulization every 6 (six) hours as needed for Wheezing or Shortness of Breath. Rescue, Disp: 90 each, Rfl: 11    albuterol (PROVENTIL/VENTOLIN HFA) 90 mcg/actuation inhaler, Inhale 2 puffs into the lungs 2 (two) times daily as needed for Wheezing., Disp: 18 g, Rfl: 2    amLODIPine (NORVASC) 10 MG tablet, Take 1 tablet (10 mg total) by mouth once daily., Disp: 90 tablet, Rfl: 1    aspirin 81 MG Chew, Take 81 mg by mouth once daily., Disp: , Rfl:     atorvastatin (LIPITOR) 80 MG tablet, Take 1 tablet (80 mg total) by mouth every evening., Disp: 90 tablet, Rfl: 1    clopidogreL (PLAVIX) 75 mg tablet, TAKE 1 TABLET BY MOUTH once DAILY, Disp: 90 tablet, Rfl: 1    diclofenac sodium (VOLTAREN) 1 % Gel, Apply 1 g topically 4 (four) times daily as needed (pain)., Disp: 20  "g, Rfl: 1    docusate sodium (COLACE) 100 MG capsule, Take 1 capsule (100 mg total) by mouth 2 (two) times daily. (Patient not taking: Reported on 4/24/2024), Disp: 28 capsule, Rfl: 0    DULoxetine (CYMBALTA) 30 MG capsule, Take 1 capsule (30 mg total) by mouth 2 (two) times daily., Disp: 60 capsule, Rfl: 2    empagliflozin-metformin 10-1,000 mg TBph, Take 1 tablet by mouth Daily., Disp: 90 tablet, Rfl: 1    flash glucose scanning reader (FREESTYLE CT 2 READER) Misc, Use to monitor blood glucose, Disp: 1 each, Rfl: 1    flash glucose sensor (FREESTYLE CT 2 SENSOR) Kit, Use to monitor blood sugar and replace unit every 2 weeks, Disp: 1 kit, Rfl: 6    fluticasone propion-salmeterol 115-21 mcg/dose (ADVAIR HFA) 115-21 mcg/actuation HFAA inhaler, Inhale 2 puffs into the lungs every 12 (twelve) hours. Controller, Disp: 12 g, Rfl: 2    gabapentin (NEURONTIN) 300 MG capsule, 900 mg  q.i.d. or 300 mg 3 tablets q.i.d., Disp: 360 capsule, Rfl: 2    gabapentin (NEURONTIN) 400 MG capsule, 400 mg, 2 tablets by mouth q.6 hours, Disp: 180 capsule, Rfl: 2    HYDROcodone-acetaminophen (NORCO)  mg per tablet, Take 1 tablet by mouth every 6 (six) hours as needed for Pain (pain)., Disp: 120 tablet, Rfl: 0    HYDROcodone-acetaminophen (NORCO)  mg per tablet, Take 1 tablet by mouth every 6 (six) hours as needed for Pain., Disp: 120 tablet, Rfl: 0    [START ON 12/18/2024] HYDROcodone-acetaminophen (NORCO)  mg per tablet, Take 1 tablet by mouth every 6 (six) hours as needed for Pain (pain)., Disp: 120 tablet, Rfl: 0    hydrOXYzine pamoate (VISTARIL) 25 MG Cap, Take 1 capsule (25 mg total) by mouth once daily. Take nightly for sleep, Disp: 90 capsule, Rfl: 1    insulin degludec (TRESIBA FLEXTOUCH U-200) 200 unit/mL (3 mL) insulin pen, Inject 10 Units into the skin once daily., Disp: 3 pen , Rfl: 2    insulin syringe-needle U-100 (BD INSULIN SYRINGE ULTRA-FINE) 0.3 mL 30 gauge x 1/2" Syrg, Use one syringe twice daily " "with insulin injection for diabetes, Disp: 100 each, Rfl: 5    isosorbide mononitrate (IMDUR) 30 MG 24 hr tablet, Take 1 tablet (30 mg total) by mouth once daily., Disp: 30 tablet, Rfl: 11    LIDOcaine (LIDODERM) 5 %, 1 patch once daily., Disp: , Rfl:     lisinopriL 10 MG tablet, Take 1 tablet (10 mg total) by mouth once daily., Disp: 90 tablet, Rfl: 1    metoprolol succinate (TOPROL-XL) 50 MG 24 hr tablet, Take 1 tablet (50 mg total) by mouth once daily., Disp: 90 tablet, Rfl: 1    nitroGLYCERIN (NITROSTAT) 0.4 MG SL tablet, Place 1 tablet (0.4 mg total) under the tongue every 5 (five) minutes as needed for Chest pain., Disp: 30 tablet, Rfl: 2    pantoprazole (PROTONIX) 40 MG tablet, Take 1 tablet (40 mg total) by mouth once daily., Disp: 90 tablet, Rfl: 1  Meds reviewed but not reconciled. He did not bring his meds today.      Review of Systems   Respiratory:  Negative for shortness of breath.    Cardiovascular:  Positive for leg swelling. Negative for chest pain, palpitations, orthopnea, claudication and PND.        Resolves at HS   Neurological:  Negative for dizziness, loss of consciousness and weakness.           Objective:   BP (!) 150/72 (BP Location: Left arm, Patient Position: Sitting)   Pulse 72   Ht 5' 7" (1.702 m)   Wt 73.2 kg (161 lb 6.4 oz)   SpO2 96%   BMI 25.28 kg/m²     Physical Exam  Vitals and nursing note reviewed.   Constitutional:       Appearance: Normal appearance. He is normal weight.   HENT:      Head: Normocephalic and atraumatic.   Neck:      Vascular: No carotid bruit.   Cardiovascular:      Rate and Rhythm: Normal rate and regular rhythm.      Pulses: Normal pulses.      Heart sounds: Normal heart sounds.   Pulmonary:      Effort: Pulmonary effort is normal.      Breath sounds: Normal breath sounds.   Abdominal:      Palpations: Abdomen is soft.   Musculoskeletal:      Cervical back: Neck supple.      Right lower leg: No edema.      Left lower leg: No edema.   Skin:     General: " Skin is warm and dry.      Capillary Refill: Capillary refill takes less than 2 seconds.   Neurological:      Mental Status: He is alert.           Assessment:     1. Coronary artery disease, unspecified vessel or lesion type, unspecified whether angina present, unspecified whether native or transplanted heart  EKG 12-lead      2. Stable angina        3. Mixed hyperlipidemia        4. Coronary artery disease of bypass graft of native heart with stable angina pectoris        5. Benign hypertension        6. S/P CABG (coronary artery bypass graft)              Plan:   Stable angina  Chronic, stable    Mixed hyperlipidemia  Lab Results   Component Value Date    CHOL 184 12/03/2024    CHOL 147 01/05/2023    CHOL 161 07/06/2022     Lab Results   Component Value Date    HDL 34 (L) 12/03/2024    HDL 39 (L) 01/05/2023    HDL 38 (L) 07/06/2022     Lab Results   Component Value Date    LDLCALC 91 12/03/2024    LDLCALC 86 01/05/2023    LDLCALC 91 07/06/2022     Lab Results   Component Value Date    TRIG 293 (H) 12/03/2024    TRIG 110 01/05/2023    TRIG 161 (H) 07/06/2022       Lab Results   Component Value Date    CHOLHDL 5.4 12/03/2024    CHOLHDL 3.8 01/05/2023    CHOLHDL 4.2 07/06/2022     Lipitor 80 mg po daily  Lipids followed by PCP  Appointment planned with PCP to address LDL     Coronary artery disease involving coronary bypass graft of native heart  Stable angina  Continue ASA/Plavix/Lipitor    Benign hypertension  160/78 mmHg  Rechecked 150/72    Bp check on 12/30/2024    S/P CABG (coronary artery bypass graft)  CABG 2018 ( no op note available)    RTC 6 months

## 2024-12-09 NOTE — ASSESSMENT & PLAN NOTE
Lab Results   Component Value Date    CHOL 184 12/03/2024    CHOL 147 01/05/2023    CHOL 161 07/06/2022     Lab Results   Component Value Date    HDL 34 (L) 12/03/2024    HDL 39 (L) 01/05/2023    HDL 38 (L) 07/06/2022     Lab Results   Component Value Date    LDLCALC 91 12/03/2024    LDLCALC 86 01/05/2023    LDLCALC 91 07/06/2022     Lab Results   Component Value Date    TRIG 293 (H) 12/03/2024    TRIG 110 01/05/2023    TRIG 161 (H) 07/06/2022       Lab Results   Component Value Date    CHOLHDL 5.4 12/03/2024    CHOLHDL 3.8 01/05/2023    CHOLHDL 4.2 07/06/2022     Lipitor 80 mg po daily  Lipids followed by PCP  Appointment planned with PCP to address LDL

## 2024-12-18 NOTE — PROGRESS NOTES
Subjective:         Patient ID: [id number] Avery is a 64 y.o. male.    Chief Complaint: Hip Pain, Knee Pain, and Shoulder Pain        Pain  This is a chronic problem. The current episode started more than 1 year ago. The problem occurs daily. The problem has been unchanged. Associated symptoms include arthralgias and neck pain. Pertinent negatives include no anorexia, chest pain, chills, coughing, diaphoresis, fever, sore throat, vertigo or vomiting.     Review of Systems   Constitutional:  Negative for activity change, chills, diaphoresis, fever and unexpected weight change.   HENT:  Negative for drooling, ear discharge, ear pain, facial swelling, nosebleeds, sore throat, trouble swallowing, voice change and goiter.    Eyes:  Negative for photophobia, pain, discharge, redness and visual disturbance.   Respiratory:  Negative for apnea, cough, choking, chest tightness, shortness of breath, wheezing and stridor.    Cardiovascular:  Negative for chest pain, palpitations and leg swelling.   Gastrointestinal:  Negative for abdominal distention, anorexia, diarrhea, rectal pain, vomiting and fecal incontinence.   Endocrine: Negative for cold intolerance, heat intolerance, polydipsia, polyphagia and polyuria.   Genitourinary:  Negative for bladder incontinence, dysuria, flank pain and frequency.   Musculoskeletal:  Positive for arthralgias, back pain, leg pain, neck pain and neck stiffness.   Integumentary:  Negative for color change and pallor.   Neurological:  Negative for dizziness, vertigo, seizures, syncope, facial asymmetry, speech difficulty, light-headedness, memory loss and coordination difficulties.   Hematological:  Negative for adenopathy. Does not bruise/bleed easily.   Psychiatric/Behavioral:  Negative for agitation, behavioral problems, confusion, decreased concentration, dysphoric mood, hallucinations, self-injury and suicidal ideas. The patient is not nervous/anxious and is not hyperactive.             Past Medical History:   Diagnosis Date    Arthritis     Carpal tunnel syndrome     Charcot foot due to diabetes mellitus 09/29/2023    Prescription for CROW orthotic boot given today    COPD (chronic obstructive pulmonary disease)     Coronary artery disease involving coronary bypass graft of native heart 06/27/2023    CABG 2018 (No OP report available)     Most recent left heart catheterization 08/12/2019  1. Normal LV size apical akinesis and overall normal LV systolic function, ejection fraction 55%  2. Patent saphenous vein graft to the right PDA with patent jump graft to the OM branch left circumflex  3. Patent LIMA to the OM1  4. No significant mitral regurgitation     COVID 02/02/2022    Diabetic peripheral neuropathy     Essential (primary) hypertension     GERD (gastroesophageal reflux disease)     Insomnia secondary to depression with anxiety 06/17/2021    Mixed hyperlipidemia 03/23/2021    Nicotine dependence     Peripheral vascular disease, unspecified     Sleep apnea     Type 2 diabetes mellitus      Past Surgical History:   Procedure Laterality Date    CORONARY ARTERY BYPASS GRAFT      CORONARY STENT PLACEMENT      DEBRIDEMENT OF FOOT Right 08/10/2023    Dr. Chiu    DEBRIDEMENT OF FOOT Right 8/10/2023    Procedure: DEBRIDEMENT, FOOT;  Surgeon: Evelio Chiu DO;  Location: [facility] OR;  Service: General;  Laterality: Right;    DEBRIDEMENT OF FOOT Right 2/27/2024    Procedure: DEBRIDEMENT, FOOT;  Surgeon: Evelio Chiu DO;  Location: [facility] OR;  Service: General;  Laterality: Right;    DEBRIDEMENT OF LOWER EXTREMITY Right 06/27/2022    Procedure: DEBRIDEMENT, LOWER EXTREMITY;  Surgeon: Forrest Kiser MD;  Location: [facility] OR;  Service: General;  Laterality: Right;  right foot    FOOT AMPUTATION THROUGH METATARSAL Right 3/1/2024    Procedure: AMPUTATION, FOOT, TRANSMETATARSAL; RIGHT LISFRANK AMPUTATION;  Surgeon: Evelio Chiu DO;  Location: [facility] OR;  Service: General;   Laterality: Right;    HIP FRACTURE SURGERY Left     IRRIGATION AND DEBRIDEMENT OF LOWER EXTREMITY Right 09/08/2022    Procedure: IRRIGATION AND DEBRIDEMENT, LOWER EXTREMITY;  Surgeon: Selina Matos MD;  Location: [facility];  Service: General;  Laterality: Right;    IRRIGATION AND DEBRIDEMENT OF LOWER EXTREMITY Right 01/05/2023    Procedure: IRRIGATION AND DEBRIDEMENT, LOWER EXTREMITY;  Surgeon: Evelio Chiu DO;  Location: [facility] OR;  Service: General;  Laterality: Right;    SHOULDER OPEN ROTATOR CUFF REPAIR Left     SPINE SURGERY      TOE AMPUTATION Right 2/27/2024    Procedure: AMPUTATION, TOE;  Surgeon: Evelio Chiu DO;  Location: [facility] OR;  Service: General;  Laterality: Right;  RIGHT 5 TH TOE AND DEBRIDEMENT    VASCULAR SURGERY       Social History     Socioeconomic History    Marital status:    Occupational History    Occupation: Retired   Tobacco Use    Smoking status: Every Day     Current packs/day: 2.50     Average packs/day: 2.5 packs/day for 53.4 years (133.5 ttl pk-yrs)     Types: Cigarettes     Start date: 8/10/1971     Passive exposure: Current    Smokeless tobacco: Never   Substance and Sexual Activity    Alcohol use: Not Currently    Drug use: Yes     Types: Oxycodone    Sexual activity: Yes     Partners: Female     Social Drivers of Health     Financial Resource Strain: Low Risk  (2/29/2024)    Overall Financial Resource Strain (CARDIA)     Difficulty of Paying Living Expenses: Not very hard   Food Insecurity: No Food Insecurity (2/29/2024)    Hunger Vital Sign     Worried About Running Out of Food in the Last Year: Never true     Ran Out of Food in the Last Year: Never true   Transportation Needs: No Transportation Needs (2/29/2024)    PRAPARE - Transportation     Lack of Transportation (Medical): No     Lack of Transportation (Non-Medical): No   Physical Activity: Inactive (2/28/2024)    Exercise Vital Sign     Days of Exercise per Week: 0 days     Minutes of Exercise  "per Session: 0 min   Stress: No Stress Concern Present (2/29/2024)    [employer] - Occupational Stress Questionnaire     Feeling of Stress : Only a little   Recent Concern: Stress - Stress Concern Present (2/28/2024)    [employer] [place] [employer] - Occupational Stress Questionnaire     Feeling of Stress : Rather much   Housing Stability: Low Risk  (2/29/2024)    Housing Stability Vital Sign     Unable to Pay for Housing in the Last Year: No     Number of Places Lived in the Last Year: 1     Unstable Housing in the Last Year: No     Family History   Problem Relation Name Age of Onset    Arthritis Mother      Hypertension Mother      Learning disabilities Mother      Alcohol abuse Father      Early death Father      Heart disease Father      Cancer Sister      Diabetes Sister      Heart disease Maternal Grandfather      COPD Paternal Grandfather      Heart disease Son       Review of patient's allergies indicates:  No Known Allergies     Objective:  Vitals:    01/08/25 0759   BP: (!) 180/92   Pulse: 76   Resp: 20   Weight: 73.5 kg (162 lb)   Height: 5' 8" (1.727 m)   PainSc:   8             Physical Exam  Vitals and nursing note reviewed. Exam conducted with a chaperone present.   Constitutional:       General: He is awake. He is not in acute distress.     Appearance: Normal appearance. He is not ill-appearing, toxic-appearing or diaphoretic.   HENT:      Head: Normocephalic and atraumatic.      Nose: Nose normal.      Mouth/Throat:      Mouth: Mucous membranes are moist.      Pharynx: Oropharynx is clear.   Eyes:      Conjunctiva/sclera: Conjunctivae normal.      Pupils: Pupils are equal, round, and reactive to light.   Cardiovascular:      Rate and Rhythm: Normal rate.   Pulmonary:      Effort: Pulmonary effort is normal. No respiratory distress.   Abdominal:      Palpations: Abdomen is soft.   Musculoskeletal:         General: Normal range of motion.      Cervical back: " Normal range of motion and neck supple.      Thoracic back: Tenderness present.      Lumbar back: Tenderness present.   Skin:     General: Skin is warm and dry.      Coloration: Skin is not jaundiced or pale.   Neurological:      General: No focal deficit present.      Mental Status: He is alert and oriented to person, place, and time. Mental status is at baseline.      Cranial Nerves: No cranial nerve deficit (II-XII).   Psychiatric:         Mood and Affect: Mood normal.         Behavior: Behavior normal. Behavior is cooperative.         Thought Content: Thought content normal.           CT Head Without Contrast  Narrative: EXAMINATION:  CT HEAD WITHOUT CONTRAST    CLINICAL HISTORY:  Mental status change, unknown cause;    TECHNIQUE:  Low dose axial images were obtained through the head.  Coronal and sagittal reformations were also performed. Contrast was not administered.    COMPARISON:  MRI of the brain performed 09/10/2019.    FINDINGS:  Blood: No acute intracranial hemorrhage.    Parenchyma: No definite loss of gray-white differentiation to suggest acute or subacute transcortical infarct. Generalized pattern age-related parenchymal volume loss.  Patchy white matter hypoattenuation, dominant focus in the left anterior centrum semiovale and corona radiata, similar to signal abnormality on remote MRI performed 09/10/2019 allowing for differences in technique and modality.    Ventricles/Extra-axial spaces: No abnormal extra-axial fluid collection. Basal cisterns are patent.    Vessels: Moderate atherosclerotic calcification.    Orbits: Status post bilateral lens replacements.    Scalp: Unremarkable.    Skull: There are no depressed skull fractures or destructive bone lesions.    Sinuses and mastoids: Scattered relatively modest paranasal sinus mucosal thickening with small retention cysts.    Other findings: None  Impression: No definite acute intracranial findings by noncontrast CT.    Electronically signed  by: Abdon Pickett  Date:    12/26/2024  Time:    12:12         Patient Outreach on 12/24/2024   Component Date Value Ref Range Status    Left Eye DM Retinopathy 09/27/2024 Positive   Final    Right Eye DM Retinopathy 09/27/2024 Positive   Final   Office Visit on 12/09/2024   Component Date Value Ref Range Status    QRS Duration 12/09/2024 98  ms Final    [facility] QTC Calculation 12/09/2024 434  ms Final   Lab Visit on 12/03/2024   Component Date Value Ref Range Status    Sodium 12/03/2024 141  136 - 145 mmol/L Final    Potassium 12/03/2024 4.3  3.5 - 5.1 mmol/L Final    Chloride 12/03/2024 104  98 - 107 mmol/L Final    CO2 12/03/2024 29  23 - 31 mmol/L Final    Anion Gap 12/03/2024 12  7 - 16 mmol/L Final    Glucose 12/03/2024 173 (H)  82 - 115 mg/dL Final    BUN 12/03/2024 14  8 - 26 mg/dL Final    Creatinine 12/03/2024 0.88  0.72 - 1.25 mg/dL Final    BUN/Creatinine Ratio 12/03/2024 16  6 - 20 Final    Calcium 12/03/2024 9.2  8.8 - 10.0 mg/dL Final    Total Protein 12/03/2024 7.0  5.8 - 7.6 g/dL Final    Albumin 12/03/2024 3.9  3.4 - 4.8 g/dL Final    Globulin 12/03/2024 3.1  2.0 - 4.0 g/dL Final    A/G Ratio 12/03/2024 1.3   Final    Bilirubin, Total 12/03/2024 0.2  <=1.5 mg/dL Final    Alk Phos 12/03/2024 74  40 - 150 U/L Final    ALT 12/03/2024 13  <=55 U/L Final    AST 12/03/2024 14  5 - 34 U/L Final    eGFR 12/03/2024 95  >=60 mL/min/1.73m2 Final    Triglycerides 12/03/2024 293 (H)  34 - 140 mg/dL Final    Cholesterol 12/03/2024 184  <=200 mg/dL Final    HDL Cholesterol 12/03/2024 34 (L)  35 - 60 mg/dL Final    Cholesterol/HDL Ratio (Risk Factor) 12/03/2024 5.4   Final    Non-HDL 12/03/2024 150  mg/dL Final    LDL Calculated 12/03/2024 91  mg/dL Final    LDL/HDL 12/03/2024 2.7   Final    VLDL 12/03/2024 59  mg/dL Final    Hemoglobin A1C 12/03/2024 8.2 (H)  <=7.0 % Final    Estimated Average Glucose 12/03/2024 189  mg/dL Final    WBC 12/03/2024 5.64  4.50 - 11.00 K/uL Final    RBC 12/03/2024 4.31 (L)  4.60 - 6.20  M/uL Final    Hemoglobin 12/03/2024 13.3 (L)  13.5 - 18.0 g/dL Final    Hematocrit 12/03/2024 42.5  40.0 - 54.0 % Final    MCV 12/03/2024 98.6 (H)  80.0 - 96.0 fL Final    MCH 12/03/2024 30.9  27.0 - 31.0 pg Final    MCHC 12/03/2024 31.3 (L)  32.0 - 36.0 g/dL Final    RDW 12/03/2024 12.7  11.5 - 14.5 % Final    Platelet Count 12/03/2024 142 (L)  150 - 400 K/uL Final    MPV 12/03/2024 11.5  9.4 - 12.4 fL Final    Neutrophils % 12/03/2024 58.8  53.0 - 65.0 % Final    Lymphocytes % 12/03/2024 29.4  27.0 - 41.0 % Final    Monocytes % 12/03/2024 6.6 (H)  2.0 - 6.0 % Final    Eosinophils % 12/03/2024 4.1 (H)  1.0 - 4.0 % Final    Basophils % 12/03/2024 0.9  0.0 - 1.0 % Final    Immature Granulocytes % 12/03/2024 0.2  0.0 - 0.4 % Final    nRBC, Auto 12/03/2024 0.0  <=0.0 % Final    Neutrophils, Abs 12/03/2024 3.32  1.80 - 7.70 K/uL Final    Lymphocytes, Absolute 12/03/2024 1.66  1.00 - 4.80 K/uL Final    Monocytes, Absolute 12/03/2024 0.37  0.00 - 0.80 K/uL Final    Eosinophils, Absolute 12/03/2024 0.23  0.00 - 0.50 K/uL Final    Basophils, Absolute 12/03/2024 0.05  0.00 - 0.20 K/uL Final    Immature Granulocytes, Absolute 12/03/2024 0.01  0.00 - 0.04 K/uL Final    nRBC, Absolute 12/03/2024 0.00  <=0.00 x10e3/uL Final    Diff Type 12/03/2024 Auto   Final    Iron Level 12/03/2024 107  65 - 175 ug/dL Final    Iron Binding Capacity Total 12/03/2024 323  250 - 450 ug/dL Final    Iron Binding Capacity Unsaturated 12/03/2024 216  69 - 240 ug/dL Final    Iron Saturation 12/03/2024 33  20 - 50 % Final    Transferrin 12/03/2024 289  163 - 344 mg/dL Final    Ferritin 12/03/2024 24  22 - 275 ng/mL Final   Office Visit on 10/17/2024   Component Date Value Ref Range Status    POC Amphetamines 10/17/2024 Negative  Negative, Inconclusive Final    POC Barbiturates 10/17/2024 Negative  Negative, Inconclusive Final    POC Benzodiazepines 10/17/2024 Negative  Negative, Inconclusive Final    POC Cocaine 10/17/2024 Negative  Negative,  Inconclusive Final    POC THC 10/17/2024 Negative  Negative, Inconclusive Final    POC Methadone 10/17/2024 Negative  Negative, Inconclusive Final    POC Methamphetamine 10/17/2024 Negative  Negative, Inconclusive Final    POC Opiates 10/17/2024 Presumptive Positive (A)  Negative, Inconclusive Final    POC Oxycodone 10/17/2024 Negative  Negative, Inconclusive Final    POC Phencyclidine 10/17/2024 Negative  Negative, Inconclusive Final    POC Methylenedioxymethamphetamine * 10/17/2024 Negative  Negative, Inconclusive Final    POC Tricyclic Antidepressants 10/17/2024 Negative  Negative, Inconclusive Final    POC Buprenorphine 10/17/2024 Negative   Final     Acceptable 10/17/2024 Yes   Final    POC Temperature (Urine) 10/17/2024 92   Final   Office Visit on 08/01/2024   Component Date Value Ref Range Status    Sodium 08/01/2024 137  136 - 145 mmol/L Final    Potassium 08/01/2024 4.6  3.5 - 5.1 mmol/L Final    Chloride 08/01/2024 104  98 - 107 mmol/L Final    CO2 08/01/2024 29  21 - 32 mmol/L Final    Anion Gap 08/01/2024 9  7 - 16 mmol/L Final    Glucose 08/01/2024 207 (H)  74 - 106 mg/dL Final    BUN 08/01/2024 32 (H)  7 - 18 mg/dL Final    Creatinine 08/01/2024 1.09  0.70 - 1.30 mg/dL Final    BUN/Creatinine Ratio 08/01/2024 29 (H)  6 - 20 Final    Calcium 08/01/2024 10.2 (H)  8.5 - 10.1 mg/dL Final    Total Protein 08/01/2024 8.0  6.4 - 8.2 g/dL Final    Albumin 08/01/2024 4.2  3.5 - 5.0 g/dL Final    Globulin 08/01/2024 3.8  2.0 - 4.0 g/dL Final    A/G Ratio 08/01/2024 1.1   Final    Bilirubin, Total 08/01/2024 0.4  >0.0 - 1.2 mg/dL Final    Alk Phos 08/01/2024 95  45 - 115 U/L Final    ALT 08/01/2024 32  16 - 61 U/L Final    AST 08/01/2024 17  15 - 37 U/L Final    eGFR 08/01/2024 75  >=60 mL/min/1.73m2 Final    Hemoglobin A1C 08/01/2024 8.3 (H)  4.5 - 6.6 % Final    Estimated Average Glucose 08/01/2024 192  mg/dL Final    WBC 08/01/2024 5.73  4.50 - 11.00 K/uL Final    RBC 08/01/2024 4.43 (L)  4.60  - 6.20 M/uL Final    Hemoglobin 08/01/2024 13.7  13.5 - 18.0 g/dL Final    Hematocrit 08/01/2024 43.0  40.0 - 54.0 % Final    MCV 08/01/2024 97.1 (H)  80.0 - 96.0 fL Final    MCH 08/01/2024 30.9  27.0 - 31.0 pg Final    MCHC 08/01/2024 31.9 (L)  32.0 - 36.0 g/dL Final    RDW 08/01/2024 13.9  11.5 - 14.5 % Final    Platelet Count 08/01/2024 186  150 - 400 K/uL Final    MPV 08/01/2024 11.5  9.4 - 12.4 fL Final    Neutrophils % 08/01/2024 43.7 (L)  53.0 - 65.0 % Final    Lymphocytes % 08/01/2024 42.1 (H)  27.0 - 41.0 % Final    Monocytes % 08/01/2024 6.8 (H)  2.0 - 6.0 % Final    Eosinophils % 08/01/2024 6.1 (H)  1.0 - 4.0 % Final    Basophils % 08/01/2024 1.0  0.0 - 1.0 % Final    Immature Granulocytes % 08/01/2024 0.3  0.0 - 0.4 % Final    nRBC, Auto 08/01/2024 0.0  <=0.0 % Final    Neutrophils, Abs 08/01/2024 2.50  1.80 - 7.70 K/uL Final    Lymphocytes, Absolute 08/01/2024 2.41  1.00 - 4.80 K/uL Final    Monocytes, Absolute 08/01/2024 0.39  0.00 - 0.80 K/uL Final    Eosinophils, Absolute 08/01/2024 0.35  0.00 - 0.50 K/uL Final    Basophils, Absolute 08/01/2024 0.06  0.00 - 0.20 K/uL Final    Immature Granulocytes, Absolute 08/01/2024 0.02  0.00 - 0.04 K/uL Final    nRBC, Absolute 08/01/2024 0.00  <=0.00 x10e3/uL Final    Diff Type 08/01/2024 Auto   Final   Office Visit on 07/17/2024   Component Date Value Ref Range Status    POC Amphetamines 07/17/2024 Negative  Negative, Inconclusive Final    POC Barbiturates 07/17/2024 Negative  Negative, Inconclusive Final    POC Benzodiazepines 07/17/2024 Negative  Negative, Inconclusive Final    POC Cocaine 07/17/2024 Negative  Negative, Inconclusive Final    POC THC 07/17/2024 Negative  Negative, Inconclusive Final    POC Methadone 07/17/2024 Negative  Negative, Inconclusive Final    POC Methamphetamine 07/17/2024 Negative  Negative, Inconclusive Final    POC Opiates 07/17/2024 Presumptive Positive (A)  Negative, Inconclusive Final    POC Oxycodone 07/17/2024 Negative   Negative, Inconclusive Final    POC Phencyclidine 07/17/2024 Negative  Negative, Inconclusive Final    POC Methylenedioxymethamphetamine * 07/17/2024 Negative  Negative, Inconclusive Final    POC Tricyclic Antidepressants 07/17/2024 Negative  Negative, Inconclusive Final    POC Buprenorphine 07/17/2024 Negative   Final     Acceptable 07/17/2024 Yes   Final    POC Temperature (Urine) 07/17/2024 90   Final         Orders Placed This Encounter   Procedures    X-Ray Hips Bilateral 2 View Inc AP Pelvis     Please do lateral left hip for evaluation hardware acetabular reconstruction [facility]     Standing Status:   Future     Standing Expiration Date:   1/8/2026     Order Specific Question:   Does the patient have a splint or a brace?     Answer:   No     Order Specific Question:   Does the patient have a cast?     Answer:   No     Order Specific Question:   May the Radiologist modify the order per protocol to meet the clinical needs of the patient?     Answer:   Yes     Order Specific Question:   Release to patient     Answer:   Immediate    POCT Urine Drug Screen Presump     Interpretive Information:     Negative:  No drug detected at the cut off level.   Positive:  This result represents presumptive positive for the   tested drug, other substances may yield a positive response other   than the analyte of interest. This result should be utilized for   diagnostic purpose only. Confirmation testing will be performed upon physician request only.       Case Request Operating Room: Injection, Joint, Hip, intra-articular hip injection     Order Specific Question:   Medical Necessity:     Answer:   Medically Non-Urgent [100]     Order Specific Question:   Case classification     Answer:   E - Elective [90]     Order Specific Question:   Is an on-site pathologist required for this procedure?     Answer:   N/A       Requested Prescriptions     Signed Prescriptions Disp Refills    DULoxetine (CYMBALTA) 30 MG capsule 60  capsule 2     Sig: Take 1 capsule (30 mg total) by mouth 2 (two) times daily.    gabapentin (NEURONTIN) 400 MG capsule 180 capsule 2     Si mg, 2 tablets by mouth q.6 hours    HYDROcodone-acetaminophen (NORCO)  mg per tablet 120 tablet 0     Sig: Take 1 tablet by mouth every 6 (six) hours as needed for Pain (pain).       Assessment:     1. Hip pain, left, [facility] status post acetabular hardware placement    2. Peripheral vascular disease, unspecified    3. Ulcer of right foot with necrosis of muscle    4. Neuropathy    5. Encounter for long-term (current) use of medications             A's of Opioid Risk Assessment  Activity:Patient can perform ADL.   Analgesia:Patients pain is partially controlled by current medication. Patient has tried OTC medications such as Tylenol and Ibuprofen with out relief.   Adverse Effects: Patient denies constipation or sedation.  Aberrant Behavior:  reviewed with no aberrant drug seeking/taking behavior.  Overdose reversal drug naloxone discussed    Drug screen reviewed      X-ray pelvis and hips [facility] 2023  Acetabular fracture repair present with appearance similar to previous.  No other evidence of fracture seen.  The alignment of the joints appears normal.  No degenerative change is present.  No soft tissue abnormality is seen.          Plan:    Narcan 2023    Can not tolerate Lyrica      Chronic left hip pain left groin pain after left after acetabular reconstruction right foot reconstruction [facility]    Was recommended further surgery for his left hip due to comorbidities surgery was postpone per [facility]      Complaint of increasing burning numbness needle sensation bilateral feet diabetic neuropathy gabapentin help    Here today requesting procedure for chronic left hip pain     After reviewing x-rays considering options due to severe diabetes will limit steroids     Will order AP lateral hip for evaluation hardware acetabular reconstruction    Left hip  intra-articular injection limit steroids    He states current medication does help his chronic back and joint pain    Continue home exercise program as tolerated     Continue current medication     Monitor anesthesia request is medically indicated for the scheduled nerve block procedure due to:  1- needle phobia and anxiety, placing  the patient at risk during the provided service.  2-patient has an ASA class greater than 3 and requires constant presence of an anesthesiologist during the procedure,   3-patient has severe problems hard to lie still  4-patient suffers from chronic pain and is unable to function due to  diminished ADLs    Schedule left intra-articular hip injection limit steroids # 1, chronic left hip pain after acetabular reconstruction      Dr. Dobson    Bring original prescription medication bottles/container/box with labels to each visit

## 2024-12-19 ENCOUNTER — TELEPHONE (OUTPATIENT)
Dept: FAMILY MEDICINE | Facility: CLINIC | Age: 64
End: 2024-12-19
Payer: MEDICARE

## 2024-12-20 ENCOUNTER — PATIENT OUTREACH (OUTPATIENT)
Facility: HOSPITAL | Age: 64
End: 2024-12-20
Payer: MEDICARE

## 2024-12-20 NOTE — LETTER
AUTHORIZATION FOR RELEASE OF   CONFIDENTIAL INFORMATION    Dear [facility],    We are seeing Navdeep Avery, date of birth 1960, in the clinic at [facility]. Marquez Huff MD is the patient's PCP. Navdeep Avery has an outstanding lab/procedure at the time we reviewed his chart. In order to help keep his health information updated, he has authorized us to request the following medical record(s):        (  )  MAMMOGRAM                                      (  )  COLONOSCOPY      (  )  PAP SMEAR                                          (  )  OUTSIDE LAB RESULTS     (  )  DEXA SCAN                                          ( x )  EYE EXAM 2024           (  )  FOOT EXAM                                          (  )  ENTIRE RECORD     (  )  OUTSIDE IMMUNIZATIONS                 ( x )  2nd request         Please fax records to [facility], Mallary Harvey, LPN Care Coordinator, [phone].      If you have any questions, please contact Rahul at [phone]. .           Patient Name: Navdeep Avery  : 1960  Patient Phone #: [phone]                Navdeep Avery  MRN: [id number]  : 1960  Age: 63 y.o.  Sex: male         Patient/Legal Guardian Signature  This signature was collected at 2024    Self       _______________________________   Printed Name/Relationship to Patient      Consent for Examination and Treatment: I hereby authorize the providers and employees of [facility] ([facility]) to provide medical treatment/services which includes, but is not limited to, performing and administering tests and diagnostic procedures that are deemed necessary, including, but not limited to, imaging examinations, blood tests and other laboratory procedures as may be required by the hospital, clinic, or may be ordered by my physician(s) or persons working under the general and/or special instructions of my physician(s).      I understand and agree that this  consent covers all authorized persons, including but not limited to physicians, residents, nurse practitioners, physicians' assistants, specialists, consultants, student nurses, and independently contracted physicians, who are called upon by the physician in charge, to carry out the diagnostic procedures and medical or surgical treatment.     I hereby authorize [facility] to retain or dispose of any specimens or tissue, should there be such remaining from any test or procedure.     I hereby authorize and give consent for [facility] providers and employees to take photographs, images or videotapes of such diagnostic, surgical or treatment procedures of Patient as may be required by [facility] or as may be ordered by a physician. I further acknowledge and agree that [facility] may use cameras or other devices for patient monitoring.     I am aware that the practice of medicine is not an exact science, and I acknowledge that no guarantees have been made to me as to the outcome of any tests, procedures or treatment.     Authorization for Release of Information: I understand that my insurance company and/or their agents may need information necessary to make determinations about payment/reimbursement. I hereby provide authorization to release to all insurance companies, their successors, assignees, other parties with whom they may have contracted, or others acting on their behalf, that are involved with payment for any hospital and/or clinic charges incurred by the patient, any information that they request and deem necessary for payment/reimbursement, and/or quality review.  I further authorize the release of my health information to physicians or other health care practitioners on staff who are involved in my health care now and in the future, and to other health care providers, entities, or institutions for the purpose of my continued care and treatment, including referrals.     REGISTRATION AUTHORIZATION  Form No. 18956 (Rev.  3/25/2024)    Page 1 of 3                       Medicare Patient's Certification and Authorization to Release Information and Payment Request:  I certify that the information given by me in applying for payment under Title XVIII of the Social Security Act is correct. I authorize any benson of medical or other information about me to release to the Social SecurityAdministration, or its intermediaries or carriers, any information needed for this or a related Medicare claim. I request that payment of authorized benefits be made on my behalf.     Assignment of Insurance Benefits:   I hereby authorize any and all insurance companies, health plans, defined   benefit plans, health insurers or any entity that is or may be responsible for payment of my medical expenses to pay all hospital and medical benefits now due, and to become due and payable to me under any hospital benefits, sick benefits, injury benefits or any other benefit for services rendered to me, including Major Medical Benefits, direct to [facility] and all independently contracted physicians. I assign any and all rights that I may have against any and all insurance companies, health plans, defined benefit plans, health insurers or any entity that is or may be responsible for payment of my medical expenses, including, but not limited to any right to appeal a denial of a claim, any right to bring any action, lawsuit, administrative proceeding, or other cause of action on my behalf. I specifically assign my right to pursue litigation against any and all insurance companies, health plans, defined benefit plans, health insurers or any entity that is or may be responsible for payment of my medical expenses based upon a refusal to pay charges.            E. Valuables: It is understood and agreed that [facility] is not liable for the damage to or loss of any money, jewelry,   documents, dentures, eye glasses, hearing aids, prosthetics, or other property of value.     F.  Computer Equipment: I understand and agree that should I choose to use computer equipment owned by [facility] or if I choose to access the Internet via [facility]s network, I do so at my own risk. [facility] is not responsible for any damage to my computer equipment or to any damages of any type that might arise from my loss of equipment or data.     G. Acceptance of Financial Responsibility:  I agree that in consideration of the services and   supplies that have been   or will be furnished to the patient, I am hereby obligated to pay all charges made for or on the account of the patient according to the standard rates (in effect at the time the services and supplies are delivered) established by [facility], including its Patient Financial Assistance Policy to the extent it is applicable. I understand that I am responsible for all charges, or portions thereof, not covered by insurance or other sources. Patient refunds will be distributed only after balances at all [facility] facilities are paid.     H. Communication Authorization:  I hereby authorize [facility] and its representatives, along with any billing service   or  who may work on their behalf, to contact me on   my cell phone and/or home phone using pre- recorded messages, artificial voice messages, automatic telephone dialing devices or other computer assisted technology, or by electronic      mail, text messaging, or by any other form of electronic communication. This includes, but is not limited to, appointment reminders, yearly physical exam reminders, preventive care reminders, patient campaigns, welcome calls, and calls about account balances on my account or any account on which I am listed as a guarantor. I understand I have the right to opt out of these communications at any time.      Relationship  Between  Facility and  Provider:      I understand that some, but not all, providers furnishing services to the patient are not employees or agents of  [facility]. The patient is under the care and supervision of his/her attending physician, and it is the responsibility of the facility and its nursing staff to carry out the instructions of such physicians. It is the responsibility of the patient's physician/designee to obtain the patient's informed consent, when required, for medical or surgical treatment, special diagnostic or therapeutic procedures, or hospital services rendered for the patient under the special instructions of the physician/designee.           REGISTRATION AUTHORIZATION  Form No. 63776 (Rev. 3/25/2024)    Page 2 of 3                       Immunizations: [facility] shares immunization information with state sponsored health departments to help you and your doctor keep track of your immunization records. By signing, you consent to have this information shared with the health department in your state:                                [place] - LINKS ([place] Immunization Network for Kids Statewide)                                [place] - MIIX ([place] Immunization Information eXchange)                                [place] - ImmPRINT (Immunization Patient Registry with Integrated Technology)     TERM: This authorization is valid for this and subsequent care/treatment I receive at [facility] and will remain valid unless/until revoked in writing by me.     [facility]: As used in this document, [facility] means all [facility] owned and managed facilities, including, but not limited to, all health centers, surgery centers, clinics, urgent care centers, and hospitals.         [facility] complies with applicable Federal civil rights laws and does not discriminate on the basis of race, color, national origin, age, disability, or sex.  ATENCIÓN: si habla naylaañol, tiene a manning disposición servicios gratuitos de asistencia lingüística. Riana al [phone].  CHÚ Ý: N?u b?n nói Ti?ng Vi?t, có các d?ch v? h? tr? ngôn ng? mi?n phí  dành cho b?n. G?i s? [phone].        REGISTRATION AUTHORIZATION  Form No. 98065 (Rev. 3/25/2024)   Page 3 of 3

## 2024-12-20 NOTE — PROGRESS NOTES
Population Health Chart Review & Patient Outreach Details    Updates Requested / Reviewed:  [x]  Care Team Updated  [x]  Care Everywhere Updated & Reviewed    Health Maintenance Topics Addressed and Outreach Outcomes / Actions Taken:  Diabetic Eye Exam [x] Referral from 5/2024 did not get scheduled. However, I called [facility] and they report that the patient had an eye exam with them in 9/2024.     [x] KRISTAN faxed to [facility] for eye exam in 9/2024.     [x] Next eye appt scheduled for 1/7/25 with Dr. Castro. Reminder set to request records.

## 2024-12-24 ENCOUNTER — PATIENT OUTREACH (OUTPATIENT)
Facility: HOSPITAL | Age: 64
End: 2024-12-24
Payer: MEDICARE

## 2024-12-24 NOTE — PROGRESS NOTES
Population Health Chart Review & Patient Outreach Details    Updates Requested / Reviewed:  [x]  Care Team Updated      Health Maintenance Topics Addressed and Outreach Outcomes / Actions Taken:  Diabetic Eye Exam [x]  updated with eye exam on 9/27/24 by Dr. Calderon.

## 2024-12-26 ENCOUNTER — HOSPITAL ENCOUNTER (EMERGENCY)
Facility: HOSPITAL | Age: 64
Discharge: HOME | End: 2024-12-26
Attending: FAMILY MEDICINE
Payer: MEDICARE

## 2024-12-26 VITALS
WEIGHT: 162 LBS | TEMPERATURE: 98 F | RESPIRATION RATE: 20 BRPM | SYSTOLIC BLOOD PRESSURE: 140 MMHG | BODY MASS INDEX: 24.55 KG/M2 | HEART RATE: 71 BPM | OXYGEN SATURATION: 95 % | DIASTOLIC BLOOD PRESSURE: 75 MMHG | HEIGHT: 68 IN

## 2024-12-26 DIAGNOSIS — R51.9 INTRACTABLE HEADACHE, UNSPECIFIED CHRONICITY PATTERN, UNSPECIFIED HEADACHE TYPE: ICD-10-CM

## 2024-12-26 DIAGNOSIS — I10 HYPERTENSION, UNSPECIFIED TYPE: Primary | ICD-10-CM

## 2024-12-26 PROCEDURE — 96372 THER/PROPH/DIAG INJ SC/IM: CPT | Performed by: FAMILY MEDICINE

## 2024-12-26 PROCEDURE — 25000003 PHARM REV CODE 250: Performed by: FAMILY MEDICINE

## 2024-12-26 PROCEDURE — 99285 EMERGENCY DEPT VISIT HI MDM: CPT | Mod: 25

## 2024-12-26 PROCEDURE — 63600175 PHARM REV CODE 636 W HCPCS: Performed by: FAMILY MEDICINE

## 2024-12-26 RX ORDER — MEPERIDINE HYDROCHLORIDE 25 MG/ML
50 INJECTION INTRAMUSCULAR; INTRAVENOUS; SUBCUTANEOUS
Status: COMPLETED | OUTPATIENT
Start: 2024-12-26 | End: 2024-12-26

## 2024-12-26 RX ORDER — CLONIDINE HYDROCHLORIDE 0.1 MG/1
0.1 TABLET ORAL
Status: COMPLETED | OUTPATIENT
Start: 2024-12-26 | End: 2024-12-26

## 2024-12-26 RX ORDER — PROMETHAZINE HYDROCHLORIDE 25 MG/ML
25 INJECTION, SOLUTION INTRAMUSCULAR; INTRAVENOUS
Status: COMPLETED | OUTPATIENT
Start: 2024-12-26 | End: 2024-12-26

## 2024-12-26 RX ADMIN — MEPERIDINE HYDROCHLORIDE 50 MG: 25 INJECTION, SOLUTION INTRAMUSCULAR; INTRAVENOUS; SUBCUTANEOUS at 11:12

## 2024-12-26 RX ADMIN — PROMETHAZINE HYDROCHLORIDE 25 MG: 25 INJECTION INTRAMUSCULAR; INTRAVENOUS at 11:12

## 2024-12-26 RX ADMIN — CLONIDINE HYDROCHLORIDE 0.1 MG: 0.1 TABLET ORAL at 11:12

## 2024-12-26 NOTE — Clinical Note
"Navdeep"Jackie Avery was seen and treated in our emergency department on 12/26/2024.  He may return to work on 12/30/2024.       If you have any questions or concerns, please don't hesitate to call.      Brent Clark, DO"

## 2024-12-26 NOTE — ED PROVIDER NOTES
Encounter Date: 12/26/2024    SCRIBE #1 NOTE: I, Polina Soriano, am scribing for, and in the presence of,  Elizabeth Clark DO. I have scribed the entire note.       History     Chief Complaint   Patient presents with    Hypertension     Pt presents to ER with having hypertension with headache for the last 2 days    Headache    Shortness of Breath     Reports using inhaler throughout the night     This is a 63 y/o male,who presents to the ED with complaints of elevated BP which started 2 days ago. He notes a HA. There is no hx of CP, dizziness, Lightheadedness, or shortness of breath. He reports he has a known hx of HTN and normally takes Norvasc and Lisinopril. There are no other complaints/pain in the ED at this time. He has a known hx of CAD, hyperlipidemia, COPD, HTN, diabetes, and GERD. He has had a coronary artery bypass graft and coronary stent placed. He is a current every day smoker.     The history is provided by the patient and the spouse.     Review of patient's allergies indicates:  No Known Allergies  Past Medical History:   Diagnosis Date    Arthritis     Carpal tunnel syndrome     Charcot foot due to diabetes mellitus 09/29/2023    Prescription for CROW orthotic boot given today    COPD (chronic obstructive pulmonary disease)     Coronary artery disease involving coronary bypass graft of native heart 06/27/2023    CABG 2018 (No OP report available)     Most recent left heart catheterization 08/12/2019  1. Normal LV size apical akinesis and overall normal LV systolic function, ejection fraction 55%  2. Patent saphenous vein graft to the right PDA with patent jump graft to the OM branch left circumflex  3. Patent LIMA to the OM1  4. No significant mitral regurgitation     COVID 02/02/2022    Diabetic peripheral neuropathy     Essential (primary) hypertension     GERD (gastroesophageal reflux disease)     Insomnia secondary to depression with anxiety 06/17/2021    Mixed  hyperlipidemia 03/23/2021    Nicotine dependence     Peripheral vascular disease, unspecified     Sleep apnea     Type 2 diabetes mellitus      Past Surgical History:   Procedure Laterality Date    CORONARY ARTERY BYPASS GRAFT      CORONARY STENT PLACEMENT      DEBRIDEMENT OF FOOT Right 08/10/2023    Dr. Chiu    DEBRIDEMENT OF FOOT Right 8/10/2023    Procedure: DEBRIDEMENT, FOOT;  Surgeon: Evelio Chiu DO;  Location: [facility];  Service: General;  Laterality: Right;    DEBRIDEMENT OF FOOT Right 2/27/2024    Procedure: DEBRIDEMENT, FOOT;  Surgeon: Evelio Chiu DO;  Location: [facility] OR;  Service: General;  Laterality: Right;    DEBRIDEMENT OF LOWER EXTREMITY Right 06/27/2022    Procedure: DEBRIDEMENT, LOWER EXTREMITY;  Surgeon: Forrest Kiser MD;  Location: [facility];  Service: General;  Laterality: Right;  right foot    FOOT AMPUTATION THROUGH METATARSAL Right 3/1/2024    Procedure: AMPUTATION, FOOT, TRANSMETATARSAL; RIGHT LISFRANK AMPUTATION;  Surgeon: Evelio Chiu DO;  Location: [facility];  Service: General;  Laterality: Right;    HIP FRACTURE SURGERY Left     IRRIGATION AND DEBRIDEMENT OF LOWER EXTREMITY Right 09/08/2022    Procedure: IRRIGATION AND DEBRIDEMENT, LOWER EXTREMITY;  Surgeon: Selina Matos MD;  Location: [facility] OR;  Service: General;  Laterality: Right;    IRRIGATION AND DEBRIDEMENT OF LOWER EXTREMITY Right 01/05/2023    Procedure: IRRIGATION AND DEBRIDEMENT, LOWER EXTREMITY;  Surgeon: Evelio Chiu DO;  Location: [facility];  Service: General;  Laterality: Right;    SHOULDER OPEN ROTATOR CUFF REPAIR Left     SPINE SURGERY      TOE AMPUTATION Right 2/27/2024    Procedure: AMPUTATION, TOE;  Surgeon: Evelio Chiu DO;  Location: [facility];  Service: General;  Laterality: Right;  RIGHT 5 TH TOE AND DEBRIDEMENT    VASCULAR SURGERY       Family History   Problem Relation Name Age of Onset    Arthritis Mother      Hypertension Mother       Learning disabilities Mother      Alcohol abuse Father      Early death Father      Heart disease Father      Cancer Sister      Diabetes Sister      Heart disease Maternal Grandfather      COPD Paternal Grandfather      Heart disease Son       Social History     Tobacco Use    Smoking status: Every Day     Current packs/day: 2.50     Average packs/day: 2.5 packs/day for 53.4 years (133.4 ttl pk-yrs)     Types: Cigarettes     Start date: 8/10/1971     Passive exposure: Current    Smokeless tobacco: Never   Substance Use Topics    Alcohol use: Not Currently    Drug use: Yes     Types: Oxycodone     Review of Systems   Respiratory:  Negative for shortness of breath.         Elevated BP.      Cardiovascular:  Negative for chest pain.   Gastrointestinal:  Negative for nausea.   Neurological:  Positive for headaches. Negative for dizziness.   All other systems reviewed and are negative.      Physical Exam     Initial Vitals   BP Pulse Resp Temp SpO2   12/26/24 1036 12/26/24 1036 12/26/24 1051 12/26/24 1036 12/26/24 1036   (!) 182/80 75 16 98 °F (36.7 °C) (!) 93 %      MAP       --                Physical Exam    Nursing note and vitals reviewed.  Constitutional: He appears well-developed and well-nourished.   HENT:   Head: Normocephalic and atraumatic.   Right Ear: External ear normal.   Left Ear: External ear normal.   Nose: Nose normal. Mouth/Throat: Oropharynx is clear and moist.   Eyes: Conjunctivae and EOM are normal. Pupils are equal, round, and reactive to light.   Neck: Neck supple.   Normal range of motion.  Cardiovascular:  Normal rate, regular rhythm, normal heart sounds and intact distal pulses.           Pulmonary/Chest: Breath sounds normal.   Abdominal: Abdomen is soft. Bowel sounds are normal.   Genitourinary:    Prostate and penis normal.     Musculoskeletal:         General: Normal range of motion.      Cervical back: Normal range of motion and neck supple.     Neurological: He is alert and  oriented to person, place, and time. He has normal strength and normal reflexes.   Skin: Skin is warm and dry. Capillary refill takes less than 2 seconds.   Psychiatric: He has a normal mood and affect. His behavior is normal. Judgment and thought content normal.         ED Course   Procedures  Labs Reviewed - No data to display       Imaging Results              CT Head Without Contrast (Final result)  Result time 12/26/24 12:12:05      Final result by Abdon Pickett MD (12/26/24 12:12:05)                   Impression:      No definite acute intracranial findings by noncontrast CT.      Electronically signed by: Abdon Pickett  Date:    12/26/2024  Time:    12:12               Narrative:    EXAMINATION:  CT HEAD WITHOUT CONTRAST    CLINICAL HISTORY:  Mental status change, unknown cause;    TECHNIQUE:  Low dose axial images were obtained through the head.  Coronal and sagittal reformations were also performed. Contrast was not administered.    COMPARISON:  MRI of the brain performed 09/10/2019.    FINDINGS:  Blood: No acute intracranial hemorrhage.    Parenchyma: No definite loss of gray-white differentiation to suggest acute or subacute transcortical infarct. Generalized pattern age-related parenchymal volume loss.  Patchy white matter hypoattenuation, dominant focus in the left anterior centrum semiovale and corona radiata, similar to signal abnormality on remote MRI performed 09/10/2019 allowing for differences in technique and modality.    Ventricles/Extra-axial spaces: No abnormal extra-axial fluid collection. Basal cisterns are patent.    Vessels: Moderate atherosclerotic calcification.    Orbits: Status post bilateral lens replacements.    Scalp: Unremarkable.    Skull: There are no depressed skull fractures or destructive bone lesions.    Sinuses and mastoids: Scattered relatively modest paranasal sinus mucosal thickening with small retention cysts.    Other findings: None                                 "       Medications   cloNIDine tablet 0.1 mg (0.1 mg Oral Given 12/26/24 1153)   meperidine (PF) injection 50 mg (50 mg Intramuscular Given 12/26/24 1156)   promethazine injection 25 mg (25 mg Intramuscular Given 12/26/24 1156)     Medical Decision Making  Amount and/or Complexity of Data Reviewed  Radiology: ordered.    Risk  Prescription drug management.              Attending Attestation:           Physician Attestation for Scribe:  Physician Attestation Statement for Scribe #1: I, Brent Clark, DO, reviewed documentation, as scribed by Polina Soriano in my presence, and it is both accurate and complete.             ED Course as of 12/26/24 1324   Thu Dec 26, 2024   1218 CT Head without contrast:   No definite acute intracranial findings by noncontrast CT. [BW]      ED Course User Index  [BW] Polina Soriano               Medical Decision Making:   Initial Assessment:   This is a 63 y/o male,who presents to the ED with complaints of elevated BP which started 2 days ago. He notes a HA. There is no hx of CP, dizziness, Lightheadedness, or shortness of breath. He reports he has a known hx of HTN and normally takes Norvasc and Lisinopril. There are no other complaints/pain in the ED at this time. He has a known hx of CAD, hyperlipidemia, COPD, HTN, diabetes, and GERD. He has had a coronary artery bypass graft and coronary stent placed. He is a current every day smoker.     The history is provided by the patient and the spouse.     Differential Diagnosis:   Patient with hypertension and elevated blood pressure  ED Management:  Follow-up primary care provider take meds as directed continue blood pressure medication             Clinical Impression:   ***Please document a Clinical Impression and click the "Refresh" button to refresh your note and automatically pull in before signing.***           "

## 2024-12-26 NOTE — ED PROVIDER NOTES
Encounter Date: 12/26/2024    SCRIBE #1 NOTE: I, Polina Soriano, am scribing for, and in the presence of,  Elizabeth Clark DO. I have scribed the entire note.       History     Chief Complaint   Patient presents with    Hypertension     Pt presents to ER with having hypertension with headache for the last 2 days    Headache    Shortness of Breath     Reports using inhaler throughout the night     This is a 63 y/o male,who presents to the ED with complaints of elevated BP which started 2 days ago. He notes a HA. There is no hx of CP, dizziness, Lightheadedness, or shortness of breath. He reports he has a known hx of HTN and normally takes Norvasc and Lisinopril. There are no other complaints/pain in the ED at this time. He has a known hx of CAD, hyperlipidemia, COPD, HTN, diabetes, and GERD. He has had a coronary artery bypass graft and coronary stent placed. He is a current every day smoker.     The history is provided by the patient and the spouse.     Review of patient's allergies indicates:  No Known Allergies  Past Medical History:   Diagnosis Date    Arthritis     Carpal tunnel syndrome     Charcot foot due to diabetes mellitus 09/29/2023    Prescription for CROW orthotic boot given today    COPD (chronic obstructive pulmonary disease)     Coronary artery disease involving coronary bypass graft of native heart 06/27/2023    CABG 2018 (No OP report available)     Most recent left heart catheterization 08/12/2019  1. Normal LV size apical akinesis and overall normal LV systolic function, ejection fraction 55%  2. Patent saphenous vein graft to the right PDA with patent jump graft to the OM branch left circumflex  3. Patent LIMA to the OM1  4. No significant mitral regurgitation     COVID 02/02/2022    Diabetic peripheral neuropathy     Essential (primary) hypertension     GERD (gastroesophageal reflux disease)     Insomnia secondary to depression with anxiety 06/17/2021    Mixed hyperlipidemia 03/23/2021     Nicotine dependence     Peripheral vascular disease, unspecified     Sleep apnea     Type 2 diabetes mellitus      Past Surgical History:   Procedure Laterality Date    CORONARY ARTERY BYPASS GRAFT      CORONARY STENT PLACEMENT      DEBRIDEMENT OF FOOT Right 08/10/2023    Dr. Chiu    DEBRIDEMENT OF FOOT Right 8/10/2023    Procedure: DEBRIDEMENT, FOOT;  Surgeon: Evelio Chiu DO;  Location: [facility];  Service: General;  Laterality: Right;    DEBRIDEMENT OF FOOT Right 2/27/2024    Procedure: DEBRIDEMENT, FOOT;  Surgeon: Evelio Chiu DO;  Location: [facility] OR;  Service: General;  Laterality: Right;    DEBRIDEMENT OF LOWER EXTREMITY Right 06/27/2022    Procedure: DEBRIDEMENT, LOWER EXTREMITY;  Surgeon: Forrest Kiser MD;  Location: [facility] OR;  Service: General;  Laterality: Right;  right foot    FOOT AMPUTATION THROUGH METATARSAL Right 3/1/2024    Procedure: AMPUTATION, FOOT, TRANSMETATARSAL; RIGHT LISFRANK AMPUTATION;  Surgeon: Evelio Chiu DO;  Location: [facility] OR;  Service: General;  Laterality: Right;    HIP FRACTURE SURGERY Left     IRRIGATION AND DEBRIDEMENT OF LOWER EXTREMITY Right 09/08/2022    Procedure: IRRIGATION AND DEBRIDEMENT, LOWER EXTREMITY;  Surgeon: Selina Matos MD;  Location: [facility] OR;  Service: General;  Laterality: Right;    IRRIGATION AND DEBRIDEMENT OF LOWER EXTREMITY Right 01/05/2023    Procedure: IRRIGATION AND DEBRIDEMENT, LOWER EXTREMITY;  Surgeon: Eveloi Chiu DO;  Location: [facility];  Service: General;  Laterality: Right;    SHOULDER OPEN ROTATOR CUFF REPAIR Left     SPINE SURGERY      TOE AMPUTATION Right 2/27/2024    Procedure: AMPUTATION, TOE;  Surgeon: Evelio Chiu DO;  Location: [facility] OR;  Service: General;  Laterality: Right;  RIGHT 5 TH TOE AND DEBRIDEMENT    VASCULAR SURGERY       Family History   Problem Relation Name Age of Onset    Arthritis Mother      Hypertension Mother      Learning disabilities Mother      Alcohol  abuse Father      Early death Father      Heart disease Father      Cancer Sister      Diabetes Sister      Heart disease Maternal Grandfather      COPD Paternal Grandfather      Heart disease Son       Social History     Tobacco Use    Smoking status: Every Day     Current packs/day: 2.50     Average packs/day: 2.5 packs/day for 53.4 years (133.5 ttl pk-yrs)     Types: Cigarettes     Start date: 8/10/1971     Passive exposure: Current    Smokeless tobacco: Never   Substance Use Topics    Alcohol use: Not Currently    Drug use: Yes     Types: Oxycodone     Review of Systems   Respiratory:  Negative for shortness of breath.         Elevated BP.      Cardiovascular:  Negative for chest pain.   Gastrointestinal:  Negative for nausea.   Neurological:  Positive for headaches. Negative for dizziness.   All other systems reviewed and are negative.      Physical Exam     Initial Vitals   BP Pulse Resp Temp SpO2   12/26/24 1036 12/26/24 1036 12/26/24 1051 12/26/24 1036 12/26/24 1036   (!) 182/80 75 16 98 °F (36.7 °C) (!) 93 %      MAP       --                Physical Exam    Nursing note and vitals reviewed.  Constitutional: He appears well-developed and well-nourished.   HENT:   Head: Normocephalic and atraumatic.   Eyes: Conjunctivae and EOM are normal. Pupils are equal, round, and reactive to light.   Neck: Neck supple.   Normal range of motion.  Cardiovascular:  Normal rate, regular rhythm, normal heart sounds and intact distal pulses.           Pulmonary/Chest: Breath sounds normal.   Abdominal: Abdomen is soft. Bowel sounds are normal.   Musculoskeletal:         General: Normal range of motion.      Cervical back: Normal range of motion and neck supple.     Neurological: He is alert and oriented to person, place, and time. He has normal strength.   Skin: Skin is warm and dry. Capillary refill takes less than 2 seconds.   Psychiatric: He has a normal mood and affect. Thought content normal.         ED Course    Procedures  Labs Reviewed - No data to display       Imaging Results              CT Head Without Contrast (Final result)  Result time 12/26/24 12:12:05      Final result by Abdon Pickett MD (12/26/24 12:12:05)                   Impression:      No definite acute intracranial findings by noncontrast CT.      Electronically signed by: Abdon Pickett  Date:    12/26/2024  Time:    12:12               Narrative:    EXAMINATION:  CT HEAD WITHOUT CONTRAST    CLINICAL HISTORY:  Mental status change, unknown cause;    TECHNIQUE:  Low dose axial images were obtained through the head.  Coronal and sagittal reformations were also performed. Contrast was not administered.    COMPARISON:  MRI of the brain performed 09/10/2019.    FINDINGS:  Blood: No acute intracranial hemorrhage.    Parenchyma: No definite loss of gray-white differentiation to suggest acute or subacute transcortical infarct. Generalized pattern age-related parenchymal volume loss.  Patchy white matter hypoattenuation, dominant focus in the left anterior centrum semiovale and corona radiata, similar to signal abnormality on remote MRI performed 09/10/2019 allowing for differences in technique and modality.    Ventricles/Extra-axial spaces: No abnormal extra-axial fluid collection. Basal cisterns are patent.    Vessels: Moderate atherosclerotic calcification.    Orbits: Status post bilateral lens replacements.    Scalp: Unremarkable.    Skull: There are no depressed skull fractures or destructive bone lesions.    Sinuses and mastoids: Scattered relatively modest paranasal sinus mucosal thickening with small retention cysts.    Other findings: None                                       Medications   cloNIDine tablet 0.1 mg (0.1 mg Oral Given 12/26/24 1153)   meperidine (PF) injection 50 mg (50 mg Intramuscular Given 12/26/24 1156)   promethazine injection 25 mg (25 mg Intramuscular Given 12/26/24 1156)     Medical Decision Making  Amount and/or Complexity  of Data Reviewed  Radiology: ordered.    Risk  Prescription drug management.              Attending Attestation:           Physician Attestation for Scribe:  Physician Attestation Statement for Scribe #1: I, Brent Clark DO, reviewed documentation, as scribed by Polnia Soriano in my presence, and it is both accurate and complete.             ED Course as of 01/09/25 1926   Thu Dec 26, 2024   1218 CT Head without contrast:   No definite acute intracranial findings by noncontrast CT. [BW]      ED Course User Index  [BW] Polina Soriano               Medical Decision Making:   Initial Assessment:   This is a 65 y/o male,who presents to the ED with complaints of elevated BP which started 2 days ago. He notes a HA. There is no hx of CP, dizziness, Lightheadedness, or shortness of breath. He reports he has a known hx of HTN and normally takes Norvasc and Lisinopril. There are no other complaints/pain in the ED at this time. He has a known hx of CAD, hyperlipidemia, COPD, HTN, diabetes, and GERD. He has had a coronary artery bypass graft and coronary stent placed. He is a current every day smoker.     The history is provided by the patient and the spouse.     Differential Diagnosis:   Hypertension  and cephalgia              Clinical Impression:  Final diagnoses:  [I10] Hypertension, unspecified type (Primary)  [R51.9] Intractable headache, unspecified chronicity pattern, unspecified headache type          ED Disposition Condition    Discharge Stable          ED Prescriptions    None       Follow-up Information    None          Brent Clark DO  01/09/25 1926

## 2025-01-08 ENCOUNTER — HOSPITAL ENCOUNTER (OUTPATIENT)
Dept: RADIOLOGY | Facility: HOSPITAL | Age: 65
Discharge: HOME | End: 2025-01-08
Attending: PHYSICIAN ASSISTANT
Payer: MEDICARE

## 2025-01-08 ENCOUNTER — OFFICE VISIT (OUTPATIENT)
Dept: PAIN MEDICINE | Facility: CLINIC | Age: 65
End: 2025-01-08
Payer: MEDICARE

## 2025-01-08 VITALS
HEIGHT: 68 IN | RESPIRATION RATE: 20 BRPM | DIASTOLIC BLOOD PRESSURE: 92 MMHG | WEIGHT: 162 LBS | SYSTOLIC BLOOD PRESSURE: 180 MMHG | HEART RATE: 76 BPM | BODY MASS INDEX: 24.55 KG/M2

## 2025-01-08 DIAGNOSIS — E78.5 HYPERLIPIDEMIA, UNSPECIFIED HYPERLIPIDEMIA TYPE: ICD-10-CM

## 2025-01-08 DIAGNOSIS — Z79.899 ENCOUNTER FOR LONG-TERM (CURRENT) USE OF MEDICATIONS: ICD-10-CM

## 2025-01-08 DIAGNOSIS — L97.513 ULCER OF RIGHT FOOT WITH NECROSIS OF MUSCLE: Chronic | ICD-10-CM

## 2025-01-08 DIAGNOSIS — I73.9 PERIPHERAL VASCULAR DISEASE, UNSPECIFIED: Chronic | ICD-10-CM

## 2025-01-08 DIAGNOSIS — M25.552 HIP PAIN, LEFT: Chronic | ICD-10-CM

## 2025-01-08 DIAGNOSIS — M25.552 HIP PAIN, LEFT: Primary | Chronic | ICD-10-CM

## 2025-01-08 DIAGNOSIS — G62.9 NEUROPATHY: Chronic | ICD-10-CM

## 2025-01-08 PROCEDURE — 99999 PR PBB SHADOW E&M-EST. PATIENT-LVL V: CPT | Mod: PBBFAC,,, | Performed by: PHYSICIAN ASSISTANT

## 2025-01-08 PROCEDURE — 73521 X-RAY EXAM HIPS BI 2 VIEWS: CPT | Mod: TC

## 2025-01-08 PROCEDURE — 99215 OFFICE O/P EST HI 40 MIN: CPT | Mod: PBBFAC | Performed by: PHYSICIAN ASSISTANT

## 2025-01-08 PROCEDURE — 80305 DRUG TEST PRSMV DIR OPT OBS: CPT | Mod: PBBFAC | Performed by: PHYSICIAN ASSISTANT

## 2025-01-08 PROCEDURE — 73521 X-RAY EXAM HIPS BI 2 VIEWS: CPT | Mod: 26,,, | Performed by: RADIOLOGY

## 2025-01-08 PROCEDURE — 99214 OFFICE O/P EST MOD 30 MIN: CPT | Mod: S$PBB,,, | Performed by: PHYSICIAN ASSISTANT

## 2025-01-08 PROCEDURE — 99999PBSHW POCT URINE DRUG SCREEN PRESUMP: Mod: PBBFAC,,,

## 2025-01-08 RX ORDER — HYDROCODONE BITARTRATE AND ACETAMINOPHEN 10; 325 MG/1; MG/1
1 TABLET ORAL EVERY 6 HOURS PRN
Qty: 120 TABLET | Refills: 0 | Status: CANCELLED | OUTPATIENT
Start: 2025-01-08 | End: 2025-02-07

## 2025-01-08 RX ORDER — GABAPENTIN 400 MG/1
CAPSULE ORAL
Qty: 180 CAPSULE | Refills: 2 | Status: SHIPPED | OUTPATIENT
Start: 2025-01-08

## 2025-01-08 RX ORDER — NITROGLYCERIN 0.4 MG/1
0.4 TABLET SUBLINGUAL EVERY 5 MIN PRN
Qty: 30 TABLET | Refills: 2 | Status: SHIPPED | OUTPATIENT
Start: 2025-01-08 | End: 2026-01-08

## 2025-01-08 RX ORDER — HYDROCODONE BITARTRATE AND ACETAMINOPHEN 10; 325 MG/1; MG/1
1 TABLET ORAL EVERY 6 HOURS PRN
Qty: 120 TABLET | Refills: 0 | Status: CANCELLED | OUTPATIENT
Start: 2025-01-08

## 2025-01-08 RX ORDER — HYDROCODONE BITARTRATE AND ACETAMINOPHEN 10; 325 MG/1; MG/1
1 TABLET ORAL EVERY 6 HOURS PRN
Qty: 120 TABLET | Refills: 0 | Status: SHIPPED | OUTPATIENT
Start: 2025-01-18 | End: 2025-02-17

## 2025-01-08 RX ORDER — DULOXETIN HYDROCHLORIDE 30 MG/1
30 CAPSULE, DELAYED RELEASE ORAL 2 TIMES DAILY
Qty: 60 CAPSULE | Refills: 2 | Status: SHIPPED | OUTPATIENT
Start: 2025-01-08

## 2025-01-08 NOTE — TELEPHONE ENCOUNTER
----- Message from Maria sent at 1/8/2025  8:28 AM CST -----  Regarding: REFILL  Who Called: Navdeep Avery    Refill or New Rx:Refill  RX Name and Strength:NITROGLYCERIN 0.4 MG  How is the patient currently taking it? (ex. 1XDay):  Is this a 30 day or 90 day RX:  Local or Mail Order:LOCAL  List of preferred pharmac[facility] on [facility] (remove unneeded): [facility]  If different Pharmacy is requested, enter Pharmacy information here including location and phone number: [facility]   Ordering Provider:NICOLE VALADEZ      Preferred Method of Contact: Phone Call  Patient's Preferred Phone Number on File: [phone]   Best Call Back Number, if different:  Additional Information:

## 2025-01-09 ENCOUNTER — PATIENT OUTREACH (OUTPATIENT)
Facility: HOSPITAL | Age: 65
End: 2025-01-09
Payer: MEDICARE

## 2025-01-09 NOTE — LETTER
AUTHORIZATION FOR RELEASE OF   CONFIDENTIAL INFORMATION    [facility],    We are seeing Navdeep Avery, date of birth 1960, in the clinic at [facility]. Marquez Huff MD is the patient's PCP. Navdeep Avery has an outstanding lab/procedure at the time we reviewed his chart. In order to help keep his health information updated, he has authorized us to request the following medical record(s):        (  )  MAMMOGRAM                                      (  )  COLONOSCOPY      (  )  PAP SMEAR                                          (  )  OUTSIDE LAB RESULTS     (  )  DEXA SCAN                                          ( x )  EYE EXAM 25           (  )  FOOT EXAM                                          (  )  ENTIRE RECORD     (  )  OUTSIDE IMMUNIZATIONS                 (  )  _______________         Please fax records to [facility], Mallary Harvey, LPN Care Coordinator, [phone].      If you have any questions, please contact Rahul at [phone]. .           Patient Name: Navdeep Avery  : 1960  Patient Phone #: [phone]                Navdeep Avery  MRN: [id number]  : 1960  Age: 63 y.o.  Sex: male         Patient/Legal Guardian Signature  This signature was collected at 2024    Self       _______________________________   Printed Name/Relationship to Patient      Consent for Examination and Treatment: I hereby authorize the providers and employees of [facility] ([facility]) to provide medical treatment/services which includes, but is not limited to, performing and administering tests and diagnostic procedures that are deemed necessary, including, but not limited to, imaging examinations, blood tests and other laboratory procedures as may be required by the hospital, clinic, or may be ordered by my physician(s) or persons working under the general and/or special instructions of my physician(s).      I understand and agree that  this consent covers all authorized persons, including but not limited to physicians, residents, nurse practitioners, physicians' assistants, specialists, consultants, student nurses, and independently contracted physicians, who are called upon by the physician in charge, to carry out the diagnostic procedures and medical or surgical treatment.     I hereby authorize [facility] to retain or dispose of any specimens or tissue, should there be such remaining from any test or procedure.     I hereby authorize and give consent for [facility] providers and employees to take photographs, images or videotapes of such diagnostic, surgical or treatment procedures of Patient as may be required by [facility] or as may be ordered by a physician. I further acknowledge and agree that [facility] may use cameras or other devices for patient monitoring.     I am aware that the practice of medicine is not an exact science, and I acknowledge that no guarantees have been made to me as to the outcome of any tests, procedures or treatment.     Authorization for Release of Information: I understand that my insurance company and/or their agents may need information necessary to make determinations about payment/reimbursement. I hereby provide authorization to release to all insurance companies, their successors, assignees, other parties with whom they may have contracted, or others acting on their behalf, that are involved with payment for any hospital and/or clinic charges incurred by the patient, any information that they request and deem necessary for payment/reimbursement, and/or quality review.  I further authorize the release of my health information to physicians or other health care practitioners on staff who are involved in my health care now and in the future, and to other health care providers, entities, or institutions for the purpose of my continued care and treatment, including referrals.     REGISTRATION AUTHORIZATION  Form No. 24586  (Rev. 3/25/2024)    Page 1 of 3                       Medicare Patient's Certification and Authorization to Release Information and Payment Request:  I certify that the information given by me in applying for payment under Title XVIII of the Social Security Act is correct. I authorize any benson of medical or other information about me to release to the Social SecurityAdministration, or its intermediaries or carriers, any information needed for this or a related Medicare claim. I request that payment of authorized benefits be made on my behalf.     Assignment of Insurance Benefits:   I hereby authorize any and all insurance companies, health plans, defined   benefit plans, health insurers or any entity that is or may be responsible for payment of my medical expenses to pay all hospital and medical benefits now due, and to become due and payable to me under any hospital benefits, sick benefits, injury benefits or any other benefit for services rendered to me, including Major Medical Benefits, direct to [facility] and all independently contracted physicians. I assign any and all rights that I may have against any and all insurance companies, health plans, defined benefit plans, health insurers or any entity that is or may be responsible for payment of my medical expenses, including, but not limited to any right to appeal a denial of a claim, any right to bring any action, lawsuit, administrative proceeding, or other cause of action on my behalf. I specifically assign my right to pursue litigation against any and all insurance companies, health plans, defined benefit plans, health insurers or any entity that is or may be responsible for payment of my medical expenses based upon a refusal to pay charges.            E. Valuables: It is understood and agreed that [facility] is not liable for the damage to or loss of any money, jewelry,   documents, dentures, eye glasses, hearing aids, prosthetics, or other property of value.      F. Computer Equipment: I understand and agree that should I choose to use computer equipment owned by [facility] or if I choose to access the Internet via [facility]s network, I do so at my own risk. [facility] is not responsible for any damage to my computer equipment or to any damages of any type that might arise from my loss of equipment or data.     G. Acceptance of Financial Responsibility:  I agree that in consideration of the services and   supplies that have been   or will be furnished to the patient, I am hereby obligated to pay all charges made for or on the account of the patient according to the standard rates (in effect at the time the services and supplies are delivered) established by [facility], including its Patient Financial Assistance Policy to the extent it is applicable. I understand that I am responsible for all charges, or portions thereof, not covered by insurance or other sources. Patient refunds will be distributed only after balances at all [facility] facilities are paid.     H. Communication Authorization:  I hereby authorize [facility] and its representatives, along with any billing service   or  who may work on their behalf, to contact me on   my cell phone and/or home phone using pre- recorded messages, artificial voice messages, automatic telephone dialing devices or other computer assisted technology, or by electronic      mail, text messaging, or by any other form of electronic communication. This includes, but is not limited to, appointment reminders, yearly physical exam reminders, preventive care reminders, patient campaigns, welcome calls, and calls about account balances on my account or any account on which I am listed as a guarantor. I understand I have the right to opt out of these communications at any time.      Relationship  Between  Facility and  Provider:      I understand that some, but not all, providers furnishing services to the patient are not employees or agents  of [facility]. The patient is under the care and supervision of his/her attending physician, and it is the responsibility of the facility and its nursing staff to carry out the instructions of such physicians. It is the responsibility of the patient's physician/designee to obtain the patient's informed consent, when required, for medical or surgical treatment, special diagnostic or therapeutic procedures, or hospital services rendered for the patient under the special instructions of the physician/designee.           REGISTRATION AUTHORIZATION  Form No. 74027 (Rev. 3/25/2024)    Page 2 of 3                       Immunizations: [facility] shares immunization information with state sponsored health departments to help you and your doctor keep track of your immunization records. By signing, you consent to have this information shared with the health department in your state:                                [place] - LINKS ([place] Immunization Network for Kids Statewide)                                [place] - MIIX ([place] Immunization Information eXchange)                                [place] - ImmPRINT (Immunization Patient Registry with Integrated Technology)     TERM: This authorization is valid for this and subsequent care/treatment I receive at [facility] and will remain valid unless/until revoked in writing by me.     [facility]: As used in this document, [facility] means all [facility] owned and managed facilities, including, but not limited to, all health centers, surgery centers, clinics, urgent care centers, and hospitals.         [facility] complies with applicable Federal civil rights laws and does not discriminate on the basis of race, color, national origin, age, disability, or sex.  ATENCIÓN: si habla kacie, tiene a manning disposición servicios gratuitos de asistencia lingüística. Riana dumas [phone].  [facility] Ý: N?u b?n nói Ti?ng Vi?t, có các d?ch v? h? tr? ngôn ng? mi?n phí  dành cho b?n. G?i s? [phone].        REGISTRATION AUTHORIZATION  Form No. 04449 (Rev. 3/25/2024)   Page 3 of 3

## 2025-01-09 NOTE — PROGRESS NOTES
Population Health Chart Review & Patient Outreach Details        Health Maintenance Topics Addressed and Outreach Outcomes / Actions Taken:  Diabetic Eye Exam [x] KRISTAN faxed to Dr. Castro for eye exam on 1/7/25.

## 2025-01-27 ENCOUNTER — OFFICE VISIT (OUTPATIENT)
Dept: FAMILY MEDICINE | Facility: CLINIC | Age: 65
End: 2025-01-27
Payer: MEDICARE

## 2025-01-27 VITALS
HEART RATE: 74 BPM | BODY MASS INDEX: 25.35 KG/M2 | HEIGHT: 68 IN | WEIGHT: 167.25 LBS | RESPIRATION RATE: 20 BRPM | TEMPERATURE: 98 F | SYSTOLIC BLOOD PRESSURE: 138 MMHG | DIASTOLIC BLOOD PRESSURE: 70 MMHG | OXYGEN SATURATION: 97 %

## 2025-01-27 DIAGNOSIS — Z79.4 TYPE 2 DIABETES MELLITUS WITH DIABETIC PERIPHERAL ANGIOPATHY WITHOUT GANGRENE, WITH LONG-TERM CURRENT USE OF INSULIN: Primary | ICD-10-CM

## 2025-01-27 DIAGNOSIS — E11.3213 TYPE 2 DIABETES MELLITUS WITH BOTH EYES AFFECTED BY MILD NONPROLIFERATIVE RETINOPATHY AND MACULAR EDEMA, WITH LONG-TERM CURRENT USE OF INSULIN: ICD-10-CM

## 2025-01-27 DIAGNOSIS — M25.511 CHRONIC PAIN OF BOTH SHOULDERS: ICD-10-CM

## 2025-01-27 DIAGNOSIS — Z89.431 STATUS POST TRANSMETATARSAL AMPUTATION OF RIGHT FOOT: ICD-10-CM

## 2025-01-27 DIAGNOSIS — Z79.4 TYPE 2 DIABETES MELLITUS WITH BOTH EYES AFFECTED BY MILD NONPROLIFERATIVE RETINOPATHY AND MACULAR EDEMA, WITH LONG-TERM CURRENT USE OF INSULIN: ICD-10-CM

## 2025-01-27 DIAGNOSIS — M86.671 OTHER CHRONIC OSTEOMYELITIS, RIGHT ANKLE AND FOOT: ICD-10-CM

## 2025-01-27 DIAGNOSIS — R26.81 UNSTEADY GAIT WHEN WALKING: ICD-10-CM

## 2025-01-27 DIAGNOSIS — G89.29 CHRONIC PAIN OF BOTH SHOULDERS: ICD-10-CM

## 2025-01-27 DIAGNOSIS — M25.512 CHRONIC PAIN OF BOTH SHOULDERS: ICD-10-CM

## 2025-01-27 DIAGNOSIS — F11.10 OPIOID ABUSE: ICD-10-CM

## 2025-01-27 DIAGNOSIS — J44.9 CHRONIC OBSTRUCTIVE PULMONARY DISEASE, UNSPECIFIED COPD TYPE: ICD-10-CM

## 2025-01-27 DIAGNOSIS — E78.5 HYPERLIPIDEMIA, UNSPECIFIED HYPERLIPIDEMIA TYPE: ICD-10-CM

## 2025-01-27 DIAGNOSIS — E11.51 TYPE 2 DIABETES MELLITUS WITH DIABETIC PERIPHERAL ANGIOPATHY WITHOUT GANGRENE, WITH LONG-TERM CURRENT USE OF INSULIN: Primary | ICD-10-CM

## 2025-01-27 PROCEDURE — 99214 OFFICE O/P EST MOD 30 MIN: CPT | Mod: ,,, | Performed by: FAMILY MEDICINE

## 2025-01-27 RX ORDER — EZETIMIBE 10 MG/1
10 TABLET ORAL DAILY
Qty: 90 TABLET | Refills: 3 | Status: SHIPPED | OUTPATIENT
Start: 2025-01-27 | End: 2026-01-27

## 2025-01-27 NOTE — PROGRESS NOTES
Navdeep Avery is a 64 y.o. male seen today for follow-up on his type 2 diabetes.  We reviewed his lab work from last month where his A1c was 8.2 and I have encouraged him to continue to work on his diet.  His triglycerides were still not to goal at 293 and and I have asked him to cut back on carbohydrates and fats.  We did discuss Vascepa and he prefer to work on his diet before starting the medication again.  Patient also is requesting physical therapy for balance issues as well as bilateral shoulder pain secondary to degenerative joint disease.  He denies any chest pain or shortness for breath above baseline and is up-to-date on his pneumonia vaccine RSV vaccine and flu vaccine.    Past Medical History:   Diagnosis Date    Arthritis     Carpal tunnel syndrome     Charcot foot due to diabetes mellitus 09/29/2023    Prescription for CROW orthotic boot given today    COPD (chronic obstructive pulmonary disease)     Coronary artery disease involving coronary bypass graft of native heart 06/27/2023    CABG 2018 (No OP report available)     Most recent left heart catheterization 08/12/2019  1. Normal LV size apical akinesis and overall normal LV systolic function, ejection fraction 55%  2. Patent saphenous vein graft to the right PDA with patent jump graft to the OM branch left circumflex  3. Patent LIMA to the OM1  4. No significant mitral regurgitation     COVID 02/02/2022    Diabetic peripheral neuropathy     Essential (primary) hypertension     GERD (gastroesophageal reflux disease)     Insomnia secondary to depression with anxiety 06/17/2021    Mixed hyperlipidemia 03/23/2021    Nicotine dependence     Peripheral vascular disease, unspecified     Sleep apnea     Type 2 diabetes mellitus      Family History   Problem Relation Name Age of Onset    Arthritis Mother      Hypertension Mother      Learning disabilities Mother      Alcohol abuse Father      Early death Father      Heart disease Father      Cancer  Sister      Diabetes Sister      Heart disease Maternal Grandfather      COPD Paternal Grandfather      Heart disease Son       Current Outpatient Medications on File Prior to Visit   Medication Sig Dispense Refill    albuterol (PROVENTIL) 2.5 mg /3 mL (0.083 %) nebulizer solution Take 3 mLs (2.5 mg total) by nebulization every 6 (six) hours as needed for Wheezing or Shortness of Breath. Rescue 90 each 11    albuterol (PROVENTIL/VENTOLIN HFA) 90 mcg/actuation inhaler Inhale 2 puffs into the lungs 2 (two) times daily as needed for Wheezing. 18 g 2    amLODIPine (NORVASC) 10 MG tablet Take 1 tablet (10 mg total) by mouth once daily. 90 tablet 1    aspirin 81 MG Chew Take 81 mg by mouth once daily.      atorvastatin (LIPITOR) 80 MG tablet Take 1 tablet (80 mg total) by mouth every evening. 90 tablet 1    clopidogreL (PLAVIX) 75 mg tablet TAKE 1 TABLET BY MOUTH once DAILY 90 tablet 1    diclofenac sodium (VOLTAREN) 1 % Gel Apply 1 g topically 4 (four) times daily as needed (pain). 20 g 1    docusate sodium (COLACE) 100 MG capsule Take 1 capsule (100 mg total) by mouth 2 (two) times daily. (Patient not taking: Reported on 4/24/2024) 28 capsule 0    DULoxetine (CYMBALTA) 30 MG capsule Take 1 capsule (30 mg total) by mouth 2 (two) times daily. 60 capsule 2    empagliflozin-metformin 10-1,000 mg TBph Take 1 tablet by mouth Daily. 90 tablet 1    flash glucose scanning reader (FREESTYLE CT 2 READER) [facility] [place] [facility] Use to monitor blood glucose 1 each 1    flash glucose sensor (FREESTYLE CT 2 SENSOR) Kit Use to monitor blood sugar and replace unit every 2 weeks 1 kit 6    fluticasone propion-salmeterol 115-21 mcg/dose (ADVAIR HFA) 115-21 mcg/actuation HFAA inhaler Inhale 2 puffs into the lungs every 12 (twelve) hours. Controller 12 g 2    gabapentin (NEURONTIN) 400 MG capsule 400 mg, 2 tablets by mouth q.6 hours 180 capsule 2    HYDROcodone-acetaminophen (NORCO)  mg per tablet Take 1 tablet by mouth every 6 (six) hours as  "needed for Pain. 120 tablet 0    HYDROcodone-acetaminophen (NORCO)  mg per tablet Take 1 tablet by mouth every 6 (six) hours as needed for Pain (pain). 120 tablet 0    hydrOXYzine pamoate (VISTARIL) 25 MG Cap Take 1 capsule (25 mg total) by mouth once daily. Take nightly for sleep 90 capsule 1    insulin degludec (TRESIBA FLEXTOUCH U-200) 200 unit/mL (3 mL) insulin pen Inject 10 Units into the skin once daily. 3 pen 2    insulin syringe-needle U-100 (BD INSULIN SYRINGE ULTRA-FINE) 0.3 mL 30 gauge x 1/2" Syrg Use one syringe twice daily with insulin injection for diabetes 100 each 5    isosorbide mononitrate (IMDUR) 30 MG 24 hr tablet Take 1 tablet (30 mg total) by mouth once daily. 30 tablet 11    LIDOcaine (LIDODERM) 5 % 1 patch once daily.      lisinopriL 10 MG tablet Take 1 tablet (10 mg total) by mouth once daily. 90 tablet 1    metoprolol succinate (TOPROL-XL) 50 MG 24 hr tablet Take 1 tablet (50 mg total) by mouth once daily. 90 tablet 1    nitroGLYCERIN (NITROSTAT) 0.4 MG SL tablet Place 1 tablet (0.4 mg total) under the tongue every 5 (five) minutes as needed for Chest pain. 30 tablet 2    pantoprazole (PROTONIX) 40 MG tablet Take 1 tablet (40 mg total) by mouth once daily. 90 tablet 1    [DISCONTINUED] gabapentin (NEURONTIN) 300 MG capsule 900 mg  q.i.d. or 300 mg 3 tablets q.i.d. 360 capsule 2    [DISCONTINUED] LEVEMIR FLEXTOUCH U100 INSULIN 100 unit/mL (3 mL) InPn pen Inject 10 units subcutaneously every morning, inject 20 units subcutaneously every evening. 9 mL 2     No current facility-administered medications on file prior to visit.     Immunization History   Administered Date(s) Administered    Influenza - Quadrivalent - PF *Preferred* (6 months and older) 09/14/2021, 10/21/2022    Influenza - Trivalent - Fluad - Adjuvanted - PF (65 years and older 11/27/2024    Pneumococcal Conjugate - 13 Valent 10/25/2017    Pneumococcal Conjugate - 20 Valent 02/17/2023    Pneumococcal Polysaccharide - 23 " Gail 10/19/2016    Tdap 12/20/2019       Review of Systems   Constitutional:  Negative for fever, malaise/fatigue and weight loss.   Respiratory:  Positive for shortness of breath.    Cardiovascular:  Negative for chest pain and palpitations.   Gastrointestinal:  Negative for nausea and vomiting.   Musculoskeletal:  Positive for joint pain.   Psychiatric/Behavioral:  Negative for depression.         Vitals:    01/27/25 1416   BP: 138/70   Pulse: 74   Resp: 20   Temp: 97.9 °F (36.6 °C)       Physical Exam  Vitals reviewed.   Constitutional:       Appearance: Normal appearance.   HENT:      Head: Normocephalic.   Eyes:      Extraocular Movements: Extraocular movements intact.      Conjunctiva/sclera: Conjunctivae normal.      Pupils: Pupils are equal, round, and reactive to light.   Neck:      Thyroid: No thyroid mass or thyromegaly.   Cardiovascular:      Rate and Rhythm: Normal rate and regular rhythm.      Heart sounds: Normal heart sounds. No murmur heard.     No gallop.   Pulmonary:      Effort: Pulmonary effort is normal. No respiratory distress.      Breath sounds: Normal breath sounds. No wheezing or rales.   Musculoskeletal:      Right shoulder: Decreased range of motion.      Left shoulder: Decreased range of motion.   Skin:     General: Skin is warm and dry.      Coloration: Skin is not jaundiced or pale.   Neurological:      Mental Status: He is alert.   Psychiatric:         Mood and Affect: Mood normal.         Behavior: Behavior normal.         Thought Content: Thought content normal.         Judgment: Judgment normal.          Assessment and Plan  1. Type 2 diabetes mellitus with diabetic peripheral angiopathy without gangrene, with long-term current use of insulin  Overview:                                  Assessment & Plan:  We will continue current plan encouraged the patient to have a healthy diet and monitor A1c.    Orders:  -     Cancel: CBC Auto Differential; Future; Expected date:  01/27/2025  -     Cancel: Comprehensive Metabolic Panel; Future; Expected date: 01/27/2025  -     Cancel: Hemoglobin A1C; Future; Expected date: 01/27/2025  -     CBC Auto Differential; Future; Expected date: 07/27/2025  -     Comprehensive Metabolic Panel; Future  -     Hemoglobin A1C; Future; Expected date: 07/27/2025    2. Opioid abuse  Assessment & Plan:  The patient will continue current plan and follow-up with pain management as directed.      3. Status post transmetatarsal amputation of right foot  Assessment & Plan:  Current plan and medication and follow cholesterol and A1c      4. Other chronic osteomyelitis, right ankle and foot  Assessment & Plan:  We will continue current plan and follow-up closely for exacerbation      5. Type 2 diabetes mellitus with both eyes affected by mild nonproliferative retinopathy and macular edema, with long-term current use of insulin  Assessment & Plan:  We will continue current plan encouraged a healthy diet and monitor A1c and follow-up with ophthalmology.    Orders:  -     Microalbumin/Creatinine Ratio, Urine    6. Chronic obstructive pulmonary disease, unspecified COPD type  Assessment & Plan:  We will continue current plan and encouraged vaccination and monitor for exacerbations      7. Unsteady gait when walking  -     Ambulatory Referral/Consult to Physical Therapy/Occupational Therapy; Future; Expected date: 02/03/2025    8. Chronic pain of both shoulders  -     Ambulatory Referral/Consult to Physical Therapy/Occupational Therapy; Future; Expected date: 02/03/2025    9. Hyperlipidemia, unspecified hyperlipidemia type  -     ezetimibe (ZETIA) 10 mg tablet; Take 1 tablet (10 mg total) by mouth once daily.  Dispense: 90 tablet; Refill: 3  -     Cancel: Lipid Panel; Future; Expected date: 01/27/2025  -     Lipid Panel; Future; Expected date: 07/27/2025             Return to clinic in 6 months for follow-up blood work and then an office visit or as needed.    Health  Maintenance Topics with due status: Not Due       Topic Last Completion Date    TETANUS VACCINE 12/20/2019    Foot Exam 05/30/2024    Diabetic Eye Exam 09/27/2024    High Dose Statin 11/27/2024    Aspirin/Antiplatelet Therapy 11/27/2024    Lipid Panel 12/03/2024    Hemoglobin A1c 12/03/2024

## 2025-01-27 NOTE — ASSESSMENT & PLAN NOTE
We will continue current plan encouraged a healthy diet and monitor A1c and follow-up with ophthalmology.

## 2025-02-20 ENCOUNTER — TELEPHONE (OUTPATIENT)
Dept: PAIN MEDICINE | Facility: CLINIC | Age: 65
End: 2025-02-20
Payer: MEDICARE

## 2025-02-20 DIAGNOSIS — I73.9 PERIPHERAL VASCULAR DISEASE, UNSPECIFIED: Chronic | ICD-10-CM

## 2025-02-20 DIAGNOSIS — F32.A DEPRESSION, UNSPECIFIED DEPRESSION TYPE: ICD-10-CM

## 2025-02-20 DIAGNOSIS — M25.552 HIP PAIN, LEFT: Chronic | ICD-10-CM

## 2025-02-20 DIAGNOSIS — L97.513 ULCER OF RIGHT FOOT WITH NECROSIS OF MUSCLE: Chronic | ICD-10-CM

## 2025-02-20 DIAGNOSIS — G62.9 NEUROPATHY: Chronic | ICD-10-CM

## 2025-02-20 NOTE — TELEPHONE ENCOUNTER
----- Message from [employer] [place] sent at 2/20/2025 12:07 PM CST -----  Who Called: Navdeep Oleksandr is requesting assistance/information from provider's office.Preferred Method of Contact: Phone CallPatient's Preferred Phone Number on File: [phone] Best Call Back Number, if different:Additional Information: patient said that he needs a refill on his HYDROcodone-acetaminophen (NORCO)  mg per tablet, he said that only one month was sent in and it's usually three, he wanted to know if he needed to be seen first to get a new prescription written.

## 2025-02-21 RX ORDER — HYDROCODONE BITARTRATE AND ACETAMINOPHEN 10; 325 MG/1; MG/1
1 TABLET ORAL EVERY 6 HOURS PRN
Qty: 120 TABLET | Refills: 0 | Status: SHIPPED | OUTPATIENT
Start: 2025-02-21

## 2025-03-21 NOTE — ASSESSMENT & PLAN NOTE
Patient with known CAD s/p CABG, which is controlled Will continue ASA, Plavix, and Statin and monitor for S/Sx of angina/ACS. Continue to monitor on telemetry.    Time reflects when diagnosis was documented in both MDM as applicable and the Disposition within this note       Time User Action Codes Description Comment    3/21/2025  2:37 PM Nato العراقي Add [K59.00] Constipation, unspecified constipation type           ED Disposition       ED Disposition   Discharge    Condition   Stable    Date/Time   Fri Mar 21, 2025  2:37 PM    Comment   Miguel Angel Ortega discharge to home/self care.                   Assessment & Plan       Medical Decision Making  This patient presents with abdominal distention, constipation.   Diagnostic considerations include small bowel obstruction, volvulus, constipation, malignancy.         Problems Addressed:  Constipation, unspecified constipation type: acute illness or injury    Amount and/or Complexity of Data Reviewed  Labs: ordered. Decision-making details documented in ED Course.  Radiology: ordered. Decision-making details documented in ED Course.    Risk  OTC drugs.  Prescription drug management.      ED Course as of 03/21/25 1518   Fri Mar 21, 2025   1150 Vital signs reviewed.  Abdominal exam is benign.  History of constipation.  Patient states that he has not had a bowel movement in approximately 1 week.  Patient/family noticed increased distention this morning.  Passing minimal flatus.  Denies nausea/vomiting.  Will obtain labs, imaging.  Will hydrate with IV fluid bolus.  Has not been compliant with prescribed laxative/stool softener medication.   1330 CT imaging +No evidence of small bowel obstruction. There is significant increased fecal stasis as compared to the study of March 14th. Some distention of the rectum may suggest a degree of impaction. No evidence of inflammatory changes to suggest colitis. Laboratory w/u largely unremarkable. UA is non-infectious appearing.    1436 Patient had a large bowel movement status post fleets enema.  Per the patient/caretaker, the patient has not been taking his regularly prescribed MiraLAX, stool  softeners being that he was having nausea/vomiting/diarrhea last week.  The patient was encouraged to continue his bowel regimen, orally hydrate.  Return precautions discussed.  Stable for discharge.     Medications   multi-electrolyte (Plasmalyte-A/Isolyte-S PH 7.4/Normosol-R) IV bolus 1,000 mL (0 mL Intravenous Stopped 3/21/25 1404)   iohexol (OMNIPAQUE) 350 MG/ML injection (MULTI-DOSE) 100 mL (100 mL Intravenous Given 3/21/25 1222)   sodium phosphate-biphosphate (FLEET) enema 1 enema (1 enema Rectal Given 3/21/25 1351)     ED Risk Strat Scores          SBIRT 20yo+      Flowsheet Row Most Recent Value   Initial Alcohol Screen: US AUDIT-C     1. How often do you have a drink containing alcohol? 0 Filed at: 03/21/2025 1132   2. How many drinks containing alcohol do you have on a typical day you are drinking?  0 Filed at: 03/21/2025 1132   3b. FEMALE Any Age, or MALE 65+: How often do you have 4 or more drinks on one occassion? 0 Filed at: 03/21/2025 1132   Audit-C Score 0 Filed at: 03/21/2025 1132   AMBAR: How many times in the past year have you...    Used an illegal drug or used a prescription medication for non-medical reasons? Never Filed at: 03/21/2025 1132          History of Present Illness       Chief Complaint   Patient presents with    Hernia     Pt sent from [facility] this AM after staff found large mass in center of patient abdomen when attempting to sit up, also reports no BM since Friday- pt offers no complaints on arrival, has GI apt on Wednesday        Past Medical History:   Diagnosis Date    A-fib (HCC)     Coronary artery disease     History of angina     Iron deficiency anemia     Prostate cancer (HCC)     Renal disorder       Past Surgical History:   Procedure Laterality Date    CORONARY ARTERY BYPASS GRAFT  1995    [facility] CYSTO W/IRRIG & EVAC MULTPLE OBSTRUCTING CLOTS N/A 12/27/2023    Procedure: CYSTOSCOPY EVACUATION OF CLOTS, fulguration of bleeding. insertion 24FR [address] zuniga CBI;   Surgeon: Carson Singer MD;  Location:  MAIN OR;  Service: Urology    TONSILLECTOMY        Family History   Problem Relation Age of Onset    Hypertension Father       Social History     Tobacco Use    Smoking status: Former     Types: Cigarettes    Smokeless tobacco: Never   Vaping Use    Vaping status: Never Used   Substance Use Topics    Alcohol use: Not Currently    Drug use: Not Currently      E-Cigarette/Vaping    E-Cigarette Use Never User       E-Cigarette/Vaping Substances    Nicotine No     THC No     CBD No     Flavoring No     Other No     Unknown No       I have reviewed and agree with the history as documented.     89 yo M.  Presents with abdominal distention, constipation.  Patient states that his last bowel movement was 1 week ago.  At that time, the patient had nausea/vomiting/diarrhea.  Was evaluated in the emergency department.  Patient states that he has been passing minimal flatus but belching.  No nausea/vomiting since last week.  Tolerating a diet.  Typically takes MiraLAX, stool softeners but has not since his GI illness last week.       History provided by:  Patient, medical records and caregiver    Review of Systems   Constitutional:  Negative for activity change, appetite change, chills, fatigue and fever.   Respiratory:  Negative for cough, chest tightness and shortness of breath.    Cardiovascular:  Negative for chest pain and palpitations.   Gastrointestinal:  Positive for abdominal distention, constipation, diarrhea, nausea and vomiting. Negative for abdominal pain.   Genitourinary:  Negative for decreased urine volume, difficulty urinating, dysuria, flank pain, frequency and urgency.   Musculoskeletal:  Negative for back pain, myalgias, neck pain and neck stiffness.   Neurological:  Negative for dizziness, syncope, weakness, light-headedness and headaches.   All other systems reviewed and are negative.    Objective       ED Triage Vitals [03/21/25 1133]   Temperature Pulse Blood  Pressure Respirations SpO2 Patient Position - Orthostatic VS   98.2 °F (36.8 °C) 65 146/63 18 98 % Sitting      Temp Source Heart Rate Source BP Location FiO2 (%) Pain Score    Temporal Monitor Right arm -- --      Vitals      Date and Time Temp Pulse SpO2 Resp BP Pain Score FACES Pain Rating User   03/21/25 1445 -- 68 98 % -- 131/60 -- --    03/21/25 1430 -- 70 98 % -- 144/62 -- --    03/21/25 1345 -- 67 99 % -- -- -- --    03/21/25 1330 -- 62 96 % -- 187/77 -- --    03/21/25 1315 -- 65 98 % -- -- -- --    03/21/25 1300 -- 60 98 % -- 157/70 -- --    03/21/25 1245 -- 58 100 % -- 156/69 -- --    03/21/25 1133 98.2 °F (36.8 °C) 65 98 % 18 146/63 -- -- SS          Physical Exam  Vitals and nursing note reviewed.   Constitutional:       General: He is not in acute distress.     Appearance: He is not ill-appearing or toxic-appearing.   HENT:      Head: Normocephalic and atraumatic.      Right Ear: External ear normal.      Left Ear: External ear normal.      Nose: No congestion or rhinorrhea.      Mouth/Throat:      Mouth: Mucous membranes are dry.   Eyes:      General: No scleral icterus.        Right eye: No discharge.         Left eye: No discharge.      Extraocular Movements: Extraocular movements intact.      Conjunctiva/sclera: Conjunctivae normal.   Cardiovascular:      Rate and Rhythm: Normal rate and regular rhythm.      Pulses: Normal pulses.      Heart sounds: Normal heart sounds. No murmur heard.  Pulmonary:      Effort: Pulmonary effort is normal. No respiratory distress.      Breath sounds: Normal breath sounds. No stridor. No wheezing, rhonchi or rales.   Chest:      Chest wall: No tenderness.   Abdominal:      General: Bowel sounds are normal. There is distension.      Palpations: Abdomen is soft.      Tenderness: There is no abdominal tenderness. There is no right CVA tenderness, left CVA tenderness, guarding or rebound.      Comments: The patient's upper abdomen is mildly distended.  No  tenderness to palpation.  Decreased bowel sounds.   Musculoskeletal:      Right lower leg: No edema.      Left lower leg: No edema.   Skin:     General: Skin is warm and dry.      Coloration: Skin is not jaundiced or pale.   Neurological:      Mental Status: He is alert and oriented to person, place, and time.   Psychiatric:         Mood and Affect: Mood normal.         Behavior: Behavior normal.       Results Reviewed       Procedure Component Value Units Date/Time    Urine Microscopic [[id number]]  (Normal) Collected: 03/21/25 1308    Lab Status: Final result Specimen: Urine, Clean Catch Updated: 03/21/25 1337     RBC, UA 1-2 /hpf      WBC, UA None Seen /hpf      Epithelial Cells None Seen /hpf      Bacteria, UA Occasional /hpf     UA w Reflex to Microscopic w Reflex to Culture [[id number]]  (Abnormal) Collected: 03/21/25 1308    Lab Status: Final result Specimen: Urine, Clean Catch Updated: 03/21/25 1320     Color, UA Yellow     Clarity, UA Clear     Specific Gravity, UA <=1.005     pH, UA 6.0     Leukocytes, UA Negative     Nitrite, UA Negative     Protein, UA Negative mg/dl      Glucose, UA Negative mg/dl      Ketones, UA Negative mg/dl      Urobilinogen, UA 0.2 E.U./dl      Bilirubin, UA Negative     Occult Blood, UA Trace-Intact    Basic metabolic panel [[id number]]  (Abnormal) Collected: 03/21/25 1145    Lab Status: Final result Specimen: Blood from Arm, Left Updated: 03/21/25 1210     Sodium 131 mmol/L      Potassium 4.4 mmol/L      Chloride 101 mmol/L      CO2 23 mmol/L      ANION GAP 7 mmol/L      BUN 16 mg/dL      Creatinine 1.07 mg/dL      Glucose 102 mg/dL      Calcium 8.4 mg/dL      eGFR 57 ml/min/1.73sq m     Narrative:      National Kid[employer] Disease Foundation guidelines for Chronic Kidney Disease (CKD):     Stage 1 with normal or high GFR (GFR > 90 mL/min/1.73 square meters)    Stage 2 Mild CKD (GFR = 60-89 mL/min/1.73 square meters)    Stage 3A Moderate CKD (GFR = 45-59 mL/min/1.73 square meters)     Stage 3B Moderate CKD (GFR = 30-44 mL/min/1.73 square meters)    Stage 4 Severe CKD (GFR = 15-29 mL/min/1.73 square meters)    Stage 5 End Stage CKD (GFR <15 mL/min/1.73 square meters)  Note: GFR calculation is accurate only with a steady state creatinine    Magnesium [[id number]]  (Normal) Collected: 03/21/25 1145    Lab Status: Final result Specimen: Blood from Arm, Left Updated: 03/21/25 1210     Magnesium 1.9 mg/dL     Lactic acid, plasma (w/reflex if result > 2.0) [[id number]]  (Normal) Collected: 03/21/25 1145    Lab Status: Final result Specimen: Blood from Arm, Left Updated: 03/21/25 1210     LACTIC ACID 1.2 mmol/L     Narrative:      Result may be elevated if tourniquet was used during collection.    CBC and differential [[id number]]  (Abnormal) Collected: 03/21/25 1145    Lab Status: Final result Specimen: Blood from Arm, Left Updated: 03/21/25 1153     WBC 5.59 Thousand/uL      RBC 3.21 Million/uL      Hemoglobin 10.3 g/dL      Hematocrit 30.3 %      MCV 94 fL      MCH 32.1 pg      MCHC 34.0 g/dL      RDW 13.2 %      MPV 8.1 fL      Platelets 189 Thousands/uL      nRBC 0 /100 WBCs      Segmented % 71 %      Immature Grans % 1 %      Lymphocytes % 17 %      Monocytes % 10 %      Eosinophils Relative 1 %      Basophils Relative 0 %      Absolute Neutrophils 3.97 Thousands/µL      Absolute Immature Grans 0.03 Thousand/uL      Absolute Lymphocytes 0.96 Thousands/µL      Absolute Monocytes 0.57 Thousand/µL      Eosinophils Absolute 0.04 Thousand/µL      Basophils Absolute 0.02 Thousands/µL             CT abdomen pelvis with contrast   Final Interpretation by Quang Franklin MD (03/21 1323)      No evidence of small bowel obstruction. There is significant increased fecal stasis as compared to the study of March 14th. Some distention of the rectum may suggest a degree of impaction. No evidence of inflammatory changes to suggest colitis.      Bladder wall thickening and moderate distention is seen with slight  perivesicular fat infiltration. This should be correlated with urinalysis for any evidence of urinary tract infection.      Stable cystic lesion in the pancreatic head. Please see full recommendations from prior study. 6-month follow-up was advised at that time.      Evidence of widespread osseous metastatic disease with pathologic fractures in the pelvis similar to the study of March 14.      Gallstones         The study was marked in EPIC for immediate notification.               Workstation performed: [id number]             Procedures    ED Medication and Procedure Management   Prior to Admission Medications   Prescriptions Last Dose Informant Patient Reported? Taking?   HealthyLax 17 g packet   Yes No   Multiple Vitamins-Minerals (PreserVision AREDS) CAPS   Yes No   Sig: Take 1 capsule by mouth daily   Neomycin-Bacitracin-Polymyxin (GNP Triple Antibiotic) OINT   Yes No   Patient not taking: Reported on 12/9/2024   Refresh Tears 0.5 % SOLN   Yes No   SSD 1 % cream   Yes No   Saline GEL   Yes No   Sig: into each nostril   Vibegron 75 MG TABS   No No   Sig: Take 75 mg by mouth every other day   acetaminophen (TYLENOL) 325 mg tablet   No No   Sig: Take 2 tablets (650 mg total) by mouth every 6 (six) hours as needed for mild pain   aluminum-magnesium hydroxide-simethicone (Antacid) 9113-9269-971 mg/30 mL suspension   Yes No   Sig: Take by mouth   bisacodyl (Dulcolax) 10 mg suppository   Yes No   Sig: Insert 10 mg into the rectum daily as needed for constipation   clopidogrel (PLAVIX) 75 mg tablet   Yes No   Sig: Take 75 mg by mouth daily   cyanocobalamin (VITAMIN B-12) 1000 MCG tablet   No No   Sig: Take 1 tablet (1,000 mcg total) by mouth daily   digoxin (Digox) 0.125 mg tablet   Yes No   Sig: Take 125 mcg by mouth every other day   dronedarone (Multaq) 400 mg tablet   Yes No   Sig: Take 400 mg by mouth 2 (two) times a day with meals   folic acid (FOLVITE) 1 mg tablet   Yes No   Sig: Take 1 tablet by mouth daily    loperamide (IMODIUM) 2 mg capsule   Yes No   Sig: Take 1 capsule by mouth 4 (four) times a day as needed   melatonin 3 mg   No No   Sig: Take 1 tablet (3 mg total) by mouth daily at bedtime as needed (insomnia)   meloxicam (MOBIC) 15 mg tablet   Yes No   midodrine (PROAMATINE) 5 mg tablet   No No   Sig: Take 0.5 tablets (2.5 mg total) by mouth 2 (two) times a day Only give if sbp <120   ondansetron (ZOFRAN) 4 mg tablet   Yes No   ondansetron (ZOFRAN) 4 mg tablet   No No   Sig: Take 1 tablet (4 mg total) by mouth every 6 (six) hours   pantoprazole (PROTONIX) 40 mg tablet   Yes No   Sig: Take 40 mg by mouth daily   primidone (MYSOLINE) 50 mg tablet   Yes No   Sig: Take 50 mg by mouth every 12 (twelve) hours   senna-docusate sodium (SENOKOT S) 8.6-50 mg per tablet   No No   Sig: Take 1 tablet by mouth 2 (two) times a day   Patient taking differently: Take 1 tablet by mouth daily as needed for constipation   tamsulosin (FLOMAX) 0.4 mg   No No   Sig: Take 1 capsule (0.4 mg total) by mouth daily with dinner      Facility-Administered Medications: None     Discharge Medication List as of 3/21/2025  2:38 PM        CONTINUE these medications which have NOT CHANGED    Details   acetaminophen (TYLENOL) 325 mg tablet Take 2 tablets (650 mg total) by mouth every 6 (six) hours as needed for mild pain, Starting Mon 6/17/2024, No Print      aluminum-magnesium hydroxide-simethicone (Antacid) 1190-6507-557 mg/30 mL suspension Take by mouth, Historical Med      bisacodyl (Dulcolax) 10 mg suppository Insert 10 mg into the rectum daily as needed for constipation, Historical Med      clopidogrel (PLAVIX) 75 mg tablet Take 75 mg by mouth daily, Historical Med      cyanocobalamin (VITAMIN B-12) 1000 MCG tablet Take 1 tablet (1,000 mcg total) by mouth daily, Starting Fri 1/5/2024, Print      digoxin (Digox) 0.125 mg tablet Take 125 mcg by mouth every other day, Historical Med      dronedarone (Multaq) 400 mg tablet Take 400 mg by mouth 2  (two) times a day with meals, Historical Med      folic acid (FOLVITE) 1 mg tablet Take 1 tablet by mouth daily, Historical Med      HealthyLax 17 g packet Historical Med      loperamide (IMODIUM) 2 mg capsule Take 1 capsule by mouth 4 (four) times a day as needed, Historical Med      melatonin 3 mg Take 1 tablet (3 mg total) by mouth daily at bedtime as needed (insomnia), Starting Thu 1/4/2024, Print      meloxicam (MOBIC) 15 mg tablet Historical Med      midodrine (PROAMATINE) 5 mg tablet Take 0.5 tablets (2.5 mg total) by mouth 2 (two) times a day Only give if sbp <120, Starting Mon 6/17/2024, Until Mon 12/9/2024, Print      Multiple Vitamins-Minerals (PreserVision AREDS) CAPS Take 1 capsule by mouth daily, Historical Med      Neomycin-Bacitracin-Polymyxin (GNP Triple Antibiotic) OINT Historical Med      !! ondansetron (ZOFRAN) 4 mg tablet Historical Med      !! ondansetron (ZOFRAN) 4 mg tablet Take 1 tablet (4 mg total) by mouth every 6 (six) hours, Starting Fri 3/14/2025, Normal      pantoprazole (PROTONIX) 40 mg tablet Take 40 mg by mouth daily, Historical Med      primidone (MYSOLINE) 50 mg tablet Take 50 mg by mouth every 12 (twelve) hours, Historical Med      Refresh Tears 0.5 % SOLN Historical Med      Saline GEL into each nostril, Historical Med      senna-docusate sodium (SENOKOT S) 8.6-50 mg per tablet Take 1 tablet by mouth 2 (two) times a day, Starting Thu 1/4/2024, Until Wed 10/23/2024, Print      SSD 1 % cream Historical Med      tamsulosin (FLOMAX) 0.4 mg Take 1 capsule (0.4 mg total) by mouth daily with dinner, Starting Mon 3/11/2024, Normal      Vibegron 75 MG TABS Take 75 mg by mouth every other day, Starting Mon 6/17/2024, No Print       !! - Potential duplicate medications found. Please discuss with provider.        No discharge procedures on file.  ED SEPSIS DOCUMENTATION   Time reflects when diagnosis was documented in both MDM as applicable and the Disposition within this note       Time  User Action Codes Description Comment    3/21/2025  2:37 PM Nato العراقي Add [K59.00] Constipation, unspecified constipation type                  Nato العراقي DO  03/21/25 1523

## 2025-04-14 NOTE — PROGRESS NOTES
Subjective:         Patient ID: [id number] Avery is a 64 y.o. male.    Chief Complaint: Shoulder Pain, Back Pain, Leg Pain, and Hip Pain        Pain  This is a chronic problem. The current episode started more than 1 year ago. The problem occurs daily. The problem has been waxing and waning. Associated symptoms include arthralgias and neck pain. Pertinent negatives include no anorexia, chest pain, chills, coughing, diaphoresis, fever, sore throat, vertigo or vomiting.     Review of Systems   Constitutional:  Negative for activity change, chills, diaphoresis, fever and unexpected weight change.   HENT:  Negative for drooling, ear discharge, ear pain, facial swelling, nosebleeds, sore throat, trouble swallowing, voice change and goiter.    Eyes:  Negative for photophobia, pain, discharge, redness and visual disturbance.   Respiratory:  Negative for apnea, cough, choking, chest tightness, shortness of breath, wheezing and stridor.    Cardiovascular:  Negative for chest pain, palpitations and leg swelling.   Gastrointestinal:  Negative for abdominal distention, anorexia, diarrhea, rectal pain, vomiting and fecal incontinence.   Endocrine: Negative for cold intolerance, heat intolerance, polydipsia, polyphagia and polyuria.   Genitourinary:  Negative for bladder incontinence, dysuria, flank pain and frequency.   Musculoskeletal:  Positive for arthralgias, back pain, leg pain, neck pain and neck stiffness.   Integumentary:  Negative for color change and pallor.   Neurological:  Negative for dizziness, vertigo, seizures, syncope, facial asymmetry, speech difficulty, light-headedness, coordination difficulties and memory loss.   Hematological:  Negative for adenopathy. Does not bruise/bleed easily.   Psychiatric/Behavioral:  Negative for agitation, behavioral problems, confusion, decreased concentration, dysphoric mood, hallucinations, self-injury and suicidal ideas. The patient is not nervous/anxious and is not  hyperactive.            Past Medical History:   Diagnosis Date    Arthritis     Carpal tunnel syndrome     Charcot foot due to diabetes mellitus 09/29/2023    Prescription for CROW orthotic boot given today    COPD (chronic obstructive pulmonary disease)     Coronary artery disease involving coronary bypass graft of native heart 06/27/2023    CABG 2018 (No OP report available)     Most recent left heart catheterization 08/12/2019  1. Normal LV size apical akinesis and overall normal LV systolic function, ejection fraction 55%  2. Patent saphenous vein graft to the right PDA with patent jump graft to the OM branch left circumflex  3. Patent LIMA to the OM1  4. No significant mitral regurgitation     COVID 02/02/2022    Diabetic peripheral neuropathy     Essential (primary) hypertension     GERD (gastroesophageal reflux disease)     Insomnia secondary to depression with anxiety 06/17/2021    Mixed hyperlipidemia 03/23/2021    Nicotine dependence     Peripheral vascular disease, unspecified     Sleep apnea     Type 2 diabetes mellitus      Past Surgical History:   Procedure Laterality Date    CORONARY ARTERY BYPASS GRAFT      CORONARY STENT PLACEMENT      DEBRIDEMENT OF FOOT Right 08/10/2023    Dr. Chiu    DEBRIDEMENT OF FOOT Right 8/10/2023    Procedure: DEBRIDEMENT, FOOT;  Surgeon: Evelio Chiu DO;  Location: [facility];  Service: General;  Laterality: Right;    DEBRIDEMENT OF FOOT Right 2/27/2024    Procedure: DEBRIDEMENT, FOOT;  Surgeon: Evelio Chiu DO;  Location: [facility] OR;  Service: General;  Laterality: Right;    DEBRIDEMENT OF LOWER EXTREMITY Right 06/27/2022    Procedure: DEBRIDEMENT, LOWER EXTREMITY;  Surgeon: Forrest Kiser MD;  Location: [facility] OR;  Service: General;  Laterality: Right;  right foot    FOOT AMPUTATION THROUGH METATARSAL Right 3/1/2024    Procedure: AMPUTATION, FOOT, TRANSMETATARSAL; RIGHT LISFRANK AMPUTATION;  Surgeon: Evelio Chiu DO;  Location: [facility] OR;   Service: General;  Laterality: Right;    HIP FRACTURE SURGERY Left     IRRIGATION AND DEBRIDEMENT OF LOWER EXTREMITY Right 09/08/2022    Procedure: IRRIGATION AND DEBRIDEMENT, LOWER EXTREMITY;  Surgeon: Selina Matos MD;  Location: [facility] OR;  Service: General;  Laterality: Right;    IRRIGATION AND DEBRIDEMENT OF LOWER EXTREMITY Right 01/05/2023    Procedure: IRRIGATION AND DEBRIDEMENT, LOWER EXTREMITY;  Surgeon: Evelio Chiu DO;  Location: [facility] OR;  Service: General;  Laterality: Right;    SHOULDER OPEN ROTATOR CUFF REPAIR Left     SPINE SURGERY      TOE AMPUTATION Right 2/27/2024    Procedure: AMPUTATION, TOE;  Surgeon: Evelio Chiu DO;  Location: [facility] OR;  Service: General;  Laterality: Right;  RIGHT 5 TH TOE AND DEBRIDEMENT    VASCULAR SURGERY       Social History     Socioeconomic History    Marital status:    Occupational History    Occupation: Retired   Tobacco Use    Smoking status: Every Day     Current packs/day: 2.50     Average packs/day: 2.5 packs/day for 53.7 years (134.2 ttl pk-yrs)     Types: Cigarettes     Start date: 8/10/1971     Passive exposure: Current    Smokeless tobacco: Never   Substance and Sexual Activity    Alcohol use: Not Currently    Drug use: Yes     Types: Oxycodone    Sexual activity: Yes     Partners: Female     Social Drivers of Health     Financial Resource Strain: Medium Risk (3/20/2025)    Overall Financial Resource Strain (CARDIA)     Difficulty of Paying Living Expenses: Somewhat hard   Food Insecurity: Food Insecurity Present (3/20/2025)    Hunger Vital Sign     Worried About Running Out of Food in the Last Year: Patient declined     Ran Out of Food in the Last Year: Often true   Transportation Needs: No Transportation Needs (3/20/2025)    PRAPARE - Transportation     Lack of Transportation (Medical): No     Lack of Transportation (Non-Medical): No   Physical Activity: Unknown (3/20/2025)    Exercise Vital Sign     Days of Exercise per Week:  "Patient declined     Minutes of Exercise per Session: 0 min   Recent Concern: Physical Activity - Inactive (1/27/2025)    Exercise Vital Sign     Days of Exercise per Week: 0 days     Minutes of Exercise per Session: 0 min   Stress: Patient Declined (3/20/2025)    [employer] - Occupational Stress Questionnaire     Feeling of Stress : Patient declined   Recent Concern: Stress - Stress Concern Present (1/27/2025)    [employer] - Occupational Stress Questionnaire     Feeling of Stress : To some extent   Housing Stability: Unknown (3/20/2025)    Housing Stability Vital Sign     Unable to Pay for Housing in the Last Year: No     Homeless in the Last Year: Patient declined     Family History   Problem Relation Name Age of Onset    Arthritis Mother      Hypertension Mother      Learning disabilities Mother      Alcohol abuse Father      Early death Father      Heart disease Father      Cancer Sister      Diabetes Sister      Heart disease Maternal Grandfather      COPD Paternal Grandfather      Heart disease Son       Review of patient's allergies indicates:  No Known Allergies     Objective:  Vitals:    04/17/25 1318 04/17/25 1319   BP: (!) 152/74    Pulse: 79    Resp: 18    Weight: 70.3 kg (155 lb)    Height: 5' 8" (1.727 m)    PainSc:   5   5               Physical Exam  Vitals and nursing note reviewed. Exam conducted with a chaperone present.   Constitutional:       General: He is awake. He is not in acute distress.     Appearance: Normal appearance. He is not ill-appearing, toxic-appearing or diaphoretic.   HENT:      Head: Normocephalic and atraumatic.      Nose: Nose normal.      Mouth/Throat:      Mouth: Mucous membranes are moist.      Pharynx: Oropharynx is clear.   Eyes:      Conjunctiva/sclera: Conjunctivae normal.      Pupils: Pupils are equal, round, and reactive to light.   Cardiovascular:      Rate and Rhythm: Normal rate.   Pulmonary:      " Effort: Pulmonary effort is normal. No respiratory distress.   Abdominal:      Palpations: Abdomen is soft.   Musculoskeletal:         General: Normal range of motion.      Cervical back: Normal range of motion and neck supple.      Thoracic back: Tenderness present.      Lumbar back: Tenderness present.   Skin:     General: Skin is warm and dry.      Coloration: Skin is not jaundiced or pale.   Neurological:      General: No focal deficit present.      Mental Status: He is alert and oriented to person, place, and time. Mental status is at baseline.      Cranial Nerves: No cranial nerve deficit (II-XII).   Psychiatric:         Mood and Affect: Mood normal.         Behavior: Behavior normal. Behavior is cooperative.         Thought Content: Thought content normal.           X-Ray Hips Bilateral 2 View Inc AP Pelvis  Narrative: EXAMINATION:  XR HIPS BILATERAL 2 VIEW INCL AP PELVIS    CLINICAL HISTORY:  Pain in left hip    FINDINGS:  AP pelvis and two views of the right hip compared to 01/05/2023 show remote healed left acetabular fracture, post ORIF with metallic fixation hardware.  No findings of hardware fracture or loosening.    There is no acute fracture, dislocation, or destructive osseous lesion.  There is degenerative narrowing of both hip joint spaces, without periarticular erosions.  The sacroiliac joints and symphysis pubis are normal, with normal bone mineralization.  There are aorta act and femoral arterial vascular calcifications  Impression: 1. No acute fracture or destructive osseous lesion.  2. Degenerative narrowing of both hip joint spaces.  3. Peripheral arterial vascular disease.    Electronically signed by: Ramana Cortez  Date:    01/10/2025  Time:    11:58         Office Visit on 01/08/2025   Component Date Value Ref Range Status    POC Amphetamines 01/08/2025 Negative  Negative, Inconclusive Final    POC Barbiturates 01/08/2025 Negative  Negative, Inconclusive Final    POC Benzodiazepines  01/08/2025 Negative  Negative, Inconclusive Final    POC Cocaine 01/08/2025 Negative  Negative, Inconclusive Final    POC THC 01/08/2025 Negative  Negative, Inconclusive Final    POC Methadone 01/08/2025 Negative  Negative, Inconclusive Final    POC Methamphetamine 01/08/2025 Negative  Negative, Inconclusive Final    POC Opiates 01/08/2025 Presumptive Positive (A)  Negative, Inconclusive Final    POC Oxycodone 01/08/2025 Negative  Negative, Inconclusive Final    POC Phencyclidine 01/08/2025 Negative  Negative, Inconclusive Final    POC Methylenedioxymethamphetamine * 01/08/2025 Negative  Negative, Inconclusive Final    POC Tricyclic Antidepressants 01/08/2025 Negative  Negative, Inconclusive Final    POC Buprenorphine 01/08/2025 Negative   Final     Acceptable 01/08/2025 Yes   Final    POC Temperature (Urine) 01/08/2025 92   Final   Patient Outreach on 12/24/2024   Component Date Value Ref Range Status    Left Eye DM Retinopathy 09/27/2024 Positive   Final    Right Eye DM Retinopathy 09/27/2024 Positive   Final   Office Visit on 12/09/2024   Component Date Value Ref Range Status    QRS Duration 12/09/2024 98  ms Final    [facility] QTC Calculation 12/09/2024 434  ms Final   Lab Visit on 12/03/2024   Component Date Value Ref Range Status    Sodium 12/03/2024 141  136 - 145 mmol/L Final    Potassium 12/03/2024 4.3  3.5 - 5.1 mmol/L Final    Chloride 12/03/2024 104  98 - 107 mmol/L Final    CO2 12/03/2024 29  23 - 31 mmol/L Final    Anion Gap 12/03/2024 12  7 - 16 mmol/L Final    Glucose 12/03/2024 173 (H)  82 - 115 mg/dL Final    BUN 12/03/2024 14  8 - 26 mg/dL Final    Creatinine 12/03/2024 0.88  0.72 - 1.25 mg/dL Final    BUN/Creatinine Ratio 12/03/2024 16  6 - 20 Final    Calcium 12/03/2024 9.2  8.8 - 10.0 mg/dL Final    Total Protein 12/03/2024 7.0  5.8 - 7.6 g/dL Final    Albumin 12/03/2024 3.9  3.4 - 4.8 g/dL Final    Globulin 12/03/2024 3.1  2.0 - 4.0 g/dL Final    A/G Ratio 12/03/2024 1.3   Final     Bilirubin, Total 12/03/2024 0.2  <=1.5 mg/dL Final    Alk Phos 12/03/2024 74  40 - 150 U/L Final    ALT 12/03/2024 13  <=55 U/L Final    AST 12/03/2024 14  5 - 34 U/L Final    eGFR 12/03/2024 95  >=60 mL/min/1.73m2 Final    Triglycerides 12/03/2024 293 (H)  34 - 140 mg/dL Final    Cholesterol 12/03/2024 184  <=200 mg/dL Final    HDL Cholesterol 12/03/2024 34 (L)  35 - 60 mg/dL Final    Cholesterol/HDL Ratio (Risk Factor) 12/03/2024 5.4   Final    Non-HDL 12/03/2024 150  mg/dL Final    LDL Calculated 12/03/2024 91  mg/dL Final    LDL/HDL 12/03/2024 2.7   Final    VLDL 12/03/2024 59  mg/dL Final    Hemoglobin A1C 12/03/2024 8.2 (H)  <=7.0 % Final    Estimated Average Glucose 12/03/2024 189  mg/dL Final    WBC 12/03/2024 5.64  4.50 - 11.00 K/uL Final    RBC 12/03/2024 4.31 (L)  4.60 - 6.20 M/uL Final    Hemoglobin 12/03/2024 13.3 (L)  13.5 - 18.0 g/dL Final    Hematocrit 12/03/2024 42.5  40.0 - 54.0 % Final    MCV 12/03/2024 98.6 (H)  80.0 - 96.0 fL Final    MCH 12/03/2024 30.9  27.0 - 31.0 pg Final    MCHC 12/03/2024 31.3 (L)  32.0 - 36.0 g/dL Final    RDW 12/03/2024 12.7  11.5 - 14.5 % Final    Platelet Count 12/03/2024 142 (L)  150 - 400 K/uL Final    MPV 12/03/2024 11.5  9.4 - 12.4 fL Final    Neutrophils % 12/03/2024 58.8  53.0 - 65.0 % Final    Lymphocytes % 12/03/2024 29.4  27.0 - 41.0 % Final    Monocytes % 12/03/2024 6.6 (H)  2.0 - 6.0 % Final    Eosinophils % 12/03/2024 4.1 (H)  1.0 - 4.0 % Final    Basophils % 12/03/2024 0.9  0.0 - 1.0 % Final    Immature Granulocytes % 12/03/2024 0.2  0.0 - 0.4 % Final    nRBC, Auto 12/03/2024 0.0  <=0.0 % Final    Neutrophils, Abs 12/03/2024 3.32  1.80 - 7.70 K/uL Final    Lymphocytes, Absolute 12/03/2024 1.66  1.00 - 4.80 K/uL Final    Monocytes, Absolute 12/03/2024 0.37  0.00 - 0.80 K/uL Final    Eosinophils, Absolute 12/03/2024 0.23  0.00 - 0.50 K/uL Final    Basophils, Absolute 12/03/2024 0.05  0.00 - 0.20 K/uL Final    Immature Granulocytes, Absolute 12/03/2024 0.01   0.00 - 0.04 K/uL Final    nRBC, Absolute 12/03/2024 0.00  <=0.00 x10e3/uL Final    Diff Type 12/03/2024 Auto   Final    Iron Level 12/03/2024 107  65 - 175 ug/dL Final    Iron Binding Capacity Total 12/03/2024 323  250 - 450 ug/dL Final    Iron Binding Capacity Unsaturated 12/03/2024 216  69 - 240 ug/dL Final    Iron Saturation 12/03/2024 33  20 - 50 % Final    Transferrin 12/03/2024 289  163 - 344 mg/dL Final    Ferritin 12/03/2024 24  22 - 275 ng/mL Final         Orders Placed This Encounter   Procedures    X-Ray Hip 2 or 3 views Left with Pelvis when performed     Standing Status:   Future     Expected Date:   4/17/2025     Expiration Date:   4/17/2026     Scheduling Instructions:      Please do lateral view left hip for acetabular reconstruction hardware placement need lateral view considering left intra-articular hip injection     May the Radiologist modify the order per protocol to meet the clinical needs of the patient?:   Yes     Release to patient:   Immediate    POCT Urine Drug Screen Presump     Interpretive Information:     Negative:  No drug detected at the cut off level.   Positive:  This result represents presumptive positive for the   tested drug, other substances may yield a positive response other   than the analyte of interest. This result should be utilized for   diagnostic purpose only. Confirmation testing will be performed upon physician request only.          Requested Prescriptions     Signed Prescriptions Disp Refills    DULoxetine (CYMBALTA) 30 MG capsule 60 capsule 2     Sig: Take 1 capsule (30 mg total) by mouth 2 (two) times daily.    HYDROcodone-acetaminophen (NORCO)  mg per tablet 120 tablet 0     Sig: Take 1 tablet by mouth every 6 (six) hours as needed for Pain.    HYDROcodone-acetaminophen (NORCO)  mg per tablet 120 tablet 0     Sig: Take 1 tablet by mouth every 6 (six) hours as needed for Pain (pain).    HYDROcodone-acetaminophen (NORCO)  mg per tablet 120 tablet  0     Sig: Take 1 tablet by mouth every 6 (six) hours as needed for Pain.       Assessment:     1. Hip pain, left, [facility] status post acetabular hardware placement    2. Peripheral vascular disease, unspecified    3. Ulcer of right foot with necrosis of muscle    4. Neuropathy    5. Encounter for long-term (current) use of medications               A's of Opioid Risk Assessment  Activity:Patient can perform ADL.   Analgesia:Patients pain is partially controlled by current medication. Patient has tried OTC medications such as Tylenol and Ibuprofen with out relief.   Adverse Effects: Patient denies constipation or sedation.  Aberrant Behavior:  reviewed with no aberrant drug seeking/taking behavior.  Overdose reversal drug naloxone discussed    Drug screen reviewed      X-ray pelvis and hips [facility] January 5, 2023  Acetabular fracture repair present with appearance similar to previous.  No other evidence of fracture seen.  The alignment of the joints appears normal.  No degenerative change is present.  No soft tissue abnormality is seen.          Plan:    Patient cancel/reschedule appointment  April 2, 2025          Narcan March 2023    Can not tolerate Lyrica      Chronic left hip pain left groin pain after left after acetabular reconstruction right foot reconstruction [facility]    Was recommended further surgery for his left hip due to comorbidities surgery was postpone per [facility]      Chronic burning numbness needle sensation bilateral feet diabetic neuropathy he states gabapentin is helping    Considering procedure for left hip pain    Limit steroids due to diabetes    Will order AP lateral hip for evaluation hardware acetabular reconstruction    Left hip intra-articular injection limit steroids    He states current medication does help his chronic back and joint pain    Continue home exercise program as tolerated     Continue current medication     Follow-up 3 months    Dr. Dobson July 2025    Bring original  prescription medication bottles/container/box with labels to each visit

## 2025-04-17 ENCOUNTER — OFFICE VISIT (OUTPATIENT)
Dept: PAIN MEDICINE | Facility: CLINIC | Age: 65
End: 2025-04-17
Payer: MEDICARE

## 2025-04-17 VITALS
RESPIRATION RATE: 18 BRPM | HEART RATE: 79 BPM | BODY MASS INDEX: 23.49 KG/M2 | WEIGHT: 155 LBS | DIASTOLIC BLOOD PRESSURE: 74 MMHG | HEIGHT: 68 IN | SYSTOLIC BLOOD PRESSURE: 152 MMHG

## 2025-04-17 DIAGNOSIS — Z79.899 ENCOUNTER FOR LONG-TERM (CURRENT) USE OF MEDICATIONS: ICD-10-CM

## 2025-04-17 DIAGNOSIS — G62.9 NEUROPATHY: Chronic | ICD-10-CM

## 2025-04-17 DIAGNOSIS — L97.513 ULCER OF RIGHT FOOT WITH NECROSIS OF MUSCLE: Chronic | ICD-10-CM

## 2025-04-17 DIAGNOSIS — I73.9 PERIPHERAL VASCULAR DISEASE, UNSPECIFIED: Chronic | ICD-10-CM

## 2025-04-17 DIAGNOSIS — M25.552 HIP PAIN, LEFT: Primary | Chronic | ICD-10-CM

## 2025-04-17 PROCEDURE — 99215 OFFICE O/P EST HI 40 MIN: CPT | Mod: PBBFAC | Performed by: PHYSICIAN ASSISTANT

## 2025-04-17 PROCEDURE — 99999PBSHW POCT URINE DRUG SCREEN PRESUMP: Mod: PBBFAC,,,

## 2025-04-17 PROCEDURE — 80305 DRUG TEST PRSMV DIR OPT OBS: CPT | Mod: PBBFAC | Performed by: PHYSICIAN ASSISTANT

## 2025-04-17 PROCEDURE — 99999 PR PBB SHADOW E&M-EST. PATIENT-LVL V: CPT | Mod: PBBFAC,,, | Performed by: PHYSICIAN ASSISTANT

## 2025-04-17 PROCEDURE — 99214 OFFICE O/P EST MOD 30 MIN: CPT | Mod: S$PBB,,, | Performed by: PHYSICIAN ASSISTANT

## 2025-04-17 RX ORDER — HYDROCODONE BITARTRATE AND ACETAMINOPHEN 10; 325 MG/1; MG/1
1 TABLET ORAL EVERY 6 HOURS PRN
Qty: 120 TABLET | Refills: 0 | Status: SHIPPED | OUTPATIENT
Start: 2025-05-22

## 2025-04-17 RX ORDER — HYDROCODONE BITARTRATE AND ACETAMINOPHEN 10; 325 MG/1; MG/1
1 TABLET ORAL EVERY 6 HOURS PRN
Qty: 120 TABLET | Refills: 0 | Status: SHIPPED | OUTPATIENT
Start: 2025-06-21

## 2025-04-17 RX ORDER — HYDROCODONE BITARTRATE AND ACETAMINOPHEN 10; 325 MG/1; MG/1
1 TABLET ORAL EVERY 6 HOURS PRN
Qty: 120 TABLET | Refills: 0 | Status: SHIPPED | OUTPATIENT
Start: 2025-04-22 | End: 2025-05-22

## 2025-04-17 RX ORDER — DULOXETIN HYDROCHLORIDE 30 MG/1
30 CAPSULE, DELAYED RELEASE ORAL 2 TIMES DAILY
Qty: 60 CAPSULE | Refills: 2 | Status: SHIPPED | OUTPATIENT
Start: 2025-04-17

## 2025-05-20 ENCOUNTER — HOSPITAL ENCOUNTER (EMERGENCY)
Facility: HOSPITAL | Age: 65
Discharge: HOME | End: 2025-05-20
Payer: MEDICARE

## 2025-05-20 VITALS
HEART RATE: 81 BPM | OXYGEN SATURATION: 94 % | RESPIRATION RATE: 17 BRPM | SYSTOLIC BLOOD PRESSURE: 194 MMHG | DIASTOLIC BLOOD PRESSURE: 84 MMHG | TEMPERATURE: 99 F | HEIGHT: 68 IN | BODY MASS INDEX: 22.88 KG/M2 | WEIGHT: 151 LBS

## 2025-05-20 DIAGNOSIS — R11.2 NAUSEA AND VOMITING, UNSPECIFIED VOMITING TYPE: Primary | ICD-10-CM

## 2025-05-20 LAB
ALBUMIN SERPL BCP-MCNC: 4.4 G/DL (ref 3.4–4.8)
ALBUMIN/GLOB SERPL: 1 {RATIO}
ALP SERPL-CCNC: 90 U/L (ref 40–150)
ALT SERPL W P-5'-P-CCNC: 15 U/L
ANION GAP SERPL CALCULATED.3IONS-SCNC: 18 MMOL/L (ref 7–16)
AST SERPL W P-5'-P-CCNC: 24 U/L (ref 11–45)
BACTERIA #/AREA URNS HPF: ABNORMAL /HPF
BASOPHILS # BLD AUTO: 0.05 K/UL (ref 0–0.2)
BASOPHILS NFR BLD AUTO: 0.6 % (ref 0–1)
BILIRUB SERPL-MCNC: 0.5 MG/DL
BILIRUB UR QL STRIP: NEGATIVE
BUN SERPL-MCNC: 23 MG/DL (ref 8–26)
BUN/CREAT SERPL: 25 (ref 6–20)
CALCIUM SERPL-MCNC: 10.3 MG/DL (ref 8.8–10)
CHLORIDE SERPL-SCNC: 100 MMOL/L (ref 98–107)
CLARITY UR: CLEAR
CO2 SERPL-SCNC: 29 MMOL/L (ref 23–31)
COLOR UR: ABNORMAL
CREAT SERPL-MCNC: 0.93 MG/DL (ref 0.72–1.25)
DIFFERENTIAL METHOD BLD: ABNORMAL
EGFR (NO RACE VARIABLE) (RUSH/TITUS): 91 ML/MIN/1.73M2
EOSINOPHIL # BLD AUTO: 0.04 K/UL (ref 0–0.5)
EOSINOPHIL NFR BLD AUTO: 0.5 % (ref 1–4)
ERYTHROCYTE [DISTWIDTH] IN BLOOD BY AUTOMATED COUNT: 12.7 % (ref 11.5–14.5)
FOLATE SERPL-MCNC: 10.3 NG/ML (ref 7–31.4)
GLOBULIN SER-MCNC: 4.4 G/DL (ref 2–4)
GLUCOSE SERPL-MCNC: 226 MG/DL (ref 82–115)
GLUCOSE SERPL-MCNC: 240 MG/DL (ref 70–105)
GLUCOSE UR STRIP-MCNC: 300 MG/DL
HCT VFR BLD AUTO: 47.8 % (ref 40–54)
HGB BLD-MCNC: 16.1 G/DL (ref 13.5–18)
HYALINE CASTS #/AREA URNS LPF: ABNORMAL /LPF
IMM GRANULOCYTES # BLD AUTO: 0.04 K/UL (ref 0–0.04)
IMM GRANULOCYTES NFR BLD: 0.5 % (ref 0–0.4)
KETONES UR STRIP-SCNC: NEGATIVE MG/DL
LEUKOCYTE ESTERASE UR QL STRIP: NEGATIVE
LYMPHOCYTES # BLD AUTO: 1.83 K/UL (ref 1–4.8)
LYMPHOCYTES NFR BLD AUTO: 22.6 % (ref 27–41)
MCH RBC QN AUTO: 31.2 PG (ref 27–31)
MCHC RBC AUTO-ENTMCNC: 33.7 G/DL (ref 32–36)
MCV RBC AUTO: 92.6 FL (ref 80–96)
MONOCYTES # BLD AUTO: 0.47 K/UL (ref 0–0.8)
MONOCYTES NFR BLD AUTO: 5.8 % (ref 2–6)
MPC BLD CALC-MCNC: 11 FL (ref 9.4–12.4)
NEUTROPHILS # BLD AUTO: 5.67 K/UL (ref 1.8–7.7)
NEUTROPHILS NFR BLD AUTO: 70 % (ref 53–65)
NITRITE UR QL STRIP: NEGATIVE
NRBC # BLD AUTO: 0 X10E3/UL
NRBC, AUTO (.00): 0 %
PH UR STRIP: 7 PH UNITS
PLATELET # BLD AUTO: 193 K/UL (ref 150–400)
POTASSIUM SERPL-SCNC: 3.9 MMOL/L (ref 3.5–5.1)
PROT SERPL-MCNC: 8.8 G/DL (ref 5.8–7.6)
PROT UR QL STRIP: 300
RBC # BLD AUTO: 5.16 M/UL (ref 4.6–6.2)
RBC # UR STRIP: ABNORMAL /UL
RBC #/AREA URNS HPF: 5 /HPF
SODIUM SERPL-SCNC: 143 MMOL/L (ref 136–145)
SP GR UR STRIP: 1.02
SQUAMOUS #/AREA URNS LPF: ABNORMAL /HPF
UROBILINOGEN UR STRIP-ACNC: NORMAL MG/DL
VIT B12 SERPL-MCNC: 333 PG/ML (ref 213–816)
WBC # BLD AUTO: 8.1 K/UL (ref 4.5–11)
WBC #/AREA URNS HPF: 5 /HPF

## 2025-05-20 PROCEDURE — 96375 TX/PRO/DX INJ NEW DRUG ADDON: CPT

## 2025-05-20 PROCEDURE — 80053 COMPREHEN METABOLIC PANEL: CPT | Performed by: NURSE PRACTITIONER

## 2025-05-20 PROCEDURE — 25000003 PHARM REV CODE 250: Performed by: NURSE PRACTITIONER

## 2025-05-20 PROCEDURE — 82746 ASSAY OF FOLIC ACID SERUM: CPT | Performed by: NURSE PRACTITIONER

## 2025-05-20 PROCEDURE — 81003 URINALYSIS AUTO W/O SCOPE: CPT

## 2025-05-20 PROCEDURE — 96361 HYDRATE IV INFUSION ADD-ON: CPT

## 2025-05-20 PROCEDURE — 99285 EMERGENCY DEPT VISIT HI MDM: CPT | Mod: 25

## 2025-05-20 PROCEDURE — 36415 COLL VENOUS BLD VENIPUNCTURE: CPT | Performed by: NURSE PRACTITIONER

## 2025-05-20 PROCEDURE — 85025 COMPLETE CBC W/AUTO DIFF WBC: CPT | Performed by: NURSE PRACTITIONER

## 2025-05-20 PROCEDURE — 96365 THER/PROPH/DIAG IV INF INIT: CPT

## 2025-05-20 PROCEDURE — 25000003 PHARM REV CODE 250

## 2025-05-20 PROCEDURE — 82962 GLUCOSE BLOOD TEST: CPT

## 2025-05-20 PROCEDURE — 63600175 PHARM REV CODE 636 W HCPCS: Performed by: NURSE PRACTITIONER

## 2025-05-20 RX ORDER — ONDANSETRON 4 MG/1
4 TABLET, FILM COATED ORAL EVERY 6 HOURS
Qty: 56 TABLET | Refills: 0 | Status: SHIPPED | OUTPATIENT
Start: 2025-05-20 | End: 2025-06-03

## 2025-05-20 RX ORDER — ONDANSETRON HYDROCHLORIDE 2 MG/ML
4 INJECTION, SOLUTION INTRAVENOUS
Status: COMPLETED | OUTPATIENT
Start: 2025-05-20 | End: 2025-05-20

## 2025-05-20 RX ORDER — CLONIDINE HYDROCHLORIDE 0.1 MG/1
0.2 TABLET ORAL
Status: COMPLETED | OUTPATIENT
Start: 2025-05-20 | End: 2025-05-20

## 2025-05-20 RX ADMIN — PROMETHAZINE HYDROCHLORIDE 25 MG: 25 INJECTION INTRAMUSCULAR; INTRAVENOUS at 04:05

## 2025-05-20 RX ADMIN — SODIUM CHLORIDE 1000 ML: 0.9 INJECTION, SOLUTION INTRAVENOUS at 02:05

## 2025-05-20 RX ADMIN — ONDANSETRON 4 MG: 2 INJECTION INTRAMUSCULAR; INTRAVENOUS at 02:05

## 2025-05-20 RX ADMIN — CLONIDINE HYDROCHLORIDE 0.2 MG: 0.1 TABLET ORAL at 07:05

## 2025-05-20 NOTE — ED PROVIDER NOTES
Encounter Date: 5/20/2025       History     Chief Complaint   Patient presents with    Vomiting    Spasms     Patient presents to the ED with c/o vomiting x2 days and muscle spasms. Patient's family member states that he has been getting confused on occasion too since before throwing up.     64-year-old male presents to the emergency department to be evaluated for vomiting since yesterday around lunch, after eating a sausage dog from a local convenience store.  He also reports abdominal pain and discomfort.  He denies diarrhea.  His wife is in the room with him, and states that he has had arm cramps and leg cramps today.  Denies any fever.  Denies dysuria.  States vomitus is stomach contents, and denies visualization of any blood. Denies chest pain or shortness of breath.   His wife reports and he has been experiencing mild confusion prior to the onset of vomiting yesterday.  He is alert to verbal stimuli, and oriented to person place and time during the exam.    The history is provided by the patient and the spouse.   Emesis   This is a new problem. The current episode started yesterday. The problem occurs 2 - 4 times per day. The problem has been unchanged. The emesis has an appearance of stomach contents. Associated symptoms include abdominal pain, arthralgias and myalgias. Pertinent negatives include no chills, no cough, no diarrhea, no fever, no headaches, no sweats and no URI. Risk factors include suspect food intake.     Review of patient's allergies indicates:  No Known Allergies  Past Medical History:   Diagnosis Date    Arthritis     Carpal tunnel syndrome     Charcot foot due to diabetes mellitus 09/29/2023    Prescription for CROW orthotic boot given today    COPD (chronic obstructive pulmonary disease)     Coronary artery disease involving coronary bypass graft of native heart 06/27/2023    CABG 2018 (No OP report available)     Most recent left heart catheterization 08/12/2019  1. Normal LV size apical  akinesis and overall normal LV systolic function, ejection fraction 55%  2. Patent saphenous vein graft to the right PDA with patent jump graft to the OM branch left circumflex  3. Patent LIMA to the OM1  4. No significant mitral regurgitation     COVID 02/02/2022    Diabetic peripheral neuropathy     Essential (primary) hypertension     GERD (gastroesophageal reflux disease)     Insomnia secondary to depression with anxiety 06/17/2021    Mixed hyperlipidemia 03/23/2021    Nicotine dependence     Peripheral vascular disease, unspecified     Sleep apnea     Type 2 diabetes mellitus      Past Surgical History:   Procedure Laterality Date    CORONARY ARTERY BYPASS GRAFT      CORONARY STENT PLACEMENT      DEBRIDEMENT OF FOOT Right 08/10/2023    Dr. Chiu    DEBRIDEMENT OF FOOT Right 8/10/2023    Procedure: DEBRIDEMENT, FOOT;  Surgeon: Evelio Chiu DO;  Location: [facility];  Service: General;  Laterality: Right;    DEBRIDEMENT OF FOOT Right 2/27/2024    Procedure: DEBRIDEMENT, FOOT;  Surgeon: Evelio Chiu DO;  Location: [facility];  Service: General;  Laterality: Right;    DEBRIDEMENT OF LOWER EXTREMITY Right 06/27/2022    Procedure: DEBRIDEMENT, LOWER EXTREMITY;  Surgeon: Forrest Kiser MD;  Location: [facility] OR;  Service: General;  Laterality: Right;  right foot    FOOT AMPUTATION THROUGH METATARSAL Right 3/1/2024    Procedure: AMPUTATION, FOOT, TRANSMETATARSAL; RIGHT LISFRANK AMPUTATION;  Surgeon: Evelio Chiu DO;  Location: [facility] OR;  Service: General;  Laterality: Right;    HIP FRACTURE SURGERY Left     IRRIGATION AND DEBRIDEMENT OF LOWER EXTREMITY Right 09/08/2022    Procedure: IRRIGATION AND DEBRIDEMENT, LOWER EXTREMITY;  Surgeon: Selina Matos MD;  Location: [facility] OR;  Service: General;  Laterality: Right;    IRRIGATION AND DEBRIDEMENT OF LOWER EXTREMITY Right 01/05/2023    Procedure: IRRIGATION AND DEBRIDEMENT, LOWER EXTREMITY;  Surgeon: Evelio Chiu DO;  Location: [facility] OR;  Service: General;  Laterality: Right;    SHOULDER OPEN ROTATOR CUFF REPAIR Left     SPINE SURGERY      TOE AMPUTATION Right 2/27/2024    Procedure: AMPUTATION, TOE;  Surgeon: Evelio Chiu DO;  Location: [facility] OR;  Service: General;  Laterality: Right;  RIGHT 5 TH TOE AND DEBRIDEMENT    VASCULAR SURGERY       Family History   Problem Relation Name Age of Onset    Arthritis Mother      Hypertension Mother      Learning disabilities Mother      Alcohol abuse Father      Early death Father      Heart disease Father      Cancer Sister      Diabetes Sister      Heart disease Maternal Grandfather      COPD Paternal Grandfather      Heart disease Son       Social History[1]  Review of Systems   Constitutional:  Positive for appetite change. Negative for chills and fever.   Respiratory:  Negative for cough.    Gastrointestinal:  Positive for abdominal pain, nausea and vomiting. Negative for constipation and diarrhea.   Genitourinary: Negative.    Musculoskeletal:  Positive for arthralgias and myalgias. Negative for back pain, gait problem, joint swelling, neck pain and neck stiffness.   Neurological: Negative.  Negative for light-headedness and headaches.   Hematological: Negative.    Psychiatric/Behavioral:  Positive for confusion (Per his wife; prior to vomiting). Negative for decreased concentration, hallucinations, sleep disturbance and suicidal ideas. The patient is not nervous/anxious.        Physical Exam     Initial Vitals [05/20/25 1319]   BP Pulse Resp Temp SpO2   (!) 199/92 90 16 97.6 °F (36.4 °C) 96 %      MAP       --         Physical Exam    Vitals reviewed.  Constitutional: He appears well-developed and well-nourished. He is not diaphoretic. No distress.   HENT:   Head: Normocephalic and atraumatic.   Right Ear: External ear normal.   Left Ear: External ear normal.   Nose: Nose normal. Mouth/Throat: Uvula is midline. Mucous membranes are dry. No oropharyngeal exudate.   Eyes: EOM are normal.  Pupils are equal, round, and reactive to light.   Neck: Neck supple. No tracheal deviation present. No JVD present.   Normal range of motion.  Cardiovascular:  Normal rate, regular rhythm and intact distal pulses.           Pulmonary/Chest: Breath sounds normal. No stridor. No respiratory distress.   Patient is a long-term former smoker.  His wife states that he has recently restarted smoking   Abdominal: Abdomen is soft. Bowel sounds are normal. He exhibits no distension. There is abdominal tenderness (Generalized). There is no rebound and no guarding.   Musculoskeletal:         General: No tenderness or edema. Normal range of motion.      Cervical back: Normal range of motion and neck supple.     Neurological: He is alert and oriented to person, place, and time. He has normal strength. No cranial nerve deficit or sensory deficit. GCS score is 15. GCS eye subscore is 4. GCS verbal subscore is 5. GCS motor subscore is 6.   Skin: Skin is warm and dry. Capillary refill takes less than 2 seconds.   Psychiatric: He has a normal mood and affect. Thought content normal.         Medical Screening Exam   See Full Note    ED Course   Procedures  Labs Reviewed   COMPREHENSIVE METABOLIC PANEL - Abnormal       Result Value    Sodium 143      Potassium 3.9      Chloride 100      CO2 29      Anion Gap 18 (*)     Glucose 226 (*)     BUN 23      Creatinine 0.93      BUN/Creatinine Ratio 25 (*)     Calcium 10.3 (*)     Total Protein 8.8 (*)     Albumin 4.4      Globulin 4.4 (*)     A/G Ratio 1.0      Bilirubin, Total 0.5      Alk Phos 90      ALT 15      AST 24      eGFR 91     CBC WITH DIFFERENTIAL - Abnormal    WBC 8.10      RBC 5.16      Hemoglobin 16.1      Hematocrit 47.8      MCV 92.6      MCH 31.2 (*)     MCHC 33.7      RDW 12.7      Platelet Count 193      MPV 11.0      Neutrophils % 70.0 (*)     Lymphocytes % 22.6 (*)     Monocytes % 5.8      Eosinophils % 0.5 (*)     Basophils % 0.6      Immature Granulocytes % 0.5 (*)      nRBC, Auto 0.0      Neutrophils, Abs 5.67      Lymphocytes, Absolute 1.83      Monocytes, Absolute 0.47      Eosinophils, Absolute 0.04      Basophils, Absolute 0.05      Immature Granulocytes, Absolute 0.04      nRBC, Absolute 0.00      Diff Type Auto     URINALYSIS, REFLEX TO URINE CULTURE - Abnormal    Color, UA Light Yellow      Clarity, UA Clear      pH, UA 7.0      Leukocytes, UA Negative      Nitrites, UA Negative      Protein,  (*)     Glucose,  (*)     Ketones, UA Negative      Urobilinogen, UA Normal      Bilirubin, UA Negative      Blood, UA Trace (*)     Specific Barton, UA 1.020     URINALYSIS, MICROSCOPIC - Abnormal    WBC, UA 5      RBC, UA 5 (*)     Bacteria, UA Occasional (*)     Squamous Epithelial Cells, UA Occasional (*)     Hyaline Casts, UA 2-5 (*)    POCT GLUCOSE MONITORING CONTINUOUS - Abnormal    POC Glucose 240 (*)    VITAMIN B12/FOLATE, SERUM PANEL - Normal    Vitamin B12 333      Folate 10.3     CBC W/ AUTO DIFFERENTIAL    Narrative:     The following orders were created for panel order CBC auto differential.  Procedure                               Abnormality         Status                     ---------                               -----------         ------                     CBC with Differential[[id number]]       Abnormal            Final result                 Please view results for these tests on the individual orders.          Imaging Results              CT Head Without Contrast (Final result)  Result time 05/20/25 17:47:46      Final result by Evelio Webb MD (05/20/25 17:47:46)                   Impression:      1. Allowing for motion artifact, no convincing acute intracranial abnormalities noting stable sequela of chronic microvascular ischemic change, senescent change, and encephalomalacia as described.      Electronically signed by: Evelio Webb MD  Date:    05/20/2025  Time:    17:47               Narrative:    EXAMINATION:  CT HEAD WITHOUT  CONTRAST    CLINICAL HISTORY:  Memory loss;    TECHNIQUE:  Low dose axial images were obtained through the head.  Coronal and sagittal reformations were also performed. Contrast was not administered.    COMPARISON:  12/26/2024    FINDINGS:  There is motion artifact.    There is generalized cerebral volume loss.  There is hypoattenuation in a periventricular fashion, likely sequela of chronic microvascular ischemic change.There is a more focal region of low attenuation involving the posterior aspect of the left corona radiata, stable.  There is a stable focus of encephalomalacia along the left paramedian occipital lobe.  There is no evidence of acute major vascular territory infarct, hemorrhage, or mass.  There is no hydrocephalus.  There are no abnormal extra-axial fluid collections.  The paranasal sinuses and mastoid air cells are clear, and there is no evidence of calvarial fracture.  The visualized soft tissues are unremarkable.                                       XR ABDOMEN, ACUTE 2 OR MORE VIEWS WITH CHEST (Final result)  Result time 05/20/25 15:50:24      Final result by Samantha Uribe MD (05/20/25 15:50:24)                   Impression:      No acute process seen of the chest or abdomen.    Moderate stool retention.      Electronically signed by: Samantha Uribe MD  Date:    05/20/2025  Time:    15:50               Narrative:    EXAMINATION:  XR ABDOMEN ACUTE 2 OR MORE VIEWS WITH CHEST    CLINICAL HISTORY:  Abdominal pain, nausea, vomiting;    TECHNIQUE:  KUB, PA chest    COMPARISON:  09/08/2014 chest    FINDINGS:  Sternotomy wires.  The cardiac silhouette is midline, normal in size.  The pulmonary vascularity is normal.  No focal area of acute airspace consolidation.  No pleural effusion or pneumothorax.  The osseous structures of the chest appear normal.    Moderate stool retention, no bowel dilation, no air-fluid level.  No free air.  No organomegaly.  No abnormal calcifications.   Postoperative changes of the left hip joint.  Significant atherosclerotic plaque of the abdominal aorta and its branches.  Mild degenerative changes of the lumbar spine.                                       Medications   sodium chloride 0.9% bolus 1,000 mL 1,000 mL (0 mLs Intravenous Stopped 5/20/25 1523)   ondansetron injection 4 mg (4 mg Intravenous Given 5/20/25 1423)   promethazine (PHENERGAN) 25 mg in 0.9% NaCl 50 mL IVPB (0 mg Intravenous Stopped 5/20/25 1639)   cloNIDine tablet 0.2 mg (0.2 mg Oral Given 5/20/25 1900)     Medical Decision Making  64-year-old male presents to the emergency department to be evaluated for vomiting since yesterday around lunch, after eating a sausage dog from a local convenience store.  He also reports abdominal pain and discomfort.  He denies diarrhea.  His wife is in the room with him, and states that he has had arm cramps and leg cramps today.  Denies any fever.  Denies dysuria.  States vomitus is stomach contents, and denies visualization of any blood. Denies chest pain or shortness of breath.   His wife reports and he has been experiencing mild confusion prior to the onset of vomiting yesterday.  He is alert to verbal stimuli, and oriented to person place and time during the exam.  Labs, urinalysis ordered and reviewed  X-ray ordered and reviewed with radiologist's interpretation significant for Moderate stool retention. Otherwise, no acute process.  1 L normal saline, Zofran, Phenergan IV administered in ED   Ordered and reviewed CT head as well as radiologist's interpretation with results significant for 1. Allowing for motion artifact, no convincing acute intracranial abnormalities noting stable sequela of chronic microvascular ischemic change, senescent change, and encephalomalacia as described.  Diagnosis:  Nausea and vomiting    Amount and/or Complexity of Data Reviewed  Labs: ordered.  Radiology: ordered.    Risk  Prescription drug management.                                       Clinical Impression:   Final diagnoses:  [R11.2] Nausea and vomiting, unspecified vomiting type (Primary)        ED Disposition Condition    Discharge Stable          ED Prescriptions       Medication Sig Dispense Start Date End Date Auth. Provider    ondansetron (ZOFRAN) 4 MG tablet Take 1 tablet (4 mg total) by mouth every 6 (six) hours. for 14 days 56 tablet 5/20/2025 6/3/2025 Fredrick Rocha NP          Follow-up Information    None              [1]   Social History  Tobacco Use    Smoking status: Every Day     Current packs/day: 2.50     Average packs/day: 2.5 packs/day for 53.8 years (134.4 ttl pk-yrs)     Types: Cigarettes     Start date: 8/10/1971     Passive exposure: Current    Smokeless tobacco: Never   Substance Use Topics    Alcohol use: Not Currently    Drug use: Yes     Types: Oxycodone        Fredrick Rocha NP  05/20/25 1931

## 2025-05-20 NOTE — DISCHARGE INSTRUCTIONS
Take nausea medicine as ordered.  Drink plenty of water.  Follow up with primary care provider in the morning for an appointment for re-evaluation.  Return to the emergency department for new or worsening symptoms.

## 2025-06-08 DIAGNOSIS — J44.9 CHRONIC OBSTRUCTIVE PULMONARY DISEASE, UNSPECIFIED COPD TYPE: ICD-10-CM

## 2025-06-09 RX ORDER — ALBUTEROL SULFATE 90 UG/1
INHALANT RESPIRATORY (INHALATION)
Qty: 8.5 G | Refills: 2 | Status: SHIPPED | OUTPATIENT
Start: 2025-06-09

## 2025-06-11 ENCOUNTER — OFFICE VISIT (OUTPATIENT)
Dept: CARDIOLOGY | Facility: CLINIC | Age: 65
End: 2025-06-11
Payer: MEDICARE

## 2025-06-11 VITALS
BODY MASS INDEX: 23.85 KG/M2 | HEIGHT: 68 IN | OXYGEN SATURATION: 98 % | DIASTOLIC BLOOD PRESSURE: 84 MMHG | SYSTOLIC BLOOD PRESSURE: 118 MMHG | HEART RATE: 69 BPM | WEIGHT: 157.38 LBS

## 2025-06-11 DIAGNOSIS — I20.89 STABLE ANGINA: ICD-10-CM

## 2025-06-11 DIAGNOSIS — I10 BENIGN HYPERTENSION: ICD-10-CM

## 2025-06-11 DIAGNOSIS — Z95.1 S/P CABG (CORONARY ARTERY BYPASS GRAFT): Chronic | ICD-10-CM

## 2025-06-11 DIAGNOSIS — I25.700 CORONARY ARTERY DISEASE INVOLVING CORONARY BYPASS GRAFT OF NATIVE HEART WITH UNSTABLE ANGINA PECTORIS: ICD-10-CM

## 2025-06-11 DIAGNOSIS — I25.10 CORONARY ARTERY DISEASE INVOLVING NATIVE CORONARY ARTERY OF NATIVE HEART WITHOUT ANGINA PECTORIS: Primary | ICD-10-CM

## 2025-06-11 DIAGNOSIS — E78.2 MIXED HYPERLIPIDEMIA: ICD-10-CM

## 2025-06-11 PROCEDURE — 99214 OFFICE O/P EST MOD 30 MIN: CPT | Mod: S$PBB,,, | Performed by: NURSE PRACTITIONER

## 2025-06-11 PROCEDURE — 99999 PR PBB SHADOW E&M-EST. PATIENT-LVL V: CPT | Mod: PBBFAC,,, | Performed by: NURSE PRACTITIONER

## 2025-06-11 PROCEDURE — 93010 ELECTROCARDIOGRAM REPORT: CPT | Mod: S$PBB,,, | Performed by: INTERNAL MEDICINE

## 2025-06-11 PROCEDURE — 99215 OFFICE O/P EST HI 40 MIN: CPT | Mod: PBBFAC | Performed by: NURSE PRACTITIONER

## 2025-06-11 PROCEDURE — 93005 ELECTROCARDIOGRAM TRACING: CPT | Mod: PBBFAC | Performed by: INTERNAL MEDICINE

## 2025-06-11 NOTE — ASSESSMENT & PLAN NOTE
Doing well  No issues with angina.   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   Continue ASA/Plavix/Lipitor

## 2025-06-11 NOTE — PROGRESS NOTES
PCP: Marquez Huff MD    Referring Provider:   Chief Complaint   Patient presents with    Dizziness     When getting up from bending over       Subjective:   Navdeep Avery is a 64 y.o. male with hx of MICAD s/p CABG (2018), DM, type II, HTN, HLD,  SAMEERA and GERD, who presents for routine follow up. History of Present Illness    HPI:  Patient reports decreased smoking and expectorating thick, discolored sputum for 2 days. He has ocular pruritus and nasal congestion, suggesting possible allergy symptoms, with significant nasal congestion at one point.    His EKG shows changes compared to previous results. Cholesterol levels, particularly triglycerides, have been an ongoing issue for 30 years. In 12/2024, his triglyceride level was 293 mg/dL (target is less than 150 mg/dL), significantly higher than the 110 mg/dL recorded in 01/2023. His LDL cholesterol in 12/2024 was 91 mg/dL, slightly higher than the target of less than 70 mg/dL for patients with a history of MI.    He acknowledges suboptimal glucose control but has not been checking it regularly due to issues with his continuous glucose monitor, which is missing. He will need to resume finger stick testing to monitor his glucose levels.    He has lost some weight since his last visit.    He denies chest pain and dyspnea.    CARDIAC HISTORY:  EKG ([id number]:15:00.000Z): changed compared to previous, less favorable Multiple MIs EKG 12/2024: performed by different technician than current EKG    TEST RESULTS:  Patient's cholesterol levels were elevated in December 2024. His triglycerides were high at 293 mg/dL in December 2024, exceeding the target of <150 mg/dL, but were previously at 110 mg/dL in January 2023. Patient's LDL was 91 mg/dL in December 2024, up from 86 mg/dL in January 2023, which was his lowest recorded level. The target for LDL is <70 mg/dL.    MEDICAL HISTORY:  Patient has a history of high cholesterol, high triglycerides, and diabetes.          12/9/2024 presents for routine follow up. He has no cardiac complaints other than mild BLE that resolves at HS.     4/24/2024 presents for 3 month follow up. He has had no syncopal or near syncopal issues. He dose get dizzy at times and his BP yesterday as 90/40 mmHg. He admits that he drinks 2-3 POTS of coffee per day and no water.     1/24/2024 presents for routine follow up. Patient reports 4 episodes of stable angina in the last month. He also reports reports a syncopal episode 3 months ago. He was standing up using the remote control for the TV and the next thing he knew he was hitting his head on the floor. He awakened alert and oriented. He did not seek medical attention or check his glucose. He and his wife suspect that this was r/t hypoglycemia but they did not check his glucose or his BP. He has had no further issues. He had an unremarkable echo and Lexiscan 6 months ago. He is to seek medical attention or at least check his glucose and bp if this occurs again.     8/19/2023 presents for 2 month follow up. He deneis chest discomfort. He shane have SHANE which is chronic and stable. He feels SHANE is r/t anxiety. He had decreased his smoking to 2-3 cigarettes per day but is now back to smoking as much as he did before.     6/27/2023 presents for routine follow up. He has been lost to cardiology follow up since 5/26/2021. He reports that he is concerned regarding an episode of right scapular pain that occurred over the weekend.  He describes it as an aching pain to his right scapula that was continuous for 2 days.  He did not seek medical attention.  He does recognize this as his anginal equivalent prior to 1 of his heart attacks.  He is also had daily issues with indigestion that is also 1 of his anginal equivalent prior to a previous heart attack.      Fhx:  Family History   Problem Relation Name Age of Onset    Arthritis Mother      Hypertension Mother      Learning disabilities Mother      Alcohol abuse  Father      Early death Father      Heart disease Father      Cancer Sister      Diabetes Sister      Heart disease Maternal Grandfather      COPD Paternal Grandfather      Heart disease Son       Shx:   Social History     Socioeconomic History    Marital status:    Occupational History    Occupation: Retired   Tobacco Use    Smoking status: Every Day     Current packs/day: 2.50     Average packs/day: 2.5 packs/day for 53.8 years (134.6 ttl pk-yrs)     Types: Cigarettes     Start date: 8/10/1971     Passive exposure: Current    Smokeless tobacco: Never   Substance and Sexual Activity    Alcohol use: Not Currently    Drug use: Yes     Types: Oxycodone    Sexual activity: Yes     Partners: Female     Social Drivers of Health     Financial Resource Strain: Medium Risk (3/20/2025)    Overall Financial Resource Strain (CARDIA)     Difficulty of Paying Living Expenses: Somewhat hard   Food Insecurity: Food Insecurity Present (3/20/2025)    Hunger Vital Sign     Worried About Running Out of Food in the Last Year: Patient declined     Ran Out of Food in the Last Year: Often true   Transportation Needs: No Transportation Needs (3/20/2025)    PRAPARE - Transportation     Lack of Transportation (Medical): No     Lack of Transportation (Non-Medical): No   Physical Activity: Unknown (3/20/2025)    Exercise Vital Sign     Days of Exercise per Week: Patient declined     Minutes of Exercise per Session: 0 min   Recent Concern: Physical Activity - Inactive (1/27/2025)    Exercise Vital Sign     Days of Exercise per Week: 0 days     Minutes of Exercise per Session: 0 min   Stress: Patient Declined (3/20/2025)    [employer] - Occupational Stress Questionnaire     Feeling of Stress : Patient declined   Recent Concern: Stress - Stress Concern Present (1/27/2025)    [employer] - Occupational Stress Questionnaire     Feeling of Stress : To some extent   Housing Stability:  Unknown (3/20/2025)    Housing Stability Vital Sign     Unable to Pay for Housing in the Last Year: No     Homeless in the Last Year: Patient declined       EK2025 RSR with HR 65 bpm; can not r/o anterior infarct; lateral ST&TWA;   2024 RSR with HR 70 bpm; inferior infarct sited on or before 2021; anterolateral infarct cited on or before 2021; no significant change compared to EKG 2024 RSR with HR 74 bpm; NSTWA   2023 sinus bradycardia; HR 58 bpm; minimal voltage criteria for LVH; inferior infarct; anterior infarct; lateral TWA    2021 RSR; HR 64 bpm;  inferior infarct; anterior infarct; lateral TWA     Lexican 2023  Dense, modertae size apical perfusion defect c/w a scar but not ischemia  Normal LV size with apical septal hypokinesis and overall normal LVSF, EF 53%    Results for orders placed during the hospital encounter of 23    Echo Saline Bubble? No    Interpretation Summary  · The left ventricle is normal in size with  · Atrial fibrillation not observed.  · Normal right ventricular size.  · Mild mitral regurgitation.     Echocardiogram 2019  1. Normal chamber size with hypokinetic mid and basal inferior wall and mid and apical anterior wall and LV apex. Overall LVSF appeared to be at the low end of nomral range with estimated LVEF 50-55%  2. Mild or mild to moderate MR   3. Aortic valve appears tricuspid and mildly calcified and sclerotic but maintained normal opening  4. Trivial aortic regurgitation  5. Trace TR with est RVSP 39 mmHg  6. Mild PI  7. Impaired LV relaxation           Heart cath- most recent 2019  1. Severe 3 vessel CAD  2. Normal LV size with apical akinesis and overall normal LVSF, EF 55%  3. Patent SVG to the r-PDA with patent jump graft to the OM branch of the LCx  4. Patent LIMA to the OM1  5. No significant MR  ECHO No results found for this or any previous visit.            Lab Results   Component Value Date    NA  143 05/20/2025    K 3.9 05/20/2025     05/20/2025    CO2 29 05/20/2025    BUN 23 05/20/2025    CREATININE 0.93 05/20/2025    CALCIUM 10.3 (H) 05/20/2025    ANIONGAP 18 (H) 05/20/2025    ESTGFRAFRICA 130 01/25/2021    EGFRNONAA 66 06/29/2022       Lab Results   Component Value Date    CHOL 184 12/03/2024    CHOL 147 01/05/2023    CHOL 161 07/06/2022     Lab Results   Component Value Date    HDL 34 (L) 12/03/2024    HDL 39 (L) 01/05/2023    HDL 38 (L) 07/06/2022     Lab Results   Component Value Date    LDLCALC 91 12/03/2024    LDLCALC 86 01/05/2023    LDLCALC 91 07/06/2022     Lab Results   Component Value Date    TRIG 293 (H) 12/03/2024    TRIG 110 01/05/2023    TRIG 161 (H) 07/06/2022     Lab Results   Component Value Date    CHOLHDL 5.4 12/03/2024    CHOLHDL 3.8 01/05/2023    CHOLHDL 4.2 07/06/2022       Lab Results   Component Value Date    WBC 8.10 05/20/2025    HGB 16.1 05/20/2025    HCT 47.8 05/20/2025    MCV 92.6 05/20/2025     05/20/2025           Current Outpatient Medications:     albuterol (PROVENTIL) 2.5 mg /3 mL (0.083 %) nebulizer solution, Take 3 mLs (2.5 mg total) by nebulization every 6 (six) hours as needed for Wheezing or Shortness of Breath. Rescue, Disp: 90 each, Rfl: 11    albuterol (PROVENTIL/VENTOLIN HFA) 90 mcg/actuation inhaler, INHALE 2 PUFFS BY MOUTH INTO THE LUNGS TWICE DAILY AS NEEDED FOR WHEEZING, Disp: 8.5 g, Rfl: 2    amLODIPine (NORVASC) 10 MG tablet, Take 1 tablet (10 mg total) by mouth once daily., Disp: 90 tablet, Rfl: 1    aspirin 81 MG Chew, Take 81 mg by mouth once daily., Disp: , Rfl:     atorvastatin (LIPITOR) 80 MG tablet, Take 1 tablet (80 mg total) by mouth every evening., Disp: 90 tablet, Rfl: 1    clopidogreL (PLAVIX) 75 mg tablet, TAKE 1 TABLET BY MOUTH once DAILY, Disp: 90 tablet, Rfl: 1    diclofenac sodium (VOLTAREN) 1 % Gel, Apply 1 g topically 4 (four) times daily as needed (pain)., Disp: 20 g, Rfl: 1    docusate sodium (COLACE) 100 MG capsule, Take 1  "capsule (100 mg total) by mouth 2 (two) times daily., Disp: 28 capsule, Rfl: 0    DULoxetine (CYMBALTA) 30 MG capsule, Take 1 capsule (30 mg total) by mouth 2 (two) times daily., Disp: 60 capsule, Rfl: 2    empagliflozin-metformin 10-1,000 mg TBph, Take 1 tablet by mouth Daily., Disp: 90 tablet, Rfl: 1    ezetimibe (ZETIA) 10 mg tablet, Take 1 tablet (10 mg total) by mouth once daily., Disp: 90 tablet, Rfl: 3    flash glucose scanning reader (FREESTYLE CT 2 READER) Misc, Use to monitor blood glucose, Disp: 1 each, Rfl: 1    flash glucose sensor (FREESTYLE CT 2 SENSOR) Kit, Use to monitor blood sugar and replace unit every 2 weeks, Disp: 1 kit, Rfl: 6    fluticasone propion-salmeterol 115-21 mcg/dose (ADVAIR HFA) 115-21 mcg/actuation HFAA inhaler, Inhale 2 puffs into the lungs every 12 (twelve) hours. Controller, Disp: 12 g, Rfl: 2    gabapentin (NEURONTIN) 400 MG capsule, 400 mg, 2 tablets by mouth q.6 hours, Disp: 180 capsule, Rfl: 2    HYDROcodone-acetaminophen (NORCO)  mg per tablet, Take 1 tablet by mouth every 6 (six) hours as needed for Pain., Disp: 120 tablet, Rfl: 0    [START ON 6/21/2025] HYDROcodone-acetaminophen (NORCO)  mg per tablet, Take 1 tablet by mouth every 6 (six) hours as needed for Pain., Disp: 120 tablet, Rfl: 0    hydrOXYzine pamoate (VISTARIL) 25 MG Cap, Take 1 capsule (25 mg total) by mouth once daily. Take nightly for sleep, Disp: 90 capsule, Rfl: 1    insulin degludec (TRESIBA FLEXTOUCH U-200) 200 unit/mL (3 mL) insulin pen, Inject 10 Units into the skin once daily., Disp: 3 pen , Rfl: 2    insulin syringe-needle U-100 (BD INSULIN SYRINGE ULTRA-FINE) 0.3 mL 30 gauge x 1/2" Syrg, Use one syringe twice daily with insulin injection for diabetes, Disp: 100 each, Rfl: 5    isosorbide mononitrate (IMDUR) 30 MG 24 hr tablet, Take 1 tablet (30 mg total) by mouth once daily., Disp: 30 tablet, Rfl: 11    LIDOcaine (LIDODERM) 5 %, 1 patch once daily., Disp: , Rfl:     lisinopriL 10 " "MG tablet, Take 1 tablet (10 mg total) by mouth once daily., Disp: 90 tablet, Rfl: 1    metoprolol succinate (TOPROL-XL) 50 MG 24 hr tablet, Take 1 tablet (50 mg total) by mouth once daily., Disp: 90 tablet, Rfl: 1    nitroGLYCERIN (NITROSTAT) 0.4 MG SL tablet, Place 1 tablet (0.4 mg total) under the tongue every 5 (five) minutes as needed for Chest pain., Disp: 30 tablet, Rfl: 2    pantoprazole (PROTONIX) 40 MG tablet, Take 1 tablet (40 mg total) by mouth once daily., Disp: 90 tablet, Rfl: 1  Meds reviewed but not reconciled. He did not bring his meds today.      Review of Systems   Respiratory:  Negative for shortness of breath.    Cardiovascular:  Negative for chest pain, palpitations, orthopnea, claudication, leg swelling and PND.   Neurological:  Positive for dizziness. Negative for loss of consciousness and weakness.        Chronic with bending over and standing up           Objective:   /84 (BP Location: Left arm, Patient Position: Sitting)   Pulse 69   Ht 5' 8" (1.727 m)   Wt 71.4 kg (157 lb 6.4 oz)   SpO2 98%   BMI 23.93 kg/m²     Physical Exam  Vitals and nursing note reviewed.   Constitutional:       Appearance: Normal appearance. He is normal weight.   HENT:      Head: Normocephalic and atraumatic.   Neck:      Vascular: No carotid bruit.   Cardiovascular:      Rate and Rhythm: Normal rate and regular rhythm.      Pulses: Normal pulses.      Heart sounds: Normal heart sounds.   Pulmonary:      Effort: Pulmonary effort is normal.      Breath sounds: Normal breath sounds.   Abdominal:      Palpations: Abdomen is soft.   Musculoskeletal:      Cervical back: Neck supple.      Right lower leg: No edema.      Left lower leg: No edema.   Skin:     General: Skin is warm and dry.      Capillary Refill: Capillary refill takes less than 2 seconds.   Neurological:      Mental Status: He is alert.           Assessment:     1. Coronary artery disease involving native coronary artery of native heart without " "angina pectoris  EKG 12-lead    EKG 12-lead      2. Stable angina        3. Mixed hyperlipidemia        4. Coronary artery disease involving coronary bypass graft of native heart with unstable angina pectoris        5. Benign hypertension        6. S/P CABG (coronary artery bypass graft)              Plan:   Stable angina  Chronic and stable    Mixed hyperlipidemia  Lab Results   Component Value Date    CHOL 184 12/03/2024    CHOL 147 01/05/2023    CHOL 161 07/06/2022      Lab Results   Component Value Date    HDL 34 (L) 12/03/2024    HDL 39 (L) 01/05/2023    HDL 38 (L) 07/06/2022     Lab Results   Component Value Date    LDLCALC 91 12/03/2024    LDLCALC 86 01/05/2023    LDLCALC 91 07/06/2022      Lab Results   Component Value Date    TRIG 293 (H) 12/03/2024    TRIG 110 01/05/2023    TRIG 161 (H) 07/06/2022     No results found for: "TOTALCHOLEST"  Lab Results   Component Value Date    NONHDLCHOL 150 12/03/2024    NONHDLCHOL 108 01/05/2023    NONHDLCHOL 123 07/06/2022     Lab Results   Component Value Date    CHOLHDL 5.4 12/03/2024    CHOLHDL 3.8 01/05/2023    CHOLHDL 4.2 07/06/2022   Lipids followed by PCP  Lipitor 80 mg po daily  Zetia 10 mg po daily    Coronary artery disease involving coronary bypass graft of native heart  Doing well  No issues with angina.   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   Continue ASA/Plavix/Lipitor    Benign hypertension  118/84 mmHg    S/P CABG (coronary artery bypass graft)  CABG 2018    RTC 6 months          "

## 2025-06-11 NOTE — ASSESSMENT & PLAN NOTE
"Lab Results   Component Value Date    CHOL 184 12/03/2024    CHOL 147 01/05/2023    CHOL 161 07/06/2022      Lab Results   Component Value Date    HDL 34 (L) 12/03/2024    HDL 39 (L) 01/05/2023    HDL 38 (L) 07/06/2022     Lab Results   Component Value Date    LDLCALC 91 12/03/2024    LDLCALC 86 01/05/2023    LDLCALC 91 07/06/2022      Lab Results   Component Value Date    TRIG 293 (H) 12/03/2024    TRIG 110 01/05/2023    TRIG 161 (H) 07/06/2022     No results found for: "TOTALCHOLEST"  Lab Results   Component Value Date    NONHDLCHOL 150 12/03/2024    NONHDLCHOL 108 01/05/2023    NONHDLCHOL 123 07/06/2022     Lab Results   Component Value Date    CHOLHDL 5.4 12/03/2024    CHOLHDL 3.8 01/05/2023    CHOLHDL 4.2 07/06/2022   Lipids followed by PCP  Lipitor 80 mg po daily  Zetia 10 mg po daily  "

## 2025-06-12 LAB
OHS QRS DURATION: 106 MS
OHS QTC CALCULATION: 438 MS

## 2025-07-09 NOTE — PROGRESS NOTES
Subjective:         Patient ID: [id number] Avery is a 64 y.o. male.    Chief Complaint: Hip Pain (Left hip pain, bilateral hip pain and right shoulder pain.)        Pain  This is a chronic problem. The current episode started more than 1 year ago. The problem occurs daily. The problem has been unchanged. Associated symptoms include arthralgias and neck pain. Pertinent negatives include no anorexia, chest pain, chills, coughing, diaphoresis, fever, sore throat, vertigo or vomiting.     Review of Systems   Constitutional:  Negative for activity change, chills, diaphoresis, fever and unexpected weight change.   HENT:  Negative for drooling, ear discharge, ear pain, facial swelling, nosebleeds, sore throat, trouble swallowing, voice change and goiter.    Eyes:  Negative for photophobia, pain, discharge, redness and visual disturbance.   Respiratory:  Negative for apnea, cough, choking, chest tightness, shortness of breath, wheezing and stridor.    Cardiovascular:  Negative for chest pain, palpitations and leg swelling.   Gastrointestinal:  Negative for abdominal distention, anorexia, diarrhea, rectal pain, vomiting and fecal incontinence.   Endocrine: Negative for cold intolerance, heat intolerance, polydipsia, polyphagia and polyuria.   Genitourinary:  Negative for bladder incontinence, dysuria, flank pain and frequency.   Musculoskeletal:  Positive for arthralgias, back pain, leg pain, neck pain and neck stiffness.   Integumentary:  Negative for color change and pallor.   Neurological:  Negative for dizziness, vertigo, seizures, syncope, facial asymmetry, speech difficulty, light-headedness, coordination difficulties and memory loss.   Hematological:  Negative for adenopathy. Does not bruise/bleed easily.   Psychiatric/Behavioral:  Negative for agitation, behavioral problems, confusion, decreased concentration, dysphoric mood, hallucinations, self-injury and suicidal ideas. The patient is not nervous/anxious and is  not hyperactive.            Past Medical History:   Diagnosis Date    Arthritis     Carpal tunnel syndrome     Charcot foot due to diabetes mellitus 09/29/2023    Prescription for CROW orthotic boot given today    COPD (chronic obstructive pulmonary disease)     Coronary artery disease involving coronary bypass graft of native heart 06/27/2023    CABG 2018 (No OP report available)     Most recent left heart catheterization 08/12/2019  1. Normal LV size apical akinesis and overall normal LV systolic function, ejection fraction 55%  2. Patent saphenous vein graft to the right PDA with patent jump graft to the OM branch left circumflex  3. Patent LIMA to the OM1  4. No significant mitral regurgitation     COVID 02/02/2022    Diabetic peripheral neuropathy     Essential (primary) hypertension     GERD (gastroesophageal reflux disease)     Insomnia secondary to depression with anxiety 06/17/2021    Mixed hyperlipidemia 03/23/2021    Nicotine dependence     Peripheral vascular disease, unspecified     Sleep apnea     Type 2 diabetes mellitus      Past Surgical History:   Procedure Laterality Date    CORONARY ARTERY BYPASS GRAFT      CORONARY STENT PLACEMENT      DEBRIDEMENT OF FOOT Right 08/10/2023    Dr. Chiu    DEBRIDEMENT OF FOOT Right 8/10/2023    Procedure: DEBRIDEMENT, FOOT;  Surgeon: Evelio Chiu DO;  Location: [facility], [place] OR;  Service: General;  Laterality: Right;    DEBRIDEMENT OF FOOT Right 2/27/2024    Procedure: DEBRIDEMENT, FOOT;  Surgeon: Evelio Chiu DO;  Location: [facility], [place] OR;  Service: General;  Laterality: Right;    DEBRIDEMENT OF LOWER EXTREMITY Right 06/27/2022    Procedure: DEBRIDEMENT, LOWER EXTREMITY;  Surgeon: Forrest Kiser MD;  Location: [facility], [place] OR;  Service: General;  Laterality: Right;  right foot    FOOT AMPUTATION THROUGH METATARSAL Right 3/1/2024    Procedure: AMPUTATION, FOOT, TRANSMETATARSAL; RIGHT LISFRANK AMPUTATION;  Surgeon: Evelio Chiu DO;  Location: [facility] [place]  OR;  Service: General;  Laterality: Right;    HIP FRACTURE SURGERY Left     IRRIGATION AND DEBRIDEMENT OF LOWER EXTREMITY Right 09/08/2022    Procedure: IRRIGATION AND DEBRIDEMENT, LOWER EXTREMITY;  Surgeon: Selina Matos MD;  Location: [facility] OR;  Service: General;  Laterality: Right;    IRRIGATION AND DEBRIDEMENT OF LOWER EXTREMITY Right 01/05/2023    Procedure: IRRIGATION AND DEBRIDEMENT, LOWER EXTREMITY;  Surgeon: Evelio Chiu DO;  Location: [facility] OR;  Service: General;  Laterality: Right;    SHOULDER OPEN ROTATOR CUFF REPAIR Left     SPINE SURGERY      TOE AMPUTATION Right 2/27/2024    Procedure: AMPUTATION, TOE;  Surgeon: Evelio Chiu DO;  Location: [facility] OR;  Service: General;  Laterality: Right;  RIGHT 5 TH TOE AND DEBRIDEMENT    VASCULAR SURGERY       Social History     Socioeconomic History    Marital status:    Occupational History    Occupation: Retired   Tobacco Use    Smoking status: Every Day     Current packs/day: 2.50     Average packs/day: 2.5 packs/day for 53.9 years (134.8 ttl pk-yrs)     Types: Cigarettes     Start date: 8/10/1971     Passive exposure: Current    Smokeless tobacco: Never   Substance and Sexual Activity    Alcohol use: Not Currently    Drug use: Yes     Types: Oxycodone    Sexual activity: Yes     Partners: Female     Social Drivers of Health     Financial Resource Strain: Medium Risk (3/20/2025)    Overall Financial Resource Strain (CARDIA)     Difficulty of Paying Living Expenses: Somewhat hard   Food Insecurity: Food Insecurity Present (3/20/2025)    Hunger Vital Sign     Worried About Running Out of Food in the Last Year: Patient declined     Ran Out of Food in the Last Year: Often true   Transportation Needs: No Transportation Needs (3/20/2025)    PRAPARE - Transportation     Lack of Transportation (Medical): No     Lack of Transportation (Non-Medical): No   Physical Activity: Unknown (3/20/2025)    Exercise Vital Sign     Days of Exercise per  "Week: Patient declined     Minutes of Exercise per Session: 0 min   Recent Concern: Physical Activity - Inactive (1/27/2025)    Exercise Vital Sign     Days of Exercise per Week: 0 days     Minutes of Exercise per Session: 0 min   Stress: Patient Declined (3/20/2025)    [employer] [place] - Occupational Stress Questionnaire     Feeling of Stress : Patient declined   Recent Concern: Stress - Stress Concern Present (1/27/2025)    [employer] - Occupational Stress Questionnaire     Feeling of Stress : To some extent   Housing Stability: Unknown (3/20/2025)    Housing Stability Vital Sign     Unable to Pay for Housing in the Last Year: No     Homeless in the Last Year: Patient declined     Family History   Problem Relation Name Age of Onset    Arthritis Mother      Hypertension Mother      Learning disabilities Mother      Alcohol abuse Father      Early death Father      Heart disease Father      Cancer Sister      Diabetes Sister      Heart disease Maternal Grandfather      COPD Paternal Grandfather      Heart disease Son       Review of patient's allergies indicates:  No Known Allergies     Objective:  Vitals:    07/17/25 0934 07/17/25 0935   BP: (!) 191/73    Pulse: 62    Resp: 18    Weight: 73 kg (161 lb)    Height: 5' 8" (1.727 m)    PainSc:   7   7   PainLoc: Hip                  Physical Exam  Vitals and nursing note reviewed. Exam conducted with a chaperone present.   Constitutional:       General: He is awake. He is not in acute distress.     Appearance: Normal appearance. He is not ill-appearing, toxic-appearing or diaphoretic.   HENT:      Head: Normocephalic and atraumatic.      Nose: Nose normal.      Mouth/Throat:      Mouth: Mucous membranes are moist.      Pharynx: Oropharynx is clear.   Eyes:      Conjunctiva/sclera: Conjunctivae normal.      Pupils: Pupils are equal, round, and reactive to light.   Cardiovascular:      Rate and Rhythm: Normal rate. "   Pulmonary:      Effort: Pulmonary effort is normal. No respiratory distress.   Abdominal:      Palpations: Abdomen is soft.   Musculoskeletal:         General: Normal range of motion.      Cervical back: Normal range of motion and neck supple.      Thoracic back: Tenderness present.      Lumbar back: Tenderness present.   Skin:     General: Skin is warm and dry.      Coloration: Skin is not jaundiced or pale.   Neurological:      General: No focal deficit present.      Mental Status: He is alert and oriented to person, place, and time. Mental status is at baseline.      Cranial Nerves: No cranial nerve deficit (II-XII).   Psychiatric:         Mood and Affect: Mood normal.         Behavior: Behavior normal. Behavior is cooperative.         Thought Content: Thought content normal.           CT Head Without Contrast  Narrative: EXAMINATION:  CT HEAD WITHOUT CONTRAST    CLINICAL HISTORY:  Memory loss;    TECHNIQUE:  Low dose axial images were obtained through the head.  Coronal and sagittal reformations were also performed. Contrast was not administered.    COMPARISON:  12/26/2024    FINDINGS:  There is motion artifact.    There is generalized cerebral volume loss.  There is hypoattenuation in a periventricular fashion, likely sequela of chronic microvascular ischemic change.There is a more focal region of low attenuation involving the posterior aspect of the left corona radiata, stable.  There is a stable focus of encephalomalacia along the left paramedian occipital lobe.  There is no evidence of acute major vascular territory infarct, hemorrhage, or mass.  There is no hydrocephalus.  There are no abnormal extra-axial fluid collections.  The paranasal sinuses and mastoid air cells are clear, and there is no evidence of calvarial fracture.  The visualized soft tissues are unremarkable.  Impression: 1. Allowing for motion artifact, no convincing acute intracranial abnormalities noting stable sequela of chronic  microvascular ischemic change, senescent change, and encephalomalacia as described.    Electronically signed by: Evelio Webb MD  Date:    05/20/2025  Time:    17:47  XR ABDOMEN, ACUTE 2 OR MORE VIEWS WITH CHEST  Narrative: EXAMINATION:  XR ABDOMEN ACUTE 2 OR MORE VIEWS WITH CHEST    CLINICAL HISTORY:  Abdominal pain, nausea, vomiting;    TECHNIQUE:  KUB, PA chest    COMPARISON:  09/08/2014 chest    FINDINGS:  Sternotomy wires.  The cardiac silhouette is midline, normal in size.  The pulmonary vascularity is normal.  No focal area of acute airspace consolidation.  No pleural effusion or pneumothorax.  The osseous structures of the chest appear normal.    Moderate stool retention, no bowel dilation, no air-fluid level.  No free air.  No organomegaly.  No abnormal calcifications.  Postoperative changes of the left hip joint.  Significant atherosclerotic plaque of the abdominal aorta and its branches.  Mild degenerative changes of the lumbar spine.  Impression: No acute process seen of the chest or abdomen.    Moderate stool retention.    Electronically signed by: Samantha Uribe MD  Date:    05/20/2025  Time:    15:50         Office Visit on 06/11/2025   Component Date Value Ref Range Status    QRS Duration 06/11/2025 106  ms Final    [facility] QTC Calculation 06/11/2025 438  ms Final   Admission on 05/20/2025, Discharged on 05/20/2025   Component Date Value Ref Range Status    POC Glucose 05/20/2025 240 (H)  70 - 105 mg/dL Final    Sodium 05/20/2025 143  136 - 145 mmol/L Final    Potassium 05/20/2025 3.9  3.5 - 5.1 mmol/L Final    Chloride 05/20/2025 100  98 - 107 mmol/L Final    CO2 05/20/2025 29  23 - 31 mmol/L Final    Anion Gap 05/20/2025 18 (H)  7 - 16 mmol/L Final    Glucose 05/20/2025 226 (H)  82 - 115 mg/dL Final    BUN 05/20/2025 23  8 - 26 mg/dL Final    Creatinine 05/20/2025 0.93  0.72 - 1.25 mg/dL Final    BUN/Creatinine Ratio 05/20/2025 25 (H)  6 - 20 Final    Calcium 05/20/2025 10.3 (H)  8.8 - 10.0  mg/dL Final    Total Protein 05/20/2025 8.8 (H)  5.8 - 7.6 g/dL Final    Albumin 05/20/2025 4.4  3.4 - 4.8 g/dL Final    Globulin 05/20/2025 4.4 (H)  2.0 - 4.0 g/dL Final    A/G Ratio 05/20/2025 1.0   Final    Bilirubin, Total 05/20/2025 0.5  <=1.5 mg/dL Final    Alk Phos 05/20/2025 90  40 - 150 U/L Final    ALT 05/20/2025 15  <=55 U/L Final    AST 05/20/2025 24  11 - 45 U/L Final    eGFR 05/20/2025 91  >=60 mL/min/1.73m2 Final    WBC 05/20/2025 8.10  4.50 - 11.00 K/uL Final    RBC 05/20/2025 5.16  4.60 - 6.20 M/uL Final    Hemoglobin 05/20/2025 16.1  13.5 - 18.0 g/dL Final    Hematocrit 05/20/2025 47.8  40.0 - 54.0 % Final    MCV 05/20/2025 92.6  80.0 - 96.0 fL Final    MCH 05/20/2025 31.2 (H)  27.0 - 31.0 pg Final    MCHC 05/20/2025 33.7  32.0 - 36.0 g/dL Final    RDW 05/20/2025 12.7  11.5 - 14.5 % Final    Platelet Count 05/20/2025 193  150 - 400 K/uL Final    MPV 05/20/2025 11.0  9.4 - 12.4 fL Final    Neutrophils % 05/20/2025 70.0 (H)  53.0 - 65.0 % Final    Lymphocytes % 05/20/2025 22.6 (L)  27.0 - 41.0 % Final    Monocytes % 05/20/2025 5.8  2.0 - 6.0 % Final    Eosinophils % 05/20/2025 0.5 (L)  1.0 - 4.0 % Final    Basophils % 05/20/2025 0.6  0.0 - 1.0 % Final    Immature Granulocytes % 05/20/2025 0.5 (H)  0.0 - 0.4 % Final    nRBC, Auto 05/20/2025 0.0  <=0.0 % Final    Neutrophils, Abs 05/20/2025 5.67  1.80 - 7.70 K/uL Final    Lymphocytes, Absolute 05/20/2025 1.83  1.00 - 4.80 K/uL Final    Monocytes, Absolute 05/20/2025 0.47  0.00 - 0.80 K/uL Final    Eosinophils, Absolute 05/20/2025 0.04  0.00 - 0.50 K/uL Final    Basophils, Absolute 05/20/2025 0.05  0.00 - 0.20 K/uL Final    Immature Granulocytes, Absolute 05/20/2025 0.04  0.00 - 0.04 K/uL Final    nRBC, Absolute 05/20/2025 0.00  <=0.00 x10e3/uL Final    Diff Type 05/20/2025 Auto   Final    Vitamin B12 05/20/2025 333  213 - 816 pg/mL Final    Folate 05/20/2025 10.3  7.0 - 31.4 ng/mL Final    Color, UA 05/20/2025 Light Yellow  Colorless, Straw, Yellow,  Dark Yellow, Light Yellow Final    Clarity, UA 05/20/2025 Clear  Clear Final    pH, UA 05/20/2025 7.0  5.0 to 8.0 pH Units Final    Leukocytes, UA 05/20/2025 Negative  Negative Final    Nitrites, UA 05/20/2025 Negative  Negative Final    Protein, UA 05/20/2025 300 (A)  Negative Final    Glucose, UA 05/20/2025 300 (A)  Normal mg/dL Final    Ketones, UA 05/20/2025 Negative  Negative mg/dL Final    Urobilinogen, UA 05/20/2025 Normal  0.2, 1.0, Normal mg/dL Final    Bilirubin, UA 05/20/2025 Negative  Negative Final    Blood, UA 05/20/2025 Trace (A)  Negative Final    Specific Gravity, UA 05/20/2025 1.020  <=1.030 Final    WBC, UA 05/20/2025 5  <=5 /hpf Final    RBC, UA 05/20/2025 5 (H)  <=3 /hpf Final    Bacteria, UA 05/20/2025 Occasional (A)  None Seen /hpf Final    Squamous Epithelial Cells, UA 05/20/2025 Occasional (A)  None Seen /HPF Final    Hyaline Casts, UA 05/20/2025 2-5 (A)  None Seen /lpf Final   Office Visit on 04/17/2025   Component Date Value Ref Range Status    POC Amphetamines 04/17/2025 Negative  Negative, Inconclusive Final    POC Barbiturates 04/17/2025 Negative  Negative, Inconclusive Final    POC Benzodiazepines 04/17/2025 Negative  Negative, Inconclusive Final    POC Cocaine 04/17/2025 Negative  Negative, Inconclusive Final    POC THC 04/17/2025 Negative  Negative, Inconclusive Final    POC Methadone 04/17/2025 Negative  Negative, Inconclusive Final    POC Methamphetamine 04/17/2025 Negative  Negative, Inconclusive Final    POC Opiates 04/17/2025 Presumptive Positive (A)  Negative, Inconclusive Final    POC Oxycodone 04/17/2025 Negative  Negative, Inconclusive Final    POC Phencyclidine 04/17/2025 Negative  Negative, Inconclusive Final    POC Methylenedioxymethamphetamine * 04/17/2025 Negative  Negative, Inconclusive Final    POC Tricyclic Antidepressants 04/17/2025 Negative  Negative, Inconclusive Final    POC Buprenorphine 04/17/2025 Negative   Final     Acceptable 04/17/2025 Yes    Final    POC Temperature (Urine) 2025 94   Final         Orders Placed This Encounter   Procedures    POCT Urine Drug Screen Presump     Interpretive Information:     Negative:  No drug detected at the cut off level.   Positive:  This result represents presumptive positive for the   tested drug, other substances may yield a positive response other   than the analyte of interest. This result should be utilized for   diagnostic purpose only. Confirmation testing will be performed upon physician request only.       Case Request Operating Room: Injection, Joint, Hip, intra-articular hip injection     Medical Necessity::   Medically Non-Urgent [100]     Case classification:   E - Elective [90]     Is an on-site pathologist required for this procedure?:   N/A       Requested Prescriptions     Signed Prescriptions Disp Refills    gabapentin (NEURONTIN) 400 MG capsule 180 capsule 2     Si mg, 2 tablets by mouth q.6 hours    HYDROcodone-acetaminophen (NORCO)  mg per tablet 120 tablet 0     Sig: Take 1 tablet by mouth every 6 (six) hours as needed for Pain.    DULoxetine (CYMBALTA) 30 MG capsule 60 capsule 2     Sig: Take 1 capsule (30 mg total) by mouth 2 (two) times daily.       Assessment:     1. Hip pain, left, [facility] status post acetabular hardware placement    2. Peripheral vascular disease, unspecified    3. Ulcer of right foot with necrosis of muscle    4. Neuropathy    5. Encounter for long-term (current) use of medications                 A's of Opioid Risk Assessment  Activity:Patient can perform ADL.   Analgesia:Patients pain is partially controlled by current medication. Patient has tried OTC medications such as Tylenol and Ibuprofen with out relief.   Adverse Effects: Patient denies constipation or sedation.  Aberrant Behavior:  reviewed with no aberrant drug seeking/taking behavior.  Overdose reversal drug naloxone discussed    Drug screen reviewed      X-ray pelvis and hips [facility]  January 5, 2023  Acetabular fracture repair present with appearance similar to previous.  No other evidence of fracture seen.  The alignment of the joints appears normal.  No degenerative change is present.  No soft tissue abnormality is seen.          Plan:    Patient cancel/reschedule appointment  April 2, 2025      Narcan March 2023    Can not tolerate Lyrica    Anticoagulated Plavix  Will request permission to hold Plavix July 17, 2025      Chronic left hip pain left groin pain after left after acetabular reconstruction right foot reconstruction [facility]    Was recommended further surgery for his left hip due to comorbidities surgery was postpone per [facility]      Chronic burning numbness needle sensation bilateral feet diabetic neuropathy he states gabapentin is helping    Requesting procedure for left hip pain    Limit steroids due to diabetes    Difficult to ascertain location of hardware without lateral view of left hip    Due to location of hardware do not recommend femoral/obturator blocks      Patient states he will go to x-ray department and complete AP lateral hip for evaluation hardware acetabular reconstruction    Left hip intra-articular injection limit steroids    He states current medication does help his chronic back and joint pain    Continue home exercise program as tolerated     Continue current medication     Monitor anesthesia request is medically indicated for the scheduled nerve block procedure due to:  1- needle phobia and anxiety, placing  the patient at risk during the provided service.  2-patient has an ASA class greater than 3 and requires constant presence of an anesthesiologist during the procedure,   3-patient has severe problems hard to lie still  4-patient suffers from chronic pain and is unable to function due to  diminished ADLs    Schedule left intra-articular hip injection # 1, left hip pain after reconstruction hardware in place    Dr. Dobson    Bring original prescription medication  bottles/container/box with labels to each visit

## 2025-07-17 ENCOUNTER — OFFICE VISIT (OUTPATIENT)
Dept: PAIN MEDICINE | Facility: CLINIC | Age: 65
End: 2025-07-17
Payer: MEDICARE

## 2025-07-17 ENCOUNTER — HOSPITAL ENCOUNTER (OUTPATIENT)
Dept: RADIOLOGY | Facility: HOSPITAL | Age: 65
Discharge: HOME | End: 2025-07-17
Attending: PHYSICIAN ASSISTANT
Payer: MEDICARE

## 2025-07-17 VITALS
BODY MASS INDEX: 24.4 KG/M2 | RESPIRATION RATE: 18 BRPM | SYSTOLIC BLOOD PRESSURE: 191 MMHG | HEART RATE: 62 BPM | DIASTOLIC BLOOD PRESSURE: 73 MMHG | WEIGHT: 161 LBS | HEIGHT: 68 IN

## 2025-07-17 DIAGNOSIS — L97.513 ULCER OF RIGHT FOOT WITH NECROSIS OF MUSCLE: Chronic | ICD-10-CM

## 2025-07-17 DIAGNOSIS — I73.9 PERIPHERAL VASCULAR DISEASE, UNSPECIFIED: Chronic | ICD-10-CM

## 2025-07-17 DIAGNOSIS — G62.9 NEUROPATHY: Chronic | ICD-10-CM

## 2025-07-17 DIAGNOSIS — M25.552 HIP PAIN, LEFT: Chronic | ICD-10-CM

## 2025-07-17 DIAGNOSIS — M25.552 HIP PAIN, LEFT: Primary | Chronic | ICD-10-CM

## 2025-07-17 DIAGNOSIS — Z79.899 ENCOUNTER FOR LONG-TERM (CURRENT) USE OF MEDICATIONS: ICD-10-CM

## 2025-07-17 PROCEDURE — 73502 X-RAY EXAM HIP UNI 2-3 VIEWS: CPT | Mod: TC,LT

## 2025-07-17 PROCEDURE — 99999 PR PBB SHADOW E&M-EST. PATIENT-LVL V: CPT | Mod: PBBFAC,,, | Performed by: PHYSICIAN ASSISTANT

## 2025-07-17 PROCEDURE — 99215 OFFICE O/P EST HI 40 MIN: CPT | Mod: PBBFAC | Performed by: PHYSICIAN ASSISTANT

## 2025-07-17 PROCEDURE — 99999PBSHW POCT URINE DRUG SCREEN PRESUMP: Mod: PBBFAC,,,

## 2025-07-17 PROCEDURE — 80305 DRUG TEST PRSMV DIR OPT OBS: CPT | Mod: PBBFAC | Performed by: PHYSICIAN ASSISTANT

## 2025-07-17 PROCEDURE — 73502 X-RAY EXAM HIP UNI 2-3 VIEWS: CPT | Mod: 26,LT,, | Performed by: INTERNAL MEDICINE

## 2025-07-17 PROCEDURE — 99214 OFFICE O/P EST MOD 30 MIN: CPT | Mod: S$PBB,,, | Performed by: PHYSICIAN ASSISTANT

## 2025-07-17 RX ORDER — GABAPENTIN 400 MG/1
CAPSULE ORAL
Qty: 180 CAPSULE | Refills: 2 | Status: SHIPPED | OUTPATIENT
Start: 2025-07-17

## 2025-07-17 RX ORDER — HYDROCODONE BITARTRATE AND ACETAMINOPHEN 10; 325 MG/1; MG/1
1 TABLET ORAL EVERY 6 HOURS PRN
Qty: 120 TABLET | Refills: 0 | Status: SHIPPED | OUTPATIENT
Start: 2025-07-21

## 2025-07-17 RX ORDER — DULOXETIN HYDROCHLORIDE 30 MG/1
30 CAPSULE, DELAYED RELEASE ORAL 2 TIMES DAILY
Qty: 60 CAPSULE | Refills: 2 | Status: SHIPPED | OUTPATIENT
Start: 2025-07-17

## 2025-07-17 RX ORDER — HYDROCODONE BITARTRATE AND ACETAMINOPHEN 10; 325 MG/1; MG/1
1 TABLET ORAL EVERY 6 HOURS PRN
Qty: 120 TABLET | Refills: 0 | Status: CANCELLED | OUTPATIENT
Start: 2025-07-17

## 2025-07-17 RX ORDER — DULOXETIN HYDROCHLORIDE 30 MG/1
30 CAPSULE, DELAYED RELEASE ORAL 2 TIMES DAILY
Qty: 60 CAPSULE | Refills: 2 | Status: SHIPPED | OUTPATIENT
Start: 2025-07-17 | End: 2025-07-17

## 2025-07-17 NOTE — PATIENT INSTRUCTIONS

## 2025-07-28 DIAGNOSIS — Z79.4 TYPE 2 DIABETES MELLITUS WITH DIABETIC PERIPHERAL ANGIOPATHY WITHOUT GANGRENE, WITH LONG-TERM CURRENT USE OF INSULIN: ICD-10-CM

## 2025-07-28 DIAGNOSIS — E78.5 HYPERLIPIDEMIA, UNSPECIFIED HYPERLIPIDEMIA TYPE: ICD-10-CM

## 2025-07-28 DIAGNOSIS — E11.51 TYPE 2 DIABETES MELLITUS WITH DIABETIC PERIPHERAL ANGIOPATHY WITHOUT GANGRENE, WITH LONG-TERM CURRENT USE OF INSULIN: ICD-10-CM

## 2025-07-28 DIAGNOSIS — I10 ESSENTIAL HYPERTENSION: Primary | ICD-10-CM

## 2025-08-04 ENCOUNTER — OFFICE VISIT (OUTPATIENT)
Dept: FAMILY MEDICINE | Facility: CLINIC | Age: 65
End: 2025-08-04
Payer: MEDICARE

## 2025-08-04 VITALS
OXYGEN SATURATION: 95 % | HEIGHT: 68 IN | SYSTOLIC BLOOD PRESSURE: 156 MMHG | WEIGHT: 160 LBS | TEMPERATURE: 98 F | BODY MASS INDEX: 24.25 KG/M2 | DIASTOLIC BLOOD PRESSURE: 64 MMHG | HEART RATE: 73 BPM | RESPIRATION RATE: 18 BRPM

## 2025-08-04 DIAGNOSIS — E11.51 TYPE 2 DIABETES MELLITUS WITH DIABETIC PERIPHERAL ANGIOPATHY WITHOUT GANGRENE, WITH LONG-TERM CURRENT USE OF INSULIN: Primary | ICD-10-CM

## 2025-08-04 DIAGNOSIS — Z79.4 TYPE 2 DIABETES MELLITUS WITH BOTH EYES AFFECTED BY MILD NONPROLIFERATIVE RETINOPATHY AND MACULAR EDEMA, WITH LONG-TERM CURRENT USE OF INSULIN: ICD-10-CM

## 2025-08-04 DIAGNOSIS — Z79.4 TYPE 2 DIABETES MELLITUS WITH DIABETIC PERIPHERAL ANGIOPATHY WITHOUT GANGRENE, WITH LONG-TERM CURRENT USE OF INSULIN: Primary | ICD-10-CM

## 2025-08-04 DIAGNOSIS — J34.89 NASAL SORE: ICD-10-CM

## 2025-08-04 DIAGNOSIS — R80.9 MICROALBUMINURIA: ICD-10-CM

## 2025-08-04 DIAGNOSIS — E11.3213 TYPE 2 DIABETES MELLITUS WITH BOTH EYES AFFECTED BY MILD NONPROLIFERATIVE RETINOPATHY AND MACULAR EDEMA, WITH LONG-TERM CURRENT USE OF INSULIN: ICD-10-CM

## 2025-08-04 DIAGNOSIS — H54.7 DECREASED VISION: ICD-10-CM

## 2025-08-04 DIAGNOSIS — I10 ESSENTIAL HYPERTENSION: ICD-10-CM

## 2025-08-04 PROCEDURE — 99213 OFFICE O/P EST LOW 20 MIN: CPT | Mod: ,,, | Performed by: FAMILY MEDICINE

## 2025-08-04 RX ORDER — EMPAGLIFLOZIN, METFORMIN HYDROCHLORIDE 25; 1000 MG/1; MG/1
1 TABLET, EXTENDED RELEASE ORAL DAILY
Qty: 30 TABLET | Refills: 5 | Status: SHIPPED | OUTPATIENT
Start: 2025-08-04

## 2025-08-04 RX ORDER — MUPIROCIN 20 MG/G
OINTMENT TOPICAL 4 TIMES DAILY
Qty: 30 G | Refills: 1 | Status: SHIPPED | OUTPATIENT
Start: 2025-08-04

## 2025-08-04 RX ORDER — FLASH GLUCOSE SCANNING READER
EACH MISCELLANEOUS
Qty: 1 EACH | Refills: 1 | Status: SHIPPED | OUTPATIENT
Start: 2025-08-04

## 2025-08-04 RX ORDER — LISINOPRIL 20 MG/1
20 TABLET ORAL DAILY
Qty: 90 TABLET | Refills: 3 | Status: SHIPPED | OUTPATIENT
Start: 2025-08-04 | End: 2026-08-04

## 2025-08-04 RX ORDER — FINERENONE 20 MG/1
20 TABLET, FILM COATED ORAL DAILY
Qty: 30 TABLET | Refills: 1 | Status: SHIPPED | OUTPATIENT
Start: 2025-08-04

## 2025-08-04 RX ORDER — FLASH GLUCOSE SENSOR
KIT MISCELLANEOUS
Qty: 1 KIT | Refills: 6 | Status: SHIPPED | OUTPATIENT
Start: 2025-08-04
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
## 2025-08-15 ENCOUNTER — HOSPITAL ENCOUNTER (EMERGENCY)
Facility: HOSPITAL | Age: 65
Discharge: HOME | End: 2025-08-15
Attending: FAMILY MEDICINE
Payer: MEDICARE

## 2025-08-15 VITALS
HEART RATE: 56 BPM | TEMPERATURE: 97 F | OXYGEN SATURATION: 100 % | WEIGHT: 162 LBS | BODY MASS INDEX: 24.55 KG/M2 | HEIGHT: 68 IN | RESPIRATION RATE: 10 BRPM | SYSTOLIC BLOOD PRESSURE: 114 MMHG | DIASTOLIC BLOOD PRESSURE: 46 MMHG

## 2025-08-15 DIAGNOSIS — R55 SYNCOPE, UNSPECIFIED SYNCOPE TYPE: ICD-10-CM

## 2025-08-15 DIAGNOSIS — E86.0 DEHYDRATION: Primary | ICD-10-CM

## 2025-08-15 DIAGNOSIS — R53.1 WEAKNESS: ICD-10-CM

## 2025-08-15 DIAGNOSIS — N17.9 AKI (ACUTE KIDNEY INJURY): ICD-10-CM

## 2025-08-15 DIAGNOSIS — R40.20 LOSS OF CONSCIOUSNESS: ICD-10-CM

## 2025-08-15 LAB
ALBUMIN SERPL BCP-MCNC: 4.6 G/DL (ref 3.4–4.8)
ALBUMIN/GLOB SERPL: 1.1 {RATIO}
ALP SERPL-CCNC: 85 U/L (ref 40–150)
ALT SERPL W P-5'-P-CCNC: 14 U/L
ANION GAP SERPL CALCULATED.3IONS-SCNC: 12 MMOL/L (ref 7–16)
ANION GAP SERPL CALCULATED.3IONS-SCNC: 16 MMOL/L (ref 7–16)
APTT PPP: 24.6 SECONDS (ref 25.2–37.3)
AST SERPL W P-5'-P-CCNC: 25 U/L (ref 11–45)
BASOPHILS # BLD AUTO: 0.06 K/UL (ref 0–0.2)
BASOPHILS NFR BLD AUTO: 0.5 % (ref 0–1)
BILIRUB SERPL-MCNC: 0.4 MG/DL
BUN SERPL-MCNC: 36 MG/DL (ref 8–26)
BUN SERPL-MCNC: 38 MG/DL (ref 8–26)
BUN/CREAT SERPL: 15 (ref 6–20)
BUN/CREAT SERPL: 18 (ref 6–20)
CALCIUM SERPL-MCNC: 10.6 MG/DL (ref 8.8–10)
CALCIUM SERPL-MCNC: 9.2 MG/DL (ref 8.8–10)
CHLORIDE SERPL-SCNC: 104 MMOL/L (ref 98–107)
CHLORIDE SERPL-SCNC: 96 MMOL/L (ref 98–107)
CO2 SERPL-SCNC: 28 MMOL/L (ref 23–31)
CO2 SERPL-SCNC: 29 MMOL/L (ref 23–31)
CREAT SERPL-MCNC: 2.03 MG/DL (ref 0.72–1.25)
CREAT SERPL-MCNC: 2.47 MG/DL (ref 0.72–1.25)
DIFFERENTIAL METHOD BLD: ABNORMAL
EGFR (NO RACE VARIABLE) (RUSH/TITUS): 28 ML/MIN/1.73M2
EGFR (NO RACE VARIABLE) (RUSH/TITUS): 35 ML/MIN/1.73M2
EOSINOPHIL # BLD AUTO: 0.12 K/UL (ref 0–0.5)
EOSINOPHIL NFR BLD AUTO: 1 % (ref 1–4)
ERYTHROCYTE [DISTWIDTH] IN BLOOD BY AUTOMATED COUNT: 12.3 % (ref 11.5–14.5)
GLOBULIN SER-MCNC: 4.2 G/DL (ref 2–4)
GLUCOSE SERPL-MCNC: 187 MG/DL (ref 82–115)
GLUCOSE SERPL-MCNC: 327 MG/DL (ref 70–105)
GLUCOSE SERPL-MCNC: 331 MG/DL (ref 82–115)
HCT VFR BLD AUTO: 44.4 % (ref 40–54)
HGB BLD-MCNC: 15.3 G/DL (ref 13.5–18)
IMM GRANULOCYTES # BLD AUTO: 0.07 K/UL (ref 0–0.04)
IMM GRANULOCYTES NFR BLD: 0.6 % (ref 0–0.4)
INDIRECT COOMBS: NORMAL
INR BLD: 0.99
LYMPHOCYTES # BLD AUTO: 1.71 K/UL (ref 1–4.8)
LYMPHOCYTES NFR BLD AUTO: 14.3 % (ref 27–41)
MCH RBC QN AUTO: 32.5 PG (ref 27–31)
MCHC RBC AUTO-ENTMCNC: 34.5 G/DL (ref 32–36)
MCV RBC AUTO: 94.3 FL (ref 80–96)
MONOCYTES # BLD AUTO: 0.72 K/UL (ref 0–0.8)
MONOCYTES NFR BLD AUTO: 6 % (ref 2–6)
MPC BLD CALC-MCNC: 11 FL (ref 9.4–12.4)
NEUTROPHILS # BLD AUTO: 9.27 K/UL (ref 1.8–7.7)
NEUTROPHILS NFR BLD AUTO: 77.6 % (ref 53–65)
NRBC # BLD AUTO: 0 X10E3/UL
NRBC, AUTO (.00): 0 %
PLATELET # BLD AUTO: 212 K/UL (ref 150–400)
POTASSIUM SERPL-SCNC: 4.7 MMOL/L (ref 3.5–5.1)
POTASSIUM SERPL-SCNC: 5.2 MMOL/L (ref 3.5–5.1)
PROT SERPL-MCNC: 8.8 G/DL (ref 5.8–7.6)
PROTHROMBIN TIME: 13.2 SECONDS (ref 11.7–14.7)
RBC # BLD AUTO: 4.71 M/UL (ref 4.6–6.2)
RH BLD: NORMAL
SODIUM SERPL-SCNC: 136 MMOL/L (ref 136–145)
SODIUM SERPL-SCNC: 139 MMOL/L (ref 136–145)
SPECIMEN OUTDATE: NORMAL
WBC # BLD AUTO: 11.95 K/UL (ref 4.5–11)

## 2025-08-15 PROCEDURE — 86850 RBC ANTIBODY SCREEN: CPT | Performed by: FAMILY MEDICINE

## 2025-08-15 PROCEDURE — 36415 COLL VENOUS BLD VENIPUNCTURE: CPT

## 2025-08-15 PROCEDURE — 96375 TX/PRO/DX INJ NEW DRUG ADDON: CPT

## 2025-08-15 PROCEDURE — 82962 GLUCOSE BLOOD TEST: CPT

## 2025-08-15 PROCEDURE — 96361 HYDRATE IV INFUSION ADD-ON: CPT

## 2025-08-15 PROCEDURE — 99285 EMERGENCY DEPT VISIT HI MDM: CPT | Mod: 25

## 2025-08-15 PROCEDURE — 93005 ELECTROCARDIOGRAM TRACING: CPT

## 2025-08-15 PROCEDURE — 93010 ELECTROCARDIOGRAM REPORT: CPT | Mod: ,,, | Performed by: INTERNAL MEDICINE

## 2025-08-15 PROCEDURE — G0390 TRAUMA RESPONS W/HOSP CRITI: HCPCS | Mod: TRAUMA2

## 2025-08-15 PROCEDURE — 25000003 PHARM REV CODE 250: Performed by: FAMILY MEDICINE

## 2025-08-15 PROCEDURE — 36415 COLL VENOUS BLD VENIPUNCTURE: CPT | Performed by: FAMILY MEDICINE

## 2025-08-15 PROCEDURE — 96374 THER/PROPH/DIAG INJ IV PUSH: CPT

## 2025-08-15 PROCEDURE — 63600175 PHARM REV CODE 636 W HCPCS: Performed by: FAMILY MEDICINE

## 2025-08-15 PROCEDURE — 85610 PROTHROMBIN TIME: CPT | Performed by: FAMILY MEDICINE

## 2025-08-15 PROCEDURE — 80053 COMPREHEN METABOLIC PANEL: CPT | Performed by: FAMILY MEDICINE

## 2025-08-15 PROCEDURE — 85025 COMPLETE CBC W/AUTO DIFF WBC: CPT | Performed by: FAMILY MEDICINE

## 2025-08-15 PROCEDURE — 93010 ELECTROCARDIOGRAM REPORT: CPT | Mod: 76,,, | Performed by: INTERNAL MEDICINE

## 2025-08-15 RX ORDER — ONDANSETRON HYDROCHLORIDE 2 MG/ML
4 INJECTION, SOLUTION INTRAVENOUS
Status: COMPLETED | OUTPATIENT
Start: 2025-08-15 | End: 2025-08-15

## 2025-08-15 RX ORDER — MORPHINE SULFATE 4 MG/ML
4 INJECTION, SOLUTION INTRAMUSCULAR; INTRAVENOUS
Status: COMPLETED | OUTPATIENT
Start: 2025-08-15 | End: 2025-08-15

## 2025-08-15 RX ADMIN — ONDANSETRON 4 MG: 2 INJECTION INTRAMUSCULAR; INTRAVENOUS at 06:08

## 2025-08-15 RX ADMIN — SODIUM CHLORIDE 1000 ML: 9 INJECTION, SOLUTION INTRAVENOUS at 07:08

## 2025-08-15 RX ADMIN — MORPHINE SULFATE 4 MG: 4 INJECTION INTRAVENOUS at 06:08

## 2025-08-15 RX ADMIN — SODIUM CHLORIDE 1000 ML: 9 INJECTION, SOLUTION INTRAVENOUS at 05:08

## 2025-08-16 LAB
OHS QRS DURATION: 106 MS
OHS QTC CALCULATION: 429 MS

## 2025-08-17 LAB
OHS QRS DURATION: 108 MS
OHS QTC CALCULATION: 436 MS

## 2025-08-18 ENCOUNTER — TELEPHONE (OUTPATIENT)
Dept: FAMILY MEDICINE | Facility: CLINIC | Age: 65
End: 2025-08-18
Payer: MEDICARE

## 2025-08-18 ENCOUNTER — CLINICAL SUPPORT (OUTPATIENT)
Dept: FAMILY MEDICINE | Facility: CLINIC | Age: 65
End: 2025-08-18
Payer: MEDICARE

## 2025-08-18 DIAGNOSIS — Z71.9 COUNSELED BY NURSE: ICD-10-CM

## 2025-08-18 DIAGNOSIS — N17.9 AKI (ACUTE KIDNEY INJURY): Primary | ICD-10-CM

## 2025-08-19 ENCOUNTER — HOSPITAL ENCOUNTER (OUTPATIENT)
Facility: HOSPITAL | Age: 65
Discharge: HOME | End: 2025-08-19
Attending: PAIN MEDICINE | Admitting: PAIN MEDICINE
Payer: MEDICARE

## 2025-08-19 ENCOUNTER — TELEPHONE (OUTPATIENT)
Dept: FAMILY MEDICINE | Facility: CLINIC | Age: 65
End: 2025-08-19
Payer: MEDICARE

## 2025-08-19 ENCOUNTER — ANESTHESIA (OUTPATIENT)
Dept: PAIN MEDICINE | Facility: HOSPITAL | Age: 65
End: 2025-08-19
Payer: MEDICARE

## 2025-08-19 ENCOUNTER — ANESTHESIA EVENT (OUTPATIENT)
Dept: PAIN MEDICINE | Facility: HOSPITAL | Age: 65
End: 2025-08-19
Payer: MEDICARE

## 2025-08-19 VITALS
SYSTOLIC BLOOD PRESSURE: 175 MMHG | DIASTOLIC BLOOD PRESSURE: 75 MMHG | HEART RATE: 62 BPM | WEIGHT: 159.63 LBS | TEMPERATURE: 97 F | RESPIRATION RATE: 14 BRPM | OXYGEN SATURATION: 100 % | BODY MASS INDEX: 24.19 KG/M2 | HEIGHT: 68 IN

## 2025-08-19 DIAGNOSIS — M25.559 HIP PAIN: ICD-10-CM

## 2025-08-19 LAB
GLUCOSE SERPL-MCNC: 162 MG/DL (ref 70–105)
OHS QRS DURATION: 108 MS
OHS QTC CALCULATION: 462 MS

## 2025-08-19 PROCEDURE — 25000003 PHARM REV CODE 250: Performed by: NURSE ANESTHETIST, CERTIFIED REGISTERED

## 2025-08-19 PROCEDURE — 63600175 PHARM REV CODE 636 W HCPCS: Performed by: PAIN MEDICINE

## 2025-08-19 PROCEDURE — 37000008 HC ANESTHESIA 1ST 15 MINUTES: Performed by: PAIN MEDICINE

## 2025-08-19 PROCEDURE — 77002 NEEDLE LOCALIZATION BY XRAY: CPT | Mod: 26,,, | Performed by: PAIN MEDICINE

## 2025-08-19 PROCEDURE — 82962 GLUCOSE BLOOD TEST: CPT

## 2025-08-19 PROCEDURE — 20610 DRAIN/INJ JOINT/BURSA W/O US: CPT | Mod: LT,,, | Performed by: PAIN MEDICINE

## 2025-08-19 PROCEDURE — 37000009 HC ANESTHESIA EA ADD 15 MINS: Performed by: PAIN MEDICINE

## 2025-08-19 PROCEDURE — 27000284 HC CANNULA NASAL: Performed by: NURSE ANESTHETIST, CERTIFIED REGISTERED

## 2025-08-19 PROCEDURE — 25000003 PHARM REV CODE 250: Performed by: PAIN MEDICINE

## 2025-08-19 PROCEDURE — 63600175 PHARM REV CODE 636 W HCPCS: Performed by: NURSE ANESTHETIST, CERTIFIED REGISTERED

## 2025-08-19 PROCEDURE — 20610 DRAIN/INJ JOINT/BURSA W/O US: CPT | Mod: LT | Performed by: PAIN MEDICINE

## 2025-08-19 RX ORDER — SODIUM CHLORIDE 9 MG/ML
INJECTION, SOLUTION INTRAVENOUS CONTINUOUS
Status: DISCONTINUED | OUTPATIENT
Start: 2025-08-19 | End: 2025-08-19 | Stop reason: HOSPADM

## 2025-08-19 RX ORDER — ETOMIDATE 2 MG/ML
INJECTION INTRAVENOUS
Status: DISCONTINUED | OUTPATIENT
Start: 2025-08-19 | End: 2025-08-19

## 2025-08-19 RX ORDER — BUPIVACAINE HYDROCHLORIDE 2.5 MG/ML
INJECTION, SOLUTION INFILTRATION; PERINEURAL CODE/TRAUMA/SEDATION MEDICATION
Status: DISCONTINUED | OUTPATIENT
Start: 2025-08-19 | End: 2025-08-19 | Stop reason: HOSPADM

## 2025-08-19 RX ORDER — LIDOCAINE HYDROCHLORIDE 20 MG/ML
INJECTION, SOLUTION EPIDURAL; INFILTRATION; INTRACAUDAL; PERINEURAL
Status: DISCONTINUED | OUTPATIENT
Start: 2025-08-19 | End: 2025-08-19

## 2025-08-19 RX ORDER — TRIAMCINOLONE ACETONIDE 40 MG/ML
INJECTION, SUSPENSION INTRA-ARTICULAR; INTRAMUSCULAR CODE/TRAUMA/SEDATION MEDICATION
Status: DISCONTINUED | OUTPATIENT
Start: 2025-08-19 | End: 2025-08-19 | Stop reason: HOSPADM

## 2025-08-19 RX ORDER — ONDANSETRON HYDROCHLORIDE 2 MG/ML
INJECTION, SOLUTION INTRAVENOUS
Status: DISCONTINUED | OUTPATIENT
Start: 2025-08-19 | End: 2025-08-19

## 2025-08-19 RX ORDER — PROPOFOL 10 MG/ML
VIAL (ML) INTRAVENOUS
Status: DISCONTINUED | OUTPATIENT
Start: 2025-08-19 | End: 2025-08-19

## 2025-08-19 RX ADMIN — PROPOFOL 100 MG: 10 INJECTION, EMULSION INTRAVENOUS at 08:08

## 2025-08-19 RX ADMIN — ONDANSETRON 4 MG: 2 INJECTION INTRAMUSCULAR; INTRAVENOUS at 08:08

## 2025-08-19 RX ADMIN — PROPOFOL 40 MG: 10 INJECTION, EMULSION INTRAVENOUS at 08:08

## 2025-08-19 RX ADMIN — ETOMIDATE 10 MG: 2 INJECTION INTRAVENOUS at 08:08

## 2025-08-19 RX ADMIN — LIDOCAINE HYDROCHLORIDE 20 MG: 20 INJECTION, SOLUTION EPIDURAL; INFILTRATION; INTRACAUDAL; PERINEURAL at 08:08

## 2025-08-19 RX ADMIN — SODIUM CHLORIDE: 9 INJECTION, SOLUTION INTRAVENOUS at 08:08

## 2025-08-20 DIAGNOSIS — L97.513 ULCER OF RIGHT FOOT WITH NECROSIS OF MUSCLE: Chronic | ICD-10-CM

## 2025-08-20 DIAGNOSIS — I73.9 PERIPHERAL VASCULAR DISEASE, UNSPECIFIED: Chronic | ICD-10-CM

## 2025-08-20 DIAGNOSIS — G62.9 NEUROPATHY: Chronic | ICD-10-CM

## 2025-08-20 RX ORDER — HYDROCODONE BITARTRATE AND ACETAMINOPHEN 10; 325 MG/1; MG/1
1 TABLET ORAL EVERY 6 HOURS PRN
Qty: 120 TABLET | Refills: 0 | Status: SHIPPED | OUTPATIENT
Start: 2025-08-20

## 2025-09-02 DIAGNOSIS — J44.9 CHRONIC OBSTRUCTIVE PULMONARY DISEASE, UNSPECIFIED COPD TYPE: ICD-10-CM

## 2025-09-02 RX ORDER — ALBUTEROL SULFATE 90 UG/1
2 INHALANT RESPIRATORY (INHALATION) 2 TIMES DAILY PRN
Qty: 3.8 G | Refills: 3 | Status: SHIPPED | OUTPATIENT
Start: 2025-09-02

## (undated) DEVICE — KIT BASIC RUSH

## (undated) DEVICE — GLOVE 6.5 PROTEXIS PI BLUE

## (undated) DEVICE — GAUZE CURAD IODOFORM .25IN 5YD

## (undated) DEVICE — TOURNIQUET SB QC DP 24X4IN

## (undated) DEVICE — BANDAGE KERLIX AMD  4.5IN  SPONGE ROLL

## (undated) DEVICE — SET EXT STD BORE CATH 7.6IN

## (undated) DEVICE — BANDAGE ACE NON LATEX 3IN

## (undated) DEVICE — SOL NACL IRR 1000ML BTL

## (undated) DEVICE — GLOVE SENSICARE PI SURG 6

## (undated) DEVICE — GLOVE PROTEXIS PI SYN SURG 7

## (undated) DEVICE — GLOVE SENSICARE PI SURG 6.5

## (undated) DEVICE — SOL CONTINU-FLO SET 2 LAV

## (undated) DEVICE — GLOVE SURGICAL PROTEXIS PI SIZE 6

## (undated) DEVICE — SYRINGE 10-12CC LURE -LOK TIP

## (undated) DEVICE — SWAB AEROBIC CULTURETTE

## (undated) DEVICE — GLOVE PROTEXIS PI SYN SURG 8.0

## (undated) DEVICE — BANDAGE GAUZE COT STRL 4.5X4.1

## (undated) DEVICE — GLOVE SURGICAL PROTEXIS PI BLUE SIZE 6.0

## (undated) DEVICE — SPONGE COTTON TRAY 4X4IN

## (undated) DEVICE — APPLICATOR CHLORAPREP ORN 26ML

## (undated) DEVICE — SUT ETHILON 3-0 PS2 18 BLK

## (undated) DEVICE — NEEDLE ECLIPSE 22GX1 1/2 IN SAFETY

## (undated) DEVICE — DERM CURETTE 5MM DISP

## (undated) DEVICE — TRAY SKIN SCRUB WET PREMIUM

## (undated) DEVICE — GOWN NONREINF SET-IN SLV 2XL

## (undated) DEVICE — SUT 2-0 12-18IN SILK

## (undated) DEVICE — Device

## (undated) DEVICE — SPONGE COTTON WOVEN 4X4IN

## (undated) DEVICE — BANDAGE KERLIX AMD

## (undated) DEVICE — ELECTRODE BLADE INSULATED 1 IN

## (undated) DEVICE — PADDING CAST SYN STRL 4INX4YD

## (undated) DEVICE — COLLECTOR SPECIMEN ANAEROBIC

## (undated) DEVICE — BLADE SAW SAG DP 4.72X25.40X89

## (undated) DEVICE — SLIPPER FALL PREV YEL BARIAT

## (undated) DEVICE — TRAY SKIN PREP PROVIDONE IODINE STERILE LATEX FREE

## (undated) DEVICE — KIT IV START

## (undated) DEVICE — GLOVE PROTEXIS PI SYN SURG 6.0

## (undated) DEVICE — CURETTE DERMAL DISP 5MM

## (undated) DEVICE — SUT 2/0 36IN COATED VICRYL

## (undated) DEVICE — BNDG COFLEX FOAM LF2 ST 4X5YD

## (undated) DEVICE — GLOVE PROTEXIS PI SYN SURG 6.5

## (undated) DEVICE — BLADE BOVIE INSULATED COATED 2.75IN

## (undated) DEVICE — GLOVE BIOGEL 7.5

## (undated) DEVICE — SOL IRRIGATION SALINE 0.9% 1000ML BOTTLE

## (undated) DEVICE — SUT ETHILON 2-0 FS 18IN BLK

## (undated) DEVICE — CDS EXTREMITY

## (undated) DEVICE — STAPLER SKIN ROTATING HEAD

## (undated) DEVICE — SPONGE GAUZE 4X4 12 PLY STL AMD 10/TRAY

## (undated) DEVICE — GLOVE 6.0 PROTEXIS PI BLUE

## (undated) DEVICE — SYR 10CC LUER LOCK

## (undated) DEVICE — GLOVE SURGICAL PROTEXIS PI SIZE 7

## (undated) DEVICE — GLOVE 8 PROTEXIS PI BLUE

## (undated) DEVICE — SPONGE LAP XRAY STERILE 18X18

## (undated) DEVICE — GOWN POLY REINF BRTH SLV XL

## (undated) DEVICE — NDL SPINAL CLR HUB 22GX4 3/4

## (undated) DEVICE — GLOVE 8.5 PROTEXIS PI BLUE

## (undated) DEVICE — SPONGE DERMACEA 4X4IN 12PLY

## (undated) DEVICE — TRAY NERVE BLOCK UNIV 10/CA

## (undated) DEVICE — CATH INTROCAN SAF 2 IV 22GX1IN

## (undated) DEVICE — GLOVE SENSICARE PI GRN 6.5